# Patient Record
Sex: MALE | Race: WHITE | NOT HISPANIC OR LATINO | Employment: OTHER | URBAN - METROPOLITAN AREA
[De-identification: names, ages, dates, MRNs, and addresses within clinical notes are randomized per-mention and may not be internally consistent; named-entity substitution may affect disease eponyms.]

---

## 2021-04-08 DIAGNOSIS — Z23 ENCOUNTER FOR IMMUNIZATION: ICD-10-CM

## 2021-07-30 ENCOUNTER — HOSPITAL ENCOUNTER (OUTPATIENT)
Dept: CT IMAGING | Facility: HOSPITAL | Age: 71
Discharge: HOME | End: 2021-07-30

## 2021-07-30 ENCOUNTER — TELEPHONE (OUTPATIENT)
Dept: CT IMAGING | Facility: HOSPITAL | Age: 71
End: 2021-07-30

## 2021-07-30 DIAGNOSIS — R63.4 ABNORMAL WEIGHT LOSS: ICD-10-CM

## 2021-07-30 DIAGNOSIS — R06.00 DYSPNEA ON EXERTION: ICD-10-CM

## 2021-07-30 NOTE — TELEPHONE ENCOUNTER
On call physician called about ct script   [phone]  Script does not specify with or without iv and or oral contrast   TW

## 2021-07-31 ENCOUNTER — APPOINTMENT (EMERGENCY)
Dept: RADIOLOGY | Facility: HOSPITAL | Age: 71
DRG: 064 | End: 2021-07-31
Payer: MEDICARE

## 2021-07-31 ENCOUNTER — HOSPITAL ENCOUNTER (INPATIENT)
Facility: HOSPITAL | Age: 71
LOS: 8 days | DRG: 064 | End: 2021-08-08
Attending: EMERGENCY MEDICINE | Admitting: GENERAL PRACTICE
Payer: MEDICARE

## 2021-07-31 ENCOUNTER — APPOINTMENT (INPATIENT)
Dept: NON INVASIVE DIAGNOSTICS | Facility: HOSPITAL | Age: 71
DRG: 064 | End: 2021-07-31
Payer: MEDICARE

## 2021-07-31 ENCOUNTER — APPOINTMENT (INPATIENT)
Dept: RADIOLOGY | Facility: HOSPITAL | Age: 71
DRG: 064 | End: 2021-07-31
Payer: MEDICARE

## 2021-07-31 DIAGNOSIS — I63.9 CEREBROVASCULAR ACCIDENT (CVA), UNSPECIFIED MECHANISM (HCC): ICD-10-CM

## 2021-07-31 DIAGNOSIS — I82.4Z3 ACUTE DEEP VEIN THROMBOSIS (DVT) OF DISTAL VEIN OF BOTH LOWER EXTREMITIES (HCC): ICD-10-CM

## 2021-07-31 DIAGNOSIS — J39.2 NASOPHARYNGEAL MASS: ICD-10-CM

## 2021-07-31 DIAGNOSIS — R53.1 WEAKNESS DUE TO ACUTE STROKE (HCC): ICD-10-CM

## 2021-07-31 DIAGNOSIS — R91.8 LUNG MASS: ICD-10-CM

## 2021-07-31 DIAGNOSIS — R47.1 DYSARTHRIA: ICD-10-CM

## 2021-07-31 DIAGNOSIS — R77.8 ELEVATED TROPONIN: ICD-10-CM

## 2021-07-31 DIAGNOSIS — I63.9 ACUTE CVA (CEREBROVASCULAR ACCIDENT) (HCC): ICD-10-CM

## 2021-07-31 DIAGNOSIS — R29.810 FACIAL DROOP: ICD-10-CM

## 2021-07-31 DIAGNOSIS — I63.9 STROKE (HCC): Primary | ICD-10-CM

## 2021-07-31 DIAGNOSIS — I63.9 WEAKNESS DUE TO ACUTE STROKE (HCC): ICD-10-CM

## 2021-07-31 DIAGNOSIS — J96.01 ACUTE RESPIRATORY FAILURE WITH HYPOXIA (HCC): ICD-10-CM

## 2021-07-31 DIAGNOSIS — R91.8 MASS OF UPPER LOBE OF RIGHT LUNG: ICD-10-CM

## 2021-07-31 PROBLEM — R13.10 DYSPHAGIA: Status: ACTIVE | Noted: 2021-07-31

## 2021-07-31 PROBLEM — R79.89 ELEVATED TROPONIN: Status: ACTIVE | Noted: 2021-07-31

## 2021-07-31 LAB
ALBUMIN SERPL BCP-MCNC: 2.4 G/DL (ref 3.5–5)
ALP SERPL-CCNC: 56 U/L (ref 46–116)
ALT SERPL W P-5'-P-CCNC: 45 U/L (ref 12–78)
ANION GAP SERPL CALCULATED.3IONS-SCNC: 7 MMOL/L (ref 4–13)
APTT PPP: 29 SECONDS (ref 23–37)
AST SERPL W P-5'-P-CCNC: 34 U/L (ref 5–45)
BILIRUB DIRECT SERPL-MCNC: 0.12 MG/DL (ref 0–0.2)
BILIRUB SERPL-MCNC: 0.33 MG/DL (ref 0.2–1)
BUN SERPL-MCNC: 19 MG/DL (ref 5–25)
CALCIUM SERPL-MCNC: 9.2 MG/DL (ref 8.3–10.1)
CHLORIDE SERPL-SCNC: 106 MMOL/L (ref 100–108)
CO2 SERPL-SCNC: 25 MMOL/L (ref 21–32)
CREAT SERPL-MCNC: 1.05 MG/DL (ref 0.6–1.3)
ERYTHROCYTE [DISTWIDTH] IN BLOOD BY AUTOMATED COUNT: 15.7 % (ref 11.6–15.1)
GFR SERPL CREATININE-BSD FRML MDRD: 72 ML/MIN/1.73SQ M
GLUCOSE SERPL-MCNC: 100 MG/DL (ref 65–140)
GLUCOSE SERPL-MCNC: 102 MG/DL (ref 65–140)
HCT VFR BLD AUTO: 33.9 % (ref 36.5–49.3)
HGB BLD-MCNC: 10.3 G/DL (ref 12–17)
INR PPP: 1.28 (ref 0.84–1.19)
MCH RBC QN AUTO: 25.4 PG (ref 26.8–34.3)
MCHC RBC AUTO-ENTMCNC: 30.4 G/DL (ref 31.4–37.4)
MCV RBC AUTO: 84 FL (ref 82–98)
PLATELET # BLD AUTO: 245 THOUSANDS/UL (ref 149–390)
PMV BLD AUTO: 9.9 FL (ref 8.9–12.7)
POTASSIUM SERPL-SCNC: 4.2 MMOL/L (ref 3.5–5.3)
PROT SERPL-MCNC: 6.8 G/DL (ref 6.4–8.2)
PROTHROMBIN TIME: 16 SECONDS (ref 11.6–14.5)
RBC # BLD AUTO: 4.05 MILLION/UL (ref 3.88–5.62)
SARS-COV-2 RNA RESP QL NAA+PROBE: NEGATIVE
SODIUM SERPL-SCNC: 138 MMOL/L (ref 136–145)
TROPONIN I SERPL-MCNC: 6.9 NG/ML
TROPONIN I SERPL-MCNC: 7.54 NG/ML
WBC # BLD AUTO: 9.18 THOUSAND/UL (ref 4.31–10.16)

## 2021-07-31 PROCEDURE — 80076 HEPATIC FUNCTION PANEL: CPT | Performed by: GENERAL PRACTICE

## 2021-07-31 PROCEDURE — U0003 INFECTIOUS AGENT DETECTION BY NUCLEIC ACID (DNA OR RNA); SEVERE ACUTE RESPIRATORY SYNDROME CORONAVIRUS 2 (SARS-COV-2) (CORONAVIRUS DISEASE [COVID-19]), AMPLIFIED PROBE TECHNIQUE, MAKING USE OF HIGH THROUGHPUT TECHNOLOGIES AS DESCRIBED BY CMS-2020-01-R: HCPCS | Performed by: EMERGENCY MEDICINE

## 2021-07-31 PROCEDURE — U0005 INFEC AGEN DETEC AMPLI PROBE: HCPCS | Performed by: EMERGENCY MEDICINE

## 2021-07-31 PROCEDURE — 84484 ASSAY OF TROPONIN QUANT: CPT | Performed by: GENERAL PRACTICE

## 2021-07-31 PROCEDURE — 93970 EXTREMITY STUDY: CPT

## 2021-07-31 PROCEDURE — 36415 COLL VENOUS BLD VENIPUNCTURE: CPT | Performed by: EMERGENCY MEDICINE

## 2021-07-31 PROCEDURE — G1004 CDSM NDSC: HCPCS

## 2021-07-31 PROCEDURE — 93970 EXTREMITY STUDY: CPT | Performed by: SURGERY

## 2021-07-31 PROCEDURE — 1123F ACP DISCUSS/DSCN MKR DOCD: CPT | Performed by: PSYCHIATRY & NEUROLOGY

## 2021-07-31 PROCEDURE — 85610 PROTHROMBIN TIME: CPT | Performed by: EMERGENCY MEDICINE

## 2021-07-31 PROCEDURE — 99285 EMERGENCY DEPT VISIT HI MDM: CPT

## 2021-07-31 PROCEDURE — 82948 REAGENT STRIP/BLOOD GLUCOSE: CPT

## 2021-07-31 PROCEDURE — 99223 1ST HOSP IP/OBS HIGH 75: CPT | Performed by: GENERAL PRACTICE

## 2021-07-31 PROCEDURE — 84484 ASSAY OF TROPONIN QUANT: CPT | Performed by: EMERGENCY MEDICINE

## 2021-07-31 PROCEDURE — 71250 CT THORAX DX C-: CPT

## 2021-07-31 PROCEDURE — 99285 EMERGENCY DEPT VISIT HI MDM: CPT | Performed by: PSYCHIATRY & NEUROLOGY

## 2021-07-31 PROCEDURE — 74176 CT ABD & PELVIS W/O CONTRAST: CPT

## 2021-07-31 PROCEDURE — 80048 BASIC METABOLIC PNL TOTAL CA: CPT | Performed by: EMERGENCY MEDICINE

## 2021-07-31 PROCEDURE — 85027 COMPLETE CBC AUTOMATED: CPT | Performed by: EMERGENCY MEDICINE

## 2021-07-31 PROCEDURE — 70498 CT ANGIOGRAPHY NECK: CPT

## 2021-07-31 PROCEDURE — 93005 ELECTROCARDIOGRAM TRACING: CPT

## 2021-07-31 PROCEDURE — 70496 CT ANGIOGRAPHY HEAD: CPT

## 2021-07-31 PROCEDURE — 99291 CRITICAL CARE FIRST HOUR: CPT | Performed by: EMERGENCY MEDICINE

## 2021-07-31 PROCEDURE — 85730 THROMBOPLASTIN TIME PARTIAL: CPT | Performed by: EMERGENCY MEDICINE

## 2021-07-31 RX ORDER — ASPIRIN 325 MG
325 TABLET ORAL ONCE
Status: DISCONTINUED | OUTPATIENT
Start: 2021-07-31 | End: 2021-07-31

## 2021-07-31 RX ORDER — DIPHENOXYLATE HYDROCHLORIDE AND ATROPINE SULFATE 2.5; .025 MG/1; MG/1
1 TABLET ORAL DAILY
COMMUNITY
End: 2021-08-17 | Stop reason: HOSPADM

## 2021-07-31 RX ORDER — ASPIRIN 300 MG/1
300 SUPPOSITORY RECTAL ONCE
Status: COMPLETED | OUTPATIENT
Start: 2021-07-31 | End: 2021-07-31

## 2021-07-31 RX ORDER — ASPIRIN 300 MG/1
300 SUPPOSITORY RECTAL DAILY
Status: DISCONTINUED | OUTPATIENT
Start: 2021-08-01 | End: 2021-08-01

## 2021-07-31 RX ORDER — HEPARIN SODIUM 10000 [USP'U]/100ML
3-20 INJECTION, SOLUTION INTRAVENOUS
Status: DISCONTINUED | OUTPATIENT
Start: 2021-07-31 | End: 2021-08-02

## 2021-07-31 RX ORDER — ACETAMINOPHEN 650 MG/1
650 SUPPOSITORY RECTAL EVERY 6 HOURS PRN
Status: DISCONTINUED | OUTPATIENT
Start: 2021-07-31 | End: 2021-08-06

## 2021-07-31 RX ADMIN — ASPIRIN 300 MG: 300 SUPPOSITORY RECTAL at 15:17

## 2021-07-31 RX ADMIN — HEPARIN SODIUM 11.8 UNITS/KG/HR: 10000 INJECTION, SOLUTION INTRAVENOUS at 17:28

## 2021-07-31 NOTE — ASSESSMENT & PLAN NOTE
· Neuro appreciated  · Failed swallow eval so NPO  · Rectal ASA  · CVA pathway  · MRI-B  · Echo  · Defer statin as NPO

## 2021-07-31 NOTE — ASSESSMENT & PLAN NOTE
Bernard Boyd is a 72 y o  male with recent abnormal weight loss without documented medical comorbidities who presented to [place] on 07/31/2021 as a stroke alert with right hemiparesis and dysarthria  · NIHSS of 9, /87 on arrival  CT head revealed recent acute to subacute infarct located in the left caudate head, anterior lentiform nucleus, anterior limb of the left internal capsule  CTA head and neck unremarkable for large vessel occlusion or critical stenosis, however did reveal a right upper lobe lung mass and mediastinal adenopathy along with nasopharyngeal mass  Patient was not an IV tPA candidate due to infarct appearing acute to subacute, high bleed risk  · MRI brain w/wo contrast revealed multifocal diffusion abnormalities in several separate vascular territories, largest in the left MCA territory indicative of multifocal acute/recent infarctions most likely from a central embolic source  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  Both benign and malignant etiologies are in the differential diagnosis  No abnormal intraparenchymal enhancement    Strong suspicion for underlying malignancy  Acute infarct likely secondary to hypercoagulable state  Will continue on stroke pathway    Plan:  - ENT and pulmonology are planning on biopsy of lung mass and nasopharyngeal mass  - S/p rectal  mg x 1 on admission   - Heparin gtt d/c'd  - Started Lovenox on 8/3/2021; continue indefinitely  - No need for ASA 81 mg daily from a neurological perspective while pt is on Lovenox  - Continue atorvastatin 40 mg daily  - Goal normotension, euglycemia, normothermia  - Continue telemetry  - Frequent neuro checks  - Stroke education  - PT/OT/speech  - Medical management and supportive care per primary team, notify with changes  - No further inpatient neuro recommendations  Please call with questions      Imaging/Labs:  - CT head 07/31/2021:  · Recent infarct located in the left caudate head, anterior lentiform nucleus, anterior limb of the left internal capsule without evidence of hemorrhage  - CTA head and neck 07/31/2021:  · No evidence of large vessel occlusion  · Atherosclerotic calcification of the carotid bifurcations bilaterally without significant stenosis  · No focal intracranial stenosis or aneurysm noted  · Mass right nasopharynx incompletely evaluated secondary to arterial bolus times, mass measuring 2 7 x 2 0 cm  · Right upper lobe lung mass and mediastinal adenopathy noted small right pleural effusion  - CT CAP:  · There is moderate emphysema  · There is a 6 4 x 3 6 x 3 4 cm solid, irregular contoured right upper lobe pulmonary nodule, likely primary lung cancer (series 2 image 17 )  · There is extensive right hilar and mediastinal adenopathy  · Small right pleural effusion  · There are also numerous too small to characterize hepatic hypodense lesions  Consider abdomen MR with and without IV contrast to characterize   - MRI brain:  · Multifocal diffusion abnormalities in several separate vascular territories, largest in the left MCA territory indicative of multifocal acute/recent infarctions most likely from a central embolic source  Further clinical assessment advised  · 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

## 2021-07-31 NOTE — ASSESSMENT & PLAN NOTE
· Requiring 2L to keep O2 above 89  · Would like to do CTA chest but pt just got contrast load    Plus in setting of CVA poor candidate for lysis   · LEVDs confirm b/l DVTs so will assume pt has PE  · Check CT CAP w/o

## 2021-07-31 NOTE — Clinical Note
Case was discussed with Dr Parag Mejia and the patient's admission status was agreed to be Admission Status: inpatient status to the service of Dr Berkowitz Fruits

## 2021-07-31 NOTE — ASSESSMENT & PLAN NOTE
· Heparin gtt  · In setting of hypoxia will presume pt has PE  · F/u echo to check for R heart strain

## 2021-07-31 NOTE — CONSULTS
Consultation - Stroke   Aliyah Fried 72 y o  male MRN: [id number]  Unit/Bed#: CRB Encounter: [id number]      Assessment/Plan   * Acute CVA (cerebrovascular accident) [facility])  Assessment & Plan  Aliyah Fried is a 72 y o  male with recent abnormal weight loss without documented past medical history who presents to [place] [facility] on 07/31/2021 as a stroke alert with right hemiparesis and dysarthria  NIHSS of 9  CT head revealed recent acute to subacute infarct located in the left caudate head, anterior lentiform nucleus, anterior limb of the left internal capsule  CTA head and neck unremarkable for large vessel occlusion or critical stenosis, however did reveal a right upper lobe lung mass and mediastinal adenopathy  Patient was not an IV tPA candidate due to infarct appearing acute to subacute, high bleed risk  Strong suspicion for underlying malignancy  Will admit on stroke pathway      Plan:  - Admitted on stroke pathway  - Recommend MRI brain  - Echo pending  - VAS lower limb venous duplex study pending  - Recommend:  Fasting lipid panel, hemoglobin A1c, TSH  - S/p rectal  mg x 1  - Started on low-dose heparin given concern for MI vs PE  - Recommend atorvastatin 40 mg daily  - Telemetry  - Frequent neuro checks  - Stroke education  - PT/OT/speech  - Medical management and supportive care per primary team, notify with changes    Imaging/Labs:  - CT head 07/31/2021:  · Recent infarct located in the left caudate head, anterior lentiform nucleus, anterior limb of the left internal capsule without evidence of hemorrhage  - CTA head and neck 07/31/2021:  · No evidence of large vessel occlusion  · Atherosclerotic calcification of the carotid bifurcations bilaterally without significant stenosis  · No focal intracranial stenosis or aneurysm noted  · Mass right nasopharynx incompletely evaluated secondary to arterial bolus times, mass measuring 2 7 x 2 0 cm  · Right upper lobe lung mass and mediastinal adenopathy noted small right pleural effusion    - Troponin elevated on presentation 6 90    Recommendations for outpatient neurological follow up have yet to be determined  History of Present Illness     Reason for Consult / Principal Problem: Right hemiparesis   Hx and PE limited by: N/A  Patient last known well: 1315 on 07/31/2021  Stroke alert called: 1423 on 07/31/2021  Neurology time of arrival: 1423  HPI: Valarie Chapman is a 72 y o   male with recent abnormal weight loss without documented past medical history who presents to [facility] on 07/31/2021 as a stroke alert with right hemiparesis and dysarthria  Per discussed with the staff, approximately 1 hour prior to arrival patient acutely developed right-sided weakness and slurred speech  Patient reports that he developed the symptoms suddenly early in the afternoon  Patient states that he has never had similar symptoms in the past   He admits to smoking in the past, reportedly 60+ years ago  Patient states that has a lazy eye at baseline  Of note patient has been experiencing abnormal weight  This was in the process of being worked up with a scheduled CT CAP for 08/01  Patient presented to [facility] on 07/31/2021 as a stroke alert with right hemiparesis and dysarthria  NIHSS of 9 for right upper lower extremity weakness and ataxia, right facial weakness, dysarthria, and word-finding difficulties  CT head revealed evidence of a recent infarct in the left caudate head, anterior lentiform nucleus, anterior limb of the left internal capsule  Patient was not an IV tPA candidate due to the chronicity of the infarct on CT head, appearing acute to subacute per neurologist Gonzalo Ludwig  CTA unremarkable for large vessel occlusion, however did reveal a right upper lobe lung mass and mediastinal adenopathy  Troponin elevated on presentation 6 90  With concern for MI vs PE patient was started on low-dose heparin    Patient was then admitted on stroke pathway  Inpatient consult to Neurology  Consult performed by: Sonu Hernandez PA-C  Consult ordered by: Curry Rogers MD        Review of Systems  12 point ROS limited to acuity of condition  Historical Information   History reviewed  No pertinent past medical history  History reviewed  No pertinent surgical history  Social History   Social History     Substance and Sexual Activity   Alcohol Use Yes     Social History     Substance and Sexual Activity   Drug Use Never     E-Cigarette/Vaping    E-Cigarette Use Never User      E-Cigarette/Vaping Substances     Social History     Tobacco Use   Smoking Status Former Smoker   Smokeless Tobacco Never Used     Family History: History reviewed  No pertinent family history  Review of previous medical records was completed  Meds/Allergies   all current active meds have been reviewed, current meds:   Current Facility-Administered Medications   Medication Dose Route Frequency    heparin (porcine) 25,000 units in 0 45% NaCl 250 mL infusion (premix)  3-20 Units/kg/hr (Order-Specific) Intravenous Titrated    iohexol (OMNIPAQUE) 350 MG/ML injection (SINGLE-DOSE) 85 mL  85 mL Intravenous Once in imaging   , PTA meds:   None    and     No Known Allergies    Objective   Vitals:Blood pressure 150/82, pulse 76, temperature 98 1 °F (36 7 °C), temperature source Oral, resp  rate 20, weight 86 6 kg (190 lb 14 7 oz), SpO2 92 %  ,There is no height or weight on file to calculate BMI  No intake or output data in the 24 hours ending 07/31/21 1618    Invasive Devices: Invasive Devices     Peripheral Intravenous Line            Peripheral IV 07/31/21 Right Antecubital <1 day              Physical Exam  Vitals and nursing note reviewed  Constitutional:       General: He is not in acute distress  Appearance: He is normal weight  He is not ill-appearing, toxic-appearing or diaphoretic  HENT:      Head: Normocephalic and atraumatic     Eyes:      General: No scleral icterus  Right eye: No discharge  Left eye: No discharge  Extraocular Movements: Extraocular movements intact and EOM normal       Conjunctiva/sclera: Conjunctivae normal    Pulmonary:      Comments: Positive cough on exam  Musculoskeletal:      Cervical back: Normal range of motion and neck supple  Skin:     General: Skin is warm and dry  Coloration: Skin is not jaundiced or pale  Findings: No bruising, erythema, lesion or rash  Neurological:      Mental Status: He is alert  Psychiatric:         Mood and Affect: Mood normal          Behavior: Behavior normal          Thought Content: Thought content normal          Judgment: Judgment normal        Neurologic Exam     Mental Status   Patient is alert, lying in bed  Oriented to person and place  Dysarthric speech noted  Patient has difficulty naming some objects on the NIHSS booklet  Able to follow central and appendicular commands and answers all questions appropriately  Cranial Nerves     CN II   Visual fields full to confrontation       CN III, IV, VI   Extraocular motions are normal    Nystagmus: none   Upgaze: normal  Downgaze: normal  Conjugate gaze: present    CN VIII   Hearing: intact  Dysconjugate gaze at baseline, right eye appears to be slightly deviated laterally  Right lower facial weakness noted     Motor Exam   Muscle bulk: normal  Overall muscle tone: normal  Drift noted in right upper and lower extremity, both upper and lower extremity on the right to not hit the bed  No drift noted in left upper and lower extremity     Sensory Exam   Sensation to light touch intact in bilateral upper lower extremities  No evidence of extinction or neglect on exam     Gait, Coordination, and Reflexes     Tremor   Resting tremor: absent  Ataxia noted in right upper extremity finger-to-nose testing, left WNL  Ataxia noted in right lower extremity heel-to-shin testing, left WNL  No involuntary movements noted throughout exam NIHSS:  1a Level of Consciousness: 0 = Alert   1b  LOC Questions: 0 = Answers both correctly   1c  LOC Commands: 0 = Obeys both correctly   2  Best Gaze: 0 = Normal   3  Visual: 0 = No visual field loss   4  Facial Palsy: 2=Partial paralysis (total or near total paralysis of the lower face)   5a  Motor Right Arm: 1=Drift, limb holds 90 (or 45) degrees but drifts down before full 10 seconds: does not hit bed   5b  Motor Left Arm: 0=No drift, limb holds 90 (or 45) degrees for full 10 seconds   6a  Motor Right Le=Drift, limb holds 90 (or 45) degrees but drifts down before full 10 seconds: does not hit bed   6b  Motor Left Le=No drift, limb holds 90 (or 45) degrees for full 10 seconds   7  Limb Ataxia:  2=Present in two limbs   8  Sensory: 0=Normal; no sensory loss   9  Best Language:  1=Mild to moderate aphasia; some obvious loss of fluency or facility of comprehension without significant limitation on ideas expressed or form of expression  10  Dysarthria: 2=Severe; patient speech is so slurred as to be unintelligible in the absence of or our of proportion to any dysphagia, or is mute/anarthric   11  Extinction and Inattention (formerly Neglect): 0=No abnormality   Total Score: 9     Time NIHSS was completed: 1445    Modified [place] Score:  Unable to determine currently, will gather additional data    Lab Results: I have personally reviewed pertinent reports    Recent Results (from the past 24 hour(s))   Fingerstick Glucose (POCT)    Collection Time: 21  2:30 PM   Result Value Ref Range    POC Glucose 100 65 - 140 mg/dl   Basic metabolic panel    Collection Time: 21  2:48 PM   Result Value Ref Range    Sodium 138 136 - 145 mmol/L    Potassium 4 2 3 5 - 5 3 mmol/L    Chloride 106 100 - 108 mmol/L    CO2 25 21 - 32 mmol/L    ANION GAP 7 4 - 13 mmol/L    BUN 19 5 - 25 mg/dL    Creatinine 1 05 0 60 - 1 30 mg/dL    Glucose 102 65 - 140 mg/dL    Calcium 9 2 8 3 - 10 1 mg/dL    eGFR 72 ml/min/1 73sq m CBC and Platelet    Collection Time: 07/31/21  2:48 PM   Result Value Ref Range    WBC 9 18 4 31 - 10 16 Thousand/uL    RBC 4 05 3 88 - 5 62 Million/uL    Hemoglobin 10 3 (L) 12 0 - 17 0 g/dL    Hematocrit 33 9 (L) 36 5 - 49 3 %    MCV 84 82 - 98 fL    MCH 25 4 (L) 26 8 - 34 3 pg    MCHC 30 4 (L) 31 4 - 37 4 g/dL    RDW 15 7 (H) 11 6 - 15 1 %    Platelets 493 699 - 382 Thousands/uL    MPV 9 9 8 9 - 12 7 fL   Protime-INR    Collection Time: 07/31/21  2:48 PM   Result Value Ref Range    Protime 16 0 (H) 11 6 - 14 5 seconds    INR 1 28 (H) 0 84 - 1 19   APTT    Collection Time: 07/31/21  2:48 PM   Result Value Ref Range    PTT 29 23 - 37 seconds   Troponin I    Collection Time: 07/31/21  2:48 PM   Result Value Ref Range    Troponin I 6 90 (H) <=0 04 ng/mL   Hepatic function panel    Collection Time: 07/31/21  2:48 PM   Result Value Ref Range    Total Bilirubin 0 33 0 20 - 1 00 mg/dL    Bilirubin, Direct 0 12 0 00 - 0 20 mg/dL    Alkaline Phosphatase 56 46 - 116 U/L    AST 34 5 - 45 U/L    ALT 45 12 - 78 U/L    Total Protein 6 8 6 4 - 8 2 g/dL    Albumin 2 4 (L) 3 5 - 5 0 g/dL   ]  Imaging Studies: I have personally reviewed pertinent reports and I have personally reviewed pertinent films in PACS  EKG, Pathology, and Other Studies: I have personally reviewed pertinent reports  VTE Prophylaxis: Heparin     Counseling / Coordination of Care  Total time spent today 46 minutes critical care time  Greater than 50% of total time was spent with the patient and/or family counseling and/or coordination of care  A description of the counseling/coordination of care:  Patient was seen and evaluated  Discussed with attending  Chart reviewed thoroughly including laboratory and imaging studies    Plan of care discussed with patient and primary team   Discussed plan to admit on the stroke pathway with patient

## 2021-07-31 NOTE — ASSESSMENT & PLAN NOTE
· Trend trops  · Echo  · Rectal ASA  · D/w neuro - ok for heparin gtt w/ no boluses or reboluses  · Cardio consult  · F/u FLP and A1C

## 2021-07-31 NOTE — ED PROVIDER NOTES
History  Chief Complaint   Patient presents with   [address]     Pt started with R sided weakness around 1300     Pt is a 71 y/o M unknown PMH presenting as a pre-hospital stroke alert  LKN was approx 1PM  Pt presented with dysarthria, R facial droop, R UE and R LE weakness  Pt is able to speak although speech is slurred  States otherwise feeling well, does not recall feeling unwell until symptoms started around 1PM  Per chart review, patient has CT chest/abdomen/pelvis scheduled for tomorrow for dyspnea on exertion and weight loss  None       History reviewed  No pertinent past medical history  History reviewed  No pertinent surgical history  History reviewed  No pertinent family history  I have reviewed and agree with the history as documented  E-Cigarette/Vaping    E-Cigarette Use Never User      E-Cigarette/Vaping Substances     Social History     Tobacco Use    Smoking status: Former Smoker    Smokeless tobacco: Never Used   Vaping Use    Vaping Use: Never used   Substance Use Topics    Alcohol use: Yes    Drug use: Never        Review of Systems   Unable to perform ROS: Acuity of condition       Physical Exam  ED Triage Vitals [07/31/21 1430]   Temperature Pulse Respirations Blood Pressure SpO2   98 1 °F (36 7 °C) 79 18 152/87 92 %      Temp Source Heart Rate Source Patient Position - Orthostatic VS BP Location FiO2 (%)   Oral Monitor Lying -- --      Pain Score       --             Orthostatic Vital Signs  Vitals:    07/31/21 1500 07/31/21 1515 07/31/21 1530 07/31/21 1600   BP: 143/94 138/93 150/82 149/82   Pulse: 80 80 76 74   Patient Position - Orthostatic VS: Lying Lying Lying Lying       Physical Exam  Vitals and nursing note reviewed  Constitutional:       Appearance: He is not toxic-appearing  HENT:      Head: Normocephalic and atraumatic  Right Ear: External ear normal       Left Ear: External ear normal    Eyes:      General: No visual field deficit  Extraocular Movements: Extraocular movements intact  Pupils: Pupils are equal, round, and reactive to light  Cardiovascular:      Rate and Rhythm: Normal rate and regular rhythm  Pulses: Normal pulses  Heart sounds: Normal heart sounds  Pulmonary:      Effort: Pulmonary effort is normal    Abdominal:      General: Abdomen is flat  Palpations: Abdomen is soft  Musculoskeletal:      Cervical back: Normal range of motion  Skin:     General: Skin is warm and dry  Neurological:      Mental Status: He is alert and oriented to person, place, and time  Cranial Nerves: Dysarthria and facial asymmetry present  Sensory: Sensation is intact  Motor: Weakness present  Coordination: Finger-Nose-Finger Test abnormal and Heel to Revonda Lauth Test abnormal       Comments: R sided deficits: facial droop, RUE weakness 3/5, RLE weakness 4/5, dysarthria   NIHSS = 9   Psychiatric:         Mood and Affect: Mood normal          ED Medications  Medications   iohexol (OMNIPAQUE) 350 MG/ML injection (SINGLE-DOSE) 85 mL (has no administration in time range)   heparin (porcine) 25,000 units in 0 45% NaCl 250 mL infusion (premix) (has no administration in time range)   aspirin rectal suppository 300 mg (300 mg Rectal Given 7/31/21 1517)       Diagnostic Studies  Results Reviewed     Procedure Component Value Units Date/Time    Novel Coronavirus (Covid-19),PCR [facility] - 2 hour stat [[id number]]  (Normal) Collected: 07/31/21 1450    Lab Status: Final result Specimen: Nares from Nose Updated: 07/31/21 1621     SARS-CoV-2 Negative    Narrative:           APTT six (6) hours after Heparin bolus/drip initiation or dosing change [[id number]]     Lab Status: No result Specimen: Blood     Hepatic function panel [[id number]]  (Abnormal) Collected: 07/31/21 1448    Lab Status: Final result Specimen: Blood from Arm, Right Updated: 07/31/21 1531     Total Bilirubin 0 33 mg/dL      Bilirubin, Direct 0 12 mg/dL      Alkaline Phosphatase 56 U/L      AST 34 U/L      ALT 45 U/L      Total Protein 6 8 g/dL      Albumin 2 4 g/dL     Troponin I [[id number]]  (Abnormal) Collected: 07/31/21 1448    Lab Status: Final result Specimen: Blood from Arm, Right Updated: 07/31/21 1526     Troponin I 6 90 ng/mL     Basic metabolic panel [[id number]] Collected: 07/31/21 1448    Lab Status: Final result Specimen: Blood from Arm, Right Updated: 07/31/21 1521     Sodium 138 mmol/L      Potassium 4 2 mmol/L      Chloride 106 mmol/L      CO2 25 mmol/L      ANION GAP 7 mmol/L      BUN 19 mg/dL      Creatinine 1 05 mg/dL      Glucose 102 mg/dL      Calcium 9 2 mg/dL      eGFR 72 ml/min/1 73sq m     Narrative:      [place] guidelines for Chronic Kidney Disease (CKD):     Stage 1 with normal or high GFR (GFR > 90 mL/min/1 73 square meters)    Stage 2 Mild CKD (GFR = 60-89 mL/min/1 73 square meters)    Stage 3A Moderate CKD (GFR = 45-59 mL/min/1 73 square meters)    Stage 3B Moderate CKD (GFR = 30-44 mL/min/1 73 square meters)    Stage 4 Severe CKD (GFR = 15-29 mL/min/1 73 square meters)    Stage 5 End Stage CKD (GFR <15 mL/min/1 73 square meters)  Note: GFR calculation is accurate only with a steady state creatinine    Protime-INR [[id number]]  (Abnormal) Collected: 07/31/21 1448    Lab Status: Final result Specimen: Blood from Arm, Right Updated: 07/31/21 1513     Protime 16 0 seconds      INR 1 28    APTT [[id number]]  (Normal) Collected: 07/31/21 1448    Lab Status: Final result Specimen: Blood from Arm, Right Updated: 07/31/21 1513     PTT 29 seconds     CBC and Platelet [[id number]]  (Abnormal) Collected: 07/31/21 1448    Lab Status: Final result Specimen: Blood from Arm, Right Updated: 07/31/21 1459     WBC 9 18 Thousand/uL      RBC 4 05 Million/uL      Hemoglobin 10 3 g/dL      Hematocrit 33 9 %      MCV 84 fL      MCH 25 4 pg      MCHC 30 4 g/dL      RDW 15 7 %      Platelets 586 Thousands/uL      MPV 9 9 fL     Fingerstick Glucose (POCT) [[id number]]  (Normal) Collected: 07/31/21 1430    Lab Status: Final result Updated: 07/31/21 1431     POC Glucose 100 mg/dl                  CTA stroke alert (head/neck)   Final Result by Aicha Lee DO (07/31 1455)      No large vessel occlusion  Atherosclerotic calcification of the carotid bifurcation bilaterally with no significant stenosis  Bilateral vertebral arteries widely patent  No focal intracranial stenosis or aneurysm  Mass right nasopharynx incompletely evaluated secondary to arterial bolus timing  The mass measures approximately 2 7 x 2 0 cm and direct visualization by ENT recommended  Right upper lobe lung mass and mediastinal adenopathy noted small right pleural effusion               Findings were directly discussed with Yovany Fair on 7/31/2021 2:51 PM                      Workstation performed: [id number]         CT stroke alert brain   Final Result by Aicha Lee DO (07/31 1455)      Recent infarct left caudate head, anterior lentiform nucleus, and anterior limb of left internal capsule without evidence of hemorrhage  Findings were directly discussed with Yovany Fair on 7/31/2021 2:51 PM       Workstation performed: [id number]         CT chest abdomen pelvis wo contrast    (Results Pending)   VAS lower limb venous duplex study, complete bilateral    (Results Pending)         Procedures  Procedures      ED Course  ED Course as of Jul 31 1635   Sat Jul 31, 2021   1501 CBC and Platelet(!)   6394 CT stroke alert brain   1501 CTA stroke alert (head/neck)   1501 Blood Pressure: 149/88   1501 SpO2: 93 %   1516 Protime-INR(!)   1516 APTT   1631 Hepatic function panel(!)   1631 Troponin I(!)               Identification of Seniors at Risk      Most Recent Value   (ISAR) Identification of Seniors at Risk   Before the illness or injury that brought you to the Emergency, did you need someone to help you on a regular basis?   0 Filed at: 07/31/2021 1453   In the last 24 hours, have you needed more help than usual?  0 Filed at: 07/31/2021 1453   Have you been hospitalized for one or more nights during the past 6 months? 0 Filed at: 07/31/2021 1453   In general, do you see well?  0 Filed at: 07/31/2021 1453   In general, do you have serious problems with your memory? 0 Filed at: 07/31/2021 1453   Do you take more than three different medications every day?  0 Filed at: 07/31/2021 1453   ISAR Score  0 Filed at: 07/31/2021 1453                              MDM  Number of Diagnoses or Management Options  Cerebrovascular accident (CVA), unspecified mechanism ([facility] )  Dysarthria  Facial droop  Weakness due to acute stroke [facility])  Diagnosis management comments: Pre-hospital stroke alert, pt stable on arrival, sent straight to CT  CT non con evidence of stroke in L basal ganglia, likely >6hrs old/outside tPA window, CTA unrevealing  Neurology present for recs and evaluation  Discussed case with SLIM, agreed to admit to floor on tele          Amount and/or Complexity of Data Reviewed  Clinical lab tests: ordered  Tests in the radiology section of CPT®: ordered  Tests in the medicine section of CPT®: ordered  Discussion of test results with the performing providers: yes  Decide to obtain previous medical records or to obtain history from someone other than the patient: yes  Obtain history from someone other than the patient: yes  Review and summarize past medical records: yes  Discuss the patient with other providers: yes  Independent visualization of images, tracings, or specimens: yes    Risk of Complications, Morbidity, and/or Mortality  Presenting problems: high  Diagnostic procedures: low  Management options: minimal    Patient Progress  Patient progress: stable      Disposition  Final diagnoses:   Cerebrovascular accident (CVA), unspecified mechanism ([facility] )   Facial droop   Dysarthria   Weakness due to acute stroke [facility])     Time reflects when diagnosis was documented in both MDM as applicable and the Disposition within this note     Time User Action Codes Description Comment    7/31/2021  2:26 PM Wynelle Sida Add [I63 9] Stroke [facility])     7/31/2021  3:54 PM Berneda Nailer Add [J39 2] Nasopharyngeal mass     7/31/2021  3:55 PM Berneda Nailer Add [R77 8] Elevated troponin     7/31/2021  4:27 PM Cornelious Crate Add [I63 9] Cerebrovascular accident (CVA), unspecified mechanism ([address] )     7/31/2021  4:27 PM Cornelious Crate Add [R29 810] Facial droop     7/31/2021  4:27 PM Cornelious Crate Add [R47 1] Dysarthria     7/31/2021  4:27 PM Cornelious Crate Add [I63 9,  R53 1] Weakness due to acute stroke [facility])       ED Disposition     ED Disposition Condition Date/Time Comment    Admit Stable Sat Jul 31, 2021  3:38 PM Case was discussed with Dr Woo Harman and the patient's admission status was agreed to be Admission Status: inpatient status to the service of Dr Woo Harman   Follow-up Information    None         Patient's Medications    No medications on file     No discharge procedures on file  PDMP Review     None           ED Provider  Attending physically available and evaluated Barrett Stearns I managed the patient along with the ED Attending      Electronically Signed by         Betty Aguilar MD  07/31/21 7385

## 2021-07-31 NOTE — H&P
[address] [facility]  H&P- Antonella Hernandez 1950, 72 y o  male MRN: [id number]  Unit/Bed#: CRB Encounter: [id number]  Primary Care Provider: Tariq Moy MD   Date and time admitted to hospital: 7/31/2021  2:26 PM    * Acute CVA (cerebrovascular accident) [facility])  Assessment & Plan  · Neuro appreciated  · Failed swallow eval so NPO  · Rectal ASA  · CVA pathway  · MRI-B  · Echo  · Defer statin as NPO    Elevated troponin  Assessment & Plan  · Trend trops  · Echo  · Rectal ASA  · D/w neuro - ok for heparin gtt w/ no boluses or reboluses  · Cardio consult  · F/u FLP and A1C    Acute respiratory failure with hypoxia ([address] )  Assessment & Plan  · Requiring 2L to keep O2 above 89  · Would like to do CTA chest but pt just got contrast load  Plus in setting of CVA poor candidate for lysis   · LEVDs confirm b/l DVTs so will assume pt has PE  · Check CT CAP w/o    Dysphagia  Assessment & Plan  · Failed RN swallow eval  · NPO until speech eval  · Avoid IVF until echo and CT resulted    Nasopharyngeal mass  Assessment & Plan  · ENT consult  · CT CAP to check for other masses    Acute deep vein thrombosis (DVT) of distal vein of both lower extremities (HCC)  Assessment & Plan  · Heparin gtt  · In setting of hypoxia will presume pt has PE  · F/u echo to check for R heart strain      VTE Pharmacologic Prophylaxis: VTE Score: 7 High Risk (Score >/= 5) - Pharmacological DVT Prophylaxis Ordered: heparin drip  Sequential Compression Devices Ordered  Code Status: Full  Discussion with family: Updated  (wife and daughter) at bedside  Anticipated Length of Stay: Patient will be admitted on an inpatient basis with an anticipated length of stay of greater than 2 midnights secondary to CVA and hypoxia and elevated troponin      Total Time for Visit, including Counseling / Coordination of Care: 60 minutes Greater than 50% of this total time spent on direct patient counseling and coordination of care  Chief Complaint: SOB    History of Present Illness:  French Stephen is a 72 y o  male with no PMH who presents with several months of CALDERON  Can barely walk to restroom  Also noted weight loss  Had b/l CP a few days ago  Today woke up and could not move right side of body  No n/v/d  Fam hx CAD and cancer  Pt denies pain at this time and no SOB at rest     Review of Systems:  Review of Systems   Constitutional: Positive for activity change and unexpected weight change  HENT: Negative  Eyes: Negative  Respiratory: Positive for shortness of breath  Cardiovascular: Negative  Gastrointestinal: Negative  Endocrine: Negative  Genitourinary: Negative  Musculoskeletal: Negative  Skin: Negative  Allergic/Immunologic: Negative  Neurological: Positive for weakness  Hematological: Negative  Psychiatric/Behavioral: Negative  Past Medical and Surgical History:   History reviewed  No pertinent past medical history  History reviewed  No pertinent surgical history  Meds/Allergies:  Prior to Admission medications    Not on File     Pt not on any meds at home  Allergies: No Known Allergies    Social History:  Marital Status: Unknown     Patient Pre-hospital Living Situation: Home  Patient Pre-hospital Level of Mobility: walks  Patient Pre-hospital Diet Restrictions: none  Substance Use History:   Social History     Substance and Sexual Activity   Alcohol Use Yes     Social History     Tobacco Use   Smoking Status Former Smoker   Smokeless Tobacco Never Used     Social History     Substance and Sexual Activity   Drug Use Never       Family History:  History reviewed  No pertinent family history      Physical Exam:     Vitals:   Blood Pressure: 149/82 (07/31/21 1600)  Pulse: 74 (07/31/21 1600)  Temperature: 98 1 °F (36 7 °C) (07/31/21 1430)  Temp Source: Oral (07/31/21 1430)  Respirations: 20 (07/31/21 1600)  Weight - Scale: 86 6 kg (190 lb 14 7 oz) (07/31/21 1430)  SpO2: 93 % (07/31/21 1600)    Physical Exam  HENT:      Head: Normocephalic and atraumatic  Nose: Nose normal       Mouth/Throat:      Mouth: Mucous membranes are moist    Eyes:      Extraocular Movements: Extraocular movements intact  Conjunctiva/sclera: Conjunctivae normal    Cardiovascular:      Rate and Rhythm: Normal rate and regular rhythm  Pulmonary:      Effort: Pulmonary effort is normal       Breath sounds: Normal breath sounds  No wheezing or rales  Abdominal:      General: Bowel sounds are normal  There is no distension  Palpations: Abdomen is soft  Tenderness: There is no abdominal tenderness  Musculoskeletal:      Cervical back: Normal range of motion and neck supple  Right lower leg: No edema  Left lower leg: No edema  Comments: RUE and RLE weakness   Neurological:      Mental Status: He is alert and oriented to person, place, and time  Cranial Nerves: Cranial nerve deficit (facial droop) present  Motor: Weakness (RUE and RLE) present  Additional Data:     Lab Results:  Results from last 7 days   Lab Units 07/31/21  1448   WBC Thousand/uL 9 18   HEMOGLOBIN g/dL 10 3*   HEMATOCRIT % 33 9*   PLATELETS Thousands/uL 245     Results from last 7 days   Lab Units 07/31/21  1448   SODIUM mmol/L 138   POTASSIUM mmol/L 4 2   CHLORIDE mmol/L 106   CO2 mmol/L 25   BUN mg/dL 19   CREATININE mg/dL 1 05   ANION GAP mmol/L 7   CALCIUM mg/dL 9 2   ALBUMIN g/dL 2 4*   TOTAL BILIRUBIN mg/dL 0 33   ALK PHOS U/L 56   ALT U/L 45   AST U/L 34   GLUCOSE RANDOM mg/dL 102     Results from last 7 days   Lab Units 07/31/21  1448   INR  1 28*     Results from last 7 days   Lab Units 07/31/21  1430   POC GLUCOSE mg/dl 100               Imaging: Reviewed radiology reports from this admission including: CT head  CTA stroke alert (head/neck)   Final Result by Aicha 6, DO (07/31 1455)      No large vessel occlusion    Atherosclerotic calcification of the carotid bifurcation bilaterally with no significant stenosis  Bilateral vertebral arteries widely patent  No focal intracranial stenosis or aneurysm  Mass right nasopharynx incompletely evaluated secondary to arterial bolus timing  The mass measures approximately 2 7 x 2 0 cm and direct visualization by ENT recommended  Right upper lobe lung mass and mediastinal adenopathy noted small right pleural effusion               Findings were directly discussed with Marcelo Lozada on 7/31/2021 2:51 PM                      Workstation performed: [id number]         CT stroke alert brain   Final Result by Aicha 6, DO (07/31 1455)      Recent infarct left caudate head, anterior lentiform nucleus, and anterior limb of left internal capsule without evidence of hemorrhage  Findings were directly discussed with Marcelo Lozada on 7/31/2021 2:51 PM       Workstation performed: [id number]         CT chest abdomen pelvis wo contrast    (Results Pending)   VAS lower limb venous duplex study, complete bilateral    (Results Pending)       EKG and Other Studies Reviewed on Admission:   · EKG: NSR  HR 80     ** Please Note: This note has been constructed using a voice recognition system   **

## 2021-08-01 ENCOUNTER — APPOINTMENT (INPATIENT)
Dept: NON INVASIVE DIAGNOSTICS | Facility: HOSPITAL | Age: 71
DRG: 064 | End: 2021-08-01
Payer: MEDICARE

## 2021-08-01 ENCOUNTER — APPOINTMENT (INPATIENT)
Dept: RADIOLOGY | Facility: HOSPITAL | Age: 71
DRG: 064 | End: 2021-08-01
Payer: MEDICARE

## 2021-08-01 ENCOUNTER — HOSPITAL ENCOUNTER (OUTPATIENT)
Dept: CT IMAGING | Facility: HOSPITAL | Age: 71
Discharge: HOME | End: 2021-08-01

## 2021-08-01 DIAGNOSIS — R63.4 ABNORMAL WEIGHT LOSS: ICD-10-CM

## 2021-08-01 DIAGNOSIS — R06.00 DYSPNEA ON EXERTION: ICD-10-CM

## 2021-08-01 LAB
ANION GAP SERPL CALCULATED.3IONS-SCNC: 9 MMOL/L (ref 4–13)
APTT PPP: 30 SECONDS (ref 23–37)
APTT PPP: 47 SECONDS (ref 23–37)
APTT PPP: 58 SECONDS (ref 23–37)
ATRIAL RATE: 80 BPM
BASE EX.OXY STD BLDV CALC-SCNC: 83.9 % (ref 60–80)
BASE EXCESS BLDV CALC-SCNC: -3.1 MMOL/L
BUN SERPL-MCNC: 17 MG/DL (ref 5–25)
CALCIUM SERPL-MCNC: 9.8 MG/DL (ref 8.3–10.1)
CHLORIDE SERPL-SCNC: 108 MMOL/L (ref 100–108)
CHOLEST SERPL-MCNC: 125 MG/DL (ref 50–200)
CO2 SERPL-SCNC: 22 MMOL/L (ref 21–32)
CREAT SERPL-MCNC: 0.89 MG/DL (ref 0.6–1.3)
ERYTHROCYTE [DISTWIDTH] IN BLOOD BY AUTOMATED COUNT: 15.6 % (ref 11.6–15.1)
EST. AVERAGE GLUCOSE BLD GHB EST-MCNC: 117 MG/DL
GFR SERPL CREATININE-BSD FRML MDRD: 87 ML/MIN/1.73SQ M
GLUCOSE SERPL-MCNC: 93 MG/DL (ref 65–140)
HBA1C MFR BLD: 5.7 %
HCO3 BLDV-SCNC: 21.6 MMOL/L (ref 24–30)
HCT VFR BLD AUTO: 36 % (ref 36.5–49.3)
HDLC SERPL-MCNC: 30 MG/DL
HGB BLD-MCNC: 11.1 G/DL (ref 12–17)
LDLC SERPL CALC-MCNC: 76 MG/DL (ref 0–100)
MAGNESIUM SERPL-MCNC: 2.6 MG/DL (ref 1.6–2.6)
MCH RBC QN AUTO: 25.6 PG (ref 26.8–34.3)
MCHC RBC AUTO-ENTMCNC: 30.8 G/DL (ref 31.4–37.4)
MCV RBC AUTO: 83 FL (ref 82–98)
NT-PROBNP SERPL-MCNC: 4647 PG/ML
O2 CT BLDV-SCNC: 15 ML/DL
P AXIS: 71 DEGREES
PCO2 BLDV: 37.5 MM HG (ref 42–50)
PH BLDV: 7.38 [PH] (ref 7.3–7.4)
PHOSPHATE SERPL-MCNC: 3 MG/DL (ref 2.3–4.1)
PLATELET # BLD AUTO: 314 THOUSANDS/UL (ref 149–390)
PMV BLD AUTO: 10.7 FL (ref 8.9–12.7)
PO2 BLDV: 54.5 MM HG (ref 35–45)
POTASSIUM SERPL-SCNC: 4.1 MMOL/L (ref 3.5–5.3)
PR INTERVAL: 146 MS
PROCALCITONIN SERPL-MCNC: <0.05 NG/ML
QRS AXIS: 210 DEGREES
QRSD INTERVAL: 80 MS
QT INTERVAL: 360 MS
QTC INTERVAL: 415 MS
RBC # BLD AUTO: 4.33 MILLION/UL (ref 3.88–5.62)
SODIUM SERPL-SCNC: 139 MMOL/L (ref 136–145)
T WAVE AXIS: -45 DEGREES
TRIGL SERPL-MCNC: 95 MG/DL
TROPONIN I SERPL-MCNC: 10.3 NG/ML
TROPONIN I SERPL-MCNC: 10.8 NG/ML
TROPONIN I SERPL-MCNC: 8.23 NG/ML
VENTRICULAR RATE: 80 BPM
WBC # BLD AUTO: 11.12 THOUSAND/UL (ref 4.31–10.16)

## 2021-08-01 PROCEDURE — 92610 EVALUATE SWALLOWING FUNCTION: CPT

## 2021-08-01 PROCEDURE — 84100 ASSAY OF PHOSPHORUS: CPT | Performed by: GENERAL PRACTICE

## 2021-08-01 PROCEDURE — 80048 BASIC METABOLIC PNL TOTAL CA: CPT | Performed by: GENERAL PRACTICE

## 2021-08-01 PROCEDURE — 83036 HEMOGLOBIN GLYCOSYLATED A1C: CPT | Performed by: GENERAL PRACTICE

## 2021-08-01 PROCEDURE — 31575 DIAGNOSTIC LARYNGOSCOPY: CPT | Performed by: OTOLARYNGOLOGY

## 2021-08-01 PROCEDURE — 99222 1ST HOSP IP/OBS MODERATE 55: CPT | Performed by: INTERNAL MEDICINE

## 2021-08-01 PROCEDURE — 83880 ASSAY OF NATRIURETIC PEPTIDE: CPT | Performed by: PHYSICIAN ASSISTANT

## 2021-08-01 PROCEDURE — 85730 THROMBOPLASTIN TIME PARTIAL: CPT | Performed by: GENERAL PRACTICE

## 2021-08-01 PROCEDURE — 71045 X-RAY EXAM CHEST 1 VIEW: CPT

## 2021-08-01 PROCEDURE — 94762 N-INVAS EAR/PLS OXIMTRY CONT: CPT

## 2021-08-01 PROCEDURE — 85027 COMPLETE CBC AUTOMATED: CPT | Performed by: GENERAL PRACTICE

## 2021-08-01 PROCEDURE — 84145 PROCALCITONIN (PCT): CPT | Performed by: PHYSICIAN ASSISTANT

## 2021-08-01 PROCEDURE — 99232 SBSQ HOSP IP/OBS MODERATE 35: CPT | Performed by: PHYSICIAN ASSISTANT

## 2021-08-01 PROCEDURE — 99214 OFFICE O/P EST MOD 30 MIN: CPT | Performed by: PSYCHIATRY & NEUROLOGY

## 2021-08-01 PROCEDURE — 84484 ASSAY OF TROPONIN QUANT: CPT | Performed by: PHYSICIAN ASSISTANT

## 2021-08-01 PROCEDURE — 84484 ASSAY OF TROPONIN QUANT: CPT | Performed by: INTERNAL MEDICINE

## 2021-08-01 PROCEDURE — 94760 N-INVAS EAR/PLS OXIMETRY 1: CPT

## 2021-08-01 PROCEDURE — 80061 LIPID PANEL: CPT | Performed by: GENERAL PRACTICE

## 2021-08-01 PROCEDURE — 99221 1ST HOSP IP/OBS SF/LOW 40: CPT | Performed by: OTOLARYNGOLOGY

## 2021-08-01 PROCEDURE — 82805 BLOOD GASES W/O2 SATURATION: CPT | Performed by: PHYSICIAN ASSISTANT

## 2021-08-01 PROCEDURE — 93306 TTE W/DOPPLER COMPLETE: CPT

## 2021-08-01 PROCEDURE — 0CJS8ZZ INSPECTION OF LARYNX, VIA NATURAL OR ARTIFICIAL OPENING ENDOSCOPIC: ICD-10-PCS | Performed by: OTOLARYNGOLOGY

## 2021-08-01 PROCEDURE — 83735 ASSAY OF MAGNESIUM: CPT | Performed by: GENERAL PRACTICE

## 2021-08-01 PROCEDURE — 85730 THROMBOPLASTIN TIME PARTIAL: CPT | Performed by: INTERNAL MEDICINE

## 2021-08-01 PROCEDURE — 93010 ELECTROCARDIOGRAM REPORT: CPT | Performed by: INTERNAL MEDICINE

## 2021-08-01 PROCEDURE — 93306 TTE W/DOPPLER COMPLETE: CPT | Performed by: INTERNAL MEDICINE

## 2021-08-01 RX ORDER — ASPIRIN 81 MG/1
81 TABLET, CHEWABLE ORAL DAILY
Status: DISCONTINUED | OUTPATIENT
Start: 2021-08-01 | End: 2021-08-01

## 2021-08-01 RX ORDER — OXYMETAZOLINE HYDROCHLORIDE 0.05 G/100ML
2 SPRAY NASAL ONCE
Status: DISPENSED | OUTPATIENT
Start: 2021-08-01 | End: 2021-08-04

## 2021-08-01 RX ORDER — ATORVASTATIN CALCIUM 40 MG/1
40 TABLET, FILM COATED ORAL
Status: DISCONTINUED | OUTPATIENT
Start: 2021-08-01 | End: 2021-08-08 | Stop reason: HOSPADM

## 2021-08-01 RX ORDER — LIDOCAINE HYDROCHLORIDE 40 MG/ML
5 SOLUTION TOPICAL ONCE
Status: DISCONTINUED | OUTPATIENT
Start: 2021-08-01 | End: 2021-08-08 | Stop reason: HOSPADM

## 2021-08-01 RX ADMIN — ASPIRIN 300 MG: 300 SUPPOSITORY RECTAL at 10:40

## 2021-08-01 RX ADMIN — HEPARIN SODIUM 17.8 UNITS/KG/HR: 10000 INJECTION, SOLUTION INTRAVENOUS at 16:00

## 2021-08-01 RX ADMIN — ATORVASTATIN CALCIUM 40 MG: 40 TABLET, FILM COATED ORAL at 16:00

## 2021-08-01 NOTE — QUICK NOTE
Pt slowing increasing O2 requirement  90s on 5-6L NC  Requiring 12L now downtrending to 8L  Mild increase wob though pt continue to rip off O2 and significant snoring while falling asleep  Repeat CXR  PCT,VBG and repeat troponin this am  Mitt sapplied, monitor with Mitts to assess if O2 improves or he continues to need midflow  Neuro exam unchanged  Orientation questions appear to  Wax and wane between oriented x 1-3

## 2021-08-01 NOTE — SPEECH THERAPY NOTE
Speech-Language Pathology Bedside Swallow Evaluation      Patient Name: French Stephen    LQEOA'F Date: 8/1/2021     Problem List  Principal Problem:    Acute CVA (cerebrovascular accident) [facility])  Active Problems:    Elevated troponin    Acute respiratory failure with hypoxia ([facility] [address] )    Acute deep vein thrombosis (DVT) of distal vein of both lower extremities ([facility] [address] )    Nasopharyngeal mass    Dysphagia      Past Medical History  History reviewed  No pertinent past medical history  Past Surgical History  History reviewed  No pertinent surgical history  Summary   Pt presented with s/s suggestive of minimal oral and suspected minimal pharyngeal dysphagia  Symptoms or concerns included min prolonged mastication and transfer time with regular solids, with cough on 1 of 4 sips thin liquids  Patient presents with right side facial droop and weakness and is at risk of pocketing  However, patient is able to use lingual sweep to clear  Patient presents with dysarthria and would benefit from formal speech/language evaluation when able  Risk/s for Aspiration: low     Recommended Diet: soft/level 3 diet and thin liquids   Recommended Form of Meds: whole with liquid   Aspiration precautions and swallowing strategies: upright posture and small bites/sips  Other Recommendations: Continue frequent oral care    Current Medical Status  French Stephen is a 72 y o  male with no PMH who presents with several months of CALDERON  Can barely walk to restroom  Also noted weight loss  Had b/l CP a few days ago  Today woke up and could not move right side of body  No n/v/d  Fam hx CAD and cancer    Pt denies pain at this time and no SOB at rest     Neurology: 7/31/2021  Neurology time of arrival: 1423  HPI: French Stephen is a 72 y o   male with recent abnormal weight loss without documented past medical history who presents to [facility] ED on 07/31/2021 as a stroke alert with right hemiparesis and dysarthria    Per discussed with the staff, approximately 1 hour prior to arrival patient acutely developed right-sided weakness and slurred speech  Patient reports that he developed the symptoms suddenly early in the afternoon  Patient states that he has never had similar symptoms in the past   He admits to smoking in the past, reportedly 60+ years ago  Patient states that has a lazy eye at baseline  Of note patient has been experiencing abnormal weight  This was in the process of being worked up with a scheduled CT CAP for 08/01     Patient presented to [place] ED on 07/31/2021 as a stroke alert with right hemiparesis and dysarthria  NIHSS of 9 for right upper lower extremity weakness and ataxia, right facial weakness, dysarthria, and word-finding difficulties  CT head revealed evidence of a recent infarct in the left caudate head, anterior lentiform nucleus, anterior limb of the left internal capsule  Patient was not an IV tPA candidate due to the chronicity of the infarct on CT head, appearing acute to subacute per neurologist Gonzalo Ludwig  CTA unremarkable for large vessel occlusion, however did reveal a right upper lobe lung mass and mediastinal adenopathy  Troponin elevated on presentation 6 90  With concern for MI vs PE patient was started on low-dose heparin  Patient was then admitted on stroke pathway        Current Precautions:  Fall  Aspiration    Allergies:  No known food allergies  Past medical history:  Please see H&P for details    Special Studies:  CT chest 7/31/2021:   There is moderate emphysema      There is a 6 4 x 3 6 x 3 4 cm solid, irregular contoured right upper lobe pulmonary nodule, likely primary lung cancer (series 2 image 17 )   There is extensive right hilar and mediastinal adenopathy    Small right pleural effusion    There are also numerous too small to characterize hepatic hypodense lesions    Consider abdomen MR with and without IV contrast to characterize      Social/Education/Vocational Hx:  Pt lives with family    Swallow Information   Current Risks for Dysphagia & Aspiration: CVA and dysarthria  Current Symptoms/Concerns: coughing with po  Current Diet: NPO   Baseline Diet: regular diet and thin liquids    Baseline Assessment   Behavior/Cognition: alert  Speech/Language Status: able to participate in basic conversation and able to follow commands  Patient Positioning: upright in bed  Pain Status/Interventions/Response to Interventions: No report of or nonverbal indications of pain  Swallow Mechanism Exam  Facial: right facial droop  Labial: decreased ROM right side  Lingual: WFL  Velum: symmetrical  Mandible: decreased ROM right side  Dentition: adequate  Vocal quality:clear/adequate   Volitional Cough: strong/productive   Respiratory Status: on 8L midflow O2      Consistencies Assessed and Performance   Consistencies Administered: thin liquids, nectar thick, honey thick, puree, soft solids and hard solids  Materials administered included applesauce, lilian cracker and cookie with honey thick, nectar thick and thin liquids     Oral Stage: minimal  Mastication was prolonged, but efficient with solids  Bolus formation was adequate, with min prolonged transfer time  No significant oral residue noted  No overt s/s reduced oral control  Pharyngeal Stage: minimal  Swallow Mechanics:  Swallowing initiation appeared prompt  Laryngeal rise was palpated and judged to be within functional limits   Cough on 1 of 4 sips thin liquids observed     Esophageal Concerns: none reported    Summary and Recommendations (see above)    Results Reviewed with: patient, RN, PA and family     Treatment Recommended: dysphagia therapy and speech/language eval      Frequency of treatment: 2-3x week, as able     Patient Stated Goal: none     Dysphagia LTG  -Patient will demonstrate safe and effective oral intake (without overt s/s significant oral/pharyngeal dysphagia including s/s penetration or aspiration) for the highest appropriate diet level       Short Term Goals:  -Pt will tolerate Dysphagia 3/advanced (dental soft) diet and thin liquid with no significant s/s oral or pharyngeal dysphagia across 1-3 diagnostic session/s     -Patient will tolerate trials of upgraded food and/or liquid texture with no significant s/s of oral or pharyngeal dysphagia including aspiration across 1-3 diagnostic sessions     Speech Therapy Prognosis   Prognosis: good    Prognosis Considerations: age

## 2021-08-01 NOTE — ASSESSMENT & PLAN NOTE
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

## 2021-08-01 NOTE — ASSESSMENT & PLAN NOTE
- VAS lower limb venous duplex study 7/31/2021:  · Right lower limb:   · Acute deep vein thrombosis noted in posterior tibial and peroneal veins   · Left lower limb:  · Acute deep vein thrombosis noted in the peroneal veins   · Acute superficial thrombophlebitis noted in the great saphenous vein from the mid thigh to the mid calf  - Heparin drip d/c'd, Lovenox initiated

## 2021-08-01 NOTE — ASSESSMENT & PLAN NOTE
· Troponin elevation likely type 2 secondary to pulmonary embolism and hypoxia  · 6 90 > 7 54 > 10 80  · Will trend to peak then dc  · Echo- official read pending  · Received rectal aspirin  · D/w neuro - ok for heparin gtt w/ no boluses or reboluses  · Cardio consult- input appreciated  ·  Consider CT angiogram once patient more clinically stable  · Continue aspirin, heparin, treating underlying cause  · FLP- pending  · HbA1c: 5 6

## 2021-08-01 NOTE — PLAN OF CARE
Problem: PAIN - ADULT  Goal: Verbalizes/displays adequate comfort level or baseline comfort level  Description: Interventions:  - Encourage patient to monitor pain and request assistance  - Assess pain using appropriate pain scale  - Administer analgesics based on type and severity of pain and evaluate response  - Implement non-pharmacological measures as appropriate and evaluate response  - Consider cultural and social influences on pain and pain management  - Notify physician/advanced practitioner if interventions unsuccessful or patient reports new pain  Outcome: Progressing     Problem: INFECTION - ADULT  Goal: Absence or prevention of progression during hospitalization  Description: INTERVENTIONS:  - Assess and monitor for signs and symptoms of infection  - Monitor lab/diagnostic results  - Monitor all insertion sites, i e  indwelling lines, tubes, and drains  - Monitor endotracheal if appropriate and nasal secretions for changes in amount and color  - [place] appropriate cooling/warming therapies per order  - Administer medications as ordered  - Instruct and encourage patient and family to use good hand hygiene technique  - Identify and instruct in appropriate isolation precautions for identified infection/condition  Outcome: Progressing  Goal: Absence of fever/infection during neutropenic period  Description: INTERVENTIONS:  - Monitor WBC    Outcome: Progressing     Problem: SAFETY ADULT  Goal: Patient will remain free of falls  Description: INTERVENTIONS:  - Educate patient/family on patient safety including physical limitations  - Instruct patient to call for assistance with activity   - Consult OT/PT to assist with strengthening/mobility   - Keep Call bell within reach  - Keep bed low and locked with side rails adjusted as appropriate  - Keep care items and personal belongings within reach  - Initiate and maintain comfort rounds  - Make Fall Risk Sign visible to staff  - Apply yellow socks and bracelet for high fall risk patients  - Consider moving patient to room near nurses station  Outcome: Progressing  Goal: Maintain or return to baseline ADL function  Description: INTERVENTIONS:  -  Assess patient's ability to carry out ADLs; assess patient's baseline for ADL function and identify physical deficits which impact ability to perform ADLs (bathing, care of mouth/teeth, toileting, grooming, dressing, etc )  - Assess/evaluate cause of self-care deficits   - Assess range of motion  - Assess patient's mobility; develop plan if impaired  - Assess patient's need for assistive devices and provide as appropriate  - Encourage maximum independence but intervene and supervise when necessary  - Involve family in performance of ADLs  - Assess for home care needs following discharge   - Consider OT consult to assist with ADL evaluation and planning for discharge  - Provide patient education as appropriate  Outcome: Progressing  Goal: Maintains/Returns to pre admission functional level  Description: INTERVENTIONS:  - Perform BMAT or MOVE assessment daily    - Set and communicate daily mobility goal to care team and patient/family/caregiver     - Collaborate with rehabilitation services on mobility goals if consulted  - Perform Range of Motion   Problem: SAFETY ADULT  Goal: Patient will remain free of falls  Description: INTERVENTIONS:  - Educate patient/family on patient safety including physical limitations  - Instruct patient to call for assistance with activity   - Consult OT/PT to assist with strengthening/mobility   - Keep Call bell within reach  - Keep bed low and locked with side rails adjusted as appropriate  - Keep care items and personal belongings within reach  - Initiate and maintain comfort rounds  - Make Fall Risk Sign visible to staff   - Apply yellow socks and bracelet for high fall risk patients  - Consider moving patient to room near nurses station  Outcome: Progressing  Goal: Maintain or return to baseline ADL function  Description: INTERVENTIONS:  -  Assess patient's ability to carry out ADLs; assess patient's baseline for ADL function and identify physical deficits which impact ability to perform ADLs (bathing, care of mouth/teeth, toileting, grooming, dressing, etc )  - Assess/evaluate cause of self-care deficits   - Assess range of motion  - Assess patient's mobility; develop plan if impaired  - Assess patient's need for assistive devices and provide as appropriate  - Encourage maximum independence but intervene and supervise when necessary  - Involve family in performance of ADLs  - Assess for home care needs following discharge   - Consider OT consult to assist with ADL evaluation and planning for discharge  - Provide patient education as appropriate  Outcome: Progressing  Goal: Maintains/Returns to pre admission functional level  Description: INTERVENTIONS:  - Perform BMAT or MOVE assessment daily    - Set and communicate daily mobility goal to care team and patient/family/caregiver  - Collaborate with rehabilitation services on mobility goals if consulted  - Perform Range of Motion 2 times a day  - Reposition patient every 2 hours    - Dangle patient 3 times a day  Problem: SAFETY ADULT  Goal: Patient will remain free of falls  Description: INTERVENTIONS:  - Educate patient/family on patient safety including physical limitations  - Instruct patient to call for assistance with activity   - Consult OT/PT to assist with strengthening/mobility   - Keep Call bell within reach  - Keep bed low and locked with side rails adjusted as appropriate  - Keep care items and personal belongings within reach  - Initiate and maintain comfort rounds  - Make Fall Risk Sign visible to staff  - Apply yellow socks and bracelet for high fall risk patients  - Consider moving patient to room near nurses station  Outcome: Progressing  Goal: Maintain or return to baseline ADL function  Description: INTERVENTIONS:  -  Assess patient's ability to carry out ADLs; assess patient's baseline for ADL function and identify physical deficits which impact ability to perform ADLs (bathing, care of mouth/teeth, toileting, grooming, dressing, etc )  - Assess/evaluate cause of self-care deficits   - Assess range of motion  - Assess patient's mobility; develop plan if impaired  - Assess patient's need for assistive devices and provide as appropriate  - Encourage maximum independence but intervene and supervise when necessary  - Involve family in performance of ADLs  - Assess for home care needs following discharge   - Consider OT consult to assist with ADL evaluation and planning for discharge  - Provide patient education as appropriate  Outcome: Progressing  Goal: Maintains/Returns to pre admission functional level  Description: INTERVENTIONS:  - Perform BMAT or MOVE assessment daily    - Set and communicate daily mobility goal to care team and patient/family/caregiver  - Collaborate with rehabilitation services on mobility goals if consulted    Problem: DISCHARGE PLANNING  Goal: Discharge to home or other facility with appropriate resources  Description: INTERVENTIONS:  - Identify barriers to discharge w/patient and caregiver  - Arrange for needed discharge resources and transportation as appropriate  - Identify discharge learning needs (meds, wound care, etc )  - Arrange for interpretive services to assist at discharge as needed  - Refer to Case Management Department for coordinating discharge planning if the patient needs post-hospital services based on physician/advanced practitioner order or complex needs related to functional status, cognitive ability, or social support system  Outcome: Progressing     Problem: Nutrition/Hydration-ADULT  Goal: Nutrient/Hydration intake appropriate for improving, restoring or maintaining nutritional needs  Description: Monitor and assess patient's nutrition/hydration status for malnutrition   Collaborate with interdisciplinary team and initiate plan and interventions as ordered  Monitor patient's weight and dietary intake as ordered or per policy  Utilize nutrition screening tool and intervene as necessary  Determine patient's food preferences and provide high-protein, high-caloric foods as appropriate       INTERVENTIONS:  - Monitor oral intake, urinary output, labs, and treatment plans  - Assess nutrition and hydration status and recommend course of action  - Evaluate amount of meals eaten  - Assist patient with eating if necessary   - Allow adequate time for meals  - Recommend/ encourage appropriate diets, oral nutritional supplements, and vitamin/mineral supplements  - Order, calculate, and assess calorie counts as needed  - Recommend, monitor, and adjust tube feedings and TPN/PPN based on assessed needs  - Assess need for intravenous fluids  - Provide specific nutrition/hydration education as appropriate  - Include patient/family/caregiver in decisions related to nutrition  Outcome: Progressing     Problem: MOBILITY - ADULT  Goal: Maintain or return to baseline ADL function  Description: INTERVENTIONS:  -  Assess patient's ability to carry out ADLs; assess patient's baseline for ADL function and identify physical deficits which impact ability to perform ADLs (bathing, care of mouth/teeth, toileting, grooming, dressing, etc )  - Assess/evaluate cause of self-care deficits   - Assess range of motion  - Assess patient's mobility; develop plan if impaired  - Assess patient's need for assistive devices and provide as appropriate  - Encourage maximum independence but intervene and supervise when necessary  - Involve family in performance of ADLs  - Assess for home care needs following discharge   - Consider OT consult to assist with ADL evaluation and planning for discharge  - Provide patient education as appropriate  Outcome: Progressing  Goal: Maintains/Returns to pre admission functional level  Description: INTERVENTIONS:  - Perform BMAT or MOVE assessment daily    - Set and communicate daily mobility goal to care team and patient/family/caregiver  - Collaborate with rehabilitation services on mobility goals if consulted  - Out of bed for toileting  - Record patient progress and toleration of activity level   Outcome: Progressing   - Out of bed for toileting  - Record patient progress and toleration of activity level   Outcome: Progressing     - Out of bed for toileting  - Record patient progress and toleration of activity level   Outcome: Progressing   3 times a day  - Reposition patient every 2 hours    - Dangle patient 3 times a day  - Stand patient 2 times a day  - Ambulate patient 3 times a day  - Out of bed to chair 3 times a day   - Out of bed for meals 2 times a day  - Out of bed for toileting  - Record patient progress and toleration of activity level   Outcome: Progressing

## 2021-08-01 NOTE — PROGRESS NOTES
Progress Note - Neurology   Noel Prado 72 y o  male MRN: [id number]  Unit/Bed#: [facility] 725-01 Encounter: [id number]      Assessment/Plan   * Acute CVA (cerebrovascular accident) [facility])  Assessment & Plan  Noel Prado is a 72 y o  male with recent abnormal weight loss without documented past medical history who presents to [place] ED on 07/31/2021 as a stroke alert with right hemiparesis and dysarthria  NIHSS of 9  CT head revealed recent acute to subacute infarct located in the left caudate head, anterior lentiform nucleus, anterior limb of the left internal capsule  CTA head and neck unremarkable for large vessel occlusion or critical stenosis, however did reveal a right upper lobe lung mass and mediastinal adenopathy  Patient was not an IV tPA candidate due to infarct appearing acute to subacute, high bleed risk  Strong suspicion for underlying malignancy  Acute infarct likely secondary to hypercoagulable state  Will continue on stroke pathway      Plan:  - MRI brain pending   - Echo official read pending  - S/p rectal  mg x 1 on admission   - Continues on heparin gtt  - Appears to be on a ASA 81 mg daily, no indication to continue aspirin at this time from a neurology standpoint as patient is on heparin drip, would recommend discontinuation if there is no other indication  - Recommend atorvastatin 40 mg daily when able  - Goal normotension, euglycemia, normothermia  - Telemetry  - Frequent neuro checks  - Stroke education  - PT/OT/speech  - Medical management and supportive care per primary team, notify with changes    Imaging/Labs:  - CT head 07/31/2021:  · Recent infarct located in the left caudate head, anterior lentiform nucleus, anterior limb of the left internal capsule without evidence of hemorrhage  - CTA head and neck 07/31/2021:  · No evidence of large vessel occlusion  · Atherosclerotic calcification of the carotid bifurcations bilaterally without significant stenosis  · No focal intracranial stenosis or aneurysm noted  · Mass right nasopharynx incompletely evaluated secondary to arterial bolus times, mass measuring 2 7 x 2 0 cm  · Right upper lobe lung mass and mediastinal adenopathy noted small right pleural effusion  - VAS lower limb venous duplex study:  · Right lower limb:   · Acute deep vein thrombosis noted in posterior tibial and peroneal veins   · Left lower limb:  · Acute deep vein thrombosis noted in the peroneal veins   · Acute superficial thrombophlebitis noted in the great saphenous vein from the mid thigh to the mid calf  - Troponin elevated on presentation 6 90  - Lipid panel: Total cholesterol 125, triglycerides 95, HDL 30, LDL 76  - Hemoglobin A1c:  5 7    Nasopharyngeal mass  Assessment & Plan  - 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  - ENT following, recommending biopsy; patient cleared from a Neurology standpoint to proceed with ENT workup inpatient  - CT CAP pending to assess for other masses    Acute deep vein thrombosis (DVT) of distal vein of both lower extremities (HCC)  Assessment & Plan  - VAS lower limb venous duplex study:  · Right lower limb:   · Acute deep vein thrombosis noted in posterior tibial and peroneal veins   · Left lower limb:  · Acute deep vein thrombosis noted in the peroneal veins   · Acute superficial thrombophlebitis noted in the great saphenous vein from the mid thigh to the mid calf  - Currently on heparin drip    Elevated troponin  Assessment & Plan  - Troponin elevated on presentation 6 90  - Echo pending  - Currently on ASA 81 mg daily, no need to be on ASA from a neurology standpoint  - Appreciate cardiology input in regards to continuing ASA    Recommendations for outpatient neurological follow up have yet to be determined  Subjective: Today on exam patient now exhibits neglect on the right with flaccid right hemiparesis      ROS:  12 point ROS limited to encephalopathy    Vitals: Blood pressure 135/84, pulse 69, temperature 98 7 °F (37 1 °C), temperature source Oral, resp  rate 21, weight 86 6 kg (190 lb 14 7 oz), SpO2 90 %  ,There is no height or weight on file to calculate BMI  Physical Exam  Vitals and nursing note reviewed  Constitutional:       General: He is not in acute distress  Appearance: He is normal weight  He is ill-appearing and diaphoretic  He is not toxic-appearing  HENT:      Head: Normocephalic and atraumatic  Eyes:      General: No scleral icterus  Right eye: No discharge  Left eye: No discharge  Conjunctiva/sclera: Conjunctivae normal    Musculoskeletal:      Cervical back: Normal range of motion and neck supple  Skin:     General: Skin is warm  Coloration: Skin is not jaundiced or pale  Findings: No bruising, erythema, lesion or rash  Psychiatric:         Mood and Affect: Mood normal          Behavior: Behavior normal        Neurologic Exam     Mental Status   Patient is alert, sitting up in bed, accompanied by wife  Oriented to person  Incorrectly states he is in [place]  Incorrectly states the month is October and the year is 65  Able to name some simple objects, however has difficulty with other simple and complex objects  Patient is able to follow central and appendicular commands    Difficulty with complex commands     Cranial Nerves     Left gaze preference, able to cross midline to the right  Right facial weakness noted, appears to have facial puffing with breathing     Motor Exam   Muscle bulk: normal  Left upper extremity strength testing 5/5 throughout  Left lower extremity hip flexion at least 4/5 with give-way weakness    Right upper lower extremities flaccid, fall to the bed  No true withdrawal to noxious stimuli in right upper and lower extremity     Sensory Exam     Grimaces to noxious stimuli in right upper and lower extremity  Right-sided neglect noted, does not recognize right upper extremity with held in front of phase Gait, Coordination, and Reflexes     Tremor   Resting tremor: absent  No involuntary movements noted throughout exam     Lab Results: I have personally reviewed pertinent reports    Recent Results (from the past 24 hour(s))   Basic metabolic panel    Collection Time: 07/31/21  2:48 PM   Result Value Ref Range    Sodium 138 136 - 145 mmol/L    Potassium 4 2 3 5 - 5 3 mmol/L    Chloride 106 100 - 108 mmol/L    CO2 25 21 - 32 mmol/L    ANION GAP 7 4 - 13 mmol/L    BUN 19 5 - 25 mg/dL    Creatinine 1 05 0 60 - 1 30 mg/dL    Glucose 102 65 - 140 mg/dL    Calcium 9 2 8 3 - 10 1 mg/dL    eGFR 72 ml/min/1 73sq m   CBC and Platelet    Collection Time: 07/31/21  2:48 PM   Result Value Ref Range    WBC 9 18 4 31 - 10 16 Thousand/uL    RBC 4 05 3 88 - 5 62 Million/uL    Hemoglobin 10 3 (L) 12 0 - 17 0 g/dL    Hematocrit 33 9 (L) 36 5 - 49 3 %    MCV 84 82 - 98 fL    MCH 25 4 (L) 26 8 - 34 3 pg    MCHC 30 4 (L) 31 4 - 37 4 g/dL    RDW 15 7 (H) 11 6 - 15 1 %    Platelets 903 249 - 002 Thousands/uL    MPV 9 9 8 9 - 12 7 fL   Protime-INR    Collection Time: 07/31/21  2:48 PM   Result Value Ref Range    Protime 16 0 (H) 11 6 - 14 5 seconds    INR 1 28 (H) 0 84 - 1 19   APTT    Collection Time: 07/31/21  2:48 PM   Result Value Ref Range    PTT 29 23 - 37 seconds   Troponin I    Collection Time: 07/31/21  2:48 PM   Result Value Ref Range    Troponin I 6 90 (H) <=0 04 ng/mL   Hepatic function panel    Collection Time: 07/31/21  2:48 PM   Result Value Ref Range    Total Bilirubin 0 33 0 20 - 1 00 mg/dL    Bilirubin, Direct 0 12 0 00 - 0 20 mg/dL    Alkaline Phosphatase 56 46 - 116 U/L    AST 34 5 - 45 U/L    ALT 45 12 - 78 U/L    Total Protein 6 8 6 4 - 8 2 g/dL    Albumin 2 4 (L) 3 5 - 5 0 g/dL   Novel Coronavirus (Covid-19),PCR [facility] - 2 hour stat    Collection Time: 07/31/21  2:50 PM    Specimen: Nose; Nares   Result Value Ref Range    SARS-CoV-2 Negative Negative   Troponin I    Collection Time: 07/31/21  5:59 PM   Result Value Ref Range    Troponin I 7 54 (H) <=0 04 ng/mL   Troponin I    Collection Time: 07/31/21 11:38 PM   Result Value Ref Range    Troponin I 8 23 (H) <=0 04 ng/mL   APTT    Collection Time: 08/01/21 12:25 AM   Result Value Ref Range    PTT 58 (H) 23 - 37 seconds   Lipid Panel with Direct LDL reflex    Collection Time: 08/01/21  5:07 AM   Result Value Ref Range    Cholesterol 125 50 - 200 mg/dL    Triglycerides 95 <=150 mg/dL    HDL, Direct 30 (L) >=40 mg/dL    LDL Calculated 76 0 - 100 mg/dL   Hemoglobin A1c w/EAG Estimation    Collection Time: 08/01/21  5:07 AM   Result Value Ref Range    Hemoglobin A1C 5 7 (H) Normal 3 8-5 6%; PreDiabetic 5 7-6 4%;  Diabetic >=6 5%; Glycemic control for adults with diabetes <7 0% %     mg/dl   Magnesium    Collection Time: 08/01/21  5:07 AM   Result Value Ref Range    Magnesium 2 6 1 6 - 2 6 mg/dL   Phosphorus    Collection Time: 08/01/21  5:07 AM   Result Value Ref Range    Phosphorus 3 0 2 3 - 4 1 mg/dL   Basic metabolic panel    Collection Time: 08/01/21  5:07 AM   Result Value Ref Range    Sodium 139 136 - 145 mmol/L    Potassium 4 1 3 5 - 5 3 mmol/L    Chloride 108 100 - 108 mmol/L    CO2 22 21 - 32 mmol/L    ANION GAP 9 4 - 13 mmol/L    BUN 17 5 - 25 mg/dL    Creatinine 0 89 0 60 - 1 30 mg/dL    Glucose 93 65 - 140 mg/dL    Calcium 9 8 8 3 - 10 1 mg/dL    eGFR 87 ml/min/1 73sq m   CBC (With Platelets)    Collection Time: 08/01/21  5:07 AM   Result Value Ref Range    WBC 11 12 (H) 4 31 - 10 16 Thousand/uL    RBC 4 33 3 88 - 5 62 Million/uL    Hemoglobin 11 1 (L) 12 0 - 17 0 g/dL    Hematocrit 36 0 (L) 36 5 - 49 3 %    MCV 83 82 - 98 fL    MCH 25 6 (L) 26 8 - 34 3 pg    MCHC 30 8 (L) 31 4 - 37 4 g/dL    RDW 15 6 (H) 11 6 - 15 1 %    Platelets 719 823 - 110 Thousands/uL    MPV 10 7 8 9 - 12 7 fL   NT-BNP PRO    Collection Time: 08/01/21  7:58 AM   Result Value Ref Range    NT-proBNP 4,647 (H) <125 pg/mL   APTT    Collection Time: 08/01/21  7:59 AM   Result Value Ref Range PTT 30 23 - 37 seconds   Blood gas, venous    Collection Time: 08/01/21  7:59 AM   Result Value Ref Range    pH, Cipriano 7 378 7 300 - 7 400    pCO2, Cipriano 37 5 (L) 42 0 - 50 0 mm Hg    pO2, Cipriano 54 5 (H) 35 0 - 45 0 mm Hg    HCO3, Cipriano 21 6 (L) 24 - 30 mmol/L    Base Excess, Cipriano -3 1 mmol/L    O2 Content, Cipriano 15 0 ml/dL    O2 HGB, VENOUS 83 9 (H) 60 0 - 80 0 %   Procalcitonin with AM Reflex    Collection Time: 08/01/21  7:59 AM   Result Value Ref Range    Procalcitonin <0 05 <=0 25 ng/ml   Troponin I    Collection Time: 08/01/21  8:02 AM   Result Value Ref Range    Troponin I 10 80 (H) <=0 04 ng/mL   Troponin I    Collection Time: 08/01/21 11:54 AM   Result Value Ref Range    Troponin I 10 30 (H) <=0 04 ng/mL   ]  Imaging Studies: I have personally reviewed pertinent reports and I have personally reviewed pertinent films in PACS  EKG, Pathology, and Other Studies: I have personally reviewed pertinent reports  VTE Prophylaxis: Heparin    Counseling / Coordination of Care  Total time spent today 28 minutes  Greater than 50% of total time was spent with the patient and/or family counseling and/or coordination of care  A description of the counseling/coordination of care:  Patient was seen and evaluated  Discussed with attending  Chart reviewed thoroughly including laboratory and imaging studies  Plan of care discussed with patient and primary team   Discussed plan to obtain MRI brain and continue on the heparin drip with patient's wife

## 2021-08-01 NOTE — ASSESSMENT & PLAN NOTE
· ENT consult- directly visualized- will need biopsy  ·  trying to coordinate with Pulmonary Medicine to perform at the same time    Patient will need bronch with ebus and TBNA  · CT CAP to check for other masses: numerous too small to characterize hepatic hypodense lesions  ·  Consider MRI abdomen with contrast to further characterize

## 2021-08-01 NOTE — PROCEDURES
OTOLARYNGOLOGY PROCEDURE NOTE    PRE-OP DIAGNOSIS: The primary encounter diagnosis was Stroke [facility])  Diagnoses of Nasopharyngeal mass, Elevated troponin, Cerebrovascular accident (CVA), unspecified mechanism ([address] ), Facial droop, Dysarthria, Weakness due to acute stroke ([address] ), Acute deep vein thrombosis (DVT) of distal vein of both lower extremities ([address] ), Acute respiratory failure with hypoxia ([address] ), and Lung mass were also pertinent to this visit  POST-OP DIAGNOSIS: Same    PROCEDURE: Flexible Laryngoscopy    DESCRIPTION OF PROCEDURE:  After adequate anesthesia decongestion with a mixture of Afrin and lidocaine, scope was passed into the right nasal cavity along the nasal floor and into the nasopharynx  The patient did have smooth soft tissue mass in the right nasopharynx that appeared to be occluding the right eustachian tube  Left eustachian tube without any own abnormality  Nasopharynx is clear  The patient is able to move both vocal cords bilaterally  I did not see any other abnormalities in the hypopharynx or larynx  The patient tolerated the procedure well without complication        Jayy Ruby MD

## 2021-08-01 NOTE — PHYSICAL THERAPY NOTE
Physical Therapy Cancellation Note    Patient's Name: French Stephen    Orders received, chart reviewed  Pt admit with Acute CVA, Acute PE, B/L DVT, increasing O2 requirements this AM   Pt asleep upon PT arrival, met with family in hallway who request allowing pt time to rest   Made aware PT will follow-up next date and educated on PT evaluation/goals of mobility  Will follow for PT evaluation as medically appropriate  Thank you       Lieutenant Victor, PT, DPT

## 2021-08-01 NOTE — ASSESSMENT & PLAN NOTE
· Requiring 2L to keep O2 above 89  · CTA chest deferred given recent contrast load and patient being poor  Candidate for lysis  · LEVDs confirm b/l DVTs so will assume pt has PE

## 2021-08-01 NOTE — CONSULTS
Consultation - Pulmonary Medicine   Sonny Galarza 72 y o  male MRN: [id number]  Unit/Bed#: [facility] 725-01 Encounter: [id number]      Physician Requesting Consult: Dr Resendez  Reason for Consult: hypoxia    Assessment/Plan:    1  Acute hypoxic respiratory failure - due to PE and RUL lung mass  2  Acute pulmonary emboli unknown location without cor pulmonale  3  Acute left-sided CVA in the lentiform nucleus and anterior limb of her left internal capsule - not candidate for TPA since findings were present on CT on arrival  4  RUL lung mass with mediastinal and hilar lymphadenopathy - likely malignancy  5  Acute RLE DVT in the posterior tibial and peroneal veins  6  Acute LLE DVT in the peroneal veins  7  Acute LLE superficial thrombophlebitis in the great saphenous vein  8  Abnormal troponin  9  Moderate centrilobular emphysema  10  Small right sided pleural effusion - water density, likely transudative  11  Dysphagia  12  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  13  Nicotine dependence in sustained remission-smoked 2+ packs per day times 40+ years, quit at age 48    - continue oxygen titrate as able  Hypoxia secondary to his pulmonary emboli with possible right heart strain   -await echo   -continue heparin drip with plan to transition to LMWH vs oral anticoagulation when okay with Neurology and when no further procedures needed  Will need LMWH indefinitely at this point    - Right upper lobe mass with mediastinal hilar lymphadenopathy is malignant until proven otherwise  Consider small cell given smoking history  The liver lesions are likely too small for biopsy    - Patient would benefit from EBUS TBNA of mediastinal and hilar lymph nodes, which would help diagnose and stage him  Heparin will need to be held for the procedure  Will figure out timing for the procedure, which can be done this admission but likely should be done as outpatient, pending his clinical course   Also will need input from ENT regarding nasopharyngeal mass - are these related or two separate suspected primaries? - Await MRI brain   - Right-sided effusion is small and water density  May be secondary to pulmonary emboli  Too small for thoracentesis at this time  - Swallowing evaluation for dysphagia  - Etiology of CVA is likely due to hypercoaguable state from malignancy  PFO? Monitor neuro status closely while on heparin  At risk for hemorrhagic conversion  If he has hemorrhagic conversion and heparin is stopped, will need IVC filter  Oneta Pain on CT - needs o/p f/u for PFTs  Complicated case - multiple issues  Discussed need to hold heparin for biopsy with risk of CVA while its on hold with patient, daughter, and wife  Risk of hemorrhagic conversion on heparin  Risk of worsened clot since he is not able to receive heparin boluses/reboluses, etc  Overall prognosis guarded given likely cancer with PEs, DVTs, and new CVA  ______________________________________________________________________    Chief complaint:  I feel okay    HPI:    Antonella Hernandez is a 72 y o  male who presented as a stroke alert  History is per patient's wife and daughter who were sitting at the bedside  Patient prefers the provide history  Patient was sitting at the table eating lunch 20 only slumped to the right side  His wife was able to hold him and call 911  The advised lying him on the floor but he was to further to moves she held up until EMS arrived  In the emergency room, his CT head showed recent infarct in left caudate head, anterior lentiform nucleus and anterior limb of the internal capsule  Because he was found to evidence of stroke on CT scan, he was determined to be outside the window for tPA  He was noted to have an oxygen requirement, which is new for him  There was concern for PE  Because he had a CTA head/neck, he did not have a CTA chest but was empirically started on heparin    Admitted to the floors we are consulted to help with pulmonary management  Found lying in bed  Awake but somewhat withdrawn  Responds to questions  Has pursed lip breathing, which family reports is chronic for him and daughter reports is improved since yesterday  Has had shortness of breath for over a year  Patient has not noticed it but his wife has  About 6 weeks ago, he lost 10-15 lb dramatically  This was unintentional   He also had a decreased appetite  Patient went to primary care doctor and has been undergoing workup for unintentional weight loss  He had labs or unrevealing and was scheduled for a CT two days ago but did not make it due to confusion about location  No swelling or pain in bilateral lower extremities  Heavy smoker in past   Quit 15 years ago  Review of Systems:  Positive as mentioned above and negative otherwise    Historical Information   PMH  Nicotine dependence in remission  Sebaceous cysts behind right ear    PSH  Cataract extraction    Social History   Social History     Substance and Sexual Activity   Alcohol Use Yes     Social History     Tobacco Use   Smoking Status Former Smoker   Smokeless Tobacco Never Used       Smoked 2+ ppd x 40+ years, quit at age 48    Occupational history:  Works as a  - no occupational exposures to lung per family    Family History:   No family history of lung disease    Medications: The patient's active and prehospital medications were reviewed    Current Facility-Administered Medications   Medication Dose Route Frequency Provider Last Rate    acetaminophen  650 mg Rectal Q6H PRN Allison Knuckles, DO      aspirin  300 mg Rectal Daily Allison Knuckles, DO      heparin (porcine)  3-20 Units/kg/hr (Order-Specific) Intravenous Titrated Allison Knuckles, DO 17 8 Units/kg/hr (08/01/21 0905)    lidocaine  5 mL Topical Once Denver Pimenta, MD      oxymetazoline  2 spray Each Nare Once Denver Pimenta, MD           PhysicalExamination:  Vitals:   Vitals:    08/01/21 0345 08/01/21 8332 08/01/21 0645 08/01/21 0805   BP: 136/87 135/85 129/83 131/82   Pulse: 73 73 68 72   Resp:       Temp:       TempSrc:       SpO2: 90% (!) 89% 97% 94%   Weight:         There is no height or weight on file to calculate BMI  Temp  Min: 98 1 °F (36 7 °C)  Max: 98 7 °F (37 1 °C)       SpO2: 94 %,   SpO2 Activity: At Rest,   O2 Device: Mid flow nasal cannula    GEN: appears unwell/sick; no distress; pursed lip breathing  HEENT: NCAT, EOMI, mild right eyelid ptosis  CVS: regular  LUNGS: clear b/l bs  ABD: soft, nd, nt  EXT: No c/c/e  NEURO: + left sided weakness  MS: Moving all extremities  SKIN: warm, dry  PSYCH: calm, cooperative      Diagnostic Data:  CBC:   Results from last 7 days   Lab Units 08/01/21  0507 07/31/21  1448   WBC Thousand/uL 11 12* 9 18   HEMOGLOBIN g/dL 11 1* 10 3*   HEMATOCRIT % 36 0* 33 9*   PLATELETS Thousands/uL 314 245       CMP:   Results from last 7 days   Lab Units 08/01/21  0507 07/31/21  1448   SODIUM mmol/L 139 138   POTASSIUM mmol/L 4 1 4 2   CHLORIDE mmol/L 108 106   CO2 mmol/L 22 25   BUN mg/dL 17 19   CREATININE mg/dL 0 89 1 05   CALCIUM mg/dL 9 8 9 2   ALK PHOS U/L  --  56   ALT U/L  --  45   AST U/L  --  34       Microbiology: COVID negative        Imaging:  B/L L/E U/S: acute DVT RLE posterior tibial and peroneal veins; acute DVT LLE peroneal veins and acute superficial thrombophlebitis LLE great saphenous vein from mid thigh to calf  CT chest per my review shows 6 4-3 6-3 4 cm large RUL lung mass with diffuse right sided hilar and mediastinal lymphadenopathy; multiple small liver lesions, small right sided pleural effusion      Cardiac lab/EKG/telemetry/ECHO:   Results from last 7 days   Lab Units 08/01/21  0802 08/01/21  0758 07/31/21  2338 07/31/21  1759   TROPONIN I ng/mL 10 80*  --  8 23* 7 54*   NT-PRO BNP pg/mL  --  0,796*  --   --      ECHO pending      Susana Cortes, DO

## 2021-08-01 NOTE — ED ATTENDING ATTESTATION
7/31/2021  IRishi MD, saw and evaluated the patient  I have discussed the patient with the resident/non-physician practitioner and agree with the resident's/non-physician practitioner's findings, Plan of Care, and MDM as documented in the resident's/non-physician practitioner's note, except where noted  All available labs and Radiology studies were reviewed  I was present for key portions of any procedure(s) performed by the resident/non-physician practitioner and I was immediately available to provide assistance  At this point I agree with the current assessment done in the Emergency Department  I have conducted an independent evaluation of this patient a history and physical is as follows:    ED Course     Patient is a 71-year-old male brought in his stroke alert  Per EMS, last known well 1 hour prior to arrival   Symptoms include right-sided weakness right facial droop dysarthria  Patient noted to be hypoxic require 2 liters O2 to keep pulse ox above 90 percent  Patient taken directly to CT scanner from EMS as per stroke protocol  Neurology saw patient examined patient at bedside  Patient with NIH stroke scale of 9  Review of neuroimaging with Radiology Neurology shows a left stroke on left caudate and internal capsula at places patient's timing of stroke symptoms beyond the 3 hour surekha/subacute stroke  No LVO  Patient deemed not a tPA candidate will give rectal aspirin secondary to failed dysphagia screen admit patient to medicine for stroke pathway  Patient noted to have incidental lung mass as well as parapharyngeal mass on CT imaging of head neck  Labs reviewed patient has elevated troponin      Patient admitted to [address] [place] [address] Time  Performed by: Rishi Raygoza MD  Authorized by: Rishi Raygoza MD     Critical care provider statement:     Critical care time (minutes):  32    Critical care time was exclusive of: Separately billable procedures and treating other patients and teaching time    Critical care was necessary to treat or prevent imminent or life-threatening deterioration of the following conditions:  CNS failure or compromise    Critical care was time spent personally by me on the following activities:  Obtaining history from patient or surrogate, development of treatment plan with patient or surrogate, discussions with consultants, evaluation of patient's response to treatment, ordering and review of laboratory studies, ordering and review of radiographic studies, re-evaluation of patient's condition and examination of patient    I assumed direction of critical care for this patient from another provider in my specialty: no

## 2021-08-01 NOTE — ASSESSMENT & PLAN NOTE
· Heparin gtt  · In setting of hypoxia will presume pt has PE  · Echocardiogram does reveal hypokinesis of right ventricle, await official read

## 2021-08-01 NOTE — PROGRESS NOTES
[facility]  Progress Note Jack Exon 1950, 72 y o  male MRN: [id number]  Unit/Bed#: [facility] 725-01 Encounter: [id number]  Primary Care Provider: Mihai Blanchard MD   Date and time admitted to hospital: 7/31/2021  2:26 PM    * Acute CVA (cerebrovascular accident) [facility])  Assessment & Plan  · Neuro appreciated  · Source suspected to be   Hypercoagulable state due to likely underlying malignancy visualized in lung and nasopharyngeal mass  · Initially failed swallow evaluation but now able to tolerate level 3 with thin liquids per speech  · Rectal ASA  · CVA pathway  · MRI brain pending  · Echo- read pending does appear to have hypokinesis of right ventricle  · Initiate aspirin and statin as patient is now able to tolerate diet    Acute deep vein thrombosis (DVT) of distal vein of both lower extremities (HCC)  Assessment & Plan  · Heparin gtt  · In setting of hypoxia will presume pt has PE  · Echocardiogram does reveal hypokinesis of right ventricle, await official read    Elevated troponin  Assessment & Plan  · Troponin elevation likely type 2 secondary to pulmonary embolism and hypoxia  · 6 90 > 7 54 > 10 80  · Will trend to peak then dc  · Echo- official read pending  · Received rectal aspirin  · D/w neuro - ok for heparin gtt w/ no boluses or reboluses  · Cardio consult- input appreciated  ·  Consider CT angiogram once patient more clinically stable  · Continue aspirin, heparin, treating underlying cause  · FLP- pending  · HbA1c: 5 6    Dysphagia  Assessment & Plan  · Failed RN swallow eval  · Speech has cleared patient for dysphagia level 3 and thin liquid    Nasopharyngeal mass  Assessment & Plan  · ENT consult- directly visualized- will need biopsy  ·  trying to coordinate with Pulmonary Medicine to perform at the same time    Patient will need bronch with ebus and TBNA  · CT CAP to check for other masses: numerous too small to characterize hepatic hypodense lesions  ·  Consider MRI abdomen with contrast to further characterize    Acute respiratory failure with hypoxia ([address] )  Assessment & Plan  · Requiring 2L to keep O2 above 89  · CTA chest deferred given recent contrast load and patient being poor  Candidate for lysis  · LEVDs confirm b/l DVTs so will assume pt has PE        VTE Pharmacologic Prophylaxis: VTE Score: 7 High Risk (Score >/= 5) - Pharmacological DVT Prophylaxis Ordered: heparin drip  Sequential Compression Devices Ordered  Patient Centered Rounds: I performed bedside rounds with nursing staff today  Discussions with Specialists or Other Care Team Provider:  Discussed with ENT, pulmonology  Will anticipate further biopsies  Do agrees that this is likely type 2 troponin elevation in the setting pulmonary embolism  Education and Discussions with Family / Patient: Patient declined call to   Time Spent for Care: 60 minutes  More than 50% of total time spent on counseling and coordination of care as described above  Current Length of Stay: 1 day(s)  Current Patient Status: Inpatient   Certification Statement: The patient will continue to require additional inpatient hospital stay due to Further evaluation of possible malignancy, recent stroke, heparin drip  Discharge Plan: Anticipate discharge in >72 hrs to rehab facility  Code Status: Level 1 - Full Code    Subjective:   Patient appears comfortable, does have right-sided weakness and slurred speech  Baseline right-sided blindness    Objective:     Vitals:   Temp (24hrs), Av 4 °F (36 9 °C), Min:98 1 °F (36 7 °C), Max:98 7 °F (37 1 °C)    Temp:  [98 1 °F (36 7 °C)-98 7 °F (37 1 °C)] 98 7 °F (37 1 °C)  HR:  [68-82] 69  Resp:  [18-21] 21  BP: (129-162)/(79-95) 135/84  SpO2:  [87 %-97 %] 90 %  There is no height or weight on file to calculate BMI  Input and Output Summary (last 24 hours):      Intake/Output Summary (Last 24 hours) at 2021 1218  Last data filed at 8/1/2021 0905  Gross per 24 hour   Intake --   Output 200 ml   Net -200 ml       Physical Exam:   Physical Exam  Vitals and nursing note reviewed  Constitutional:       General: He is not in acute distress  Appearance: Normal appearance  He is well-developed  HENT:      Head: Normocephalic and atraumatic  Eyes:      General: No scleral icterus  Extraocular Movements:      Right eye: Abnormal extraocular motion present  Conjunctiva/sclera: Conjunctivae normal       Comments: R sided blindness baseline, gaze not aligned   Cardiovascular:      Rate and Rhythm: Normal rate and regular rhythm  Heart sounds: No murmur heard  Pulmonary:      Effort: Pulmonary effort is normal       Breath sounds: No wheezing, rhonchi or rales  Abdominal:      General: There is no distension  Palpations: Abdomen is soft  Skin:     General: Skin is warm and dry  Neurological:      General: No focal deficit present  Mental Status: He is alert  Cranial Nerves: Dysarthria present  Motor: Weakness (R sided) present     Psychiatric:         Mood and Affect: Mood normal           Additional Data:     Labs:  Results from last 7 days   Lab Units 08/01/21  0507   WBC Thousand/uL 11 12*   HEMOGLOBIN g/dL 11 1*   HEMATOCRIT % 36 0*   PLATELETS Thousands/uL 314     Results from last 7 days   Lab Units 08/01/21  0507 07/31/21  1448   SODIUM mmol/L 139 138   POTASSIUM mmol/L 4 1 4 2   CHLORIDE mmol/L 108 106   CO2 mmol/L 22 25   BUN mg/dL 17 19   CREATININE mg/dL 0 89 1 05   ANION GAP mmol/L 9 7   CALCIUM mg/dL 9 8 9 2   ALBUMIN g/dL  --  2 4*   TOTAL BILIRUBIN mg/dL  --  0 33   ALK PHOS U/L  --  56   ALT U/L  --  45   AST U/L  --  34   GLUCOSE RANDOM mg/dL 93 102     Results from last 7 days   Lab Units 07/31/21  1448   INR  1 28*     Results from last 7 days   Lab Units 07/31/21  1430   POC GLUCOSE mg/dl 100     Results from last 7 days   Lab Units 08/01/21  0507   HEMOGLOBIN A1C % 5 7*     Results from last 7 days   Lab Units 08/01/21  0759   PROCALCITONIN ng/ml <0 05       Lines/Drains:  Invasive Devices     Peripheral Intravenous Line            Peripheral IV 08/01/21 Dorsal (posterior); Right Hand <1 day    Peripheral IV 08/01/21 Right Antecubital <1 day                  Telemetry:  Telemetry Orders (From admission, onward)             48 Hour Telemetry Monitoring  Continuous x 48 hours     Question:  Reason for 48 Hour Telemetry  Answer:  Acute CVA (<24 hrs old, hemispheric strokes, selected brainstem strokes, cardiac arrhythmias)                 Telemetry Reviewed: Normal Sinus Rhythm  Indication for Continued Telemetry Use: PE           Imaging: Reviewed radiology reports from this admission including: abdominal/pelvic CT and CT head    Recent Cultures (last 7 days):         Last 24 Hours Medication List:   Current Facility-Administered Medications   Medication Dose Route Frequency Provider Last Rate    acetaminophen  650 mg Rectal Q6H PRN Freada Simper, DO      aspirin  81 mg Oral Daily [employer] YONATAN PERRIN      atorvastatin  40 mg Oral Daily With Chief Trunk V, PA-C      heparin (porcine)  3-20 Units/kg/hr (Order-Specific) Intravenous Titrated Freada Simper, DO 17 8 Units/kg/hr (08/01/21 0905)    lidocaine  5 mL Topical Once Ranjit Freeman MD      oxymetazoline  2 spray Each Nare Once Ranjit Freeman MD          Today, Patient Was Seen By: Bob Dye PA-C    **Please Note: This note may have been constructed using a voice recognition system  **

## 2021-08-01 NOTE — CONSULTS
Reason for Consult / Principal Problem:elevated troponin  Physician Requesting Consult:  Jagruti Boswell MD    Cardiologist: none  Assessment and Plan      Current Problem List   Principal Problem:    Acute CVA (cerebrovascular accident) ([address] )  Active Problems:    Elevated troponin    Acute respiratory failure with hypoxia ([facility])    Acute deep vein thrombosis (DVT) of distal vein of both lower extremities (HCC)    Nasopharyngeal mass    Dysphagia    Assessment/Plan:    1  Elevated troponin - in the setting of likely pulmonary embolism - wet read of patient's echocardiogram reveals significantly dilated right ventricle however technically difficult study, pending official read  Patient denies any chest pain to me  · Patient at this time likely has elevated troponin in the setting of likely submassive pulmonary embolism given findings on echocardiogram   · Other differentials do include acute coronary syndrome although feel this is less likely inpatient would not be able to undergo a cardiac catheterization regardless given his respiratory status as well as need for dual antiplatelet therapy  · Would continue medically treating with aspirin daily and heparin  · Await official read on echocardiogram   · Ultimately likely will need CT angiogram for further evaluation potential pulmonary embolism - but will leave up to primary team   · Continue aspirin and heparin drip as long as neurology clears  2   Elevated BNP  ·  Patient clinically does not appear to be in heart failure, this is likely elevated in the setting of likely pulmonary embolism    3  Acute respiratory failure  ·  Per primary, likely multifactorial   ·       Subjective     CC:  Elevated troponin    HPI: This is a 43-year-old male past medical history significant for tobacco abuse and no other known past medical history as records are not available although patient denies who presented to [facility] secondary to stroke alert  Patient reportedly had right-sided weakness around 1:00 p m  Yesterday  On presentation to the emergency department a stroke alert was called  Patient did not receive IV tPA secondary to infarct appearing acute to subacute with high bleed risk  Patient ultimately had a CT chest abdomen pelvis which revealed metastatic cancer likely secondary to primary lung cancer  As well as mass in the right nasopharynx  Patient was found to have bilateral DVTs suspect to have hypercoagulability of malignancy, subsequently troponin was obtained which trended upwards from 6-10  A BNP was elevated 4600  On questioning this morning patient to me denies any chest pain, he states that he is also not short of breath however patient is on mid flow nasal cannula at 8 liters/minute  He otherwise denies any previous cardiac history  EKG was reviewed which was notable for sinus rhythm, normal axis, T-wave inversions in V3 and V4 no ST segment elevation, T-wave inversions also noted in the inferior leads  He otherwise denies any complaints to me  Telemetry:  No events  Net fluid balance:      Weight: 190 lbs  EKG: see above    ECHO: Pending    Stress test: none in system  Cardiac Catheterization: none - patient denies  Device Interrogation: none    CHEST X-RAY: pending repeat  CT scan: see above    Labs: see above  Family History: History reviewed  No pertinent family history  Historical Information   History reviewed  No pertinent past medical history  History reviewed  No pertinent surgical history  Social History   Social History     Substance and Sexual Activity   Alcohol Use Yes     Social History     Substance and Sexual Activity   Drug Use Never     Social History     Tobacco Use   Smoking Status Former Smoker   Smokeless Tobacco Never Used     Family History: History reviewed  No pertinent family history      Review of Systems:  Review of Systems   Constitutional: Negative for activity change and fever  Eyes: Negative for discharge  Respiratory: Negative for chest tightness and shortness of breath  Cardiovascular: Negative for chest pain  Gastrointestinal: Negative for abdominal distention  Psychiatric/Behavioral: Negative for agitation  Scheduled Meds:  Current Facility-Administered Medications   Medication Dose Route Frequency Provider Last Rate    acetaminophen  650 mg Rectal Q6H PRN Fermin Twin Brooks, DO      aspirin  300 mg Rectal Daily Fermin Twin Brooks, DO      heparin (porcine)  3-20 Units/kg/hr (Order-Specific) Intravenous Titrated Fermin Twin Brooks, DO 17 8 Units/kg/hr (21)    lidocaine  5 mL Topical Once Eldon Cm MD      oxymetazoline  2 spray Each Nare Once Eldon Cm MD       Continuous Infusions:heparin (porcine), 3-20 Units/kg/hr (Order-Specific), Last Rate: 17 8 Units/kg/hr (21)      PRN Meds:   acetaminophen  current meds:   Current Facility-Administered Medications   Medication Dose Route Frequency    acetaminophen (TYLENOL) rectal suppository 650 mg  650 mg Rectal Q6H PRN    aspirin rectal suppository 300 mg  300 mg Rectal Daily    heparin (porcine) 25,000 units in 0 45% NaCl 250 mL infusion (premix)  3-20 Units/kg/hr (Order-Specific) Intravenous Titrated    lidocaine (XYLOCAINE) 4 % topical solution 5 mL  5 mL Topical Once    oxymetazoline (AFRIN) 0 05 % nasal spray 2 spray  2 spray Each Nare Once    and PTA meds:   Prior to Admission Medications   Prescriptions Last Dose Informant Patient Reported?  Taking?   multivitamin (THERAGRAN) TABS  Spouse/Significant Other Yes Yes   Sig: Take 1 tablet by mouth daily      Facility-Administered Medications: None       No Known Allergies    Objective   Vitals: Temp (24hrs), Av 4 °F (36 9 °C), Min:98 1 °F (36 7 °C), Max:98 7 °F (37 1 °C)  Current: Temperature: 98 7 °F (37 1 °C)  Patient Vitals for the past 24 hrs:   BP Temp Temp src Pulse Resp SpO2 Weight   21 0805 131/82 -- -- 72 -- 94 % --   08/01/21 0645 129/83 -- -- 68 -- 97 % --   08/01/21 0445 135/85 -- -- 73 -- (!) 89 % --   08/01/21 0345 136/87 -- -- 73 -- 90 % --   08/01/21 0245 139/94 -- -- 74 -- 90 % --   08/01/21 0230 141/93 -- -- 72 -- 91 % --   08/01/21 0226 141/93 -- -- 75 -- (!) 89 % --   08/01/21 0145 140/95 -- -- 74 -- (!) 87 % --   07/31/21 2345 142/91 -- -- 78 -- 91 % --   07/31/21 2151 -- 98 7 °F (37 1 °C) Oral 76 21 91 % --   07/31/21 2000 139/84 -- -- 74 18 94 % --   07/31/21 1900 148/84 -- -- 74 18 92 % --   07/31/21 1830 142/87 -- -- 72 18 92 % --   07/31/21 1800 143/85 -- -- 74 18 93 % --   07/31/21 1730 143/91 -- -- 74 20 92 % --   07/31/21 1700 162/90 -- -- 74 18 93 % --   07/31/21 1600 149/82 -- -- 74 20 93 % --   07/31/21 1530 150/82 -- -- 76 20 92 % --   07/31/21 1515 138/93 -- -- 80 20 92 % --   07/31/21 1500 143/94 -- -- 80 18 92 % --   07/31/21 1454 149/88 -- -- 82 18 93 % --   07/31/21 1449 148/80 -- -- 82 -- 94 % --   07/31/21 1436 130/79 -- -- 77 18 94 % --   07/31/21 1430 152/87 98 1 °F (36 7 °C) Oral 79 18 92 % 86 6 kg (190 lb 14 7 oz)    There is no height or weight on file to calculate BMI  Orthostatic Blood Pressures      Most Recent Value   Blood Pressure  131/82 filed at 08/01/2021 0805   Patient Position - Orthostatic VS  Lying filed at 07/31/2021 2000              Invasive Devices     Peripheral Intravenous Line            Peripheral IV 08/01/21 Dorsal (posterior); Right Hand <1 day    Peripheral IV 08/01/21 Right Antecubital <1 day                Physical Exam:    General:  AO x3 - in acute distress  Cardiac:  S1-S2 normal  No murmurs, rubs or gallops, JVP:not elevated  Lungs:  No adventitious lung sounds noted  Abdomen:  Soft nontender nondistended, positive bowel sounds  Extremities:  Warm  Neuro: noted to have facial droop  Able to speak with slurring of words, but able to discuss his medical conditions     Psych:  Normal affect      Lab Results:   Results from last 7 days   Lab Units 08/01/21  0507 07/31/21  1448   WBC Thousand/uL 11 12* 9 18   HEMOGLOBIN g/dL 11 1* 10 3*   HEMATOCRIT % 36 0* 33 9*   PLATELETS Thousands/uL 314 245      Results from last 7 days   Lab Units 08/01/21  0802 08/01/21  0759 08/01/21  0507 08/01/21 0025 07/31/21 2338 07/31/21 1759 07/31/21  1448   SODIUM mmol/L  --   --  139  --   --   --  138   POTASSIUM mmol/L  --   --  4 1  --   --   --  4 2   CHLORIDE mmol/L  --   --  108  --   --   --  106   CO2 mmol/L  --   --  22  --   --   --  25   BUN mg/dL  --   --  17  --   --   --  19   CREATININE mg/dL  --   --  0 89  --   --   --  1 05   CALCIUM mg/dL  --   --  9 8  --   --   --  9 2   ALK PHOS U/L  --   --   --   --   --   --  56   ALT U/L  --   --   --   --   --   --  45   AST U/L  --   --   --   --   --   --  34   TROPONIN I ng/mL 10 80*  --   --   --  8 23* 7 54* 6 90*   MAGNESIUM mg/dL  --   --  2 6  --   --   --   --    PHOSPHORUS mg/dL  --   --  3 0  --   --   --   --    INR   --   --   --   --   --   --  1 28*   PTT seconds  --  30  --  58*  --   --  29   EGFR ml/min/1 73sq m  --   --  87  --   --   --  72     Results from last 7 days   Lab Units 08/01/21  0759 08/01/21 0025 07/31/21  1448   INR   --   --  1 28*   PTT seconds 30 58* 29         Results from last 7 days   Lab Units 08/01/21  0802 07/31/21 2338 07/31/21 1759 07/31/21  1448   TROPONIN I ng/mL 10 80* 8 23* 7 54* 6 90*     No results found for: PHART, WLC6URY, PO2ART, EIL8ULE, E9IHXVGV, BEART, SOURCE  No components found for: HIV1X2  No results found for: HAV, HEPAIGM, HEPBIGM, HEPBCAB, HBEAG, HEPCAB  No results found for: SPEP, UPEP   Lab Results   Component Value Date    HGBA1C 5 7 (H) 08/01/2021     No results found for: CHOL   Lab Results   Component Value Date    HDL 30 (L) 08/01/2021      Lab Results   Component Value Date    LDLCALC 76 08/01/2021      Lab Results   Component Value Date    TRIG 95 08/01/2021     No components found for: PROCAL          Imaging: I have personally reviewed pertinent reports

## 2021-08-01 NOTE — CONSULTS
Otolaryngology (ENT) Consultation Note     Doc Tabitha  [id number]  1950       Chief Complaint: Nasopharyngeal Mass    History of Present Illness:  61-year-old man who presented to [facility] for acute stroke  CT scan showed acute left sided stroke  As part of the stroke workup, CTA head and neck was performed  This did show a right-sided nasopharyngeal mass for which ENT is consulted  In addition, he was also found to have a right upper lobe pulmonary nodule as well as extensive right hilar and mediastinal adenopathy  He is currently on a heparin drip  In addition to stroke, he was found to have multiple DVTs and some evidence of PE as well  He does have a history of smoking 2 packs per day plus but quit about 15 years ago  For the past 10 or so years, he has been working for a [employer] business  No Known Allergies    History reviewed  No pertinent past medical history  History reviewed  No pertinent surgical history  Social History     Socioeconomic History    Marital status: /Civil Union     Spouse name: Not on file    Number of children: Not on file    Years of education: Not on file    Highest education level: Not on file   Occupational History    Not on file   Tobacco Use    Smoking status: Former Smoker    Smokeless tobacco: Never Used   Vaping Use    Vaping Use: Never used   Substance and Sexual Activity    Alcohol use: Yes    Drug use: Never    Sexual activity: Not on file   Other Topics Concern    Not on file   Social History Narrative    Not on file     Social Determinants of Health     Financial Resource Strain:     Difficulty of Paying Living Expenses:    Food Insecurity:     Worried About Running Out of Food in the Last Year:     [address] in the Last Year:    Transportation Needs:     Lack of Transportation (Medical):      Lack of Transportation (Non-Medical):    Physical Activity:     Days of Exercise per Week:     Minutes of Exercise per Session:    Stress:     Feeling of Stress :    Social Connections:     Frequency of Communication with Friends and Family:     Frequency of Social Gatherings with Friends and Family:     Attends [employer] Services:     Active Member of Clubs or Organizations:     Attends Club or Organization Meetings:     Marital Status:    Intimate Partner Violence:     Fear of Current or Ex-Partner:     Emotionally Abused:     Physically Abused:     Sexually Abused:        History reviewed  No pertinent family history  No current facility-administered medications on file prior to encounter  Current Outpatient Medications on File Prior to Encounter   Medication Sig Dispense Refill    multivitamin (THERAGRAN) TABS Take 1 tablet by mouth daily         Review of Systems:  10-point ROS performed  Patient denies fevers or chills  All other systems reviewed and found to be negative unless otherwise noted in the HPI  Vitals:    08/01/21 0805   BP: 131/82   Pulse: 72   Resp:    Temp:    SpO2: 94%       General Appearance: No apparent distress    Head: Normocephalic, atraumatic  Face:  Some right-sided facial weakness noted house Brackmann 2 out of 6  Eyes: Conjunctiva clear, extraocular movements are intact, but on straight gaze, right eye does seem to drift laterally  He denies double vision  Ears: Pinna normal shape and position  Canals are clear  TMs intact with no middle ear effusion  Nose: External pyramid midline  Oral cavity/Oropharynx: No mucosal lesions, masses, or pharyngeal asymmetry  Neck: No cervical lymphadenopathy or masses appreciated    Skin: Skin warm and dry  Respiratory: No stridor or labored breathing  Cardiovascular: Good perfusion of the upper extremities  No cyanosis of the fingers or hands  Psychiatric: Alert  Data Reviewed:  CT scans were reviewed as above per the HPI      Assessment:  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  Patient also found to have DVTs, PE, and right-sided lung mass  Plan: This is a complicated cases the patient is just recovering from an acute stroke and also has DVTs and PEs  He is currently on heparin drip  I did visualize the nasopharyngeal mass via scope exam today  Etiology is unknown at this time, but would likely benefit from biopsy  Of course, the timing has to be considered and risks and benefits weighed given the fact that he is on blood thinners for stroke, DVT, and PE  Per pulmonology note, they are considering biopsy during this admission  Could consider performing endoscopic nasopharyngeal biopsy at the same time if schedule allows  Will follow  Melody Almazan MD  Otolaryngology - Head & Neck Surgery  [facility]    ADDENDUM:  After speaking with pulmonology, we will work with both of our schedulers to find time to perform biopsies in one OR trip  This is likely to happen as an outpatient    Will request medical clearance for the procedure from the team

## 2021-08-01 NOTE — PLAN OF CARE
Summary   Pt presented with s/s suggestive of minimal oral and suspected minimal pharyngeal dysphagia  Symptoms or concerns included min prolonged mastication and transfer time with regular solids, with cough on 1 of 4 sips thin liquids  Patient presents with right side facial droop and weakness and is at risk of pocketing  However, patient is able to use lingual sweep to clear  Patient presents with dysarthria and would benefit from formal speech/language evaluation when able        Risk/s for Aspiration: low     Recommended Diet: soft/level 3 diet and thin liquids   Recommended Form of Meds: whole with liquid   Aspiration precautions and swallowing strategies: upright posture and small bites/sips  Other Recommendations: Continue frequent oral care

## 2021-08-01 NOTE — ASSESSMENT & PLAN NOTE
- Troponin elevated on presentation 6 90, evaluated by Cardiology, likely due to PE   - Echo completed, EF 55%, right atrium normal sized

## 2021-08-01 NOTE — ASSESSMENT & PLAN NOTE
· Neuro appreciated  · Source suspected to be   Hypercoagulable state due to likely underlying malignancy visualized in lung and nasopharyngeal mass  · Initially failed swallow evaluation but now able to tolerate level 3 with thin liquids per speech  · Rectal ASA  · CVA pathway  · MRI brain pending  · Echo- read pending does appear to have hypokinesis of right ventricle  · Initiate aspirin and statin as patient is now able to tolerate diet

## 2021-08-02 LAB
ANION GAP SERPL CALCULATED.3IONS-SCNC: 4 MMOL/L (ref 4–13)
APTT PPP: 33 SECONDS (ref 23–37)
APTT PPP: 50 SECONDS (ref 23–37)
ATRIAL RATE: 77 BPM
BASOPHILS # BLD AUTO: 0.08 THOUSANDS/ΜL (ref 0–0.1)
BASOPHILS NFR BLD AUTO: 1 % (ref 0–1)
BUN SERPL-MCNC: 20 MG/DL (ref 5–25)
CALCIUM SERPL-MCNC: 9.8 MG/DL (ref 8.3–10.1)
CHLORIDE SERPL-SCNC: 110 MMOL/L (ref 100–108)
CO2 SERPL-SCNC: 24 MMOL/L (ref 21–32)
CREAT SERPL-MCNC: 0.99 MG/DL (ref 0.6–1.3)
EOSINOPHIL # BLD AUTO: 0.16 THOUSAND/ΜL (ref 0–0.61)
EOSINOPHIL NFR BLD AUTO: 2 % (ref 0–6)
ERYTHROCYTE [DISTWIDTH] IN BLOOD BY AUTOMATED COUNT: 15.6 % (ref 11.6–15.1)
GFR SERPL CREATININE-BSD FRML MDRD: 77 ML/MIN/1.73SQ M
GLUCOSE SERPL-MCNC: 96 MG/DL (ref 65–140)
HCT VFR BLD AUTO: 34.2 % (ref 36.5–49.3)
HGB BLD-MCNC: 10.4 G/DL (ref 12–17)
IMM GRANULOCYTES # BLD AUTO: 0.04 THOUSAND/UL (ref 0–0.2)
IMM GRANULOCYTES NFR BLD AUTO: 0 % (ref 0–2)
LYMPHOCYTES # BLD AUTO: 1.82 THOUSANDS/ΜL (ref 0.6–4.47)
LYMPHOCYTES NFR BLD AUTO: 19 % (ref 14–44)
MCH RBC QN AUTO: 25.6 PG (ref 26.8–34.3)
MCHC RBC AUTO-ENTMCNC: 30.4 G/DL (ref 31.4–37.4)
MCV RBC AUTO: 84 FL (ref 82–98)
MONOCYTES # BLD AUTO: 0.81 THOUSAND/ΜL (ref 0.17–1.22)
MONOCYTES NFR BLD AUTO: 9 % (ref 4–12)
NEUTROPHILS # BLD AUTO: 6.6 THOUSANDS/ΜL (ref 1.85–7.62)
NEUTS SEG NFR BLD AUTO: 69 % (ref 43–75)
NRBC BLD AUTO-RTO: 0 /100 WBCS
P AXIS: 42 DEGREES
PLATELET # BLD AUTO: 331 THOUSANDS/UL (ref 149–390)
PMV BLD AUTO: 10.2 FL (ref 8.9–12.7)
POTASSIUM SERPL-SCNC: 4 MMOL/L (ref 3.5–5.3)
PR INTERVAL: 148 MS
PROCALCITONIN SERPL-MCNC: <0.05 NG/ML
QRS AXIS: -43 DEGREES
QRSD INTERVAL: 82 MS
QT INTERVAL: 400 MS
QTC INTERVAL: 452 MS
RBC # BLD AUTO: 4.06 MILLION/UL (ref 3.88–5.62)
SODIUM SERPL-SCNC: 138 MMOL/L (ref 136–145)
T WAVE AXIS: -51 DEGREES
VENTRICULAR RATE: 77 BPM
WBC # BLD AUTO: 9.51 THOUSAND/UL (ref 4.31–10.16)

## 2021-08-02 PROCEDURE — 94760 N-INVAS EAR/PLS OXIMETRY 1: CPT

## 2021-08-02 PROCEDURE — 85730 THROMBOPLASTIN TIME PARTIAL: CPT | Performed by: INTERNAL MEDICINE

## 2021-08-02 PROCEDURE — 94762 N-INVAS EAR/PLS OXIMTRY CONT: CPT

## 2021-08-02 PROCEDURE — 97163 PT EVAL HIGH COMPLEX 45 MIN: CPT

## 2021-08-02 PROCEDURE — 85025 COMPLETE CBC W/AUTO DIFF WBC: CPT | Performed by: PHYSICIAN ASSISTANT

## 2021-08-02 PROCEDURE — 99232 SBSQ HOSP IP/OBS MODERATE 35: CPT | Performed by: INTERNAL MEDICINE

## 2021-08-02 PROCEDURE — 97167 OT EVAL HIGH COMPLEX 60 MIN: CPT

## 2021-08-02 PROCEDURE — 93010 ELECTROCARDIOGRAM REPORT: CPT | Performed by: INTERNAL MEDICINE

## 2021-08-02 PROCEDURE — 80048 BASIC METABOLIC PNL TOTAL CA: CPT | Performed by: PHYSICIAN ASSISTANT

## 2021-08-02 PROCEDURE — 84145 PROCALCITONIN (PCT): CPT | Performed by: PHYSICIAN ASSISTANT

## 2021-08-02 PROCEDURE — 99233 SBSQ HOSP IP/OBS HIGH 50: CPT | Performed by: PSYCHIATRY & NEUROLOGY

## 2021-08-02 RX ADMIN — ATORVASTATIN CALCIUM 40 MG: 40 TABLET, FILM COATED ORAL at 17:32

## 2021-08-02 RX ADMIN — HEPARIN SODIUM 20 UNITS/KG/HR: 10000 INJECTION, SOLUTION INTRAVENOUS at 08:31

## 2021-08-02 NOTE — PROGRESS NOTES
Attempted to follow up visit patient  Patient was not available for visit at this time  Will plan next visit if able       08/02/21 1100   Clinical Encounter Type   Visited With Patient not available   Referral From

## 2021-08-02 NOTE — ASSESSMENT & PLAN NOTE
· Most likely due to acute PE an RV dysfunction  · Patient denies any chest pain  · Cardiology following

## 2021-08-02 NOTE — PROGRESS NOTES
[address] [facility]  Progress Note Lisa Bradford 1950, 72 y o  male MRN: [id number]  Unit/Bed#: [facility] 725-01 Encounter: [id number]  Primary Care Provider: Nikia Donahue MD   Date and time admitted to hospital: 7/31/2021  2:26 PM    * Acute CVA (cerebrovascular accident) [facility])  Assessment & Plan  · Neuro appreciated  · Source suspected to be   Hypercoagulable state due to likely underlying malignancy visualized in lung and nasopharyngeal mass  · MRI brain pending  · Echo- shows right ventricular dysfunction  · Since patient on anticoagulation  No aspirin from Neurology standpoint  · Continue statin  · This morning patient reports that his right-sided weakness is improving  Nasopharyngeal mass  Assessment & Plan  · ENT consult- directly visualized- will need biopsy   ·  trying to coordinate with Pulmonary Medicine to perform at the same time  Patient will need bronch with ebus and TBNA  · CT CAP to check for other masses: numerous too small to characterize hepatic hypodense lesions  ·  Consider MRI abdomen with contrast to further characterize    Acute deep vein thrombosis (DVT) of distal vein of both lower extremities (HCC)  Assessment & Plan  · In setting of hypoxia will presume pt has PE  · Echocardiogram does reveal hypokinesis of right ventricle  · Patient currently on heparin drip  Cleared for Lovenox from Neurology standpoint  Will switched to Lovenox  Acute respiratory failure with hypoxia ([facility])  Assessment & Plan  · Currently on mid flow nasal cannula  Wean down as tolerated  · Suspected most likely due to pulmonary embolism  · CTA chest deferred given recent contrast load and patient being poor  Candidate for lysis  · LEVDs confirm b/l DVTs so will assume pt has PE    Elevated troponin  Assessment & Plan  · Most likely due to acute PE an RV dysfunction  · Patient denies any chest pain  · Cardiology following          VTE Pharmacologic Prophylaxis: Pharmacologic: Enoxaparin (Lovenox)  Mechanical VTE Prophylaxis in Place: Yes    Patient Centered Rounds: I have performed bedside rounds with nursing staff today  Discussions with Specialists or Other Care Team Provider: neurology    Education and Discussions with Family / Patient: pt and wife    Time Spent for Care: 20 minutes  More than 50% of total time spent on counseling and coordination of care as described above  Current Length of Stay: 2 day(s)    Current Patient Status: Inpatient   Certification Statement: The patient will continue to require additional inpatient hospital stay due to above    Discharge Plan: possibly in next 1-2 days     Code Status: Level 1 - Full Code      Subjective:   Pt seen and examined by me this morning  Pt denied any specific complaints  Said overall feeling better compared to yesterday  But wife was present at bedside  Feels that his strength on the right side is improving  Objective:     Vitals:   Temp (24hrs), Av 4 °F (36 3 °C), Min:97 3 °F (36 3 °C), Max:97 4 °F (36 3 °C)    Temp:  [97 3 °F (36 3 °C)-97 4 °F (36 3 °C)] 97 4 °F (36 3 °C)  HR:  [71-73] 73  Resp:  [18] 18  BP: (138-149)/(82-87) 138/82  SpO2:  [89 %-95 %] 89 %  There is no height or weight on file to calculate BMI  Input and Output Summary (last 24 hours): Intake/Output Summary (Last 24 hours) at 2021 1339  Last data filed at 2021 8868  Gross per 24 hour   Intake 683 73 ml   Output 200 ml   Net 483 73 ml       Physical Exam:     Physical Exam    Constitutional: Pt appears comfortable  Not in any acute distress  Cardiovascular: Normal rate, regular rhythm, normal heart sounds  No murmur heard  Pulmonary/Chest: Effort normal, air entry b/l equal  No respiratory distress  Pt has no wheezes or crackles  Abdominal: Soft  Non-distended, Non-tender  Bowel sounds are normal    Musculoskeletal: Normal range of motion  Neurological: awake, alert   Moving all extremities spontaneously  Right-sided strength 4/5  Psychiatric: normal mood and affect  Additional Data:     Labs:    Results from last 7 days   Lab Units 08/02/21  0619   WBC Thousand/uL 9 51   HEMOGLOBIN g/dL 10 4*   HEMATOCRIT % 34 2*   PLATELETS Thousands/uL 331   NEUTROS PCT % 69   LYMPHS PCT % 19   MONOS PCT % 9   EOS PCT % 2     Results from last 7 days   Lab Units 08/02/21  0619 07/31/21  1448   SODIUM mmol/L 138 138   POTASSIUM mmol/L 4 0 4 2   CHLORIDE mmol/L 110* 106   CO2 mmol/L 24 25   BUN mg/dL 20 19   CREATININE mg/dL 0 99 1 05   ANION GAP mmol/L 4 7   CALCIUM mg/dL 9 8 9 2   ALBUMIN g/dL  --  2 4*   TOTAL BILIRUBIN mg/dL  --  0 33   ALK PHOS U/L  --  56   ALT U/L  --  45   AST U/L  --  34   GLUCOSE RANDOM mg/dL 96 102     Results from last 7 days   Lab Units 07/31/21  1448   INR  1 28*     Results from last 7 days   Lab Units 07/31/21  1430   POC GLUCOSE mg/dl 100     Results from last 7 days   Lab Units 08/01/21  0507   HEMOGLOBIN A1C % 5 7*     Results from last 7 days   Lab Units 08/02/21  0619 08/01/21  0759   PROCALCITONIN ng/ml <0 05 <0 05           * I Have Reviewed All Lab Data Listed Above  * Additional Pertinent Lab Tests Reviewed:  Leslie 67 Admission Reviewed    Imaging:    Imaging Reports Reviewed Today Include:   Imaging Personally Reviewed by Myself Includes:      Recent Cultures (last 7 days):           Last 24 Hours Medication List:   Current Facility-Administered Medications   Medication Dose Route Frequency Provider Last Rate    acetaminophen  650 mg Rectal Q6H PRN Kye Reason, DO      atorvastatin  40 mg Oral Daily With [employer] V, PA-C      enoxaparin  1 mg/kg Subcutaneous Q12H [address] Toshia Zaman MD      lidocaine  5 mL Topical Once Evelyn Street MD      oxymetazoline  2 spray Each Nare Once Evelyn Street MD          Today, Patient Was Seen By: Toshia Zaman MD    ** Please Note: Dictation voice to text software may have been used in the creation of this document   **

## 2021-08-02 NOTE — PROGRESS NOTES
PULMONOLOGY PROGRESS NOTE     Name: Sonny Galarza   Age & Sex: 72 y o  male   MRN: [id number]  Unit/Bed#: [facility] 725-01   Encounter: [id number]    PATIENT INFORMATION     Name: Sonny Galarza   Age & Sex: 72 y o  male   MRN: [id number]  Hospital Stay Days: 2    ASSESSMENT/PLAN     Principal Problem:    Acute CVA (cerebrovascular accident) [facility])  Active Problems:    Elevated troponin    Acute respiratory failure with hypoxia ([address] )    Acute deep vein thrombosis (DVT) of distal vein of both lower extremities ([facility] [address] )    Nasopharyngeal mass    Dysphagia    Patient presents for acute evaluation of stroke-like symptoms and is found to have lef caudate, anterior lentiform nucleus and anterior limb of internal capsule stroke  He was outside of the window for tPA  As he was noted to be significantly hypoxic, he was started on empiric heparin for suspected PE (could not perform CTA after contrast for CT head/neck)  He was found to have DVTs in both lower extremities  Additionally, he was found to have a 6 4x3  6x3 4cm solid RUL pulmonary nodule suspicious for primary lung cancer  ENT is also following for 2 7x2 0cm right nasopharyngeal mass which may also be a primary malignancy  - Continue anticoagulation for suspected PE and DVTs, may benefit from visualizing PE with CT angio when appropriate  - Monitor neurological status closely while on anticoagulation given recent CVA  - Coodinating pulmonary and nasopharyngeal biopsy timing with ENT to avoid multiple doses of anesthesia     SUBJECTIVE     Patient seen and examined  No acute events overnight  He continues to experience generalized weakness and anxiety over his multiple medical conditions  His wife and extended family were at bedside and all questions were answered in full   Patient's ROS otherwise normal      OBJECTIVE     Vitals:    08/01/21 2317 08/02/21 0239 08/02/21 0903 08/02/21 1503   BP:  144/83 138/82 139/81   BP Location:       Pulse:  73  68   Resp:       Temp: Osvaldo Lace ) 97 4 °F (36 3 °C) 97 7 °F (36 5 °C)   TempSrc:       SpO2: 93% (!) 89%  93%   Weight:       Height:    5' 8" (1 727 m)      Temperature:   Temp (24hrs), Av 6 °F (36 4 °C), Min:97 4 °F (36 3 °C), Max:97 7 °F (36 5 °C)    Temperature: 97 7 °F (36 5 °C)  Intake & Output:  I/O       701 -  0700  07 -  0700    P  O  240 240    I V  (mL/kg) 323 7 (3 7)     Total Intake(mL/kg) 563 7 (6 5) 240 (2 8)    Urine (mL/kg/hr) 450 (0 2)     Total Output 450     Net +113 7 +240              Weights:   IBW (Ideal Body Weight): 68 4 kg    Body mass index is 29 03 kg/m²  Weight (last 2 days)     Date/Time   Weight    21 14:30:40   86 6 (190 92)            Physical Exam  Vitals and nursing note reviewed  Constitutional:       General: He is not in acute distress  Appearance: He is well-developed  HENT:      Head: Normocephalic and atraumatic  Mouth/Throat:      Pharynx: No oropharyngeal exudate or posterior oropharyngeal erythema  Eyes:      Conjunctiva/sclera: Conjunctivae normal    Cardiovascular:      Rate and Rhythm: Normal rate and regular rhythm  Heart sounds: No murmur heard  Pulmonary:      Effort: Pulmonary effort is normal  No respiratory distress  Breath sounds: Normal breath sounds  No wheezing, rhonchi or rales  Abdominal:      Palpations: Abdomen is soft  Tenderness: There is no abdominal tenderness  Musculoskeletal:      Cervical back: Neck supple  Skin:     General: Skin is warm and dry  Neurological:      Mental Status: He is alert and oriented to person, place, and time  Motor: Weakness: RUE weakness with 3-4/5 strength in the RUE  Otherwise strength grossly in tact  LABORATORY DATA     Labs: I have personally reviewed pertinent reports    Results from last 7 days   Lab Units 21  0619 21  0507 21  1448   WBC Thousand/uL 9 51 11 12* 9 18   HEMOGLOBIN g/dL 10 4* 11 1* 10 3*   HEMATOCRIT % 34 2* 36 0* 33 9* PLATELETS Thousands/uL 331 314 245   NEUTROS PCT % 69  --   --    MONOS PCT % 9  --   --       Results from last 7 days   Lab Units 08/02/21  0619 08/01/21  0507 07/31/21  1448   POTASSIUM mmol/L 4 0 4 1 4 2   CHLORIDE mmol/L 110* 108 106   CO2 mmol/L 24 22 25   BUN mg/dL 20 17 19   CREATININE mg/dL 0 99 0 89 1 05   CALCIUM mg/dL 9 8 9 8 9 2   ALK PHOS U/L  --   --  56   ALT U/L  --   --  45   AST U/L  --   --  34     Results from last 7 days   Lab Units 08/01/21  0507   MAGNESIUM mg/dL 2 6     Results from last 7 days   Lab Units 08/01/21  0507   PHOSPHORUS mg/dL 3 0      Results from last 7 days   Lab Units 08/02/21  0913 08/02/21  0102 08/01/21  1547 07/31/21  1448   INR   --   --   --  1 28*   PTT seconds 33 50* 47* 29         Results from last 7 days   Lab Units 08/01/21  1154 08/01/21  0802 07/31/21  2338   TROPONIN I ng/mL 10 30* 10 80* 8 23*           IMAGING & DIAGNOSTIC TESTING     Radiology Results: I have personally reviewed pertinent reports  CT chest abdomen pelvis wo contrast    Result Date: 7/31/2021  Impression: There is moderate emphysema  There is a 6 4 x 3 6 x 3 4 cm solid, irregular contoured right upper lobe pulmonary nodule, likely primary lung cancer (series 2 image 17 ) There is extensive right hilar and mediastinal adenopathy  Small right pleural effusion  There are also numerous too small to characterize hepatic hypodense lesions  Consider abdomen MR with and without IV contrast to characterize  Workstation performed: [id number]     XR chest portable    Result Date: 8/2/2021  Impression: Right upper lobe mass with suggestion of mediastinal and hilar lymphadenopathy  At the time of this dictation, CT of the chest had been performed which confirms findings which are suspicious for malignancy   Workstation performed: [id number]     CT stroke alert brain    Result Date: 7/31/2021  Impression: Recent infarct left caudate head, anterior lentiform nucleus, and anterior limb of left internal capsule without evidence of hemorrhage  Findings were directly discussed with Lazaro Mejia on 7/31/2021 2:51 PM  Workstation performed: [id number]     CTA stroke alert (head/neck)    Result Date: 7/31/2021  Impression: No large vessel occlusion  Atherosclerotic calcification of the carotid bifurcation bilaterally with no significant stenosis  Bilateral vertebral arteries widely patent  No focal intracranial stenosis or aneurysm  Mass right nasopharynx incompletely evaluated secondary to arterial bolus timing  The mass measures approximately 2 7 x 2 0 cm and direct visualization by ENT recommended  Right upper lobe lung mass and mediastinal adenopathy noted small right pleural effusion    Findings were directly discussed with Lazaro Mejia on 7/31/2021 2:51 PM  Workstation performed: [id number]     Other Diagnostic Testing: I have personally reviewed pertinent reports  ACTIVE MEDICATIONS     Current Facility-Administered Medications   Medication Dose Route Frequency    acetaminophen (TYLENOL) rectal suppository 650 mg  650 mg Rectal Q6H PRN    atorvastatin (LIPITOR) tablet 40 mg  40 mg Oral Daily With Dinner    enoxaparin (LOVENOX) subcutaneous injection 90 mg  1 mg/kg Subcutaneous Q12H OPAL    lidocaine (XYLOCAINE) 4 % topical solution 5 mL  5 mL Topical Once    oxymetazoline (AFRIN) 0 05 % nasal spray 2 spray  2 spray Each Nare Once       VTE Pharmacologic Prophylaxis: Enoxaparin (Lovenox)  - systemic anticoagulation      Disclaimer: Portions of the record may have been created with voice recognition software  Occasional wrong word or "sound a like" substitutions may have occurred due to the inherent limitations of voice recognition software  Careful consideration should be taken to recognize, using context, where substitutions have occurred      Galina Coleman DO  Pulmonary/Critical Care Fellowship PGY-IV  [facility]

## 2021-08-02 NOTE — RESTORATIVE TECHNICIAN NOTE
Restorative Technician Note      Patient Name: Lisa De La Paz     Note Type: Mobility  Patient Position Upon Consult: Bedside chair  Activity Performed: Ambulated;Dangled;Stood  Assistive Device: Other (Comment) (HHA x2 back to bed)  Education Provided: Yes (Educated/encouraged pt to ambulate with assistance 3-4 x's/day )  Patient Position at End of Consult: Supine; All needs within reach;Bed/Chair alarm activated      Birdie BLUNT, Restorative Technician, [employer]

## 2021-08-02 NOTE — PROGRESS NOTES
Cardiology Progress Note - Racheal Friend 72 y o  male MRN: [id number]    Unit/Bed#: [facility] 725-01 Encounter: [id number]      Assessment:  Principal Problem:    Acute CVA (cerebrovascular accident) [facility])  Active Problems:    Elevated troponin    Acute respiratory failure with hypoxia ([address] )    Acute deep vein thrombosis (DVT) of distal vein of both lower extremities ([address] )    Nasopharyngeal mass    Dysphagia      Plan:  Patient with no chest pain or significant dyspnea  He is on supplemental oxygen  Echocardiogram yesterday demonstrated preserved LV systolic function with mild concentric left ventricular hypertrophy  Dilatation of the right ventricle with reduced RV systolic function is noted  A small hemodynamically on port in pericardial effusion is noted  Patient on intravenous heparin  Will eventually need oral anticoagulant when appropriate in terms of possible need for further workup and in reference to neurologic situation  Troponin elevation likely related to pulmonary embolism with RV dysfunction which also may in part be related to COPD given patient's tobacco use in visualization of emphysema on the chest CT  Agree with current management  BMP today with potassium of 4 0 and creatinine of 0 99  Hemoglobin 10 4  Subjective:   Patient seen and examined  No significant events overnight   negative  Objective:     Vitals: Blood pressure 144/83, pulse 73, temperature (!) 97 3 °F (36 3 °C), resp  rate 18, weight 86 6 kg (190 lb 14 7 oz), SpO2 (!) 89 %  , There is no height or weight on file to calculate BMI ,   Orthostatic Blood Pressures      Most Recent Value   Blood Pressure  144/83 filed at 08/02/2021 0239   Patient Position - Orthostatic VS  Lying filed at 08/01/2021 1935      ,      Intake/Output Summary (Last 24 hours) at 8/2/2021 0853  Last data filed at 8/1/2021 1901  Gross per 24 hour   Intake 563 73 ml   Output 450 ml   Net 113 73 ml       No significant arrhythmias seen on telemetry review  Physical Exam:    GEN: Radha Hines   NECK: supple, no carotid bruits, no JVD or HJR  HEART: normal rate, regular rhythm, normal S1 and S2, no murmurs, clicks, gallops or rubs   LUNGS:  Reduced breath sounds   ABDOMEN: normal bowel sounds, soft, no tenderness, no distention  EXTREMITIES: peripheral pulses normal; no clubbing, cyanosis, or edema  SKIN: warm and well perfused, no suspicious lesions on exposed skin    Labs & Results:    Admission on 07/31/2021   Component Date Value    Sodium 07/31/2021 138     Potassium 07/31/2021 4 2     Chloride 07/31/2021 106     CO2 07/31/2021 25     ANION GAP 07/31/2021 7     BUN 07/31/2021 19     Creatinine 07/31/2021 1 05     Glucose 07/31/2021 102     Calcium 07/31/2021 9 2     eGFR 07/31/2021 72     WBC 07/31/2021 9 18     RBC 07/31/2021 4 05     Hemoglobin 07/31/2021 10 3*    Hematocrit 07/31/2021 33 9*    MCV 07/31/2021 84     MCH 07/31/2021 25 4*    MCHC 07/31/2021 30 4*    RDW 07/31/2021 15 7*    Platelets 64/16/7862 245     MPV 07/31/2021 9 9     Protime 07/31/2021 16 0*    INR 07/31/2021 1 28*    PTT 07/31/2021 29     Troponin I 07/31/2021 6 90*    SARS-CoV-2 07/31/2021 Negative     POC Glucose 07/31/2021 100     Total Bilirubin 07/31/2021 0 33     Bilirubin, Direct 07/31/2021 0 12     Alkaline Phosphatase 07/31/2021 56     AST 07/31/2021 34     ALT 07/31/2021 45     Total Protein 07/31/2021 6 8     Albumin 07/31/2021 2 4*    Troponin I 07/31/2021 7 54*    Troponin I 07/31/2021 8 23*    PTT 08/01/2021 58*    Cholesterol 08/01/2021 125     Triglycerides 08/01/2021 95     HDL, Direct 08/01/2021 30*    LDL Calculated 08/01/2021 76     Hemoglobin A1C 08/01/2021 5 7*    EAG 08/01/2021 117     Magnesium 08/01/2021 2 6     Phosphorus 08/01/2021 3 0     Sodium 08/01/2021 139     Potassium 08/01/2021 4 1     Chloride 08/01/2021 108     CO2 08/01/2021 22     ANION GAP 08/01/2021 9     BUN 08/01/2021 17     Creatinine 08/01/2021 0 89     Glucose 08/01/2021 93     Calcium 08/01/2021 9 8     eGFR 08/01/2021 87     WBC 08/01/2021 11 12*    RBC 08/01/2021 4 33     Hemoglobin 08/01/2021 11 1*    Hematocrit 08/01/2021 36 0*    MCV 08/01/2021 83     MCH 08/01/2021 25 6*    MCHC 08/01/2021 30 8*    RDW 08/01/2021 15 6*    Platelets 55/71/0887 314     MPV 08/01/2021 10 7     PTT 08/01/2021 30     Ventricular Rate 07/31/2021 80     Atrial Rate 07/31/2021 80     AZ Interval 07/31/2021 146     QRSD Interval 07/31/2021 80     QT Interval 07/31/2021 360     QTC Interval 07/31/2021 415     P Axis 07/31/2021 71     QRS Axis 07/31/2021 210     T Wave Axis 07/31/2021 -45     pH, Cipriano 08/01/2021 7 378     pCO2, Cipriano 08/01/2021 37 5*    pO2, Cipriano 08/01/2021 54 5*    HCO3, Cipriano 08/01/2021 21 6*    Base Excess, Cipriano 08/01/2021 -3 1     O2 Content, Cipriano 08/01/2021 15 0     O2 HGB, VENOUS 08/01/2021 83 9*    Procalcitonin 08/01/2021 <0 05     Troponin I 08/01/2021 10 80*    NT-proBNP 08/01/2021 4,647*    Procalcitonin 08/02/2021 <0 05     Troponin I 08/01/2021 10 30*    PTT 08/01/2021 47*    PTT 08/02/2021 50*    WBC 08/02/2021 9 51     RBC 08/02/2021 4 06     Hemoglobin 08/02/2021 10 4*    Hematocrit 08/02/2021 34 2*    MCV 08/02/2021 84     MCH 08/02/2021 25 6*    MCHC 08/02/2021 30 4*    RDW 08/02/2021 15 6*    MPV 08/02/2021 10 2     Platelets 16/14/3329 331     nRBC 08/02/2021 0     Neutrophils Relative 08/02/2021 69     Immat GRANS % 08/02/2021 0     Lymphocytes Relative 08/02/2021 19     Monocytes Relative 08/02/2021 9     Eosinophils Relative 08/02/2021 2     Basophils Relative 08/02/2021 1     Neutrophils Absolute 08/02/2021 6 60     Immature Grans Absolute 08/02/2021 0 04     Lymphocytes Absolute 08/02/2021 1 82     Monocytes Absolute 08/02/2021 0 81     Eosinophils Absolute 08/02/2021 0 16     Basophils Absolute 08/02/2021 0 08     Sodium 08/02/2021 138     Potassium 08/02/2021 4 0     Chloride 2021 110*    CO2 2021 24     ANION GAP 2021 4     BUN 2021 20     Creatinine 2021 0 99     Glucose 2021 96     Calcium 2021 9 8     eGFR 2021 77        Echo complete with contrast if indicated    Result Date: 2021  Narrative: [facility] [address] [facility] [place] [address] [facility] [phone] Transthoracic Echocardiogram 2D, M-mode, Doppler, and Color Doppler Study date:  01-Aug-2021 Patient: Yoan Contreras MR number: [id number] Account number: [de-identified] : 1950 Age: 72 years Gender: Male Status: Inpatient Location: Bedside Height: 68 in Weight: 189 6 lb BP: 129/ 83 mmHg Indications: CVA  Diagnoses: I67 9 - Cerebrovascular disease, unspecified Sonographer:  Becki Caraballo RDCS Referring Physician:  Lindsey Uriostegui DO Group:  [facility] Interpreting Physician:  Reji Foley DO SUMMARY LEFT VENTRICLE: Systolic function was normal  Ejection fraction was estimated to be 55 %  Although no diagnostic regional wall motion abnormality was identified, this possibility cannot be completely excluded on the basis of this study  Wall thickness was mildly increased  Doppler parameters were consistent with abnormal left ventricular relaxation (grade 1 diastolic dysfunction)  RIGHT VENTRICLE: The ventricle was mildly to moderately dilated  Systolic function was mildly reduced  There is hypokinesis of the mid free wall  RIGHT ATRIUM: The atrium was mildly dilated  TRICUSPID VALVE: There was mild regurgitation  Estimated peak PA pressure was 44 mmHg  PERICARDIUM: A small pericardial effusion was identified  There was no evidence of hemodynamic compromise  HISTORY: PRIOR HISTORY: CVA  Elevated troponin  Acute respiratory failure  DVT  Former smoker  PROCEDURE: The procedure was performed at the bedside  This was a routine study  The transthoracic approach was used   The study included complete 2D imaging, M-mode, complete spectral Doppler, and color Doppler  The heart rate was 70 bpm, at the start of the study  Images were obtained from the parasternal, apical, subcostal, and suprasternal notch acoustic windows  Echocardiographic views were limited due to restricted patient mobility and lung interference  This was a technically difficult study  LEFT VENTRICLE: Size was normal  Systolic function was normal  Ejection fraction was estimated to be 55 %  Although no diagnostic regional wall motion abnormality was identified, this possibility cannot be completely excluded on the basis of this study  Wall thickness was mildly increased  DOPPLER: Doppler parameters were consistent with abnormal left ventricular relaxation (grade 1 diastolic dysfunction)  RIGHT VENTRICLE: The ventricle was mildly to moderately dilated  Systolic function was mildly reduced  There is hypokinesis of the mid free wall  LEFT ATRIUM: Size was normal  RIGHT ATRIUM: The atrium was mildly dilated  MITRAL VALVE: Valve structure was normal  There was normal leaflet separation  DOPPLER: The transmitral velocity was within the normal range  There was no evidence for stenosis  There was no regurgitation  AORTIC VALVE: The valve was trileaflet  Leaflets exhibited normal thickness and normal cuspal separation  DOPPLER: Transaortic velocity was within the normal range  There was no evidence for stenosis  There was no regurgitation  TRICUSPID VALVE: The valve structure was normal  There was normal leaflet separation  DOPPLER: The transtricuspid velocity was within the normal range  There was no evidence for stenosis  There was mild regurgitation  Estimated peak PA pressure was 44 mmHg  PULMONIC VALVE: Leaflets exhibited normal thickness, no calcification, and normal cuspal separation  DOPPLER: The transpulmonic velocity was within the normal range  There was no regurgitation  PERICARDIUM: A small pericardial effusion was identified   There was no evidence of hemodynamic compromise  The pericardium was normal in appearance  AORTA: The root exhibited normal size  SYSTEMIC VEINS: IVC: The inferior vena cava was not well visualized  SYSTEM MEASUREMENT TABLES 2D %FS: 24 % Ao Diam: 3 63 cm EDV(Teich): 92 69 ml EF(Teich): 47 96 % ESV(Teich): 48 24 ml IVSd: 1 08 cm LA Area: 10 98 cm2 LA Diam: 3 49 cm LVEDV MOD A4C: 91 9 ml LVEF MOD A4C: 58 38 % LVESV MOD A4C: 38 25 ml LVIDd: 4 51 cm LVIDs: 3 42 cm LVLd A4C: 8 32 cm LVLs A4C: 7 04 cm LVPWd: 1 03 cm RA Area: 19 63 cm2 RVIDd: 4 75 cm SV MOD A4C: 53 66 ml SV(Teich): 44 45 ml CW TR Vmax: 3 04 m/s TR maxP 91 mmHg MM TAPSE: 1 3 cm PW E' Sept: 0 08 m/s E/E' Sept: 7 34 MV A Tristan: 0 75 m/s MV Dec Owen: 1 98 m/s2 MV DecT: 280 99 ms MV E Tristan: 0 56 m/s MV E/A Ratio: 0 74 MV PHT: 81 49 ms MVA By PHT: 2 7 cm2 [address] Accredited Echocardiography Laboratory Prepared and electronically signed by Jojo Newman DO Signed 01-Aug-2021 14:29:36     CT chest abdomen pelvis wo contrast    Result Date: 2021  Narrative: CT CHEST, ABDOMEN AND PELVIS WITHOUT IV CONTRAST INDICATION:   Metastases suspected, unknown primary lung mass and SOB  COMPARISON:  No prior chest imaging  Comparison is made with the relevant images of the head and neck CTA from earlier the same day  TECHNIQUE: CT examination of the chest, abdomen and pelvis was performed without intravenous contrast   Axial, sagittal, and coronal 2D reformatted images were created from the source data and submitted for interpretation  Radiation dose length product (DLP) for this visit:  848  76 mGy-cm   This examination, like all CT scans performed in the [facility], was performed utilizing techniques to minimize radiation dose exposure, including the use of iterative  reconstruction and automated exposure control  Enteric contrast was not administered  FINDINGS: CHEST LUNGS:  There is moderate emphysema   There is a 6 4 x 3 6 x 3 4 cm solid, irregular contoured right upper lobe pulmonary nodule (series 2 image 17 )  There is no tracheal or endobronchial lesion  PLEURA:  Small water density right-sided pleural effusion  HEART/GREAT VESSELS:  Trace pericardial effusion  Mild cardiomegaly  MEDIASTINUM AND JONH:  There is diffuse right hilar and mediastinal adenopathy  Largest is a right paratracheal node measuring 2 cm in short axis (series 2 image 21 ) CHEST WALL AND LOWER NECK:   Unremarkable  ABDOMEN LIVER/BILIARY TREE:  Numerous too small to characterize hypodensities scattered throughout the liver  GALLBLADDER:  No calcified gallstones  No pericholecystic inflammatory change  SPLEEN:  Unremarkable  PANCREAS:  Unremarkable  ADRENAL GLANDS:  Unremarkable  KIDNEYS/URETERS:  There is a 5 6 x 5 6 cm simple cyst in the left kidney lower pole  No hydronephrosis  There is contrast throughout the renal collecting systems, due to the prior IV contrast injection for head and neck CTA  STOMACH AND BOWEL:  Unremarkable  APPENDIX:  No findings to suggest appendicitis  ABDOMINOPELVIC CAVITY:  No ascites  No pneumoperitoneum  No lymphadenopathy  VESSELS:  Unremarkable for patient's age  PELVIS REPRODUCTIVE ORGANS:  Unremarkable for patient's age  URINARY BLADDER:  Bladder is also filled with previous injected iodinated IV contrast  ABDOMINAL WALL/INGUINAL REGIONS:  Unremarkable  OSSEOUS STRUCTURES:  No acute fracture or destructive osseous lesion  Impression: There is moderate emphysema  There is a 6 4 x 3 6 x 3 4 cm solid, irregular contoured right upper lobe pulmonary nodule, likely primary lung cancer (series 2 image 17 ) There is extensive right hilar and mediastinal adenopathy  Small right pleural effusion  There are also numerous too small to characterize hepatic hypodense lesions  Consider abdomen MR with and without IV contrast to characterize   Workstation performed: [id number]     XR chest portable    Result Date: 8/2/2021  Narrative: CHEST INDICATION: increasing oxygen requirements  COMPARISON:  None EXAM PERFORMED/VIEWS:  XR CHEST PORTABLE FINDINGS: Cardiomediastinal silhouette appears unremarkable  4 7 x 3 6 cm right upper lobe mass is present  There is suggestion of mediastinal and hilar lymphadenopathy  No pleural effusions or pneumothorax  Osseous structures appear within normal limits for patient age  Impression: Right upper lobe mass with suggestion of mediastinal and hilar lymphadenopathy  At the time of this dictation, CT of the chest had been performed which confirms findings which are suspicious for malignancy  Workstation performed: [id number]     CT stroke alert brain    Result Date: 7/31/2021  Narrative: CT BRAIN - STROKE ALERT PROTOCOL INDICATION:   Right-sided weakness  Dysarthria  COMPARISON:  None  TECHNIQUE:  CT examination of the brain was performed  In addition to axial images, coronal reformatted images were created and submitted for interpretation  Radiation dose length product (DLP) for this visit:  868 03 mGy-cm   This examination, like all CT scans performed in the [facility], was performed utilizing techniques to minimize radiation dose exposure, including the use of iterative  reconstruction and automated exposure control  IMAGE QUALITY:  Diagnostic  FINDINGS:  PARENCHYMA:  Loss of gray-white differentiation identified in the left caudate head involving the anterior limb of left internal capsule and anterior portion of the lentiform nucleus representing recent infarct measuring approximately 2 4 x 1 8 cm  No hemorrhage identified  Areas of low-density in periventricular subcortical regions compatible with mild chronic microangiopathy  Normal intracranial vasculature  VENTRICLES AND EXTRA-AXIAL SPACES:  Normal for patient's age  VISUALIZED ORBITS AND PARANASAL SINUSES:  Unremarkable   CALVARIUM AND EXTRACRANIAL SOFT TISSUES:   Normal      Impression: Recent infarct left caudate head, anterior lentiform nucleus, and anterior limb of left internal capsule without evidence of hemorrhage  Findings were directly discussed with Devang Mcgrath on 7/31/2021 2:51 PM  Workstation performed: [id number]     VAS lower limb venous duplex study, complete bilateral    Result Date: 8/1/2021  Narrative:  [facility] REPORT CLINICAL: Indications: Patient presents with bilateral lower calf pain and swelling  FINDINGS:  Right           Impression              Peroneal        Occlusive Subsegmental  PostTibial      Occlusive Subsegmental   Left            Impression              DVT                GSV Mid Thigh                           Acute - Occlusive  GSV Dist Thigh                          Acute - Occlusive  GSV Knee                                Acute - Occlusive  GSV Mid Calf                            Acute - Occlusive  Peroneal        Occlusive Subsegmental                        CONCLUSION:  Impression: RIGHT LOWER LIMB: Acute deep vein thrombosis is noted in the posterior tibial and peroneal veins  No evidence of superficial thrombophlebitis noted  Doppler evaluation shows a normal response to augmentation maneuvers  Popliteal, posterior tibial and anterior tibial arterial Doppler waveforms are triphasic  LEFT LOWER LIMB: Acute deep vein thrombosis is noted in the peroneal veins  Acute superficial thrombophlebitis is noted in the great saphenous vein from the mid thigh to the mid calf  Doppler evaluation shows a normal response to augmentation maneuvers  Popliteal, posterior tibial and anterior tibial arterial Doppler waveforms are triphasic  Technical findings were given to Dr Meg Sterling  SIGNATURE: Electronically Signed by: Lou Gordon on 2021-08-01 11:43:04 AM    CTA stroke alert (head/neck)    Result Date: 7/31/2021  Narrative: CTA NECK AND BRAIN WITH CONTRAST INDICATION: Stroke symptoms with right upper and lower extremity ataxia  Dysarthria  Right facial droop  COMPARISON:   None   TECHNIQUE:   Post contrast imaging was performed after administration of iodinated contrast through the neck and brain  Post contrast axial 0 625 mm images timed to opacify the arterial system  3D rendering was performed on an independent workstation  MIP reconstructions performed  Coronal reconstructions were performed of the noncontrast portion of the brain  Radiation dose length product (DLP) for this visit:  609 42 mGy-cm   This examination, like all CT scans performed in the [facility], was performed utilizing techniques to minimize radiation dose exposure, including the use of iterative  reconstruction and automated exposure control  IV Contrast:  85 mL Omnipaque 350 contrast   IMAGE QUALITY:   Diagnostic FINDINGS: CERVICAL VASCULATURE AORTIC ARCH AND GREAT VESSELS:  Normal aortic arch and great vessel origins  Normal visualized subclavian vessels  RIGHT VERTEBRAL ARTERY CERVICAL SEGMENT:  Normal origin  The vessel is normal in caliber throughout the neck  LEFT VERTEBRAL ARTERY CERVICAL SEGMENT:  Normal origin  The vessel is normal in caliber throughout the neck  RIGHT EXTRACRANIAL CAROTID SEGMENT:  Normal caliber common carotid artery  Normal bifurcation and cervical internal carotid artery  No stenosis or dissection  LEFT EXTRACRANIAL CAROTID SEGMENT:  Normal caliber common carotid artery  Normal bifurcation and cervical internal carotid artery  No stenosis or dissection  NASCET criteria was used to determine the degree of internal carotid artery diameter stenosis  INTRACRANIAL VASCULATURE INTERNAL CAROTID ARTERIES:  Normal enhancement of the intracranial portions of the internal carotid arteries  Normal ophthalmic artery origins  Normal ICA terminus  ANTERIOR CIRCULATION:  Symmetric A1 segments and anterior cerebral arteries with normal enhancement  Normal anterior communicating artery   MIDDLE CEREBRAL ARTERY CIRCULATION:  M1 segment and middle cerebral artery branches demonstrate normal enhancement bilaterally  DISTAL VERTEBRAL ARTERIES:  Normal distal vertebral arteries  Posterior inferior cerebellar artery origins are normal  Normal vertebral basilar junction  BASILAR ARTERY:  Basilar artery is normal in caliber  Normal superior cerebellar arteries  POSTERIOR CEREBRAL ARTERIES: Both posterior cerebral arteries arises from the basilar tip  Both arteries demonstrate normal enhancement  DURAL VENOUS SINUSES:  Normal  NON VASCULAR ANATOMY BONY STRUCTURES:  Reversal the cervical lordosis apex C4-5  SOFT TISSUES OF THE NECK:  Mass identified right nasopharynx for which direct visualization by ENT is recommended  This is incompletely evaluated on this study secondary arterial contrast bolus timing  THORACIC INLET:  Right upper lobe lung mass, small right pleural effusion, or mediastinal adenopathy noted  Impression: No large vessel occlusion  Atherosclerotic calcification of the carotid bifurcation bilaterally with no significant stenosis  Bilateral vertebral arteries widely patent  No focal intracranial stenosis or aneurysm  Mass right nasopharynx incompletely evaluated secondary to arterial bolus timing  The mass measures approximately 2 7 x 2 0 cm and direct visualization by ENT recommended  Right upper lobe lung mass and mediastinal adenopathy noted small right pleural effusion    Findings were directly discussed with Ammy Hirsch on 7/31/2021 2:51 PM  Workstation performed: [id number]       EKG personally reviewed by Inna Brian MD      Counseling / Coordination of Care  Total floor / unit time spent today 30 minutes  Greater than 50% of total time was spent with the patient and / or family counseling and / or coordination of care

## 2021-08-02 NOTE — ASSESSMENT & PLAN NOTE
· In setting of hypoxia will presume pt has PE  · Echocardiogram does reveal hypokinesis of right ventricle  · Patient currently on heparin drip  Cleared for Lovenox from Neurology standpoint  Will switched to Lovenox

## 2021-08-02 NOTE — PHYSICAL THERAPY NOTE
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]  Dysarthria [R47 1]  Acute respiratory failure with hypoxia ([address] ) [J96 01]  Acute deep vein thrombosis (DVT) of distal vein of both lower extremities (HCC) [I82 4Z3]  Cerebrovascular accident (CVA), unspecified mechanism ([address] ) [I63 9]  Weakness due to acute stroke ([facility] ) [I63 9, R53 1]    Problem List  Patient Active Problem List   Diagnosis    Acute CVA (cerebrovascular accident) ([address] )    Elevated troponin    Acute respiratory failure with hypoxia ([address] )    Acute deep vein thrombosis (DVT) of distal vein of both lower extremities ([facility] [address] [facility] )    Nasopharyngeal mass    Dysphagia       Past Medical History  History reviewed  No pertinent past medical history  Past Surgical History  History reviewed  No pertinent surgical history  08/02/21 0859   PT Last Visit   PT Visit Date 08/02/21   Note Type   Note type Evaluation   Pain Assessment   Pain Assessment Tool Pain Assessment not indicated - pt denies pain   Pain Score No Pain   Home Living   Type of [address] [place] [address] One level;Performs ADLs on one level; Able to live on main level with bedroom/bathroom  (1 NALLELY)   Bathroom Shower/Tub Tub/shower unit   Bathroom Toilet Standard   Bathroom Equipment   (denies )   Home Equipment   (denies )   Prior Function   Level of [place] Independent with ADLs and functional mobility   Lives With [employer] Help From Family   ADL Assistance Independent   IADLs Independent   Falls in the last 6 months 0   Vocational Retired   Restrictions/Precautions   Well[employer] Son[employer] Bearing Precautions Per Order No   Other Precautions Chair Alarm; Bed Alarm;Multiple lines;O2;Fall Risk   General   Family/Caregiver Present Yes   Cognition   Overall Cognitive Status Impaired   Attention Attends with cues to redirect   Orientation Level Oriented to person;Oriented to place  (knew year, reports it is "winter")   Following Commands Follows one step commands with increased time or repetition   Comments Pt pleasant and cooperative; motivated to get OOB  RLE Assessment   RLE Assessment X   Strength RLE   R Hip Flexion 2+/5   R Knee Flexion 3-/5   R Knee Extension 3-/5   R Ankle Dorsiflexion 2+/5   R Ankle Plantar Flexion 2+/5   LLE Assessment   LLE Assessment   (grossly 4-/5 )   Coordination   Movements are Fluid and Coordinated 0   Light Touch   RLE Light Touch Impaired   LLE Light Touch Impaired   Bed Mobility   Supine to Sit 3  Moderate assistance   Additional items Assist x 2; Increased time required;Verbal cues;LE management   Sit to Supine Unable to assess   Additional Comments Pt supine in bed upon arrival  Pt sitting EOB fluctuating between min- max Ax1 to correct R lateral and posterior lean  Pt sitting upright in bedside chair with chair alarm on with all needs within reach  Transfers   Sit to Stand 3  Moderate assistance   Additional items Assist x 2; Increased time required;Verbal cues  (R knee block )   Stand to Sit 3  Moderate assistance   Additional items Assist x 2; Increased time required;Verbal cues   Sit pivot 3  Moderate assistance   Additional items Assist x 2; Increased time required;Verbal cues   Additional Comments Transfers with b/l HHA + R knee block  Able to take 1 step however R knee buckled- performed sit pivot transfer with mod Ax2  Ambulation/Elevation   Gait pattern Decreased foot clearance;Decreased R stance; Step to;Excessively slow;R Knee Namita   Gait Assistance 3  Moderate assist   Additional items Assist x 2; Tactile cues; Verbal cues  (+ R knee block )   Assistive Device   (b/l HHA )   Distance 1 lateral side step    Balance   Static Sitting Poor   Dynamic Sitting Poor   Static Standing Poor -   Dynamic Standing Poor -   Ambulatory Poor -   Activity Tolerance   Activity Tolerance Patient limited by fatigue   Medical Staff Made Aware OT, OTS; Co-eval performed this date 2* increased medical complexity and multiple co-morbidities    Nurse Made Aware RN cleared pt to participate in therapy session    Assessment   Prognosis Good   Problem List Decreased strength;Decreased range of motion;Decreased endurance; Impaired balance;Decreased mobility; Impaired judgement;Decreased safety awareness;Decreased coordination;Decreased cognition   Assessment Pt seen for high complexity PT evaluation  Pt with active PT eval/treat and up and OOB as tolerated orders  Pt is a 72 y o  male who was [facility] to [facility] on 7/31/2021 with acute CVA  Pt's active problems include: nasopharyngeal mass, acute DVT of both LEs, acute respiratory failure with hypoxia, elevated troponin  Pt resides with wife in 1  with 1 NALLELY and was independent prior to hospital admission  Pt has limitations in strength, balance, endurance, and overall functional mobility  Pt requires assist of 2 for all mobility performed this date  Pt performed bed mobility tasks with mod Ax2  Pt sat EOB fluctuating between min- max Ax1 to correct R lateral and posterior lean  Pt performed STS from EOB using b/l HHA and mod Ax2 + R knee block  Pt able to take lateral side step toward [facility] with R knee block; R knee buckled  Pt performed sit pivot transfer from EOB to drop arm bedside chair with mod Ax2  Pt was left sitting upright in bedside chair with chair alarm on at the end of PT session with all needs in reach  Pt would benefit from continued PT services while in hospital to address remaining limitations  PT to continue to follow pt and recommends post-acute rehab services after d/c  The patient's AM-PAC Basic Mobility Inpatient Short Form Low Function Raw Score 14 , Standardized Score is 22 01  A standardized score less 42 9 suggests the patient may benefit from discharge to post-acute rehab services   Please also refer to the recommendation of the Physical Therapist for safe discharge planning  Barriers to Discharge Inaccessible home environment   Goals   Patient Goals to get OOB    STG Expiration Date 08/16/21   Short Term Goal #1 STG 1  Pt will be able to perform bed mobility with min A in order to improve overall functional mobility and assist in safe d/c  STG 2  Pt will be able to perform functional transfer with min A in order to improve overall functional mobility and assist in safe d/c  STG 3  Pt will be able to ambulate at least 150 feet with least restrictive device with min A in order to improve overall functional mobility and assist in safe d/c  STG 4  Pt will improve sitting/standing static/dynamic balance 1 grade in order to improve functional mobility and assist in safe d/c  STG 5  Pt will improve LE strength by one grade in order to improve functional mobility and assist in safe d/c     PT Treatment Day 0   Plan   Treatment/Interventions Functional transfer training;LE strengthening/ROM; Endurance training;Cognitive reorientation;Patient/family training;Bed mobility;Gait training;Spoke to nursing;Spoke to case management;OT   PT Frequency Other (Comment)  (3-5x/wk )   Recommendation   PT Discharge Recommendation Post acute rehabilitation services   PT - OK to Discharge Yes  (once medically cleared )   AM-PAC Basic Mobility Inpatient   Turning in Bed Without Bedrails 1   Lying on Back to Sitting on Edge of Flat Bed 1   Moving Bed to Chair 1   Standing Up From Chair 1   Walk in Room 1   Climb 3-5 Stairs 1   Basic Mobility Inpatient Raw Score 6   Turning Head Towards Sound 4   Follow Simple Instructions 4   Low Function Basic Mobility Raw Score 14   Low Function Basic Mobility Standardized Score 22 01   Modified [place] Scale   Modified [place] Scale 4         Blake Meléndez, PT, DPT

## 2021-08-02 NOTE — PROGRESS NOTES
Progress Note - Neurology   Gretel Stevens 72 y o  male [id number]  Unit/Bed#: [facility] 725/[facility] 725-01    Assessment/Plan:    * Acute CVA (cerebrovascular accident) [facility])  Assessment & Plan  Gretel Stevens is a 72 y o  male with recent abnormal weight loss without documented medical comorbidities who presented to [place] ED on 07/31/2021 as a stroke alert with right hemiparesis and dysarthria  NIHSS of 9, /87 on arrival  CT head revealed recent acute to subacute infarct located in the left caudate head, anterior lentiform nucleus, anterior limb of the left internal capsule  CTA head and neck unremarkable for large vessel occlusion or critical stenosis, however did reveal a right upper lobe lung mass and mediastinal adenopathy  Patient was not an IV tPA candidate due to infarct appearing acute to subacute, high bleed risk  Strong suspicion for underlying malignancy  Acute infarct likely secondary to hypercoagulable state  Will continue on stroke pathway   MRI brain pending     Plan:  - MRI brain w wo contrast pending   - Echo completed, EF 55%, normal sized R atrium   - S/p rectal  mg x 1 on admission   - Heparin gtt d/c'd  - Start Lovenox, discussed with primary team   - No need for ASA 81 mg QD from a neurological perspective while pt is on Lovenox   - Continue atorvastatin   - Goal normotension, euglycemia, normothermia  - Continue telemetry  - Frequent neuro checks  - Stroke education  - PT/OT/speech  - Medical management and supportive care per primary team, notify with changes    Imaging/Labs:  - CT head 07/31/2021:  · Recent infarct located in the left caudate head, anterior lentiform nucleus, anterior limb of the left internal capsule without evidence of hemorrhage  - CTA head and neck 07/31/2021:  · No evidence of large vessel occlusion  · Atherosclerotic calcification of the carotid bifurcations bilaterally without significant stenosis  · No focal intracranial stenosis or aneurysm noted  · Mass right nasopharynx incompletely evaluated secondary to arterial bolus times, mass measuring 2 7 x 2 0 cm  · Right upper lobe lung mass and mediastinal adenopathy noted small right pleural effusion  - VAS lower limb venous duplex study:  · Right lower limb:   · Acute deep vein thrombosis noted in posterior tibial and peroneal veins   · Left lower limb:  · Acute deep vein thrombosis noted in the peroneal veins   · Acute superficial thrombophlebitis noted in the great saphenous vein from the mid thigh to the mid calf  - Troponin elevated on presentation 6 90  - Lipid panel: Total cholesterol 125, triglycerides 95, HDL 30, LDL 76  - Hemoglobin A1c:  5 7    Elevated troponin  Assessment & Plan  - Troponin elevated on presentation 6 90, evaluated by Cardiology, likely due to PE   - Echo completed, EF 55%, right atrium normal sized       Nasopharyngeal mass  Assessment & Plan  - 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  - ENT following, recommending biopsy; patient cleared from a Neurology standpoint to proceed with ENT workup inpatient  - CT CAP pending to assess for other masses    Acute deep vein thrombosis (DVT) of distal vein of both lower extremities (HCC)  Assessment & Plan  - VAS lower limb venous duplex study 7/31/2021:  · Right lower limb:   · Acute deep vein thrombosis noted in posterior tibial and peroneal veins   · Left lower limb:  · Acute deep vein thrombosis noted in the peroneal veins   · Acute superficial thrombophlebitis noted in the great saphenous vein from the mid thigh to the mid calf  - Heparin drip d/c'd, Lovenox initiated       Recommendations for outpatient neurological follow up have yet to be determined  Subjective:   Pt is a 71 yo RHD male  Pt's family notes great improvement with movement compared to yesterday  Pt is now able to lift RUE and RLE against gravity, which pt's daughter states he was unable to do yesterday  Pt notes feeling "okay" overall  Pt's wife notes pt continues with some R facial droop and some dysarthria, which she notes is also improving  Pt notes that he felt better sitting up in chair with therapy today  History reviewed  No pertinent past medical history  History reviewed  No pertinent surgical history  History reviewed  No pertinent family history  Social History     Socioeconomic History    Marital status: /Civil Union     Spouse name: None    Number of children: None    Years of education: None    Highest education level: None   Occupational History    None   Tobacco Use    Smoking status: Former Smoker    Smokeless tobacco: Never Used   Vaping Use    Vaping Use: Never used   Substance and Sexual Activity    Alcohol use: Yes    Drug use: Never    Sexual activity: None   Other Topics Concern    None   Social History Narrative    None     Social Determinants of Health     Financial Resource Strain:     Difficulty of Paying Living Expenses:    Food Insecurity:     Worried About Running Out of Food in the Last Year:     [address] in the Last Year:    Transportation Needs:     Lack of Transportation (Medical):  Lack of Transportation (Non-Medical):    Physical Activity:     Days of Exercise per Week:     Minutes of Exercise per Session:    Stress:     Feeling of Stress :    Social Connections:     Frequency of Communication with Friends and Family:     Frequency of Social Gatherings with Friends and Family:     Attends [employer] Services:     Active Member of Clubs or Organizations:     Attends Club or Organization Meetings:     Marital Status:    Intimate Partner Violence:     Fear of Current or Ex-Partner:     Emotionally Abused:     Physically Abused:     Sexually Abused:      E-Cigarette/Vaping    E-Cigarette Use Never User      E-Cigarette/Vaping Substances         Medications:   All current active meds have been reviewed and current meds:  Scheduled Meds:  Current Facility-Administered Medications   Medication Dose Route Frequency Provider Last Rate    acetaminophen  650 mg Rectal Q6H PRN Fermin Lou DO      atorvastatin  40 mg Oral Daily With [employer] YONATAN PERRIN      enoxaparin  1 mg/kg Subcutaneous Q12H [address] Lavell Salgado MD      lidocaine  5 mL Topical Once Eldon Cm MD      oxymetazoline  2 spray Each Nare Once Eldon Cm MD       Continuous Infusions:   PRN Meds:   acetaminophen       ROS:   Review of Systems   Constitutional: Positive for unexpected weight change  Negative for appetite change and fever  HENT: Negative for congestion and sore throat  Eyes: Negative for pain, redness, itching and visual disturbance  Respiratory: Positive for shortness of breath  Negative for cough  Cardiovascular: Positive for leg swelling  Negative for chest pain and palpitations  Gastrointestinal: Negative for nausea and vomiting  Genitourinary: Negative for difficulty urinating and dysuria  Musculoskeletal: Positive for gait problem  Negative for arthralgias  Neurological: Positive for facial asymmetry (R sided reported by pt's wife ), speech difficulty (dysarthria reported by wife ) and weakness  Negative for dizziness, tremors, light-headedness, numbness and headaches  Psychiatric/Behavioral: Negative for behavioral problems and dysphoric mood  12 point review of systems performed and negative except as indicated above  Vitals:   /81   Pulse 68   Temp 97 7 °F (36 5 °C)   Resp 18   Ht 5' 8" (1 727 m)   Wt 86 6 kg (190 lb 14 7 oz)   SpO2 93%   BMI 29 03 kg/m²     Physical Exam:   Physical Exam  Vitals and nursing note reviewed  Constitutional:       General: He is not in acute distress  Appearance: He is not diaphoretic  HENT:      Head: Normocephalic and atraumatic  Eyes:      General: No scleral icterus  Right eye: No discharge  Left eye: No discharge        Pupils: Pupils are equal, round, and reactive to light  Comments: 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   Cardiovascular:      Rate and Rhythm: Normal rate  Pulmonary:      Effort: Pulmonary effort is normal    Musculoskeletal:      Cervical back: Normal range of motion  Skin:     General: Skin is warm and dry  Neurological:      Mental Status: He is alert  Coordination: Finger-Nose-Finger Test (with weakness in URE testing but ataxia not out of proportion to weakness) normal       Deep Tendon Reflexes:      Reflex Scores:       Bicep reflexes are 2+ on the right side and 2+ on the left side  Brachioradialis reflexes are 2+ on the right side and 2+ on the left side  Patellar reflexes are 2+ on the right side and 2+ on the left side  Psychiatric:      Comments: Pleasant and cooperative during exam        Neurologic Exam     Mental Status   Follows 2 step (touch your Left finger to your nose ) commands  Attention: normal  Concentration: normal    Speech: (Slight dysarthria noted)  Level of consciousness: alert  Able to perform simple calculations (6 quarters in $1 50, on 2nd attempt )  Unable to name object: glove, button, pen    - able to state name, that he is in a hospital, and the year 2021  Pt is unable to provide the hospital name nor the month  - unable to name current president but was able to name president prior "Trump"   - pt able to name association between apple/orange "they are both round"        Cranial Nerves     CN II   Visual acuity: (blind in R eye at baseline )  Right visual field deficit: blind in R eye at baseline   Left visual field deficit: none     CN III, IV, VI   Pupils are equal, round, and reactive to light  Right pupil: Size: 2 mm  Shape: regular  Reactivity: brisk  Consensual response: intact  Accommodation: intact  Left pupil: Size: 2 mm  Shape: regular  Reactivity: brisk  Consensual response: intact  Accommodation: intact     Nystagmus: none     CN V   Facial sensation intact  CN VII   Right facial weakness: central    CN VIII   CN VIII normal      CN IX, X   CN IX normal    CN X normal    Palate: symmetric    CN XI   CN XI normal    Right sternocleidomastoid strength: normal  Left sternocleidomastoid strength: normal  Right trapezius strength: normal  Left trapezius strength: normal    CN XII   CN XII normal    Tongue: not atrophic  Fasciculations: absent  - R gaze preference noted but is able to cross midline b/l   - gaze not aligned      Motor Exam   Muscle bulk: normal  Right arm pronator drift: present  Left arm pronator drift: absent    Strength   Strength 5/5 except as noted  - R shoulder abduction/adduction 3/5   - R  strength 4/5   - R elbow flexion/extenstion 4/5   - R hip flexion 3/5   - R knee flexion/extension 4/5   - + drift RLE  - able to hold BLE against gravity (without hitting bed) x 5 seconds   - able to hold BUE against gravity (without hitting bed) x 10 seconds      Sensory Exam   Light touch normal    Vibration normal    - temperature intact x 4 extremities   - extinction intact      Gait, Coordination, and Reflexes     Gait  Gait: (deferred for pt safety )    Coordination   Finger to nose coordination: normal (with weakness in URE testing but ataxia not out of proportion to weakness)    Tremor   Resting tremor: absent  Intention tremor: absent  Action tremor: absent    Reflexes   Right brachioradialis: 2+  Left brachioradialis: 2+  Right biceps: 2+  Left biceps: 2+  Right patellar: 2+  Left patellar: 2+  Right plantar: equivocal  Left plantar: equivocal  Right ankle clonus: absent  Left pendular knee jerk: absent          Labs: I have personally reviewed pertinent reports     Recent Results (from the past 24 hour(s))   APTT    Collection Time: 08/02/21  1:02 AM   Result Value Ref Range    PTT 50 (H) 23 - 37 seconds   Procalcitonin Reflex    Collection Time: 08/02/21  6:19 AM   Result Value Ref Range    Procalcitonin <0 05 <=0 25 ng/ml   CBC and differential    Collection Time: 08/02/21  6:19 AM   Result Value Ref Range    WBC 9 51 4 31 - 10 16 Thousand/uL    RBC 4 06 3 88 - 5 62 Million/uL    Hemoglobin 10 4 (L) 12 0 - 17 0 g/dL    Hematocrit 34 2 (L) 36 5 - 49 3 %    MCV 84 82 - 98 fL    MCH 25 6 (L) 26 8 - 34 3 pg    MCHC 30 4 (L) 31 4 - 37 4 g/dL    RDW 15 6 (H) 11 6 - 15 1 %    MPV 10 2 8 9 - 12 7 fL    Platelets 682 906 - 685 Thousands/uL    nRBC 0 /100 WBCs    Neutrophils Relative 69 43 - 75 %    Immat GRANS % 0 0 - 2 %    Lymphocytes Relative 19 14 - 44 %    Monocytes Relative 9 4 - 12 %    Eosinophils Relative 2 0 - 6 %    Basophils Relative 1 0 - 1 %    Neutrophils Absolute 6 60 1 85 - 7 62 Thousands/µL    Immature Grans Absolute 0 04 0 00 - 0 20 Thousand/uL    Lymphocytes Absolute 1 82 0 60 - 4 47 Thousands/µL    Monocytes Absolute 0 81 0 17 - 1 22 Thousand/µL    Eosinophils Absolute 0 16 0 00 - 0 61 Thousand/µL    Basophils Absolute 0 08 0 00 - 0 10 Thousands/µL   Basic metabolic panel    Collection Time: 08/02/21  6:19 AM   Result Value Ref Range    Sodium 138 136 - 145 mmol/L    Potassium 4 0 3 5 - 5 3 mmol/L    Chloride 110 (H) 100 - 108 mmol/L    CO2 24 21 - 32 mmol/L    ANION GAP 4 4 - 13 mmol/L    BUN 20 5 - 25 mg/dL    Creatinine 0 99 0 60 - 1 30 mg/dL    Glucose 96 65 - 140 mg/dL    Calcium 9 8 8 3 - 10 1 mg/dL    eGFR 77 ml/min/1 73sq m   APTT    Collection Time: 08/02/21  9:13 AM   Result Value Ref Range    PTT 33 23 - 37 seconds       Imaging: I have personally reviewed pertinent imaging in PACS, including CTH/CTA H/N stroke alert 7/321/21, CT chest/abdomen/pelvis without contrast 7/31/21  and I have personally reviewed PACS reports  EKG, Pathology, and Other Studies: I have personally reviewed pertinent reports  ECHO 8/1/21  EKG 8/2/21      VTE Prophylaxis: Heparin      Counseling / Coordination of Care  Total time spent today 30 minutes    Greater than 50% of total time was spent with the patient and/or family counseling and/or coordination of care  A description of the counseling/coordination of care:  Patient was seen and evaluated  Discussed with attending  Chart reviewed thoroughly including laboratory and imaging studies    Plan of care discussed with patient and primary team

## 2021-08-02 NOTE — PLAN OF CARE
Problem: OCCUPATIONAL THERAPY ADULT  Goal: Performs self-care activities at highest level of function for planned discharge setting  See evaluation for individualized goals  Description: Treatment Interventions: ADL retraining, Functional transfer training, UE strengthening/ROM, Endurance training, Cognitive reorientation, Patient/family training, Neuromuscular reeducation, Fine motor coordination activities, Compensatory technique education, Energy conservation, Activityengagement          See flowsheet documentation for full assessment, interventions and recommendations  Outcome: Progressing  Note: Limitation: Decreased UE ROM, Decreased UE strength, Decreased ADL status, Decreased cognition, Decreased endurance, Decreased fine motor control, Decreased high-level ADLs, Decreased self-care trans  Prognosis: Fair, Good  Assessment: Pt is a 72 y o  male admitted to [facility] on 7/[facility]/2021 with Acute CVA (cerebrovascular accident) ([facility] [address] )  Pt  has no past medical history on file    PTA, Pt's baseline was Independent in ADLs, IADLs, and functional mobility  Pt lives at home with his wife in a 1 story house with 1  NALLELY without railings  Pt is currently Mod assist x2 with ADLs, sit to stand, and sit pivot transfers from bed to chair  Pt unable to complete stand pivot transfers at this time 2* significant posterior lateral lean, decreased balance, deficits in coordination, and inability to bear weight through RLE due to significant knee buckling  Pt was able to attend to verbal cues and can follow simple one step commands with door closed to limit distraction  Pt is demonstrating deficits in decreased ADL status, decreased UE ROM, decreased UE strength, decreased cognition, decreased endurance, decreased fine motor control, decreased safety awareness during ADL, decreased self-care trans and decreased high-level ADLs   Pt has supportive wife who will be home 24/7 to assist prn, however, Pt is not functioning close to baseline levels   Pt would benefit from skilled OT services during hospital stay and post acute rehab upon d/c      OT Discharge Recommendation: Post acute rehabilitation services  OT - OK to Discharge: Yes (when medically cleared)

## 2021-08-02 NOTE — ASSESSMENT & PLAN NOTE
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

## 2021-08-02 NOTE — PLAN OF CARE
Problem: PHYSICAL THERAPY ADULT  Goal: Performs mobility at highest level of function for planned discharge setting  See evaluation for individualized goals  Description: Treatment/Interventions: Functional transfer training, LE strengthening/ROM, Endurance training, Cognitive reorientation, Patient/family training, Bed mobility, Gait training, Spoke to nursing, Spoke to case management, OT          See flowsheet documentation for full assessment, interventions and recommendations  Note: Prognosis: Good  Problem List: Decreased strength, Decreased range of motion, Decreased endurance, Impaired balance, Decreased mobility, Impaired judgement, Decreased safety awareness, Decreased coordination, Decreased cognition  Assessment: Pt seen for high complexity PT evaluation  Pt with active PT eval/treat and up and OOB as tolerated orders  Pt is a 72 y o  male who was admitted to [facility] on 7/31/2021 with acute CVA  Pt's active problems include: nasopharyngeal mass, acute DVT of both LEs, acute respiratory failure with hypoxia, elevated troponin  Pt resides with wife in 1  with 1 NALLELY and was independent prior to hospital admission  Pt has limitations in strength, balance, endurance, and overall functional mobility  Pt requires assist of 2 for all mobility performed this date  Pt performed bed mobility tasks with mod Ax2  Pt sat EOB fluctuating between min- max Ax1 to correct R lateral and posterior lean  Pt performed STS from EOB using b/l HHA and mod Ax2 + R knee block  Pt able to take lateral side step toward [facility] with R knee block; R knee buckled  Pt performed sit pivot transfer from EOB to drop arm bedside chair with mod Ax2  Pt was left sitting upright in bedside chair with chair alarm on at the end of PT session with all needs in reach  Pt would benefit from continued PT services while in hospital to address remaining limitations   PT to continue to follow pt and recommends post-acute rehab services after d/c  The patient's AM-PAC Basic Mobility Inpatient Short Form Low Function Raw Score 14 , Standardized Score is 22 01  A standardized score less 42 9 suggests the patient may benefit from discharge to post-acute rehab services  Please also refer to the recommendation of the Physical Therapist for safe discharge planning  Barriers to Discharge: Inaccessible home environment        PT Discharge Recommendation: Post acute rehabilitation services     PT - OK to Discharge: Yes (once medically cleared )    See flowsheet documentation for full assessment

## 2021-08-02 NOTE — OCCUPATIONAL THERAPY NOTE
Occupational Therapy Evaluation     Patient Name: [place]  Today's Date: 8/2/2021  Problem List  Principal Problem:    Acute CVA (cerebrovascular accident) [facility])  Active Problems:    Elevated troponin    Acute respiratory failure with hypoxia ([facility] [address] )    Acute deep vein thrombosis (DVT) of distal vein of both lower extremities ([facility] [address] )    Nasopharyngeal mass    Dysphagia    Past Medical History  History reviewed  No pertinent past medical history  Past Surgical History  History reviewed  No pertinent surgical history  08/02/21 0900   OT Last Visit   OT Visit Date 08/02/21   Note Type   Note type Evaluation   Restrictions/Precautions   Weight Bearing Precautions Per Order No   Other Precautions Chair Alarm; Bed Alarm;Multiple lines; Fall Risk   Pain Assessment   Pain Assessment Tool 0-10   Pain Score No Pain   Home Living   Type of [address] One level;Performs ADLs on one level; Able to live on main level with bedroom/bathroom;Stairs to enter without rails  (1 NALLELY)   Bathroom Shower/Tub Tub/shower unit   Bathroom Toilet Standard   Bathroom Equipment Other (Comment)  (Pt denies)   [address] Other (Comment)  (Pt denies- did not use AD/DME PTA)   Additional Comments Pt reports living in a [facility] with 1STE without railings   Prior Function   Level of [place] Independent with ADLs and functional mobility   Lives With Spouse   Receives Help From Family   ADL Assistance Independent   IADLs Independent   Falls in the last 6 months 0   Vocational Retired   Comments Pt reports living at home with his wife who is home 24/7   Lifestyle   Autonomy independent in ADLs, IADLs, and functional mobility PTA   Reciprocal Relationships supportive wife who will be home 24/7 to assist   Service to Others retired   Intrinsic Gratification active PTA   Psychosocial   Psychosocial (WDL) WDL   Subjective   Subjective "I am not in any pain"   ADL   Where Assessed Edge of bed Eating Assistance 5  Supervision/Setup   Grooming Assistance 5  Supervision/Setup   UB Bathing Assistance 4  Minimal Assistance   LB Bathing Assistance 3  Moderate Assistance   [address] 3  Moderate Assistance   LB Dressing Deficit Supervision/safety; Increased time to complete;Verbal cueing; Thread RLE into pants   Toileting Assistance  3  Moderate Assistance   Bed Mobility   Supine to Sit 3  Moderate assistance   Additional items Assist x 2; Increased time required;Verbal cues   Transfers   Sit to Stand 3  Moderate assistance   Additional items Assist x 2; Increased time required;Verbal cues   Stand to Sit 3  Moderate assistance   Additional items Assist x 2; Increased time required;Verbal cues   Sit pivot 3  Moderate assistance   Additional items Assist x 2; Increased time required;Verbal cues   Additional Comments Pt did not utilize AD/DME for functional transfers   Functional Mobility   Functional Mobility 3  Moderate assistance   Additional Comments Pt did not utilize AD/DME- Pt unable to take steps due to lack of coordination, unable to bear weight without knee buckling on RLE   Balance   Static Sitting Poor   Dynamic Sitting Poor   Static Standing Poor -   Dynamic Standing Poor -   Ambulatory Poor -   Activity Tolerance   Activity Tolerance Patient limited by fatigue;Patient tolerated treatment well   Medical Staff Made Aware PT Fortino Angelucci- saorj due to medical complexity  OT focused on self-care tasks, safety during functional transfers, and limited activity tolerance  Nurse Made Aware nurse cleared session   RUE Assessment   RUE Assessment X  (3-/5)   LUE Assessment   LUE Assessment WNL  (4-/5)   Hand Function   Gross Motor Coordination Impaired  (RUE)   Fine Motor Coordination Impaired  (RUE impaired grasp noted while grabbing pants)   Cognition   Overall Cognitive Status Impaired   Arousal/Participation Responsive; Cooperative;Arousable   Attention Attends with cues to redirect   Orientation Level Oriented to person;Oriented to place  (Pt reported correct year - incorrect season)   Memory Unable to assess   Following Commands Follows one step commands with increased time or repetition   Comments Pt was able to follow simple one step commands    Assessment   Limitation Decreased UE ROM; Decreased UE strength;Decreased ADL status; Decreased cognition;Decreased endurance;Decreased fine motor control;Decreased high-level ADLs; Decreased self-care trans   Prognosis Fair;Good   Assessment Pt is a 72 y o  male admitted to [facility] on 7/31/2021 with Acute CVA (cerebrovascular accident) ([facility] [address] )  Pt  has no past medical history on file    PTA, Pt's baseline was Independent in ADLs, IADLs, and functional mobility  Pt lives at home with his wife in a 1 story house with 1  NALLELY without railings  Pt is currently Mod assist x2 with ADLs, sit to stand, and sit pivot transfers from bed to chair  Pt unable to complete stand pivot transfers at this time 2* significant posterior lateral lean, decreased balance, deficits in coordination, and inability to bear weight through RLE due to significant knee buckling  Pt was able to attend to verbal cues and can follow simple one step commands with door closed to limit distraction  Pt is demonstrating deficits in decreased ADL status, decreased UE ROM, decreased UE strength, decreased cognition, decreased endurance, decreased fine motor control, decreased safety awareness during ADL, decreased self-care trans and decreased high-level ADLs  Pt has supportive wife who will be home 24/7 to assist prn, however, Pt is not functioning close to baseline levels  Pt would benefit from skilled OT services during hospital stay and post acute rehab upon d/c     Goals   Patient Goals to get better   LTG Time Frame 10-14   Long Term Goal #1 please see below   Plan   Treatment Interventions ADL retraining;Functional transfer training;UE strengthening/ROM; Endurance training;Cognitive reorientation;Patient/family training;Neuromuscular reeducation; Fine motor coordination activities; Compensatory technique education; Energy conservation; Activityengagement   Goal Expiration Date 08/16/21   OT Frequency 3-5x/wk   Recommendation   OT Discharge Recommendation Post acute rehabilitation services   OT - OK to Discharge Yes  (when medically cleared)   AM-[facility] Daily Activity Inpatient   Lower Body Dressing 2   Bathing 2   Toileting 2   Upper Body Dressing 3   Grooming 3   Eating 3   Daily Activity Raw Score 15   Daily Activity Standardized Score (Calc for Raw Score >=11) 34 69   AM-[facility] Applied Cognition Inpatient   Following a Speech/Presentation 1   Understanding Ordinary Conversation 3   Taking Medications 1   Remembering Where Things Are Placed or Put Away 3   Remembering List of 4-5 Errands 2   Taking Care of Complicated Tasks 1   Applied Cognition Raw Score 11   Applied Cognition Standardized Score 27 03       The patient's raw score on the AM-PAC Daily Activity inpatient short form is 15, standardized score is 34 69, less than 39 4  Patients at this level are likely to benefit from discharge to post-acute rehabilitation services  Please refer to the recommendation of the Occupational Therapist for safe discharge planning  Goals to be met within 10-14 days:    1  Pt will be Independent in UB bathing with use of AD/DME prn     2  Pt will be Independent in LB bathing with use of AD/DME prn     3  Pt will be Independent in UB dressing tasks with use of AD/DME prn     4  Pt will be Independent in LB dressing tasks with use of AD/DME prn     7    Pt will be Independent with toileting with use of AD/DME prn     8  Pt will demonstrate good carryover of body mechanics and energy conservation techniques during ADL/IADL tasks  9  Pt will be Independent in functional transfers to and from all surfaces with good safety awareness and good balance      10  Pt will be Independent with functional mobility with use of AD/DME prn for ADL/IADL tasks  11  Pt will be A & O x 4 for all tx sessions with environmental/verbal cues prn  12    Pt will demonstrate good problem solving and sequencing skills during ADL/IADL tasks with environmental/verbal cues prn  15  Pt will engage in ongoing cognitive assessment w/ good participation to assist with safe d/c planning recommendations  14  Pt will demonstrate good activity tolerance during OT session  15  Pt will increase balance to good during ADL/IADL activities        Venkat Magana

## 2021-08-02 NOTE — ASSESSMENT & PLAN NOTE
· Currently on mid flow nasal cannula  Wean down as tolerated  · Suspected most likely due to pulmonary embolism    · CTA chest deferred given recent contrast load and patient being poor  Candidate for lysis  · LEVDs confirm b/l DVTs so will assume pt has PE

## 2021-08-02 NOTE — RESTORATIVE TECHNICIAN NOTE
Restorative Technician Note      Patient Name: Racheal Friend     Note Type: Mobility  Patient Position Upon Consult: Bedside chair  Activity Performed: Dangled;Stood  Assistive Device: Other (Comment) (Assist x2 to stand pt to assist nursing with putting pt on/off the bedpan )  Education Provided: Yes  Patient Position at End of Consult: Bedside chair; All needs within reach;Bed/Chair alarm activated      Octavia BLUNT, Restorative Technician, [employer]

## 2021-08-03 ENCOUNTER — APPOINTMENT (INPATIENT)
Dept: RADIOLOGY | Facility: HOSPITAL | Age: 71
DRG: 064 | End: 2021-08-03
Payer: MEDICARE

## 2021-08-03 ENCOUNTER — TELEPHONE (OUTPATIENT)
Dept: NEUROLOGY | Facility: CLINIC | Age: 71
End: 2021-08-03

## 2021-08-03 PROBLEM — R91.8 MASS OF UPPER LOBE OF RIGHT LUNG: Status: ACTIVE | Noted: 2021-08-03

## 2021-08-03 LAB
ANION GAP SERPL CALCULATED.3IONS-SCNC: 7 MMOL/L (ref 4–13)
BASOPHILS # BLD AUTO: 0.08 THOUSANDS/ΜL (ref 0–0.1)
BASOPHILS NFR BLD AUTO: 1 % (ref 0–1)
BUN SERPL-MCNC: 20 MG/DL (ref 5–25)
CALCIUM SERPL-MCNC: 9.9 MG/DL (ref 8.3–10.1)
CHLORIDE SERPL-SCNC: 108 MMOL/L (ref 100–108)
CO2 SERPL-SCNC: 24 MMOL/L (ref 21–32)
CREAT SERPL-MCNC: 0.92 MG/DL (ref 0.6–1.3)
EOSINOPHIL # BLD AUTO: 0.2 THOUSAND/ΜL (ref 0–0.61)
EOSINOPHIL NFR BLD AUTO: 2 % (ref 0–6)
ERYTHROCYTE [DISTWIDTH] IN BLOOD BY AUTOMATED COUNT: 15.6 % (ref 11.6–15.1)
GFR SERPL CREATININE-BSD FRML MDRD: 85 ML/MIN/1.73SQ M
GLUCOSE SERPL-MCNC: 90 MG/DL (ref 65–140)
HCT VFR BLD AUTO: 34.1 % (ref 36.5–49.3)
HGB BLD-MCNC: 10.4 G/DL (ref 12–17)
IMM GRANULOCYTES # BLD AUTO: 0.04 THOUSAND/UL (ref 0–0.2)
IMM GRANULOCYTES NFR BLD AUTO: 1 % (ref 0–2)
LYMPHOCYTES # BLD AUTO: 1.49 THOUSANDS/ΜL (ref 0.6–4.47)
LYMPHOCYTES NFR BLD AUTO: 17 % (ref 14–44)
MCH RBC QN AUTO: 25.6 PG (ref 26.8–34.3)
MCHC RBC AUTO-ENTMCNC: 30.5 G/DL (ref 31.4–37.4)
MCV RBC AUTO: 84 FL (ref 82–98)
MONOCYTES # BLD AUTO: 0.73 THOUSAND/ΜL (ref 0.17–1.22)
MONOCYTES NFR BLD AUTO: 9 % (ref 4–12)
NEUTROPHILS # BLD AUTO: 6.09 THOUSANDS/ΜL (ref 1.85–7.62)
NEUTS SEG NFR BLD AUTO: 70 % (ref 43–75)
NRBC BLD AUTO-RTO: 0 /100 WBCS
PLATELET # BLD AUTO: 370 THOUSANDS/UL (ref 149–390)
PMV BLD AUTO: 9.9 FL (ref 8.9–12.7)
POTASSIUM SERPL-SCNC: 4 MMOL/L (ref 3.5–5.3)
RBC # BLD AUTO: 4.06 MILLION/UL (ref 3.88–5.62)
SODIUM SERPL-SCNC: 139 MMOL/L (ref 136–145)
WBC # BLD AUTO: 8.63 THOUSAND/UL (ref 4.31–10.16)

## 2021-08-03 PROCEDURE — 99233 SBSQ HOSP IP/OBS HIGH 50: CPT | Performed by: PHYSICIAN ASSISTANT

## 2021-08-03 PROCEDURE — 99232 SBSQ HOSP IP/OBS MODERATE 35: CPT | Performed by: INTERNAL MEDICINE

## 2021-08-03 PROCEDURE — G1004 CDSM NDSC: HCPCS

## 2021-08-03 PROCEDURE — A9585 GADOBUTROL INJECTION: HCPCS | Performed by: GENERAL PRACTICE

## 2021-08-03 PROCEDURE — 80048 BASIC METABOLIC PNL TOTAL CA: CPT | Performed by: PHYSICIAN ASSISTANT

## 2021-08-03 PROCEDURE — 70553 MRI BRAIN STEM W/O & W/DYE: CPT

## 2021-08-03 PROCEDURE — 85025 COMPLETE CBC W/AUTO DIFF WBC: CPT | Performed by: PHYSICIAN ASSISTANT

## 2021-08-03 RX ADMIN — GADOBUTROL 8 ML: 604.72 INJECTION INTRAVENOUS at 04:30

## 2021-08-03 RX ADMIN — ENOXAPARIN SODIUM 90 MG: 100 INJECTION SUBCUTANEOUS at 08:44

## 2021-08-03 RX ADMIN — ENOXAPARIN SODIUM 90 MG: 100 INJECTION SUBCUTANEOUS at 21:47

## 2021-08-03 RX ADMIN — ATORVASTATIN CALCIUM 40 MG: 40 TABLET, FILM COATED ORAL at 16:16

## 2021-08-03 NOTE — PROGRESS NOTES
Cardiology Progress Note - Radha Hines 72 y o  male MRN: [id number]    Unit/Bed#: [facility] 725-01 Encounter: [id number]      Assessment:  Principal Problem:    Acute CVA (cerebrovascular accident) [facility])  Active Problems:    Elevated troponin    Acute respiratory failure with hypoxia ([address] )    Acute deep vein thrombosis (DVT) of distal vein of both lower extremities (HCC)    Nasopharyngeal mass    Dysphagia      Plan:  No issues overnight  Patient has no chest pain or significant dyspnea  He is on supplemental oxygen  Consultant notes reviewed  Patient likely to have diagnostic Pulmonary and ENT biopsies  Patient on Lovenox  Pulmonary note appreciated  Neurology note appreciated in reference to CVA  Patient cleared from cardiac perspective for diagnostic testing  Can check follow-up echocardiogram in 4-6 weeks to assess status of right ventricle again  RV function should recover with treatment of thromboembolic condition  Troponin elevation and EKG change on arrival likely related to presumed pulmonary embolism  BMP is 4 0 with a creatinine of 0 92  Hemoglobin 10 4  Cardiology can see as an outpatient if this would be helpful  Please call if I can be of further assistance  Subjective:   Patient seen and examined  No significant events overnight   negative  Objective:     Vitals: Blood pressure 135/80, pulse 65, temperature 97 7 °F (36 5 °C), resp  rate 18, height 5' 8" (1 727 m), weight 86 6 kg (190 lb 14 7 oz), SpO2 94 %  , Body mass index is 29 03 kg/m² ,   Orthostatic Blood Pressures      Most Recent Value   Blood Pressure  135/80 filed at 08/03/2021 0757   Patient Position - Orthostatic VS  Lying filed at 08/01/2021 1935      ,    No intake or output data in the 24 hours ending 08/03/21 3152          Physical Exam:    GEN: Radha Hines   NECK: supple, no carotid bruits, no JVD or HJR  HEART: normal rate, regular rhythm, normal S1 and S2, no murmurs, clicks, gallops or rubs   LUNGS: clear to auscultation bilaterally; no wheezes, rales, or rhonchi   ABDOMEN: normal bowel sounds, soft, no tenderness, no distention  EXTREMITIES: peripheral pulses normal; no clubbing, cyanosis, or edema  SKIN: warm and well perfused, no suspicious lesions on exposed skin    Labs & Results:    Admission on 07/31/2021   Component Date Value    Sodium 07/31/2021 138     Potassium 07/31/2021 4 2     Chloride 07/31/2021 106     CO2 07/31/2021 25     ANION GAP 07/31/2021 7     BUN 07/31/2021 19     Creatinine 07/31/2021 1 05     Glucose 07/31/2021 102     Calcium 07/31/2021 9 2     eGFR 07/31/2021 72     WBC 07/31/2021 9 18     RBC 07/31/2021 4 05     Hemoglobin 07/31/2021 10 3*    Hematocrit 07/31/2021 33 9*    MCV 07/31/2021 84     MCH 07/31/2021 25 4*    MCHC 07/31/2021 30 4*    RDW 07/31/2021 15 7*    Platelets 92/14/3646 245     MPV 07/31/2021 9 9     Protime 07/31/2021 16 0*    INR 07/31/2021 1 28*    PTT 07/31/2021 29     Troponin I 07/31/2021 6 90*    SARS-CoV-2 07/31/2021 Negative     POC Glucose 07/31/2021 100     Total Bilirubin 07/31/2021 0 33     Bilirubin, Direct 07/31/2021 0 12     Alkaline Phosphatase 07/31/2021 56     AST 07/31/2021 34     ALT 07/31/2021 45     Total Protein 07/31/2021 6 8     Albumin 07/31/2021 2 4*    Troponin I 07/31/2021 7 54*    Troponin I 07/31/2021 8 23*    PTT 08/01/2021 58*    Cholesterol 08/01/2021 125     Triglycerides 08/01/2021 95     HDL, Direct 08/01/2021 30*    LDL Calculated 08/01/2021 76     Hemoglobin A1C 08/01/2021 5 7*    EAG 08/01/2021 117     Magnesium 08/01/2021 2 6     Phosphorus 08/01/2021 3 0     Sodium 08/01/2021 139     Potassium 08/01/2021 4 1     Chloride 08/01/2021 108     CO2 08/01/2021 22     ANION GAP 08/01/2021 9     BUN 08/01/2021 17     Creatinine 08/01/2021 0 89     Glucose 08/01/2021 93     Calcium 08/01/2021 9 8     eGFR 08/01/2021 87     WBC 08/01/2021 11 12*    RBC 08/01/2021 4 33     Hemoglobin 08/01/2021 11 1*    Hematocrit 08/01/2021 36 0*    MCV 08/01/2021 83     MCH 08/01/2021 25 6*    MCHC 08/01/2021 30 8*    RDW 08/01/2021 15 6*    Platelets 78/52/4393 314     MPV 08/01/2021 10 7     PTT 08/01/2021 30     Ventricular Rate 07/31/2021 80     Atrial Rate 07/31/2021 80     ND Interval 07/31/2021 146     QRSD Interval 07/31/2021 80     QT Interval 07/31/2021 360     QTC Interval 07/31/2021 415     P Axis 07/31/2021 71     QRS Axis 07/31/2021 210     T Wave Axis 07/31/2021 -45     pH, Cipriano 08/01/2021 7 378     pCO2, Cipriano 08/01/2021 37 5*    pO2, Cipriano 08/01/2021 54 5*    HCO3, Cipriano 08/01/2021 21 6*    Base Excess, Cipriano 08/01/2021 -3 1     O2 Content, Cipriano 08/01/2021 15 0     O2 HGB, VENOUS 08/01/2021 83 9*    Procalcitonin 08/01/2021 <0 05     Troponin I 08/01/2021 10 80*    NT-proBNP 08/01/2021 4,647*    Procalcitonin 08/02/2021 <0 05     Troponin I 08/01/2021 10 30*    PTT 08/01/2021 47*    PTT 08/02/2021 50*    WBC 08/02/2021 9 51     RBC 08/02/2021 4 06     Hemoglobin 08/02/2021 10 4*    Hematocrit 08/02/2021 34 2*    MCV 08/02/2021 84     MCH 08/02/2021 25 6*    MCHC 08/02/2021 30 4*    RDW 08/02/2021 15 6*    MPV 08/02/2021 10 2     Platelets 70/68/5735 331     nRBC 08/02/2021 0     Neutrophils Relative 08/02/2021 69     Immat GRANS % 08/02/2021 0     Lymphocytes Relative 08/02/2021 19     Monocytes Relative 08/02/2021 9     Eosinophils Relative 08/02/2021 2     Basophils Relative 08/02/2021 1     Neutrophils Absolute 08/02/2021 6 60     Immature Grans Absolute 08/02/2021 0 04     Lymphocytes Absolute 08/02/2021 1 82     Monocytes Absolute 08/02/2021 0 81     Eosinophils Absolute 08/02/2021 0 16     Basophils Absolute 08/02/2021 0 08     Sodium 08/02/2021 138     Potassium 08/02/2021 4 0     Chloride 08/02/2021 110*    CO2 08/02/2021 24     ANION GAP 08/02/2021 4     BUN 08/02/2021 20     Creatinine 08/02/2021 0 99     Glucose 08/02/2021 96     Calcium 2021 9 8     eGFR 2021 77     PTT 2021 33     Ventricular Rate 2021 77     Atrial Rate 2021 77     MI Interval 2021 148     QRSD Interval 2021 82     QT Interval 2021 400     QTC Interval 2021 452     P Axis 2021 42     QRS Axis 2021 -43     T Wave Axis 2021 -51     WBC 2021 8 63     RBC 2021 4 06     Hemoglobin 2021 10 4*    Hematocrit 2021 34 1*    MCV 2021 84     MCH 2021 25 6*    MCHC 2021 30 5*    RDW 2021 15 6*    MPV 2021 9 9     Platelets  370     nRBC 2021 0     Neutrophils Relative 2021 70     Immat GRANS % 2021 1     Lymphocytes Relative 2021 17     Monocytes Relative 2021 9     Eosinophils Relative 2021 2     Basophils Relative 2021 1     Neutrophils Absolute 2021 6 09     Immature Grans Absolute 2021 0 04     Lymphocytes Absolute 2021 1 49     Monocytes Absolute 2021 0 73     Eosinophils Absolute 2021 0 20     Basophils Absolute 2021 0 08     Sodium 2021 139     Potassium 2021 4 0     Chloride 2021 108     CO2 2021 24     ANION GAP 2021 7     BUN 2021 20     Creatinine 2021 0 92     Glucose 2021 90     Calcium 2021 9 9     eGFR 2021 85        Echo complete with contrast if indicated    Result Date: 2021  Narrative: [address] [phone] Transthoracic Echocardiogram 2D, M-mode, Doppler, and Color Doppler Study date:  01-Aug-2021 Patient: Topher Diallo MR number: [id number] Account number: [de-identified] : 1950 Age: 72 years Gender: Male Status: Inpatient Location: Bedside Height: 68 in Weight: 189 6 lb BP: 129/ 83 mmHg Indications: CVA   Diagnoses: I67 9 - Cerebrovascular disease, unspecified Sonographer: Madeleine Freeman RDCS Referring Physician:  Adeline Russell DO Group:  [facility] Interpreting Physician:  Delaney Cody DO SUMMARY LEFT VENTRICLE: Systolic function was normal  Ejection fraction was estimated to be 55 %  Although no diagnostic regional wall motion abnormality was identified, this possibility cannot be completely excluded on the basis of this study  Wall thickness was mildly increased  Doppler parameters were consistent with abnormal left ventricular relaxation (grade 1 diastolic dysfunction)  RIGHT VENTRICLE: The ventricle was mildly to moderately dilated  Systolic function was mildly reduced  There is hypokinesis of the mid free wall  RIGHT ATRIUM: The atrium was mildly dilated  TRICUSPID VALVE: There was mild regurgitation  Estimated peak PA pressure was 44 mmHg  PERICARDIUM: A small pericardial effusion was identified  There was no evidence of hemodynamic compromise  HISTORY: PRIOR HISTORY: CVA  Elevated troponin  Acute respiratory failure  DVT  Former smoker  PROCEDURE: The procedure was performed at the bedside  This was a routine study  The transthoracic approach was used  The study included complete 2D imaging, M-mode, complete spectral Doppler, and color Doppler  The heart rate was 70 bpm, at the start of the study  Images were obtained from the parasternal, apical, subcostal, and suprasternal notch acoustic windows  Echocardiographic views were limited due to restricted patient mobility and lung interference  This was a technically difficult study  LEFT VENTRICLE: Size was normal  Systolic function was normal  Ejection fraction was estimated to be 55 %  Although no diagnostic regional wall motion abnormality was identified, this possibility cannot be completely excluded on the basis of this study  Wall thickness was mildly increased  DOPPLER: Doppler parameters were consistent with abnormal left ventricular relaxation (grade 1 diastolic dysfunction)   RIGHT VENTRICLE: The ventricle was mildly to moderately dilated  Systolic function was mildly reduced  There is hypokinesis of the mid free wall  LEFT ATRIUM: Size was normal  RIGHT ATRIUM: The atrium was mildly dilated  MITRAL VALVE: Valve structure was normal  There was normal leaflet separation  DOPPLER: The transmitral velocity was within the normal range  There was no evidence for stenosis  There was no regurgitation  AORTIC VALVE: The valve was trileaflet  Leaflets exhibited normal thickness and normal cuspal separation  DOPPLER: Transaortic velocity was within the normal range  There was no evidence for stenosis  There was no regurgitation  TRICUSPID VALVE: The valve structure was normal  There was normal leaflet separation  DOPPLER: The transtricuspid velocity was within the normal range  There was no evidence for stenosis  There was mild regurgitation  Estimated peak PA pressure was 44 mmHg  PULMONIC VALVE: Leaflets exhibited normal thickness, no calcification, and normal cuspal separation  DOPPLER: The transpulmonic velocity was within the normal range  There was no regurgitation  PERICARDIUM: A small pericardial effusion was identified  There was no evidence of hemodynamic compromise  The pericardium was normal in appearance  AORTA: The root exhibited normal size  SYSTEMIC VEINS: IVC: The inferior vena cava was not well visualized   SYSTEM MEASUREMENT TABLES 2D %FS: 24 % Ao Diam: 3 63 cm EDV(Teich): 92 69 ml EF(Teich): 47 96 % ESV(Teich): 48 24 ml IVSd: 1 08 cm LA Area: 10 98 cm2 LA Diam: 3 49 cm LVEDV MOD A4C: 91 9 ml LVEF MOD A4C: 58 38 % LVESV MOD A4C: 38 25 ml LVIDd: 4 51 cm LVIDs: 3 42 cm LVLd A4C: 8 32 cm LVLs A4C: 7 04 cm LVPWd: 1 03 cm RA Area: 19 63 cm2 RVIDd: 4 75 cm SV MOD A4C: 53 66 ml SV(Teich): 44 45 ml CW TR Vmax: 3 04 m/s TR maxP 91 mmHg MM TAPSE: 1 3 cm PW E' Sept: 0 08 m/s E/E' Sept: 7 34 MV A Tristan: 0 75 m/s MV Dec [place]: 1 98 m/s2 MV DecT: 280 99 ms MV E Tristan: 0 56 m/s MV E/A Ratio: 0 74 MV PHT: 81 49 ms MVA By PHT: 2 7 cm2 [address] Accredited Echocardiography Laboratory Prepared and electronically signed by Nancy Rivera DO Signed 01-Aug-2021 14:29:36     CT chest abdomen pelvis wo contrast    Result Date: 7/31/2021  Narrative: CT CHEST, ABDOMEN AND PELVIS WITHOUT IV CONTRAST INDICATION:   Metastases suspected, unknown primary lung mass and SOB  COMPARISON:  No prior chest imaging  Comparison is made with the relevant images of the head and neck CTA from earlier the same day  TECHNIQUE: CT examination of the chest, abdomen and pelvis was performed without intravenous contrast   Axial, sagittal, and coronal 2D reformatted images were created from the source data and submitted for interpretation  Radiation dose length product (DLP) for this visit:  848  76 mGy-cm   This examination, like all CT scans performed in the [facility], was performed utilizing techniques to minimize radiation dose exposure, including the use of iterative  reconstruction and automated exposure control  Enteric contrast was not administered  FINDINGS: CHEST LUNGS:  There is moderate emphysema  There is a 6 4 x 3 6 x 3 4 cm solid, irregular contoured right upper lobe pulmonary nodule (series 2 image 17 )  There is no tracheal or endobronchial lesion  PLEURA:  Small water density right-sided pleural effusion  HEART/GREAT VESSELS:  Trace pericardial effusion  Mild cardiomegaly  MEDIASTINUM AND JONH:  There is diffuse right hilar and mediastinal adenopathy  Largest is a right paratracheal node measuring 2 cm in short axis (series 2 image 21 ) CHEST WALL AND LOWER NECK:   Unremarkable  ABDOMEN LIVER/BILIARY TREE:  Numerous too small to characterize hypodensities scattered throughout the liver  GALLBLADDER:  No calcified gallstones  No pericholecystic inflammatory change  SPLEEN:  Unremarkable  PANCREAS:  Unremarkable  ADRENAL GLANDS:  Unremarkable   KIDNEYS/URETERS:  There is a 5 6 x 5 6 cm simple cyst in the left kidney lower pole  No hydronephrosis  There is contrast throughout the renal collecting systems, due to the prior IV contrast injection for head and neck CTA  STOMACH AND BOWEL:  Unremarkable  APPENDIX:  No findings to suggest appendicitis  ABDOMINOPELVIC CAVITY:  No ascites  No pneumoperitoneum  No lymphadenopathy  VESSELS:  Unremarkable for patient's age  PELVIS REPRODUCTIVE ORGANS:  Unremarkable for patient's age  URINARY BLADDER:  Bladder is also filled with previous injected iodinated IV contrast  ABDOMINAL WALL/INGUINAL REGIONS:  Unremarkable  OSSEOUS STRUCTURES:  No acute fracture or destructive osseous lesion  Impression: There is moderate emphysema  There is a 6 4 x 3 6 x 3 4 cm solid, irregular contoured right upper lobe pulmonary nodule, likely primary lung cancer (series 2 image 17 ) There is extensive right hilar and mediastinal adenopathy  Small right pleural effusion  There are also numerous too small to characterize hepatic hypodense lesions  Consider abdomen MR with and without IV contrast to characterize  Workstation performed: [id number]     XR chest portable    Result Date: 8/2/2021  Narrative: CHEST INDICATION:   increasing oxygen requirements  COMPARISON:  None EXAM PERFORMED/VIEWS:  XR CHEST PORTABLE FINDINGS: Cardiomediastinal silhouette appears unremarkable  4 7 x 3 6 cm right upper lobe mass is present  There is suggestion of mediastinal and hilar lymphadenopathy  No pleural effusions or pneumothorax  Osseous structures appear within normal limits for patient age  Impression: Right upper lobe mass with suggestion of mediastinal and hilar lymphadenopathy  At the time of this dictation, CT of the chest had been performed which confirms findings which are suspicious for malignancy  Workstation performed: [id number]     MRI brain w wo contrast    Result Date: 8/3/2021  Narrative: MRI BRAIN WITH AND WITHOUT CONTRAST INDICATION: stroke     Status post stroke alert  Right-sided weakness  COMPARISON:  None  TECHNIQUE: Sagittal T1, axial T2, axial FLAIR, axial T1, axial Jasper, axial diffusion  Sagittal, axial T1 postcontrast   Axial bravo postcontrast with coronal reconstructions  IV Contrast:  8 mL of Gadobutrol injection (SINGLE-DOSE)  IMAGE QUALITY:   Diagnostic  FINDINGS: BRAIN PARENCHYMA:  Multifocal diffusion restriction in at least 3 discrete vascular territories most notably in the left MCA involving the left basal ganglia and portions of the left internal capsule on image 19, series 3 correlating to the hypodensity on the prior stroke alert head CT from a few days days earlier  Additionally there are few subtle cortical foci of diffusion restriction more superiorly in the left frontal lobe on image 25 and 26 also in the left MCA territory  A faint, subtle focus of diffusion restriction superiorly in the right frontal lobe on image 29, series 3 is noted  This is in the right MCA territory  In the periventricular white matter adjacent to the occipital horn of the left lateral ventricle in the parietal occipital junction on image 20, series 3 there is a faint focus of diffusion restriction  In the left occipital lobe on image 15, series 3 is a left posterior cerebral artery territory there are 2 foci of diffusion restriction  A faint focus of diffusion restriction superiorly in the right cerebellum image 11, series 3 is noted indicative of posterior circulation infarction likely in the superior cerebellar artery territory on the right  Another more subtle faint focus of diffusion restriction more anteriorly inferiorly on image 10, series 3 may be in the anterior-inferior cerebellar artery territory  There is questionable subtle faint  patchy enhancement in the left basal ganglia infarction, suggesting a subacute component  No large intracranial hemorrhage  No extra-axial fluid collection  Cerebellar tonsils are normally positioned   Small scattered hyperintensities on T2/FLAIR imaging are noted in the periventricular and subcortical white matter demonstrating an appearance that is statistically most likely to represent mild microangiopathic change  VENTRICLES:  Slight effacement of the frontal horn left lateral ventricle  SELLA AND PITUITARY GLAND:  Normal  ORBITS:  Right lens implant noted  PARANASAL SINUSES:  Normal  VASCULATURE:  Evaluation of the major intracranial vasculature demonstrates appropriate flow voids  CALVARIUM AND SKULL BASE:  Normal  EXTRACRANIAL SOFT TISSUES:  Bilobed mass in the right nasopharynx laterally in the region of the fossa of Rosenmuller has 2 components with a more medial intrinsically T1 hyperintense elements and more medial hypointense/cystic element  In aggregate the  lesion measures approximately 2 5 cm transverse dimension on image 5, series 11  Impression: 1  Multifocal diffusion abnormalities in several separate vascular territories, largest in the left MCA territory indicative of multifocal acute/recent infarctions most likely from a central embolic source  Further clinical assessment advised  2   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  Both benign and malignant etiologies are in the differential diagnosis  ENT assessment recommended  Workstation performed: [id number]     CT stroke alert brain    Result Date: 7/31/2021  Narrative: CT BRAIN - STROKE ALERT PROTOCOL INDICATION:   Right-sided weakness  Dysarthria  COMPARISON:  None  TECHNIQUE:  CT examination of the brain was performed  In addition to axial images, coronal reformatted images were created and submitted for interpretation  Radiation dose length product (DLP) for this visit:  868 03 mGy-cm     This examination, like all CT scans performed in the [facility], was performed utilizing techniques to minimize radiation dose exposure, including the use of iterative  reconstruction and automated exposure control  IMAGE QUALITY:  Diagnostic  FINDINGS:  PARENCHYMA:  Loss of gray-white differentiation identified in the left caudate head involving the anterior limb of left internal capsule and anterior portion of the lentiform nucleus representing recent infarct measuring approximately 2 4 x 1 8 cm  No hemorrhage identified  Areas of low-density in periventricular subcortical regions compatible with mild chronic microangiopathy  Normal intracranial vasculature  VENTRICLES AND EXTRA-AXIAL SPACES:  Normal for patient's age  VISUALIZED ORBITS AND PARANASAL SINUSES:  Unremarkable  CALVARIUM AND EXTRACRANIAL SOFT TISSUES:   Normal      Impression: Recent infarct left caudate head, anterior lentiform nucleus, and anterior limb of left internal capsule without evidence of hemorrhage  Findings were directly discussed with Terence Patiño on 7/31/2021 2:51 PM  Workstation performed: [id number]     VAS lower limb venous duplex study, complete bilateral    Result Date: 8/1/2021  Narrative:  [facility] REPORT CLINICAL: Indications: Patient presents with bilateral lower calf pain and swelling  FINDINGS:  Right           Impression              Peroneal        Occlusive Subsegmental  PostTibial      Occlusive Subsegmental   Left            Impression              DVT                GSV Mid Thigh                           Acute - Occlusive  GSV Dist Thigh                          Acute - Occlusive  GSV Knee                                Acute - Occlusive  GSV Mid Calf                            Acute - Occlusive  Peroneal        Occlusive Subsegmental                        CONCLUSION:  Impression: RIGHT LOWER LIMB: Acute deep vein thrombosis is noted in the posterior tibial and peroneal veins  No evidence of superficial thrombophlebitis noted  Doppler evaluation shows a normal response to augmentation maneuvers  Popliteal, posterior tibial and anterior tibial arterial Doppler waveforms are triphasic    LEFT LOWER LIMB: Acute deep vein thrombosis is noted in the peroneal veins  Acute superficial thrombophlebitis is noted in the great saphenous vein from the mid thigh to the mid calf  Doppler evaluation shows a normal response to augmentation maneuvers  Popliteal, posterior tibial and anterior tibial arterial Doppler waveforms are triphasic  Technical findings were given to Dr Yaya Baker  SIGNATURE: Electronically Signed by: Jules Joseph on 2021-08-01 11:43:04 AM    CTA stroke alert (head/neck)    Result Date: 7/31/2021  Narrative: CTA NECK AND BRAIN WITH CONTRAST INDICATION: Stroke symptoms with right upper and lower extremity ataxia  Dysarthria  Right facial droop  COMPARISON:   None  TECHNIQUE:   Post contrast imaging was performed after administration of iodinated contrast through the neck and brain  Post contrast axial 0 625 mm images timed to opacify the arterial system  3D rendering was performed on an independent workstation  MIP reconstructions performed  Coronal reconstructions were performed of the noncontrast portion of the brain  Radiation dose length product (DLP) for this visit:  609 42 mGy-cm   This examination, like all CT scans performed in the [facility], was performed utilizing techniques to minimize radiation dose exposure, including the use of iterative  reconstruction and automated exposure control  IV Contrast:  85 mL Omnipaque 350 contrast   IMAGE QUALITY:   Diagnostic FINDINGS: CERVICAL VASCULATURE AORTIC ARCH AND GREAT VESSELS:  Normal aortic arch and great vessel origins  Normal visualized subclavian vessels  RIGHT VERTEBRAL ARTERY CERVICAL SEGMENT:  Normal origin  The vessel is normal in caliber throughout the neck  LEFT VERTEBRAL ARTERY CERVICAL SEGMENT:  Normal origin  The vessel is normal in caliber throughout the neck  RIGHT EXTRACRANIAL CAROTID SEGMENT:  Normal caliber common carotid artery  Normal bifurcation and cervical internal carotid artery    No stenosis or dissection  LEFT EXTRACRANIAL CAROTID SEGMENT:  Normal caliber common carotid artery  Normal bifurcation and cervical internal carotid artery  No stenosis or dissection  NASCET criteria was used to determine the degree of internal carotid artery diameter stenosis  INTRACRANIAL VASCULATURE INTERNAL CAROTID ARTERIES:  Normal enhancement of the intracranial portions of the internal carotid arteries  Normal ophthalmic artery origins  Normal ICA terminus  ANTERIOR CIRCULATION:  Symmetric A1 segments and anterior cerebral arteries with normal enhancement  Normal anterior communicating artery  MIDDLE CEREBRAL ARTERY CIRCULATION:  M1 segment and middle cerebral artery branches demonstrate normal enhancement bilaterally  DISTAL VERTEBRAL ARTERIES:  Normal distal vertebral arteries  Posterior inferior cerebellar artery origins are normal  Normal vertebral basilar junction  BASILAR ARTERY:  Basilar artery is normal in caliber  Normal superior cerebellar arteries  POSTERIOR CEREBRAL ARTERIES: Both posterior cerebral arteries arises from the basilar tip  Both arteries demonstrate normal enhancement  DURAL VENOUS SINUSES:  Normal  NON VASCULAR ANATOMY BONY STRUCTURES:  Reversal the cervical lordosis apex C4-5  SOFT TISSUES OF THE NECK:  Mass identified right nasopharynx for which direct visualization by ENT is recommended  This is incompletely evaluated on this study secondary arterial contrast bolus timing  THORACIC INLET:  Right upper lobe lung mass, small right pleural effusion, or mediastinal adenopathy noted  Impression: No large vessel occlusion  Atherosclerotic calcification of the carotid bifurcation bilaterally with no significant stenosis  Bilateral vertebral arteries widely patent  No focal intracranial stenosis or aneurysm  Mass right nasopharynx incompletely evaluated secondary to arterial bolus timing    The mass measures approximately 2 7 x 2 0 cm and direct visualization by ENT recommended  Right upper lobe lung mass and mediastinal adenopathy noted small right pleural effusion    Findings were directly discussed with Linda Ruiz on 7/31/2021 2:51 PM  Workstation performed: [id number]       EKG personally reviewed by Nadeem Douglas MD      Counseling / Coordination of Care  Total floor / unit time spent today 30 minutes  Greater than 50% of total time was spent with the patient and / or family counseling and / or coordination of care

## 2021-08-03 NOTE — PROGRESS NOTES
INTERNAL MEDICINE RESIDENCY PROGRESS NOTE     Name: Anita Chin   Age & Sex: 72 y o  male   MRN: [id number]  Unit/Bed#: [facility] 725-01   Encounter: [id number]  Team: SLIM    PATIENT INFORMATION     Name: Anita Chin   Age & Sex: 72 y o  male   MRN: [id number]  Hospital Stay Days: 3    ASSESSMENT/PLAN     Principal Problem:    Acute CVA (cerebrovascular accident) [facility])  Active Problems:    Acute respiratory failure with hypoxia ([address] )    Acute deep vein thrombosis (DVT) of distal vein of both lower extremities ([facility] [address] )    Mass of upper lobe of right lung    Nasopharyngeal mass    Elevated troponin    Dysphagia      * Acute CVA (cerebrovascular accident) [facility])  Assessment & Plan  Presented as stroke alert  CT showed recent infarct in the left caudate head, anterior lentiform nucleus and anterior limb of the internal capsule  Was not a tPA candidate secondary to in fact appearing acute to subacute  MRI brain showing multifocal diffusion abnormalities in several separate vascular territories, largest in the left MCA territory indicative of multifocal acute/recent infarctions most likely from a central embolic source  Also imaging found to have nasopharyngeal mass as well as right upper lobe lung lesion  Echo EF 55%, RV dysfunction    Etiology of stroke likely secondary to hypercoagulable state from possible underlying malignancy  Plan:  · Neurology following, appreciated  · No need for aspirin while patient is on Lovenox  · Continue atorvastatin 40 mg daily  · Goal normotension  · Frequent neuro checks  · PT/OT and PM&R  Acute respiratory failure with hypoxia [facility])  Assessment & Plan  Due to presumed PE and right upper lobe lung mass  Plan:  Continue to titrate oxygen as able for goal SpO2 greater than 90%        Acute deep vein thrombosis (DVT) of distal vein of both lower extremities (HCC)  Assessment & Plan  Right lower extremity DVT in the posterior tibial and peroneal veins, acute lower extremity DVT in the peroneal veins and acute lower extremity superficial thrombophlebitis in the greater saphenous vein  In the setting of acute hypoxic respiratory failure, PEs presumed  Plan:  · Continue therapeutic Lovenox  Will likely need AC indefinitely  Mass of upper lobe of right lung  Assessment & Plan  6 4 x 3 6 x 3 4 cm solid, irregular contoured right upper lobe pulmonary nodule, likely primary lung cancer with extensive hilar and mediastinal lymphadenopathy  Plan:  · Pulmonology following, appreciated  · Would benefit from EBUS TBNA of mediastinal and hilar lymph nodes diagnose and stage  Timing to be decided, can likely be performed as outpatient  Nasopharyngeal mass  Assessment & Plan   2 7 x 2 0 cm mass noted on CT and directly visualized by ENT  Plan:   · ENT following  · Plans to consider endoscopic nasopharyngeal biopsy at the same time with EBUS TBNA if possible  Elevated troponin  Assessment & Plan  Most likely due to acute PE an RV dysfunction  Low concern for ACS  Plan:  · Echocardiogram in 4-6 weeks to assess RV function  Disposition:  Continue current level of care, PM&R evaluation for acute rehab need    SUBJECTIVE     Patient seen and examined  No acute events overnight  Patient's wife and son at bedside  No acute complaints this morning    OBJECTIVE     Vitals:    21 1503 21 2216 21 0757 21 1530   BP: 139/81 136/80 135/80 136/80   Pulse: 68 70 65 64   Resp:   18 20   Temp: 97 7 °F (36 5 °C)  97 7 °F (36 5 °C) 97 7 °F (36 5 °C)   TempSrc:       SpO2: 93% 91% 94% 96%   Weight:       Height: 5' 8" (1 727 m)         Temperature:   Temp (24hrs), Av 7 °F (36 5 °C), Min:97 7 °F (36 5 °C), Max:97 7 °F (36 5 °C)    Temperature: 97 7 °F (36 5 °C)  Intake & Output:  I/O       701 -  07 -  07 -  07    P  O  240 240     I V  (mL/kg) 323 7 (3 7)      Total Intake(mL/kg) 563 7 (6 5) 240 (2 8) Urine (mL/kg/hr) 450 (0 2)      Total Output 450      Net +113 7 +240                Weights:   IBW (Ideal Body Weight): 68 4 kg    Body mass index is 29 03 kg/m²  Weight (last 2 days)     None        Physical Exam  Vitals and nursing note reviewed  Constitutional:       Appearance: He is well-developed  HENT:      Head: Normocephalic and atraumatic  Eyes:      Conjunctiva/sclera: Conjunctivae normal    Cardiovascular:      Rate and Rhythm: Normal rate and regular rhythm  Heart sounds: No murmur heard  Pulmonary:      Effort: Pulmonary effort is normal  No respiratory distress  Breath sounds: Normal breath sounds  Abdominal:      Palpations: Abdomen is soft  Tenderness: There is no abdominal tenderness  Musculoskeletal:      Cervical back: Neck supple  Skin:     General: Skin is warm and dry  Neurological:      Mental Status: He is alert and oriented to person, place, and time  Comments: RUE 4/5 otherwise intact strength       LABORATORY DATA     Labs: I have personally reviewed pertinent reports    Results from last 7 days   Lab Units 08/03/21  0550 08/02/21  0619 08/01/21  0507   WBC Thousand/uL 8 63 9 51 11 12*   HEMOGLOBIN g/dL 10 4* 10 4* 11 1*   HEMATOCRIT % 34 1* 34 2* 36 0*   PLATELETS Thousands/uL 370 331 314   NEUTROS PCT % 70 69  --    MONOS PCT % 9 9  --       Results from last 7 days   Lab Units 08/03/21  0550 08/02/21  0619 08/01/21  0507 07/31/21  1448   POTASSIUM mmol/L 4 0 4 0 4 1 4 2   CHLORIDE mmol/L 108 110* 108 106   CO2 mmol/L 24 24 22 25   BUN mg/dL 20 20 17 19   CREATININE mg/dL 0 92 0 99 0 89 1 05   CALCIUM mg/dL 9 9 9 8 9 8 9 2   ALK PHOS U/L  --   --   --  56   ALT U/L  --   --   --  45   AST U/L  --   --   --  34     Results from last 7 days   Lab Units 08/01/21  0507   MAGNESIUM mg/dL 2 6     Results from last 7 days   Lab Units 08/01/21  0507   PHOSPHORUS mg/dL 3 0      Results from last 7 days   Lab Units 08/02/21  0913 08/02/21  0102 08/01/21  1547 07/31/21  1448   INR   --   --   --  1 28*   PTT seconds 33 50* 47* 29         Results from last 7 days   Lab Units 08/01/21  1154 08/01/21  0802 07/31/21  2338   TROPONIN I ng/mL 10 30* 10 80* 8 23*       IMAGING & DIAGNOSTIC TESTING     Radiology Results: I have personally reviewed pertinent reports  CT chest abdomen pelvis wo contrast    Result Date: 7/31/2021  Impression: There is moderate emphysema  There is a 6 4 x 3 6 x 3 4 cm solid, irregular contoured right upper lobe pulmonary nodule, likely primary lung cancer (series 2 image 17 ) There is extensive right hilar and mediastinal adenopathy  Small right pleural effusion  There are also numerous too small to characterize hepatic hypodense lesions  Consider abdomen MR with and without IV contrast to characterize  Workstation performed: [id number]     XR chest portable    Result Date: 8/2/2021  Impression: Right upper lobe mass with suggestion of mediastinal and hilar lymphadenopathy  At the time of this dictation, CT of the chest had been performed which confirms findings which are suspicious for malignancy  Workstation performed: [id number]     MRI brain w wo contrast    Result Date: 8/3/2021  Impression: 1  Multifocal diffusion abnormalities in several separate vascular territories, largest in the left MCA territory indicative of multifocal acute/recent infarctions most likely from a central embolic source  Further clinical assessment advised  2   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  Both benign and malignant etiologies are in the differential diagnosis  ENT assessment recommended  Workstation performed: [id number]     CT stroke alert brain    Result Date: 7/31/2021  Impression: Recent infarct left caudate head, anterior lentiform nucleus, and anterior limb of left internal capsule without evidence of hemorrhage   Findings were directly discussed with Pedro Thurman on 7/31/2021 2:51 PM  Workstation performed: [id number]     CTA stroke alert (head/neck)    Result Date: 7/31/2021  Impression: No large vessel occlusion  Atherosclerotic calcification of the carotid bifurcation bilaterally with no significant stenosis  Bilateral vertebral arteries widely patent  No focal intracranial stenosis or aneurysm  Mass right nasopharynx incompletely evaluated secondary to arterial bolus timing  The mass measures approximately 2 7 x 2 0 cm and direct visualization by ENT recommended  Right upper lobe lung mass and mediastinal adenopathy noted small right pleural effusion    Findings were directly discussed with Ira Jensen on 7/31/2021 2:51 PM  Workstation performed: [id number]     Other Diagnostic Testing: I have personally reviewed pertinent reports  ACTIVE MEDICATIONS     Current Facility-Administered Medications   Medication Dose Route Frequency    acetaminophen (TYLENOL) rectal suppository 650 mg  650 mg Rectal Q6H PRN    atorvastatin (LIPITOR) tablet 40 mg  40 mg Oral Daily With Dinner    enoxaparin (LOVENOX) subcutaneous injection 90 mg  1 mg/kg Subcutaneous Q12H OPAL    lidocaine (XYLOCAINE) 4 % topical solution 5 mL  5 mL Topical Once    oxymetazoline (AFRIN) 0 05 % nasal spray 2 spray  2 spray Each Nare Once       VTE Pharmacologic Prophylaxis: Enoxaparin (Lovenox)  VTE Mechanical Prophylaxis: sequential compression device    Portions of the record may have been created with voice recognition software  Occasional wrong word or "sound a like" substitutions may have occurred due to the inherent limitations of voice recognition software    Read the chart carefully and recognize, using context, where substitutions have occurred   ==  Aly St MD  [address]  Internal Medicine Residency PGY-3

## 2021-08-03 NOTE — ASSESSMENT & PLAN NOTE
2 7 x 2 0 cm mass noted on CT and directly visualized via flexible laryngoscope by ENT  Plan:   · ENT recommended biopsy as outpatient

## 2021-08-03 NOTE — ASSESSMENT & PLAN NOTE
- CT CAP:  · There is a 6 4 x 3 6 x 3 4 cm solid, irregular contoured right upper lobe pulmonary nodule, likely primary lung cancer (series 2 image 17 )  · There is extensive right hilar and mediastinal adenopathy  · Small right pleural effusion  · There are also numerous too small to characterize hepatic hypodense lesions    Consider abdomen MR with and without IV contrast to characterize   - Biopsy pending

## 2021-08-03 NOTE — ASSESSMENT & PLAN NOTE
Due to presumed PE and right upper lobe lung mass  Plan:  · Continue to titrate oxygen as able for goal SpO2 greater than 88%  · Will likely need home O2 on discharge  Will need repeat O2 eval prior to discharge from [facility]

## 2021-08-03 NOTE — CASE MANAGEMENT
LOS-2d  Bundle-Yes  Readmission score-12, green  Pt resides [place] [address] [address] [facility] [place] with his spouse Renetta Luna (  Pt resides in a ranch style home and has access to all his needs  Prior to admission pt was independent and able to meet all his ADL's  Pt has never used/needed any DME or has been in any SNF/TUC  Pt has no POA/LW, spouse requested information  CM to provide  Pt has reported no D&A or MH history  Pt and family are open to VNA, and SNF if recommended  Pt reported being seen by PT yesterday   Family to transport at d/c   CM to follow

## 2021-08-03 NOTE — TELEPHONE ENCOUNTER
Reviewed patient chart, inpatient at [facility] at this time  Admitted under stroke pathway  Plan per inpatient therapy recommendations is for patient to be discharged to post-acute rehab when medically cleared  Per inpatient team documentation, patient continues to be confused at this time - spouse Brandy Hobson is managing care coordination  Caleb Ramirez, I introduced myself and explained neurovascular nurse navigator role  We discussed follow up care, stroke education, and discharge planning  She declined full stroke education review at this time  Offered to send stroke education binder in the mail, she is agreeable to this  Mailed the information as requested to the home address on file  Informed spouse I will continue to follow up during hospitalization with inpatient neurology recommendations as well as after hospital discharge to assist  She verbalizes understanding and is agreeable to plan  I addressed all her questions  At the conclusion of the conversation, wife denies having any further questions or concerns

## 2021-08-03 NOTE — PLAN OF CARE
Problem: PAIN - ADULT  Goal: Verbalizes/displays adequate comfort level or baseline comfort level  Description: Interventions:  - Encourage patient to monitor pain and request assistance  - Assess pain using appropriate pain scale  - Administer analgesics based on type and severity of pain and evaluate response  - Implement non-pharmacological measures as appropriate and evaluate response  - Consider cultural and social influences on pain and pain management  - Notify physician/advanced practitioner if interventions unsuccessful or patient reports new pain  8/3/2021 0858 by Carline Franklin RN  Outcome: Progressing  8/3/2021 0858 by Carline Franklin RN  Outcome: Progressing     Problem: INFECTION - ADULT  Goal: Absence or prevention of progression during hospitalization  Description: INTERVENTIONS:  - Assess and monitor for signs and symptoms of infection  - Monitor lab/diagnostic results  - Monitor all insertion sites, i e  indwelling lines, tubes, and drains  - Winston appropriate cooling/warming therapies per order  - Administer medications as ordered  - Instruct and encourage patient and family to use good hand hygiene technique  - Identify and instruct in appropriate isolation precautions for identified infection/condition  8/3/2021 0858 by Carline Franklin RN  Outcome: Progressing  8/3/2021 0858 by Carline Franklin RN  Outcome: Progressing  Goal: Absence of fever/infection during neutropenic period  Description: INTERVENTIONS:  - Monitor WBC    8/3/2021 0858 by Carline Franklin RN  Outcome: Progressing  8/3/2021 0858 by Carline Franklin RN  Outcome: Progressing     Problem: SAFETY ADULT  Goal: Patient will remain free of falls  Description: INTERVENTIONS:  - Educate patient/family on patient safety including physical limitations  - Instruct patient to call for assistance with activity   - Consult OT/PT to assist with strengthening/mobility   - Keep Call bell within reach  - Keep bed low and locked with side rails adjusted as appropriate  - Keep care items and personal belongings within reach  - Initiate and maintain comfort rounds  - Make Fall Risk Sign visible to staff  - Offer Toileting every 2 Hours, in advance of need  - Initiate/Maintain bed alarm  - Apply yellow socks and bracelet for high fall risk patients  - Consider moving patient to room near nurses station  8/3/2021 0858 by Polina Davey RN  Outcome: Progressing  8/3/2021 0858 by Polina Davey RN  Outcome: Progressing  Goal: Maintain or return to baseline ADL function  Description: INTERVENTIONS:  -  Assess patient's ability to carry out ADLs; assess patient's baseline for ADL function and identify physical deficits which impact ability to perform ADLs (bathing, care of mouth/teeth, toileting, grooming, dressing, etc )  - Assess/evaluate cause of self-care deficits   - Assess range of motion  - Assess patient's mobility; develop plan if impaired  - Assess patient's need for assistive devices and provide as appropriate  - Encourage maximum independence but intervene and supervise when necessary  - Involve family in performance of ADLs  - Assess for home care needs following discharge   - Consider OT consult to assist with ADL evaluation and planning for discharge  - Provide patient education as appropriate  8/3/2021 0858 by Polina Davey RN  Outcome: Progressing  8/3/2021 0858 by Polina Davey RN  Outcome: Progressing  Goal: Maintains/Returns to pre admission functional level  Description: INTERVENTIONS:  - Perform BMAT or MOVE assessment daily    - Set and communicate daily mobility goal to care team and patient/family/caregiver  - Collaborate with rehabilitation services on mobility goals if consulted  - Perform Range of Motion 3 times a day  - Reposition patient every 2 hours    - Dangle patient 3 times a day  - Stand patient 3 times a day  - Ambulate patient 3 times a day  - Out of bed to chair 3 times a day - Out of bed for meals 3 times a day  - Out of bed for toileting  - Record patient progress and toleration of activity level   8/3/2021 0858 by Zac Benitez RN  Outcome: Connie Benton  8/3/2021 0858 by Zac Benitez RN  Outcome: Progressing     Problem: DISCHARGE PLANNING  Goal: Discharge to home or other facility with appropriate resources  Description: INTERVENTIONS:  - Identify barriers to discharge w/patient and caregiver  - Arrange for needed discharge resources and transportation as appropriate  - Identify discharge learning needs (meds, wound care, etc )  - Arrange for interpretive services to assist at discharge as needed  - Refer to Case Management Department for coordinating discharge planning if the patient needs post-hospital services based on physician/advanced practitioner order or complex needs related to functional status, cognitive ability, or social support system  8/3/2021 0858 by Zac Benitez RN  Outcome: Connie Benton  8/3/2021 0858 by Zac Benitez RN  Outcome: Progressing     Problem: Knowledge Deficit  Goal: Patient/family/caregiver demonstrates understanding of disease process, treatment plan, medications, and discharge instructions  Description: Complete learning assessment and assess knowledge base  Interventions:  - Provide teaching at level of understanding  - Provide teaching via preferred learning methods  8/3/2021 0858 by Zac Benitez RN  Outcome: Connie Benton  8/3/2021 0858 by Zac Benitez RN  Outcome: Progressing     Problem: Nutrition/Hydration-ADULT  Goal: Nutrient/Hydration intake appropriate for improving, restoring or maintaining nutritional needs  Description: Monitor and assess patient's nutrition/hydration status for malnutrition  Collaborate with interdisciplinary team and initiate plan and interventions as ordered  Monitor patient's weight and dietary intake as ordered or per policy   Utilize nutrition screening tool and intervene as necessary  Determine patient's food preferences and provide high-protein, high-caloric foods as appropriate       INTERVENTIONS:  - Monitor oral intake, urinary output, labs, and treatment plans  - Assess nutrition and hydration status and recommend course of action  - Evaluate amount of meals eaten  - Assist patient with eating if necessary   - Allow adequate time for meals  - Recommend/ encourage appropriate diets, oral nutritional supplements, and vitamin/mineral supplements  - Order, calculate, and assess calorie counts as needed  - Assess need for intravenous fluids  - Provide specific nutrition/hydration education as appropriate  - Include patient/family/caregiver in decisions related to nutrition  8/3/2021 0858 by Carlos Manuel Thomas RN  Outcome: Progressing  8/3/2021 0858 by Carlos Manuel Thomas RN  Outcome: Progressing     Problem: MOBILITY - ADULT  Goal: Maintain or return to baseline ADL function  Description: INTERVENTIONS:  -  Assess patient's ability to carry out ADLs; assess patient's baseline for ADL function and identify physical deficits which impact ability to perform ADLs (bathing, care of mouth/teeth, toileting, grooming, dressing, etc )  - Assess/evaluate cause of self-care deficits   - Assess range of motion  - Assess patient's mobility; develop plan if impaired  - Assess patient's need for assistive devices and provide as appropriate  - Encourage maximum independence but intervene and supervise when necessary  - Involve family in performance of ADLs  - Assess for home care needs following discharge   - Consider OT consult to assist with ADL evaluation and planning for discharge  - Provide patient education as appropriate  8/3/2021 0858 by Carlos Manuel Thomas RN  Outcome: Progressing  8/3/2021 0858 by Carlos Manuel Thomas RN  Outcome: Progressing  Goal: Maintains/Returns to pre admission functional level  Description: INTERVENTIONS:  - Perform BMAT or MOVE assessment daily    - Set and communicate daily mobility goal to care team and patient/family/caregiver  - Collaborate with rehabilitation services on mobility goals if consulted  - Perform Range of Motion 3 times a day  - Reposition patient every 2 hours    - Dangle patient 3 times a day  - Stand patient 3 times a day  - Ambulate patient 3 times a day  - Out of bed to chair 3 times a day   - Out of bed for meals 3 times a day  - Out of bed for toileting  - Record patient progress and toleration of activity level   8/3/2021 0858 by Ortega Whiteside RN  Outcome: Progressing  8/3/2021 0858 by Ortega Whiteside RN  Outcome: Progressing     Problem: Prexisting or High Potential for Compromised Skin Integrity  Goal: Skin integrity is maintained or improved  Description: INTERVENTIONS:  - Identify patients at risk for skin breakdown  - Assess and monitor skin integrity  - Assess and monitor nutrition and hydration status  - Monitor labs   - Assess for incontinence   - Turn and reposition patient  - Assist with mobility/ambulation  - Relieve pressure over bony prominences  - Avoid friction and shearing  - Provide appropriate hygiene as needed including keeping skin clean and dry  - Evaluate need for skin moisturizer/barrier cream  - Collaborate with interdisciplinary team   - Patient/family teaching  - Consider wound care consult   8/3/2021 0858 by Ortega Whiteside RN  Outcome: Progressing  8/3/2021 0858 by Ortega Whiteside RN  Outcome: Progressing

## 2021-08-03 NOTE — ASSESSMENT & PLAN NOTE
6 4 x 3 6 x 3 4 cm solid, irregular contoured right upper lobe pulmonary nodule, likely primary lung cancer with extensive hilar and mediastinal lymphadenopathy  IR guided percutaneous biopsy of lung mass performed, preliminary result consistent with adenocarcinoma  Plan:  · Pulmonology following, appreciated  · Medical oncology on board, outpatient follow-up with them regarding plan for chemotherapy

## 2021-08-03 NOTE — PLAN OF CARE
Problem: PAIN - ADULT  Goal: Verbalizes/displays adequate comfort level or baseline comfort level  Description: Interventions:  - Encourage patient to monitor pain and request assistance  - Assess pain using appropriate pain scale  - Administer analgesics based on type and severity of pain and evaluate response  - Implement non-pharmacological measures as appropriate and evaluate response  - Consider cultural and social influences on pain and pain management  - Notify physician/advanced practitioner if interventions unsuccessful or patient reports new pain  Outcome: Progressing     Problem: INFECTION - ADULT  Goal: Absence or prevention of progression during hospitalization  Description: INTERVENTIONS:  - Assess and monitor for signs and symptoms of infection  - Monitor lab/diagnostic results  - Monitor all insertion sites, i e  indwelling lines, tubes, and drains  - Conde appropriate cooling/warming therapies per order  - Administer medications as ordered  - Instruct and encourage patient and family to use good hand hygiene technique  - Identify and instruct in appropriate isolation precautions for identified infection/condition  Outcome: Progressing  Goal: Absence of fever/infection during neutropenic period  Description: INTERVENTIONS:  - Monitor WBC    Outcome: Progressing     Problem: SAFETY ADULT  Goal: Patient will remain free of falls  Description: INTERVENTIONS:  - Educate patient/family on patient safety including physical limitations  - Instruct patient to call for assistance with activity   - Consult OT/PT to assist with strengthening/mobility   - Keep Call bell within reach  - Keep bed low and locked with side rails adjusted as appropriate  - Keep care items and personal belongings within reach  - Initiate and maintain comfort rounds  - Make Fall Risk Sign visible to staff  - Offer Toileting every 2 Hours, in advance of need  - Initiate/Maintain bed alarm  - Apply yellow socks and bracelet for high fall risk patients  - Consider moving patient to room near nurses station  Outcome: Progressing  Goal: Maintain or return to baseline ADL function  Description: INTERVENTIONS:  -  Assess patient's ability to carry out ADLs; assess patient's baseline for ADL function and identify physical deficits which impact ability to perform ADLs (bathing, care of mouth/teeth, toileting, grooming, dressing, etc )  - Assess/evaluate cause of self-care deficits   - Assess range of motion  - Assess patient's mobility; develop plan if impaired  - Assess patient's need for assistive devices and provide as appropriate  - Encourage maximum independence but intervene and supervise when necessary  - Involve family in performance of ADLs  - Assess for home care needs following discharge   - Consider OT consult to assist with ADL evaluation and planning for discharge  - Provide patient education as appropriate  Outcome: Progressing  Goal: Maintains/Returns to pre admission functional level  Description: INTERVENTIONS:  - Perform BMAT or MOVE assessment daily    - Set and communicate daily mobility goal to care team and patient/family/caregiver  - Collaborate with rehabilitation services on mobility goals if consulted  - Perform Range of Motion 3 times a day  - Reposition patient every 2 hours    - Dangle patient 3 times a day  - Stand patient 3 times a day  - Ambulate patient 3 times a day  - Out of bed to chair 3 times a day   - Out of bed for meals 3 times a day  - Out of bed for toileting  - Record patient progress and toleration of activity level   Outcome: Progressing     Problem: DISCHARGE PLANNING  Goal: Discharge to home or other facility with appropriate resources  Description: INTERVENTIONS:  - Identify barriers to discharge w/patient and caregiver  - Arrange for needed discharge resources and transportation as appropriate  - Identify discharge learning needs (meds, wound care, etc )  - Arrange for interpretive services to assist at discharge as needed  - Refer to Case Management Department for coordinating discharge planning if the patient needs post-hospital services based on physician/advanced practitioner order or complex needs related to functional status, cognitive ability, or social support system  Outcome: Progressing     Problem: Knowledge Deficit  Goal: Patient/family/caregiver demonstrates understanding of disease process, treatment plan, medications, and discharge instructions  Description: Complete learning assessment and assess knowledge base  Interventions:  - Provide teaching at level of understanding  - Provide teaching via preferred learning methods  Outcome: Progressing     Problem: Nutrition/Hydration-ADULT  Goal: Nutrient/Hydration intake appropriate for improving, restoring or maintaining nutritional needs  Description: Monitor and assess patient's nutrition/hydration status for malnutrition  Collaborate with interdisciplinary team and initiate plan and interventions as ordered  Monitor patient's weight and dietary intake as ordered or per policy  Utilize nutrition screening tool and intervene as necessary  Determine patient's food preferences and provide high-protein, high-caloric foods as appropriate       INTERVENTIONS:  - Monitor oral intake, urinary output, labs, and treatment plans  - Assess nutrition and hydration status and recommend course of action  - Evaluate amount of meals eaten  - Assist patient with eating if necessary   - Allow adequate time for meals  - Recommend/ encourage appropriate diets, oral nutritional supplements, and vitamin/mineral supplements  - Order, calculate, and assess calorie counts as needed  - Assess need for intravenous fluids  - Provide specific nutrition/hydration education as appropriate  - Include patient/family/caregiver in decisions related to nutrition  Outcome: Progressing     Problem: MOBILITY - ADULT  Goal: Maintain or return to baseline ADL function  Description: INTERVENTIONS:  -  Assess patient's ability to carry out ADLs; assess patient's baseline for ADL function and identify physical deficits which impact ability to perform ADLs (bathing, care of mouth/teeth, toileting, grooming, dressing, etc )  - Assess/evaluate cause of self-care deficits   - Assess range of motion  - Assess patient's mobility; develop plan if impaired  - Assess patient's need for assistive devices and provide as appropriate  - Encourage maximum independence but intervene and supervise when necessary  - Involve family in performance of ADLs  - Assess for home care needs following discharge   - Consider OT consult to assist with ADL evaluation and planning for discharge  - Provide patient education as appropriate  Outcome: Progressing  Goal: Maintains/Returns to pre admission functional level  Description: INTERVENTIONS:  - Perform BMAT or MOVE assessment daily    - Set and communicate daily mobility goal to care team and patient/family/caregiver  - Collaborate with rehabilitation services on mobility goals if consulted  - Perform Range of Motion 3 times a day  - Reposition patient every 2 hours    - Dangle patient 3 times a day  - Stand patient 3 times a day  - Ambulate patient 3 times a day  - Out of bed to chair 3 times a day   - Out of bed for meals 3 times a day  - Out of bed for toileting  - Record patient progress and toleration of activity level   Outcome: Progressing     Problem: Prexisting or High Potential for Compromised Skin Integrity  Goal: Skin integrity is maintained or improved  Description: INTERVENTIONS:  - Identify patients at risk for skin breakdown  - Assess and monitor skin integrity  - Assess and monitor nutrition and hydration status  - Monitor labs   - Assess for incontinence   - Turn and reposition patient  - Assist with mobility/ambulation  - Relieve pressure over bony prominences  - Avoid friction and shearing  - Provide appropriate hygiene as needed including keeping skin clean and dry  - Evaluate need for skin moisturizer/barrier cream  - Collaborate with interdisciplinary team   - Patient/family teaching  - Consider wound care consult   Outcome: Progressing

## 2021-08-03 NOTE — PROGRESS NOTES
PULMONOLOGY PROGRESS NOTE     Name: Brittany Langford   Age & Sex: 72 y o  male   MRN: [id number]  Unit/Bed#: [facility] 725-01   Encounter: [id number]    PATIENT INFORMATION     Name: Brittany Langford   Age & Sex: 72 y o  male   MRN: [id number]  Hospital Stay Days: 3    ASSESSMENT/PLAN     Principal Problem:    Acute CVA (cerebrovascular accident) [facility])  Active Problems:    Elevated troponin    Acute respiratory failure with hypoxia ([address] )    Acute deep vein thrombosis (DVT) of distal vein of both lower extremities ([facility] [address] )    Nasopharyngeal mass    Dysphagia    Mass of upper lobe of right lung    Patient presents for acute evaluation of stroke-like symptoms and is found to have CVA  He was outside of the window for tPA  As he was noted to be significantly hypoxic, he was started on empiric heparin for suspected PE (could not perform CTA after contrast for CT head/neck)  He was found to have DVTs in both lower extremities  Additionally, he was found to have a 6 4x3  6x3 4cm solid RUL pulmonary nodule suspicious for primary lung cancer  ENT is also following for 2 7x2 0cm right nasopharyngeal mass which may also be a primary malignancy      - Continue anticoagulation with lovenox for suspected PE and DVTs, may consider visualizing PE with CT angio at some point  - Monitor neurological status closely while on anticoagulation given recent CVA  - Nasopharyngeal biopsy may not be able to be timed with pulmonary nodule biopsy  - IR consulted for potential percutaneous pulmonary biopsy    SUBJECTIVE     Patient seen and examined  No acute events overnight  Patient continues to improve symptomatically from a pulmonary standpoint  Denies difficulty breathing, productive cough or hemoptysis   ROS otherwise normal     OBJECTIVE     Vitals:    08/02/21 1503 08/02/21 2216 08/03/21 0757 08/03/21 1530   BP: 139/81 136/80 135/80 136/80   Pulse: 68 70 65 64   Resp:   18 20   Temp: 97 7 °F (36 5 °C)  97 7 °F (36 5 °C) 97 7 °F (36 5 °C)   TempSrc: SpO2: 93% 91% 94% 96%   Weight:       Height: 5' 8" (1 727 m)         Temperature:   Temp (24hrs), Av 7 °F (36 5 °C), Min:97 7 °F (36 5 °C), Max:97 7 °F (36 5 °C)    Temperature: 97 7 °F (36 5 °C)  Intake & Output:  I/O        07 -  0700  07 -  0700    P  O  240 480    I V  (mL/kg)      Total Intake(mL/kg) 240 (2 8) 480 (5 5)    Urine (mL/kg/hr)      Total Output      Net +240 +480              Weights:   IBW (Ideal Body Weight): 68 4 kg    Body mass index is 29 03 kg/m²  Weight (last 2 days)     None        Physical Exam  Constitutional:       General: He is not in acute distress  Appearance: Normal appearance  HENT:      Head: Normocephalic and atraumatic  Mouth/Throat:      Mouth: Mucous membranes are moist       Pharynx: No oropharyngeal exudate or posterior oropharyngeal erythema  Eyes:      General: No scleral icterus  Conjunctiva/sclera: Conjunctivae normal    Cardiovascular:      Rate and Rhythm: Normal rate and regular rhythm  Heart sounds: No murmur heard  No friction rub  No gallop  Pulmonary:      Effort: Pulmonary effort is normal  No respiratory distress  Breath sounds: Normal breath sounds  No wheezing, rhonchi or rales  Abdominal:      Palpations: Abdomen is soft  Tenderness: There is no abdominal tenderness  There is no guarding  Musculoskeletal:      Cervical back: No tenderness  Right lower leg: No edema  Left lower leg: No edema  Lymphadenopathy:      Cervical: No cervical adenopathy  Skin:     General: Skin is warm and dry  Neurological:      Mental Status: He is alert and oriented to person, place, and time  Motor: No weakness (4/5 strength in the RUE  Otherwise generally weak, but strength is intact to confrontation)  LABORATORY DATA     Labs: I have personally reviewed pertinent reports    Results from last 7 days   Lab Units 21  0550 21  0619 21  0507   WBC Thousand/uL 8 63 9 51 11 12*   HEMOGLOBIN g/dL 10 4* 10 4* 11 1*   HEMATOCRIT % 34 1* 34 2* 36 0*   PLATELETS Thousands/uL 370 331 314   NEUTROS PCT % 70 69  --    MONOS PCT % 9 9  --       Results from last 7 days   Lab Units 08/03/21  0550 08/02/21  0619 08/01/21  0507 07/31/21  1448   POTASSIUM mmol/L 4 0 4 0 4 1 4 2   CHLORIDE mmol/L 108 110* 108 106   CO2 mmol/L 24 24 22 25   BUN mg/dL 20 20 17 19   CREATININE mg/dL 0 92 0 99 0 89 1 05   CALCIUM mg/dL 9 9 9 8 9 8 9 2   ALK PHOS U/L  --   --   --  56   ALT U/L  --   --   --  45   AST U/L  --   --   --  34     Results from last 7 days   Lab Units 08/01/21  0507   MAGNESIUM mg/dL 2 6     Results from last 7 days   Lab Units 08/01/21  0507   PHOSPHORUS mg/dL 3 0      Results from last 7 days   Lab Units 08/02/21  0913 08/02/21  0102 08/01/21  1547 07/31/21  1448   INR   --   --   --  1 28*   PTT seconds 33 50* 47* 29         Results from last 7 days   Lab Units 08/01/21  1154 08/01/21  0802 07/31/21  2338   TROPONIN I ng/mL 10 30* 10 80* 8 23*         ABG:       IMAGING & DIAGNOSTIC TESTING     Radiology Results: I have personally reviewed pertinent reports  CT chest abdomen pelvis wo contrast    Result Date: 7/31/2021  Impression: There is moderate emphysema  There is a 6 4 x 3 6 x 3 4 cm solid, irregular contoured right upper lobe pulmonary nodule, likely primary lung cancer (series 2 image 17 ) There is extensive right hilar and mediastinal adenopathy  Small right pleural effusion  There are also numerous too small to characterize hepatic hypodense lesions  Consider abdomen MR with and without IV contrast to characterize  Workstation performed: [id number]     XR chest portable    Result Date: 8/2/2021  Impression: Right upper lobe mass with suggestion of mediastinal and hilar lymphadenopathy  At the time of this dictation, CT of the chest had been performed which confirms findings which are suspicious for malignancy   Workstation performed: [id number]     MRI brain w wo contrast    Result Date: 8/3/2021  Impression: 1  Multifocal diffusion abnormalities in several separate vascular territories, largest in the left MCA territory indicative of multifocal acute/recent infarctions most likely from a central embolic source  Further clinical assessment advised  2   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  Both benign and malignant etiologies are in the differential diagnosis  ENT assessment recommended  Workstation performed: [id number]     CT stroke alert brain    Result Date: 7/31/2021  Impression: Recent infarct left caudate head, anterior lentiform nucleus, and anterior limb of left internal capsule without evidence of hemorrhage  Findings were directly discussed with Devang Mcgrath on 7/31/2021 2:51 PM  Workstation performed: [id number]     CTA stroke alert (head/neck)    Result Date: 7/31/2021  Impression: No large vessel occlusion  Atherosclerotic calcification of the carotid bifurcation bilaterally with no significant stenosis  Bilateral vertebral arteries widely patent  No focal intracranial stenosis or aneurysm  Mass right nasopharynx incompletely evaluated secondary to arterial bolus timing  The mass measures approximately 2 7 x 2 0 cm and direct visualization by ENT recommended  Right upper lobe lung mass and mediastinal adenopathy noted small right pleural effusion    Findings were directly discussed with Devang Mcgrath on 7/31/2021 2:51 PM  Workstation performed: [id number]     Other Diagnostic Testing: I have personally reviewed pertinent reports      ACTIVE MEDICATIONS     Current Facility-Administered Medications   Medication Dose Route Frequency    acetaminophen (TYLENOL) rectal suppository 650 mg  650 mg Rectal Q6H PRN    atorvastatin (LIPITOR) tablet 40 mg  40 mg Oral Daily With Dinner    enoxaparin (LOVENOX) subcutaneous injection 90 mg  1 mg/kg Subcutaneous Q12H [facility]    lidocaine (XYLOCAINE) 4 % topical solution 5 mL  5 mL Topical Once    oxymetazoline (AFRIN) 0 05 % nasal spray 2 spray  2 spray Each Nare Once       Disclaimer: Portions of the record may have been created with voice recognition software  Occasional wrong word or "sound a like" substitutions may have occurred due to the inherent limitations of voice recognition software  Careful consideration should be taken to recognize, using context, where substitutions have occurred      Aki Norton DO  Pulmonary/Critical Care Fellowship PGY-IV  [facility] Pulmonary & Critical Care [facility]

## 2021-08-03 NOTE — ASSESSMENT & PLAN NOTE
Most likely due to acute PE and RV dysfunction  Low concern for ACS  No chest pain  Plan:  · Echocardiogram in 4-6 weeks to assess RV function

## 2021-08-03 NOTE — ASSESSMENT & PLAN NOTE
Presented as stroke alert  CT showed recent infarct in the left caudate head, anterior lentiform nucleus and anterior limb of the internal capsule  Was not a tPA candidate secondary to in fact appearing acute to subacute  MRI brain showing multifocal diffusion abnormalities in several separate vascular territories, largest in the left MCA territory indicative of multifocal acute/recent infarctions most likely from a central embolic source  Also on imaging found to have nasopharyngeal mass as well as right upper lobe lung lesion  Echo EF 55%, RV dysfunction     Etiology of stroke likely secondary to hypercoagulable state from possible underlying malignancy  Neurology signed off  PMR cleared for ARC, but pt family prefers home discharge  Plan:  · No need for aspirin while patient is on anticoagulation with Eliquis  · Continue atorvastatin 40 mg daily    · Goal normotension  · Frequent neuro checks  · PT/OT

## 2021-08-03 NOTE — ASSESSMENT & PLAN NOTE
Right lower extremity DVT in the posterior tibial and peroneal veins, acute lower extremity DVT in the peroneal veins and acute lower extremity superficial thrombophlebitis in the greater saphenous vein  In the setting of acute hypoxic respiratory failure, PEs presumed  Plan:  Switched to Eliquis on 08/08  Discussed with Medical Oncology and okay to do Eliquis instead of Lovenox for acute DVT in setting of malignancy  Will be on 10 mg b i d  Eliquis for 7 days starting 8 8 and switch to 5 mg b i d  On 08/14

## 2021-08-03 NOTE — PROGRESS NOTES
Progress Note - Neurology   Johnathan Mattson 72 y o  male [id number]  Unit/Bed#: [facility] 725/[facility] 725-01    Assessment/Plan:    * Acute CVA (cerebrovascular accident) [facility])  Assessment & Plan  Johnathan Mattson is a 72 y o  male with recent abnormal weight loss without documented medical comorbidities who presented to [place] ED on 07/31/2021 as a stroke alert with right hemiparesis and dysarthria  · NIHSS of 9, /87 on arrival  CT head revealed recent acute to subacute infarct located in the left caudate head, anterior lentiform nucleus, anterior limb of the left internal capsule  CTA head and neck unremarkable for large vessel occlusion or critical stenosis, however did reveal a right upper lobe lung mass and mediastinal adenopathy along with nasopharyngeal mass  Patient was not an IV tPA candidate due to infarct appearing acute to subacute, high bleed risk  · MRI brain w/wo contrast revealed multifocal diffusion abnormalities in several separate vascular territories, largest in the left MCA territory indicative of multifocal acute/recent infarctions most likely from a central embolic source  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  Both benign and malignant etiologies are in the differential diagnosis  No abnormal intraparenchymal enhancement    Strong suspicion for underlying malignancy  Acute infarct likely secondary to hypercoagulable state    Will continue on stroke pathway    Plan:  - ENT and pulmonology are planning on biopsy of lung mass and nasopharyngeal mass  - S/p rectal  mg x 1 on admission   - Heparin gtt d/c'd  - Started Lovenox on 8/3/2021; continue indefinitely  - No need for ASA 81 mg daily from a neurological perspective while pt is on Lovenox  - Continue atorvastatin 40 mg daily  - Goal normotension, euglycemia, normothermia  - Continue telemetry  - Frequent neuro checks  - Stroke education  - PT/OT/speech  - Medical management and supportive care per primary team, notify with changes  - No further inpatient neuro recommendations  Please call with questions  Imaging/Labs:  - CT head 07/31/2021:  · Recent infarct located in the left caudate head, anterior lentiform nucleus, anterior limb of the left internal capsule without evidence of hemorrhage  - CTA head and neck 07/31/2021:  · No evidence of large vessel occlusion  · Atherosclerotic calcification of the carotid bifurcations bilaterally without significant stenosis  · No focal intracranial stenosis or aneurysm noted  · Mass right nasopharynx incompletely evaluated secondary to arterial bolus times, mass measuring 2 7 x 2 0 cm  · Right upper lobe lung mass and mediastinal adenopathy noted small right pleural effusion  - CT CAP:  · There is moderate emphysema  · There is a 6 4 x 3 6 x 3 4 cm solid, irregular contoured right upper lobe pulmonary nodule, likely primary lung cancer (series 2 image 17 )  · There is extensive right hilar and mediastinal adenopathy  · Small right pleural effusion  · There are also numerous too small to characterize hepatic hypodense lesions  Consider abdomen MR with and without IV contrast to characterize   - MRI brain:  · Multifocal diffusion abnormalities in several separate vascular territories, largest in the left MCA territory indicative of multifocal acute/recent infarctions most likely from a central embolic source  Further clinical assessment advised  · 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  Both benign and malignant etiologies are in the differential diagnosis    - VAS lower limb venous duplex study:  · Right lower limb:   · Acute deep vein thrombosis noted in posterior tibial and peroneal veins   · Left lower limb:  · Acute deep vein thrombosis noted in the peroneal veins   · Acute superficial thrombophlebitis noted in the great saphenous vein from the mid thigh to the mid calf  - Troponin elevated on presentation 6 90  - Lipid panel: Total cholesterol 125, triglycerides 95, HDL 30, LDL 76  - Hemoglobin A1c:  5 7    Acute deep vein thrombosis (DVT) of distal vein of both lower extremities (HCC)  Assessment & Plan  - VAS lower limb venous duplex study 7/31/2021:  · Right lower limb:   · Acute deep vein thrombosis noted in posterior tibial and peroneal veins   · Left lower limb:  · Acute deep vein thrombosis noted in the peroneal veins   · Acute superficial thrombophlebitis noted in the great saphenous vein from the mid thigh to the mid calf  - Heparin drip d/c'd, Lovenox initiated     Mass of upper lobe of right lung  Assessment & Plan  - CT CAP:  · There is a 6 4 x 3 6 x 3 4 cm solid, irregular contoured right upper lobe pulmonary nodule, likely primary lung cancer (series 2 image 17 )  · There is extensive right hilar and mediastinal adenopathy  · Small right pleural effusion  · There are also numerous too small to characterize hepatic hypodense lesions  Consider abdomen MR with and without IV contrast to characterize   - Biopsy pending    Nasopharyngeal mass  Assessment & Plan  - 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  - ENT following, recommending biopsy; patient cleared from a Neurology standpoint to proceed with ENT workup inpatient    Elevated troponin  Assessment & Plan  - Troponin elevated on presentation 6 90, evaluated by Cardiology, likely due to PE   - Echo completed, EF 55%, right atrium normal sized         Hermina Dudley will need follow up in in 4 weeks with neurovascular attending  He will not require outpatient neurological testing  Subjective:   Patient seen and examined with daughter at bedside  Patient initially sleeping and quite lethargic upon arousal, but became more awake throughout the exam   Continues to have dysarthria, aphasia, and right sided weakness  Per daughter, the strength on the right side continues to improve    She also states that the patient has not been sleeping well while in the hospital and gets more confused at night  Patient had difficulty answering ROS questions given lethargy  History reviewed  No pertinent past medical history  History reviewed  No pertinent surgical history  History reviewed  No pertinent family history  Social History     Socioeconomic History    Marital status: /Civil Union     Spouse name: None    Number of children: None    Years of education: None    Highest education level: None   Occupational History    None   Tobacco Use    Smoking status: Former Smoker    Smokeless tobacco: Never Used   Vaping Use    Vaping Use: Never used   Substance and Sexual Activity    Alcohol use: Yes    Drug use: Never    Sexual activity: None   Other Topics Concern    None   Social History Narrative    None     Social Determinants of Health     Financial Resource Strain:     Difficulty of Paying Living Expenses:    Food Insecurity:     Worried About Running Out of Food in the Last Year:     [address] in the Last Year:    Transportation Needs:     Lack of Transportation (Medical):  Lack of Transportation (Non-Medical):    Physical Activity:     Days of Exercise per Week:     Minutes of Exercise per Session:    Stress:     Feeling of Stress :    Social Connections:     Frequency of Communication with Friends and Family:     Frequency of Social Gatherings with Friends and Family:     Attends [employer] Services:     Active Member of Clubs or Organizations:     Attends Club or Organization Meetings:     Marital Status:    Intimate Partner Violence:     Fear of Current or Ex-Partner:     Emotionally Abused:     Physically Abused:     Sexually Abused:      E-Cigarette/Vaping    E-Cigarette Use Never User      E-Cigarette/Vaping Substances         Medications:   All current active meds have been reviewed and current meds:  Scheduled Meds:  Current Facility-Administered Medications Medication Dose Route Frequency Provider Last Rate    acetaminophen  650 mg Rectal Q6H PRN Adeline Russell DO      atorvastatin  40 mg Oral Daily With [employer] YONATAN PERRIN      enoxaparin  1 mg/kg Subcutaneous Q12H [address] Shauna Wong MD      lidocaine  5 mL Topical Once Marlea Self, MD      oxymetazoline  2 spray Each Nare Once Marlea Self, MD       Continuous Infusions:   PRN Meds:   acetaminophen       ROS:   Review of Systems   Reason unable to perform ROS: lethargy  Vitals:   /80   Pulse 65   Temp 97 7 °F (36 5 °C)   Resp 18   Ht 5' 8" (1 727 m)   Wt 86 6 kg (190 lb 14 7 oz)   SpO2 94%   BMI 29 03 kg/m²     Physical Exam:   Physical Exam  Constitutional:       Comments: Patient initially sleeping and remained lethargic throughout the exam    HENT:      Head: Normocephalic and atraumatic  Mouth/Throat:      Mouth: Mucous membranes are moist       Pharynx: Oropharynx is clear  Eyes:      Extraocular Movements: Extraocular movements intact  Conjunctiva/sclera: Conjunctivae normal       Comments: Blind in the right eye   Pulmonary:      Effort: Pulmonary effort is normal    Musculoskeletal:      Comments: Continues to have weakness in the right UE/LE, but is able to lift both limbs antigravity   Skin:     General: Skin is warm and dry  Neurological:      Comments: See full neuro exam below       Neurologic Exam     Mental Status   Neuro exam limited due to lethargy  Able to state his name and location  Had difficulty naming month and year  Speech was slightly dysarthric and hypophonic  Able to name "Pen" but could not name "phone" or "badge "  Able to follow most simple commands       Cranial Nerves   Blind in the right eye  Left pupil round and reactive to light  Slight dysconjugate gaze  Right central facial weakness  Difficulty assessing facial sensation due to lethargy  Tongue midline     Motor Exam   Pronation, but no drift of right UE  Able to lift right UE antigravity  Approximatley 4+/5 right biceps/triceps  4-/5 right  strength  Able to lift right LE antigravity and wiggle toes bilaterally  5/5 strength in left UE/LE     Sensory Exam   Difficulty assessing sensation due to lethargy     Gait, Coordination, and Reflexes     Gait  Gait: (deferred for patient's safety)    No resting or action tremor  No ankle clonus bilaterally  Ataxia is not out of proportion to weakness with right finger to nose testing  No ataxia with left finger to nose           Labs: I have personally reviewed pertinent reports     Recent Results (from the past 24 hour(s))   CBC and differential    Collection Time: 08/03/21  5:50 AM   Result Value Ref Range    WBC 8 63 4 31 - 10 16 Thousand/uL    RBC 4 06 3 88 - 5 62 Million/uL    Hemoglobin 10 4 (L) 12 0 - 17 0 g/dL    Hematocrit 34 1 (L) 36 5 - 49 3 %    MCV 84 82 - 98 fL    MCH 25 6 (L) 26 8 - 34 3 pg    MCHC 30 5 (L) 31 4 - 37 4 g/dL    RDW 15 6 (H) 11 6 - 15 1 %    MPV 9 9 8 9 - 12 7 fL    Platelets 950 452 - 306 Thousands/uL    nRBC 0 /100 WBCs    Neutrophils Relative 70 43 - 75 %    Immat GRANS % 1 0 - 2 %    Lymphocytes Relative 17 14 - 44 %    Monocytes Relative 9 4 - 12 %    Eosinophils Relative 2 0 - 6 %    Basophils Relative 1 0 - 1 %    Neutrophils Absolute 6 09 1 85 - 7 62 Thousands/µL    Immature Grans Absolute 0 04 0 00 - 0 20 Thousand/uL    Lymphocytes Absolute 1 49 0 60 - 4 47 Thousands/µL    Monocytes Absolute 0 73 0 17 - 1 22 Thousand/µL    Eosinophils Absolute 0 20 0 00 - 0 61 Thousand/µL    Basophils Absolute 0 08 0 00 - 0 10 Thousands/µL   Basic metabolic panel    Collection Time: 08/03/21  5:50 AM   Result Value Ref Range    Sodium 139 136 - 145 mmol/L    Potassium 4 0 3 5 - 5 3 mmol/L    Chloride 108 100 - 108 mmol/L    CO2 24 21 - 32 mmol/L    ANION GAP 7 4 - 13 mmol/L    BUN 20 5 - 25 mg/dL    Creatinine 0 92 0 60 - 1 30 mg/dL    Glucose 90 65 - 140 mg/dL    Calcium 9 9 8 3 - 10 1 mg/dL eGFR 85 ml/min/1 73sq m       Imaging: I have personally reviewed pertinent imaging in PACS, including CT head, CTA head/neck, MRI brain w/wo contrast, Echo,  and I have personally reviewed PACS reports  EKG, Pathology, and Other Studies: I have personally reviewed pertinent reports  VTE Prophylaxis: Sequential compression device (Venodyne)  and Enoxaparin (Lovenox)      Counseling / Coordination of Care  Total time spent today 28 minutes  Greater than 50% of total time was spent with the patient and/or family counseling and/or coordination of care  A description of the counseling/coordination of care:  Patient was seen and evaluated  Discussed with attending  Chart reviewed thoroughly including laboratory and imaging studies    Plan of care discussed with patient and primary team

## 2021-08-04 LAB
ANION GAP SERPL CALCULATED.3IONS-SCNC: 7 MMOL/L (ref 4–13)
APTT PPP: 33 SECONDS (ref 23–37)
BASOPHILS # BLD AUTO: 0.07 THOUSANDS/ΜL (ref 0–0.1)
BASOPHILS NFR BLD AUTO: 1 % (ref 0–1)
BUN SERPL-MCNC: 21 MG/DL (ref 5–25)
CALCIUM SERPL-MCNC: 9.8 MG/DL (ref 8.3–10.1)
CHLORIDE SERPL-SCNC: 107 MMOL/L (ref 100–108)
CO2 SERPL-SCNC: 24 MMOL/L (ref 21–32)
CREAT SERPL-MCNC: 0.96 MG/DL (ref 0.6–1.3)
EOSINOPHIL # BLD AUTO: 0.18 THOUSAND/ΜL (ref 0–0.61)
EOSINOPHIL NFR BLD AUTO: 2 % (ref 0–6)
ERYTHROCYTE [DISTWIDTH] IN BLOOD BY AUTOMATED COUNT: 15.2 % (ref 11.6–15.1)
GFR SERPL CREATININE-BSD FRML MDRD: 80 ML/MIN/1.73SQ M
GLUCOSE SERPL-MCNC: 85 MG/DL (ref 65–140)
HCT VFR BLD AUTO: 34.6 % (ref 36.5–49.3)
HGB BLD-MCNC: 10.4 G/DL (ref 12–17)
IMM GRANULOCYTES # BLD AUTO: 0.04 THOUSAND/UL (ref 0–0.2)
IMM GRANULOCYTES NFR BLD AUTO: 1 % (ref 0–2)
INR PPP: 1.07 (ref 0.84–1.19)
LYMPHOCYTES # BLD AUTO: 1.61 THOUSANDS/ΜL (ref 0.6–4.47)
LYMPHOCYTES NFR BLD AUTO: 19 % (ref 14–44)
MCH RBC QN AUTO: 25.3 PG (ref 26.8–34.3)
MCHC RBC AUTO-ENTMCNC: 30.1 G/DL (ref 31.4–37.4)
MCV RBC AUTO: 84 FL (ref 82–98)
MONOCYTES # BLD AUTO: 0.71 THOUSAND/ΜL (ref 0.17–1.22)
MONOCYTES NFR BLD AUTO: 9 % (ref 4–12)
NEUTROPHILS # BLD AUTO: 5.67 THOUSANDS/ΜL (ref 1.85–7.62)
NEUTS SEG NFR BLD AUTO: 68 % (ref 43–75)
NRBC BLD AUTO-RTO: 0 /100 WBCS
PLATELET # BLD AUTO: 378 THOUSANDS/UL (ref 149–390)
PMV BLD AUTO: 10.2 FL (ref 8.9–12.7)
POTASSIUM SERPL-SCNC: 3.8 MMOL/L (ref 3.5–5.3)
PROTHROMBIN TIME: 14 SECONDS (ref 11.6–14.5)
RBC # BLD AUTO: 4.11 MILLION/UL (ref 3.88–5.62)
SODIUM SERPL-SCNC: 138 MMOL/L (ref 136–145)
WBC # BLD AUTO: 8.28 THOUSAND/UL (ref 4.31–10.16)

## 2021-08-04 PROCEDURE — 97535 SELF CARE MNGMENT TRAINING: CPT

## 2021-08-04 PROCEDURE — 97530 THERAPEUTIC ACTIVITIES: CPT

## 2021-08-04 PROCEDURE — 85730 THROMBOPLASTIN TIME PARTIAL: CPT | Performed by: STUDENT IN AN ORGANIZED HEALTH CARE EDUCATION/TRAINING PROGRAM

## 2021-08-04 PROCEDURE — 97110 THERAPEUTIC EXERCISES: CPT

## 2021-08-04 PROCEDURE — NC001 PR NO CHARGE: Performed by: PHYSICIAN ASSISTANT

## 2021-08-04 PROCEDURE — 85025 COMPLETE CBC W/AUTO DIFF WBC: CPT | Performed by: PHYSICIAN ASSISTANT

## 2021-08-04 PROCEDURE — 99232 SBSQ HOSP IP/OBS MODERATE 35: CPT | Performed by: INTERNAL MEDICINE

## 2021-08-04 PROCEDURE — 80048 BASIC METABOLIC PNL TOTAL CA: CPT | Performed by: PHYSICIAN ASSISTANT

## 2021-08-04 PROCEDURE — 85610 PROTHROMBIN TIME: CPT | Performed by: STUDENT IN AN ORGANIZED HEALTH CARE EDUCATION/TRAINING PROGRAM

## 2021-08-04 PROCEDURE — 99223 1ST HOSP IP/OBS HIGH 75: CPT | Performed by: PHYSICAL MEDICINE & REHABILITATION

## 2021-08-04 PROCEDURE — 97116 GAIT TRAINING THERAPY: CPT

## 2021-08-04 RX ORDER — POLYETHYLENE GLYCOL 3350 17 G/17G
17 POWDER, FOR SOLUTION ORAL DAILY PRN
Status: DISCONTINUED | OUTPATIENT
Start: 2021-08-04 | End: 2021-08-05

## 2021-08-04 RX ORDER — POLYETHYLENE GLYCOL 3350 17 G/17G
17 POWDER, FOR SOLUTION ORAL DAILY
Status: DISCONTINUED | OUTPATIENT
Start: 2021-08-05 | End: 2021-08-08 | Stop reason: HOSPADM

## 2021-08-04 RX ORDER — AMOXICILLIN 250 MG
1 CAPSULE ORAL 2 TIMES DAILY
Status: DISCONTINUED | OUTPATIENT
Start: 2021-08-04 | End: 2021-08-05

## 2021-08-04 RX ORDER — HEPARIN SODIUM 10000 [USP'U]/100ML
3-30 INJECTION, SOLUTION INTRAVENOUS
Status: DISCONTINUED | OUTPATIENT
Start: 2021-08-04 | End: 2021-08-06

## 2021-08-04 RX ADMIN — HEPARIN SODIUM 18 UNITS/KG/HR: 10000 INJECTION, SOLUTION INTRAVENOUS at 18:09

## 2021-08-04 RX ADMIN — ENOXAPARIN SODIUM 90 MG: 100 INJECTION SUBCUTANEOUS at 08:14

## 2021-08-04 RX ADMIN — ATORVASTATIN CALCIUM 40 MG: 40 TABLET, FILM COATED ORAL at 18:05

## 2021-08-04 RX ADMIN — DOCUSATE SODIUM AND SENNOSIDES 1 TABLET: 8.6; 5 TABLET ORAL at 18:08

## 2021-08-04 NOTE — PROGRESS NOTES
INTERNAL MEDICINE RESIDENCY PROGRESS NOTE     Name: Valarie Chapman   Age & Sex: 72 y o  male   MRN: [id number]  Unit/Bed#: [facility] 725-01   Encounter: [id number]  Team: SLIM    PATIENT INFORMATION     Name: Valarie Chapman   Age & Sex: 72 y o  male   MRN: [id number]  Hospital Stay Days: 4    ASSESSMENT/PLAN     Principal Problem:    Acute CVA (cerebrovascular accident) [facility])  Active Problems:    Acute respiratory failure with hypoxia ([address] )    Acute deep vein thrombosis (DVT) of distal vein of both lower extremities ([facility] [address] )    Mass of upper lobe of right lung    Nasopharyngeal mass    Elevated troponin    Dysphagia      * Acute CVA (cerebrovascular accident) [facility])  Assessment & Plan  Presented as stroke alert  CT showed recent infarct in the left caudate head, anterior lentiform nucleus and anterior limb of the internal capsule  Was not a tPA candidate secondary to in fact appearing acute to subacute  MRI brain showing multifocal diffusion abnormalities in several separate vascular territories, largest in the left MCA territory indicative of multifocal acute/recent infarctions most likely from a central embolic source  Also on imaging found to have nasopharyngeal mass as well as right upper lobe lung lesion  Echo EF 55%, RV dysfunction     Etiology of stroke likely secondary to hypercoagulable state from possible underlying malignancy  Neurology signed off  Plan:  · No need for aspirin while patient is on Lovenox  · Continue atorvastatin 40 mg daily  · Goal normotension  · Frequent neuro checks  · PT/OT  · PM&R consulted    Acute respiratory failure with hypoxia [facility])  Assessment & Plan  Due to presumed PE and right upper lobe lung mass  Plan:  Continue to titrate oxygen as able for goal SpO2 greater than 90%        Acute deep vein thrombosis (DVT) of distal vein of both lower extremities (HCC)  Assessment & Plan  Right lower extremity DVT in the posterior tibial and peroneal veins, acute lower extremity DVT in the peroneal veins and acute lower extremity superficial thrombophlebitis in the greater saphenous vein  In the setting of acute hypoxic respiratory failure, PEs presumed  Plan:  · Continue therapeutic Lovenox  Will likely need AC indefinitely  Mass of upper lobe of right lung  Assessment & Plan  6 4 x 3 6 x 3 4 cm solid, irregular contoured right upper lobe pulmonary nodule, likely primary lung cancer with extensive hilar and mediastinal lymphadenopathy  Plan:  · Pulmonology following, appreciated  · IR consulted, plan for IR guided percutaneous biopsy of lung mass with associated thoracentesis for diagnosing and staging  Nasopharyngeal mass  Assessment & Plan   2 7 x 2 0 cm mass noted on CT and directly visualized via flexible laryngoscope by ENT  Plan:   · ENT following  · Biopsy as outpatient  Elevated troponin  Assessment & Plan  Most likely due to acute PE and RV dysfunction  Low concern for ACS  Plan:  · Echocardiogram in 4-6 weeks to assess RV function  Disposition:  Downgrade to [facility] surg  Awaiting IR biopsy of lung mass and PM&R evaluation for rehabilitation needs    SUBJECTIVE     Patient seen and examined  No acute events overnight  Patient doing well, family present at bedside  No acute complaints  OBJECTIVE     Vitals:    21 0757 21 1530 21 2202 21 0723   BP: 135/80 136/80 131/78 130/77   BP Location:   Left arm    Pulse: 65 64 65    Resp: 18 20 18    Temp: 97 7 °F (36 5 °C) 97 7 °F (36 5 °C) 98 1 °F (36 7 °C)    TempSrc:   Oral    SpO2: 94% 96% 93%    Weight:       Height:          Temperature:   Temp (24hrs), Av 9 °F (36 6 °C), Min:97 7 °F (36 5 °C), Max:98 1 °F (36 7 °C)    Temperature: 98 1 °F (36 7 °C)  Intake & Output:  I/O       701 -  07 -  07 -  07    P  O  240 480     I V  (mL/kg)       Total Intake(mL/kg) 240 (2 8) 480 (5 5)     Urine (mL/kg/hr)       Total Output Net +240 +480                Weights:   IBW (Ideal Body Weight): 68 4 kg    Body mass index is 29 03 kg/m²  Weight (last 2 days)     None        Physical Exam  Vitals and nursing note reviewed  Constitutional:       Appearance: He is well-developed  HENT:      Head: Normocephalic and atraumatic  Eyes:      Conjunctiva/sclera: Conjunctivae normal    Cardiovascular:      Rate and Rhythm: Normal rate and regular rhythm  Heart sounds: No murmur heard  Pulmonary:      Effort: Pulmonary effort is normal  No respiratory distress  Breath sounds: Normal breath sounds  Comments: On 5 L NC  Abdominal:      Palpations: Abdomen is soft  Tenderness: There is no abdominal tenderness  Musculoskeletal:      Cervical back: Neck supple  Skin:     General: Skin is warm and dry  Neurological:      Mental Status: He is alert  Motor: Weakness (RUE 4/5) present  LABORATORY DATA     Labs: I have personally reviewed pertinent reports    Results from last 7 days   Lab Units 08/04/21  0453 08/03/21  0550 08/02/21  0619   WBC Thousand/uL 8 28 8 63 9 51   HEMOGLOBIN g/dL 10 4* 10 4* 10 4*   HEMATOCRIT % 34 6* 34 1* 34 2*   PLATELETS Thousands/uL 378 370 331   NEUTROS PCT % 68 70 69   MONOS PCT % 9 9 9      Results from last 7 days   Lab Units 08/04/21  0453 08/03/21  0550 08/02/21  0619 07/31/21  1448   POTASSIUM mmol/L 3 8 4 0 4 0 4 2   CHLORIDE mmol/L 107 108 110* 106   CO2 mmol/L 24 24 24 25   BUN mg/dL 21 20 20 19   CREATININE mg/dL 0 96 0 92 0 99 1 05   CALCIUM mg/dL 9 8 9 9 9 8 9 2   ALK PHOS U/L  --   --   --  56   ALT U/L  --   --   --  45   AST U/L  --   --   --  34     Results from last 7 days   Lab Units 08/01/21  0507   MAGNESIUM mg/dL 2 6     Results from last 7 days   Lab Units 08/01/21  0507   PHOSPHORUS mg/dL 3 0      Results from last 7 days   Lab Units 08/02/21  0913 08/02/21  0102 08/01/21  1547 07/31/21  1448   INR   --   --   --  1 28*   PTT seconds 33 50* 47* 29         Results from last 7 days   Lab Units 08/01/21  1154 08/01/21  0802 07/31/21  2338   TROPONIN I ng/mL 10 30* 10 80* 8 23*       IMAGING & DIAGNOSTIC TESTING     Radiology Results: I have personally reviewed pertinent reports  XR chest portable    Result Date: 8/2/2021  Impression: Right upper lobe mass with suggestion of mediastinal and hilar lymphadenopathy  At the time of this dictation, CT of the chest had been performed which confirms findings which are suspicious for malignancy  Workstation performed: [id number]     MRI brain w wo contrast    Result Date: 8/3/2021  Impression: 1  Multifocal diffusion abnormalities in several separate vascular territories, largest in the left MCA territory indicative of multifocal acute/recent infarctions most likely from a central embolic source  Further clinical assessment advised  2   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  Both benign and malignant etiologies are in the differential diagnosis  ENT assessment recommended  Workstation performed: [id number]     Other Diagnostic Testing: I have personally reviewed pertinent reports  ACTIVE MEDICATIONS     Current Facility-Administered Medications   Medication Dose Route Frequency    acetaminophen (TYLENOL) rectal suppository 650 mg  650 mg Rectal Q6H PRN    atorvastatin (LIPITOR) tablet 40 mg  40 mg Oral Daily With Dinner    enoxaparin (LOVENOX) subcutaneous injection 90 mg  1 mg/kg Subcutaneous Q12H OPAL    lidocaine (XYLOCAINE) 4 % topical solution 5 mL  5 mL Topical Once    polyethylene glycol (MIRALAX) packet 17 g  17 g Oral Daily PRN    senna-docusate sodium (SENOKOT S) 8 6-50 mg per tablet 1 tablet  1 tablet Oral BID       VTE Pharmacologic Prophylaxis: Enoxaparin (Lovenox)  VTE Mechanical Prophylaxis: sequential compression device    Portions of the record may have been created with voice recognition software    Occasional wrong word or "sound a like" substitutions may have occurred due to the inherent limitations of voice recognition software    Read the chart carefully and recognize, using context, where substitutions have occurred   ==  Gladys Bangura MD  [address]  Internal Medicine Residency PGY-3

## 2021-08-04 NOTE — PROGRESS NOTES
PULMONOLOGY PROGRESS NOTE     Name: Rohith Buckner   Age & Sex: 72 y o  male   MRN: [id number]  Unit/Bed#: [facility] 725-01   Encounter: [id number]    PATIENT INFORMATION     Name: Rohith Buckner   Age & Sex: 72 y o  male   MRN: [id number]  Hospital Stay Days: 4    ASSESSMENT/PLAN     Principal Problem:    Acute CVA (cerebrovascular accident) [facility])  Active Problems:    Elevated troponin    Acute respiratory failure with hypoxia ([address] )    Acute deep vein thrombosis (DVT) of distal vein of both lower extremities ([facility] [address] )    Nasopharyngeal mass    Dysphagia    Mass of upper lobe of right lung    Patient presents for acute evaluation of stroke-like symptoms and is found to have CVA  He was outside of the window for tPA  As he was noted to be significantly hypoxic, he was started on empiric heparin for suspected PE (could not perform CTA after contrast for CT head/neck)  He was found to have DVTs in both lower extremities  Additionally, he was found to have a 6 4x3  6x3 4cm solid RUL pulmonary nodule suspicious for primary lung cancer  ENT is also following for 2 7x2 0cm right nasopharyngeal mass which may also be a primary malignancy  He has been on lovenox for his suspected PE  This was discussed with interventional radiology and he will be transitioned to heparin infusion as he will be undergoing percutaneous biopsy of his pulmonary lesion      - Continue anticoagulation with heparin and discontinue approximately 6 hours before his procedure tomorrow with IR  - Monitor neurological status closely while on anticoagulation given recent CVA  - Nasopharyngeal biopsy per ENT   - Supplemental O2 to maintain saturations >88%  Titrate down as tolerated      SUBJECTIVE     Patient seen and examined  No acute events overnight  No acute concerns this morning  States that he is feeling well and has a good appetite  No respiratory concerns   ROS otherwise normal      OBJECTIVE     Vitals:    08/03/21 1530 08/03/21 2202 08/04/21 0723 08/04/21 1533   BP: 136/80 131/78 130/77 124/71   BP Location:  Left arm     Pulse: 64 65  67   Resp: 20 18     Temp: 97 7 °F (36 5 °C) 98 1 °F (36 7 °C)  98 6 °F (37 °C)   TempSrc:  Oral     SpO2: 96% 93%  90%   Weight:       Height:          Temperature:   Temp (24hrs), Av 4 °F (36 9 °C), Min:98 1 °F (36 7 °C), Max:98 6 °F (37 °C)    Temperature: 98 6 °F (37 °C)  Intake & Output:  I/O        07 - / 0700 / 07 -  0700 / 07 -  0700    P  O  240 480 180    I V  (mL/kg)       Total Intake(mL/kg) 240 (2 8) 480 (5 5) 180 (2 1)    Urine (mL/kg/hr)       Total Output       Net +240 +480 +180               Weights:   IBW (Ideal Body Weight): 68 4 kg    Body mass index is 29 03 kg/m²  Weight (last 2 days)     None        Physical Exam  Constitutional:       General: He is not in acute distress  Appearance: Normal appearance  HENT:      Head: Normocephalic and atraumatic  Nose: No congestion or rhinorrhea  Mouth/Throat:      Mouth: Mucous membranes are moist       Pharynx: No oropharyngeal exudate or posterior oropharyngeal erythema  Eyes:      General: No scleral icterus  Conjunctiva/sclera: Conjunctivae normal    Cardiovascular:      Rate and Rhythm: Normal rate and regular rhythm  Heart sounds: No murmur heard  No friction rub  No gallop  Pulmonary:      Effort: Pulmonary effort is normal  No respiratory distress  Breath sounds: Normal breath sounds  No wheezing, rhonchi or rales  Abdominal:      Palpations: Abdomen is soft  Tenderness: There is no abdominal tenderness  There is no guarding  Musculoskeletal:      Cervical back: No tenderness  Right lower leg: No edema  Left lower leg: No edema  Lymphadenopathy:      Cervical: No cervical adenopathy  Skin:     General: Skin is warm and dry  Neurological:      Mental Status: He is alert and oriented to person, place, and time  LABORATORY DATA     Labs:  I have personally reviewed pertinent reports  Results from last 7 days   Lab Units 08/04/21  0453 08/03/21  0550 08/02/21  0619   WBC Thousand/uL 8 28 8 63 9 51   HEMOGLOBIN g/dL 10 4* 10 4* 10 4*   HEMATOCRIT % 34 6* 34 1* 34 2*   PLATELETS Thousands/uL 378 370 331   NEUTROS PCT % 68 70 69   MONOS PCT % 9 9 9      Results from last 7 days   Lab Units 08/04/21  0453 08/03/21  0550 08/02/21  0619 07/31/21  1448   POTASSIUM mmol/L 3 8 4 0 4 0 4 2   CHLORIDE mmol/L 107 108 110* 106   CO2 mmol/L 24 24 24 25   BUN mg/dL 21 20 20 19   CREATININE mg/dL 0 96 0 92 0 99 1 05   CALCIUM mg/dL 9 8 9 9 9 8 9 2   ALK PHOS U/L  --   --   --  56   ALT U/L  --   --   --  45   AST U/L  --   --   --  34     Results from last 7 days   Lab Units 08/01/21  0507   MAGNESIUM mg/dL 2 6     Results from last 7 days   Lab Units 08/01/21  0507   PHOSPHORUS mg/dL 3 0      Results from last 7 days   Lab Units 08/02/21  0913 08/02/21  0102 08/01/21  1547 07/31/21  1448   INR   --   --   --  1 28*   PTT seconds 33 50* 47* 29         Results from last 7 days   Lab Units 08/01/21  1154 08/01/21  0802 07/31/21  2338   TROPONIN I ng/mL 10 30* 10 80* 8 23*       IMAGING & DIAGNOSTIC TESTING     Radiology Results: I have personally reviewed pertinent reports  XR chest portable    Result Date: 8/2/2021  Impression: Right upper lobe mass with suggestion of mediastinal and hilar lymphadenopathy  At the time of this dictation, CT of the chest had been performed which confirms findings which are suspicious for malignancy  Workstation performed: [id number]     MRI brain w wo contrast    Result Date: 8/3/2021  Impression: 1  Multifocal diffusion abnormalities in several separate vascular territories, largest in the left MCA territory indicative of multifocal acute/recent infarctions most likely from a central embolic source  Further clinical assessment advised  2   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    Both benign and malignant etiologies are in the differential diagnosis  ENT assessment recommended  Workstation performed: [id number]     Other Diagnostic Testing: I have personally reviewed pertinent reports  ACTIVE MEDICATIONS     Current Facility-Administered Medications   Medication Dose Route Frequency    acetaminophen (TYLENOL) rectal suppository 650 mg  650 mg Rectal Q6H PRN    atorvastatin (LIPITOR) tablet 40 mg  40 mg Oral Daily With Dinner    heparin (porcine) 25,000 units in 0 45% NaCl 250 mL infusion (premix)  3-30 Units/kg/hr (Order-Specific) Intravenous Titrated    lidocaine (XYLOCAINE) 4 % topical solution 5 mL  5 mL Topical Once    polyethylene glycol (MIRALAX) packet 17 g  17 g Oral Daily PRN    senna-docusate sodium (SENOKOT S) 8 6-50 mg per tablet 1 tablet  1 tablet Oral BID       Disclaimer: Portions of the record may have been created with voice recognition software  Occasional wrong word or "sound a like" substitutions may have occurred due to the inherent limitations of voice recognition software  Careful consideration should be taken to recognize, using context, where substitutions have occurred      Pasquale Barksdale DO  Pulmonary/Critical Care Fellowship PGY-IV  [facility]

## 2021-08-04 NOTE — H&P (VIEW-ONLY)
PHYSICAL MEDICINE AND REHABILITATION CONSULT NOTE  Epifania Aschoff 72 y o  male MRN: [id number]  [facility] Encounter: [id number]    Requested by (Physician/Service): Gio Tidwell MD  Reason for Consultation:  Assessment of rehabilitation needs    Assessment:  Rehabilitation Diagnosis:    Bilateral CVA   RUL lobe lung mass   Impaired mobility and self care   Impaired cognition     Recommendations:  Rehabilitation Plan:   Continue PT/OT/SLP while on acute care   The patient will likely be a candidate for acute inpatient rehabilitation however spouse prefers to take patient home with in home therapy  Would recommend eventual transition to outpt neuro rehabilitation   Covid-19 Testing: [facility] rehabilitation units require testing within 48 hours of potential admission for those UNVACCINATED  *Re-testing is NOT required for patients recovering from COVID-19 infection if isolation has been discontinued per CDC criteria  Medical Co-morbidities Plan:  · Bilateral CVA  · RUL lung mass  · Bilateral DVTs  · Presumed PE  · Acute respiratory failure with hypoxia   · Nasopharyngeal mass  · Elevated troponin  · DVT ppx: lovenox and SCD    Thank you for this consultation  Do not hesitate to contact service with further questions  CRISTOFER Aponte  PM&R    History of Present Illness:  Epifania Aschoff is a 72 y o  male with PMH of tobacco abuse and recent weight loss who presented to the [address] on 7/31/21 with right hemiparesis and dysarthria  CT of the head revealed recent infarct in the left caudate head, anterior lentiform nucleus, anterior limb of the left internal capsule  MRI showed multifocal diffusion abnormalities in several separate vascular territories, largest in the left MCA territory indicative of multifocal acute/recent infarctions most likely from a central embolic source    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 Aarti  Both benign and malignant etiologies are in the differential diagnosis  ENT assessment recommended  He was found to have RUL pulmonary nodule suspicious for primary lung cancer as well as DVTs in the bilateral lower extremities  He was started on lovenox  He will need potential percutaneous pulmonary biopsy  PM&R are consulted for rehabilitation recommendations  The patient was seen in his room with his spouse at bedside  Per spouse she has noted that he is off his baseline cognitively  He denies any pain, numbness or tingling  Inpatient rehabilitation was discussed however spouse would prefer to take him home with in home therapy  Review of Systems: 10 point ROS negative except for what is noted in HPI    Function:  Prior level of function and living situation:  The patient lives in a one level home with 1 [address]  He lives with his spouse who is home 24/7 and was independent for ADLs  Current level of function:  Physical Therapy: Moderate assist for bed mobility, transfers and ambulation   Occupational Therapy:  Supervision for eating, minimal assist for grooming, moderate assist for UB bathing/dressing, LB dressing and toileting maximal assist for LB bathing     Physical Exam:  /77   Pulse 65   Temp 98 1 °F (36 7 °C) (Oral)   Resp 18   Ht 5' 8" (1 727 m)   Wt 86 6 kg (190 lb 14 7 oz)   SpO2 93%   BMI 29 03 kg/m²        Intake/Output Summary (Last 24 hours) at 8/4/2021 1007  Last data filed at 8/3/2021 1800  Gross per 24 hour   Intake 480 ml   Output --   Net 480 ml       Body mass index is 29 03 kg/m²  Physical Exam  HENT:      Head: Atraumatic  Eyes:      Comments: Right visual field cut which is chronic    Cardiovascular:      Rate and Rhythm: Regular rhythm  Pulmonary:      Effort: Pulmonary effort is normal       Breath sounds: Normal breath sounds  Musculoskeletal:      Comments: RUE/RLE 4/5 throughout   Skin:     Coloration: Skin is pale     Neurological: Mental Status: He is alert  Comments: Right UE/LE ataxia    Psychiatric:         Cognition and Memory: Cognition is impaired  Social History:    Social History     Socioeconomic History    Marital status: /Civil Union     Spouse name: None    Number of children: None    Years of education: None    Highest education level: None   Occupational History    None   Tobacco Use    Smoking status: Former Smoker    Smokeless tobacco: Never Used   Vaping Use    Vaping Use: Never used   Substance and Sexual Activity    Alcohol use: Yes    Drug use: Never    Sexual activity: None   Other Topics Concern    None   Social History Narrative    None     Social Determinants of Health     Financial Resource Strain:     Difficulty of Paying Living Expenses:    Food Insecurity:     Worried About Running Out of Food in the Last Year:     [address] in the Last Year:    Transportation Needs:     Lack of Transportation (Medical):  Lack of Transportation (Non-Medical):    Physical Activity:     Days of Exercise per Week:     Minutes of Exercise per Session:    Stress:     Feeling of Stress :    Social Connections:     Frequency of Communication with Friends and Family:     Frequency of Social Gatherings with Friends and Family:     Attends Muslim Services:     Active Member of Clubs or Organizations:     Attends Club or Organization Meetings:     Marital Status:    Intimate Partner Violence:     Fear of Current or Ex-Partner:     Emotionally Abused:     Physically Abused:     Sexually Abused:         Family History:    History reviewed  No pertinent family history        Medications:     Current Facility-Administered Medications:     acetaminophen (TYLENOL) rectal suppository 650 mg, 650 mg, Rectal, Q6H PRN, Justin Allison DO    atorvastatin (LIPITOR) tablet 40 mg, 40 mg, Oral, Daily With Dinner, Reyna PERRIN PA-C, 40 mg at 08/03/21 1616    enoxaparin (LOVENOX) subcutaneous injection 90 mg, 1 mg/kg, Subcutaneous, Q12H [address], [place]ena Ruby MD, 90 mg at 08/04/21 0814    lidocaine (XYLOCAINE) 4 % topical solution 5 mL, 5 mL, Topical, Once, Scottie Huynh MD    oxymetazoline (AFRIN) 0 05 % nasal spray 2 spray, 2 spray, Each Nare, Once, Scottie Huynh MD    Past Medical History:     History reviewed  No pertinent past medical history  Past Surgical History:     History reviewed  No pertinent surgical history  Allergies:     No Known Allergies        LABORATORY RESULTS:      Lab Results   Component Value Date    HGB 10 4 (L) 08/04/2021    HCT 34 6 (L) 08/04/2021    WBC 8 28 08/04/2021     Lab Results   Component Value Date    BUN 21 08/04/2021    K 3 8 08/04/2021     08/04/2021    CREATININE 0 96 08/04/2021     Lab Results   Component Value Date    PROTIME 16 0 (H) 07/31/2021    INR 1 28 (H) 07/31/2021        DIAGNOSTIC STUDIES: Reviewed  XR chest portable    Result Date: 8/2/2021  Impression: Right upper lobe mass with suggestion of mediastinal and hilar lymphadenopathy  At the time of this dictation, CT of the chest had been performed which confirms findings which are suspicious for malignancy  Workstation performed: [id number]     MRI brain w wo contrast    Result Date: 8/3/2021  Impression: 1  Multifocal diffusion abnormalities in several separate vascular territories, largest in the left MCA territory indicative of multifocal acute/recent infarctions most likely from a central embolic source  Further clinical assessment advised  2   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

## 2021-08-04 NOTE — TELEMEDICINE
e-Consult (IPC)  - Interventional Radiology  Eren Carreon 72 y o  male MRN: [id number]  [facility] Encounter: [id number]    IR has been consulted to evaluate the patient, determine the appropriate procedure, and whether or not a procedure can and should be performed regarding the care of Eren Carreon  We were consulted by pulmonology concerning large right pulmonary mass, and to possibly perform a biopsy +/- right thoracentesis if medically appropriate for the patient  IP Consult to IR  Consult performed by: Giancarlo Recinos PA-C  Consult ordered by: Nicholette Runner, DO        08/04/21      Assessment/Recommendation:     72year old male presenting with PE on therapeutic lovenox, CVA, with large right pulmonary mass, also with right nasopharyngeal mass    - patient currently on therapeutic lovenox  Would request lovenox be held tonight and patient placed on a heparin gtt  - Tentatively scheduled for lung biopsy tomorrow in IR at 1430  Please hold heparin gtt 6 hours prior to procedure (0830)  Heparin gtt may be resumed in 6-8 hours after biopsy   - please keep npo after midnight      Total time spent in review of data, discussion with requesting provider and rendering advice was 25 minutes       Patient or appropriate family member was verbally informed by pulmonology of this consultative service on their behalf to provide more timely access to specialty care in lieu of an in person consultation  Verbal consent was obtained  Thank you for allowing Interventional Radiology to participate in the care of Eren Carreon  Please don't hesitate to call or TigerText us with any questions       Giancarlo Recinos PA-C

## 2021-08-04 NOTE — PHYSICAL THERAPY NOTE
PHYSICAL THERAPY NOTE  Patient Name: Stefanie Rincon  EVRPW'T Date: 8/4/2021 08/04/21 1041   PT Last Visit   PT Visit Date 08/04/21   Note Type   Note Type Treatment   Pain Assessment   Pain Assessment Tool 0-10   Pain Score No Pain   Restrictions/Precautions   Weight Bearing Precautions Per Order No   Other Precautions Chair Alarm;Telemetry; Fall Risk;O2  (4L O2)   General   Chart Reviewed Yes   Response to Previous Treatment Patient with no complaints from previous session  Family/Caregiver Present Yes   Cognition   Arousal/Participation Alert; Responsive; Cooperative   Attention Within functional limits   Orientation Level Oriented to person;Disoriented to place;Oriented to time;Disoriented to situation   Following Commands Follows one step commands with increased time or repetition   Comments Pt very pleasant and cooperative to work w/ therapy   Subjective   Subjective "I'm feeling better today"   Bed Mobility   Supine to Sit Unable to assess   Sit to Supine Unable to assess   Additional Comments Pt seated OOB upon PT arrival  Pt returned seated OOB at end of session w/ all needs within reach and chair alarm activated   Transfers   Sit to Stand 3  Moderate assistance   Additional items Assist x 1; Increased time required;Verbal cues   Stand to Sit 3  Moderate assistance   Additional items Assist x 1; Increased time required;Verbal cues   Additional Comments Transfers w/ RW  Pt initially requiring Mod Ax2 for STS and progressed to Mod Ax1  During mobility, pt required Mod Ax1 w/ RW + 2nd person to provide R knee block with hand throughout mobility  During 2nd mobility trial, pt required very minimal R knee block  Continue to assess DME needs hemiwalker/QC vs RW   Ambulation/Elevation   Gait pattern Excessively slow; Short stride; Inconsistent shala;Decreased foot clearance;Shuffling;R Knee Namita   Gait Assistance 3  Moderate assist Additional items Assist x 1;Verbal cues  (+ 2nd for R knee block (Min))   Assistive Device Rolling walker   Distance 10ft + 10ft   Balance   Static Sitting Fair -   Dynamic Sitting Poor +   Static Standing Poor   Dynamic Standing Poor   Ambulatory Poor   Endurance Deficit   Endurance Deficit Yes   Endurance Deficit Description weakness, fatigue   Activity Tolerance   Activity Tolerance Patient limited by fatigue   Nurse Made Aware yes   Exercises   Hip Abduction Sitting;Bilateral;10 reps  (x2)   Knee AROM Long Arc Quad Sitting;Bilateral;10 reps  (x2)   Marching Sitting;Bilateral;10 reps  (x2)   Assessment   Prognosis Good   Problem List Decreased strength;Decreased endurance; Impaired balance;Decreased mobility   Assessment Pt presents with decreased mobility, strength, balance, and activity tolerance  Pt demonstrated ability to perform STS functional transfers at [address] [place] [address] Road (progressed from Mod Ax2 to Mod Ax1)  Pt ambulated 10ft + 10ft with RW at Mod Ax1 + 2nd to provide R knee block with hand  During 2nd mobility trial, pt required very minimal R knee block  Continue to assess DME needs hemiwalker/QC vs RW  Pt performed seated LE therex program  Pt demonstrates improvement in mobility this session being able to progress to ambulation trials and requiring less assistance for transfers  Pt continues to benefit from skilled therapy to maximize functional independence  Recommendation at this time is rehab  Pt would benefit from continued ambulation, functional transfers, strength, balance, and activity tolerance   Barriers to Discharge Inaccessible home environment   Goals   Patient Goals to walk more   STG Expiration Date 08/16/21   PT Treatment Day 1   Plan   Treatment/Interventions Functional transfer training;LE strengthening/ROM; Therapeutic exercise; Endurance training;Patient/family training;Equipment eval/education; Bed mobility;Gait training;Spoke to nursing   Progress Progressing toward goals   PT Frequency (3-5x/week)   Recommendation   PT Discharge Recommendation Post acute rehabilitation services   Equipment Recommended   (RW pending)   PT - OK to Discharge Yes   [place] Vanegas   Turning in Bed Without Bedrails 2   Lying on Back to Sitting on Edge of Flat Bed 2   Moving Bed to Chair 2   Standing Up From Chair 2   Walk in Room 2   Climb 3-5 Stairs 1   Basic Mobility Inpatient Raw Score 11   Basic Mobility Standardized Score 30 25     The patient's AM-PAC Basic Mobility Inpatient Short Form Raw Score is 11, Standardized Score is 30 25  A standardized score less than 42 9 suggests the patient may benefit from discharge to post-acute rehabilitation services  Please also refer to the recommendation of the Physical Therapist for safe discharge planning      Bessy Macario, PT, DPT

## 2021-08-04 NOTE — RESTORATIVE TECHNICIAN NOTE
Restorative Technician Note      Patient Name: Dannial Fleischer     Note Type: Mobility  Patient Position Upon Consult: Bedside chair  Activity Performed: Ambulated;Dangled;Stood  Assistive Device: Roller walker; Other (Comment) (Assist x2 with chair follow  Assisted PT Madie Sauer )  Education Provided: Yes (Educated/encouraged pt to ambulate with assistance 3-4 x's/day )  Patient Position at End of Consult: Bedside chair; All needs within reach;Bed/Chair alarm activated    Elodia BLUNT, Restorative Technician, [employer]

## 2021-08-04 NOTE — CONSULTS
PHYSICAL MEDICINE AND REHABILITATION CONSULT NOTE  Yi Headley 72 y o  male MRN: [id number]  [facility] Encounter: [id number]    Requested by (Physician/Service): Alesia Shreman MD  Reason for Consultation:  Assessment of rehabilitation needs    Assessment:  Rehabilitation Diagnosis:    Bilateral CVA   RUL lobe lung mass   Impaired mobility and self care   Impaired cognition     Recommendations:  Rehabilitation Plan:   Continue PT/OT/SLP while on acute care   The patient will likely be a candidate for acute inpatient rehabilitation however spouse prefers to take patient home with in home therapy  Would recommend eventual transition to outpt neuro rehabilitation   Covid-19 Testing: [facility] rehabilitation units require testing within 48 hours of potential admission for those UNVACCINATED  *Re-testing is NOT required for patients recovering from COVID-19 infection if isolation has been discontinued per CDC criteria  Medical Co-morbidities Plan:  · Bilateral CVA  · RUL lung mass  · Bilateral DVTs  · Presumed PE  · Acute respiratory failure with hypoxia   · Nasopharyngeal mass  · Elevated troponin  · DVT ppx: lovenox and SCD    Thank you for this consultation  Do not hesitate to contact service with further questions  CRISTOFER Davidson  PM&R    History of Present Illness:  Yi Headley is a 72 y o  male with PMH of tobacco abuse and recent weight loss who presented to the [address] on 7/31/21 with right hemiparesis and dysarthria  CT of the head revealed recent infarct in the left caudate head, anterior lentiform nucleus, anterior limb of the left internal capsule  MRI showed multifocal diffusion abnormalities in several separate vascular territories, largest in the left MCA territory indicative of multifocal acute/recent infarctions most likely from a central embolic source    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 [place]  Both benign and malignant etiologies are in the differential diagnosis  ENT assessment recommended  He was found to have RUL pulmonary nodule suspicious for primary lung cancer as well as DVTs in the bilateral lower extremities  He was started on lovenox  He will need potential percutaneous pulmonary biopsy  PM&R are consulted for rehabilitation recommendations  The patient was seen in his room with his spouse at bedside  Per spouse she has noted that he is off his baseline cognitively  He denies any pain, numbness or tingling  Inpatient rehabilitation was discussed however spouse would prefer to take him home with in home therapy  Review of Systems: 10 point ROS negative except for what is noted in HPI    Function:  Prior level of function and living situation:  The patient lives in a one level home with 1 NALLELY  He lives with his spouse who is home 24/7 and was independent for ADLs  Current level of function:  Physical Therapy: Moderate assist for bed mobility, transfers and ambulation   Occupational Therapy:  Supervision for eating, minimal assist for grooming, moderate assist for UB bathing/dressing, LB dressing and toileting maximal assist for LB bathing     Physical Exam:  /77   Pulse 65   Temp 98 1 °F (36 7 °C) (Oral)   Resp 18   Ht 5' 8" (1 727 m)   Wt 86 6 kg (190 lb 14 7 oz)   SpO2 93%   BMI 29 03 kg/m²        Intake/Output Summary (Last 24 hours) at 8/4/2021 1007  Last data filed at 8/3/2021 1800  Gross per 24 hour   Intake 480 ml   Output --   Net 480 ml       Body mass index is 29 03 kg/m²  Physical Exam  HENT:      Head: Atraumatic  Eyes:      Comments: Right visual field cut which is chronic    Cardiovascular:      Rate and Rhythm: Regular rhythm  Pulmonary:      Effort: Pulmonary effort is normal       Breath sounds: Normal breath sounds  Musculoskeletal:      Comments: RUE/RLE 4/5 throughout   Skin:     Coloration: Skin is pale     Neurological: Mental Status: He is alert  Comments: Right UE/LE ataxia    Psychiatric:         Cognition and Memory: Cognition is impaired  Social History:    Social History     Socioeconomic History    Marital status: /Civil Union     Spouse name: None    Number of children: None    Years of education: None    Highest education level: None   Occupational History    None   Tobacco Use    Smoking status: Former Smoker    Smokeless tobacco: Never Used   Vaping Use    Vaping Use: Never used   Substance and Sexual Activity    Alcohol use: Yes    Drug use: Never    Sexual activity: None   Other Topics Concern    None   Social History Narrative    None     Social Determinants of Health     Financial Resource Strain:     Difficulty of Paying Living Expenses:    Food Insecurity:     Worried About Running Out of Food in the Last Year:     [address] in the Last Year:    Transportation Needs:     Lack of Transportation (Medical):  Lack of Transportation (Non-Medical):    Physical Activity:     Days of Exercise per Week:     Minutes of Exercise per Session:    Stress:     Feeling of Stress :    Social Connections:     Frequency of Communication with Friends and Family:     Frequency of Social Gatherings with Friends and Family:     Attends Judaism Services:     Active Member of Clubs or Organizations:     Attends Club or Organization Meetings:     Marital Status:    Intimate Partner Violence:     Fear of Current or Ex-Partner:     Emotionally Abused:     Physically Abused:     Sexually Abused:         Family History:    History reviewed  No pertinent family history        Medications:     Current Facility-Administered Medications:     acetaminophen (TYLENOL) rectal suppository 650 mg, 650 mg, Rectal, Q6H PRN, DO James    atorvastatin (LIPITOR) tablet 40 mg, 40 mg, Oral, Daily With Dinner, Reyna PERRIN PA-C, 40 mg at 08/03/21 1616    enoxaparin (LOVENOX) subcutaneous injection 90 mg, 1 mg/kg, Subcutaneous, Q12H [address], Peewee FriMD ej, 90 mg at 08/04/21 0814    lidocaine (XYLOCAINE) 4 % topical solution 5 mL, 5 mL, Topical, Once, Lucas Sicard, MD    oxymetazoline (AFRIN) 0 05 % nasal spray 2 spray, 2 spray, Each Nare, Once, Lucas Sicard, MD    Past Medical History:     History reviewed  No pertinent past medical history  Past Surgical History:     History reviewed  No pertinent surgical history  Allergies:     No Known Allergies        LABORATORY RESULTS:      Lab Results   Component Value Date    HGB 10 4 (L) 08/04/2021    HCT 34 6 (L) 08/04/2021    WBC 8 28 08/04/2021     Lab Results   Component Value Date    BUN 21 08/04/2021    K 3 8 08/04/2021     08/04/2021    CREATININE 0 96 08/04/2021     Lab Results   Component Value Date    PROTIME 16 0 (H) 07/31/2021    INR 1 28 (H) 07/31/2021        DIAGNOSTIC STUDIES: Reviewed  XR chest portable    Result Date: 8/2/2021  Impression: Right upper lobe mass with suggestion of mediastinal and hilar lymphadenopathy  At the time of this dictation, CT of the chest had been performed which confirms findings which are suspicious for malignancy  Workstation performed: [id number]     MRI brain w wo contrast    Result Date: 8/3/2021  Impression: 1  Multifocal diffusion abnormalities in several separate vascular territories, largest in the left MCA territory indicative of multifocal acute/recent infarctions most likely from a central embolic source  Further clinical assessment advised  2   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  Both benign and malignant etiologies are in the differential diagnosis  ENT assessment recommended   Workstation performed: [id number]

## 2021-08-04 NOTE — OCCUPATIONAL THERAPY NOTE
Occupational Therapy Treatment Note:       08/04/21 1050   OT Last Visit   OT Visit Date 08/04/21   Note Type   Note Type Treatment   Restrictions/Precautions   Weight Bearing Precautions Per Order No   Other Precautions Chair Alarm;O2;Fall Risk   Pain Assessment   Pain Score No Pain   ADL   Where Assessed Chair   Grooming Assistance 4  Minimal Assistance   UB Bathing Assistance 3  Moderate Assistance   LB Bathing Assistance 2  Maximal Assistance   UB Dressing Assistance 4  Minimal Assistance   LB Dressing Assistance 3  Moderate Assistance   Toileting Assistance  3  Moderate Assistance   Bed Mobility   Additional Comments OOB upon presentaion and at end of session   Transfers   Sit to Stand 3  Moderate assistance  (initially mod x 2 for STS pregressed to mod x1)   Additional items Assist x 1;Assist x 2   Stand to Sit 3  Moderate assistance   Additional items Assist x 1   Functional Mobility   Functional Mobility 3  Moderate assistance   Additional Comments ax2 for knee block    Additional items Rolling walker   Cognition   Overall Cognitive Status Impaired   Arousal/Participation Alert; Cooperative   Attention Attends with cues to redirect   Orientation Level Oriented to person;Oriented to time;Disoriented to place; Disoriented to situation   Memory Decreased recall of recent events   Following Commands Follows one step commands with increased time or repetition   Comments Overall plesant and cooperative, very slow to process at times, inreased time require to follow directions    Vision   Vision Comments Able to read, small print low contrast closk on upper right wall, no reports of double or blurry vision, is able to track objects    Activity Tolerance   Activity Tolerance Patient limited by fatigue   Medical Staff Made Aware NSG aware   Assessment   Assessment Pt was seen this date for OT tx session focusing on self care tasks, sit to stand progressions, standing tolerance, tranfers, functional mobility, compensatory techniques, and overall activity tolerance  Pt presents seated OOB in chair , completes previously mentioned tasks at documented assist levels please see above in flow sheet  Pt with notable progress this date, continues to have some inattention to R side however educated on importance of forced use of RUE as ROM and  strength are in tact  Pt was able to compelte trafner this date noted R knee buckle requires assist to block for same  Given built up handle for self - feeding and encouraged continual use of RUE for funcitonal tasks  Family educated on same as well  Pt resting OOB in chair at end of session with all needs in reach and alarm on Would benefit from continued OT tx to improve overall functional abilities   Continue to follow with current POC   Plan   Treatment Interventions ADL retraining   Goal Expiration Date 08/16/21   OT Treatment Day 1   OT Frequency 3-5x/wk   Recommendation   OT Discharge Recommendation Post acute rehabilitation services   AM-PAC Daily Activity Inpatient   Lower Body Dressing 2   Bathing 2   Toileting 2   Upper Body Dressing 3   Grooming 3   Eating 3   Daily Activity Raw Score 15   Daily Activity Standardized Score (Calc for Raw Score >=11) 34 69   AM-PAC Applied Cognition Inpatient   Following a Speech/Presentation 2   Understanding Ordinary Conversation 3   Taking Medications 1   Remembering Where Things Are Placed or Put Away 2   Remembering List of 4-5 Errands 2   Taking Care of Complicated Tasks 2   Applied Cognition Raw Score 12   Applied Cognition Standardized Score 28 82     [facility]ON [facility]su

## 2021-08-04 NOTE — PLAN OF CARE
Problem: PHYSICAL THERAPY ADULT  Goal: Performs mobility at highest level of function for planned discharge setting  See evaluation for individualized goals  Description: Treatment/Interventions: Functional transfer training, LE strengthening/ROM, Endurance training, Cognitive reorientation, Patient/family training, Bed mobility, Gait training, Spoke to nursing, Spoke to case management, OT          See flowsheet documentation for full assessment, interventions and recommendations  Outcome: Progressing  Note: Prognosis: Good  Problem List: Decreased strength, Decreased endurance, Impaired balance, Decreased mobility  Assessment: Pt presents with decreased mobility, strength, balance, and activity tolerance  Pt demonstrated ability to perform STS functional transfers at [address] (progressed from Mod Ax2 to Mod Ax1)  Pt ambulated 10ft + 10ft with RW at Mod Ax1 + 2nd to provide R knee block with hand  During 2nd mobility trial, pt required very minimal R knee block  Continue to assess DME needs hemiwalker/QC vs RW  Pt performed seated LE therex program  Pt demonstrates improvement in mobility this session being able to progress to ambulation trials and requiring less assistance for transfers  Pt continues to benefit from skilled therapy to maximize functional independence  Recommendation at this time is rehab  Pt would benefit from continued ambulation, functional transfers, strength, balance, and activity tolerance  Barriers to Discharge: Inaccessible home environment        PT Discharge Recommendation: Post acute rehabilitation services     PT - OK to Discharge: Yes    See flowsheet documentation for full assessment

## 2021-08-05 ENCOUNTER — APPOINTMENT (INPATIENT)
Dept: RADIOLOGY | Facility: HOSPITAL | Age: 71
DRG: 064 | End: 2021-08-05
Payer: MEDICARE

## 2021-08-05 LAB — APTT PPP: 40 SECONDS (ref 23–37)

## 2021-08-05 PROCEDURE — 0T9B70Z DRAINAGE OF BLADDER WITH DRAINAGE DEVICE, VIA NATURAL OR ARTIFICIAL OPENING: ICD-10-PCS | Performed by: INTERNAL MEDICINE

## 2021-08-05 PROCEDURE — 0BBC3ZX EXCISION OF RIGHT UPPER LUNG LOBE, PERCUTANEOUS APPROACH, DIAGNOSTIC: ICD-10-PCS | Performed by: RADIOLOGY

## 2021-08-05 PROCEDURE — 97530 THERAPEUTIC ACTIVITIES: CPT

## 2021-08-05 PROCEDURE — 99232 SBSQ HOSP IP/OBS MODERATE 35: CPT | Performed by: INTERNAL MEDICINE

## 2021-08-05 PROCEDURE — 88342 IMHCHEM/IMCYTCHM 1ST ANTB: CPT | Performed by: PATHOLOGY

## 2021-08-05 PROCEDURE — 99152 MOD SED SAME PHYS/QHP 5/>YRS: CPT

## 2021-08-05 PROCEDURE — 88305 TISSUE EXAM BY PATHOLOGIST: CPT | Performed by: PATHOLOGY

## 2021-08-05 PROCEDURE — 32408 CORE NDL BX LNG/MED PERQ: CPT | Performed by: RADIOLOGY

## 2021-08-05 PROCEDURE — 88334 PATH CONSLTJ SURG CYTO XM EA: CPT | Performed by: PATHOLOGY

## 2021-08-05 PROCEDURE — 88333 PATH CONSLTJ SURG CYTO XM 1: CPT | Performed by: PATHOLOGY

## 2021-08-05 PROCEDURE — 32408 CORE NDL BX LNG/MED PERQ: CPT

## 2021-08-05 PROCEDURE — 97116 GAIT TRAINING THERAPY: CPT

## 2021-08-05 PROCEDURE — 99153 MOD SED SAME PHYS/QHP EA: CPT

## 2021-08-05 PROCEDURE — 99152 MOD SED SAME PHYS/QHP 5/>YRS: CPT | Performed by: RADIOLOGY

## 2021-08-05 PROCEDURE — 88341 IMHCHEM/IMCYTCHM EA ADD ANTB: CPT | Performed by: PATHOLOGY

## 2021-08-05 PROCEDURE — 85730 THROMBOPLASTIN TIME PARTIAL: CPT | Performed by: INTERNAL MEDICINE

## 2021-08-05 RX ORDER — FENTANYL CITRATE 50 UG/ML
INJECTION, SOLUTION INTRAMUSCULAR; INTRAVENOUS CODE/TRAUMA/SEDATION MEDICATION
Status: COMPLETED | OUTPATIENT
Start: 2021-08-05 | End: 2021-08-05

## 2021-08-05 RX ORDER — LANOLIN ALCOHOL/MO/W.PET/CERES
3 CREAM (GRAM) TOPICAL
Status: DISCONTINUED | OUTPATIENT
Start: 2021-08-05 | End: 2021-08-08 | Stop reason: HOSPADM

## 2021-08-05 RX ORDER — MIDAZOLAM HYDROCHLORIDE 2 MG/2ML
INJECTION, SOLUTION INTRAMUSCULAR; INTRAVENOUS CODE/TRAUMA/SEDATION MEDICATION
Status: COMPLETED | OUTPATIENT
Start: 2021-08-05 | End: 2021-08-05

## 2021-08-05 RX ORDER — AMOXICILLIN 250 MG
2 CAPSULE ORAL 2 TIMES DAILY
Status: DISCONTINUED | OUTPATIENT
Start: 2021-08-05 | End: 2021-08-08 | Stop reason: HOSPADM

## 2021-08-05 RX ADMIN — FENTANYL CITRATE 50 MCG: 50 INJECTION INTRAMUSCULAR; INTRAVENOUS at 16:26

## 2021-08-05 RX ADMIN — FENTANYL CITRATE 50 MCG: 50 INJECTION INTRAMUSCULAR; INTRAVENOUS at 16:55

## 2021-08-05 RX ADMIN — MIDAZOLAM 1 MG: 1 INJECTION INTRAMUSCULAR; INTRAVENOUS at 16:26

## 2021-08-05 RX ADMIN — MIDAZOLAM 1 MG: 1 INJECTION INTRAMUSCULAR; INTRAVENOUS at 16:55

## 2021-08-05 NOTE — DISCHARGE INSTRUCTIONS
Needle Biopsy of the Lung    WHAT YOU NEED TO KNOW:  A needle biopsy of the lung is a procedure to remove cells or tissue from your lung  You may have a fine needle aspiration biopsy (FNAB), or a core needle biopsy (CNB)  A FNAB is used to remove cells through a thin needle  CNB uses a thicker needle to remove lung tissue  The samples are collected and tested for inflammation, infection, or cancer  DISCHARGE INSTRUCTIONS:   Resume your normal diet  Small sips of flat soda will help with nausea  Limit your activity for 24 hours  Wound Care:      - Remove band aid in 24 hours      Contact Interventional Radiology at [phone] Jalen PATIENTS: Contact Interventional Radiology at [phone]) Carmelo Morelos PATIENTS: Contact Interventional Radiology at [phone]) if any of the following occur:    - You have a fever greater than 101*    - You cough up large amounts of bright red blood     - You have chest pain with breathing    - You have shortness of breath    -You have persistent nausea and vomiting    - You have pus, redness or swelling around your biopsy site    - You have questions or concerns about your condition or care

## 2021-08-05 NOTE — PHYSICAL THERAPY NOTE
PHYSICAL THERAPY NOTE    Patient Name: Messi Dave  EKFFL'O Date: 8/5/2021 08/05/21 1118   PT Last Visit   PT Visit Date 08/05/21   Note Type   Note Type Treatment   Pain Assessment   Pain Assessment Tool 0-10   Pain Score No Pain   Restrictions/Precautions   Weight Bearing Precautions Per Order No   Other Precautions Chair Alarm; Bed Alarm;O2;Fall Risk  (5L O2)   General   Chart Reviewed Yes   Response to Previous Treatment Patient with no complaints from previous session  Family/Caregiver Present Yes   Cognition   Overall Cognitive Status Impaired   Arousal/Participation Alert; Responsive; Cooperative   Attention Attends with cues to redirect   Orientation Level Oriented to person;Oriented to place   Memory Decreased recall of precautions   Following Commands Follows one step commands with increased time or repetition   Comments Pt overall pleasant and cooperative to work w/ therapy   Subjective   Subjective "I need to use the bathroom"   Bed Mobility   Supine to Sit 3  Moderate assistance   Additional items Assist x 1;HOB elevated; Increased time required;Verbal cues;LE management   Sit to Supine Unable to assess   Additional Comments Pt lying supine upon PT arrival  Pt returned seated OOB at end of session w/ all needs within reach and chair alarm activated   Transfers   Sit to Stand 3  Moderate assistance   Additional items Assist x 1; Increased time required;Verbal cues   Stand to Sit 3  Moderate assistance   Additional items Assist x 1; Increased time required;Verbal cues   Toilet transfer 3  Moderate assistance   Additional items Assist x 1;Standard toilet   Additional Comments Transfers w/ RW  Pt presents w/ R lateral lean in standing and during mobility  Pt requires VCs to lock R knee into extension throughout mobility  Ambulation/Elevation   Gait pattern Excessively slow; Short stride; Inconsistent shala;R Foot drag;R Knee Namita   Gait Assistance 3  Moderate assist   Additional items Assist x 1;Verbal cues  (+ SBA of 2nd for safety + 3rd for chair and lines)   Assistive Device Rolling walker   Distance 40ft + 40ft   Stair Management Assistance Not tested   Balance   Static Sitting Fair -   Dynamic Sitting Poor +   Static Standing Poor   Dynamic Standing Poor   Ambulatory Poor   Endurance Deficit   Endurance Deficit Yes   Endurance Deficit Description weakness, fatigue   Activity Tolerance   Activity Tolerance Patient limited by fatigue   Nurse Made Aware yes   Assessment   Prognosis Good   Problem List Decreased strength;Decreased endurance; Impaired balance;Decreased mobility; Decreased cognition   Assessment Pt presents with decreased mobility, strength, balance, and activity tolerance  Pt demonstrated ability to perform bed mobility at Mod Ax1 and STS functional transfers at [facility] [address]  Pt ambulated 40ft x2 with RW at Mod Ax1 + SBA of 2nd for safety + 3rd for lines and chair follow  Pt presents w/ R lateral lean in standing and during mobility  Pt requires VCs to lock R knee into extension throughout mobility  Pt continues to benefit from skilled therapy to maximize functional independence  Recommendation at this time is rehab  Pt would benefit from continued ambulation, functional transfers, strength, balance, and activity tolerance   Barriers to Discharge Inaccessible home environment   Goals   Patient Goals to use the bathroom   STG Expiration Date 08/16/21   PT Treatment Day 2   Plan   Treatment/Interventions Functional transfer training;LE strengthening/ROM; Therapeutic exercise; Endurance training;Patient/family training;Equipment eval/education; Bed mobility;Gait training;Spoke to nursing   Progress Progressing toward goals   PT Frequency   (3-5x/week)   Recommendation   PT Discharge Recommendation Post acute rehabilitation services   Equipment Recommended Vibha Batista   PT - OK to Discharge Yes   [place] Vanegas Turning in Bed Without Bedrails 2   Lying on Back to Sitting on Edge of Flat Bed 2   Moving Bed to Chair 2   Standing Up From Chair 2   Walk in Room 2   Climb 3-5 Stairs 2   Basic Mobility Inpatient Raw Score 12   Basic Mobility Standardized Score 32 23     The patient's AM-PAC Basic Mobility Inpatient Short Form Raw Score is 12, Standardized Score is 32 23  A standardized score less than 42 9 suggests the patient may benefit from discharge to post-acute rehabilitation services  Please also refer to the recommendation of the Physical Therapist for safe discharge planning      Frandy Fabian, PT, DPT

## 2021-08-05 NOTE — PLAN OF CARE
Problem: PHYSICAL THERAPY ADULT  Goal: Performs mobility at highest level of function for planned discharge setting  See evaluation for individualized goals  Description: Treatment/Interventions: Functional transfer training, LE strengthening/ROM, Endurance training, Cognitive reorientation, Patient/family training, Bed mobility, Gait training, Spoke to nursing, Spoke to case management, OT          See flowsheet documentation for full assessment, interventions and recommendations  Outcome: Progressing  Note: Prognosis: Good  Problem List: Decreased strength, Decreased endurance, Impaired balance, Decreased mobility, Decreased cognition  Assessment: Pt presents with decreased mobility, strength, balance, and activity tolerance  Pt demonstrated ability to perform bed mobility at Mod Ax1 and STS functional transfers at [address]  Pt ambulated 40ft x2 with RW at Mod Ax1 + SBA of 2nd for safety + 3rd for lines and chair follow  Pt presents w/ R lateral lean in standing and during mobility  Pt requires VCs to lock R knee into extension throughout mobility  Pt continues to benefit from skilled therapy to maximize functional independence  Recommendation at this time is rehab  Pt would benefit from continued ambulation, functional transfers, strength, balance, and activity tolerance  Barriers to Discharge: Inaccessible home environment        PT Discharge Recommendation: Post acute rehabilitation services     PT - OK to Discharge: Yes    See flowsheet documentation for full assessment

## 2021-08-05 NOTE — PLAN OF CARE
Problem: PAIN - ADULT  Goal: Verbalizes/displays adequate comfort level or baseline comfort level  Description: Interventions:  - Encourage patient to monitor pain and request assistance  - Assess pain using appropriate pain scale  - Administer analgesics based on type and severity of pain and evaluate response  - Implement non-pharmacological measures as appropriate and evaluate response  - Consider cultural and social influences on pain and pain management  - Notify physician/advanced practitioner if interventions unsuccessful or patient reports new pain  Outcome: Progressing     Problem: INFECTION - ADULT  Goal: Absence or prevention of progression during hospitalization  Description: INTERVENTIONS:  - Assess and monitor for signs and symptoms of infection  - Monitor lab/diagnostic results  - Monitor all insertion sites, i e  indwelling lines, tubes, and drains  - [place] appropriate cooling/warming therapies per order  - Administer medications as ordered  - Instruct and encourage patient and family to use good hand hygiene technique  - Identify and instruct in appropriate isolation precautions for identified infection/condition  Outcome: Progressing  Goal: Absence of fever/infection during neutropenic period  Description: INTERVENTIONS:  - Monitor WBC    Outcome: Progressing     Problem: SAFETY ADULT  Goal: Patient will remain free of falls  Description: INTERVENTIONS:  - Educate patient/family on patient safety including physical limitations  - Instruct patient to call for assistance with activity   - Consult OT/PT to assist with strengthening/mobility   - Keep Call bell within reach  - Keep bed low and locked with side rails adjusted as appropriate  - Keep care items and personal belongings within reach  - Initiate and maintain comfort rounds  - Make Fall Risk Sign visible to staff  - Offer Toileting every 2 Hours, in advance of need  - Initiate/Maintain bed alarm  - Apply yellow socks and bracelet for high fall risk patients  - Consider moving patient to room near nurses station  Outcome: Progressing  Goal: Maintain or return to baseline ADL function  Description: INTERVENTIONS:  -  Assess patient's ability to carry out ADLs; assess patient's baseline for ADL function and identify physical deficits which impact ability to perform ADLs (bathing, care of mouth/teeth, toileting, grooming, dressing, etc )  - Assess/evaluate cause of self-care deficits   - Assess range of motion  - Assess patient's mobility; develop plan if impaired  - Assess patient's need for assistive devices and provide as appropriate  - Encourage maximum independence but intervene and supervise when necessary  - Involve family in performance of ADLs  - Assess for home care needs following discharge   - Consider OT consult to assist with ADL evaluation and planning for discharge  - Provide patient education as appropriate  Outcome: Progressing  Goal: Maintains/Returns to pre admission functional level  Description: INTERVENTIONS:  - Perform BMAT or MOVE assessment daily    - Set and communicate daily mobility goal to care team and patient/family/caregiver  - Collaborate with rehabilitation services on mobility goals if consulted  - Perform Range of Motion 3 times a day  - Reposition patient every 2 hours    - Dangle patient 3 times a day  - Stand patient 3 times a day  - Ambulate patient 3 times a day  - Out of bed to chair 3 times a day   - Out of bed for meals 3 times a day  - Out of bed for toileting  - Record patient progress and toleration of activity level   Outcome: Progressing     Problem: DISCHARGE PLANNING  Goal: Discharge to home or other facility with appropriate resources  Description: INTERVENTIONS:  - Identify barriers to discharge w/patient and caregiver  - Arrange for needed discharge resources and transportation as appropriate  - Identify discharge learning needs (meds, wound care, etc )  - Arrange for interpretive services to assist at discharge as needed  - Refer to Case Management Department for coordinating discharge planning if the patient needs post-hospital services based on physician/advanced practitioner order or complex needs related to functional status, cognitive ability, or social support system  Outcome: Progressing     Problem: Knowledge Deficit  Goal: Patient/family/caregiver demonstrates understanding of disease process, treatment plan, medications, and discharge instructions  Description: Complete learning assessment and assess knowledge base  Interventions:  - Provide teaching at level of understanding  - Provide teaching via preferred learning methods  Outcome: Progressing     Problem: Nutrition/Hydration-ADULT  Goal: Nutrient/Hydration intake appropriate for improving, restoring or maintaining nutritional needs  Description: Monitor and assess patient's nutrition/hydration status for malnutrition  Collaborate with interdisciplinary team and initiate plan and interventions as ordered  Monitor patient's weight and dietary intake as ordered or per policy  Utilize nutrition screening tool and intervene as necessary  Determine patient's food preferences and provide high-protein, high-caloric foods as appropriate       INTERVENTIONS:  - Monitor oral intake, urinary output, labs, and treatment plans  - Assess nutrition and hydration status and recommend course of action  - Evaluate amount of meals eaten  - Assist patient with eating if necessary   - Allow adequate time for meals  - Recommend/ encourage appropriate diets, oral nutritional supplements, and vitamin/mineral supplements  - Order, calculate, and assess calorie counts as needed  - Assess need for intravenous fluids  - Provide specific nutrition/hydration education as appropriate  - Include patient/family/caregiver in decisions related to nutrition  Outcome: Progressing     Problem: MOBILITY - ADULT  Goal: Maintain or return to baseline ADL function  Description: INTERVENTIONS:  -  Assess patient's ability to carry out ADLs; assess patient's baseline for ADL function and identify physical deficits which impact ability to perform ADLs (bathing, care of mouth/teeth, toileting, grooming, dressing, etc )  - Assess/evaluate cause of self-care deficits   - Assess range of motion  - Assess patient's mobility; develop plan if impaired  - Assess patient's need for assistive devices and provide as appropriate  - Encourage maximum independence but intervene and supervise when necessary  - Involve family in performance of ADLs  - Assess for home care needs following discharge   - Consider OT consult to assist with ADL evaluation and planning for discharge  - Provide patient education as appropriate  Outcome: Progressing  Goal: Maintains/Returns to pre admission functional level  Description: INTERVENTIONS:  - Perform BMAT or MOVE assessment daily    - Set and communicate daily mobility goal to care team and patient/family/caregiver  - Collaborate with rehabilitation services on mobility goals if consulted  - Perform Range of Motion 3 times a day  - Reposition patient every 2 hours    - Dangle patient 3 times a day  - Stand patient 3 times a day  - Ambulate patient 3 times a day  - Out of bed to chair 3 times a day   - Out of bed for meals 3 times a day  - Out of bed for toileting  - Record patient progress and toleration of activity level   Outcome: Progressing     Problem: Prexisting or High Potential for Compromised Skin Integrity  Goal: Skin integrity is maintained or improved  Description: INTERVENTIONS:  - Identify patients at risk for skin breakdown  - Assess and monitor skin integrity  - Assess and monitor nutrition and hydration status  - Monitor labs   - Assess for incontinence   - Turn and reposition patient  - Assist with mobility/ambulation  - Relieve pressure over bony prominences  - Avoid friction and shearing  - Provide appropriate hygiene as needed including keeping skin clean and dry  - Evaluate need for skin moisturizer/barrier cream  - Collaborate with interdisciplinary team   - Patient/family teaching  - Consider wound care consult   Outcome: Progressing     Problem: Potential for Falls  Goal: Patient will remain free of falls  Description: INTERVENTIONS:  - Educate patient/family on patient safety including physical limitations  - Instruct patient to call for assistance with activity   - Consult OT/PT to assist with strengthening/mobility   - Keep Call bell within reach  - Keep bed low and locked with side rails adjusted as appropriate  - Keep care items and personal belongings within reach  - Initiate and maintain comfort rounds  - Make Fall Risk Sign visible to staff  - Offer Toileting every 2 Hours, in advance of need  - Initiate/Maintain bed alarm  - Apply yellow socks and bracelet for high fall risk patients  - Consider moving patient to room near nurses station  Outcome: Progressing

## 2021-08-05 NOTE — INTERVAL H&P NOTE
Update: (This section must be completed if the H&P was completed greater than 24 hrs to procedure or admission)    H&P reviewed  After examining the patient, I find no changed to the H&P since it had been written  70M w/ recent strok, found to have lung mass - Lung biopsy indicated for diagnosis    Ct reviewed    Risks benefit alternatives discussed w/ daughter via phone  Risks of bleeding, elevated on anticoagulation (held per protocol)  Risk of pneumothorax, chest tube    Will proceed  Mp2 ASA3    Patient re-evaluated   Accept as history and physical     Gregg Bahena MD/August 5, 2021/4:07 PM

## 2021-08-05 NOTE — RESTORATIVE TECHNICIAN NOTE
Restorative Technician Note      Patient Name: Epifania Aschoff     Note Type: Mobility  Patient Position Upon Consult: Supine  Activity Performed: Ambulated;Dangled;Stood  Assistive Device: Roller walker; Other (Comment) (Assist x1-2 with chair follow and NC O2 on 5L in room at rest and 6L for ambulation  Assisted PT Conception Radar )  Education Provided: Yes (Educated/encouraged pt to ambulate with assistance 3-4 x's/day )  Patient Position at End of Consult: Bedside chair; All needs within reach;Bed/Chair alarm activated  Radhika BLUNT, Restorative Technician, [employer]

## 2021-08-05 NOTE — ARC ADMISSION
Referral received for consideration of patient for inpatient acute rehab  Per PM&R consult, patient is likely ARC candidate pending medical stability  Noted that patient is for IR lung biopsy today, 8/5  Will continue to follow and update as able

## 2021-08-05 NOTE — PROGRESS NOTES
INTERNAL MEDICINE RESIDENCY PROGRESS NOTE     Name: Jack Rosas   Age & Sex: 72 y o  male   MRN: [id number]  Unit/Bed#: [facility] 725-01   Encounter: [id number]  Team: SLIM    PATIENT INFORMATION     Name: Jack Rosas   Age & Sex: 72 y o  male   MRN: [id number]  Hospital Stay Days: 5    ASSESSMENT/PLAN     Principal Problem:    Acute CVA (cerebrovascular accident) [facility])  Active Problems:    Acute respiratory failure with hypoxia ([address] )    Acute deep vein thrombosis (DVT) of distal vein of both lower extremities ([facility] [address] )    Mass of upper lobe of right lung    Nasopharyngeal mass    Elevated troponin    Dysphagia      * Acute CVA (cerebrovascular accident) [facility])  Assessment & Plan  Presented as stroke alert  CT showed recent infarct in the left caudate head, anterior lentiform nucleus and anterior limb of the internal capsule  Was not a tPA candidate secondary to in fact appearing acute to subacute  MRI brain showing multifocal diffusion abnormalities in several separate vascular territories, largest in the left MCA territory indicative of multifocal acute/recent infarctions most likely from a central embolic source  Also on imaging found to have nasopharyngeal mass as well as right upper lobe lung lesion  Echo EF 55%, RV dysfunction     Etiology of stroke likely secondary to hypercoagulable state from possible underlying malignancy  Neurology signed off  PMR cleared for ARC, but pt wife prefers home discharge  Plan:  · No need for aspirin while patient is on Lovenox  · Continue atorvastatin 40 mg daily  · Goal normotension  · Frequent neuro checks  · PT/OT      Acute respiratory failure with hypoxia [facility])  Assessment & Plan  Due to presumed PE and right upper lobe lung mass  Plan:  Continue to titrate oxygen as able for goal SpO2 greater than 90%        Acute deep vein thrombosis (DVT) of distal vein of both lower extremities ([facility])  Assessment & Plan  Right lower extremity DVT in the posterior tibial and peroneal veins, acute lower extremity DVT in the peroneal veins and acute lower extremity superficial thrombophlebitis in the greater saphenous vein  In the setting of acute hypoxic respiratory failure, PEs presumed  Plan:  · Continue therapeutic Lovenox  Will likely need AC indefinitely  Mass of upper lobe of right lung  Assessment & Plan  6 4 x 3 6 x 3 4 cm solid, irregular contoured right upper lobe pulmonary nodule, likely primary lung cancer with extensive hilar and mediastinal lymphadenopathy  Plan:  · Pulmonology following, appreciated  · IR guided percutaneous biopsy of lung mass with associated thoracentesis for diagnosing and staging scheduled  at 1430  Nasopharyngeal mass  Assessment & Plan   2 7 x 2 0 cm mass noted on CT and directly visualized via flexible laryngoscope by ENT  Plan:   · ENT following  · Biopsy as outpatient  Elevated troponin  Assessment & Plan  Most likely due to acute PE and RV dysfunction  Low concern for ACS  No chest pain  Plan:  · Echocardiogram in 4-6 weeks to assess RV function  Disposition: continue current level of care  SUBJECTIVE     Patient seen and examined  No acute events overnight  Patient unable to void and required straight cath  Denies dysuria  Also complains of constipation  Feels his strength has improved  OBJECTIVE     Vitals:    21 0723 21 1533 21 2209 21 0837   BP: 130/77 124/71 125/76 126/76   BP Location:  Left arm Left arm    Pulse:  67 66 64   Resp:  22 18 18   Temp:  98 6 °F (37 °C) 98 4 °F (36 9 °C) 97 6 °F (36 4 °C)   TempSrc:  Oral Oral    SpO2:  90% 93% 93%   Weight:       Height:          Temperature:   Temp (24hrs), Av 2 °F (36 8 °C), Min:97 6 °F (36 4 °C), Max:98 6 °F (37 °C)    Temperature: 97 6 °F (36 4 °C)  Intake & Output:  I/O       701 -  07 -  07 -  0700    P  O  480 180     Total Intake(mL/kg) 480 (5 5) 180 (2 1) Urine (mL/kg/hr)  600 (0 3) 100 (0 9)    Total Output  600 100    Net +480 -420 -100           Unmeasured Urine Occurrence  1 x         Weights:   IBW (Ideal Body Weight): 68 4 kg    Body mass index is 29 03 kg/m²  Weight (last 2 days)     None        Physical Exam  Vitals and nursing note reviewed  Constitutional:       Appearance: He is well-developed  HENT:      Head: Normocephalic and atraumatic  Eyes:      Conjunctiva/sclera: Conjunctivae normal    Cardiovascular:      Rate and Rhythm: Normal rate and regular rhythm  Heart sounds: No murmur heard  Pulmonary:      Effort: Pulmonary effort is normal  No respiratory distress  Breath sounds: Normal breath sounds  Comments: 5 L NC   Abdominal:      Palpations: Abdomen is soft  Tenderness: There is no abdominal tenderness  Musculoskeletal:      Cervical back: Neck supple  Skin:     General: Skin is warm and dry  Neurological:      Mental Status: He is alert  Motor: Weakness present  Comments: Right sided weakness        LABORATORY DATA     Labs: I have personally reviewed pertinent reports    Results from last 7 days   Lab Units 08/04/21 0453 08/03/21  0550 08/02/21  0619   WBC Thousand/uL 8 28 8 63 9 51   HEMOGLOBIN g/dL 10 4* 10 4* 10 4*   HEMATOCRIT % 34 6* 34 1* 34 2*   PLATELETS Thousands/uL 378 370 331   NEUTROS PCT % 68 70 69   MONOS PCT % 9 9 9      Results from last 7 days   Lab Units 08/04/21  0453 08/03/21  0550 08/02/21  0619 07/31/21  1448   POTASSIUM mmol/L 3 8 4 0 4 0 4 2   CHLORIDE mmol/L 107 108 110* 106   CO2 mmol/L 24 24 24 25   BUN mg/dL 21 20 20 19   CREATININE mg/dL 0 96 0 92 0 99 1 05   CALCIUM mg/dL 9 8 9 9 9 8 9 2   ALK PHOS U/L  --   --   --  56   ALT U/L  --   --   --  45   AST U/L  --   --   --  34     Results from last 7 days   Lab Units 08/01/21  0507   MAGNESIUM mg/dL 2 6     Results from last 7 days   Lab Units 08/01/21  0507   PHOSPHORUS mg/dL 3 0      Results from last 7 days   Lab Units 08/05/21  0037 08/04/21  1647 08/02/21  0913 07/31/21  1448   INR   --  1 07  --  1 28*   PTT seconds 40* 33 33 29         Results from last 7 days   Lab Units 08/01/21  1154 08/01/21  0802 07/31/21  2338   TROPONIN I ng/mL 10 30* 10 80* 8 23*       IMAGING & DIAGNOSTIC TESTING     Radiology Results: I have personally reviewed pertinent reports  XR chest portable    Result Date: 8/2/2021  Impression: Right upper lobe mass with suggestion of mediastinal and hilar lymphadenopathy  At the time of this dictation, CT of the chest had been performed which confirms findings which are suspicious for malignancy  Workstation performed: [id number]     MRI brain w wo contrast    Result Date: 8/3/2021  Impression: 1  Multifocal diffusion abnormalities in several separate vascular territories, largest in the left MCA territory indicative of multifocal acute/recent infarctions most likely from a central embolic source  Further clinical assessment advised  2   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  Both benign and malignant etiologies are in the differential diagnosis  ENT assessment recommended  Workstation performed: [id number]     Other Diagnostic Testing: I have personally reviewed pertinent reports      ACTIVE MEDICATIONS     Current Facility-Administered Medications   Medication Dose Route Frequency    acetaminophen (TYLENOL) rectal suppository 650 mg  650 mg Rectal Q6H PRN    atorvastatin (LIPITOR) tablet 40 mg  40 mg Oral Daily With Dinner    heparin (porcine) 25,000 units in 0 45% NaCl 250 mL infusion (premix)  3-30 Units/kg/hr (Order-Specific) Intravenous Titrated    lidocaine (XYLOCAINE) 4 % topical solution 5 mL  5 mL Topical Once    polyethylene glycol (MIRALAX) packet 17 g  17 g Oral Daily    senna-docusate sodium (SENOKOT S) 8 6-50 mg per tablet 2 tablet  2 tablet Oral BID       VTE Pharmacologic Prophylaxis: Reason for no pharmacologic prophylaxis Held prior to biopsy, will resume afterwards   VTE Mechanical Prophylaxis: sequential compression device    Portions of the record may have been created with voice recognition software  Occasional wrong word or "sound a like" substitutions may have occurred due to the inherent limitations of voice recognition software    Read the chart carefully and recognize, using context, where substitutions have occurred   ==  Robley Heimlich, MD  [address]  Internal Medicine Residency PGY-3

## 2021-08-05 NOTE — PROGRESS NOTES
PULMONOLOGY PROGRESS NOTE     Name: French Stephen   Age & Sex: 72 y o  male   MRN: [id number]  Unit/Bed#: [facility] 725-01   Encounter: [id number]    PATIENT INFORMATION     Name: French Stephen   Age & Sex: 72 y o  male   MRN: [id number]  Hospital Stay Days: 5    ASSESSMENT/PLAN     Principal Problem:    Acute CVA (cerebrovascular accident) [facility])  Active Problems:    Elevated troponin    Acute respiratory failure with hypoxia ([address] )    Acute deep vein thrombosis (DVT) of distal vein of both lower extremities ([address] )    Nasopharyngeal mass    Dysphagia    Mass of upper lobe of right lung    Patient presents for acute evaluation of stroke-like symptoms and is found to have CVA  He was outside of the window for tPA  As he was noted to be significantly hypoxic, he was started on empiric heparin for suspected PE (could not perform CTA after contrast for CT head/neck)  He was found to have DVTs in both lower extremities  Additionally, he was found to have a 6 4x3  6x3 4cm solid RUL pulmonary nodule suspicious for primary lung cancer  ENT is also following for 2 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 0cm right nasopharyngeal mass which may also be a primary malignancy  He has been on lovenox for his suspected PE  This was discussed with interventional radiology and he will be transitioned to heparin infusion as he will be undergoing percutaneous biopsy of his pulmonary lesion  Some degree of emphysema noted on his CT, not symptomatic from a potential COPD standpoint      - Heparin held this morning in anticipation of IR biopsy  To be resumed when cleared from IR perspective after procedure  - Monitor neurological status closely while on anticoagulation given recent CVA  - Nasopharyngeal biopsy per ENT   - Supplemental O2 to maintain saturations >88%  Titrate down as tolerated         SUBJECTIVE     Patient seen and examined  No acute events overnight  Experienced urinary retention overnight and has anxiety due to this   ROS otherwise normal      OBJECTIVE Vitals:    21 1656 21 1700 21 1701 21 1743   BP: 154/82  141/81 139/77   Pulse: 67  66 69   Resp: 20 20 20 20   Temp:       TempSrc:       SpO2: 99%  99% (!) 89%   Weight:       Height:          Temperature:   Temp (24hrs), Av °F (36 7 °C), Min:97 6 °F (36 4 °C), Max:98 4 °F (36 9 °C)    Temperature: 97 6 °F (36 4 °C)  Intake & Output:  I/O        07 -  0700  07 -  0700    P  O  180     Total Intake(mL/kg) 180 (2 1)     Urine (mL/kg/hr) 600 (0 3) 200 (0 2)    Stool  0    Total Output 600 200    Net -420 -200          Unmeasured Urine Occurrence 1 x     Unmeasured Stool Occurrence  1 x        Weights:   IBW (Ideal Body Weight): 68 4 kg    Body mass index is 29 03 kg/m²  Weight (last 2 days)     None        Physical Exam  Vitals and nursing note reviewed  Constitutional:       General: He is not in acute distress  Appearance: Normal appearance  HENT:      Head: Normocephalic and atraumatic  Mouth/Throat:      Mouth: Mucous membranes are moist       Pharynx: No oropharyngeal exudate or posterior oropharyngeal erythema  Eyes:      General: No scleral icterus  Conjunctiva/sclera: Conjunctivae normal    Cardiovascular:      Rate and Rhythm: Normal rate and regular rhythm  Heart sounds: No murmur heard  No friction rub  No gallop  Pulmonary:      Effort: Pulmonary effort is normal  No respiratory distress  Breath sounds: Normal breath sounds  No wheezing, rhonchi or rales  Abdominal:      Palpations: Abdomen is soft  Tenderness: There is no abdominal tenderness  There is no guarding  Musculoskeletal:      Cervical back: No tenderness  Right lower leg: No edema  Left lower leg: No edema  Lymphadenopathy:      Cervical: No cervical adenopathy  Skin:     General: Skin is warm and dry  Neurological:      Mental Status: He is alert and oriented to person, place, and time        Motor: Weakness (Neurological deficits unchanged) present  LABORATORY DATA     Labs: I have personally reviewed pertinent reports  Results from last 7 days   Lab Units 08/04/21  0453 08/03/21  0550 08/02/21  0619   WBC Thousand/uL 8 28 8 63 9 51   HEMOGLOBIN g/dL 10 4* 10 4* 10 4*   HEMATOCRIT % 34 6* 34 1* 34 2*   PLATELETS Thousands/uL 378 370 331   NEUTROS PCT % 68 70 69   MONOS PCT % 9 9 9      Results from last 7 days   Lab Units 08/04/21  0453 08/03/21  0550 08/02/21  0619 07/31/21  1448   POTASSIUM mmol/L 3 8 4 0 4 0 4 2   CHLORIDE mmol/L 107 108 110* 106   CO2 mmol/L 24 24 24 25   BUN mg/dL 21 20 20 19   CREATININE mg/dL 0 96 0 92 0 99 1 05   CALCIUM mg/dL 9 8 9 9 9 8 9 2   ALK PHOS U/L  --   --   --  56   ALT U/L  --   --   --  45   AST U/L  --   --   --  34     Results from last 7 days   Lab Units 08/01/21  0507   MAGNESIUM mg/dL 2 6     Results from last 7 days   Lab Units 08/01/21  0507   PHOSPHORUS mg/dL 3 0      Results from last 7 days   Lab Units 08/05/21  0037 08/04/21  1647 08/02/21  0913 07/31/21  1448   INR   --  1 07  --  1 28*   PTT seconds 40* 33 33 29         Results from last 7 days   Lab Units 08/01/21  1154 08/01/21  0802 07/31/21  2338   TROPONIN I ng/mL 10 30* 10 80* 8 23*         IMAGING & DIAGNOSTIC TESTING     Radiology Results: I have personally reviewed pertinent reports  XR chest portable    Result Date: 8/2/2021  Impression: Right upper lobe mass with suggestion of mediastinal and hilar lymphadenopathy  At the time of this dictation, CT of the chest had been performed which confirms findings which are suspicious for malignancy  Workstation performed: [id number]     MRI brain w wo contrast    Result Date: 8/3/2021  Impression: 1  Multifocal diffusion abnormalities in several separate vascular territories, largest in the left MCA territory indicative of multifocal acute/recent infarctions most likely from a central embolic source  Further clinical assessment advised   2   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  Both benign and malignant etiologies are in the differential diagnosis  ENT assessment recommended  Workstation performed: [id number]     IR biopsy lung    Result Date: 8/5/2021  Impression: Impression: CT-guided biopsy of a right lung mass Small right pleural effusion  Given the small size of the effusion and risk for pneumothorax after needle angulation required for the biopsy we deferred a thoracentesis at this time  This can be considered in the future if clinically appropriate Workstation performed: [id number]     Other Diagnostic Testing: I have personally reviewed pertinent reports  ACTIVE MEDICATIONS     Current Facility-Administered Medications   Medication Dose Route Frequency    acetaminophen (TYLENOL) rectal suppository 650 mg  650 mg Rectal Q6H PRN    atorvastatin (LIPITOR) tablet 40 mg  40 mg Oral Daily With Dinner    heparin (porcine) 25,000 units in 0 45% NaCl 250 mL infusion (premix)  3-30 Units/kg/hr (Order-Specific) Intravenous Titrated    lidocaine (XYLOCAINE) 4 % topical solution 5 mL  5 mL Topical Once    polyethylene glycol (MIRALAX) packet 17 g  17 g Oral Daily    senna-docusate sodium (SENOKOT S) 8 6-50 mg per tablet 2 tablet  2 tablet Oral BID       Disclaimer: Portions of the record may have been created with voice recognition software  Occasional wrong word or "sound a like" substitutions may have occurred due to the inherent limitations of voice recognition software  Careful consideration should be taken to recognize, using context, where substitutions have occurred      Robert Acevedor, DO  Pulmonary/Critical Care Fellowship PGY-IV  [facility]

## 2021-08-06 PROBLEM — R33.9 URINARY RETENTION: Status: ACTIVE | Noted: 2021-08-06

## 2021-08-06 LAB
ANION GAP SERPL CALCULATED.3IONS-SCNC: 1 MMOL/L (ref 4–13)
APTT PPP: 35 SECONDS (ref 23–37)
APTT PPP: 51 SECONDS (ref 23–37)
BASOPHILS # BLD AUTO: 0.04 THOUSANDS/ΜL (ref 0–0.1)
BASOPHILS # BLD AUTO: 0.04 THOUSANDS/ΜL (ref 0–0.1)
BASOPHILS NFR BLD AUTO: 0 % (ref 0–1)
BASOPHILS NFR BLD AUTO: 0 % (ref 0–1)
BUN SERPL-MCNC: 20 MG/DL (ref 5–25)
CALCIUM SERPL-MCNC: 10.7 MG/DL (ref 8.3–10.1)
CHLORIDE SERPL-SCNC: 108 MMOL/L (ref 100–108)
CO2 SERPL-SCNC: 28 MMOL/L (ref 21–32)
CREAT SERPL-MCNC: 0.89 MG/DL (ref 0.6–1.3)
EOSINOPHIL # BLD AUTO: 0.05 THOUSAND/ΜL (ref 0–0.61)
EOSINOPHIL # BLD AUTO: 0.06 THOUSAND/ΜL (ref 0–0.61)
EOSINOPHIL NFR BLD AUTO: 1 % (ref 0–6)
EOSINOPHIL NFR BLD AUTO: 1 % (ref 0–6)
ERYTHROCYTE [DISTWIDTH] IN BLOOD BY AUTOMATED COUNT: 15.3 % (ref 11.6–15.1)
ERYTHROCYTE [DISTWIDTH] IN BLOOD BY AUTOMATED COUNT: 15.3 % (ref 11.6–15.1)
GFR SERPL CREATININE-BSD FRML MDRD: 87 ML/MIN/1.73SQ M
GLUCOSE SERPL-MCNC: 91 MG/DL (ref 65–140)
HCT VFR BLD AUTO: 35.6 % (ref 36.5–49.3)
HCT VFR BLD AUTO: 36 % (ref 36.5–49.3)
HGB BLD-MCNC: 10.8 G/DL (ref 12–17)
HGB BLD-MCNC: 10.9 G/DL (ref 12–17)
IMM GRANULOCYTES # BLD AUTO: 0.05 THOUSAND/UL (ref 0–0.2)
IMM GRANULOCYTES # BLD AUTO: 0.06 THOUSAND/UL (ref 0–0.2)
IMM GRANULOCYTES NFR BLD AUTO: 1 % (ref 0–2)
IMM GRANULOCYTES NFR BLD AUTO: 1 % (ref 0–2)
LYMPHOCYTES # BLD AUTO: 1.18 THOUSANDS/ΜL (ref 0.6–4.47)
LYMPHOCYTES # BLD AUTO: 1.35 THOUSANDS/ΜL (ref 0.6–4.47)
LYMPHOCYTES NFR BLD AUTO: 11 % (ref 14–44)
LYMPHOCYTES NFR BLD AUTO: 13 % (ref 14–44)
MCH RBC QN AUTO: 25.2 PG (ref 26.8–34.3)
MCH RBC QN AUTO: 25.5 PG (ref 26.8–34.3)
MCHC RBC AUTO-ENTMCNC: 30.3 G/DL (ref 31.4–37.4)
MCHC RBC AUTO-ENTMCNC: 30.3 G/DL (ref 31.4–37.4)
MCV RBC AUTO: 83 FL (ref 82–98)
MCV RBC AUTO: 84 FL (ref 82–98)
MONOCYTES # BLD AUTO: 0.68 THOUSAND/ΜL (ref 0.17–1.22)
MONOCYTES # BLD AUTO: 0.77 THOUSAND/ΜL (ref 0.17–1.22)
MONOCYTES NFR BLD AUTO: 7 % (ref 4–12)
MONOCYTES NFR BLD AUTO: 7 % (ref 4–12)
NEUTROPHILS # BLD AUTO: 8.34 THOUSANDS/ΜL (ref 1.85–7.62)
NEUTROPHILS # BLD AUTO: 8.4 THOUSANDS/ΜL (ref 1.85–7.62)
NEUTS SEG NFR BLD AUTO: 78 % (ref 43–75)
NEUTS SEG NFR BLD AUTO: 80 % (ref 43–75)
NRBC BLD AUTO-RTO: 0 /100 WBCS
NRBC BLD AUTO-RTO: 0 /100 WBCS
PLATELET # BLD AUTO: 407 THOUSANDS/UL (ref 149–390)
PLATELET # BLD AUTO: 412 THOUSANDS/UL (ref 149–390)
PMV BLD AUTO: 9.3 FL (ref 8.9–12.7)
PMV BLD AUTO: 9.5 FL (ref 8.9–12.7)
POTASSIUM SERPL-SCNC: 4.2 MMOL/L (ref 3.5–5.3)
RBC # BLD AUTO: 4.27 MILLION/UL (ref 3.88–5.62)
RBC # BLD AUTO: 4.28 MILLION/UL (ref 3.88–5.62)
SODIUM SERPL-SCNC: 137 MMOL/L (ref 136–145)
WBC # BLD AUTO: 10.34 THOUSAND/UL (ref 4.31–10.16)
WBC # BLD AUTO: 10.68 THOUSAND/UL (ref 4.31–10.16)

## 2021-08-06 PROCEDURE — 94760 N-INVAS EAR/PLS OXIMETRY 1: CPT

## 2021-08-06 PROCEDURE — 85730 THROMBOPLASTIN TIME PARTIAL: CPT | Performed by: INTERNAL MEDICINE

## 2021-08-06 PROCEDURE — 85025 COMPLETE CBC W/AUTO DIFF WBC: CPT | Performed by: PHYSICIAN ASSISTANT

## 2021-08-06 PROCEDURE — 99232 SBSQ HOSP IP/OBS MODERATE 35: CPT | Performed by: INTERNAL MEDICINE

## 2021-08-06 PROCEDURE — 94761 N-INVAS EAR/PLS OXIMETRY MLT: CPT

## 2021-08-06 PROCEDURE — 80048 BASIC METABOLIC PNL TOTAL CA: CPT | Performed by: PHYSICIAN ASSISTANT

## 2021-08-06 PROCEDURE — 99233 SBSQ HOSP IP/OBS HIGH 50: CPT | Performed by: INTERNAL MEDICINE

## 2021-08-06 RX ORDER — ACETAMINOPHEN 325 MG/1
650 TABLET ORAL EVERY 6 HOURS SCHEDULED
Status: DISCONTINUED | OUTPATIENT
Start: 2021-08-06 | End: 2021-08-08 | Stop reason: HOSPADM

## 2021-08-06 RX ADMIN — ACETAMINOPHEN 650 MG: 325 TABLET, FILM COATED ORAL at 17:24

## 2021-08-06 RX ADMIN — HEPARIN SODIUM 22 UNITS/KG/HR: 10000 INJECTION, SOLUTION INTRAVENOUS at 04:04

## 2021-08-06 RX ADMIN — ATORVASTATIN CALCIUM 40 MG: 40 TABLET, FILM COATED ORAL at 17:24

## 2021-08-06 RX ADMIN — ENOXAPARIN SODIUM 90 MG: 100 INJECTION SUBCUTANEOUS at 12:52

## 2021-08-06 RX ADMIN — MELATONIN 3 MG: at 22:04

## 2021-08-06 RX ADMIN — POLYETHYLENE GLYCOL 3350 17 G: 17 POWDER, FOR SOLUTION ORAL at 08:59

## 2021-08-06 RX ADMIN — DOCUSATE SODIUM AND SENNOSIDES 2 TABLET: 8.6; 5 TABLET ORAL at 08:59

## 2021-08-06 RX ADMIN — DOCUSATE SODIUM AND SENNOSIDES 2 TABLET: 8.6; 5 TABLET ORAL at 17:25

## 2021-08-06 RX ADMIN — ENOXAPARIN SODIUM 90 MG: 100 INJECTION SUBCUTANEOUS at 22:04

## 2021-08-06 NOTE — PROGRESS NOTES
PULMONOLOGY PROGRESS NOTE     Name: Anita Chin   Age & Sex: 72 y o  male   MRN: [id number]  Unit/Bed#: [facility] 725-01   Encounter: [id number]    PATIENT INFORMATION     Name: Anita Chin   Age & Sex: 72 y o  male   MRN: [id number]  Hospital Stay Days: 6    ASSESSMENT/PLAN     Principal Problem:    Acute CVA (cerebrovascular accident) [facility])  Active Problems:    Elevated troponin    Acute respiratory failure with hypoxia ([facility] [address] )    Acute deep vein thrombosis (DVT) of distal vein of both lower extremities ([facility] [address] )    Nasopharyngeal mass    Dysphagia    Mass of upper lobe of right lung    Urinary retention    Patient initially presented for acute evaluation of stroke-like symptoms and is found to have CVA  He was outside of the window for tPA  As he was noted to be significantly hypoxic, he was started on empiric heparin for suspected PE (could not perform CTA after contrast for CT head/neck)  He was found to have DVTs in both lower extremities  Additionally, he was found to have a 6 4x3  6x3 4cm solid RUL pulmonary nodule suspicious for primary lung cancer  ENT is also following for 2 7x2 0cm right nasopharyngeal mass which may also be a primary malignancy  IR performed percutaneous biopsy  He has been on anticoagulation for his suspected PE  Some degree of emphysema noted on his CT, not symptomatic from a potential COPD standpoint      - Awaiting biopsy results  - Monitor neurological status closely while on anticoagulation given recent CVA  - Nasopharyngeal biopsy per ENT   - Supplemental O2 to maintain saturations >88%  Titrate down as tolerated  - Can follow up with pulmonary in approximately 2 weeks  - Will sign off at this point  Please feel free to reach out with any further questions or concerns        SUBJECTIVE     Patient seen and examined  No acute events overnight  Patient states that he feels well this morning  Continues to improve daily and looks forward to going home   ROS otherwise normal      OBJECTIVE Vitals:    21 1743 21 2006 21 0047 21 1530   BP: 139/77 155/82 119/56 (!) 139/115   Pulse: 69 72 67 73   Resp: 20   18   Temp:    97 8 °F (36 6 °C)   TempSrc:       SpO2: (!) 89% 90% 92% 93%   Weight:       Height:          Temperature:   Temp (24hrs), Av 8 °F (36 6 °C), Min:97 8 °F (36 6 °C), Max:97 8 °F (36 6 °C)    Temperature: 97 8 °F (36 6 °C)  Intake & Output:  I/O       701 -  07 -  07 -  0700    P  O  180      Total Intake(mL/kg) 180 (2 1)      Urine (mL/kg/hr) 600 (0 3) 1334 (0 6)     Stool  0     Total Output 600 1334     Net -420 -1334            Unmeasured Urine Occurrence 1 x      Unmeasured Stool Occurrence  2 x         Weights:   IBW (Ideal Body Weight): 68 4 kg    Body mass index is 29 03 kg/m²  Weight (last 2 days)     None        Physical Exam  Vitals and nursing note reviewed  Constitutional:       Appearance: He is well-developed  HENT:      Head: Normocephalic and atraumatic  Eyes:      Conjunctiva/sclera: Conjunctivae normal    Cardiovascular:      Rate and Rhythm: Normal rate and regular rhythm  Heart sounds: No murmur heard  Pulmonary:      Effort: Pulmonary effort is normal  No respiratory distress  Breath sounds: Normal breath sounds  Abdominal:      Palpations: Abdomen is soft  Tenderness: There is no abdominal tenderness  Musculoskeletal:      Cervical back: Neck supple  Skin:     General: Skin is warm and dry  Neurological:      Mental Status: He is alert and oriented to person, place, and time  Mental status is at baseline  LABORATORY DATA     Labs: I have personally reviewed pertinent reports    Results from last 7 days   Lab Units 21  1004 21  0735 21  0453   WBC Thousand/uL 10 34* 10 68* 8 28   HEMOGLOBIN g/dL 10 8* 10 9* 10 4*   HEMATOCRIT % 35 6* 36 0* 34 6*   PLATELETS Thousands/uL 412* 407* 378   NEUTROS PCT % 80* 78* 68   MONOS PCT % 7 7 9 Results from last 7 days   Lab Units 08/06/21  0735 08/04/21  0453 08/03/21  0550 07/31/21  1448   POTASSIUM mmol/L 4 2 3 8 4 0 4 2   CHLORIDE mmol/L 108 107 108 106   CO2 mmol/L 28 24 24 25   BUN mg/dL 20 21 20 19   CREATININE mg/dL 0 89 0 96 0 92 1 05   CALCIUM mg/dL 10 7* 9 8 9 9 9 2   ALK PHOS U/L  --   --   --  56   ALT U/L  --   --   --  45   AST U/L  --   --   --  34     Results from last 7 days   Lab Units 08/01/21  0507   MAGNESIUM mg/dL 2 6     Results from last 7 days   Lab Units 08/01/21  0507   PHOSPHORUS mg/dL 3 0      Results from last 7 days   Lab Units 08/06/21  1004 08/05/21  0037 08/04/21  1647 07/31/21  1448   INR   --   --  1 07 1 28*   PTT seconds 51* 40* 33 29         Results from last 7 days   Lab Units 08/01/21  1154 08/01/21  0802 07/31/21  2338   TROPONIN I ng/mL 10 30* 10 80* 8 23*           IMAGING & DIAGNOSTIC TESTING     Radiology Results: I have personally reviewed pertinent reports  XR chest portable    Result Date: 8/2/2021  Impression: Right upper lobe mass with suggestion of mediastinal and hilar lymphadenopathy  At the time of this dictation, CT of the chest had been performed which confirms findings which are suspicious for malignancy  Workstation performed: [id number]     MRI brain w wo contrast    Result Date: 8/3/2021  Impression: 1  Multifocal diffusion abnormalities in several separate vascular territories, largest in the left MCA territory indicative of multifocal acute/recent infarctions most likely from a central embolic source  Further clinical assessment advised  2   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  Both benign and malignant etiologies are in the differential diagnosis  ENT assessment recommended  Workstation performed: [id number]     IR biopsy lung    Result Date: 8/5/2021  Impression: Impression: CT-guided biopsy of a right lung mass Small right pleural effusion   Given the small size of the effusion and risk for pneumothorax after needle angulation required for the biopsy we deferred a thoracentesis at this time  This can be considered in the future if clinically appropriate Workstation performed: [id number]     Other Diagnostic Testing: I have personally reviewed pertinent reports  ACTIVE MEDICATIONS     Current Facility-Administered Medications   Medication Dose Route Frequency    acetaminophen (TYLENOL) tablet 650 mg  650 mg Oral Q6H [facility]    atorvastatin (LIPITOR) tablet 40 mg  40 mg Oral Daily With Dinner    enoxaparin (LOVENOX) subcutaneous injection 90 mg  1 mg/kg Subcutaneous Q12H [facility]    lidocaine (XYLOCAINE) 4 % topical solution 5 mL  5 mL Topical Once    melatonin tablet 3 mg  3 mg Oral HS    polyethylene glycol (MIRALAX) packet 17 g  17 g Oral Daily    senna-docusate sodium (SENOKOT S) 8 6-50 mg per tablet 2 tablet  2 tablet Oral BID     Disclaimer: Portions of the record may have been created with voice recognition software  Occasional wrong word or "sound a like" substitutions may have occurred due to the inherent limitations of voice recognition software  Careful consideration should be taken to recognize, using context, where substitutions have occurred      Leila Klein DO  Pulmonary/Critical Care Fellowship PGY-IV  [facility]

## 2021-08-06 NOTE — CASE MANAGEMENT
Pt family refusing out of home placement and would like to take him home  Family in agreement with [facility] and will provide choices  As of this time [facility] is not accepting due to a high census  Pt to be d/c home wit his family with [facility], DME has been ordered-walker, hospital bed, and commode  Pt also to be discharged with o2 (already ordered) and Lovenox  Unable to get a price from [facility] for the Lovenox

## 2021-08-06 NOTE — RESPIRATORY THERAPY NOTE
resp          Home Oxygen Qualifying Test       Patient name: Johnathan Mattson        : 1950   Date of Test:  2021  Diagnosis:      Home Oxygen Test:    **Medicare Guidelines require item(s) 1-5 on all ambulatory patients or 1 and 2 on non-ambulatory patients  1   Baseline SPO2 on Room Air at rest 87-90 %  2   SPO2 during exercise on Room Air 85 %  During exercise monitor SpO2  If SPO2 increases >=89% with ambulation do not add supplemental             oxygen  If <= 88% on room air add O2 via NC and titrate patient  Patient must be ambulated with O2 and titrated to > 88% with exertion  3   SPO2 on Oxygen at rest 94 % 3 lpm     4   SPO2 during exercise on Oxygen  87% a liter flow of 6 lpm     5   Exercise performed:          walking, duration 5 (min), distance 30 (feet)          [x]  Supplemental Home Oxygen is indicated  []  Client does not qualify for home oxygen  Respiratory Additional Notes- Pt unstable on feet requiring an assist of three  Unable to maintain SpO2 above 88% on 6L  Pt will most likely need a pendant cannula for home use and an oxygen concentrator that achieves at least 10L       Darryn Yeung RT

## 2021-08-06 NOTE — PROGRESS NOTES
INTERNAL MEDICINE RESIDENCY PROGRESS NOTE     Name: Gretel Stevens   Age & Sex: 72 y o  male   MRN: [id number]  Unit/Bed#: [facility] 725-01   Encounter: [id number]  Team: SLIM    PATIENT INFORMATION     Name: Gretel Stevens   Age & Sex: 72 y o  male   MRN: [id number]  Hospital Stay Days: 6    ASSESSMENT/PLAN     Principal Problem:    Acute CVA (cerebrovascular accident) [facility])  Active Problems:    Acute respiratory failure with hypoxia ([facility] [address] )    Acute deep vein thrombosis (DVT) of distal vein of both lower extremities ([facility] [address] )    Mass of upper lobe of right lung    Nasopharyngeal mass    Elevated troponin    Dysphagia    Urinary retention      * Acute CVA (cerebrovascular accident) [facility])  [address] [place] [address]  Presented as stroke alert  CT showed recent infarct in the left caudate head, anterior lentiform nucleus and anterior limb of the internal capsule  Was not a tPA candidate secondary to in fact appearing acute to subacute  MRI brain showing multifocal diffusion abnormalities in several separate vascular territories, largest in the left MCA territory indicative of multifocal acute/recent infarctions most likely from a central embolic source  Also on imaging found to have nasopharyngeal mass as well as right upper lobe lung lesion  Echo EF 55%, RV dysfunction     Etiology of stroke likely secondary to hypercoagulable state from possible underlying malignancy  Neurology signed off  PMR cleared for ARC, but pt wife prefers home discharge  Plan:  · No need for aspirin while patient is on Lovenox  · Continue atorvastatin 40 mg daily  · Goal normotension  · Frequent neuro checks  · PT/OT      Acute respiratory failure with hypoxia [facility])  Assessment & Plan  Due to presumed PE and right upper lobe lung mass  Plan:  · Continue to titrate oxygen as able for goal SpO2 greater than 90%    · Will need home O2 on discharge    Acute deep vein thrombosis (DVT) of distal vein of both lower extremities ([facility])  Assessment & Plan  Right lower extremity DVT in the posterior tibial and peroneal veins, acute lower extremity DVT in the peroneal veins and acute lower extremity superficial thrombophlebitis in the greater saphenous vein  In the setting of acute hypoxic respiratory failure, PEs presumed  Plan:  · Continue therapeutic Lovenox  Will likely need AC indefinitely  Mass of upper lobe of right lung  Assessment & Plan  6 4 x 3 6 x 3 4 cm solid, irregular contoured right upper lobe pulmonary nodule, likely primary lung cancer with extensive hilar and mediastinal lymphadenopathy  Plan:  · Pulmonology following, appreciated  · IR guided percutaneous biopsy of lung mass performed and awaiting results  · Thoracentesis deferred  Nasopharyngeal mass  Assessment & Plan   2 7 x 2 0 cm mass noted on CT and directly visualized via flexible laryngoscope by ENT  Plan:   · ENT following  · Biopsy as outpatient  Elevated troponin  Assessment & Plan  Most likely due to acute PE and RV dysfunction  Low concern for ACS  No chest pain  Plan:  · Echocardiogram in 4-6 weeks to assess RV function  Disposition:  Continue current level of care  Anticipate discharge home with home O2 and DME the next 24-48 hours per patient's family request     SUBJECTIVE     Patient seen and examined  No critical events overnight  Patient's wife present at bedside  Patient reported doing well and had no acute complaints  He was unable to void and again required straight catheterization  Did have a bowel movement yesterday  OBJECTIVE     Vitals:    08/05/21 1701 08/05/21 1743 08/05/21 2006 08/06/21 0047   BP: 141/81 139/77 155/82 119/56   Pulse: 66 69 72 67   Resp: 20 20     Temp:       TempSrc:       SpO2: 99% (!) 89% 90% 92%   Weight:       Height:          Temperature:   No data recorded      Temperature: 97 6 °F (36 4 °C)  Intake & Output:  I/O       08/04 0701 - 08/05 0700 08/05 0701 - 08/06 0700 08/06 0701 - 08/07 0700 P O  180      Total Intake(mL/kg) 180 (2 1)      Urine (mL/kg/hr) 600 (0 3) 1334 (0 6)     Stool  0     Total Output 600 1334     Net -420 -1334            Unmeasured Urine Occurrence 1 x      Unmeasured Stool Occurrence  2 x         Weights:   IBW (Ideal Body Weight): 68 4 kg    Body mass index is 29 03 kg/m²  Weight (last 2 days)     None        Physical Exam  Vitals and nursing note reviewed  Constitutional:       Appearance: He is well-developed  HENT:      Head: Normocephalic and atraumatic  Eyes:      Conjunctiva/sclera: Conjunctivae normal    Cardiovascular:      Rate and Rhythm: Normal rate and regular rhythm  Heart sounds: No murmur heard  Pulmonary:      Effort: Pulmonary effort is normal  No respiratory distress  Breath sounds: Normal breath sounds  Comments: On 5 L nasal cannula  Abdominal:      Palpations: Abdomen is soft  Tenderness: There is no abdominal tenderness  Musculoskeletal:      Cervical back: Neck supple  Skin:     General: Skin is warm and dry  Neurological:      Mental Status: He is alert  Comments: Alert and awake  Follows commands and answers appropriately  Right-sided weakness noted  LABORATORY DATA     Labs: I have personally reviewed pertinent reports    Results from last 7 days   Lab Units 08/06/21  1004 08/06/21  0735 08/04/21  0453   WBC Thousand/uL 10 34* 10 68* 8 28   HEMOGLOBIN g/dL 10 8* 10 9* 10 4*   HEMATOCRIT % 35 6* 36 0* 34 6*   PLATELETS Thousands/uL 412* 407* 378   NEUTROS PCT % 80* 78* 68   MONOS PCT % 7 7 9      Results from last 7 days   Lab Units 08/06/21  0735 08/04/21  0453 08/03/21  0550 07/31/21  1448   POTASSIUM mmol/L 4 2 3 8 4 0 4 2   CHLORIDE mmol/L 108 107 108 106   CO2 mmol/L 28 24 24 25   BUN mg/dL 20 21 20 19   CREATININE mg/dL 0 89 0 96 0 92 1 05   CALCIUM mg/dL 10 7* 9 8 9 9 9 2   ALK PHOS U/L  --   --   --  56   ALT U/L  --   --   --  45   AST U/L  --   --   --  34     Results from last 7 days   Lab Units 08/01/21  0507   MAGNESIUM mg/dL 2 6     Results from last 7 days   Lab Units 08/01/21  0507   PHOSPHORUS mg/dL 3 0      Results from last 7 days   Lab Units 08/06/21  1004 08/05/21  0037 08/04/21  1647 07/31/21  1448   INR   --   --  1 07 1 28*   PTT seconds 51* 40* 33 29         Results from last 7 days   Lab Units 08/01/21  1154 08/01/21  0802 07/31/21  2338   TROPONIN I ng/mL 10 30* 10 80* 8 23*       IMAGING & DIAGNOSTIC TESTING     Radiology Results: I have personally reviewed pertinent reports  XR chest portable    Result Date: 8/2/2021  Impression: Right upper lobe mass with suggestion of mediastinal and hilar lymphadenopathy  At the time of this dictation, CT of the chest had been performed which confirms findings which are suspicious for malignancy  Workstation performed: [id number]     MRI brain w wo contrast    Result Date: 8/3/2021  Impression: 1  Multifocal diffusion abnormalities in several separate vascular territories, largest in the left MCA territory indicative of multifocal acute/recent infarctions most likely from a central embolic source  Further clinical assessment advised  2   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  Both benign and malignant etiologies are in the differential diagnosis  ENT assessment recommended  Workstation performed: [id number]     IR biopsy lung    Result Date: 8/5/2021  Impression: Impression: CT-guided biopsy of a right lung mass Small right pleural effusion  Given the small size of the effusion and risk for pneumothorax after needle angulation required for the biopsy we deferred a thoracentesis at this time  This can be considered in the future if clinically appropriate Workstation performed: [id number]     Other Diagnostic Testing: I have personally reviewed pertinent reports      ACTIVE MEDICATIONS     Current Facility-Administered Medications   Medication Dose Route Frequency    acetaminophen (TYLENOL) rectal suppository 650 mg  650 mg Rectal Q6H PRN    atorvastatin (LIPITOR) tablet 40 mg  40 mg Oral Daily With Dinner    enoxaparin (LOVENOX) subcutaneous injection 90 mg  1 mg/kg Subcutaneous Q12H OPAL    lidocaine (XYLOCAINE) 4 % topical solution 5 mL  5 mL Topical Once    melatonin tablet 3 mg  3 mg Oral HS    polyethylene glycol (MIRALAX) packet 17 g  17 g Oral Daily    senna-docusate sodium (SENOKOT S) 8 6-50 mg per tablet 2 tablet  2 tablet Oral BID       VTE Pharmacologic Prophylaxis: Enoxaparin (Lovenox)  VTE Mechanical Prophylaxis: sequential compression device    Portions of the record may have been created with voice recognition software  Occasional wrong word or "sound a like" substitutions may have occurred due to the inherent limitations of voice recognition software    Read the chart carefully and recognize, using context, where substitutions have occurred   ==  Pepe Guillen MD  [facility]  Internal Medicine Residency PGY-3

## 2021-08-06 NOTE — RESTORATIVE TECHNICIAN NOTE
Restorative Technician Note      Patient Name: Dannial Fleischer     Note Type: Mobility  Patient Position Upon Consult: Supine  Activity Performed: Ambulated;Dangled;Stood  Assistive Device: Roller walker; Other (Comment) (Assist x2 with chair follow  NC O2 on 3L)  Education Provided: Yes (Educated/encouraged pt to ambulate with assistance )  Patient Position at End of Consult: Bedside chair; All needs within reach;Bed/Chair alarm activated      Elodia BLUNT, Restorative Technician, [employer]

## 2021-08-06 NOTE — ARC ADMISSION
Per [facility] physician and PM&R consult, patient is acute rehab appropriate pending medical stability  CM has been updated  Will continue to follow at this time

## 2021-08-06 NOTE — RESPIRATORY THERAPY NOTE
resp       08/06/21 1843   Respiratory Assessment   Resp Comments Attempted home O2 evaluation at this time  Pt not stable on feet requiring an assist of three for ambulation  Pt only able to walk 30feet due to SpO2 unable to be maintain at 6L SpO2 87% and pt became to unsteady on feet and needed to sit  Pt may be a candidate for a pendant cannula  Prior to study pt SpO2 at rest 87-90% on RA  Pt currently up in chair on 3L NC, SpO2 WNL      Oxygen Therapy/Pulse Ox   O2 Device Nasal cannula   Nasal Cannula O2 Flow Rate (L/min) 3 L/min   Calculated FIO2 (%) - Nasal Cannula 32   SpO2 94 %   SpO2 Activity At Rest   $ Pulse Oximetry Spot Check Charge Completed   $ Home O2 Eval Completed

## 2021-08-06 NOTE — PLAN OF CARE
Problem: PAIN - ADULT  Goal: Verbalizes/displays adequate comfort level or baseline comfort level  Description: Interventions:  - Encourage patient to monitor pain and request assistance  - Assess pain using appropriate pain scale  - Administer analgesics based on type and severity of pain and evaluate response  - Implement non-pharmacological measures as appropriate and evaluate response  - Consider cultural and social influences on pain and pain management  - Notify physician/advanced practitioner if interventions unsuccessful or patient reports new pain  Outcome: Progressing     Problem: INFECTION - ADULT  Goal: Absence or prevention of progression during hospitalization  Description: INTERVENTIONS:  - Assess and monitor for signs and symptoms of infection  - Monitor lab/diagnostic results  - Monitor all insertion sites, i e  indwelling lines, tubes, and drains  - [place] appropriate cooling/warming therapies per order  - Administer medications as ordered  - Instruct and encourage patient and family to use good hand hygiene technique  - Identify and instruct in appropriate isolation precautions for identified infection/condition  Outcome: Progressing  Goal: Absence of fever/infection during neutropenic period  Description: INTERVENTIONS:  - Monitor WBC    Outcome: Progressing     Problem: SAFETY ADULT  Goal: Patient will remain free of falls  Description: INTERVENTIONS:  - Educate patient/family on patient safety including physical limitations  - Instruct patient to call for assistance with activity   - Consult OT/PT to assist with strengthening/mobility   - Keep Call bell within reach  - Keep bed low and locked with side rails adjusted as appropriate  - Keep care items and personal belongings within reach  - Initiate and maintain comfort rounds  - Make Fall Risk Sign visible to staff  - Offer Toileting every 2 Hours, in advance of need  - Initiate/Maintain bed alarm  - Apply yellow socks and bracelet for high fall risk patients  - Consider moving patient to room near nurses station  Outcome: Progressing  Goal: Maintain or return to baseline ADL function  Description: INTERVENTIONS:  -  Assess patient's ability to carry out ADLs; assess patient's baseline for ADL function and identify physical deficits which impact ability to perform ADLs (bathing, care of mouth/teeth, toileting, grooming, dressing, etc )  - Assess/evaluate cause of self-care deficits   - Assess range of motion  - Assess patient's mobility; develop plan if impaired  - Assess patient's need for assistive devices and provide as appropriate  - Encourage maximum independence but intervene and supervise when necessary  - Involve family in performance of ADLs  - Assess for home care needs following discharge   - Consider OT consult to assist with ADL evaluation and planning for discharge  - Provide patient education as appropriate  Outcome: Progressing  Goal: Maintains/Returns to pre admission functional level  Description: INTERVENTIONS:  - Perform BMAT or MOVE assessment daily    - Set and communicate daily mobility goal to care team and patient/family/caregiver  - Collaborate with rehabilitation services on mobility goals if consulted  - Perform Range of Motion 3 times a day  - Reposition patient every 2 hours    - Dangle patient 3 times a day  - Stand patient 3 times a day  - Ambulate patient 3 times a day  - Out of bed to chair 3 times a day   - Out of bed for meals 3 times a day  - Out of bed for toileting  - Record patient progress and toleration of activity level   Outcome: Progressing     Problem: DISCHARGE PLANNING  Goal: Discharge to home or other facility with appropriate resources  Description: INTERVENTIONS:  - Identify barriers to discharge w/patient and caregiver  - Arrange for needed discharge resources and transportation as appropriate  - Identify discharge learning needs (meds, wound care, etc )  - Arrange for interpretive services to assist at discharge as needed  - Refer to Case Management Department for coordinating discharge planning if the patient needs post-hospital services based on physician/advanced practitioner order or complex needs related to functional status, cognitive ability, or social support system  Outcome: Progressing     Problem: Knowledge Deficit  Goal: Patient/family/caregiver demonstrates understanding of disease process, treatment plan, medications, and discharge instructions  Description: Complete learning assessment and assess knowledge base  Interventions:  - Provide teaching at level of understanding  - Provide teaching via preferred learning methods  Outcome: Progressing     Problem: Nutrition/Hydration-ADULT  Goal: Nutrient/Hydration intake appropriate for improving, restoring or maintaining nutritional needs  Description: Monitor and assess patient's nutrition/hydration status for malnutrition  Collaborate with interdisciplinary team and initiate plan and interventions as ordered  Monitor patient's weight and dietary intake as ordered or per policy  Utilize nutrition screening tool and intervene as necessary  Determine patient's food preferences and provide high-protein, high-caloric foods as appropriate       INTERVENTIONS:  - Monitor oral intake, urinary output, labs, and treatment plans  - Assess nutrition and hydration status and recommend course of action  - Evaluate amount of meals eaten  - Assist patient with eating if necessary   - Allow adequate time for meals  - Recommend/ encourage appropriate diets, oral nutritional supplements, and vitamin/mineral supplements  - Order, calculate, and assess calorie counts as needed  - Assess need for intravenous fluids  - Provide specific nutrition/hydration education as appropriate  - Include patient/family/caregiver in decisions related to nutrition  Outcome: Progressing     Problem: MOBILITY - ADULT  Goal: Maintain or return to baseline ADL function  Description: INTERVENTIONS:  -  Assess patient's ability to carry out ADLs; assess patient's baseline for ADL function and identify physical deficits which impact ability to perform ADLs (bathing, care of mouth/teeth, toileting, grooming, dressing, etc )  - Assess/evaluate cause of self-care deficits   - Assess range of motion  - Assess patient's mobility; develop plan if impaired  - Assess patient's need for assistive devices and provide as appropriate  - Encourage maximum independence but intervene and supervise when necessary  - Involve family in performance of ADLs  - Assess for home care needs following discharge   - Consider OT consult to assist with ADL evaluation and planning for discharge  - Provide patient education as appropriate  Outcome: Progressing  Goal: Maintains/Returns to pre admission functional level  Description: INTERVENTIONS:  - Perform BMAT or MOVE assessment daily    - Set and communicate daily mobility goal to care team and patient/family/caregiver  - Collaborate with rehabilitation services on mobility goals if consulted  - Perform Range of Motion 3 times a day  - Reposition patient every 2 hours    - Dangle patient 3 times a day  - Stand patient 3 times a day  - Ambulate patient 3 times a day  - Out of bed to chair 3 times a day   - Out of bed for meals 3 times a day  - Out of bed for toileting  - Record patient progress and toleration of activity level   Outcome: Progressing     Problem: Prexisting or High Potential for Compromised Skin Integrity  Goal: Skin integrity is maintained or improved  Description: INTERVENTIONS:  - Identify patients at risk for skin breakdown  - Assess and monitor skin integrity  - Assess and monitor nutrition and hydration status  - Monitor labs   - Assess for incontinence   - Turn and reposition patient  - Assist with mobility/ambulation  - Relieve pressure over bony prominences  - Avoid friction and shearing  - Provide appropriate hygiene as needed including keeping skin clean and dry  - Evaluate need for skin moisturizer/barrier cream  - Collaborate with interdisciplinary team   - Patient/family teaching  - Consider wound care consult   Outcome: Progressing     Problem: Potential for Falls  Goal: Patient will remain free of falls  Description: INTERVENTIONS:  - Educate patient/family on patient safety including physical limitations  - Instruct patient to call for assistance with activity   - Consult OT/PT to assist with strengthening/mobility   - Keep Call bell within reach  - Keep bed low and locked with side rails adjusted as appropriate  - Keep care items and personal belongings within reach  - Initiate and maintain comfort rounds  - Make Fall Risk Sign visible to staff  - Offer Toileting every 2 Hours, in advance of need  - Initiate/Maintain bed alarm  - Apply yellow socks and bracelet for high fall risk patients  - Consider moving patient to room near nurses station  Outcome: Progressing

## 2021-08-07 LAB
ANION GAP SERPL CALCULATED.3IONS-SCNC: 5 MMOL/L (ref 4–13)
BASOPHILS # BLD AUTO: 0.06 THOUSANDS/ΜL (ref 0–0.1)
BASOPHILS NFR BLD AUTO: 1 % (ref 0–1)
BUN SERPL-MCNC: 22 MG/DL (ref 5–25)
CALCIUM SERPL-MCNC: 10.2 MG/DL (ref 8.3–10.1)
CHLORIDE SERPL-SCNC: 106 MMOL/L (ref 100–108)
CO2 SERPL-SCNC: 28 MMOL/L (ref 21–32)
CREAT SERPL-MCNC: 0.96 MG/DL (ref 0.6–1.3)
EOSINOPHIL # BLD AUTO: 0.11 THOUSAND/ΜL (ref 0–0.61)
EOSINOPHIL NFR BLD AUTO: 1 % (ref 0–6)
ERYTHROCYTE [DISTWIDTH] IN BLOOD BY AUTOMATED COUNT: 15.4 % (ref 11.6–15.1)
GFR SERPL CREATININE-BSD FRML MDRD: 80 ML/MIN/1.73SQ M
GLUCOSE SERPL-MCNC: 118 MG/DL (ref 65–140)
GLUCOSE SERPL-MCNC: 132 MG/DL (ref 65–140)
HCT VFR BLD AUTO: 34.8 % (ref 36.5–49.3)
HGB BLD-MCNC: 10.4 G/DL (ref 12–17)
IMM GRANULOCYTES # BLD AUTO: 0.07 THOUSAND/UL (ref 0–0.2)
IMM GRANULOCYTES NFR BLD AUTO: 1 % (ref 0–2)
LYMPHOCYTES # BLD AUTO: 1.28 THOUSANDS/ΜL (ref 0.6–4.47)
LYMPHOCYTES NFR BLD AUTO: 11 % (ref 14–44)
MCH RBC QN AUTO: 24.9 PG (ref 26.8–34.3)
MCHC RBC AUTO-ENTMCNC: 29.9 G/DL (ref 31.4–37.4)
MCV RBC AUTO: 84 FL (ref 82–98)
MONOCYTES # BLD AUTO: 0.74 THOUSAND/ΜL (ref 0.17–1.22)
MONOCYTES NFR BLD AUTO: 7 % (ref 4–12)
NEUTROPHILS # BLD AUTO: 8.93 THOUSANDS/ΜL (ref 1.85–7.62)
NEUTS SEG NFR BLD AUTO: 79 % (ref 43–75)
NRBC BLD AUTO-RTO: 0 /100 WBCS
PLATELET # BLD AUTO: 399 THOUSANDS/UL (ref 149–390)
PMV BLD AUTO: 9.3 FL (ref 8.9–12.7)
POTASSIUM SERPL-SCNC: 4 MMOL/L (ref 3.5–5.3)
RBC # BLD AUTO: 4.17 MILLION/UL (ref 3.88–5.62)
SARS-COV-2 RNA RESP QL NAA+PROBE: NEGATIVE
SODIUM SERPL-SCNC: 139 MMOL/L (ref 136–145)
WBC # BLD AUTO: 11.19 THOUSAND/UL (ref 4.31–10.16)

## 2021-08-07 PROCEDURE — 99239 HOSP IP/OBS DSCHRG MGMT >30: CPT | Performed by: INTERNAL MEDICINE

## 2021-08-07 PROCEDURE — U0005 INFEC AGEN DETEC AMPLI PROBE: HCPCS | Performed by: INTERNAL MEDICINE

## 2021-08-07 PROCEDURE — 82948 REAGENT STRIP/BLOOD GLUCOSE: CPT

## 2021-08-07 PROCEDURE — 80048 BASIC METABOLIC PNL TOTAL CA: CPT | Performed by: PHYSICIAN ASSISTANT

## 2021-08-07 PROCEDURE — U0003 INFECTIOUS AGENT DETECTION BY NUCLEIC ACID (DNA OR RNA); SEVERE ACUTE RESPIRATORY SYNDROME CORONAVIRUS 2 (SARS-COV-2) (CORONAVIRUS DISEASE [COVID-19]), AMPLIFIED PROBE TECHNIQUE, MAKING USE OF HIGH THROUGHPUT TECHNOLOGIES AS DESCRIBED BY CMS-2020-01-R: HCPCS | Performed by: INTERNAL MEDICINE

## 2021-08-07 PROCEDURE — 85025 COMPLETE CBC W/AUTO DIFF WBC: CPT | Performed by: PHYSICIAN ASSISTANT

## 2021-08-07 PROCEDURE — 99231 SBSQ HOSP IP/OBS SF/LOW 25: CPT | Performed by: INTERNAL MEDICINE

## 2021-08-07 RX ORDER — BISACODYL 10 MG
10 SUPPOSITORY, RECTAL RECTAL DAILY PRN
Status: DISCONTINUED | OUTPATIENT
Start: 2021-08-07 | End: 2021-08-08 | Stop reason: HOSPADM

## 2021-08-07 RX ADMIN — ATORVASTATIN CALCIUM 40 MG: 40 TABLET, FILM COATED ORAL at 17:18

## 2021-08-07 RX ADMIN — ACETAMINOPHEN 650 MG: 325 TABLET, FILM COATED ORAL at 17:18

## 2021-08-07 RX ADMIN — ENOXAPARIN SODIUM 90 MG: 100 INJECTION SUBCUTANEOUS at 21:30

## 2021-08-07 RX ADMIN — MELATONIN 3 MG: at 21:30

## 2021-08-07 RX ADMIN — ACETAMINOPHEN 650 MG: 325 TABLET, FILM COATED ORAL at 00:58

## 2021-08-07 RX ADMIN — ENOXAPARIN SODIUM 90 MG: 100 INJECTION SUBCUTANEOUS at 08:14

## 2021-08-07 RX ADMIN — DOCUSATE SODIUM AND SENNOSIDES 2 TABLET: 8.6; 5 TABLET ORAL at 17:18

## 2021-08-07 RX ADMIN — ACETAMINOPHEN 650 MG: 325 TABLET, FILM COATED ORAL at 12:20

## 2021-08-07 RX ADMIN — DOCUSATE SODIUM AND SENNOSIDES 2 TABLET: 8.6; 5 TABLET ORAL at 08:13

## 2021-08-07 RX ADMIN — POLYETHYLENE GLYCOL 3350 17 G: 17 POWDER, FOR SOLUTION ORAL at 08:13

## 2021-08-07 NOTE — PROGRESS NOTES
INTERNAL MEDICINE RESIDENCY PROGRESS NOTE     Name: Antonella Hernandez   Age & Sex: 72 y o  male   MRN: [id number]  Unit/Bed#: [facility] 725-01   Encounter: [id number]  Team: [id number]    PATIENT INFORMATION     Name: Antonella Hernandez   Age & Sex: 72 y o  male   MRN: [id number]  Hospital Stay Days: 7    ASSESSMENT/PLAN     Principal Problem:    Acute CVA (cerebrovascular accident) [facility])  Active Problems:    Acute respiratory failure with hypoxia ([address] )    Acute deep vein thrombosis (DVT) of distal vein of both lower extremities ([facility] [address] )    Mass of upper lobe of right lung    Nasopharyngeal mass    Elevated troponin    Dysphagia    Urinary retention      * Acute CVA (cerebrovascular accident) [facility])  [address] [place] [address]  Presented as stroke alert  CT showed recent infarct in the left caudate head, anterior lentiform nucleus and anterior limb of the internal capsule  Was not a tPA candidate secondary to in fact appearing acute to subacute  MRI brain showing multifocal diffusion abnormalities in several separate vascular territories, largest in the left MCA territory indicative of multifocal acute/recent infarctions most likely from a central embolic source  Also on imaging found to have nasopharyngeal mass as well as right upper lobe lung lesion  Echo EF 55%, RV dysfunction     Etiology of stroke likely secondary to hypercoagulable state from possible underlying malignancy  Neurology signed off  PMR cleared for AR[facility], but pt family prefers home discharge  Plan:  · No need for aspirin while patient is on Lovenox  · Continue atorvastatin 40 mg daily  · Goal normotension  · Frequent neuro checks  · PT/OT      Acute respiratory failure with hypoxia [facility])  Assessment & Plan  Due to presumed PE and right upper lobe lung mass  Plan:  · Continue to titrate oxygen as able for goal SpO2 greater than 88%    · Will need home O2 on discharge    Acute deep vein thrombosis (DVT) of distal vein of both lower extremities (HCC)  Assessment & Plan  Right lower extremity DVT in the posterior tibial and peroneal veins, acute lower extremity DVT in the peroneal veins and acute lower extremity superficial thrombophlebitis in the greater saphenous vein  In the setting of acute hypoxic respiratory failure, PEs presumed  Plan:  · Continue therapeutic Lovenox  Will likely need AC indefinitely  Mass of upper lobe of right lung  Assessment & Plan  6 4 x 3 6 x 3 4 cm solid, irregular contoured right upper lobe pulmonary nodule, likely primary lung cancer with extensive hilar and mediastinal lymphadenopathy  IR guided percutaneous biopsy of lung mass performed, preliminary result consistent with adenocarcinoma  Plan:  · Pulmonology following, appreciated  · Will consult oncology  Nasopharyngeal mass  Assessment & Plan   2 7 x 2 0 cm mass noted on CT and directly visualized via flexible laryngoscope by ENT  Plan:   · ENT recommended biopsy as outpatient  Elevated troponin  Assessment & Plan  Most likely due to acute PE and RV dysfunction  Low concern for ACS  No chest pain  Plan:  · Echocardiogram in 4-6 weeks to assess RV function  Disposition: anticipate discharge home on oxygen, DMEs and home health per family request     SUBJECTIVE     Patient seen and examined  No acute events overnight  Wife and son at bedside  Was unable to void, now cadet in place  Had a BM yesterday  No acute complains  OBJECTIVE     Vitals:    21 1530 21 1843 21 2052 21 0829   BP: (!) 139/115  116/54 130/76   Pulse: 73  68 68   Resp: 18  18 18   Temp: 97 8 °F (36 6 °C)  97 7 °F (36 5 °C) (!) 97 3 °F (36 3 °C)   TempSrc:       SpO2: 93% 94% 94% 90%   Weight:       Height:          Temperature:   Temp (24hrs), Av 6 °F (36 4 °C), Min:97 3 °F (36 3 °C), Max:97 8 °F (36 6 °C)    Temperature: (!) 97 3 °F (36 3 °C)  Intake & Output:  I/O       701 -  07 -  -  07    P  O  Total Intake(mL/kg)       Urine (mL/kg/hr) 1334 (0 6) 550 (0 3)     Stool 0      Total Output 1334 550     Net -1334 -550            Unmeasured Stool Occurrence 2 x          Weights:   IBW (Ideal Body Weight): 68 4 kg    Body mass index is 29 03 kg/m²  Weight (last 2 days)     None        Physical Exam  Vitals and nursing note reviewed  Constitutional:       Appearance: He is well-developed  HENT:      Head: Normocephalic and atraumatic  Eyes:      Conjunctiva/sclera: Conjunctivae normal    Cardiovascular:      Rate and Rhythm: Normal rate and regular rhythm  Heart sounds: No murmur heard  Pulmonary:      Effort: Pulmonary effort is normal  No respiratory distress  Breath sounds: Normal breath sounds  Comments: 3 L NC   Abdominal:      Palpations: Abdomen is soft  Tenderness: There is no abdominal tenderness  Musculoskeletal:      Cervical back: Neck supple  Right lower leg: No edema  Left lower leg: No edema  Skin:     General: Skin is warm and dry  Neurological:      Mental Status: He is alert  Motor: Weakness present  LABORATORY DATA     Labs: I have personally reviewed pertinent reports    Results from last 7 days   Lab Units 08/06/21  1004 08/06/21  0735 08/04/21  0453   WBC Thousand/uL 10 34* 10 68* 8 28   HEMOGLOBIN g/dL 10 8* 10 9* 10 4*   HEMATOCRIT % 35 6* 36 0* 34 6*   PLATELETS Thousands/uL 412* 407* 378   NEUTROS PCT % 80* 78* 68   MONOS PCT % 7 7 9      Results from last 7 days   Lab Units 08/06/21  0735 08/04/21  0453 08/03/21  0550 07/31/21  1448   POTASSIUM mmol/L 4 2 3 8 4 0 4 2   CHLORIDE mmol/L 108 107 108 106   CO2 mmol/L 28 24 24 25   BUN mg/dL 20 21 20 19   CREATININE mg/dL 0 89 0 96 0 92 1 05   CALCIUM mg/dL 10 7* 9 8 9 9 9 2   ALK PHOS U/L  --   --   --  56   ALT U/L  --   --   --  45   AST U/L  --   --   --  34     Results from last 7 days   Lab Units 08/01/21  0507   MAGNESIUM mg/dL 2 6     Results from last 7 days   Lab Units 08/01/21  0507   PHOSPHORUS mg/dL 3 0      Results from last 7 days   Lab Units 08/06/21  1737 08/06/21  1004 08/05/21  0037 08/04/21  1647 07/31/21  1448   INR   --   --   --  1 07 1 28*   PTT seconds 35 51* 40* 33 29         Results from last 7 days   Lab Units 08/01/21  1154 08/01/21  0802 07/31/21  2338   TROPONIN I ng/mL 10 30* 10 80* 8 23*       IMAGING & DIAGNOSTIC TESTING     Radiology Results: I have personally reviewed pertinent reports  XR chest portable    Result Date: 8/2/2021  Impression: Right upper lobe mass with suggestion of mediastinal and hilar lymphadenopathy  At the time of this dictation, CT of the chest had been performed which confirms findings which are suspicious for malignancy  Workstation performed: [id number]     MRI brain w wo contrast    Result Date: 8/3/2021  Impression: 1  Multifocal diffusion abnormalities in several separate vascular territories, largest in the left MCA territory indicative of multifocal acute/recent infarctions most likely from a central embolic source  Further clinical assessment advised  2   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  Both benign and malignant etiologies are in the differential diagnosis  ENT assessment recommended  Workstation performed: [id number]     IR biopsy lung    Result Date: 8/5/2021  Impression: Impression: CT-guided biopsy of a right lung mass Small right pleural effusion  Given the small size of the effusion and risk for pneumothorax after needle angulation required for the biopsy we deferred a thoracentesis at this time  This can be considered in the future if clinically appropriate Workstation performed: [id number]     Other Diagnostic Testing: I have personally reviewed pertinent reports      ACTIVE MEDICATIONS     Current Facility-Administered Medications   Medication Dose Route Frequency    acetaminophen (TYLENOL) tablet 650 mg  650 mg Oral Q6H [address]    atorvastatin (LIPITOR) tablet 40 mg  40 mg Oral Daily With Dinner    enoxaparin (LOVENOX) subcutaneous injection 90 mg  1 mg/kg Subcutaneous Q12H [address]    lidocaine (XYLOCAINE) 4 % topical solution 5 mL  5 mL Topical Once    melatonin tablet 3 mg  3 mg Oral HS    polyethylene glycol (MIRALAX) packet 17 g  17 g Oral Daily    senna-docusate sodium (SENOKOT S) 8 6-50 mg per tablet 2 tablet  2 tablet Oral BID       VTE Pharmacologic Prophylaxis: Enoxaparin (Lovenox)  VTE Mechanical Prophylaxis: sequential compression device    Portions of the record may have been created with voice recognition software  Occasional wrong word or "sound a like" substitutions may have occurred due to the inherent limitations of voice recognition software    Read the chart carefully and recognize, using context, where substitutions have occurred   ==  Riccardo Gomez MD  [address] [facility]  Internal Medicine Residency PGY-3

## 2021-08-07 NOTE — CONSULTS
Medical Oncology/Hematology Consult Note  Camila Grant, male, 72 y o , 1950,  [facility], [id number]     Assessment and Plan  1  Adenocarcinoma of the lung:  I had a lengthy discussion with the patient and his family regarding the new diagnosis of adenocarcinoma of the lung  The initial clinical staging seems to be compatible with at least stage III A  However, the CT scan was done without IV contrast   The patient was told that a PET-CT scan as an outpatient may give us a better idea about the exact staging  If he continues to have stage IIIA then concurrent chemoradiation followed by maintenance immunotherapy should be entertained if his performance status allows  The patient lives close to [facility] where he can be treated in the Oncology Clinic/infusion center  2  Hypercoagulable state:  He seems to have a DVT of the lower extremities and recent stroke  The patient should be continued on anticoagulation unless there is absolute contraindication  Direct oral anticoagulants would be appropriate if low-molecular rate heparin is not feasible  3  Right Nasopharyngeal mass: The exact etiology is not entirely clear  He will need to follow-up with the ENT team as an outpatient for further evaluation and possible biopsy to rule out any hint of a 2nd malignancy versus benign etiology  Reason for consultation:  Adenocarcinoma of the lung  History of present illness: This is a 42-year-old gentleman with the history of tobacco abuse, the patient was admitted to the hospital as a stroke alert  During the hospital stay he had multiple imaging studies including CTA of the brain which showed mass in the right nasopharyngeal region measuring 2 7 cm in greatest dimension  He also had a Doppler ultrasound of the lower extremities which showed acute deep vein thrombosis in both lower extremities    CT scan of the chest abdomen pelvis without contrast on 07/31 showed 6 4 cm solid irregular right upper lobe pulmonary mass with extensive right hilar and mediastinal adenopathy  He also seems to have small right pleural effusion and moderate emphysema  MRI of the brain on 08/03/2021 showed multifocal diffusion abnormalities in several separate vascular territories  No metastatic disease to the brain was mentioned  He does seem to have 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  CT-guided biopsy of the right lung mass was done on 08/05  The pathology showed adenocarcinoma of lung primary  The patient is currently on Lovenox injection for anticoagulation  His most recent blood work from yesterday showed hemoglobin of 10 8 with a platelet count of 701 and white cell count of 10 3  Creatinine 0 8 with calcium of 10 7  He seems to have weakness of the right upper lower extremities which is getting slightly better since the stroke  Review of Systems:   Review of Systems   Constitutional: Positive for activity change, appetite change and fatigue  Negative for chills and fever  HENT: Negative for ear pain and sore throat  Eyes: Negative for pain and visual disturbance  Respiratory: Positive for cough  Negative for shortness of breath  Cardiovascular: Negative for chest pain and palpitations  Gastrointestinal: Negative for abdominal pain and vomiting  Genitourinary: Negative for dysuria and hematuria  Musculoskeletal: Negative for arthralgias and back pain  Skin: Negative for color change and rash  Neurological: Positive for weakness (Right upper and lower extremities)  Negative for seizures and syncope  All other systems reviewed and are negative  History reviewed  No pertinent past medical history  Past Surgical History:   Procedure Laterality Date    IR BIOPSY LUNG  8/5/2021       History reviewed  No pertinent family history      Social History     Socioeconomic History    Marital status: /Civil Union     Spouse name: None    Number of children: None    Years of education: None    Highest education level: None   Occupational History    None   Tobacco Use    Smoking status: Former Smoker    Smokeless tobacco: Never Used   Vaping Use    Vaping Use: Never used   Substance and Sexual Activity    Alcohol use: Yes    Drug use: Never    Sexual activity: None   Other Topics Concern    None   Social History Narrative    None     Social Determinants of Health     Financial Resource Strain:     Difficulty of Paying Living Expenses:    Food Insecurity:     Worried About Running Out of Food in the Last Year:     [address] in the Last Year:    Transportation Needs:     Lack of Transportation (Medical):      Lack of Transportation (Non-Medical):    Physical Activity:     Days of Exercise per Week:     Minutes of Exercise per Session:    Stress:     Feeling of Stress :    Social Connections:     Frequency of Communication with Friends and Family:     Frequency of Social Gatherings with Friends and Family:     Attends [employer] Services:     Active Member of Clubs or Organizations:     Attends Club or Organization Meetings:     Marital Status:    Intimate Partner Violence:     Fear of Current or Ex-Partner:     Emotionally Abused:     Physically Abused:     Sexually Abused:          Current Facility-Administered Medications:     acetaminophen (TYLENOL) tablet 650 mg, 650 mg, Oral, Q6H [facility], Danica Rey MD, 650 mg at 08/07/21 0058    atorvastatin (LIPITOR) tablet 40 mg, 40 mg, Oral, Daily With Dinner, Reyna PERRIN PA-C, 40 mg at 08/06/21 1724    enoxaparin (LOVENOX) subcutaneous injection 90 mg, 1 mg/kg, Subcutaneous, Q12H [facility], Meme Brunson MD, 90 mg at 08/07/21 0814    lidocaine (XYLOCAINE) 4 % topical solution 5 mL, 5 mL, Topical, Once, Wanda Martell MD    melatonin tablet 3 mg, 3 mg, Oral, HS, Michi Alexis PA-C, 3 mg at 08/06/21 2204    polyethylene glycol (MIRALAX) packet 17 g, 17 g, Oral, Daily, Princess Rosa MD, 17 g at 08/07/21 0813    senna-docusate sodium (SENOKOT S) 8 6-50 mg per tablet 2 tablet, 2 tablet, Oral, BID, Joaquín Gunter MD, 2 tablet at 08/07/21 0813    Medications Prior to Admission   Medication    multivitamin (THERAGRAN) TABS       No Known Allergies      Physical Exam:    /76   Pulse 68   Temp (!) 97 3 °F (36 3 °C)   Resp 18   Ht 5' 8" (1 727 m)   Wt 86 6 kg (190 lb 14 7 oz)   SpO2 90%   BMI 29 03 kg/m²     Physical Exam    Recent Results (from the past 48 hour(s))   Tissue Exam    Collection Time: 08/05/21  4:35 PM   Result Value Ref Range    Case Report       Surgical Pathology Report                         Case: [id number]                                   Authorizing Provider:  Wilber Wilburn MD       Collected:           08/05/2021 1635              Ordering Location:     [address] [place] [address]      Received:            08/05/2021 [address] [place] [address]                                                              Pathologist:           Matt Alvarez MD                                                         Intraop:               Matt Alvarez MD                                                         Specimen:    Lung, apex                                                                                 Preliminary Diagnosis       A  Right lung apex, image-guided core needle biopsy:  - Adenocarcinoma - see Note  Note       Note to preliminary report:  routine H&E sections demonstrate malignant glandular structures infiltrating a desmoplastic stroma, indicating the presence of adenocarcinoma  Intradepartmental consultation concurs with the diagnosis of adenocarcinoma  Immunohistochemical stains are undertaken in an attempt to determine tumor histotype and will be described in the final report  Clinical Data:  - CT chest, abd pelvis (7/31/2021): CT CHEST, ABDOMEN AND PELVIS WITHOUT IV CONTRAST  INDICATION:   Metastases suspected, unknown primary lung mass and SOB  COMPARISON:  No prior chest imaging  Comparison is made with the relevant images of the head and neck CTA from earlier the same day  TECHNIQUE: CT examination of the chest, abdomen and pelvis was performed without intravenous contrast   Axial, sagittal, and coronal 2D reformatted images were created from the source data and submitted for interpretation  Radiation dose length product (DLP) for this visit:  848  76 mGy-cm   This examination, like all CT scans performed in the [facility], was performed utilizing techniques to minimize radiation dose exposure, including the use of iterative  reconstruction and automated exposure control  Enteric contrast was not administered  FINDINGS:   CHEST   LUNGS:  There is moderate emphysema  There is a 6 4 x 3 6 x 3 4 cm solid, irregular contoured right upper lobe pulmonary nodule (series 2 image 17 )  There is no tracheal or endobronchial lesion  PLEURA:  Small water density right-sided pleural effusion  HEART/GREAT VESSELS:  Trace pericardial effusion  Mild cardiomegaly  MEDIASTINUM AND JONH:  There is diffuse right hilar and mediastinal adenopathy  Largest is a right paratracheal node measuring 2 cm in short axis (series 2 image 21 )   CHEST WALL AND LOWER NECK:   Unremarkable  ABDOMEN   LIVER/BILIARY TREE:  Numerous too small to characterize hypodensities scattered throughout the liver  GALLBLADDER:  No calcified gallstones  No pericholecystic inflammatory change  SPLEEN:  Unremarkable  PANCREAS:  Unremarkable  ADRENAL GLANDS:  Unremarkable  KIDNEYS/URETERS:  There is a 5 6 x 5 6 cm simple cyst in the left kidney lower pole  No hydronephrosis  There is contrast throughout the renal collecting systems, due to the prior IV contrast injection for head and neck CTA  STOMACH AND BOWEL:  Unremarkable  APPENDIX:  No findings to suggest appendicitis  ABDOMINOPELVIC CAVITY:  No ascites  No pneumoperitoneum  No lymphadenopathy  VESSELS:  Unremarkable for patient's age  PELVIS   REPRODUCTIVE ORGANS:  Unremarkable for patient's age  URINARY BLADDER:  Bladder is also filled with previous injected iodinated IV contrast    ABDOMINAL WALL/INGUINAL REGIONS:  Unremarkable  OSSEOUS STRUCTURES:  No acute fracture or destructive osseous lesion  IMPRESSION:  There is moderate emphysema  There is a 6 4 x 3 6 x 3 4 cm solid, irregular contoured right upper lobe pulmonary nodule, likely primary lung cancer (series 2 image 17 )   There is extensive right hilar and mediastinal adenopathy  Small right pleural effusion  There are also numerous too small to characterize hepatic hypodense lesions  Consider abdomen MR with and without IV contrast to characterize      Additional Information       All reported additional testing was performed with appropriately reactive controls  These tests were developed and their performance characteristics determined by [facility] or appropriate performing facility, though some tests may be performed on tissues which have not been validated for performance characteristics (such as staining performed on alcohol exposed cell blocks and decalcified tissues)  Results should be interpreted with caution and in the context of the patients clinical condition  These tests may not be cleared or approved by the [place]  Food and Drug Administration, though the FDA has determined that such clearance or approval is not necessary  These tests are used for clinical purposes and they should not be regarded as investigational or for research  This laboratory has been approved by CLIA 88, designated as a high-complexity laboratory and is qualified to perform these tests         Intraoperative Consultation          A  TPDx A: Atypical epithelioid cells present, request additional cores into formalin    TPDx A given by Dr Rody Paulino to Dr Stef Prather @ 4:54 PM, 8/5/2021  Charli Paredes  [facility], MD *electronic signature*  Interpretation performed at [facility], [address] [facility]        Gross Description          A  The specimen is received in formalin, labeled with the patient's name and hospital number, and is designated " Lung apex"  The specimen consists of 5 tan soft tissue cores measuring less than 0 1 cm in diameter and ranging from 0 5- 0 8 cm in length  The specimen is entirely submitted between sponges, 5 cassettes  Note: The estimated total formalin fixation time based upon information provided by the submitting clinician and the standard processing schedule is 10 hours      [facility]    CBC and differential    Collection Time: 08/06/21  7:35 AM   Result Value Ref Range    WBC 10 68 (H) 4 31 - 10 16 Thousand/uL    RBC 4 27 3 88 - 5 62 Million/uL    Hemoglobin 10 9 (L) 12 0 - 17 0 g/dL    Hematocrit 36 0 (L) 36 5 - 49 3 %    MCV 84 82 - 98 fL    MCH 25 5 (L) 26 8 - 34 3 pg    MCHC 30 3 (L) 31 4 - 37 4 g/dL    RDW 15 3 (H) 11 6 - 15 1 %    MPV 9 3 8 9 - 12 7 fL    Platelets 696 (H) 455 - 390 Thousands/uL    nRBC 0 /100 WBCs    Neutrophils Relative 78 (H) 43 - 75 %    Immat GRANS % 1 0 - 2 %    Lymphocytes Relative 13 (L) 14 - 44 %    Monocytes Relative 7 4 - 12 %    Eosinophils Relative 1 0 - 6 %    Basophils Relative 0 0 - 1 %    Neutrophils Absolute 8 40 (H) 1 85 - 7 62 Thousands/µL    Immature Grans Absolute 0 06 0 00 - 0 20 Thousand/uL    Lymphocytes Absolute 1 35 0 60 - 4 47 Thousands/µL    Monocytes Absolute 0 77 0 17 - 1 22 Thousand/µL    Eosinophils Absolute 0 06 0 00 - 0 61 Thousand/µL    Basophils Absolute 0 04 0 00 - 0 10 Thousands/µL   Basic metabolic panel    Collection Time: 08/06/21  7:35 AM   Result Value Ref Range    Sodium 137 136 - 145 mmol/L    Potassium 4 2 3 5 - 5 3 mmol/L    Chloride 108 100 - 108 mmol/L    CO2 28 21 - 32 mmol/L    ANION GAP 1 (L) 4 - 13 mmol/L BUN 20 5 - 25 mg/dL    Creatinine 0 89 0 60 - 1 30 mg/dL    Glucose 91 65 - 140 mg/dL    Calcium 10 7 (H) 8 3 - 10 1 mg/dL    eGFR 87 ml/min/1 73sq m   CBC and differential    Collection Time: 08/06/21 10:04 AM   Result Value Ref Range    WBC 10 34 (H) 4 31 - 10 16 Thousand/uL    RBC 4 28 3 88 - 5 62 Million/uL    Hemoglobin 10 8 (L) 12 0 - 17 0 g/dL    Hematocrit 35 6 (L) 36 5 - 49 3 %    MCV 83 82 - 98 fL    MCH 25 2 (L) 26 8 - 34 3 pg    MCHC 30 3 (L) 31 4 - 37 4 g/dL    RDW 15 3 (H) 11 6 - 15 1 %    MPV 9 5 8 9 - 12 7 fL    Platelets 594 (H) 432 - 390 Thousands/uL    nRBC 0 /100 WBCs    Neutrophils Relative 80 (H) 43 - 75 %    Immat GRANS % 1 0 - 2 %    Lymphocytes Relative 11 (L) 14 - 44 %    Monocytes Relative 7 4 - 12 %    Eosinophils Relative 1 0 - 6 %    Basophils Relative 0 0 - 1 %    Neutrophils Absolute 8 34 (H) 1 85 - 7 62 Thousands/µL    Immature Grans Absolute 0 05 0 00 - 0 20 Thousand/uL    Lymphocytes Absolute 1 18 0 60 - 4 47 Thousands/µL    Monocytes Absolute 0 68 0 17 - 1 22 Thousand/µL    Eosinophils Absolute 0 05 0 00 - 0 61 Thousand/µL    Basophils Absolute 0 04 0 00 - 0 10 Thousands/µL   APTT    Collection Time: 08/06/21 10:04 AM   Result Value Ref Range    PTT 51 (H) 23 - 37 seconds   Wheeled Walker    Collection Time: 08/06/21  3:09 PM   Result Value Ref Range    Supplier Name [address]     Supplier Phone Number [phone]     Order Status Pending Delivery Ticket     Delivery Note      Delivery Request Date 08/06/2021     Item Description Mervat Duarte, Adult    Semi Electric Bed Package    Collection Time: 08/06/21  4:07 PM   Result Value Ref Range    Supplier Name [address]     Supplier Phone Number [phone]     Order Status Pending Patient Contact     Delivery Note      Delivery Request Date 08/06/2021     Item Description Semi-Electric Hospital Bed, Gel Overlay     Item Description Standard Mattress     Item Description Gel Overlay     Item Description Half Bed Rails    APTT    Collection Time: 21  5:37 PM   Result Value Ref Range    PTT 35 23 - 37 seconds   Fingerstick Glucose (POCT)    Collection Time: 21 10:57 AM   Result Value Ref Range    POC Glucose 118 65 - 140 mg/dl       Echo complete with contrast if indicated    Result Date: 2021  Narrative: [place] [address] [facility], [address] [facility] [phone] Transthoracic Echocardiogram 2D, M-mode, Doppler, and Color Doppler Study date:  01-Aug-2021 Patient: Chad De La Paz MR number: [id number] Account number: [de-identified] : 1950 Age: 72 years Gender: Male Status: Inpatient Location: Bedside Height: 68 in Weight: 189 6 lb BP: 129/ 83 mmHg Indications: CVA  Diagnoses: I67 9 - Cerebrovascular disease, unspecified Sonographer:  Bishop Connie RDCS Referring Physician:  Ishan Yanez DO Group:  [facility] Interpreting Physician:  Dahiana Bashir DO SUMMARY LEFT VENTRICLE: Systolic function was normal  Ejection fraction was estimated to be 55 %  Although no diagnostic regional wall motion abnormality was identified, this possibility cannot be completely excluded on the basis of this study  Wall thickness was mildly increased  Doppler parameters were consistent with abnormal left ventricular relaxation (grade 1 diastolic dysfunction)  RIGHT VENTRICLE: The ventricle was mildly to moderately dilated  Systolic function was mildly reduced  There is hypokinesis of the mid free wall  RIGHT ATRIUM: The atrium was mildly dilated  TRICUSPID VALVE: There was mild regurgitation  Estimated peak PA pressure was 44 mmHg  PERICARDIUM: A small pericardial effusion was identified  There was no evidence of hemodynamic compromise  HISTORY: PRIOR HISTORY: CVA  Elevated troponin  Acute respiratory failure  DVT  Former smoker  PROCEDURE: The procedure was performed at the bedside  This was a routine study   The transthoracic approach was used  The study included complete 2D imaging, M-mode, complete spectral Doppler, and color Doppler  The heart rate was 70 bpm, at the start of the study  Images were obtained from the parasternal, apical, subcostal, and suprasternal notch acoustic windows  Echocardiographic views were limited due to restricted patient mobility and lung interference  This was a technically difficult study  LEFT VENTRICLE: Size was normal  Systolic function was normal  Ejection fraction was estimated to be 55 %  Although no diagnostic regional wall motion abnormality was identified, this possibility cannot be completely excluded on the basis of this study  Wall thickness was mildly increased  DOPPLER: Doppler parameters were consistent with abnormal left ventricular relaxation (grade 1 diastolic dysfunction)  RIGHT VENTRICLE: The ventricle was mildly to moderately dilated  Systolic function was mildly reduced  There is hypokinesis of the mid free wall  LEFT ATRIUM: Size was normal  RIGHT ATRIUM: The atrium was mildly dilated  MITRAL VALVE: Valve structure was normal  There was normal leaflet separation  DOPPLER: The transmitral velocity was within the normal range  There was no evidence for stenosis  There was no regurgitation  AORTIC VALVE: The valve was trileaflet  Leaflets exhibited normal thickness and normal cuspal separation  DOPPLER: Transaortic velocity was within the normal range  There was no evidence for stenosis  There was no regurgitation  TRICUSPID VALVE: The valve structure was normal  There was normal leaflet separation  DOPPLER: The transtricuspid velocity was within the normal range  There was no evidence for stenosis  There was mild regurgitation  Estimated peak PA pressure was 44 mmHg  PULMONIC VALVE: Leaflets exhibited normal thickness, no calcification, and normal cuspal separation  DOPPLER: The transpulmonic velocity was within the normal range  There was no regurgitation   PERICARDIUM: A small pericardial effusion was identified  There was no evidence of hemodynamic compromise  The pericardium was normal in appearance  AORTA: The root exhibited normal size  SYSTEMIC VEINS: IVC: The inferior vena cava was not well visualized  SYSTEM MEASUREMENT TABLES 2D %FS: 24 % Ao Diam: 3 63 cm EDV(Teich): 92 69 ml EF(Teich): 47 96 % ESV(Teich): 48 24 ml IVSd: 1 08 cm LA Area: 10 98 cm2 LA Diam: 3 49 cm LVEDV MOD A4C: 91 9 ml LVEF MOD A4C: 58 38 % LVESV MOD A4C: 38 25 ml LVIDd: 4 51 cm LVIDs: 3 42 cm LVLd A4C: 8 32 cm LVLs A4C: 7 04 cm LVPWd: 1 03 cm RA Area: 19 63 cm2 RVIDd: 4 75 cm SV MOD A4C: 53 66 ml SV(Teich): 44 45 ml CW TR Vmax: 3 04 m/s TR maxP 91 mmHg MM TAPSE: 1 3 cm PW E' Sept: 0 08 m/s E/E' Sept: 7 34 MV A Tristan: 0 75 m/s MV Dec Owsley: 1 98 m/s2 MV DecT: 280 99 ms MV E Tristan: 0 56 m/s MV E/A Ratio: 0 74 MV PHT: 81 49 ms MVA By PHT: 2 7 cm2 [address] Accredited Echocardiography Laboratory Prepared and electronically signed by Melvi Gilbert DO Signed 01-Aug-2021 14:29:36     CT chest abdomen pelvis wo contrast    Result Date: 2021  Narrative: CT CHEST, ABDOMEN AND PELVIS WITHOUT IV CONTRAST INDICATION:   Metastases suspected, unknown primary lung mass and SOB  COMPARISON:  No prior chest imaging  Comparison is made with the relevant images of the head and neck CTA from earlier the same day  TECHNIQUE: CT examination of the chest, abdomen and pelvis was performed without intravenous contrast   Axial, sagittal, and coronal 2D reformatted images were created from the source data and submitted for interpretation  Radiation dose length product (DLP) for this visit:  848  76 mGy-cm   This examination, like all CT scans performed in the [place], was performed utilizing techniques to minimize radiation dose exposure, including the use of iterative  reconstruction and automated exposure control  Enteric contrast was not administered   FINDINGS: CHEST LUNGS:  There is moderate emphysema  There is a 6 4 x 3 6 x 3 4 cm solid, irregular contoured right upper lobe pulmonary nodule (series 2 image 17 )  There is no tracheal or endobronchial lesion  PLEURA:  Small water density right-sided pleural effusion  HEART/GREAT VESSELS:  Trace pericardial effusion  Mild cardiomegaly  MEDIASTINUM AND JONH:  There is diffuse right hilar and mediastinal adenopathy  Largest is a right paratracheal node measuring 2 cm in short axis (series 2 image 21 ) CHEST WALL AND LOWER NECK:   Unremarkable  ABDOMEN LIVER/BILIARY TREE:  Numerous too small to characterize hypodensities scattered throughout the liver  GALLBLADDER:  No calcified gallstones  No pericholecystic inflammatory change  SPLEEN:  Unremarkable  PANCREAS:  Unremarkable  ADRENAL GLANDS:  Unremarkable  KIDNEYS/URETERS:  There is a 5 6 x 5 6 cm simple cyst in the left kidney lower pole  No hydronephrosis  There is contrast throughout the renal collecting systems, due to the prior IV contrast injection for head and neck CTA  STOMACH AND BOWEL:  Unremarkable  APPENDIX:  No findings to suggest appendicitis  ABDOMINOPELVIC CAVITY:  No ascites  No pneumoperitoneum  No lymphadenopathy  VESSELS:  Unremarkable for patient's age  PELVIS REPRODUCTIVE ORGANS:  Unremarkable for patient's age  URINARY BLADDER:  Bladder is also filled with previous injected iodinated IV contrast  ABDOMINAL WALL/INGUINAL REGIONS:  Unremarkable  OSSEOUS STRUCTURES:  No acute fracture or destructive osseous lesion  Impression: There is moderate emphysema  There is a 6 4 x 3 6 x 3 4 cm solid, irregular contoured right upper lobe pulmonary nodule, likely primary lung cancer (series 2 image 17 ) There is extensive right hilar and mediastinal adenopathy  Small right pleural effusion  There are also numerous too small to characterize hepatic hypodense lesions  Consider abdomen MR with and without IV contrast to characterize   Workstation performed: [id number]     XR chest portable    Result Date: 8/2/2021  Narrative: CHEST INDICATION:   increasing oxygen requirements  COMPARISON:  None EXAM PERFORMED/VIEWS:  XR CHEST PORTABLE FINDINGS: Cardiomediastinal silhouette appears unremarkable  4 7 x 3 6 cm right upper lobe mass is present  There is suggestion of mediastinal and hilar lymphadenopathy  No pleural effusions or pneumothorax  Osseous structures appear within normal limits for patient age  Impression: Right upper lobe mass with suggestion of mediastinal and hilar lymphadenopathy  At the time of this dictation, CT of the chest had been performed which confirms findings which are suspicious for malignancy  Workstation performed: [id number]     MRI brain w wo contrast    Result Date: 8/3/2021  Narrative: MRI BRAIN WITH AND WITHOUT CONTRAST INDICATION: stroke  Status post stroke alert  Right-sided weakness  COMPARISON:  None  TECHNIQUE: Sagittal T1, axial T2, axial FLAIR, axial T1, axial Wiggins, axial diffusion  Sagittal, axial T1 postcontrast   Axial bravo postcontrast with coronal reconstructions  IV Contrast:  8 mL of Gadobutrol injection (SINGLE-DOSE)  IMAGE QUALITY:   Diagnostic  FINDINGS: BRAIN PARENCHYMA:  Multifocal diffusion restriction in at least 3 discrete vascular territories most notably in the left MCA involving the left basal ganglia and portions of the left internal capsule on image 19, series 3 correlating to the hypodensity on the prior stroke alert head CT from a few days days earlier  Additionally there are few subtle cortical foci of diffusion restriction more superiorly in the left frontal lobe on image 25 and 26 also in the left MCA territory  A faint, subtle focus of diffusion restriction superiorly in the right frontal lobe on image 29, series 3 is noted  This is in the right MCA territory   In the periventricular white matter adjacent to the occipital horn of the left lateral ventricle in the parietal occipital junction on image 20, series 3 there is a faint focus of diffusion restriction  In the left occipital lobe on image 15, series 3 is a left posterior cerebral artery territory there are 2 foci of diffusion restriction  A faint focus of diffusion restriction superiorly in the right cerebellum image 11, series 3 is noted indicative of posterior circulation infarction likely in the superior cerebellar artery territory on the right  Another more subtle faint focus of diffusion restriction more anteriorly inferiorly on image 10, series 3 may be in the anterior-inferior cerebellar artery territory  There is questionable subtle faint  patchy enhancement in the left basal ganglia infarction, suggesting a subacute component  No large intracranial hemorrhage  No extra-axial fluid collection  Cerebellar tonsils are normally positioned  Small scattered hyperintensities on T2/FLAIR imaging are noted in the periventricular and subcortical white matter demonstrating an appearance that is statistically most likely to represent mild microangiopathic change  VENTRICLES:  Slight effacement of the frontal horn left lateral ventricle  SELLA AND PITUITARY GLAND:  Normal  ORBITS:  Right lens implant noted  PARANASAL SINUSES:  Normal  VASCULATURE:  Evaluation of the major intracranial vasculature demonstrates appropriate flow voids  CALVARIUM AND SKULL BASE:  Normal  EXTRACRANIAL SOFT TISSUES:  Bilobed mass in the right nasopharynx laterally in the region of the fossa of Rosenmuller has 2 components with a more medial intrinsically T1 hyperintense elements and more medial hypointense/cystic element  In aggregate the  lesion measures approximately 2 5 cm transverse dimension on image 5, series 11  Impression: 1  Multifocal diffusion abnormalities in several separate vascular territories, largest in the left MCA territory indicative of multifocal acute/recent infarctions most likely from a central embolic source  Further clinical assessment advised   2   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  Both benign and malignant etiologies are in the differential diagnosis  ENT assessment recommended  Workstation performed: [id number]     CT stroke alert brain    Result Date: 7/31/2021  Narrative: CT BRAIN - STROKE ALERT PROTOCOL INDICATION:   Right-sided weakness  Dysarthria  COMPARISON:  None  TECHNIQUE:  CT examination of the brain was performed  In addition to axial images, coronal reformatted images were created and submitted for interpretation  Radiation dose length product (DLP) for this visit:  868 03 mGy-cm   This examination, like all CT scans performed in the [facility], was performed utilizing techniques to minimize radiation dose exposure, including the use of iterative  reconstruction and automated exposure control  IMAGE QUALITY:  Diagnostic  FINDINGS:  PARENCHYMA:  Loss of gray-white differentiation identified in the left caudate head involving the anterior limb of left internal capsule and anterior portion of the lentiform nucleus representing recent infarct measuring approximately 2 4 x 1 8 cm  No hemorrhage identified  Areas of low-density in periventricular subcortical regions compatible with mild chronic microangiopathy  Normal intracranial vasculature  VENTRICLES AND EXTRA-AXIAL SPACES:  Normal for patient's age  VISUALIZED ORBITS AND PARANASAL SINUSES:  Unremarkable  CALVARIUM AND EXTRACRANIAL SOFT TISSUES:   Normal      Impression: Recent infarct left caudate head, anterior lentiform nucleus, and anterior limb of left internal capsule without evidence of hemorrhage  Findings were directly discussed with Neena Arauz on 7/31/2021 2:51 PM  Workstation performed: [id number]     IR biopsy lung    Result Date: 8/5/2021  Narrative: CT-guided lung biopsy Clinical History: Lung mass  Stroke  Biopsy for diagnosis  Small right effusion  Nasopharyngeal mass   Moderate sedation time: 45 minutes Procedure: After explaining the risks and benefits of the procedure to the patient's daughter, informed consent was obtained  CT was used to localize the right apical mass   Radiation dose length product (DLP) for this visit:  2294 mGy   This examination, like all CT scans performed in the [facility], was performed utilizing techniques to minimize radiation dose exposure, including the use of iterative reconstruction and automated exposure control  The overlying skin was prepped and draped in the usual sterile fashion  Local anesthesia was obtained with a 1% lidocaine solution  Using CT guidance, a 17-gauge coaxial needle was advanced  2 passes with an 18g gauge core biopsy needle were performed  The tissue was given to pathology  Additional passes were obtained  The needle was removed and final imaging performed  Findings: Large right apical lung mass measuring up to 7 5 cm  Internal calcifications  Small right pleural effusion  Needle within the lesion  Postprocedural changes without pneumothorax  Specimens: Touch prep, 18-gauge core x5   The patient tolerated the procedure well without immediate complication     Impression: Impression: CT-guided biopsy of a right lung mass Small right pleural effusion  Given the small size of the effusion and risk for pneumothorax after needle angulation required for the biopsy we deferred a thoracentesis at this time  This can be considered in the future if clinically appropriate Workstation performed: [id number]     VAS lower limb venous duplex study, complete bilateral    Result Date: 8/1/2021  Narrative:  [facility] REPORT CLINICAL: Indications: Patient presents with bilateral lower calf pain and swelling    FINDINGS:  Right           Impression              Peroneal        Occlusive Subsegmental  PostTibial      Occlusive Subsegmental   Left            Impression              DVT                GSV Mid Thigh                           Acute - Occlusive  GSV Dist Thigh                          Acute - Occlusive  GSV Knee                                Acute - Occlusive  GSV Mid Calf                            Acute - Occlusive  Peroneal        Occlusive Subsegmental                        CONCLUSION:  Impression: RIGHT LOWER LIMB: Acute deep vein thrombosis is noted in the posterior tibial and peroneal veins  No evidence of superficial thrombophlebitis noted  Doppler evaluation shows a normal response to augmentation maneuvers  Popliteal, posterior tibial and anterior tibial arterial Doppler waveforms are triphasic  LEFT LOWER LIMB: Acute deep vein thrombosis is noted in the peroneal veins  Acute superficial thrombophlebitis is noted in the great saphenous vein from the mid thigh to the mid calf  Doppler evaluation shows a normal response to augmentation maneuvers  Popliteal, posterior tibial and anterior tibial arterial Doppler waveforms are triphasic  Technical findings were given to Dr Kelly Fields  SIGNATURE: Electronically Signed by: Leila Flores on 2021-08-01 11:43:04 AM    CTA stroke alert (head/neck)    Result Date: 7/31/2021  Narrative: CTA NECK AND BRAIN WITH CONTRAST INDICATION: Stroke symptoms with right upper and lower extremity ataxia  Dysarthria  Right facial droop  COMPARISON:   None  TECHNIQUE:   Post contrast imaging was performed after administration of iodinated contrast through the neck and brain  Post contrast axial 0 625 mm images timed to opacify the arterial system  3D rendering was performed on an independent workstation  MIP reconstructions performed  Coronal reconstructions were performed of the noncontrast portion of the brain  Radiation dose length product (DLP) for this visit:  609 42 mGy-cm     This examination, like all CT scans performed in the [place], was performed utilizing techniques to minimize radiation dose exposure, including the use of iterative  reconstruction and automated exposure control  IV Contrast:  85 mL Omnipaque 350 contrast   IMAGE QUALITY:   Diagnostic FINDINGS: CERVICAL VASCULATURE AORTIC ARCH AND GREAT VESSELS:  Normal aortic arch and great vessel origins  Normal visualized subclavian vessels  RIGHT VERTEBRAL ARTERY CERVICAL SEGMENT:  Normal origin  The vessel is normal in caliber throughout the neck  LEFT VERTEBRAL ARTERY CERVICAL SEGMENT:  Normal origin  The vessel is normal in caliber throughout the neck  RIGHT EXTRACRANIAL CAROTID SEGMENT:  Normal caliber common carotid artery  Normal bifurcation and cervical internal carotid artery  No stenosis or dissection  LEFT EXTRACRANIAL CAROTID SEGMENT:  Normal caliber common carotid artery  Normal bifurcation and cervical internal carotid artery  No stenosis or dissection  NASCET criteria was used to determine the degree of internal carotid artery diameter stenosis  INTRACRANIAL VASCULATURE INTERNAL CAROTID ARTERIES:  Normal enhancement of the intracranial portions of the internal carotid arteries  Normal ophthalmic artery origins  Normal ICA terminus  ANTERIOR CIRCULATION:  Symmetric A1 segments and anterior cerebral arteries with normal enhancement  Normal anterior communicating artery  MIDDLE CEREBRAL ARTERY CIRCULATION:  M1 segment and middle cerebral artery branches demonstrate normal enhancement bilaterally  DISTAL VERTEBRAL ARTERIES:  Normal distal vertebral arteries  Posterior inferior cerebellar artery origins are normal  Normal vertebral basilar junction  BASILAR ARTERY:  Basilar artery is normal in caliber  Normal superior cerebellar arteries  POSTERIOR CEREBRAL ARTERIES: Both posterior cerebral arteries arises from the basilar tip  Both arteries demonstrate normal enhancement  DURAL VENOUS SINUSES:  Normal  NON VASCULAR ANATOMY BONY STRUCTURES:  Reversal the cervical lordosis apex C4-5  SOFT TISSUES OF THE NECK:  Mass identified right nasopharynx for which direct visualization by ENT is recommended    This is incompletely evaluated on this study secondary arterial contrast bolus timing  THORACIC INLET:  Right upper lobe lung mass, small right pleural effusion, or mediastinal adenopathy noted  Impression: No large vessel occlusion  Atherosclerotic calcification of the carotid bifurcation bilaterally with no significant stenosis  Bilateral vertebral arteries widely patent  No focal intracranial stenosis or aneurysm  Mass right nasopharynx incompletely evaluated secondary to arterial bolus timing  The mass measures approximately 2 7 x 2 0 cm and direct visualization by ENT recommended  Right upper lobe lung mass and mediastinal adenopathy noted small right pleural effusion    Findings were directly discussed with Cory Yu on 7/31/2021 2:51 PM  Workstation performed: [id number]       Labs and pertinent reports reviewed  This note has been generated by voice recognition software system  Therefore, there may be spelling, grammar, and or syntax errors  Please contact if questions arise

## 2021-08-07 NOTE — PROGRESS NOTES
PHYSICAL MEDICINE AND REHABILITATION   PREADMISSION ASSESSMENT     Projected [facility] Diagnoses:  Impairment of mobility, safety, Activities of Daily Living (ADLs), and cognitive/communication skills due to Stroke:  01 2  Right Body Involvement (Left Brain)  Etiologic Dx: Bilateral Multifocal Infarcts  Date of Onset: 7/31/2021   Date of surgery: 8/1/2021 Flexible Laryngoscopy; 8/5/2021 IR Lung Biopsy    PATIENT INFORMATION  Name: Dustin Hernandez Phone #: [phone] (home)   Address: [address] [place] [address]  YOB: 1950 Age: 72 y o  SS#   Marital Status: /Civil Union  Ethnicity:   Employment Status: retired  Extended Emergency Contact Information  Primary Emergency Contact: [address] [place] [address] Phone: [phone]  Relation: Daughter  Advance Directive: Level 1 Full Code - unknown advanced directive    INSURANCE/COVERAGE:     Primary Payor: MEDICARE / Plan: MEDICARE A AND B / Product Type: Medicare A & B Fee for Service /   Secondary Payer: Private Pay   Payer Contact:  Payer Contact:   Contact Phone:  Contact Phone:     Authorization #:   Coverage Dates:  LCD:   MEDICARE #: [id number]  Medicare Days: 60/30/60  Medical Record #: [id number]    REFERRAL SOURCE:   Referring provider: Aj Odonnell MD  Referring facility: [address] [place] [address]  Room: [facility] 725/[facility]  PCP: Steve Hsieh MD PCP phone number: [phone]    MEDICAL INFORMATION  HPI: Patient is a 72year old male that presented to [place] [address] on 7/31/2021 as a pre-hospital stroke alert for complaints of dysarthria, right facial droop and right sided weakness  Patient with noted recent dyspnea on exertion and weight loss, with planned CT chest/abdomen/pelvis  CT of the brain showed recent infarct left caudate head, anterior lentiform nucleus and anterior limb of left internal capsule without evidence of hemorrhage   CTA of the head/neck showed no occlusion or stenosis, however did reveal mass right nasopharynx and right upper lobe lung mass  Neurology was consulted  Patient was with an NIHSS of 9, and was not a tPA candidate given infarct appearing acute to subacute and high bleed risk  There was a strong suspicion for underlying malignancy with hypercoagulable state, and patient was admitted to stroke pathway  Patient was initiated on Heparin gtt with statin after ASA load, given concern for MI vs PE, in setting of elevated troponin levels (6 90) and dyspnea  Exam revealed B/L LE swelling and calf pain, with B/L LE venous duplex study showing RLE DVT in posterior tibial and peroneal veins, LLE DVT in peroneal veins  CT of the chest/abdomen/pelvis showed moderate emphysema, 6 4x3  6x3 4cm solid, irregular contoured RUL pulmonary nodule, likely primary lung cancer  Pulmonary was consulted due to increased O2 needs/hypoxia, likely caused by PE with possible right heart strain  ECHO showed an EF of 64%, grade 1 diastolic dysfunction, RV mildly to moderately dilated with hypokinesis, RA mildly dilated, mild TVR and small pericardial effusion  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 and underwent flexible laryngoscopy on 8/1  Mass was visualized, however etiology remains unknown - would benefit from biopsy with timing TBD, likely as an outpatient  Cardiology was consulted regarding elevated troponin levels, likely multifactorial and type 2 elevation given profound hypoxia and likely PE  Recommended for medical treatment with ASA and Heparin  Patient is to undergo repeat ECHO in 4-6 weeks to assess RV  On 8/2, Heparin gtt was discontinued and patient was initiated on Lovenox SQ per Neurology recommendations  Patient is to continue indefinitely   MRI of the brain showed multifocal diffusion abnormalities in several separate vascular territories, largest in left MCA territory indicative of multifocal acute/recent infarctions most likely from central embolic source  IR was consulted regarding right lung biopsy, and on 8/5, underwent IR right lung biopsy  Patient was with noted urinary retention during hospital course, and required cadet catheter insertion on 8/6  Oncology was consulted on 8/7, with initial clinical staging appearing to be compatible with at least stage IIIA  Patient is recommended for an outpatient PET-CT scan to give better idea about exact staging  If patient has stage IIIA, then recommendation would be for concurrent chemoradiation followed by immunotherapy pending functional status  Patient is overall hemodynamically stable and medically cleared for discharge to [facility]  PT/OT/ST therapies and PM&R were consulted, and they are recommending patient for inpatient Acute Rehab  He has demonstrated that he can tolerate and participate in 3 hours of therapy per day  COVID test resulted negative on 7/31/2021 and 8/7/2021  Past Medical History:   Past Surgical History: Allergies:     History reviewed  No pertinent past medical history   Past Surgical History:   Procedure Laterality Date    IR BIOPSY LUNG  8/5/2021     No Known Allergies      Comorbidities/Surgeries in the last 100 days: dysarthria, right facial droop, mild right hemiparesis, expressive aphasia, dysphagia, right lung cancer s/p IR biopsy on 8/5, ARF with hypoxia, right nasopharyngeal mass, B/L LE DVT, type 2 MI, suspected PE, urinary retention    CURRENT VITAL SIGNS:   Temp:  [97 7 °F (36 5 °C)-98 2 °F (36 8 °C)] 97 7 °F (36 5 °C)  HR:  [66-69] 69  Resp:  [16-18] 18  BP: (144-146)/(79-82) 144/79   Intake/Output Summary (Last 24 hours) at 8/8/2021 1105  Last data filed at 8/8/2021 0407  Gross per 24 hour   Intake 240 ml   Output 1225 ml   Net -985 ml        LABORATORY RESULTS:      Lab Results   Component Value Date    HGB 9 9 (L) 08/08/2021    HCT 33 3 (L) 08/08/2021    WBC 10 28 (H) 08/08/2021     Lab Results   Component Value Date    BUN 19 08/08/2021    K 4 0 08/08/2021    CL 106 08/08/2021    CREATININE 0 79 08/08/2021     Lab Results   Component Value Date    PROTIME 14 0 08/04/2021    INR 1 07 08/04/2021        DIAGNOSTIC STUDIES:  XR chest portable    Result Date: 8/2/2021  Impression: Right upper lobe mass with suggestion of mediastinal and hilar lymphadenopathy  At the time of this dictation, CT of the chest had been performed which confirms findings which are suspicious for malignancy  Workstation performed: [id number]     MRI brain w wo contrast    Result Date: 8/3/2021  Impression: 1  Multifocal diffusion abnormalities in several separate vascular territories, largest in the left MCA territory indicative of multifocal acute/recent infarctions most likely from a central embolic source  Further clinical assessment advised  2   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  Both benign and malignant etiologies are in the differential diagnosis  ENT assessment recommended  Workstation performed: [id number]     IR biopsy lung    Result Date: 8/5/2021  Impression: Impression: CT-guided biopsy of a right lung mass Small right pleural effusion  Given the small size of the effusion and risk for pneumothorax after needle angulation required for the biopsy we deferred a thoracentesis at this time   This can be considered in the future if clinically appropriate Workstation performed: [id number]       PRECAUTIONS/SPECIAL NEEDS:  Tobacco:   Social History     Tobacco Use   Smoking Status Former Smoker   Smokeless Tobacco Never Used   , Alcohol:    Social History     Substance and Sexual Activity   Alcohol Use Yes   , Anticoagulation:  Eliquis 10mg PO BID and Lovenox 90mg SQ Q12H, Edema Management, Safety Concerns, Pain Management, Requires O2: 3 L/min, IV: Type: Peripheral Location: Right Antecubital Reason: Medications/IVF, Aspiration Risk/Precautions, Dietary Restrictions: Dysphagia 3 diet with Thin liquids, Language Preference: English and Fall Precautions  MEDICATIONS:     Current Facility-Administered Medications:     acetaminophen (TYLENOL) tablet 650 mg, 650 mg, Oral, Q6H [address], Magaly Schwartz MD, 650 mg at 08/08/21 0028    apixaban (ELIQUIS) tablet 10 mg, 10 mg, Oral, BID, Yolie Jung MD, 10 mg at 08/08/21 0938    [START ON 8/14/2021] apixaban (ELIQUIS) tablet 5 mg, 5 mg, Oral, BID, Yolie Jung MD    atorvastatin (LIPITOR) tablet 40 mg, 40 mg, Oral, Daily With Dinner, Reyna PERRIN PA-C, 40 mg at 08/07/21 1718    bisacodyl (DULCOLAX) rectal suppository 10 mg, 10 mg, Rectal, Daily PRN, Yolie Jung MD    lidocaine (XYLOCAINE) 4 % topical solution 5 mL, 5 mL, Topical, Once, Jennifer Martinez MD    melatonin tablet 3 mg, 3 mg, Oral, HS, Michi Alexis PA-C, 3 mg at 08/07/21 2130    polyethylene glycol (MIRALAX) packet 17 g, 17 g, Oral, Daily, Yolie Jung MD, 17 g at 08/08/21 8934    senna-docusate sodium (SENOKOT S) 8 6-50 mg per tablet 2 tablet, 2 tablet, Oral, BID, Magaly Schwartz MD, 2 tablet at 08/08/21 8935    SKIN INTEGRITY:   no rashes, no erythema, right chest catheter site with bandaid    PRIOR LEVEL OF FUNCTION:  He lives in a(n) single family home  Ulysses Ronna is  and lives with their spouse  Self Care: Independent, Indoor Mobility: Independent, Stairs (in/outdoor): Independent and Cognition: Independent  Prior to patient's admission, patient was fully Independent with ADLs and IADLs  Patient was Independent without use of assistive device for mobility  FALLS IN THE LAST 6 MONTHS: none    HOME ENVIRONMENT:  The living area: can live on one level  There is 1 step to enter the home  The patient will have 24 hour supervision/physical assistance available upon discharge  Patient's spouse is supportive and able to assist as needed      PREVIOUS DME:  Equipment in home (previous DME): None    FUNCTIONAL STATUS:  Physical Therapy Occupational Therapy Speech Therapy   8/8/2021, per PTA    Subjective Subjective Pt encountered supine in bed, pleasant and agreeable to treatment  Reports no new complaints  Follows commands with occastional repetition, though does demonstrate limited carryover between trials  Reports no changes in status with activity  Bed Mobility   Supine to Sit 4  Minimal assistance   Additional items Assist x 1;HOB elevated; Increased time required;Verbal cues   Transfers   Sit to Stand 3  Moderate assistance   Additional items Assist x 1; Armrests; Increased time required;Verbal cues   Stand to Sit 3  Moderate assistance   Additional items Assist x 1; Armrests; Increased time required;Verbal cues   Ambulation/Elevation   Gait pattern Excessively slow; Short stride; Inconsistent shala; Shuffling;Decreased foot clearance;Narrow ALEX; Improper Weight shift; Poor UE support   Gait Assistance 3  Moderate assist   Additional items Assist x 1   Assistive Device Rolling walker   Distance 30' x 2   Balance   Static Sitting Fair   Dynamic Sitting Poor +   Static Standing Poor   Ambulatory Poor   Endurance Deficit   Endurance Deficit Yes   Endurance Deficit Description fatigue, dyspnea with exertion on 3L O2 nc with SpO2 92% at rest, dropping to 87% s/p ambulation, returning to baseline with short rest    Activity Tolerance   Activity Tolerance Patient tolerated treatment well;Patient limited by fatigue   Nurse Made Aware yes   Assessment   Prognosis Good   Problem List Decreased strength;Decreased endurance; Impaired balance;Decreased mobility; Decreased cognition   Assessment Pt demonstrted improved bed mobiilty this session, performing transfer with reduced assist & demonstrating no LOB once sitting unsupported EOB  Does require increased assist when performing dynamic UE & LE movments to dress with LONGORIA, which results in retropulsion  Min-mod A required to prevent overt posterior LOB    Pt able to perform repeated sit to stand transfers from bed & chair without LOB, but does require increased assist to initiate from bed surface  LEs appeared unsteady, but no knee buckling noted in standing or while ambulatory  Pt maintained static standing for extended periods of time for pericare & donning pants EOB prior to ambulation  During ambulation, pt demosntrated inconsistant step length & foot clearance as well as limited  strength & poor hand position of RUE on RW  Requried assist for RW management as a result  Pt also demosntrated R lateral lean as he fatigued during each trial, which he was able to correct slightly during 2nd trial with instructions  Discussed POC, goals of care & role of PT at this time, as  well as plan to d/c to rehab when medically cleared  Pt & wife in agreement at this time  Will continue to follow and progress mobility, addressing functional deficits & reducing burden of care with these tasks  8/8/2021, per LONGORIA    ADL   Where Assessed Edge of bed   Grooming Assistance 4  Minimal Assistance   UB Bathing Assistance 4  Minimal Assistance   LB Bathing Assistance 3  Moderate Assistance   [address] 2  Maximal Assistance  (max asst to [place] an around the back jacket/robe  )   UB Dressing Comments poor awareness of sleeve height after donning r sleeve  pt needed asst to bring jacket around his back and to place stronger l ue  LB Dressing Assistance 2  Maximal Assistance   LB Dressing Comments pt was able to thread pants onto r le with mod asst and requried max for socks and for clothing management  Toileting Assistance  1  Total Assistance   Toileting Comments incont of bowel smear, pt aware and voiced need to go  pt with indwelling cadet catheter at this time  tmax asst for clothing management and ta for anal hygiene   Functional Standing Tolerance   Time f- balance during clothing management and transfers   Bed Mobility   Supine to Sit 4  Minimal assistance  (hob elevated slightly  Simultaneous filing   User may not have seen previous data )   Additional items    (much incrased time allowed 2* to trunk coord / moderate vc's  Simultaneous filing  User may not have seen previous data )   Transfers   Sit to Stand 3  Moderate assistance  (Simultaneous filing  User may not have seen previous data )   Additional items    (mod vc's for hand placement, mod asst for standing  Simultaneous filing  User may not have seen previous data )   Stand to Sit 4  Minimal assistance  (Simultaneous filing  User may not have seen previous data )   Additional items Assist x 1; Armrests; Increased time required;Verbal cues   Stand pivot 3  Moderate assistance   Additional items    (asst to balance / manage rw at times  pt slow and ataxic)   Functional Mobility   Functional Mobility 3  Moderate assistance  (o2, cadet and chair follow)   Additional Comments    (ax1)   Additional items Rolling walker  (pt reports moderate fatigued post adl and mobility)   Cognition   Overall Cognitive Status Impaired   Arousal/Participation Alert   Attention Within functional limits   Orientation Level    (grossly oriented to month states "8" )   Memory Decreased short term memory;Decreased recall of recent events;Decreased recall of precautions   Following Commands Follows one step commands with increased time or repetition   Vision   Vision Comments recommend continued assessment   Activity Tolerance   Activity Tolerance Patient tolerated treatment well   Assessment   Assessment pt participated in am ot session and was seen focusing on adl tasks seated eob, dynamic sitting and standing balance, activity tolerence and ongoing cognitive and v/p0 assessment during adl tasks  pt with slight weakness r hemibody but appears more limited by ataxic trunk / body during mobility tasks  pt was able to close small snap using b hands with increased time  pt required heavy balance asst when attempting to use 1 ue to maange pants overh ips   pt with fair + [id number] balance dynamic and f- standing c re static   pt follows commands with increased time  pt is slow to verbally answer questions and is often incorrect  pt also is a know joker per pts wife  pt was on 3 l o2 throughout session and pulse ranged from 95 to 85%  pt reports moderate fatigue post session  pt is motivated and very cooperative  pts wife was present for posrtions of session      8/1/2021, per SLP    Summary   Pt presented with s/s suggestive of minimal oral and suspected minimal pharyngeal dysphagia  Symptoms or concerns included min prolonged mastication and transfer time with regular solids, with cough on 1 of 4 sips thin liquids  Patient presents with right side facial droop and weakness and is at risk of pocketing  However, patient is able to use lingual sweep to clear       Patient presents with dysarthria and would benefit from formal speech/language evaluation when able       Risk/s for Aspiration: low     Recommended Diet: soft/level 3 diet and thin liquids   Recommended Form of Meds: whole with liquid   Aspiration precautions and swallowing strategies: upright posture and small bites/sips  Other Recommendations: Continue frequent oral care     Current Medical Status  Elifalivia Alexey a 72 y  o  male with no PMH who presents with several months of CALDERON   Can barely walk to restroom  [facility] noted weight loss   Had b/l CP a few days ago  Bertha Ruiz woke up and could not move right side of body   No n/v/d   Fam hx CAD and cancer   Pt denies pain at this time and no SOB at rest      Neurology: 7/31/2021  Neurology time of arrival: 1423  HPI: Corin Rubio is U 89 y o   male with recent abnormal weight loss without documented past medical history who presents to [place] [facility] on 07/31/2021 as a stroke alert with right hemiparesis and dysarthria    Per discussed with the staff, approximately 1 hour prior to arrival patient acutely developed right-sided weakness and slurred speech   Patient reports that he developed the symptoms suddenly early in the afternoon   Patient states that he has never had similar symptoms in the past  [facility] admits to smoking in the past, reportedly 60+ years ago  Jaswinder Acosta states that has a lazy eye at baseline  Of note patient has been experiencing abnormal weight   This was in the process of being worked up with a scheduled CT CAP for 08/01     Patient presented to [place] [facility] on 07/31/2021 as a stroke alert with right hemiparesis and dysarthria   NIHSS of 9 for right upper lower extremity weakness and ataxia, right facial weakness, dysarthria, and word-finding difficulties   CT head revealed evidence of a recent infarct in the left caudate head, anterior lentiform nucleus, anterior limb of the left internal capsule   Patient was not an IV tPA candidate due to the chronicity of the infarct on CT head, appearing acute to subacute per neurologist Dana Espino unremarkable for large vessel occlusion, however did reveal a right upper lobe lung mass and mediastinal adenopathy   Troponin elevated on presentation 6 90   With concern for MI vs PE patient was started on low-dose heparin   Patient was then admitted on stroke pathway         Current Precautions:  Fall  Aspiration    Allergies:  No known food allergies  Past medical history:  Please see H&P for details     Special Studies:  CT chest 7/31/2021:   There is moderate emphysema      There is a 6 4 x 3 6 x 3 4 cm solid, irregular contoured right upper lobe pulmonary nodule, likely primary lung cancer (series 2 image 17 )   There is extensive right hilar and mediastinal adenopathy    Small right pleural effusion    There are also numerous too small to characterize hepatic hypodense lesions   Consider abdomen MR with and without IV contrast to characterize      Social/Education/Vocational Hx:  Pt lives with family     Swallow Information   Current Risks for Dysphagia & Aspiration: CVA and dysarthria  Current Symptoms/Concerns: coughing with po  Current Diet: NPO   Baseline Diet: regular diet and thin liquids    Baseline Assessment   Behavior/Cognition: alert  Speech/Language Status: able to participate in basic conversation and able to follow commands  Patient Positioning: upright in bed  Pain Status/Interventions/Response to Interventions: No report of or nonverbal indications of pain  Swallow Mechanism Exam  Facial: right facial droop  Labial: decreased ROM right side  Lingual: WFL  Velum: symmetrical  Mandible: decreased ROM right side  Dentition: adequate  Vocal quality:clear/adequate   Volitional Cough: strong/productive   Respiratory Status: on 8L midflow O2       Consistencies Assessed and Performance   Consistencies Administered: thin liquids, nectar thick, honey thick, puree, soft solids and hard solids  Materials administered included applesauce, lilian cracker and cookie with honey thick, nectar thick and thin liquids     Oral Stage: minimal  Mastication was prolonged, but efficient with solids  Bolus formation was adequate, with min prolonged transfer time  No significant oral residue noted  No overt s/s reduced oral control  Pharyngeal Stage: minimal  Swallow Mechanics:  Swallowing initiation appeared prompt  Laryngeal rise was palpated and judged to be within functional limits   Cough on 1 of 4 sips thin liquids observed      Esophageal Concerns: none reported    Summary and Recommendations (see above)     Results Reviewed with: patient, RN, PA and family      Treatment Recommended: dysphagia therapy and speech/language eval       Frequency of treatment: 2-3x week, as able      Patient Stated Goal: none      Dysphagia LTG  -Patient will demonstrate safe and effective oral intake (without overt s/s significant oral/pharyngeal dysphagia including s/s penetration or aspiration) for the highest appropriate diet level       Short Term Goals:  -Pt will tolerate Dysphagia 3/advanced (dental soft) diet and thin liquid with no significant s/s oral or pharyngeal dysphagia across 1-3 diagnostic session/s      -Patient will tolerate trials of upgraded food and/or liquid texture with no significant s/s of oral or pharyngeal dysphagia including aspiration across 1-3 diagnostic sessions      Speech Therapy Prognosis   Prognosis: good     Prognosis Considerations: age     CURRENT GAP IN FUNCTION  Prior to Admission: Functional Status: Patient was independent with mobility/ambulation, transfers, ADL's, IADL's  Estimated length of stay: 10 to 14 days    Anticipated Post-Discharge Disposition/Treatment  Disposition: Return to previous home/apartment  Outpatient Services: Physical Therapy (PT), Occupational Therapy (OT) and Speech Therapy    BARRIERS TO DISCHARGE  Lovenox, Weakness, Pain, Diminished cognition/Mentation change, Balance Difficulty, Fatigue, Home Accessibility, Caregiver Accessibility, Financial Resources, Equipment Needs and Resource Availability    INTERVENTIONS FOR DISCHARGE  Adaptive equipment, Patient/Family/Caregiver Education, [employer], Support Group, Financial Assistance, Arrange DME needs, Medication Changes as per MD recommendations, Therapy exercises, Center of balance support  and Energy conservation education     REQUIRED THERAPY:  Patient will require PT, OT and ST 60 minutes each per day, five days per week to achieve rehab goals  REQUIRED FUNCTIONAL AND MEDICAL MANAGEMENT FOR INPATIENT REHABILITATION:  Skin:  There are no pressure sores currently, right chest catheter site with bandaid, Pain Management: Overall pain is well controlled, Deep Vein Thrombosis (DVT) Prophylaxis:  Eliquis 10mg PO BID and Lovenox 90mg SQ Q12H, further IM management of additional medical conditions while on the ARC, he needs PT/OT/ST intervention, patient/family education and training, possible Neuropsych and Neurology consults with any other needed consults prn, nursing medication review and management of bowel/bladder function   Patient/family can participate in unit based stroke education  RECOMMENDED LEVEL OF CARE:  Patient presented to [place] [address] on 7/31/2021 as a pre-hospital stroke alert for complaints of dysarthria, right facial droop and right sided weakness  Patient with noted recent dyspnea on exertion and weight loss, with planned CT chest/abdomen/pelvis  CT of the brain showed recent infarct left caudate head, anterior lentiform nucleus and anterior limb of left internal capsule without evidence of hemorrhage  CTA of the head/neck showed no occlusion or stenosis, however did reveal mass right nasopharynx and right upper lobe lung mass  Neurology was consulted  Patient was with an NIHSS of 9, and was not a tPA candidate given infarct appearing acute to subacute and high bleed risk  There was a strong suspicion for underlying malignancy with hypercoagulable state, and patient was admitted to stroke pathway  Patient was initiated on Heparin gtt with statin after ASA load, given concern for MI vs PE, in setting of elevated troponin levels (6 90) and dyspnea  Exam revealed B/L LE swelling and calf pain, with B/L LE venous duplex study showing RLE DVT in posterior tibial and peroneal veins, LLE DVT in peroneal veins  CT of the chest/abdomen/pelvis showed moderate emphysema, 6 4x3  6x3 4cm solid, irregular contoured RUL pulmonary nodule, likely primary lung cancer  Pulmonary was consulted due to increased O2 needs/hypoxia, likely caused by PE with possible right heart strain  ECHO showed an EF of 72%, grade 1 diastolic dysfunction, RV mildly to moderately dilated with hypokinesis, RA mildly dilated, mild TVR and small pericardial effusion  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 and underwent flexible laryngoscopy on 8/1  Mass was visualized, however etiology remains unknown - would benefit from biopsy with timing TBD, likely as an outpatient   Cardiology was consulted regarding elevated troponin levels, likely multifactorial and type 2 elevation given profound hypoxia and likely PE  Recommended for medical treatment with ASA and Heparin  Patient is to undergo repeat ECHO in 4-6 weeks to assess RV  On 8/2, Heparin gtt was discontinued and patient was initiated on Lovenox SQ per Neurology recommendations  Patient is to continue indefinitely  MRI of the brain showed multifocal diffusion abnormalities in several separate vascular territories, largest in left MCA territory indicative of multifocal acute/recent infarctions most likely from central embolic source  IR was consulted regarding right lung biopsy, and on 8/5, underwent IR right lung biopsy  Patient was with noted urinary retention during hospital course, and required cadet catheter insertion on 8/6  Oncology was consulted on 8/7, with initial clinical staging appearing to be compatible with at least stage IIIA  Patient is recommended for an outpatient PET-CT scan to give better idea about exact staging  If patient has stage IIIA, then recommendation would be for concurrent chemoradiation followed by immunotherapy pending functional status  Prior to patient's admission, patient was fully Independent with ADLs and IADLs  Patient was Independent without use of assistive device for mobility  Currently, patient is Mod assist with use of RW for gait and transfers, and Min/Max assist with UB ADLs, Max assist with LB ADLs  Close medical management and PM&R management is recommended at this time while patient is on the [facility]  Inpatient acute rehab is recommended for patient to maximize overall strength and mobility upon discharge to home with support of family

## 2021-08-07 NOTE — CASE MANAGEMENT
CM met with pt's daughter Mima Taveras at bedside as requested  Mima Taveras informed upon discussion with the medical team, pt and family have decided to move forward with admission to [facility]  CM informed [facility] admission's liaison Vijaya Leavitt of same requesting re-evaluation  Upon review, CM informed pt has been accepted to [facility] and can be admitted tomorrow pending new therapy notes for continued PT/OT need  CM informed PT/OT of same via TT  Pt placed on Sunday PT/OT schedule  CM informed Dr Choudhury of same who informed pt is medically stable for d/c tomorrow to [facility]  COVID test requested Per [facility] request      Original goal for pt to return home with [facility] services  DME was ordered and has already been processed for delivery  Co-payment also paid per daughter  DME includes: hospital bed, BSC, and walker  Pt also recommended home 02 upon d/c  CM informed [facility] of same for re-evaluation of home 02 need upon d/c from [facility]  CM informed pt and pts daughter, who are agreeable to same  CM delivered walker to pt at bedside  Delivery ticket signed  Copy placed in [employer] mailbox  CM will deliver [facility] upon [facility]  CM will remain available for additional d/c needs or concerns

## 2021-08-07 NOTE — [facility] ADMISSION
Notified by CM that patient/family are requesting re-eval for [facility]  Will need updated PT/OT notes, and will review updates with [facility]  Will continue to follow and update as able

## 2021-08-07 NOTE — DISCHARGE SUMMARY
Discharge Summary - [facility] Internal Medicine    Patient Information: Gini Álvarez 72 y o  male MRN: [id number]  Unit/Bed#: [facility] 725-01 Encounter: [id number]    Discharging Physician / Practitioner: Eren Sheets MD  PCP: Camila Mcgrath MD  Admission Date: 7/31/2021  Discharge Date: 08/08/21    Reason for Admission:  Stroke alert    Discharge Diagnoses:     Principal Problem:    Acute CVA (cerebrovascular accident) [facility])  Active Problems:    Elevated troponin    Acute respiratory failure with hypoxia ([address] )    Acute deep vein thrombosis (DVT) of distal vein of both lower extremities ([facility] [address] )    Nasopharyngeal mass    Dysphagia    Mass of upper lobe of right lung    Urinary retention    Constipation  Resolved Problems:    * No resolved hospital problems  *      Consultations During Hospital Stay:  · Neurology  · Pulmonology  · Cardiology  · ENT  · Oncology    Procedures Performed:     · Flexible laryngoscopy 8/1  · IR lung biopsy 8/5    Significant Findings / Test Results:     · [facility] 7/31:  Recent infarct in the left caudate head, anterior lentiform nucleus and anterior limb of left internal capsule without evidence of hemorrhage  · CTA head and neck 7/31:  No large vessel occlusion, atherosclerotic calcification of the carotids, no focal intracranial stenosis or aneurysms  Right nasopharynx mass measuring 2 7 x 2 0 centimetre, right upper lobe lung mass and mediastinal adenopathy  · VAS lower limb duplex 7/31:  Acute DVT in the left posterior tibial and peroneal veins, acute DVT the right peroneal veins and superficial thrombophlebitis in the right great saphenous vein  · CT C/A/P 7/31:  Moderate emphysema, right upper lobe pulmonary nodule likely primary lung cancer, right hilar and mediastinal adenopathy, numerous too small to characterize hepatic dense lesions    · MRI brain 8/3:  Multifocal diffusion abnormalities in several separate vascular territories largest in the left MCA indicative of multifocal acute/recent infarctions most likely from central embolic source, complex bilobed right nasopharyngeal mass  Incidental Findings:   CT chest abdomen pelvis: There are also numerous too small to characterize hepatic hypodense lesions  Consider abdomen MR with and without IV contrast to characterize  Test Results Pending at Discharge (will require follow up): · Final biopsy result of lung mass     tpatient Tests Requested:    Complications:  None     Hospital Course:     Emma Mcdonald is a 72 y o  male patient who originally presented to the hospital on 7/31/2021 as a stroke alert  Patient has significant tobacco abuse history but no other known past medical history  He was sitting at the table eating lunch and slumped to the right side  Upon arrival he received CT head which showed recent infarct in the left caudate head, anterior lentiform nucleus and anterior limb of the internal capsule  His CTA showed no large vessel occlusion, focal internal stenosis or aneurysms but did show nasopharyngeal mass and right upper lobe lung mass  Patient did not receive tPA secondary to infarct appearing acute to subacute with high bleed risk  He received CT chest abdomen pelvis which demonstrated irregular right upper lobe pulmonary nodule likely primary lung cancer with extensive right hilar and mediastinal adenopathy as well as numerous small to characterize hepatic hypodense lesions  He was noted to have hypoxia with increasing oxygen requirement which is new for him and there was concern for PE  He received lower extremity Dopplers which did show bilateral DVTs  He was empirically started on heparin for presumed PEs  He was seen by Neurology, had MRI which showed multiple diffusion abnormalities in several vascular territories largest in the left MCA indicative of multifocal acute/recent infarctions most likely from central embolic source    Etiology of stroke felt to be likely secondary to hypercoagulable state from underlying malignancy  He was started on atorvastatin, no need for aspirin as patient is receiving full-dose anticoagulation  Discussed with medical oncology on 08/07  Okay to switch from Lovenox to Eliquis for anticoagulation  Now on Eliquis  10 mg b i d  For 7 days followed by 5 mg b i d   Patient also noted to have elevated troponin, seen by Cardiology felt likely secondary to PE and RV dysfunction  He will need repeat echocardiogram in 4-6 weeks to assess RV function  He was also seen in consultation by ENT and received direct laryngoscopy to visualize nasopharyngeal mass  ENT recommended outpatient biopsy  Pulmonology was consulted, recommended IR biopsy of lung mass which took place on 8/5  Preliminary results consistent with adenocarcinoma  Oncology was consulted and they will follow outpatient for further management including chemotherapy       During hospitalization, patient was complaining of urine retention  He was placed on urine retention protocol and failed spontaneous voiding trials  He required placement of [place] catheter  Patient was evaluated by PT/OT and PM&R who recommended acute rehab  Patient's family was not agreeable and elected to take patient with home health despite our recommendations for rehab  To maximize discharge his of tolerating chemotherapy, patient and family ventrally agreed for acute rehab at [facility]  Will likely need repeat home oxygen eval prior to discharge from rehab  Urology can be consulted at rehab for Hampton removal       Condition at Discharge: stable     Discharge Day Visit / Exam:     Subjective:  No overnight events  Still with intermittent constipation  No nausea, vomiting, abdominal discomfort, shortness of breath  Agreeable with discharge plan today      Vitals: Blood Pressure: 144/79 (08/08/21 0757)  Pulse: 69 (08/08/21 0757)  Temperature: 97 7 °F (36 5 °C) (08/08/21 0757)  Temp Source: Oral (08/04/21 2209)  Respirations: 18 (08/08/21 0757)  Height: 5' 8" (172 7 cm) (08/02/21 1503)  Weight - Scale: 86 6 kg (190 lb 14 7 oz) (07/31/21 1430)  SpO2: (!) 89 % (08/08/21 0757)  Exam:   Physical Exam  Constitutional: Pt appears comfortable  Not in any acute distress  Cardiovascular: Normal rate, regular rhythm, normal heart sounds   No murmur heard  Pulmonary/Chest: Effort normal, air entry b/l equal  No respiratory distress  Pt has no wheezes or crackles  Abdominal: Soft  Minimally ddistended, Non-tender  Bowel sounds are normal    Neurological:  Awake and alert   Communicative   Right-sided weakness noted  Skin:  Warm    Discussion with Family:  Discussed with wife at bedside    Discharge instructions/Information to patient and family:   See after visit summary for information provided to patient and family  Provisions for Follow-Up Care:  See after visit summary for information related to follow-up care and any pertinent home health orders  Discharge Statement:  I spent 40 minutes discharging the patient  This time was spent on the day of discharge  I had direct contact with the patient on the day of discharge  Greater than 50% of the total time was spent examining patient, answering all patient questions, arranging and discussing plan of care with patient as well as directly providing post-discharge instructions  Additional time then spent on discharge activities  Discharge Medications:  See after visit summary for reconciled discharge medications provided to patient and family        ** Please Note: This note has been constructed using a voice recognition system **

## 2021-08-07 NOTE — PLAN OF CARE
Problem: PAIN - ADULT  Goal: Verbalizes/displays adequate comfort level or baseline comfort level  Description: Interventions:  - Encourage patient to monitor pain and request assistance  - Assess pain using appropriate pain scale  - Administer analgesics based on type and severity of pain and evaluate response  - Implement non-pharmacological measures as appropriate and evaluate response  - Consider cultural and social influences on pain and pain management  - Notify physician/advanced practitioner if interventions unsuccessful or patient reports new pain  Outcome: Progressing     Problem: INFECTION - ADULT  Goal: Absence or prevention of progression during hospitalization  Description: INTERVENTIONS:  - Assess and monitor for signs and symptoms of infection  - Monitor lab/diagnostic results  - Monitor all insertion sites, i e  indwelling lines, tubes, and drains  - [place] appropriate cooling/warming therapies per order  - Administer medications as ordered  - Instruct and encourage patient and family to use good hand hygiene technique  - Identify and instruct in appropriate isolation precautions for identified infection/condition  Outcome: Progressing  Goal: Absence of fever/infection during neutropenic period  Description: INTERVENTIONS:  - Monitor WBC    Outcome: Progressing     Problem: SAFETY ADULT  Goal: Patient will remain free of falls  Description: INTERVENTIONS:  - Educate patient/family on patient safety including physical limitations  - Instruct patient to call for assistance with activity   - Consult OT/PT to assist with strengthening/mobility   - Keep Call bell within reach  - Keep bed low and locked with side rails adjusted as appropriate  - Keep care items and personal belongings within reach  - Initiate and maintain comfort rounds  - Make Fall Risk Sign visible to staff  - Offer Toileting every 2 Hours, in advance of need  - Initiate/Maintain bed alarm  - Apply yellow socks and bracelet for high fall risk patients  - Consider moving patient to room near nurses station  Outcome: Progressing  Goal: Maintain or return to baseline ADL function  Description: INTERVENTIONS:  -  Assess patient's ability to carry out ADLs; assess patient's baseline for ADL function and identify physical deficits which impact ability to perform ADLs (bathing, care of mouth/teeth, toileting, grooming, dressing, etc )  - Assess/evaluate cause of self-care deficits   - Assess range of motion  - Assess patient's mobility; develop plan if impaired  - Assess patient's need for assistive devices and provide as appropriate  - Encourage maximum independence but intervene and supervise when necessary  - Involve family in performance of ADLs  - Assess for home care needs following discharge   - Consider OT consult to assist with ADL evaluation and planning for discharge  - Provide patient education as appropriate  Outcome: Progressing  Goal: Maintains/Returns to pre admission functional level  Description: INTERVENTIONS:  - Perform BMAT or MOVE assessment daily    - Set and communicate daily mobility goal to care team and patient/family/caregiver  - Collaborate with rehabilitation services on mobility goals if consulted  - Perform Range of Motion 3 times a day  - Reposition patient every 2 hours    - Dangle patient 3 times a day  - Stand patient 3 times a day  - Ambulate patient 3 times a day  - Out of bed to chair 3 times a day   - Out of bed for meals 3 times a day  - Out of bed for toileting  - Record patient progress and toleration of activity level   Outcome: Progressing     Problem: DISCHARGE PLANNING  Goal: Discharge to home or other facility with appropriate resources  Description: INTERVENTIONS:  - Identify barriers to discharge w/patient and caregiver  - Arrange for needed discharge resources and transportation as appropriate  - Identify discharge learning needs (meds, wound care, etc )  - Arrange for interpretive services to assist at discharge as needed  - Refer to Case Management Department for coordinating discharge planning if the patient needs post-hospital services based on physician/advanced practitioner order or complex needs related to functional status, cognitive ability, or social support system  Outcome: Progressing     Problem: Knowledge Deficit  Goal: Patient/family/caregiver demonstrates understanding of disease process, treatment plan, medications, and discharge instructions  Description: Complete learning assessment and assess knowledge base  Interventions:  - Provide teaching at level of understanding  - Provide teaching via preferred learning methods  Outcome: Progressing     Problem: Nutrition/Hydration-ADULT  Goal: Nutrient/Hydration intake appropriate for improving, restoring or maintaining nutritional needs  Description: Monitor and assess patient's nutrition/hydration status for malnutrition  Collaborate with interdisciplinary team and initiate plan and interventions as ordered  Monitor patient's weight and dietary intake as ordered or per policy  Utilize nutrition screening tool and intervene as necessary  Determine patient's food preferences and provide high-protein, high-caloric foods as appropriate       INTERVENTIONS:  - Monitor oral intake, urinary output, labs, and treatment plans  - Assess nutrition and hydration status and recommend course of action  - Evaluate amount of meals eaten  - Assist patient with eating if necessary   - Allow adequate time for meals  - Recommend/ encourage appropriate diets, oral nutritional supplements, and vitamin/mineral supplements  - Order, calculate, and assess calorie counts as needed  - Assess need for intravenous fluids  - Provide specific nutrition/hydration education as appropriate  - Include patient/family/caregiver in decisions related to nutrition  Outcome: Progressing     Problem: MOBILITY - ADULT  Goal: Maintain or return to baseline ADL function  Description: INTERVENTIONS:  -  Assess patient's ability to carry out ADLs; assess patient's baseline for ADL function and identify physical deficits which impact ability to perform ADLs (bathing, care of mouth/teeth, toileting, grooming, dressing, etc )  - Assess/evaluate cause of self-care deficits   - Assess range of motion  - Assess patient's mobility; develop plan if impaired  - Assess patient's need for assistive devices and provide as appropriate  - Encourage maximum independence but intervene and supervise when necessary  - Involve family in performance of ADLs  - Assess for home care needs following discharge   - Consider OT consult to assist with ADL evaluation and planning for discharge  - Provide patient education as appropriate  Outcome: Progressing  Goal: Maintains/Returns to pre admission functional level  Description: INTERVENTIONS:  - Perform BMAT or MOVE assessment daily    - Set and communicate daily mobility goal to care team and patient/family/caregiver  - Collaborate with rehabilitation services on mobility goals if consulted  - Perform Range of Motion 3 times a day  - Reposition patient every 2 hours    - Dangle patient 3 times a day  - Stand patient 3 times a day  - Ambulate patient 3 times a day  - Out of bed to chair 3 times a day   - Out of bed for meals 3 times a day  - Out of bed for toileting  - Record patient progress and toleration of activity level   Outcome: Progressing     Problem: Prexisting or High Potential for Compromised Skin Integrity  Goal: Skin integrity is maintained or improved  Description: INTERVENTIONS:  - Identify patients at risk for skin breakdown  - Assess and monitor skin integrity  - Assess and monitor nutrition and hydration status  - Monitor labs   - Assess for incontinence   - Turn and reposition patient  - Assist with mobility/ambulation  - Relieve pressure over bony prominences  - Avoid friction and shearing  - Provide appropriate hygiene as needed including keeping skin clean and dry  - Evaluate need for skin moisturizer/barrier cream  - Collaborate with interdisciplinary team   - Patient/family teaching  - Consider wound care consult   Outcome: Progressing     Problem: Potential for Falls  Goal: Patient will remain free of falls  Description: INTERVENTIONS:  - Educate patient/family on patient safety including physical limitations  - Instruct patient to call for assistance with activity   - Consult OT/PT to assist with strengthening/mobility   - Keep Call bell within reach  - Keep bed low and locked with side rails adjusted as appropriate  - Keep care items and personal belongings within reach  - Initiate and maintain comfort rounds  - Make Fall Risk Sign visible to staff  - Offer Toileting every 2 Hours, in advance of need  - Initiate/Maintain bed alarm  - Apply yellow socks and bracelet for high fall risk patients  - Consider moving patient to room near nurses station  Outcome: Progressing

## 2021-08-08 ENCOUNTER — HOSPITAL ENCOUNTER (INPATIENT)
Facility: HOSPITAL | Age: 71
LOS: 9 days | Discharge: HOME HEALTH | DRG: 056 | End: 2021-08-17
Attending: STUDENT IN AN ORGANIZED HEALTH CARE EDUCATION/TRAINING PROGRAM | Admitting: STUDENT IN AN ORGANIZED HEALTH CARE EDUCATION/TRAINING PROGRAM
Payer: MEDICARE

## 2021-08-08 VITALS
DIASTOLIC BLOOD PRESSURE: 79 MMHG | HEIGHT: 68 IN | HEART RATE: 69 BPM | RESPIRATION RATE: 18 BRPM | OXYGEN SATURATION: 89 % | TEMPERATURE: 97.7 F | WEIGHT: 190.92 LBS | SYSTOLIC BLOOD PRESSURE: 144 MMHG | BODY MASS INDEX: 28.94 KG/M2

## 2021-08-08 DIAGNOSIS — I63.9 ACUTE CVA (CEREBROVASCULAR ACCIDENT) (HCC): ICD-10-CM

## 2021-08-08 DIAGNOSIS — Z91.89 AT RISK FOR PAIN: ICD-10-CM

## 2021-08-08 DIAGNOSIS — Z97.8 FOLEY CATHETER IN PLACE: ICD-10-CM

## 2021-08-08 DIAGNOSIS — R45.1 RESTLESSNESS AND AGITATION: ICD-10-CM

## 2021-08-08 DIAGNOSIS — K59.00 CONSTIPATION: ICD-10-CM

## 2021-08-08 DIAGNOSIS — R31.9 HEMATURIA: ICD-10-CM

## 2021-08-08 DIAGNOSIS — R77.8 ELEVATED TROPONIN: ICD-10-CM

## 2021-08-08 DIAGNOSIS — G47.00 INSOMNIA: ICD-10-CM

## 2021-08-08 DIAGNOSIS — R33.9 URINARY RETENTION: ICD-10-CM

## 2021-08-08 DIAGNOSIS — R91.8 MASS OF UPPER LOBE OF RIGHT LUNG: Primary | ICD-10-CM

## 2021-08-08 DIAGNOSIS — I82.4Z3 ACUTE DEEP VEIN THROMBOSIS (DVT) OF DISTAL VEIN OF BOTH LOWER EXTREMITIES (HCC): ICD-10-CM

## 2021-08-08 DIAGNOSIS — I51.89 RIGHT VENTRICULAR DIASTOLIC DYSFUNCTION: ICD-10-CM

## 2021-08-08 DIAGNOSIS — R09.89 SUSPECTED PULMONARY EMBOLISM: ICD-10-CM

## 2021-08-08 DIAGNOSIS — S06.9X9A: ICD-10-CM

## 2021-08-08 DIAGNOSIS — J96.01 ACUTE RESPIRATORY FAILURE WITH HYPOXIA (HCC): ICD-10-CM

## 2021-08-08 PROBLEM — I50.42 CHRONIC COMBINED SYSTOLIC AND DIASTOLIC CHF (CONGESTIVE HEART FAILURE) (HCC): Status: ACTIVE | Noted: 2021-08-08

## 2021-08-08 PROBLEM — D72.829 LEUKOCYTOSIS: Status: ACTIVE | Noted: 2021-08-08

## 2021-08-08 PROBLEM — D64.9 ANEMIA: Status: ACTIVE | Noted: 2021-08-08

## 2021-08-08 LAB
ANION GAP SERPL CALCULATED.3IONS-SCNC: 2 MMOL/L (ref 4–13)
BASOPHILS # BLD AUTO: 0.09 THOUSANDS/ΜL (ref 0–0.1)
BASOPHILS NFR BLD AUTO: 1 % (ref 0–1)
BUN SERPL-MCNC: 19 MG/DL (ref 5–25)
CALCIUM SERPL-MCNC: 10.3 MG/DL (ref 8.3–10.1)
CHLORIDE SERPL-SCNC: 106 MMOL/L (ref 100–108)
CO2 SERPL-SCNC: 31 MMOL/L (ref 21–32)
CREAT SERPL-MCNC: 0.79 MG/DL (ref 0.6–1.3)
EOSINOPHIL # BLD AUTO: 0.23 THOUSAND/ΜL (ref 0–0.61)
EOSINOPHIL NFR BLD AUTO: 2 % (ref 0–6)
ERYTHROCYTE [DISTWIDTH] IN BLOOD BY AUTOMATED COUNT: 15.6 % (ref 11.6–15.1)
GFR SERPL CREATININE-BSD FRML MDRD: 92 ML/MIN/1.73SQ M
GLUCOSE SERPL-MCNC: 93 MG/DL (ref 65–140)
HCT VFR BLD AUTO: 33.3 % (ref 36.5–49.3)
HGB BLD-MCNC: 9.9 G/DL (ref 12–17)
IMM GRANULOCYTES # BLD AUTO: 0.05 THOUSAND/UL (ref 0–0.2)
IMM GRANULOCYTES NFR BLD AUTO: 1 % (ref 0–2)
LYMPHOCYTES # BLD AUTO: 1.78 THOUSANDS/ΜL (ref 0.6–4.47)
LYMPHOCYTES NFR BLD AUTO: 17 % (ref 14–44)
MCH RBC QN AUTO: 25.4 PG (ref 26.8–34.3)
MCHC RBC AUTO-ENTMCNC: 29.7 G/DL (ref 31.4–37.4)
MCV RBC AUTO: 86 FL (ref 82–98)
MONOCYTES # BLD AUTO: 0.82 THOUSAND/ΜL (ref 0.17–1.22)
MONOCYTES NFR BLD AUTO: 8 % (ref 4–12)
NEUTROPHILS # BLD AUTO: 7.31 THOUSANDS/ΜL (ref 1.85–7.62)
NEUTS SEG NFR BLD AUTO: 71 % (ref 43–75)
NRBC BLD AUTO-RTO: 0 /100 WBCS
PLATELET # BLD AUTO: 427 THOUSANDS/UL (ref 149–390)
PMV BLD AUTO: 9.8 FL (ref 8.9–12.7)
POTASSIUM SERPL-SCNC: 4 MMOL/L (ref 3.5–5.3)
RBC # BLD AUTO: 3.89 MILLION/UL (ref 3.88–5.62)
SODIUM SERPL-SCNC: 139 MMOL/L (ref 136–145)
WBC # BLD AUTO: 10.28 THOUSAND/UL (ref 4.31–10.16)

## 2021-08-08 PROCEDURE — 97112 NEUROMUSCULAR REEDUCATION: CPT

## 2021-08-08 PROCEDURE — 97535 SELF CARE MNGMENT TRAINING: CPT

## 2021-08-08 PROCEDURE — 85025 COMPLETE CBC W/AUTO DIFF WBC: CPT | Performed by: PHYSICIAN ASSISTANT

## 2021-08-08 PROCEDURE — NC001 PR NO CHARGE

## 2021-08-08 PROCEDURE — 80048 BASIC METABOLIC PNL TOTAL CA: CPT | Performed by: PHYSICIAN ASSISTANT

## 2021-08-08 PROCEDURE — 97116 GAIT TRAINING THERAPY: CPT

## 2021-08-08 PROCEDURE — 97530 THERAPEUTIC ACTIVITIES: CPT

## 2021-08-08 PROCEDURE — 99223 1ST HOSP IP/OBS HIGH 75: CPT | Performed by: NURSE PRACTITIONER

## 2021-08-08 PROCEDURE — 99223 1ST HOSP IP/OBS HIGH 75: CPT | Performed by: STUDENT IN AN ORGANIZED HEALTH CARE EDUCATION/TRAINING PROGRAM

## 2021-08-08 RX ORDER — AMOXICILLIN 250 MG
2 CAPSULE ORAL 2 TIMES DAILY
Status: DISCONTINUED | OUTPATIENT
Start: 2021-08-08 | End: 2021-08-17 | Stop reason: HOSPADM

## 2021-08-08 RX ORDER — BISACODYL 10 MG
10 SUPPOSITORY, RECTAL RECTAL DAILY PRN
Status: CANCELLED | OUTPATIENT
Start: 2021-08-08

## 2021-08-08 RX ORDER — LANOLIN ALCOHOL/MO/W.PET/CERES
3 CREAM (GRAM) TOPICAL
Status: DISCONTINUED | OUTPATIENT
Start: 2021-08-08 | End: 2021-08-12

## 2021-08-08 RX ORDER — ATORVASTATIN CALCIUM 40 MG/1
40 TABLET, FILM COATED ORAL
Status: DISCONTINUED | OUTPATIENT
Start: 2021-08-08 | End: 2021-08-17 | Stop reason: HOSPADM

## 2021-08-08 RX ORDER — LANOLIN ALCOHOL/MO/W.PET/CERES
3 CREAM (GRAM) TOPICAL
Status: CANCELLED | OUTPATIENT
Start: 2021-08-08

## 2021-08-08 RX ORDER — POLYETHYLENE GLYCOL 3350 17 G/17G
17 POWDER, FOR SOLUTION ORAL DAILY
Status: CANCELLED | OUTPATIENT
Start: 2021-08-09

## 2021-08-08 RX ORDER — POLYETHYLENE GLYCOL 3350 17 G/17G
17 POWDER, FOR SOLUTION ORAL DAILY
Status: DISCONTINUED | OUTPATIENT
Start: 2021-08-09 | End: 2021-08-09

## 2021-08-08 RX ORDER — BISACODYL 10 MG
10 SUPPOSITORY, RECTAL RECTAL DAILY PRN
Status: DISCONTINUED | OUTPATIENT
Start: 2021-08-08 | End: 2021-08-09

## 2021-08-08 RX ORDER — ATORVASTATIN CALCIUM 40 MG/1
40 TABLET, FILM COATED ORAL
Status: CANCELLED | OUTPATIENT
Start: 2021-08-08

## 2021-08-08 RX ORDER — AMOXICILLIN 250 MG
2 CAPSULE ORAL 2 TIMES DAILY
Status: CANCELLED | OUTPATIENT
Start: 2021-08-08

## 2021-08-08 RX ADMIN — DOCUSATE SODIUM AND SENNOSIDES 2 TABLET: 8.6; 5 TABLET ORAL at 17:16

## 2021-08-08 RX ADMIN — APIXABAN 10 MG: 5 TABLET, FILM COATED ORAL at 17:16

## 2021-08-08 RX ADMIN — MELATONIN 3 MG: at 21:18

## 2021-08-08 RX ADMIN — APIXABAN 10 MG: 5 TABLET, FILM COATED ORAL at 09:38

## 2021-08-08 RX ADMIN — ATORVASTATIN CALCIUM 40 MG: 40 TABLET, FILM COATED ORAL at 17:16

## 2021-08-08 RX ADMIN — DOCUSATE SODIUM AND SENNOSIDES 2 TABLET: 8.6; 5 TABLET ORAL at 09:38

## 2021-08-08 RX ADMIN — POLYETHYLENE GLYCOL 3350 17 G: 17 POWDER, FOR SOLUTION ORAL at 09:38

## 2021-08-08 RX ADMIN — ACETAMINOPHEN 650 MG: 325 TABLET, FILM COATED ORAL at 00:28

## 2021-08-08 NOTE — OCCUPATIONAL THERAPY NOTE
Occupational Therapy Treatment Note:       08/08/21 0948   OT Last Visit   OT Visit Date 08/08/21   Note Type   Note Type Treatment  (Simultaneous filing  User may not have seen previous data )   Restrictions/Precautions   Other Precautions Cognitive; Chair Alarm; Bed Alarm;O2;Fall Risk  (Simultaneous filing  User may not have seen previous data )   Lifestyle   Reciprocal Relationships per pts wife, pt likes to joke as noted during todays session   Pain Assessment   Pain Assessment Tool Pain Assessment not indicated - pt denies pain  (Simultaneous filing  User may not have seen previous data )   ADL   Where Assessed Edge of bed   Grooming Assistance 4  Minimal Assistance   UB Bathing Assistance 4  Minimal Assistance   LB Bathing Assistance 3  Moderate Assistance   [facility] 2  Maximal Assistance  (max asst to [place] an around the back jacket/robe  )   UB Dressing Comments poor awareness of sleeve height after donning r sleeve  pt needed asst to bring jacket around his back and to place stronger l ue  LB Dressing Assistance 2  Maximal Assistance   LB Dressing Comments pt was able to thread pants onto r le with mod asst and requried max for socks and for clothing management  Toileting Assistance  1  Total Assistance   Toileting Comments incont of bowel smear, pt aware and voiced need to go  pt with indwelling cadet catheter at this time  tmax asst for clothing management and ta for anal hygiene   Functional Standing Tolerance   Time f- balance during clothing management and transfers   Bed Mobility   Supine to Sit 4  Minimal assistance  (hob elevated slightly  Simultaneous filing  User may not have seen previous data )   Additional items   (much incrased time allowed 2* to trunk coord / moderate vc's  Simultaneous filing  User may not have seen previous data )   Transfers   Sit to Stand 3  Moderate assistance  (Simultaneous filing   User may not have seen previous data )   Additional items   (mod vc's for hand placement, mod asst for standing  Simultaneous filing  User may not have seen previous data )   Stand to Sit 4  Minimal assistance  (Simultaneous filing  User may not have seen previous data )   Additional items Assist x 1; Armrests; Increased time required;Verbal cues   Stand pivot 3  Moderate assistance   Additional items   (asst to balance / manage rw at times  pt slow and ataxic)   Functional Mobility   Functional Mobility 3  Moderate assistance  (o2, cadet and chair follow)   Additional Comments   (ax1)   Additional items Rolling walker  (pt reports moderate fatigued post adl and mobility)   Cognition   Overall Cognitive Status Impaired   Arousal/Participation Alert   Attention Within functional limits   Orientation Level   (grossly oriented to month states "8" )   Memory Decreased short term memory;Decreased recall of recent events;Decreased recall of precautions   Following Commands Follows one step commands with increased time or repetition   Vision   Vision Comments recommend continued assessment   Activity Tolerance   Activity Tolerance Patient tolerated treatment well   Assessment   Assessment pt participated in am ot session and was seen focusing on adl tasks seated eob, dynamic sitting and standing balance, activity tolerence and ongoing cognitive and v/p0 assessment during adl tasks  pt with slight weakness r hemibody but appears more limited by ataxic trunk / body during mobility tasks  pt was able to close small snap using b hands with increased time  pt required heavy balance asst when attempting to use 1 ue to maange pants overh ips  pt with fair + [id number] balance dynamic and f- standing c re static   pt follows commands with increased time  pt is slow to verbally answer questions and is often incorrect  pt also is a know joker per pts wife  pt was on 3 l o2 throughout session and pulse ranged from 95 to 85%  pt reports moderate fatigue post session   pt is motivated and very cooperative  pts wife was present for posrtions of session   Plan   Treatment Interventions ADL retraining;Visual perceptual retraining;Functional transfer training;UE strengthening/ROM; Endurance training;Cognitive reorientation;Patient/family training;Equipment evaluation/education; Neuromuscular reeducation; Fine motor coordination activities; Compensatory technique education; Energy conservation; Activityengagement   Goal Expiration Date 08/16/21   OT Treatment Day 2   OT Frequency 3-5x/wk   Recommendation   OT Discharge Recommendation Post acute rehabilitation services   OT - OK to Discharge Yes   AM-PAC Daily Activity Inpatient   Lower Body Dressing 2   Bathing 2   Toileting 2   Upper Body Dressing 2   Grooming 3   Eating 3   Daily Activity Raw Score 14   Daily Activity Standardized Score (Calc for Raw Score >=11) 33 39   AM-PAC Applied Cognition Inpatient   Following a Speech/Presentation 2   Understanding Ordinary Conversation 3   Taking Medications 1   Remembering Where Things Are Placed or Put Away 2   Remembering List of 4-5 Errands 1   Taking Care of Complicated Tasks 1   Applied Cognition Raw Score 10   Applied Cognition Standardized Score 24 98   April A Earle White

## 2021-08-08 NOTE — ASSESSMENT & PLAN NOTE
Thought to be related to hypercoagulability secondary to malignancy  -Concern for PE as well   Transitioned from Eliquis 10mg BID to 5mg BID on 8/15    RIGHT LOWER LIMB:  Acute deep vein thrombosis is noted in the posterior tibial and peroneal veins  LEFT LOWER LIMB:  Acute deep vein thrombosis is noted in the peroneal veins  Acute superficial thrombophlebitis is noted in the great saphenous vein from  the mid thigh to the mid calf      OP oncology/PCP follow-up

## 2021-08-08 NOTE — ASSESSMENT & PLAN NOTE
· Stable 8/15, remains afebrile  · Low grade leukocytosis likely reactive from DVTs and suspected Pulmonary embolism  · Monitor for urinary or pulmonary infections clinically

## 2021-08-08 NOTE — CONSULTS
Internal Medicine Consultation Note    Patient: Angie Cevallos  Age/sex: 72 y o  male  Medical Record #: [id number]      ASSESSMENT PLAN    CVA   Multiembolic due to hypercoagulable state from cancer   Cont eliquis/atorvastatin   Rehab per PMR    B/L DVT/suspected PE   Continue eliquis    Adenocarcinoma of RUL   Seen by oncology   Needs outpatient PET scan for staging    Nasopharyngeal mass   Needs biopsy as outpatient by ENT    Elevated troponin   Needs cardiology f/u as outpatient for repeat echo   Ef 55% +RV hypokinesis        Subjective/ HPI: Patient seen and examined  Pt is a 70 y/o male who developed acute onset right facial droop, right hemiparesis and dysarthria on 7/31  He presented to the ER and was found to have multi embolic CVA, CTA however did not show any acute occlusions  He was not a candidate for tPA due to increased bleeding risk  His has a recent h/o weight loss as well as CALDERON and was scheduled to have outpatient CT scan  He underwent scan here and was found to have a nasopharyngeal mass as well as a RUL mass  He also had b/l LE edema and CALDERON, dopplers were positive for DVT in both legs and it was assumed he also had PE and was started on therapeutic lovenox  He also underwent biopsy of RUL mass on 8/5 which came back positive for adenocarcinoma and is likely the primary site  He will need to rehab and then have outpatient PET scan for further staging and to develop a plan a/t oncology  He was seen by ENT for the mass in his nasopharyngeal area and will need biopsy as outpatient  He was seen by cardiology for elevated troponin and abnormal echocardiogram   They felt that the troponin was secondary to the suspected PE  He will need repeat echo to evaluate his RV function as it was significantly decreased  He also had some acute urinary retention and required cadet catheter insertion  HE is now coming to acute rehab and we are asked to follow along for medical management      ROS:   A 10 point ROS was performed; negative except as noted above       Social History:    Substance Use History:   Social History     Substance and Sexual Activity   Alcohol Use Yes     Social History     Tobacco Use   Smoking Status Former Smoker   Smokeless Tobacco Never Used     Social History     Substance and Sexual Activity   Drug Use Never       Family History:    non-contributory      Review of Scheduled Meds:  Current Facility-Administered Medications   Medication Dose Route Frequency Provider Last Rate    apixaban  10 mg Oral BID Vanetta Christiank, DO      [START ON 8/14/2021] apixaban  5 mg Oral BID Vandamaris Gonzalesk, DO      atorvastatin  40 mg Oral Daily With [employer] a[employer] Mas DO      bisacodyl  10 mg Rectal Daily PRN Vanetta Stank, DO      melatonin  3 mg Oral HS Vanetta Christiank, DO      [START ON 8/9/2021] polyethylene glycol  17 g Oral Daily Vandamaris Gonzalesk, DO      senna-docusate sodium  2 tablet Oral BID Vandamaris Gonzalesk, DO         Labs:     Results from last 7 days   Lab Units 08/08/21  0457 08/07/21  1431   WBC Thousand/uL 10 28* 11 19*   HEMOGLOBIN g/dL 9 9* 10 4*   HEMATOCRIT % 33 3* 34 8*   PLATELETS Thousands/uL 427* 399*     Results from last 7 days   Lab Units 08/08/21  0457 08/07/21  1431   SODIUM mmol/L 139 139   POTASSIUM mmol/L 4 0 4 0   CHLORIDE mmol/L 106 106   CO2 mmol/L 31 28   BUN mg/dL 19 22   CREATININE mg/dL 0 79 0 96   CALCIUM mg/dL 10 3* 10 2*         Results from last 7 days   Lab Units 08/04/21  1647   INR  1 07        Results from last 7 days   Lab Units 08/07/21  1057   POC GLUCOSE mg/dl 118       No results found for: Paulina Azevedo, [facility] , [employer]UR    Input and Output Summary (last 24 hours):     No intake or output data in the 24 hours ending 08/08/21 1654    Imaging:     No orders to display       *Labs /Radiology studiesLabs reviewed  *Medications reviewed and reconciled as needed  *Please refer to order section for additional ordered labs studies  *Case discussed with primary attending during morning huddle case rounds    Vitals:   Temp (24hrs), Av °F (36 7 °C), Min:97 7 °F (36 5 °C), Max:98 3 °F (36 8 °C)    Temp:  [97 7 °F (36 5 °C)-98 3 °F (36 8 °C)] 97 9 °F (36 6 °C)  HR:  [65-69] 65  Resp:  [18-24] 20  BP: (131-144)/(75-79) 141/76  SpO2:  [89 %-96 %] 96 %  There is no height or weight on file to calculate BMI  Physical Exam:   GEN: No apparent distress, interactive  NEURO: Alert and oriented x3  HEENT: Pupils are equal and reactive, EOMI, mucous membranes are moist, face asymmetrical  CV: S1 S2 regular, no MRG, no peripheral edema noted  RESP: Lungs are clear bilaterally, no wheezes, rales or rhonchi noted, on room air, respirations easy and non labored  GI: Flat, soft non tender, non distended; +BS x4  : Voiding without difficulty  MUSC: Moves all extremities; mild right hemiparesis  SKIN: pink, warm and dry, normal turgor, no rashes, lesions          Invasive Devices     Peripheral Intravenous Line            Peripheral IV 21 Right Antecubital 3 days          Drain            Urethral Catheter 16 Fr  2 days                 VTE Pharmacologic Prophylaxis: Sequential compression device (Venodyne)   Code Status: Level 1 - Full Code  Current Length of Stay: 0 day(s)    Total floor / unit time spent today 1 hour with more than 50% spent counseling/coordinating care  Counseling includes discussion with patient re: progress  and discussion with patient of his/her current medical state/information  Coordination of patient's care was performed in conjunction with primary service  Time invested included review of patient's labs, vitals, and management of their comorbidities with continued monitoring  In addition, this patient was discussed with medical team including physician and advanced extenders  The care of the patient was extensively discussed and appropriate treatment plan was formulated unique for this patient       ** Please Note: Fluency Direct voice to text software may have been used in the creation of this document   Audio transcription errors may occur**

## 2021-08-08 NOTE — TREATMENT PLAN
Individualized Plan of [address] [facility] [place] 72 y o  male MRN: [id number]  Unit/Bed#: -01 Encounter: [id number]     PATIENT INFORMATION  ADMISSION DATE: 8/8/2021  2:18 PM TERESA CATEGORY:Stroke:  01 2  Right Body Involvement (Left Brain)   ADMISSION DIAGNOSIS: CVA (cerebral vascular accident) ([facility] [address] ) [I63 9]  EXPECTED LOS: 2 - 3 weeks     MEDICAL/FUNCTIONAL PROGNOSIS  Based on my assessment of the patient's medical conditions and current functional status, the prognosis for attaining medical and functional goals or the IRF stay is:  Good    Medical Goals: Patient will be medically stable for discharge to [facility] upon completion of rehab program and Patient will be able to manage medical conditions and comorbid conditions with medications and follow up upon completion of rehab program    Cardiopulmonary function in setting of diastolic and systolic dysfunction/Anemia: Ensure cardiopulmonary stability and optimize cardiopulmonary function not only at rest but with activity as patient's activity level significantly increases in acute rehab compared with prior to transfer in preparation for safe discharge from [facility]  Must closely and frequently monitor blood pressure, HR, anemia to ensure adequate cardiac output during ADLs and ambulation as patient is at increased risk for orthostatic hypotension/syncope and potential injury if not monitored for and managed adequately  Hypoxia prevention: Ensure appropriate level of oxygenation at rest and with activity to avoid symptomatic hypoxia, maximize functional performance, and decrease risk of atelectasis/pneumonia through close and frequent monitoring, providing appropriate respiratory treatments (such as incentive spirometry), and when necessary provide/adjust respiratory medications      Stroke with hemiparesis:  Intensive skilled therapies with physical therapy and occupational therapy with close oversight and management by rehab specialized physician in acute rehabilitation setting to most expeditiously and effectively improve functional mobility, transfers, upper and lower body strengthening, conditioning, balance, and gait training with appropriate assistive device  Patient will have optimal blood pressure management and blood sugar control  Prevent/decrease risk of VTE, atelectasis, pneumonia, skin ulceration, fall, other injury (and when applicable shoulder subluxation, chronic shoulder pain, plantarflexion contracture)  Cognitive deficits following cerebrovascular disease: intensive skilled therapies including speech language pathology to evaluate and treat deficits in cognition  Close oversight and management by rehab specialized physician of cognitive deficits and potential confounding factors (prevention of, monitoring for, and if found treatment of possible complications such as infections, as well as optimally managing sleep, mood, and meications which can negatively impact cognition and functional recovery)  Dysphagia: improve swallowing through SLP evaluation with intensive treatments, optimal nutrition, and fluid intake  Adjust diet and swallowing precautions as indicated to decrease risk of aspiration pneumonia and respiratory distress  Close oversight and management by rehab specialized physician  Inpatient rehabilitation education/teaching: To be provided to patient and typically family/caregiver (if able to be identified) by all skilled therapists, rehab nursing, case management, and rehab specialized physician to ensure optimal recovery and decrease risks of complications in both acute rehabilitation setting as well as after discharge  Electrolyte abnormality: hypercalcemia:  placing patient at risk for additional complications which may impact medical stability and functional recovery    Frequent measurement and monitoring of labs by with appropriate adjustments in medication and/or fluid management by rehabilitation physician and internal medicine consultant  Acute indwelling cadet management: Appropriate urinary retention management and voiding protocols which include close monitoring and evaluation of bladder scans/post-void residuals once cadet removed, toileting program, and monitor voided urinary output with goal of expeditious but safe and appropriate removal of indwelling cadet catheter, improved urinary retention and risks associated with retention some of which include infection, bladder stretch injury, and kidney injury  Adjustments in medications are common and will be provided if indicated as well  Bowel dysfunction: Appropriate bowel management with appropriate toileting program from rehab nursing and staff with oversight management by rehabilitation physicians which include adjustments in medications to optimize appropriate bowel function and prevent/decrease risk of constipation and bowel obstruction  Protein-calorie malnutrition with failure to thrive-unintentional weight loss:  Close monitoring of nutrition status, caloric intake, nutrition specialist with adjustments in diet, supplements, and at times medications to optimize nutritional status for both short-term and long-term functional recovery and prevention of complications associated with malnutrition  Skin: Appropriate skin checks for wound/skin evaluation  Ensure frequent appropriate turning, positioning in bed, in chair, when mobilizing, and when appropriate with use of appropriate devices to optimize healing and decrease risk of worsening or new skin breakdown          ANTICIPATED DISCHARGE DISPOSITION AND SERVICES  COMMUNITY SETTING: Home - independent/modified independent    ANTICIPATED FOLLOW-UP SERVICE:   Outpatient Therapy Services: PT, OT and SLP     DISCIPLINE SPECIFIC PLANS:  Required Disciplines & Services: Rehabillitation Nursing, Case Management, Repiratory Therapy, Dietay/Nutrition and Psychology      REQUIRED THERAPY:  Therapy Hours per Day Days per Week Total Days   Physical Therapy 1 5 21   Occupational Therapy 1 5 21   Speech/Language Therapy 1 5 21   NOTE: Additional therapy time(s) or changes to allocation of therapies as appropriate to meet patient needs and to achieve functional goals  Patient will either participate in above therapy regimen or participate in 900 minutes of therapy within 7 day week consisting of PT, OT and SLP due to the following medical procedure/condition:Stroke:  01 2  Right Body Involvement (Left Brain)    ANTICIPATED FUNCTIONAL OUTCOMES:  ADL:  Modified independetn for basic ADLs, supervision to Min A for iADLs   Bladder/Bowel:   Independent-supervision   Transfers:  Modified independent   Locomotion:   Modified independent   Cognitive:  Supervision-independent     DISCHARGE PLANNING NEEDS  Equipment needs: Discharge needs to be reviewed with team      REHAB ANTICIPATED PARTICIPATION RESTRICTIONS:  Amubulate Short Distances Only, Assist with Bathing in Tub, Assist with Mobility, Assist with Tub Shower Transfer, Decreased Insight to Deficits, Decreaed Safety Awareness, Rquires Assist with ADLS, Requires Assit with Homemaking, Requires Assit with Steps and Requires modified diet    Aubrey Hairston,   Physical Medicine and [place]

## 2021-08-08 NOTE — ASSESSMENT & PLAN NOTE
· Troponin elevation likely type 2 secondary to pulmonary embolism and hypoxia  ? 6 90 > 7 54 > 10 80 > Trended by SLIM to Peak at 10 8, next Tropnin was 10 3 and discontinued  · Cardio consulted  ? Recommend OP TTE  to recheck right ventricle function in 4-6 weeks from prior  ? Discussed with medicine CTA chest consideration and will hold for now as not likely to acutely change mgmt patient can follow-up with pulmonary and cards as OP to reconsider  · On Eliquis  · OP f/u cards and pulm     ECHO:  · Left ventricular EF was estimated to be 55 %  Wall thickness was mildly increased  Grade 1 diastolic dysfunction  · The right ventricle was mildly to moderately dilated  Systolic function was mildly reduced  There is hypokinesis of the mid free wall  · The right atrium was mildly dilated  · A small pericardial effusion was identified

## 2021-08-08 NOTE — ASSESSMENT & PLAN NOTE
· Hampton placed on 8/6 for urinary retention > continue for now per urology f/u 8/16  · Improving hematuria   · Hampton can remain in place for up to 1 month  · (Proscar was never started by urology)   · Eliquis dose decreased  · OP urology follow-up

## 2021-08-08 NOTE — PHYSICAL THERAPY NOTE
Physical Therapy Progress Note     08/08/21 0948   PT Last Visit   PT Visit Date 08/08/21   Note Type   Note Type Treatment for insurance authorization   Pain Assessment   Pain Assessment Tool Pain Assessment not indicated - pt denies pain   Restrictions/Precautions   Other Precautions Cognitive; Chair Alarm; Fall Risk;Multiple lines;O2  ([place] catheter)   Subjective   Subjective Pt encountered supine in bed, pleasant and agreeable to treatment  Reports no new complaints  Follows commands with occastional repetition, though does demonstrate limited carryover between trials  Reports no changes in status with activity  Bed Mobility   Supine to Sit 4  Minimal assistance   Additional items Assist x 1;HOB elevated; Increased time required;Verbal cues   Transfers   Sit to Stand 3  Moderate assistance   Additional items Assist x 1; Armrests; Increased time required;Verbal cues   Stand to Sit 3  Moderate assistance   Additional items Assist x 1; Armrests; Increased time required;Verbal cues   Ambulation/Elevation   Gait pattern Excessively slow; Short stride; Inconsistent shala; Shuffling;Decreased foot clearance;Narrow ALEX; Improper Weight shift; Poor UE support   Gait Assistance 3  Moderate assist   Additional items Assist x 1   Assistive Device Rolling walker   Distance 30' x 2   Balance   Static Sitting Fair   Dynamic Sitting Poor +   Static Standing Poor   Ambulatory Poor   Endurance Deficit   Endurance Deficit Yes   Endurance Deficit Description fatigue, dyspnea with exertion on 3L O2 nc with SpO2 92% at rest, dropping to 87% s/p ambulation, returning to baseline with short rest    Activity Tolerance   Activity Tolerance Patient tolerated treatment well;Patient limited by fatigue   Nurse Made Aware yes   Assessment   Prognosis Good   Problem List Decreased strength;Decreased endurance; Impaired balance;Decreased mobility; Decreased cognition   Assessment Pt demonstrted improved bed mobiilty this session, performing transfer with reduced assist & demonstrating no LOB once sitting unsupported EOB  Does require increased assist when performing dynamic UE & LE movments to dress with LONGORIA, which results in retropulsion  Min-mod A required to prevent overt posterior LOB  Pt able to perform repeated sit to stand transfers from bed & chair without LOB, but does require increased assist to initiate from bed surface  LEs appeared unsteady, but no knee buckling noted in standing or while ambulatory  Pt maintained static standing for extended periods of time for pericare & donning pants EOB prior to ambulation  During ambulation, pt demosntrated inconsistant step length & foot clearance as well as limited  strength & poor hand position of RUE on RW  Requried assist for RW management as a result  Pt also demosntrated R lateral lean as he fatigued during each trial, which he was able to correct slightly during 2nd trial with instructions  Discussed POC, goals of care & role of PT at this time, as  well as plan to d/c to rehab when medically cleared  Pt & wife in agreement at this time  Will continue to follow and progress mobility, addressing functional deficits & reducing burden of care with these tasks  Goals   Patient Goals to get stronger & go home   STG Expiration Date 08/16/21   PT Treatment Day 3   Plan   Treatment/Interventions Functional transfer training;LE strengthening/ROM; Therapeutic exercise; Endurance training;Patient/family training;Equipment eval/education; Bed mobility;Gait training   Progress Progressing toward goals   PT Frequency Other (Comment)  (3-5x/week)   Recommendation   PT Discharge Recommendation Post acute rehabilitation services   Equipment Recommended [address] Mobility Inpatient   Turning in Bed Without Bedrails 3   Lying on Back to Sitting on Edge of Flat Bed 3   Moving Bed to Chair 2   Standing Up From Chair 2   Walk in Room 2   Climb 3-5 Stairs 2   Basic Mobility Inpatient Raw Score 14 Basic Mobility Standardized Score 35 55   The patient's AM-PAC Basic Mobility Inpatient Short Form Raw Score is 14, Standardized Score is 35 55  A standardized score greater than 42 9 suggests the patient may benefit from discharge to home  Please also refer to the recommendation of the Physical Therapist for safe discharge planning            Adolph Fabian PTA

## 2021-08-08 NOTE — CASE MANAGEMENT
Per chart review this AM PT/OT visit completed  CM informed [facility] admission's Deven Chavarria of same via TT at 10:30am      10:49AM:  Pt accepted for admission to [facility] to room 970 at 2pm report to be called to   CM informed Pts, pt's spouse, bedside RN, and Dr Lisy Roberts of same  CM placed TC to [place] [employer] in regards to DME: [facility] order for pt, as daughter informed she has already paid co-payment cost for order  CM spoke with customer service who informed cancel of order was in error and advised to deliver [facility] via in stock consignment  BSC delivered at bedside and delivery ticket signed  Copy of delivery ticket placed in [facility] mailbox  O2 order canceled as pt will be re-evaluated for DME needs prior to d/c from [facility]  CM to continue to remain available for d/c needs and or concerns as needed

## 2021-08-08 NOTE — PLAN OF CARE
Problem: PAIN - ADULT  Goal: Verbalizes/displays adequate comfort level or baseline comfort level  Description: Interventions:  - Encourage patient to monitor pain and request assistance  - Assess pain using appropriate pain scale  - Administer analgesics based on type and severity of pain and evaluate response  - Implement non-pharmacological measures as appropriate and evaluate response  - Consider cultural and social influences on pain and pain management  - Notify physician/advanced practitioner if interventions unsuccessful or patient reports new pain  Outcome: Progressing     Problem: INFECTION - ADULT  Goal: Absence or prevention of progression during hospitalization  Description: INTERVENTIONS:  - Assess and monitor for signs and symptoms of infection  - Monitor lab/diagnostic results  - Monitor all insertion sites, i e  indwelling lines, tubes, and drains  - Monitor endotracheal if appropriate and nasal secretions for changes in amount and color  - Thompson appropriate cooling/warming therapies per order  - Administer medications as ordered  - Instruct and encourage patient and family to use good hand hygiene technique  - Identify and instruct in appropriate isolation precautions for identified infection/condition  Outcome: Progressing     Problem: SAFETY ADULT  Goal: Patient will remain free of falls  Description: INTERVENTIONS:  - Educate patient/family on patient safety including physical limitations  - Instruct patient to call for assistance with activity   - Consult OT/PT to assist with strengthening/mobility   - Keep Call bell within reach  - Keep bed low and locked with side rails adjusted as appropriate  - Keep care items and personal belongings within reach  - Initiate and maintain comfort rounds  - Make Fall Risk Sign visible to staff  - Offer Toileting every Hours, in advance of need  - Initiate/Maintain alarm  - Obtain necessary fall risk management equipment:   - Apply yellow socks and bracelet for high fall risk patients  - Consider moving patient to room near nurses station  Outcome: Progressing  Goal: Maintain or return to baseline ADL function  Description: INTERVENTIONS:  -  Assess patient's ability to carry out ADLs; assess patient's baseline for ADL function and identify physical deficits which impact ability to perform ADLs (bathing, care of mouth/teeth, toileting, grooming, dressing, etc )  - Assess/evaluate cause of self-care deficits   - Assess range of motion  - Assess patient's mobility; develop plan if impaired  - Assess patient's need for assistive devices and provide as appropriate  - Encourage maximum independence but intervene and supervise when necessary  - Involve family in performance of ADLs  - Assess for home care needs following discharge   - Consider OT consult to assist with ADL evaluation and planning for discharge  - Provide patient education as appropriate  Outcome: Progressing  Goal: Maintains/Returns to pre admission functional level  Description: INTERVENTIONS:  - Perform BMAT or MOVE assessment daily    - Set and communicate daily mobility goal to care team and patient/family/caregiver  - Collaborate with rehabilitation services on mobility goals if consulted  - Perform Range of Motion  times a day  - Reposition patient every  hours    - Dangle patient  times a day  - Stand patient  times a day  - Ambulate patient  times a day  - Out of bed to chair times a day   - Out of bed for meals times a day  - Out of bed for toileting  - Record patient progress and toleration of activity level   Outcome: Progressing     Problem: DISCHARGE PLANNING  Goal: Discharge to home or other facility with appropriate resources  Description: INTERVENTIONS:  - Identify barriers to discharge w/patient and caregiver  - Arrange for needed discharge resources and transportation as appropriate  - Identify discharge learning needs (meds, wound care, etc )  - Arrange for interpretive services to assist at discharge as needed  - Refer to Case Management Department for coordinating discharge planning if the patient needs post-hospital services based on physician/advanced practitioner order or complex needs related to functional status, cognitive ability, or social support system  Outcome: Progressing     Problem: NEUROSENSORY - ADULT  Goal: Achieves stable or improved neurological status  Description: INTERVENTIONS  - Monitor and report changes in neurological status  - Monitor vital signs such as temperature, blood pressure, glucose, and any other labs ordered   - Initiate measures to prevent increased intracranial pressure  - Monitor for seizure activity and implement precautions if appropriate      Outcome: Progressing  Goal: Achieves maximal functionality and self care  Description: INTERVENTIONS  - Monitor swallowing and airway patency with patient fatigue and changes in neurological status  - Encourage and assist patient to increase activity and self care     - Encourage visually impaired, hearing impaired and aphasic patients to use assistive/communication devices  Outcome: Progressing     Problem: RESPIRATORY - ADULT  Goal: Achieves optimal ventilation and oxygenation  Description: INTERVENTIONS:  - Assess for changes in respiratory status  - Assess for changes in mentation and behavior  - Position to facilitate oxygenation and minimize respiratory effort  - Oxygen administered by appropriate delivery if ordered  - Initiate smoking cessation education as indicated  - Encourage broncho-pulmonary hygiene including cough, deep breathe, Incentive Spirometry  - Assess the need for suctioning and aspirate as needed  - Assess and instruct to report SOB or any respiratory difficulty  - Respiratory Therapy support as indicated  Outcome: Progressing     Problem: GENITOURINARY - ADULT  Goal: Maintains or returns to baseline urinary function  Description: INTERVENTIONS:  - Assess urinary function  - Encourage oral fluids to ensure adequate hydration if ordered  - Administer IV fluids as ordered to ensure adequate hydration  - Administer ordered medications as needed  - Offer frequent toileting  - Follow urinary retention protocol if ordered  Outcome: Progressing  Goal: Absence of urinary retention  Description: INTERVENTIONS:  - Assess patients ability to void and empty bladder  - Monitor I/O  - Bladder scan as needed  - Discuss with physician/AP medications to alleviate retention as needed  - Discuss catheterization for long term situations as appropriate  Outcome: Progressing  Goal: Urinary catheter remains patent  Description: INTERVENTIONS:  - Assess patency of urinary catheter  - If patient has a chronic cadet, consider changing catheter if non-functioning  - Follow guidelines for intermittent irrigation of non-functioning urinary catheter  Outcome: Progressing     Problem: SKIN/TISSUE INTEGRITY - ADULT  Goal: Skin Integrity remains intact(Skin Breakdown Prevention)  Description: Assess:  -Perform [place] assessment every   -Clean and moisturize skin every   -Inspect skin when repositioning, toileting, and assisting with ADLS  -Assess under medical devices such as every   -Assess extremities for adequate circulation and sensation     Bed Management:  -Have minimal linens on bed & keep smooth, unwrinkled  -Change linens as needed when moist or perspiring  -Avoid sitting or lying in one position for more than  hours while in bed  -Keep HOB at degrees     Toileting:  -Offer bedside commode  -Assess for incontinence every   -Use incontinent care products after each incontinent episode such as     Activity:  -Mobilize patient times a day  -Encourage activity and walks on unit  -Encourage or provide ROM exercises   -Turn and reposition patient every Hours  -Use appropriate equipment to lift or move patient in bed  -Instruct/ Assist with weight shifting every when out of bed in chair  -Consider limitation of chair time hour intervals    Skin Care:  -Avoid use of baby powder, tape, friction and shearing, hot water or constrictive clothing  -Relieve pressure over bony prominences using   -Do not massage red bony areas    Next Steps:  -Teach patient strategies to minimize risks such as   -Consider consults to  interdisciplinary teams such as   Outcome: Progressing     Problem: MUSCULOSKELETAL - ADULT  Goal: Maintain or return mobility to safest level of function  Description: INTERVENTIONS:  - Assess patient's ability to carry out ADLs; assess patient's baseline for ADL function and identify physical deficits which impact ability to perform ADLs (bathing, care of mouth/teeth, toileting, grooming, dressing, etc )  - Assess/evaluate cause of self-care deficits   - Assess range of motion  - Assess patient's mobility  - Assess patient's need for assistive devices and provide as appropriate  - Encourage maximum independence but intervene and supervise when necessary  - Involve family in performance of ADLs  - Assess for home care needs following discharge   - Consider OT consult to assist with ADL evaluation and planning for discharge  - Provide patient education as appropriate  Outcome: Progressing  Goal: Maintain proper alignment of affected body part  Description: INTERVENTIONS:  - Support, maintain and protect limb and body alignment  - Provide patient/ family with appropriate education  Outcome: Progressing

## 2021-08-08 NOTE — PLAN OF CARE
Problem: Neurological Deficit  Goal: Neurological status is stable or improving  Description: Interventions:  - Monitor and assess patient's level of consciousness, motor function, sensory function, and level of assistance needed for ADLs  - Monitor and report changes from baseline  Collaborate with interdisciplinary team to initiate plan and implement interventions as ordered  - Provide and maintain a safe environment  - Consider seizure precautions  - Consider fall precautions  - Consider aspiration precautions  - Consider bleeding precautions  Outcome: Completed     Problem: Activity Intolerance/Impaired Mobility  Goal: Mobility/activity is maintained at optimum level for patient  Description: Interventions:  - Assess and monitor patient  barriers to mobility and need for assistive/adaptive devices  - Assess patient's emotional response to limitations  - Collaborate with interdisciplinary team and initiate plans and interventions as ordered  - Encourage independent activity per ability   - Maintain proper body alignment  - Perform active/passive rom as tolerated/ordered  - Plan activities to conserve energy   - Turn patient as appropriate  Outcome: Completed     Problem: Communication Impairment  Goal: Ability to express needs and understand communication  Description: Assess patient's communication skills and ability to understand information  Patient will demonstrate use of effective communication techniques, alternative methods of communication and understanding even if not able to speak  - Encourage communication and provide alternate methods of communication as needed  - Collaborate with case management/ for discharge needs  - Include patient/family/caregiver in decisions related to communication    Outcome: Completed     Problem: Potential for Aspiration  Goal: Non-ventilated patient's risk of aspiration is minimized  Description: Assess and monitor vital signs, respiratory status, and labs (WBC)  Monitor for signs of aspiration (tachypnea, cough, rales, wheezing, cyanosis, fever)  - Assess and monitor patient's ability to swallow  - Place patient up in chair to eat if possible  - HOB up at 90 degrees to eat if unable to get patient up into chair   - Supervise patient during oral intake  - Instruct patient/ family to take small bites  - Instruct patient/ family to take small single sips when taking liquids  - Follow patient-specific strategies generated by speech pathologist   Outcome: Completed  Goal: Ventilated patient's risk of aspiration is minimized  Description: Assess and monitor vital signs, respiratory status, airway cuff pressure, and labs (WBC)  Monitor for signs of aspiration (tachypnea, cough, rales, wheezing, cyanosis, fever)  - Elevate head of bed 30 degrees if patient has tube feeding   - Monitor tube feeding  Outcome: Completed     Problem: Nutrition  Goal: Nutrition/Hydration status is improving  Description: Monitor and assess patient's nutrition/hydration status for malnutrition (ex- brittle hair, bruises, dry skin, pale skin and conjunctiva, muscle wasting, smooth red tongue, and disorientation)  Collaborate with interdisciplinary team and initiate plan and interventions as ordered  Monitor patient's weight and dietary intake as ordered or per policy  Utilize nutrition screening tool and intervene per policy  Determine patient's food preferences and provide high-protein, high-caloric foods as appropriate  - Assist patient with eating   - Allow adequate time for meals   - Encourage patient to take dietary supplement as ordered  - Collaborate with clinical nutritionist   - Include patient/family/caregiver in decisions related to nutrition    Outcome: Completed     Problem: Prexisting or High Potential for Compromised Skin Integrity  Goal: Skin integrity is maintained or improved  Description: INTERVENTIONS:  - Identify patients at risk for skin breakdown  - Assess and monitor skin integrity  - Assess and monitor nutrition and hydration status  - Monitor labs   - Assess for incontinence   - Turn and reposition patient  - Assist with mobility/ambulation  - Relieve pressure over bony prominences  - Avoid friction and shearing  - Provide appropriate hygiene as needed including keeping skin clean and dry  - Evaluate need for skin moisturizer/barrier cream  - Collaborate with interdisciplinary team   - Patient/family teaching  - Consider wound care consult   Outcome: Completed     Problem: Potential for Falls  Goal: Patient will remain free of falls  Description: INTERVENTIONS:  - Educate patient/family on patient safety including physical limitations  - Instruct patient to call for assistance with activity   - Consult OT/PT to assist with strengthening/mobility   - Keep Call bell within reach  - Keep bed low and locked with side rails adjusted as appropriate  - Keep care items and personal belongings within reach  - Initiate and maintain comfort rounds  - Make Fall Risk Sign visible to staff  - Offer Toileting every Hours, in advance of need  - Initiate/Maintain alarm  - Obtain necessary fall risk management equipment:   - Apply yellow socks and bracelet for high fall risk patients  - Consider moving patient to room near nurses station  Outcome: Completed

## 2021-08-08 NOTE — ASSESSMENT & PLAN NOTE
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

## 2021-08-08 NOTE — PLAN OF CARE
Problem: PAIN - ADULT  Goal: Verbalizes/displays adequate comfort level or baseline comfort level  Description: Interventions:  - Encourage patient to monitor pain and request assistance  - Assess pain using appropriate pain scale  - Administer analgesics based on type and severity of pain and evaluate response  - Implement non-pharmacological measures as appropriate and evaluate response  - Consider cultural and social influences on pain and pain management  - Notify physician/advanced practitioner if interventions unsuccessful or patient reports new pain  Outcome: Progressing     Problem: INFECTION - ADULT  Goal: Absence or prevention of progression during hospitalization  Description: INTERVENTIONS:  - Assess and monitor for signs and symptoms of infection  - Monitor lab/diagnostic results  - Monitor all insertion sites, i e  indwelling lines, tubes, and drains  - Hall appropriate cooling/warming therapies per order  - Administer medications as ordered  - Instruct and encourage patient and family to use good hand hygiene technique  - Identify and instruct in appropriate isolation precautions for identified infection/condition  Outcome: Progressing  Goal: Absence of fever/infection during neutropenic period  Description: INTERVENTIONS:  - Monitor WBC    Outcome: Progressing     Problem: SAFETY ADULT  Goal: Patient will remain free of falls  Description: INTERVENTIONS:  - Educate patient/family on patient safety including physical limitations  - Instruct patient to call for assistance with activity   - Consult OT/PT to assist with strengthening/mobility   - Keep Call bell within reach  - Keep bed low and locked with side rails adjusted as appropriate  - Keep care items and personal belongings within reach  - Initiate and maintain comfort rounds  - Make Fall Risk Sign visible to staff  - Offer Toileting every 2 Hours, in advance of need  - Initiate/Maintain bed alarm  - Apply yellow socks and bracelet for high fall risk patients  - Consider moving patient to room near nurses station  Outcome: Progressing  Goal: Maintain or return to baseline ADL function  Description: INTERVENTIONS:  -  Assess patient's ability to carry out ADLs; assess patient's baseline for ADL function and identify physical deficits which impact ability to perform ADLs (bathing, care of mouth/teeth, toileting, grooming, dressing, etc )  - Assess/evaluate cause of self-care deficits   - Assess range of motion  - Assess patient's mobility; develop plan if impaired  - Assess patient's need for assistive devices and provide as appropriate  - Encourage maximum independence but intervene and supervise when necessary  - Involve family in performance of ADLs  - Assess for home care needs following discharge   - Consider OT consult to assist with ADL evaluation and planning for discharge  - Provide patient education as appropriate  Outcome: Progressing  Goal: Maintains/Returns to pre admission functional level  Description: INTERVENTIONS:  - Perform BMAT or MOVE assessment daily    - Set and communicate daily mobility goal to care team and patient/family/caregiver  - Collaborate with rehabilitation services on mobility goals if consulted  - Perform Range of Motion 3 times a day  - Reposition patient every 2 hours    - Dangle patient 3 times a day  - Stand patient 3 times a day  - Ambulate patient 3 times a day  - Out of bed to chair 3 times a day   - Out of bed for meals 3 times a day  - Out of bed for toileting  - Record patient progress and toleration of activity level   Outcome: Progressing     Problem: DISCHARGE PLANNING  Goal: Discharge to home or other facility with appropriate resources  Description: INTERVENTIONS:  - Identify barriers to discharge w/patient and caregiver  - Arrange for needed discharge resources and transportation as appropriate  - Identify discharge learning needs (meds, wound care, etc )  - Arrange for interpretive services to assist at discharge as needed  - Refer to Case Management Department for coordinating discharge planning if the patient needs post-hospital services based on physician/advanced practitioner order or complex needs related to functional status, cognitive ability, or social support system  Outcome: Progressing     Problem: Knowledge Deficit  Goal: Patient/family/caregiver demonstrates understanding of disease process, treatment plan, medications, and discharge instructions  Description: Complete learning assessment and assess knowledge base  Interventions:  - Provide teaching at level of understanding  - Provide teaching via preferred learning methods  Outcome: Progressing     Problem: Nutrition/Hydration-ADULT  Goal: Nutrient/Hydration intake appropriate for improving, restoring or maintaining nutritional needs  Description: Monitor and assess patient's nutrition/hydration status for malnutrition  Collaborate with interdisciplinary team and initiate plan and interventions as ordered  Monitor patient's weight and dietary intake as ordered or per policy  Utilize nutrition screening tool and intervene as necessary  Determine patient's food preferences and provide high-protein, high-caloric foods as appropriate       INTERVENTIONS:  - Monitor oral intake, urinary output, labs, and treatment plans  - Assess nutrition and hydration status and recommend course of action  - Evaluate amount of meals eaten  - Assist patient with eating if necessary   - Allow adequate time for meals  - Recommend/ encourage appropriate diets, oral nutritional supplements, and vitamin/mineral supplements  - Order, calculate, and assess calorie counts as needed  - Assess need for intravenous fluids  - Provide specific nutrition/hydration education as appropriate  - Include patient/family/caregiver in decisions related to nutrition  Outcome: Progressing     Problem: MOBILITY - ADULT  Goal: Maintain or return to baseline ADL function  Description: INTERVENTIONS:  -  Assess patient's ability to carry out ADLs; assess patient's baseline for ADL function and identify physical deficits which impact ability to perform ADLs (bathing, care of mouth/teeth, toileting, grooming, dressing, etc )  - Assess/evaluate cause of self-care deficits   - Assess range of motion  - Assess patient's mobility; develop plan if impaired  - Assess patient's need for assistive devices and provide as appropriate  - Encourage maximum independence but intervene and supervise when necessary  - Involve family in performance of ADLs  - Assess for home care needs following discharge   - Consider OT consult to assist with ADL evaluation and planning for discharge  - Provide patient education as appropriate  Outcome: Progressing  Goal: Maintains/Returns to pre admission functional level  Description: INTERVENTIONS:  - Perform BMAT or MOVE assessment daily    - Set and communicate daily mobility goal to care team and patient/family/caregiver  - Collaborate with rehabilitation services on mobility goals if consulted  - Perform Range of Motion 3 times a day  - Reposition patient every 2 hours    - Dangle patient 3 times a day  - Stand patient 3 times a day  - Ambulate patient 3 times a day  - Out of bed to chair 3 times a day   - Out of bed for meals 3 times a day  - Out of bed for toileting  - Record patient progress and toleration of activity level   Outcome: Progressing     Problem: Prexisting or High Potential for Compromised Skin Integrity  Goal: Skin integrity is maintained or improved  Description: INTERVENTIONS:  - Identify patients at risk for skin breakdown  - Assess and monitor skin integrity  - Assess and monitor nutrition and hydration status  - Monitor labs   - Assess for incontinence   - Turn and reposition patient  - Assist with mobility/ambulation  - Relieve pressure over bony prominences  - Avoid friction and shearing  - Provide appropriate hygiene as needed including keeping skin clean and dry  - Evaluate need for skin moisturizer/barrier cream  - Collaborate with interdisciplinary team   - Patient/family teaching  - Consider wound care consult   Outcome: Progressing     Problem: Potential for Falls  Goal: Patient will remain free of falls  Description: INTERVENTIONS:  - Educate patient/family on patient safety including physical limitations  - Instruct patient to call for assistance with activity   - Consult OT/PT to assist with strengthening/mobility   - Keep Call bell within reach  - Keep bed low and locked with side rails adjusted as appropriate  - Keep care items and personal belongings within reach  - Initiate and maintain comfort rounds  - Make Fall Risk Sign visible to staff  - Offer Toileting every 2 Hours, in advance of need  - Initiate/Maintain bed alarm  - Apply yellow socks and bracelet for high fall risk patients  - Consider moving patient to room near nurses station  Outcome: Progressing

## 2021-08-08 NOTE — PLAN OF CARE
Problem: PHYSICAL THERAPY ADULT  Goal: Performs mobility at highest level of function for planned discharge setting  See evaluation for individualized goals  Description: Treatment/Interventions: Functional transfer training, LE strengthening/ROM, Endurance training, Cognitive reorientation, Patient/family training, Bed mobility, Gait training, Spoke to nursing, Spoke to case management, OT          See flowsheet documentation for full assessment, interventions and recommendations  Outcome: Progressing  Note: Prognosis: Good  Problem List: Decreased strength, Decreased endurance, Impaired balance, Decreased mobility, Decreased cognition  Assessment: Pt demonstrted improved bed mobiilty this session, performing transfer with reduced assist & demonstrating no LOB once sitting unsupported EOB  Does require increased assist when performing dynamic UE & LE movments to dress with LONGORIA, which results in retropulsion  Min-mod A required to prevent overt posterior LOB  Pt able to perform repeated sit to stand transfers from bed & chair without LOB, but does require increased assist to initiate from bed surface  LEs appeared unsteady, but no knee buckling noted in standing or while ambulatory  Pt maintained static standing for extended periods of time for pericare & donning pants EOB prior to ambulation  During ambulation, pt demosntrated inconsistant step length & foot clearance as well as limited  strength & poor hand position of RUE on RW  Requried assist for RW management as a result  Pt also demosntrated R lateral lean as he fatigued during each trial, which he was able to correct slightly during 2nd trial with instructions  Discussed POC, goals of care & role of PT at this time, as  well as plan to d/c to rehab when medically cleared  Pt & wife in agreement at this time  Will continue to follow and progress mobility, addressing functional deficits & reducing burden of care with these tasks    Barriers to Discharge: Inaccessible home environment        PT Discharge Recommendation: Post acute rehabilitation services     PT - OK to Discharge: Yes    See flowsheet documentation for full assessment

## 2021-08-08 NOTE — ARC ADMISSION
Patient will admit to [address] [place] [address] room 970 with pickup at 2pm  Report can be called to   Noted that COVID test resulted negative on 8/7/2021  CM has been updated

## 2021-08-08 NOTE — ASSESSMENT & PLAN NOTE
Lung Adenocarcinoma   - Found to have a 6 4x3  6x3 4cm solid RUL pulmonary nodule IR performed percutaneous biopsy   - Seen by oncology on acute, Dr Goodman Record  "clinical staging seems to be compatible with at least stage III A  However, the CT scan was done without IV contrast   The patient was told that a PET-CT scan as an outpatient may give us a better idea about the exact staging  If he continues to have stage IIIA then concurrent chemoradiation followed by maintenance immunotherapy should be entertained if his performance status allows  The patient lives close to [facility] where he can be treated in the Oncology Clinic/infusion center  "  - Follow-up with OP oncology   - Follow up with [facility] pulmonary

## 2021-08-08 NOTE — H&P
PHYSICAL MEDICINE AND REHABILITATION H&P/ADMISSION NOTE  West Mensah 72 y o  male MRN: [id number]  Unit/Bed#: -01 Encounter: [id number]     Rehab Diagnosis: Impairment of mobility, safety, Activities of Daily Living (ADLs), and cognitive/communication skills due to Stroke:  01 2  Right Body Involvement (Left Brain)    History of Present Illness:   West Mensah is a 72year old male that presented to [place] [address] on 7/31/2021 as a pre-hospital stroke alert for complaints of dysarthria, right facial droop and right sided weakness  Patient with noted recent dyspnea on exertion and weight loss, with planned CT chest/abdomen/pelvis  CT of the brain showed recent infarct left caudate head, anterior lentiform nucleus and anterior limb of left internal capsule without evidence of hemorrhage  CTA of the head/neck showed no occlusion or stenosis, however did reveal mass right nasopharynx and right upper lobe lung mass  Neurology was consulted  Patient was with an NIHSS of 9, and was not a tPA candidate given infarct appearing acute to subacute and high bleed risk  There was a strong suspicion for underlying malignancy with hypercoagulable state, and patient was admitted to stroke pathway  Patient was initiated on Heparin gtt with statin after ASA load, given concern for MI vs PE, in setting of elevated troponin levels (6 90) and dyspnea  Exam revealed B/L LE swelling and calf pain, with B/L LE venous duplex study showing RLE DVT in posterior tibial and peroneal veins, LLE DVT in peroneal veins  CT of the chest/abdomen/pelvis showed moderate emphysema, 6 4x3  6x3 4cm solid, irregular contoured RUL pulmonary nodule, likely primary lung cancer  Pulmonary was consulted due to increased O2 needs/hypoxia, likely caused by PE with possible right heart strain   ECHO showed an EF of 63%, grade 1 diastolic dysfunction, RV mildly to moderately dilated with hypokinesis, RA mildly dilated, mild TVR and small pericardial effusion  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 and underwent flexible laryngoscopy on 8/1  Mass was visualized, however etiology remains unknown - would benefit from biopsy with timing TBD, likely as an outpatient  Cardiology was consulted regarding elevated troponin levels, likely multifactorial and type 2 elevation given profound hypoxia and likely PE  Recommended for medical treatment with ASA and Heparin  Patient is to undergo repeat ECHO in 4-6 weeks to assess RV  On 8/2, Heparin gtt was discontinued and patient was initiated on Lovenox SQ per Neurology recommendations  Patient is to continue indefinitely  MRI of the brain showed multifocal diffusion abnormalities in several separate vascular territories, largest in left MCA territory indicative of multifocal acute/recent infarctions most likely from central embolic source  IR was consulted regarding right lung biopsy, and on 8/5, underwent IR right lung biopsy  Patient was with noted urinary retention during hospital course, and required cadet catheter insertion on 8/6  Oncology was consulted on 8/7, with initial clinical staging appearing to be compatible with at least stage IIIA  Patient is recommended for an outpatient PET-CT scan to give better idea about exact staging  If patient has stage IIIA, then recommendation would be for concurrent chemoradiation followed by immunotherapy pending functional status  Patient is overall hemodynamically stable and medically cleared for discharge to [facility]  PT/OT/ST therapies and PM&R were consulted, and patient was deemed appropriate for acute inpatient rehabilitation and admitted to the [facility] on 8/8/21  Plan:     Rehabilitation   Functional deficits: impaired mobility, self care   Begin PT/OT/SLP  Rehabilitation goals are to achieve a modified independent level with mobility and self care  Prognosis is good-fair  ELOS is 2-3 weeks  Estimated discharge is home       DVT prophylaxis   Eliquis 10mg BID loading through 8/14 and decreased to 5mg BID henceforth    Pain   None at this time    Bladder plan   Indwelling catheter placed 8/6    Bowel plan   Continent   Last BM 8/8    Code Status   Level 1: Full Code    Incidental Findings   Lung Mass found on Chest CT during workup for pulmonary embolism   Right nasopharynx mass   Numerous hypodense hepatic lesions    * Acute CVA (cerebrovascular accident) ([address] )  Assessment & Plan  · CT head revealed recent acute to subacute infarct located in the left caudate head, anterior lentiform nucleus, anterior limb of the left internal capsule  · MRI brain w/wo contrast revealed multifocal diffusion abnormalities in several separate vascular territories, largest in the left MCA territory indicative of multifocal acute/recent infarctions most likely from a central embolic source  · Neurology followed in acute care and will need follow up as outpatient  · Welsh to be embolic and started on Eliquis 10mg BID through 8/14 then 5mg BID  · Per Neuro, continue statin, no need for aspirin based on etiology  · Normotension goals    Chronic combined systolic and diastolic CHF (congestive heart failure) (HCC)  Assessment & Plan  Wt Readings from Last 3 Encounters:   07/31/21 86 6 kg (190 lb 14 7 oz)     · Left ventricular EF was estimated to be 55 %  Wall thickness was mildly increased  Grade 1 diastolic dysfunction  · The right ventricle was mildly to moderately dilated  Systolic function was mildly reduced  There is hypokinesis of the mid free wall      Monitor weights, appears euvolemic      Suspected pulmonary embolism  Assessment & Plan  · In setting of hypoxemic respiratory failure, newly found malignancy, hypercoabuable state and confirmed bilateral DVTs, likely pulmonary embolism  · Could not be confirmed due to contrast that had previously been used earlier that day for the CT head and neck  · Continue Eliquis  · Oxygen 3L via NC, wean as able to maintain 88%+    Leukocytosis  Assessment & Plan  · Low grade leukocytosis likely reactive from DVTs and suspected Pulmonary embolism  · Monitor for urinary or pulmonary infections clinically  · Labs for the AM    Anemia  Assessment & Plan  · Hgb 9 9 down from 10 9 a few days ago  · Seen by heme/Onc for Malignancy work up and plans  · Monitor for further drop as patient is on Eliquis 10mg BID at this    Urinary retention  Assessment & Plan  · [place] placed on 8/6 for urinary retention  · Urinary retention protocol    Mass of upper lobe of right lung  Assessment & Plan  Patient presented with hypoxia  Started on empiric heparin for suspected PE (could not perform CTA after contrast for CT head/neck)  Found to have a 6 4x3  6x3 4cm solid RUL pulmonary nodule suspicious for primary lung cancer  IR performed percutaneous biopsy   - Awaiting biopsy results  - Supplemental O2 to maintain saturations >88%  Titrate down as tolerated  - Can follow up with pulmonary in approximately 2 weeks  Dysphagia  Assessment & Plan  · Dysphagia 3 with thins, had failed several bedside swallow evaluations prior  · SLP to follow on ARC    Nasopharyngeal mass  Assessment & Plan  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  Both benign and malignant etiologies are in the differential diagnosis  ENT saw patient and visualized mass flexible laryngoscopy  - Smooth soft tissue mass in the right nasopharynx that appeared to be occluding the right eustachian tube    Acute deep vein thrombosis (DVT) of distal vein of both lower extremities (HCC)  Assessment & Plan  Thought to be related to hypercoagulability secondary to malignancy  Continue Eliquis 10mg BID through 8/14 and then 5mg BID    RIGHT LOWER LIMB:  Acute deep vein thrombosis is noted in the posterior tibial and peroneal veins  LEFT LOWER LIMB:  Acute deep vein thrombosis is noted in the peroneal veins    Acute superficial thrombophlebitis is noted in the great saphenous vein from  the mid thigh to the mid calf  Acute respiratory failure with hypoxia ([facility])  Assessment & Plan  On 3L via NC  Goal per Pulm is to maintain 88%+ O2 sat, wean as able to    Elevated troponin  Assessment & Plan  · Troponin elevation likely type 2 secondary to pulmonary embolism and hypoxia  ? 6 90 > 7 54 > 10 80  ? Trended by SLIM to Peak at 10 8, next Tropnin was 10 3 and discontinued  · Cardio consult- input appreciated  ? Consider CT angiogram once patient more clinically stable  ? Continue aspirin, heparin, treating underlying cause    ECHO:  · Left ventricular EF was estimated to be 55 %  Wall thickness was mildly increased  Grade 1 diastolic dysfunction  · The right ventricle was mildly to moderately dilated  Systolic function was mildly reduced  There is hypokinesis of the mid free wall  · The right atrium was mildly dilated  · A small pericardial effusion was identified  Subjective:     Review of Systems   Constitutional: Positive for unexpected weight change  Negative for chills and fever  HENT: Positive for trouble swallowing  Negative for hearing loss  Eyes: Positive for visual disturbance  Negative for photophobia  Respiratory: Negative for cough and shortness of breath  Cardiovascular: Negative for chest pain and leg swelling  Gastrointestinal: Positive for constipation  Negative for diarrhea, nausea and vomiting  Endocrine: Negative for cold intolerance and heat intolerance  Genitourinary: Positive for dysuria  Negative for hematuria  Musculoskeletal: Negative for back pain and neck pain  Skin: Negative for rash and wound  Allergic/Immunologic: Negative for environmental allergies and food allergies  Neurological: Positive for weakness  Negative for dizziness, light-headedness and headaches  Hematological: Negative for adenopathy  Does not bruise/bleed easily     Psychiatric/Behavioral: Negative for dysphoric mood and sleep disturbance  Function:    PRIOR LEVEL OF FUNCTION:  He lives in a(n) single family home  Anita Chin is  and lives with their spouse  Self Care: Independent, Indoor Mobility: Independent, Stairs (in/outdoor): Independent and Cognition: Independent  Prior to patient's admission, patient was fully Independent with ADLs and IADLs  Patient was Independent without use of assistive device for mobility      HOME ENVIRONMENT:  The living area: can live on one level  There is 1 step to enter the home  The patient will have 24 hour supervision/physical assistance available upon discharge  Patient's spouse is supportive and able to assist as needed  Current level of function:  Physical Therapy: bed mobility Min A, Transfers Mod A, Ambulation Mod A with RW for 30' x2  Occupational Therapy: Mod-Max Assist for UB and LB ADLs, Total assist for toileting  Speech Therapy: Dysarthria, modifed dysphagia 3 diet with thins    Physical Exam:  /75 (BP Location: Left arm)   Pulse 69   Temp 98 3 °F (36 8 °C) (Oral)   Resp (!) 24   SpO2 96%      No intake or output data in the 24 hours ending 08/08/21 1537    There is no height or weight on file to calculate BMI  Physical Exam  Constitutional:       General: He is not in acute distress  HENT:      Head:      Comments: Mild facial droop     Right Ear: External ear normal       Left Ear: External ear normal       Nose: No rhinorrhea  Comments: Nasal cannula     Mouth/Throat:      Mouth: Mucous membranes are moist    Eyes:      Comments: Right eye blind, intact left sided peripheral vision, chronic right field cut     Cardiovascular:      Rate and Rhythm: Normal rate  Pulses: Normal pulses  Pulmonary:      Effort: Pulmonary effort is normal  No respiratory distress  Breath sounds: No wheezing or rales  Abdominal:      General: Bowel sounds are normal  There is no distension  Palpations: Abdomen is soft  Musculoskeletal:      Right lower leg: No edema  Left lower leg: No edema  Skin:     General: Skin is warm  Neurological:      Mental Status: He is alert and oriented to person, place, and time  Comments: RUE/RLE 4/5 with right pronator drift and mild dysmetria/ataxia  Tremoring on MMT on right leg  Dysarthric  Mild cognitive impairments   Psychiatric:         Mood and Affect: Mood normal          Behavior: Behavior normal             Labs, medications, and imaging personally reviewed      Laboratory:    Lab Results   Component Value Date    SODIUM 139 08/08/2021    K 4 0 08/08/2021     08/08/2021    CO2 31 08/08/2021    BUN 19 08/08/2021    CREATININE 0 79 08/08/2021    GLUC 93 08/08/2021    CALCIUM 10 3 (H) 08/08/2021     Lab Results   Component Value Date    WBC 10 28 (H) 08/08/2021    HGB 9 9 (L) 08/08/2021    HCT 33 3 (L) 08/08/2021    MCV 86 08/08/2021     (H) 08/08/2021     Lab Results   Component Value Date    INR 1 07 08/04/2021    INR 1 28 (H) 07/31/2021    PROTIME 14 0 08/04/2021    PROTIME 16 0 (H) 07/31/2021         Current Facility-Administered Medications:     apixaban (ELIQUIS) tablet 10 mg, 10 mg, Oral, BID, Doneta New York, DO    [START ON 8/14/2021] apixaban (ELIQUIS) tablet 5 mg, 5 mg, Oral, BID, Doneta New York, DO    atorvastatin (LIPITOR) tablet 40 mg, 40 mg, Oral, Daily With Dinner, Doneta [place], DO    bisacodyl (DULCOLAX) rectal suppository 10 mg, 10 mg, Rectal, Daily PRN, Doneta [place], DO    melatonin tablet 3 mg, 3 mg, Oral, HS, Doneta [place], DO    [START ON 8/9/2021] polyethylene glycol (MIRALAX) packet 17 g, 17 g, Oral, Daily, Doneta New York, DO    senna-docusate sodium (SENOKOT S) 8 6-50 mg per tablet 2 tablet, 2 tablet, Oral, BID, Doneta [place], DO  No Known Allergies   Patient Active Problem List    Diagnosis Date Noted    Acute CVA (cerebrovascular [facility]) [address] ) 07/31/2021    Constipation 08/08/2021    Anemia 08/08/2021    Leukocytosis 08/08/2021    Suspected pulmonary embolism 08/08/2021    Chronic combined systolic and diastolic CHF (congestive heart failure) ([facility] [address] ) 08/08/2021    Urinary retention 08/06/2021    Mass of upper lobe of right lung 08/03/2021    Elevated troponin 07/31/2021    Acute respiratory failure with hypoxia ([facility]) 07/31/2021    Acute deep vein thrombosis (DVT) of distal vein of both lower [facility] 07/31/2021    Nasopharyngeal mass 07/31/2021    Dysphagia 07/31/2021     No past medical history on file  Past Surgical History:   Procedure Laterality Date    IR BIOPSY LUNG  8/5/2021     Social History     Socioeconomic History    Marital status: /Civil Union     Spouse name: Not on file    Number of children: Not on file    Years of education: Not on file    Highest education level: Not on file   Occupational History    Not on file   Tobacco Use    Smoking status: Former Smoker    Smokeless tobacco: Never Used   Vaping Use    Vaping Use: Never used   Substance and Sexual Activity    Alcohol use: Yes    Drug use: Never    Sexual activity: Not on file   Other Topics Concern    Not on file   Social History Narrative    Not on file     Social Determinants of Health     Financial Resource Strain:     Difficulty of Paying Living Expenses:    Food Insecurity:     Worried About Running Out of Food in the Last Year:     [address] in the Last Year:    Transportation Needs:     Lack of Transportation (Medical):      Lack of Transportation (Non-Medical):    Physical Activity:     Days of Exercise per Week:     Minutes of Exercise per Session:    Stress:     Feeling of Stress :    Social Connections:     Frequency of Communication with Friends and Family:     Frequency of Social Gatherings with Friends and Family:     Attends Zoroastrianism Services:     Active Member of Clubs or Organizations:     Attends Club or Organization Meetings:     Marital Status:    Intimate Partner Violence:     Fear of Current or Ex-Partner:     Emotionally Abused:     Physically Abused:     Sexually Abused:      Social History     Tobacco Use   Smoking Status Former Smoker   Smokeless Tobacco Never Used     Social History     Substance and Sexual Activity   Alcohol Use Yes     No family history on file  Medical Necessity Criteria for ARC Admission: Electrolyte imbalance:  hypercalcemia, Anemia, with the following plan: monitor H/H, Bowel/Bladder Management, Leukocystosis, Urinary retention, Pulmonary emboli / Deep vein thrombosis (PE/DVT), STEMI/NSTEMI and Oxygen needs of 3L via NC, Newly diagnosed nasopharyngeal and right Lung Mass  In addition, the preadmission screen, post-admission physical evaluation, overall plan of care and admissions order demonstrate a reasonable expectation that the following criteria were met at the time of admission to the [facility]  1  The patient requires active and ongoing therapeutic intervention of multiple therapy disciplines (physical therapy, occupational therapy, speech-language pathology, or prosthetics/orthotics), one of which is physical or occupational therapy  2  Patient requires an intensive rehabilitation therapy program, as defined in Chapter 1, section 110 2 2 of the CMS Medicare Policy Manual  This intensive rehabilitation therapy program will consist of at least 3 hours of therapy per day at least 5 days per week or at least 15 hours of intensive rehabilitation therapy within a 7 consecutive day period, beginning with the date of admission to the [facility]  3  The patient is reasonably expected to actively participate in, and benefit significantly from, the intensive rehabilitation therapy program as defined in Chapter 1, section 110 2 2 of the CMS Medicare Policy Manual at this time of admission to the [facility]   He can reasonably be expected to make measurable improvement (that will be of practical value to improve the patients functional capacity or adaptation to impairments) as a result of the rehabilitation treatment, as defined in section 110 3, and such improvement can be expected to be made within the prescribed period of time  As noted in the CMS Medicare Policy Manual, the patient need not be expected to achieve complete independence in the domain of self-care nor be expected to return to his or her prior level of functioning in order to meet this standard  4  The patient must require physician supervision by a rehabilitation physician  As such, a rehabilitation physician will conduct face-to-face visits with the patient at least 3 days per week throughout the patients stay in the [facility] to assess the patient both medically and functionally, as well as to modify the course of treatment as needed to maximize the patients capacity to benefit from the rehabilitation process  5  The patient requires an intensive and coordinated interdisciplinary approach to providing rehabilitation, as defined in Chapter 1, section 110 2 5 of the CMS Medicare Policy Manual  This will be achieved through periodic team conferences, conducted at least once in a 7-day period, and comprising of an interdisciplinary team of medical professionals consisting of: a rehabilitation physician, registered nurse,  and/or , and a licensed/certified therapist from each therapy discipline involved in treating the patient  Changes Since Pre-admission Assessment: None -This patient's participation in rehab continues to be reasonable, necessary and appropriate  CMS Required Post-Admission Physician Evaluation Elements  History and Physical, including medical history, functional history and active comorbidities as in above text  Post-Admission Physician Evaluation:  The patient has the potential to make improvement and is in need of physical, occupational, and/or therapy services  The patient may also need nutritional services   Given the patient's complex medical condition and risk of further medical complications, rehabilitative services cannot be safely provided at a lower level of care, such as a skilled nursing facility  I have reviewed the patient's functional and medical status at the time of the preadmission screening and they are the same as on the day of this admission  I acknowledge that I have personally performed a full physical examination on this patient within 24 hours of admission  The patient and/or family demonstrated understanding the rehabilitation program and the discharge process after we discussed them  Agree in entirety: yes  Minor adaptions: none    Major changes: none    Tye Montelongo, DO  Physical Medicine and [address]    ** Please Note: Fluency Direct voice to text software may have been used in the creation of this document   **

## 2021-08-08 NOTE — PLAN OF CARE
Problem: OCCUPATIONAL THERAPY ADULT  Goal: Performs self-care activities at highest level of function for planned discharge setting  See evaluation for individualized goals  Description: Treatment Interventions: ADL retraining, Visual perceptual retraining, Functional transfer training, UE strengthening/ROM, Endurance training, Cognitive reorientation, Patient/family training, Equipment evaluation/education, Neuromuscular reeducation, Fine motor coordination activities, Compensatory technique education, Energy conservation, Activityengagement          See flowsheet documentation for full assessment, interventions and recommendations  8/8/2021 1020 by VON Jovel  Note: Limitation: Decreased UE ROM, Decreased UE strength, Decreased ADL status, Decreased cognition, Decreased endurance, Decreased fine motor control, Decreased high-level ADLs, Decreased self-care trans  Prognosis: Fair, Good  Assessment: pt participated in am ot session and was seen focusing on adl tasks seated eob, dynamic sitting and standing balance, activity tolerence and ongoing cognitive and v/p0 assessment during adl tasks  pt with slight weakness r hemibody but appears more limited by ataxic trunk / body during mobility tasks  pt was able to close small snap using b hands with increased time  pt required heavy balance asst when attempting to use 1 ue to maange pants overh ips  pt with fair + [id number] balance dynamic and f- standing c re static   pt follows commands with increased time  pt is slow to verbally answer questions and is often incorrect  pt also is a know joker per pts wife  pt was on 3 l o2 throughout session and pulse ranged from 95 to 85%  pt reports moderate fatigue post session  pt is motivated and very cooperative   pts wife was present for posrtions of session     OT Discharge Recommendation: Post acute rehabilitation services  OT - OK to Discharge: Yes    8/8/2021 1019 by VON Jovel  Outcome: Progressing  Note: Limitation: Decreased UE ROM, Decreased UE strength, Decreased ADL status, Decreased cognition, Decreased endurance, Decreased fine motor control, Decreased high-level ADLs, Decreased self-care trans  Prognosis: Fair, Good  Assessment: pt participated in am ot session and was seen focusing on adl tasks seated eob, dynamic sitting and standing balance, activity tolerence and ongoing cognitive and v/p0 assessment during adl tasks  pt with slight weakness r hemibody but appears more limited by ataxic trunk / body during mobility tasks  pt was able to close small snap using b hands with increased time  pt required heavy balance asst when attempting to use 1 ue to maange pants overh ips  pt with fair + [id number] balance dynamic and f- standing c re static   pt follows commands with increased time  pt is slow to verbally answer questions and is often incorrect  pt also is a know joker per pts wife  pt was on 3 l o2 throughout session and pulse ranged from 95 to 85%  pt reports moderate fatigue post session  pt is motivated and very cooperative  pts wife was present for posrtions of session     OT Discharge Recommendation: Post acute rehabilitation services  OT - OK to Discharge:  Yes

## 2021-08-08 NOTE — ASSESSMENT & PLAN NOTE
Wt Readings from Last 3 Encounters:   07/31/21 86 6 kg (190 lb 14 7 oz)     · Left ventricular EF was estimated to be 55 %  Wall thickness was mildly increased  Grade 1 diastolic dysfunction  · The right ventricle was mildly to moderately dilated  Systolic function was mildly reduced  There is hypokinesis of the mid free wall    · Appears euvolemic  · OP Cards follow-up

## 2021-08-08 NOTE — ASSESSMENT & PLAN NOTE
· In setting of hypoxemic respiratory failure, newly found malignancy, hypercoabuable state and confirmed bilateral DVTs, likely pulmonary embolism  · Could not be confirmed due to contrast that had previously been used earlier that day for the CT head and neck  · Continue Eliquis  · Supplemental O2, wean as able to maintain 88%+  · OP pulm/hem-onc follow-up

## 2021-08-08 NOTE — ASSESSMENT & PLAN NOTE
· Stable exam during ARC course with improvements in function; completed CG training   · CT head revealed recent acute to subacute infarct located in the left caudate head, anterior lentiform nucleus, anterior limb of the left internal capsule     · MRI brain w/wo contrast revealed multifocal diffusion abnormalities in several separate vascular territories, largest in the left MCA territory indicative of multifocal acute/recent infarctions most likely from a central embolic source  · Neurology followed in acute care and will need follow up as outpatient  · Eliquis 5mg BID (transitioned from 10mg BID as previously instructed on 8/15)  · Per Neuro, continue statin, no need for aspirin based on etiology  · OP neuro follow-up

## 2021-08-08 NOTE — ASSESSMENT & PLAN NOTE
Hampton placed and as per urinary retention protocol, Hampton placed on 08/06  Outpatient Urology follow-up

## 2021-08-09 ENCOUNTER — PATIENT OUTREACH (OUTPATIENT)
Dept: CASE MANAGEMENT | Facility: OTHER | Age: 71
End: 2021-08-09

## 2021-08-09 PROBLEM — R41.89 IMPAIRED COGNITION: Status: ACTIVE | Noted: 2021-08-09

## 2021-08-09 PROBLEM — J96.91 RESPIRATORY FAILURE WITH HYPOXIA (HCC): Status: ACTIVE | Noted: 2021-08-09

## 2021-08-09 LAB
ANION GAP SERPL CALCULATED.3IONS-SCNC: 5 MMOL/L (ref 4–13)
BUN SERPL-MCNC: 18 MG/DL (ref 5–25)
CALCIUM SERPL-MCNC: 10.3 MG/DL (ref 8.3–10.1)
CHLORIDE SERPL-SCNC: 107 MMOL/L (ref 100–108)
CO2 SERPL-SCNC: 29 MMOL/L (ref 21–32)
CREAT SERPL-MCNC: 0.79 MG/DL (ref 0.6–1.3)
ERYTHROCYTE [DISTWIDTH] IN BLOOD BY AUTOMATED COUNT: 15.6 % (ref 11.6–15.1)
GFR SERPL CREATININE-BSD FRML MDRD: 92 ML/MIN/1.73SQ M
GLUCOSE P FAST SERPL-MCNC: 88 MG/DL (ref 65–99)
GLUCOSE SERPL-MCNC: 88 MG/DL (ref 65–140)
HCT VFR BLD AUTO: 34.4 % (ref 36.5–49.3)
HGB BLD-MCNC: 10.4 G/DL (ref 12–17)
MCH RBC QN AUTO: 25.6 PG (ref 26.8–34.3)
MCHC RBC AUTO-ENTMCNC: 30.2 G/DL (ref 31.4–37.4)
MCV RBC AUTO: 85 FL (ref 82–98)
PLATELET # BLD AUTO: 427 THOUSANDS/UL (ref 149–390)
PMV BLD AUTO: 10 FL (ref 8.9–12.7)
POTASSIUM SERPL-SCNC: 3.8 MMOL/L (ref 3.5–5.3)
RBC # BLD AUTO: 4.07 MILLION/UL (ref 3.88–5.62)
SODIUM SERPL-SCNC: 141 MMOL/L (ref 136–145)
WBC # BLD AUTO: 10.83 THOUSAND/UL (ref 4.31–10.16)

## 2021-08-09 PROCEDURE — 85027 COMPLETE CBC AUTOMATED: CPT | Performed by: STUDENT IN AN ORGANIZED HEALTH CARE EDUCATION/TRAINING PROGRAM

## 2021-08-09 PROCEDURE — 97535 SELF CARE MNGMENT TRAINING: CPT

## 2021-08-09 PROCEDURE — 97167 OT EVAL HIGH COMPLEX 60 MIN: CPT

## 2021-08-09 PROCEDURE — 92610 EVALUATE SWALLOWING FUNCTION: CPT

## 2021-08-09 PROCEDURE — 99233 SBSQ HOSP IP/OBS HIGH 50: CPT

## 2021-08-09 PROCEDURE — 99232 SBSQ HOSP IP/OBS MODERATE 35: CPT | Performed by: NURSE PRACTITIONER

## 2021-08-09 PROCEDURE — 97129 THER IVNTJ 1ST 15 MIN: CPT

## 2021-08-09 PROCEDURE — 97163 PT EVAL HIGH COMPLEX 45 MIN: CPT

## 2021-08-09 PROCEDURE — 97530 THERAPEUTIC ACTIVITIES: CPT

## 2021-08-09 PROCEDURE — 92523 SPEECH SOUND LANG COMPREHEN: CPT

## 2021-08-09 PROCEDURE — 80048 BASIC METABOLIC PNL TOTAL CA: CPT | Performed by: STUDENT IN AN ORGANIZED HEALTH CARE EDUCATION/TRAINING PROGRAM

## 2021-08-09 RX ORDER — ONDANSETRON 4 MG/1
4 TABLET, ORALLY DISINTEGRATING ORAL EVERY 8 HOURS PRN
Status: DISCONTINUED | OUTPATIENT
Start: 2021-08-09 | End: 2021-08-17 | Stop reason: HOSPADM

## 2021-08-09 RX ORDER — BISACODYL 10 MG
10 SUPPOSITORY, RECTAL RECTAL DAILY PRN
Status: DISCONTINUED | OUTPATIENT
Start: 2021-08-09 | End: 2021-08-17 | Stop reason: HOSPADM

## 2021-08-09 RX ORDER — POLYETHYLENE GLYCOL 3350 17 G/17G
17 POWDER, FOR SOLUTION ORAL DAILY PRN
Status: DISCONTINUED | OUTPATIENT
Start: 2021-08-09 | End: 2021-08-17 | Stop reason: HOSPADM

## 2021-08-09 RX ORDER — ACETAMINOPHEN 325 MG/1
650 TABLET ORAL EVERY 6 HOURS PRN
Status: DISCONTINUED | OUTPATIENT
Start: 2021-08-09 | End: 2021-08-17 | Stop reason: HOSPADM

## 2021-08-09 RX ADMIN — DOCUSATE SODIUM AND SENNOSIDES 2 TABLET: 8.6; 5 TABLET ORAL at 17:44

## 2021-08-09 RX ADMIN — MELATONIN 3 MG: at 21:20

## 2021-08-09 RX ADMIN — POLYETHYLENE GLYCOL 3350 17 G: 17 POWDER, FOR SOLUTION ORAL at 09:53

## 2021-08-09 RX ADMIN — ATORVASTATIN CALCIUM 40 MG: 40 TABLET, FILM COATED ORAL at 17:52

## 2021-08-09 RX ADMIN — APIXABAN 10 MG: 5 TABLET, FILM COATED ORAL at 17:52

## 2021-08-09 RX ADMIN — APIXABAN 10 MG: 5 TABLET, FILM COATED ORAL at 09:55

## 2021-08-09 NOTE — PROGRESS NOTES
Physical Medicine and Rehabilitation Progress Note  Gini Álvarez 72 y o  male MRN: [id number]  Unit/Bed#: -01 Encounter: [id number]      Chief Complaints:  Wants to know what is going on with his car     Interval Events/Subjective:     Patient perseverating on what is going on with his car  He was only partially redirectable  On eval, patient somewhat tired but arousable able to follow most simple commands but does have some delay in processing speed and response time  He is oriented to self, month, year, and partially to situation  Patient denies pain, visual problems, focal weakness, lightheadedness, SOB, CP, fever, chills, or other new complaints  ROS: A 10 point ROS was performed; negative except as noted above  Assessment & Plan: Mass of upper lobe of right lung  Assessment & Plan  Lung Adenocarcinoma   - Found to have a 6 4x3  6x3 4cm solid RUL pulmonary nodule IR performed percutaneous biopsy   - Seen by oncology on acute, Dr Sara Maher  "clinical staging seems to be compatible with at least stage III A  However, the CT scan was done without IV contrast   The patient was told that a PET-CT scan as an outpatient may give us a better idea about the exact staging  If he continues to have stage IIIA then concurrent chemoradiation followed by maintenance immunotherapy should be entertained if his performance status allows  The patient lives close to [address] [place] [address] where he can be treated in the Oncology Clinic/infusion center  "  - Follow-up with OP oncology   - Follow up with OP pulmonary (approximately 2 weeks if d/c)    Impaired cognition  Assessment & Plan  -Acute comprehensive interdisciplinary inpatient rehabilitation to include intensive skilled therapies (PT, OT, ST) as outlined with oversight and management by rehabilitation physician  Continue inpatient rehab level nursing, case management and weekly interdisciplinary team meetings   Provide family teaching regarding brain injury  -Monitor neuro-exam, wakefulness, mood, cognition, insight into deficits and safety awareness  -Patient/family/caregiver education and training   -Overstimulation precautions, frequent re-orientation, re-direction, re-assurance  -Sleep log and agitation monitoring  -Optimize sleep-wake cycle  -Limit sedating medications when possible  -Ensure optimal management electrolytes, nutrition, and hydration  -Monitor for signs or symptoms of infection, medication intolerances, other systemic etiologies  -Provide 1:1 > pt trying to get out of bed repeatedly and is high fall risk  -Fall precautions with frequent rounding; proactive toileting program, patient should not be unattended in bathroom     -Current psychotropics and potentially sedating medications: Melatonin      Nasopharyngeal mass  Assessment & Plan  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  Both benign and malignant etiologies are in the differential diagnosis  ENT saw patient and visualized mass flexible laryngoscopy  - Smooth soft tissue mass in the right nasopharynx that appeared to be occluding the right eustachian tube  - OP ENT follow-up unless condition warrants sooner     * Acute CVA (cerebrovascular accident) [facility])  Assessment & Plan  · CT head revealed recent acute to subacute infarct located in the left caudate head, anterior lentiform nucleus, anterior limb of the left internal capsule     · MRI brain w/wo contrast revealed multifocal diffusion abnormalities in several separate vascular territories, largest in the left MCA territory indicative of multifocal acute/recent infarctions most likely from a central embolic source  · Neurology followed in acute care and will need follow up as outpatient  · Tebbetts to be embolic and started on Eliquis 10mg BID through 8/14 then 5mg BID  · Per Neuro, continue statin, no need for aspirin based on etiology  · Normotension goals    Acute respiratory failure with hypoxia ([address] )  Assessment & Plan  On 3L via NC  Goal per Pulm is to maintain 88%+ O2 sat, wean as able to  Likely multifactorial but could be large part related to PE, also has moderate emphysema/COPD, and lung mass  - Supplemental O2  - Antithrombotics  - Mgmt of COPD per IM  - OP follow-up with pulm/oncology     Suspected pulmonary embolism  Assessment & Plan  · In setting of hypoxemic respiratory failure, newly found malignancy, hypercoabuable state and confirmed bilateral DVTs, likely pulmonary embolism  · Could not be confirmed due to contrast that had previously been used earlier that day for the CT head and neck  · Continue Eliquis  · Supplemental O2, wean as able to maintain 88%+  · OP pulm follow-up     Acute deep vein thrombosis (DVT) of distal vein of both lower extremities (HCC)  Assessment & Plan  Thought to be related to hypercoagulability secondary to malignancy  Continue Eliquis 10mg BID through 8/14 and then 5mg BID    RIGHT LOWER LIMB:  Acute deep vein thrombosis is noted in the posterior tibial and peroneal veins  LEFT LOWER LIMB:  Acute deep vein thrombosis is noted in the peroneal veins  Acute superficial thrombophlebitis is noted in the great saphenous vein from  the mid thigh to the mid calf  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 6 90 > 7 54 > 10 80  ?  Trended by SLIM to Peak at 10 8, next Tropnin was 10 3 and discontinued  · Cardio consulted  ? Consider CT angiogram once patient more clinically stable  · On Eliquis    ECHO:  · Left ventricular EF was estimated to be 55 %  Wall thickness was mildly increased  Grade 1 diastolic dysfunction  · The right ventricle was mildly to moderately dilated  Systolic function was mildly reduced  There is hypokinesis of the mid free wall  · The right atrium was mildly dilated  · A small pericardial effusion was identified  · OP Cards Follow-up     Urinary retention  Assessment & Plan  · Hampton placed on 8/6 for urinary retention  · Continue for now as it was recently placed for failed voiding trial  · Consider removal late this week   · Gross hematuria in bag > comgmt with medicine service - they recommend waiting on urology eval for now  · Monitor for s/s of infection     Leukocytosis  Assessment & Plan  · Stable  · Low grade leukocytosis likely reactive from DVTs and suspected Pulmonary embolism  · Monitor for urinary or pulmonary infections clinically      Anemia  Assessment & Plan  · Hb stable   · Seen by heme/Onc for Malignancy work up and plans  · Monitor for further drop as patient is on Eliquis 10mg BID at this    Dysphagia  Assessment & Plan  · Dysphagia 3 with thins, had failed several bedside swallow evaluations prior  · SLP to follow on ARC  · Supervision or hold oral intake if too lethargic  · Aspiration precautions         Other Medical Issues:       Disposition   Home v other with [facility] 2-3 weeks from admission     Follow-up providers and other issues to be followed up after discharge   PCP   Oncology   ENT   Pulmonary   Cardiology    CODE: Level 1: Full Code    Restrictions include:   Fall precautions    Objective:    Functional Update:  UB dressing mod assist  Sit to stand transfers mod assist    Allergies per EMR    Physical Exam:  Temp:  [98 4 °F (36 9 °C)-98 7 °F (37 1 °C)] 98 7 °F (37 1 °C)  HR:  [65-80] 70  Resp:  [16-18] 16  BP: (126-150)/(68-77) 134/68  SpO2:  [93 %-94 %] 94 %    GEN:  Lying in bed in NAD   HEENT/NECK: Normocephalic, atraumatic, moist mucous membranes   CARDIAC: Regular rate rhythm, no murmers, no rubs, no gallops  LUNGS:  clear to auscultation, no wheezes, rales, or rhonchi  ABDOMEN: Soft, nontender, slightly hypoactive, nondistended  EXTREMITIES/SKIN:  no calf edema, no calf tenderness to palpation  NEURO:   MENTAL STATUS: patient somewhat tired but arousable able to follow most simple commands but does have some delay in processing speed and response time  He is oriented to self, month, year, and partially to situation   , CN II-XII: Intact and Strength/MMT:  RUE 4/5, RLE 5-/5, L sided 5/5  PSYCH:  Affect:  Flattened    Diagnostic Studies: Reviewed, no new imaging  No orders to display       Laboratory: Labs reviewed  Results from last 7 days   Lab Units 08/09/21  0548 08/08/21  0457 08/07/21  1431   HEMOGLOBIN g/dL 10 4* 9 9* 10 4*   HEMATOCRIT % 34 4* 33 3* 34 8*   WBC Thousand/uL 10 83* 10 28* 11 19*     Results from last 7 days   Lab Units 08/09/21  0548 08/08/21  0457 08/07/21  1431   BUN mg/dL 18 19 22   SODIUM mmol/L 141 139 139   POTASSIUM mmol/L 3 8 4 0 4 0   CHLORIDE mmol/L 107 106 106   CREATININE mg/dL 0 79 0 79 0 96     Results from last 7 days   Lab Units 08/04/21  1647   PROTIME seconds 14 0   INR  1 07        Drug regimen reviewed, all potential adverse effects identified and addressed:    Scheduled Meds:  Current Facility-Administered Medications   Medication Dose Route Frequency Provider Last Rate    acetaminophen  650 mg Oral Q6H PRN Dinesh MD Slava      apixaban  10 mg Oral BID [facility], DO      [START ON 8/14/2021] apixaban  5 mg Oral BID [facility], DO      atorvastatin  40 mg Oral Daily With [employer] [facility] [place], DO      bisacodyl  10 mg Rectal Daily PRN Dinesh Pedersen MD      melatonin  3 mg Oral HS [facility], DO      ondansetron  4 mg Oral Q8H PRN Dinesh MD Slava      polyethylene glycol 17 g Oral Daily PRN MD Mynor Sanchez-docusate sodium  2 tablet Oral BID Drema Krabbe, DO         ** Please Note: Fluency Direct voice to text software may have been used in the creation of this document  **    Total time spent:  35 minutes, with more than 50% spent counseling/coordinating care  Counseling includes discussion with patient and wife re: progress in therapies, functional issues observed by therapy staff, and discussion with patient his/her current medical state/wellbeing  Coordination of patient's care was performed in conjunction with Internal Medicine service to monitor patient's labs, vitals, and management of their comorbidities

## 2021-08-09 NOTE — PROGRESS NOTES
Internal Medicine Progress Note  Patient: Jack Rosas  Age/sex: 72 y o  male  Medical Record #: [id number]      ASSESSMENT/PLAN: (Interval History)  Jack Rosas is seen and examined and management for following issues:    CVA  · Multiembolic due to hypercoagulable state from cancer  · Cont eliquis/atorvastatin     B/L DVT/suspected PE  · Continue eliquis     Adenocarcinoma of RUL  · Seen by oncology  · Needs outpatient PET scan for staging     Acute resp failure/emphysema  · Presumed from PE, possibly lung mass  · Smoked 2+ppd x 40 yrs and quit   · Improving  · Still requires 3L NC  · Will wean O2 as able but at home, he could barely walk to the bathroom 2/2 CALDERON    Nasopharyngeal mass  · Needs biopsy as outpatient by ENT     Elevated troponin/depressed RVEF  · Needs cardiology f/u as outpatient for repeat echo  · Ef 55% +RV hypokinesis    Urine retention  · Has cadet  · Had some punch colored urine today that has subsided = if reoccurs may need to be seen by Uro  · Dr Nellie Sood aware      Discharge date:  Team      The above assessment and plan was reviewed and updated as determined by my evaluation of the patient on 8/9/2021      Labs:   Results from last 7 days   Lab Units 08/09/21  0548 08/08/21  0457   WBC Thousand/uL 10 83* 10 28*   HEMOGLOBIN g/dL 10 4* 9 9*   HEMATOCRIT % 34 4* 33 3*   PLATELETS Thousands/uL 427* 427*     Results from last 7 days   Lab Units 08/09/21  0548 08/08/21  0457   SODIUM mmol/L 141 139   POTASSIUM mmol/L 3 8 4 0   CHLORIDE mmol/L 107 106   CO2 mmol/L 29 31   BUN mg/dL 18 19   CREATININE mg/dL 0 79 0 79   CALCIUM mg/dL 10 3* 10 3*         Results from last 7 days   Lab Units 08/04/21  1647   INR  1 07     Results from last 7 days   Lab Units 08/07/21  1057   POC GLUCOSE mg/dl 118       Review of Scheduled Meds:  Current Facility-Administered Medications   Medication Dose Route Frequency Provider Last Rate    apixaban  10 mg Oral BID Rebecca Wilson DO      [START ON 8/14/2021] apixaban 5 mg Oral BID Fariba Rist, DO      atorvastatin  40 mg Oral Daily With [employer] Alexis Harding, DO      bisacodyl  10 mg Rectal Daily PRN Fariba Rist, DO      melatonin  3 mg Oral HS Fariba Rist, DO      polyethylene glycol  17 g Oral Daily Fariba Rist, DO      senna-docusate sodium  2 tablet Oral BID Fariba Rist, DO         Subjective/ HPI: Patient seen and examined  Patients overnight issues or events were reviewed with nursing or staff during rounds or morning huddle session  New or overnight issues include the following:     None     ROS:   Negative 12 point ROS other than denoted above in HPI or assessment/plan  Imaging:     No orders to display       *Labs /Radiology studies reviewed  *Medications reviewed and reconciled as needed  *Please refer to order section for additional ordered labs studies  *Case discussed with primary attending during morning huddle case rounds      Physical Examination:  Vitals:   Vitals:    08/08/21 1530 08/08/21 2100 08/09/21 0107 08/09/21 0744   BP: 141/76  126/72 137/69   BP Location: Left arm  Left arm Left arm   Pulse: 65  65 71   Resp: 20  18 18   Temp: 97 9 °F (36 6 °C)  98 7 °F (37 1 °C) 98 4 °F (36 9 °C)   TempSrc: Oral  Oral Oral   SpO2: 96%  93% 93%   Weight:  87 2 kg (192 lb 3 9 oz)     Height:  5' 8" (1 727 m)         General Appearance: no distress, conversive  HEENT: PERRLA, conjuctiva normal; oropharynx clear; mucous membranes moist   Neck:  Supple, normal ROM, no JVD  Lungs: CTA, normal respiratory effort, no retractions, expiratory effort normal  CV: regular rate and rhythm; no rubs/murmurs/gallops, PMI normal   ABD: soft; ND/NT; +BS  EXT: no edema  Skin: normal turgor, normal texture, no rashes  Psych: affect normal, mood normal  Neuro: AA; sleepy but follows commands  +right sided weakness      The above physical exam was reviewed and updated as determined by my evaluation of the patient on 8/9/2021      Invasive Devices     Peripheral Intravenous Line            Peripheral IV 08/05/21 Right Antecubital 4 days          Drain            Urethral Catheter 16 Fr  2 days                   VTE Pharmacologic Prophylaxis: Eliquis  Code Status: Level 1 - Full Code  Current Length of Stay: 1 day(s)      Total time spent:  30 minutes with more than 50% spent counseling/coordinating care  Counseling includes discussion with patient re: progress  and discussion with patient of his/her current medical state/information  Coordination of patient's care was performed in conjunction with primary service  Time invested included review of patient's labs, vitals, and management of their comorbidities with continued monitoring  In addition, this patient was discussed with medical team including physician and advanced extenders  The care of the patient was extensively discussed and appropriate treatment plan was formulated unique for this patient  ** Please Note:  voice to text software may have been used in the creation of this document   Although proof errors in transcription or interpretation are a potential of such software**

## 2021-08-09 NOTE — PROGRESS NOTES
PT Initial Evaluation   08/09/21 1300   Patient Data   Rehab Impairment Decline in Funcional mobility, ADLS and Safety due to Stroke   Etiologic Diagnosis Multifocal Infarcts   Date of Onset 07/31/21   Support System   Name David Maher   Relationship Spouse   Able to provide 24 hour supervision Yes  (will need to confirm)   Able to provide physical help? Yes   Multiple Support Systems Yes   Support System 2   Name 2 Radha Rob   Relationship 2 Daughter   Home Setup   Type of Home Single Level  (per chart review and pt, will need to confirm w/ family)   Method of Entry Stairs   Number of Stairs   (will need to confirm home setup/entry with family)   Available Equipment   (per chart and pt no DME at home)   Prior Level of Function   Self-Care 3  Independent - Patient completed the activities by him/herself, with or without an assistive device, with no assistance from a helper  Indoor-Mobility (Ambulation) 3  Independent - Patient completed the activities by him/herself, with or without an assistive device, with no assistance from a helper  Stairs 3  Independent - Patient completed the activities by him/herself, with or without an assistive device, with no assistance from a helper  Functional Cognition 3  Independent - Patient completed the activities by him/herself, with or without an assistive device, with no assistance from a helper  Prior Device Used Z  None of the above   Falls in the Last Year   Number of falls in the past 12 months 0   Psychosocial   Ability to Express Feelings Needs assistance   Ability to Express Needs Needs assistance   Ability to Express Thoughts Needs assistance   Ability to Understand Others Usually understands   Restrictions/Precautions   Precautions Aspiration;Bed/chair alarms;Cognitive; Fall Risk;Supervision on toilet/commode;O2;[place]   Pain Assessment   Pain Assessment Tool Pain Assessment not indicated - pt denies pain   Pain Score No Pain   Transfer Bed/Chair/Wheelchair   Positioning Concerns Cognition   Limitations Noted In Problem Solving; Endurance; Coordination;Balance; Sequencing;LE Strength;UE Strength;Vision   Adaptive Equipment None   Type of Assistance Needed Physical assistance;Verbal cues   Physical Assistance Level 51%-75%   Comment Stand pivot w/o device, leans to right, diff moving feet with pivot, requires A for management of O2 and [place]   Chair/Bed-to-Chair Transfer CARE Score 2   Roll Left and Right   Type of Assistance Needed Incidental touching   Physical Assistance Level No physical assistance   Roll Left and Right CARE Score 4   Sit to Lying   Type of Assistance Needed Physical assistance   Physical Assistance Level 51%-75%   Comment poor ability to coordinate and motor plan transfer   Sit to Lying CARE Score 2   Lying to Sitting on Side of Bed   Type of Assistance Needed Physical assistance;Verbal cues   Physical Assistance Level 26%-50%   Comment cueing to complete pushing up from side and to scoot to EOB, right /post lean noted   Lying to Sitting on Side of Bed CARE Score 3   Sit to Stand   Type of Assistance Needed Physical assistance   Physical Assistance Level 26%-50%   Comment Right lateral/post deviation   Sit to Stand CARE Score 3   Picking Up Object   Comment will attempt when safe   Reason if not Attempted Safety concerns   Picking Up Object CARE Score 88   Car Transfer   Type of Assistance Needed Physical assistance;Verbal cues   Physical Assistance Level 51%-75%   Comment MiN A to perform SPT from chair and with Sit<> stand from seat, cueing for sequencing   Car Transfer CARE Score 2   Ambulation   Primary Mode of Locomotion Prior to Admission Walk   Distance Walked (feet) 75 ft   Assist Device Hand Hold  (left)   Gait Pattern Ataxic; Slow Ama;R foot drag;Lateral deviation; Improper weight shift; Step to; Step through; Decreased foot clearance   Limitations Noted In Balance; Coordination; Heel Strike;Midline Orientation;Posture; Safety;Strength; Endurance   Provided Assistance with: Weight Shift;Trunk Support;Balance   Walk Assist Level Moderate Assist;Chair Follow   Walk 10 Feet   Type of Assistance Needed Physical assistance   Physical Assistance Level Total assistance   Comment Mod A with Chair follow and A for lines   Walk 10 Feet CARE Score 1   Walk 50 Feet with Two Turns   Type of Assistance Needed Physical assistance   Physical Assistance Level Total assistance   Comment Mod A with Chair follow and A for lines   Walk 50 Feet with Two Turns CARE Score 1   Walk 150 Feet   Comment fatigued at 75ft   Reason if not Attempted Safety concerns   Walk 150 Feet CARE Score 88   Walking 10 Feet on Uneven Surfaces   Reason if not Attempted Safety concerns   Walking 10 Feet on Uneven Surfaces CARE Score 88   Wheel 50 Feet with Two Turns   Reason if not Attempted Activity not applicable   Wheel 50 Feet with Two Turns CARE Score 9   Wheel 150 Feet   Reason if not Attempted Activity not applicable   Wheel 936 Feet CARE Score 9   Curb or Single Stair   Style negotiated Single stair   Type of Assistance Needed Physical assistance   Physical Assistance Level Total assistance   Comment Mod A , step by step cueing, BU E support on HR and 2 nd person for stand by  Completd 2 6"  steps   1 Step (Curb) CARE Score 1   4 Steps   Comment LE weakness, retropulsive, stopped after 2 steps   Reason if not Attempted Safety concerns   4 Steps CARE Score 88   12 Steps   Reason if not Attempted Safety concerns   12 Steps CARE Score 88   Comprehension   QI: Comprehension 2  Sometimes Understands: Understands only basic conversations or simple, direct phrases  Frequently requires cues to understand   Expression   QI: Expression 3   Exhibits some difficulty with expressing needs and ideas (e g , some words or finishing thoughts) or speech is not clear   RLE Assessment   RLE Assessment X   Strength RLE   R Hip Flexion 3/5   R Knee Flexion 3-/5   R Knee Extension 3+/5   R Ankle Dorsiflexion 3/5   R Ankle Plantar Flexion 3/5   LLE Assessment   LLE Assessment WFL  (grossly 4-/5)   Coordination   Movements are Fluid and Coordinated 0   Coordination and Movement Description Dysmetria noted   Sensation   Propioception Partial deficits in the RUE;Partial deficits in the RLE   Cognition   Overall Cognitive Status Impaired   Arousal/Participation Alert; Cooperative   Attention Difficulty dividing attention   Orientation Level Oriented to person;Oriented to time   Memory Decreased short term memory;Decreased recall of recent events;Decreased recall of precautions;Decreased recall of biographical information   Following Commands Follows one step commands with increased time or repetition   Vision   Vision Comments Recommend formal screen by OTR   Therapeutic Exercise   Therapeutic Exercise/Activity Practiced safe transfers  Extra time required for all mobility with frequent rest breaks  Vitals checked for Orthostatic BP, see flowsheet stable with all activity  I   Discharge Information   Patient's Discharge Plan to return home with family   Patient's Rehab Expectations unale to state   Barriers to Discharge Home Decreased Endurance; Safety Considerations;Decreased Strength;Decreased Cognitive Function  (will need to confirm family Assistance and home setup)   Impressions Pt is a 71 y/o male admitted to the [facility] after suffering CVA with muiltifocal infarcts  Pt with newly found Lung nodule, hepatic lesions and nasalpharangyl mass  Pt on 3L o2 with O2 sats 95%, 89% on RA  Presents with balance deficits, right sided weakness with noted right lean, ataxia and dysmetria  Poor activity tolerance with fatigue  Decreased ability to ambulate with coordination deficits, decreased safey awareness and delayed righting reactions  Fair to Good Rehab potential to return home with family, however will need to confirm setup, assistance and supervision available due to patient's cognitive deficits     PT Therapy Minutes   PT Time In 0900   PT Time Out 1000   PT Total Time (minutes) 60   PT Mode of treatment - Individual (minutes) 60   PT Mode of treatment - Concurrent (minutes) 0   PT Mode of treatment - Group (minutes) 0   PT Mode of treatment - Co-treat (minutes) 0   PT Mode of Treatment - Total time(minutes) 60 minutes   PT Cumulative Minutes 60   Cumulative Minutes   Cumulative therapy minutes 105                    08/09/21 1300   Patient Data   Rehab Impairment Decline in Funcional mobility, ADLS and Safety due to Stroke   Etiologic Diagnosis Multifocal Infarcts   Date of Onset 07/31/21   Support System   Name Carlos Enrique Elders   Relationship Spouse   Able to provide 24 hour supervision Yes  (will need to confirm)   Able to provide physical help? Yes   Multiple Support Systems Yes   Support System 2   Name 2 Tavon Villagomez   Relationship 2 Daughter   Home Setup   Type of Home Single Level  (per chart review and pt, will need to confirm w/ family)   Method of Entry Stairs   Number of Stairs   (will need to confirm home setup/entry with family)   Available Equipment   (per chart and pt no DME at home)   Prior Level of Function   Self-Care 3  Independent - Patient completed the activities by him/herself, with or without an assistive device, with no assistance from a helper  Indoor-Mobility (Ambulation) 3  Independent - Patient completed the activities by him/herself, with or without an assistive device, with no assistance from a helper  Stairs 3  Independent - Patient completed the activities by him/herself, with or without an assistive device, with no assistance from a helper  Functional Cognition 3  Independent - Patient completed the activities by him/herself, with or without an assistive device, with no assistance from a helper  Prior Device Used Z   None of the above   Falls in the Last Year   Number of falls in the past 12 months 0   Psychosocial   Ability to Express Feelings Needs assistance   Ability to Express Needs Needs assistance   Ability to Express Thoughts Needs assistance   Ability to Understand Others Usually understands   Restrictions/Precautions   Precautions Aspiration;Bed/chair alarms;Cognitive; Fall Risk;Supervision on toilet/commode;O2;[place]   Pain Assessment   Pain Assessment Tool Pain Assessment not indicated - pt denies pain   Pain Score No Pain   Transfer Bed/Chair/Wheelchair   Positioning Concerns Cognition   Limitations Noted In Problem Solving; Endurance; Coordination;Balance; Sequencing;LE Strength;UE Strength;Vision   Adaptive Equipment None   Type of Assistance Needed Physical assistance;Verbal cues   Physical Assistance Level 51%-75%   Comment Stand pivot w/o device, leans to right, diff moving feet with pivot, requires A for management of O2 and [place]   Chair/Bed-to-Chair Transfer CARE Score 2   Roll Left and Right   Type of Assistance Needed Incidental touching   Physical Assistance Level No physical assistance   Roll Left and Right CARE Score 4   Sit to Lying   Type of Assistance Needed Physical assistance   Physical Assistance Level 51%-75%   Comment poor ability to coordinate and motor plan transfer   Sit to Lying CARE Score 2   Lying to Sitting on Side of Bed   Type of Assistance Needed Physical assistance;Verbal cues   Physical Assistance Level 26%-50%   Comment cueing to complete pushing up from side and to scoot to EOB, right /post lean noted   Lying to Sitting on Side of Bed CARE Score 3   Sit to Stand   Type of Assistance Needed Physical assistance   Physical Assistance Level 26%-50%   Comment Right lateral/post deviation   Sit to Stand CARE Score 3   Picking Up Object   Comment will attempt when safe   Reason if not Attempted Safety concerns   Picking Up Object CARE Score 88   Car Transfer   Type of Assistance Needed Physical assistance;Verbal cues   Physical Assistance Level 51%-75%   Comment MiN A to perform SPT from chair and with Sit<> stand from seat, cueing for sequencing   Car Transfer CARE Score 2   Ambulation   Primary Mode of Locomotion Prior to Admission Walk   Distance Walked (feet) 75 ft   Assist Device Hand Hold  (left)   Gait Pattern Ataxic; Slow Ama;R foot drag;Lateral deviation; Improper weight shift; Step to; Step through; Decreased foot clearance   Limitations Noted In Balance; Coordination; Heel Strike;Midline Orientation;Posture; Safety;Strength; Endurance   Provided Assistance with: Weight Shift;Trunk Support;Balance   Walk Assist Level Moderate Assist;Chair Follow   Walk 10 Feet   Type of Assistance Needed Physical assistance   Physical Assistance Level Total assistance   Comment Mod A with Chair follow and A for lines   Walk 10 Feet CARE Score 1   Walk 50 Feet with Two Turns   Type of Assistance Needed Physical assistance   Physical Assistance Level Total assistance   Comment Mod A with Chair follow and A for lines   Walk 50 Feet with Two Turns CARE Score 1   Walk 150 Feet   Comment fatigued at 75ft   Reason if not Attempted Safety concerns   Walk 150 Feet CARE Score 88   Walking 10 Feet on Uneven Surfaces   Reason if not Attempted Safety concerns   Walking 10 Feet on Uneven Surfaces CARE Score 88   Wheel 50 Feet with Two Turns   Reason if not Attempted Activity not applicable   Wheel 50 Feet with Two Turns CARE Score 9   Wheel 150 Feet   Reason if not Attempted Activity not applicable   Wheel 687 Feet CARE Score 9   Curb or Single Stair   Style negotiated Single stair   Type of Assistance Needed Physical assistance   Physical Assistance Level Total assistance   Comment Mod A , step by step cueing, BU E support on HR and 2 nd person for stand by  Completd 2 6"  steps   1 Step (Curb) CARE Score 1   4 Steps   Comment LE weakness, retropulsive, stopped after 2 steps   Reason if not Attempted Safety concerns   4 Steps CARE Score 88   12 Steps   Reason if not Attempted Safety concerns   12 Steps CARE Score 88   Comprehension   QI: Comprehension 2   Sometimes Understands: Understands only basic conversations or simple, direct phrases  Frequently requires cues to understand   Expression   QI: Expression 3  Exhibits some difficulty with expressing needs and ideas (e g , some words or finishing thoughts) or speech is not clear   RLE Assessment   RLE Assessment X   Strength RLE   R Hip Flexion 3/5   R Knee Flexion 3-/5   R Knee Extension 3+/5   R Ankle Dorsiflexion 3/5   R Ankle Plantar Flexion 3/5   LLE Assessment   LLE Assessment WFL  (grossly 4-/5)   Coordination   Movements are Fluid and Coordinated 0   Coordination and Movement Description Dysmetria noted   Sensation   Propioception Partial deficits in the RUE;Partial deficits in the RLE   Cognition   Overall Cognitive Status Impaired   Arousal/Participation Alert; Cooperative   Attention Difficulty dividing attention   Orientation Level Oriented to person;Oriented to time   Memory Decreased short term memory;Decreased recall of recent events;Decreased recall of precautions;Decreased recall of biographical information   Following Commands Follows one step commands with increased time or repetition   Vision   Vision Comments Recommend formal screen by OTR   Therapeutic Exercise   Therapeutic Exercise/Activity Practiced safe transfers  Extra time required for all mobility with frequent rest breaks  Vitals checked for Orthostatic BP, see flowsheet stable with all activity  I   Discharge Information   Patient's Discharge Plan to return home with family   Patient's Rehab Expectations unale to state   Barriers to Discharge Home Decreased Endurance; Safety Considerations;Decreased Strength;Decreased Cognitive Function  (will need to confirm family Assistance and home setup)   Impressions Pt is a 73 y/o male admitted to the [facility] after suffering CVA with muiltifocal infarcts  Pt with newly found Lung nodule, hepatic lesions and nasalpharangyl mass  Pt on 3L o2 with O2 sats 95%, 89% on RA  Presents with balance deficits, right sided weakness with noted right lean, ataxia and dysmetria   Poor activity tolerance with fatigue  Decreased ability to ambulate with coordination deficits, decreased safey awareness and delayed righting reactions  Fair to Good Rehab potential to return home with family, however will need to confirm setup, assistance and supervision available due to patient's cognitive deficits     PT Therapy Minutes   PT Time In 0900   PT Time Out 1000   PT Total Time (minutes) 60   PT Mode of treatment - Individual (minutes) 60   PT Mode of treatment - Concurrent (minutes) 0   PT Mode of treatment - Group (minutes) 0   PT Mode of treatment - Co-treat (minutes) 0   PT Mode of Treatment - Total time(minutes) 60 minutes   PT Cumulative Minutes 60   Cumulative Minutes   Cumulative therapy minutes 105

## 2021-08-09 NOTE — PLAN OF CARE
Problem: PAIN - ADULT  Goal: Verbalizes/displays adequate comfort level or baseline comfort level  Description: Interventions:  - Encourage patient to monitor pain and request assistance  - Assess pain using appropriate pain scale  - Administer analgesics based on type and severity of pain and evaluate response  - Implement non-pharmacological measures as appropriate and evaluate response  - Consider cultural and social influences on pain and pain management  - Notify physician/advanced practitioner if interventions unsuccessful or patient reports new pain  Outcome: Progressing     Problem: INFECTION - ADULT  Goal: Absence or prevention of progression during hospitalization  Description: INTERVENTIONS:  - Assess and monitor for signs and symptoms of infection  - Monitor lab/diagnostic results  - Monitor all insertion sites, i e  indwelling lines, tubes, and drains  - Monitor endotracheal if appropriate and nasal secretions for changes in amount and color  - [place] appropriate cooling/warming therapies per order  - Administer medications as ordered  - Instruct and encourage patient and family to use good hand hygiene technique  - Identify and instruct in appropriate isolation precautions for identified infection/condition  Outcome: Progressing     Problem: SAFETY ADULT  Goal: Patient will remain free of falls  Description: INTERVENTIONS:  - Educate patient/family on patient safety including physical limitations  - Instruct patient to call for assistance with activity   - Consult OT/PT to assist with strengthening/mobility   - Keep Call bell within reach  - Keep bed low and locked with side rails adjusted as appropriate  - Keep care items and personal belongings within reach  - Initiate and maintain comfort rounds  - Make Fall Risk Sign visible to staff  - Offer Toileting every  Hours, in advance of need  - Initiate/Maintain alarm  - Obtain necessary fall risk management equipment:   - Apply yellow socks and bracelet for high fall risk patients  - Consider moving patient to room near nurses station  Outcome: Progressing  Goal: Maintain or return to baseline ADL function  Description: INTERVENTIONS:  -  Assess patient's ability to carry out ADLs; assess patient's baseline for ADL function and identify physical deficits which impact ability to perform ADLs (bathing, care of mouth/teeth, toileting, grooming, dressing, etc )  - Assess/evaluate cause of self-care deficits   - Assess range of motion  - Assess patient's mobility; develop plan if impaired  - Assess patient's need for assistive devices and provide as appropriate  - Encourage maximum independence but intervene and supervise when necessary  - Involve family in performance of ADLs  - Assess for home care needs following discharge   - Consider OT consult to assist with ADL evaluation and planning for discharge  - Provide patient education as appropriate  Outcome: Progressing  Goal: Maintains/Returns to pre admission functional level  Description: INTERVENTIONS:  - Perform BMAT or MOVE assessment daily    - Set and communicate daily mobility goal to care team and patient/family/caregiver  - Collaborate with rehabilitation services on mobility goals if consulted  - Perform Range of Motion  times a day  - Reposition patient every  hours    - Dangle patient times a day  - Stand patient  times a day  - Ambulate patient  times a day  - Out of bed to chair times a day   - Out of bed for m times a day  - Out of bed for toileting  - Record patient progress and toleration of activity level   Outcome: Progressing     Problem: DISCHARGE PLANNING  Goal: Discharge to home or other facility with appropriate resources  Description: INTERVENTIONS:  - Identify barriers to discharge w/patient and caregiver  - Arrange for needed discharge resources and transportation as appropriate  - Identify discharge learning needs (meds, wound care, etc )  - Arrange for interpretive services to assist at discharge as needed  - Refer to Case Management Department for coordinating discharge planning if the patient needs post-hospital services based on physician/advanced practitioner order or complex needs related to functional status, cognitive ability, or social support system  Outcome: Progressing     Problem: NEUROSENSORY - ADULT  Goal: Achieves stable or improved neurological status  Description: INTERVENTIONS  - Monitor and report changes in neurological status  - Monitor vital signs such as temperature, blood pressure, glucose, and any other labs ordered   - Initiate measures to prevent increased intracranial pressure  - Monitor for seizure activity and implement precautions if appropriate      Outcome: Progressing  Goal: Achieves maximal functionality and self care  Description: INTERVENTIONS  - Monitor swallowing and airway patency with patient fatigue and changes in neurological status  - Encourage and assist patient to increase activity and self care     - Encourage visually impaired, hearing impaired and aphasic patients to use assistive/communication devices  Outcome: Progressing     Problem: RESPIRATORY - ADULT  Goal: Achieves optimal ventilation and oxygenation  Description: INTERVENTIONS:  - Assess for changes in respiratory status  - Assess for changes in mentation and behavior  - Position to facilitate oxygenation and minimize respiratory effort  - Oxygen administered by appropriate delivery if ordered  - Initiate smoking cessation education as indicated  - Encourage broncho-pulmonary hygiene including cough, deep breathe, Incentive Spirometry  - Assess the need for suctioning and aspirate as needed  - Assess and instruct to report SOB or any respiratory difficulty  - Respiratory Therapy support as indicated  Outcome: Progressing     Problem: GENITOURINARY - ADULT  Goal: Maintains or returns to baseline urinary function  Description: INTERVENTIONS:  - Assess urinary function  - Encourage oral fluids to ensure adequate hydration if ordered  - Administer IV fluids as ordered to ensure adequate hydration  - Administer ordered medications as needed  - Offer frequent toileting  - Follow urinary retention protocol if ordered  Outcome: Progressing  Goal: Absence of urinary retention  Description: INTERVENTIONS:  - Assess patients ability to void and empty bladder  - Monitor I/O  - Bladder scan as needed  - Discuss with physician/AP medications to alleviate retention as needed  - Discuss catheterization for long term situations as appropriate  Outcome: Progressing  Goal: Urinary catheter remains patent  Description: INTERVENTIONS:  - Assess patency of urinary catheter  - If patient has a chronic cadet, consider changing catheter if non-functioning  - Follow guidelines for intermittent irrigation of non-functioning urinary catheter  Outcome: Progressing     Problem: SKIN/TISSUE INTEGRITY - ADULT  Goal: Skin Integrity remains intact(Skin Breakdown Prevention)  Description: Assess:  -Perform [place] assessment every   -Clean and moisturize skin every   -Inspect skin when repositioning, toileting, and assisting with ADLS  -Assess under medical devices such as  every   -Assess extremities for adequate circulation and sensation     Bed Management:  -Have minimal linens on bed & keep smooth, unwrinkled  -Change linens as needed when moist or perspiring  -Avoid sitting or lying in one position for more than  hours while in bed  -Keep HOB at degrees     Toileting:  -Offer bedside commode  -Assess for incontinence every   -Use incontinent care products after each incontinent episode such as     Activity:  -Mobilize patient  times a day  -Encourage activity and walks on unit  -Encourage or provide ROM exercises   -Turn and reposition patient every Hours  -Use appropriate equipment to lift or move patient in bed  -Instruct/ Assist with weight shifting every when out of bed in chair  -Consider limitation of chair time  hour intervals    Skin Care:  -Avoid use of baby powder, tape, friction and shearing, hot water or constrictive clothing  -Relieve pressure over bony prominences using   -Do not massage red bony areas    Next Steps:  -Teach patient strategies to minimize risks such as    -Consider consults to  interdisciplinary teams such as  Outcome: Progressing     Problem: MUSCULOSKELETAL - ADULT  Goal: Maintain or return mobility to safest level of function  Description: INTERVENTIONS:  - Assess patient's ability to carry out ADLs; assess patient's baseline for ADL function and identify physical deficits which impact ability to perform ADLs (bathing, care of mouth/teeth, toileting, grooming, dressing, etc )  - Assess/evaluate cause of self-care deficits   - Assess range of motion  - Assess patient's mobility  - Assess patient's need for assistive devices and provide as appropriate  - Encourage maximum independence but intervene and supervise when necessary  - Involve family in performance of ADLs  - Assess for home care needs following discharge   - Consider OT consult to assist with ADL evaluation and planning for discharge  - Provide patient education as appropriate  Outcome: Progressing  Goal: Maintain proper alignment of affected body part  Description: INTERVENTIONS:  - Support, maintain and protect limb and body alignment  - Provide patient/ family with appropriate education  Outcome: Progressing     Problem: INFECTION - ADULT  Goal: Absence or prevention of progression during hospitalization  Description: INTERVENTIONS:  - Assess and monitor for signs and symptoms of infection  - Monitor lab/diagnostic results  - Monitor all insertion sites, i e  indwelling lines, tubes, and drains  - Monitor endotracheal if appropriate and nasal secretions for changes in amount and color  - [place] appropriate cooling/warming therapies per order  - Administer medications as ordered  - Instruct and encourage patient and family to use good hand hygiene technique  - Identify and instruct in appropriate isolation precautions for identified infection/condition  Outcome: Progressing     Problem: SAFETY ADULT  Goal: Patient will remain free of falls  Description: INTERVENTIONS:  - Educate patient/family on patient safety including physical limitations  - Instruct patient to call for assistance with activity   - Consult OT/PT to assist with strengthening/mobility   - Keep Call bell within reach  - Keep bed low and locked with side rails adjusted as appropriate  - Keep care items and personal belongings within reach  - Initiate and maintain comfort rounds  - Make Fall Risk Sign visible to staff  - Offer Toileting every Hours, in advance of need  - Initiate/Maintain alarm  - Obtain necessary fall risk management equipment:   - Apply yellow socks and bracelet for high fall risk patients  - Consider moving patient to room near nurses station  Outcome: Progressing     Problem: SAFETY ADULT  Goal: Maintains/Returns to pre admission functional level  Description: INTERVENTIONS:  - Perform BMAT or MOVE assessment daily    - Set and communicate daily mobility goal to care team and patient/family/caregiver  - Collaborate with rehabilitation services on mobility goals if consulted  - Perform Range of Motion times a day  - Reposition patient every  hours    - Dangle patient  times a day  - Stand patient times a day  - Ambulate patient  times a day  - Out of bed to chair  times a day   - Out of bed for meals times a day  - Out of bed for toileting  - Record patient progress and toleration of activity level   Outcome: Progressing     Problem: DISCHARGE PLANNING  Goal: Discharge to home or other facility with appropriate resources  Description: INTERVENTIONS:  - Identify barriers to discharge w/patient and caregiver  - Arrange for needed discharge resources and transportation as appropriate  - Identify discharge learning needs (meds, wound care, etc )  - Arrange for interpretive services to assist at discharge as needed  - Refer to Case Management Department for coordinating discharge planning if the patient needs post-hospital services based on physician/advanced practitioner order or complex needs related to functional status, cognitive ability, or social support system  Outcome: Progressing     Problem: NEUROSENSORY - ADULT  Goal: Achieves stable or improved neurological status  Description: INTERVENTIONS  - Monitor and report changes in neurological status  - Monitor vital signs such as temperature, blood pressure, glucose, and any other labs ordered   - Initiate measures to prevent increased intracranial pressure  - Monitor for seizure activity and implement precautions if appropriate      Outcome: Progressing     Problem: NEUROSENSORY - ADULT  Goal: Achieves stable or improved neurological status  Description: INTERVENTIONS  - Monitor and report changes in neurological status  - Monitor vital signs such as temperature, blood pressure, glucose, and any other labs ordered   - Initiate measures to prevent increased intracranial pressure  - Monitor for seizure activity and implement precautions if appropriate      Outcome: Progressing     Problem: NEUROSENSORY - ADULT  Goal: Achieves maximal functionality and self care  Description: INTERVENTIONS  - Monitor swallowing and airway patency with patient fatigue and changes in neurological status  - Encourage and assist patient to increase activity and self care     - Encourage visually impaired, hearing impaired and aphasic patients to use assistive/communication devices  Outcome: Progressing     Problem: RESPIRATORY - ADULT  Goal: Achieves optimal ventilation and oxygenation  Description: INTERVENTIONS:  - Assess for changes in respiratory status  - Assess for changes in mentation and behavior  - Position to facilitate oxygenation and minimize respiratory effort  - Oxygen administered by appropriate delivery if ordered  - Initiate smoking cessation education as indicated  - Encourage broncho-pulmonary hygiene including cough, deep breathe, Incentive Spirometry  - Assess the need for suctioning and aspirate as needed  - Assess and instruct to report SOB or any respiratory difficulty  - Respiratory Therapy support as indicated  Outcome: Progressing     Problem: GENITOURINARY - ADULT  Goal: Maintains or returns to baseline urinary function  Description: INTERVENTIONS:  - Assess urinary function  - Encourage oral fluids to ensure adequate hydration if ordered  - Administer IV fluids as ordered to ensure adequate hydration  - Administer ordered medications as needed  - Offer frequent toileting  - Follow urinary retention protocol if ordered  Outcome: Progressing     Problem: GENITOURINARY - ADULT  Goal: Absence of urinary retention  Description: INTERVENTIONS:  - Assess patients ability to void and empty bladder  - Monitor I/O  - Bladder scan as needed  - Discuss with physician/AP medications to alleviate retention as needed  - Discuss catheterization for long term situations as appropriate  Outcome: Progressing     Problem: GENITOURINARY - ADULT  Goal: Urinary catheter remains patent  Description: INTERVENTIONS:  - Assess patency of urinary catheter  - If patient has a chronic cadet, consider changing catheter if non-functioning  - Follow guidelines for intermittent irrigation of non-functioning urinary catheter  Outcome: Progressing     Problem: SKIN/TISSUE INTEGRITY - ADULT  Goal: Skin Integrity remains intact(Skin Breakdown Prevention)  Description: Assess:  -Perform [place] assessment every   -Clean and moisturize skin every   -Inspect skin when repositioning, toileting, and assisting with ADLS  -Assess under medical devices such as every   -Assess extremities for adequate circulation and sensation     Bed Management:  -Have minimal linens on bed & keep smooth, unwrinkled  -Change linens as needed when moist or perspiring  -Avoid sitting or lying in one position for more than hours while in bed  -Keep HOB atdegrees     Toileting:  -Offer bedside commode  -Assess for incontinence every   -Use incontinent care products after each incontinent episode such as    Activity:  -Mobilize patient  times a day  -Encourage activity and walks on unit  -Encourage or provide ROM exercises   -Turn and reposition patient every Hours  -Use appropriate equipment to lift or move patient in bed  -Instruct/ Assist with weight shifting every  when out of bed in chair  -Consider limitation of chair time hour intervals    Skin Care:  -Avoid use of baby powder, tape, friction and shearing, hot water or constrictive clothing  -Relieve pressure over bony prominences using   -Do not massage red bony areas    Next Steps:  -Teach patient strategies to minimize risks such as   -Consider consults to  interdisciplinary teams such  Outcome: Progressing     Problem: MUSCULOSKELETAL - ADULT  Goal: Maintain or return mobility to safest level of function  Description: INTERVENTIONS:  - Assess patient's ability to carry out ADLs; assess patient's baseline for ADL function and identify physical deficits which impact ability to perform ADLs (bathing, care of mouth/teeth, toileting, grooming, dressing, etc )  - Assess/evaluate cause of self-care deficits   - Assess range of motion  - Assess patient's mobility  - Assess patient's need for assistive devices and provide as appropriate  - Encourage maximum independence but intervene and supervise when necessary  - Involve family in performance of ADLs  - Assess for home care needs following discharge   - Consider OT consult to assist with ADL evaluation and planning for discharge  - Provide patient education as appropriate  Outcome: Progressing     Problem: MUSCULOSKELETAL - ADULT  Goal: Maintain proper alignment of affected body part  Description: INTERVENTIONS:  - Support, maintain and protect limb and body alignment  - Provide patient/ family with appropriate education  Outcome: Progressing

## 2021-08-09 NOTE — PROGRESS NOTES
SLP Cognitive/Language and Dysphagia Assessments       21 1100   Pain Assessment   Pain Assessment Tool Pain Assessment not indicated - pt denies pain   Restrictions/Precautions   Precautions Aspiration;Bed/chair alarms;Cognitive; Fall Risk;Impulsive; [place]; Supervision on toilet/commode   Cognitive Linguisitic Assessments   Cognitive Linguistic Quick Test (CLQT) Refer to assessment section for details  Comprehension   QI: Comprehension 2  Sometimes Understands: Understands only basic conversations or simple, direct phrases  Frequently requires cues to understand   Comprehension (FIM) 3 - Understands basic info/conversation 50-74% of time   Expression   QI: Expression 3  Exhibits some difficulty with expressing needs and ideas (e g , some words or finishing thoughts) or speech is not clear   Expression (FIM) 4 - Expresses basic info/needs 75-90% of time   Social Interaction   Social Interaction (FIM) 3 - Interacts 50-74% of time   Problem Solving   Problem solving (FIM) 1 - Needs direction nearly all the time   Memory   Memory (FIM) 1 - Recalls only 1 step   Language Assessments   Informal Speech Language Assessment Refer to assessment below   Speech/Swallow Mechanism Exam   Labial Symmetry Abnormal symmetry right   Labial Strength WFL   Labial ROM WFL   Facial Symmetry Right droop   Facial Strength WFL   Facial ROM WFL   Lingual Symmetry WFL   Lingual Strength Mild reduced   Lingual ROM Mild reduced   Dentition Dentures top;Dentures bottom   Respratory Status Nasal Cannula  (3L)   Speech/Language/Cognition Assessmetn   Treatment Assessment Pt completing the Informal Language Assessment   Pt was able to answer the following orientation information: Name: +; Date: -month/+year; Place: -  Needing moderate amount of probing questions to elicit being in the hospital; Situation:-   Ability to answer LTM Biographical Information is as follows: : +; Age: - ; Marital Status/Name of Significant Other; +; Children/Names; +, but difficulty in recalling grandchildren's name; Address: increased time noted to recall street address, but able to state city w/o cues; Prior IADL's completed: Pt reporting that wife completes all I ADL's prior to admission; will need to confirm w/ wife  Auditory Comprehension Tasks: Simple Yes/No Questions: 8/10 accurate; Moderate Yes/No Questions: 7/10 accurate; Auditory Commands: 1-step: 5/5 accurate; 2-step: 10/10 accurate; Verbal Expression Tasks: Refused to answer any questions pertaining to expressive language skills  Did STM recall given 4 words to recall immediately where pt was 4/4 accurate, but when attempting to recall in 5-10 second delay 1/4 accurate  It was noted throughout questions that pt remained fairly confused as to current location, perseverating on 'getting the car fixed and then I leave' needing moderate redirection for location/situation  Increased need for reorienting to situation primarily needed once pt began to demonstrate refusal to answer questions  As for speech intelligibility, pt's voicing is noticed to be lower volume, but pt does report this is baseline, but will need to confirm w/ wife  Otherwise, speech was fairly intelligible as session progressed, but could see as pt would fatigue, increased difficulty in clarity of speech  At this time, pt will continue to benefit from further completion of speech, language and cognitive skills to determine plan of care while in the acute rehab center  Pt would benefit from ongoing SLP services at this time to maximize overall functioning of mentioned deficits to decreased burden of care for family at time of discharge  Swallow Information   Current Risks for Dysphagia & Aspiration Respiratory compromise;Rapid respiratory rate;Weak cough; Dysarthria;General debilitation;New Neuro event;Brain injury;Cognitive deficit; Mental status change;Reduced alertnes; Positionong Issues   Current Symptoms/Concerns Cough; With liquids Current Diet Dysphagia advance; Thin liquid   Baseline Diet Regular; Thin liquids   Consistencies Assessed and Performance   Materials Admnistered Puree/Level 1;Soft/Level 3; Thin liquid   Oral Stage Mild impaired   Phargngeal Stage Mild impaired;Aspiration risk   Swallow Mechanics Mild delayed;Swallow initation;Good Larygneal rise;Aspiration risk   Esophageal Concerns No s/s reported   Recommendations   Risk for Aspiration Present   Recommendations Dysphagia treatment   Diet Solid Recommendation Level 3 Dysphagia/ advanced/ soft to chew   Diet Liquid Recommendation Thin liquid   Recommended Form of Meds As tolerated   General Precautions Aspiration precautions; Feed only when alert;Minimize distractions;Upright as possible for all oral intake;Remain upright for 45 mins after meals; Other (Comment)  (Distant supervision, FULLY upright for ALL meals)   Compensatory Swallowing Strategies Alternate solids and liquids; External pacing;Effortful swallow;Cue for lingual sweep;Voluntary throat clear/cough to clear penetration   Results Reviewed with RN;MD;PT/Family/Caregiver   Eating   Type of Assistance Needed Set-up / clean-up;Supervision;Verbal cues   Physical Assistance Level No physical assistance   Eating CARE Score 4   Swallow Assessment   Swallow Treatment Assessment Pt was observed w/ lunchtime meal for dysphagia tx session, to which current diet is dysphagia level 3 w/ thin liquids  Pt was seated fully upright w/ bed in chair position for meal, to which SLP setup tray but pt was able to feed self accordingly  Consumed 50% of meal and 120cc of thin liquids by cup  Lip seal around tsp, fork and cup was noted to be functional as well as bolus retrieval of consistencies observed during meal  No anterior loss was noted today  Mastication given softer solids (chopped chicken) was noted to be mildly prolonged but was effective in clearing oral cavity prior to next bites   Bolus control/transfer given puree (mashed potatoes, chocolate mousse) was noted to be mildly labored but did not exhibit residual/pocketing  Bolus control/transfer of thin liquids by cup was noted be more controlled/timely w/ small single sips vs taking larger sips  Suspect decreased control of larger sips of thin liquids  Swallow initiation was mildly delayed across consistencies, but hyolarygneal excursion was fairly functional upon palpation  Weak cough x1 elicited after larger sip of thin liquid but no further or increased aspiration sxs noted w/ single sips nor given food items throughout meal  At this time, will recommend to continue dysphagia level 3 diet w/ thin liquids  Aspiration Precautions, Distant supervision for now--->setup w/ tray  FULLY upright w/ bed in chair position or OOB w/ meals, small bites/sips, alternate solids/liquids, encourage PO intake  If changes in cognitive skills noted, would recommend FULL supervision for meals  Results d/w nsg and MD, Dr Deborah Sullivan  Pt will continue to benefit from SLP services to maximize overall safety in swallow function as well as establish safest least restrictive diet w/o increased oropharygneal or aspiration sxs  Swallow Assessment Prognosis   Prognosis Fair   Prognosis Considerations Co-morbidities; Medical diagnosis; Medical prognosis; New learning ability; Severity of impairments;Previous level of function;Ability to carry over; Cooperation   SLP Therapy Minutes   SLP Time In 1100   SLP Time Out 6338   SLP Total Time (minutes) 45   SLP Mode of treatment - Individual (minutes) 45   SLP Mode of treatment - Concurrent (minutes) 0   SLP Mode of treatment - Group (minutes) 0   SLP Mode of treatment - Co-treat (minutes) 0   SLP Mode of Treatment - Total time(minutes) 45 minutes   SLP Cumulative Minutes 45   Therapy Time missed   Time missed?  No

## 2021-08-09 NOTE — PROGRESS NOTES
OT LTGs     08/09/21 1300   Rehab Team Goals   ADL Team Goal Patient will require supervision with ADLs with least restrictive device upon completion of rehab program   Rehab Team Interventions   OT Interventions Self Care;Home Management; Therapeutic Exercise;Cognitive Reintegration;Cognitive Retraining;Energy Conservation;Patient/Family Education   Eating Goal   Eating Goal 05  Setup or clean-up assistance - Edon SETS UP or CLEANS UP, patient completes activity  Edon assists only prior to or following the activity  Status Ongoing; Target goal - three weeks   Interventions Optimal Position; Neuromuscular Education   Grooming Goal   Oral Hygiene Goal 05  Setup or clean-up assistance - Edon SETS UP or CLEANS UP, patient completes activity  Edon assists only prior to or following the activity  Task Brush Teeth;Wash/Dry Hands; Wash/Dry Face;Comb Hair   Environment Seated at [employer]; Target goal - three weeks   Intervention Assistive Device;Balance Work;Neuromuscular Education; Therapeutic Exercise; Tolerance Work   Tub/Shower Transfer Goal   Method Shower Stall  (SUP using LRD)   Status Ongoing; Target goal - three weeks   Interventions ADL Training;Assistive Device; Neuromuscular Education   Bathing Goal   Shower/bathe self Goal 04  Supervision or touching assistance- Edon provides VERBAL CUES or supervision throughout activity  Environment Seated;Standing   Status Ongoing; Target goal - three weeks   Intervention ADL Training;Assistive Device; Neuromuscular Education; Therapeutic Exercise   Upper Body Dressing Goal   Upper body dressing Goal 05  Setup or clean-up assistance - Edon SETS UP or CLEANS UP, patient completes activity  Edon assists only prior to or following the activity  Task Upper Body;Arms in/out; Over Head   Environment Seated   Status Ongoing; Target goal - three weeks   Intervention Balance Work;Neuromuscular Education; Therapeutic Exercise; Tolerance Work   Lower Body Dressing Goal   Lower body dressing Goal 04  Supervision or touching assistance- [place] provides VERBAL CUES or supervision throughout activity  Putting on/taking off footwear Goal 04  Supervision or touching assistance- [place] provides VERBAL CUES or supervision throughout activity  Environment Seated;Standing   Status Ongoing; Target goal - three weeks   Intervention Assistive Device;Balance Work;Neuromuscular Education; Therapeutic Exercise; Tolerance Work   Toileting Transfer Goal   Toilet transfer Goal 04  Supervision or touching assistance- [place] provides VERBAL CUES or supervision throughout activity  Status Ongoing; Target goal - three weeks   Toileting Goal   Toileting hygiene Goal 04  Supervision or touching assistance- [place] provides VERBAL CUES or supervision throughout activity  Task Pants Up;Pants Down;Hygiene   Status Ongoing; Target goal - three weeks   Intervention ADL Training;Balance Work;Assistive Device

## 2021-08-09 NOTE — ASSESSMENT & PLAN NOTE
Impaired cognition but stable during ARC course  - Had occasional increased confusion brief agitation in evenings in ARC but remains at risk for future confusion/agitation   - Completed CG training  - Optimal sleep/wake - melatonin low dose at night   - Limit sedating meds as much as possible  - Will Rx very limited Rx of Seroquel 12 5mg qday PRN Disp #5 for uncontrolled agitation/restlessness not adequately controlled by non-pharm means     -MOCA not completed during ARC course > OP neuro follow-up     I notified patient/family that due to cog impairments and patient's residual functional impairments could significantly impact driving  I notifiied patient/family that I would notify [employer] that it could impact driving and it would be at their discretion how to proceed  In interim, I recommend no driving until cleared by outpatient physician and after cleared by formal driving testing  Initial Reporting Form [id number] filled out and faxed to [facility]

## 2021-08-09 NOTE — ASSESSMENT & PLAN NOTE
Improving prior to d/c but still needing O2 with activity; occasionally at rest/sleep   - Desat on RA with activity but was maintaining saturation at rest most recently   - Desat from 91% to 84% with activity on 8/16 that went up to 95% with 2L O2  - CM setting up home O2 - DME form filled out   Goal per Pulm is to maintain 88%+ O2 sat, wean as able to  - Family counseled to check and provide or increase O2 supplementation if SpO2<89%   Likely multifactorial but could be large part related to PE, also has moderate emphysema/COPD, and lung mass  - Supplemental O2  - Antithrombotics  - OP follow-up with pulm/oncology

## 2021-08-09 NOTE — PROGRESS NOTES
Intelligibility Phrase   Findings Pt initiating portions of langauge and cognitive assessments  Refer to SLP Rehab note for details  QI: Expression 3  Exhibits some difficulty with expressing needs and ideas (e g , some words or finishing thoughts) or speech is not clear   Expression (FIM) 4 - Expresses basic info/needs 75-90% of time   Social Interaction   Cooperation with staff   Participation Individual   Behaviors observed Appropriate   Findings Pt initiating portions of langauge and cognitive assessments  Refer to SLP Rehab note for details  Social Interaction (FIM) 3 - Interacts 50-74% of time   Problem Solving   Routine Manages call bell   Findings Pt initiating portions of langauge and cognitive assessments  Refer to SLP Rehab note for details  Problem solving (FIM) 1 - Needs direction nearly all the time   Memory   Recognize People No   Remember Routine No   Initiates Tasks Yes   Short-Term Impaired   Long Term Impaired   Recalls Precaution No   Findings Pt initiating portions of langauge and cognitive assessments  Refer to SLP Rehab note for details  Memory (FIM) 1 - Recalls only 1 step   Discharge Information   Patient's Discharge Plan home w/ family support/supervision   Patient's Rehab Expectations unable to verbalize at this time   Barriers to Discharge Home Decreased Cognitive Function;Decreased Strength;Decreased Endurance; Safety Considerations   Impressions Pt is a fair to good rehab candidate to achieve min A level cognitive, language and speech skills as well as establishing safest least restrictive diet w/o increased aspiration sxs  Current deficits include decreased orientation, decreased LT/ST memory, decreased working memory, decreased speech clarity, decreased executive function skills (problem solving, reasoning, sequencing, organization), decreased attention and decreased insight to overall deficits which impacts safety awareness and functional mobility   ELOS 2-3 weeks, given family training w/ family for ensure safe discharge home w/ supervision/support  At this time, pt will continue to benefit from SLP services to maximize overall safety in swallow function as well as establish safest least restrictive diet w/o increased oropharygneal or aspiration sxs as well as to maximize overall cognitive linguistic functioning of mentioned deficits to decreased burden of care for family at time of discharge      SLP Therapy Minutes   SLP Time In 1100   SLP Time Out 9374   SLP Total Time (minutes) 45   SLP Mode of treatment - Individual (minutes) 45   SLP Mode of treatment - Concurrent (minutes) 0   SLP Mode of treatment - Group (minutes) 0   SLP Mode of treatment - Co-treat (minutes) 0   SLP Mode of Treatment - Total time(minutes) 45 minutes   SLP Cumulative Minutes 45   Cumulative Minutes   Cumulative therapy minutes 45

## 2021-08-09 NOTE — CASE MANAGEMENT
Phone call placed to pts dtr Ghada Duran, [address], reviewed rehab routine and cm role  Pt and spouse, lulu live in a ranch home and lulu is independent and drives  Pt and wife were/are completely healthy that they dont even take any prescribed medications  Pt does not have an rx plan and Ghada Duran is looking into plans that she can enroll in now until medicare open enrollment  Ghada Duran is aware his current medications are going to be costly  Cm provided number for [employer] at [facility] for her to gain information about medicare programs  Pt has a walker and commode that were ordered during hospital stay and per raya the bed was being delivered today  Ghada Duran is aware of [facility] services if recommended  Reviewed team mtg process and potential los  Ghada Duran would like her mother and brother available on wedensday following team to collectively receive info  3pm time arranged  Pt wife Mallissa Goodpasture is to be first contact and if not available/does not answer the phone Ghada Duran can be called  Lulu's number is [phone]

## 2021-08-10 LAB
DME PARACHUTE DELIVERY DATE ACTUAL: NORMAL
DME PARACHUTE DELIVERY DATE EXPECTED: NORMAL
DME PARACHUTE DELIVERY DATE REQUESTED: NORMAL
DME PARACHUTE ITEM DESCRIPTION: NORMAL
DME PARACHUTE ORDER STATUS: NORMAL
DME PARACHUTE SUPPLIER NAME: NORMAL
DME PARACHUTE SUPPLIER PHONE: NORMAL

## 2021-08-10 PROCEDURE — 97112 NEUROMUSCULAR REEDUCATION: CPT

## 2021-08-10 PROCEDURE — 92507 TX SP LANG VOICE COMM INDIV: CPT

## 2021-08-10 PROCEDURE — 97535 SELF CARE MNGMENT TRAINING: CPT

## 2021-08-10 PROCEDURE — 97129 THER IVNTJ 1ST 15 MIN: CPT

## 2021-08-10 PROCEDURE — 92526 ORAL FUNCTION THERAPY: CPT

## 2021-08-10 PROCEDURE — 97116 GAIT TRAINING THERAPY: CPT

## 2021-08-10 PROCEDURE — 99232 SBSQ HOSP IP/OBS MODERATE 35: CPT | Performed by: INTERNAL MEDICINE

## 2021-08-10 PROCEDURE — 97530 THERAPEUTIC ACTIVITIES: CPT

## 2021-08-10 PROCEDURE — 99232 SBSQ HOSP IP/OBS MODERATE 35: CPT

## 2021-08-10 RX ADMIN — MELATONIN 3 MG: at 21:27

## 2021-08-10 RX ADMIN — APIXABAN 10 MG: 5 TABLET, FILM COATED ORAL at 10:10

## 2021-08-10 RX ADMIN — APIXABAN 10 MG: 5 TABLET, FILM COATED ORAL at 17:57

## 2021-08-10 RX ADMIN — ATORVASTATIN CALCIUM 40 MG: 40 TABLET, FILM COATED ORAL at 17:57

## 2021-08-10 NOTE — PCC SPEECH THERAPY
Pt currently being followed for dysphagia, speech/language/cognitive tx sessions  Currently for pt's swallow function, pt is demonstrating mild oropharyngeal sxs due to fatigue and fluctuating KALLI which increased mastication time is noted along w/ decreased bolus control/transfer of thin liquids  Swallow initiation is mildly delayed across consistencies to where pt does exhibit weak cough given thin liquids  However, it is noted that in f/u session where pt's KALLI is maximal, pt's swallow function is improved w/o exhibiting increased oropharyngeal or aspiration sxs  Due to pt's fluctuations given KALLI, will continue to recommend level 3 diet w/ thin liquids but will continue to benefit from SLP services to maximize swallow function and establish safest least restrictive diet w/o increased aspiration sxs  As for pt's speech/language/cognitive skills, it heavily depends on pt's overall KALLI and fatigue levels  Pt was noted to demonstrate mild dysarthria as well as some difficulty in word finding and pt's orientation waxes  Again, when KALLI improved, orientation is still off but some improvement in expression/comprehension skills and basic cognitive skills  Other barriers besides pt's fatigue include decreased comprehension, decreased word finding, decreased ST/working memory, decreased problem solving, decreased sequencing, decreased organization of thoughts decreased insight to deficits which impacts current safety and functional mobility  Will continue to benefit from SLP services to maximize overall functional independence of speech/language/cognitive skills to decreased burden of care for family at discharge

## 2021-08-10 NOTE — PROGRESS NOTES
08/10/21 1230   Pain Assessment   Pain Assessment Tool Pain Assessment not indicated - pt denies pain   Pain Score No Pain   Restrictions/Precautions   Precautions Bed/chair alarms;1:1;Aspiration;Cognitive; Fall Risk; [place]et; Impulsive;Supervision on toilet/commode   Weight Bearing Restrictions No   ROM Restrictions No   Lifestyle   Autonomy "I think yesterday I was OK"   Oral Hygiene   Type of Assistance Needed Supervision   Physical Assistance Level No physical assistance   Comment seated in w/c at sink; max VCs for sequencing/initiation   Oral Hygiene CARE Score 4   Shower/Bathe Self   Type of Assistance Needed Physical assistance   Physical Assistance Level 51%-75%   Comment Seated at sink to complete sponge bath, pt able to bath UB with set up/supervision with max VCs for sequencing/initiation  Pt able to bathe upper/lower legs and feet seated in w/c using cross leg tech; mod assist in stance for balance while bathing buttocks/sheron area      Shower/Bathe Self CARE Score 2   Upper Body Dressing   Type of Assistance Needed Physical assistance   Physical Assistance Level 25% or less   Comment min assist to don due to decreased orientation of shirt, decreased sequencing and problem solving   Upper Body Dressing CARE Score 3   Lower Body Dressing   Type of Assistance Needed Physical assistance   Physical Assistance Level 76% or more   Comment pt requires assist to manage cadet through pants, assist to thread R LE, able to thread L LE, assist to pull up over hips   Lower Body Dressing CARE Score 2   Putting On/Taking Off Footwear   Type of Assistance Needed Supervision   Physical Assistance Level No physical assistance   Comment seated to don/doff socks   Putting On/Taking Off Footwear CARE Score 4   Sit to Stand   Type of Assistance Needed Physical assistance   Physical Assistance Level 26%-50%   Comment from w/c to sink and to manage cadet   Sit to Stand CARE Score 3   Bed-Chair Transfer   Type of Assistance Needed Physical assistance   Physical Assistance Level 26%-50%   Comment sit/stand pivot from recliner to w/c and w/c to recliner transferring to L side; assist to manage O2 tubing and cadet   Chair/Bed-to-Chair Transfer CARE Score 3   Toileting Hygiene   Type of Assistance Needed Physical assistance   Physical Assistance Level Total assistance   Comment due to bowel incontinence in brief    Toileting Hygiene CARE Score 1   Functional Standing Tolerance   Time 5 min at sink during self care tasks   Comments up to mod assist for balance in stance at sink during LE self care   Cognition   Overall Cognitive Status Impaired   Arousal/Participation Alert; Cooperative   Attention Attends with cues to redirect   Orientation Level Disoriented to place; Disoriented to time;Disoriented to situation   Memory Decreased short term memory;Decreased recall of precautions;Decreased recall of recent events   Following Commands Follows one step commands inconsistently   Activity Tolerance   Activity Tolerance Patient tolerated treatment well   Medical Staff Made Aware RN made aware of pt complaint with discomfort from brief  RN in agreement to only have pt wear brief when in therapies/OOB, otherwise when in bed, pt to have brief off  Assessment   Treatment Assessment Pt engages in 60 minute skilled OT Session focusing on ADL at sink, sit/stand pivots to L side  See above for full functional details  Pt demo's inc ability to safely engage and perform OT tasks, appears more alert and awake this date  Pt able to perform sit/stand pivots with min assist and max VCs for safe hand placement  Min assist with max VCs for donning over head shirt as pt noted with decreased problem solving, sequencing and initiation  Grooming tasks completed seated at sink due to decreased standing kristen/bal as well as func cog requiring max VCs for initiating and sequencing brushing teeth and combing hair   Coordination and visual deficits noted during basic self care tasks  When asked pt to cover L eye, pt reports he sees "nothing" in his R eye  When asked about this, 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  Will need to confirm with spouse  Recommend continued skilled care in order to inc independence with self care, functional transfers, standing kristen/bal, func cog in order to decrease burden of care at d/c  Prognosis Good   Problem List Decreased strength;Decreased range of motion;Decreased endurance; Impaired balance;Decreased mobility; Decreased coordination;Decreased cognition; Impaired judgement;Decreased safety awareness; Impaired vision   Barriers to Discharge Inaccessible home environment;Decreased caregiver support   Plan   Treatment/Interventions ADL retraining;Functional transfer training; Therapeutic exercise; Endurance training;Cognitive reorientation;Patient/family training;Equipment eval/education; Compensatory technique education   OT Therapy Minutes   OT Time In 1230   OT Time Out 1330   OT Total Time (minutes) 60   OT Mode of treatment - Individual (minutes) 60   OT Mode of treatment - Concurrent (minutes) 0   OT Mode of treatment - Group (minutes) 0   OT Mode of treatment - Co-treat (minutes) 0   OT Mode of Treatment - Total time(minutes) 60 minutes   OT Cumulative Minutes 90   Therapy Time missed   Time missed?  No

## 2021-08-10 NOTE — PROGRESS NOTES
08/10/21 0800   Pain Assessment   Pain Assessment Tool Pain Assessment not indicated - pt denies pain   Restrictions/Precautions   Precautions 1:1;Aspiration;Bed/chair alarms;Cognitive; Fall Risk;Impulsive; [place]; Supervision on toilet/commode   Comprehension   Comprehension (FIM) 4 - Understands basic info/conversation 75-90% of time   Expression   Expression (FIM) 4 - Expresses basic info/needs 75-90% of time   Social Interaction   Social Interaction (FIM) 5 - Interacts appropriately with others 90% of time   Problem Solving   Problem solving (FIM) 4 - Solves basic problems 75-89% of time   Memory   Memory (FIM) 4 - Recalls 2 of 3 steps   Speech/Language/Cognition Assessmetn   Treatment Assessment Pt awake, alert and was noted to be more oriented today  Due to improved KALLI and participation, SLP reviewed information which was covered yesterday in regards to LTM biographical information, orientation, etc  Today, pt was able to verbalize correct place, year  Pt continues to need re-orientation to month and situation  As for recall of LTM biographical information, pt was noted to have difficulty initially recalling son and dtr's names, but when SLP provided initial letter cue, able to recall their names accordingly  Of note, pt was able to recall all 4 grandchildren's names w/o cues today  Pt remained consistent in recall of  and wife's name  However, improvement noted in ability to spontaneously elicit age, stree address as well as confirming that wife does complete I ADL's at home   Pt's ability to engage in conversational speech was noted to be more fluent in regards to home life, hobbies, etc  However, when asking pt about medical information, pt was noted to demonstrate difficulty in regards to needing O2 currently, as well as deficits given sustaining stroke, etc  SLP did engage pt in confrontation naming due to what he did verbalize as difficulty in coming up w/ words, to which pt was able to name all items which SLP had pt name given 8/8 accuracy  SLP providing pt w/ education in regards to yesterday's session and how fatigue will highly impact overall expressive language skills, speech clarity as well as cognitive linguistic skills which more support will be provided by staff  Pt verbalizing understanding of information at this time and will continue to benefit from SLP services to maximize overall functional independence of speech/language/cognitive skills to decreased burden of care for family at discharge  Swallow Information   Current Risks for Dysphagia & Aspiration Respiratory compromise;Rapid respiratory rate;Weak cough; Dysarthria;General debilitation;New Neuro event;Brain injury;Cognitive deficit; Mental status change;Reduced alertnes; Positionong Issues   Current Symptoms/Concerns Clear throat;Cough; With liquids; Difficulty chewing;Decreased oral intake   Current Diet Dysphagia advance; Thin liquid   Baseline Diet Regular; Thin liquids   Consistencies Assessed and Performance   Materials Admnistered Regular/Solid; Thin liquid   Oral Stage Mild impaired   Phargngeal Stage Mild impaired;Aspiration risk   Swallow Mechanics Mild delayed;Swallow initation;Good Larygneal rise;Aspiration risk   Esophageal Concerns No s/s reported   Recommendations   Risk for Aspiration Present   Recommendations Dysphagia treatment   Diet Solid Recommendation Level 3 Dysphagia/ advanced/ soft to chew   Diet Liquid Recommendation Thin liquid   Recommended Form of Meds As tolerated   General Precautions Aspiration precautions; Feed only when alert;Minimize distractions;Upright as possible for all oral intake;Remain upright for 45 mins after meals; Other (Comment)  (FULLY upright/OOB for ALL meals, Distant supervision)   Compensatory Swallowing Strategies Alternate solids and liquids; External pacing;Effortful swallow;Cue for lingual sweep; Check for pocketing of food on the right;Voluntary throat clear/cough to clear penetration   Results Reviewed with RN;PT/Family/Caregiver   Eating   Type of Assistance Needed Set-up / clean-up; Verbal cues   Eating CARE Score 4   Swallow Assessment   Swallow Treatment Assessment Observed pt w/ breakfast meal where SLP did order pt a trial tray of regular diet w/ thin liquids  Bed was placed in chair position for meal which was the seated fully upright for meal  Continue to setup tray for pt but pt is able to feed self w/o assistance  Today, pt consumed 75% of meal and 360cc of thin liquids by cup/straw  Due to overall improvement in KALLI, pt's functional stages of swallow were also noted to be improved  SLP did take O2 today as pt does remain on 3L via NC  Prior to initiating meal, pt's O2 sats ranged from 94-95% and HR was 68  When consuming meal, pt's O2 ranged from 91-93% on 3L via NC and HR was 72 during meal, which remained consistent throughout meal  Lip seal around fork, cup and straw was noted to be [place] along w/ functional bolus retrieval given consistencies during meal  Mastication given solids today  (ham, cheese, onion and pepper omelet and sliced peaches) was noted to be mildly prolonged but effective and functional w/o exhibiting oral residual/pocketing  Of note, rate of intake was increased near end of meal to which SLP needed to provide pt w/ verbal cues to "chew and swallow before taking next bites " Pt was able to follow through w/ this strategy as meal progressed  Bolus control/transfer given thin liquids was prompter today  Swallow initiation remains mildly delayed given solids, but prompter given thin liquids  Hyolaryngeal excursion upon palpation was noted to be functional across textures during meal today  No overt or increased aspiration or oropharyngeal sxs were noted during meal today but suspect 2* improved KALLI  SLP providing education to pt in regards to how improved KALLI does impact overall functioning of oropharyngeal stages which does decrease risk of aspiration   SLP also stating to pt about being observed at different meals to monitor KALLI and ability to safely advance diet at this time, which pt agreeable given education at this time  Currently will continue to recommend level 3 diet w/ thin liquids, continuing Aspiration Precautions, Distant supervision for now--->setup w/ tray  FULLY upright w/ bed in chair position or OOB w/ meals, small bites/sips, alternate solids/liquids, encourage PO intake  If changes in cognitive skills noted, would recommend FULL supervision for meals  Pt will continue to benefit from SLP services to maximize overall safety in swallow function as well as establish safest least restrictive diet w/o increased oropharygneal or aspiration sxs  Swallow Assessment Prognosis   Prognosis Good   Prognosis Considerations Co-morbidities; Medical diagnosis; Medical prognosis; Severity of impairments;Previous level of function;New learning ability;Ability to carry over   SLP Therapy Minutes   SLP Time In 0800   SLP Time Out 0900   SLP Total Time (minutes) 60   SLP Mode of treatment - Individual (minutes) 60   SLP Mode of treatment - Concurrent (minutes) 0   SLP Mode of treatment - Group (minutes) 0   SLP Mode of treatment - Co-treat (minutes) 0   SLP Mode of Treatment - Total time(minutes) 60 minutes   SLP Cumulative Minutes 105   Therapy Time missed   Time missed?  No

## 2021-08-10 NOTE — PLAN OF CARE
Problem: PAIN - ADULT  Goal: Verbalizes/displays adequate comfort level or baseline comfort level  Description: Interventions:  - Encourage patient to monitor pain and request assistance  - Assess pain using appropriate pain scale  - Administer analgesics based on type and severity of pain and evaluate response  - Implement non-pharmacological measures as appropriate and evaluate response  - Consider cultural and social influences on pain and pain management  - Notify physician/advanced practitioner if interventions unsuccessful or patient reports new pain  Outcome: Progressing     Problem: INFECTION - ADULT  Goal: Absence or prevention of progression during hospitalization  Description: INTERVENTIONS:  - Assess and monitor for signs and symptoms of infection  - Monitor lab/diagnostic results  - Monitor all insertion sites, i e  indwelling lines, tubes, and drains  - Monitor endotracheal if appropriate and nasal secretions for changes in amount and color  - [place] appropriate cooling/warming therapies per order  - Administer medications as ordered  - Instruct and encourage patient and family to use good hand hygiene technique  - Identify and instruct in appropriate isolation precautions for identified infection/condition  Outcome: Progressing     Problem: SAFETY ADULT  Goal: Patient will remain free of falls  Description: INTERVENTIONS:  - Educate patient/family on patient safety including physical limitations  - Instruct patient to call for assistance with activity   - Consult OT/PT to assist with strengthening/mobility   - Keep Call bell within reach  - Keep bed low and locked with side rails adjusted as appropriate  - Keep care items and personal belongings within reach  - Initiate and maintain comfort rounds  - Make Fall Risk Sign visible to staff  - Offer Toileting every  Hours, in advance of need  - Initiate/Maintain alarm  - Obtain necessary fall risk management equipment:   - Apply yellow socks and bracelet for high fall risk patients  - Consider moving patient to room near nurses station  Outcome: Progressing  Goal: Maintain or return to baseline ADL function  Description: INTERVENTIONS:  -  Assess patient's ability to carry out ADLs; assess patient's baseline for ADL function and identify physical deficits which impact ability to perform ADLs (bathing, care of mouth/teeth, toileting, grooming, dressing, etc )  - Assess/evaluate cause of self-care deficits   - Assess range of motion  - Assess patient's mobility; develop plan if impaired  - Assess patient's need for assistive devices and provide as appropriate  - Encourage maximum independence but intervene and supervise when necessary  - Involve family in performance of ADLs  - Assess for home care needs following discharge   - Consider OT consult to assist with ADL evaluation and planning for discharge  - Provide patient education as appropriate  Outcome: Progressing  Goal: Maintains/Returns to pre admission functional level  Description: INTERVENTIONS:  - Perform BMAT or MOVE assessment daily    - Set and communicate daily mobility goal to care team and patient/family/caregiver  - Collaborate with rehabilitation services on mobility goals if consulted  - Perform Range of Motion  times a day  - Reposition patient every  hours    - Dangle patient  times a day  - Stand patient  times a day  - Ambulate patient  times a day  - Out of bed to chair  times a day   - Out of bed for meals times a day  - Out of bed for toileting  - Record patient progress and toleration of activity level   Outcome: Progressing     Problem: DISCHARGE PLANNING  Goal: Discharge to home or other facility with appropriate resources  Description: INTERVENTIONS:  - Identify barriers to discharge w/patient and caregiver  - Arrange for needed discharge resources and transportation as appropriate  - Identify discharge learning needs (meds, wound care, etc )  - Arrange for interpretive services to assist at discharge as needed  - Refer to Case Management Department for coordinating discharge planning if the patient needs post-hospital services based on physician/advanced practitioner order or complex needs related to functional status, cognitive ability, or social support system  Outcome: Progressing     Problem: NEUROSENSORY - ADULT  Goal: Achieves stable or improved neurological status  Description: INTERVENTIONS  - Monitor and report changes in neurological status  - Monitor vital signs such as temperature, blood pressure, glucose, and any other labs ordered   - Initiate measures to prevent increased intracranial pressure  - Monitor for seizure activity and implement precautions if appropriate      Outcome: Progressing  Goal: Achieves maximal functionality and self care  Description: INTERVENTIONS  - Monitor swallowing and airway patency with patient fatigue and changes in neurological status  - Encourage and assist patient to increase activity and self care     - Encourage visually impaired, hearing impaired and aphasic patients to use assistive/communication devices  Outcome: Progressing     Problem: RESPIRATORY - ADULT  Goal: Achieves optimal ventilation and oxygenation  Description: INTERVENTIONS:  - Assess for changes in respiratory status  - Assess for changes in mentation and behavior  - Position to facilitate oxygenation and minimize respiratory effort  - Oxygen administered by appropriate delivery if ordered  - Initiate smoking cessation education as indicated  - Encourage broncho-pulmonary hygiene including cough, deep breathe, Incentive Spirometry  - Assess the need for suctioning and aspirate as needed  - Assess and instruct to report SOB or any respiratory difficulty  - Respiratory Therapy support as indicated  Outcome: Progressing     Problem: GENITOURINARY - ADULT  Goal: Maintains or returns to baseline urinary function  Description: INTERVENTIONS:  - Assess urinary function  - Encourage oral fluids to ensure adequate hydration if ordered  - Administer IV fluids as ordered to ensure adequate hydration  - Administer ordered medications as needed  - Offer frequent toileting  - Follow urinary retention protocol if ordered  Outcome: Progressing  Goal: Absence of urinary retention  Description: INTERVENTIONS:  - Assess patients ability to void and empty bladder  - Monitor I/O  - Bladder scan as needed  - Discuss with physician/AP medications to alleviate retention as needed  - Discuss catheterization for long term situations as appropriate  Outcome: Progressing  Goal: Urinary catheter remains patent  Description: INTERVENTIONS:  - Assess patency of urinary catheter  - If patient has a chronic cadet, consider changing catheter if non-functioning  - Follow guidelines for intermittent irrigation of non-functioning urinary catheter  Outcome: Progressing     Problem: SKIN/TISSUE INTEGRITY - ADULT  Goal: Skin Integrity remains intact(Skin Breakdown Prevention)  Description: Assess:  -Perform [place] assessment every   -Clean and moisturize skin every   -Inspect skin when repositioning, toileting, and assisting with ADLS  -Assess under medical devices such as  every   -Assess extremities for adequate circulation and sensation     Bed Management:  -Have minimal linens on bed & keep smooth, unwrinkled  -Change linens as needed when moist or perspiring  -Avoid sitting or lying in one position for more than  hours while in bed  -Keep HOB at degrees     Toileting:  -Offer bedside commode  -Assess for incontinence every   -Use incontinent care products after each incontinent episode such as     Activity:  -Mobilize patient  times a day  -Encourage activity and walks on unit  -Encourage or provide ROM exercises   -Turn and reposition patient every  Hours  -Use appropriate equipment to lift or move patient in bed  -Instruct/ Assist with weight shifting every  when out of bed in chair  -Consider limitation of chair time  hour intervals    Skin Care:  -Avoid use of baby powder, tape, friction and shearing, hot water or constrictive clothing  -Relieve pressure over bony prominences using   -Do not massage red bony areas    Next Steps:  -Teach patient strategies to minimize risks such as    -Consider consults to  interdisciplinary teams such   Outcome: Progressing     Problem: MUSCULOSKELETAL - ADULT  Goal: Maintain or return mobility to safest level of function  Description: INTERVENTIONS:  - Assess patient's ability to carry out ADLs; assess patient's baseline for ADL function and identify physical deficits which impact ability to perform ADLs (bathing, care of mouth/teeth, toileting, grooming, dressing, etc )  - Assess/evaluate cause of self-care deficits   - Assess range of motion  - Assess patient's mobility  - Assess patient's need for assistive devices and provide as appropriate  - Encourage maximum independence but intervene and supervise when necessary  - Involve family in performance of ADLs  - Assess for home care needs following discharge   - Consider OT consult to assist with ADL evaluation and planning for discharge  - Provide patient education as appropriate  Outcome: Progressing  Goal: Maintain proper alignment of affected body part  Description: INTERVENTIONS:  - Support, maintain and protect limb and body alignment  - Provide patient/ family with appropriate education  Outcome: Progressing

## 2021-08-10 NOTE — PCC PHYSICAL THERAPY
Pt requires overall min-mod A for functional mobility including transfers, standing static balance/dynamic balance, gait with HHA and elevations with HRs  Second person utilized for w/c follow and assist with mgmt of O2 line and cadet line during amb  Observed ~50% accuracy with one-step commands throughout PT session  Pt is cooperative, motivated and easily redirected to tasks at hand through demo and repeated cues  Deficits include dec coordination, delayed motor planning/processing, dec balance in sitting and standing, dec midline awareness/attn with slight post/lat lean especially when engaged in dynamic tasks including SLS tasks, dec endurance and activity tolerance and dec overall mobility  Amb up to 90 ft with HHA on L with dec step lengths, shuffle, slight fwd flex, inconsistent step length and gait speed  Assist for weight shift, support in SLS, cues for inc step length bilat and midline awareness/upright posture  Pt will benefit from ongoing PT to further improve NMR, strength, balance, endurance and indep and safety with all functional mobility  Barriers: Unclear home setup at this time according to PT eval  Will need to confirm available support and setup

## 2021-08-10 NOTE — PROGRESS NOTES
08/10/21 0900   Pain Assessment   Pain Assessment Tool 0-10   Pain Score No Pain   Restrictions/Precautions   Precautions Aphasia;Bed/chair alarms;Cognitive; Fall Risk; Cadet; Impulsive;O2;Supervision on toilet/commode;1:1   Weight Bearing Restrictions No   ROM Restrictions No   Cognition   Overall Cognitive Status Impaired   Arousal/Participation Alert; Cooperative   Attention Attends with cues to redirect   Orientation Level Oriented to person   Memory Decreased short term memory;Decreased recall of precautions;Decreased recall of recent events   Following Commands Follows one step commands inconsistently   Comments 50% accurate with one-step commands, occ inc cues, prompting  Delayed motor planning/processing time  Pleasant  Subjective   Subjective Agreable to PT  PT arrived with pt in bed, with gown only  1:1 aide present  Roll Left and Right   Type of Assistance Needed Incidental touching;Verbal cues   Roll Left and Right CARE Score 4   Lying to Sitting on Side of Bed   Type of Assistance Needed Physical assistance;Verbal cues   Physical Assistance Level 25% or less   Comment Cues for hand placement and attn to O2 line/cadet line  Lying to Sitting on Side of Bed CARE Score 3   Sit to Stand   Type of Assistance Needed Physical assistance;Verbal cues   Physical Assistance Level 26%-50%   Comment At times CG/Min A however occ mod A as pt fatigue   Sit to Stand CARE Score 3   Bed-Chair Transfer   Type of Assistance Needed Physical assistance;Verbal cues   Physical Assistance Level 51%-75%   Comment Fluctuating between min-mod A depending on pt level of fatigue  Requires inc time and cueing for sequencing  Chair/Bed-to-Chair Transfer CARE Score 2   Transfer Bed/Chair/Wheelchair   Limitations Noted In Balance;Confidence; Endurance;Problem Solving; Sequencing;Vision   Adaptive Equipment None   Findings Inc time and cues to complete for proper safety, sequencing and attn      Walk 10 Feet   Type of Assistance Needed Physical assistance;Verbal cues   Physical Assistance Level Total assistance   Comment Min-mod A HHA on L for balance, stability and to encourage speed and reciprocal gait  Use of second person for wc follow and assist with O2 tank  Walk 10 Feet CARE Score 1   Walk 50 Feet with Two Turns   Type of Assistance Needed Physical assistance;Verbal cues   Physical Assistance Level Total assistance   Comment As above, min-mod A for balance with HHA on L and assist of second person for wc follow and O2 tank mgmt  Walk 50 Feet with Two Turns CARE Score 1   Walk 150 Feet   Reason if not Attempted Safety concerns   Walk 150 Feet CARE Score 88   Walking 10 Feet on Uneven Surfaces   Reason if not Attempted Safety concerns   Walking 10 Feet on Uneven Surfaces CARE Score 88   Ambulation   Does the patient walk? 2  Yes   Primary Mode of Locomotion Prior to Admission Walk   Distance Walked (feet) 90 ft   Assist Device Hand Hold   Gait Pattern Ataxic; Inconsistant Ama; Slow Ama;Decreased foot clearance; Forward Flexion;Narrow ALEX;Shuffle;Step to; Step through; Decreased R stance;Decreased L stance; Improper weight shift   Limitations Noted In Balance; Coordination; Endurance; Heel Strike;Midline Orientation;Posture; Safety; Sensation; Sequencing;Speed;Strength;Swing   Provided Assistance with: Balance;Direction;Weight Shift;Trunk Support   Walk Assist Level Minimum Assist;Moderate Assist;Chair Follow   Findings Occasional instability at bilat ankles and knees upon initial stand, improved as walk progressed however pt with short step length bilat, dec heel strike  Was able to amb 80' x2   Wheel 50 Feet with Two Turns   Reason if not Attempted Activity not applicable   Wheel 50 Feet with Two Turns CARE Score 9   Wheel 150 Feet   Reason if not Attempted Activity not applicable   Wheel 663 Feet CARE Score 9   Wheelchair mobility   Does the patient use a wheelchair? 0   No   Curb or Single Stair   Style negotiated Single stair Type of Assistance Needed Physical assistance;Verbal cues; Adaptive equipment   Physical Assistance Level 26%-50%   Comment Trainer steps   1 Step (Curb) CARE Score 3   4 Steps   Type of Assistance Needed Physical assistance;Verbal cues; Adaptive equipment   Physical Assistance Level 26%-50%   Comment 4 steps on  steps, up fwd/down bkwd with bilat HRs   4 Steps CARE Score 3   12 Steps   Reason if not Attempted Safety concerns   12 Steps CARE Score 88   Stairs   Type Stairs   # of Steps 4   Weight Bearing Precautions Fall Risk   Assist Devices Bilateral Rail   Findings Up fwd/down bkwd   Picking Up Object   Reason if not Attempted Safety concerns   Picking Up Object CARE Score 88   Therapeutic Interventions   Neuromuscular Re-Education Pre-gait activities including lateral weight shift with uni UE support, fwd step taps to target with focus on weight shift and "hold"   Assessment   Treatment Assessment Pt participated in 60 min PT session with focus on NMR, pregait and gait activities  Pt demo improved dynamic balance in seated and standing positions  Dec latera/post lean noted throughout session including with standing and gait activities  He conts to require inc time for completing all tasks, activities  Inc motor planning/processing time  Conts with coordination deficits and ?vision deficits  Ongoing rec for HHA at this time (vs RW/AD)  He conts to be motivated and cooperative and will cont to benefit from PT to further improve strength, NMR, balance, endurance/activity tolerance, coordination, motor planning/processing and overall level of functional mobility, indep and safety prior to d/c as well as dec caregiver burden  Family/Caregiver Present No   Problem List Decreased strength;Decreased range of motion;Decreased endurance; Impaired balance;Decreased mobility; Decreased coordination;Decreased cognition; Impaired judgement;Decreased safety awareness; Impaired vision   Barriers to Discharge Inaccessible home environment;Decreased caregiver support   PT Barriers   Physical Impairment Decreased strength;Decreased range of motion;Decreased endurance; Impaired balance;Decreased mobility; Decreased coordination;Decreased cognition; Impaired judgement;Decreased safety awareness; Impaired vision   Functional Limitation Car transfers;Stair negotiation;Ramp negotiation;Standing;Transfers; Walking   Plan   Treatment/Interventions Functional transfer training;LE strengthening/ROM; Elevations; Therapeutic exercise; Endurance training;Cognitive reorientation;Patient/family training;Equipment eval/education; Bed mobility;Gait training; Compensatory technique education   Progress Progressing toward goals   Recommendation   PT Discharge Recommendation Home with home health rehabilitation   PT Therapy Minutes   PT Time In 0900   PT Time Out 1000   PT Total Time (minutes) 60   PT Mode of treatment - Individual (minutes) 60   PT Mode of treatment - Concurrent (minutes) 0   PT Mode of treatment - Group (minutes) 0   PT Mode of treatment - Co-treat (minutes) 0   PT Mode of Treatment - Total time(minutes) 60 minutes   PT Cumulative Minutes 120   Therapy Time missed   Time missed?  No

## 2021-08-10 NOTE — PCC OCCUPATIONAL THERAPY
72year [facility] as a pre-hospital stroke alert for complaints of dysarthria, right facial droop and right sided weakness  CT of the brain showed recent infarct left caudate head, anterior lentiform nucleus and anterior limb of left internal capsule without evidence of hemorrhage  CTA of the head/neck showed no occlusion or stenosis, however did reveal mass right nasopharynx and right upper lobe lung mass  MRI of the brain showed multifocal diffusion abnormalities in several separate vascular territories, largest in left MCA territory indicative of multifocal acute/recent infarctions most likely from central embolic source  B/L LE venous duplex study showing RLE DVT in posterior tibial and peroneal veins, LLE DVT in peroneal veins  Pulmonary was consulted due to increased O2 needs/hypoxia, likely caused by PE with possible right heart strain  ECHO showed an EF of 82%, grade 1 diastolic dysfunction, RV mildly to moderately dilated with hypokinesis, RA mildly dilated, mild TVR and small pericardial effusion  Pt is to undergo repeat ECHO in 4-6 weeks to assess RV  Pt was with noted urinary retention during hospital course, and required cadet catheter insertion on 8/6  Pt underwent lung biopsy 8/5  Oncology was consulted on 8/7, with initial clinical staging appearing to be compatible with at least stage IIIA lung cancer  Pt is recommended for an outpatient PET-CT scan to give better idea about exact staging  Pt oriented to person  Due to impaired cognition, recommending therapy team clarify home setup and SUP/assistance available upon d/c with spouse  Pt presenting with lethargy, impaired sitting balance, impaired activity tolerance, impaired functional strength and impaired cognition that are impacting his ability to complete functional transfers/mobility and ADLs independently   Pt with significant decreased problem solving, sequencing and initiation especially during self care tasks requiring max VCs for accurate completion  Pt would benefit from 2-3week LOS to achieve SUP functional level upon d/c   Recommend re-team

## 2021-08-10 NOTE — PROGRESS NOTES
Internal Medicine Progress Note  Patient: Aliyah Fried  Age/sex: 72 y o  male  Medical Record #: [id number]      ASSESSMENT/PLAN: (Interval History)  Aliyah Fried is seen and examined and management for following issues:    CVA  · Multiembolic due to hypercoagulable state from cancer  · Cont eliquis/atorvastatin     B/L DVT w/suspected PE  · Continue eliquis     Adenocarcinoma of RUL  · Seen by oncology  · Needs outpatient PET scan for staging     Acute resp failure/emphysema  · Presumed from PE, possibly lung mass  · Smoked 2+ppd x 40 yrs and quit   · Improving  · Still requires 3L NC  · Will wean O2 as able  At home, he could barely walk to the bathroom 2/2 CALDERON     Nasopharyngeal mass  · Needs biopsy as outpatient by ENT     Elevated troponin/depressed RVEF  · Needs cardiology f/u as outpatient for repeat ECHO  · Ef 55% +RV hypokinesis     Urine retention  · Has cadet  · Had some punch colored urine on 8/9/21 that subsided = if reoccurs may need to be seen by [facility]  · Dr Severino Come aware        Discharge date:  Team    The above assessment and plan was reviewed and updated as determined by my evaluation of the patient on 8/10/2021      Labs:   Results from last 7 days   Lab Units 08/09/21  0548 08/08/21  0457   WBC Thousand/uL 10 83* 10 28*   HEMOGLOBIN g/dL 10 4* 9 9*   HEMATOCRIT % 34 4* 33 3*   PLATELETS Thousands/uL 427* 427*     Results from last 7 days   Lab Units 08/09/21  0548 08/08/21  0457   SODIUM mmol/L 141 139   POTASSIUM mmol/L 3 8 4 0   CHLORIDE mmol/L 107 106   CO2 mmol/L 29 31   BUN mg/dL 18 19   CREATININE mg/dL 0 79 0 79   CALCIUM mg/dL 10 3* 10 3*         Results from last 7 days   Lab Units 08/04/21  1647   INR  1 07     Results from last 7 days   Lab Units 08/07/21  1057   POC GLUCOSE mg/dl 118       Review of Scheduled Meds:  Current Facility-Administered Medications   Medication Dose Route Frequency Provider Last Rate    acetaminophen  650 mg Oral Q6H PRN Aashish Linda MD      apixaban  10 mg Oral BID David Miller DO      [START ON 8/14/2021] apixaban  5 mg Oral BID David iMller,       atorvastatin  40 mg Oral Daily With [employer] Cori Joseph,       bisacodyl  10 mg Rectal Daily PRN Doug Matthews MD      melatonin  3 mg Oral HS David Miller, DO      ondansetron  4 mg Oral Q8H PRN Doug Matthews MD      polyethylene glycol  17 g Oral Daily PRN Doug Matthews MD      senna-docusate sodium  2 tablet Oral BID David Miller DO         Subjective/ HPI: Patient seen and examined  Patients overnight issues or events were reviewed with nursing or staff during rounds or morning huddle session  New or overnight issues include the following:     None     ROS:   Negative 12 point ROS other than denoted above in HPI or assessment/plan        Imaging:     No orders to display       *Labs /Radiology studies reviewed  *Medications reviewed and reconciled as needed  *Please refer to order section for additional ordered labs studies  *Case discussed with primary attending during morning huddle case rounds      Physical Examination:  Vitals:   Vitals:    08/09/21 0905 08/09/21 1543 08/10/21 0051 08/10/21 0743   BP: 150/77 134/68 121/72 128/76   BP Location: Left arm Left arm Left arm Left arm   Pulse: 80 70 61 66   Resp:  16 20 16   Temp:  98 7 °F (37 1 °C) 98 6 °F (37 °C) 98 4 °F (36 9 °C)   TempSrc:  Oral Oral Oral   SpO2:  94% 93% 95%   Weight:       Height:           General Appearance: no distress, conversive  HEENT: PERRLA, conjuctiva normal; oropharynx clear; mucous membranes moist   Neck:  Supple, normal ROM, no JVD  Lungs: CTA, normal respiratory effort, no retractions, expiratory effort normal  CV: regular rate and rhythm; no rubs/murmurs/gallops, PMI normal   ABD: soft; ND/NT; +BS  EXT: no edema  Skin: normal turgor, normal texture, no rashes  Psych: affect normal, mood normal  Neuro: AA      The above physical exam was reviewed and updated as determined by my evaluation of the patient on 8/10/2021  Invasive Devices     Peripheral Intravenous Line            Peripheral IV 08/05/21 Right Antecubital 5 days          Drain            Urethral Catheter 16 Fr  3 days                   VTE Pharmacologic Prophylaxis: Eliquis  Code Status: Level 1 - Full Code  Current Length of Stay: 2 day(s)      Total time spent:  30 minutes with more than 50% spent counseling/coordinating care  Counseling includes discussion with patient re: progress  and discussion with patient of his/her current medical state/information  Coordination of patient's care was performed in conjunction with primary service  Time invested included review of patient's labs, vitals, and management of their comorbidities with continued monitoring  In addition, this patient was discussed with medical team including physician and advanced extenders  The care of the patient was extensively discussed and appropriate treatment plan was formulated unique for this patient  ** Please Note:  voice to text software may have been used in the creation of this document   Although proof errors in transcription or interpretation are a potential of such software**

## 2021-08-10 NOTE — PROGRESS NOTES
08/10/21 1500   Pain Assessment   Pain Assessment Tool Pain Assessment not indicated - pt denies pain   Pain Score No Pain   Restrictions/Precautions   Precautions Bed/chair alarms;1:1;Cognitive; Fall Risk;Impulsive;Supervision on toilet/commode   Weight Bearing Restrictions No   ROM Restrictions No   Cognition   Overall Cognitive Status Impaired   Arousal/Participation Alert; Cooperative   Attention Attends with cues to redirect   Orientation Level Unable to assess   Memory Decreased recall of recent events;Decreased recall of precautions;Decreased short term memory   Following Commands Follows one step commands with increased time or repetition   Sit to Stand   Type of Assistance Needed Physical assistance;Verbal cues   Physical Assistance Level 26%-50%   Comment verbal cues for safety  Sit to Stand CARE Score 3   Bed-Chair Transfer   Type of Assistance Needed Physical assistance;Verbal cues   Physical Assistance Level 26%-50%   Comment no AD   Chair/Bed-to-Chair Transfer CARE Score 3   Transfer Bed/Chair/Wheelchair   Adaptive Equipment None   Walk 10 Feet   Type of Assistance Needed Physical assistance;Verbal cues   Physical Assistance Level Total assistance   Comment MIN/MODA HHA on L side, A of second person for safety and 02 tank management   Walk 10 Feet CARE Score 1   Walk 150 Feet   Reason if not Attempted Safety concerns   Walk 150 Feet CARE Score 88   Walking 10 Feet on Uneven Surfaces   Reason if not Attempted Safety concerns   Walking 10 Feet on Uneven Surfaces CARE Score 88   Ambulation   Does the patient walk? 2  Yes   Primary Mode of Locomotion Prior to Admission Walk   Distance Walked (feet) 15 ft  (x2)   Assist Device Hand Hold   Gait Pattern Ataxic; Inconsistant Ama; Slow Ama;Decreased foot clearance;R foot drag; Forward Flexion;Narrow ALEX;Lateral deviation; Shuffle;Improper weight shift   Limitations Noted In Balance; Coordination;Posture; Safety;Speed;Strength   Provided Assistance with: Balance   Walk Assist Level Minimum Assist   Findings initially shuffled gait but able to correct with VC's   Wheel 50 Feet with Two Turns   Reason if not Attempted Activity not applicable   Wheel 50 Feet with Two Turns CARE Score 9   Wheel 150 Feet   Reason if not Attempted Activity not applicable   Wheel 069 Feet CARE Score 9   Toilet Transfer   Type of Assistance Needed Physical assistance;Verbal cues; Adaptive equipment   Physical Assistance Level 26%-50%   Comment BRITTNEY with use of grab bars   Toilet Transfer CARE Score 3   Toilet Transfer   Findings pt incontinent of BM requiring MAXA to perform hygiene  Therapeutic Interventions   Neuromuscular Re-Education fwd/bwds along rail x4 reps, side stepping along rail x4 reps with HHA on R   Assessment   Treatment Assessment Pt performed 30 min skilled PT session with increased focus on NPP and functional transfers  Pt was found to be incontinent of BM upon entering room and was able to amb to toilet with HHA on L  Pt had no knowledge of BM in brief when questioned  Pt requires VC's to increase WHT shift to L with gait and increase step length for longer stride  Pt will cont to benefit from increased functional transfers, increased balance/coordination, increased gait and increased safety awareness for decreased burden of care at discharge  Cont POC as tolerated  Problem List Decreased strength;Decreased endurance; Impaired balance;Decreased coordination;Decreased mobility; Decreased cognition; Impaired judgement;Decreased safety awareness; Impaired tone   Barriers to Discharge Inaccessible home environment;Decreased caregiver support   PT Barriers   Functional Limitation Car transfers;Stair negotiation;Standing;Transfers; Walking   Plan   Treatment/Interventions Functional transfer training;LE strengthening/ROM; Therapeutic exercise; Endurance training;Cognitive reorientation; Bed mobility;Gait training   Progress Progressing toward goals   Recommendation   PT Discharge Recommendation   (TBD)   Equipment Recommended   (TBD)   PT Therapy Minutes   PT Time In 1500   PT Time Out 1530   PT Total Time (minutes) 30   PT Mode of treatment - Individual (minutes) 30   PT Mode of treatment - Concurrent (minutes) 0   PT Mode of treatment - Group (minutes) 0   PT Mode of treatment - Co-treat (minutes) 0   PT Mode of Treatment - Total time(minutes) 30 minutes   PT Cumulative Minutes 150   Therapy Time missed   Time missed?  No

## 2021-08-10 NOTE — PROGRESS NOTES
Physical Medicine and Rehabilitation Progress Note  Jack Rosas 72 y o  male MRN: [id number]  Unit/Bed#: -01 Encounter: [id number]      Chief Complaints:  Rehab follow-up      Interval Events/Subjective:     Patient reports feeling ok overall and is without complaints  He denies significant pain, SOB, nausea, visual changes, changes in strength or other new complaints  ROS: A 10 point ROS was performed; negative except as noted above  Assessment & Plan: Mass of upper lobe of right lung  Assessment & Plan  Lung Adenocarcinoma   - Found to have a 6 4x3  6x3 4cm solid RUL pulmonary nodule IR performed percutaneous biopsy   - Seen by oncology on acute, Dr Yoselin Ceja  "clinical staging seems to be compatible with at least stage III A  However, the CT scan was done without IV contrast   The patient was told that a PET-CT scan as an outpatient may give us a better idea about the exact staging  If he continues to have stage IIIA then concurrent chemoradiation followed by maintenance immunotherapy should be entertained if his performance status allows  The patient lives close to [facility] where he can be treated in the Oncology Clinic/infusion center  "  - Follow-up with OP oncology   - Follow up with OP pulmonary (approximately 2 weeks if d/c)    Impaired cognition  Assessment & Plan  -8/10 Some improved wakefulness - neuro exam stable otherwise  -Acute comprehensive interdisciplinary inpatient rehabilitation to include intensive skilled therapies (PT, OT, ST) as outlined with oversight and management by rehabilitation physician  Continue inpatient rehab level nursing, case management and weekly interdisciplinary team meetings   Provide family teaching regarding brain injury  -Monitor neuro-exam, wakefulness, mood, cognition, insight into deficits and safety awareness  -Patient/family/caregiver education and training   -Overstimulation precautions, frequent re-orientation, re-direction, re-assurance  -Sleep log and agitation monitoring  -Optimize sleep-wake cycle  -Limit sedating medications when possible  -Ensure optimal management electrolytes, nutrition, and hydration  -Monitor for signs or symptoms of infection, medication intolerances, other systemic etiologies  -Provide 1:1 > pt trying to get out of bed repeatedly and is high fall risk  -Fall precautions with frequent rounding; proactive toileting program, patient should not be unattended in bathroom     -Current psychotropics and potentially sedating medications: Melatonin      Nasopharyngeal mass  Assessment & Plan  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  Both benign and malignant etiologies are in the differential diagnosis  ENT saw patient and visualized mass flexible laryngoscopy  - Smooth soft tissue mass in the right nasopharynx that appeared to be occluding the right eustachian tube  - OP ENT follow-up unless condition warrants sooner     * Acute CVA (cerebrovascular accident) ([facility] [address] )  Assessment & Plan  · -8/10 Some improved wakefulness - neuro exam stable otherwise  · CT head revealed recent acute to subacute infarct located in the left caudate head, anterior lentiform nucleus, anterior limb of the left internal capsule     · MRI brain w/wo contrast revealed multifocal diffusion abnormalities in several separate vascular territories, largest in the left MCA territory indicative of multifocal acute/recent infarctions most likely from a central embolic source  · Neurology followed in acute care and will need follow up as outpatient  · Spickard to be embolic and started on Eliquis 10mg BID through 8/14 then 5mg BID > does not have insurance > CM to assist  · Per Neuro, continue statin, no need for aspirin based on etiology  · Normotension goals    Acute respiratory failure with hypoxia ([address] )  Assessment & Plan  On 3L via NC  Goal per Pulm is to maintain 88%+ O2 sat, wean as able to  Likely multifactorial but could be large part related to PE, also has moderate emphysema/COPD, and lung mass  - Supplemental O2  - Antithrombotics  - Mgmt of COPD per IM  - OP follow-up with pulm/oncology     Suspected pulmonary embolism  Assessment & Plan  · In setting of hypoxemic respiratory failure, newly found malignancy, hypercoabuable state and confirmed bilateral DVTs, likely pulmonary embolism  · Could not be confirmed due to contrast that had previously been used earlier that day for the CT head and neck  · Continue Eliquis  · Supplemental O2, wean as able to maintain 88%+  · OP pulm follow-up     Acute deep vein thrombosis (DVT) of distal vein of both lower extremities (HCC)  Assessment & Plan  Thought to be related to hypercoagulability secondary to malignancy  Continue Eliquis 10mg BID through 8/14 and then 5mg BID    RIGHT LOWER LIMB:  Acute deep vein thrombosis is noted in the posterior tibial and peroneal veins  LEFT LOWER LIMB:  Acute deep vein thrombosis is noted in the peroneal veins  Acute superficial thrombophlebitis is noted in the great saphenous vein from  the mid thigh to the mid calf  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 6 90 > 7 54 > 10 80  ? Trended by SLIM to Peak at 10 8, next Tropnin was 10 3 and discontinued  · Cardio consulted  ?  Consider CT angiogram once patient more clinically stable  · On Eliquis    ECHO:  · Left ventricular EF was estimated to be 55 %  Wall thickness was mildly increased  Grade 1 diastolic dysfunction  · The right ventricle was mildly to moderately dilated  Systolic function was mildly reduced  There is hypokinesis of the mid free wall  · The right atrium was mildly dilated  · A small pericardial effusion was identified  · OP Cards Follow-up     Urinary retention  Assessment & Plan  · [place] placed on 8/6 for urinary retention  · Continue for now as it was recently placed for failed voiding trial  · Consider removal late this week   · Gross hematuria in bag but appears better 8/10 > comgmt with medicine service - they recommend waiting on urology eval for now  · Monitor for s/s of infection     Leukocytosis  Assessment & Plan  · Stable  · Low grade leukocytosis likely reactive from DVTs and suspected Pulmonary embolism  · Monitor for urinary or pulmonary infections clinically      Anemia  Assessment & Plan  · Hb stable   · Seen by heme/Onc for Malignancy work up and plans  · Monitor for further drop as patient is on Eliquis 10mg BID at this    Dysphagia  Assessment & Plan  · Dysphagia 3 with thins, had failed several bedside swallow evaluations prior  · SLP to follow on ARC  · Supervision or hold oral intake if too lethargic  · Aspiration precautions       Other Medical Issues:       Disposition   Home v other with ELOS 2-3 weeks from admission     Follow-up providers and other issues to be followed up after discharge   PCP   Oncology   ENT   Pulmonary   Cardiology    CODE: Level 1: Full Code    Restrictions include:   Fall precautions    Objective:    Functional Update:  UB dressing mod assist  Sit to stand transfers mod assist    Allergies per EMR    Physical Exam:  Temp:  [98 4 °F (36 9 °C)-98 7 °F (37 1 °C)] 98 4 °F (36 9 °C)  HR:  [61-70] 66  Resp:  [16-20] 16  BP: (121-134)/(68-76) 128/76  SpO2:  [93 %-95 %] 95 %    GEN:  Sitting in NAD   HEENT/NECK: MMM, NC in place  CARDIAC: Regular rate rhythm, no murmers, no rubs, no gallops  LUNGS:  clear to auscultation, no wheezes, rales, or rhonchi  ABDOMEN: Soft, nontender, normoactive, nondistended  EXTREMITIES/SKIN:  no calf edema, no calf tenderness to palpation  NEURO:   MENTAL STATUS: Some improved wakefulness and interaction but still blunted; able to follow simple commands; oriented to self, place, year with small prompt, CN II-XII: Intact and Strength/MMT:  RUE 4/5, RLE 5-/5, L sided 5/5  PSYCH:  Affect:  Flat    Diagnostic Studies: Reviewed, no new imaging  No orders to display       Laboratory: Labs reviewed  Results from last 7 days   Lab Units 08/09/21  0548 08/08/21  0457 08/07/21  1431   HEMOGLOBIN g/dL 10 4* 9 9* 10 4*   HEMATOCRIT % 34 4* 33 3* 34 8*   WBC Thousand/uL 10 83* 10 28* 11 19*     Results from last 7 days   Lab Units 08/09/21  0548 08/08/21  0457 08/07/21  1431   BUN mg/dL 18 19 22   SODIUM mmol/L 141 139 139   POTASSIUM mmol/L 3 8 4 0 4 0   CHLORIDE mmol/L 107 106 106   CREATININE mg/dL 0 79 0 79 0 96     Results from last 7 days   Lab Units 08/04/21  1647   PROTIME seconds 14 0   INR  1 07        Drug regimen reviewed, all potential adverse effects identified and addressed:    Scheduled Meds:  Current Facility-Administered Medications   Medication Dose Route Frequency Provider Last Rate    acetaminophen  650 mg Oral Q6H PRN Yandel Oropeza MD      apixaban  10 mg Oral BID Rob Ng DO      [START ON 8/14/2021] apixaban  5 mg Oral BID Rob Ng DO      atorvastatin  40 mg Oral Daily With [employer] Bryant Fillers, DO      bisacodyl  10 mg Rectal Daily PRN Yandel Oropeza MD      melatonin  3 mg Oral HS Rob Ng DO      ondansetron  4 mg Oral Q8H PRN Yandel Oropeza MD      polyethylene glycol  17 g Oral Daily PRN Yandel Oropeza MD      senna-docusate sodium  2 tablet Oral BID Rob Ng DO         ** Please Note: Fluency Direct voice to text software may have been used in the creation of this document   **

## 2021-08-11 PROCEDURE — 97110 THERAPEUTIC EXERCISES: CPT

## 2021-08-11 PROCEDURE — 99232 SBSQ HOSP IP/OBS MODERATE 35: CPT | Performed by: INTERNAL MEDICINE

## 2021-08-11 PROCEDURE — 97530 THERAPEUTIC ACTIVITIES: CPT

## 2021-08-11 PROCEDURE — 92526 ORAL FUNCTION THERAPY: CPT

## 2021-08-11 PROCEDURE — 97129 THER IVNTJ 1ST 15 MIN: CPT

## 2021-08-11 PROCEDURE — 97535 SELF CARE MNGMENT TRAINING: CPT

## 2021-08-11 PROCEDURE — 97130 THER IVNTJ EA ADDL 15 MIN: CPT

## 2021-08-11 PROCEDURE — 92507 TX SP LANG VOICE COMM INDIV: CPT

## 2021-08-11 PROCEDURE — 99233 SBSQ HOSP IP/OBS HIGH 50: CPT

## 2021-08-11 PROCEDURE — 97112 NEUROMUSCULAR REEDUCATION: CPT

## 2021-08-11 RX ADMIN — ATORVASTATIN CALCIUM 40 MG: 40 TABLET, FILM COATED ORAL at 17:37

## 2021-08-11 RX ADMIN — DOCUSATE SODIUM AND SENNOSIDES 2 TABLET: 8.6; 5 TABLET ORAL at 17:37

## 2021-08-11 RX ADMIN — APIXABAN 10 MG: 5 TABLET, FILM COATED ORAL at 17:37

## 2021-08-11 RX ADMIN — APIXABAN 10 MG: 5 TABLET, FILM COATED ORAL at 08:05

## 2021-08-11 RX ADMIN — MELATONIN 3 MG: at 21:42

## 2021-08-11 NOTE — TEAM CONFERENCE
Acute RehabilitationTeam Conference Note  Date: 8/11/2021   Time: 11:30 AM       Patient Name:  tJ Valentine       Medical Record Number: [id number]   YOB: 1950  Sex:  Male          Room/Bed:  [facility] 970/[facility] 970-01  Payor Info:  Payor: MEDICARE / Plan: MEDICARE A AND B / Product Type: Medicare A & B Fee for Service /      Admitting Diagnosis: CVA (cerebral vascular accident) ([facility] ) [I63 9]   Admit Date/Time:  8/8/2021  2:18 PM  Admission Comments: No comment available     Primary Diagnosis:  Acute CVA (cerebrovascular accident) ([facility] )  Principal Problem: Acute CVA (cerebrovascular accident) [facility])    Patient Active Problem List    Diagnosis Date Noted    Respiratory failure with hypoxia ([facility] ) 08/09/2021    Impaired cognition 08/09/2021    Constipation 08/08/2021    Anemia 08/08/2021    Leukocytosis 08/08/2021    Suspected pulmonary embolism 08/08/2021    Chronic combined systolic and diastolic CHF (congestive heart failure) ([facility] ) 08/08/2021    Urinary retention 08/06/2021    Mass of upper lobe of right lung 08/03/2021    Acute CVA (cerebrovascular accident) ([place] [address] ) 07/31/2021    Elevated troponin 07/31/2021    Acute respiratory failure with hypoxia ([facility] ) 07/31/2021    Acute deep vein thrombosis (DVT) of distal vein of both lower extremities ([place] [address] ) 07/31/2021    Nasopharyngeal mass 07/31/2021    Dysphagia 07/31/2021       Physical Therapy:    Weight Bearing Status: Full Weight Bearing  Transfers: Minimal Assistance, Moderate Assistance  Bed Mobility: Minimal Assistance  Amulation Distance (ft): 90 feet  Ambulation: Minimal Assistance, Moderate Assistance, Assist of 2 (Assist of second person for w/c follow and O2/cadet line mgmt)  Assistive Device for Ambulation: Other (HHA on L)  Number of Stairs: 2  Assistive Device for Stairs: Bilateral [employer]  Stair Assistance: Minimal Assistance (Up fwd/down bkwd)  Discharge Recommendations: Home with:  [address] with[de-identified] 24 Hour Supervision, 24 Hour Assisteance, Family Support, Outpatient Physical Therapy, Home Physical Therapy    Pt requires overall min-mod A for functional mobility including transfers, standing static balance/dynamic balance, gait with HHA and elevations with HRs  Second person utilized for w/c follow and assist with mgmt of O2 line and cadet line during amb  Observed ~50% accuracy with one-step commands throughout PT session  Pt is cooperative, motivated and easily redirected to tasks at hand through demo and repeated cues  Deficits include dec coordination, delayed motor planning/processing, dec balance in sitting and standing, dec midline awareness/attn with slight post/lat lean especially when engaged in dynamic tasks including SLS tasks, dec endurance and activity tolerance and dec overall mobility  Amb up to 90 ft with HHA on L with dec step lengths, shuffle, slight fwd flex, inconsistent step length and gait speed  Assist for weight shift, support in SLS, cues for inc step length bilat and midline awareness/upright posture  Pt will benefit from ongoing PT to further improve NMR, strength, balance, endurance and indep and safety with all functional mobility  Barriers: Unclear home setup at this time according to PT eval  Will need to confirm available support and setup  Occupational Therapy:  Eating: Supervision  Grooming: Supervision  Bathing: Moderate Assistance  Bathing: Moderate Assistance  Upper Body Dressing: Minimal Assistance  Lower Body Dressing: Maximum Assistance  Toileting: Total Assistance  Cognition: Exceptions to WNL  Cognition: Decreased Memory, Decreased Executive Functions, Decreased Attention, Decreased Comprehension, Decreased Safety, Impulsive  Orientation: Person  Discharge Recommendations:  (pending pt progress)       72year old male that presented [facility] as a pre-hospital stroke alert for complaints of dysarthria, right facial droop and right sided weakness  CT of the brain showed recent infarct left caudate head, anterior lentiform nucleus and anterior limb of left internal capsule without evidence of hemorrhage  CTA of the head/neck showed no occlusion or stenosis, however did reveal mass right nasopharynx and right upper lobe lung mass  MRI of the brain showed multifocal diffusion abnormalities in several separate vascular territories, largest in left MCA territory indicative of multifocal acute/recent infarctions most likely from central embolic source  B/L LE venous duplex study showing RLE DVT in posterior tibial and peroneal veins, LLE DVT in peroneal veins  Pulmonary was consulted due to increased O2 needs/hypoxia, likely caused by PE with possible right heart strain  ECHO showed an EF of 31%, grade 1 diastolic dysfunction, RV mildly to moderately dilated with hypokinesis, RA mildly dilated, mild TVR and small pericardial effusion  Pt is to undergo repeat ECHO in 4-6 weeks to assess RV  Pt was with noted urinary retention during hospital course, and required cadet catheter insertion on 8/6  Pt underwent lung biopsy 8/5  Oncology was consulted on 8/7, with initial clinical staging appearing to be compatible with at least stage IIIA lung cancer  Pt is recommended for an outpatient PET-CT scan to give better idea about exact staging  Pt oriented to person  Due to impaired cognition, recommending therapy team clarify home setup and SUP/assistance available upon d/c with spouse  Pt presenting with lethargy, impaired sitting balance, impaired activity tolerance, impaired functional strength and impaired cognition that are impacting his ability to complete functional transfers/mobility and ADLs independently  Pt with significant decreased problem solving, sequencing and initiation especially during self care tasks requiring max VCs for accurate completion  Pt would benefit from 2-3week LOS to achieve SUP functional level upon d/c   Recommend re-team        Speech Therapy:  Mode of Communication: Verbal  Speech/Language: Dysarthia (suspect some degree of receptive and expressive language deficits)  Cognition: Exceptions to WNL  Cognition: Decreased Memory, Decreased Executive Functions, Decreased Attention, Decreased Comprehension, Decreased Safety, Impulsive  Orientation: Person, Place  Swallowing: Exceptions to WNL  Swallowing: Oral Dysphagia, Pharyngeal Dysphagia, Aspiration Risk  Diet Recommendations: Level 3/Denture Soft, Thin  Discharge Recommendations: Home with:  [address] with[de-identified] 24 Hour Supervision, 24 Hour Assisteance, Family Support, Home Speech Therapy, Outpatient Speech Therapy (pending pt progress)  Pt currently being followed for dysphagia, speech/language/cognitive tx sessions  Currently for pt's swallow function, pt is demonstrating mild oropharyngeal sxs due to fatigue and fluctuating KALLI which increased mastication time is noted along w/ decreased bolus control/transfer of thin liquids  Swallow initiation is mildly delayed across consistencies to where pt does exhibit weak cough given thin liquids  However, it is noted that in f/u session where pt's KALLI is maximal, pt's swallow function is improved w/o exhibiting increased oropharyngeal or aspiration sxs  Due to pt's fluctuations given KALLI, will continue to recommend level 3 diet w/ thin liquids but will continue to benefit from SLP services to maximize swallow function and establish safest least restrictive diet w/o increased aspiration sxs  As for pt's speech/language/cognitive skills, it heavily depends on pt's overall KALLI and fatigue levels  Pt was noted to demonstrate mild dysarthria as well as some difficulty in word finding and pt's orientation waxes  Again, when KALLI improved, orientation is still off but some improvement in expression/comprehension skills and basic cognitive skills   Other barriers besides pt's fatigue include decreased comprehension, decreased word finding, decreased ST/working memory, decreased problem solving, decreased sequencing, decreased organization of thoughts decreased insight to deficits which impacts current safety and functional mobility  Will continue to benefit from SLP services to maximize overall functional independence of speech/language/cognitive skills to decreased burden of care for family at discharge  Nursing Notes:     Diet Type: Dysphagia III, Thin Liquids                                                                        Pain Score: 0                                  Patient is a 72year old male that presented to [place] [id number] on 7/31/2021 as a pre-hospital stroke alert for complaints of dysarthria, right facial droop and right sided weakness  Patient with noted recent dyspnea on exertion and weight loss, with planned CT chest/abdomen/pelvis  CT of the brain showed recent infarct left caudate head, anterior lentiform nucleus and anterior limb of left internal capsule without evidence of hemorrhage  CTA of the head/neck showed no occlusion or stenosis, however did reveal mass right nasopharynx and right upper lobe lung mass  HX: B/L LE DVTs, CHF, anemia, impaired cognition    Pain managed with PRN Tylenol  Anticoagulation and stroke maintenance managed with eliquis and lipitor  Alarms and continuous observation required for safety  Skin is intact  Incontinent of bowel, indwelling catheter patent  Dysphagia 3 diet with thin liquids ordered  Patient is currently on 3L O2 NC  This week we will encourage independence with ADLs while monitoring labs and vitals and maintaining skin integrity  We will keep the patient free from falls through patient education with safe transferring and maintaining safety precautions         Case Management:     Discharge Planning  Living Arrangements: Lives w/ Spouse/significant other  Support Systems: Spouse/significant other, Children  Assistance Needed: no  Type of Current Residence: Private residence  Current Home Care Services: No  Pt admitted s/p stroke and is experiencing cognitive deficits  Pt has supportive spouse and children and are prepared to take pt home on dc  Education provided on continued hhc and outpt therapy services  Pt has dme in the home already  Following to assist w/dc planning needs  Is the patient actively participating in therapies? yes  List any modifications to the treatment plan:     Barriers Interventions   Cognition effecting carryover with new learning and safety Safety education techinques   Coordination, delayed processing Therapy exercises, cueing for safety   Poor initiation to task Cueing to attend to task   Right side inattention Cueing to attend to the right         Is the patient making expected progress toward goals? yes  List any update or changes to goals:     Medical Goals: Patient will be medically stable for discharge to [facility] upon completion of rehab program and Patient will be able to manage medical conditions and comorbid conditions with medications and follow up upon completion of rehab program    Weekly Team Goals:   Rehab Team Goals  ADL Team Goal: Patient will require supervision with ADLs with least restrictive device upon completion of rehab program  Bowel/Bladder Team Goal: Patient will return to premorbid level for bladder/bowel management upon completion of rehab program  Transfer Team Goal: Patient will require supervision with transfers with least restrictive device upon completion of rehab program  Locomotion Team Goal: Patient will require supervision with locomotion with least restrictive device upon completion of rehab program    Discussion: pt presents with the above barriers and is mod a with ambulation with a walker  Pt has a cadet but discussion is for potential removal on Friday for voiding trial  adls are mod to max a with adls   pts biggest barrier is cognition which is effecting task initiation, carryover and safety  Pt is mod to max a with cognition  Estimated los two weeks  Anticipated Discharge Date:  reteam [facility] Team Members Present: The following team members are supervising care for this patient and were present during this Weekly Team Conference      Physician: Dr Yariel Alcocer MD  : SELWYN Kaur  Registered Nurse: Shaggy Scales RN  Physical Therapist: Melissa Gordillo DPT  Occupational Therapist: Yessenia Otero MS, OTR/L, CBIS  Speech Therapist: Colton Lopez MA, CCC-SLP  Other: Mirella Magallanes RN  [address] [place] [address] and Hospice

## 2021-08-11 NOTE — PROGRESS NOTES
Occupational Therapy Treatment Note         08/11/21 5888   Pain Assessment   Pain Assessment Tool Pain Assessment not indicated - pt denies pain   Restrictions/Precautions   Precautions Bed/chair alarms;Cognitive;1:1;Fall Risk;Supervision on toilet/commemily; [place]  (2 L 02)   Sit to Stand   Type of Assistance Needed Physical assistance; Adaptive equipment   Physical Assistance Level 26%-50%   Comment RW   Sit to Stand CARE Score 3   Bed-Chair Transfer   Type of Assistance Needed Physical assistance; Adaptive equipment   Physical Assistance Level 26%-50%   Comment to/from bathroom with RW    Chair/Bed-to-Chair Transfer CARE Score 3   Toileting Hygiene   Type of Assistance Needed Physical assistance; Adaptive equipment   Physical Assistance Level 51%-75%   Comment assist for clothing management in stance and rear hygiene, stands with RW    Toileting Hygiene CARE Score 2   Toilet Transfer   Type of Assistance Needed Physical assistance; Adaptive equipment   Physical Assistance Level 26%-50%   Comment using standard toilet, RW and R grab bar    Toilet Transfer CARE Score 3   Cognition   Overall Cognitive Status Impaired   Arousal/Participation Alert   Attention Attends with cues to redirect   Orientation Level Oriented to person   Memory Decreased short term memory;Decreased recall of recent events;Decreased recall of precautions   Following Commands Follows one step commands with increased time or repetition   Comments Engaged in repetitive cognitive retraining focused on increasing pt's orientation to location, situation, and deficits  Provided pt with orientation sheet in room  Initially, pt oriented to having something happen with his "brain", he was oriented to month being August, but not is birthday   After repetitive cognitive retraining focused on him reading worksheet, repeating, and asking questions x15 minutes, pt was able to recall (without visual aide of worksheet) [facility], and that he had a problem with his brain  He continued to require cues for deficits in memory and walking, and that he needs therapy to get better  Assessment   Treatment Assessment Pt participated in skilled OT tx session focused on toileting, functional mobility within room/bathroom using RW, cognitive retraining  See above for further details on functional performance  Pt is an inconsistent historian regarding his vision, 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 from [facility] 1 and denies any new vision deficits, states he retired from being a  in 7132 2* blindness, then he reports he retired in 2018  Will need to follow up with family on pt's baseline vision deficits  Pt will continue to benefit from skilled OT intervention to address functional mobility/transfers using RW, standing balance/tolerance, cognitive retraining focused on pt's orientation to current situation in order to maximize functional independence in ADLS, functional mobility/transfers, while decreasing burden of care  pts 02 92% on room air during session, HR 70s  Prognosis Fair   Problem List Decreased strength;Decreased endurance; Impaired balance;Decreased mobility; Decreased safety awareness; Impaired judgement;Decreased cognition; Impaired vision   Plan   Treatment/Interventions Functional transfer training;ADL retraining; Therapeutic exercise; Endurance training;Cognitive reorientation;Patient/family training;Equipment eval/education; Bed mobility; Compensatory technique education   Progress Progressing toward goals   Recommendation   OT Discharge Recommendation   (pending progress )   OT Therapy Minutes   OT Time In 1330   OT Time Out 1430   OT Total Time (minutes) 60   OT Mode of treatment - Individual (minutes) 60   OT Mode of treatment - Concurrent (minutes) 0   OT Mode of treatment - Group (minutes) 0   OT Mode of treatment - Co-treat (minutes) 0   OT Mode of Treatment - Total time(minutes) 60 minutes   OT Cumulative Minutes 150   Therapy Time missed   Time missed?  No

## 2021-08-11 NOTE — NURSING NOTE
Patient becoming restless requesting to speak with daughter  Call placed to daughter, Lex Serrano for patient and he started speaking to her stating that staff was tying him up and that we were trying to kill him  Patient reassured and redirected that staff is here to help him  This nurse then spoke with the patient's daughter to give her updates on the patient's status

## 2021-08-11 NOTE — PCC NURSING
Patient is a 72year old [facility] as a pre-hospital stroke alert for complaints of dysarthria, right facial droop and right sided weakness  Patient with noted recent dyspnea on exertion and weight loss, with planned CT chest/abdomen/pelvis  CT of the brain showed recent infarct left caudate head, anterior lentiform nucleus and anterior limb of left internal capsule without evidence of hemorrhage  CTA of the head/neck showed no occlusion or stenosis, however did reveal mass right nasopharynx and right upper lobe lung mass  HX: B/L LE DVTs, CHF, anemia, impaired cognition    Pain managed with PRN Tylenol  Anticoagulation and stroke maintenance managed with eliquis and lipitor  Alarms and continuous observation required for safety  Skin is intact  Incontinent of bowel, indwelling catheter patent  Dysphagia 3 diet with thin liquids ordered  Patient is currently on 3L O2 NC  This week we will encourage independence with ADLs while monitoring labs and vitals and maintaining skin integrity  We will keep the patient free from falls through patient education with safe transferring and maintaining safety precautions

## 2021-08-11 NOTE — PROGRESS NOTES
08/11/21 1200   Pain Assessment   Pain Assessment Tool Pain Assessment not indicated - pt denies pain   Pain Score No Pain   Restrictions/Precautions   Precautions Bed/chair alarms;Cognitive;1:1;Fall Risk; [place]; Visual deficit;Supervision on toilet/commode  (2L 02)   Weight Bearing Restrictions No   ROM Restrictions No   Comprehension   Comprehension (FIM) 4 - Understands basic info/conversation 75-90% of time   Expression   Expression (FIM) 4 - Expresses basic info/needs 75-90% of time   Social Interaction   Social Interaction (FIM) 5 - Interacts appropriately with others 90% of time   Problem Solving   Problem solving (FIM) 4 - Solves basic problems 75-89% of time   Memory   Memory (FIM) 4 - Recognizes/recalls/performs 75-89%   Speech/Language/Cognition Assessmetn   Treatment Assessment   Pt seen for skilled speech therapy session following lunch  Pt alert and interactive seen OOB upright in chair in room  Pt engaging and completing portions of the Informal Language Assessment and the Informal Cognitive Assessment  Scores as follows: Auditory Comprehension Tasks: Auditory Commands: 1-step: (5/5); 2-step (5/5); 3-step (3/5); Receptive Picture/Object Identification: (5/5); Verbal Expression Tasks: Responsive Naming: (10/10); Phrase Completion: Opposites: (9/10); Simple Phrases: (9/10); Confrontation Naming: (10/10); Written Expression: Name/Address (partial); Reading Comprehension: 1, 2, 3 step commands:(+)  Short Term Memory Recall: Working Memory: (6/8); Immediate Recall Digits Forward: (4/5); Immediate Recall Digits Backwards: (1/3); Auditory Recall of Short Paragraph (0/4); Executive Function Tasks: Convergent Categories: (1/3); Divergent Categories (2/3); Compare/Contrast: (4/4);  Overall pt is demonstrating mild language deficits and moderate cognitive linguistic communication deficits in thought organization, STM and working memory, reasoning/problem solving, insight to deficits, attention, safety awareness and overall executive functioning skills  Pt will benefit from SLP services at this time to maximize overall language skills  At end of session, pt removing O2 and asking to go look in the closet and drawers  Pt redirected to put on O2 and agreed to allow pt to walk with assistance over to those items in the room  Pt removing clothes from drawers however able to be redirected to hand items to this SLP and agreed to simple place items in bed  Pt ambulated back to chair and was content to sit down for a while and wait for his next therapy session  Pt needs multiple repetitions for recall of rehab stay and to participate in therapy in order to get better and stronger before going home  Pt conts with 1:1 for this attention and to reduce falls as pt is impulsive and does not recall his precautions  Swallow Information   Current Risks for Dysphagia & Aspiration Respiratory compromise;Rapid respiratory rate;Weak cough; Dysarthria;General debilitation;New Neuro event;Brain injury;Cognitive deficit; Mental status change;Reduced alertnes; Positionong Issues   Current Symptoms/Concerns Difficulty chewing;Holding food in mouth;Decreased oral intake   Current Diet Dysphagia advance; Thin liquid   Baseline Diet Regular; Thin liquids   Consistencies Assessed and Performance   Materials Admnistered Regular/Solid; Thin liquid   Oral Stage Mild impaired   Phargngeal Stage Mild impaired   Swallow Mechanics Mild delayed;Swallow initation;Good Larygneal rise;Aspiration risk   Esophageal Concerns No s/s reported   Recommendations   Risk for Aspiration Present   Recommendations Dysphagia treatment   Diet Solid Recommendation Level 3 Dysphagia/ advanced/ soft to chew   Diet Liquid Recommendation Thin liquid   Recommended Form of Meds Whole; With thin liquid   General Precautions Aspiration precautions; Feed only when alert;Minimize distractions;Upright as possible for all oral intake;Remain upright for 45 mins after meals  (Distant supervision, FULLY upright for ALL meals)   Compensatory Swallowing Strategies Alternate solids and liquids; External pacing;Effortful swallow;Cue for lingual sweep;Voluntary throat clear/cough to clear penetration   Results Reviewed with PT/Family/Caregiver;RN   Eating   Type of Assistance Needed Supervision;Set-up / clean-up   Physical Assistance Level No physical assistance   Eating CARE Score 4   Swallow Assessment   Swallow Treatment Assessment Pt seen for skilled dysphagia therapy session  Pt seated OOB upright in chair in room, 2L NC present  Pt agreeable to meal- items assessed included regular diet trial items- cheeseburger, chips, ice cream and thin liquids by cup  Pt required some set up assistance with tray but no physical assistance self feeding  Meal completion 75% and 180cc liquids  Pt ate food items c good bolus retrieval by bite, noted larger bites and taking 2 at a time  Oral processing slow and mildly prolonged with mildly delayed transfers and swallows as well  Pt drank thin liquids by cup c good bolus retrieval, oral control, functional transfers and swallows  No s/s aspiration noted  Noted pt with some retention/pocketing in R side during meal which cleared throughout  Pt benefitted from cues to slow rate and take one bite at a time while clearing food between bites  Pt benefitted also from extended time during meal to complete  Currently will continue to recommend level 3 diet w/ thin liquids, continuing Aspiration Precautions, Distant supervision for now--->setup w/ tray  FULLY upright w/ bed in chair position or OOB w/ meals, small bites/sips, alternate solids/liquids, encourage PO intake  Pt noted with improvement in alertness and cognition and anticipate upgrade soon pending cont'ing upward trend   Pt is recommended for continued skilled ST services for dysphagia therapy to maximize swallow function, to increase independent use of safe swallow strategies and to safely support PO intake while determining safest/least restrictive diet at this time  Swallow Assessment Prognosis   Prognosis Good   Prognosis Considerations Age; Co-morbidities; Medical diagnosis; Medical prognosis;Previous level of function;Severity of impairments;Ability to carry over;New learning ability; Cooperation   SLP Therapy Minutes   SLP Time In 1200   SLP Time Out 1847   SLP Total Time (minutes) 75   SLP Mode of treatment - Individual (minutes) 75   SLP Mode of treatment - Concurrent (minutes) 0   SLP Mode of treatment - Group (minutes) 0   SLP Mode of treatment - Co-treat (minutes) 0   SLP Mode of Treatment - Total time(minutes) 75 minutes   SLP Cumulative Minutes 180

## 2021-08-11 NOTE — PROGRESS NOTES
Physical Medicine and Rehabilitation Progress Note  Minna Nielsen 72 y o  male MRN: [id number]  Unit/Bed#: -01 Encounter: [id number]      Chief Complaints:  Rehab follow-up      Interval Events/Subjective:     Patient reports no current complaints  He had transient paranoia last evening but then slept fairly well  Patient denies pain, SOB, anxiety, depression, or other new complaints  ROS: A 10 point ROS was performed; negative except as noted above  Assessment & Plan: Mass of upper lobe of right lung  Assessment & Plan  Lung Adenocarcinoma   - Found to have a 6 4x3  6x3 4cm solid RUL pulmonary nodule IR performed percutaneous biopsy   - Seen by oncology on acute, Dr Jonh Gale  "clinical staging seems to be compatible with at least stage III A  However, the CT scan was done without IV contrast   The patient was told that a PET-CT scan as an outpatient may give us a better idea about the exact staging  If he continues to have stage IIIA then concurrent chemoradiation followed by maintenance immunotherapy should be entertained if his performance status allows  The patient lives close to [address] [place] [address] where he can be treated in the Oncology Clinic/infusion center  "  - Follow-up with OP oncology   - Follow up with OP pulmonary (approximately 2 weeks if d/c)    Impaired cognition  Assessment & Plan  -8/10-11 Improved wakefulness/interaction   -Recommend MOCA likely early next week  -Acute comprehensive interdisciplinary inpatient rehabilitation to include intensive skilled therapies (PT, OT, ST) as outlined with oversight and management by rehabilitation physician  Continue inpatient rehab level nursing, case management and weekly interdisciplinary team meetings   Provide family teaching regarding brain injury  -Monitor neuro-exam, wakefulness, mood, cognition, insight into deficits and safety awareness  -Patient/family/caregiver education and training   -Overstimulation precautions, frequent re-orientation, re-direction, re-assurance  -Sleep log and agitation monitoring  -Optimize sleep-wake cycle > slept well overnight   -Limit sedating medications when possible  -Ensure optimal management electrolytes, nutrition, and hydration  -Monitor for signs or symptoms of infection, medication intolerances, other systemic etiologies  -Provide 1:1 > pt trying to get out of bed repeatedly and is high fall risk  - Transient paranoid ideations 8/10 pm > wife reports some escalation even at home that started a few weeks prior > not currently > may be sundowing phenomenon   -Fall precautions with frequent rounding; proactive toileting program, patient should not be unattended in bathroom     -Current psychotropics and potentially sedating medications: Melatonin      Nasopharyngeal mass  Assessment & Plan  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  Both benign and malignant etiologies are in the differential diagnosis  ENT saw patient and visualized mass flexible laryngoscopy  - Smooth soft tissue mass in the right nasopharynx that appeared to be occluding the right eustachian tube  - OP ENT follow-up unless condition warrants sooner     * Acute CVA (cerebrovascular accident) [facility])  Assessment & Plan  -8/10-11 Improved wakefulness/interaction   · CT head revealed recent acute to subacute infarct located in the left caudate head, anterior lentiform nucleus, anterior limb of the left internal capsule     · MRI brain w/wo contrast revealed multifocal diffusion abnormalities in several separate vascular territories, largest in the left MCA territory indicative of multifocal acute/recent infarctions most likely from a central embolic source  · Neurology followed in acute care and will need follow up as outpatient  · [place] to be embolic and started on Eliquis 10mg BID through 8/14 then 5mg BID > does not have insurance > CM to assist  · Per Neuro, continue statin, no need for aspirin based on etiology  · Normotension goals    Acute respiratory failure with hypoxia ([facility])  Assessment & Plan  Remains largely On 3 to 3 5L via NC  Goal per Pulm is to maintain 88%+ O2 sat, wean as able to  Likely multifactorial but could be large part related to PE, also has moderate emphysema/COPD, and lung mass  - Supplemental O2  - Antithrombotics  - Mgmt of COPD per IM  - OP follow-up with pulm/oncology     Suspected pulmonary embolism  Assessment & Plan  · In setting of hypoxemic respiratory failure, newly found malignancy, hypercoabuable state and confirmed bilateral DVTs, likely pulmonary embolism  · Could not be confirmed due to contrast that had previously been used earlier that day for the CT head and neck  · Continue Eliquis  · Supplemental O2, wean as able to maintain 88%+  · OP pulm follow-up     Acute deep vein thrombosis (DVT) of distal vein of both lower extremities (HCC)  Assessment & Plan  Thought to be related to hypercoagulability secondary to malignancy  Continue Eliquis 10mg BID through 8/14 and then 5mg BID    RIGHT LOWER LIMB:  Acute deep vein thrombosis is noted in the posterior tibial and peroneal veins  LEFT LOWER LIMB:  Acute deep vein thrombosis is noted in the peroneal veins  Acute superficial thrombophlebitis is noted in the great saphenous vein from  the mid thigh to the mid calf  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  6 90 > 7 54 > 10 80  ? Trended by SLIM to [place] at 10 8, next Tropnin was 10 3 and discontinued  · Cardio consulted  ? Consider CT angiogram once patient more clinically stable  · On Eliquis    ECHO:  · Left ventricular EF was estimated to be 55 %  Wall thickness was mildly increased  Grade 1 diastolic dysfunction  · The right ventricle was mildly to moderately dilated  Systolic function was mildly reduced  There is hypokinesis of the mid free wall  · The right atrium was mildly dilated  · A small pericardial effusion was identified  · OP Cards Follow-up     Urinary retention  Assessment & Plan  · Hampton placed on 8/6 for urinary retention  · Continue for now as it was recently placed for failed voiding trial  · Consider removal late this week  · Gross hematuria in bag but appears better 8/10 > comgmt with medicine service - they recommend waiting on urology eval for now  · Monitor for s/s of infection     Leukocytosis  Assessment & Plan  · Stable  · Low grade leukocytosis likely reactive from DVTs and suspected Pulmonary embolism  · Monitor for urinary or pulmonary infections clinically      Anemia  Assessment & Plan  · Hb stable   · Seen by heme/Onc for Malignancy work up and plans  · Monitor for further drop as patient is on Eliquis 10mg BID at this    Dysphagia  Assessment & Plan  · Dysphagia 3 with thins, had failed several bedside swallow evaluations prior  · SLP to follow on ARC  · Supervision or hold oral intake if too lethargic  · Aspiration precautions       Other Medical Issues:       Disposition   Home v other with [facility] 2-3 weeks from admission     Follow-up providers and other issues to be followed up after discharge   PCP   Oncology   ENT   Pulmonary   Cardiology    CODE: Level 1: Full Code    Restrictions include: Fall precautions    Objective:    Functional Update:   Total assist - to hygiene  Sig assist - bathing, LB dressing   UB dressing min assist  Transfers mod assist     Allergies per EMR    Physical Exam:  Temp:  [98 3 °F (36 8 °C)-98 9 °F (37 2 °C)] 98 3 °F (36 8 °C)  HR:  [64-74] 72  Resp:  [16-18] 18  BP: (117-143)/(63-74) 117/63  SpO2:  [92 %-95 %] 94 %    GEN:  Lying in bed in NAD   HEENT/NECK: MMM, NC in place  CARDIAC: Regular rate rhythm, no murmers, no rubs, no gallops  LUNGS:  clear to auscultation, no wheezes, rales, or rhonchi  ABDOMEN: Soft, nontender, normoactive, nondistended  EXTREMITIES/SKIN:  no calf edema, no calf tenderness to palpation  NEURO:   MENTAL STATUS: Improving wakefulness; able to follow simple commands, CN II-XII: Intact and Strength/MMT:  RUE 4+/5, RLE 5-/5, L sided 5/5  PSYCH:  Affect:  Flat    Diagnostic Studies: Reviewed, no new imaging  No orders to display       Laboratory: Labs reviewed  Results from last 7 days   Lab Units 08/09/21  0548 08/08/21  0457 08/07/21  1431   HEMOGLOBIN g/dL 10 4* 9 9* 10 4*   HEMATOCRIT % 34 4* 33 3* 34 8*   WBC Thousand/uL 10 83* 10 28* 11 19*     Results from last 7 days   Lab Units 08/09/21  0548 08/08/21  0457 08/07/21  1431   BUN mg/dL 18 19 22   SODIUM mmol/L 141 139 139   POTASSIUM mmol/L 3 8 4 0 4 0   CHLORIDE mmol/L 107 106 106   CREATININE mg/dL 0 79 0 79 0 96     Results from last 7 days   Lab Units 08/04/21  1647   PROTIME seconds 14 0   INR  1 07        Drug regimen reviewed, all potential adverse effects identified and addressed:    Scheduled Meds:  Current Facility-Administered Medications   Medication Dose Route Frequency Provider Last Rate    acetaminophen  650 mg Oral Q6H PRN Adam Smith MD      apixaban  10 mg Oral BID Rebecca Wilson DO      [START ON 8/14/2021] apixaban  5 mg Oral BID Rebecca Wilson DO      atorvastatin  40 mg Oral Daily With [employer] Mellisa Mini, DO      bisacodyl  10 mg Rectal Daily PRN Adam Smith MD      melatonin  3 mg Oral HS Rebecca Wilson DO      ondansetron  4 mg Oral Q8H PRN Adam Smith MD      polyethylene glycol  17 g Oral Daily PRN Adam Smith MD     [place] senna-docusate sodium  2 tablet Oral BID Kristen Brooke,          ** Please Note: Fluency Direct voice to text software may have been used in the creation of this document  **    Total time spent:  35 minutes, with more than 50% spent counseling/coordinating care  Counseling includes discussion with patient re: progress in therapies, functional issues observed by therapy staff, and discussion with patient his/her current medical state/wellbeing  Coordination of patient's care was performed in conjunction with Internal Medicine service to monitor patient's labs, vitals, and management of their comorbidities  In addition, this patient was discussed by the interdisciplinary team in weekly case conference today  The care of the patient was extensively discussed with all care providers and an appropriate rehabilitation plan was formulated unique for this patient  Barriers were identified preventing progression of therapy and appropriate interventions were discussed with each discipline  Please see the team note for input from all disciplines regarding barriers, intervention, and discharge planning

## 2021-08-11 NOTE — PROGRESS NOTES
Internal Medicine Progress Note  Patient: Valarie Chapman  Age/sex: 72 y o  male  Medical Record #: [id number]      ASSESSMENT/PLAN: (Interval History)  Valarie Chapman is seen and examined and management for following issues:    CVA  · Multiembolic due to hypercoagulable state from cancer  · Cont eliquis/atorvastatin     B/L DVT w/suspected PE  · Continue eliquis     Adenocarcinoma of RUL  · Seen by oncology  · Needs outpatient PET scan for staging     Acute resp failure/emphysema  · Presumed from PE, possibly lung mass  · Smoked 2+ppd x 40 yrs and quit   · Improving  · Still requires 2L NC  · Will wean O2 as able  At home, he could barely walk to the bathroom 2/2 CALDERON but has improved     Nasopharyngeal mass  · Needs biopsy as outpatient by ENT     Elevated troponin/depressed RVEF  · Needs cardiology f/u as outpatient for repeat ECHO  · Ef 55% +RV hypokinesis     Urine retention  · Has cadet  · Had some punch colored urine on 8/9/21 that subsided = if reoccurs may need to be seen by Uro  · Dr Michael Uribe aware        Discharge date:  Team    The above assessment and plan was reviewed and updated as determined by my evaluation of the patient on 8/11/2021      Labs:   Results from last 7 days   Lab Units 08/09/21  0548 08/08/21  0457   WBC Thousand/uL 10 83* 10 28*   HEMOGLOBIN g/dL 10 4* 9 9*   HEMATOCRIT % 34 4* 33 3*   PLATELETS Thousands/uL 427* 427*     Results from last 7 days   Lab Units 08/09/21  0548 08/08/21  0457   SODIUM mmol/L 141 139   POTASSIUM mmol/L 3 8 4 0   CHLORIDE mmol/L 107 106   CO2 mmol/L 29 31   BUN mg/dL 18 19   CREATININE mg/dL 0 79 0 79   CALCIUM mg/dL 10 3* 10 3*         Results from last 7 days   Lab Units 08/04/21  1647   INR  1 07     Results from last 7 days   Lab Units 08/07/21  1057   POC GLUCOSE mg/dl 118       Review of Scheduled Meds:  Current Facility-Administered Medications   Medication Dose Route Frequency Provider Last Rate    acetaminophen  650 mg Oral Q6H PRN MD Yary Irene apixaban  10 mg Oral BID Piedad Huerta DO      [START ON 8/14/2021] apixaban  5 mg Oral BID Piedad Huerta DO      atorvastatin  40 mg Oral Daily With [employer] Katt Estrada DO      bisacodyl  10 mg Rectal Daily PRN Ailyn Polanco MD      melatonin  3 mg Oral HS Piedad Huerta DO      ondansetron  4 mg Oral Q8H PRN Ailyn Polanco MD      polyethylene glycol  17 g Oral Daily PRN Ailyn Polanco MD      senna-docusate sodium  2 tablet Oral BID Piedad Huerta DO         Subjective/ HPI: Patient seen and examined  Patients overnight issues or events were reviewed with nursing or staff during rounds or morning huddle session  New or overnight issues include the following:     Confused, restless last night and told daughter on the phone that the staff was tying him up and trying to kill him    ROS:   Negative 12 point ROS other than denoted above in HPI or assessment/plan        Imaging:     No orders to display       *Labs /Radiology studies reviewed  *Medications reviewed and reconciled as needed  *Please refer to order section for additional ordered labs studies  *Case discussed with primary attending during morning huddle case rounds      Physical Examination:  Vitals:   Vitals:    08/10/21 1500 08/10/21 2221 08/11/21 0535 08/11/21 0801   BP: 126/74 143/64  117/63   BP Location: Left arm Left arm  Right arm   Pulse: 64 74  72   Resp: 18 16  18   Temp: 98 9 °F (37 2 °C) 98 9 °F (37 2 °C)  98 3 °F (36 8 °C)   TempSrc: Oral Oral  Oral   SpO2: 95% 92%  94%   Weight:   83 5 kg (184 lb 1 4 oz)    Height:           General Appearance: no distress, conversive  HEENT: PERRLA, conjuctiva normal; oropharynx clear; mucous membranes moist   Neck:  Supple, normal ROM, no JVD  Lungs: CTA, normal respiratory effort, no retractions, expiratory effort normal  CV: regular rate and rhythm; no rubs/murmurs/gallops, PMI normal   ABD: soft; ND/NT; +BS  EXT: no edema  Skin: normal turgor, normal texture, no rashes  Psych: affect normal, mood normal  Neuro: AA; some mild confusion but cooperative      The above physical exam was reviewed and updated as determined by my evaluation of the patient on 8/11/2021  Invasive Devices     Drain            Urethral Catheter 16 Fr  4 days                   VTE Pharmacologic Prophylaxis: Eliquis  Code Status: Level 1 - Full Code  Current Length of Stay: 3 day(s)      Total time spent:  30 minutes with more than 50% spent counseling/coordinating care  Counseling includes discussion with patient re: progress  and discussion with patient of his/her current medical state/information  Coordination of patient's care was performed in conjunction with primary service  Time invested included review of patient's labs, vitals, and management of their comorbidities with continued monitoring  In addition, this patient was discussed with medical team including physician and advanced extenders  The care of the patient was extensively discussed and appropriate treatment plan was formulated unique for this patient  ** Please Note:  voice to text software may have been used in the creation of this document   Although proof errors in transcription or interpretation are a potential of such software**

## 2021-08-11 NOTE — PCC CARE MANAGEMENT
Pt admitted s/p stroke and is experiencing cognitive deficits  Pt has supportive spouse and children and are prepared to take pt home on dc  Education provided on continued hhc and outpt therapy services  Pt has dme in the home already  Following to assist w/dc planning needs

## 2021-08-11 NOTE — PROGRESS NOTES
08/11/21 1000   Pain Assessment   Pain Assessment Tool Pain Assessment not indicated - pt denies pain   Pain Score No Pain   Restrictions/Precautions   Precautions Bed/chair alarms;Cognitive;1:1;Fall Risk;Supervision on toilet/commode;O2  (2-3L O2)   Weight Bearing Restrictions No   ROM Restrictions No   Cognition   Overall Cognitive Status Impaired   Comments Patient oriented to person, place, month, and year  He was also able to answer correctly to reason for admission as being a stroke  Subjective   Subjective Patient agreeable to participate in PT session  No new complaints  1:1 at bedside at the start and end of session    Roll Left and Right   Comment OOB in chair at the start and end of session    Sit to [address] in chair at the start and end of session    Lying to Sitting on Side of Bed   Comment OOB in chair at the start and end of session    Sit to Stand   Type of Assistance Needed Physical assistance   Physical Assistance Level 26%-50%   Comment modAx1; vebral cues for proper hand placement   Sit to Stand CARE Score 3   Bed-Chair Transfer   Type of Assistance Needed Physical assistance   Physical Assistance Level 26%-50%   Comment min/mod Ax1 SPT with RW   Chair/Bed-to-Chair Transfer CARE Score 3   Transfer Bed/Chair/Wheelchair   Limitations Noted In Balance; Endurance; Sequencing;Vision   Adaptive Equipment Roller Walker   Walk 10 Feet   Type of Assistance Needed Physical assistance   Physical Assistance Level 26%-50%   Comment mod Ax1 with RW   Walk 10 Feet CARE Score 3   Walk 50 Feet with Two Turns   Type of Assistance Needed Physical assistance   Physical Assistance Level 26%-50%   Comment mod Ax1 with RW   Walk 50 Feet with Two Turns CARE Score 3   Walk 150 Feet   Type of Assistance Needed Physical assistance   Physical Assistance Level 26%-50%   Comment mod Ax1 with RW   Walk 150 Feet CARE Score 3   Ambulation   Does the patient walk? 2   Yes   Findings Initiated ambulation with BARB SHELBY and need for assist of w/c and line management  Patient able to complete 150' with B HHA without rest  He demonstrated mild CALDERON on 2-3L and remained at 95-97% SpO2 with all activity  Progressed patient to 150'x2 ambulation with RW at [facility] and no chair follow to allow for increased (I) and decreased caregiver burden   Wheel 50 Feet with Two Turns   Reason if not Attempted Activity not applicable   Wheel 50 Feet with Two Turns CARE Score 9   Wheel 150 Feet   Reason if not Attempted Activity not applicable   Wheel 661 Feet CARE Score 9   Wheelchair mobility   Does the patient use a wheelchair? 0  No   Curb or Single Stair   Style negotiated Single stair   Type of Assistance Needed Physical assistance   Physical Assistance Level 26%-50%   Comment Isela on  steps with B HR   1 Step (Curb) CARE Score 3   4 Steps   Type of Assistance Needed Physical assistance   Physical Assistance Level 26%-50%   Comment Isela on  steps with B HR   4 Steps CARE Score 3   12 Steps   Type of Assistance Needed Physical assistance   Physical Assistance Level 26%-50%   12 Steps CARE Score 3   Stairs   Type Stairs   # of Steps 12   Weight Bearing Precautions Fall Risk   Assist Devices Bilateral Rail  (nonreciprocal pattern; cues for RUE advancement)   Findings Wife confirmed patient as 1 NALLELY from either entryway without TINOCO  She confirms this can be done with a RW like a curb step  Recommend performing next session    Therapeutic Interventions   Strengthening 10x STS with no UE support    Flexibility BLE heel cord and HS gentle stretch TERT 3 minutes    Balance 30'x2 forwards + 30'x2 backwards ambulation with R rail assist; 150' B HHA ambulation; 150' ambulation with RW   Other /82 at the start of session; SpO2 95-97% on 2-3 L at rest and with activity; HR ranged in the 70s  Other Comments   Comments Spoke with wife during session  She confirms 1 NALLELY home  She currently has a hospital bed, RW and BSC   She states they are waiting on a w/c but did not state who has ordered this equipment  She is turning the living room into an area where [place] can stay  She is there at all times and can provide needed assistance  She reports he was relatively sedentary PTA, taking care of his outdoor cats and watching TV  Assessment   Treatment Assessment Patient engaged in PT treatment session with focus on functional mobility  Patient seemingly more alert and appropriate this session in comparison to previous sessions and overnight  He was able to identify accurate orientation to person, place, month/year and situation  He also demonstrated improved breathing and O2 saturations with numbers reading >95% on 2L  His quality of mobility also improved with dec ataxia and right lateral leaned noted  He was able to icn distances to 150' with B HHA and again 2x using a RW  Will likely incorporate use of RW to inc his (I) and reduce his overall burden of care  Wife present at the end of session and pleased with his current status  She states thi PM at 15:00 meeting with CM to review recommendations from [address]  Despite progress made, patient will need to demonstrate consistency and safety with mobility as well as family training in order to safely d/c home with 24 hour S/A from wife  Problem List Decreased strength; Impaired balance;Decreased endurance;Decreased mobility; Decreased cognition;Decreased safety awareness   Barriers to Discharge Inaccessible home environment;Decreased caregiver support   PT Barriers   Functional Limitation Stair negotiation; Walking   Plan   Treatment/Interventions LE strengthening/ROM; Functional transfer training;Elevations; Therapeutic exercise; Endurance training;Equipment eval/education;Patient/family training;Bed mobility;Gait training   Progress Progressing toward goals   PT Therapy Minutes   PT Time In 1000   PT Time Out 1100   PT Total Time (minutes) 60   PT Mode of treatment - Individual (minutes) 60   PT Mode of treatment - Concurrent (minutes) 0   PT Mode of treatment - Group (minutes) 0   PT Mode of treatment - Co-treat (minutes) 0   PT Mode of Treatment - Total time(minutes) 60 minutes   PT Cumulative Minutes 210

## 2021-08-11 NOTE — PROGRESS NOTES
Urine this am cloudy yellow this afternoon has hematuria no clots punch colored is clearing up back to yellow Dr Severino Come aware It was reported to me that pt pulled at cadet last night

## 2021-08-11 NOTE — SPEECH THERAPY NOTE
Family meeting was held w/ pt's wife, dtr and son today in regards to pt current functioning level  , Coco Negron, reviewing update w/ pt's family in regards to team meeting  Current SLP offering increased education in regards to pt's current language, cognitive and dysphagia functioning  Provided family guidance and education in regards to decreased appetite given current diet BUT the hope is to advance diet relatively soon to regular diet  SLP providing education in regards to pt's fatigue and changes in cognition throughout the day to which SLP team is able to assess pt at all 3 different meals, which is the plan for pt to hopefully exhibit consistency in swallow functioning to safely advance diet w/o increasing any risk of aspiration  Family accepting of this information at this time w/o further questions  SLP also providing update from past sessions in regards to speech/langauge skills, to which language does appear to be functional not when demonstrating confusion or fatigue  SLP asked family for feedback in regards to current speech clarity, which the did state his speech remains impaired but not too far off from his normal  What family did express most concern was in regards to pt's overall cognition as pt currently waxing in regards to overall clarity  SLP providing family w/ basic education in regards to pt's stroke and the cognitive effects which occur in this acute phase  However, SLP unable to speak further into the overt changes which family had currently noticed today  MD, Dr Thad Trevino was able to join family meeting via phone who was able to provide family w/ further insight/education in regards to his delirium which appears to be occurring late afternoons into early evening  By the end of the meeting, family w/o further questions and thankful for information provided       Start of meetin  End of meetin

## 2021-08-11 NOTE — CASE MANAGEMENT
Family meeting held with pts wife Olvin Ceron and son Kecia Roa  Speech therapist Darling Bourne also attended to provide comprehensive overview of pts cog and speech/language deficits  Reviewed potential los, potential recommendations for contd outpt therapy vs [facility] services, how they're arranged and how often they occur  Educated on potential need for family training prior to dc  Dr Amy Freitas phoned in and answered medical questions from family related to confusion, cadet and plan  Family aware of another team mtg next week with a potential second family meeting to review dc plan and recommendations

## 2021-08-12 LAB
25(OH)D3 SERPL-MCNC: 47.8 NG/ML (ref 30–100)
ALBUMIN SERPL BCP-MCNC: 2.5 G/DL (ref 3.5–5)
ALP SERPL-CCNC: 61 U/L (ref 46–116)
ALT SERPL W P-5'-P-CCNC: 34 U/L (ref 12–78)
AMMONIA PLAS-SCNC: 34 UMOL/L (ref 11–35)
ANION GAP SERPL CALCULATED.3IONS-SCNC: 4 MMOL/L (ref 4–13)
AST SERPL W P-5'-P-CCNC: 24 U/L (ref 5–45)
BASOPHILS # BLD AUTO: 0.11 THOUSANDS/ΜL (ref 0–0.1)
BASOPHILS NFR BLD AUTO: 1 % (ref 0–1)
BILIRUB DIRECT SERPL-MCNC: 0.15 MG/DL (ref 0–0.2)
BILIRUB SERPL-MCNC: 0.52 MG/DL (ref 0.2–1)
BUN SERPL-MCNC: 20 MG/DL (ref 5–25)
CALCIUM SERPL-MCNC: 10.5 MG/DL (ref 8.3–10.1)
CHLORIDE SERPL-SCNC: 103 MMOL/L (ref 100–108)
CO2 SERPL-SCNC: 31 MMOL/L (ref 21–32)
CREAT SERPL-MCNC: 0.92 MG/DL (ref 0.6–1.3)
EOSINOPHIL # BLD AUTO: 0.24 THOUSAND/ΜL (ref 0–0.61)
EOSINOPHIL NFR BLD AUTO: 2 % (ref 0–6)
ERYTHROCYTE [DISTWIDTH] IN BLOOD BY AUTOMATED COUNT: 16 % (ref 11.6–15.1)
FOLATE SERPL-MCNC: 16 NG/ML (ref 3.1–17.5)
GFR SERPL CREATININE-BSD FRML MDRD: 85 ML/MIN/1.73SQ M
GLUCOSE P FAST SERPL-MCNC: 92 MG/DL (ref 65–99)
GLUCOSE SERPL-MCNC: 92 MG/DL (ref 65–140)
HCT VFR BLD AUTO: 34.2 % (ref 36.5–49.3)
HGB BLD-MCNC: 10.2 G/DL (ref 12–17)
IMM GRANULOCYTES # BLD AUTO: 0.07 THOUSAND/UL (ref 0–0.2)
IMM GRANULOCYTES NFR BLD AUTO: 1 % (ref 0–2)
LYMPHOCYTES # BLD AUTO: 2.01 THOUSANDS/ΜL (ref 0.6–4.47)
LYMPHOCYTES NFR BLD AUTO: 19 % (ref 14–44)
MCH RBC QN AUTO: 25 PG (ref 26.8–34.3)
MCHC RBC AUTO-ENTMCNC: 29.8 G/DL (ref 31.4–37.4)
MCV RBC AUTO: 84 FL (ref 82–98)
MONOCYTES # BLD AUTO: 0.92 THOUSAND/ΜL (ref 0.17–1.22)
MONOCYTES NFR BLD AUTO: 9 % (ref 4–12)
NEUTROPHILS # BLD AUTO: 7.09 THOUSANDS/ΜL (ref 1.85–7.62)
NEUTS SEG NFR BLD AUTO: 68 % (ref 43–75)
NRBC BLD AUTO-RTO: 0 /100 WBCS
PLATELET # BLD AUTO: 337 THOUSANDS/UL (ref 149–390)
PMV BLD AUTO: 9.8 FL (ref 8.9–12.7)
POTASSIUM SERPL-SCNC: 4.1 MMOL/L (ref 3.5–5.3)
PROT SERPL-MCNC: 7 G/DL (ref 6.4–8.2)
RBC # BLD AUTO: 4.08 MILLION/UL (ref 3.88–5.62)
SODIUM SERPL-SCNC: 138 MMOL/L (ref 136–145)
VIT B12 SERPL-MCNC: 1514 PG/ML (ref 100–900)
WBC # BLD AUTO: 10.44 THOUSAND/UL (ref 4.31–10.16)

## 2021-08-12 PROCEDURE — 80048 BASIC METABOLIC PNL TOTAL CA: CPT | Performed by: NURSE PRACTITIONER

## 2021-08-12 PROCEDURE — 82746 ASSAY OF FOLIC ACID SERUM: CPT

## 2021-08-12 PROCEDURE — 97130 THER IVNTJ EA ADDL 15 MIN: CPT

## 2021-08-12 PROCEDURE — 85025 COMPLETE CBC W/AUTO DIFF WBC: CPT | Performed by: NURSE PRACTITIONER

## 2021-08-12 PROCEDURE — 99232 SBSQ HOSP IP/OBS MODERATE 35: CPT

## 2021-08-12 PROCEDURE — 99232 SBSQ HOSP IP/OBS MODERATE 35: CPT | Performed by: NURSE PRACTITIONER

## 2021-08-12 PROCEDURE — 97530 THERAPEUTIC ACTIVITIES: CPT

## 2021-08-12 PROCEDURE — 82306 VITAMIN D 25 HYDROXY: CPT

## 2021-08-12 PROCEDURE — 97110 THERAPEUTIC EXERCISES: CPT

## 2021-08-12 PROCEDURE — 80076 HEPATIC FUNCTION PANEL: CPT

## 2021-08-12 PROCEDURE — 99222 1ST HOSP IP/OBS MODERATE 55: CPT | Performed by: PHYSICIAN ASSISTANT

## 2021-08-12 PROCEDURE — 92526 ORAL FUNCTION THERAPY: CPT

## 2021-08-12 PROCEDURE — 82140 ASSAY OF AMMONIA: CPT

## 2021-08-12 PROCEDURE — 97129 THER IVNTJ 1ST 15 MIN: CPT

## 2021-08-12 PROCEDURE — 97112 NEUROMUSCULAR REEDUCATION: CPT

## 2021-08-12 PROCEDURE — 82607 VITAMIN B-12: CPT

## 2021-08-12 RX ORDER — LANOLIN ALCOHOL/MO/W.PET/CERES
3 CREAM (GRAM) TOPICAL
Status: DISCONTINUED | OUTPATIENT
Start: 2021-08-12 | End: 2021-08-17 | Stop reason: HOSPADM

## 2021-08-12 RX ADMIN — MELATONIN TAB 3 MG 3 MG: 3 TAB at 20:23

## 2021-08-12 RX ADMIN — APIXABAN 10 MG: 5 TABLET, FILM COATED ORAL at 18:19

## 2021-08-12 RX ADMIN — DOCUSATE SODIUM AND SENNOSIDES 2 TABLET: 8.6; 5 TABLET ORAL at 08:40

## 2021-08-12 RX ADMIN — ATORVASTATIN CALCIUM 40 MG: 40 TABLET, FILM COATED ORAL at 18:19

## 2021-08-12 RX ADMIN — APIXABAN 10 MG: 5 TABLET, FILM COATED ORAL at 08:40

## 2021-08-12 RX ADMIN — DOCUSATE SODIUM AND SENNOSIDES 2 TABLET: 8.6; 5 TABLET ORAL at 18:19

## 2021-08-12 NOTE — CONSULTS
Consults: UROLOGY  Angie Cevallos 72 y o  male [id number]   Unit/Bed #: -01  Encounter: [id number]        Assessment  & Plan  :  Gross hematuria;  -spontaneously began on 08/09 and resolved  Began again on 0 8/11   -continues to be present  Urine currently light punch in color  -CT scan did not reveal any abnormalities  However exam was limited due to contrast within urinary bladder from prior examination  No abnormalities on kidneys except for benign renal cyst   -hemoglobin currently stable at 10 4, serial H&H, transfuse as needed per primary team   -manual irrigation Q 4 per nursing staff  Discussed with nursing team today   -patient currently anticoagulated on Eliquis  Unable to discontinue this medication at this time as patient had recent bilateral DVTs with suspected PE and CVA  He is at high risk an in hypercoagulable state from cancer   -would maintain urethral Hampton catheter at this time  Given continued gross hematuria  Hampton catheter can remain in place for up to a month  -will add Proscar for possible prostatic bleeding in the setting of anticoagulation   -urology to determine 1 urethral Hampton catheter will be removed  Urology will continue to follow  Subjective :    Angie Cevallos  is a 72 y o  male who was admitted for CVA (cerebral vascular accident) ([facility] [address] ) [I63 [id number]]  Urology consulted due intermittent gross hematuria  Patient had a urethral Hampton catheter inserted for urinary retention on 08/06/2021  Patient failed recent voiding trial   And had reinsertion  Patient developed a spontaneous gross hematuria on 08/09 which resolved  He then had a recurrence on 8/11  Patient had a CT scan on 07/31/2021 bladder is filled with previously injected IV contrast, unable to be visualized  Kidneys revealed a 5 x 5 cm simple cyst on the left kidney  No hydronephrosis  There is contrast in the renal collecting systems due to the prior IV contrast injection for head and neck CT    Patient denies a history of radiation  He denies a history of enlarged prostate  He reports he does have a smoking history approximately 15 years ago and was a heavy smoker  Patient is not the most reliable historian  His family members are present and able to provide historical information  No Known Allergies   Current Outpatient Medications   Medication Instructions    enoxaparin (LOVENOX) 1 mg/kg, Subcutaneous, Every 12 hours scheduled    multivitamin (THERAGRAN) TABS 1 tablet, Oral, Daily      Past Medical History:   Diagnosis Date    Lung cancer ([facility] [address] )     Stroke [facility])      Past Surgical History:   Procedure Laterality Date    IR BIOPSY LUNG  8/5/2021     History reviewed  No pertinent family history  Social History     Socioeconomic History    Marital status: /Civil Union     Spouse name: None    Number of children: None    Years of education: None    Highest education level: None   Occupational History    None   Tobacco Use    Smoking status: Former Smoker    Smokeless tobacco: Never Used   Vaping Use    Vaping Use: Never used   Substance and Sexual Activity    Alcohol use: Yes    Drug use: Never    Sexual activity: None   Other Topics Concern    None   Social History Narrative    None     Social Determinants of Health     Financial Resource Strain:     Difficulty of Paying Living Expenses:    Food Insecurity:     Worried About Running Out of Food in the Last Year:     [address] in the Last Year:    Transportation Needs:     Lack of Transportation (Medical):      Lack of Transportation (Non-Medical):    Physical Activity:     Days of Exercise per Week:     Minutes of Exercise per Session:    Stress:     Feeling of Stress :    Social Connections:     Frequency of Communication with Friends and Family:     Frequency of Social Gatherings with Friends and Family:     Attends Rastafarian Services:     Active Member of Clubs or Organizations:     Attends Club or Organization Meetings:     Marital Status:    Intimate Partner Violence:     Fear of Current or Ex-Partner:     Emotionally Abused:     Physically Abused:     Sexually Abused:         Review of Systems   Constitutional: Negative  Negative for chills and fever  HENT: Negative  Eyes: Negative  Respiratory: Negative  Cardiovascular: Negative  Gastrointestinal: Negative  Negative for abdominal pain, diarrhea, nausea and vomiting  Endocrine: Negative  Genitourinary: Positive for hematuria  Negative for difficulty urinating, dysuria, flank pain and urgency  Musculoskeletal: Negative  Skin: Negative  Allergic/Immunologic: Negative  Neurological: Negative  Hematological: Negative  Psychiatric/Behavioral: Negative  Objective     Physical Exam  Vitals reviewed  Constitutional:       General: He is not in acute distress  Appearance: He is normal weight  He is not ill-appearing, toxic-appearing or diaphoretic  HENT:      Head: Normocephalic and atraumatic  Right Ear: External ear normal       Left Ear: External ear normal       Nose: Nose normal       Mouth/Throat:      Pharynx: Oropharynx is clear  Eyes:      General: No scleral icterus  Conjunctiva/sclera: Conjunctivae normal    Cardiovascular:      Rate and Rhythm: Normal rate and regular rhythm  Pulses: Normal pulses  Heart sounds: No murmur heard  No friction rub  No gallop  Pulmonary:      Effort: Pulmonary effort is normal  No respiratory distress  Breath sounds: Normal breath sounds  No stridor  No wheezing, rhonchi or rales  Abdominal:      General: Bowel sounds are normal  There is no distension  Tenderness: There is no abdominal tenderness  There is no right CVA tenderness or left CVA tenderness  Genitourinary:     Comments: Urethral Hampton catheter in place draining light punch colored urine  Musculoskeletal:      Cervical back: Normal range of motion     Neurological: General: No focal deficit present  Mental Status: He is alert and oriented to person, place, and time  Psychiatric:         Mood and Affect: Mood normal          Behavior: Behavior normal          Thought Content: Thought content normal          Judgment: Judgment normal                 Imaging:  CT CHEST, ABDOMEN AND PELVIS WITHOUT IV CONTRAST     INDICATION:   Metastases suspected, unknown primary  lung mass and SOB      COMPARISON:  No prior chest imaging  Comparison is made with the relevant images of the head and neck CTA from earlier the same day      TECHNIQUE: CT examination of the chest, abdomen and pelvis was performed without intravenous contrast   Axial, sagittal, and coronal 2D reformatted images were created from the source data and submitted for interpretation      Radiation dose length product (DLP) for this visit:  848  76 mGy-cm   This examination, like all CT scans performed in the [facility], was performed utilizing techniques to minimize radiation dose exposure, including the use of iterative   reconstruction and automated exposure control       Enteric contrast was not administered       FINDINGS:     CHEST     LUNGS:  There is moderate emphysema      There is a 6 4 x 3 6 x 3 4 cm solid, irregular contoured right upper lobe pulmonary nodule (series 2 image 17 )  There is no tracheal or endobronchial lesion      PLEURA:  Small water density right-sided pleural effusion      HEART/GREAT VESSELS:  Trace pericardial effusion  Mild cardiomegaly      MEDIASTINUM AND JONH:  There is diffuse right hilar and mediastinal adenopathy  Largest is a right paratracheal node measuring 2 cm in short axis (series 2 image 21 )     CHEST WALL AND LOWER NECK:   Unremarkable      ABDOMEN     LIVER/BILIARY TREE:  Numerous too small to characterize hypodensities scattered throughout the liver      GALLBLADDER:  No calcified gallstones   No pericholecystic inflammatory change      SPLEEN: Unremarkable      PANCREAS:  Unremarkable      ADRENAL GLANDS:  Unremarkable      KIDNEYS/URETERS:  There is a 5 6 x 5 6 cm simple cyst in the left kidney lower pole  No hydronephrosis  There is contrast throughout the renal collecting systems, due to the prior IV contrast injection for head and neck CTA      STOMACH AND BOWEL:  Unremarkable      APPENDIX:  No findings to suggest appendicitis      ABDOMINOPELVIC CAVITY:  No ascites  No pneumoperitoneum  No lymphadenopathy      VESSELS:  Unremarkable for patient's age      PELVIS     REPRODUCTIVE ORGANS:  Unremarkable for patient's age      URINARY BLADDER:  Bladder is also filled with previous injected iodinated IV contrast      ABDOMINAL WALL/INGUINAL REGIONS:  Unremarkable      OSSEOUS STRUCTURES:  No acute fracture or destructive osseous lesion      IMPRESSION:     There is moderate emphysema      There is a 6 4 x 3 6 x 3 4 cm solid, irregular contoured right upper lobe pulmonary nodule, likely primary lung cancer (series 2 image 17 )     There is extensive right hilar and mediastinal adenopathy      Small right pleural effusion      There are also numerous too small to characterize hepatic hypodense lesions  Consider abdomen MR with and without IV contrast to characterize      Labs:  Lab Results   Component Value Date    SODIUM 138 08/12/2021    K 4 1 08/12/2021     08/12/2021    CO2 31 08/12/2021    BUN 20 08/12/2021    CREATININE 0 92 08/12/2021    GLUC 92 08/12/2021    CALCIUM 10 5 (H) 08/12/2021         Lab Results   Component Value Date    WBC 10 44 (H) 08/12/2021    HGB 10 2 (L) 08/12/2021    HCT 34 2 (L) 08/12/2021    MCV 84 08/12/2021     08/12/2021         VTE Pharmacologic Prophylaxis:  Eliquis  VTE Mechanical Prophylaxis: sequential compression device     Cecy Maria PA-C

## 2021-08-12 NOTE — CASE MANAGEMENT
Received call from pts dtr [facility] stating family had a discussion last evening and they feel pt needs to be dc'd Sunday or Monday to aid in better emotional recovery  They feel pt is getting depressed and it would be better for him to be at home  Dr Rishi Walker made aware and is asking that family training be initiated tomorrow and through the weekend with team follow up on Monday and dc Tuesday  [place] is still planning on being discontinued tomorrow and family is in agreement pt needs a couple of days after for medical observation  Therapy made aware and they have contacted [facility] and scheduled family training  Following to final recommendations for dc planning

## 2021-08-12 NOTE — PROGRESS NOTES
08/12/21 0900   Pain Assessment   Pain Assessment Tool Pain Assessment not indicated - pt denies pain   Pain Score No Pain   Restrictions/Precautions   Precautions 1:1;Bed/chair alarms;Cognitive; Fall Risk; [place]; Supervision on toilet/commode;O2  (1-2 L O2 during session )   Weight Bearing Restrictions No   ROM Restrictions No   Cognition   Overall Cognitive Status Impaired   Attention Attends with cues to redirect   Memory Decreased short term memory;Decreased recall of recent events;Decreased recall of precautions   Following Commands Follows one step commands inconsistently   Subjective   Subjective Patient ready to participate  No new complaints    Sit to Stand   Type of Assistance Needed Physical assistance   Physical Assistance Level 25% or less   Comment improvement noted from this AM; able to carry over proper hand placement appx 50% of the time    Sit to Stand CARE Score 3   Bed-Chair Transfer   Type of Assistance Needed Physical assistance   Physical Assistance Level 25% or less   Chair/Bed-to-Chair Transfer CARE Score 3   Transfer Bed/Chair/Wheelchair   Limitations Noted In Balance; Endurance;Confidence;Problem Solving;UE Strength;Vision   Adaptive Equipment Roller Walker   Walk 10 Feet   Type of Assistance Needed Physical assistance   Physical Assistance Level 26%-50%   Walk 10 Feet CARE Score 3   Walk 50 Feet with Two Turns   Type of Assistance Needed Physical assistance   Physical Assistance Level 26%-50%   Walk 50 Feet with Two Turns CARE Score 3   Walk 150 Feet   Type of Assistance Needed Physical assistance   Physical Assistance Level 26%-50%   Walk 150 Feet CARE Score 3   Ambulation   Does the patient walk? 2  Yes   Primary Mode of Locomotion Prior to Admission Walk   Distance Walked (feet) 150 ft   Assist Device Walker   Gait Pattern Ataxic; Inconsistant Ama;Decreased foot clearance;R foot drag;L foot drag;Shuffle;Improper weight shift; Lateral deviation  (R lateral lean )   Limitations Noted In Balance; Endurance; Heel Strike; Sequencing;Speed;Strength;Swing   Provided Assistance with: Balance;Weight Shift   Therapeutic Interventions   Balance 30'x2 forward + 30'x2 backwards ambulation using right railing; 150'x2 L HHA ambulation with 2nd person managing O2    Other 1x 60' ambulation with focus on O2 line management- anticipate wife will need to be trained on management    Equipment Use   NuStep L1x8 minutes BUE/LE   Assessment   Treatment Assessment Patient engaged in PT treatment session with focus on assessing and improving overall functional mobility  Patient with improvement noted in alertness, balance, and right sided attention in comparison to earlier this AM  He was able to increase ambulation trials and distance  Focus for d/c will however be placed on short distances with RW  RW will be helpful in energy conservation, management of lines, and decrease caregiver burden on patient's spouse  Trialed patient managing O2 line however he needed 100% cues and intervention for safety  Patient's spouse will need to be trained on this in order to dec patient's risk for falls  Spoke with ANA Calderon who was able to schedule FT with patient's wife 8/13/21  Plan for some observation and then hands on for STS, SPT with RW and short distances with RW  Patient will need 24 hour care and will likely be set up initially with HHPT services  PT Barriers   Physical Impairment Decreased strength;Decreased range of motion;Decreased endurance; Impaired balance;Decreased mobility; Decreased coordination;Decreased cognition   Functional Limitation Car transfers; Ramp negotiation;Stair negotiation;Standing;Transfers; Walking   Plan   Treatment/Interventions Functional transfer training;LE strengthening/ROM; Therapeutic exercise; Endurance training;Cognitive reorientation;Equipment eval/education; Bed mobility;Gait training;Patient/family training   Recommendation   PT Discharge Recommendation   (TBD pending progress )   PT Therapy Minutes   PT Time In 0930   PT Time Out 1030   PT Total Time (minutes) 60   PT Mode of treatment - Individual (minutes) 60   PT Mode of treatment - Concurrent (minutes) 0   PT Mode of treatment - Group (minutes) 0   PT Mode of treatment - Co-treat (minutes) 0   PT Mode of Treatment - Total time(minutes) 60 minutes   PT Cumulative Minutes 300

## 2021-08-12 NOTE — OCCUPATIONAL THERAPY NOTE
OT NOTE      OT notified via tiger text from CM that pt's children would like to pursue d/c home possibly next Tuesday  OT called pt's daughter Henrik Shaikh, informed her of pt's high caregiver burden at this time, including providing him max cues during ADL routine as well as managing his 02 and cadet lines during mobility (plan for void trial but will need training on cadet if void trial is not successful)  Henrik Shaikh and her brother will be assisting as they are able to, but it will be pt's wife who is with pt all day  OT recommending pt's wife be present all day beginning tomorrow and continuing over the weekend, for training and to see how pt is throughout the day  Family training scheduled with pt's wife and Henrik Shaikh Lenin  for full ADL and then at 12:30 for PT family training which pt's son will also be at  Pt's wife will be in tomorrow for family training all day in therapy beginning at 9:30  OT informed her on home health aides, to decrease wife's caregiver burden at home, she reports she will be looking into this  Plan for OT family training - educate on pt's cognitive deficits, functional mobility to/from bathroom using RW, toileting  Then plan for full ADL (sponge bathing level) on Sunday  Please give wife handouts to recommend: bed/chair alarm, video baby monitor, gait belt  Discuss plan for sponge bathing currently  Submitted BSC order today to , recommend this be used at night       Will be recommending continued home therapy services initially upon d/c

## 2021-08-12 NOTE — PROGRESS NOTES
Internal Medicine Progress Note  Patient: Jack Rosas  Age/sex: 72 y o  male  Medical Record #: [id number]      ASSESSMENT/PLAN: (Interval History)  Jack Rosas is seen and examined and management for following issues:    CVA  · Multiembolic due to hypercoagulable state from cancer  · Cont eliquis/atorvastatin     B/L DVT w/suspected PE  · Continue eliquis     Adenocarcinoma of RUL  · Seen by oncology  · Needs outpatient PET scan for staging     Acute resp failure/emphysema  · Presumed from PE, possibly lung mass  · Smoked 2+ppd x 40 yrs and quit   · Still requires 2L NC  · Will wean O2 as able for sat 92% or greater  · At home, he could barely walk to the bathroom 2/2 CALDERON but has improved here     Nasopharyngeal mass  · Needs biopsy as outpatient by ENT     Elevated troponin/depressed RVEF  · Needs cardiology f/u as outpatient for repeat ECHO  · Ef 55% +RV hypokinesis     Urine retention/hematuria  · Has cadet  · Had some punch colored urine on 8/9/21 and 8/11 that resolved and reoccurred today after being OOB with therapy  · Uro to see        Discharge date:  [facility]       The above assessment and plan was reviewed and updated as determined by my evaluation of the patient on 8/12/2021      Labs:   Results from last 7 days   Lab Units 08/12/21  0539 08/09/21  0548   WBC Thousand/uL 10 44* 10 83*   HEMOGLOBIN g/dL 10 2* 10 4*   HEMATOCRIT % 34 2* 34 4*   PLATELETS Thousands/uL 337 427*     Results from last 7 days   Lab Units 08/12/21  0539 08/09/21  0548   SODIUM mmol/L 138 141   POTASSIUM mmol/L 4 1 3 8   CHLORIDE mmol/L 103 107   CO2 mmol/L 31 29   BUN mg/dL 20 18   CREATININE mg/dL 0 92 0 79   CALCIUM mg/dL 10 5* 10 3*             Results from last 7 days   Lab Units 08/07/21  1057   POC GLUCOSE mg/dl 118       Review of Scheduled Meds:  Current Facility-Administered Medications   Medication Dose Route Frequency Provider Last Rate    acetaminophen  650 mg Oral Q6H PRN Adam Smith MD      apixaban  10 mg Oral BID Simrantova Ramirez, DO      [START ON 8/14/2021] apixaban  5 mg Oral BID Simran Ashley,       atorvastatin  40 mg Oral Daily With [employer] Emani Mobley DO      bisacodyl  10 mg Rectal Daily PRN Dinesh Pill, MD      melatonin  3 mg Oral HS Dinesh Pill, MD      ondansetron  4 mg Oral Q8H PRN Dinesh Pill, MD      polyethylene glycol  17 g Oral Daily PRN Dinesh Pill, MD      senna-docusate sodium  2 tablet Oral BID Simran Ashley, DO         Subjective/ HPI: Patient seen and examined  Patients overnight issues or events were reviewed with nursing or staff during rounds or morning huddle session  New or overnight issues include the following:     No new or overnight issues  Offers no complaints  Reoccurrence of hematuria    ROS:   A 10 point ROS was performed; negative except as noted above         Imaging:     No orders to display       *Labs /Radiology studies reviewed  *Medications reviewed and reconciled as needed  *Please refer to order section for additional ordered labs studies  *Case discussed with primary attending during morning huddle case rounds      Physical Examination:  Vitals:   Vitals:    08/11/21 0801 08/11/21 1500 08/11/21 2321 08/12/21 0542   BP: 117/63 147/85 125/77 139/74   BP Location: Right arm Left arm Left arm Left arm   Pulse: 72 82 67 66   Resp: 18 19 20 18   Temp: 98 3 °F (36 8 °C) 97 8 °F (36 6 °C) 98 °F (36 7 °C) 98 7 °F (37 1 °C)   TempSrc: Oral Oral Axillary Oral   SpO2: 94% 93% 97% 92%   Weight:       Height:           General Appearance: no distress, conversive  HEENT: PERRLA, conjuctiva normal; oropharynx clear; mucous membranes moist   Neck:  Supple, normal ROM, no JVD  Lungs: CTA, normal respiratory effort, no retractions, expiratory effort normal  CV: regular rate and rhythm; no rubs/murmurs/gallops, PMI normal   ABD: soft; ND/NT; +BS  EXT: no edema  Skin: normal turgor, normal texture, no rashes  Psych: affect normal, mood normal  Neuro: AA      The above physical exam was reviewed and updated as determined by my evaluation of the patient on 8/12/2021  Invasive Devices     Drain            Urethral Catheter 16 Fr  5 days                   VTE Pharmacologic Prophylaxis: Eliquis  Code Status: Level 1 - Full Code  Current Length of Stay: 4 day(s)      Total time spent:  30 minutes with more than 50% spent counseling/coordinating care  Counseling includes discussion with patient re: progress  and discussion with patient of his/her current medical state/information  Coordination of patient's care was performed in conjunction with primary service  Time invested included review of patient's labs, vitals, and management of their comorbidities with continued monitoring  In addition, this patient was discussed with medical team including physician and advanced extenders  The care of the patient was extensively discussed and appropriate treatment plan was formulated unique for this patient  ** Please Note:  voice to text software may have been used in the creation of this document   Although proof errors in transcription or interpretation are a potential of such software**

## 2021-08-12 NOTE — PROGRESS NOTES
08/12/21 0830   Pain Assessment   Pain Assessment Tool Pain Assessment not indicated - pt denies pain   Pain Score No Pain   Restrictions/Precautions   Precautions Bed/chair alarms;1:1;Cognitive; Fall Risk; [place]; Supervision on toilet/commode;O2   Weight Bearing Restrictions No   ROM Restrictions No   Cognition   Overall Cognitive Status Impaired   Attention Attends with cues to redirect   Memory Decreased short term memory;Decreased recall of recent events;Decreased recall of precautions   Following Commands Follows one step commands inconsistently   Subjective   Subjective Patient OOB in chair, agreeable to particiapte in PT session  Sit to Stand   Type of Assistance Needed Physical assistance   Physical Assistance Level 26%-50%   Sit to Stand CARE Score 3   Bed-Chair Transfer   Type of Assistance Needed Physical assistance   Physical Assistance Level 26%-50%   Comment with RW; cues for proper hand placement    Chair/Bed-to-Chair Transfer CARE Score 3   Transfer Bed/Chair/Wheelchair   Limitations Noted In Balance;Confidence; Endurance;Problem Solving; Sequencing;Vision;UE Strength   Adaptive Equipment Roller Walker   Walk 10 Feet   Type of Assistance Needed Physical assistance   Physical Assistance Level 26%-50%   Walk 10 Feet CARE Score 3   Walk 50 Feet with Two Turns   Type of Assistance Needed Physical assistance   Physical Assistance Level 26%-50%   Walk 50 Feet with Two Turns CARE Score 3   Ambulation   Does the patient walk? 2  Yes   Primary Mode of Locomotion Prior to Admission Walk   Distance Walked (feet) 120 ft   Assist Device Walker   Gait Pattern Ataxic; Inconsistant Ama;Decreased foot clearance;R foot drag;L foot drag;Shuffle;Improper weight shift; Lateral deviation  (R lateral lean )   Limitations Noted In Balance; Endurance; Heel Strike; Sequencing;Speed;Swing;Strength   Provided Assistance with: Balance;Weight Shift   Findings Patient needing inc cueing for R hand placement 2* dec attention and coordination  Cues needed also to inc step length however unsuccessful  Inc right sway this session compared to 8/11   Wheel 50 Feet with Two Turns   Reason if not Attempted Activity not applicable   Wheel 50 Feet with Two Turns CARE Score 9   Wheel 150 Feet   Reason if not Attempted Activity not applicable   Wheel 938 Feet CARE Score 9   Wheelchair mobility   Does the patient use a wheelchair? 0  No   Therapeutic Interventions   Strengthening 5x STS with focus on proper hand placement using RW   Other Upon arrival, PCA present stating Dr Nellie Sood just took off O2 and patient reading 95% at rest on RA  Following transport to PT gym, patient reading 85% on RA and was unable to keep >90% consistently with verbal cues  Kept patient on 1-2L where he was able to keep WNL  /78 at the start of session with no complaints or issues   Assessment   Treatment Assessment Patient engaged in brief 30 minute PT treatment session where he presented with dec attention to right side, specifically when using the RW, and increased right lateral sway  He is slow to process and needs cues to initiate and engage in task  Later PT session to focus on strengthening, balance and more gait training through neuro re-ed  PT Barriers   Physical Impairment Decreased strength;Decreased range of motion;Decreased endurance; Impaired balance;Decreased mobility; Decreased coordination;Decreased cognition   Functional Limitation Car transfers; Ramp negotiation;Stair negotiation;Standing;Transfers; Walking   Recommendation   PT Discharge Recommendation   (TBD pending progress )   PT Therapy Minutes   PT Time In 0830   PT Time Out 0900   PT Total Time (minutes) 30   PT Mode of treatment - Individual (minutes) 30   PT Mode of treatment - Concurrent (minutes) 0   PT Mode of treatment - Group (minutes) 0   PT Mode of treatment - Co-treat (minutes) 0   PT Mode of Treatment - Total time(minutes) 30 minutes   PT Cumulative Minutes 240   Therapy Time missed Time missed?  No

## 2021-08-12 NOTE — PLAN OF CARE
Problem: PAIN - ADULT  Goal: Verbalizes/displays adequate comfort level or baseline comfort level  Description: Interventions:  - Encourage patient to monitor pain and request assistance  - Assess pain using appropriate pain scale  - Administer analgesics based on type and severity of pain and evaluate response  - Implement non-pharmacological measures as appropriate and evaluate response  - Consider cultural and social influences on pain and pain management  - Notify physician/advanced practitioner if interventions unsuccessful or patient reports new pain  Outcome: Progressing     Problem: INFECTION - ADULT  Goal: Absence or prevention of progression during hospitalization  Description: INTERVENTIONS:  - Assess and monitor for signs and symptoms of infection  - Monitor lab/diagnostic results  - Monitor all insertion sites, i e  indwelling lines, tubes, and drains  - Monitor endotracheal if appropriate and nasal secretions for changes in amount and color  - [place] appropriate cooling/warming therapies per order  - Administer medications as ordered  - Instruct and encourage patient and family to use good hand hygiene technique  - Identify and instruct in appropriate isolation precautions for identified infection/condition  Outcome: Progressing     Problem: SAFETY ADULT  Goal: Patient will remain free of falls  Description: INTERVENTIONS:  - Educate patient/family on patient safety including physical limitations  - Instruct patient to call for assistance with activity   - Consult OT/PT to assist with strengthening/mobility   - Keep Call bell within reach  - Keep bed low and locked with side rails adjusted as appropriate  - Keep care items and personal belongings within reach  - Initiate and maintain comfort rounds  - Make Fall Risk Sign visible to staff  - Offer Toileting every 2Hours, in advance of need  - Maintain alarm  - Apply yellow socks and bracelet for high fall risk patients  - Consider moving patient to room near nurses station  Outcome: Progressing  Goal: Maintain or return to baseline ADL function  Description: INTERVENTIONS:  -  Assess patient's ability to carry out ADLs; assess patient's baseline for ADL function and identify physical deficits which impact ability to perform ADLs (bathing, care of mouth/teeth, toileting, grooming, dressing, etc )  - Assess/evaluate cause of self-care deficits   - Assess range of motion  - Assess patient's mobility; develop plan if impaired  - Assess patient's need for assistive devices and provide as appropriate  - Encourage maximum independence but intervene and supervise when necessary  - Involve family in performance of ADLs  - Assess for home care needs following discharge   - Consider OT consult to assist with ADL evaluation and planning for discharge  - Provide patient education as appropriate  Outcome: Progressing  Goal: Maintains/Returns to pre admission functional level  Description: INTERVENTIONS:  - Perform BMAT or MOVE assessment daily    - Set and communicate daily mobility goal to care team and patient/family/caregiver     - Collaborate with rehabilitation services on mobility goals if consulted  - Perform Range of Motion   - Reposition patient   - Stand patient   - Ambulate patient   - Out of bed to chair    - Out of bed for meals   - Out of bed for toileting  - Record patient progress and toleration of activity level   Outcome: Progressing     Problem: DISCHARGE PLANNING  Goal: Discharge to home or other facility with appropriate resources  Description: INTERVENTIONS:  - Identify barriers to discharge w/patient and caregiver  - Arrange for needed discharge resources and transportation as appropriate  - Identify discharge learning needs (meds, wound care, etc )  - Arrange for interpretive services to assist at discharge as needed  - Refer to Case Management Department for coordinating discharge planning if the patient needs post-hospital services based on physician/advanced practitioner order or complex needs related to functional status, cognitive ability, or social support system  Outcome: Progressing     Problem: NEUROSENSORY - ADULT  Goal: Achieves stable or improved neurological status  Description: INTERVENTIONS  - Monitor and report changes in neurological status  - Monitor vital signs such as temperature, blood pressure, glucose, and any other labs ordered   - Initiate measures to prevent increased intracranial pressure  - Monitor for seizure activity and implement precautions if appropriate      Outcome: Progressing  Goal: Achieves maximal functionality and self care  Description: INTERVENTIONS  - Monitor swallowing and airway patency with patient fatigue and changes in neurological status  - Encourage and assist patient to increase activity and self care     - Encourage visually impaired, hearing impaired and aphasic patients to use assistive/communication devices  Outcome: Progressing     Problem: RESPIRATORY - ADULT  Goal: Achieves optimal ventilation and oxygenation  Description: INTERVENTIONS:  - Assess for changes in respiratory status  - Assess for changes in mentation and behavior  - Position to facilitate oxygenation and minimize respiratory effort  - Oxygen administered by appropriate delivery if ordered  - Initiate smoking cessation education as indicated  - Encourage broncho-pulmonary hygiene including cough, deep breathe, Incentive Spirometry  - Assess the need for suctioning and aspirate as needed  - Assess and instruct to report SOB or any respiratory difficulty  - Respiratory Therapy support as indicated  Outcome: Progressing     Problem: GENITOURINARY - ADULT  Goal: Maintains or returns to baseline urinary function  Description: INTERVENTIONS:  - Assess urinary function  - Encourage oral fluids to ensure adequate hydration if ordered  - Administer IV fluids as ordered to ensure adequate hydration  - Administer ordered medications as needed  - Offer frequent toileting  - Follow urinary retention protocol if ordered  Outcome: Progressing  Goal: Absence of urinary retention  Description: INTERVENTIONS:  - Assess patients ability to void and empty bladder  - Monitor I/O  - Bladder scan as needed  - Discuss with physician/AP medications to alleviate retention as needed  - Discuss catheterization for long term situations as appropriate  Outcome: Progressing  Goal: Urinary catheter remains patent  Description: INTERVENTIONS:  - Assess patency of urinary catheter  - If patient has a chronic cadet, consider changing catheter if non-functioning  - Follow guidelines for intermittent irrigation of non-functioning urinary catheter  Outcome: Progressing     Problem: SKIN/TISSUE INTEGRITY - ADULT  Goal: Skin Integrity remains intact(Skin Breakdown Prevention)  Description: Assess:  -Perform [place] assessment  -Clean and moisturize skin  -Inspect skin when repositioning, toileting, and assisting with ADLS  -Assess extremities for adequate circulation and sensation     Bed Management:  -Have minimal linens on bed & keep smooth, unwrinkled  -Change linens as needed when moist or perspiring  -Avoid sitting or lying in one position for more than 2 hours while in bed    Toileting:  -Offer bedside commode  -Assess for incontinence  -Use incontinent care products after each incontinent episode    Activity:  -Mobilize patient   -Encourage activity and walks on unit  -Encourage or provide ROM exercises   -Turn and reposition patient every 2 Hours  -Use appropriate equipment to lift or move patient in bed  -Instruct/ Assist with weight shifting when out of bed in chair  -Consider limitation of chair time    Skin Care:  -Avoid use of baby powder, tape, friction and shearing, hot water or constrictive clothing  -Relieve pressure over bony prominences   -Do not massage red bony areas    Next Steps:  -Teach patient strategies to minimize risks    -Consider consults to interdisciplinary teams   Outcome: Progressing     Problem: MUSCULOSKELETAL - ADULT  Goal: Maintain or return mobility to safest level of function  Description: INTERVENTIONS:  - Assess patient's ability to carry out ADLs; assess patient's baseline for ADL function and identify physical deficits which impact ability to perform ADLs (bathing, care of mouth/teeth, toileting, grooming, dressing, etc )  - Assess/evaluate cause of self-care deficits   - Assess range of motion  - Assess patient's mobility  - Assess patient's need for assistive devices and provide as appropriate  - Encourage maximum independence but intervene and supervise when necessary  - Involve family in performance of ADLs  - Assess for home care needs following discharge   - Consider OT consult to assist with ADL evaluation and planning for discharge  - Provide patient education as appropriate  Outcome: Progressing  Goal: Maintain proper alignment of affected body part  Description: INTERVENTIONS:  - Support, maintain and protect limb and body alignment  - Provide patient/ family with appropriate education  Outcome: Progressing     Problem: Nutrition/Hydration-ADULT  Goal: Nutrient/Hydration intake appropriate for improving, restoring or maintaining nutritional needs  Description: Monitor and assess patient's nutrition/hydration status for malnutrition  Collaborate with interdisciplinary team and initiate plan and interventions as ordered  Monitor patient's weight and dietary intake as ordered or per policy  Utilize nutrition screening tool and intervene as necessary  Determine patient's food preferences and provide high-protein, high-caloric foods as appropriate       INTERVENTIONS:  - Monitor oral intake, urinary output, labs, and treatment plans  - Assess nutrition and hydration status and recommend course of action  - Evaluate amount of meals eaten  - Assist patient with eating if necessary   - Allow adequate time for meals  - Recommend/ encourage appropriate diets, oral nutritional supplements, and vitamin/mineral supplements  - Order, calculate, and assess calorie counts as needed  - Recommend, monitor, and adjust tube feedings and TPN/PPN based on assessed needs  - Assess need for intravenous fluids  - Provide specific nutrition/hydration education as appropriate  - Include patient/family/caregiver in decisions related to nutrition  Outcome: Progressing

## 2021-08-12 NOTE — PROGRESS NOTES
21 0700   Pain Assessment   Pain Assessment Tool Pain Assessment not indicated - pt denies pain   Pain Score No Pain   Restrictions/Precautions   Precautions 1:1;Bed/chair alarms;Cognitive; Fall Risk; [place]; Impulsive;Multiple lines;O2;Supervision on toilet/commode;Visual deficit   Comprehension   Comprehension (FIM) 4 - Understands basic info/conversation 75-90% of time   Expression   Expression (FIM) 4 - Expresses basic info/needs 75-90% of time   Social Interaction   Social Interaction (FIM) 5 - Interacts appropriately with others 90% of time   Problem Solving   Problem solving (FIM) 3 - Solves basic problmes 50-74% of time   Memory   Memory (FIM) 4 - Recognizes/recalls/performs 75-89%   Speech/Language/Cognition Assessmetn   Treatment Assessment Pt seen for skilled speech therapy session following breakfast meal  Pt completing the last portions of Informal Cognitive Assessment  Executive Function Tasks:  Organization: (1/3); Auditory Sequencing: (1/3); Auditory Problem Solving: (); Basic Math: (); Written Expression: Name and Address (improvement from yesterday with full address completed this time); Clock Drawing: (completed with all numbers and appropriate hands however length of hands the same but each hand pointing to appropriate target)  Pt completed orientation as well- able to state "hospitial" in regards to location but unable to recall [facility]  For situation, pt needed cues to recall as well  For date, pt stated appropriate month and year but need cues for number/[place]  Pt adequately recalled his  but for age first stated "22" but when provided a quizzical look, corrected to "80" to which pt needed cues that he is currently 72 and will be turning 71 soon  Pt able to recite full and complete address  Pt able to state his wife's name and his two children but increased difficulty with naming all grandchildren getting 2/4 correct   Pt provided again repetition as to purpose of therapy due to current situation  Pt provided direct examples as to his current deficits and safety needs  Reiterated reason for rehab and his goals to return home with family support but at his best level of function in order to decrease burden on family  Pt agrees to all this but will need repetition and redirection for increased comprehension/recall  Pt will benefit from SLP services at this time to maximize overall cognitive linguistic skills  Swallow Information   Current Risks for Dysphagia & Aspiration Respiratory compromise;Rapid respiratory rate;Weak cough; Dysarthria;General debilitation;New Neuro event;Brain injury;Cognitive deficit; Mental status change;Reduced alertnes; Positionong Issues   Current Symptoms/Concerns Cough; With liquids   Current Diet Dysphagia advance; Thin liquid   Baseline Diet Regular; Thin liquids   Consistencies Assessed and Performance   Materials Admnistered Regular/Solid; Thin liquid   Oral Stage Mild impaired   Phargngeal Stage Mild impaired   Swallow Mechanics Mild delayed;Swallow initation;Good Larygneal rise;Aspiration risk   Esophageal Concerns No s/s reported   Recommendations   Risk for Aspiration Present   Recommendations Dysphagia treatment   Diet Solid Recommendation Level 3 Dysphagia/ advanced/ soft to chew   Diet Liquid Recommendation Thin liquid   Recommended Form of Meds As desired; As tolerated   General Precautions Aspiration precautions; Feed only when alert;Upright as possible for all oral intake;Minimize distractions; Remain upright for 45 mins after meals  (Distant supervision, FULLY upright for ALL meals)   Compensatory Swallowing Strategies Alternate solids and liquids; External pacing;Effortful swallow;Cue for lingual sweep;Voluntary throat clear/cough to clear penetration   Results Reviewed with PT/Family/Caregiver   Eating   Type of Assistance Needed Set-up / clean-up;Supervision;Verbal cues   Physical Assistance Level No physical assistance   Eating CARE Score 4   Swallow Assessment   Swallow Treatment Assessment Pt seen for skilled dysphagia therapy session early this AM with breakfast  Pt seated OOB upright in chair in room, 2L NC present  Pt agreeable to meal- items assessed included regular diet trial items- egg omelet with mushrooms and cheese with home fries and francis and thin liquids by cup  Pt required some set up assistance with tray but no physical assistance self feeding  Meal completion 75% and 120cc liquids  Pt ate food items c good bolus retrieval by bite, noted larger bites and taking 2 at a time  Oral processing slow and mildly prolonged with mildly delayed transfers and swallows  Pt provided verbal cues for alternation of food and liquids- drank thin liquids by cup c good bolus retrieval, oral control, functional transfers and swallows  However noted one immediate dry cough post successive sips of thin- able to cough and clear with then no further s/s aspiration noted  Noted pt with some retention/pocketing in R side during meal which cleared throughout  Pt benefitted from cues to slow rate and take one bite at a time while clearing food between bites  Pt benefitted also from extended time during meal to complete  Pt improving with tolerance of regular diet textures however cont to assess at different times of day to assess for fluctuating alertness and attention  Currently will continue to recommend level 3 diet w/ thin liquids, continuing Aspiration Precautions, Distant supervision for now--->setup w/ tray  FULLY upright w/ bed in chair position or OOB w/ meals, small bites/sips, alternate solids/liquids, encourage PO intake  Pt is recommended for continued skilled ST services for dysphagia therapy to maximize swallow function, to increase independent use of safe swallow strategies and to safely support PO intake while determining safest/least restrictive diet at this time     Swallow Assessment Prognosis   Prognosis Good   Prognosis Considerations Age;Co-morbidities; Medical diagnosis; Medical prognosis;Previous level of function;Severity of impairments;New learning ability;Ability to carry over; Cooperation   SLP Therapy Minutes   SLP Time In 0700   SLP Time Out 0800   SLP Total Time (minutes) 60   SLP Mode of treatment - Individual (minutes) 60   SLP Mode of treatment - Concurrent (minutes) 0   SLP Mode of treatment - Group (minutes) 0   SLP Mode of treatment - Co-treat (minutes) 0   SLP Mode of Treatment - Total time(minutes) 60 minutes   SLP Cumulative Minutes 240   Therapy Time missed   Time missed?  No

## 2021-08-12 NOTE — PROGRESS NOTES
08/12/21 1230   Pain Assessment   Pain Assessment Tool Pain Assessment not indicated - pt denies pain   Pain Score No Pain   Restrictions/Precautions   Precautions 1:1;Bed/chair alarms;Cognitive; Fall Risk; [place]; Impulsive;Multiple lines;O2;Supervision on toilet/commode   Weight Bearing Restrictions No   ROM Restrictions No   Lifestyle   Autonomy Pt agreeable to OT session   Sit to Stand   Type of Assistance Needed Physical assistance   Physical Assistance Level 25% or less   Comment with RW; max VCs for safe hand placement   Sit to Stand CARE Score 3   Bed-Chair Transfer   Type of Assistance Needed Physical assistance   Physical Assistance Level 25% or less   Comment with RW; max VCs for safe hand placement   Chair/Bed-to-Chair Transfer CARE Score 3   Toileting Hygiene   Type of Assistance Needed Physical assistance   Physical Assistance Level 76% or more   Comment Pt requires assist for thoroughness post BM incontinence in brief  Requires assist for balance and ultimately assist to fully pull up brief and pants  Toileting Hygiene CARE Score 2   Toilet Transfer   Type of Assistance Needed Physical assistance   Physical Assistance Level 26%-50%   Comment with RW; max VCs for safe hand placement on standard toilet with GB   Toilet Transfer CARE Score 3   Functional Standing Tolerance   Time 2 min x2    Activity balloon pass in stance   Comments Pt engages in 2 rounds of balloon pass in stance focusing on unsupported standing in order to inc/challenge balance dynamically  Pt requires up to mod/max assist for balance when fatigued as pt starts to R laterally lean as well as stands with flexed knees requiring constant VCs for upright posture when fatigued  Coordination   Gross Motor Pt engages in seated UE coordination task again using balloon while holding 2 5# dowel and hitting balloon using dowel bar for overall coordination, direction following and strength/endurance      Additional Activities   Additional Activities Comments pt's SpO2 remains above 95% on 1 5L O2 throughout entire session   Activity Tolerance   Activity Tolerance Patient tolerated treatment well   Assessment   Treatment Assessment Pt engages in 90 minute skilled OT Session focusing on repetitive STS transfers with RW and short distance in room functional mobility with RW, dynamic standing kristen/bal, activity tolerance, repetitive cognitive orientation, and light UE strengthening/coordination  See above for full functional details  Pt completes mass repetition of STS transfers and short in room functional transfers with RW and overall CG/min assist with max VCs for safe hand placement  Pt requires assist to safely manage O2 tubing and cadet bag  Pt completes unsupported standing task in order to challenge balance and activity tolerance  Pt initially does well requiring only CG, however with fatigue requires up to max assist for R lateral lean as well as "sinking knees" requiring VCs for upright posture  Repetitive cognitive orientation completed with significant difficulty with basic orientation questions  Pt accurately states day of week, year, name, birthday, but unable to accurately state month, date, age  Pt able to state that Christmas is in December but unable to match any other holidays to the correct months  Pt's spouse present at end of session  Updated spouse on activities completed in today's session as well as therapist that will be completing family training with spouse tomorrow  Continue with OT POC to focus on ADL retraining, mass repetition functional transfers, cognitive orientation, activity tolerance and strength/endurance/coordination in order to decrease burden of care at d/c  Prognosis Fair   Problem List Decreased strength;Decreased endurance; Impaired balance;Decreased mobility; Decreased safety awareness; Impaired judgement;Decreased cognition; Impaired vision   Barriers to Discharge Inaccessible home environment;Decreased caregiver support   Plan   Treatment/Interventions ADL retraining;Functional transfer training; Therapeutic exercise; Endurance training;Cognitive reorientation;Patient/family training;Equipment eval/education; Compensatory technique education   OT Therapy Minutes   OT Time In 1230   OT Time Out 1400   OT Total Time (minutes) 90   OT Mode of treatment - Individual (minutes) 90   OT Mode of treatment - Concurrent (minutes) 0   OT Mode of treatment - Group (minutes) 0   OT Mode of treatment - Co-treat (minutes) 0   OT Mode of Treatment - Total time(minutes) 90 minutes   OT Cumulative Minutes 240   Therapy Time missed   Time missed?  No

## 2021-08-13 PROBLEM — Z78.9 IMPAIRED MOBILITY AND ADLS: Status: ACTIVE | Noted: 2021-08-13

## 2021-08-13 PROBLEM — Z74.09 IMPAIRED MOBILITY AND ADLS: Status: ACTIVE | Noted: 2021-08-13

## 2021-08-13 LAB
DME PARACHUTE DELIVERY DATE ACTUAL: NORMAL
DME PARACHUTE DELIVERY DATE ACTUAL: NORMAL
DME PARACHUTE DELIVERY DATE REQUESTED: NORMAL
DME PARACHUTE DELIVERY DATE REQUESTED: NORMAL
DME PARACHUTE ITEM DESCRIPTION: NORMAL
DME PARACHUTE ITEM DESCRIPTION: NORMAL
DME PARACHUTE ORDER STATUS: NORMAL
DME PARACHUTE ORDER STATUS: NORMAL
DME PARACHUTE SUPPLIER NAME: NORMAL
DME PARACHUTE SUPPLIER NAME: NORMAL
DME PARACHUTE SUPPLIER PHONE: NORMAL
DME PARACHUTE SUPPLIER PHONE: NORMAL

## 2021-08-13 PROCEDURE — 92526 ORAL FUNCTION THERAPY: CPT

## 2021-08-13 PROCEDURE — 97530 THERAPEUTIC ACTIVITIES: CPT

## 2021-08-13 PROCEDURE — 99232 SBSQ HOSP IP/OBS MODERATE 35: CPT

## 2021-08-13 PROCEDURE — 97535 SELF CARE MNGMENT TRAINING: CPT

## 2021-08-13 PROCEDURE — 97110 THERAPEUTIC EXERCISES: CPT

## 2021-08-13 PROCEDURE — 97130 THER IVNTJ EA ADDL 15 MIN: CPT

## 2021-08-13 PROCEDURE — 97129 THER IVNTJ 1ST 15 MIN: CPT

## 2021-08-13 PROCEDURE — 99232 SBSQ HOSP IP/OBS MODERATE 35: CPT | Performed by: NURSE PRACTITIONER

## 2021-08-13 RX ADMIN — ATORVASTATIN CALCIUM 40 MG: 40 TABLET, FILM COATED ORAL at 17:08

## 2021-08-13 RX ADMIN — DOCUSATE SODIUM AND SENNOSIDES 2 TABLET: 8.6; 5 TABLET ORAL at 07:44

## 2021-08-13 RX ADMIN — APIXABAN 10 MG: 5 TABLET, FILM COATED ORAL at 17:08

## 2021-08-13 RX ADMIN — APIXABAN 10 MG: 5 TABLET, FILM COATED ORAL at 07:44

## 2021-08-13 RX ADMIN — MELATONIN TAB 3 MG 3 MG: 3 TAB at 21:43

## 2021-08-13 NOTE — PROGRESS NOTES
Internal Medicine Progress Note  Patient: Noel Prado  Age/sex: 72 y o  male  Medical Record #: [id number]      ASSESSMENT/PLAN: (Interval History)  Noel Prado is seen and examined and management for following issues:    CVA  · Multiembolic due to hypercoagulable state from cancer  · Cont eliquis/atorvastatin     B/L DVT w/suspected PE  · Continue eliquis     Adenocarcinoma of RUL  · Seen by oncology  · Needs outpatient PET scan for staging     Acute resp failure/emphysema  · Presumed from PE, possibly lung mass  · Smoked 2+ppd and quit 15 yrs ago  · Still requires 2L NC  · Will wean O2 as able for sat 92% or greater  · At home, he could barely walk to the bathroom 2/2 CALDERON but has improved here possibly since he is wearing O2     Nasopharyngeal mass  · Needs biopsy as outpatient by ENT     Elevated troponin/depressed RVEF  · Needs cardiology f/u as outpatient for repeat ECHO  · Ef 55% +RV hypokinesis     Urine retention/hematuria  · Has cadet  · Had some punch colored urine on 8/9/21, 8/11 that resolved; reoccurred 8/12/21 after being OOB with therapy  · Will check CBC in AM  · Uro saw 8/12:   Added Proscar for possible prostatic bleeding in setting of [facility]   Keep cadet in for now and they will decide when to remove        Discharge date:  [facility]eam    The above assessment and plan was reviewed and updated as determined by my evaluation of the patient on 8/13/2021      Labs:   Results from last 7 days   Lab Units 08/12/21  0539 08/09/21  0548   WBC Thousand/uL 10 44* 10 83*   HEMOGLOBIN g/dL 10 2* 10 4*   HEMATOCRIT % 34 2* 34 4*   PLATELETS Thousands/uL 337 427*     Results from last 7 days   Lab Units 08/12/21  0539 08/09/21  0548   SODIUM mmol/L 138 141   POTASSIUM mmol/L 4 1 3 8   CHLORIDE mmol/L 103 107   CO2 mmol/L 31 29   BUN mg/dL 20 18   CREATININE mg/dL 0 92 0 79   CALCIUM mg/dL 10 5* 10 3*             Results from last 7 days   Lab Units 08/07/21  1057   POC GLUCOSE mg/dl 118       Review of Scheduled Meds:  Current Facility-Administered Medications   Medication Dose Route Frequency Provider Last Rate    acetaminophen  650 mg Oral Q6H PRN Aashish Linda MD      apixaban  10 mg Oral BID Darryn Preston DO      [START ON 8/14/2021] apixaban  5 mg Oral BID Darryn Preston DO      atorvastatin  40 mg Oral Daily With [employer] Tanna De La Fuente DO      bisacodyl  10 mg Rectal Daily PRN Aashish Linda MD      melatonin  3 mg Oral HS Aashish Linda MD      ondansetron  4 mg Oral Q8H PRN Aashish Linda MD      polyethylene glycol  17 g Oral Daily PRN Aashish Linda MD      senna-docusate sodium  2 tablet Oral BID Darryn Preston DO         Subjective/ HPI: Patient seen and examined  Patients overnight issues or events were reviewed with nursing or staff during rounds or morning huddle session  New or overnight issues include the following:     No new or overnight issues  Offers no complaints    ROS:   A 10 point ROS was performed; negative except as noted above         Imaging:     No orders to display       *Labs /Radiology studies reviewed  *Medications reviewed and reconciled as needed  *Please refer to order section for additional ordered labs studies  *Case discussed with primary attending during morning huddle case rounds      Physical Examination:  Vitals:   Vitals:    08/12/21 0542 08/12/21 1523 08/13/21 0025 08/13/21 0555   BP: 139/74 138/72 129/66 134/76   BP Location: Left arm Left arm Left arm Left arm   Pulse: 66 70 74 68   Resp: 18 18 18 18   Temp: 98 7 °F (37 1 °C) 98 8 °F (37 1 °C) 98 6 °F (37 °C) 98 8 °F (37 1 °C)   TempSrc: Oral Oral Oral Axillary   SpO2: 92% 93% 91% 94%   Weight:       Height:           General Appearance: no distress, conversive  HEENT: PERRLA, conjuctiva normal; oropharynx clear; mucous membranes moist   Neck:  Supple, normal ROM, no JVD  Lungs: CTA, normal respiratory effort, no retractions, expiratory effort normal  CV: regular rate and rhythm; no rubs/murmurs/gallops, PMI normal ABD: soft; ND/NT; +BS  : cadet with some brown sediment but no bright red blood today  EXT: no edema  Skin: normal turgor, normal texture, no rashes  Psych: affect normal, mood normal  Neuro: AA      The above physical exam was reviewed and updated as determined by my evaluation of the patient on 8/13/2021  Invasive Devices     Drain            Urethral Catheter 16 Fr  6 days                   VTE Pharmacologic Prophylaxis: Eliquis  Code Status: Level 1 - Full Code  Current Length of Stay: 5 day(s)      Total time spent:  30 minutes with more than 50% spent counseling/coordinating care  Counseling includes discussion with patient re: progress  and discussion with patient of his/her current medical state/information  Coordination of patient's care was performed in conjunction with primary service  Time invested included review of patient's labs, vitals, and management of their comorbidities with continued monitoring  In addition, this patient was discussed with medical team including physician and advanced extenders  The care of the patient was extensively discussed and appropriate treatment plan was formulated unique for this patient  ** Please Note:  voice to text software may have been used in the creation of this document   Although proof errors in transcription or interpretation are a potential of such software**

## 2021-08-13 NOTE — PROGRESS NOTES
08/13/21 1230   Pain Assessment   Pain Assessment Tool Pain Assessment not indicated - pt denies pain   Pain Score No Pain   Restrictions/Precautions   Precautions 1:1;Bed/chair alarms;Cognitive; Fall Risk; [place]; Impulsive;Multiple lines;O2;Supervision on toilet/commode   Weight Bearing Restrictions No   ROM Restrictions No   Exercise Tools   Exercise Tools Yes   Other Exercise Tool 1 Seated w/Sup, [id number] all planes alternating UE TherEx using 2# free weight, for BUE strengthening and endurance work, for increased participation in fxl transfers/STS  Pt tolerated well, with repeated verbal directions and frequent vc's for technique and positioning  Rest breaks required between sets to manage fatigue  Neuromuscular Education   Weight Bearing Technique Yes   RUE Weight Bearing Extended arm seated   Response to Weight Bearing Technique Seated with Sup, [id number] each fwd flexion and clockwise circles while WBing through B/L hands (L over R to stabilize R) into large red physioball on floor ahead  Focus was on increasing proprioceptive input via [address] through RUE for neuro re-ed  Pt tolerated with min vc's for technique and occasional rest breaks to stop and reposition hands, as R hand tended to slide with circular movements  Cognition   Overall Cognitive Status Impaired   Arousal/Participation Alert; Cooperative   Attention Attends with cues to redirect   Orientation Level Oriented to person;Oriented to place   Memory Decreased short term memory;Decreased recall of precautions;Decreased recall of recent events   Following Commands Follows one step commands inconsistently   Activity Tolerance   Activity Tolerance Patient tolerated treatment well   Assessment   Treatment Assessment Pt seen for 60min skilled OT session focused on family training with pt's wife, UE TherEx, and RUE neuro re-ed, for increased independence w/ADLs/IADLs and decreased caregiver burden  See detailed descriptions of fxl performance below   Pt REFILLED   PER  PROTOCOL tolerated session well, family training to continue t/o weekend, including full ADL (sponge bath) Sunday with family  Pt continues to demonstrate difficulty accurately answering orientation questions, and continues to require 1:1 2* impulsivity and decreased safety awareness  Pt would benefit from continued skilled OT focused on functional mobility/transfers using RW, standing balance/tolerance, cognitive retraining focused on pt's orientation to current situation  OT Family training done with: pt's wife   Assessment of family training Pt and pt's wife present for family training, including the following  Cadet training: therapist demonstrated how to empty cadet bag with pt's wife repeating steps back to therapist and would benefit from completing this herself in future session  Cognition: educated pt's wife on pt's cognitive deficits, including difficulty orienting self, with therapist asking pt orientation questions, pt responded that month was February, year 2020, day Friday (correct)  Educated wife on pt demonstrating increased disorientation at overnight hours  Provided wife with handouts recommending bed/chair alarm, video baby monitor, for increased safety especially overnight  Wife agreeable  Self care: pt and wife declined to complete toileting/toilet transfer during OT training session as pt's wife states pt just completed this in PT family training just prior  Pt's wife reported she assisted pt hands-on with toilet transfer and hygiene/CM and reports was able to manage O2 and Cadet lines well  Pt's wife able to describe toileting transfer/process step by step verbally correctly  Practice physically in future OT family training session  Discussed plan for sponge bathing currently, and that sponge bath will occur as part of FT on Sunday (full ADL)  Pt's wife reported they already have [facility] at home, so [facility] followed up with KATY Thayer who cancelled [facility] order   Wife and pt agreeable to using BSC at night for increased safety w/toileting, and decreased fall risk  Recommended continued home therapy services initially upon d/c  Prognosis Fair   Problem List Decreased strength;Decreased endurance; Impaired balance;Decreased mobility; Decreased coordination;Decreased cognition; Impaired judgement;Decreased safety awareness; Impaired tone   Barriers to Discharge Inaccessible home environment   Plan   Treatment/Interventions ADL retraining;Functional transfer training; Therapeutic exercise; Endurance training;Cognitive reorientation;Patient/family training;Equipment eval/education; Bed mobility; Compensatory technique education   Progress Progressing toward goals   Recommendation   OT Discharge Recommendation   (pending)   OT Therapy Minutes   OT Time In 1230   OT Time Out 1330   OT Total Time (minutes) 60   OT Mode of treatment - Individual (minutes) 60   OT Mode of treatment - Concurrent (minutes) 0   OT Mode of treatment - Group (minutes) 0   OT Mode of treatment - Co-treat (minutes) 0   OT Mode of Treatment - Total time(minutes) 60 minutes   OT Cumulative Minutes 300   Therapy Time missed   Time missed?  No

## 2021-08-13 NOTE — PROGRESS NOTES
08/13/21 1130   Pain Assessment   Pain Assessment Tool Pain Assessment not indicated - pt denies pain   Pain Score No Pain   Restrictions/Precautions   Precautions 1:1;Bed/chair alarms;Aspiration;Cognitive; Fall Risk; [place]; Impulsive;O2;Multiple lines;Visual deficit;Supervision on toilet/commode   Weight Bearing Restrictions No   ROM Restrictions No   Comprehension   Comprehension (FIM) 4 - Understands basic info/conversation 75-90% of time   Expression   Expression (FIM) 4 - Expresses self with assist from staff to occlude trach   Social Interaction   Social Interaction (FIM) 5 - Interacts appropriately with others 90% of time   Problem Solving   Problem solving (FIM) 3 - Solves basic problmes 50-74% of time   Memory   Memory (FIM) 3 - Recognizes, recalls/performs 50-74%   Speech/Language/Cognition Assessmetn   Treatment Assessment Pt seen for skilled speech therapy session targeting cognitive linguistic communication skills  Pt alert and interactive- completed skilled tasks following lunch meal  Wife present for session as well to observe how he does  Pt completed the following- problem solving picture cards- pt was able to ID the problem in the picture with 6/8acc and then describe how he would fix the problem with 5/8acc  Pt needed verbal cues and further context to situation- suspect visual deficit too as well as he was having difficulty identifying some simple items in the picture (microwave, fork)  Pt next completed simple 4 step sequencing task- provided with 4 written steps, pt was instructed to read through and put them in the correct order  Pt needed direct assistance with task as he would perseverate on the first statement and needed assistance to read through ALL of the statements before putting in order  In attempted to help with processing and give context, asked pt to try to name the task being completed (e g , gardening, driving a car, doing laundry) to allow him better processing of task   However this was too difficult as well and pt needed mod to max cues to ID  Pt was able to successfully sequence tasks with 12/24acc  Pt also noted to have increased difficulty with chronological time and ID which holidays occurred in which months  At end of task, engaged wife in discussion regarding pt's progress as well as current cognitive deficits  Expressed pt conts with MODERATE cognitive deficits with simple tasks and decreased memory, problem solving, orientation, recall, sequencing, and planning as well as decreased insight to overall deficits  Pt and wife also would benefit from further stroke education  Discussed how at this time, it is recommended that pt have 24hr FULL supervision at home for his safety  Pt with need assistance with completion of medication management skills as well  Wife appeared in agreement, all questions answered as able  Encouraged wife to cont to observe sessions this weekend prior to possible discharge home early next week  Pt overall is improving with skilled SLP services and will cont to benefit to maximize overall cognitive linguistic communication abilities at this time  Swallow Information   Current Risks for Dysphagia & Aspiration Respiratory compromise;Rapid respiratory rate;Weak cough; Dysarthria;General debilitation;New Neuro event;Brain injury;Cognitive deficit; Mental status change;Reduced alertnes; Positionong Issues   Current Diet Dysphagia advance; Thin liquid   Baseline Diet Regular; Thin liquids   Consistencies Assessed and Performance   Materials Admnistered Regular/Solid; Thin liquid   Oral Stage Mild impaired   Phargngeal Stage Mild impaired;Aspiration risk   Swallow Mechanics Mild delayed;Swallow initation;Good Larygneal rise;Aspiration risk   Esophageal Concerns No s/s reported   Recommendations   Risk for Aspiration Present   Recommendations Dysphagia treatment   Diet Solid Recommendation Regular consistency   Diet Liquid Recommendation Thin liquid   Recommended Form of Meds As desired; As tolerated   General Precautions Aspiration precautions; Feed only when alert;Minimize distractions;Upright as possible for all oral intake;Remain upright for 45 mins after meals  (Distant supervision, FULLY upright for ALL meals)   Compensatory Swallowing Strategies Alternate solids and liquids; External pacing;Effortful swallow;Cue for lingual sweep;Voluntary throat clear/cough to clear penetration   Results Reviewed with PT/Family/Caregiver;RN;MD   Eating   Type of Assistance Needed Set-up / clean-up;Supervision;Verbal cues   Physical Assistance Level No physical assistance   Eating CARE Score 4   Swallow Assessment   Swallow Treatment Assessment Pt seen for skilled dysphagia therapy session with lunch  Pt seated OOB upright in chair in room, 2L NC present  Pt agreeable to meal- items assessed included regular diet trial items- figueroa pasta, mixed cooked veggies and pudding with thin liquids by cup  Pt required some set up assistance with tray but no physical assistance self feeding  Meal completion 75% and 300cc liquids  Wife present for portion of meal before leaving to get herself lunch and returning for portion of cognitive session to discuss pt progress  Discussed plan for upgraded post this session- reference pink sheet for swallow precautions/strategies  Pt ate food items c good bolus retrieval from utensil  Oral processing slow and grossly functional with mildly delayed transfers and swallows  Pt provided verbal cues for alternation of food and liquids- drank thin liquids by cup c good bolus retrieval, oral control, functional transfers and swallows  Oral clearance appropriate throughout meal- trace retention between swallow which cleared by end of meal  Pt benefitted from cues to slow rate and take one bite at a time while clearing food between bites  Pt also benefitted also from extended time during meal to complete   Pt appears to be tolerating regular diet textures and thin liquids therefore recommend UPGRADE to REGULAR diet with cont'd thin liquids, continuing Aspiration Precautions, set up with tray and distant supervision, FULLY upright w/ bed in chair position or OOB w/ meals, small bites/sips, alternate solids/liquids, encourage PO intake  Pt is recommended for continued skilled ST services for dysphagia therapy to follow up post upgrade x1 to maximize swallow function, to increase independent use of safe swallow strategies and to safely support PO intake while determining safest/least restrictive diet at this time  Swallow Assessment Prognosis   Prognosis Good   Prognosis Considerations Age; Family/community support; Co-morbidities; Potential;Previous level of function;New learning ability;Ability to carry over; Cooperation   SLP Therapy Minutes   SLP Time In 1130   SLP Time Out 1230   SLP Total Time (minutes) 60   SLP Mode of treatment - Individual (minutes) 60   SLP Mode of treatment - Concurrent (minutes) 0   SLP Mode of treatment - Group (minutes) 0   SLP Mode of treatment - Co-treat (minutes) 0   SLP Mode of Treatment - Total time(minutes) 60 minutes   SLP Cumulative Minutes 300   Therapy Time missed   Time missed?  No

## 2021-08-13 NOTE — PROGRESS NOTES
08/13/21 0930   Pain Assessment   Pain Assessment Tool Pain Assessment not indicated - pt denies pain   Restrictions/Precautions   Precautions 1:1;Bed/chair alarms; Fall Risk; [place]; Impulsive;O2;Multiple lines;Supervision on toilet/commode   Weight Bearing Restrictions No   ROM Restrictions No   Cognition   Overall Cognitive Status Impaired   Arousal/Participation Alert; Cooperative   Attention Attends with cues to redirect   Orientation Level Oriented to person   Memory Decreased short term memory;Decreased recall of recent events;Decreased recall of precautions   Following Commands Follows one step commands inconsistently   Sit to Lying   Type of Assistance Needed Verbal cues; Adaptive equipment;Supervision   Physical Assistance Level No physical assistance   Comment CS with use of grab bar, Pt's wife providing S  Sit to Lying CARE Score 4   Lying to Sitting on Side of Bed   Type of Assistance Needed Verbal cues; Adaptive equipment;Supervision   Physical Assistance Level No physical assistance   Comment CS with use of grab bar, Pt's wife providing S    Lying to Sitting on Side of Bed CARE Score 4   Sit to Stand   Type of Assistance Needed Physical assistance;Verbal cues; Adaptive equipment   Physical Assistance Level 25% or less   Comment VC's for hand placement and safety provided by pt's wife appropriately  Sit to Stand CARE Score 3   Bed-Chair Transfer   Type of Assistance Needed Physical assistance;Verbal cues; Adaptive equipment   Physical Assistance Level 25% or less   Comment A provided by wife with use of RW, VC's for hand placment   Chair/Bed-to-Chair Transfer CARE Score 3   Transfer Bed/Chair/Wheelchair   Adaptive Equipment Roller Walker   Car Transfer   Type of Assistance Needed Physical assistance;Verbal cues; Adaptive equipment   Physical Assistance Level 26%-50%   Comment BRITTNEY with RW and VC's provided by wife   Car Transfer CARE Score 3   Walk 10 Feet   Type of Assistance Needed Physical assistance;Verbal cues; Adaptive equipment   Physical Assistance Level 26%-50%   Comment MIN/MODA with use of RW provided by wife, PTA assisted with o2 tank when needed  Walk 10 Feet CARE Score 3   Ambulation   Does the patient walk? 2  Yes   Primary Mode of Locomotion Prior to Admission Walk   Distance Walked (feet) 15 ft  (x4)   Assist Device Roller Walker   Gait Pattern Ataxic; Inconsistant Ama; Slow Ama;Decreased foot clearance;R foot drag;Lateral deviation;Narrow ALEX; Improper weight shift   Limitations Noted In Balance;Device Management; Heel Strike;Midline Orientation; Safety;Speed;Strength   Provided Assistance with: Balance;Weight Shift   Walk Assist Level Minimum Assist   Findings Vc's for hand placement and R attention  Pt;s wife providing A with o2 line and RW management when required  Wheel 50 Feet with Two Turns   Reason if not Attempted Activity not applicable   Wheel 50 Feet with Two Turns CARE Score 9   Wheel 150 Feet   Reason if not Attempted Activity not applicable   Wheel 895 Feet CARE Score 9   Wheelchair mobility   Does the patient use a wheelchair? 0  No   Curb or Single Stair   Style negotiated Curb   Type of Assistance Needed Physical assistance;Verbal cues; Adaptive equipment   Physical Assistance Level 26%-50%   Comment MIN/MODA on 6inch  step x2 reps with MOD VC's  Pt's wife providing A and VC's when needed, SBA by PTA   1 Step (Curb) CARE Score 3   Stairs   Type Curb   # of Steps 2   Weight Bearing Precautions Fall Risk   Assist Devices Roller Walker   Findings curb step to enter home with MIN/MODA and MOD VC's required  Pt's wife feels she could benefit from increased training with step  Picking Up Object   Reason if not Attempted Safety concerns   Picking Up Object CARE Score 88   Toilet Transfer   Type of Assistance Needed Physical assistance; Adaptive equipment;Verbal cues   Physical Assistance Level 26%-50%   Comment with RW and grab abrs, pt will have BSC      Toilet Transfer CARE Score 3   Assessment   Treatment Assessment Pt's wife present for hands on training with increased focus on HH dist amb with RW, functional transfers and bed mobility  Pt's wife was able to give appropriate VC's and A to pt with all transfers and gait with visual and verbal cues given first  Both pt and wife educated on safety precautions with o2 tubing and cadet bag with transfers and gait  Wife Pranay Pugh stated she would benefit from increased curb step management and cont management of 02 line with functional transfers and gait  Pt's wife will be the primary care giver at discharge and is anticipating discharge Tuesday 08/17/21  Pt will cont to benefit from increased functional transfers, increased gait, increased safety awareness and increased family training with pt's wife to decrease burden of care at home  Cont POC as tolerated  Family/Caregiver Present yes   PT Family training done with: wife, Pranay Pugh   Problem List Decreased strength;Decreased endurance; Impaired balance;Decreased mobility; Decreased coordination;Decreased cognition; Impaired judgement;Decreased safety awareness; Impaired tone   Barriers to Discharge Inaccessible home environment   PT Barriers   Functional Limitation Car transfers;Stair negotiation;Standing;Transfers; Walking   Plan   Treatment/Interventions Functional transfer training;LE strengthening/ROM; Therapeutic exercise; Endurance training;Patient/family training;Cognitive reorientation;Gait training   Progress Progressing toward goals   Recommendation   PT Discharge Recommendation Home with home health rehabilitation   Equipment Recommended Walker   PT Therapy Minutes   PT Time In 0930   PT Time Out 1100   PT Total Time (minutes) 90   PT Mode of treatment - Individual (minutes) 90   PT Mode of treatment - Concurrent (minutes) 0   PT Mode of treatment - Group (minutes) 0   PT Mode of treatment - Co-treat (minutes) 0   PT Mode of Treatment - Total time(minutes) 90 minutes   PT Cumulative Minutes 390   Therapy Time missed   Time missed?  No

## 2021-08-13 NOTE — PLAN OF CARE
Problem: PAIN - ADULT  Goal: Verbalizes/displays adequate comfort level or baseline comfort level  Description: Interventions:  - Encourage patient to monitor pain and request assistance  - Assess pain using appropriate pain scale  - Administer analgesics based on type and severity of pain and evaluate response  - Implement non-pharmacological measures as appropriate and evaluate response  - Consider cultural and social influences on pain and pain management  - Notify physician/advanced practitioner if interventions unsuccessful or patient reports new pain  Outcome: Progressing     Problem: INFECTION - ADULT  Goal: Absence or prevention of progression during hospitalization  Description: INTERVENTIONS:  - Assess and monitor for signs and symptoms of infection  - Monitor lab/diagnostic results  - Monitor all insertion sites, i e  indwelling lines, tubes, and drains  - Monitor endotracheal if appropriate and nasal secretions for changes in amount and color  - [place] appropriate cooling/warming therapies per order  - Administer medications as ordered  - Instruct and encourage patient and family to use good hand hygiene technique  - Identify and instruct in appropriate isolation precautions for identified infection/condition  Outcome: Progressing     Problem: SAFETY ADULT  Goal: Patient will remain free of falls  Description: INTERVENTIONS:  - Educate patient/family on patient safety including physical limitations  - Instruct patient to call for assistance with activity   - Consult OT/PT to assist with strengthening/mobility   - Keep Call bell within reach  - Keep bed low and locked with side rails adjusted as appropriate  - Keep care items and personal belongings within reach  - Initiate and maintain comfort rounds  - Make Fall Risk Sign visible to staff  - Offer Toileting every 2 Hours, in advance of need  - Maintain alarm  - Apply yellow socks and bracelet for high fall risk patients  - Consider moving patient to room near nurses station  Outcome: Progressing  Goal: Maintain or return to baseline ADL function  Description: INTERVENTIONS:  -  Assess patient's ability to carry out ADLs; assess patient's baseline for ADL function and identify physical deficits which impact ability to perform ADLs (bathing, care of mouth/teeth, toileting, grooming, dressing, etc )  - Assess/evaluate cause of self-care deficits   - Assess range of motion  - Assess patient's mobility; develop plan if impaired  - Assess patient's need for assistive devices and provide as appropriate  - Encourage maximum independence but intervene and supervise when necessary  - Involve family in performance of ADLs  - Assess for home care needs following discharge   - Consider OT consult to assist with ADL evaluation and planning for discharge  - Provide patient education as appropriate  Outcome: Progressing  Goal: Maintains/Returns to pre admission functional level  Description: INTERVENTIONS:  - Perform BMAT or MOVE assessment daily    - Set and communicate daily mobility goal to care team and patient/family/caregiver     - Collaborate with rehabilitation services on mobility goals if consulted  - Perform Range of Motion   - Reposition patient   - Dangle patient   - Stand patient  - Ambulate patient  - Out of bed to chair   - Out of bed for meals  - Out of bed for toileting  - Record patient progress and toleration of activity level   Outcome: Progressing     Problem: DISCHARGE PLANNING  Goal: Discharge to home or other facility with appropriate resources  Description: INTERVENTIONS:  - Identify barriers to discharge w/patient and caregiver  - Arrange for needed discharge resources and transportation as appropriate  - Identify discharge learning needs (meds, wound care, etc )  - Arrange for interpretive services to assist at discharge as needed  - Refer to Case Management Department for coordinating discharge planning if the patient needs post-hospital services based on physician/advanced practitioner order or complex needs related to functional status, cognitive ability, or social support system  Outcome: Progressing     Problem: NEUROSENSORY - ADULT  Goal: Achieves stable or improved neurological status  Description: INTERVENTIONS  - Monitor and report changes in neurological status  - Monitor vital signs such as temperature, blood pressure, glucose, and any other labs ordered   - Initiate measures to prevent increased intracranial pressure  - Monitor for seizure activity and implement precautions if appropriate      Outcome: Progressing  Goal: Achieves maximal functionality and self care  Description: INTERVENTIONS  - Monitor swallowing and airway patency with patient fatigue and changes in neurological status  - Encourage and assist patient to increase activity and self care     - Encourage visually impaired, hearing impaired and aphasic patients to use assistive/communication devices  Outcome: Progressing     Problem: RESPIRATORY - ADULT  Goal: Achieves optimal ventilation and oxygenation  Description: INTERVENTIONS:  - Assess for changes in respiratory status  - Assess for changes in mentation and behavior  - Position to facilitate oxygenation and minimize respiratory effort  - Oxygen administered by appropriate delivery if ordered  - Initiate smoking cessation education as indicated  - Encourage broncho-pulmonary hygiene including cough, deep breathe, Incentive Spirometry  - Assess the need for suctioning and aspirate as needed  - Assess and instruct to report SOB or any respiratory difficulty  - Respiratory Therapy support as indicated  Outcome: Progressing     Problem: GENITOURINARY - ADULT  Goal: Maintains or returns to baseline urinary function  Description: INTERVENTIONS:  - Assess urinary function  - Encourage oral fluids to ensure adequate hydration if ordered  - Administer IV fluids as ordered to ensure adequate hydration  - Administer ordered medications as needed  - Offer frequent toileting  - Follow urinary retention protocol if ordered  Outcome: Progressing  Goal: Absence of urinary retention  Description: INTERVENTIONS:  - Assess patients ability to void and empty bladder  - Monitor I/O  - Bladder scan as needed  - Discuss with physician/AP medications to alleviate retention as needed  - Discuss catheterization for long term situations as appropriate  Outcome: Progressing  Goal: Urinary catheter remains patent  Description: INTERVENTIONS:  - Assess patency of urinary catheter  - If patient has a chronic cadet, consider changing catheter if non-functioning  - Follow guidelines for intermittent irrigation of non-functioning urinary catheter  Outcome: Progressing     Problem: SKIN/TISSUE INTEGRITY - ADULT  Goal: Skin Integrity remains intact(Skin Breakdown Prevention)  Description: Assess:  -Perform [place] assessment  -Clean and moisturize skin   -Inspect skin when repositioning, toileting, and assisting with ADLS  -Assess under medical devices   -Assess extremities for adequate circulation and sensation     Bed Management:  -Have minimal linens on bed & keep smooth, unwrinkled  -Change linens as needed when moist or perspiring  -Avoid sitting or lying in one position     Toileting:  -Offer bedside commode  -Assess for incontinence   -Use incontinent care products after each incontinent episode    Activity:  -Mobilize patient   -Encourage activity and walks on unit  -Encourage or provide ROM exercises   -Turn and reposition patient   -Use appropriate equipment to lift or move patient in bed  -Instruct/ Assist with weight shifting when out of bed in chair  -Consider limitation of chair time     Skin Care:  -Avoid use of baby powder, tape, friction and shearing, hot water or constrictive clothing  -Relieve pressure over bony prominences  -Do not massage red bony areas    Next Steps:  -Teach patient strategies to minimize risks   -Consider consults to  interdisciplinary teams   Outcome: Progressing     Problem: MUSCULOSKELETAL - ADULT  Goal: Maintain or return mobility to safest level of function  Description: INTERVENTIONS:  - Assess patient's ability to carry out ADLs; assess patient's baseline for ADL function and identify physical deficits which impact ability to perform ADLs (bathing, care of mouth/teeth, toileting, grooming, dressing, etc )  - Assess/evaluate cause of self-care deficits   - Assess range of motion  - Assess patient's mobility  - Assess patient's need for assistive devices and provide as appropriate  - Encourage maximum independence but intervene and supervise when necessary  - Involve family in performance of ADLs  - Assess for home care needs following discharge   - Consider OT consult to assist with ADL evaluation and planning for discharge  - Provide patient education as appropriate  Outcome: Progressing  Goal: Maintain proper alignment of affected body part  Description: INTERVENTIONS:  - Support, maintain and protect limb and body alignment  - Provide patient/ family with appropriate education  Outcome: Progressing     Problem: Nutrition/Hydration-ADULT  Goal: Nutrient/Hydration intake appropriate for improving, restoring or maintaining nutritional needs  Description: Monitor and assess patient's nutrition/hydration status for malnutrition  Collaborate with interdisciplinary team and initiate plan and interventions as ordered  Monitor patient's weight and dietary intake as ordered or per policy  Utilize nutrition screening tool and intervene as necessary  Determine patient's food preferences and provide high-protein, high-caloric foods as appropriate       INTERVENTIONS:  - Monitor oral intake, urinary output, labs, and treatment plans  - Assess nutrition and hydration status and recommend course of action  - Evaluate amount of meals eaten  - Assist patient with eating if necessary   - Allow adequate time for meals  - Recommend/ encourage appropriate diets, oral nutritional supplements, and vitamin/mineral supplements  - Order, calculate, and assess calorie counts as needed  - Recommend, monitor, and adjust tube feedings and TPN/PPN based on assessed needs  - Assess need for intravenous fluids  - Provide specific nutrition/hydration education as appropriate  - Include patient/family/caregiver in decisions related to nutrition  Outcome: Progressing

## 2021-08-14 LAB
BASOPHILS # BLD AUTO: 0.09 THOUSANDS/ΜL (ref 0–0.1)
BASOPHILS NFR BLD AUTO: 1 % (ref 0–1)
EOSINOPHIL # BLD AUTO: 0.19 THOUSAND/ΜL (ref 0–0.61)
EOSINOPHIL NFR BLD AUTO: 2 % (ref 0–6)
ERYTHROCYTE [DISTWIDTH] IN BLOOD BY AUTOMATED COUNT: 16 % (ref 11.6–15.1)
HCT VFR BLD AUTO: 34.5 % (ref 36.5–49.3)
HGB BLD-MCNC: 10.3 G/DL (ref 12–17)
IMM GRANULOCYTES # BLD AUTO: 0.06 THOUSAND/UL (ref 0–0.2)
IMM GRANULOCYTES NFR BLD AUTO: 1 % (ref 0–2)
LYMPHOCYTES # BLD AUTO: 1.8 THOUSANDS/ΜL (ref 0.6–4.47)
LYMPHOCYTES NFR BLD AUTO: 17 % (ref 14–44)
MCH RBC QN AUTO: 25 PG (ref 26.8–34.3)
MCHC RBC AUTO-ENTMCNC: 29.9 G/DL (ref 31.4–37.4)
MCV RBC AUTO: 84 FL (ref 82–98)
MONOCYTES # BLD AUTO: 0.92 THOUSAND/ΜL (ref 0.17–1.22)
MONOCYTES NFR BLD AUTO: 9 % (ref 4–12)
NEUTROPHILS # BLD AUTO: 7.33 THOUSANDS/ΜL (ref 1.85–7.62)
NEUTS SEG NFR BLD AUTO: 70 % (ref 43–75)
NRBC BLD AUTO-RTO: 0 /100 WBCS
PLATELET # BLD AUTO: 319 THOUSANDS/UL (ref 149–390)
PMV BLD AUTO: 9.8 FL (ref 8.9–12.7)
RBC # BLD AUTO: 4.12 MILLION/UL (ref 3.88–5.62)
WBC # BLD AUTO: 10.39 THOUSAND/UL (ref 4.31–10.16)

## 2021-08-14 PROCEDURE — 99232 SBSQ HOSP IP/OBS MODERATE 35: CPT

## 2021-08-14 PROCEDURE — 85025 COMPLETE CBC W/AUTO DIFF WBC: CPT | Performed by: NURSE PRACTITIONER

## 2021-08-14 PROCEDURE — 92526 ORAL FUNCTION THERAPY: CPT

## 2021-08-14 PROCEDURE — 97129 THER IVNTJ 1ST 15 MIN: CPT

## 2021-08-14 PROCEDURE — 99232 SBSQ HOSP IP/OBS MODERATE 35: CPT | Performed by: NURSE PRACTITIONER

## 2021-08-14 PROCEDURE — 97535 SELF CARE MNGMENT TRAINING: CPT

## 2021-08-14 PROCEDURE — 97530 THERAPEUTIC ACTIVITIES: CPT

## 2021-08-14 PROCEDURE — 97110 THERAPEUTIC EXERCISES: CPT

## 2021-08-14 RX ADMIN — ATORVASTATIN CALCIUM 40 MG: 40 TABLET, FILM COATED ORAL at 17:27

## 2021-08-14 RX ADMIN — APIXABAN 10 MG: 5 TABLET, FILM COATED ORAL at 17:27

## 2021-08-14 RX ADMIN — MELATONIN TAB 3 MG 3 MG: 3 TAB at 21:12

## 2021-08-14 RX ADMIN — DOCUSATE SODIUM AND SENNOSIDES 2 TABLET: 8.6; 5 TABLET ORAL at 09:14

## 2021-08-14 RX ADMIN — APIXABAN 10 MG: 5 TABLET, FILM COATED ORAL at 09:14

## 2021-08-14 RX ADMIN — DOCUSATE SODIUM AND SENNOSIDES 2 TABLET: 8.6; 5 TABLET ORAL at 17:27

## 2021-08-14 NOTE — PROGRESS NOTES
Internal Medicine Progress Note  Patient: French Stephen  Age/sex: 72 y o  male  Medical Record #: [id number]      ASSESSMENT/PLAN: (Interval History)  French Stephen is seen and examined and management for following issues:    CVA  · Multiembolic due to hypercoagulable state from cancer  · Cont eliquis/atorvastatin     B/L DVT w/suspected PE  · Continue eliquis     Adenocarcinoma of RUL  · Seen by oncology  · Needs outpatient PET scan for staging     Acute resp failure/emphysema  · Presumed from PE, possibly lung mass  · Smoked 2+ppd and quit 15 yrs ago  · Still requires 2L NC  · Will wean O2 as able for sat 92% or greater  · At home, he could barely walk to the bathroom 2/2 CALDERON but has improved here possibly since he is wearing O2     Nasopharyngeal mass  · Needs biopsy as outpatient by ENT     Elevated troponin/depressed RVEF  · Needs cardiology f/u as outpatient for repeat ECHO  · Ef 55% +RV hypokinesis     Urine retention/hematuria  · Has cadet  · Had some punch colored urine on 8/9/21, 8/11 that resolved; reoccurred 8/12/21 after being OOB with therapy  · Hemoglobin stable today 8/14  · Uro saw 8/12:             Added Proscar for possible prostatic bleeding in setting of [facility]             Keep cadet in for now and they will decide when to remove        Discharge date:  Reteam    The above assessment and plan was reviewed and updated as determined by my evaluation of the patient on 8/14/2021      Labs:   Results from last 7 days   Lab Units 08/14/21  0605 08/12/21  0539   WBC Thousand/uL 10 39* 10 44*   HEMOGLOBIN g/dL 10 3* 10 2*   HEMATOCRIT % 34 5* 34 2*   PLATELETS Thousands/uL 319 337     Results from last 7 days   Lab Units 08/12/21  0539 08/09/21  0548   SODIUM mmol/L 138 141   POTASSIUM mmol/L 4 1 3 8   CHLORIDE mmol/L 103 107   CO2 mmol/L 31 29   BUN mg/dL 20 18   CREATININE mg/dL 0 92 0 79   CALCIUM mg/dL 10 5* 10 3*                   Review of Scheduled Meds:  Current Facility-Administered Medications Medication Dose Route Frequency Provider Last Rate    acetaminophen  650 mg Oral Q6H PRN Ailyn Polanco MD      apixaban  10 mg Oral BID Piedad Huerta DO      [START ON 8/15/2021] apixaban  5 mg Oral BID Ailyn Polanco MD      atorvastatin  40 mg Oral Daily With Troy Ortega DO      bisacodyl  10 mg Rectal Daily PRN Ailyn Polanco MD      melatonin  3 mg Oral HS Ailyn Polanco MD      ondansetron  4 mg Oral Q8H PRTOMÁS Polanco MD      polyethylene glycol  17 g Oral Daily PRTOMÁS Polanco MD      senna-docusate sodium  2 tablet Oral BID Piedad Huetra DO         Subjective/ HPI: Patient seen and examined  Patients overnight issues or events were reviewed with nursing or staff during rounds or morning huddle session  New or overnight issues include the following:     Agitated last night and kept trying to take off O2 at which point he would drop his O2 sat into 80's  Offers no complaints    ROS:   A 10 point ROS was performed; negative except as noted above         Imaging:     No orders to display       *Labs /Radiology studies reviewed  *Medications reviewed and reconciled as needed  *Please refer to order section for additional ordered labs studies  *Case discussed with primary attending during morning huddle case rounds      Physical Examination:  Vitals:   Vitals:    08/13/21 1443 08/14/21 0020 08/14/21 0555 08/14/21 1410   BP: 127/81 116/58 133/79 137/78   BP Location: Left arm Left arm Right arm    Pulse: 76 70 63 73   Resp: 18 18 18 18   Temp: 98 °F (36 7 °C) 98 3 °F (36 8 °C) 98 1 °F (36 7 °C) 98 5 °F (36 9 °C)   TempSrc: Oral Oral Oral Oral   SpO2: 94% 92% 91% 94%   Weight:       Height:           General Appearance: no distress, conversive  HEENT: PERRLA, conjuctiva normal; oropharynx clear; mucous membranes moist   Neck:  Supple, normal ROM, no JVD  Lungs: CTA, normal respiratory effort, no retractions, expiratory effort normal  CV: regular rate and rhythm; no rubs/murmurs/gallops, PMI normal   ABD: soft; ND/NT; +BS  : cadet with some old bloody sediment that is clearing  EXT: no edema  Skin: normal turgor, normal texture, no rashes  Psych: affect normal, mood normal  Neuro: AA; mildly confused but cooperative    The above physical exam was reviewed and updated as determined by my evaluation of the patient on 8/14/2021  Invasive Devices     Drain            Urethral Catheter 16 Fr  7 days                   VTE Pharmacologic Prophylaxis: Eliquis  Code Status: Level 1 - Full Code  Current Length of Stay: 6 day(s)      Total time spent:  30 minutes with more than 50% spent counseling/coordinating care  Counseling includes discussion with patient re: progress  and discussion with patient of his/her current medical state/information  Coordination of patient's care was performed in conjunction with primary service  Time invested included review of patient's labs, vitals, and management of their comorbidities with continued monitoring  In addition, this patient was discussed with medical team including physician and advanced extenders  The care of the patient was extensively discussed and appropriate treatment plan was formulated unique for this patient  ** Please Note:  voice to text software may have been used in the creation of this document   Although proof errors in transcription or interpretation are a potential of such software**

## 2021-08-14 NOTE — PROGRESS NOTES
08/14/21 0955   Pain Assessment   Pain Assessment Tool Pain Assessment not indicated - pt denies pain   Pain Score No Pain   Restrictions/Precautions   Precautions 1:1;Aspiration;Bed/chair alarms;Cognitive; Fall Risk;Impulsive; Cadet;O2;Supervision on toilet/commode   Weight Bearing Restrictions No   ROM Restrictions No   Sit to Lying   Type of Assistance Needed Supervision; Adaptive equipment;Verbal cues   Physical Assistance Level No physical assistance   Comment CS using grab bars, pt's wife providing Sup  Therapist provided vc's for technique  Pt's wife reports he will have hospital bed at home, they are currently ordering bed rails  Sit to Lying CARE Score 4   Lying to Sitting on Side of Bed   Type of Assistance Needed Supervision; Adaptive equipment;Verbal cues   Physical Assistance Level No physical assistance   Comment CS using grab bars, pt's wife and daughter providing Sup  Therapist provided vc's for hand placement on bed rail and provided pt's family with education regarding body mechanics for transitioning to seated EOB (push up through elbow, swing legs over EOB first, etc)   Lying to Sitting on Side of Bed CARE Score 4   Sit to Stand   Type of Assistance Needed Physical assistance;Verbal cues; Adaptive equipment   Physical Assistance Level 25% or less   Comment at RW, with A and vc's provided by wife and daughter, mainly for hand placement (pt demo P carryover) and O2/cadet line mgmt   Sit to Stand CARE Score 3   Bed-Chair Transfer   Type of Assistance Needed Physical assistance;Verbal cues; Adaptive equipment   Physical Assistance Level 26%-50%   Comment SPT and short distance fxl mob w/RW, Phi required  Assist and vc's provided by pt's wife and daughter, who managed O2/cadet lines well, and gave G vc's for hand positioning and body mechanics   Chair/Bed-to-Chair Transfer CARE Score 3   Toileting Hygiene   Type of Assistance Needed Physical assistance;Verbal cues; Adaptive equipment   Physical Assistance Level 51%-75%   Comment A for CM up/down over hips in stance with pt BUE support on RW and grab bar, pt unable to have BM but has demonstrated need for A with rear hygiene in recent sessions   Kuldip Conwaydex 83 Score 2   Toilet Transfer   Type of Assistance Needed Physical assistance;Verbal cues; Adaptive equipment   Physical Assistance Level 26%-50%   Comment Phi w/RW for fxl mob recliner<>toilet, A provided by daughter and wife who also managed O2 and cadet lines well  Vc's provided for hand placement, with pt using both RW and grab bar   Toilet Transfer CARE Score 3   Toilet Transfer   Surface Assessed Standard Toilet   Transfer Technique Standard   Limitations Noted In Balance; Endurance;Problem Solving;ROM;Safety; Sequencing;UE Strength;LE Strength   Adaptive Equipment Grab Bar;Walker   Positioning Concerns Grab Bars;Cognition; Safety   Exercise Tools   Exercise Tools Yes   Theraband Initiated UE strengthening HEP using yellow tband and providing pt and pt's wife/daughter with handouts showing visuals of each exercise so they can assist pt with direction following and technique at home 2* cog impairments  Recommended [id number] each UE for each exercise, initially 1x per day, grading up to 2-3 x per day as appropriate  Therapist educated/demonstrated elbow flexion and extension exercises, with pt demo F carryover of technique and requiriing frequent tactile cues/ [place] assist for technique  Finish educating pt on remainder of HEP packet in future OT session  Also (at pt's daughter's request) provided handout on putty exercises HEP (and yellow putty) for RUE FMC/FMS  If able in future sessions, educate pt and family on these exercises more before D/C  Cognition   Overall Cognitive Status Impaired   Arousal/Participation Alert; Cooperative   Attention Attends with cues to redirect   Orientation Level Oriented to person   Memory Decreased short term memory;Decreased recall of precautions   Following Commands Follows one step commands with increased time or repetition   Activity Tolerance   Activity Tolerance Patient tolerated treatment well   Medical Staff Made Aware Notified nsg RN Na Norton that 250cc urine was emptied from pt's cadet bag during OT session, Na Norton stated she would enter into pt's I/O record in epic   Assessment   Treatment Assessment Pt seen for 90min skilled OT session focused on family training with pt's wife and daughter, initiating BUE TherEx HEP, toileting, bed mobility, and toilet transfers, for increased independence w/ADLs/IADLs and decreased caregiver burden  See detailed descriptions of fxl performance above/below  Pt tolerated session well, family training to continue t/o weekend, including full ADL (sponge bath) tomorrow (Sunday) with family  Pt continues to demonstrate difficulty accurately answering orientation questions, and continues to require 1:1 2* impulsivity and decreased safety awareness  Pt would benefit from continued skilled OT focused on functional mobility/transfers using RW, standing balance/tolerance, cognitive retraining focused on pt's orientation to current situation  Vitals monitored during session, SPO2 99% on 4LO2 via NC, 68 bpm HR, 120/77 BP seated  OT Family training done with: pt's wife Rocio Chan and daughter, Nayan Root of family training OT family training continued today, with pt's wife and daughter present t/o session  Pt's wife hands-on practiced managing w/c brakes and leg rests (donning/doffing several times) with vc's provided for technique and increased time required to manage, with pt's wife demonstrating G carryover of education and improving with practice  Provided pt's wife and daughter education on Cadet bag mgmt including emptying cadet bag, as pt expected to D/C home with cadet bag  LONGORIA first provided demonstration, then pt's wife emptied cadet bag into measuring cup, and successfully manipulating closures   Continue to provide family opportunities for emptying cadet before D/C for continued practice/carryover  Pt's wife and daughter also hands-on practiced bed mobility, toileting (CM), and fxl mobility w/RW to/from toilet, w/pt, see above for details  Prognosis Fair   Problem List Decreased strength;Decreased endurance;Decreased mobility; Impaired balance;Decreased coordination;Decreased cognition; Impaired judgement;Decreased safety awareness; Impaired tone   Barriers to Discharge Inaccessible home environment   Plan   Treatment/Interventions ADL retraining;Functional transfer training; Therapeutic exercise; Endurance training;Cognitive reorientation;Patient/family training;Equipment eval/education; Bed mobility; Compensatory technique education;Spoke to nursing   Progress Progressing toward goals   Recommendation   OT Discharge Recommendation   (pending)   OT Therapy Minutes   OT Time In 0930   OT Time Out 1100   OT Total Time (minutes) 90   OT Mode of treatment - Individual (minutes) 90   OT Mode of treatment - Concurrent (minutes) 0   OT Mode of treatment - Group (minutes) 0   OT Mode of treatment - Co-treat (minutes) 0   OT Mode of Treatment - Total time(minutes) 90 minutes   OT Cumulative Minutes 390   Therapy Time missed   Time missed?  No

## 2021-08-14 NOTE — PROGRESS NOTES
08/14/21 1630   Pain Assessment   Pain Assessment Tool Pain Assessment not indicated - pt denies pain   Restrictions/Precautions   Precautions 1:1;Aspiration;Bed/chair alarms;Cognitive; Fall Risk;Impulsive;O2;Supervision on toilet/commode; [place]   Swallow Information   Current Risks for Dysphagia & Aspiration Respiratory compromise;Rapid respiratory rate;Weak cough; Dysarthria;General debilitation;New Neuro event;Brain injury;Cognitive deficit; Mental status change;Behavioral issues; Positionong Issues   Current Symptoms/Concerns Clear throat;During meals; Difficulty chewing;Decreased oral intake   Current Diet Regular; Thin liquid   Baseline Diet Regular; Thin liquids   Consistencies Assessed and Performance   Materials Admnistered Regular/Solid; Thin liquid   Oral Stage WFL   Phargngeal Stage WFL; Aspiration risk   Swallow Mechanics WFL;Swallow initation; Appears prompt;Good Larygneal rise;Aspiration risk   Esophageal Concerns No s/s reported   Recommendations   Diet Solid Recommendation Regular consistency   Diet Liquid Recommendation Thin liquid   Recommended Form of Meds As tolerated   General Precautions Aspiration precautions; Feed only when alert;Minimize distractions;Upright as possible for all oral intake;Remain upright for 45 mins after meals; Other (Comment)  (OOB for ALL meals, Distant supervision)   Compensatory Swallowing Strategies Alternate solids and liquids; External pacing;Effortful swallow;Cue for lingual sweep;Voluntary throat clear/cough to clear penetration   Results Reviewed with RN;PT/Family/Caregiver   Eating   Type of Assistance Needed Set-up / clean-up   Physical Assistance Level No physical assistance   Eating CARE Score 5   Swallow Assessment   Swallow Treatment Assessment Pt observed w/ dinner time meal for f/u dysphagia tx session  Pt had been recently advanced to regular w/ thin liquids   Wife initially present for meal, but went to get coffee once pt began meal  Pt able to setup tray by self and feed self, but consumed ~25% of meal tonight, but consumed 240cc of Ensure and 60cc of water  Pt was OOB in recliner for meal  When SLP providing encouragement to increase intake, pt politely declining stating that the rest was for [place]ough (pt's wife)  SLP did state to pt that likely she would encourage pt to consume more of his meal as well when she returned  Which she did and attempted to cue to increase intake but continued to decline  Pt's brother, Barrett Antunez, came in during meal to which he also brought pt food from home, which pt continued to decline him as well as his encouragement to consumed food items  However, of what pt did eat, lip seal around tsp, fork, cup and straw was noted to be [facility] along w/ functional bolus retrieval given consistencies  Mastication given solids (meatloaf, parmesan stuffed tomato) was noted to be full/functional w/o exhibiting oral residual/pocketing  Bolus manipulation given ice cream was prompt  Bolus control/transfer of thin liquids by cup/straw remains to be prompt across sips, both single sips as well as larger sips  Swallow initiation is noted to be fairly prompt across consistencies and hyolaryngeal excursion upon palpation across all items consumed was noted to be functional  No overt signs/sxs of aspiration noted throughout meal today  SLP providing f/u education to wife in regards to swallow strategies once home, being OOB and fully upright for meals, slower rate of intake, along w/ small/bites and sips, alternating solids/liquids, as well as providing encouragement for more intake, but to not push allowing rest breaks throughout meal  At this time, will continue to recommend regular diet w/ thin liquids  Continue continuing Aspiration Precautions, set up with tray and distant supervision, FULLY upright w/ bed in chair position or OOB w/ meals, small bites/sips, alternate solids/liquids, encourage PO intake   Currently pt is demonstrating tolerance of safest least restrictive diet w/o increase oropharyngeal or aspiration sxs  No further services warranted for dysphagia at this time, but reconsult if issues arise during meals  Swallow Assessment Prognosis   Prognosis Good   Prognosis Considerations Co-morbidities; Medical diagnosis; Medical prognosis; Potential;Previous level of function;Severity of impairments;New learning ability;Ability to carry over   SLP Therapy Minutes   SLP Time In 1630   SLP Time Out 1700   SLP Total Time (minutes) 30   SLP Mode of treatment - Individual (minutes) 30   SLP Mode of treatment - Concurrent (minutes) 0   SLP Mode of treatment - Group (minutes) 0   SLP Mode of treatment - Co-treat (minutes) 0   SLP Mode of Treatment - Total time(minutes) 30 minutes   SLP Cumulative Minutes 350   Therapy Time missed   Time missed?  No

## 2021-08-14 NOTE — ASSESSMENT & PLAN NOTE
- Function improving  - CG training completed   - Also c/b cardiopulmonary limitations, supplemental O2, cadet cath  -  PT, OT, ST, RN

## 2021-08-14 NOTE — PROGRESS NOTES
08/14/21 1430   Pain Assessment   Pain Assessment Tool Pain Assessment not indicated - pt denies pain   Restrictions/Precautions   Precautions 1:1;Aspiration;Bed/chair alarms;Cognitive; Fall Risk;Impulsive; [place];O2;Supervision on toilet/commode   Comprehension   Comprehension (FIM) 4 - Understands basic info/conversation 75-90% of time   Expression   Expression (FIM) 5 - Needs help/cues only RARELY (< 10% of the time)   Social Interaction   Social Interaction (FIM) 3 - Interacts 50-74% of time   Problem Solving   Problem solving (FIM) 3 - Solves basic problmes 50-74% of time   Memory   Memory (FIM) 2 - Recalls 1 of 2 steps   Speech/Language/Cognition Assessmetn   Treatment Assessment Pt OOB in recliner upon completing session  P's wife David Maher was present for session today, providing education in regards to session as it progressed today  SLP engaging pt in answering orientation questions to which pt was accurate in stating "[place] [id number]" for place, but needing binary choices for city  Remains not oriented to month, needing maximal cues to determine "August" but remains consistent is stating year as "2021 " SLP asking pt about prior therapies today, in which pt verbalized initially that "I didn't do any " However wife and SLP cueing pt about relocation to therapy gyms for prior OT/PT sessions, which pt did verbalize occurring but unable to provide any further insight to activities completed  Pt was agreeable to engage in verbal problem solving task, to which SLP provided pt w/ a problem to a situation and given solution to the problem  Pt was to identify whether the solution was good vs bad and provide support as to why  Pt's ability to identify whether the solution to the problem was good vs bad, where pt was 5/6 accurate, but 4/6 correct in verbalizing a solution to a bad situation, needing moderate probing questions to elicit appropriate solutions   During this task (as SLP had 4 more questions to go), pt noted to become more "figety" in looking for papers  Pt asking wife "where the papers are for the [place]?" SLP asking wife if he has been focusing on the car again (as this was noted on initial assessment w/ current SLP)  Wife did verbalize he does reference this quite often  She also reported that this is the kind of behaviors which occur when current SLP and wife had discussed in family meeting  Wife did state that she is noticing how when he does fatigue, his mentation does decrease  SLP attempting to re-orient pt, as he was insistent that he was "at home" but again, unable to redirect despite cues to look around room, etc  SLP providing pt w/ directive cues to place, time and when asking situation, pt stated "I don't want to have to go through this again " When SLP offered binary choices, pt was accurate in eliciting stroke  Despite attempts to have pt complete structured task pt continued to decline  SLP providing education to wife in regards if this occurs at home, allow optimal rest breaks to allow decompression and decrease possible agitation  Will f/u later today during dinner time meal to f/u and monitor pt's swallow function at dinner time meal  At this time, pt would continue to benefit from SLP services to maximize overall functional cognitive linguistic skills to decrease burden of care for family at time of discharge  SLP Therapy Minutes   SLP Time In 1430   SLP Time Out 1450   SLP Total Time (minutes) 20   SLP Mode of treatment - Individual (minutes) 20   SLP Mode of treatment - Concurrent (minutes) 0   SLP Mode of treatment - Group (minutes) 0   SLP Mode of treatment - Co-treat (minutes) 0   SLP Mode of Treatment - Total time(minutes) 20 minutes   SLP Cumulative Minutes 320   Therapy Time missed   Time missed?  No

## 2021-08-14 NOTE — PROGRESS NOTES
Called into room by PCA  Patient agitated and attempting to pull at cadet catheter  Patient states wants to go home and sitting at edge of bed  Patient noted to have oxygen off and o2 sat 87%, however, patient refusing to wear o2  With verbal redirection, patient agreeable to be repositioned and lay in bed again, does not want to sit in chair at this time  However, he continued to refuse oxygen  Two security personnel assisted nursing staff with patient and were able to  re-apply patient's oxygen  Within few minutes, patient no longer agitated, became calm  Patient o2 sat 91%  Patient now cooperative and calm  Patient now falling back to sleep  Will continue to monitor patient

## 2021-08-14 NOTE — PROGRESS NOTES
08/14/21 1300   Pain Assessment   Pain Assessment Tool Pain Assessment not indicated - pt denies pain   Restrictions/Precautions   Precautions 1:1;Aspiration;Bed/chair alarms;Cognitive; Fall Risk; [place]; Impulsive;O2;Multiple lines;Supervision on toilet/commode   Weight Bearing Restrictions No   ROM Restrictions No   Cognition   Overall Cognitive Status Impaired   Arousal/Participation Alert; Cooperative   Attention Attends with cues to redirect   Orientation Level Oriented to person   Memory Decreased short term memory;Decreased recall of precautions   Following Commands Follows one step commands with increased time or repetition   Sit to Stand   Type of Assistance Needed Physical assistance;Verbal cues; Adaptive equipment   Physical Assistance Level 25% or less   Comment A and VC's provided by pt's wife and daughter   Sit to Stand CARE Score 3   Bed-Chair Transfer   Type of Assistance Needed Physical assistance;Verbal cues; Adaptive equipment   Physical Assistance Level 26%-50%   Comment A and VC's provided by pt's wife and daughter   Chair/Bed-to-Chair Transfer CARE Score 3   Transfer Bed/Chair/Wheelchair   Adaptive Equipment Roller Walker   Car Transfer   Type of Assistance Needed Physical assistance;Verbal cues; Adaptive equipment   Physical Assistance Level 26%-50%   Comment BRITTNEY with RW and VC's provided by wife/daughter   Car Transfer CARE Score 3   Walk 10 Feet   Type of Assistance Needed Physical assistance;Verbal cues; Adaptive equipment   Physical Assistance Level 26%-50%   Comment MIN/MODA with use of RW provided by wife and daughter, PTA assisted with o2 tank when needed  Walk 10 Feet CARE Score 3   Walking 10 Feet on Uneven Surfaces   Reason if not Attempted Safety concerns   Walking 10 Feet on Uneven Surfaces CARE Score 88   Ambulation   Does the patient walk? 2   Yes   Primary Mode of Locomotion Prior to Admission Walk   Distance Walked (feet) 25 ft  (15 x3)   Assist Device Roller Walker   Gait Pattern Ataxic; Inconsistant Ama; Slow Ama;Decreased foot clearance;Narrow ALEX;Shuffle;Improper weight shift   Limitations Noted In Balance;Device Management; Safety;Speed;Strength;Swing   Provided Assistance with: Balance;Weight Shift   Walk Assist Level Minimum Assist   Findings Vc's for hand placement and R attention  Pt's wife and daughter providing A with o2 line and RW management when required  Wheel 50 Feet with Two Turns   Reason if not Attempted Activity not applicable   Wheel 50 Feet with Two Turns CARE Score 9   Wheel 150 Feet   Reason if not Attempted Activity not applicable   Wheel 719 Feet CARE Score 9   Wheelchair mobility   Does the patient use a wheelchair? 0  No   Curb or Single Stair   Style negotiated Curb   Type of Assistance Needed Physical assistance;Verbal cues; Adaptive equipment   Physical Assistance Level Total assistance   Comment MIN/MODA on 6 inch  step x2 reps with MOD VC's  Pt's wife in front helping with RW management and daughter behind pt to A with balance as doorway at home would be too small to stand next to pt     1 Step (Curb) CARE Score 1   Stairs   Type Curb   # of Steps 2   Weight Bearing Precautions Fall Risk   Assist Devices Roller Walker   Picking Up Object   Reason if not Attempted Safety concerns   Picking Up Object CARE Score 88   Toilet Transfer   Type of Assistance Needed Physical assistance;Verbal cues; Adaptive equipment   Physical Assistance Level 26%-50%   Comment daughter and wife present A with pt and providing VC's for sequencing  Toilet Transfer CARE Score 3   Other Comments   Comments Pt remained on 3L during session via NC with 02 saturation WNL      Assessment   Treatment Assessment Pt wife, Cynthia Castro and daughter, Vinh Sarmiento present for family training that consisted of hands on with functional transfers from [facility], recliner and toilet, short dist amb with use of RW, car transfers and step negotiation with use of RW  Pt's family was able to provide A and VC's appropriately throughout session with x1 VC's to maintain nest to pt with all transfers to allow for hands on at all times  Family educated when in tight areas to remain in front of pt with hands on gait belt to A with RW when needed  If pt is having a better day he can manage RW and they can just worry about maintaining hands on for balance  They both would benefit from cont practise with o2 line management and stair management to increase safety awareness prior to discharge  Cont POC as tolerated  Family/Caregiver Present yes   PT Family training done with: Wife, Abbey Bueno and Daughter, Clive Gutierrez   Problem List Decreased strength;Decreased endurance; Impaired balance;Decreased mobility; Decreased coordination;Decreased cognition; Impaired judgement;Decreased safety awareness; Impaired tone   Barriers to Discharge Inaccessible home environment   PT Barriers   Functional Limitation Car transfers;Transfers;Stair negotiation; Walking   Plan   Treatment/Interventions Functional transfer training;LE strengthening/ROM; Therapeutic exercise; Endurance training;Cognitive reorientation;Patient/family training;Gait training   Progress Progressing toward goals   Recommendation   PT Discharge Recommendation Home with home health rehabilitation   Equipment Recommended Walker   PT Therapy Minutes   PT Time In 1300   PT Time Out 1400   PT Total Time (minutes) 60   PT Mode of treatment - Individual (minutes) 60   PT Mode of treatment - Concurrent (minutes) 0   PT Mode of treatment - Group (minutes) 0   PT Mode of treatment - Co-treat (minutes) 0   PT Mode of Treatment - Total time(minutes) 60 minutes   PT Cumulative Minutes 450   Therapy Time missed   Time missed?  No

## 2021-08-14 NOTE — PROGRESS NOTES
Physical Medicine and Rehabilitation Progress Note  Cely Chew 72 y o  male MRN: [id number]  Unit/Bed#: -01 Encounter: [id number]      Chief Complaints:  Brain injury rehab follow-up      Interval Events/Subjective:     Urology eval yesterday and rec manual irrigation and proscar while maintaining cadet  Patient denies new complaints including pain, worsening strength, lightheadedness, etc     CG training initiated  ROS: A 10 point ROS was performed; negative except as noted above  Functional Update:  Sig assist - bathing, LB dressing, To hygien  UB dressing min assist  Transfers min-mod assist   Ambulation 150 ft mod assist  Problem solving, memory mod assist  Comprehension, expression supervision    Assessment & Plan:     Impaired mobility and ADLs  Assessment & Plan  Family would like to bring patient home as soon as possible although he continues to have significant functional deficits and is 2 person assist in several domains and continues with significant brain injury  - Also c/b cardiopulmonary limitations, supplemental O2, cadet cath  - We will work to expedite family training  - Begin caregiver training and continue pt/family education on BI and overall functional limitations  - Continue PT, OT, ST    Mass of upper lobe of right lung  Assessment & Plan  Lung Adenocarcinoma   - Found to have a 6 4x3  6x3 4cm solid RUL pulmonary nodule IR performed percutaneous biopsy   - Seen by oncology on acute, Dr Tr Chester  "clinical staging seems to be compatible with at least stage III A  However, the CT scan was done without IV contrast   The patient was told that a PET-CT scan as an outpatient may give us a better idea about the exact staging  If he continues to have stage IIIA then concurrent chemoradiation followed by maintenance immunotherapy should be entertained if his performance status allows    The patient lives close to [facility] where he can be treated in the Oncology Clinic/infusion center  "  - Follow-up with OP oncology   - Follow up with OP pulmonary (approximately 2 weeks if d/c)    Impaired cognition  Assessment & Plan  -8/13 stable wakefulness and neuro exam; not quite as oriented but overall stable cognition    -8/12 stable wakefulness and interaction   -8/10-11 Improved wakefulness/interaction   -Recommend MOCA Monday   -Acute comprehensive interdisciplinary inpatient rehabilitation to include intensive skilled therapies (PT, OT, ST) as outlined with oversight and management by rehabilitation physician  Continue inpatient rehab level nursing, case management and weekly interdisciplinary team meetings  Provide family teaching regarding brain injury  -Monitor neuro-exam, wakefulness, mood, cognition, insight into deficits and safety awareness  -Patient/family/caregiver education and training   -Overstimulation precautions, frequent re-orientation, re-direction, re-assurance  -Sleep log and agitation monitoring  -Optimize sleep-wake cycle > slept well overnight   -Limit sedating medications when possible  -Ensure optimal management electrolytes, nutrition, and hydration  -Monitor for signs or symptoms of infection, medication intolerances, other systemic etiologies  -Provide 1:1 > pt trying to get out of bed repeatedly and is high fall risk  - Transient paranoid ideations 8/10 pm > wife reports some escalation even at home that started a few weeks prior > not currently > may be sundowing phenomenon   -Fall precautions with frequent rounding; proactive toileting program, patient should not be unattended in bathroom     -Current psychotropics and potentially sedating medications: Melatonin      Nasopharyngeal mass  Assessment & Plan  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  Both benign and malignant etiologies are in the differential diagnosis    ENT saw patient and visualized mass flexible laryngoscopy  - Smooth soft tissue mass in the right nasopharynx that appeared to be occluding the right eustachian tube  - OP ENT follow-up unless condition warrants sooner     * Acute CVA (cerebrovascular accident) [facility])  Assessment & Plan  -8/13 stable wakefulness and neuro exam; not quite as oriented but overall stable cognition    -8/12-stable wakefulness/interaction and neuro exam  -8/10-11 Improved wakefulness/interaction; stable neuro exam   · CT head revealed recent acute to subacute infarct located in the left caudate head, anterior lentiform nucleus, anterior limb of the left internal capsule     · MRI brain w/wo contrast revealed multifocal diffusion abnormalities in several separate vascular territories, largest in the left MCA territory indicative of multifocal acute/recent infarctions most likely from a central embolic source  · Neurology followed in acute care and will need follow up as outpatient  · [place] to be embolic and started on Eliquis 10mg BID through 8/14 then 5mg BID > does not have insurance > CM to assist  · Per Neuro, continue statin, no need for aspirin based on etiology  · Normotension goals    Acute respiratory failure with hypoxia ([address] )  Assessment & Plan  Remains largely On 2-3 via NC  Goal per Pulm is to maintain 88%+ O2 sat, wean as able to  Likely multifactorial but could be large part related to PE, also has moderate emphysema/COPD, and lung mass  - Supplemental O2  - Antithrombotics  - Mgmt of COPD per IM  - OP follow-up with pulm/oncology     Suspected pulmonary embolism  Assessment & Plan  · In setting of hypoxemic respiratory failure, newly found malignancy, hypercoabuable state and confirmed bilateral DVTs, likely pulmonary embolism  · Could not be confirmed due to contrast that had previously been used earlier that day for the CT head and neck  · Continue Eliquis  · Supplemental O2, wean as able to maintain 88%+  · OP pulm follow-up     Acute deep vein thrombosis (DVT) of distal vein of both lower extremities ([address] )  Assessment & Plan  Thought to be related to hypercoagulability secondary to malignancy  Continue Eliquis 10mg BID through 8/14 and then 5mg BID    RIGHT LOWER LIMB:  Acute deep vein thrombosis is noted in the posterior tibial and peroneal veins  LEFT LOWER LIMB:  Acute deep vein thrombosis is noted in the peroneal veins  Acute superficial thrombophlebitis is noted in the great saphenous vein from  the mid thigh to the mid calf  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 6 90 > 7 54 > 10 80  ? Trended by SLIM to [place] at 10 8, next Tropnin was 10 3 and discontinued  · Cardio consulted  ? Consider CT angiogram once patient more clinically stable  · On Eliquis    ECHO:  · Left ventricular EF was estimated to be 55 %  Wall thickness was mildly increased  Grade 1 diastolic dysfunction  · The right ventricle was mildly to moderately dilated  Systolic function was mildly reduced  There is hypokinesis of the mid free wall  · The right atrium was mildly dilated  · A small pericardial effusion was identified    · OP Cards Follow-up     Urinary retention  Assessment & Plan  · [place] placed on 8/6 for urinary retention > continue for now  · Consult urology 8/12 - gross hematuria   · Now with ongoing fairly significant intermittent gross hematuria > clearing up with manual irrigation and start of proscar for possible prostatic bleeding  · [place] can remain in place for up to 1 month  · Eliquis dose decreasing soon which may help as well  · Continue for now as it was recently placed for failed voiding trial  · Monitor for s/s of infection     Leukocytosis  Assessment & Plan  · Stable 8/12, remains afebril  · Low grade leukocytosis likely reactive from DVTs and suspected Pulmonary embolism  · Monitor for urinary or pulmonary infections clinically      Anemia  Assessment & Plan  · Hb stable 8/12  · Seen by heme/Onc for Malignancy work up and plans  · Monitor for further drop as patient is on Eliquis 10mg BID at this    Dysphagia  Assessment & Plan  · Improving   · Upgrade to regular with thins  · SLP   · Supervision or hold oral intake if too lethargic  · Aspiration precautions       Other Medical Issues:       Disposition   Home with goal Tuesday at family request pending medical stability and completion of CG training    Follow-up providers and other issues to be followed up after discharge   PCP   Oncology   ENT   Pulmonary   Cardiology    CODE: Level 1: Full Code    Restrictions include: Fall precautions    Objective:       Allergies per EMR    Physical Exam:  Vitals:    08/13/21 1443   BP: 127/81   Pulse: 76   Resp: 18   Temp: 98 °F (36 7 °C)   SpO2: 94%     GEN:  Sitting in NAD   HEENT/NECK: MMM, NC in place  CARDIAC: Reg rate  LUNGS:  Nonlabored breathing  ABDOMEN: Nondistended  EXTREMITIES/SKIN:  no calf edema, no calf tenderness to palpation and : Catheter in place with slight pinkish urine  NEURO:   MENTAL STATUS: Stable wakefulness and interaction; oriented to self, year with prompting, not [place] initially; able to follow simple commands; Strength; RUE seems slightly stronger today and  PSYCH:  Affect:  Flat    Diagnostic Studies: Reviewed, no new imaging  No orders to display       Laboratory: Labs reviewed  Results from last 7 days   Lab Units 08/12/21  0539 08/09/21  0548 08/08/21  0457   HEMOGLOBIN g/dL 10 2* 10 4* 9 9*   HEMATOCRIT % 34 2* 34 4* 33 3*   WBC Thousand/uL 10 44* 10 83* 10 28*     Results from last 7 days   Lab Units 08/12/21  0539 08/09/21  0548 08/08/21  0457   BUN mg/dL 20 18 19   SODIUM mmol/L 138 141 139   POTASSIUM mmol/L 4 1 3 8 4 0   CHLORIDE mmol/L 103 107 106   CREATININE mg/dL 0 92 0 79 0 79   AST U/L 24  --   --    ALT U/L 34  --   --             Drug regimen reviewed, all potential adverse effects identified and addressed:    Scheduled Meds:  Current Facility-Administered Medications   Medication Dose Route Frequency Provider Last Rate    acetaminophen  650 mg Oral Q6H PRN Mariela Tejeda MD      apixaban  10 mg Oral BID Erasto Yusuf DO      [START ON 8/15/2021] apixaban  5 mg Oral BID Mariela Tejeda MD      atorvastatin  40 mg Oral Daily With 1 hospitalsDO      bisacodyl  10 mg Rectal Daily PRN Mariela Tejeda MD      melatonin  3 mg Oral HS Mariela Tejeda MD      ondansetron  4 mg Oral Q8H PRN Mariela Tejeda MD      polyethylene glycol  17 g Oral Daily PRTOMÁS Tejeda MD      senna-docusate sodium  2 tablet Oral BID Erasto Yusuf DO         ** Please Note: Fluency Direct voice to text software may have been used in the creation of this document   **

## 2021-08-14 NOTE — PLAN OF CARE
Reviewed    Problem: PAIN - ADULT  Goal: Verbalizes/displays adequate comfort level or baseline comfort level  Description: Interventions:  - Encourage patient to monitor pain and request assistance  - Assess pain using appropriate pain scale  - Administer analgesics based on type and severity of pain and evaluate response  - Implement non-pharmacological measures as appropriate and evaluate response  - Consider cultural and social influences on pain and pain management  - Notify physician/advanced practitioner if interventions unsuccessful or patient reports new pain  Outcome: Progressing     Problem: INFECTION - ADULT  Goal: Absence or prevention of progression during hospitalization  Description: INTERVENTIONS:  - Assess and monitor for signs and symptoms of infection  - Monitor lab/diagnostic results  - Monitor all insertion sites, i e  indwelling lines, tubes, and drains  - Monitor endotracheal if appropriate and nasal secretions for changes in amount and color  - [place] appropriate cooling/warming therapies per order  - Administer medications as ordered  - Instruct and encourage patient and family to use good hand hygiene technique  - Identify and instruct in appropriate isolation precautions for identified infection/condition  Outcome: Progressing     Problem: SAFETY ADULT  Goal: Patient will remain free of falls  Description: INTERVENTIONS:  - Educate patient/family on patient safety including physical limitations  - Instruct patient to call for assistance with activity   - Consult OT/PT to assist with strengthening/mobility   - Keep Call bell within reach  - Keep bed low and locked with side rails adjusted as appropriate  - Keep care items and personal belongings within reach  - Initiate and maintain comfort rounds  - Make Fall Risk Sign visible to staff  - Offer Toileting every 4 Hours, in advance of need  - Initiate/Maintain bed and chair alarm  - Apply yellow socks and bracelet for high fall risk patients  - Consider moving patient to room near nurses station  Outcome: Progressing  Goal: Maintain or return to baseline ADL function  Description: INTERVENTIONS:  -  Assess patient's ability to carry out ADLs; assess patient's baseline for ADL function and identify physical deficits which impact ability to perform ADLs (bathing, care of mouth/teeth, toileting, grooming, dressing, etc )  - Assess/evaluate cause of self-care deficits   - Assess range of motion  - Assess patient's mobility; develop plan if impaired  - Assess patient's need for assistive devices and provide as appropriate  - Encourage maximum independence but intervene and supervise when necessary  - Involve family in performance of ADLs  - Assess for home care needs following discharge   - Consider OT consult to assist with ADL evaluation and planning for discharge  - Provide patient education as appropriate  Outcome: Progressing     Problem: DISCHARGE PLANNING  Goal: Discharge to home or other facility with appropriate resources  Description: INTERVENTIONS:  - Identify barriers to discharge w/patient and caregiver  - Arrange for needed discharge resources and transportation as appropriate  - Identify discharge learning needs (meds, wound care, etc )  - Arrange for interpretive services to assist at discharge as needed  - Refer to Case Management Department for coordinating discharge planning if the patient needs post-hospital services based on physician/advanced practitioner order or complex needs related to functional status, cognitive ability, or social support system  Outcome: Progressing     Problem: NEUROSENSORY - ADULT  Goal: Achieves stable or improved neurological status  Description: INTERVENTIONS  - Monitor and report changes in neurological status  - Monitor vital signs such as temperature, blood pressure, glucose, and any other labs ordered   - Initiate measures to prevent increased intracranial pressure  - Monitor for seizure activity and implement precautions if appropriate      Outcome: Progressing  Goal: Achieves maximal functionality and self care  Description: INTERVENTIONS  - Monitor swallowing and airway patency with patient fatigue and changes in neurological status  - Encourage and assist patient to increase activity and self care     - Encourage visually impaired, hearing impaired and aphasic patients to use assistive/communication devices  Outcome: Progressing     Problem: RESPIRATORY - ADULT  Goal: Achieves optimal ventilation and oxygenation  Description: INTERVENTIONS:  - Assess for changes in respiratory status  - Assess for changes in mentation and behavior  - Position to facilitate oxygenation and minimize respiratory effort  - Oxygen administered by appropriate delivery if ordered  - Initiate smoking cessation education as indicated  - Encourage broncho-pulmonary hygiene including cough, deep breathe, Incentive Spirometry  - Assess the need for suctioning and aspirate as needed  - Assess and instruct to report SOB or any respiratory difficulty  - Respiratory Therapy support as indicated  Outcome: Progressing     Problem: GENITOURINARY - ADULT  Goal: Maintains or returns to baseline urinary function  Description: INTERVENTIONS:  - Assess urinary function  - Encourage oral fluids to ensure adequate hydration if ordered  - Administer IV fluids as ordered to ensure adequate hydration  - Administer ordered medications as needed  - Offer frequent toileting  - Follow urinary retention protocol if ordered  Outcome: Progressing  Goal: Absence of urinary retention  Description: INTERVENTIONS:  - Assess patients ability to void and empty bladder  - Monitor I/O  - Bladder scan as needed  - Discuss with physician/AP medications to alleviate retention as needed  - Discuss catheterization for long term situations as appropriate  Outcome: Progressing  Goal: Urinary catheter remains patent  Description: INTERVENTIONS:  - Assess patency of urinary catheter  - If patient has a chronic cadet, consider changing catheter if non-functioning  - Follow guidelines for intermittent irrigation of non-functioning urinary catheter  Outcome: Progressing     Problem: SKIN/TISSUE INTEGRITY - ADULT  Goal: Skin Integrity remains intact(Skin Breakdown Prevention)  Description: Assess:  -Perform Chester assessment every 12  -Clean and moisturize skin every shift  -Inspect skin when repositioning, toileting, and assisting with ADLS  -Assess extremities for adequate circulation and sensation     Bed Management:  -Have minimal linens on bed & keep smooth, unwrinkled  -Change linens as needed when moist or perspiring  -Avoid sitting or lying in one position for more than 2 hours while in bed  -Keep HOB at 30 degrees     Toileting:  -Offer bedside commode  -Assess for incontinence every 4 hours      Skin Care:  -Avoid use of baby powder, tape, friction and shearing, hot water or constrictive clothing  -Relieve pressure over bony prominences using Allevyn foam  -Do not massage red bony areas    Outcome: Progressing     Problem: MUSCULOSKELETAL - ADULT  Goal: Maintain or return mobility to safest level of function  Description: INTERVENTIONS:  - Assess patient's ability to carry out ADLs; assess patient's baseline for ADL function and identify physical deficits which impact ability to perform ADLs (bathing, care of mouth/teeth, toileting, grooming, dressing, etc )  - Assess/evaluate cause of self-care deficits   - Assess range of motion  - Assess patient's mobility  - Assess patient's need for assistive devices and provide as appropriate  - Encourage maximum independence but intervene and supervise when necessary  - Involve family in performance of ADLs  - Assess for home care needs following discharge   - Consider OT consult to assist with ADL evaluation and planning for discharge  - Provide patient education as appropriate  Outcome: Progressing  Goal: Maintain proper alignment of affected body part  Description: INTERVENTIONS:  - Support, maintain and protect limb and body alignment  - Provide patient/ family with appropriate education  Outcome: Progressing     Problem: Nutrition/Hydration-ADULT  Goal: Nutrient/Hydration intake appropriate for improving, restoring or maintaining nutritional needs  Description: Monitor and assess patient's nutrition/hydration status for malnutrition  Collaborate with interdisciplinary team and initiate plan and interventions as ordered  Monitor patient's weight and dietary intake as ordered or per policy  Utilize nutrition screening tool and intervene as necessary  Determine patient's food preferences and provide high-protein, high-caloric foods as appropriate       INTERVENTIONS:  - Monitor oral intake, urinary output, labs, and treatment plans  - Assess nutrition and hydration status and recommend course of action  - Evaluate amount of meals eaten  - Assist patient with eating if necessary   - Allow adequate time for meals  - Recommend/ encourage appropriate diets, oral nutritional supplements, and vitamin/mineral supplements  - Order, calculate, and assess calorie counts as needed  - Recommend, monitor, and adjust tube feedings and TPN/PPN based on assessed needs  - Assess need for intravenous fluids  - Provide specific nutrition/hydration education as appropriate  - Include patient/family/caregiver in decisions related to nutrition  Outcome: Progressing

## 2021-08-15 PROCEDURE — 97530 THERAPEUTIC ACTIVITIES: CPT

## 2021-08-15 PROCEDURE — 99232 SBSQ HOSP IP/OBS MODERATE 35: CPT | Performed by: NURSE PRACTITIONER

## 2021-08-15 PROCEDURE — 97535 SELF CARE MNGMENT TRAINING: CPT

## 2021-08-15 RX ORDER — QUETIAPINE FUMARATE 25 MG/1
12.5 TABLET, FILM COATED ORAL DAILY PRN
Status: DISCONTINUED | OUTPATIENT
Start: 2021-08-15 | End: 2021-08-17 | Stop reason: HOSPADM

## 2021-08-15 RX ADMIN — MELATONIN TAB 3 MG 3 MG: 3 TAB at 20:00

## 2021-08-15 RX ADMIN — APIXABAN 5 MG: 5 TABLET, FILM COATED ORAL at 08:47

## 2021-08-15 RX ADMIN — DOCUSATE SODIUM AND SENNOSIDES 2 TABLET: 8.6; 5 TABLET ORAL at 16:40

## 2021-08-15 RX ADMIN — ATORVASTATIN CALCIUM 40 MG: 40 TABLET, FILM COATED ORAL at 16:40

## 2021-08-15 RX ADMIN — APIXABAN 5 MG: 5 TABLET, FILM COATED ORAL at 16:41

## 2021-08-15 RX ADMIN — DOCUSATE SODIUM AND SENNOSIDES 2 TABLET: 8.6; 5 TABLET ORAL at 08:47

## 2021-08-15 NOTE — PROGRESS NOTES
08/15/21 1000   Pain Assessment   Pain Assessment Tool Pain Assessment not indicated - pt denies pain   Pain Score No Pain   Restrictions/Precautions   Precautions 1:1;Aspiration;Bed/chair alarms;Cognitive; Fall Risk; [place]; Impulsive;O2;Supervision on toilet/commode   Weight Bearing Restrictions No   ROM Restrictions No   Eating   Type of Assistance Needed Set-up / clean-up   Physical Assistance Level No physical assistance   Comment seated in recliner at end of OT session for lunch meal with pt's wife, daughter, and 1:1 present   Eating CARE Score 5   Oral Hygiene   Reason if not Attempted Refused to perform   Oral Hygiene CARE Score 7   Grooming   Able To Wash/Dry Hands; Wash/Dry Face;Comb/Brush Hair   Limitation Noted In Coordination;Problem Solving; Safety; Sequencing;Strength;Timeliness   Findings Sup while seated in w/c at sink to brush hair and wash face, vc's required from pt's wife for initiation and sequencing   Shower/Bathe Self   Type of Assistance Needed Physical assistance;Verbal cues   Physical Assistance Level 26%-50%   Comment Pt engaged in sponge bath seated in w/c at sink with pt's wife and daughter present as part of ongoing family training  LONGORIA provided vc's for sequencing for pt and wife, to sequence bathing from head downward, and pt's wife then physically assisted pt with bath and pt's daughter observed  Pt continues to require max vc's for sequencing and one-step verbal directions, and A to safely manage O2 line and [place] bag/line  Pt able to wash UB and B/L upper legs while seated with cues/Sup  In stance with Isela to steady, B/L support on sink ledge, and vc's for hand placement and posture  In stance, pt's wife washed pt's sheron/rear areas  Explained to pts wife that pt has been able to cross legs to reach feet, but pt's wife wanted to complete B/L lower leg/feet bathing and stated she would be doing this at home     Shower/Bathe Self CARE Score 3   Bathing   Assessed Bath Style Sponge Bath Anticipated D/C Bath Style Sponge Bath   Able to Gather/Transport No   Able to [employer] Temperature No   Able to Wash/Rinse/Dry (body part) Left Arm;Right Arm;L Upper Leg;R Upper Leg;Chest;Abdomen   Limitations Noted in Balance; Coordination; Endurance;Problem Solving; Safety; Sequencing;Strength;Timeliness   Positioning Seated;Standing   Tub/Shower Transfer   Reason Not Assessed Sponge Bath;Safety   Upper Body Dressing   Type of Assistance Needed Physical assistance;Verbal cues   Physical Assistance Level 25% or less   Comment pt's wife provided Phi to don [place] shirt 2* decreased orientation of shirt, problem solving, sequencing  LONGORIA provided vc's to pt's wife to assist with threading UEs first, then donning [place]   Upper Body Dressing CARE Score 3   Lower Body Dressing   Type of Assistance Needed Physical assistance;Verbal cues   Physical Assistance Level 76% or more   Comment LONGORIA educated pt's wife/daughter on how to thread [place] bag/line through L pants leg, with pt's wife then practicing this   A to thread RLE and complete CM up over hips while pt in stance with BUE support on sink ledge, with vc's for posture and hand placement   Lower Body Dressing CARE Score 2   Putting On/Taking Off Footwear   Type of Assistance Needed Supervision;Verbal cues   Physical Assistance Level No physical assistance   Comment pt able to complete seated in w/c at Sup level using cross leg technique with max cues for sequencing and initiation, although pt's wife wished to doff/don pt's socks today to practice assisting, as pt's wife states she will likely assist pt with footwear at home   Putting [address] Score 4   Picking Up Object   Reason if not Attempted Safety concerns   Picking Up Object CARE Score 88   Dressing/Undressing Clothing   Remove UB Clothes Pullover [address] Clothes Pants;Socks   Don LB Clothes Pants;Socks   Limitations Noted In Balance; Coordination; Endurance;Problem Solving; Safety; Sequencing;Strength;Timeliness   Positioning Supported Sit;Standing   Sit to Stand   Type of Assistance Needed Physical assistance;Verbal cues; Adaptive equipment   Physical Assistance Level 25% or less   Comment A and vc's provided by pt's wife and daughter, cues for hand placement (pt demo P carryover from previous education) at [employer] to Stand CARE Score 3   Bed-Chair Transfer   Type of Assistance Needed Physical assistance;Verbal cues; Adaptive equipment   Physical Assistance Level 25% or less   Comment SPT w/RW, Phi and vc's provided by pt's wife and daughter, for hand placement and to manage O2/cadet lines   Chair/Bed-to-Chair Transfer CARE Score 3   Cognition   Overall Cognitive Status Impaired   Arousal/Participation Alert; Cooperative   Attention Attends with cues to redirect   Orientation Level Oriented to person   Memory Decreased short term memory;Decreased recall of precautions   Following Commands Follows one step commands with increased time or repetition   Activity Tolerance   Activity Tolerance Patient tolerated treatment well   Medical Staff Made Aware Notified nsg GILMAR Anderson that pt's wife emptied 300cc urine from pt's Cadet bag during OT family training   Assessment   Treatment Assessment Pt seen for 75min skilled OT session focused on continued family training with pt's wife and daughter  Main focus of today's FT was on completing full ADL routine (sponge bath and dressing at sink), repetitive practice of emptying pt's cadet bag, and O2/Cadet line mgmt during dressing/bathing and fxl mobility w/RW  See detailed descriptions of fxl performance above  Pt's wife was hands-on completing each step of bathing/dressing ADL with patient, with daughter observing in background, and LONGORIA providing wife step-by-step instruction on how best to assist pt through ADL   Pt's wife and daughter demonstrated G ability to manage O2 and Cadet lines today during transfers/RW mobility in-room, and demo ability to safely assist pt with ADL routine (sponge bath) upon D/C in home environment  Pt's wife may benefit from continued repetitive practice of threading [place] bag/line through pants leg when donning LB clothing  Pt tolerated session well, but continues to demonstrate difficulty accurately answering orientation questions, and continues to require 1:1 2* impulsivity and decreased safety awareness when family not present  Pt would benefit from continued skilled OT focused on D/C ADL, functional mobility/transfers using RW, standing balance/tolerance, and cognitive retraining focused on pt's orientation to current situation  Pt anticipated to D/C home on Tuesday 8/17 with family support  Prognosis Fair   Problem List Decreased strength;Decreased endurance; Impaired balance;Decreased mobility; Decreased coordination;Decreased cognition; Impaired judgement;Decreased safety awareness; Impaired tone   Barriers to Discharge Inaccessible home environment   Plan   Treatment/Interventions ADL retraining;Functional transfer training; Therapeutic exercise; Endurance training;Cognitive reorientation;Patient/family training;Equipment eval/education; Bed mobility; Compensatory technique education;Spoke to nursing   Progress Progressing toward goals   Recommendation   OT Discharge Recommendation   (home with family support)   OT Therapy Minutes   OT Time In 1000   OT Time Out 1115   OT Total Time (minutes) 75   OT Mode of treatment - Individual (minutes) 75   OT Mode of treatment - Concurrent (minutes) 0   OT Mode of treatment - Group (minutes) 0   OT Mode of treatment - Co-treat (minutes) 0   OT Mode of Treatment - Total time(minutes) 75 minutes   OT Cumulative Minutes 465   Therapy Time missed   Time missed?  No

## 2021-08-15 NOTE — PROGRESS NOTES
08/15/21 1300   Pain Assessment   Pain Assessment Tool Pain Assessment not indicated - pt denies pain   Pain Score No Pain   Restrictions/Precautions   Precautions Bed/chair alarms;Cognitive;1:1;Fall Risk; [place];O2;Supervision on toilet/commode   Cognition   Overall Cognitive Status Impaired   Comments improved carryover of instruction noted from previous sessions   Subjective   Subjective Patient and family with no new complaints, eager to particiapte in PT session    Sit to Lying   Comment patient requesting to stay OOB at the end of session, educated on change of positioning and importance of mobility in order to reduce risk for skin breakdown    Sit to Stand   Type of Assistance Needed Incidental touching   Physical Assistance Level No physical assistance   Comment CGA with vebral cues needed for prooper had placement    Sit to Stand CARE Score 4   Bed-Chair Transfer   Type of Assistance Needed Physical assistance   Physical Assistance Level 25% or less   Comment patient overall CGA however demonstrated 1x LOB needing slight correction to recover    Chair/Bed-to-Chair Transfer CARE Score 3   Transfer Bed/Chair/Wheelchair   Limitations Noted In Balance; Endurance;Problem Solving; Sequencing   Adaptive Equipment Roller Walker   Car Transfer   Type of Assistance Needed Physical assistance   Physical Assistance Level 25% or less   Comment overall CGA/Isela with RW    Car Transfer CARE Score 3   Walk 10 Feet   Type of Assistance Needed Physical assistance   Physical Assistance Level 25% or less   Comment CGA/Isela with RW and use of gait belt    Walk 10 Feet CARE Score 3   Walk 50 Feet with Two Turns   Type of Assistance Needed Physical assistance   Physical Assistance Level 25% or less   Comment CGA/Isela with RW and use of gait belt    Walk 50 Feet with Two Turns CARE Score 3   Ambulation   Does the patient walk? 2   Yes   Primary Mode of Locomotion Prior to Admission Walk   Distance Walked (feet) 50 ft  (x3)   Assist Device Roller Walker   Gait Pattern Inconsistant Ama;Decreased foot clearance   Limitations Noted In Balance; Endurance; Heel Strike;Speed;Strength;Swing   Findings repeated ambulation trials performed mostly with patient's son, Lala Alicia  He required PT intervention for line management and body placement at first  He was educated to consider assessing the environment and task prior to movement  Following repetition and education on proper techniques, Lala Alicia was able to demonstrate good understanding of guarding techniques, hand placement and line management  All prefer use of gait belt with mobility, and theirs should be delivered by Thursday  Wheelchair mobility   Does the patient use a wheelchair? 0  No   Findings Patient does not have wheelchair goals as he is unable to self propel due to deficits in cognition, carryover, UE coordination and activity tolerance  He would however beneift from w/c at time of discharge to increase safety and reduce falls risk  He requires Ax1 for all mobility and is only able to consistently ambulate household distances safely  During ambulation, caregiver is expected to manage RW at times for steering, O2 line management, catheter, and steady patient's occasional losses of balance  He presents with a CVA but also has a diagnosis of "adenocarcinoma, acute respiratory failure/emphysema, and nasopharyngeal mass" which can be progressive in nature and affect patient's overall ability to function, his overall activity tolerance/endurance, and in turn increase the caregiver's burden  He would benefit from a wheelchair at time of discharge in order to reduce risk for falls and injury, allow patient to access medical appointments necessary, and allow him to improve upon his QOL      Curb or Single Stair   Style negotiated Curb   Type of Assistance Needed Physical assistance   Physical Assistance Level 25% or less   Comment performed 4x with patient's family guarding which they were able to perform well with good carryover of previous session  Encouraged family to have patient step into walker prior to advancement off or on step for improved balance and safety    1 Step (Curb) CARE Score 3   Other Comments   Comments SpO2 93% or greater on 2L- family reports purchasing SpO2 monitor (I)    Assessment   Treatment Assessment Patient and family participated in PT treatment session focused on family training with daughter, son and wife  Son mostly hands on this session as daughter and wife previously in and felt comfortable with techniques reviewed  Following education and repetition, son able to verbalize and demonstrate understanding of recommendations and techniques for d/c  Family hopeful for Tuesday d/c, 8/17/21, with HHPT services  They are aware that w/c may be covered however would like a phone call 8/16 by therapy or CM after stand up to confirm d/c and  determine if item was covered  Will speak with MD  Otherwise, patient's family previously owning hospital bed, RW and BSC  Will additionally provide with HEP prior to discharge to continue to maximize strength and in turn function and mobility while reducing risk for falls and decreasing caregiver burden      Recommendation   PT Discharge Recommendation Home with home health rehabilitation   Equipment Recommended Wheelchair;Walker   PT Equipment ordered w/c    Date ordered 08/12/21   PT Therapy Minutes   PT Time In 1300   PT Time Out 1400   PT Total Time (minutes) 60   PT Mode of treatment - Individual (minutes) 60   PT Mode of treatment - Concurrent (minutes) 0   PT Mode of treatment - Group (minutes) 0   PT Mode of treatment - Co-treat (minutes) 0   PT Mode of Treatment - Total time(minutes) 60 minutes   PT Cumulative Minutes 510

## 2021-08-15 NOTE — PROGRESS NOTES
Internal Medicine Progress Note  Patient: Epifania Aschoff  Age/sex: 72 y o  male  Medical Record #: [id number]      ASSESSMENT/PLAN: (Interval History)  Epifania Aschoff is seen and examined and management for following issues:    CVA  · Multiembolic due to hypercoagulable state from cancer  · Cont eliquis/atorvastatin     B/L DVT w/suspected PE  · Continue eliquis     Adenocarcinoma of RUL  · Seen by oncology  · Needs outpatient PET scan for staging     Acute resp failure/emphysema  · Presumed from PE, possibly lung mass  · Smoked 2+ppd and quit 15 yrs ago  · Still requires 2L NC  · Will wean O2 as able for sat 92% or greater  · At home, he could barely walk to the bathroom 2/2 Lena Clancy has improved here possibly since he is wearing O2     Nasopharyngeal mass  · Needs biopsy as outpatient by ENT     Elevated troponin/depressed RVEF  · Needs cardiology f/u as outpatient for repeat ECHO  · Ef 55% +RV hypokinesis     Urine retention/hematuria  · Has cadet  · No hematuria today  · Hemoglobin stable 8/14  · Uro saw 8/12:             Added Proscar for possible prostatic bleeding in setting of AC             Keep cadet in for now and they will decide when to remove        Discharge date:  Reteam    The above assessment and plan was reviewed and updated as determined by my evaluation of the patient on 8/15/2021      Labs:   Results from last 7 days   Lab Units 08/14/21  0605 08/12/21  0539   WBC Thousand/uL 10 39* 10 44*   HEMOGLOBIN g/dL 10 3* 10 2*   HEMATOCRIT % 34 5* 34 2*   PLATELETS Thousands/uL 319 337     Results from last 7 days   Lab Units 08/12/21  0539 08/09/21  0548   SODIUM mmol/L 138 141   POTASSIUM mmol/L 4 1 3 8   CHLORIDE mmol/L 103 107   CO2 mmol/L 31 29   BUN mg/dL 20 18   CREATININE mg/dL 0 92 0 79   CALCIUM mg/dL 10 5* 10 3*                   Review of Scheduled Meds:  Current Facility-Administered Medications   Medication Dose Route Frequency Provider Last Rate    acetaminophen  650 mg Oral Q6H PRN Chestine Led Art Freed MD      apixaban  5 mg Oral BID Mariela Tejeda MD      atorvastatin  40 mg Oral Daily With Roosevelt Hammer DO      bisacodyl  10 mg Rectal Daily PRN Mariela Tejeda MD      melatonin  3 mg Oral HS Mariela Tejeda MD      ondansetron  4 mg Oral Q8H PRN Mariela Tejeda MD      polyethylene glycol  17 g Oral Daily PRN Mariela Tejeda MD      QUEtiapine  12 5 mg Oral Daily PRN Mariela Tejeda MD      senna-docusate sodium  2 tablet Oral BID Erasto Yusuf DO         Subjective/ HPI: Patient seen and examined  Patients overnight issues or events were reviewed with nursing or staff during rounds or morning huddle session  New or overnight issues include the following:     No new or overnight issues  Offers no complaints    ROS:   A 10 point ROS was performed; negative except as noted above         Imaging:     No orders to display       *Labs /Radiology studies reviewed  *Medications reviewed and reconciled as needed  *Please refer to order section for additional ordered labs studies  *Case discussed with primary attending during morning huddle case rounds      Physical Examination:  Vitals:   Vitals:    08/14/21 1410 08/14/21 2111 08/15/21 0600 08/15/21 1337   BP: 137/78 140/74 119/71 120/74   BP Location:   Right arm Right arm   Pulse: 73 76 60 67   Resp: 18 18 18 16   Temp: 98 5 °F (36 9 °C) 98 3 °F (36 8 °C) 98 °F (36 7 °C) 98 3 °F (36 8 °C)   TempSrc: Oral Oral Oral Oral   SpO2: 94% 97% 94% 94%   Weight:       Height:           General Appearance: no distress, conversive; nontoxic appearing  HEENT: PERRLA, conjuctiva normal; oropharynx clear; mucous membranes moist   Neck:  Supple, normal ROM, no JVD  Lungs: CTA, normal respiratory effort, no retractions, expiratory effort normal  CV: regular rate and rhythm; no rubs/murmurs/gallops, PMI normal   ABD: soft; ND/NT; +BS  EXT: no edema  Skin: normal turgor, normal texture, no rashes  Psych: affect normal, mood normal  Neuro: AA; confused but cooperative The above physical exam was reviewed and updated as determined by my evaluation of the patient on 8/15/2021  Invasive Devices     Drain            Urethral Catheter 16 Fr  8 days                   VTE Pharmacologic Prophylaxis: Eliquis  Code Status: Level 1 - Full Code  Current Length of Stay: 7 day(s)      Total time spent:  30 minutes with more than 50% spent counseling/coordinating care  Counseling includes discussion with patient re: progress  and discussion with patient of his/her current medical state/information  Coordination of patient's care was performed in conjunction with primary service  Time invested included review of patient's labs, vitals, and management of their comorbidities with continued monitoring  In addition, this patient was discussed with medical team including physician and advanced extenders  The care of the patient was extensively discussed and appropriate treatment plan was formulated unique for this patient  ** Please Note:  voice to text software may have been used in the creation of this document   Although proof errors in transcription or interpretation are a potential of such software**

## 2021-08-16 LAB
ANION GAP SERPL CALCULATED.3IONS-SCNC: 2 MMOL/L (ref 4–13)
BUN SERPL-MCNC: 19 MG/DL (ref 5–25)
CALCIUM SERPL-MCNC: 10.6 MG/DL (ref 8.3–10.1)
CHLORIDE SERPL-SCNC: 107 MMOL/L (ref 100–108)
CO2 SERPL-SCNC: 29 MMOL/L (ref 21–32)
CREAT SERPL-MCNC: 0.85 MG/DL (ref 0.6–1.3)
GFR SERPL CREATININE-BSD FRML MDRD: 89 ML/MIN/1.73SQ M
GLUCOSE P FAST SERPL-MCNC: 90 MG/DL (ref 65–99)
GLUCOSE SERPL-MCNC: 90 MG/DL (ref 65–140)
POTASSIUM SERPL-SCNC: 4.4 MMOL/L (ref 3.5–5.3)
SODIUM SERPL-SCNC: 138 MMOL/L (ref 136–145)

## 2021-08-16 PROCEDURE — 99232 SBSQ HOSP IP/OBS MODERATE 35: CPT | Performed by: NURSE PRACTITIONER

## 2021-08-16 PROCEDURE — 99232 SBSQ HOSP IP/OBS MODERATE 35: CPT | Performed by: PHYSICIAN ASSISTANT

## 2021-08-16 PROCEDURE — 97130 THER IVNTJ EA ADDL 15 MIN: CPT

## 2021-08-16 PROCEDURE — 97530 THERAPEUTIC ACTIVITIES: CPT

## 2021-08-16 PROCEDURE — 97129 THER IVNTJ 1ST 15 MIN: CPT

## 2021-08-16 PROCEDURE — 97110 THERAPEUTIC EXERCISES: CPT

## 2021-08-16 PROCEDURE — 99233 SBSQ HOSP IP/OBS HIGH 50: CPT

## 2021-08-16 PROCEDURE — 97116 GAIT TRAINING THERAPY: CPT

## 2021-08-16 PROCEDURE — 97535 SELF CARE MNGMENT TRAINING: CPT

## 2021-08-16 PROCEDURE — 80048 BASIC METABOLIC PNL TOTAL CA: CPT | Performed by: NURSE PRACTITIONER

## 2021-08-16 RX ORDER — QUETIAPINE FUMARATE 25 MG/1
12.5 TABLET, FILM COATED ORAL DAILY PRN
Qty: 5 TABLET | Refills: 0 | Status: SHIPPED | OUTPATIENT
Start: 2021-08-16 | End: 2021-08-26

## 2021-08-16 RX ORDER — ATORVASTATIN CALCIUM 40 MG/1
40 TABLET, FILM COATED ORAL
Qty: 30 TABLET | Refills: 2 | Status: SHIPPED | OUTPATIENT
Start: 2021-08-16 | End: 2021-11-08 | Stop reason: SDUPTHER

## 2021-08-16 RX ORDER — ACETAMINOPHEN 325 MG/1
650 TABLET ORAL 4 TIMES DAILY PRN
Refills: 0
Start: 2021-08-16

## 2021-08-16 RX ORDER — LANOLIN ALCOHOL/MO/W.PET/CERES
3 CREAM (GRAM) TOPICAL
Qty: 30 TABLET | Refills: 0 | Status: SHIPPED | OUTPATIENT
Start: 2021-08-16 | End: 2021-09-24 | Stop reason: ALTCHOICE

## 2021-08-16 RX ADMIN — MELATONIN TAB 3 MG 3 MG: 3 TAB at 21:40

## 2021-08-16 RX ADMIN — APIXABAN 5 MG: 5 TABLET, FILM COATED ORAL at 08:32

## 2021-08-16 RX ADMIN — APIXABAN 5 MG: 5 TABLET, FILM COATED ORAL at 18:09

## 2021-08-16 RX ADMIN — DOCUSATE SODIUM AND SENNOSIDES 2 TABLET: 8.6; 5 TABLET ORAL at 18:09

## 2021-08-16 RX ADMIN — DOCUSATE SODIUM AND SENNOSIDES 2 TABLET: 8.6; 5 TABLET ORAL at 08:32

## 2021-08-16 RX ADMIN — ATORVASTATIN CALCIUM 40 MG: 40 TABLET, FILM COATED ORAL at 16:14

## 2021-08-16 NOTE — CASE MANAGEMENT
Cm phoned dtr Micheal Quijano and confirmed dc for tomorrow  Cm asked for  between 12 and 1 to allow for wheelchair and oxygen to get delivered to pts room  Micheal Quijano would like [facility] for contd rn pt ot slp and hha services and is requesting one or any of the following  Nurses: Lan Bucio, adan berger, octavio camacho  Referral made via ecin, awaiting determination  Cm informed Micheal Quijano of the scripts being sent to [facility] in [place] and she confirmed she has already received the cards from [facility] for prescription coverage  Micheal Quijano stated she will call and or go to [facility] today and will phone cm if any issues arise  Dr Jaya Almonte made aware  Awaiting testing results for oxygen need and completion of script for the order to be placed  Completed script and documentation sent via ecin to [facility] for oxygen order

## 2021-08-16 NOTE — SPEECH THERAPY NOTE
Stroke Education Series    Pt participated in skilled Stroke Education Series in an individual setting to address the topic of Purpose of Rehab post stroke in both verbal and written formats  Education within this session included a review of the individual roles of the rehab team, functions of the acute rehab center, and neuro rehabilitation treatment strategies  The goal of this education session was to provide the patient with an understanding of the overall importance of the therapy process  This section reviews neuroplasticity principles as well that includes how patiens can incorporate specificity, intensity, repetition and salience into their home exercise program  This allows the patient to connect neuro rehabilitation treatment strategies to his or her individual therapy process  The patients are engaged in conversation to incorporate activities they like to do into therapy sessions  Following education, the patient's response to education is: verbalizes understanding more from wife vs pt at this time  Additional topics to individualize this section of stroke education include: adapting to stroke , cardiovascular components of rehab, involvement of therapy reducing risk of another stroke and how family can help in the rehab process   Start Time: 1100    End Time: 36    Stroke Education Series    Patient and caregiver (pt's wife) participated in skilled Stroke Education Series in an individualized setting to address the topic of Caregiver Education post stroke in both verbal and written formats  Education within this session included the roles of family/caregivers within the rehabilitation process and how they will be involved and instrumental in his/her return home  In addition, education on how "home life" may be affected and the impact of caring for loved ones post stroke was provided   The goal of this education session was to provide patients and caregivers with a greater understanding of their roles in the rehabilitation process and how instrumental that relationship could and will be at time of discharge  Education was provided to improve both patient and caregiver safety and self-efficacy, therefore improving overall quality of life individually, and as a unit  Following education, pt's response to education is: verbalizes understanding more from wife vs pt at this time      Start Time: 1100    End Time: 36    Stroke Education Series    Pt participated in skilled Stroke Education Series in an individualized setting to address the topic of Preparing To Go Home  This education was provided in both verbal and written formats  Education within this session focused on providing safety strategies including environmental and lifestyle changes that if made will support a safe discharge to his/her home setting  One goal of this session is to focus on ways to improve the patient's independence while maintaining safety and decreasing fall risk  Following education, pt's response to education is: verbalizes understanding more from wife vs pt at this time  To individualize this session, the following topics were reviewed: caregiver considerations, assistive devices, ADL recommendations, IADL recommendations , emergency preparedness, family training and healthcare literacy resources        Start Time: 1100    End Time: 1130

## 2021-08-16 NOTE — PROGRESS NOTES
Internal Medicine Progress Note  Patient: Barrett Stearns  Age/sex: 72 y o  male  Medical Record #: [id number]      ASSESSMENT/PLAN: (Interval History)  Barrett Stearns is seen and examined and management for following issues:    CVA  · Multiembolic due to hypercoagulable state from cancer  · Cont eliquis/atorvastatin     B/L DVT w/suspected PE  · Continue eliquis     Adenocarcinoma of RUL  · Seen by oncology  · Needs outpatient PET scan for staging     Acute resp failure/emphysema  · Presumed from PE, possibly lung mass  · Smoked 2+ppd and quit 15 yrs ago  · Still requires 2L NC  · Will wean O2 as able for sat 92% or greater  · At home, he could barely walk to the bathroom 2/2 Ron Castelan has improved here possibly since he is wearing O2     Nasopharyngeal mass  · Needs biopsy as outpatient by ENT     Elevated troponin/depressed RVEF  · Needs cardiology f/u as outpatient for repeat ECHO  · Ef 55% +RV hypokinesis     Urine retention/hematuria  · Has cadet  · No hematuria again today  · Hemoglobin stable 8/14  · Uro saw 8/12:             Added Proscar for possible prostatic bleeding in setting of AC             Keep cadet in for now and they will decide when to remove        Discharge date:  [facility]       The above assessment and plan was reviewed and updated as determined by my evaluation of the patient on 8/16/2021      Labs:   Results from last 7 days   Lab Units 08/14/21  0605 08/12/21  0539   WBC Thousand/uL 10 39* 10 44*   HEMOGLOBIN g/dL 10 3* 10 2*   HEMATOCRIT % 34 5* 34 2*   PLATELETS Thousands/uL 319 337     Results from last 7 days   Lab Units 08/16/21  0447 08/12/21  0539   SODIUM mmol/L 138 138   POTASSIUM mmol/L 4 4 4 1   CHLORIDE mmol/L 107 103   CO2 mmol/L 29 31   BUN mg/dL 19 20   CREATININE mg/dL 0 85 0 92   CALCIUM mg/dL 10 6* 10 5*                   Review of Scheduled Meds:  Current Facility-Administered Medications   Medication Dose Route Frequency Provider Last Rate    acetaminophen  650 mg Oral Q6H PRN Karoline Bro MD      apixaban  5 mg Oral BID Karoline Bro MD      atorvastatin  40 mg Oral Daily With Rober Church DO      bisacodyl  10 mg Rectal Daily PRN Karoline Bro MD      melatonin  3 mg Oral HS Karoline Bro MD      ondansetron  4 mg Oral Q8H PRN Karoline Bro MD      polyethylene glycol  17 g Oral Daily PRN Karoline Bro MD      QUEtiapine  12 5 mg Oral Daily PRN Karoline Bro MD      senna-docusate sodium  2 tablet Oral BID Vasile Knowles DO         Subjective/ HPI: Patient seen and examined  Patients overnight issues or events were reviewed with nursing or staff during rounds or morning huddle session  New or overnight issues include the following:     No new or overnight issues  Offers no complaints    ROS:   A 10 point ROS was performed; negative except as noted above         Imaging:     No orders to display       *Labs /Radiology studies reviewed  *Medications reviewed and reconciled as needed  *Please refer to order section for additional ordered labs studies  *Case discussed with primary attending during morning huddle case rounds      Physical Examination:  Vitals:   Vitals:    08/15/21 1337 08/15/21 2111 08/16/21 0633 08/16/21 0830   BP: 120/74 148/67 116/71 149/82   BP Location: Right arm Left arm Left arm Right arm   Pulse: 67 72 66    Resp: 16 18     Temp: 98 3 °F (36 8 °C) 98 9 °F (37 2 °C) 98 2 °F (36 8 °C)    TempSrc: Oral Oral     SpO2: 94% 94% 91%    Weight:       Height:           General Appearance: no distress, conversive  HEENT: PERRLA, conjuctiva normal; oropharynx clear; mucous membranes moist   Neck:  Supple, normal ROM, no JVD  Lungs: CTA, normal respiratory effort, no retractions, expiratory effort normal  CV: regular rate and rhythm; no rubs/murmurs/gallops, PMI normal   ABD: soft; ND/NT; +BS  EXT: no edema  Skin: normal turgor, normal texture, no rashes  Psych: affect normal, mood normal  Neuro: AA      The above physical exam was reviewed and updated as determined by my evaluation of the patient on 8/16/2021  Invasive Devices     Drain            Urethral Catheter 16 Fr  9 days                   VTE Pharmacologic Prophylaxis: Eliquis  Code Status: Level 1 - Full Code  Current Length of Stay: 8 day(s)      Total time spent:  30 minutes with more than 50% spent counseling/coordinating care  Counseling includes discussion with patient re: progress  and discussion with patient of his/her current medical state/information  Coordination of patient's care was performed in conjunction with primary service  Time invested included review of patient's labs, vitals, and management of their comorbidities with continued monitoring  In addition, this patient was discussed with medical team including physician and advanced extenders  The care of the patient was extensively discussed and appropriate treatment plan was formulated unique for this patient  ** Please Note:  voice to text software may have been used in the creation of this document   Although proof errors in transcription or interpretation are a potential of such software**

## 2021-08-16 NOTE — PROGRESS NOTES
08/16/21 0830   Pain Assessment   Pain Assessment Tool Pain Assessment not indicated - pt denies pain   Pain Score No Pain   Restrictions/Precautions   Precautions Bed/chair alarms;Cognitive; Fall Risk; [place]; Supervision on toilet/commode;O2   Weight Bearing Restrictions No   ROM Restrictions No   Cognition   Overall Cognitive Status Impaired   Arousal/Participation Alert; Cooperative   Attention Attends with cues to redirect   Orientation Level Oriented to person   Memory Decreased short term memory;Decreased recall of precautions   Following Commands Follows one step commands with increased time or repetition   Sit to Lying   Type of Assistance Needed Supervision;Verbal cues   Comment on mat table with increased time   Sit to Lying CARE Score 4   Lying to Sitting on Side of Bed   Type of Assistance Needed Supervision;Verbal cues   Comment on mat table with increased time   Lying to Sitting on Side of Bed CARE Score 4   Sit to Stand   Type of Assistance Needed Incidental touching   Physical Assistance Level No physical assistance   Comment CGA with VC's at times for hand placement   Sit to Stand CARE Score 4   Bed-Chair Transfer   Type of Assistance Needed Incidental touching; Adaptive equipment;Verbal cues   Physical Assistance Level No physical assistance   Comment CGA with RW  VC for hand placement at times  Chair/Bed-to-Chair Transfer CARE Score 4   Transfer Bed/Chair/Wheelchair   Adaptive Equipment Roller Walker   Car Transfer   Type of Assistance Needed Physical assistance;Verbal cues; Adaptive equipment   Physical Assistance Level 25% or less   Comment CGA/BRITTNEY with RW   Car Transfer CARE Score 3   Walk 10 Feet   Type of Assistance Needed Incidental touching; Adaptive equipment   Physical Assistance Level No physical assistance   Comment CGA with RW   Walk 10 Feet CARE Score 4   Walk 50 Feet with Two Turns   Type of Assistance Needed Physical assistance; Incidental touching; Adaptive equipment   Physical Assistance Level 25% or less   Comment CGA/BRITTNEY with RW   Walk 50 Feet with Two Turns CARE Score 3   Walk 150 Feet   Reason if not Attempted Safety concerns   Walk 150 Feet CARE Score 88   Walking 10 Feet on Uneven Surfaces   Reason if not Attempted Safety concerns   Walking 10 Feet on Uneven Surfaces CARE Score 88   Ambulation   Does the patient walk? 2  Yes   Primary Mode of Locomotion Prior to Admission Walk   Distance Walked (feet) 75 ft  (x2)   Assist Device Roller Walker   Gait Pattern Inconsistant Ama; Slow Ama;Decreased foot clearance;Shuffle;Improper weight shift;Narrow ALEX   Limitations Noted In Balance; Endurance;Midline Orientation;Speed;Strength   Provided Assistance with: Balance   Walk Assist Level Contact Guard;Minimum Assist   Findings up to 75 feet for increased endurance and strengthening only  Wheel 50 Feet with Two Turns   Reason if not Attempted Activity not applicable   Wheel 50 Feet with Two Turns CARE Score 9   Wheel 150 Feet   Reason if not Attempted Activity not applicable   Wheel 848 Feet CARE Score 9   Wheelchair mobility   Does the patient use a wheelchair? 0  No   Curb or Single Stair   Style negotiated Curb   Type of Assistance Needed Physical assistance;Verbal cues; Adaptive equipment   Physical Assistance Level 25% or less   Comment BRITTNEY with RW with MIN VC's for sequencing     1 Step (Curb) CARE Score 3   4 Steps   Reason if not Attempted Safety concerns   4 Steps CARE Score 88   12 Steps   Reason if not Attempted Activity not applicable   12 Steps CARE Score 9   Stairs   Type Curb   # of Steps 2   Weight Bearing Precautions Fall Risk   Assist Devices Roller Walker   Findings 6 inch curb step   Picking Up Object   Comment will have 24 hour S at home   Reason if not Attempted Safety concerns   Picking Up Object CARE Score 88   Therapeutic Interventions   Strengthening overviewed supine and seated HEP: seated LAQ, hip flex, ankle DF/PF, hip ADD/ABD, supine bridges, LAQ, glute sets and SAQ   Assessment   Treatment Assessment Pt participated in skilled PT session with increased focus on gait, functional transfers and increased LE strengthening with HEP overview  Pt anticipates discharge home tomorrow 8/17 with hour A and S at home provided by family  Pt's wife was given copy of HEP and copy was placed in pt's soft chart  Pt will cont to benefit from increased endurance, safe awareness and increased gait  Cont POC as tolerated  Problem List Decreased strength;Decreased range of motion;Decreased endurance; Impaired balance;Decreased mobility; Decreased coordination;Decreased cognition; Impaired judgement;Decreased safety awareness; Impaired tone   Barriers to Discharge None   PT Barriers   Functional Limitation Car transfers;Stair negotiation; Walking;Standing;Transfers   Plan   Treatment/Interventions Functional transfer training;LE strengthening/ROM; Endurance training; Therapeutic exercise;Gait training   Progress Progressing toward goals   Recommendation   PT Discharge Recommendation Home with home health rehabilitation   Equipment Recommended Walker; Wheelchair   PT Therapy Minutes   PT Time In 0830   PT Time Out 1000   PT Total Time (minutes) 90   PT Mode of treatment - Individual (minutes) 90   PT Mode of treatment - Concurrent (minutes) 0   PT Mode of treatment - Group (minutes) 0   PT Mode of treatment - Co-treat (minutes) 0   PT Mode of Treatment - Total time(minutes) 90 minutes   PT Cumulative Minutes 600   Therapy Time missed   Time missed?  No

## 2021-08-16 NOTE — PROGRESS NOTES
Physical Medicine and Rehabilitation Progress Note  Jack Rosas 72 y o  male MRN: [id number]  Unit/Bed#: -01 Encounter: [id number]      Chief Complaints:  BI rehab follow-up     Interval Events/Subjective: On eval, patient reports no new issues including pain or uncontrolled mood  He denies SOB, lightheadedness, changes in strength or other complaints  ROS: A 10 point ROS was performed; negative except as noted above  Extended time meeting with patient/family to discuss brain injury care, recent hospital management, function, rehabilitation, disposition, and to provide supportive counseling  Assessment & Plan:     Impaired mobility and ADLs  Assessment & Plan  - Function improving  - CG training completed   - Also c/b cardiopulmonary limitations, supplemental O2, cadet cath  -  PT, OT, ST, RN     Mass of upper lobe of right lung  Assessment & Plan  Lung Adenocarcinoma   - Found to have a 6 4x3  6x3 4cm solid RUL pulmonary nodule IR performed percutaneous biopsy   - Seen by oncology on acute, Dr Yoselin Ceja  "clinical staging seems to be compatible with at least stage III A  However, the CT scan was done without IV contrast   The patient was told that a PET-CT scan as an outpatient may give us a better idea about the exact staging  If he continues to have stage IIIA then concurrent chemoradiation followed by maintenance immunotherapy should be entertained if his performance status allows  The patient lives close to [facility] where he can be treated in the Oncology Clinic/infusion center  "  - Follow-up with OP oncology   - Follow up with OP pulmonary (approximately 2 weeks if d/c)    Impaired cognition  Assessment & Plan  -8/16 stable   -8/14 stable wakefulness and exam (Had some evening transient agitation and restlessness with transient increase confusion the night before)  -8/13 stable wakefulness and neuro exam; not quite as oriented but overall stable cognition   -8/12 stable wakefulness and interaction   -8/10-11 Improved wakefulness/interaction   -Recommend MOCA > prior to d/c would be ideal   -Patient/family/caregiver education and training   -Overstimulation precautions, frequent re-orientation, re-direction, re-assurance  -Sleep log and agitation monitoring  -Optimize sleep-wake cycle   -Limit sedating medications when possible  -Ensure optimal management electrolytes, nutrition, and hydration  -Monitor for signs or symptoms of infection, medication intolerances, other systemic etiologies  -Provide 1:1 > pt trying to get out of bed repeatedly and is high fall risk  -Fall precautions with frequent rounding; proactive toileting program, patient should not be unattended in bathroom     -Current psychotropics and potentially sedating medications:    Melatonin   Seroquel 12 5mg PRN for uncontrolled agitation/restlessness not redirectable by non-pharm means - discussed risks with pt/wife       Nasopharyngeal mass  Assessment & Plan  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  Both benign and malignant etiologies are in the differential diagnosis  ENT saw patient and visualized mass flexible laryngoscopy  - Smooth soft tissue mass in the right nasopharynx that appeared to be occluding the right eustachian tube  - OP ENT follow-up unless condition warrants sooner     * Acute CVA (cerebrovascular accident) [facility])  Assessment & Plan  -8/13-14, 16 stable wakefulness and neuro exam  -8/12-stable wakefulness/interaction and neuro exam  -8/10-11 Improved wakefulness/interaction; stable neuro exam   · CT head revealed recent acute to subacute infarct located in the left caudate head, anterior lentiform nucleus, anterior limb of the left internal capsule     · MRI brain w/wo contrast revealed multifocal diffusion abnormalities in several separate vascular territories, largest in the left MCA territory indicative of multifocal acute/recent infarctions most likely from a central embolic source  · Neurology followed in acute care and will need follow up as outpatient  · [place] to be embolic and started on Eliquis 10mg BID through 8/14 then 5mg BID > does not have insurance > CM to assist  · Per Neuro, continue statin, no need for aspirin based on etiology  · Normotension goals    Acute respiratory failure with hypoxia ([facility])  Assessment & Plan  On 2-3L at times  - Desat on RA with activity but was maintaining saturation at rest today  - Desat from 91% on 84% after activity that went up to 95% with 2L O2  - CM setting up home O2 - DME form filled out   Goal per Pulm is to maintain 88%+ O2 sat, wean as able to  Likely multifactorial but could be large part related to PE, also has moderate emphysema/COPD, and lung mass  - Supplemental O2  - Antithrombotics  - Mgmt of COPD per IM  - OP follow-up with pulm/oncology     Suspected pulmonary embolism  Assessment & Plan  · In setting of hypoxemic respiratory failure, newly found malignancy, hypercoabuable state and confirmed bilateral DVTs, likely pulmonary embolism  · Could not be confirmed due to contrast that had previously been used earlier that day for the CT head and neck  · Continue Eliquis  · Supplemental O2, wean as able to maintain 88%+  · OP pulm follow-up     Acute deep vein thrombosis (DVT) of distal vein of both lower extremities ([address] )  Assessment & Plan  Thought to be related to hypercoagulability secondary to malignancy  Transitioned from Eliquis 10mg BID to 5mg BID on 8/15    RIGHT LOWER LIMB:  Acute deep vein thrombosis is noted in the posterior tibial and peroneal veins  LEFT LOWER LIMB:  Acute deep vein thrombosis is noted in the peroneal veins  Acute superficial thrombophlebitis is noted in the great saphenous vein from  the mid thigh to the mid calf        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 6 90 > 7 54 > 10 80  ? Trended by SLIM to [place] at 10 8, next Tropnin was 10 3 and discontinued  · Cardio consulted  ? Discussed with medicine CTA and will hold for now as not likely to acutely change mgmt patient can follow-up with pulmonary and cards as OP to reconsider  · On Eliquis    ECHO:  · Left ventricular EF was estimated to be 55 %  Wall thickness was mildly increased  Grade 1 diastolic dysfunction  · The right ventricle was mildly to moderately dilated  Systolic function was mildly reduced  There is hypokinesis of the mid free wall  · The right atrium was mildly dilated  · A small pericardial effusion was identified    · Will need follow-up TTE to recheck right ventricle function in 4-6 weeks from prior  · OP Cards Follow-up     Urinary retention  Assessment & Plan  · Hampton placed on 8/6 for urinary retention > continue for now per urology f/u 8/16  · Improving hematuria   · Hampton can remain in place for up to 1 month  · (Proscar was never started by urology)   · Eliquis dose decreased  · OP urology follow-up   · Continue for now as it was recently placed for failed voiding trial  · Monitor for s/s of infection     Leukocytosis  Assessment & Plan  · Stable 8/15, remains afebrile  · Low grade leukocytosis likely reactive from DVTs and suspected Pulmonary embolism  · Monitor for urinary or pulmonary infections clinically      Anemia  Assessment & Plan  · Hb stable 8/12  · Seen by heme/Onc for Malignancy work up and plans  · Eliquis down to 5mg BID Dysphagia  Assessment & Plan  · Improving   · Upgrade to regular with thins  · SLP   · Supervision or hold oral intake if too lethargic  · Aspiration precautions       Other Medical Issues:       Disposition   Home with goal Tuesday at family request pending medical stability and completion of CG training    Follow-up providers and other issues to be followed up after discharge   PCP  [address] ENT   Pulmonary   Cardiology    CODE: Level 1: Full Code    Restrictions include: Fall precautions    Objective:       Allergies per EMR    Physical Exam:  08/16/21 0633  98 2 °F (36 8 °C)  66  --  116/71  --  91 %  --  --  Nasal cannula        Vitals reviewed  GEN:  Lying in gym in NAD  HEENT/NECK: MMM, NC in place  CARDIAC: Regular rate rhythm, no murmers, no rubs, no gallops  LUNGS:  clear to auscultation, no wheezes, rales, or rhonchi  ABDOMEN: Soft, non-tender, non-distended, normal active bowel sounds  EXTREMITIES/SKIN:  no calf edema, no calf tenderness to palpation and : [place] in place with largely slightly dark yellow urine  NEURO:   MENTAL STATUS: Stable wakefulness and interaction; able to follow simple commands; some impaired memory; Strength unchanged and Strength/MMT:  N/A  PSYCH:  Affect:  Flat      Diagnostic Studies: Reviewed, no new imaging  No orders to display       Laboratory: Labs reviewed  Results from last 7 days   Lab Units 08/14/21  0605 08/12/21  0539   HEMOGLOBIN g/dL 10 3* 10 2*   HEMATOCRIT % 34 5* 34 2*   WBC Thousand/uL 10 39* 10 44*     Results from last 7 days   Lab Units 08/16/21  0447 08/12/21  0539   BUN mg/dL 19 20   SODIUM mmol/L 138 138   POTASSIUM mmol/L 4 4 4 1   CHLORIDE mmol/L 107 103   CREATININE mg/dL 0 85 0 92   AST U/L  --  24   ALT U/L  --  34            Drug regimen reviewed, all potential adverse effects identified and addressed:    Scheduled Meds:  Current Facility-Administered Medications   Medication Dose Route Frequency Provider Last Rate    acetaminophen 650 mg Oral Q6H PRN Yung Carranza MD      apixaban  5 mg Oral BID Yung Carranza MD      atorvastatin  40 mg Oral Daily With Garrison Mathis DO      bisacodyl  10 mg Rectal Daily PRN Yung Carranza MD      melatonin  3 mg Oral HS Yung Carranza MD      ondansetron  4 mg Oral Q8H PRN Yung Carranza MD      polyethylene glycol  17 g Oral Daily PRN Yung Carranza MD      QUEtiapine  12 5 mg Oral Daily PRN Yung Carranza MD      senna-docusate sodium  2 tablet Oral BID Charbel Adrian DO         ** Please Note: Fluency Direct voice to text software may have been used in the creation of this document  **    Extended time meeting with patient/family to discuss brain injury care, recent hospital management, function, rehabilitation, disposition, and to provide supportive counseling  35 minutes or greater spent face-to-face with patient during this encounter and with coordination of care  Extended discussion today held with patient regarding current condition, medical history, mood, medical/rehabilitation management, and disposition

## 2021-08-16 NOTE — PROGRESS NOTES
08/16/21 1230   Pain Assessment   Pain Assessment Tool Pain Assessment not indicated - pt denies pain   Pain Score No Pain   Restrictions/Precautions   Precautions Bed/chair alarms;Cognitive; Fall Risk; Cadet; Supervision on toilet/commode;O2   Weight Bearing Restrictions No   ROM Restrictions No   Lifestyle   Autonomy Pt and spouse agreeable to shower with OT   Oral Hygiene   Reason if not Attempted Refused to perform   Oral Hygiene CARE Score 7   Shower/Bathe Self   Type of Assistance Needed Physical assistance   Physical Assistance Level 26%-50%   Comment Pt completes full shower seated and standing with continued recommendation of sponge bathing at time of d/c until assessed by home OT  Pt bathes UB with supervision and max VCs for sequencing steps and problem solving  Pt bathes LB bathing with min/mod assist for lower legs and feet as well as rear hygiene in stance with heavy reliance on GB for balance  Shower/Bathe Self CARE Score 3   Bathing   Assessed Bath Style Shower   Anticipated D/C Bath Style Sponge Bath   Able to Vee Markie No   Able to [employer] Temperature No   Able to Wash/Rinse/Dry (body part) Left Arm;Right Arm;L Upper Leg;R Upper Leg;Perineal Area; Chest;Abdomen   Limitations Noted in Balance; Coordination; Endurance;Problem Solving; Safety; Sequencing;Timeliness   Positioning Seated;Standing   Adaptive Equipment Shower Bars; Shower Seat;Hand [employer]   Type of Assistance Needed Supervision   Physical Assistance Level No physical assistance   Comment however level of assist varies due to decreased func cog   Upper Body Dressing CARE Score 4   Lower Body Dressing   Type of Assistance Needed Physical assistance   Physical Assistance Level 76% or more   Comment Pt's spouse mervin's great understanding and carryover of threading cadet bag through pants and then threading feet with no VCs or assist from this LONGORIA   Spouse then assists with donning tab brief as well as pulling up pants over hips while pt maintains balance with RW  Pt does attempt to assist, however ultimately requires assist fully  Lower Body Dressing CARE Score 2   Putting On/Taking Off Footwear   Type of Assistance Needed Physical assistance   Physical Assistance Level Total assistance   Comment pt's spouse assists with footwear   Putting On/Taking Off Footwear CARE Score 1   Sit to Stand   Type of Assistance Needed Incidental touching   Physical Assistance Level No physical assistance   Comment CG with RW; max VCs for hand placement and sequencing steps   Sit to Stand CARE Score 4   Bed-Chair Transfer   Type of Assistance Needed Incidental touching   Physical Assistance Level No physical assistance   Comment CG with RW; max VCs for sequencing, problem solving   Chair/Bed-to-Chair Transfer CARE Score 4   Toileting Hygiene   Type of Assistance Needed Physical assistance   Physical Assistance Level 51%-75%   Comment assist for safely managing pants up/down during toileting; unsuccessful with BM   Toileting Hygiene CARE Score 2   Toilet Transfer   Type of Assistance Needed Incidental touching   Physical Assistance Level No physical assistance   Comment CG with RW, GB   Toilet Transfer CARE Score 4   Cognition   Overall Cognitive Status Impaired   Arousal/Participation Alert; Cooperative   Attention Attends with cues to redirect   Orientation Level Oriented to person   Memory Decreased short term memory;Decreased recall of precautions   Following Commands Follows one step commands with increased time or repetition   Additional Activities   Additional Activities Comments SpO2 monitored on RA at rest: 91%; SpO2 on RA with exercise: 84%   Activity Tolerance   Activity Tolerance Patient tolerated treatment well   Assessment   Treatment Assessment Pt engages in 105 minute skilled OT Session focusing on full shower routine, functional transfers, and SpO2 monitoring during ADL routine   See above for full functional details  Pt SpO2 monitored on RA at rest: 91%; SpO2 on RA with exercise (walking down hallway): 84%, increasing to 92% post 2 min PLB  Pt placed back on 2L O2 at end of session/trial on RA with SpO2 returning to 95%  Dr Gagan Glasgow and Alesia Riley made aware  Pt's spouse mervin's excellent carryover of managing cadet bag through pants as well as assisting with CM over hips  She also demo's good safety awareness with O2 tubing management during functional transfers requiring no VCs from this LONGORIA  Despite showering this session in OT, continued recommendation of sponge bathing upon d/c home until assessed by home OT, spouse verbalizes understanding  Pt to d/c home tomorrow with 24/7 assist/supervision from spouse with intermittent assist from daughter Jack Larson  Family training completed with sposue and daughter with spouse completing hands on training  Recommend home OT  Recommend continued skilled care in order to inc independence with self care, functional transfers and cognitive re-orientation in order to decrease burden of care at d/c  Prognosis Fair   Problem List Decreased strength;Decreased range of motion;Decreased endurance; Impaired balance;Decreased mobility; Decreased coordination;Decreased cognition; Impaired judgement;Decreased safety awareness; Impaired tone   Barriers to Discharge None   Plan   Treatment/Interventions ADL retraining;Functional transfer training; Therapeutic exercise; Endurance training;Cognitive reorientation;Patient/family training;Equipment eval/education; Compensatory technique education   OT Therapy Minutes   OT Time In 1230   OT Time Out 1415   OT Total Time (minutes) 105   OT Mode of treatment - Individual (minutes) 105   OT Mode of treatment - Concurrent (minutes) 0   OT Mode of treatment - Group (minutes) 0   OT Mode of treatment - Co-treat (minutes) 0   OT Mode of Treatment - Total time(minutes) 105 minutes   OT Cumulative Minutes 570   Therapy Time missed   Time missed?  No

## 2021-08-16 NOTE — PROGRESS NOTES
08/16/21 1100   Pain Assessment   Pain Assessment Tool Pain Assessment not indicated - pt denies pain   Restrictions/Precautions   Precautions Aspiration;Bed/chair alarms;Cognitive; Fall Risk; [place]; Impulsive;O2;Supervision on toilet/commode   Comprehension   Comprehension (FIM) 4 - Understands basic info/conversation 75-90% of time   Expression   Expression (FIM) 4 - Expresses basic info/needs 75-90% of time   Social Interaction   Social Interaction (FIM) 5 - Interacts appropriately with others 90% of time   Problem Solving   Problem solving (FIM) 2 - Needs direction more than ½ time to initiate, plan or complete simple tasks   Memory   Memory (FIM) 2 - Recalls 1 of 2 steps   Speech/Language/Cognition Assessmetn   Treatment Assessment Session today focusing on an overview of Stroke Education for both pt and pt's wife  Upon arrival into room, SLP asking pt about tomorrow, which increased time needed for pt to recognize that he would be discharging tomorrow  When asking pt about recalling items consumed for breakfast or activities complete in prior PT session, pt needed mod-max verbal cues from wife to elicit information  SLP began reviewing basic stroke information in regards to the types of stroke sustained focusing on pt's infact type stroke, as well as reviewing pt's current risk factors for stroke, as well as engaging pt in importance of taking medications once home  Wife was able to verbalize insight to the current medications which pt is taking for stroke prevention (blood thinners, cholesterol medication) which SLP further providing education as to how these medications will decrease chances for stroke at this time  Other topics discussed were overall stroke recovery, which pt's wife asked about  SLP provided education in regards to most recovery in the first month since sustaining stroke, but w/ continued therapies and ongoing medical care recovery can take up to a year or greater   Stressed to both that there is no timeline in regards to recovery but what the pt puts in to help his recovery the better  SLP also providing wife w/ education in regards to decreasing caregiver burnout  SLP stressing to wife to use others as resources to help (their children, siblings, neighbors, friends, etc) which pt and pt's wife verbalizing understanding  Last topic which SLP covered was signs/sxs of stroke using the acronym "BE FAST " SLP discussing each letter at length w/ both pt and wife and how symptoms could be just one item or multiple  SLP even providing written handout to pt and wife at end of session for ease of reference  Wife did state having the patient booklet "What You Need to Know About Stroke: A Patient Resource" but was currently at home  SLP stating to wife to read the information as well as having both son/dtr read through the information to maximize awareness/knowledge  SLP providing pt's wife different activities and topics to engage pt's STM once home, which wife thankful for at this time  No further questions presented by pt or wife at this time  Will recommend continued SLP services at discharge w/ home care to continue to maximize pt's overall cognitive linguistic skills to decrease burden of care for family at discharge  SLP Therapy Minutes   SLP Time In 1100   SLP Time Out 1130   SLP Total Time (minutes) 30   SLP Mode of treatment - Individual (minutes) 30   SLP Mode of treatment - Concurrent (minutes) 0   SLP Mode of treatment - Group (minutes) 0   SLP Mode of treatment - Co-treat (minutes) 0   SLP Mode of Treatment - Total time(minutes) 30 minutes   SLP Cumulative Minutes 380   Therapy Time missed   Time missed?  No

## 2021-08-16 NOTE — PLAN OF CARE
Problem: PAIN - ADULT  Goal: Verbalizes/displays adequate comfort level or baseline comfort level  Description: Interventions:  - Encourage patient to monitor pain and request assistance  - Assess pain using appropriate pain scale  - Administer analgesics based on type and severity of pain and evaluate response  - Implement non-pharmacological measures as appropriate and evaluate response  - Consider cultural and social influences on pain and pain management  - Notify physician/advanced practitioner if interventions unsuccessful or patient reports new pain  Outcome: Progressing     Problem: INFECTION - ADULT  Goal: Absence or prevention of progression during hospitalization  Description: INTERVENTIONS:  - Assess and monitor for signs and symptoms of infection  - Monitor lab/diagnostic results  - Monitor all insertion sites, i e  indwelling lines, tubes, and drains  - Monitor endotracheal if appropriate and nasal secretions for changes in amount and color  - [place] appropriate cooling/warming therapies per order  - Administer medications as ordered  - Instruct and encourage patient and family to use good hand hygiene technique  - Identify and instruct in appropriate isolation precautions for identified infection/condition  Outcome: Progressing     Problem: SAFETY ADULT  Goal: Patient will remain free of falls  Description: INTERVENTIONS:  - Educate patient/family on patient safety including physical limitations  - Instruct patient to call for assistance with activity   - Consult OT/PT to assist with strengthening/mobility   - Keep Call bell within reach  - Keep bed low and locked with side rails adjusted as appropriate  - Keep care items and personal belongings within reach  - Initiate and maintain comfort rounds  - Make Fall Risk Sign visible to staff  - Offer Toileting every 2 Hours, in advance of need  - Initiate/Maintain bed/chair alarm  - Obtain necessary fall risk management equipment: 1:1  - Apply yellow socks and bracelet for high fall risk patients  - Consider moving patient to room near nurses station  Outcome: Progressing  Goal: Maintain or return to baseline ADL function  Description: INTERVENTIONS:  -  Assess patient's ability to carry out ADLs; assess patient's baseline for ADL function and identify physical deficits which impact ability to perform ADLs (bathing, care of mouth/teeth, toileting, grooming, dressing, etc )  - Assess/evaluate cause of self-care deficits   - Assess range of motion  - Assess patient's mobility; develop plan if impaired  - Assess patient's need for assistive devices and provide as appropriate  - Encourage maximum independence but intervene and supervise when necessary  - Involve family in performance of ADLs  - Assess for home care needs following discharge   - Consider OT consult to assist with ADL evaluation and planning for discharge  - Provide patient education as appropriate  Outcome: Progressing  Goal: Maintains/Returns to pre admission functional level  Description: INTERVENTIONS:  - Perform BMAT or MOVE assessment daily    - Set and communicate daily mobility goal to care team and patient/family/caregiver  - Collaborate with rehabilitation services on mobility goals if consulted  - Perform Range of Motion 3 times a day  - Reposition patient every 2 hours    - Dangle patient 3 times a day  - Stand patient 3 times a day  - Ambulate patient 3 times a day  - Out of bed to chair 3 times a day   - Out of bed for meals 3 times a day  - Out of bed for toileting  - Record patient progress and toleration of activity level   Outcome: Progressing     Problem: DISCHARGE PLANNING  Goal: Discharge to home or other facility with appropriate resources  Description: INTERVENTIONS:  - Identify barriers to discharge w/patient and caregiver  - Arrange for needed discharge resources and transportation as appropriate  - Identify discharge learning needs (meds, wound care, etc )  - Arrange for interpretive services to assist at discharge as needed  - Refer to Case Management Department for coordinating discharge planning if the patient needs post-hospital services based on physician/advanced practitioner order or complex needs related to functional status, cognitive ability, or social support system  Outcome: Progressing     Problem: NEUROSENSORY - ADULT  Goal: Achieves stable or improved neurological status  Description: INTERVENTIONS  - Monitor and report changes in neurological status  - Monitor vital signs such as temperature, blood pressure, glucose, and any other labs ordered   - Initiate measures to prevent increased intracranial pressure  - Monitor for seizure activity and implement precautions if appropriate      Outcome: Progressing  Goal: Achieves maximal functionality and self care  Description: INTERVENTIONS  - Monitor swallowing and airway patency with patient fatigue and changes in neurological status  - Encourage and assist patient to increase activity and self care     - Encourage visually impaired, hearing impaired and aphasic patients to use assistive/communication devices  Outcome: Progressing     Problem: RESPIRATORY - ADULT  Goal: Achieves optimal ventilation and oxygenation  Description: INTERVENTIONS:  - Assess for changes in respiratory status  - Assess for changes in mentation and behavior  - Position to facilitate oxygenation and minimize respiratory effort  - Oxygen administered by appropriate delivery if ordered  - Initiate smoking cessation education as indicated  - Encourage broncho-pulmonary hygiene including cough, deep breathe, Incentive Spirometry  - Assess the need for suctioning and aspirate as needed  - Assess and instruct to report SOB or any respiratory difficulty  - Respiratory Therapy support as indicated  Outcome: Progressing     Problem: GENITOURINARY - ADULT  Goal: Maintains or returns to baseline urinary function  Description: INTERVENTIONS:  - Assess urinary function  - Encourage oral fluids to ensure adequate hydration if ordered  - Administer IV fluids as ordered to ensure adequate hydration  - Administer ordered medications as needed  - Offer frequent toileting  - Follow urinary retention protocol if ordered  Outcome: Progressing  Goal: Absence of urinary retention  Description: INTERVENTIONS:  - Assess patients ability to void and empty bladder  - Monitor I/O  - Bladder scan as needed  - Discuss with physician/AP medications to alleviate retention as needed  - Discuss catheterization for long term situations as appropriate  Outcome: Progressing  Goal: Urinary catheter remains patent  Description: INTERVENTIONS:  - Assess patency of urinary catheter  - If patient has a chronic cadet, consider changing catheter if non-functioning  - Follow guidelines for intermittent irrigation of non-functioning urinary catheter  Outcome: Progressing     Problem: SKIN/TISSUE INTEGRITY - ADULT  Goal: Skin Integrity remains intact(Skin Breakdown Prevention)  Description: Assess:  -Perform [place] assessment every 3  -Clean and moisturize skin every 3  -Inspect skin when repositioning, toileting, and assisting with ADLS  -Assess under medical devices such as 3 every 3  -Assess extremities for adequate circulation and sensation     Bed Management:  -Have minimal linens on bed & keep smooth, unwrinkled  -Change linens as needed when moist or perspiring  -Avoid sitting or lying in one position for more than 3 hours while in bed  -Keep HOB at 3degrees     Toileting:  -Offer bedside commode  -Assess for incontinence every 3  -Use incontinent care products after each incontinent episode such as 3    Activity:  -Mobilize patient 3 times a day  -Encourage activity and walks on unit  -Encourage or provide ROM exercises   -Turn and reposition patient every 2 Hours  -Use appropriate equipment to lift or move patient in bed  -Instruct/ Assist with weight shifting every 3 when out of bed in chair  -Consider limitation of chair time 3 hour intervals    Skin Care:  -Avoid use of baby powder, tape, friction and shearing, hot water or constrictive clothing  -Relieve pressure over bony prominences using 3  -Do not massage red bony areas    Next Steps:  -Teach patient strategies to minimize risks such as 3   -Consider consults to  interdisciplinary teams such as 3  Outcome: Progressing

## 2021-08-16 NOTE — PROGRESS NOTES
provided introduction to role and support available from [facility] [employer], [facility]  Explored [employer] affiliation  Fanny Cousin does not identify with an organized [employer]   Gregor Morin will update [employer] info in Epic to reflect this      08/16/21 1200   Clinical Encounter Type   Visited With Patient and family together   Routine Visit Introduction

## 2021-08-16 NOTE — PROGRESS NOTES
Physical Medicine and Rehabilitation Progress Note  Gretel Stevens 72 y o  male MRN: [id number]  Unit/Bed#: -01 Encounter: [id number]      Chief Complaints:  Rehab follow-up     Interval Events/Subjective:     Patient with temporary increased confusion and refusing to wear O2 overnight along with some agitation which has since resolved  Patient denies increased SOB, CP, lightheadedness, weakness, calf pain, or other new complaints  ROS: A 10 point ROS was performed; negative except as noted above  Assessment & Plan:     Impaired mobility and ADLs  Assessment & Plan  Family would like to bring patient home as soon as possible although he continues to have significant functional deficits and is 2 person assist in several domains and continues with significant brain injury  - Also c/b cardiopulmonary limitations, supplemental O2, cadet cath  - Family training ongoing - goal still d/c Tuesday but if additional days needed that would be reasonable   - Continue PT, OT, ST    Mass of upper lobe of right lung  Assessment & Plan  Lung Adenocarcinoma   - Found to have a 6 4x3  6x3 4cm solid RUL pulmonary nodule IR performed percutaneous biopsy   - Seen by oncology on acute, Dr Esther Maldonado  "clinical staging seems to be compatible with at least stage III A  However, the CT scan was done without IV contrast   The patient was told that a PET-CT scan as an outpatient may give us a better idea about the exact staging  If he continues to have stage IIIA then concurrent chemoradiation followed by maintenance immunotherapy should be entertained if his performance status allows  The patient lives close to [place] where he can be treated in the Oncology Clinic/infusion center  "  - Follow-up with OP oncology   - Follow up with OP pulmonary (approximately 2 weeks if d/c)    Impaired cognition  Assessment & Plan  -8/14 stable wakefulness and exam (Had some evening transient agitation and restlessness with transient increase confusion the night before)  -8/13 stable wakefulness and neuro exam; not quite as oriented but overall stable cognition   -8/12 stable wakefulness and interaction   -8/10-11 Improved wakefulness/interaction   -Recommend MOCA Monday   -Acute comprehensive interdisciplinary inpatient rehabilitation to include intensive skilled therapies (PT, OT, ST) as outlined with oversight and management by rehabilitation physician  Continue inpatient rehab level nursing, case management and weekly interdisciplinary team meetings  Provide family teaching regarding brain injury  -Monitor neuro-exam, wakefulness, mood, cognition, insight into deficits and safety awareness  -Patient/family/caregiver education and training   -Overstimulation precautions, frequent re-orientation, re-direction, re-assurance  -Sleep log and agitation monitoring  -Optimize sleep-wake cycle > slept well overnight   -Limit sedating medications when possible  -Ensure optimal management electrolytes, nutrition, and hydration  -Monitor for signs or symptoms of infection, medication intolerances, other systemic etiologies  -Provide 1:1 > pt trying to get out of bed repeatedly and is high fall risk  - Transient paranoid ideations 8/10 pm > wife reports some escalation even at home that started a few weeks prior > not currently > may be sundowing phenomenon   -Fall precautions with frequent rounding; proactive toileting program, patient should not be unattended in bathroom     -Current psychotropics and potentially sedating medications:    Melatonin   Consider seroquel 12 5mg PRN for uncontrolled agitation/restlessness not redirectable by non-pharm means - discussed risks with pt/wife       Nasopharyngeal mass  Assessment & Plan  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  Both benign and malignant etiologies are in the differential diagnosis    ENT saw patient and visualized mass flexible laryngoscopy  - Smooth soft tissue mass in the right nasopharynx that appeared to be occluding the right eustachian tube  - OP ENT follow-up unless condition warrants sooner     * Acute CVA (cerebrovascular accident) [facility])  Assessment & Plan  -8/13-14 stable wakefulness and neuro exam  -8/12-stable wakefulness/interaction and neuro exam  -8/10-11 Improved wakefulness/interaction; stable neuro exam   · CT head revealed recent acute to subacute infarct located in the left caudate head, anterior lentiform nucleus, anterior limb of the left internal capsule  · MRI brain w/wo contrast revealed multifocal diffusion abnormalities in several separate vascular territories, largest in the left MCA territory indicative of multifocal acute/recent infarctions most likely from a central embolic source  · Neurology followed in acute care and will need follow up as outpatient  · [place] to be embolic and started on Eliquis 10mg BID through 8/14 then 5mg BID > does not have insurance > CM to assist  · Per Neuro, continue statin, no need for aspirin based on etiology  · Normotension goals    Acute respiratory failure with hypoxia ([facility] [address] )  Assessment & Plan  Slightly up to 4L at times - denies increased SOB/CP or other new complaints    Goal per Pulm is to maintain 88%+ O2 sat, wean as able to  Likely multifactorial but could be large part related to PE, also has moderate emphysema/COPD, and lung mass  - Supplemental O2  - Antithrombotics  - Mgmt of COPD per IM  - OP follow-up with pulm/oncology     Suspected pulmonary embolism  Assessment & Plan  · In setting of hypoxemic respiratory failure, newly found malignancy, hypercoabuable state and confirmed bilateral DVTs, likely pulmonary embolism  · Could not be confirmed due to contrast that had previously been used earlier that day for the CT head and neck  · Continue Eliquis  · Supplemental O2, wean as able to maintain 88%+  · OP pulm follow-up     Acute deep vein thrombosis (DVT) of distal vein of both lower extremities [facility])  Assessment & Plan  Thought to be related to hypercoagulability secondary to malignancy  Transitioned from Eliquis 10mg BID to 5mg BID on 8/15    RIGHT LOWER LIMB:  Acute deep vein thrombosis is noted in the posterior tibial and peroneal veins  LEFT LOWER LIMB:  Acute deep vein thrombosis is noted in the peroneal veins  Acute superficial thrombophlebitis is noted in the great saphenous vein from  the mid thigh to the mid calf  Chronic combined systolic and diastolic CHF (congestive heart failure) ([facility])  Assessment & Plan  Wt Readings from Last 3 Encounters:   07/31/21 86 6 kg (190 lb 14 7 oz)     · Left ventricular EF was estimated to be 55 %  Wall thickness was mildly increased  Grade 1 diastolic dysfunction  · The right ventricle was mildly to moderately dilated  Systolic function was mildly reduced  There is hypokinesis of the mid free wall  · Monitor weights, appears euvolemic  · OP Cards follow-up       Elevated troponin  Assessment & Plan  · Troponin elevation likely type 2 secondary to pulmonary embolism and hypoxia  ? 6 90 > 7 54 > 10 80  ? Trended by SLIM to Peak at 10 8, next Tropnin was 10 3 and discontinued  · Cardio consulted  ? Consider CT angiogram once patient more clinically stable  · On Eliquis    ECHO:  · Left ventricular EF was estimated to be 55 %  Wall thickness was mildly increased  Grade 1 diastolic dysfunction  · The right ventricle was mildly to moderately dilated  Systolic function was mildly reduced  There is hypokinesis of the mid free wall  · The right atrium was mildly dilated  · A small pericardial effusion was identified    · OP Cards Follow-up     Urinary retention  Assessment & Plan  · [place] placed on 8/6 for urinary retention > continue for now  · Decreased gross hematuria   · Consult urology 8/12 - gross hematuria   · Had airly significant intermittent gross hematuria > clearing up with manual irrigation and start of proscar for possible prostatic bleeding  · Hampton can remain in place for up to 1 month  · Eliquis dose decreased  · Continue for now as it was recently placed for failed voiding trial  · Monitor for s/s of infection     Leukocytosis  Assessment & Plan  · Stable 8/15, remains afebrile  · Low grade leukocytosis likely reactive from DVTs and suspected Pulmonary embolism  · Monitor for urinary or pulmonary infections clinically      Anemia  Assessment & Plan  · Hb stable 8/12  · Seen by heme/Onc for Malignancy work up and plans  · Monitor for further drop as patient is on Eliquis 10mg BID at this    Dysphagia  Assessment & Plan  · Improving   · Upgrade to regular with thins  · SLP   · Supervision or hold oral intake if too lethargic  · Aspiration precautions       Other Medical Issues:       Disposition   Home with goal Tuesday at family request pending medical stability and completion of CG training    Follow-up providers and other issues to be followed up after discharge   PCP   Oncology   ENT   Pulmonary   Cardiology    CODE: Level 1: Full Code    Restrictions include: Fall precautions    Objective:       Allergies per EMR    Physical Exam:  08/14/21 1410  98 5 °F (36 9 °C)  73  18  137/78  --  94 % on 4L   Vitals reviewed    GEN:  Sitting in NAD   HEENT/NECK: MMM, NC in place  CARDIAC: Regular rate rhythm, no murmers, no rubs, no gallops  LUNGS:  clear to auscultation, no wheezes, rales, or rhonchi  ABDOMEN: Soft, non-tender, non-distended, normal active bowel sounds  EXTREMITIES/SKIN:  no calf edema, no calf tenderness to palpation  NEURO:   MENTAL STATUS: Stable wakefulness and interaction; able to follow simple commands; some impaired memory; Strength unchanged  PSYCH:  Affect:  Flat      Diagnostic Studies: Reviewed, no new imaging  No orders to display       Laboratory: Labs reviewed  Results from last 7 days   Lab Units 08/14/21  0605 08/12/21  0539 08/09/21  0548   HEMOGLOBIN g/dL 10 3* 10 2* 10 4*   HEMATOCRIT % 34 5* 34 2* 34 4* WBC Thousand/uL 10 39* 10 44* 10 83*     Results from last 7 days   Lab Units 08/12/21  0539 08/09/21  0548   BUN mg/dL 20 18   SODIUM mmol/L 138 141   POTASSIUM mmol/L 4 1 3 8   CHLORIDE mmol/L 103 107   CREATININE mg/dL 0 92 0 79   AST U/L 24  --    ALT U/L 34  --             Drug regimen reviewed, all potential adverse effects identified and addressed:    Scheduled Meds:  Current Facility-Administered Medications   Medication Dose Route Frequency Provider Last Rate    acetaminophen  650 mg Oral Q6H PRN Cherylene Deans, MD      apixaban  5 mg Oral BID Cherylene Deans, MD      atorvastatin  40 mg Oral Daily With Ana Kelley DO      bisacodyl  10 mg Rectal Daily PRN Cherylene Deans, MD      melatonin  3 mg Oral HS Cherylene Deans, MD      ondansetron  4 mg Oral Q8H PRN Cherylene Deans, MD      polyethylene glycol  17 g Oral Daily PRN Cherylene Deans, MD      QUEtiapine  12 5 mg Oral Daily PRN Cherylene Deans, MD      senna-docusate sodium  2 tablet Oral BID Ephriam Bellashanti,          ** Please Note: Fluency Direct voice to text software may have been used in the creation of this document   **

## 2021-08-16 NOTE — PROGRESS NOTES
UROLOGY PROGRESS NOTE   Patient Identifiers: Emma Mcdonald (MRN [id number])  Date of Service: 8/16/2021    Subjective:    Sitting out of bed in a chair  Creatinine 0 85  Patient has  no complaints        Objective:     VITALS:    Vitals:    08/16/21 0633   BP: 116/71   Pulse: 66   Resp:    Temp: 98 2 °F (36 8 °C)   SpO2: 91%           LABS:  Lab Results   Component Value Date    HGB 10 3 (L) 08/14/2021    HCT 34 5 (L) 08/14/2021    WBC 10 39 (H) 08/14/2021     08/14/2021   ]    Lab Results   Component Value Date    K 4 4 08/16/2021     08/16/2021    CO2 29 08/16/2021    BUN 19 08/16/2021    CREATININE 0 85 08/16/2021    CALCIUM 10 6 (H) 08/16/2021   ]        INPATIENT MEDS:    Current Facility-Administered Medications:     acetaminophen (TYLENOL) tablet 650 mg, 650 mg, Oral, Q6H PRN, Liz Yousif MD    apixaban Salazar Cart) tablet 5 mg, 5 mg, Oral, BID, Liz Yousif MD, 5 mg at 08/16/21 5984    atorvastatin (LIPITOR) tablet 40 mg, 40 mg, Oral, Daily With Dinner, Israel Buckley DO, 40 mg at 08/15/21 1640    bisacodyl (DULCOLAX) rectal suppository 10 mg, 10 mg, Rectal, Daily PRN, Liz Yousif MD    melatonin tablet 3 mg, 3 mg, Oral, HS, Liz Yousif MD, 3 mg at 08/15/21 2000    ondansetron (ZOFRAN-ODT) dispersible tablet 4 mg, 4 mg, Oral, Q8H PRN, Liz Yousif MD    polyethylene glycol [facility]) packet 17 g, 17 g, Oral, Daily PRN, Liz Yousif MD    QUEtiapine (SEROquel) tablet 12 5 mg, 12 5 mg, Oral, Daily PRN, Liz Yousif MD    senna-docusate sodium (SENOKOT S) 8 6-50 mg per tablet 2 tablet, 2 tablet, Oral, BID, Israel Buckley DO, 2 tablet at 08/16/21 4113      Physical Exam:   /71 (BP Location: Left arm)   Pulse 66   Temp 98 2 °F (36 8 °C)   Resp 18   Ht 5' 8" (1 727 m)   Wt 83 5 kg (184 lb 1 4 oz)   SpO2 91%   BMI 27 99 kg/m²   GEN: no acute distress    RESP: breathing comfortably with no accessory muscle use    ABD: soft, non-tender, non-distended   INCISION: EXT: no significant peripheral edema   VARGAS: in place draining clear yellow urine  heavy clots and       RADIOLOGY:   None     Assessment:   1  Urinary retention  2  Gross hematuria-resolved  3   History of CVA    Plan:   -maintain Vargas and observe for improvement  -outpatient trial of void  -urine is clear with no further hematuria will sign off and see p r n   -please call with any further questions or concerns

## 2021-08-17 ENCOUNTER — PATIENT OUTREACH (OUTPATIENT)
Dept: CASE MANAGEMENT | Facility: OTHER | Age: 71
End: 2021-08-17

## 2021-08-17 VITALS
BODY MASS INDEX: 27.9 KG/M2 | DIASTOLIC BLOOD PRESSURE: 75 MMHG | SYSTOLIC BLOOD PRESSURE: 131 MMHG | TEMPERATURE: 97.8 F | HEIGHT: 68 IN | WEIGHT: 184.08 LBS | HEART RATE: 65 BPM | RESPIRATION RATE: 20 BRPM | OXYGEN SATURATION: 92 %

## 2021-08-17 PROCEDURE — 99232 SBSQ HOSP IP/OBS MODERATE 35: CPT | Performed by: INTERNAL MEDICINE

## 2021-08-17 PROCEDURE — 99239 HOSP IP/OBS DSCHRG MGMT >30: CPT

## 2021-08-17 RX ORDER — POLYETHYLENE GLYCOL 3350 17 G/17G
17 POWDER, FOR SOLUTION ORAL DAILY PRN
Qty: 255 G | Refills: 0 | Status: SHIPPED | OUTPATIENT
Start: 2021-08-17 | End: 2021-08-24 | Stop reason: HOSPADM

## 2021-08-17 RX ORDER — POLYETHYLENE GLYCOL 3350 17 G/17G
17 POWDER, FOR SOLUTION ORAL ONCE
Status: COMPLETED | OUTPATIENT
Start: 2021-08-17 | End: 2021-08-17

## 2021-08-17 RX ORDER — AMOXICILLIN 250 MG
2 CAPSULE ORAL 2 TIMES DAILY
Qty: 60 TABLET | Refills: 0 | Status: SHIPPED | OUTPATIENT
Start: 2021-08-17 | End: 2021-09-24 | Stop reason: ALTCHOICE

## 2021-08-17 RX ORDER — BISACODYL 10 MG
10 SUPPOSITORY, RECTAL RECTAL DAILY PRN
Qty: 3 SUPPOSITORY | Refills: 0 | Status: SHIPPED | OUTPATIENT
Start: 2021-08-17 | End: 2021-08-24 | Stop reason: HOSPADM

## 2021-08-17 RX ADMIN — DOCUSATE SODIUM AND SENNOSIDES 2 TABLET: 8.6; 5 TABLET ORAL at 08:22

## 2021-08-17 RX ADMIN — POLYETHYLENE GLYCOL 3350 17 G: 17 POWDER, FOR SOLUTION ORAL at 08:21

## 2021-08-17 RX ADMIN — APIXABAN 5 MG: 5 TABLET, FILM COATED ORAL at 08:22

## 2021-08-17 NOTE — DISCHARGE SUMMARY
Discharge Summary - PMR   Dustin Hernandez 72 y o  male MRN: [id number]  Unit/Bed#: -01 Encounter: [id number]    Admission Date: 8/8/2021     Discharge Date: 8/17/2021    Rehabilitation/Etiologic Diagnosis:   Stroke:  01 2  Right Body Involvement (Left Brain)    Discharge Diagnoses:      Patient Active Problem List   Diagnosis    Acute CVA (cerebrovascular accident) ([address] )    Elevated troponin    Acute respiratory failure with hypoxia ([address] )    Acute deep vein thrombosis (DVT) of distal vein of both lower extremities ([address] )    Nasopharyngeal mass    Dysphagia    Mass of upper lobe of right lung    Urinary retention    Constipation    Anemia    Leukocytosis    Suspected pulmonary embolism    Chronic combined systolic and diastolic CHF (congestive heart failure) (HCC)    Respiratory failure with hypoxia ([facility])    Impaired cognition    Impaired mobility and ADLs       Acute Rehabilitation Center Course:   (with significant findings, care, complications, treatment, and services provided):       Patient participated in a comprehensive interdisciplinary inpatient rehabilitation program which included involvment of MD, therapies (PT, OT, and SLP), RN, CM/SW, dietary  He made some functional gains as outlined below  He has had some improvements in cognition but still continues with significant impairments as outlined below  He was offered additional time in rehab but he and his family elected to d/c home at this time  He continues to require assistance for most ADLs and mobility  Patient remains some fall/injury risk however this risk has decreased since admission to [facility]  Caregiver training completed and with adequate oversight patient is cleared for discharge home with family and assistance  Medical issues with comanagement from our internal medicine team     Please see below for patient's hospital course and day to day management of medical needs in problem list format        Functional status on admission to [facility]:  Total assist Ambulation   Significant assist to hygiene, bathing, LB dressing, transfers   Total assist problem solving, memory  Mod assist social interaction and comprehension  Expression min assist     Functional status on discharge from ARC:  Bathing mod assist   LB dressing significant assist and toileting hygiene  Transfers CGA  Ambulation CGA 10 ft min assist 50 ft, mod assist 150 ft   Significant assist memory and problem solving  Min assist comprehension and expression  Social interaction supervision      Impaired mobility and ADLs  Assessment & Plan  - Function improving  - CG training completed   - Also c/b cardiopulmonary limitations, supplemental O2, cadet cath  -  PT, OT, ST, RN     Mass of upper lobe of right lung  Assessment & Plan  Lung Adenocarcinoma   - Found to have a 6 4x3  6x3 4cm solid RUL pulmonary nodule IR performed percutaneous biopsy   - Seen by oncology on acute, Dr Goodman Record  "clinical staging seems to be compatible with at least stage III A  However, the CT scan was done without IV contrast   The patient was told that a PET-CT scan as an outpatient may give us a better idea about the exact staging  If he continues to have stage IIIA then concurrent chemoradiation followed by maintenance immunotherapy should be entertained if his performance status allows  The patient lives close to [facility] where he can be treated in the Oncology Clinic/infusion center  "  - Follow-up with OP oncology   - Follow up with OP pulmonary     Impaired cognition  Assessment & Plan  Impaired cognition but stable during ARC course  - Had occasional increased confusion brief agitation in evenings in ARC but remains at risk for future confusion/agitation   - Completed CG training  - Optimal sleep/wake - melatonin low dose at night   - Limit sedating meds as much as possible  - Will Rx very limited Rx of Seroquel 12 5mg qday PRN Disp #5 for uncontrolled agitation/restlessness not adequately controlled by non-pharm means     -MOCA not completed during ARC course > OP neuro follow-up     I notified patient/family that due to cog impairments and patient's residual functional impairments could significantly impact driving  I notifiied patient/family that I would notify [employer] that it could impact driving and it would be at their discretion how to proceed  In interim, I recommend no driving until cleared by outpatient physician and after cleared by formal driving testing  Initial Reporting Form [id number] filled out and faxed to [facility]  Nasopharyngeal mass  Assessment & Plan  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  Both benign and malignant etiologies are in the differential diagnosis  ENT saw patient and visualized mass flexible laryngoscopy  - Smooth soft tissue mass in the right nasopharynx that appeared to be occluding the right eustachian tube  - OP ENT follow-up     * Acute CVA (cerebrovascular accident) [facility])  Assessment & Plan  · Stable exam during ARC course with improvements in function; completed CG training   · CT head revealed recent acute to subacute infarct located in the left caudate head, anterior lentiform nucleus, anterior limb of the left internal capsule     · MRI brain w/wo contrast revealed multifocal diffusion abnormalities in several separate vascular territories, largest in the left MCA territory indicative of multifocal acute/recent infarctions most likely from a central embolic source  · Neurology followed in acute care and will need follow up as outpatient  · Eliquis 5mg BID (transitioned from 10mg BID as previously instructed on 8/15)  · Per Neuro, continue statin, no need for aspirin based on etiology  · OP neuro follow-up    Acute respiratory failure with hypoxia [facility])  Assessment & Plan  Improving prior to d/c but still needing O2 with activity; occasionally at rest/sleep   - Desat on RA with activity but was maintaining saturation at rest most recently   - Desat from 91% to 84% with activity on 8/16 that went up to 95% with 2L O2  - CM setting up home O2 - DME form filled out   Goal per Pulm is to maintain 88%+ O2 sat, wean as able to  - Family counseled to check and provide or increase O2 supplementation if SpO2<89%   Likely multifactorial but could be large part related to PE, also has moderate emphysema/COPD, and lung mass  - Supplemental O2  - Antithrombotics  - OP follow-up with pulm/oncology     Suspected pulmonary embolism  Assessment & Plan  · In setting of hypoxemic respiratory failure, newly found malignancy, hypercoabuable state and confirmed bilateral DVTs, likely pulmonary embolism  · Could not be confirmed due to contrast that had previously been used earlier that day for the CT head and neck  · Continue Eliquis  · Supplemental O2, wean as able to maintain 88%+  · OP pulm/hem-onc follow-up     Acute deep vein thrombosis (DVT) of distal vein of both lower extremities ([address] )  Assessment & Plan  Thought to be related to hypercoagulability secondary to malignancy  -Concern for PE as well   Transitioned from Eliquis 10mg BID to 5mg BID on 8/15    RIGHT LOWER LIMB:  Acute deep vein thrombosis is noted in the posterior tibial and peroneal veins  LEFT LOWER LIMB:  Acute deep vein thrombosis is noted in the peroneal veins  Acute superficial thrombophlebitis is noted in the great saphenous vein from  the mid thigh to the mid calf  OP oncology/PCP follow-up    Chronic combined systolic and diastolic CHF (congestive heart failure) (HCC)  Assessment & Plan  Wt Readings from Last 3 Encounters:   07/31/21 86 6 kg (190 lb 14 7 oz)     · Left ventricular EF was estimated to be 55 %  Wall thickness was mildly increased  Grade 1 diastolic dysfunction  · The right ventricle was mildly to moderately dilated  Systolic function was mildly reduced  There is hypokinesis of the mid free wall    · Appears euvolemic  · OP Cards follow-up       Elevated troponin  Assessment & Plan  · Troponin elevation likely type 2 secondary to pulmonary embolism and hypoxia  ? 6 90 > 7 54 > 10 80 > Trended by SLIM to Peak at 10 8, next Tropnin was 10 3 and discontinued  · Cardio consulted  ? Recommend OP TTE  to recheck right ventricle function in 4-6 weeks from prior  ? Discussed with medicine CTA chest consideration and will hold for now as not likely to acutely change mgmt patient can follow-up with pulmonary and cards as OP to reconsider  · On Eliquis  · OP f/u cards and pulm     ECHO:  · Left ventricular EF was estimated to be 55 %  Wall thickness was mildly increased  Grade 1 diastolic dysfunction  · The right ventricle was mildly to moderately dilated  Systolic function was mildly reduced  There is hypokinesis of the mid free wall  · The right atrium was mildly dilated  · A small pericardial effusion was identified        Urinary retention  Assessment & Plan  · [place] placed on 8/6 for urinary retention > continue for now per urology f/u 8/16  · Improving hematuria   · Hampton can remain in place for up to 1 month  · (Proscar was never started by urology)   · Eliquis dose decreased  · OP urology follow-up       Leukocytosis  Assessment & Plan  · Stable 8/15, remains afebrile  · Low grade leukocytosis likely reactive from DVTs and suspected Pulmonary embolism  · Monitor for urinary or pulmonary infections clinically      Anemia  Assessment & Plan  · Hb stable 8/12  · Seen by heme/Onc for Malignancy work up and plans  · Eliquis down to 5mg BID     Constipation  Assessment & Plan  Colace-senna  PRN miralax/dulcolax     Dysphagia  Assessment & Plan  · Improved  · Upgraded to regular with thins  · SLP   · Aspiration precautions       Disposition  · Home with family and HH PT, OT, ST, RN    Follow-up providers and other issues to be followed up after discharge  · PCP  · Oncology  · ENT  · Pulmonary  · Cardiology  · Urology      - See AVS (After visit summary) with discharge instructions, discharge medications, and other details  Imaging (See above as well):  No results found  Pertinent Recent Labs (See Course above, EPIC EMR, and if needed request additional records):  Results for Chitra Scales (MRN [id number]) as of 8/17/2021 14:21   Ref   Range 8/14/2021 06:05 8/16/2021 04:47   Sodium Latest Ref Range: 136 - 145 mmol/L  138   Potassium Latest Ref Range: 3 5 - 5 3 mmol/L  4 4   Chloride Latest Ref Range: 100 - 108 mmol/L  107   CO2 Latest Ref Range: 21 - 32 mmol/L  29   Anion Gap Latest Ref Range: 4 - 13 mmol/L  2 (L)   BUN Latest Ref Range: 5 - 25 mg/dL  19   Creatinine Latest Ref Range: 0 60 - 1 30 mg/dL  0 85   Glucose, Random Latest Ref Range: 65 - 140 mg/dL  90   GLUCOSE FASTING Latest Ref Range: 65 - 99 mg/dL  90   Calcium Latest Ref Range: 8 3 - 10 1 mg/dL  10 6 (H)   eGFR Latest Units: ml/min/1 73sq m  89   WBC Latest Ref Range: 4 31 - 10 16 Thousand/uL 10 39 (H)    Red Blood Cell Count Latest Ref Range: 3 88 - 5 62 Million/uL 4 12    Hemoglobin Latest Ref Range: 12 0 - 17 0 g/dL 10 3 (L)    HCT Latest Ref Range: 36 5 - 49 3 % 34 5 (L)    MCV Latest Ref Range: 82 - 98 fL 84    MCH Latest Ref Range: 26 8 - 34 3 pg 25 0 (L)    MCHC Latest Ref Range: 31 4 - 37 4 g/dL 29 9 (L)    RDW Latest Ref Range: 11 6 - 15 1 % 16 0 (H)    Platelet Count Latest Ref Range: 149 - 390 Thousands/uL 319        Procedures Performed During ARC Admission: None    Discharge Physical Examination:  Temp:  [97 8 °F (36 6 °C)-98 8 °F (37 1 °C)] 97 8 °F (36 6 °C)  HR:  [65-67] 65  Resp:  [20] 20  BP: (131-136)/(74-75) 131/75  SpO2:  [88 %-95 %] 92 %  GEN:  Sitting in NAD   HEENT/NECK: Normocephalic, atraumatic, moist mucous membranes   NC in place  CARDIAC: Regular rate rhythm, no murmers, no rubs, no gallops  LUNGS:  clear to auscultation, no wheezes, rales, or rhonchi  ABDOMEN: Soft, non-tender, non-distended, normal active bowel sounds  EXTREMITIES/SKIN:  no calf edema, no calf tenderness to palpation  NEURO:   MENTAL STATUS: Oriented to self, 8th month, [place]; able to follow simple commands; stable sluggish processing time, CN II-XII: R eye exotropia otherwise intact and Strength/MMT:  RUE stable mild weakness; RLE near 5/5, L side 5/5 FTN intact b/l  PSYCH:  Affect:  Flat    HPI:   Per Dr Arabella Holly  "Antonella Hernandez is a 72year old male that presented to [place] [id number] on 7/31/2021 as a pre-hospital stroke alert for complaints of dysarthria, right facial droop and right sided weakness  Patient with noted recent dyspnea on exertion and weight loss, with planned CT chest/abdomen/pelvis  CT of the brain showed recent infarct left caudate head, anterior lentiform nucleus and anterior limb of left internal capsule without evidence of hemorrhage  CTA of the head/neck showed no occlusion or stenosis, however did reveal mass right nasopharynx and right upper lobe lung mass  Neurology was consulted  Patient was with an NIHSS of 9, and was not a tPA candidate given infarct appearing acute to subacute and high bleed risk  There was a strong suspicion for underlying malignancy with hypercoagulable state, and patient was admitted to stroke pathway  Patient was initiated on Heparin gtt with statin after ASA load, given concern for MI vs PE, in setting of elevated troponin levels (6 90) and dyspnea  Exam revealed B/L LE swelling and calf pain, with B/L LE venous duplex study showing RLE DVT in posterior tibial and peroneal veins, LLE DVT in peroneal veins  CT of the chest/abdomen/pelvis showed moderate emphysema, 6 4x3  6x3 4cm solid, irregular contoured RUL pulmonary nodule, likely primary lung cancer  Pulmonary was consulted due to increased O2 needs/hypoxia, likely caused by PE with possible right heart strain  ECHO showed an EF of 24%, grade 1 diastolic dysfunction, RV mildly to moderately dilated with hypokinesis, RA mildly dilated, mild TVR and small pericardial effusion   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 and underwent flexible laryngoscopy on 8/1  Mass was visualized, however etiology remains unknown - would benefit from biopsy with timing TBD, likely as an outpatient  Cardiology was consulted regarding elevated troponin levels, likely multifactorial and type 2 elevation given profound hypoxia and likely PE  Recommended for medical treatment with ASA and Heparin  Patient is to undergo repeat ECHO in 4-6 weeks to assess RV  On 8/2, Heparin gtt was discontinued and patient was initiated on Lovenox SQ per Neurology recommendations  Patient is to continue indefinitely  MRI of the brain showed multifocal diffusion abnormalities in several separate vascular territories, largest in left MCA territory indicative of multifocal acute/recent infarctions most likely from central embolic source  IR was consulted regarding right lung biopsy, and on 8/5, underwent IR right lung biopsy  Patient was with noted urinary retention during hospital course, and required cadet catheter insertion on 8/6  Oncology was consulted on 8/7, with initial clinical staging appearing to be compatible with at least stage IIIA  Patient is recommended for an outpatient PET-CT scan to give better idea about exact staging  If patient has stage IIIA, then recommendation would be for concurrent chemoradiation followed by immunotherapy pending functional status  Patient is overall hemodynamically stable and medically cleared for discharge to [facility]  PT/OT/ST therapies and PM&R were consulted, and patient was deemed appropriate for acute inpatient rehabilitation and admitted to the [facility] on 8/8/21"    Condition at Discharge: stable     Discharge instructions/Information to patient and family:   See after visit summary for information provided to patient and family  Provisions for Follow-Up Care:  See after visit summary for information related to follow-up care and any pertinent home health orders        Disposition: Home with family and 24-7 supervision    Planned Readmission:  No    Discharge Statement   Total time spent examining patient, counseling patient (or patient/family) on condition, medication, rehabilitation/medical plan, and coordinating care on day of discharge: at least 45 minutes  Greater than 50% of the total time was spent examining patient, answering all questions, directly discussing plan of care and post-discharge instructions with patient (or patient and family)  Additional time spent on coordinating care and other discharge activities  Discharge Medications:  See after visit summary for reconciled discharge medications provided to patient and family

## 2021-08-17 NOTE — PLAN OF CARE
Problem: PAIN - ADULT  Goal: Verbalizes/displays adequate comfort level or baseline comfort level  Description: Interventions:  - Encourage patient to monitor pain and request assistance  - Assess pain using appropriate pain scale  - Administer analgesics based on type and severity of pain and evaluate response  - Implement non-pharmacological measures as appropriate and evaluate response  - Consider cultural and social influences on pain and pain management  - Notify physician/advanced practitioner if interventions unsuccessful or patient reports new pain  Outcome: Adequate for Discharge     Problem: INFECTION - ADULT  Goal: Absence or prevention of progression during hospitalization  Description: INTERVENTIONS:  - Assess and monitor for signs and symptoms of infection  - Monitor lab/diagnostic results  - Monitor all insertion sites, i e  indwelling lines, tubes, and drains  - Monitor endotracheal if appropriate and nasal secretions for changes in amount and color  - [place] appropriate cooling/warming therapies per order  - Administer medications as ordered  - Instruct and encourage patient and family to use good hand hygiene technique  - Identify and instruct in appropriate isolation precautions for identified infection/condition  Outcome: Adequate for Discharge     Problem: SAFETY ADULT  Goal: Patient will remain free of falls  Description: INTERVENTIONS:  - Educate patient/family on patient safety including physical limitations  - Instruct patient to call for assistance with activity   - Consult OT/PT to assist with strengthening/mobility   - Keep Call bell within reach  - Keep bed low and locked with side rails adjusted as appropriate  - Keep care items and personal belongings within reach  - Initiate and maintain comfort rounds  - Make Fall Risk Sign visible to staff  - Offer Toileting every 2 Hours, in advance of need  - Maintain alarm  - Apply yellow socks and bracelet for high fall risk patients  - Consider moving patient to room near nurses station  Outcome: Adequate for Discharge  Goal: Maintain or return to baseline ADL function  Description: INTERVENTIONS:  -  Assess patient's ability to carry out ADLs; assess patient's baseline for ADL function and identify physical deficits which impact ability to perform ADLs (bathing, care of mouth/teeth, toileting, grooming, dressing, etc )  - Assess/evaluate cause of self-care deficits   - Assess range of motion  - Assess patient's mobility; develop plan if impaired  - Assess patient's need for assistive devices and provide as appropriate  - Encourage maximum independence but intervene and supervise when necessary  - Involve family in performance of ADLs  - Assess for home care needs following discharge   - Consider OT consult to assist with ADL evaluation and planning for discharge  - Provide patient education as appropriate  Outcome: Adequate for Discharge  Goal: Maintains/Returns to pre admission functional level  Description: INTERVENTIONS:  - Perform BMAT or MOVE assessment daily    - Set and communicate daily mobility goal to care team and patient/family/caregiver     - Collaborate with rehabilitation services on mobility goals if consulted  - Perform Range of Motion   - Reposition patient   - Stand patient   - Ambulate patient  - Out of bed to chair  - Out of bed for meals   - Out of bed for toileting  - Record patient progress and toleration of activity level   Outcome: Adequate for Discharge     Problem: DISCHARGE PLANNING  Goal: Discharge to home or other facility with appropriate resources  Description: INTERVENTIONS:  - Identify barriers to discharge w/patient and caregiver  - Arrange for needed discharge resources and transportation as appropriate  - Identify discharge learning needs (meds, wound care, etc )  - Arrange for interpretive services to assist at discharge as needed  - Refer to Case Management Department for coordinating discharge planning if the patient needs post-hospital services based on physician/advanced practitioner order or complex needs related to functional status, cognitive ability, or social support system  Outcome: Adequate for Discharge     Problem: NEUROSENSORY - ADULT  Goal: Achieves stable or improved neurological status  Description: INTERVENTIONS  - Monitor and report changes in neurological status  - Monitor vital signs such as temperature, blood pressure, glucose, and any other labs ordered   - Initiate measures to prevent increased intracranial pressure  - Monitor for seizure activity and implement precautions if appropriate      Outcome: Adequate for Discharge  Goal: Achieves maximal functionality and self care  Description: INTERVENTIONS  - Monitor swallowing and airway patency with patient fatigue and changes in neurological status  - Encourage and assist patient to increase activity and self care     - Encourage visually impaired, hearing impaired and aphasic patients to use assistive/communication devices  Outcome: Adequate for Discharge     Problem: RESPIRATORY - ADULT  Goal: Achieves optimal ventilation and oxygenation  Description: INTERVENTIONS:  - Assess for changes in respiratory status  - Assess for changes in mentation and behavior  - Position to facilitate oxygenation and minimize respiratory effort  - Oxygen administered by appropriate delivery if ordered  - Initiate smoking cessation education as indicated  - Encourage broncho-pulmonary hygiene including cough, deep breathe, Incentive Spirometry  - Assess the need for suctioning and aspirate as needed  - Assess and instruct to report SOB or any respiratory difficulty  - Respiratory Therapy support as indicated  Outcome: Adequate for Discharge     Problem: GENITOURINARY - ADULT  Goal: Maintains or returns to baseline urinary function  Description: INTERVENTIONS:  - Assess urinary function  - Encourage oral fluids to ensure adequate hydration if ordered  - Administer IV fluids as ordered to ensure adequate hydration  - Administer ordered medications as needed  - Offer frequent toileting  - Follow urinary retention protocol if ordered  Outcome: Adequate for Discharge  Goal: Absence of urinary retention  Description: INTERVENTIONS:  - Assess patients ability to void and empty bladder  - Monitor I/O  - Bladder scan as needed  - Discuss with physician/AP medications to alleviate retention as needed  - Discuss catheterization for long term situations as appropriate  Outcome: Adequate for Discharge  Goal: Urinary catheter remains patent  Description: INTERVENTIONS:  - Assess patency of urinary catheter  - If patient has a chronic cadet, consider changing catheter if non-functioning  - Follow guidelines for intermittent irrigation of non-functioning urinary catheter  Outcome: Adequate for Discharge     Problem: SKIN/TISSUE INTEGRITY - ADULT  Goal: Skin Integrity remains intact(Skin Breakdown Prevention)  Description: Assess:  -Perform [place] assessment   -Clean and moisturize skin   -Inspect skin when repositioning, toileting, and assisting with ADLS  -Assess under medical devices   -Assess extremities for adequate circulation and sensation     Bed Management:  -Have minimal linens on bed & keep smooth, unwrinkled  -Change linens as needed when moist or perspiring  -Avoid sitting or lying in one position   -Keep HOB     Toileting:  -Offer bedside commode  -Assess for incontinence  -Use incontinent care products after each incontinent episode     Activity:  -Mobilize patient   -Encourage activity and walks on unit  -Encourage or provide ROM exercises   -Turn and reposition patient   -Use appropriate equipment to lift or move patient in bed  -Instruct/ Assist with weight shifting   -Consider limitation of chair time     Skin Care:  -Avoid use of baby powder, tape, friction and shearing, hot water or constrictive clothing  -Relieve pressure over bony prominences   -Do not massage red bony areas    Next Steps:  -Teach patient strategies to minimize risks   -Consider consults to  interdisciplinary teams   Outcome: Adequate for Discharge     Problem: MUSCULOSKELETAL - ADULT  Goal: Maintain or return mobility to safest level of function  Description: INTERVENTIONS:  - Assess patient's ability to carry out ADLs; assess patient's baseline for ADL function and identify physical deficits which impact ability to perform ADLs (bathing, care of mouth/teeth, toileting, grooming, dressing, etc )  - Assess/evaluate cause of self-care deficits   - Assess range of motion  - Assess patient's mobility  - Assess patient's need for assistive devices and provide as appropriate  - Encourage maximum independence but intervene and supervise when necessary  - Involve family in performance of ADLs  - Assess for home care needs following discharge   - Consider OT consult to assist with ADL evaluation and planning for discharge  - Provide patient education as appropriate  Outcome: Adequate for Discharge  Goal: Maintain proper alignment of affected body part  Description: INTERVENTIONS:  - Support, maintain and protect limb and body alignment  - Provide patient/ family with appropriate education  Outcome: Adequate for Discharge     Problem: Nutrition/Hydration-ADULT  Goal: Nutrient/Hydration intake appropriate for improving, restoring or maintaining nutritional needs  Description: Monitor and assess patient's nutrition/hydration status for malnutrition  Collaborate with interdisciplinary team and initiate plan and interventions as ordered  Monitor patient's weight and dietary intake as ordered or per policy  Utilize nutrition screening tool and intervene as necessary  Determine patient's food preferences and provide high-protein, high-caloric foods as appropriate       INTERVENTIONS:  - Monitor oral intake, urinary output, labs, and treatment plans  - Assess nutrition and hydration status and recommend course of action  - Evaluate amount of meals eaten  - Assist patient with eating if necessary   - Allow adequate time for meals  - Recommend/ encourage appropriate diets, oral nutritional supplements, and vitamin/mineral supplements  - Order, calculate, and assess calorie counts as needed  - Recommend, monitor, and adjust tube feedings and TPN/PPN based on assessed needs  - Assess need for intravenous fluids  - Provide specific nutrition/hydration education as appropriate  - Include patient/family/caregiver in decisions related to nutrition  Outcome: Adequate for Discharge

## 2021-08-17 NOTE — PROGRESS NOTES
Internal Medicine Progress Note  Patient: Jack Rosas  Age/sex: 72 y o  male  Medical Record #: [id number]      ASSESSMENT/PLAN: (Interval History)  Jack Rosas is seen and examined and management for following issues:    CVA  · Multiembolic due to hypercoagulable state from cancer  · Cont eliquis/atorvastatin     B/L DVT w/suspected PE  · Continue eliquis     Adenocarcinoma of RUL  · Seen by oncology  · Needs outpatient PET scan for staging     Acute resp failure/emphysema  · Presumed from PE, possibly lung mass  · Smoked 2+ppd and quit 15 yrs ago  · Still requires 2L NC  · At home, he could barely walk to the bathroom 2/2 CALDERON but has improved here possibly since he is wearing O2  · For home O2     Nasopharyngeal mass  · Needs biopsy as outpatient by ENT     Elevated troponin/depressed RVEF  · Needs cardiology f/u as outpatient for repeat ECHO  · Ef 55% +RV hypokinesis     Urine retention/hematuria  · Has cadet  · No hematuria again today  · Hemoglobin stable 8/14  · Uro saw 8/12:             Added Proscar for possible prostatic bleeding in setting of AC             Keep cadet in for now and they will decide when to remove        Discharge date:  [facility]       The above assessment and plan was reviewed and updated as determined by my evaluation of the patient on 8/17/2021      Labs:   Results from last 7 days   Lab Units 08/14/21  0605 08/12/21  0539   WBC Thousand/uL 10 39* 10 44*   HEMOGLOBIN g/dL 10 3* 10 2*   HEMATOCRIT % 34 5* 34 2*   PLATELETS Thousands/uL 319 337     Results from last 7 days   Lab Units 08/16/21  0447 08/12/21  0539   SODIUM mmol/L 138 138   POTASSIUM mmol/L 4 4 4 1   CHLORIDE mmol/L 107 103   CO2 mmol/L 29 31   BUN mg/dL 19 20   CREATININE mg/dL 0 85 0 92   CALCIUM mg/dL 10 6* 10 5*                   Review of Scheduled Meds:  Current Facility-Administered Medications   Medication Dose Route Frequency Provider Last Rate    acetaminophen  650 mg Oral Q6H PRN Adam Smith MD      apixaban 5 mg Oral BID Valentine Recio MD      atorvastatin  40 mg Oral Daily With [employer] Cynthia Goldberg DO      bisacodyl  10 mg Rectal Daily PRN Valentine Recio MD      melatonin  3 mg Oral HS Valentine Recio MD      ondansetron  4 mg Oral Q8H PRN Valentine Recio MD      polyethylene glycol  17 g Oral Daily PRN Valentine Recio MD      QUEtiapine  12 5 mg Oral Daily PRN Valentine Recio MD      senna-docusate sodium  2 tablet Oral BID Aliyah Kebede DO         Subjective/ HPI: Patient seen and examined  Patients overnight issues or events were reviewed with nursing or staff during rounds or morning huddle session  New or overnight issues include the following:     No new or overnight issues  Offers no complaints    ROS:   A 10 point ROS was performed; negative except as noted above         Imaging:     No orders to display       *Labs /Radiology studies reviewed  *Medications reviewed and reconciled as needed  *Please refer to order section for additional ordered labs studies  *Case discussed with primary attending during morning huddle case rounds      Physical Examination:  Vitals:   Vitals:    08/16/21 1415 08/16/21 1636 08/16/21 2139 08/17/21 0606   BP:  135/75 136/74 131/75   BP Location:  Right arm Left arm Left arm   Pulse:  67 66 65   Resp:  20 20 20   Temp:  97 9 °F (36 6 °C) 98 8 °F (37 1 °C) 97 8 °F (36 6 °C)   TempSrc:  Oral Oral Oral   SpO2: (!) 84% 95% 93% 93%   Weight:       Height:           General Appearance: no distress, conversive  HEENT: PERRLA, conjuctiva normal; oropharynx clear; mucous membranes moist   Neck:  Supple, normal ROM, no JVD  Lungs: CTA, normal respiratory effort, no retractions, expiratory effort normal  CV: regular rate and rhythm; no rubs/murmurs/gallops, PMI normal   ABD: soft; ND/NT; +BS  EXT: no edema  Skin: normal turgor, normal texture, no rashes  Psych: affect normal, mood normal  Neuro: AA      The above physical exam was reviewed and updated as determined by my evaluation of the patient on 8/17/2021  Invasive Devices     Drain            Urethral Catheter 16 Fr  10 days                   VTE Pharmacologic Prophylaxis: Eliquis  Code Status: Level 1 - Full Code  Current Length of Stay: 9 day(s)      Total time spent:  30 minutes with more than 50% spent counseling/coordinating care  Counseling includes discussion with patient re: progress  and discussion with patient of his/her current medical state/information  Coordination of patient's care was performed in conjunction with primary service  Time invested included review of patient's labs, vitals, and management of their comorbidities with continued monitoring  In addition, this patient was discussed with medical team including physician and advanced extenders  The care of the patient was extensively discussed and appropriate treatment plan was formulated unique for this patient  ** Please Note:  voice to text software may have been used in the creation of this document   Although proof errors in transcription or interpretation are a potential of such software**

## 2021-08-17 NOTE — CASE MANAGEMENT
Team dc summary - pt made gains but family wished to take him home prior to the maximizing of his los  Family education completed and pt would be continuing with [facility] services for rn pt ot and speech through [facility] [address]  Pt received a wheelchair through [employer] as well as oxygen for home use with activity or when pt requires  Family present for dc instructions

## 2021-08-17 NOTE — PROGRESS NOTES
Chart review complete the patient  8/17/21 to Home with [facility]  I have removed myself off of the care team, added the CM to the care team who will follow the patient through the bundle episode, sent the care manager a maday notifying them of the bundle episode, updated the BPCI form, and updated the care coordination note  Per team conference meeting on 8/11/21 the patient is functioning at the current levels:    Physical Therapy:     Weight Bearing Status: Full Weight Bearing  Transfers: Minimal Assistance, Moderate Assistance  Bed Mobility: Minimal Assistance  Amulation Distance (ft): 90 feet  Ambulation: Minimal Assistance, Moderate Assistance, Assist of 2 (Assist of second person for w/c follow and O2/cadet line mgmt)  Assistive Device for Ambulation: Other (HHA on L)  Number of Stairs: 2  Assistive Device for Stairs: Bilateral Hand Rails  Stair Assistance: Minimal Assistance (Up fwd/down bkwd)  Discharge Recommendations: Home with:  [address] with[de-identified] 24 Hour Supervision, 24 Hour Assisteance, Family Support, Outpatient Physical Therapy, Home Physical Therapy     Occupational Therapy:  Eating: Supervision  Grooming: Supervision  Bathing: Moderate Assistance  Bathing: Moderate Assistance  Upper Body Dressing: Minimal Assistance  Lower Body Dressing: Maximum Assistance  Toileting:  Total Assistance  Cognition: Exceptions to WNL  Cognition: Decreased Memory, Decreased Executive Functions, Decreased Attention, Decreased Comprehension, Decreased Safety, Impulsive  Orientation: Person  Discharge Recommendations:  (pending pt progress)    Speech Therapy:  Mode of Communication: Verbal  Speech/Language: Dysarthia (suspect some degree of receptive and expressive language deficits)  Cognition: Exceptions to WNL  Cognition: Decreased Memory, Decreased Executive Functions, Decreased Attention, Decreased Comprehension, Decreased Safety, Impulsive  Orientation: Person, Place  Swallowing: Exceptions to WNL  Swallowing: Oral Dysphagia, Pharyngeal Dysphagia, Aspiration Risk  Diet Recommendations: Level 3/Denture Soft, Thin  Discharge Recommendations: Home with:  [address] with[de-identified] 24 Hour Supervision, 24 Hour Assisteance, Family Support, Home Speech     This care manager assistant will continue to monitor via chart review throughout bundle episode

## 2021-08-17 NOTE — DISCHARGE INSTRUCTIONS
DISCHARGE INSTRUCTIONS: Edward Lynne [age]    Bring these instructions with you to your Outpatient Physician appointments so they can order and follow-up any additional lab work or imaging recommended at time of discharge  It  you or your caregivers responsibility to obtain follow-up MEDICATION REFILLS  As indicated through your Primary Care Physician (PCP) and other outpatient specialty provider(s) after discharge  Please follow-up with your PCP as soon as possible after discharge to set-up follow-up management and when appropriate refills  You have been determined to have some cognitive impairments  - It is YOUR CAREGIVER'S RESPONSIBILITY to ensure appropriate follow-up which includes:  - APPOINTMENTS are scheduled and safe transportation is arranged  - LABS and IMAGING are completed  - MEDICATION MANAGEMENT at home is carried out appropriately     You remain a fall and injury risk which could be severe  - Your risk of fall has decreased however since admission to acute rehab  Caregiver training has been completed with our staff  - Appropriate supervision +/- assistance as instructed during your rehab course is recommended to decrease risk of fall and injury  If you (or your health care proxy) have any questions or concerns regarding your acute rehabilitation stay including issues with medications, rehabilitation, and follow-up plan, please call:           [address] [place] [address] at [phone] or [phone]  Should you develop fevers, chills, new weakness, changes in sensation, difficulty speaking, facial weakness, confusion, shortness of breath, chest pain, or other concerning symptoms please call 911 and/or obtain transportation to nearest ER immediately  Should you develop feelings of significant hopelessness, severe depression, severe anxiety, or suicide you should call 911 or obtain transportation to nearest ER  National Suicide Prevention Lifeline is 2-270.923.9001 and is available 24 hrs/day  PHYSICIANS to see:  Please see your doctors listed in the follow up providers section of your discharge paperwork, and take the discharge paperwork with you to your appointments  Home health has been ordered for you: Your home health agency should reach out to you or your family soon to arrange follow-up  (If [facility] is your provider their phone number is [phone])    If you are unable to get in touch with home health you may contact your  at [phone]  [place]      LAB WORK to recommended after discharge: Follow-up lab work at discretion of your outpatient physicians to be determined at time of your future appointments  Obtain the following lab orders and follow-up management through PCP and outpatient specialists  CBC and BMP to monitor hemoglobin, white blood cell, electrolytes and kidney function at next visit within about 1 week     Excessive delay in labs and appropriate follow-up could potentially increase your risk of complications which could be severe and even life-threatening  It is you or your caregiver's responsibility to ensure these tests are ordered by your outpatient providers and followed up with accordingly  IMAGING to follow-up:  Follow-up imaging as discussed with your recent physicians or at discretion of your outpatient physicians to be determined at time of your appointments  Excessive delay in imaging and appropriate follow-up could potentially increase your risk of complications which could be severe and even life-threatening  It is you or your caregiver's responsibility to ensure these tests are ordered by your outpatient providers and followed up with accordingly  ADDITIONAL FINDINGS and ISSUES to follow-up:    Check under the "DISCHARGE PROVIDER" section of these DISCHARGE INSTRUCTIONS for provider specific issues as well        Right upper lobe lung mass consistent with lung cancer  - Follow-up with oncology who will likely also refer you to radiation oncology as well as pulmonary specialists    Nasopharyngeal mass  - Follow-up with ENT specialist to obtain biopsy and follow-up management  Also follow-up with oncology  Respiratory failure requiring oxygen and likely lung clot  - Follow-up with pulmonary     Stroke history  - Follow-up with neurology    Urinary retention and blood in urine  - Follow-up with urology    Elevated heart enzymes and abnormal right ventricle function   - Follow-up with cardiology - will need follow-up echocardiogram as well     Leg clots  - Follow-up with hematology/oncology    Excessive delay or lack of appropriate follow-up could potentially increase your risk of complications which could be severe and even life-threatening  It is you or your caregiver's responsibility to ensure these tests are ordered by your outpatient providers and followed up with accordingly  SKIN CARE INSTRUCTIONS to follow:    Monitor skin for increased redness or breadown and promptly notify your physician should these develop    Be sure you stand and walk some every 1-2 hours during day (with supervision/assist)  While seated or lying in bed shift positions and from side to side often  Can use barrier type cream such as Hydragaurd 1-2 times per day  Turn patient (yourself) every 2 hours  Due to the following you are at increased risk of skin breakdown:  - Impaired mobility  - Medical co-morbidities    WEIGHTBEARING/ACTIVITY PRECAUTIONS to follow:  Weightbearing as tolerated  Driving restrictions: You are recommended against driving until cleared by an outpatient physician and cleared through a formal driving evaluation  Driving at current time can increase your risk of injury and can increase risk of injury of others        **As outlined in [address], healthcare personnel are required to report every person over 13years of age diagnosed as having a condition that could impair their ability to drive, with the exception of medical condition expected to last less than 90 days (most commonly orthopedic fractures and post orthopedic surgery conditions without other co-morbidities)  Your medical condition hopefully will have adequately improved by that time but at this time it is not certain  Work restrictions: You should NOT return to work (if working) until cleared by an outpatient physician  You should not operate heavy machinery (if applicable) until cleared by an outpatient physician  Alcohol restrictions: You are recommended to not drink alcohol at this time unless cleared by an outpatient physician  Drinking alcohol in your current functional condition can increase your risk of injury which could be severe  Drinking alcohol given your current health problems can lead to increased medical complications which could be severe  Combining alcohol with your current medications can increase your risk of injury which could be severe  Smoking restrictions: You are recommended to not smoke nicotine  Smoking increases your risk of heart attack, stroke, emphysema/COPD, and lung cancer  Cannabis restrictions: You are recommended to not smoke/ingest/consume/use cannabis/marijuana at this time unless cleared by an outpatient physician  Cannabis use/intoxication in your current functional condition can increase your risk of injury which could be severe  MEDICATIONS:  Please see a full list of your medications outlined in the After Visit Summary that is attached to these Discharge Instructions  Please note changes may have been made to your medications please refer to your discharge paperwork for your current medications and take this list with you to all your doctors appointments for your doctors to review    Please do not resume a home medication unless the medication reconciliation sheet indicates to do so, please do not assume that a medication that you were given a prescription for is the same as a medication you have at home based on both medications having the same name as dosages and frequency may have changed  Unless specifically noted in your medication list provided to you in your discharge paper work do not resume prior vitamins, minerals, or supplements you may have been taking prior to your hospitalization unless instructed by an outpatient physician in the future  Blood thinners prescribed to optimize long and short term health and decrease risk of complications but with risks related to bleeding and other complications  Eliquis (Apixiban) instructions: You have been prescribed apixiban(Eliquis)  This medication is used to prevent clots which can cause severe disability and even death  This medication will need to be managed by your outpatient physician(s) after discharge  Follow-up with the appropriate provider as soon as possible to ensure appropriate use  This is a strong anticoagulant (blood thinner)  It is being recommended for use by you (or your family) to decrease the risk of clotting which can be severely disabling and even life-threatening  Even when provided as recommended it can cause severe disabling and even life-threatening bleeding  Too much or too little of this blood thinner further increases your risk of this medication causing serious and even life-threatening complications such as severe bleeding, clots, strokes, and death  With that said, at this time based on available evidence and consensus agreement, your physicians recommend you take Eliquis (Mitzi Graven) medication based on your overall risks and benefits in your specific medical situation        If you (or your family/caregiver) notice black stools, bloody stools, vomit blood, develop new weakness, slurred speech, confusion or have any other concerning symptoms call 911 or obtain transportation to nearest emergency room immediately  Sedating Medications with increased risk of complications:    Melatonin has been used to help you sleep  You tolerated this medication adequately during your recent hospital stay  Nevertheless, the medication can increase your risk of confusion and falls (injury) which we have discussed with your prior to discharge  If you developing any concerning symptoms stop medication and notify your physician  Psychotropic Medications (Medications intended to improve mental health and safety)  You were evaluated recently and found to have signs and symptoms of labile (unstable) mood insomnia that can negatively impact your function, sleep, safety, and quality of life  You were managed by both medications and non-pharmacologic (non-medication) methods to try to improve mood sleep    Non-pharmacologic methods included supportive counseling and discussion of deep breathing/relaxation/behavioral management techniques, and skilled functional therapies  For uncontrolled agitation, restlessness you will be prescribed limited prescription of quetiapine (Seroquel)  This medication can slightly increase risk of mortality (death) but prolonged confusion and agitation can also lead to significant side effects and at this time you have been recommended this medication only if absolutely needed  You and your family were discussed risks  MEDICAL MANAGEMENT AT HOME specific to you:    Low oxygen: You were co-managed by internal medicine and recommended to follow-up with PULMONARY, PCP, AND CARDIOLOGY after discharge  You are recommended to use approximately 2 L of home oxygen with activity (and if SpO2 <89%) with goal Oxygen Saturation >89%  Obtain pulse oximeter to check and adjust oxygen accordingly    Obtain transport to ER or call 911 if oxygen levels drop below 89% and you need more than 4 L of oxygen or you have increased shortness or breath, chest pain, or confusion  Case management has set-up home health vendor to arrange home oxygen  Direct calls to vendor as related to issues obtaining oxygen or with the equipment itself  - If unable to obtain adequate assistance or are unable to reach the vendor contact   - [place]  at [phone]    - [place]  at [phone]  - If you are still unable to obtain adequate assistance you may call the rehab unit below until your are established back with your outpatient providers  - [address] [place] [address] in [place] at [phone] or [phone]  - If you have increased shortness of breath, chest pain, increased confusion, or unable to keep oxygen adequately elevated call 911 or obtain immediate transport to ED  [place] management  - Continue as taught during [facility] course; follow-up with home health nursing and outpatient urology     NSAID Warning:  Please avoid NSAID (including but not limited to advil, aleve, motrin, naproxen, ibuprofen, mobic, meloxicam, diclofenac etc) medications as NSAID medications may increase your risk of bleeding (which can be life-threatening)  Please note a summary of your hospital stay with relevant information for your doctors will try to be sent to them  Please confirm with your doctors at your follow up visits that they have received this summary and have them contact [address] [place] [address] if they have not received them along with any other medical records they may require  [place] Phone Number:  [phone]    Non-traumatic brain injury    WHAT YOU NEED TO KNOW:   Brain injury/damage  It developed because of injury to your brain related to stroke  Symptoms may be mild or severe, and may be short-term or long-term  DISCHARGE INSTRUCTIONS:   Call 911 or have someone else call for any of the following or obtain transportation to nearest emergency room right away:   · You cannot be woken     · You had a seizure  · You develop increased difficulty speaking, weakness, sensation changes, facial drooping  · Symptoms are worsening   Contact your healthcare provider if:   · You have a fever  · You are sleeping more than usual  · You dizzy or lightheaded  (If worsen or due not improve in short period see physician right away)  · You have questions or concerns about your condition or care  Medicines:    · Take your medicine as directed  Contact your healthcare provider if you think your medicine is not helping or if you have side effects  Carry your medicine list with you in case of an emergency  Brain injury management plan:     · Follow-up routinely with Primary Care Physician  · Follow-up with Neurology   · Follow-up with Hematology   · If needed follow-up with Psychiatry/Psychology (Behavioral health specialists)   · If needed follow-up with Physical Medicine & Rehabilitation    · If you need assistance prior to your outpatient follow-up appointments call:  · Akua Boucher in [facility] at [phone] or [phone]   · [facility] at [facility] at [phone]  · Follow-up with the following therapies after discharge:    · Physical therapy Occupation therapy Speech therapy     · The brain like other parts of the body takes time to heal   You need both therapy/activity and appropriate rest to heal optimally  · Staying active, sleeping well, eating well, staying appropriately hydrated, and optimal stress management are keys to an optimal recovery  · Be patient and use relaxation and deep breathing technique to minimize stress  · Eat a well-balanced diet and stay hydrated with appropriate amount of fluids    · Consider Mediterranean-style meals - healthy diet including olive oil, fruits, vegetables, nuts, beans, and whole grains has been proven to improve thinking, memory, and brain health  · Limit saturated fats, high calorie diets, and excessive carbs   · Try to get a full nights rest with goal of sleep at least 8 hours  · Limit overstimulation (loud music, overstimulating movie, prolonged computer or tv time) prior to bed   · Avoid alcohol as this can have a toxic effect on the brain   · Avoid illicit drugs   · Avoid cannabis unless cleared by another licensed healthcare provider as an outpatient in the future     · If you (or your caregiver notices that you) develop vomiting, worsening headache, visual changes, room spinning, difficulty speaking, facial weakness/drooping, weakness of arms or legs, confusion, difficulty waking up; you (or your family) should call 911 or you should obtain safe transportation to nearest emergency room immediately  · If you (or your caregiver notices that you) develop uncontrolled emotions such as uncontrolled anger, severe depression and/or severe anxiety and you want to harm yourself or others you (or your caregiver) should call 911 and/or an emergency mental health provider right away   · It is not uncommon to have some anxiety and depression during the recovery process but there is plenty of hope, treatment, and resources available  · Do not hesitate to reach out to friends, family, physician, mental health providers, therapists, and other community resources  · If you develop headaches, restlessness, frustration, or tiredness you should stop activity and rest minimum 1 hour  Follow up with your healthcare provider as directed:  Write down your questions so you remember to ask them during your visits  Additional resources for you and your family:  [facility] - [address] 9-151-57-PVTZZ ([phone])  [employer] [place] - [employer]urate [employer] com  Brain Injury Association [facility] - Accurate Group [facility]  BIAPA - Brain Injury Resource Line 5-694.714.9464   Staffed by volunteers who are able to provide information about resources that may be of help to people with brain injury or their family  Brain Injury Support Group Listings can be found at - https://biapa org/programs/support-group-listing/  [facility])  Name: Napoleon Velez Brain Injury Support Group  Location: [facility] [address] [place] [address], 2nd Floor Conference Room  Address: [address] [place] [address], [facility], [facility] [address]   Contacts: Erven Severs: [phone]  Website: www [employer] org/biplv  Meeting Day: 3rd Thursday of each month  Meeting Time: 6:30 PM  Format: Group discussion, guest speakers, social and emotional  support, social events, agenda determined by the participants

## 2021-08-17 NOTE — PROGRESS NOTES
In-basket notification that patient was D/C'd from [facility] on 8/17/21  Patient received a W/C and also oxygen Patient has received PACE  Patient was in [facility] 8/8/21-8/17/21 s/p CVA  This RN care manager will outreach patient next week

## 2021-08-17 NOTE — NURSING NOTE
Reviewed discharge instructions with the patients spouse, daughter, son and patient  All questions answered  Patient discharged with cadet in place with instructions to ensure to follow up with urology as well  Education provided to family in regards to properly provide cadet care  In addition patient was given education regarding oxygen  Script provided to the family for evaluation of home conserving device which was then faxed to [place]  Patient did drop down to 88% on room air at rest, however did increase to 92% on 2L of oxygen  Patient min assist x 1 with the use of a RW  Continent of bowel  Family stated that they understood all d/c instructions and would be following up with scheduling follow up appointments once they got home

## 2021-08-18 ENCOUNTER — TELEPHONE (OUTPATIENT)
Dept: OTHER | Facility: OTHER | Age: 71
End: 2021-08-18

## 2021-08-18 ENCOUNTER — TELEPHONE (OUTPATIENT)
Dept: NEUROLOGY | Facility: CLINIC | Age: 71
End: 2021-08-18

## 2021-08-18 ENCOUNTER — TELEPHONE (OUTPATIENT)
Dept: SURGICAL ONCOLOGY | Facility: CLINIC | Age: 71
End: 2021-08-18

## 2021-08-18 NOTE — TELEPHONE ENCOUNTER
Called Latisha nurse back and explained she would have to have PCP write orders to flush as we had not seen him yet   Will find appointment and call Corin's family

## 2021-08-18 NOTE — TELEPHONE ENCOUNTER
Sched HFU appt 10/22/2021 with Dr Blair Martinez in [place]  Pt kindly declined PMR hfu appt  Mailed NP paperwork to pt's home  SLB/Rt Hemiparesis/Medicare    PMR/SLB/Medicare    NOTE FROM CHART:  Reason for Consult / Principal Problem: Right hemiparesis   [place] will need follow up in in 4 weeks with neurovascular attending  He will not require outpatient neurological testing

## 2021-08-18 NOTE — SPEECH THERAPY NOTE
SLP Discharge Summary      Pt was discharged home w/ family support/supervision on 8/17/2021  At time of discharge, pt was able to achieve 4/6 LTG's as established on initial assessment  Pt did make slow gains while on the acute rehab but primary barriers which continued to present were decreased ST/working memory as well as decreased executive function skills, to which increased family education/training had occurred due to pt's need for 24/7 supervision/assistance at time of discharge  Pt was followed for dysphagia, speech/language/cognitive tx sessions  In regards to pt's swallow function, pt initially demonstrated mild oropharyngeal sxs due to fatigue and fluctuating KALLI which increased mastication time is noted along w/ decreased bolus control/transfer of thin liquids  Swallow initiation was mildly delayed across consistencies to where pt does exhibit weak cough given thin liquids  However, it is noted that in f/u session where pt is awake/alert, pt's swallow function is improved w/o exhibiting increased oropharyngeal or aspiration sxs  Due to pt's initial fluctuations given KALLI, continued to recommend level 3 diet w/ thin liquids but will continue to benefit from SLP services to maximize swallow function and establish safest least restrictive diet w/o increased aspiration sxs  Pt was able to be observed across various meals throughout the day (breakfast, lunch and dinner) to which overall wakefulness had improved to which pt's swallow function was noted to be fairly functional given both oral and pharyngeal stages of swallow  Diet was able to be advanced accordingly to regular diet w/ thin liquids but continued Aspiration Precautions during meals, being OOB for ALL meals, slow rate of intake, small bites/sips  Family education provided to wife/dtr in regards to swallow strategies/function as well  As for pt's speech/language/cognitive skills, it heavily depends on pt's overall KALLI and fatigue levels  Pt was noted to demonstrate mild dysarthria as well as some difficulty in word finding and pt's orientation waxes  Again, when KALLI improved, orientation is still off but some improvement in expression/comprehension skills and basic cognitive skills  Other barriers besides pt's fatigue include decreased comprehension, decreased word finding, decreased ST/working memory, decreased problem solving, decreased sequencing, decreased organization of thoughts decreased insight to deficits which impacts current safety and functional mobility  As pt's overall wakefulness improved, so did pt's overall language skills, to which it was more so noted that pt's cognitive deficits were the most forefront barrier, which included decreased attention, ST/workign memory and executive function skills  Additionally pt exhibiting confusion throughout the day  Family education again provided through multiple sessions, in which pt's wife/dtr asking questions as needed and highly appropriate  SLP also providing update from past sessions in regards to speech/langauge skills, to which language does appear to be functional not when demonstrating confusion or fatigue  SLP asked family for feedback in regards to current speech clarity, which the did state his speech remains impaired but not too far off from his normal  What family did express most concern was in regards to pt's overall cognition as pt currently waxing in regards to overall clarity  SLP providing family w/ basic education in regards to pt's stroke and the cognitive effects which occur in this acute phase  [place] education provided to wife/pt on last day to increase awareness of risk factors as well as increasing knowledge given signs/sxs of stroke  At time of discharge, pt will continue to benefit from home care SLP services to maximize overall functional independence of speech/language/cognitive skills to decreased burden of care for family at discharge

## 2021-08-18 NOTE — PROGRESS NOTES
08/18/21 1308   Hello, [Guardians Name / Andrey Paulino, this is [Caller Chun Nearing from [facility], and our clinical care team wanted to check on you / your child after your recent visit to the hospital  It will only take 3-5 minutes  Is this a good time? Discharge Call Type/ Specific Diagnosis: General Call   General Discharge Phone Call   Were your/your child's discharge instructions clear and understandable? Please tell me in your own words how to care for yourself/your child now that you're home Yes;Patient understood instructions   Have you filled your/your child's new prescriptions yet? Yes   What questions do you have about those medications? No questions   Are you/your child having any unusual symptoms or problems? (Specific to problem- i e , dressing, pain, bruising or swelling, procedure, etc ) No reported symptoms/problems   Do you have follow up appointment with your/your child's physician? Yes   Is there anything preventing you from keeping that appointment? No;Patient able to keep appointment   Are there any physicians, nurses, or hospital staff you would like us to recognize for doing a very good job? Nurse;PCA/Tech;PT/OT/RT/SLP  (everyone did a great job)   Thank you for taking the time to share with me about your care and recovery  Do you have any suggestions for us? No   This call resulted in: No interventions needed   Call Complete   Attempted Number of Calls 1   Discharge phone call complete?  Complete

## 2021-08-18 NOTE — TELEPHONE ENCOUNTER
Pt has been scheduled with Dr Deep Parker in [facility], per pt's request, on 9/3/21

## 2021-08-18 NOTE — TELEPHONE ENCOUNTER
Intake received  Insurance reviewed  Pt outreach to follow    Called pt to see if there is a supplemental plan & spoke to his wife danny she sd no other ins  Told her after the appt I will revw the plan & get back to her   She thanked me for the bonnie

## 2021-08-18 NOTE — TELEPHONE ENCOUNTER
Okay to provide flush orders to [facility]    Patient can be scheduled for combined nurse/provider visit in the next 1-2 weeks to establish care and perform void trial

## 2021-08-18 NOTE — TELEPHONE ENCOUNTER
Latisha from [facility] admitted patient to their service and is calling for orders and to schedule a new patient visit for follow up within a week at the [place] office  Patient will be on their service for a couple of weeks  Patient was discharged from rehab on 8/8/21 with a 12 F cadet catheter for urinary retention post stroke  Ian Boucher would like orders to flush and irrigate catheter if needed  She is also requesting orders to change catheter and how often if needed  Patient's catheter is draining darker urine  Denies any additional symptoms or irritation  I attempted to schedule a new patient visit with a physician in the [place] and [place] offices and could not find an appointment within a week  Please review and call Ian Boucher or patient's wife to schedule

## 2021-08-18 NOTE — TELEPHONE ENCOUNTER
New Patient Encounter    New Patient Intake Form   Patient Details:  Barrett Stearns  1950  [id number]    Background Information:  [address] starts by opening a telephone encounter and gathering the following information   Who is calling to schedule? If not self, relationship to patient? Spouse   Referring Provider Hospital f/u   What is the diagnosis? R91 8 (ICD-10-CM) - Mass of upper lobe of right lung   Is this Cancer or Non-Cancer? Cancer   Is this diagnosis confirmed? Yes   When was the diagnosis? 8/2021   Is there a confirmed diagnosis from a biopsy/tissue reviewed by pathology? Yes   Were outside slides requested? No   Is patient aware of diagnosis? Yes   Is there a personal history and what kind? No   Is there a family history and what kind? Yes  Brother prostate and brother lung   Reason for visit? Hospital Follow Up   Have you had any imaging or labs done? If so: when, where? yes  [facility]   Are records in [facility]? yes   Are records needed form an outside facility? No   If yes, name, city, state where facility is located  If patient has a prior history of cancer were old records obtained? NA   Was the patient told to bring a disk? No   Does the patient smoke or Vape? no   If yes, how many packs or cartridges per day? Scheduling Information:   Preferred [facility]:  [facility]     Are there any dates/time the patient cannot be seen? Miscellaneous: Sending to Nurse Navigator for scheduling instructions   After completing the above information, please route to Financial Counselor and the appropriate Nurse Navigator for review

## 2021-08-18 NOTE — TELEPHONE ENCOUNTER
Spoke with Roxie Abreu his wife and scheduled him with Dr Aubree Vidal on Friday @ 1;00, She is aware of location

## 2021-08-20 ENCOUNTER — OFFICE VISIT (OUTPATIENT)
Dept: UROLOGY | Facility: HOSPITAL | Age: 71
End: 2021-08-20
Payer: MEDICARE

## 2021-08-20 VITALS
SYSTOLIC BLOOD PRESSURE: 130 MMHG | HEART RATE: 67 BPM | HEIGHT: 68 IN | WEIGHT: 184 LBS | BODY MASS INDEX: 27.89 KG/M2 | DIASTOLIC BLOOD PRESSURE: 60 MMHG

## 2021-08-20 DIAGNOSIS — Z97.8 FOLEY CATHETER IN PLACE: ICD-10-CM

## 2021-08-20 DIAGNOSIS — N40.1 BPH WITH URINARY OBSTRUCTION: Primary | ICD-10-CM

## 2021-08-20 DIAGNOSIS — N13.8 BPH WITH URINARY OBSTRUCTION: Primary | ICD-10-CM

## 2021-08-20 DIAGNOSIS — R33.9 URINARY RETENTION: ICD-10-CM

## 2021-08-20 PROCEDURE — 99214 OFFICE O/P EST MOD 30 MIN: CPT | Performed by: UROLOGY

## 2021-08-20 RX ORDER — TAMSULOSIN HYDROCHLORIDE 0.4 MG/1
CAPSULE ORAL
Qty: 30 CAPSULE | Refills: 3 | Status: SHIPPED | OUTPATIENT
Start: 2021-08-20 | End: 2021-12-06

## 2021-08-20 NOTE — PROGRESS NOTES
HISTORY:    Follow-up for hospital care, urinary retention over the past month or so after a stroke  Patient denies any pre-existing symptoms, but wife said he did have slow flow, nocturia times 2-4  Since the episode retention, has failed one voiding trial, urine clear through Hampton         ASSESSMENT / PLAN:    I have given written instructions to home care nurses to remove the Hampton next week  I asked him to be available to replace the Hampton if not voiding well within 24 hours    If Hampton does have to be replaced, our next step will be cystoscopy to further evaluate    Will start tamsulosin, warned about side effects of dizziness, lightheaded, low blood pressure  Patient does not have hypertension does not take antihypertensives at this point  Wife understands this potential side effects and will monitor things carefully    The following portions of the patient's history were reviewed and updated as appropriate: allergies, current medications, past family history, past medical history, past social history, past surgical history and problem list     Review of Systems   All other systems reviewed and are negative  Objective:     Physical Exam  Constitutional:       General: He is not in acute distress  Appearance: He is well-developed  He is not diaphoretic  HENT:      Head: Normocephalic and atraumatic  Eyes:      General: No scleral icterus  Pulmonary:      Effort: Pulmonary effort is normal    Genitourinary:     Comments: Penis testes some atrophy    Prostate moderately enlarged no nodules  Skin:     Coloration: Skin is not pale  Neurological:      Mental Status: He is alert and oriented to person, place, and time  Psychiatric:         Behavior: Behavior normal          Thought Content:  Thought content normal          Judgment: Judgment normal            No results found for: PSA]  BUN   Date Value Ref Range Status   08/16/2021 19 5 - 25 mg/dL Final     Creatinine   Date Value Ref Range Status   08/16/2021 0 85 0 60 - 1 30 mg/dL Final     Comment:     Standardized to IDMS reference method     No components found for: CBC      Patient Active Problem List   Diagnosis    Acute CVA [facility]cerebrovascular accident) ([facility] [address] )    Elevated troponin    Acute respiratory failure with hypoxia ([facility])    Acute deep vein thrombosis (DVT) of distal vein of both lower extremities (HCC)    Nasopharyngeal mass    Dysphagia    Mass of upper lobe of right lung    Urinary retention    Constipation    Anemia    Leukocytosis    Suspected pulmonary embolism    Chronic combined systolic and diastolic CHF (congestive heart failure) ([facility] [address] )    Respiratory failure with hypoxia ([facility])    Impaired cognition    Impaired mobility and ADLs        Diagnoses and all orders for this visit:    BPH with urinary obstruction  -     tamsulosin (FLOMAX) 0 4 mg; Once a day right after supper    Urinary retention  -     Ambulatory referral to Urology    Hampton catheter in place  -     Ambulatory referral to Urology           Patient ID: Anita Chin is a 72 y o  male        Current Outpatient Medications:     acetaminophen (TYLENOL) 325 mg tablet, Take 2 tablets (650 mg total) by mouth 4 (four) times a day as needed for mild pain, headaches or fever, Disp: , Rfl: 0    apixaban (ELIQUIS) 5 mg, Take 1 tablet (5 mg total) by mouth 2 (two) times a day, Disp: 60 tablet, Rfl: 2    atorvastatin (LIPITOR) 40 mg tablet, Take 1 tablet (40 mg total) by mouth daily with dinner, Disp: 30 tablet, Rfl: 2    bisacodyl (DULCOLAX) 10 mg suppository, Insert 1 suppository (10 mg total) into the rectum daily as needed (Constipation - 3rd line), Disp: 3 suppository, Rfl: 0    melatonin 3 mg, Take 1 tablet (3 mg total) by mouth daily at bedtime as needed (Sleep), Disp: 30 tablet, Rfl: 0    polyethylene glycol (GLYCOLAX) 17 GM/SCOOP powder, Take 17 g by mouth daily as needed (Constipation), Disp: 255 g, Rfl: 0    QUEtiapine (SEROquel) 25 mg tablet, Take 0 5 tablets (12 5 mg total) by mouth daily as needed (Significant/uncontrolled agitation or restlessness not controlled by non-pharmacologic means), Disp: 5 tablet, Rfl: 0    senna-docusate sodium (SENOKOT S) 8 6-50 mg per tablet, Take 2 tablets by mouth 2 (two) times a day, Disp: 60 tablet, Rfl: 0    tamsulosin (FLOMAX) 0 4 mg, Once a day right after supper, Disp: 30 capsule, Rfl: 3    Past Medical History:   Diagnosis Date    Lung cancer ([facility] [address] )     Stroke [facility])        Past Surgical History:   Procedure Laterality Date    IR BIOPSY LUNG  8/5/2021       Social History

## 2021-08-20 NOTE — PATIENT INSTRUCTIONS
To visiting nurses:  Please remove Hampton next week, in the morning  Be available within the next 24 hours to replace catheter if not voiding well

## 2021-08-21 ENCOUNTER — HOSPITAL ENCOUNTER (INPATIENT)
Facility: HOSPITAL | Age: 71
LOS: 3 days | Discharge: HOME HEALTH | DRG: 299 | End: 2021-08-24
Attending: EMERGENCY MEDICINE | Admitting: HOSPITALIST
Payer: MEDICARE

## 2021-08-21 ENCOUNTER — APPOINTMENT (EMERGENCY)
Dept: NON INVASIVE DIAGNOSTICS | Facility: HOSPITAL | Age: 71
DRG: 299 | End: 2021-08-21
Payer: MEDICARE

## 2021-08-21 DIAGNOSIS — I82.403 ACUTE DEEP VEIN THROMBOSIS (DVT) OF BOTH LOWER EXTREMITIES, UNSPECIFIED VEIN (HCC): Primary | ICD-10-CM

## 2021-08-21 DIAGNOSIS — S06.9X9A: ICD-10-CM

## 2021-08-21 DIAGNOSIS — R45.1 RESTLESSNESS AND AGITATION: ICD-10-CM

## 2021-08-21 PROBLEM — D63.8 ANEMIA OF CHRONIC DISEASE: Status: ACTIVE | Noted: 2021-08-08

## 2021-08-21 PROBLEM — C34.90 ADENOCARCINOMA, LUNG (HCC): Status: ACTIVE | Noted: 2021-08-21

## 2021-08-21 LAB
ANION GAP SERPL CALCULATED.3IONS-SCNC: 2 MMOL/L (ref 4–13)
APTT PPP: 104 SECONDS (ref 23–37)
APTT PPP: 30 SECONDS (ref 23–37)
ATRIAL RATE: 65 BPM
BASOPHILS # BLD AUTO: 0.09 THOUSANDS/ΜL (ref 0–0.1)
BASOPHILS NFR BLD AUTO: 1 % (ref 0–1)
BUN SERPL-MCNC: 12 MG/DL (ref 5–25)
CALCIUM SERPL-MCNC: 10 MG/DL (ref 8.3–10.1)
CHLORIDE SERPL-SCNC: 106 MMOL/L (ref 100–108)
CO2 SERPL-SCNC: 28 MMOL/L (ref 21–32)
CREAT SERPL-MCNC: 0.8 MG/DL (ref 0.6–1.3)
EOSINOPHIL # BLD AUTO: 0.13 THOUSAND/ΜL (ref 0–0.61)
EOSINOPHIL NFR BLD AUTO: 1 % (ref 0–6)
ERYTHROCYTE [DISTWIDTH] IN BLOOD BY AUTOMATED COUNT: 16.1 % (ref 11.6–15.1)
GFR SERPL CREATININE-BSD FRML MDRD: 91 ML/MIN/1.73SQ M
GLUCOSE SERPL-MCNC: 83 MG/DL (ref 65–140)
HCT VFR BLD AUTO: 34.6 % (ref 36.5–49.3)
HGB BLD-MCNC: 10.4 G/DL (ref 12–17)
IMM GRANULOCYTES # BLD AUTO: 0.05 THOUSAND/UL (ref 0–0.2)
IMM GRANULOCYTES NFR BLD AUTO: 1 % (ref 0–2)
INR PPP: 1.16 (ref 0.84–1.19)
LYMPHOCYTES # BLD AUTO: 2.03 THOUSANDS/ΜL (ref 0.6–4.47)
LYMPHOCYTES NFR BLD AUTO: 18 % (ref 14–44)
MCH RBC QN AUTO: 25.1 PG (ref 26.8–34.3)
MCHC RBC AUTO-ENTMCNC: 30.1 G/DL (ref 31.4–37.4)
MCV RBC AUTO: 83 FL (ref 82–98)
MONOCYTES # BLD AUTO: 0.82 THOUSAND/ΜL (ref 0.17–1.22)
MONOCYTES NFR BLD AUTO: 7 % (ref 4–12)
NEUTROPHILS # BLD AUTO: 7.93 THOUSANDS/ΜL (ref 1.85–7.62)
NEUTS SEG NFR BLD AUTO: 72 % (ref 43–75)
NRBC BLD AUTO-RTO: 0 /100 WBCS
P AXIS: 44 DEGREES
PLATELET # BLD AUTO: 256 THOUSANDS/UL (ref 149–390)
PMV BLD AUTO: 9.9 FL (ref 8.9–12.7)
POTASSIUM SERPL-SCNC: 4.2 MMOL/L (ref 3.5–5.3)
PR INTERVAL: 144 MS
PROTHROMBIN TIME: 14.8 SECONDS (ref 11.6–14.5)
QRS AXIS: 129 DEGREES
QRSD INTERVAL: 84 MS
QT INTERVAL: 396 MS
QTC INTERVAL: 411 MS
RBC # BLD AUTO: 4.15 MILLION/UL (ref 3.88–5.62)
SODIUM SERPL-SCNC: 136 MMOL/L (ref 136–145)
T WAVE AXIS: 33 DEGREES
TROPONIN I SERPL-MCNC: 0.38 NG/ML
VENTRICULAR RATE: 65 BPM
WBC # BLD AUTO: 11.05 THOUSAND/UL (ref 4.31–10.16)

## 2021-08-21 PROCEDURE — 85610 PROTHROMBIN TIME: CPT | Performed by: EMERGENCY MEDICINE

## 2021-08-21 PROCEDURE — 36415 COLL VENOUS BLD VENIPUNCTURE: CPT | Performed by: EMERGENCY MEDICINE

## 2021-08-21 PROCEDURE — 99285 EMERGENCY DEPT VISIT HI MDM: CPT

## 2021-08-21 PROCEDURE — 85025 COMPLETE CBC W/AUTO DIFF WBC: CPT | Performed by: EMERGENCY MEDICINE

## 2021-08-21 PROCEDURE — 80048 BASIC METABOLIC PNL TOTAL CA: CPT | Performed by: EMERGENCY MEDICINE

## 2021-08-21 PROCEDURE — 99285 EMERGENCY DEPT VISIT HI MDM: CPT | Performed by: EMERGENCY MEDICINE

## 2021-08-21 PROCEDURE — 85730 THROMBOPLASTIN TIME PARTIAL: CPT | Performed by: EMERGENCY MEDICINE

## 2021-08-21 PROCEDURE — 84484 ASSAY OF TROPONIN QUANT: CPT | Performed by: EMERGENCY MEDICINE

## 2021-08-21 PROCEDURE — 93970 EXTREMITY STUDY: CPT | Performed by: SURGERY

## 2021-08-21 PROCEDURE — 99223 1ST HOSP IP/OBS HIGH 75: CPT | Performed by: INTERNAL MEDICINE

## 2021-08-21 PROCEDURE — 93005 ELECTROCARDIOGRAM TRACING: CPT

## 2021-08-21 PROCEDURE — 85730 THROMBOPLASTIN TIME PARTIAL: CPT | Performed by: INTERNAL MEDICINE

## 2021-08-21 PROCEDURE — 93010 ELECTROCARDIOGRAM REPORT: CPT | Performed by: INTERNAL MEDICINE

## 2021-08-21 PROCEDURE — 93970 EXTREMITY STUDY: CPT

## 2021-08-21 PROCEDURE — 87186 SC STD MICRODIL/AGAR DIL: CPT | Performed by: EMERGENCY MEDICINE

## 2021-08-21 PROCEDURE — 87086 URINE CULTURE/COLONY COUNT: CPT | Performed by: EMERGENCY MEDICINE

## 2021-08-21 RX ORDER — OXYCODONE HYDROCHLORIDE 5 MG/1
5 TABLET ORAL EVERY 4 HOURS PRN
Status: DISCONTINUED | OUTPATIENT
Start: 2021-08-21 | End: 2021-08-24 | Stop reason: HOSPADM

## 2021-08-21 RX ORDER — ONDANSETRON 2 MG/ML
4 INJECTION INTRAMUSCULAR; INTRAVENOUS EVERY 4 HOURS PRN
Status: DISCONTINUED | OUTPATIENT
Start: 2021-08-21 | End: 2021-08-24 | Stop reason: HOSPADM

## 2021-08-21 RX ORDER — TAMSULOSIN HYDROCHLORIDE 0.4 MG/1
0.4 CAPSULE ORAL
Status: DISCONTINUED | OUTPATIENT
Start: 2021-08-21 | End: 2021-08-24 | Stop reason: HOSPADM

## 2021-08-21 RX ORDER — HEPARIN SODIUM 1000 [USP'U]/ML
3200 INJECTION, SOLUTION INTRAVENOUS; SUBCUTANEOUS
Status: ACTIVE | OUTPATIENT
Start: 2021-08-21 | End: 2021-08-23

## 2021-08-21 RX ORDER — LANOLIN ALCOHOL/MO/W.PET/CERES
3 CREAM (GRAM) TOPICAL
Status: DISCONTINUED | OUTPATIENT
Start: 2021-08-21 | End: 2021-08-22

## 2021-08-21 RX ORDER — HEPARIN SODIUM 1000 [USP'U]/ML
6400 INJECTION, SOLUTION INTRAVENOUS; SUBCUTANEOUS
Status: DISPENSED | OUTPATIENT
Start: 2021-08-21 | End: 2021-08-23

## 2021-08-21 RX ORDER — ACETAMINOPHEN 325 MG/1
650 TABLET ORAL EVERY 6 HOURS PRN
Status: DISCONTINUED | OUTPATIENT
Start: 2021-08-21 | End: 2021-08-24 | Stop reason: HOSPADM

## 2021-08-21 RX ORDER — ATORVASTATIN CALCIUM 40 MG/1
40 TABLET, FILM COATED ORAL
Status: DISCONTINUED | OUTPATIENT
Start: 2021-08-21 | End: 2021-08-24 | Stop reason: HOSPADM

## 2021-08-21 RX ORDER — OXYCODONE HYDROCHLORIDE 5 MG/1
2.5 TABLET ORAL EVERY 4 HOURS PRN
Status: DISCONTINUED | OUTPATIENT
Start: 2021-08-21 | End: 2021-08-24 | Stop reason: HOSPADM

## 2021-08-21 RX ORDER — OLANZAPINE 10 MG/1
2.5 INJECTION, POWDER, LYOPHILIZED, FOR SOLUTION INTRAMUSCULAR EVERY 8 HOURS PRN
Status: DISCONTINUED | OUTPATIENT
Start: 2021-08-21 | End: 2021-08-21

## 2021-08-21 RX ORDER — HEPARIN SODIUM 1000 [USP'U]/ML
6400 INJECTION, SOLUTION INTRAVENOUS; SUBCUTANEOUS ONCE
Status: COMPLETED | OUTPATIENT
Start: 2021-08-21 | End: 2021-08-21

## 2021-08-21 RX ORDER — OLANZAPINE 10 MG/1
5 INJECTION, POWDER, LYOPHILIZED, FOR SOLUTION INTRAMUSCULAR EVERY 8 HOURS PRN
Status: DISCONTINUED | OUTPATIENT
Start: 2021-08-21 | End: 2021-08-24 | Stop reason: HOSPADM

## 2021-08-21 RX ORDER — AMOXICILLIN 250 MG
2 CAPSULE ORAL 2 TIMES DAILY
Status: DISCONTINUED | OUTPATIENT
Start: 2021-08-21 | End: 2021-08-24 | Stop reason: HOSPADM

## 2021-08-21 RX ORDER — HEPARIN SODIUM 10000 [USP'U]/100ML
3-30 INJECTION, SOLUTION INTRAVENOUS
Status: DISPENSED | OUTPATIENT
Start: 2021-08-21 | End: 2021-08-23

## 2021-08-21 RX ADMIN — HEPARIN SODIUM 18 UNITS/KG/HR: 10000 INJECTION, SOLUTION INTRAVENOUS at 12:46

## 2021-08-21 RX ADMIN — HEPARIN SODIUM 6400 UNITS: 1000 INJECTION INTRAVENOUS; SUBCUTANEOUS at 12:44

## 2021-08-21 RX ADMIN — ATORVASTATIN CALCIUM 40 MG: 40 TABLET, FILM COATED ORAL at 17:24

## 2021-08-21 RX ADMIN — TAMSULOSIN HYDROCHLORIDE 0.4 MG: 0.4 CAPSULE ORAL at 17:24

## 2021-08-21 NOTE — ASSESSMENT & PLAN NOTE
A sequela of recent hospitalization for stroke  Urinary retention protocol - follows outpatient urology  Per urology recs plan was a voiding trial with [facility] this week    Family requests & reasonable to perform void trial here  Orders placed - if fails , reinsert cadet  C/w Flomax

## 2021-08-21 NOTE — ASSESSMENT & PLAN NOTE
Presents w/ worsening pain/swelling -> recent acute DVTs diagnosed earlier this month and discharged at the time on Eliquis  Due to worsening symptomatology, may consider Eliquis failure? ? -> initiated on a heparin drip in the ED  Lower extremity venous doppler study today when compared to previous on 7/31/21, now revealing new right gastrocnemius vein and left posterior tibial vein DVTs  Consult placed for hematology/oncology to determine 1  If eliquis failure 2   Alternate options  Family is agreeable with lovenox if that's recommended  PRN pain control - PT/OT as tolerated - avoid SCDs

## 2021-08-21 NOTE — H&P
[facility]  H&P- Shimon Stewart 1950, 72 y o  male MRN: [id number]  Unit/Bed#: ED 13 Encounter: [id number]  Primary Care Provider: Cherelle Nguyen MD   Date and time admitted to hospital: 8/21/2021  7:14 AM      * Bilateral lower extremity DVTs  Assessment & Plan  Presents w/ worsening pain/swelling -> recent acute DVTs diagnosed earlier this month and discharged at the time on Eliquis  Due to worsening symptomatology, may consider Eliquis failure? ? -> initiated on a heparin drip in the ED - once further stable, recommend transition to an alternate NOAC vs Coumadin or may consider therapeutic Lovenox in the setting of underlying malignancies (see below)  Lower extremity venous doppler study today when compared to previous on 7/31/21, now revealing new right gastrocnemius vein and left posterior tibial vein DVTs  Consider hematology/oncology evaluation if necessary  PRN pain control - PT/OT as tolerated - avoid SCDs    Recent cerebrovascular accident  Assessment & Plan  MRI earlier this month noting multifocal infarctions for presumed central/embolic source  Continue statin - continue anticoagulation  PT/OT as tolerated    Suspected pulmonary embolism  Assessment & Plan  Initially suspected on recent hospitalization earlier in the month -> maintained on Eliquis for anticoagulation  Echocardiogram three weeks ago noting mild-moderate RV dilatation w/ mildly diminished function and mid free wall hypokinesis  Anticoagulation transition to a heparin drip due to presumed Eliquis failure for DVTs  Maintain oxygenation as necessary  Monitor vitals/hemodynamics    Leukocytosis  Assessment & Plan  Likely reactive due to acute medical issue(s)  Monitor WBC count    Elevated troponin  Assessment & Plan  Currently @ 0 38 (improved from 10 3 earlier this month)  Likely a residual sequela of suspected pulmonary embolism from earlier in the month    Anemia of chronic disease  Assessment & Plan  Monitor H/H    Adenocarcinoma of lung  Assessment & Plan  Confirmed on lung biopsy on 8/5/21  Outpatient follow-up with oncology    Nasopharyngeal mass  Assessment & Plan  Outpatient follow-up recommended per ENT    Urinary retention  Assessment & Plan  A sequela of recent hospitalization for stroke  Urinary retention protocol - follows outpatient neurology  Hampton catheter as necessary  C/w Flomax      DVT Prophylaxis:  Heparin drip    Code Status: Full code    Discussion with:  Patient at bedside    Anticipated Length of Stay:  Patient will be admitted on an Inpatient basis with an anticipated length of stay of greater than 2 midnights  Justification for Hospital Stay:  Recurrent DVTs requiring modification of anticoagulation and pain control  Total Time for Visit, including Counseling / Coordination of Care: 72 minutes  Greater than 50% of this total time spent on direct patient counseling and coordination of care  Chief Complaint:  Lower leg swelling/pain      History of Present Illness:    Johnathan Mattson is a 72 y o  male who presents with complaints of right lower leg swelling/pain since yesterday evening which worsened overnight radiating from the ankle region to the calf progressively  His past medical history is notable for a recent hospital admission for an acute stroke with discovery of bilateral lower extremity DVTs at the time (three weeks ago) and was discharged on and Eliquis regimen  Patient states he has been compliant to his anticoagulation regimen  Of note, during the prior hospital course, he underwent CT imaging of his head and neck during the stroke alert at the time, which incidentally discovered a nasopharyngeal mass, for which ENT recommended outpatient follow-up, along with a lung mass which was biopsied on 8/5, confirming pathologic diagnosis of adenocarcinoma    He was seen by a home health nurse yesterday, who out of concern, recommended presentation to the ED for further evaluation  At the time of my encounter, he is resting bed fairly comfortably  Denies chest pain or shortness of breath/dyspnea at this time  States that his lower extremity pain/discomfort has currently improved/resolved  No other acute complaints at this time other than weakness/fatigue  Review of Systems:    Review of Systems - A thorough 12 point review systems was conducted  Pertinent positives and negatives are mentioned in the history of present illness  Past Medical and Surgical History:     Past Medical History:   Diagnosis Date    Lung cancer ([facility] [address] )     Stroke [facility])        Past Surgical History:   Procedure Laterality Date    IR BIOPSY LUNG  8/5/2021         Medications & Allergies:    Prior to Admission medications    Medication Sig Start Date End Date Taking?  Authorizing Provider   acetaminophen (TYLENOL) 325 mg tablet Take 2 tablets (650 mg total) by mouth 4 (four) times a day as needed for mild pain, headaches or fever 8/16/21  Yes Theresa Pickering MD   apixaban (ELIQUIS) 5 mg Take 1 tablet (5 mg total) by mouth 2 (two) times a day 8/16/21  Yes Theresa Pickering MD   atorvastatin (LIPITOR) 40 mg tablet Take 1 tablet (40 mg total) by mouth daily with dinner 8/16/21  Yes Theresa Pickering MD   melatonin 3 mg Take 1 tablet (3 mg total) by mouth daily at bedtime as needed (Sleep) 8/16/21  Yes Theresa Pickering MD   senna-docusate sodium (SENOKOT S) 8 6-50 mg per tablet Take 2 tablets by mouth 2 (two) times a day 8/17/21  Yes Theresa Pickering MD   tamsulosin [facility]) 0 4 mg Once a day right after supper 8/20/21  Yes Wendy Lackey MD   bisacodyl (DULCOLAX) 10 mg suppository Insert 1 suppository (10 mg total) into the rectum daily as needed (Constipation - 3rd line)  Patient not taking: Reported on 8/21/2021 8/17/21   Theresa Pickering MD   polyethylene glycol [facility]) 17 GM/SCOOP powder Take 17 g by mouth daily as needed (Constipation)  Patient not taking: Reported on 8/21/2021 8/17/21   Sebastián Marrero MD   QUEtiapine (SEROquel) 25 mg tablet Take 0 5 tablets (12 5 mg total) by mouth daily as needed (Significant/uncontrolled agitation or restlessness not controlled by non-pharmacologic means)  Patient not taking: Reported on 8/21/2021 8/16/21   Sebastián Marrero MD         Allergies: No Known Allergies      Social History:    Substance Use History:   Social History     Substance and Sexual Activity   Alcohol Use Yes     Social History     Tobacco Use   Smoking Status Former Smoker   Smokeless Tobacco Never Used     Social History     Substance and Sexual Activity   Drug Use Never         Family History:    Significant for breast cancer in mother  Denies family history of blood clots to his knowledge        Physical Exam:     Vitals:   Blood Pressure: 142/61 (08/21/21 1215)  Pulse: 62 (08/21/21 1215)  Temperature: 98 4 °F (36 9 °C) (08/21/21 0723)  Temp Source: Tympanic (08/21/21 0723)  Respirations: 16 (08/21/21 1215)  Weight - Scale: 80 3 kg (177 lb) (08/21/21 0723)  SpO2: 95 % (08/21/21 1215)      GENERAL:  Weak/fatigued  HEAD:  Normocephalic - atraumatic  EYES: PERRL - EOMI   MOUTH:  Mucosa moist  NECK:  Supple - full range of motion  CARDIAC:  Rate controlled - S1/S2 positive  PULMONARY:  Clear breath sounds bilaterally - nonlabored respirations  ABDOMEN:  Soft - nontender/nondistended - active bowel sounds  MUSCULOSKELETAL:  Motor strength/range of motion deconditioned - no current calf tenderness  NEUROLOGIC:  Alert/oriented at baseline  SKIN:  Chronic wrinkles/blemishes   PSYCHIATRIC:  Mood/affect stable      Additional Data:     Labs & Recent Cultures:    Results from last 7 days   Lab Units 08/21/21  1107   WBC Thousand/uL 11 05*   HEMOGLOBIN g/dL 10 4*   HEMATOCRIT % 34 6*   PLATELETS Thousands/uL 256   NEUTROS PCT % 72   LYMPHS PCT % 18   MONOS PCT % 7   EOS PCT % 1     Results from last 7 days   Lab Units 08/21/21  1107   SODIUM mmol/L 136   POTASSIUM mmol/L 4 2   CHLORIDE mmol/L 106   CO2 mmol/L 28   BUN mg/dL 12   CREATININE mg/dL 0 80   ANION GAP mmol/L 2*   CALCIUM mg/dL 10 0   GLUCOSE RANDOM mg/dL 83     Results from last 7 days   Lab Units 08/21/21  1107   INR  1 16               Imaging:     VAS lower limb venous duplex study, complete bilateral    Result Date: 8/21/2021  Narrative:  [facility] REPORT CLINICAL: Indications: Patient complains of a new onset of right calf pain  Patient presents to determine propagation vs resolution of previously noted DVT in the bilateral legs discovered on 07/31/21  FINDINGS:  Segment         Right              Left                            DVT                DVT                Gastrocnemius   Acute - Occlusive                     GSV Mid Thigh                      Acute - Occlusive  GSV Dist Thigh                     Acute - Occlusive  GSV Knee                           Acute - Occlusive  GSV Prox Calf                      Acute - Occlusive  GSV Mid Calf                       Acute - Occlusive  Peroneal        Acute - Occlusive  Acute - Occlusive  PostTibial      Acute - Occlusive  Acute - Occlusive     CONCLUSION: RIGHT LOWER LIMB: Acute deep vein thrombosis is noted in the posterior tibial, peroneal veins and one of the gastrocnemius veins  No evidence of superficial thrombophlebitis noted  Doppler evaluation shows a normal response to augmentation maneuvers  Popliteal, posterior tibial and anterior tibial arterial Doppler waveforms are triphasic  LEFT LOWER LIMB: Acute deep vein thrombosis is noted in the peroneal veins and one of the posterior tibial veins  Acute superficial thrombophlebitis is noted in the great saphenous vein from the mid thigh to the mid calf  Doppler evaluation shows a normal response to augmentation maneuvers  Popliteal, posterior tibial and anterior tibial arterial Doppler waveforms are triphasic  Technical findings were given to Dr Mono Bryant    In comparison to the study of 07/31/21, there is a new finding of occurred  **

## 2021-08-21 NOTE — ED ATTENDING ATTESTATION
8/21/2021  I saw and evaluated the patient  I have discussed the patient with the resident physician and agree with the resident's findings, assessment and plan as documented in the resident physician's note, unless otherwise documented below  All available laboratory and imaging studies were reviewed by myself  I was present for key portions of any procedure(s) performed by the resident and I was immediately available to provide assistance  I agree with the current assessment done in the Emergency Department  I have conducted an independent evaluation of this patient  Chief Complaint   Patient presents with    Leg Swelling     Right LE swelling started last  Pt is s/p stroke 7/31  Was advised to come to ED by Visiting nurse to rule out clots       Cely Chew is a 72 y o  male CVA, prior DVTs on Eliquis, presenting with right lower extremity swelling and pain  Patient recently had a stroke on 07/31, predominantly in left MCA territory with concern for embolic source  He is currently convalescing at home  Per wife, he still has some confusion after the hospitalization  Yesterday, patient had some swelling in his right ankle and this morning, he complained of right ankle pain  Visiting nurse noticed that the right leg was swollen and recommended evaluation  At present, right ankle pain is completely resolved  Right leg continues to be more swollen compared to left  Patient denies any chest pain  He denies shortness of breath  He has not had any fevers  Per patient and wife, his right upper and lower extremity weakness - sequelae of his stroke - are continuing to resolve  HPI obtained from patient and wife      Review of Systems info provided by patient and wife  Constitutional: denies fevers, chills  Visual/Eyes: no changes in vision  HENT: no rhinorrhea, no sore throat  Cardiac: no chest pain  Respiratory: no shortness of breath, no cough  GI: no abdominal pain, nausea, vomiting, or diarrhea  : currently has urinary retention and has a cadet catheter in place  There is sediment in the urine  Heme/Onc: History of DVT, on Eliquis  MSK: as above  Neuro: recent stroke, residual R upper and lower extremity paresis, no headache  Psy: intermittent confusion    Ten systems reviewed and negative unless otherwise noted in HPI and above    Physical Exam  Vitals:    08/21/21 0723 08/21/21 0900 08/21/21 1000 08/21/21 1215   BP: 115/64 129/70 151/67 142/61   TempSrc: Tympanic      Pulse: 64 62 66 62   Resp: 16 16 16 16   Patient Position - Orthostatic VS: Lying   Lying   Temp: 98 4 °F (36 9 °C)        SPO2 RA Rest      ED from 8/21/2021 in [facility] Emergency Department   SpO2  95 %   SpO2 Activity  At Rest   O2 Device  None (Room air)   O2 Flow Rate  --          Constitutional:  Awake, alert, oriented  No acute distress  HEENT:  Normocephalic, atraumatic  Sclera anicteric, conjunctiva not injected  Moist oral mucosa  Cardiac:  Regular rate and rhythm, no murmurs, rubs, or gallops  2+ radial pulses  Respiratory:  Lungs are clear to auscultation bilaterally, no wheezes or rales  Abdomen:  Nondistended  Bowel sounds present  No tenderness to palpation  No rebound or guarding  Cadet catheter is in place with yellow urine with sediment  Extremities:  No deformities, right thigh and calf are edematous compared to left  No tenderness, no warmth  Integument:  No rashes over exposed areas, cap refill less than 2 seconds  Neurologic:  Awake, alert, and oriented x3  Face is symmetric  RUE 4+/5, LUE 5/5, RLE 4/5, LLE 5/5    Psychiatric:  Somewhat confused, very pleasant        Diagnostic Studies  Results Reviewed     Procedure Component Value Units Date/Time    Troponin I [[id number]]  (Abnormal) Collected: 08/21/21 1107    Lab Status: Final result Specimen: Blood from Arm, Right Updated: 08/21/21 1141     Troponin I 0 38 ng/mL     Basic metabolic panel [[id number]]  (Abnormal) Collected: 08/21/21 1107    Lab Status: Final result Specimen: Blood from Arm, Right Updated: 08/21/21 1138     Sodium 136 mmol/L      Potassium 4 2 mmol/L      Chloride 106 mmol/L      CO2 28 mmol/L      ANION GAP 2 mmol/L      BUN 12 mg/dL      Creatinine 0 80 mg/dL      Glucose 83 mg/dL      Calcium 10 0 mg/dL      eGFR 91 ml/min/1 73sq m     Narrative:      [place] guidelines for Chronic Kidney Disease (CKD):     Stage 1 with normal or high GFR (GFR > 90 mL/min/1 73 square meters)    Stage 2 Mild CKD (GFR = 60-89 mL/min/1 73 square meters)    Stage 3A Moderate CKD (GFR = 45-59 mL/min/1 73 square meters)    Stage 3B Moderate CKD (GFR = 30-44 mL/min/1 73 square meters)    Stage 4 Severe CKD (GFR = 15-29 mL/min/1 73 square meters)    Stage 5 End Stage CKD (GFR <15 mL/min/1 73 square meters)  Note: GFR calculation is accurate only with a steady state creatinine    Protime-INR [[id number]]  (Abnormal) Collected: 08/21/21 1107    Lab Status: Final result Specimen: Blood from Arm, Right Updated: 08/21/21 1138     Protime 14 8 seconds      INR 1 16    APTT [[id number]]  (Normal) Collected: 08/21/21 1107    Lab Status: Final result Specimen: Blood from Arm, Right Updated: 08/21/21 1138     PTT 30 seconds     CBC and differential [[id number]]  (Abnormal) Collected: 08/21/21 1107    Lab Status: Final result Specimen: Blood from Arm, Right Updated: 08/21/21 1119     WBC 11 05 Thousand/uL      RBC 4 15 Million/uL      Hemoglobin 10 4 g/dL      Hematocrit 34 6 %      MCV 83 fL      MCH 25 1 pg      MCHC 30 1 g/dL      RDW 16 1 %      MPV 9 9 fL      Platelets 476 Thousands/uL      nRBC 0 /100 WBCs      Neutrophils Relative 72 %      Immat GRANS % 1 %      Lymphocytes Relative 18 %      Monocytes Relative 7 %      Eosinophils Relative 1 %      Basophils Relative 1 %      Neutrophils Absolute 7 93 Thousands/µL      Immature Grans Absolute 0 05 Thousand/uL      Lymphocytes Absolute 2 03 Thousands/µL Monocytes Absolute 0 82 Thousand/µL      Eosinophils Absolute 0 13 Thousand/µL      Basophils Absolute 0 09 Thousands/µL     Urine culture [[id number]] Collected: 08/21/21 0834    Lab Status: In process Specimen: Urine, Indwelling Hampton Catheter Updated: 08/21/21 0838          VAS lower limb venous duplex study, complete bilateral   Final Result   [facility] REPORT  CLINICAL:  Indications: Patient complains of a new onset of right calf pain  Patient presents to determine propagation vs resolution of previously noted DVT  in the bilateral legs discovered on 07/31/21  FINDINGS:     Segment         Right              Left                              DVT                DVT                  Gastrocnemius   Acute - Occlusive                       GSV Mid Thigh                      Acute - Occlusive    GSV Dist Thigh                     Acute - Occlusive    GSV Knee                           Acute - Occlusive    GSV Prox Calf                      Acute - Occlusive    GSV Mid Calf                       Acute - Occlusive    Peroneal        Acute - Occlusive  Acute - Occlusive    PostTibial      Acute - Occlusive  Acute - Occlusive             CONCLUSION:     Impression:  RIGHT LOWER LIMB:  Acute deep vein thrombosis is noted in the posterior tibial, peroneal veins and  one of the gastrocnemius veins  No evidence of superficial thrombophlebitis noted  Doppler evaluation shows a normal response to augmentation maneuvers  Popliteal, posterior tibial and anterior tibial arterial Doppler waveforms are  triphasic  LEFT LOWER LIMB:  Acute deep vein thrombosis is noted in the peroneal veins and one of the  posterior tibial veins  Acute superficial thrombophlebitis is noted in the great saphenous vein from  the mid thigh to the mid calf  Doppler evaluation shows a normal response to augmentation maneuvers  Popliteal, posterior tibial and anterior tibial arterial Doppler waveforms are  triphasic  Technical findings were given to Dr Jv Collins  In comparison to the study of 07/31/21, there is a new finding of of a right  gastrocnemius vein DVT and left posterior tibial vein DVT  SIGNATURE:  Electronically Signed by: Aldo Beckford MD on 2021-08-21 11:23:16 PM       Procedures  ECG 12 Lead Documentation Only    Date/Time: 8/21/2021 11:05 AM  Performed by: Nisa Winkler MD  Authorized by: Nisa Winkler MD     Comments:      Normal sinus rhythm, VR 65, , QRS 84, QTc 411, right axis deviation, RSR' of incomplete RBBB, TWIs in anterior and anterolateral precordial leads, no STEMI  Right axis deviation is now present, TWIs in inferior leads are no longer present compared to prior EKG dated 7/31/2021  ED Course  Medications   heparin (porcine) injection 6,400 Units (has no administration in time range)   heparin (porcine) 25,000 units in 0 45% NaCl 250 mL infusion (premix) (has no administration in time range)   heparin (porcine) injection 6,400 Units (has no administration in time range)   heparin (porcine) injection 3,200 Units (has no administration in time range)     72year old male presenting with new onset RLE swelling (still present) and pain (now resolved) in setting of recent bilateral DVT, currently on Eliquis  VS reviewed, WNL  DDx includes acute DVT, volume overload, lymphedema, versus another pathology  Labs reveal unremarkable BMP, CBC with WBC 11 05, Hgb 10 4 and baseline, and normal platelet count  Coags are c/w patient being on Eliquis  Troponin is elevated at 0 38, however, it is improving from 10 30 on 8/1/2021  Patient denies chest pain  EKG obtained, as reviewed by me as above  Venous duplex reveals new DVTs as above  Patient admitted to [facility] for breakthrough DVT despite Eliquis  Patient is not tachycardic or hypoxic and he denies chest pain and shortness of breath  Will hold off on CT angiography at present      Clinical Impression  Final diagnoses:   Acute deep vein thrombosis (DVT) of both lower extremities, unspecified vein ([facility] )

## 2021-08-21 NOTE — ASSESSMENT & PLAN NOTE
Currently @ 0 38 (improved from 10 3 earlier this month)  Likely a residual sequela of suspected pulmonary embolism from earlier in the month

## 2021-08-21 NOTE — ASSESSMENT & PLAN NOTE
Initially suspected on recent hospitalization earlier in the month -> maintained on Eliquis for anticoagulation  Echocardiogram three weeks ago noting mild-moderate RV dilatation w/ mildly diminished function and mid free wall hypokinesis  Anticoagulation transition to a heparin drip due to presumed Eliquis failure for DVTs  Maintain oxygenation as necessary  Monitor vitals/hemodynamics

## 2021-08-21 NOTE — Clinical Note
Case was discussed with ANGEL and the patient's admission status was agreed to be Admission Status: inpatient status to the service of Dr Shannen Sewell

## 2021-08-21 NOTE — ED PROVIDER NOTES
History  Chief Complaint   Patient presents with    Leg Swelling     Right LE swelling started last  Pt is s/p stroke 7/31  Was advised to come to ED by Visiting nurse to rule out clots      Patient is a 43-year-old male with a past medical history lung cancer, previous stroke, and previous DVT on Eliquis presents for evaluation of right lower extremity swelling  According to patient, noticed some mild swelling in the right foot yesterday, swelling got worse overnight  Patient also experienced some mild pain in the right calf overnight  Patient currently denies any pain in the calf currently  Patient states that he was just recently in the hospital for the past 2 weeks after a stroke, patient just at home a few days ago  Patient states that he was anticoagulated during the hospital stay  Patient currently denies any other symptoms including fevers, chills, chest pain, shortness of breath, abdominal pain, nausea vomiting  History provided by:  Patient   used: No    Leg Pain  Location:  Leg  Leg location:  R lower leg  Chronicity:  New  Associated symptoms: swelling    Associated symptoms: no fever, no numbness and no tingling    Swelling:     Location: RLE  Chronicity:  New      Prior to Admission Medications   Prescriptions Last Dose Informant Patient Reported? Taking?    QUEtiapine (SEROquel) 25 mg tablet Not Taking at Unknown time Self No No   Sig: Take 0 5 tablets (12 5 mg total) by mouth daily as needed (Significant/uncontrolled agitation or restlessness not controlled by non-pharmacologic means)   Patient not taking: Reported on 8/21/2021   acetaminophen (TYLENOL) 325 mg tablet Past Month at Unknown time Self No Yes   Sig: Take 2 tablets (650 mg total) by mouth 4 (four) times a day as needed for mild pain, headaches or fever   apixaban (ELIQUIS) 5 mg 8/20/2021 Self No Yes   Sig: Take 1 tablet (5 mg total) by mouth 2 (two) times a day   atorvastatin (LIPITOR) 40 mg tablet 8/20/2021 Self No Yes   Sig: Take 1 tablet (40 mg total) by mouth daily with dinner   bisacodyl (DULCOLAX) 10 mg suppository Not Taking at Unknown time Self No No   Sig: Insert 1 suppository (10 mg total) into the rectum daily as needed (Constipation - 3rd line)   Patient not taking: Reported on 8/21/2021   melatonin 3 mg 8/20/2021 Self No Yes   Sig: Take 1 tablet (3 mg total) by mouth daily at bedtime as needed (Sleep)   polyethylene glycol (GLYCOLAX) 17 GM/SCOOP powder Not Taking at Unknown time Self No No   Sig: Take 17 g by mouth daily as needed (Constipation)   Patient not taking: Reported on 8/21/2021   senna-docusate sodium (SENOKOT S) 8 6-50 mg per tablet 8/20/2021 Self No Yes   Sig: Take 2 tablets by mouth 2 (two) times a day   tamsulosin (FLOMAX) 0 4 mg 8/20/2021 at Unknown time  No Yes   Sig: Once a day right after supper      Facility-Administered Medications: None       Past Medical History:   Diagnosis Date    Lung cancer ([facility] )     Stroke [facility])        Past Surgical History:   Procedure Laterality Date    IR BIOPSY LUNG  8/5/2021       History reviewed  No pertinent family history  I have reviewed and agree with the history as documented  E-Cigarette/Vaping    E-Cigarette Use Never User      E-Cigarette/Vaping Substances     Social History     Tobacco Use    Smoking status: Former Smoker    Smokeless tobacco: Never Used   Vaping Use    Vaping Use: Never used   Substance Use Topics    Alcohol use: Yes    Drug use: Never        Review of Systems   Constitutional: Negative for chills and fever  HENT: Negative  Respiratory: Negative for shortness of breath  Cardiovascular: Positive for leg swelling  Negative for chest pain  Gastrointestinal: Negative for abdominal pain, nausea and vomiting  Genitourinary: Negative for dysuria  Urine discoloration   Musculoskeletal: Positive for myalgias  Skin: Negative  Neurological: Negative      All other systems reviewed and are negative  Physical Exam  ED Triage Vitals [21 0723]   Temperature Pulse Respirations Blood Pressure SpO2   98 4 °F (36 9 °C) 64 16 115/64 96 %      Temp Source Heart Rate Source Patient Position - Orthostatic VS BP Location FiO2 (%)   Tympanic Monitor Lying Right arm --      Pain Score       --             Orthostatic Vital Signs  Vitals:    21 0723 21 0900 21 1000   BP: 115/64 129/70 151/67   Pulse: 64 62 66   Patient Position - Orthostatic VS: Lying         Physical Exam  Vitals and nursing note reviewed  Constitutional:       General: He is awake  He is not in acute distress  Appearance: He is not toxic-appearing  HENT:      Head: Normocephalic and atraumatic  Eyes:      General: Vision grossly intact  Gaze aligned appropriately  Cardiovascular:      Rate and Rhythm: Normal rate and regular rhythm  Pulses:           Dorsalis pedis pulses are 2+ on the right side  Heart sounds: Normal heart sounds  Pulmonary:      Effort: Pulmonary effort is normal  No respiratory distress  Breath sounds: Normal breath sounds  Abdominal:      Palpations: Abdomen is soft  Tenderness: There is no abdominal tenderness  Genitourinary:     Comments: Patient has an indwelling Hampton catheter in place  Hampton bag containing pink/red tinted urine with some sediment particles  Musculoskeletal:      Right lower le+ Edema present  Comments: No RLE tenderness   Skin:     General: Skin is warm and dry  Neurological:      Mental Status: He is alert and oriented to person, place, and time           ED Medications  Medications   heparin (VTE/PE) high (has no administration in time range)       Diagnostic Studies  Results Reviewed     Procedure Component Value Units Date/Time    Troponin I [[id number]]  (Abnormal) Collected: 21 1107    Lab Status: Final result Specimen: Blood from Arm, Right Updated: 21 1141     Troponin I 0 38 ng/mL     Basic metabolic panel [[id number]]  (Abnormal) Collected: 08/21/21 1107    Lab Status: Final result Specimen: Blood from Arm, Right Updated: 08/21/21 1138     Sodium 136 mmol/L      Potassium 4 2 mmol/L      Chloride 106 mmol/L      CO2 28 mmol/L      ANION GAP 2 mmol/L      BUN 12 mg/dL      Creatinine 0 80 mg/dL      Glucose 83 mg/dL      Calcium 10 0 mg/dL      eGFR 91 ml/min/1 73sq m     Narrative:      [place] guidelines for Chronic Kidney Disease (CKD):     Stage 1 with normal or high GFR (GFR > 90 mL/min/1 73 square meters)    Stage 2 Mild CKD (GFR = 60-89 mL/min/1 73 square meters)    Stage 3A Moderate CKD (GFR = 45-59 mL/min/1 73 square meters)    Stage 3B Moderate CKD (GFR = 30-44 mL/min/1 73 square meters)    Stage 4 Severe CKD (GFR = 15-29 mL/min/1 73 square meters)    Stage 5 End Stage CKD (GFR <15 mL/min/1 73 square meters)  Note: GFR calculation is accurate only with a steady state creatinine    Protime-INR [[id number]]  (Abnormal) Collected: 08/21/21 1107    Lab Status: Final result Specimen: Blood from Arm, Right Updated: 08/21/21 1138     Protime 14 8 seconds      INR 1 16    APTT [[id number]]  (Normal) Collected: 08/21/21 1107    Lab Status: Final result Specimen: Blood from Arm, Right Updated: 08/21/21 1138     PTT 30 seconds     CBC and differential [[id number]]  (Abnormal) Collected: 08/21/21 1107    Lab Status: Final result Specimen: Blood from Arm, Right Updated: 08/21/21 1119     WBC 11 05 Thousand/uL      RBC 4 15 Million/uL      Hemoglobin 10 4 g/dL      Hematocrit 34 6 %      MCV 83 fL      MCH 25 1 pg      MCHC 30 1 g/dL      RDW 16 1 %      MPV 9 9 fL      Platelets 717 Thousands/uL      nRBC 0 /100 WBCs      Neutrophils Relative 72 %      Immat GRANS % 1 %      Lymphocytes Relative 18 %      Monocytes Relative 7 %      Eosinophils Relative 1 %      Basophils Relative 1 %      Neutrophils Absolute 7 93 Thousands/µL      Immature Grans Absolute 0 05 Thousand/uL      Lymphocytes Absolute 2 03 Thousands/µL      Monocytes Absolute 0 82 Thousand/µL      Eosinophils Absolute 0 13 Thousand/µL      Basophils Absolute 0 09 Thousands/µL     Urine culture [[id number]] Collected: 08/21/21 0834    Lab Status: In process Specimen: Urine, Indwelling [place] Catheter Updated: 08/21/21 0838                 VAS lower limb venous duplex study, complete bilateral    (Results Pending)         Procedures  Procedures      ED Course  ED Course as of Aug 21 1210   Sat Aug 21, 2021   1203 Previously 10 3   Troponin I(!): 0 38               Identification of Seniors at Risk      Most Recent Value   (ISAR) Identification of Seniors at Risk   Before the illness or injury that brought you to the Emergency, did you need someone to help you on a regular basis? 0 Filed at: 08/21/2021 6811   In the last 24 hours, have you needed more help than usual?  0 Filed at: 08/21/2021 0102   Have you been hospitalized for one or more nights during the past 6 months? 1 Filed at: 08/21/2021 0492   In general, do you see well?  0 Filed at: 08/21/2021 8544   In general, do you have serious problems with your memory? 0 Filed at: 08/21/2021 7539   Do you take more than three different medications every day? 1 Filed at: 08/21/2021 6997   ISAR Score  2 Filed at: 08/21/2021 3318                    SBIRT 22yo+      Most Recent Value   SBIRT (23 yo +)   In order to provide better care to our patients, we are screening all of our patients for alcohol and drug use  Would it be okay to ask you these screening questions? No Filed at: 08/21/2021 0737                MDM  Number of Diagnoses or Management Options  Acute deep vein thrombosis (DVT) of both lower extremities, unspecified vein ([facility] [address] ): new and requires workup  Diagnosis management comments: 72-year-old presents for evaluation of right lower extremity pain swelling after recent inpatient stay after a stroke    Patient states that he noticed the swelling cramping his right lower extremity yesterday  Patient is currently on Eliquis for previous DVT  Patient any other symptoms at this time  On exam, patient mild swelling of right lower extremity  No calf or popliteal tenderness  Will obtain duplex of RLE to rule out acute DVT  Patient's s/o also mentions that patient's urine appears to be discolored  Patient has indwelling cadet catheter  Denies any fevers or abdominal pain at this time  Urine is pink-tinged and sediment noted in tubing  Will send urine culture  Patient found to have acute DVT's in bilateral lower extremities  Given the fact that he is already anticoagulated, decision made to admit patient for further workup and treatment  Spoke with medicine attending who requested CBC, BMP, PT, PTT, trop, and EKG for complete evaluation  Will start patient on VTE heparin drip once labs drawn  No concern at this time for acute PE as patient is on his home oxygen and denying any chest pain or dyspnea, and VSS at this time  Discussed risks and benefits of heparin drip with patient at this time          Amount and/or Complexity of Data Reviewed  Clinical lab tests: ordered and reviewed  Tests in the radiology section of CPT®: ordered and reviewed  Discuss the patient with other providers: yes    Patient Progress  Patient progress: stable      Disposition  Final diagnoses:   Acute deep vein thrombosis (DVT) of both lower extremities, unspecified vein ([facility] [address] )     Time reflects when diagnosis was documented in both MDM as applicable and the Disposition within this note     Time User Action Codes Description Comment    8/21/2021 10:49 AM Galina Duff Add [I82 403] Acute deep vein thrombosis (DVT) of both lower extremities, unspecified vein [facility])       ED Disposition     ED Disposition Condition Date/Time Comment    Admit Stable Sat Aug 21, 2021 12:08 PM Case was discussed with ANGEL and the patient's admission status was agreed to be Admission Status: inpatient status to the service of Dr Charles Fracno          Follow-up Information    None         Patient's Medications   Discharge Prescriptions    No medications on file     No discharge procedures on file  PDMP Review     None           ED Provider  Attending physically available and evaluated Epifania Aschoff I managed the patient along with the ED Attending      Electronically Signed by         Steve Anderson DO  08/21/21 8380

## 2021-08-21 NOTE — ASSESSMENT & PLAN NOTE
MRI earlier this month noting multifocal infarctions for presumed central/embolic source  Continue statin - continue anticoagulation  PT/OT as tolerated

## 2021-08-22 PROBLEM — R41.0 DELIRIUM: Status: ACTIVE | Noted: 2021-08-22

## 2021-08-22 LAB
ANION GAP SERPL CALCULATED.3IONS-SCNC: 3 MMOL/L (ref 4–13)
APTT PPP: 128 SECONDS (ref 23–37)
APTT PPP: 33 SECONDS (ref 23–37)
APTT PPP: 72 SECONDS (ref 23–37)
BASOPHILS # BLD AUTO: 0.1 THOUSANDS/ΜL (ref 0–0.1)
BASOPHILS NFR BLD AUTO: 1 % (ref 0–1)
BUN SERPL-MCNC: 15 MG/DL (ref 5–25)
CALCIUM SERPL-MCNC: 10.1 MG/DL (ref 8.3–10.1)
CHLORIDE SERPL-SCNC: 105 MMOL/L (ref 100–108)
CO2 SERPL-SCNC: 29 MMOL/L (ref 21–32)
CREAT SERPL-MCNC: 0.82 MG/DL (ref 0.6–1.3)
EOSINOPHIL # BLD AUTO: 0.15 THOUSAND/ΜL (ref 0–0.61)
EOSINOPHIL NFR BLD AUTO: 1 % (ref 0–6)
ERYTHROCYTE [DISTWIDTH] IN BLOOD BY AUTOMATED COUNT: 16.1 % (ref 11.6–15.1)
GFR SERPL CREATININE-BSD FRML MDRD: 90 ML/MIN/1.73SQ M
GLUCOSE SERPL-MCNC: 93 MG/DL (ref 65–140)
HCT VFR BLD AUTO: 32.9 % (ref 36.5–49.3)
HCT VFR BLD AUTO: 37.1 % (ref 36.5–49.3)
HGB BLD-MCNC: 11.1 G/DL (ref 12–17)
HGB BLD-MCNC: 9.9 G/DL (ref 12–17)
IMM GRANULOCYTES # BLD AUTO: 0.07 THOUSAND/UL (ref 0–0.2)
IMM GRANULOCYTES NFR BLD AUTO: 1 % (ref 0–2)
INR PPP: 1.22 (ref 0.84–1.19)
LYMPHOCYTES # BLD AUTO: 2.06 THOUSANDS/ΜL (ref 0.6–4.47)
LYMPHOCYTES NFR BLD AUTO: 20 % (ref 14–44)
MCH RBC QN AUTO: 24.9 PG (ref 26.8–34.3)
MCHC RBC AUTO-ENTMCNC: 29.9 G/DL (ref 31.4–37.4)
MCV RBC AUTO: 83 FL (ref 82–98)
MONOCYTES # BLD AUTO: 0.79 THOUSAND/ΜL (ref 0.17–1.22)
MONOCYTES NFR BLD AUTO: 8 % (ref 4–12)
NEUTROPHILS # BLD AUTO: 7.42 THOUSANDS/ΜL (ref 1.85–7.62)
NEUTS SEG NFR BLD AUTO: 69 % (ref 43–75)
NRBC BLD AUTO-RTO: 0 /100 WBCS
PLATELET # BLD AUTO: 290 THOUSANDS/UL (ref 149–390)
PMV BLD AUTO: 10.3 FL (ref 8.9–12.7)
POTASSIUM SERPL-SCNC: 3.7 MMOL/L (ref 3.5–5.3)
PROTHROMBIN TIME: 15.4 SECONDS (ref 11.6–14.5)
RBC # BLD AUTO: 4.46 MILLION/UL (ref 3.88–5.62)
SODIUM SERPL-SCNC: 137 MMOL/L (ref 136–145)
WBC # BLD AUTO: 10.59 THOUSAND/UL (ref 4.31–10.16)

## 2021-08-22 PROCEDURE — 99232 SBSQ HOSP IP/OBS MODERATE 35: CPT | Performed by: HOSPITALIST

## 2021-08-22 PROCEDURE — 85018 HEMOGLOBIN: CPT | Performed by: STUDENT IN AN ORGANIZED HEALTH CARE EDUCATION/TRAINING PROGRAM

## 2021-08-22 PROCEDURE — 85730 THROMBOPLASTIN TIME PARTIAL: CPT | Performed by: INTERNAL MEDICINE

## 2021-08-22 PROCEDURE — 85610 PROTHROMBIN TIME: CPT | Performed by: INTERNAL MEDICINE

## 2021-08-22 PROCEDURE — 85014 HEMATOCRIT: CPT | Performed by: STUDENT IN AN ORGANIZED HEALTH CARE EDUCATION/TRAINING PROGRAM

## 2021-08-22 PROCEDURE — 85730 THROMBOPLASTIN TIME PARTIAL: CPT | Performed by: HOSPITALIST

## 2021-08-22 PROCEDURE — 80048 BASIC METABOLIC PNL TOTAL CA: CPT | Performed by: INTERNAL MEDICINE

## 2021-08-22 PROCEDURE — 85025 COMPLETE CBC W/AUTO DIFF WBC: CPT | Performed by: INTERNAL MEDICINE

## 2021-08-22 RX ORDER — QUETIAPINE FUMARATE 25 MG/1
12.5 TABLET, FILM COATED ORAL
Status: DISCONTINUED | OUTPATIENT
Start: 2021-08-22 | End: 2021-08-24 | Stop reason: HOSPADM

## 2021-08-22 RX ORDER — LANOLIN ALCOHOL/MO/W.PET/CERES
3 CREAM (GRAM) TOPICAL
Status: DISCONTINUED | OUTPATIENT
Start: 2021-08-22 | End: 2021-08-24 | Stop reason: HOSPADM

## 2021-08-22 RX ADMIN — TAMSULOSIN HYDROCHLORIDE 0.4 MG: 0.4 CAPSULE ORAL at 17:39

## 2021-08-22 RX ADMIN — MELATONIN TAB 3 MG 3 MG: 3 TAB at 22:10

## 2021-08-22 RX ADMIN — ATORVASTATIN CALCIUM 40 MG: 40 TABLET, FILM COATED ORAL at 17:39

## 2021-08-22 RX ADMIN — DOCUSATE SODIUM AND SENNOSIDES 2 TABLET: 8.6; 5 TABLET ORAL at 17:39

## 2021-08-22 RX ADMIN — HEPARIN SODIUM 6400 UNITS: 1000 INJECTION INTRAVENOUS; SUBCUTANEOUS at 10:21

## 2021-08-22 RX ADMIN — DOCUSATE SODIUM AND SENNOSIDES 2 TABLET: 8.6; 5 TABLET ORAL at 08:58

## 2021-08-22 RX ADMIN — QUETIAPINE FUMARATE 12.5 MG: 25 TABLET ORAL at 22:10

## 2021-08-22 RX ADMIN — HEPARIN SODIUM 17 UNITS/KG/HR: 10000 INJECTION, SOLUTION INTRAVENOUS at 22:10

## 2021-08-22 NOTE — PLAN OF CARE
Problem: Prexisting or High Potential for Compromised Skin Integrity  Goal: Skin integrity is maintained or improved  Description: INTERVENTIONS:  - Identify patients at risk for skin breakdown  - Assess and monitor skin integrity  - Assess and monitor nutrition and hydration status  - Monitor labs   - Assess for incontinence   - Turn and reposition patient  - Assist with mobility/ambulation  - Relieve pressure over bony prominences  - Avoid friction and shearing  - Provide appropriate hygiene as needed including keeping skin clean and dry  - Evaluate need for skin moisturizer/barrier cream  - Collaborate with interdisciplinary team   - Patient/family teaching  - Consider wound care consult   Outcome: Progressing     Problem: MOBILITY - ADULT  Goal: Maintain or return to baseline ADL function  Description: INTERVENTIONS:  -  Assess patient's ability to carry out ADLs; assess patient's baseline for ADL function and identify physical deficits which impact ability to perform ADLs (bathing, care of mouth/teeth, toileting, grooming, dressing, etc )  - Assess/evaluate cause of self-care deficits   - Assess range of motion  - Assess patient's mobility; develop plan if impaired  - Assess patient's need for assistive devices and provide as appropriate  - Encourage maximum independence but intervene and supervise when necessary  - Involve family in performance of ADLs  - Assess for home care needs following discharge   - Consider OT consult to assist with ADL evaluation and planning for discharge  - Provide patient education as appropriate  Outcome: Progressing     Problem: PAIN - ADULT  Goal: Verbalizes/displays adequate comfort level or baseline comfort level  Description: Interventions:  - Encourage patient to monitor pain and request assistance  - Assess pain using appropriate pain scale  - Administer analgesics based on type and severity of pain and evaluate response  - Implement non-pharmacological measures as appropriate and evaluate response  - Consider cultural and social influences on pain and pain management  - Notify physician/advanced practitioner if interventions unsuccessful or patient reports new pain  Outcome: Progressing     Problem: INFECTION - ADULT  Goal: Absence or prevention of progression during hospitalization  Description: INTERVENTIONS:  - Assess and monitor for signs and symptoms of infection  - Monitor lab/diagnostic results  - Monitor all insertion sites, i e  indwelling lines, tubes, and drains  - Monitor endotracheal if appropriate and nasal secretions for changes in amount and color  - [place] appropriate cooling/warming therapies per order  - Administer medications as ordered  - Instruct and encourage patient and family to use good hand hygiene technique  - Identify and instruct in appropriate isolation precautions for identified infection/condition  Outcome: Progressing     Problem: INFECTION - ADULT  Goal: Absence of fever/infection during neutropenic period  Description: INTERVENTIONS:  - Monitor WBC    Outcome: Progressing     Problem: Knowledge Deficit  Goal: Patient/family/caregiver demonstrates understanding of disease process, treatment plan, medications, and discharge instructions  Description: Complete learning assessment and assess knowledge base    Interventions:  - Provide teaching at level of understanding  - Provide teaching via preferred learning methods  Outcome: Progressing

## 2021-08-22 NOTE — ASSESSMENT & PLAN NOTE
· Hospital delirium  · Better at home  · Family wants to take him home asap  · Cont posey, ordered prn seroquel HS prn, melatonin HS

## 2021-08-22 NOTE — PROGRESS NOTES
[facility]  Progress Note Maira Alvares 1950, 72 y o  male MRN: [id number]  Unit/Bed#: CW2 215-01 Encounter: [id number]  Primary Care Provider: Rossi Mims MD   Date and time admitted to hospital: 8/21/2021  7:14 AM    Delirium  Assessment & Plan  · Hospital delirium  · Better at home  · Family wants to take him home asap  · Cont posey, ordered prn seroquel HS prn, melatonin HS    Adenocarcinoma of lung  Assessment & Plan  Confirmed on lung biopsy on 8/5/21  Outpatient follow-up with oncology & pulmonary arranged    Suspected pulmonary embolism  Assessment & Plan  Initially suspected on recent hospitalization earlier in the month -> maintained on Eliquis for anticoagulation  Echocardiogram three weeks ago noting mild-moderate RV dilatation w/ mildly diminished function and mid free wall hypokinesis  Anticoagulation transition to a heparin drip due to presumed Eliquis failure for DVTs  Maintain oxygenation as necessary  Monitor vitals/hemodynamics    Leukocytosis  Assessment & Plan  Likely reactive due to acute medical issue(s)  Monitor WBC count    Anemia of chronic disease  Assessment & Plan  Monitor H/H    Urinary retention  Assessment & Plan  A sequela of recent hospitalization for stroke  Urinary retention protocol - follows outpatient urology  Per urology recs plan was a voiding trial with VAN this week    Family requests & reasonable to perform void trial here  Orders placed - if fails , reinsert cadet  C/w Flomax    Nasopharyngeal mass  Assessment & Plan  Outpatient follow-up recommended per ENT    Elevated troponin  Assessment & Plan  Currently @ 0 38 (improved from 10 3 earlier this month)  Likely a residual sequela of suspected pulmonary embolism from earlier in the month    Recent cerebrovascular accident  Assessment & Plan  MRI earlier this month noting multifocal infarctions for presumed central/embolic source  Continue statin - continue anticoagulation  PT/OT as tolerated    * Bilateral lower extremity DVTs  Assessment & Plan  Presents w/ worsening pain/swelling -> recent acute DVTs diagnosed earlier this month and discharged at the time on Eliquis  Due to worsening symptomatology, may consider Eliquis failure? ? -> initiated on a heparin drip in the ED  Lower extremity venous doppler study today when compared to previous on 7/31/21, now revealing new right gastrocnemius vein and left posterior tibial vein DVTs  Consult placed for hematology/oncology to determine 1  If eliquis failure 2  Alternate options  Family is agreeable with lovenox if that's recommended  PRN pain control - PT/OT as tolerated - avoid SCDs      [facility] Internal Medicine Progress Note  Patient: Messi Dave 72 y o  male   MRN: [id number]  PCP: Pavan Monroe MD  Unit/Bed#: CW2 215-01 Encounter: [id number]  Date Of Visit: 08/22/21    Assessment:    Principal Problem:    Bilateral lower extremity DVTs  Active Problems:    Recent cerebrovascular accident    Elevated troponin    Nasopharyngeal mass    Urinary retention    Anemia of chronic disease    Leukocytosis    Suspected pulmonary embolism    Adenocarcinoma of lung    Delirium      Plan:           VTE Pharmacologic Prophylaxis:     Moderate Risk (Score 3-4) - Pharmacological DVT Prophylaxis Ordered: Heparin Drip  Mechanical VTE Prophylaxis in Place: Yes    Patient Centered Rounds: I have performed bedside rounds with nursing staff today  Discussions with Specialists or Other Care Team Provider:     Education and Discussions with Family / Patient: Updated  (wife, son and daughter) at bedside  Time Spent for Care: 30 minutes  More than 50% of total time spent on counseling and coordination of care as described above      Current Length of Stay: 1 day(s)  Current Patient Status: Inpatient     Certification Statement: The patient will continue to require additional inpatient hospital stay due to decide on home [facility] type    Discharge Plan / Estimated Discharge Date: 24-48 hrs, family looking to take him home    Code Status: Prior      Subjective:   Pt is not fully oriented  Denies any complains or pain    Objective:     Vitals:   Temp (24hrs), Av 5 °F (36 9 °C), Min:98 3 °F (36 8 °C), Max:98 7 °F (37 1 °C)    Temp:  [98 3 °F (36 8 °C)-98 7 °F (37 1 °C)] 98 3 °F (36 8 °C)  HR:  [68-74] 68  Resp:  [18-20] 20  BP: (138-155)/(65-79) 141/73  SpO2:  [92 %-96 %] 96 %  Body mass index is 26 91 kg/m²  Input and Output Summary (last 24 hours): Intake/Output Summary (Last 24 hours) at 2021 1635  Last data filed at 2021 1300  Gross per 24 hour   Intake 970 ml   Output 1350 ml   Net -380 ml       Physical Exam:   Physical Exam  Vitals reviewed  HENT:      Head: Normocephalic and atraumatic  Mouth/Throat:      Mouth: Mucous membranes are moist    Eyes:      Conjunctiva/sclera: Conjunctivae normal    Cardiovascular:      Rate and Rhythm: Normal rate and regular rhythm  Pulmonary:      Effort: Pulmonary effort is normal  No respiratory distress  Breath sounds: Normal breath sounds  No wheezing  Abdominal:      General: There is no distension  Palpations: Abdomen is soft  Tenderness: There is no abdominal tenderness  Musculoskeletal:      Right lower leg: No edema  Left lower leg: No edema  Neurological:      Mental Status: He is alert and oriented to person, place, and time            Additional Data:     Labs:  Results from last 7 days   Lab Units 21  0557   WBC Thousand/uL 10 59*   HEMOGLOBIN g/dL 11 1*   HEMATOCRIT % 37 1   PLATELETS Thousands/uL 290   NEUTROS PCT % 69   LYMPHS PCT % 20   MONOS PCT % 8   EOS PCT % 1     Results from last 7 days   Lab Units 21  0557   SODIUM mmol/L 137   POTASSIUM mmol/L 3 7   CHLORIDE mmol/L 105   CO2 mmol/L 29   BUN mg/dL 15   CREATININE mg/dL 0 82   ANION GAP mmol/L 3*   CALCIUM mg/dL 10 1   GLUCOSE RANDOM mg/dL 93 Results from last 7 days   Lab Units 08/22/21  0557   INR  1 22*                   Imaging: No pertinent imaging reviewed  Recent Cultures (last 7 days):     Results from last 7 days   Lab Units 08/21/21  0834   URINE CULTURE  40,000-49,000 cfu/ml Staphylococcus coagulase negative*       Lines/Drains:  Invasive Devices     Peripheral Intravenous Line            Peripheral IV 08/22/21 Left;Ventral (anterior) Forearm <1 day          Drain            Urethral Catheter 16 Fr  16 days                Telemetry:        Last 24 Hours Medication List:   Current Facility-Administered Medications   Medication Dose Route Frequency Provider Last Rate    acetaminophen  650 mg Oral Q6H PRN Liliana Monreal MD      atorvastatin  40 mg Oral Daily With Aurea Carpenter MD      heparin (porcine)  3-30 Units/kg/hr (Order-Specific) Intravenous Titrated Liliana Monreal MD 20 Units/kg/hr (08/22/21 1015)    heparin (porcine)  3,200 Units Intravenous Q1H PRN Liliana Monreal MD      heparin (porcine)  6,400 Units Intravenous Q1H PRN Liliana Monreal MD      melatonin  3 mg Oral HS Shannen Lynch MD      morphine injection  2 mg Intravenous Q3H PRN Liliana Monreal MD      OLANZapine  5 mg Intramuscular Q8H PRN Liliana Monreal MD      ondansetron  4 mg Intravenous Q4H PRN MD Venkata Suh oxyCODONE  2 5 mg Oral Q4H PRN MD Venkata Suh oxyCODONE  5 mg Oral Q4H PRN Liliana Monreal MD      QUEtiapine  12 5 mg Oral HS PRN Shawnee Schmitt MD      senna-docusate sodium  2 tablet Oral BID Liliana Monreal MD      tamsulosin  0 4 mg Oral Daily With Aurea Carpenter MD          Today, Patient Was Seen By: Shawnee Schmitt MD    ** Please Note: This note has been constructed using a voice recognition system   ** Patient

## 2021-08-23 ENCOUNTER — PATIENT OUTREACH (OUTPATIENT)
Dept: CASE MANAGEMENT | Facility: OTHER | Age: 71
End: 2021-08-23

## 2021-08-23 ENCOUNTER — APPOINTMENT (INPATIENT)
Dept: RADIOLOGY | Facility: HOSPITAL | Age: 71
DRG: 299 | End: 2021-08-23
Payer: MEDICARE

## 2021-08-23 ENCOUNTER — TELEPHONE (OUTPATIENT)
Dept: RADIOLOGY | Facility: HOSPITAL | Age: 71
End: 2021-08-23

## 2021-08-23 LAB
ANION GAP SERPL CALCULATED.3IONS-SCNC: 1 MMOL/L (ref 4–13)
APTT PPP: 54 SECONDS (ref 23–37)
APTT PPP: 61 SECONDS (ref 23–37)
APTT PPP: 63 SECONDS (ref 23–37)
BACTERIA UR CULT: ABNORMAL
BACTERIA UR CULT: ABNORMAL
BASOPHILS # BLD AUTO: 0.08 THOUSANDS/ΜL (ref 0–0.1)
BASOPHILS NFR BLD AUTO: 1 % (ref 0–1)
BUN SERPL-MCNC: 15 MG/DL (ref 5–25)
CALCIUM SERPL-MCNC: 9.9 MG/DL (ref 8.3–10.1)
CHLORIDE SERPL-SCNC: 105 MMOL/L (ref 100–108)
CO2 SERPL-SCNC: 29 MMOL/L (ref 21–32)
CREAT SERPL-MCNC: 0.8 MG/DL (ref 0.6–1.3)
EOSINOPHIL # BLD AUTO: 0.23 THOUSAND/ΜL (ref 0–0.61)
EOSINOPHIL NFR BLD AUTO: 3 % (ref 0–6)
ERYTHROCYTE [DISTWIDTH] IN BLOOD BY AUTOMATED COUNT: 16.1 % (ref 11.6–15.1)
GFR SERPL CREATININE-BSD FRML MDRD: 91 ML/MIN/1.73SQ M
GLUCOSE SERPL-MCNC: 87 MG/DL (ref 65–140)
HCT VFR BLD AUTO: 32.6 % (ref 36.5–49.3)
HGB BLD-MCNC: 9.8 G/DL (ref 12–17)
IMM GRANULOCYTES # BLD AUTO: 0.04 THOUSAND/UL (ref 0–0.2)
IMM GRANULOCYTES NFR BLD AUTO: 1 % (ref 0–2)
INR PPP: 1.13 (ref 0.84–1.19)
LYMPHOCYTES # BLD AUTO: 1.63 THOUSANDS/ΜL (ref 0.6–4.47)
LYMPHOCYTES NFR BLD AUTO: 19 % (ref 14–44)
MCH RBC QN AUTO: 24.7 PG (ref 26.8–34.3)
MCHC RBC AUTO-ENTMCNC: 30.1 G/DL (ref 31.4–37.4)
MCV RBC AUTO: 82 FL (ref 82–98)
MONOCYTES # BLD AUTO: 0.77 THOUSAND/ΜL (ref 0.17–1.22)
MONOCYTES NFR BLD AUTO: 9 % (ref 4–12)
NEUTROPHILS # BLD AUTO: 5.89 THOUSANDS/ΜL (ref 1.85–7.62)
NEUTS SEG NFR BLD AUTO: 67 % (ref 43–75)
NRBC BLD AUTO-RTO: 0 /100 WBCS
PLATELET # BLD AUTO: 271 THOUSANDS/UL (ref 149–390)
PMV BLD AUTO: 10.3 FL (ref 8.9–12.7)
POTASSIUM SERPL-SCNC: 3.8 MMOL/L (ref 3.5–5.3)
PROTHROMBIN TIME: 14.5 SECONDS (ref 11.6–14.5)
RBC # BLD AUTO: 3.96 MILLION/UL (ref 3.88–5.62)
SODIUM SERPL-SCNC: 135 MMOL/L (ref 136–145)
WBC # BLD AUTO: 8.64 THOUSAND/UL (ref 4.31–10.16)

## 2021-08-23 PROCEDURE — 97163 PT EVAL HIGH COMPLEX 45 MIN: CPT

## 2021-08-23 PROCEDURE — 85025 COMPLETE CBC W/AUTO DIFF WBC: CPT | Performed by: INTERNAL MEDICINE

## 2021-08-23 PROCEDURE — 85730 THROMBOPLASTIN TIME PARTIAL: CPT | Performed by: HOSPITALIST

## 2021-08-23 PROCEDURE — A9585 GADOBUTROL INJECTION: HCPCS | Performed by: INTERNAL MEDICINE

## 2021-08-23 PROCEDURE — G1004 CDSM NDSC: HCPCS

## 2021-08-23 PROCEDURE — 74183 MRI ABD W/O CNTR FLWD CNTR: CPT

## 2021-08-23 PROCEDURE — 99223 1ST HOSP IP/OBS HIGH 75: CPT | Performed by: INTERNAL MEDICINE

## 2021-08-23 PROCEDURE — 85610 PROTHROMBIN TIME: CPT | Performed by: HOSPITALIST

## 2021-08-23 PROCEDURE — 99232 SBSQ HOSP IP/OBS MODERATE 35: CPT | Performed by: HOSPITALIST

## 2021-08-23 PROCEDURE — 80048 BASIC METABOLIC PNL TOTAL CA: CPT | Performed by: INTERNAL MEDICINE

## 2021-08-23 RX ADMIN — GADOBUTROL 8 ML: 604.72 INJECTION INTRAVENOUS at 15:17

## 2021-08-23 RX ADMIN — TAMSULOSIN HYDROCHLORIDE 0.4 MG: 0.4 CAPSULE ORAL at 17:39

## 2021-08-23 RX ADMIN — DOCUSATE SODIUM AND SENNOSIDES 2 TABLET: 8.6; 5 TABLET ORAL at 17:38

## 2021-08-23 RX ADMIN — HEPARIN SODIUM 19 UNITS/KG/HR: 10000 INJECTION, SOLUTION INTRAVENOUS at 14:46

## 2021-08-23 RX ADMIN — HEPARIN SODIUM 3200 UNITS: 1000 INJECTION INTRAVENOUS; SUBCUTANEOUS at 08:15

## 2021-08-23 RX ADMIN — QUETIAPINE FUMARATE 12.5 MG: 25 TABLET ORAL at 17:39

## 2021-08-23 RX ADMIN — ATORVASTATIN CALCIUM 40 MG: 40 TABLET, FILM COATED ORAL at 17:39

## 2021-08-23 RX ADMIN — DOCUSATE SODIUM AND SENNOSIDES 2 TABLET: 8.6; 5 TABLET ORAL at 09:00

## 2021-08-23 RX ADMIN — ENOXAPARIN SODIUM 80 MG: 80 INJECTION SUBCUTANEOUS at 22:22

## 2021-08-23 NOTE — ASSESSMENT & PLAN NOTE
A sequela of recent hospitalization for stroke  Urinary retention protocol - follows outpatient urology  Per urology recs plan was a voiding trial with VNA this week    Family requests & reasonable to perform void trial here  Hampton removed 8/22 - good UO so far  C/w Flomax

## 2021-08-23 NOTE — PLAN OF CARE
Problem: PHYSICAL THERAPY ADULT  Goal: Performs mobility at highest level of function for planned discharge setting  See evaluation for individualized goals  Description: Treatment/Interventions: Functional transfer training, LE strengthening/ROM, Elevations, Therapeutic exercise, Endurance training, Patient/family training, Equipment eval/education, Bed mobility, Gait training          See flowsheet documentation for full assessment, interventions and recommendations  8/23/2021 1533 by Ethan Hernandez, PT  Note: Prognosis: Good  Problem List: Decreased strength, Decreased endurance, Impaired balance, Decreased mobility, Decreased coordination, Decreased cognition, Impaired judgement, Decreased safety awareness  Assessment: Pt seen for high complexity physical therapy evaluation  Pt is a 71 y/o male w/ history/comorbidities of recent CVA w/ R residual weakness, DVT, lung CA who is now admitted R LE edema, pain  Dx include acute B LE DVTs, presumed PE, as well as delerium  Due to acute medical issues, need for hospital re-admit post recent d/c, need for restraints, pain, fall risk, note unstable clinical picture  PT consulted to assess mobility, d/c needs  Pt presents w/ decreased functional mob, standing balance, endurance, B LE strength R > L and B LE coordination, barriers at home  Pt will benefit from skilled PT to correct for the above problems  Anticipate d/c home w/ family when stable w/ continued home therapy  PT Discharge Recommendation: Home with home health rehabilitation     PT - OK to Discharge: (S) Yes (when stable to home w/ home PT)    See flowsheet documentation for full assessment

## 2021-08-23 NOTE — PROGRESS NOTES
[facility]  Progress Note Petty Benito 1950, 72 y o  male MRN: [id number]  Unit/Bed#: CW2 215-01 Encounter: [id number]  Primary Care Provider: Cherelle Nguyen MD   Date and time admitted to hospital: 8/21/2021  7:14 AM    * Bilateral lower extremity DVTs  Assessment & Plan  Presents w/ worsening pain/swelling -> recent acute DVTs diagnosed earlier this month and discharged at the time on Eliquis  Due to worsening symptomatology, may consider Eliquis failure? ? -> initiated on a heparin drip in the ED  Lower extremity venous doppler study today when compared to previous on 7/31/21, now revealing new right gastrocnemius vein and left posterior tibial vein DVTs  Consult placed for hematology/oncology - If eliquis failure, recommend Lovenox 1 mg /kg BID  Family is agreeable with lovenox, working on costing, needs insurance auth - called & got it override for 1 time    PRN pain control - PT/OT as tolerated - avoid SCDs    Delirium  Assessment & Plan  · Hospital delirium  · Better at home  · Family wants to take him home asap  · Off restraints, ordered prn seroquel HS prn, melatonin HS    Adenocarcinoma of lung  Assessment & Plan  Confirmed on lung biopsy on 8/5/21  Outpatient follow-up with oncology & pulmonary arranged  Hem Onc ordered MRI abdomen to evaluate the liver lesions    Suspected pulmonary embolism  Assessment & Plan  Initially suspected on recent hospitalization earlier in the month -> maintained on Eliquis for anticoagulation  Echocardiogram three weeks ago noting mild-moderate RV dilatation w/ mildly diminished function and mid free wall hypokinesis  Anticoagulation transition to a heparin drip due to presumed Eliquis failure for DVTs  Maintain oxygenation as necessary  Monitor vitals/hemodynamics    Leukocytosis  Assessment & Plan  Likely reactive due to acute medical issue(s)  Monitor WBC count    Anemia of chronic disease  Assessment & Plan  Monitor H/H    Urinary retention  Assessment & Plan  A sequela of recent hospitalization for stroke  Urinary retention protocol - follows outpatient urology  Per urology recs plan was a voiding trial with VNA this week  Family requests & reasonable to perform void trial here  Hampton removed 8/22 - good UO so far  C/w Flomax    Nasopharyngeal mass  Assessment & Plan  Outpatient follow-up recommended per ENT    Elevated troponin  Assessment & Plan  Currently @ 0 38 (improved from 10 3 earlier this month)  Likely a residual sequela of suspected pulmonary embolism from earlier in the month    Recent cerebrovascular accident  Assessment & Plan  MRI earlier this month noting multifocal infarctions for presumed central/embolic source  Continue statin - continue anticoagulation  PT/OT as tolerated      [facility] Internal Medicine Progress Note  Patient: Jt Section 72 y o  male   MRN: [id number]  PCP: Michaela Bearden MD  Unit/Bed#: CW2 215-01 Encounter: [id number]  Date Of Visit: 08/23/21    Assessment:    Principal Problem:    Bilateral lower extremity DVTs  Active Problems:    Recent cerebrovascular accident    Elevated troponin    Nasopharyngeal mass    Urinary retention    Anemia of chronic disease    Leukocytosis    Suspected pulmonary embolism    Adenocarcinoma of lung    Delirium      Plan:    ·        VTE Pharmacologic Prophylaxis:     Moderate Risk (Score 3-4) - Pharmacological DVT Prophylaxis Ordered: Heparin Drip  Mechanical VTE Prophylaxis in Place: Yes    Patient Centered Rounds: I have performed bedside rounds with nursing staff today  Discussions with Specialists or Other Care Team Provider:     Education and Discussions with Family / Patient: Updated  (wife) at bedside  Time Spent for Care: 30 minutes  More than 50% of total time spent on counseling and coordination of care as described above      Current Length of Stay: 2 day(s)  Current Patient Status: Inpatient     Certification Statement: The patient will continue to require additional inpatient hospital stay due to work on home [facility], MRI abdomen    Discharge Plan / Estimated Discharge Date: Anticipate discharge tomorrow to home with home services  family want to take him home    Code Status: Prior      Subjective:   Denies any complains, got seroquel last night    Objective:     Vitals:   Temp (24hrs), Av °F (36 7 °C), Min:97 5 °F (36 4 °C), Max:98 3 °F (36 8 °C)    Temp:  [97 5 °F (36 4 °C)-98 3 °F (36 8 °C)] 97 5 °F (36 4 °C)  HR:  [66-73] 73  Resp:  [17-21] 17  BP: (141-146)/(73-78) 146/78  SpO2:  [91 %-92 %] 92 %  Body mass index is 26 91 kg/m²  Input and Output Summary (last 24 hours): Intake/Output Summary (Last 24 hours) at 2021 1513  Last data filed at 2021 1200  Gross per 24 hour   Intake 962 65 ml   Output 1225 ml   Net -262 35 ml       Physical Exam:   Physical Exam  Vitals reviewed  HENT:      Head: Normocephalic and atraumatic  Mouth/Throat:      Mouth: Mucous membranes are moist    Cardiovascular:      Rate and Rhythm: Normal rate and regular rhythm  Heart sounds: Normal heart sounds  Pulmonary:      Effort: Pulmonary effort is normal  No respiratory distress  Breath sounds: Normal breath sounds  No wheezing  Abdominal:      General: There is no distension  Palpations: Abdomen is soft  Tenderness: There is no abdominal tenderness  Musculoskeletal:      Right lower leg: No edema  Left lower leg: No edema  Neurological:      Mental Status: He is alert            Additional Data:     Labs:  Results from last 7 days   Lab Units 21  0636   WBC Thousand/uL 8 64   HEMOGLOBIN g/dL 9 8*   HEMATOCRIT % 32 6*   PLATELETS Thousands/uL 271   NEUTROS PCT % 67   LYMPHS PCT % 19   MONOS PCT % 9   EOS PCT % 3     Results from last 7 days   Lab Units 21  0636   SODIUM mmol/L 135*   POTASSIUM mmol/L 3 8   CHLORIDE mmol/L 105   CO2 mmol/L 29   BUN mg/dL 15   CREATININE mg/dL 0 80   ANION GAP mmol/L 1*   CALCIUM mg/dL 9 9   GLUCOSE RANDOM mg/dL 87     Results from last 7 days   Lab Units 08/23/21  0636   INR  1 13                   Imaging: No pertinent imaging reviewed  Recent Cultures (last 7 days):     Results from last 7 days   Lab Units 08/21/21  0834   URINE CULTURE  40,000-49,000 cfu/ml Staphylococcus coagulase negative*  <10,000 cfu/ml Non lactose fermenting gram negative jamie*       Lines/Drains:  Invasive Devices     Peripheral Intravenous Line            Peripheral IV 08/22/21 Left;Ventral (anterior) Forearm 1 day                Telemetry:        Last 24 Hours Medication List:   Current Facility-Administered Medications   Medication Dose Route Frequency Provider Last Rate    acetaminophen  650 mg Oral Q6H PRN Stephon Aguilera MD      atorvastatin  40 mg Oral Daily With Ceasar Peralta MD      heparin (porcine)  3-30 Units/kg/hr (Order-Specific) Intravenous Titrated Stephon Aguilera MD 19 Units/kg/hr (08/23/21 1446)    heparin (porcine)  3,200 Units Intravenous Q1H PRN Stephon Aguilera MD      heparin (porcine)  6,400 Units Intravenous Q1H PRN Stephon Aguilera MD      melatonin  3 mg Oral HS Earle Galarza MD      morphine injection  2 mg Intravenous Q3H PRN Stephon Aguilera MD      OLANZapine  5 mg Intramuscular Q8H PRN Stephon Aguilera MD      ondansetron  4 mg Intravenous Q4H PRN MD Moon Mendez oxyCODONE  2 5 mg Oral Q4H PRN MD Moon Mendez oxyCODONE  5 mg Oral Q4H PRN Stephon Aguilera MD      QUEtiapine  12 5 mg Oral HS PRN Earle Galarza MD      senna-docusate sodium  2 tablet Oral BID Stephon Aguilera MD      tamsulosin  0 4 mg Oral Daily With Ceasar Peralta MD          Today, Patient Was Seen By: Earle Galarza MD    ** Please Note: This note has been constructed using a voice recognition system   **

## 2021-08-23 NOTE — CONSULTS
Medical Oncology/Hematology Consult Note  Messi Dave, male, 72 y o , 1950,  CW2 215/CW2 215-01, [id number]       Reason for consultation: Recommendations of anticoagulation in the setting of worsening DVT despite Eliquis (transitioned to 5mg, BID on 08/15/2021)  Assessment and Plan:  1  Worsening DVT in setting of therapeutic Eliquis  · Suspected Eliquis failure  · Patient previously seen for stroke 07/31/2021 ; was found to have DVT   · previously transitioned to Eliquis 5mg, BID on 08/15/2021  · Suspected multifactorial in setting of previous stroke (07/31/2021), adenocarcinoma of the lung (confirmed via biopsy on 08/05/2021)  · VAS Lower Limb Duplex (08/21/2021): compared to previous on 7/31/21, now revealing new right gastrocnemius vein and left posterior tibial vein DVTs    Plan:   Suspected Eliquis failure   Would recommend starting patient on lifelong Lovenox (1mg/kg, BID) for future coagulation  o Family is in agreement with Lovenox      2  Adenocarcinoma of the lung with potential hepatic metastasis  · In setting of extensive smoking history: (3ppd for 30+ years, quit 17 years ago)  · Biopsy confirmed diagnosis (08/05/2021): Adenocarcinoma, mucin-producing, of uncertain origin  · Positive: CK7(diffusely strong cytoplasmic), CK20(weak cytoplasmic membrane in 20% subset)  Negative: TTF-1, Napsin A, p40, CDX2, Gata3, SATB2, PAX8, estrogen receptor protein  · Previous Imaging:  · CT C/A/P (07/31/2021): There is a 6 4 x 3 6 x 3 4 cm solid, irregular contoured right upper lobe pulmonary nodule, likely primary lung cancer (series 2 image 17 ) There is extensive right hilar and mediastinal adenopathy  There are also numerous too small to characterize hepatic hypodense lesions  · MRI Brain w and wo contrast (08/03/2021): (1)   Multifocal diffusion abnormalities in several separate vascular territories, largest in the left MCA territory indicative of multifocal acute/recent infarctions most likely from a central embolic source  (2)  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  · ENT saw patient and visualized mass flexible laryngoscopy ; Smooth soft tissue mass in the right nasopharynx that appeared to be occluding the right eustachian tube  OP ENT follow-up   · Patient was recommended to follow up with Dr Gonzalo Carrillo outpatient to establish care and get scheduled for a PET-CT scan ; Patient has an appointment on 09/03/2021  Plan:  · Would recommend ordering MRI of the liver while the patient is inpatient as this could replace the need for subsequent PET-CT can outpatient  · Would recommend patient follow up with Dr Gonzalo Carrillo outpatient to discuss MRI findings and subsequent treatment options      Oncologic History:  · CT C/A/P (07/31/2021): There is a 6 4 x 3 6 x 3 4 cm solid, irregular contoured right upper lobe pulmonary nodule, likely primary lung cancer (series 2 image 17 ) There is extensive right hilar and mediastinal adenopathy  There are also numerous too small to characterize hepatic hypodense lesions  · CXR (08/01/2021): Right upper lobe mass with suggestion of mediastinal and hilar lymphadenopathy  At the time of this dictation, CT of the chest had been performed which confirms findings which are suspicious for malignancy  · MRI Brain w and wo contrast (08/03/2021): (1)  Multifocal diffusion abnormalities in several separate vascular territories, largest in the left MCA territory indicative of multifocal acute/recent infarctions most likely from a central embolic source  (2)  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  · ENT saw patient and visualized mass flexible laryngoscopy ; Smooth soft tissue mass in the right nasopharynx that appeared to be occluding the right eustachian tube  OP ENT follow-up   · IR Biopsy Lung (08/05/2021): Small right pleural effusion   Given the small size of the effusion and risk for pneumothorax after needle angulation required for the biopsy we deferred a thoracentesis at this time  This can be considered in the future if clinically appropriate  · Tissue Exam (08/05/2021): Adenocarcinoma, mucin-producing, of uncertain origin  · Positive: CK7(diffusely strong cytoplasmic), CK20(weak cytoplasmic membrane in 20% subset)  Negative: TTF-1, Napsin A, p40, CDX2, Gata3, SATB2, PAX8, estrogen receptor protein  · Per Discharge Summary (08/08/2021): "clinical staging seems to be compatible with at least stage III A  However, the CT scan was done without IV contrast  The patient was told that a PET-CT scan as an outpatient may give us a better idea about the exact staging  If he continues to have stage IIIA then concurrent chemoradiation followed by maintenance immunotherapy should be entertained if his performance status allows  Patient to follow up with OP oncology and pulmonology"        History of present illness:  Mr Joan Otero is a 72year old  male with past medical history of newly diagnosed adenocarcinoma of the lung (08/2021) with potential liver mets, nasopharyngeal mass, recent stroke (07/31/2021), and recent DVT (diagnosed 08/2021 ; transitioned to Eliquis 5mg, BID on 08/15/2021)  Patient presented to [facility] on 08/21/2021 for evaluation of one day onset, progressive right lower extremity swelling with associated mild pain of the right calf  Notably, patient states he was in the hospital for the past 2 weeks following a stroke (07/31/2021) and was discharged 08/17/2021 on therapeutic Eliquis  While in the ED, patient was found to have worsening acute DVT's of the bilateral lower extremities, despite being on therapeutic Eliquis  Patient was started on a Heparin drip and admitted for further evaluation and management  Patient states he was a former smoker of 3 ppd for 30+ years  He quit 17 years ago  Interval History:  Patient has no general complaints at today's visit   Patient states he was anticoagulated during the hospital course and was sent home on Eliquis (transitioned to 5mg, BID on 08/15/2021)  Patient states he has been compliant with the treatment as prescribed  This is confirmed by his wife and son who are present at the bedside  Patient notes he has lost approximately 30lbs unintentionally over the last year  Following his stroke, he now ambulates indepedently with a FWW  His wife notes that he has no residual focal neurological deficits, however, she states his memory has not recovered  Patient denies lightheadedness, dizziness, new headaches, sensation changes, focal weakness, gait/balance abnormalities, chest pain, dyspnea, abdominal pain, constipation, diarrhea, dysuria, urinary urgency, and increased urinary frequency  Patient admits to shortness of breath but states no worse then his baseline  Patient states he had his indwelling catheter removed yesterday and now has a condom cath in place  Patient notes he has an appointment with Dr Shahid Batista to discuss treatment options of his lung cancer on 09/03/2021  Review of Systems:   Review of Systems   Constitutional: Positive for activity change and unexpected weight change (30 lbs unintentional weight loss in last year)  Negative for appetite change, chills, fatigue and fever  HENT: Negative for congestion, sinus pressure, sinus pain, sore throat and trouble swallowing  Eyes: Negative for photophobia and visual disturbance  Respiratory: Positive for shortness of breath (at baseline)  Negative for chest tightness  Cardiovascular: Positive for leg swelling (right worse than left)  Negative for chest pain and palpitations  Gastrointestinal: Negative for abdominal pain, blood in stool, constipation, diarrhea, nausea and vomiting  Genitourinary: Negative for dysuria, frequency, hematuria and urgency  Musculoskeletal: Negative for arthralgias and back pain  Skin: Negative for color change and rash  Neurological: Negative for dizziness, weakness, light-headedness, numbness and headaches  Hematological: Negative for adenopathy  Does not bruise/bleed easily  Psychiatric/Behavioral: Negative for confusion and sleep disturbance  The patient is not nervous/anxious  Memory problems  Past Medical History:   Diagnosis Date    Lung cancer ([address] )     Stroke [facility])        Past Surgical History:   Procedure Laterality Date    IR BIOPSY LUNG  8/5/2021       History reviewed  No pertinent family history  Social History     Socioeconomic History    Marital status: /Civil Union     Spouse name: None    Number of children: None    Years of education: None    Highest education level: None   Occupational History    None   Tobacco Use    Smoking status: Former Smoker    Smokeless tobacco: Never Used   Vaping Use    Vaping Use: Never used   Substance and Sexual Activity    Alcohol use: Yes    Drug use: Never    Sexual activity: None   Other Topics Concern    None   Social History Narrative    None     Social Determinants of Health     Financial Resource Strain:     Difficulty of Paying Living Expenses:    Food Insecurity:     Worried About Running Out of Food in the Last Year:     [address] in the Last Year:    Transportation Needs:     Lack of Transportation (Medical):      Lack of Transportation (Non-Medical):    Physical Activity:     Days of Exercise per Week:     Minutes of Exercise per Session:    Stress:     Feeling of Stress :    Social Connections:     Frequency of Communication with Friends and Family:     Frequency of Social Gatherings with Friends and Family:     Attends [employer] Services:     Active Member of Clubs or Organizations:     Attends Club or Organization Meetings:     Marital Status:    Intimate Partner Violence:     Fear of Current or Ex-Partner:     Emotionally Abused:     Physically Abused:     Sexually Abused:          Current Facility-Administered Medications:     acetaminophen (TYLENOL) tablet 650 mg, 650 mg, Oral, Q6H PRN, Nicky Lundberg MD    atorvastatin (LIPITOR) tablet 40 mg, 40 mg, Oral, Daily With Dominick Block MD, 40 mg at 08/22/21 1739    heparin (porcine) 25,000 units in 0 45% NaCl 250 mL infusion (premix), 3-30 Units/kg/hr (Order-Specific), Intravenous, Titrated, Nicky Lundberg MD, Last Rate: 15 2 mL/hr at 08/23/21 0811, 19 Units/kg/hr at 08/23/21 0811    heparin (porcine) injection 3,200 Units, 3,200 Units, Intravenous, Q1H PRN, Nicky Lundberg MD, 3,200 Units at 08/23/21 0815    heparin (porcine) injection 6,400 Units, 6,400 Units, Intravenous, Q1H PRN, Nicky Lundberg MD, 6,400 Units at 08/22/21 1021    melatonin tablet 3 mg, 3 mg, Oral, HS, Shannenbarney Lynch MD, 3 mg at 08/22/21 2210    morphine injection 2 mg, 2 mg, Intravenous, Q3H PRN, Nicky Lundberg MD    OLANZapine (ZyPREXA) IM injection 5 mg, 5 mg, Intramuscular, Q8H PRN, Nicky Lundberg MD    ondansetron (ZOFRAN) injection 4 mg, 4 mg, Intravenous, Q4H PRN, Nicky Lundberg MD    oxyCODONE (ROXICODONE) IR tablet 2 5 mg, 2 5 mg, Oral, Q4H PRN, Nicky Lundberg MD    oxyCODONE (ROXICODONE) IR tablet 5 mg, 5 mg, Oral, Q4H PRN, Nicky Lundberg MD    QUEtiapine (SEROquel) tablet 12 5 mg, 12 5 mg, Oral, HS PRN, Lg Gross MD, 12 5 mg at 08/22/21 2210    senna-docusate sodium (SENOKOT S) 8 6-50 mg per tablet 2 tablet, 2 tablet, Oral, BID, Nicky Lundberg MD, 2 tablet at 08/22/21 1739    tamsulosin (FLOMAX) capsule 0 4 mg, 0 4 mg, Oral, Daily With Dominick Block MD, 0 4 mg at 08/22/21 4654    Medications Prior to Admission   Medication    acetaminophen (TYLENOL) 325 mg tablet    apixaban (ELIQUIS) 5 mg    atorvastatin (LIPITOR) 40 mg tablet    melatonin 3 mg    senna-docusate sodium (SENOKOT S) 8 6-50 mg per tablet    tamsulosin (FLOMAX) 0 4 mg    bisacodyl (DULCOLAX) 10 mg suppository    polyethylene glycol (GLYCOLAX) 17 GM/SCOOP powder    QUEtiapine (SEROquel) 25 mg tablet       No Known Allergies      Physical Exam:    /78 (BP Location: Right arm)   Pulse 66   Temp 97 5 °F (36 4 °C) (Oral)   Resp 17   Wt 80 3 kg (177 lb)   SpO2 91%   BMI 26 91 kg/m²     Physical Exam  Constitutional:       General: He is not in acute distress  Appearance: Normal appearance  He is not ill-appearing  HENT:      Head: Normocephalic and atraumatic  Nose: Nose normal       Mouth/Throat:      Mouth: Mucous membranes are moist       Pharynx: Oropharynx is clear  Eyes:      Extraocular Movements: Extraocular movements intact  Conjunctiva/sclera: Conjunctivae normal       Pupils: Pupils are equal, round, and reactive to light  Cardiovascular:      Rate and Rhythm: Normal rate and regular rhythm  Pulses: Normal pulses  Heart sounds: Normal heart sounds  No murmur heard  No gallop  Pulmonary:      Effort: Pulmonary effort is normal       Breath sounds: Normal breath sounds  No wheezing, rhonchi or rales  Abdominal:      General: Bowel sounds are normal  There is no distension  Palpations: Abdomen is soft  Tenderness: There is no abdominal tenderness  There is no guarding  Musculoskeletal:         General: Swelling (trace edema to the bilateral lower extremities ; right worse than left) present  No tenderness (to palpation of bilateral posterior calf)  Normal range of motion  Cervical back: Normal range of motion  Lymphadenopathy:      Comments: No palpable submental, submandibular, pre/posterior auricular, occipital, cervical, supraclavicular, or axillary lymphadenopathy  Skin:     General: Skin is warm and dry  Neurological:      General: No focal deficit present  Mental Status: He is alert  Mental status is at baseline  Psychiatric:         Mood and Affect: Mood normal          Behavior: Behavior normal          Thought Content:  Thought content normal          Judgment: Judgment normal  Recent Results (from the past 48 hour(s))   Urine culture    Collection Time: 08/21/21  8:34 AM    Specimen: Urine, Indwelling [place] Catheter   Result Value Ref Range    Urine Culture (A)      40,000-49,000 cfu/ml Staphylococcus coagulase negative   ECG 12 lead    Collection Time: 08/21/21 11:02 AM   Result Value Ref Range    Ventricular Rate 65 BPM    Atrial Rate 65 BPM    MI Interval 144 ms    QRSD Interval 84 ms    QT Interval 396 ms    QTC Interval 411 ms    P [place] 44 degrees    QRS Axis 129 degrees    T Wave Axis 33 degrees   CBC and differential    Collection Time: 08/21/21 11:07 AM   Result Value Ref Range    WBC 11 05 (H) 4 31 - 10 16 Thousand/uL    RBC 4 15 3 88 - 5 62 Million/uL    Hemoglobin 10 4 (L) 12 0 - 17 0 g/dL    Hematocrit 34 6 (L) 36 5 - 49 3 %    MCV 83 82 - 98 fL    MCH 25 1 (L) 26 8 - 34 3 pg    MCHC 30 1 (L) 31 4 - 37 4 g/dL    RDW 16 1 (H) 11 6 - 15 1 %    MPV 9 9 8 9 - 12 7 fL    Platelets 648 466 - 194 Thousands/uL    nRBC 0 /100 WBCs    Neutrophils Relative 72 43 - 75 %    Immat GRANS % 1 0 - 2 %    Lymphocytes Relative 18 14 - 44 %    Monocytes Relative 7 4 - 12 %    Eosinophils Relative 1 0 - 6 %    Basophils Relative 1 0 - 1 %    Neutrophils Absolute 7 93 (H) 1 85 - 7 62 Thousands/µL    Immature Grans Absolute 0 05 0 00 - 0 20 Thousand/uL    Lymphocytes Absolute 2 03 0 60 - 4 47 Thousands/µL    Monocytes Absolute 0 82 0 17 - 1 22 Thousand/µL    Eosinophils Absolute 0 13 0 00 - 0 61 Thousand/µL    Basophils Absolute 0 09 0 00 - 0 10 Thousands/µL   Basic metabolic panel    Collection Time: 08/21/21 11:07 AM   Result Value Ref Range    Sodium 136 136 - 145 mmol/L    Potassium 4 2 3 5 - 5 3 mmol/L    Chloride 106 100 - 108 mmol/L    CO2 28 21 - 32 mmol/L    ANION GAP 2 (L) 4 - 13 mmol/L    BUN 12 5 - 25 mg/dL    Creatinine 0 80 0 60 - 1 30 mg/dL    Glucose 83 65 - 140 mg/dL    Calcium 10 0 8 3 - 10 1 mg/dL    eGFR 91 ml/min/1 73sq m   Troponin I    Collection Time: 08/21/21 11:07 AM   Result Value Ref Range    Troponin I 0 38 (H) <=0 04 ng/mL   Protime-INR    Collection Time: 08/21/21 11:07 AM   Result Value Ref Range    Protime 14 8 (H) 11 6 - 14 5 seconds    INR 1 16 0 84 - 1 19   APTT    Collection Time: 08/21/21 11:07 AM   Result Value Ref Range    PTT 30 23 - 37 seconds   APTT    Collection Time: 08/21/21  6:11 PM   Result Value Ref Range     (H) 23 - 37 seconds   APTT    Collection Time: 08/22/21  1:03 AM   Result Value Ref Range    PTT 72 (H) 23 - 37 seconds   Basic metabolic panel    Collection Time: 08/22/21  5:57 AM   Result Value Ref Range    Sodium 137 136 - 145 mmol/L    Potassium 3 7 3 5 - 5 3 mmol/L    Chloride 105 100 - 108 mmol/L    CO2 29 21 - 32 mmol/L    ANION GAP 3 (L) 4 - 13 mmol/L    BUN 15 5 - 25 mg/dL    Creatinine 0 82 0 60 - 1 30 mg/dL    Glucose 93 65 - 140 mg/dL    Calcium 10 1 8 3 - 10 1 mg/dL    eGFR 90 ml/min/1 73sq m   CBC and differential    Collection Time: 08/22/21  5:57 AM   Result Value Ref Range    WBC 10 59 (H) 4 31 - 10 16 Thousand/uL    RBC 4 46 3 88 - 5 62 Million/uL    Hemoglobin 11 1 (L) 12 0 - 17 0 g/dL    Hematocrit 37 1 36 5 - 49 3 %    MCV 83 82 - 98 fL    MCH 24 9 (L) 26 8 - 34 3 pg    MCHC 29 9 (L) 31 4 - 37 4 g/dL    RDW 16 1 (H) 11 6 - 15 1 %    MPV 10 3 8 9 - 12 7 fL    Platelets 491 833 - 873 Thousands/uL    nRBC 0 /100 WBCs    Neutrophils Relative 69 43 - 75 %    Immat GRANS % 1 0 - 2 %    Lymphocytes Relative 20 14 - 44 %    Monocytes Relative 8 4 - 12 %    Eosinophils Relative 1 0 - 6 %    Basophils Relative 1 0 - 1 %    Neutrophils Absolute 7 42 1 85 - 7 62 Thousands/µL    Immature Grans Absolute 0 07 0 00 - 0 20 Thousand/uL    Lymphocytes Absolute 2 06 0 60 - 4 47 Thousands/µL    Monocytes Absolute 0 79 0 17 - 1 22 Thousand/µL    Eosinophils Absolute 0 15 0 00 - 0 61 Thousand/µL    Basophils Absolute 0 10 0 00 - 0 10 Thousands/µL   Protime-INR    Collection Time: 08/22/21  5:57 AM   Result Value Ref Range    Protime 15 4 (H) 11 6 - 14 5 seconds    INR 1 22 (H) 0 84 - 1 19   APTT    Collection Time: 08/22/21  9:27 AM   Result Value Ref Range    PTT 33 23 - 37 seconds   APTT    Collection Time: 08/22/21  5:12 PM   Result Value Ref Range     (HH) 23 - 37 seconds   Hemoglobin and hematocrit, blood    Collection Time: 08/22/21 10:15 PM   Result Value Ref Range    Hemoglobin 9 9 (L) 12 0 - 17 0 g/dL    Hematocrit 32 9 (L) 36 5 - 49 3 %   APTT    Collection Time: 08/23/21  1:17 AM   Result Value Ref Range    PTT 63 (H) 23 - 37 seconds   Basic metabolic panel    Collection Time: 08/23/21  6:36 AM   Result Value Ref Range    Sodium 135 (L) 136 - 145 mmol/L    Potassium 3 8 3 5 - 5 3 mmol/L    Chloride 105 100 - 108 mmol/L    CO2 29 21 - 32 mmol/L    ANION GAP 1 (L) 4 - 13 mmol/L    BUN 15 5 - 25 mg/dL    Creatinine 0 80 0 60 - 1 30 mg/dL    Glucose 87 65 - 140 mg/dL    Calcium 9 9 8 3 - 10 1 mg/dL    eGFR 91 ml/min/1 73sq m   CBC and differential    Collection Time: 08/23/21  6:36 AM   Result Value Ref Range    WBC 8 64 4 31 - 10 16 Thousand/uL    RBC 3 96 3 88 - 5 62 Million/uL    Hemoglobin 9 8 (L) 12 0 - 17 0 g/dL    Hematocrit 32 6 (L) 36 5 - 49 3 %    MCV 82 82 - 98 fL    MCH 24 7 (L) 26 8 - 34 3 pg    MCHC 30 1 (L) 31 4 - 37 4 g/dL    RDW 16 1 (H) 11 6 - 15 1 %    MPV 10 3 8 9 - 12 7 fL    Platelets 486 500 - 733 Thousands/uL    nRBC 0 /100 WBCs    Neutrophils Relative 67 43 - 75 %    Immat GRANS % 1 0 - 2 %    Lymphocytes Relative 19 14 - 44 %    Monocytes Relative 9 4 - 12 %    Eosinophils Relative 3 0 - 6 %    Basophils Relative 1 0 - 1 %    Neutrophils Absolute 5 89 1 85 - 7 62 Thousands/µL    Immature Grans Absolute 0 04 0 00 - 0 20 Thousand/uL    Lymphocytes Absolute 1 63 0 60 - 4 47 Thousands/µL    Monocytes Absolute 0 77 0 17 - 1 22 Thousand/µL    Eosinophils Absolute 0 23 0 00 - 0 61 Thousand/µL    Basophils Absolute 0 08 0 00 - 0 10 Thousands/µL   APTT    Collection Time: 08/23/21  6:36 AM   Result Value Ref Range    PTT 54 (H) 23 - 37 seconds   Protime-INR    Collection Time: 21  6:36 AM   Result Value Ref Range    Protime 14 5 11 6 - 14 5 seconds    INR 1 13 0 84 - 1 19       Echo complete with contrast if indicated    Result Date: 2021  Narrative: [address], [address] [facility] [phone] Transthoracic Echocardiogram 2D, M-mode, Doppler, and Color Doppler Study date:  01-Aug-2021 Patient: Ambreen Choe MR number: [id number] Account number: [de-identified] : 1950 Age: 72 years Gender: Male Status: Inpatient Location: Bedside Height: 68 in Weight: 189 6 lb BP: 129/ 83 mmHg Indications: CVA  Diagnoses: I67 9 - Cerebrovascular disease, unspecified Sonographer:  Carlos Jane RDCS Referring Physician:  Kathy Salazar DO Group:  [facility] Interpreting Physician:  Vanita Davis DO SUMMARY LEFT VENTRICLE: Systolic function was normal  Ejection fraction was estimated to be 55 %  Although no diagnostic regional wall motion abnormality was identified, this possibility cannot be completely excluded on the basis of this study  Wall thickness was mildly increased  Doppler parameters were consistent with abnormal left ventricular relaxation (grade 1 diastolic dysfunction)  RIGHT VENTRICLE: The ventricle was mildly to moderately dilated  Systolic function was mildly reduced  There is hypokinesis of the mid free wall  RIGHT ATRIUM: The atrium was mildly dilated  TRICUSPID VALVE: There was mild regurgitation  Estimated peak PA pressure was 44 mmHg  PERICARDIUM: A small pericardial effusion was identified  There was no evidence of hemodynamic compromise  HISTORY: PRIOR HISTORY: CVA  Elevated troponin  Acute respiratory failure  DVT  Former smoker  PROCEDURE: The procedure was performed at the bedside  This was a routine study  The transthoracic approach was used   The study included complete 2D imaging, M-mode, complete spectral Doppler, and color Doppler  The heart rate was 70 bpm, at the start of the study  Images were obtained from the parasternal, apical, subcostal, and suprasternal notch acoustic windows  Echocardiographic views were limited due to restricted patient mobility and lung interference  This was a technically difficult study  LEFT VENTRICLE: Size was normal  Systolic function was normal  Ejection fraction was estimated to be 55 %  Although no diagnostic regional wall motion abnormality was identified, this possibility cannot be completely excluded on the basis of this study  Wall thickness was mildly increased  DOPPLER: Doppler parameters were consistent with abnormal left ventricular relaxation (grade 1 diastolic dysfunction)  RIGHT VENTRICLE: The ventricle was mildly to moderately dilated  Systolic function was mildly reduced  There is hypokinesis of the mid free wall  LEFT ATRIUM: Size was normal  RIGHT ATRIUM: The atrium was mildly dilated  MITRAL VALVE: Valve structure was normal  There was normal leaflet separation  DOPPLER: The transmitral velocity was within the normal range  There was no evidence for stenosis  There was no regurgitation  AORTIC VALVE: The valve was trileaflet  Leaflets exhibited normal thickness and normal cuspal separation  DOPPLER: Transaortic velocity was within the normal range  There was no evidence for stenosis  There was no regurgitation  TRICUSPID VALVE: The valve structure was normal  There was normal leaflet separation  DOPPLER: The transtricuspid velocity was within the normal range  There was no evidence for stenosis  There was mild regurgitation  Estimated peak PA pressure was 44 mmHg  PULMONIC VALVE: Leaflets exhibited normal thickness, no calcification, and normal cuspal separation  DOPPLER: The transpulmonic velocity was within the normal range  There was no regurgitation  PERICARDIUM: A small pericardial effusion was identified  There was no evidence of hemodynamic compromise   The pericardium was normal in appearance  AORTA: The root exhibited normal size  SYSTEMIC VEINS: IVC: The inferior vena cava was not well visualized  SYSTEM MEASUREMENT TABLES 2D %FS: 24 % Ao Diam: 3 63 cm EDV(Teich): 92 69 ml EF(Teich): 47 96 % ESV(Teich): 48 24 ml IVSd: 1 08 cm LA Area: 10 98 cm2 LA Diam: 3 49 cm LVEDV MOD A4C: 91 9 ml LVEF MOD A4C: 58 38 % LVESV MOD A4C: 38 25 ml LVIDd: 4 51 cm LVIDs: 3 42 cm LVLd A4C: 8 32 cm LVLs A4C: 7 04 cm LVPWd: 1 03 cm RA Area: 19 63 cm2 RVIDd: 4 75 cm SV MOD A4C: 53 66 ml SV(Teich): 44 45 ml CW TR Vmax: 3 04 m/s TR maxP 91 mmHg MM TAPSE: 1 3 cm PW E' Sept: 0 08 m/s E/E' Sept: 7 34 MV A Tristan: 0 75 m/s MV Dec [place]: 1 98 m/s2 MV DecT: 280 99 ms MV E Tristan: 0 56 m/s MV E/A Ratio: 0 74 MV PHT: 81 49 ms MVA By PHT: 2 7 cm2 [address] [facility] Prepared and electronically signed by Lynda Sesay DO Signed 01-Aug-2021 14:29:36     CT chest abdomen pelvis wo contrast    Result Date: 2021  Narrative: CT CHEST, ABDOMEN AND PELVIS WITHOUT IV CONTRAST INDICATION:   Metastases suspected, unknown primary lung mass and SOB  COMPARISON:  No prior chest imaging  Comparison is made with the relevant images of the head and neck CTA from earlier the same day  TECHNIQUE: CT examination of the chest, abdomen and pelvis was performed without intravenous contrast   Axial, sagittal, and coronal 2D reformatted images were created from the source data and submitted for interpretation  Radiation dose length product (DLP) for this visit:  848  76 mGy-cm   This examination, like all CT scans performed in the [facility], was performed utilizing techniques to minimize radiation dose exposure, including the use of iterative  reconstruction and automated exposure control  Enteric contrast was not administered  FINDINGS: CHEST LUNGS:  There is moderate emphysema   There is a 6 4 x 3 6 x 3 4 cm solid, irregular contoured right upper lobe pulmonary nodule (series 2 image 17 )  There is no tracheal or endobronchial lesion  PLEURA:  Small water density right-sided pleural effusion  HEART/GREAT VESSELS:  Trace pericardial effusion  Mild cardiomegaly  MEDIASTINUM AND JONH:  There is diffuse right hilar and mediastinal adenopathy  Largest is a right paratracheal node measuring 2 cm in short axis (series 2 image 21 ) CHEST WALL AND LOWER NECK:   Unremarkable  ABDOMEN LIVER/BILIARY TREE:  Numerous too small to characterize hypodensities scattered throughout the liver  GALLBLADDER:  No calcified gallstones  No pericholecystic inflammatory change  SPLEEN:  Unremarkable  PANCREAS:  Unremarkable  ADRENAL GLANDS:  Unremarkable  KIDNEYS/URETERS:  There is a 5 6 x 5 6 cm simple cyst in the left kidney lower pole  No hydronephrosis  There is contrast throughout the renal collecting systems, due to the prior IV contrast injection for head and neck CTA  STOMACH AND BOWEL:  Unremarkable  APPENDIX:  No findings to suggest appendicitis  ABDOMINOPELVIC CAVITY:  No ascites  No pneumoperitoneum  No lymphadenopathy  VESSELS:  Unremarkable for patient's age  PELVIS REPRODUCTIVE ORGANS:  Unremarkable for patient's age  URINARY BLADDER:  Bladder is also filled with previous injected iodinated IV contrast  ABDOMINAL WALL/INGUINAL REGIONS:  Unremarkable  OSSEOUS STRUCTURES:  No acute fracture or destructive osseous lesion  Impression: There is moderate emphysema  There is a 6 4 x 3 6 x 3 4 cm solid, irregular contoured right upper lobe pulmonary nodule, likely primary lung cancer (series 2 image 17 ) There is extensive right hilar and mediastinal adenopathy  Small right pleural effusion  There are also numerous too small to characterize hepatic hypodense lesions  Consider abdomen MR with and without IV contrast to characterize  Workstation performed: [id number]     XR chest portable    Result Date: 8/2/2021  Narrative: CHEST INDICATION:   increasing oxygen requirements   COMPARISON: None EXAM PERFORMED/VIEWS:  XR CHEST PORTABLE FINDINGS: Cardiomediastinal silhouette appears unremarkable  4 7 x 3 6 cm right upper lobe mass is present  There is suggestion of mediastinal and hilar lymphadenopathy  No pleural effusions or pneumothorax  Osseous structures appear within normal limits for patient age  Impression: Right upper lobe mass with suggestion of mediastinal and hilar lymphadenopathy  At the time of this dictation, CT of the chest had been performed which confirms findings which are suspicious for malignancy  Workstation performed: [id number]     MRI brain w wo contrast    Result Date: 8/3/2021  Narrative: MRI BRAIN WITH AND WITHOUT CONTRAST INDICATION: stroke  Status post stroke alert  Right-sided weakness  COMPARISON:  None  TECHNIQUE: Sagittal T1, axial T2, axial FLAIR, axial T1, axial [place], axial diffusion  Sagittal, axial T1 postcontrast   Axial bravo postcontrast with coronal reconstructions  IV Contrast:  8 mL of Gadobutrol injection (SINGLE-DOSE)  IMAGE QUALITY:   Diagnostic  FINDINGS: BRAIN PARENCHYMA:  Multifocal diffusion restriction in at least 3 discrete vascular territories most notably in the left MCA involving the left basal ganglia and portions of the left internal capsule on image 19, series 3 correlating to the hypodensity on the prior stroke alert head CT from a few days days earlier  Additionally there are few subtle cortical foci of diffusion restriction more superiorly in the left frontal lobe on image 25 and 26 also in the left MCA territory  A faint, subtle focus of diffusion restriction superiorly in the right frontal lobe on image 29, series 3 is noted  This is in the right MCA territory  In the periventricular white matter adjacent to the occipital horn of the left lateral ventricle in the parietal occipital junction on image 20, series 3 there is a faint focus of diffusion restriction    In the left occipital lobe on image 15, series 3 is a left posterior cerebral artery territory there are 2 foci of diffusion restriction  A faint focus of diffusion restriction superiorly in the right cerebellum image 11, series 3 is noted indicative of posterior circulation infarction likely in the superior cerebellar artery territory on the right  Another more subtle faint focus of diffusion restriction more anteriorly inferiorly on image 10, series 3 may be in the anterior-inferior cerebellar artery territory  There is questionable subtle faint  patchy enhancement in the left basal ganglia infarction, suggesting a subacute component  No large intracranial hemorrhage  No extra-axial fluid collection  Cerebellar tonsils are normally positioned  Small scattered hyperintensities on T2/FLAIR imaging are noted in the periventricular and subcortical white matter demonstrating an appearance that is statistically most likely to represent mild microangiopathic change  VENTRICLES:  Slight effacement of the frontal horn left lateral ventricle  SELLA AND PITUITARY GLAND:  Normal  ORBITS:  Right lens implant noted  PARANASAL SINUSES:  Normal  VASCULATURE:  Evaluation of the major intracranial vasculature demonstrates appropriate flow voids  CALVARIUM AND SKULL BASE:  Normal  EXTRACRANIAL SOFT TISSUES:  Bilobed mass in the right nasopharynx laterally in the region of the fossa of Rosenmuller has 2 components with a more medial intrinsically T1 hyperintense elements and more medial hypointense/cystic element  In aggregate the  lesion measures approximately 2 5 cm transverse dimension on image 5, series 11  Impression: 1  Multifocal diffusion abnormalities in several separate vascular territories, largest in the left MCA territory indicative of multifocal acute/recent infarctions most likely from a central embolic source  Further clinical assessment advised  2   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    Both benign and malignant etiologies are in the differential diagnosis  ENT assessment recommended  Workstation performed: [id number]     CT stroke alert brain    Result Date: 7/31/2021  Narrative: CT BRAIN - STROKE ALERT PROTOCOL INDICATION:   Right-sided weakness  Dysarthria  COMPARISON:  None  TECHNIQUE:  CT examination of the brain was performed  In addition to axial images, coronal reformatted images were created and submitted for interpretation  Radiation dose length product (DLP) for this visit:  868 03 mGy-cm   This examination, like all CT scans performed in the [facility], was performed utilizing techniques to minimize radiation dose exposure, including the use of iterative  reconstruction and automated exposure control  IMAGE QUALITY:  Diagnostic  FINDINGS:  PARENCHYMA:  Loss of gray-white differentiation identified in the left caudate head involving the anterior limb of left internal capsule and anterior portion of the lentiform nucleus representing recent infarct measuring approximately 2 4 x 1 8 cm  No hemorrhage identified  Areas of low-density in periventricular subcortical regions compatible with mild chronic microangiopathy  Normal intracranial vasculature  VENTRICLES AND EXTRA-AXIAL SPACES:  Normal for patient's age  VISUALIZED ORBITS AND PARANASAL SINUSES:  Unremarkable  CALVARIUM AND EXTRACRANIAL SOFT TISSUES:   Normal      Impression: Recent infarct left caudate head, anterior lentiform nucleus, and anterior limb of left internal capsule without evidence of hemorrhage  Findings were directly discussed with Pedro Thurman on 7/31/2021 2:51 PM  Workstation performed: [id number]     IR biopsy lung    Result Date: 8/5/2021  Narrative: CT-guided lung biopsy Clinical History: Lung mass  Stroke  Biopsy for diagnosis  Small right effusion  Nasopharyngeal mass   Moderate sedation time: 45 minutes Procedure: After explaining the risks and benefits of the procedure to the patient's daughter, informed consent was obtained  CT was used to localize the right apical mass   Radiation dose length product (DLP) for this visit:  2294 mGy   This examination, like all CT scans performed in the [facility], was performed utilizing techniques to minimize radiation dose exposure, including the use of iterative reconstruction and automated exposure control  The overlying skin was prepped and draped in the usual sterile fashion  Local anesthesia was obtained with a 1% lidocaine solution  Using CT guidance, a 17-gauge coaxial needle was advanced  2 passes with an 18g gauge core biopsy needle were performed  The tissue was given to pathology  Additional passes were obtained  The needle was removed and final imaging performed  Findings: Large right apical lung mass measuring up to 7 5 cm  Internal calcifications  Small right pleural effusion  Needle within the lesion  Postprocedural changes without pneumothorax  Specimens: Touch prep, 18-gauge core x5   The patient tolerated the procedure well without immediate complication     Impression: Impression: CT-guided biopsy of a right lung mass Small right pleural effusion  Given the small size of the effusion and risk for pneumothorax after needle angulation required for the biopsy we deferred a thoracentesis at this time  This can be considered in the future if clinically appropriate Workstation performed: [id number]     VAS lower limb venous duplex study, complete bilateral    Result Date: 8/21/2021  Narrative:  [facility] REPORT CLINICAL: Indications: Patient complains of a new onset of right calf pain  Patient presents to determine propagation vs resolution of previously noted DVT in the bilateral legs discovered on 07/31/21    FINDINGS:  Segment         Right              Left                            DVT                DVT                Gastrocnemius   Acute - Occlusive                     GSV Mid Thigh                      Acute - Occlusive  GSV Dist Thigh                     Acute - Occlusive  GSV Knee                           Acute - Occlusive  GSV Prox Calf                      Acute - Occlusive  GSV Mid Calf                       Acute - Occlusive  Peroneal        Acute - Occlusive  Acute - Occlusive  PostTibial      Acute - Occlusive  Acute - Occlusive     CONCLUSION:  Impression: RIGHT LOWER LIMB: Acute deep vein thrombosis is noted in the posterior tibial, peroneal veins and one of the gastrocnemius veins  No evidence of superficial thrombophlebitis noted  Doppler evaluation shows a normal response to augmentation maneuvers  Popliteal, posterior tibial and anterior tibial arterial Doppler waveforms are triphasic  LEFT LOWER LIMB: Acute deep vein thrombosis is noted in the peroneal veins and one of the posterior tibial veins  Acute superficial thrombophlebitis is noted in the great saphenous vein from the mid thigh to the mid calf  Doppler evaluation shows a normal response to augmentation maneuvers  Popliteal, posterior tibial and anterior tibial arterial Doppler waveforms are triphasic  Technical findings were given to Dr Thanh Molina  In comparison to the study of 07/31/21, there is a new finding of of a right gastrocnemius vein DVT and left posterior tibial vein DVT  SIGNATURE: Electronically Signed by: Magaly Gallego MD on 2021-08-21 11:23:16 PM    VAS lower limb venous duplex study, complete bilateral    Result Date: 8/1/2021  Narrative:  [facility] REPORT CLINICAL: Indications: Patient presents with bilateral lower calf pain and swelling    FINDINGS:  Right           Impression              Peroneal        Occlusive Subsegmental  PostTibial      Occlusive Subsegmental   Left            Impression              DVT                GSV Mid Thigh                           Acute - Occlusive  GSV Dist Thigh                          Acute - Occlusive  GSV Knee                                Acute - Occlusive  GSV Mid Calf Acute - Occlusive  Peroneal        Occlusive Subsegmental                        CONCLUSION:  Impression: RIGHT LOWER LIMB: Acute deep vein thrombosis is noted in the posterior tibial and peroneal veins  No evidence of superficial thrombophlebitis noted  Doppler evaluation shows a normal response to augmentation maneuvers  Popliteal, posterior tibial and anterior tibial arterial Doppler waveforms are triphasic  LEFT LOWER LIMB: Acute deep vein thrombosis is noted in the peroneal veins  Acute superficial thrombophlebitis is noted in the great saphenous vein from the mid thigh to the mid calf  Doppler evaluation shows a normal response to augmentation maneuvers  Popliteal, posterior tibial and anterior tibial arterial Doppler waveforms are triphasic  Technical findings were given to Dr Hallie Barakat  SIGNATURE: Electronically Signed by: Josh Arevalo on 2021-08-01 11:43:04 AM    CTA stroke alert (head/neck)    Result Date: 7/31/2021  Narrative: CTA NECK AND BRAIN WITH CONTRAST INDICATION: Stroke symptoms with right upper and lower extremity ataxia  Dysarthria  Right facial droop  COMPARISON:   None  TECHNIQUE:   Post contrast imaging was performed after administration of iodinated contrast through the neck and brain  Post contrast axial 0 625 mm images timed to opacify the arterial system  3D rendering was performed on an independent workstation  MIP reconstructions performed  Coronal reconstructions were performed of the noncontrast portion of the brain  Radiation dose length product (DLP) for this visit:  609 42 mGy-cm   This examination, like all CT scans performed in the [facility], was performed utilizing techniques to minimize radiation dose exposure, including the use of iterative  reconstruction and automated exposure control     IV Contrast:  85 mL Omnipaque 350 contrast   IMAGE QUALITY:   Diagnostic FINDINGS: CERVICAL VASCULATURE AORTIC ARCH AND GREAT VESSELS:  Normal aortic arch and great vessel origins  Normal visualized subclavian vessels  RIGHT VERTEBRAL ARTERY CERVICAL SEGMENT:  Normal origin  The vessel is normal in caliber throughout the neck  LEFT VERTEBRAL ARTERY CERVICAL SEGMENT:  Normal origin  The vessel is normal in caliber throughout the neck  RIGHT EXTRACRANIAL CAROTID SEGMENT:  Normal caliber common carotid artery  Normal bifurcation and cervical internal carotid artery  No stenosis or dissection  LEFT EXTRACRANIAL CAROTID SEGMENT:  Normal caliber common carotid artery  Normal bifurcation and cervical internal carotid artery  No stenosis or dissection  NASCET criteria was used to determine the degree of internal carotid artery diameter stenosis  INTRACRANIAL VASCULATURE INTERNAL CAROTID ARTERIES:  Normal enhancement of the intracranial portions of the internal carotid arteries  Normal ophthalmic artery origins  Normal ICA terminus  ANTERIOR CIRCULATION:  Symmetric A1 segments and anterior cerebral arteries with normal enhancement  Normal anterior communicating artery  MIDDLE CEREBRAL ARTERY CIRCULATION:  M1 segment and middle cerebral artery branches demonstrate normal enhancement bilaterally  DISTAL VERTEBRAL ARTERIES:  Normal distal vertebral arteries  Posterior inferior cerebellar artery origins are normal  Normal vertebral basilar junction  BASILAR ARTERY:  Basilar artery is normal in caliber  Normal superior cerebellar arteries  POSTERIOR CEREBRAL ARTERIES: Both posterior cerebral arteries arises from the basilar tip  Both arteries demonstrate normal enhancement  DURAL VENOUS SINUSES:  Normal  NON VASCULAR ANATOMY BONY STRUCTURES:  Reversal the cervical lordosis apex C4-5  SOFT TISSUES OF THE NECK:  Mass identified right nasopharynx for which direct visualization by ENT is recommended  This is incompletely evaluated on this study secondary arterial contrast bolus timing   THORACIC INLET:  Right upper lobe lung mass, small right pleural effusion, or mediastinal adenopathy noted  Impression: No large vessel occlusion  Atherosclerotic calcification of the carotid bifurcation bilaterally with no significant stenosis  Bilateral vertebral arteries widely patent  No focal intracranial stenosis or aneurysm  Mass right nasopharynx incompletely evaluated secondary to arterial bolus timing  The mass measures approximately 2 7 x 2 0 cm and direct visualization by ENT recommended  Right upper lobe lung mass and mediastinal adenopathy noted small right pleural effusion    Findings were directly discussed with Pedro Thurman on 7/31/2021 2:51 PM  Workstation performed: [id number]         Labs and pertinent reports reviewed  Disclaimer: This document was prepared using [employer] technology  If a word or phrase is confusing, or does not make sense, this is likely due to recognition error which was not discovered during the providers review  If you believe an error has occurred, please contact me for charmaine? cation

## 2021-08-23 NOTE — PLAN OF CARE
Problem: Prexisting or High Potential for Compromised Skin Integrity  Goal: Skin integrity is maintained or improved  Description: INTERVENTIONS:  - Identify patients at risk for skin breakdown  - Assess and monitor skin integrity  - Assess and monitor nutrition and hydration status  - Monitor labs   - Assess for incontinence   - Turn and reposition patient  - Assist with mobility/ambulation  - Relieve pressure over bony prominences  - Avoid friction and shearing  - Provide appropriate hygiene as needed including keeping skin clean and dry  - Evaluate need for skin moisturizer/barrier cream  - Collaborate with interdisciplinary team   - Patient/family teaching  - Consider wound care consult   Outcome: Progressing     Problem: MOBILITY - ADULT  Goal: Maintain or return to baseline ADL function  Description: INTERVENTIONS:  -  Assess patient's ability to carry out ADLs; assess patient's baseline for ADL function and identify physical deficits which impact ability to perform ADLs (bathing, care of mouth/teeth, toileting, grooming, dressing, etc )  - Assess/evaluate cause of self-care deficits   - Assess range of motion  - Assess patient's mobility; develop plan if impaired  - Assess patient's need for assistive devices and provide as appropriate  - Encourage maximum independence but intervene and supervise when necessary  - Involve family in performance of ADLs  - Assess for home care needs following discharge   - Consider OT consult to assist with ADL evaluation and planning for discharge  - Provide patient education as appropriate  Outcome: Progressing  Goal: Maintains/Returns to pre admission functional level  Description: INTERVENTIONS:  - Perform BMAT or MOVE assessment daily    - Set and communicate daily mobility goal to care team and patient/family/caregiver     - Collaborate with rehabilitation services on mobility goals if consulted  - Out of bed for toileting  - Record patient progress and toleration of activity level   Outcome: Progressing     Problem: PAIN - ADULT  Goal: Verbalizes/displays adequate comfort level or baseline comfort level  Description: Interventions:  - Encourage patient to monitor pain and request assistance  - Assess pain using appropriate pain scale  - Administer analgesics based on type and severity of pain and evaluate response  - Implement non-pharmacological measures as appropriate and evaluate response  - Consider cultural and social influences on pain and pain management  - Notify physician/advanced practitioner if interventions unsuccessful or patient reports new pain  Outcome: Progressing     Problem: INFECTION - ADULT  Goal: Absence or prevention of progression during hospitalization  Description: INTERVENTIONS:  - Assess and monitor for signs and symptoms of infection  - Monitor lab/diagnostic results  - Monitor all insertion sites, i e  indwelling lines, tubes, and drains  - Monitor endotracheal if appropriate and nasal secretions for changes in amount and color  - [place] appropriate cooling/warming therapies per order  - Administer medications as ordered  - Instruct and encourage patient and family to use good hand hygiene technique  - Identify and instruct in appropriate isolation precautions for identified infection/condition  Outcome: Progressing  Goal: Absence of fever/infection during neutropenic period  Description: INTERVENTIONS:  - Monitor WBC    Outcome: Progressing     Problem: SAFETY ADULT  Goal: Maintain or return to baseline ADL function  Description: INTERVENTIONS:  -  Assess patient's ability to carry out ADLs; assess patient's baseline for ADL function and identify physical deficits which impact ability to perform ADLs (bathing, care of mouth/teeth, toileting, grooming, dressing, etc )  - Assess/evaluate cause of self-care deficits   - Assess range of motion  - Assess patient's mobility; develop plan if impaired  - Assess patient's need for assistive devices and provide as appropriate  - Encourage maximum independence but intervene and supervise when necessary  - Involve family in performance of ADLs  - Assess for home care needs following discharge   - Consider OT consult to assist with ADL evaluation and planning for discharge  - Provide patient education as appropriate  Outcome: Progressing  Goal: Maintains/Returns to pre admission functional level  Description: INTERVENTIONS:  - Perform BMAT or MOVE assessment daily    - Set and communicate daily mobility goal to care team and patient/family/caregiver     - Collaborate with rehabilitation services on mobility goals if consulted  - Out of bed for toileting  - Record patient progress and toleration of activity level   Outcome: Progressing  Goal: Patient will remain free of falls  Description: INTERVENTIONS:  - Educate patient/family on patient safety including physical limitations  - Instruct patient to call for assistance with activity   - Consult OT/PT to assist with strengthening/mobility   - Keep Call bell within reach  - Keep bed low and locked with side rails adjusted as appropriate  - Keep care items and personal belongings within reach  - Initiate and maintain comfort rounds  - Apply yellow socks and bracelet for high fall risk patients  - Consider moving patient to room near nurses station  Outcome: Progressing     Problem: DISCHARGE PLANNING  Goal: Discharge to home or other facility with appropriate resources  Description: INTERVENTIONS:  - Identify barriers to discharge w/patient and caregiver  - Arrange for needed discharge resources and transportation as appropriate  - Identify discharge learning needs (meds, wound care, etc )  - Arrange for interpretive services to assist at discharge as needed  - Refer to Case Management Department for coordinating discharge planning if the patient needs post-hospital services based on physician/advanced practitioner order or complex needs related to functional status, cognitive ability, or social support system  Outcome: Progressing     Problem: Knowledge Deficit  Goal: Patient/family/caregiver demonstrates understanding of disease process, treatment plan, medications, and discharge instructions  Description: Complete learning assessment and assess knowledge base  Interventions:  - Provide teaching at level of understanding  - Provide teaching via preferred learning methods  Outcome: Progressing     Problem: Potential for Falls  Goal: Patient will remain free of falls  Description: INTERVENTIONS:  - Educate patient/family on patient safety including physical limitations  - Instruct patient to call for assistance with activity   - Consult OT/PT to assist with strengthening/mobility   - Keep Call bell within reach  - Keep bed low and locked with side rails adjusted as appropriate  - Keep care items and personal belongings within reach  - Initiate and maintain comfort rounds  - Apply yellow socks and bracelet for high fall risk patients  - Consider moving patient to room near nurses station  Outcome: Progressing     Problem: Nutrition/Hydration-ADULT  Goal: Nutrient/Hydration intake appropriate for improving, restoring or maintaining nutritional needs  Description: Monitor and assess patient's nutrition/hydration status for malnutrition  Collaborate with interdisciplinary team and initiate plan and interventions as ordered  Monitor patient's weight and dietary intake as ordered or per policy  Utilize nutrition screening tool and intervene as necessary  Determine patient's food preferences and provide high-protein, high-caloric foods as appropriate       INTERVENTIONS:  - Monitor oral intake, urinary output, labs, and treatment plans  - Assess nutrition and hydration status and recommend course of action  - Evaluate amount of meals eaten  - Assist patient with eating if necessary   - Allow adequate time for meals  - Recommend/ encourage appropriate diets, oral nutritional supplements, and vitamin/mineral supplements  - Order, calculate, and assess calorie counts as needed  - Recommend, monitor, and adjust tube feedings and TPN/PPN based on assessed needs  - Assess need for intravenous fluids  - Provide specific nutrition/hydration education as appropriate  - Include patient/family/caregiver in decisions related to nutrition  Outcome: Progressing     Problem: COPING  Goal: Pt/Family able to verbalize concerns and demonstrate effective coping strategies  Description: INTERVENTIONS:  - Assist patient/family to identify coping skills, available support systems and cultural and spiritual values  - Provide emotional support, including active listening and acknowledgement of concerns of patient and caregivers  - Reduce environmental stimuli, as able  - Provide patient education  - Assess for spiritual pain/suffering and initiate spiritual care, including notification of Pastoral Care or keshav based community as needed  - Assess effectiveness of coping strategies  Outcome: Progressing  Goal: Will report anxiety at manageable levels  Description: INTERVENTIONS:  - Administer medication as ordered  - Teach and encourage coping skills  - Provide emotional support  - Assess patient/family for anxiety and ability to cope  Outcome: Progressing     Problem: DEATH & DYING  Goal: Pt/Family communicate acceptance of impending death and expresses psychological comfort and peace  Description: INTERVENTIONS:  - Assess patient/family anxiety and grief process related to end of life issues  - Provide emotional, spiritual and psychosocial support  - Provide information about the patients health status with consideration of family and cultural values  - Communicate willingness to discuss death and facilitate grief process  with patient/family as appropriate  - Emphasize sustaining relationships within family system and community, or keshav/spiritual traditions  - Initiate Spiritual Care, Pastoral care or other ancillary consults as needed  - Refer to community support groups as appropriate  Outcome: Progressing     Problem: CHANGE IN BODY IMAGE  Goal: Pt/Family communicate acceptance of loss or change in body image and expresses psychological comfort and peace  Description: INTERVENTIONS:  - Assess patient/family anxiety and grief process related to change in body image, loss of functional status and loss of sense of self  - Assess patient/family's coping skills and provide emotional, spiritual and psychosocial support  - Provide information about the patient's health status with consideration of family and cultural values  - Communicate willingness to discuss loss and facilitate grief process with patient/family as appropriate  - Emphasize sustaining relationships within family system and community, or keshav/spiritual traditions  - Refer to community support groups as appropriate  - Initiate Spiritual Care, Pastoral care or other ancillary consults as needed  Outcome: Progressing     Problem: DECISION MAKING  Goal: Pt/Family able to effectively weigh alternatives and participate in decision making related to treatment and care  Description: INTERVENTIONS:  - Identify decision maker  - Determine when there are differences among patient's view, family's view, and healthcare provider's view of patient condition and care goals  - Facilitate patient/family articulation of goals for care  - Help patient/family identify pros/cons of alternative solutions  - Provide information as requested by patient/family  - Respect patient/family rights related to privacy and sharing information   - Serve as a liaison between patient, family and health care team  - Initiate consults as appropriate (Ethics Team, Palliative Care, Family Care Conference, etc )  Outcome: Progressing     Problem: CONFUSION/THOUGHT DISTURBANCE  Goal: Thought disturbances (confusion, delirium, depression, dementia or psychosis) are managed to maintain or return to baseline mental status and functional level  Description: INTERVENTIONS:  - Assess for possible contributors to  thought disturbance, including but not limited to medications, infection, impaired vision or hearing, underlying metabolic abnormalities, dehydration, respiratory compromise,  psychiatric diagnoses and notify attending PHYSICAN/AP  - Monitor and intervene to maintain adequate nutrition, hydration, elimination, sleep and activity  - Decrease environmental stimuli, including noise as appropriate  - Provide frequent contacts to provide refocusing, direction and reassurance as needed  Approach patient calmly with eye contact and at their level  - [place] high risk fall precautions, aspiration precautions and other safety measures, as indicated  - If delirium suspected, notify physician/AP of change in condition and request immediate in-person evaluation  - Pursue consults as appropriate including Geriatric (campus dependent), OT for cognitive evaluation/activity planning, psychiatric, pastoral care, etc   Outcome: Progressing     Problem: BEHAVIOR  Goal: Pt/Family maintain appropriate behavior and adhere to behavioral management agreement, if implemented  Description: INTERVENTIONS:  - Assess the family dynamic   - Encourage verbalization of thoughts and concerns in a socially appropriate manner  - Assess patient/family's coping skills and non-compliant behavior (including use of illegal substances)  - Utilize positive, consistent limit setting strategies supporting safety of patient, staff and others  - Initiate consult with Case Management, Spiritual Care or other ancillary services as appropriate  - If a patient's/visitor's behavior jeopardizes the safety of the patient, staff, or others, refer to organization procedure     - Notify Security of behavior or suspected illegal substances which indicate the need for search of the patient and/or belongings  - Encourage participation in the decision making process about a behavioral management agreement; implement if patient meets criteria  Outcome: Progressing     Problem: Depression - IP adult  Goal: Effects of depression will be minimized  Description: INTERVENTIONS:  - Assess impact of patient's symptoms on level of functioning, self-care needs and offer support as indicated  - Assess patient/family knowledge of depression, impact on illness and need for teaching  - Provide emotional support, presence and reassurance  - Assess for possible suicidal thoughts, ideation or self-harm  If patient expresses suicidal thoughts or statements do not leave alone, notify physician/AP immediately, initiate Suicide Precautions, and determine need for continual observation   - Initiate consults and referrals as appropriate (a mental health professional, Spiritual Care)  - Administer medication as ordered  Outcome: Progressing     Problem: SELF HARM  Goal: Effect of psychiatric condition will be minimized and patient will be protected from self harm  Description: INTERVENTIONS:  - Assess impact of patient's symptoms on level of functioning, self-care needs and offer support as indicated  - Assess patient/family knowledge of depression, impact on illness and need for teaching  - Provide emotional support, presence and reassurance  - Assess for possible suicidal thoughts, ideation or self-harm  If patient expresses suicidal thoughts or statements do not leave alone, notify physician/AP immediately, initiate suicide precautions, and determine need for continual observation    - initiate consults and referrals as appropriate (a mental health professional, Spiritual Care  Outcome: Progressing     Problem: ABUSE/NEGLECT  Goal: Pt/Caregiver/Family aware of resources to assist with issues of abuse and neglect  Description: INTERVENTIONS:  - If child abuse and/or neglect is suspected, notify [facility] directly  - Assess for level of risk and safety  - Initiate referral to Case Management  - Notify PHYSICIAN/AP and Nursing Supervisor  - Provide appropriate education and resources to patient and/or family  - Initiate referral to age appropriate protective services, i e  [facility] or Child Protective Services  - Offer patient/caregiver the option to [facility] of patient [employer]  - Provide emotional support, including active listening and acknowledgment of concerns  - Provide the patient with information about supportive services i e  shelters, community resources for domestic violence  Outcome: Progressing     Problem: SUBSTANCE USE/ABUSE  Goal: Will have no detox symptoms and will verbalize plan for changing substance-related behavior  Description: INTERVENTIONS:  - Monitor physical status and assess for symptoms of withdrawal  - Administer medication as ordered  - Provide emotional support with 1 on 1 interaction with staff  - Encourage recovery focused program/ addiction education  - Assess for verbalization of changing behaviors related to substance abuse  - Initiate consults and referrals as appropriate (Case Management, Spiritual Care, etc )  Outcome: Progressing  Goal: By discharge, will develop insight into their chemical dependency and sustain motivation to continue in recovery  Description: INTERVENTIONS:  - Attends all daily group sessions and scheduled AA groups  - Actively practices coping skills through participation in the therapeutic community and adherence to program rules  - Reviews and completes assignments from individual treatment plan  - Assist patient development of understanding of their personal cycle of addiction and relapse triggers  Outcome: Progressing  Goal: By discharge, patient will have ongoing treatment plan addressing chemical dependency  Description: INTERVENTIONS:  - Assist patient with resources and/or appointments for ongoing recovery based living  Outcome: Progressing     Problem: SPIRITUAL CARE  Goal: Pt/Family able to move forward in process of forgiving self, others and/or higher power  Description: INTERVENTIONS:  - Assist patient with any spiritual needs/requests such as communion, confession, anointing, etc  - Explore guilt and help patient/family identify possible spiritual/cultural beliefs and values  - Explore possibilities of making amends & reconciliation with self, others, and/or a greater power  - Guide patient/family in identifying painful feelings  - Help patient explore and identify spiritual beliefs, cultural understandings or values that may help or hinder letting go of issue  - Help patient explore feelings of anger, bitterness, resentment, anxiety   Help patient/family identify and examine the situation in which these feelings are experienced  - Help patient/family identify destructive displacement of feelings onto other individuals  - Refer patient to formal counseling and/or to keshav community for further support as needed or per request  Outcome: Progressing  Goal: Patient feels balance and connection with others and/or higher power that empowers the self during times of loss, guilt and fear  Description: INTERVENTIONS:  - Create safety for patient through empathic presence and non-judgmental listening  - Encourage patient to explore his/her values, beliefs and/or spiritual images and practices  - Encourage use of breath work, imagery, meditation, relaxation, reiki to ease distress and provide healing  - Encourage use of cultural and spiritual celebrations and rituals  - Facilitate discussion that helps patient sort out spiritual concerns  - Help patient identify where meaning/hope/comfort & strength are in his/her life  - Refer patient to keshav community for assistance, as appropriate  - Respond to patient/family need for prayer/ritual/sacrament/ceremony  Outcome: Progressing

## 2021-08-23 NOTE — ASSESSMENT & PLAN NOTE
· Hospital delirium  · Better at home  · Family wants to take him home asap  · Off restraints, ordered prn seroquel HS prn, melatonin HS

## 2021-08-23 NOTE — PLAN OF CARE
Problem: Prexisting or High Potential for Compromised Skin Integrity  Goal: Skin integrity is maintained or improved  Description: INTERVENTIONS:  - Identify patients at risk for skin breakdown  - Assess and monitor skin integrity  - Assess and monitor nutrition and hydration status  - Monitor labs   - Assess for incontinence   - Turn and reposition patient  - Assist with mobility/ambulation  - Relieve pressure over bony prominences  - Avoid friction and shearing  - Provide appropriate hygiene as needed including keeping skin clean and dry  - Evaluate need for skin moisturizer/barrier cream  - Collaborate with interdisciplinary team   - Patient/family teaching  - Consider wound care consult   Outcome: Progressing     Problem: MOBILITY - ADULT  Goal: Maintain or return to baseline ADL function  Description: INTERVENTIONS:  -  Assess patient's ability to carry out ADLs; assess patient's baseline for ADL function and identify physical deficits which impact ability to perform ADLs (bathing, care of mouth/teeth, toileting, grooming, dressing, etc )  - Assess/evaluate cause of self-care deficits   - Assess range of motion  - Assess patient's mobility; develop plan if impaired  - Assess patient's need for assistive devices and provide as appropriate  - Encourage maximum independence but intervene and supervise when necessary  - Involve family in performance of ADLs  - Assess for home care needs following discharge   - Consider OT consult to assist with ADL evaluation and planning for discharge  - Provide patient education as appropriate  Outcome: Progressing  Goal: Maintains/Returns to pre admission functional level  Description: INTERVENTIONS:  - Perform BMAT or MOVE assessment daily    - Set and communicate daily mobility goal to care team and patient/family/caregiver     - Collaborate with rehabilitation services on mobility goals if consulted  - Out of bed for toileting  - Record patient progress and toleration of activity level   Outcome: Progressing     Problem: PAIN - ADULT  Goal: Verbalizes/displays adequate comfort level or baseline comfort level  Description: Interventions:  - Encourage patient to monitor pain and request assistance  - Assess pain using appropriate pain scale  - Administer analgesics based on type and severity of pain and evaluate response  - Implement non-pharmacological measures as appropriate and evaluate response  - Consider cultural and social influences on pain and pain management  - Notify physician/advanced practitioner if interventions unsuccessful or patient reports new pain  Outcome: Progressing     Problem: INFECTION - ADULT  Goal: Absence or prevention of progression during hospitalization  Description: INTERVENTIONS:  - Assess and monitor for signs and symptoms of infection  - Monitor lab/diagnostic results  - Monitor all insertion sites, i e  indwelling lines, tubes, and drains  - Monitor endotracheal if appropriate and nasal secretions for changes in amount and color  - [place] appropriate cooling/warming therapies per order  - Administer medications as ordered  - Instruct and encourage patient and family to use good hand hygiene technique  - Identify and instruct in appropriate isolation precautions for identified infection/condition  Outcome: Progressing  Goal: Absence of fever/infection during neutropenic period  Description: INTERVENTIONS:  - Monitor WBC    Outcome: Progressing     Problem: SAFETY ADULT  Goal: Maintain or return to baseline ADL function  Description: INTERVENTIONS:  -  Assess patient's ability to carry out ADLs; assess patient's baseline for ADL function and identify physical deficits which impact ability to perform ADLs (bathing, care of mouth/teeth, toileting, grooming, dressing, etc )  - Assess/evaluate cause of self-care deficits   - Assess range of motion  - Assess patient's mobility; develop plan if impaired  - Assess patient's need for assistive devices and provide as appropriate  - Encourage maximum independence but intervene and supervise when necessary  - Involve family in performance of ADLs  - Assess for home care needs following discharge   - Consider OT consult to assist with ADL evaluation and planning for discharge  - Provide patient education as appropriate  Outcome: Progressing  Goal: Maintains/Returns to pre admission functional level  Description: INTERVENTIONS:  - Perform BMAT or MOVE assessment daily    - Set and communicate daily mobility goal to care team and patient/family/caregiver     - Collaborate with rehabilitation services on mobility goals if consulted  - Record patient progress and toleration of activity level   Outcome: Progressing  Goal: Patient will remain free of falls  Description: INTERVENTIONS:  - Educate patient/family on patient safety including physical limitations  - Instruct patient to call for assistance with activity   - Consult OT/PT to assist with strengthening/mobility   - Keep Call bell within reach  - Keep bed low and locked with side rails adjusted as appropriate  - Keep care items and personal belongings within reach  - Initiate and maintain comfort rounds  - Make Fall Risk Sign visible to staff  - Apply yellow socks and bracelet for high fall risk patients  - Consider moving patient to room near nurses station  Outcome: Progressing     Problem: DISCHARGE PLANNING  Goal: Discharge to home or other facility with appropriate resources  Description: INTERVENTIONS:  - Identify barriers to discharge w/patient and caregiver  - Arrange for needed discharge resources and transportation as appropriate  - Identify discharge learning needs (meds, wound care, etc )  - Arrange for interpretive services to assist at discharge as needed  - Refer to Case Management Department for coordinating discharge planning if the patient needs post-hospital services based on physician/advanced practitioner order or complex needs related to functional status, cognitive ability, or social support system  Outcome: Progressing     Problem: Knowledge Deficit  Goal: Patient/family/caregiver demonstrates understanding of disease process, treatment plan, medications, and discharge instructions  Description: Complete learning assessment and assess knowledge base  Interventions:  - Provide teaching at level of understanding  - Provide teaching via preferred learning methods  Outcome: Progressing     Problem: Knowledge Deficit  Goal: Patient/family/caregiver demonstrates understanding of disease process, treatment plan, medications, and discharge instructions  Description: Complete learning assessment and assess knowledge base  Interventions:  - Provide teaching at level of understanding  - Provide teaching via preferred learning methods  Outcome: Progressing     Problem: Potential for Falls  Goal: Patient will remain free of falls  Description: INTERVENTIONS:  - Educate patient/family on patient safety including physical limitations  - Instruct patient to call for assistance with activity   - Consult OT/PT to assist with strengthening/mobility   - Keep Call bell within reach  - Keep bed low and locked with side rails adjusted as appropriate  - Keep care items and personal belongings within reach  - Initiate and maintain comfort rounds  - Make Fall Risk Sign visible to staff  - Apply yellow socks and bracelet for high fall risk patients  - Consider moving patient to room near nurses station  Outcome: Progressing     Problem: Nutrition/Hydration-ADULT  Goal: Nutrient/Hydration intake appropriate for improving, restoring or maintaining nutritional needs  Description: Monitor and assess patient's nutrition/hydration status for malnutrition  Collaborate with interdisciplinary team and initiate plan and interventions as ordered  Monitor patient's weight and dietary intake as ordered or per policy  Utilize nutrition screening tool and intervene as necessary   Determine patient's food preferences and provide high-protein, high-caloric foods as appropriate       INTERVENTIONS:  - Monitor oral intake, urinary output, labs, and treatment plans  - Assess nutrition and hydration status and recommend course of action  - Evaluate amount of meals eaten  - Assist patient with eating if necessary   - Allow adequate time for meals  - Recommend/ encourage appropriate diets, oral nutritional supplements, and vitamin/mineral supplements  - Order, calculate, and assess calorie counts as needed  - Recommend, monitor, and adjust tube feedings and TPN/PPN based on assessed needs  - Assess need for intravenous fluids  - Provide specific nutrition/hydration education as appropriate  - Include patient/family/caregiver in decisions related to nutrition  Outcome: Progressing     Problem: COPING  Goal: Pt/Family able to verbalize concerns and demonstrate effective coping strategies  Description: INTERVENTIONS:  - Assist patient/family to identify coping skills, available support systems and cultural and spiritual values  - Provide emotional support, including active listening and acknowledgement of concerns of patient and caregivers  - Reduce environmental stimuli, as able  - Provide patient education  - Assess for spiritual pain/suffering and initiate spiritual care, including notification of Pastoral Care or keshav based community as needed  - Assess effectiveness of coping strategies  Outcome: Progressing  Goal: Will report anxiety at manageable levels  Description: INTERVENTIONS:  - Administer medication as ordered  - Teach and encourage coping skills  - Provide emotional support  - Assess patient/family for anxiety and ability to cope  Outcome: Progressing     Problem: DEATH & DYING  Goal: Pt/Family communicate acceptance of impending death and expresses psychological comfort and peace  Description: INTERVENTIONS:  - Assess patient/family anxiety and grief process related to end of life issues  - Provide emotional, spiritual and psychosocial support  - Provide information about the patients health status with consideration of family and cultural values  - Communicate willingness to discuss death and facilitate grief process  with patient/family as appropriate  - Emphasize sustaining relationships within family system and community, or keshav/spiritual traditions  - Initiate Spiritual Care, Pastoral care or other ancillary consults as needed  - Refer to community support groups as appropriate  Outcome: Progressing     Problem: CHANGE IN BODY IMAGE  Goal: Pt/Family communicate acceptance of loss or change in body image and expresses psychological comfort and peace  Description: INTERVENTIONS:  - Assess patient/family anxiety and grief process related to change in body image, loss of functional status and loss of sense of self  - Assess patient/family's coping skills and provide emotional, spiritual and psychosocial support  - Provide information about the patient's health status with consideration of family and cultural values  - Communicate willingness to discuss loss and facilitate grief process with patient/family as appropriate  - Emphasize sustaining relationships within family system and community, or keshav/spiritual traditions  - Refer to community support groups as appropriate  - Initiate Spiritual Care, Pastoral care or other ancillary consults as needed  Outcome: Progressing     Problem: DECISION MAKING  Goal: Pt/Family able to effectively weigh alternatives and participate in decision making related to treatment and care  Description: INTERVENTIONS:  - Identify decision maker  - Determine when there are differences among patient's view, family's view, and healthcare provider's view of patient condition and care goals  - Facilitate patient/family articulation of goals for care  - Help patient/family identify pros/cons of alternative solutions  - Provide information as requested by patient/family  - Respect patient/family rights related to privacy and sharing information   - Serve as a liaison between patient, family and health care team  - Initiate consults as appropriate (Ethics Team, Palliative Care, Family Care Conference, etc )  Outcome: Progressing     Problem: CONFUSION/THOUGHT DISTURBANCE  Goal: Thought disturbances (confusion, delirium, depression, dementia or psychosis) are managed to maintain or return to baseline mental status and functional level  Description: INTERVENTIONS:  - Assess for possible contributors to  thought disturbance, including but not limited to medications, infection, impaired vision or hearing, underlying metabolic abnormalities, dehydration, respiratory compromise,  psychiatric diagnoses and notify attending PHYSICAN/AP  - Monitor and intervene to maintain adequate nutrition, hydration, elimination, sleep and activity  - Decrease environmental stimuli, including noise as appropriate  - Provide frequent contacts to provide refocusing, direction and reassurance as needed  Approach patient calmly with eye contact and at their level  - [place] high risk fall precautions, aspiration precautions and other safety measures, as indicated  - If delirium suspected, notify physician/AP of change in condition and request immediate in-person evaluation  - Pursue consults as appropriate including Geriatric (campus dependent), OT for cognitive evaluation/activity planning, psychiatric, pastoral care, etc   Outcome: Progressing     Problem: BEHAVIOR  Goal: Pt/Family maintain appropriate behavior and adhere to behavioral management agreement, if implemented  Description: INTERVENTIONS:  - Assess the family dynamic   - Encourage verbalization of thoughts and concerns in a socially appropriate manner  - Assess patient/family's coping skills and non-compliant behavior (including use of illegal substances)    - Utilize positive, consistent limit setting strategies supporting safety of patient, staff and others  - Initiate consult with Case Management, Spiritual Care or other ancillary services as appropriate  - If a patient's/visitor's behavior jeopardizes the safety of the patient, staff, or others, refer to organization procedure  - Notify Security of behavior or suspected illegal substances which indicate the need for search of the patient and/or belongings  - Encourage participation in the decision making process about a behavioral management agreement; implement if patient meets criteria  Outcome: Progressing     Problem: Depression - IP adult  Goal: Effects of depression will be minimized  Description: INTERVENTIONS:  - Assess impact of patient's symptoms on level of functioning, self-care needs and offer support as indicated  - Assess patient/family knowledge of depression, impact on illness and need for teaching  - Provide emotional support, presence and reassurance  - Assess for possible suicidal thoughts, ideation or self-harm  If patient expresses suicidal thoughts or statements do not leave alone, notify physician/AP immediately, initiate Suicide Precautions, and determine need for continual observation   - Initiate consults and referrals as appropriate (a mental health professional, Spiritual Care)  - Administer medication as ordered  Outcome: Progressing     Problem: SELF HARM  Goal: Effect of psychiatric condition will be minimized and patient will be protected from self harm  Description: INTERVENTIONS:  - Assess impact of patient's symptoms on level of functioning, self-care needs and offer support as indicated  - Assess patient/family knowledge of depression, impact on illness and need for teaching  - Provide emotional support, presence and reassurance  - Assess for possible suicidal thoughts, ideation or self-harm   If patient expresses suicidal thoughts or statements do not leave alone, notify physician/AP immediately, initiate suicide precautions, and determine need for continual observation    - initiate consults and referrals as appropriate (a mental health professional, Spiritual Care  Outcome: Progressing     Problem: ABUSE/NEGLECT  Goal: Pt/Caregiver/Family aware of resources to assist with issues of abuse and neglect  Description: INTERVENTIONS:  - If child abuse and/or neglect is suspected, notify Childline directly  - Assess for level of risk and safety  - Initiate referral to Case Management  - Notify PHYSICIAN/AP and Nursing Supervisor  - Provide appropriate education and resources to patient and/or family  - Initiate referral to age appropriate protective services, i e  [facility] or Child Protective Services  - Offer patient/caregiver the option to [place] of patient information directory  - Provide emotional support, including active listening and acknowledgment of concerns  - Provide the patient with information about supportive services i e  shelters, community resources for domestic violence  Outcome: Progressing     Problem: SUBSTANCE USE/ABUSE  Goal: Will have no detox symptoms and will verbalize plan for changing substance-related behavior  Description: INTERVENTIONS:  - Monitor physical status and assess for symptoms of withdrawal  - Administer medication as ordered  - Provide emotional support with 1 on 1 interaction with staff  - Encourage recovery focused program/ addiction education  - Assess for verbalization of changing behaviors related to substance abuse  - Initiate consults and referrals as appropriate (Case Management, Spiritual Care, etc )  Outcome: Progressing  Goal: By discharge, will develop insight into their chemical dependency and sustain motivation to continue in recovery  Description: INTERVENTIONS:  - Attends all daily group sessions and scheduled AA groups  - Actively practices coping skills through participation in the therapeutic community and adherence to program rules  - Reviews and completes assignments from individual treatment plan  - Assist patient development of understanding of their personal cycle of addiction and relapse triggers  Outcome: Progressing  Goal: By discharge, patient will have ongoing treatment plan addressing chemical dependency  Description: INTERVENTIONS:  - Assist patient with resources and/or appointments for ongoing recovery based living  Outcome: Progressing     Problem: SPIRITUAL CARE  Goal: Pt/Family able to move forward in process of forgiving self, others and/or higher power  Description: INTERVENTIONS:  - Assist patient with any spiritual needs/requests such as communion, confession, anointing, etc  - Explore guilt and help patient/family identify possible spiritual/cultural beliefs and values  - Explore possibilities of making amends & reconciliation with self, others, and/or a greater power  - Guide patient/family in identifying painful feelings  - Help patient explore and identify spiritual beliefs, cultural understandings or values that may help or hinder letting go of issue  - Help patient explore feelings of anger, bitterness, resentment, anxiety   Help patient/family identify and examine the situation in which these feelings are experienced  - Help patient/family identify destructive displacement of feelings onto other individuals  - Refer patient to formal counseling and/or to keshav community for further support as needed or per request  Outcome: Progressing  Goal: Patient feels balance and connection with others and/or higher power that empowers the self during times of loss, guilt and fear  Description: INTERVENTIONS:  - Create safety for patient through empathic presence and non-judgmental listening  - Encourage patient to explore his/her values, beliefs and/or spiritual images and practices  - Encourage use of breath work, imagery, meditation, relaxation, reiki to ease distress and provide healing  - Encourage use of cultural and spiritual celebrations and rituals  - Facilitate discussion that helps patient sort out spiritual concerns  - Help patient identify where meaning/hope/comfort & strength are in his/her life  - Refer patient to keshav community for assistance, as appropriate  - Respond to patient/family need for prayer/ritual/sacrament/ceremony  Outcome: Progressing

## 2021-08-23 NOTE — PHYSICAL THERAPY NOTE
Physical Therapy Evaluation    Patient's Name: Brittany Langford    Admitting Diagnosis  Leg pain [M79 606]  Acute deep vein thrombosis (DVT) of both lower extremities, unspecified vein ([facility] [address] ) [I82 403]    Problem List  Patient Active Problem List   Diagnosis    Recent cerebrovascular accident    Elevated troponin    Acute respiratory failure with hypoxia ([facility])    Bilateral lower extremity DVTs    Nasopharyngeal mass    Dysphagia    Mass of upper lobe of right lung    Urinary retention    Constipation    Anemia of chronic disease    Leukocytosis    Suspected pulmonary embolism    Chronic combined systolic and diastolic CHF (congestive heart failure) (HCC)    Respiratory failure with hypoxia ([facility])    Impaired cognition    Impaired mobility and ADLs    Adenocarcinoma of lung    Delirium       Past Medical History  Past Medical History:   Diagnosis Date    Lung cancer ([facility] [address] )     Stroke [facility])        Past Surgical History  Past Surgical History:   Procedure Laterality Date    IR BIOPSY LUNG  8/5/2021 08/23/21 1020   PT Last Visit   PT Visit Date 08/23/21   Note Type   Note type Evaluation   Pain Assessment   Pain Assessment Tool 0-10   Pain Score 3   Pain Location/Orientation Location: Generalized; Location: Back   Patient's Stated Pain Goal No pain   Hospital Pain Intervention(s) Ambulation/increased activity   Home Living   Type of Home House   Additional Comments Resides w/ spouse, children in 1 story home, 1 [address]ALLE[address]  recently d/c home from the [place] [address] w/ , was getting home therapies, and has needed A w/ ADLs since recent d/c    Prior Function   Level of [place] Needs assistance with ADLs and functional mobility   Falls in the last 6 months 1 to 4   Restrictions/Precautions   Weight Bearing Precautions Per Order No   Other Precautions Multiple lines; Fall Risk;Restraints;Cognitive; Chair Alarm; Bed Alarm   General   Family/Caregiver Present No   Cognition   Overall Cognitive Status Impaired   Arousal/Participation Arousable   Orientation Level Oriented to person   Memory Unable to assess   Following Commands Follows one step commands inconsistently   Comments slow to respond, confused, but does follow most simple commands if given time   RLE Assessment   RLE Assessment   (strength grossly 3 to 3+/5)   LLE Assessment   LLE Assessment   (strength grossly 4-/5)   Coordination   Movements are Fluid and Coordinated 0   Coordination and Movement Description B LE ataxia   Bed Mobility   Supine to Sit 4  Minimal assistance   Additional items Assist x 1   Additional Comments sat EOB x 5 min, some posterior and lateral LOB, maintains w/ CGA/min A   Transfers   Sit to Stand 4  Minimal assistance   Additional items Assist x 1   Stand to Sit 4  Minimal assistance   Ambulation/Elevation   Gait pattern   (slow, ataxia, forward flexion, lateral LOB)   Gait Assistance 4  Minimal assist   Additional items Assist x 1   Assistive Device Rolling walker   Distance 5'x1 from bed to bedside chair   Balance   Static Sitting Fair -   Dynamic Sitting Poor +   Static Standing Poor +   Dynamic Standing Poor +   Ambulatory Poor +   Endurance Deficit   Endurance Deficit Yes   Endurance Deficit Description fatigue, weakness, cog   Activity Tolerance   Activity Tolerance Patient limited by fatigue;Treatment limited secondary to medical complications (Comment)   Nurse Made Aware yes   Assessment   Prognosis Good   Problem List Decreased strength;Decreased endurance; Impaired balance;Decreased mobility; Decreased coordination;Decreased cognition; Impaired judgement;Decreased safety awareness   Assessment Pt seen for high complexity physical therapy evaluation  Pt is a 73 y/o male w/ history/comorbidities of recent CVA w/ R residual weakness, DVT, lung CA who is now admitted R LE edema, pain  Dx include acute B LE DVTs, presumed PE, as well as delerium    Due to acute medical issues, need for hospital re-admit post recent d/c, need for restraints, pain, fall risk, note unstable clinical picture  PT consulted to assess mobility, d/c needs  Pt presents w/ decreased functional mob, standing balance, endurance, B LE strength R > L and B LE coordination, barriers at home  Pt will benefit from skilled PT to correct for the above problems  Anticipate d/c home w/ family when stable w/ continued home therapy  Goals   Patient Goals none stated due to MS   STG Expiration Date 09/06/21   Short Term Goal #1 1-2 wks: bed mob and transfers w/ S, standing balance to good w/ device, ambulate 100-150 ft w/ RW and S, increase B LE strength by 1/2 -1 grade, ambulate 1 stair w/ S   PT Treatment Day 0   Plan   Treatment/Interventions Functional transfer training;LE strengthening/ROM; Elevations; Therapeutic exercise; Endurance training;Patient/family training;Equipment eval/education; Bed mobility;Gait training   PT Frequency Other (Comment)  (3-5x/wk)   Recommendation   PT Discharge Recommendation Home with home health rehabilitation   PT - OK to Discharge Yes  (when stable to home w/ home PT)   Morris 8 in Bed Without Bedrails 3   Lying on Back to Sitting on Edge of Flat Bed 3   Moving Bed to Chair 3   Standing Up From Chair 3   Walk in Room 3   Climb 3-5 Stairs 2   Basic Mobility Inpatient Raw Score 17   Basic Mobility Standardized Score 39 67           Mary Pinedo PT, DPT, CSRS

## 2021-08-23 NOTE — PROGRESS NOTES
Chart review reveals that patient was noted to have RLE swelling on 8/20/21 and home care SN advised patient to be seen in the ED to rule out DVT  Patient did present to the ED on 8/21/21 when his RLE swelling increased overnight  Venous doppler revealed bilateral DVT in BLE  Patient was admitted as he was already anti-coagulated  Plan is for voiding trial inpatient, heparin for DVT's, continue tamsulosin  This RN care manager will continue chart review to assess patient's progress toward D/C and MARY

## 2021-08-23 NOTE — OCCUPATIONAL THERAPY NOTE
Occupational Therapy         Patient Name: Stefanie Rincon  BDXCX'R Date: 8/23/2021 08/23/21 1500   OT Last Visit   OT Visit Date 08/23/21   Note Type   Note type Evaluation   Cancel Reasons Patient off floor/test                 OT orders received - chart reviewed- attempted to see pt for OT eval however pt off floor at this time - will re-attempt at later time    Jerry Johnson, OT

## 2021-08-23 NOTE — ASSESSMENT & PLAN NOTE
[address] [facility]   Patient Discharge Instructions    Jeanna Parks / [id number] : 1938    Admitted 2019 Discharged: 2019     IF YOU HAVE ANY PROBLEMS ONCE YOU ARE AT HOME CALL THE FOLLOWING NUMBERS:   Main office number: [phone]    Take Home Medications     · It is important that you take the medication exactly as they are prescribed. · Keep your medication in the bottles provided by the pharmacist and keep a list of the medication names, dosages, and times to be taken in your wallet. · Do not take other medications without consulting your doctor. What to do at [address] your prehospital diet. If you have excessive nausea or vomitting call your doctor's office     Home Physical Therapy is arranged. Use rolling walker when walking. Patients who have had a joint replacement should not drive until you are seen for your follow up appointment by Dr. Luis Enrique Root. When to Call    - Call if you have a temperature greater then 101  - Unable to keep food down  - Loose control of your bladder or bowel function  - Are unable to bear any weight   - Need a pain medication refill       DISCHARGE SUMMARY from Nurse    The following personal items collected during your admission are returned to you:   Dental Appliance: Dental Appliances: None  Vision: Visual Aid: Glasses  Hearing Aid:    Jewelry: Jewelry: None  Clothing: Clothing: Shirt, Pants  Other Valuables: Other Valuables: None  Valuables sent to safe:      PATIENT INSTRUCTIONS:    After general anesthesia or intravenous sedation, for 24 hours or while taking prescription Narcotics:  · Limit your activities  · Do not drive and operate hazardous machinery  · Do not make important personal or business decisions  · Do  not drink alcoholic beverages  · If you have not urinated within 8 hours after discharge, please contact your surgeon on call.     Report the following to your surgeon:  · Excessive pain, swelling, redness or Currently @ 0 38 (improved from 10 3 earlier this month)  Likely a residual sequela of suspected pulmonary embolism from earlier in the month odor of or around the surgical area  · Temperature over 101  · Nausea and vomiting lasting longer than 4 hours or if unable to take medications  · Any signs of decreased circulation or nerve impairment to extremity: change in color, persistent  numbness, tingling, coldness or increase pain  · Any questions, call office @ [phone]      Keep scheduled follow up appointment. If need to change, call office @ [phone]. *  Please give a list of your current medications to your Primary Care Provider. *  Please update this list whenever your medications are discontinued, doses are      changed, or new medications (including over-the-counter products) are added. *  Please carry medication information at all times in case of emergency situations. Patient Education        Total Knee Replacement: What to Expect at Home  Your Recovery    When you leave the hospital, you should be able to move around with a walker or crutches. But you will need someone to help you at home for the next few weeks or until you have more energy and can move around better. If you need more extensive rehab, you may go to a specialized rehab center for more treatment. You will go home with a bandage and stitches, staples, tissue glue, or tape strips. Change the bandage as your doctor tells you to. If you have stitches or staples, your doctor will remove them 10 to 21 days after your surgery. Glue or tape strips will fall off on their own over time. You may still have some mild pain, and the area may be swollen for 3 to 6 months after surgery. Your knee will continue to improve for 6 to 12 months. You will probably use a walker for 1 to 3 weeks and then use crutches. When you are ready, you can use a cane. You will probably be able to walk on your own in 4 to 8 weeks. You will need to do months of physical rehabilitation (rehab) after a knee replacement. Rehab will help you strengthen the muscles of the knee and help you regain movement. After you recover, your artificial knee will allow you to do normal daily activities with less pain or no pain at all. You may be able to hike, dance, ride a bike, and play golf. Talk to your doctor about whether you can do more strenuous activities. Always tell your caregivers that you have an artificial knee. How long it will take to walk on your own, return to normal activities, and go back to work depends on your health and how well your rehabilitation (rehab) program goes. The better you do with your rehab exercises, the quicker you will get your strength and movement back. This care sheet gives you a general idea about how long it will take for you to recover. But each person recovers at a different pace. Follow the steps below to get better as quickly as possible. How can you care for yourself at home? Activity    · Rest when you feel tired. You may take a nap, but do not stay in bed all day. When you sit, use a chair with arms. You can use the arms to help you stand up.     · Work with your physical therapist to find the best way to exercise. What you can do as your knee heals will depend on whether your new knee is cemented or uncemented. You may not be able to do certain things for a while if your new knee is uncemented.     · After your knee has healed enough, you can do more strenuous activities with caution. ? You can golf, but use a golf cart, and do not wear shoes with spikes. ? You can bike on a flat road or on a stationary bike. Avoid biking up hills. ? Your doctor may suggest that you stay away from activities that put stress on your knee. These include tennis or badminton, squash or racquetball, contact sports like football, jumping (such as in basketball), jogging, or running. ? Avoid activities where you might fall. These include horseback riding, skiing, and mountain biking.     · Do not sit for more than 1 hour at a time. Get up and walk around for a while before you sit again.  If you must sit for a long time, prop up your leg with a chair or footstool. This will help you avoid swelling.     · Ask your doctor when you can drive again. It may take up to 8 weeks after knee replacement surgery before it is safe for you to drive.     · When you get into a car, sit on the edge of the seat. Then pull in your legs, and turn to face the front.     · You should be able to do many everyday activities 3 to 6 weeks after your surgery. You will probably need to take 4 to 16 weeks off from work. When you can go back to work depends on the type of work you do and how you feel.     · Ask your doctor when it is okay for you to have sex.     · Do not lift anything heavier than 10 pounds and do not lift weights for 12 weeks. Diet    · By the time you leave the hospital, you should be eating your normal diet. If your stomach is upset, try bland, low-fat foods like plain rice, broiled chicken, toast, and yogurt. Your doctor may suggest that you take iron and vitamin supplements.     · Drink plenty of fluids (unless your doctor tells you not to).   · Eat healthy foods, and watch your portion sizes. Try to stay at your ideal weight. Too much weight puts more stress on your new knee.     · You may notice that your bowel movements are not regular right after your surgery. This is common. Try to avoid constipation and straining with bowel movements. You may want to take a fiber supplement every day. If you have not had a bowel movement after a couple of days, ask your doctor about taking a mild laxative. Medicines    · Your doctor will tell you if and when you can restart your medicines. He or she will also give you instructions about taking any new medicines.     · If you take blood thinners, such as warfarin (Coumadin), clopidogrel (Plavix), or aspirin, be sure to talk to your doctor. He or she will tell you if and when to start taking those medicines again.  Make sure that you understand exactly what your doctor wants you to do.     · Your doctor may give you a blood-thinning medicine to prevent blood clots. If you take a blood thinner, be sure you get instructions about how to take your medicine safely. Blood thinners can cause serious bleeding problems. This medicine could be in pill form or as a shot (injection). If a shot is necessary, your doctor will tell you how to do this.     · Be safe with medicines. Take pain medicines exactly as directed. ? If the doctor gave you a prescription medicine for pain, take it as prescribed. ? If you are not taking a prescription pain medicine, ask your doctor if you can take an over-the-counter medicine. ? Plan to take your pain medicine 30 minutes before exercises. It is easier to prevent pain before it starts than to stop it once it has started.     · If you think your pain medicine is making you sick to your stomach:  ? Take your medicine after meals (unless your doctor has told you not to). ? Ask your doctor for a different pain medicine.     · If your doctor prescribed antibiotics, take them as directed. Do not stop taking them just because you feel better. You need to take the full course of antibiotics. Incision care    · If your doctor told you how to care for your cut (incision), follow your doctor's instructions. You will have a dressing over the cut. A dressing helps the incision heal and protects it. Your doctor will tell you how to take care of this.     · If you did not get instructions, follow this general advice:  ? If you have strips of tape on the cut the doctor made, leave the tape on for a week or until it falls off.  ? If you have stitches or staples, your doctor will tell you when to come back to have them removed. ? If you have skin adhesive on the cut, leave it on until it falls off. Skin adhesive is also called glue or liquid stitches. ? Change the bandage every day. ? Wash the area daily with warm water, and pat it dry.  Don't use hydrogen peroxide or alcohol. They can slow healing. ? You may cover the area with a gauze bandage if it oozes fluid or rubs against clothing. ? You may shower 24 to 48 hours after surgery. Pat the incision dry. Don't swim or take a bath for the first 2 weeks, or until your doctor tells you it is okay. Exercise    · Your rehab program will give you a number of exercises to do to help you get back your knee's range of motion and strength. Always do them as your therapist tells you. Ice and elevation    · For pain and swelling, put ice or a cold pack on the area for 10 to 20 minutes at a time. Put a thin cloth between the ice and your skin. Other instructions    · Continue to wear your support stockings as your doctor says. These help to prevent blood clots. The length of time that you will have to wear them depends on your activity level and the amount of swelling.     · You have metal pieces in your knee. These may set off some airport metal detectors. Carry a medical alert card that says you have an artificial joint, just in case. Follow-up care is a key part of your treatment and safety. Be sure to make and go to all appointments, and call your doctor if you are having problems. It's also a good idea to know your test results and keep a list of the medicines you take. When should you call for help? Call 911 anytime you think you may need emergency care. For example, call if:    · You passed out (lost consciousness).     · You have severe trouble breathing.     · You have sudden chest pain and shortness of breath, or you cough up blood.    Call your doctor now or seek immediate medical care if:    · You have signs of infection, such as:  ? Increased pain, swelling, warmth, or redness. ? Red streaks leading from the incision. ? Pus draining from the incision. ? A fever.     · You have signs of a blood clot, such as:  ? Pain in your calf, back of the knee, thigh, or groin. ? Redness and swelling in your leg or groin.   · Your incision comes open and begins to bleed, or the bleeding increases.     · You have pain that does not get better after you take pain medicine.    Watch closely for changes in your health, and be sure to contact your doctor if:    · You do not have a bowel movement after taking a laxative. Where can you learn more? Go to http://aminah-tonie.info/. Enter E216 in the search box to learn more about \"Total Knee Replacement: What to Expect at Home. \"  Current as of: June 26, 2019  Content Version: 12.2  © 2965-8507 [employer]. Care instructions adapted under license by [employer] (which disclaims liability or warranty for this information). If you have questions about a medical condition or this instruction, always ask your healthcare professional. [address] any warranty or liability for your use of this information. These are general instructions for a healthy lifestyle:    No smoking/ No tobacco products/ Avoid exposure to second hand smoke    Surgeon General's Warning:  Quitting smoking now greatly reduces serious risk to your health. Obesity, smoking, and sedentary lifestyle greatly increases your risk for illness    A healthy diet, regular physical exercise & weight monitoring are important for maintaining a healthy lifestyle    You may be retaining fluid if you have a history of heart failure or if you experience any of the following symptoms:  Weight gain of 3 pounds or more overnight or 5 pounds in a week, increased swelling in our hands or feet or shortness of breath while lying flat in bed. Please call your doctor as soon as you notice any of these symptoms; do not wait until your next office visit.     Recognize signs and symptoms of STROKE:    F-face looks uneven    A-arms unable to move or move even    S-speech slurred or non-existent    T-time-call 911 as soon as signs and symptoms begin-DO NOT go       Back to bed or wait to see if you get better-TIME IS BRAIN. The discharge information has been reviewed with the patient. The patient verbalized understanding. Information obtained by :  I understand that if any problems occur once I am at home I am to contact my physician. I understand and acknowledge receipt of the instructions indicated above.                                                                                                                                            Physician's or R.N.'s Signature                                                                  Date/Time                                                                                                                                              Patient or Representative Signature                                                          Date/Time

## 2021-08-23 NOTE — CASE MANAGEMENT
Pt's prescription Lovenox has been pre-authorized by [employer] company ANDREW  CHANELLE Cherry, [phone])  Prescription is ready for p/u at pt's preferred [facility] and is $8 in total -- CM s/w daughter Melanie Quiles via phone to inform her of same  Rosalva agreeable  CM requested by Melanie Quiles to resume services with [facility] for at-home PT/OT/VNA services as he is current with them following his d/c from the [facility] last week  Referral sent via Ecin  Awaiting PT/OT notes from [facility] therapy team  CM will continue to follow

## 2021-08-23 NOTE — ASSESSMENT & PLAN NOTE
Confirmed on lung biopsy on 8/5/21  Outpatient follow-up with oncology & pulmonary arranged  Hem Onc ordered MRI abdomen to evaluate the liver lesions

## 2021-08-23 NOTE — ASSESSMENT & PLAN NOTE
Presents w/ worsening pain/swelling -> recent acute DVTs diagnosed earlier this month and discharged at the time on Eliquis  Due to worsening symptomatology, may consider Eliquis failure? ? -> initiated on a heparin drip in the ED  Lower extremity venous doppler study today when compared to previous on 7/31/21, now revealing new right gastrocnemius vein and left posterior tibial vein DVTs  Consult placed for hematology/oncology - If eliquis failure, recommend Lovenox 1 mg /kg BID  Family is agreeable with lovenox, working on costing, needs insurance auth - called & got it override for 1 time    PRN pain control - PT/OT as tolerated - avoid SCDs

## 2021-08-23 NOTE — CASE MANAGEMENT
LOS 2  Pt is a bundle -- Stroke SNF LOS 15-19 (BCPI left at bedside)  Pt is a readmission  Unplanned readmission risk score: 22    CM m/w pt at spouse at bedside to introduce role of CM and discuss pt's needs upon discharge  As pt is a readmission, CM reviewed previous CM note and confirmed same at bedside  Pt resides with spouse in a 1L home where he was previously IPTA, no DME utilized prior to initial admission  Pt has hospital bed, BSC, and walker at home from previous hospital stay  Pt has hx of rehab at [facility], however spouse and pt are currently requesting at-home PT/OT if recommended by therapy team due to preferred home environment  Pt is not working at this time  Spouse to provide transportation to and from medical appointments  PCP is Santiago Arango MD  Preferred [facility] is [facility] does not have a POA -- Instructions on establishing same provided during previous hospital stay  Spouse is alternate healthcare decisionmaker (Jacob Valentino,  [phone]), while daughter Britta Lomeli ([phone]) assists with the financial piece of medication management  CM will await therapy recommendations and plan for referrals accordingly  CM reviewed d/c planning process including the following: identifying help at home, patient preference for d/c planning needs, Discharge [facility] Meds to Bed program, availability of treatment team to discuss questions or concerns patient and/or family may have regarding understanding medications and recognizing signs and symptoms once discharged  CM also encouraged patient to follow up with all recommended appointments after discharge  Patient advised of importance for patient and family to participate in managing patients medical well being

## 2021-08-24 VITALS
DIASTOLIC BLOOD PRESSURE: 81 MMHG | RESPIRATION RATE: 18 BRPM | WEIGHT: 176.37 LBS | HEART RATE: 74 BPM | HEIGHT: 68 IN | TEMPERATURE: 97.7 F | BODY MASS INDEX: 26.73 KG/M2 | OXYGEN SATURATION: 97 % | SYSTOLIC BLOOD PRESSURE: 134 MMHG

## 2021-08-24 PROBLEM — D72.829 LEUKOCYTOSIS: Status: RESOLVED | Noted: 2021-08-08 | Resolved: 2021-08-24

## 2021-08-24 LAB
ANION GAP SERPL CALCULATED.3IONS-SCNC: 2 MMOL/L (ref 4–13)
BASOPHILS # BLD AUTO: 0.07 THOUSANDS/ΜL (ref 0–0.1)
BASOPHILS NFR BLD AUTO: 1 % (ref 0–1)
BUN SERPL-MCNC: 14 MG/DL (ref 5–25)
CALCIUM SERPL-MCNC: 10.1 MG/DL (ref 8.3–10.1)
CHLORIDE SERPL-SCNC: 105 MMOL/L (ref 100–108)
CO2 SERPL-SCNC: 30 MMOL/L (ref 21–32)
CREAT SERPL-MCNC: 0.8 MG/DL (ref 0.6–1.3)
EOSINOPHIL # BLD AUTO: 0.3 THOUSAND/ΜL (ref 0–0.61)
EOSINOPHIL NFR BLD AUTO: 4 % (ref 0–6)
ERYTHROCYTE [DISTWIDTH] IN BLOOD BY AUTOMATED COUNT: 16.2 % (ref 11.6–15.1)
GFR SERPL CREATININE-BSD FRML MDRD: 91 ML/MIN/1.73SQ M
GLUCOSE SERPL-MCNC: 89 MG/DL (ref 65–140)
HCT VFR BLD AUTO: 34.1 % (ref 36.5–49.3)
HGB BLD-MCNC: 10.2 G/DL (ref 12–17)
IMM GRANULOCYTES # BLD AUTO: 0.03 THOUSAND/UL (ref 0–0.2)
IMM GRANULOCYTES NFR BLD AUTO: 0 % (ref 0–2)
LYMPHOCYTES # BLD AUTO: 1.79 THOUSANDS/ΜL (ref 0.6–4.47)
LYMPHOCYTES NFR BLD AUTO: 22 % (ref 14–44)
MCH RBC QN AUTO: 24.9 PG (ref 26.8–34.3)
MCHC RBC AUTO-ENTMCNC: 29.9 G/DL (ref 31.4–37.4)
MCV RBC AUTO: 83 FL (ref 82–98)
MONOCYTES # BLD AUTO: 0.77 THOUSAND/ΜL (ref 0.17–1.22)
MONOCYTES NFR BLD AUTO: 9 % (ref 4–12)
NEUTROPHILS # BLD AUTO: 5.27 THOUSANDS/ΜL (ref 1.85–7.62)
NEUTS SEG NFR BLD AUTO: 64 % (ref 43–75)
NRBC BLD AUTO-RTO: 0 /100 WBCS
PLATELET # BLD AUTO: 283 THOUSANDS/UL (ref 149–390)
PMV BLD AUTO: 10.5 FL (ref 8.9–12.7)
POTASSIUM SERPL-SCNC: 3.8 MMOL/L (ref 3.5–5.3)
RBC # BLD AUTO: 4.09 MILLION/UL (ref 3.88–5.62)
SODIUM SERPL-SCNC: 137 MMOL/L (ref 136–145)
WBC # BLD AUTO: 8.23 THOUSAND/UL (ref 4.31–10.16)

## 2021-08-24 PROCEDURE — 85025 COMPLETE CBC W/AUTO DIFF WBC: CPT | Performed by: INTERNAL MEDICINE

## 2021-08-24 PROCEDURE — 99239 HOSP IP/OBS DSCHRG MGMT >30: CPT | Performed by: FAMILY MEDICINE

## 2021-08-24 PROCEDURE — 80048 BASIC METABOLIC PNL TOTAL CA: CPT | Performed by: INTERNAL MEDICINE

## 2021-08-24 RX ADMIN — ENOXAPARIN SODIUM 80 MG: 80 INJECTION SUBCUTANEOUS at 10:12

## 2021-08-24 RX ADMIN — DOCUSATE SODIUM AND SENNOSIDES 2 TABLET: 8.6; 5 TABLET ORAL at 10:12

## 2021-08-24 NOTE — CASE MANAGEMENT
CM received response from [address] via [address] -- They are able to accept for at-home PT/OT/VNA  CM informed them of tentative plan for d/c today  CM will continue to follow

## 2021-08-24 NOTE — INCIDENTAL FINDINGS
The following findings require follow up:  Radiographic finding   Finding: pancreatic neck cyst   Follow up required:  Repeat mri   Follow up should be done within 2 years  Please notify the following clinician to assist with the follow up:   Dr Eden Aquino

## 2021-08-24 NOTE — QUICK NOTE
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

## 2021-08-25 ENCOUNTER — TELEPHONE (OUTPATIENT)
Dept: FAMILY MEDICINE CLINIC | Facility: HOSPITAL | Age: 71
End: 2021-08-25

## 2021-08-25 NOTE — ASSESSMENT & PLAN NOTE
Presents w/ worsening pain/swelling -> recent acute DVTs diagnosed earlier this month and discharged at the time on Eliquis  Due to worsening symptomatology, may consider Eliquis failure? ? -> initiated on a heparin drip in the ED  Lower extremity venous doppler study today when compared to previous on 7/31/21, now revealing new right gastrocnemius vein and left posterior tibial vein DVTs  Consult placed for hematology/oncology - If eliquis failure, recommend Lovenox 1 mg /kg BID  Family is agreeable with lovenox, working on costing, needs insurance auth - called & got it override for 1 time    For discharge home today

## 2021-08-25 NOTE — ASSESSMENT & PLAN NOTE
Confirmed on lung biopsy on 8/5/21  Outpatient follow-up with oncology & pulmonary arranged  Hem Onc ordered MRI abdomen to evaluate the liver lesions-no suspicious lesion for metastasis    Pancreatic head cyst noted which can be followed as an outpatient with repeat MRI   Patient follows up with Dr Shahid Batista

## 2021-08-25 NOTE — DISCHARGE SUMMARY
[address] [facility]  Discharge- Stefanie Rincon 1950, 72 y o  male MRN: [id number]  Unit/Bed#: [facility] 215-01 Encounter: [id number]  Primary Care Provider: Efraín Conte MD   Date and time admitted to hospital: 8/21/2021  7:14 AM    * Bilateral lower extremity DVTs  Assessment & Plan  Presents w/ worsening pain/swelling -> recent acute DVTs diagnosed earlier this month and discharged at the time on Eliquis  Due to worsening symptomatology, may consider Eliquis failure? ? -> initiated on a heparin drip in the ED  Lower extremity venous doppler study today when compared to previous on 7/31/21, now revealing new right gastrocnemius vein and left posterior tibial vein DVTs  Consult placed for hematology/oncology - If eliquis failure, recommend Lovenox 1 mg /kg BID  Family is agreeable with lovenox, working on costing, needs insurance auth - called & got it override for 1 time  For discharge home today    Delirium  Assessment & Plan  · Hospital delirium  · Better at home    Adenocarcinoma of lung  Assessment & Plan  Confirmed on lung biopsy on 8/5/21  Outpatient follow-up with oncology & pulmonary arranged  Hem Onc ordered MRI abdomen to evaluate the liver lesions-no suspicious lesion for metastasis    Pancreatic head cyst noted which can be followed as an outpatient with repeat MRI   Patient follows up with Dr Breanna Garcia    Suspected pulmonary embolism  Assessment & Plan  Initially suspected on recent hospitalization earlier in the month -> maintained on Eliquis for anticoagulation  Echocardiogram three weeks ago noting mild-moderate RV dilatation w/ mildly diminished function and mid free wall hypokinesis  Anticoagulation transition to a heparin drip due to presumed Eliquis failure for DVTs  Patient will be discharged home with Lovenox since it is considered Eliquis failure    Anemia of chronic disease  Assessment & Plan  Monitor H/H    Urinary retention  Assessment & Plan  A sequela of recent hospitalization for stroke  Urinary retention protocol - follows outpatient urology  Per urology recs plan was a voiding trial with VNA this week  Family requests & reasonable to perform void trial here  Hampton removed 8/22 - good UO so far  C/w Flomax    Nasopharyngeal mass  Assessment & Plan  Outpatient follow-up recommended per ENT    Elevated troponin  Assessment & Plan  Currently @ 0 38 (improved from 10 3 earlier this month)  Likely a residual sequela of suspected pulmonary embolism from earlier in the month    Recent cerebrovascular accident  Assessment & Plan  MRI earlier this month noting multifocal infarctions for presumed central/embolic source  Continue statin - continue anticoagulation  PT/OT as tolerated        Discharging Physician / Practitioner: Kashmir Ellington MD  PCP: Jarett Alvarez MD  Admission Date:   Admission Orders (From admission, onward)     Ordered        08/21/21 1210  INPATIENT ADMISSION  Once                   Discharge Date: 08/24/21    Medical Problems     Resolved Problems  Date Reviewed: 8/24/2021        Resolved    Leukocytosis 8/24/2021     Resolved by  Kashmir Ellington MD                Consultations During Hospital Stay:  · Oncology    Procedures Performed:   ·     Significant Findings / Test Results:   · MRI abdomen-multiple simple hepatic cyst   No suspicious hepatic lesions  A 5 mm pancreatic neck simple cyst with connection to the main duct and without suspicious features likely side-branch IPMN  Follow-up MRI with and without contrast in 2 years can be considered if clinically relevant    Stable small right pleural effusion partially visualized right hilar lymphadenopathy better evaluated on prior CT  · Lower extremity venous Doppler-in new right gastrocnemius vein DVT and left posterior tibial vein DVT    Incidental Findings:   · Pancreatic neck cyst    Test Results Pending at Discharge (will require follow up):   ·      Outpatient Tests Requested:  ·     Complications:   none    Reason for Admission:     Hospital Course:     Messi Dave is a 72 y o  male patient who originally presented to the hospital on 8/21/2021 due to bilateral leg swelling and pain  Patient was recently in the hospital for acute CVA and at that time patient was diagnosed with bilateral lower extremity DVT and was discharged on Eliquis home  Patient has been compliant with anticoagulation and noted to have worsening of pain and swelling so he was evaluated in the emergency room  Patient found to have a nasopharyngeal mass for which he needs to see ENT as an outpatient during the last admission and he also noted to have lung mass which was biopsied confirming the pathology could diagnosis of adenocarcinoma  Patient has a scheduled appointment with Hematology next week  Patient was admitted to the hospital it appears that the patient has new DVT and was evaluated by Hematology-Oncology  Considered as failure of Eliquis and patient was initially on heparin drip which was transition to Lovenox  Patient will be discharged home on Lovenox with outpatient follow-up  Patient also had urinary retention patient successfully did the voiding trial   Patient had an MRI done which showed a simple hepatics cyst and a simple cyst in the pancreatic neck  Patient remained hemodynamically stable and afebrile for details refer to the chart        Please see above list of diagnoses and related plan for additional information  Condition at Discharge: good     Discharge Day Visit / Exam:     Subjective:  Patient seen and examined, patient and family in the room eager to go home  Discussed with the patient about the MRI report and patient has a scheduled appointment with Dr Kamilah Givens next week    Informed the patient's family that there is no concerning lesions on the MRI  Vitals: Blood Pressure: 134/81 (08/24/21 0742)  Pulse: 74 (08/24/21 0742)  Temperature: 97 7 °F (36 5 °C) (08/24/21 5668)  Temp Source: Oral (08/24/21 0742)  Respirations: 18 (08/24/21 0742)  Height: 5' 8" (172 7 cm) (08/23/21 2356)  Weight - Scale: 80 kg (176 lb 5 9 oz) (08/23/21 2356)  SpO2: 97 % (08/24/21 0742)  Exam:   Physical Exam  Constitutional:       General: He is not in acute distress  HENT:      Head: Normocephalic and atraumatic  Nose: Nose normal       Mouth/Throat:      Mouth: Mucous membranes are moist    Eyes:      General: No scleral icterus  Cardiovascular:      Rate and Rhythm: Normal rate and regular rhythm  Pulses: Normal pulses  Heart sounds: Normal heart sounds  Pulmonary:      Effort: Pulmonary effort is normal    Abdominal:      General: Abdomen is flat  There is no distension  Musculoskeletal:         General: Normal range of motion  Cervical back: Normal range of motion and neck supple  Skin:     General: Skin is warm and dry  Neurological:      General: No focal deficit present  Mental Status: He is alert  Discussion with Family:  Family updated in the room    Discharge instructions/Information to patient and family:   See after visit summary for information provided to patient and family  Provisions for Follow-Up Care:  See after visit summary for information related to follow-up care and any pertinent home health orders  Disposition:     Home with VNA Services (Reminder: Complete face to face encounter)      Planned Readmission:  none     Discharge Statement:  I spent  45  minutes discharging the patient  This time was spent on the day of discharge  I had direct contact with the patient on the day of discharge  Greater than 50% of the total time was spent examining patient, answering all patient questions, arranging and discussing plan of care with patient as well as directly providing post-discharge instructions  Additional time then spent on discharge activities      Discharge Medications:  See after visit summary for reconciled discharge medications provided to patient and family        ** Please Note: This note has been constructed using a voice recognition system **

## 2021-08-25 NOTE — TELEPHONE ENCOUNTER
Pt is a new patient - establishing care with you tomorrow  Has been in and out of the hospital (see records in chart)  His BP has been consistently getting higher since August 18th  Was running systolics in the 414-123F, but today reports it is 166/90  HR has been in the 70s with a reading of 74 today  VNA just wanted to give us a heads up to address tomorrow since he is not on any BP meds

## 2021-08-25 NOTE — ASSESSMENT & PLAN NOTE
Initially suspected on recent hospitalization earlier in the month -> maintained on Eliquis for anticoagulation  Echocardiogram three weeks ago noting mild-moderate RV dilatation w/ mildly diminished function and mid free wall hypokinesis  Anticoagulation transition to a heparin drip due to presumed Eliquis failure for DVTs  Patient will be discharged home with Lovenox since it is considered Eliquis failure

## 2021-08-26 ENCOUNTER — PATIENT OUTREACH (OUTPATIENT)
Dept: CASE MANAGEMENT | Facility: OTHER | Age: 71
End: 2021-08-26

## 2021-08-26 ENCOUNTER — OFFICE VISIT (OUTPATIENT)
Dept: FAMILY MEDICINE CLINIC | Facility: HOSPITAL | Age: 71
End: 2021-08-26
Payer: MEDICARE

## 2021-08-26 ENCOUNTER — TELEPHONE (OUTPATIENT)
Dept: UROLOGY | Facility: AMBULATORY SURGERY CENTER | Age: 71
End: 2021-08-26

## 2021-08-26 ENCOUNTER — TELEPHONE (OUTPATIENT)
Dept: NEUROLOGY | Facility: CLINIC | Age: 71
End: 2021-08-26

## 2021-08-26 VITALS
TEMPERATURE: 96.8 F | SYSTOLIC BLOOD PRESSURE: 128 MMHG | HEART RATE: 66 BPM | BODY MASS INDEX: 26.83 KG/M2 | HEIGHT: 68 IN | WEIGHT: 177 LBS | DIASTOLIC BLOOD PRESSURE: 82 MMHG

## 2021-08-26 DIAGNOSIS — J96.91 RESPIRATORY FAILURE WITH HYPOXIA, UNSPECIFIED CHRONICITY (HCC): ICD-10-CM

## 2021-08-26 DIAGNOSIS — J39.2 NASOPHARYNGEAL MASS: ICD-10-CM

## 2021-08-26 DIAGNOSIS — R41.0 DELIRIUM: ICD-10-CM

## 2021-08-26 DIAGNOSIS — R91.8 MASS OF UPPER LOBE OF RIGHT LUNG: ICD-10-CM

## 2021-08-26 DIAGNOSIS — Z78.9 IMPAIRED MOBILITY AND ADLS: ICD-10-CM

## 2021-08-26 DIAGNOSIS — D63.8 ANEMIA OF CHRONIC DISEASE: ICD-10-CM

## 2021-08-26 DIAGNOSIS — I63.9 ACUTE CVA (CEREBROVASCULAR ACCIDENT) (HCC): ICD-10-CM

## 2021-08-26 DIAGNOSIS — J96.01 ACUTE RESPIRATORY FAILURE WITH HYPOXIA (HCC): ICD-10-CM

## 2021-08-26 DIAGNOSIS — I82.4Z3 ACUTE DEEP VEIN THROMBOSIS (DVT) OF DISTAL VEIN OF BOTH LOWER EXTREMITIES (HCC): ICD-10-CM

## 2021-08-26 DIAGNOSIS — I50.42 CHRONIC COMBINED SYSTOLIC AND DIASTOLIC CHF (CONGESTIVE HEART FAILURE) (HCC): ICD-10-CM

## 2021-08-26 DIAGNOSIS — C34.90 ADENOCARCINOMA OF LUNG, UNSPECIFIED LATERALITY (HCC): Primary | ICD-10-CM

## 2021-08-26 DIAGNOSIS — R33.9 URINARY RETENTION: ICD-10-CM

## 2021-08-26 DIAGNOSIS — Z74.09 IMPAIRED MOBILITY AND ADLS: ICD-10-CM

## 2021-08-26 PROCEDURE — 99205 OFFICE O/P NEW HI 60 MIN: CPT | Performed by: FAMILY MEDICINE

## 2021-08-26 NOTE — PROGRESS NOTES
Outreach TC to patient for initial care management assessment  Spoke with CG, spouse, Anabel Smith  CG reports that patient is feeling well  CG reports that patient has no new  s/sx of CVA including unilateral weakness, new speech/language difficulties, increased confusion, headaches or sudden changes to vision  CG reports that patient needs assistance with ADL's and is dependent on spouse for IADLs  Patient resides with his spouse  who assists as needed  Patient did complete his MARY appointment this AM and  made all f/u appointments  Patient has a cardiology appointment on 9/14/21, oncology appointment 9/3/21, pulmonary -9/24/21, urology-9/28/21 and neurology appointment on 10/22/21  CG declined a review of medications  Reviewed future appointments, s/sx to report and how/when to report symptoms to provider vs  911  CG verbalizes understanding      CG educated on role of CM, contact information for CM and BPCI program CG declines additional calls  Of note, patient had difficulty voiding yesterday and [facility] was called to patient's home to catheterize patient for 1200 ml of urine  Hampton catheter was left in place  Chart review will continue until end of episode

## 2021-08-26 NOTE — PROGRESS NOTES
Assessment/Plan:      Problem List Items Addressed This Visit        Respiratory    Acute respiratory failure with hypoxia ([facility])    Adenocarcinoma of lung - Primary    Respiratory failure with hypoxia ([facility])       Cardiovascular and Mediastinum    Bilateral lower extremity DVTs    Chronic combined systolic and diastolic CHF (congestive heart failure) ([facility] [address] )    Recent cerebrovascular accident       Nervous and Auditory    Delirium       Genitourinary    Urinary retention       Other    Anemia of chronic disease    Impaired mobility and ADLs    Mass of upper lobe of right lung    Nasopharyngeal mass    Relevant Orders    Ambulatory Referral to Otolaryngology           Plan/Discussion:    Patient is establishing care today  1  Lung cancer, adenocarcinoma  Will be following up with Oncology  Current plan is to undergo chemoradiation  2  Bilateral lower extremity DVT  Secondary to underlying carcinoma  Currently on Lovenox  Previously on Eliquis an assumed failure  With bilateral lower extremity edema advised leg elevation, salt restriction, compression stockings  3  Recent cerebrovascular accident  Again probably embolic with underlying cancer  Continue with Lovenox  Continue follow-up with Neurology  4  Delirium  Mild delirium currently not needing Seroquel  Continue to monitor  Due to multiple underlying illness and hospitalization  MRI not showing any evidence of metastasis  5  Urinary retention  Recent failure of voiding trial   Continue indwelling Hampton catheter at this time  Continue care through VNA  Continue coordination with Urology  Has follow-up appointment with Urology  6  Hyperlipidemia  Continue with atorvastatin  7  Nasopharyngeal mass  No tissue diagnosis  Not clear if malignant or benign  Will need follow-up with ENT  Referral placed  8  Congestive heart failure    Currently he is euvolemic except for the lower extremity edema which I do think is more due to the bilateral DVTs rather than fluid overload  He will follow-up with Cardiology  Continue on the statin  Reviewed latest echo  With hypokinesis of the right ventricle  9  End of life counseling  At this point in time he is a full code   Discussed that other services such as palliative Care Medicine on hospice services(hospice not needed at this time) are available     total time spent face to face, review of chart, review of imaging, review of labs, review of consultations, review of hospital discharge is greater than 60 minutes  Subjective:   Chief Complaint   Patient presents with    [facility]    DVT        Patient ID: Dustin Hernandez is a 72 y o  male  Patient seen   To [facility]  Patient was recently in the hospital   Patient with known lung cancer  Following up with Oncology  Is known as a dental carcinoma  Ongoing planus the undergo chemo radiation  He has a follow-up with Oncology soon  Patient had new onset CVA  A suspected of a pulmonary embolism  Had bilateral DVTs  At the time he was on Eliquis  Deemed Eliquis to be a failure  As he has underlying lung cancer it is appropriate for him to be on Lovenox which she is able to get  He has a short supply  But working with the pharmacy for the ongoing prescription  Congestive heart failure  No shortness of breath no chest pain  Has follow-up with Cardiology  Does have some leg edema though  Delirium  Has been off and on  Significant improvement since hospitalization  Has not needed Seroquel  Urinary retention  Currently with the indwelling Hampton catheter  Voiding trial done in the hospital but a few days later had urinary retention and VNA replaced the indwelling catheter  Has follow-up with Urology  Hospital records reviewed  Reviewed labs  Reviewed imaging  Reviewed consultations            The following portions of the patient's history were reviewed and updated as appropriate: allergies, current medications, past family history, past medical history, past social history, past surgical history and problem list     Review of Systems   Constitutional: Negative  Negative for activity change, appetite change, chills and diaphoresis  HENT: Negative for congestion and dental problem  Respiratory: Negative  Negative for apnea, chest tightness, shortness of breath and wheezing  Cardiovascular: Negative  Negative for chest pain, palpitations and leg swelling  Gastrointestinal: Negative  Negative for abdominal distention, abdominal pain, constipation, diarrhea and nausea  Genitourinary: Negative  Negative for difficulty urinating, discharge, dysuria, frequency, penile pain and penile swelling  Psychiatric/Behavioral: Positive for confusion  Objective:  Vitals:    08/26/21 0934   BP: 128/82   Pulse: 66   Temp: (!) 96 8 °F (36 °C)   Weight: 80 3 kg (177 lb)   Height: 5' 8" (1 727 m)     BP Readings from Last 6 Encounters:   08/26/21 128/82   08/24/21 134/81   08/20/21 130/60   08/17/21 131/75   08/08/21 144/79      Wt Readings from Last 6 Encounters:   08/26/21 80 3 kg (177 lb)   08/23/21 80 kg (176 lb 5 9 oz)   08/23/21 80 kg (176 lb 5 9 oz)   08/20/21 83 5 kg (184 lb)   08/11/21 83 5 kg (184 lb 1 4 oz)   07/31/21 86 6 kg (190 lb 14 7 oz)             Physical Exam  Vitals and nursing note reviewed  Constitutional:       General: He is not in acute distress  Appearance: Normal appearance  He is well-developed  He is ill-appearing  HENT:      Head: Normocephalic and atraumatic  Right Ear: External ear normal       Left Ear: External ear normal       Nose: Nose normal  No congestion or rhinorrhea  Mouth/Throat:      Mouth: Mucous membranes are moist       Pharynx: No oropharyngeal exudate or posterior oropharyngeal erythema  Eyes:      Extraocular Movements: Extraocular movements intact        Conjunctiva/sclera: Conjunctivae normal  Pupils: Pupils are equal, round, and reactive to light  Cardiovascular:      Rate and Rhythm: Normal rate and regular rhythm  Heart sounds: Normal heart sounds  No murmur heard  No friction rub  No gallop  Pulmonary:      Effort: Pulmonary effort is normal  No respiratory distress  Breath sounds: Normal breath sounds  No wheezing or rales  Chest:      Chest wall: No tenderness  Abdominal:      General: Bowel sounds are normal  There is no distension  Palpations: Abdomen is soft  There is no mass  Tenderness: There is no abdominal tenderness  There is no guarding or rebound  Musculoskeletal:         General: Normal range of motion  Cervical back: Normal range of motion and neck supple  Skin:     General: Skin is warm  Capillary Refill: Capillary refill takes less than 2 seconds  Neurological:      Mental Status: He is alert and oriented to person, place, and time     Psychiatric:         Mood and Affect: Mood normal          Behavior: Behavior normal

## 2021-08-26 NOTE — TELEPHONE ENCOUNTER
Patient managed by Dr Carmen Miller [facility]) Carrillo Fall from [facility] called in to inform he got a call last evening stating patient was not voiding and he catheterized him got 1200ml and he still has the cath and wanted to know the next plan of care   Carrillo Fall can be reached at [phone]

## 2021-08-26 NOTE — TELEPHONE ENCOUNTER
Post CVA Discharge Follow Up    Hospitalization: 7/31/2021 - 8/8/2021  Rehabilitation: 8/8/2021 - 8/17/2021  The purpose of this phone call is to assess patient's general wellbeing or for any assistance needed with follow-up care  Called, reached wife Wily Murdock (she is indeed on communication consent form), I introduced myself and explained neurovascular nurse navigator role  Since discharge, she denies patient experiencing any new or worsening stroke-like symptoms  States that his clarity is improving, states that he continues to be confused at times  She notes improvement since returning home  Ambulation / ADLs:  he is ambulating with wife's assistance due to the fact that he has supplemental oxygen and an indwelling cadet catheter  She is also assisting with his ADLs, medications, appointments, and affairs  Appointments / Medication Review:  Reviewed appointments - Patient scheduled with PCP today, hem/onc 9/3, cardiology 9/14, pulm 9/24, urology 9/28, and neuro 10/22  Reports that patient, was indeed evaluated by in-home visiting nurses  I reviewed medications with her  Reports having no difficulties obtaining medications  Reports he is taking all as prescribed with no missed doses, medication side effects, or signs of bleeding  Risk Factors / Education:  Wife reports she understands stroke type, symptoms, personal risk factors and management, medications, and resources  Urine retention  BP is monitored at home by in home staff  States that his BP was slightly elevated due to urinary retention, but is now stable  I addressed all her questions  At the conclusion of the conversation, wife denies having any further questions or concerns

## 2021-08-26 NOTE — TELEPHONE ENCOUNTER
Called Netta Amezquita back and explained patient is already scheduled for a Cysto to she about retention    It was stated hi Dr Litzy Gray note if he didn't pass void trial   He will also notify patients wife of plan

## 2021-08-30 ENCOUNTER — TELEPHONE (OUTPATIENT)
Dept: FAMILY MEDICINE CLINIC | Facility: HOSPITAL | Age: 71
End: 2021-08-30

## 2021-08-30 DIAGNOSIS — R45.1 RESTLESSNESS AND AGITATION: ICD-10-CM

## 2021-08-30 DIAGNOSIS — S06.9X9A: ICD-10-CM

## 2021-08-30 RX ORDER — QUETIAPINE FUMARATE 25 MG/1
12.5 TABLET, FILM COATED ORAL
Qty: 15 TABLET | Refills: 0 | Status: SHIPPED | OUTPATIENT
Start: 2021-08-30 | End: 2021-09-22

## 2021-08-30 NOTE — TELEPHONE ENCOUNTER
Wife called  Had discussed a nightly medication at recent appt with Dr Virginia Murcia  Wife would like to proceed with that  Nighttime delirium is getting worse  He does do well on Seroquel, asking if it's ok for him to have that nightly? If it is ok, a script for that is ok with her  If not something similar please that he can be given on a nightly basis  PCB and let her know what will be sent to pharmacy

## 2021-08-31 NOTE — PHYSICAL THERAPY NOTE
PT DISCHARGE SUMMARY    Patient made fair progress during his stay at the acute rehab center where he presented with a Decline in Funcional mobility, ADLS and Safety due to Stroke  On eval, he presented with balance deficits, right sided weakness with noted right lean, ataxia and dysmetria, poor activity tolerance with fatigue, decreased ability to ambulate with coordination deficits, decreased safey awareness and delayed righting reactions  He responded well to therapeutic activity and exercise, neuro re-ed, and transfer, gait and stair training  He was able to achieve CGA/Isela level using RW; family present on multiple occasions for family training  Family aware of the following recommendations: hands on A for all mobility using gait belt (information provided from [employer] during stay) with 24 hour S also recommended  Mobility to be performed with RW with a transport chair (ordered during stay) recommended 2* deficits in cognition, carryover, UE coordination and activity tolerance  HEP provided prior to discharge with recommendation to also follow up with HHPT services in order to maximize function and reduce caregiver burden      Ruy Morales, PT

## 2021-09-02 ENCOUNTER — TELEPHONE (OUTPATIENT)
Dept: FAMILY MEDICINE CLINIC | Facility: HOSPITAL | Age: 71
End: 2021-09-02

## 2021-09-02 NOTE — TELEPHONE ENCOUNTER
Home care nurse called to get a verbal order to a urine culture on patient  Patient has a catheter and the urine is cloudy and has a smell

## 2021-09-03 ENCOUNTER — OFFICE VISIT (OUTPATIENT)
Dept: HEMATOLOGY ONCOLOGY | Facility: HOSPITAL | Age: 71
End: 2021-09-03
Payer: MEDICARE

## 2021-09-03 ENCOUNTER — TELEPHONE (OUTPATIENT)
Dept: FAMILY MEDICINE CLINIC | Facility: HOSPITAL | Age: 71
End: 2021-09-03

## 2021-09-03 VITALS
WEIGHT: 179 LBS | BODY MASS INDEX: 27.13 KG/M2 | DIASTOLIC BLOOD PRESSURE: 72 MMHG | HEIGHT: 68 IN | SYSTOLIC BLOOD PRESSURE: 112 MMHG | OXYGEN SATURATION: 97 % | TEMPERATURE: 97.6 F | RESPIRATION RATE: 16 BRPM | HEART RATE: 78 BPM

## 2021-09-03 DIAGNOSIS — C34.11 MALIGNANT NEOPLASM OF UPPER LOBE OF RIGHT LUNG (HCC): Primary | ICD-10-CM

## 2021-09-03 DIAGNOSIS — J39.2 NASOPHARYNGEAL MASS: ICD-10-CM

## 2021-09-03 DIAGNOSIS — C34.90 ADENOCARCINOMA OF LUNG, UNSPECIFIED LATERALITY (HCC): ICD-10-CM

## 2021-09-03 DIAGNOSIS — R91.8 MASS OF UPPER LOBE OF RIGHT LUNG: ICD-10-CM

## 2021-09-03 PROCEDURE — 99214 OFFICE O/P EST MOD 30 MIN: CPT | Performed by: INTERNAL MEDICINE

## 2021-09-03 NOTE — TELEPHONE ENCOUNTER
Tejas rubin occ therapist [facility] home care calling stating she is going to resume occupational therapy for him for twice a week for 3 weeks   Pt is still very confused and tired    If the dr does not want him to have it you can reach [facility] at [phone]

## 2021-09-03 NOTE — PROGRESS NOTES
Hematology/Oncology Outpatient Follow- up Note  Minna Nielsen 72 y o  male MRN: @ Encounter: [id number]        Date:  9/3/2021    Presenting Complaint/Diagnosis :  Adenocarcinoma of the lung  HPI:     per the note from Dr Jonh Gale     This is a 81-year-old gentleman with the history of tobacco abuse, the patient was admitted to the hospital as a stroke alert  During the hospital stay he had multiple imaging studies including CTA of the brain which showed mass in the right nasopharyngeal region measuring 2 7 cm in greatest dimension  He also had a Doppler ultrasound of the lower extremities which showed acute deep vein thrombosis in both lower extremities  CT scan of the chest abdomen pelvis without contrast on 07/31 showed 6 4 cm solid irregular right upper lobe pulmonary mass with extensive right hilar and mediastinal adenopathy  He also seems to have small right pleural effusion and moderate emphysema  MRI of the brain on 08/03/2021 showed multifocal diffusion abnormalities in several separate vascular territories  No metastatic disease to the brain was mentioned  He does seem to have 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  CT-guided biopsy of the right lung mass was done on 08/05  The pathology showed adenocarcinoma of lung primary  The patient is currently on Lovenox injection for anticoagulation  Current Hematologic/ Oncologic Treatment:     the patient is on Lovenox for his  Thrombosis as he had this while on Eliquis so failed Eliquis  Interval History:      The patient returns for follow-up visit  He has a known right upper lobe adenocarcinoma with mediastinal adenopathy in addition to a nasopharyngeal mass which has not been worked up or biopsied yet  He was seen in the hospital by 1 of my partners and based on his mediastinal adenopathy it was thought he should get a PET-CT scan and will be a candidate for concurrent chemoradiation    The patient has not been set up for the PET-CT scan or concurrent chemoradiation yet  I will set both of these up  The patient also needs to see ear nose and throat surgery to have the nasopharyngeal mass evaluated and I will set this up also  At this point I agree with initial plan for concurrent chemoradiation with carboplatin and Taxol and based on the fact that this is stage III disease I would favor immunotherapy for a year after that  Of course this is based on just the lung findings  The nasopharyngeal mass still needs to undergo workup  If that is malignancy he will need radiation to that also possibly with concurrent chemotherapy and if he is put on immunotherapy for his lung cancer this should help improve his survival from the nasopharyngeal mass if it is malignancy also  As far symptoms today are concerned he has a  Urinary catheter in place after his stroke which is being trialed to be removed by his urologist   Is on Lovenox which he injects at home and his wife tells me is prescribed through his primary care physician  Denies any nausea or vomiting  He had right-sided weakness which has improved  Walks with a walker  The rest of his 14 point review of systems today was negative  Test Results:    Imaging: MRI abdomen w wo contrast    Result Date: 8/24/2021  Narrative: MRI - ABDOMEN - WITH AND WITHOUT CONTRAST INDICATION:  History of lung cancer  Hypodense liver lesion seen on prior CT  COMPARISON: CT chest/abdomen/pelvis 7/31/2021   TECHNIQUE:  The following pulse sequences were obtained prior to and following the administration of intravenous contrast:     Coronal and axial T2 with TE of 90 and 180 respectively, axial T2 with fat saturation, axial FIESTA fat-sat, axial T1-weighted in-and-out-of phase, axial DWI/ADC, precontrast axial T1 with fat saturation, post-contrast dynamic axial T1 with fat saturation at 20, 70, and 180 seconds, coronal T1  with fat saturation and 7 minute delayed axial T1 with fat saturation  IV Contrast:  8 mL of Gadobutrol injection (SINGLE-DOSE) FINDINGS: LOWER CHEST:   Stable small right pleural effusion  Partially visualized right hilar lymphadenopathy, better evaluated on prior CT  LIVER: Normal in size and configuration  Scattered simple cysts throughout the liver  No suspicious mass  The hepatic veins and portal veins are patent  BILE DUCTS: No intrahepatic or extrahepatic bile duct dilation  GALLBLADDER:  Normal  PANCREAS: There is a 5 mm pancreatic neck simple cyst with connection to the main pancreatic duct, likely side branch IPMNs  No solid or enhancing components    No main pancreatic ductal dilation  ADRENAL GLANDS:  Normal  SPLEEN:  Normal  KIDNEYS/PROXIMAL URETERS: No hydroureteronephrosis  No suspicious renal mass  Left lower pole simple cyst  BOWEL:   No dilated loops of bowel  PERITONEUM/RETROPERITONEUM: No ascites  LYMPH NODES: No abdominal lymphadenopathy  VASCULAR STRUCTURES:  No aneurysm  Atherosclerotic vascular calcifications in the aorta  ABDOMINAL WALL:  Unremarkable  OSSEOUS STRUCTURES:  No suspicious osseous lesion  Few scattered vertebral body T2 hyperintense lesions, likely hemangiomas  Impression: 1  Multiple hepatic simple cysts  No suspicious hepatic lesions  2  A 5 mm pancreatic neck simple cyst with connection to the main duct and without suspicious features, likely side branch IPMN  Follow-up MRI with and without contrast in 2 years can be considered if clinically relevant  3   Stable small right pleural effusion  Partially visualized right hilar lymphadenopathy, better evaluated on prior CT  The study was marked in EPIC for significant notification  Workstation performed: [id number]     IR biopsy lung    Result Date: 8/5/2021  Narrative: CT-guided lung biopsy Clinical History: Lung mass  Stroke  Biopsy for diagnosis  Small right effusion  Nasopharyngeal mass   Moderate sedation time: 45 minutes Procedure: After explaining the risks and benefits of the procedure to the patient's daughter, informed consent was obtained  CT was used to localize the right apical mass   Radiation dose length product (DLP) for this visit:  2294 mGy   This examination, like all CT scans performed in the [facility], was performed utilizing techniques to minimize radiation dose exposure, including the use of iterative reconstruction and automated exposure control  The overlying skin was prepped and draped in the usual sterile fashion  Local anesthesia was obtained with a 1% lidocaine solution  Using CT guidance, a 17-gauge coaxial needle was advanced  2 passes with an 18g gauge core biopsy needle were performed  The tissue was given to pathology  Additional passes were obtained  The needle was removed and final imaging performed  Findings: Large right apical lung mass measuring up to 7 5 cm  Internal calcifications  Small right pleural effusion  Needle within the lesion  Postprocedural changes without pneumothorax  Specimens: Touch prep, 18-gauge core x5   The patient tolerated the procedure well without immediate complication     Impression: Impression: CT-guided biopsy of a right lung mass Small right pleural effusion  Given the small size of the effusion and risk for pneumothorax after needle angulation required for the biopsy we deferred a thoracentesis at this time  This can be considered in the future if clinically appropriate Workstation performed: [id number]     VAS lower limb venous duplex study, complete bilateral    Result Date: 8/21/2021  Narrative:  [facility] REPORT CLINICAL: Indications: Patient complains of a new onset of right calf pain  Patient presents to determine propagation vs resolution of previously noted DVT in the bilateral legs discovered on 07/31/21    FINDINGS:  Segment         Right              Left                            DVT                DVT                Gastrocnemius   Acute - Occlusive GSV Mid Thigh                      Acute - Occlusive  GSV Dist Thigh                     Acute - Occlusive  GSV Knee                           Acute - Occlusive  GSV Prox Calf                      Acute - Occlusive  GSV Mid Calf                       Acute - Occlusive  Peroneal        Acute - Occlusive  Acute - Occlusive  PostTibial      Acute - Occlusive  Acute - Occlusive     CONCLUSION:  Impression: RIGHT LOWER LIMB: Acute deep vein thrombosis is noted in the posterior tibial, peroneal veins and one of the gastrocnemius veins  No evidence of superficial thrombophlebitis noted  Doppler evaluation shows a normal response to augmentation maneuvers  Popliteal, posterior tibial and anterior tibial arterial Doppler waveforms are triphasic  LEFT LOWER LIMB: Acute deep vein thrombosis is noted in the peroneal veins and one of the posterior tibial veins  Acute superficial thrombophlebitis is noted in the great saphenous vein from the mid thigh to the mid calf  Doppler evaluation shows a normal response to augmentation maneuvers  Popliteal, posterior tibial and anterior tibial arterial Doppler waveforms are triphasic  Technical findings were given to Dr Jacquie Bond  In comparison to the study of 07/31/21, there is a new finding of of a right gastrocnemius vein DVT and left posterior tibial vein DVT    SIGNATURE: Electronically Signed by: Dain Lara MD on 2021-08-21 11:23:16 PM      Labs:   Lab Results   Component Value Date    WBC 8 23 08/24/2021    HGB 10 2 (L) 08/24/2021    HCT 34 1 (L) 08/24/2021    MCV 83 08/24/2021     08/24/2021     Lab Results   Component Value Date    K 3 8 08/24/2021     08/24/2021    CO2 30 08/24/2021    BUN 14 08/24/2021    CREATININE 0 80 08/24/2021    GLUF 90 08/16/2021    CALCIUM 10 1 08/24/2021    AST 24 08/12/2021    ALT 34 08/12/2021    ALKPHOS 61 08/12/2021    EGFR 91 08/24/2021       Lab Results   Component Value Date    NQGCNQDP03 1,514 (H) 08/12/2021 ROS: As stated in the history of present illness otherwise his 14 point review of systems today was negative  Active Problems:   Patient Active Problem List   Diagnosis    Recent cerebrovascular accident    Elevated troponin    Acute respiratory failure with hypoxia ([facility])    Bilateral lower extremity DVTs    Nasopharyngeal mass    Dysphagia    Mass of upper lobe of right lung    Urinary retention    Constipation    Anemia of chronic disease    Suspected pulmonary embolism    Chronic combined systolic and diastolic CHF (congestive heart failure) (HCC)    Respiratory failure with hypoxia ([facility])    Impaired cognition    Impaired mobility and ADLs    Adenocarcinoma of lung    Delirium       Past Medical History:   Past Medical History:   Diagnosis Date    Lung cancer ([facility] [address] )     Stroke [facility])        Surgical History:   Past Surgical History:   Procedure Laterality Date    IR BIOPSY LUNG  8/5/2021       Family History:  No family history on file  Cancer-related family history is not on file  Social History:   Social History     Socioeconomic History    Marital status: /Civil Union     Spouse name: Not on file    Number of children: Not on file    Years of education: Not on file    Highest education level: Not on file   Occupational History    Not on file   Tobacco Use    Smoking status: Former Smoker    Smokeless tobacco: Never Used   Vaping Use    Vaping Use: Never used   Substance and Sexual Activity    Alcohol use: Yes    Drug use: Never    Sexual activity: Not on file   Other Topics Concern    Not on file   Social History Narrative    Not on file     Social Determinants of Health     Financial Resource Strain:     Difficulty of Paying Living Expenses:    Food Insecurity:     Worried About Running Out of Food in the Last Year:     [address] in the Last Year:    Transportation Needs:     Lack of Transportation (Medical):      Lack of Transportation (Non-Medical):    Physical Activity:     Days of Exercise per Week:     Minutes of Exercise per Session:    Stress:     Feeling of Stress :    Social Connections:     Frequency of Communication with Friends and Family:     Frequency of Social Gatherings with Friends and Family:     Attends Faith Services:     Active Member of Clubs or Organizations:     Attends Club or Organization Meetings:     Marital Status:    Intimate Partner Violence:     Fear of Current or Ex-Partner:     Emotionally Abused:     Physically Abused:     Sexually Abused:        Current Medications:   Current Outpatient Medications   Medication Sig Dispense Refill    acetaminophen (TYLENOL) 325 mg tablet Take 2 tablets (650 mg total) by mouth 4 (four) times a day as needed for mild pain, headaches or fever  0    atorvastatin (LIPITOR) 40 mg tablet Take 1 tablet (40 mg total) by mouth daily with dinner 30 tablet 2    enoxaparin (LOVENOX) 80 mg/0 8 mL Inject 0 8 mL (80 mg total) under the skin every 12 (twelve) hours 48 mL 0    melatonin 3 mg Take 1 tablet (3 mg total) by mouth daily at bedtime as needed (Sleep) 30 tablet 0    QUEtiapine (SEROquel) 25 mg tablet Take 0 5 tablets (12 5 mg total) by mouth daily at bedtime 15 tablet 0    senna-docusate sodium (SENOKOT S) 8 6-50 mg per tablet Take 2 tablets by mouth 2 (two) times a day 60 tablet 0    tamsulosin (FLOMAX) 0 4 mg Once a day right after supper 30 capsule 3     No current facility-administered medications for this visit  Allergies: No Known Allergies    Physical Exam:    Body surface area is 1 95 meters squared      Wt Readings from Last 3 Encounters:   09/03/21 81 2 kg (179 lb)   08/26/21 80 3 kg (177 lb)   08/23/21 80 kg (176 lb 5 9 oz)        Temp Readings from Last 3 Encounters:   09/03/21 97 6 °F (36 4 °C) (Temporal)   08/26/21 (!) 96 8 °F (36 °C)   08/24/21 97 7 °F (36 5 °C) (Oral)        BP Readings from Last 3 Encounters:   09/03/21 112/72   08/26/21 128/82   08/24/21 134/81         Pulse Readings from Last 3 Encounters:   09/03/21 78   08/26/21 66   08/24/21 74        Physical Exam     Constitutional   General appearance: No acute distress, well appearing and well nourished  Eyes   Conjunctiva and lids: No swelling, erythema or discharge  Pupils and irises: Equal, round and reactive to light  Ears, Nose, Mouth, and Throat   External inspection of ears and nose: Normal     Nasal mucosa, septum, and turbinates: Normal without edema or erythema  Oropharynx: Normal with no erythema, edema, exudate or lesions  Pulmonary   Respiratory effort: No increased work of breathing or signs of respiratory distress  Auscultation of lungs: Clear to auscultation  Cardiovascular   Palpation of heart: Normal PMI, no thrills  Auscultation of heart: Normal rate and rhythm, normal S1 and S2, without murmurs  Examination of extremities for edema and/or varicosities: Normal     Carotid pulses: Normal     Abdomen   Abdomen: Non-tender, no masses  Liver and spleen: No hepatomegaly or splenomegaly  Lymphatic   Palpation of lymph nodes in neck: No lymphadenopathy  Musculoskeletal   Gait and station:  Walks with a walker  Digits and nails: Normal without clubbing or cyanosis  Inspection/palpation of joints, bones, and muscles: Normal     Skin   Skin and subcutaneous tissue: Normal without rashes or lesions  Assessment / Plan:      The patient is a pleasant 78-year-old male who has had documented thrombosis on Eliquis so is currently on Lovenox with an active malignancy I e  lung cancer with 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  I advised him he needs to stay on indefinite anticoagulation for now with Lovenox since he failed Eliquis    He could consider Coumadin if his primary care physician wishes to change him from Lovenox but I would not go to novel oral anticoagulant at this point since he failed his previous 1 probably secondary to a hypercoagulable state from the malignancy  He will continue to get his prescriptions through his primary care physician for this  In terms of his lung cancer he needs concurrent chemoradiation as I do not think he is a candidate for surgery at this point  Chemotherapy would consist of carboplatin at an AUC of 2 with Taxol at 50 milligrams/meter subcutaneously concurrently with radiation to the lung and mediastinum  I went over the risks benefits and alternatives of this approach  I explained the possible side effects to include but not limited to nausea, vomiting, diarrhea, mouth sores, hair loss, fatigue, mucositis, neuropathy, low blood counts, risk of infection and even death  The patient signed a consent form  He will start this as soon as he is seen by radiation and they can make a radiation plan  Once he completes concurrent chemoradiation he will be put on immunotherapy for a year  This is based on his stage III adenocarcinoma of the lung  In terms of his nasopharyngeal mass I will order PET-CT scan to evaluate this and also stage his lung cancer  I will also set him up with our colleagues in  Ear nose and throat surgery to workup the nasopharyngeal mass  If this is malignancy radiation to this site could be considered   In conjunction with chemotherapy  I have ordered his PET-CT scan, a Radiation Oncology consult, a near nose and throat surgery consult and I have had him consent for his chemotherapy  I will see him back in 3 weeks hopefully after he is seen by all these services and has had a PET-CT scan to finalize the plan  I hope he will be able to start within the next few weeks  He will follow with urology for his catheter and hopeful removal   He will contact his primary care physician to continue to be on indefinite anticoagulation    He can stay on Lovenox as he states it does not bother him and the other option if he wishes to switch would be Coumadin with a goal INR of 2 5-3 5  Goals and Barriers:  Current Goal:  Prolong Survival from   Newly diagnosed lung cancer and 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

## 2021-09-04 ENCOUNTER — TELEPHONE (OUTPATIENT)
Dept: OTHER | Facility: OTHER | Age: 71
End: 2021-09-04

## 2021-09-04 NOTE — TELEPHONE ENCOUNTER
RN from [facility] home care calling to report an abnormal urine culture  Paged on call via TC

## 2021-09-07 ENCOUNTER — TELEPHONE (OUTPATIENT)
Dept: HEMATOLOGY ONCOLOGY | Facility: CLINIC | Age: 71
End: 2021-09-07

## 2021-09-07 ENCOUNTER — TELEPHONE (OUTPATIENT)
Dept: NEUROLOGY | Facility: CLINIC | Age: 71
End: 2021-09-07

## 2021-09-07 ENCOUNTER — TELEPHONE (OUTPATIENT)
Dept: OTHER | Facility: OTHER | Age: 71
End: 2021-09-07

## 2021-09-07 NOTE — TELEPHONE ENCOUNTER
Home care nurse called regarding patient of Dr Carmen Miller she wanted to see if his appointment can be moved up from the 28th due to he is starting chemo on the 20th and schedule will get crazy  Please give patient's nurse a call back

## 2021-09-07 NOTE — TELEPHONE ENCOUNTER
21-30 Day Post CVA Discharge Follow Up    Hospitalization: 8/21/2021 - 8/24/2021  The purpose of this phone call is to assess patient's general wellbeing or for any assistance needed with follow-up care  Called, reached wife Cynthia Castro, since discharge, she denies patient experiencing any new or worsening stroke-like symptoms  Claims his cognition continues to improve  States he is getting bestter with every day  Ambulation / ADLs:  he is ambulating independently with Lulu's assistance  She is also available to help with ADLs as needed  Cynthia Castro manages his medications, appointments, and affairs  Appointments / Medication Review:  Reviewed appointments - patient successfully followed up with PCP and hem/onc  Scheduled with urology 9/28 and neuro 10/22  Reports that patient, was indeed evaluated by in-home visiting nurses  They visit twice a week  I reviewed medications with her  He started Cipro for UTI  Reports having no difficulties obtaining medications  Reports he is taking all as prescribed with no missed doses, medication side effects, or signs of bleeding  Risk Factors / Education:  She verbalizes understanding of stroke type, symptoms, personal risk factors and management, medications, and resources  Patient verbalizes understanding  As for risk factors, BP is monitored at home  Reported average BP continues to be 130s  She reports following a low salt diet with less processed foods  I addressed all her questions  At the conclusion of the conversation, wife denies having any further questions or concerns

## 2021-09-07 NOTE — TELEPHONE ENCOUNTER
The nurse Koffi Lopez from [facility] called to ask if she needs  her labs done before her infusion, call back # [phone]

## 2021-09-07 NOTE — TELEPHONE ENCOUNTER
Called Corin back and explaine that Dr [facility]jo [facility] is only in [facility] on the 28th this month - Will look at [facility] office and he has and appointment at the end of October    They will keep scheduled appointment

## 2021-09-09 ENCOUNTER — PATIENT OUTREACH (OUTPATIENT)
Dept: CASE MANAGEMENT | Facility: OTHER | Age: 71
End: 2021-09-09

## 2021-09-09 NOTE — PROGRESS NOTES
Chart review reveals that patient was restarted on Seroquel 12 5mg po daily at [facility]  Patient was seen by oncology on 9/3/21 and was set up for concurrent chemo and radiation for his known lung CA  Patient is scheduled for PET sandoval on 9/21/21  Patient is scheduled for cardiology on 9/14/21 and for chemo on 9/20/21  Patient will be evaluated by radiation oncology on 9/23/21and will follow up with pulmonology on 9/21/21  Patient will not be seen by ENT until 10/14/21  Patient/CG have declined calls from this RN care manager so chart review will continue until end of episode

## 2021-09-11 PROBLEM — I50.42 CHRONIC COMBINED SYSTOLIC AND DIASTOLIC CHF (CONGESTIVE HEART FAILURE) (HCC): Status: RESOLVED | Noted: 2021-08-08 | Resolved: 2021-09-11

## 2021-09-11 NOTE — PROGRESS NOTES
Cardiology   Hospital Follow Up   Office Visit Note  Jt Valentine   80 y o    male   MRN: [id number]  [place] [id number] [facility] [address] [facility] [id number] [phone]    PCP: Meme Valentine MD  Cardiologist:  Will be Dr Che Bryan            Summary of recommendations  Heart healthy diet  Educational information provided  follow up echocardiogram re: RV dysfucntion  Follow up will be scheduled with Dr Che Bryan        Assessment/plan  Acute CVA, with right-sided hemiparesissuspected  Etiology to be hypercoagulability secondary to malignancy, adm 8/8-8/17/21   No need for aspirin, per Neurology  Lung CA  PET-CT pending  To begin chemotherapy next week  Nasopharnygeal mass- F/U ENT, pending  Bilateral lower extremity DVT - on Lovenox injections, indefinitely  Suspected PE   RV dysfunction  Hyperlipidemia, on atorvastatin 40 mg daily  8/1/21: LDL 76  BPH on tamsulosin  tobacco abuse  Quit 17 years ago  Cardiac testing   TTE 8/1/21 EF  55%  No RWMA  Grade 1 DD  RV mildly to moderately dilated  Systolic function was mildly reduced  There was hypokinesis of the mid free wall  There was mild right atrial enlargement, mild TR peak PA pressure 44 mm Hg            HPI  Jt Valentine is a 81 yo male with tobacco abuse; no previously known chronic medical problems  Adm 7/31-8/8/21  to B secondary to a stroke alert  He was unable to receive tPA due to the infarct appearing acute to subacute with high risk for for bleeding  His workup revealed a right upper lobe lung mass and a nasopharyngeal mass, with extensive hilar and mediastinal adenopathy, as well as numerous hepatic hypodense lesions  He was hypoxic with increasing oxygen requirement, and treated empirically for presumed PEs based on his history and discovery of bilateral acute lower extremity DVTs:     · Acute deep vein thrombosis is noted in the right posterior tibial and peroneal veins   · Acute deep vein thrombosis is noted in the left peroneal veins  · Acute superficial thrombophlebitis is noted in the great saphenous vein from the mid thigh to the mid calf  An MRI of the brain showed multifocal diffusion abnormalities in several separate vascular territories, largest in the left MCA territory indicative of multifocal acute/recent infarctions most likely from a central embolic source  He was also found have an elevated troponin, peaking at 10 followed by Cardiology, felt likely to be secondary to PE and RV dysfunction  His EKG showed sinus rhythm and nonspecific ST T-wave changes, most pronounced inferiorly An echo demonstrated preserved LV systolic function with mild concentric left ventricular hypertrophy  Dilatation of the right ventricle with reduced RV systolic function is noted  He denied chest pain  A repeat echocardiogram was recommended in 4-6 weeks to reassess RV function  BNP was 4600  chest CT showed a small right pleural effusion  He was started on atorvastatin, and Eliquis  10 mg b i d  per Neurology  ENT recommended an outpatient biopsy  Pulmonology recommended IR biopsy of lung mass which was performed August 5th  Preliminary results were consistent with an adenocarcinoma of the lung  Oncology was consulted, and recommended outpatient follow-up  He was discharged to acute rehab    Adm 8/8/-8/17/21  [facility]  He was offered additional time in rehab but he and his family elected to d/c home at this time  He continues to require assistance for most ADLs and mobility  Adm 8/21-8/24/21  CC:  bilateral lower extremity edema  Patient reported compliance with anticoagulation  Lower extremity venous Doppler-in new right gastrocnemius vein DVT and left posterior tibial vein DVT  He was followed by Hem onc  Considered a failure of Eliquis  Transitioned to Lovenox    9/3/21  OV hem onc  PET scan ordered/ chemo ordered--carboplatin and Taxol     needs nasopharyngeal bx      9/14/21   Hospital follow-up  He is accompanied by his wife  The patient has no complaints today  Specifically he denies chest pain shortness of breath lightheadedness or dizziness  He starts chemotherapy next week Monday  He has several appointments lined up  He is receiving Lovenox injections, by his wife  He has no history of atrial fibrillation  He denies palpitations  All EKGs in the hospital showed sinus rhythm  His rhythm is regular by exam today  Blood pressure is satisfactory  He is walking with a walker, doing well  He did have physical therapy in the home, his wife now helps him with the exercises  He has some residual dysarthria and right-sided weakness  He has some right lower extremity edema  Will order a follow-up echocardiogram          I have spent 25  minutes with Patient  today in which greater than 50% of this time was spent in counseling/coordination of care regarding Intructions for management, Patient and family education, Importance of tx compliance and Risk factor reductions  Assessment  Diagnoses and all orders for this visit:    Hospital discharge follow-up    Chronic right-sided heart failure ([facility] [address] )    Recent cerebrovascular accident    Elevated troponin    Bilateral lower extremity DVTs    Suspected pulmonary embolism    Adenocarcinoma of lung, unspecified laterality (HCC)    Malignant neoplasm of upper lobe of right lung ([facility] [address] )    Nasopharyngeal mass          Past Medical History:   Diagnosis Date    Lung cancer ([facility] [address] )     Stroke ([facility] [address] )        Review of Systems   Constitutional: Negative for chills  Cardiovascular: Negative for chest pain, claudication, cyanosis, dyspnea on exertion, irregular heartbeat, leg swelling, near-syncope, orthopnea, palpitations, paroxysmal nocturnal dyspnea and syncope  Respiratory: Negative for cough and shortness of breath  Gastrointestinal: Negative for heartburn and nausea     Neurological: Negative for dizziness, focal weakness, headaches, light-headedness and weakness  All other systems reviewed and are negative  No Known Allergies    Current Outpatient Medications:     acetaminophen (TYLENOL) 325 mg tablet, Take 2 tablets (650 mg total) by mouth 4 (four) times a day as needed for mild pain, headaches or fever, Disp: , Rfl: 0    atorvastatin (LIPITOR) 40 mg tablet, Take 1 tablet (40 mg total) by mouth daily with dinner, Disp: 30 tablet, Rfl: 2    ciprofloxacin (CIPRO) 500 mg tablet, Take 1 tablet (500 mg total) by mouth every 12 (twelve) hours for 7 days, Disp: 14 tablet, Rfl: 0    enoxaparin (LOVENOX) 80 mg/0 8 mL, Inject 0 8 mL (80 mg total) under the skin every 12 (twelve) hours, Disp: 48 mL, Rfl: 0    melatonin 3 mg, Take 1 tablet (3 mg total) by mouth daily at bedtime as needed (Sleep), Disp: 30 tablet, Rfl: 0    QUEtiapine (SEROquel) 25 mg tablet, Take 0 5 tablets (12 5 mg total) by mouth daily at bedtime, Disp: 15 tablet, Rfl: 0    senna-docusate sodium (SENOKOT S) 8 6-50 mg per tablet, Take 2 tablets by mouth 2 (two) times a day, Disp: 60 tablet, Rfl: 0    tamsulosin (FLOMAX) 0 4 mg, Once a day right after supper, Disp: 30 capsule, Rfl: 3        Social History     Socioeconomic History    Marital status: /Civil Union     Spouse name: Not on file    Number of children: Not on file    Years of education: Not on file    Highest education level: Not on file   Occupational History    Not on file   Tobacco Use    Smoking status: Former Smoker    Smokeless tobacco: Never Used   Vaping Use    Vaping Use: Never used   Substance and Sexual Activity    Alcohol use:  Yes    Drug use: Never    Sexual activity: Not on file   Other Topics Concern    Not on file   Social History Narrative    Not on file     Social Determinants of Health     Financial Resource Strain:     Difficulty of Paying Living Expenses:    Food Insecurity:     Worried About Running Out of Food in the Last Year:  Ran Out of Food in the Last Year:    Transportation Needs:     Lack of Transportation (Medical):  Lack of Transportation (Non-Medical):    Physical Activity:     Days of Exercise per Week:     Minutes of Exercise per Session:    Stress:     Feeling of Stress :    Social Connections:     Frequency of Communication with Friends and Family:     Frequency of Social Gatherings with Friends and Family:     Attends Cheondo[employer] Services:     Active Member of Clubs or Organizations:     Attends Club or Organization Meetings:     Marital Status:    Intimate Partner Violence:     Fear of Current or Ex-Partner:     Emotionally Abused:     Physically Abused:     Sexually Abused:        No family history on file  Physical Exam  Vitals and nursing note reviewed  Constitutional:       General: He is not in acute distress  HENT:      Head: Normocephalic and atraumatic  Eyes:      Conjunctiva/sclera: Conjunctivae normal    Cardiovascular:      Rate and Rhythm: Normal rate and regular rhythm  Pulses: Intact distal pulses  Heart sounds: Normal heart sounds  Pulmonary:      Effort: Pulmonary effort is normal       Breath sounds: Normal breath sounds  Abdominal:      General: Bowel sounds are normal       Palpations: Abdomen is soft  Musculoskeletal:         General: Normal range of motion  Cervical back: Normal range of motion and neck supple  Skin:     General: Skin is warm and dry  Neurological:      Mental Status: He is alert and oriented to person, place, and time  Vitals: There were no vitals taken for this visit     Wt Readings from Last 3 Encounters:   09/03/21 81 2 kg (179 lb)   08/26/21 80 3 kg (177 lb)   08/23/21 80 kg (176 lb 5 9 oz)         Labs & Results:  Lab Results   Component Value Date    WBC 8 23 08/24/2021    HGB 10 2 (L) 08/24/2021    HCT 34 1 (L) 08/24/2021    MCV 83 08/24/2021     08/24/2021     No results found for: BNP  No components found for: CHEM  Troponin I   Date Value Ref Range Status   08/21/2021 0 38 (H) <=0 04 ng/mL Final     Comment:     [employer] Chemistry analyzer 99% cutoff is > 0 04 ng/mL in network labs     o cTnI 99% cutoff is useful only when applied to patients in the clinical setting of myocardial ischemia   o cTnI 99% cutoff should be interpreted in the context of clinical history, ECG findings and possibly cardiac imaging to establish correct diagnosis  o cTnI 99% cutoff may be suggestive but clearly not indicative of a coronary event without the clinical setting of myocardial ischemia  Results indicate test should be repeated on new specimen collected within 4-6 hours of the original   08/01/2021 10 30 (H) <=0 04 ng/mL Final     Comment:     [employer] Chemistry analyzer 99% cutoff is > 0 04 ng/mL in network labs     o cTnI 99% cutoff is useful only when applied to patients in the clinical setting of myocardial ischemia   o cTnI 99% cutoff should be interpreted in the context of clinical history, ECG findings and possibly cardiac imaging to establish correct diagnosis  o cTnI 99% cutoff may be suggestive but clearly not indicative of a coronary event without the clinical setting of myocardial ischemia  08/01/2021 10 80 (H) <=0 04 ng/mL Final     Comment:     S[employer]ens Chemistry analyzer 99% cutoff is > 0 04 ng/mL in network labs     o cTnI 99% cutoff is useful only when applied to patients in the clinical setting of myocardial ischemia   o cTnI 99% cutoff should be interpreted in the context of clinical history, ECG findings and possibly cardiac imaging to establish correct diagnosis  o cTnI 99% cutoff may be suggestive but clearly not indicative of a coronary event without the clinical setting of myocardial ischemia         Results for orders placed during the hospital encounter of 07/31/21    Echo complete with contrast if indicated    Paddy France [address] [place] [id number]  [phone]    Transthoracic Echocardiogram  2D, M-mode, Doppler, and Color Doppler    Study date:  01-Aug-2021    Patient: Yissel Locke  MR number: [id number]  Account number: [de-identified]  : 1950  Age: 72 years  Gender: Male  Status: Inpatient  Location: Bedside  Height: 68 in  Weight: 189 6 lb  BP: 129/ 83 mmHg    Indications: CVA  Diagnoses: I67 9 - Cerebrovascular disease, unspecified    Sonographer:  Sofia Ryan RDCS  Referring Physician:  Amaris Mark DO  Group:  [facility]  Interpreting Physician:  Luis Alvarez DO    SUMMARY    LEFT VENTRICLE:  Systolic function was normal  Ejection fraction was estimated to be 55 %  Although no diagnostic regional wall motion abnormality was identified, this possibility cannot be completely excluded on the basis of this study  Wall thickness was mildly increased  Doppler parameters were consistent with abnormal left ventricular relaxation (grade 1 diastolic dysfunction)  RIGHT VENTRICLE:  The ventricle was mildly to moderately dilated  Systolic function was mildly reduced  There is hypokinesis of the mid free wall  RIGHT ATRIUM:  The atrium was mildly dilated  TRICUSPID VALVE:  There was mild regurgitation  Estimated peak PA pressure was 44 mmHg  PERICARDIUM:  A small pericardial effusion was identified  There was no evidence of hemodynamic compromise  HISTORY: PRIOR HISTORY: CVA  Elevated troponin  Acute respiratory failure  DVT  Former smoker  PROCEDURE: The procedure was performed at the bedside  This was a routine study  The transthoracic approach was used  The study included complete 2D imaging, M-mode, complete spectral Doppler, and color Doppler  The heart rate was 70 bpm,  at the start of the study  Images were obtained from the parasternal, apical, subcostal, and suprasternal notch acoustic windows   Echocardiographic views were limited due to restricted patient mobility and lung interference  This was a  technically difficult study  LEFT VENTRICLE: Size was normal  Systolic function was normal  Ejection fraction was estimated to be 55 %  Although no diagnostic regional wall motion abnormality was identified, this possibility cannot be completely excluded on the basis  of this study  Wall thickness was mildly increased  DOPPLER: Doppler parameters were consistent with abnormal left ventricular relaxation (grade 1 diastolic dysfunction)  RIGHT VENTRICLE: The ventricle was mildly to moderately dilated  Systolic function was mildly reduced  There is hypokinesis of the mid free wall  LEFT ATRIUM: Size was normal     RIGHT ATRIUM: The atrium was mildly dilated  MITRAL VALVE: Valve structure was normal  There was normal leaflet separation  DOPPLER: The transmitral velocity was within the normal range  There was no evidence for stenosis  There was no regurgitation  AORTIC VALVE: The valve was trileaflet  Leaflets exhibited normal thickness and normal cuspal separation  DOPPLER: Transaortic velocity was within the normal range  There was no evidence for stenosis  There was no regurgitation  TRICUSPID VALVE: The valve structure was normal  There was normal leaflet separation  DOPPLER: The transtricuspid velocity was within the normal range  There was no evidence for stenosis  There was mild regurgitation  Estimated peak PA  pressure was 44 mmHg  PULMONIC VALVE: Leaflets exhibited normal thickness, no calcification, and normal cuspal separation  DOPPLER: The transpulmonic velocity was within the normal range  There was no regurgitation  PERICARDIUM: A small pericardial effusion was identified  There was no evidence of hemodynamic compromise  The pericardium was normal in appearance  AORTA: The root exhibited normal size  SYSTEMIC VEINS: IVC: The inferior vena cava was not well visualized      SYSTEM MEASUREMENT TABLES    2D  %FS: 24 %  Ao Diam: 3 63 cm  EDV(Teich): 92 69 ml  EF(Teich): 47 96 %  ESV(Teich): 48 24 ml  IVSd: 1 08 cm  LA Area: 10 98 cm2  LA Diam: 3 49 cm  LVEDV MOD A4C: 91 9 ml  LVEF MOD A4C: 58 38 %  LVESV MOD A4C: 38 25 ml  LVIDd: 4 51 cm  LVIDs: 3 42 cm  LVLd A4C: 8 32 cm  LVLs A4C: 7 04 cm  LVPWd: 1 03 cm  RA Area: 19 63 cm2  RVIDd: 4 75 cm  SV MOD A4C: 53 66 ml  SV(Teich): 44 45 ml    CW  TR Vmax: 3 04 m/s  TR maxP 91 mmHg    MM  TAPSE: 1 3 cm    PW  E' Sept: 0 08 m/s  E/E' Sept: 7 34  MV A Tristan: 0 75 m/s  MV Dec Villalba: 1 98 m/s2  MV DecT: 280 99 ms  MV E Tristan: 0 56 m/s  MV E/A Ratio: 0 74  MV PHT: 81 49 ms  MVA By PHT: 2 7 cm2    [facility] Accredited Echocardiography [facility]    Prepared and electronically signed by    Willie Ellington DO  Signed 01-Aug-2021 14:29:36    No results found for this or any previous visit  This note was completed in part utilizing m-modal fluency direct voice recognition software  Grammatical errors, random word insertion, spelling mistakes, and incomplete sentences may be an occasional consequence of the system secondary to software limitations, ambient noise and hardware issues  At the time of dictation, efforts were made to edit, clarify and /or correct errors  Please read the chart carefully and recognize, using context, where substitutions have occurred    If you have any questions or concerns about the context, text or information contained within the body of this dictation, please contact myself, the provider, for further clarification

## 2021-09-14 ENCOUNTER — OFFICE VISIT (OUTPATIENT)
Dept: CARDIOLOGY CLINIC | Facility: CLINIC | Age: 71
End: 2021-09-14
Payer: MEDICARE

## 2021-09-14 VITALS
OXYGEN SATURATION: 97 % | HEART RATE: 80 BPM | HEIGHT: 68 IN | SYSTOLIC BLOOD PRESSURE: 126 MMHG | DIASTOLIC BLOOD PRESSURE: 66 MMHG | WEIGHT: 178.8 LBS | BODY MASS INDEX: 27.1 KG/M2

## 2021-09-14 DIAGNOSIS — I51.89 RIGHT VENTRICULAR DIASTOLIC DYSFUNCTION: ICD-10-CM

## 2021-09-14 DIAGNOSIS — I51.9 MILD PULMONIC REGURGITATION AND RV DYSFUNCTION BY PRIOR ECHOCARDIOGRAM: ICD-10-CM

## 2021-09-14 DIAGNOSIS — I50.812 CHRONIC RIGHT-SIDED HEART FAILURE (HCC): ICD-10-CM

## 2021-09-14 DIAGNOSIS — J39.2 NASOPHARYNGEAL MASS: ICD-10-CM

## 2021-09-14 DIAGNOSIS — R09.89 SUSPECTED PULMONARY EMBOLISM: ICD-10-CM

## 2021-09-14 DIAGNOSIS — C34.11 MALIGNANT NEOPLASM OF UPPER LOBE OF RIGHT LUNG (HCC): ICD-10-CM

## 2021-09-14 DIAGNOSIS — I63.9 ACUTE CVA (CEREBROVASCULAR ACCIDENT) (HCC): ICD-10-CM

## 2021-09-14 DIAGNOSIS — R77.8 ELEVATED TROPONIN: ICD-10-CM

## 2021-09-14 DIAGNOSIS — Z09 HOSPITAL DISCHARGE FOLLOW-UP: Primary | ICD-10-CM

## 2021-09-14 DIAGNOSIS — I37.1 MILD PULMONIC REGURGITATION AND RV DYSFUNCTION BY PRIOR ECHOCARDIOGRAM: ICD-10-CM

## 2021-09-14 DIAGNOSIS — C34.90 ADENOCARCINOMA OF LUNG, UNSPECIFIED LATERALITY (HCC): ICD-10-CM

## 2021-09-14 DIAGNOSIS — I82.4Z3 ACUTE DEEP VEIN THROMBOSIS (DVT) OF DISTAL VEIN OF BOTH LOWER EXTREMITIES (HCC): ICD-10-CM

## 2021-09-14 PROCEDURE — 99214 OFFICE O/P EST MOD 30 MIN: CPT | Performed by: NURSE PRACTITIONER

## 2021-09-14 NOTE — LETTER
September 14, 2021     Josafat Malik MD  [address]  [id number] [address]    Patient: Bernard Boyd   YOB: 1950   Date of Visit: 9/14/2021       Dear Dr Jose Maria Golden: Thank you for referring Bernard Boyd to me for evaluation  Below are my notes for this consultation  If you have questions, please do not hesitate to call me  I look forward to following your patient along with you  Sincerely,        CRISTOFER Mercer        CC: MD Yajaira Gentile, [facility]  9/14/2021  2:56 PM  Sign when [facility] Visit  Cardiology   Krunaljennifer Ho Follow Up   Office Visit Note  Bernard Boyd   80 y o    male   MRN: [id number]  [place] [id number] [address]  [phone]    PCP: Josafat Malik MD  Cardiologist:  Will be Dr Rossana Luna            Summary of recommendations  Heart healthy diet  Educational information provided  follow up echocardiogram re: RV dysfucntion  Follow up will be scheduled with Dr Rossana Luna        Assessment/plan  Acute CVA, with right-sided hemiparesissuspected  Etiology to be hypercoagulability secondary to malignancy, adm 8/8-8/17/21   No need for aspirin, per Neurology  Lung CA  PET-CT pending  To begin chemotherapy next week  Nasopharnygeal mass- F/U ENT, pending  Bilateral lower extremity DVT - on Lovenox injections, indefinitely  Suspected PE   RV dysfunction  Hyperlipidemia, on atorvastatin 40 mg daily  8/1/21: LDL 76  BPH on tamsulosin  tobacco abuse  Quit 17 years ago  Cardiac testing   TTE 8/1/21 EF  55%  No RWMA  Grade 1 DD  RV mildly to moderately dilated  Systolic function was mildly reduced  There was hypokinesis of the mid free wall  There was mild right atrial enlargement, mild TR peak PA pressure 44 mm Hg            HPI  Bernard Boyd is a 81 yo male with tobacco abuse; no previously known chronic medical problems       Adm 7/31-8/8/21  to [facility] secondary to a stroke alert  He was unable to receive tPA due to the infarct appearing acute to subacute with high risk for for bleeding  His workup revealed a right upper lobe lung mass and a nasopharyngeal mass, with extensive hilar and mediastinal adenopathy, as well as numerous hepatic hypodense lesions  He was hypoxic with increasing oxygen requirement, and treated empirically for presumed PEs based on his history and discovery of bilateral acute lower extremity DVTs:  · Acute deep vein thrombosis is noted in the right posterior tibial and peroneal veins  · Acute deep vein thrombosis is noted in the left peroneal veins  · Acute superficial thrombophlebitis is noted in the great saphenous vein from the mid thigh to the mid calf  An MRI of the brain showed multifocal diffusion abnormalities in several separate vascular territories, largest in the left MCA territory indicative of multifocal acute/recent infarctions most likely from a central embolic source  He was also found have an elevated troponin, peaking at 10 followed by Cardiology, felt likely to be secondary to PE and RV dysfunction  His EKG showed sinus rhythm and nonspecific ST T-wave changes, most pronounced inferiorly An echo demonstrated preserved LV systolic function with mild concentric left ventricular hypertrophy  Dilatation of the right ventricle with reduced RV systolic function is noted  He denied chest pain  A repeat echocardiogram was recommended in 4-6 weeks to reassess RV function  BNP was 4600  chest CT showed a small right pleural effusion  He was started on atorvastatin, and Eliquis  10 mg b i d  per Neurology  ENT recommended an outpatient biopsy  Pulmonology recommended IR biopsy of lung mass which was performed August 5th  Preliminary results were consistent with an adenocarcinoma of the lung  Oncology was consulted, and recommended outpatient follow-up  He was discharged to acute rehab    Adm 8/8/-8/17/21  [facility] He was offered additional time in rehab but he and his family elected to d/c home at this time  He continues to require assistance for most ADLs and mobility  Adm 8/21-8/24/21  CC:  bilateral lower extremity edema  Patient reported compliance with anticoagulation  Lower extremity venous Doppler-in new right gastrocnemius vein DVT and left posterior tibial vein DVT  He was followed by Hem onc  Considered a failure of Eliquis  Transitioned to Lovenox    9/3/21  OV hem onc  PET scan ordered/ chemo ordered--carboplatin and Taxol   needs nasopharyngeal bx      9/14/21   Hospital follow-up  He is accompanied by his wife  The patient has no complaints today  Specifically he denies chest pain shortness of breath lightheadedness or dizziness  He starts chemotherapy next week Monday  He has several appointments lined up  He is receiving Lovenox injections, by his wife  He has no history of atrial fibrillation  He denies palpitations  All EKGs in the hospital showed sinus rhythm  His rhythm is regular by exam today  Blood pressure is satisfactory  He is walking with a walker, doing well  He did have physical therapy in the home, his wife now helps him with the exercises  He has some residual dysarthria and right-sided weakness  He has some right lower extremity edema  Will order a follow-up echocardiogram          I have spent 25  minutes with Patient  today in which greater than 50% of this time was spent in counseling/coordination of care regarding Intructions for management, Patient and family education, Importance of tx compliance and Risk factor reductions    Assessment  Diagnoses and all orders for this visit:    Hospital discharge follow-up    Chronic right-sided heart failure [facility])    Recent cerebrovascular accident    Elevated troponin    Bilateral lower extremity DVTs    Suspected pulmonary embolism    Adenocarcinoma of lung, unspecified laterality (HCC)    Malignant neoplasm of upper lobe of right lung ([facility] [address] )    Nasopharyngeal mass          Past Medical History:   Diagnosis Date    [facility]     Stroke ([facility] )        Review of Systems   Constitutional: Negative for chills  Cardiovascular: Negative for chest pain, claudication, cyanosis, dyspnea on exertion, irregular heartbeat, leg swelling, near-syncope, orthopnea, palpitations, paroxysmal nocturnal dyspnea and syncope  Respiratory: Negative for cough and shortness of breath  Gastrointestinal: Negative for heartburn and nausea  Neurological: Negative for dizziness, focal weakness, headaches, light-headedness and weakness  All other systems reviewed and are negative  No Known Allergies        Current Outpatient Medications:     acetaminophen (TYLENOL) 325 mg tablet, Take 2 tablets (650 mg total) by mouth 4 (four) times a day as needed for mild pain, headaches or fever, Disp: , Rfl: 0    atorvastatin (LIPITOR) 40 mg tablet, Take 1 tablet (40 mg total) by mouth daily with dinner, Disp: 30 tablet, Rfl: 2    ciprofloxacin (CIPRO) 500 mg tablet, Take 1 tablet (500 mg total) by mouth every 12 (twelve) hours for 7 days, Disp: 14 tablet, Rfl: 0    enoxaparin (LOVENOX) 80 mg/0 8 mL, Inject 0 8 mL (80 mg total) under the skin every 12 (twelve) hours, Disp: 48 mL, Rfl: 0    melatonin 3 mg, Take 1 tablet (3 mg total) by mouth daily at bedtime as needed (Sleep), Disp: 30 tablet, Rfl: 0    QUEtiapine (SEROquel) 25 mg tablet, Take 0 5 tablets (12 5 mg total) by mouth daily at bedtime, Disp: 15 tablet, Rfl: 0    senna-docusate sodium (SENOKOT S) 8 6-50 mg per tablet, Take 2 tablets by mouth 2 (two) times a day, Disp: 60 tablet, Rfl: 0    tamsulosin (FLOMAX) 0 4 mg, Once a day right after supper, Disp: 30 capsule, Rfl: 3        Social History     Socioeconomic History    Marital status: /Civil Union     Spouse name: Not on file    Number of children: Not on file    Years of education: Not on file    Highest education level: Not on file   Occupational History    Not on file   Tobacco Use    Smoking status: Former Smoker    Smokeless tobacco: Never Used   Vaping Use    Vaping Use: Never used   Substance and Sexual Activity    Alcohol use: Yes    Drug use: Never    Sexual activity: Not on file   Other Topics Concern    Not on file   Social History Narrative    Not on file     Social Determinants of Health     Financial Resource Strain:     Difficulty of Paying Living Expenses:    Food Insecurity:     Worried About Running Out of Food in the Last Year:     [address] in the Last Year:    Transportation Needs:     Lack of Transportation (Medical):  Lack of Transportation (Non-Medical):    Physical Activity:     Days of Exercise per Week:     Minutes of Exercise per Session:    Stress:     Feeling of Stress :    Social Connections:     Frequency of Communication with Friends and Family:     Frequency of Social Gatherings with Friends and Family:     Attends [employer] Services:     Active Member of Clubs or Organizations:     Attends Club or Organization Meetings:     Marital Status:    Intimate Partner Violence:     Fear of Current or Ex-Partner:     Emotionally Abused:     Physically Abused:     Sexually Abused:        No family history on file  Physical Exam  Vitals and nursing note reviewed  Constitutional:       General: He is not in acute distress  HENT:      Head: Normocephalic and atraumatic  Eyes:      Conjunctiva/sclera: Conjunctivae normal    Cardiovascular:      Rate and Rhythm: Normal rate and regular rhythm  Pulses: Intact distal pulses  Heart sounds: Normal heart sounds  Pulmonary:      Effort: Pulmonary effort is normal       Breath sounds: Normal breath sounds  Abdominal:      General: Bowel sounds are normal       Palpations: Abdomen is soft  Musculoskeletal:         General: Normal range of motion        Cervical back: Normal range of motion and neck supple  Skin:     General: Skin is warm and dry  Neurological:      Mental Status: He is alert and oriented to person, place, and time  Vitals: There were no vitals taken for this visit  Wt Readings from Last 3 Encounters:   09/03/21 81 2 kg (179 lb)   08/26/21 80 3 kg (177 lb)   08/23/21 80 kg (176 lb 5 9 oz)         Labs & Results:  Lab Results   Component Value Date    WBC 8 23 08/24/2021    HGB 10 2 (L) 08/24/2021    HCT 34 1 (L) 08/24/2021    MCV 83 08/24/2021     08/24/2021     No results found for: BNP  No components found for: CHEM  Troponin I   Date Value Ref Range Status   08/21/2021 0 38 (H) <=0 04 ng/mL Final     Comment:     [employer] Chemistry analyzer 99% cutoff is > 0 04 ng/mL in network labs     o cTnI 99% cutoff is useful only when applied to patients in the clinical setting of myocardial ischemia   o cTnI 99% cutoff should be interpreted in the context of clinical history, ECG findings and possibly cardiac imaging to establish correct diagnosis  o cTnI 99% cutoff may be suggestive but clearly not indicative of a coronary event without the clinical setting of myocardial ischemia  Results indicate test should be repeated on new specimen collected within 4-6 hours of the original   08/01/2021 10 30 (H) <=0 04 ng/mL Final     Comment:     S[employer]ens Chemistry analyzer 99% cutoff is > 0 04 ng/mL in network labs     o cTnI 99% cutoff is useful only when applied to patients in the clinical setting of myocardial ischemia   o cTnI 99% cutoff should be interpreted in the context of clinical history, ECG findings and possibly cardiac imaging to establish correct diagnosis  o cTnI 99% cutoff may be suggestive but clearly not indicative of a coronary event without the clinical setting of myocardial ischemia       08/01/2021 10 80 (H) <=0 04 ng/mL Final     Comment:     Siemens Chemistry analyzer 99% cutoff is > 0 04 ng/mL in network labs     o cTnI 99% cutoff is useful only when applied to patients in the clinical setting of myocardial ischemia   o cTnI 99% cutoff should be interpreted in the context of clinical history, ECG findings and possibly cardiac imaging to establish correct diagnosis  o cTnI 99% cutoff may be suggestive but clearly not indicative of a coronary event without the clinical setting of myocardial ischemia  Results for orders placed during the hospital encounter of 21    Echo complete with contrast if indicated    Narrative  [address]  [address], [address] [facility]  [phone]    Transthoracic Echocardiogram  2D, M-mode, Doppler, and Color Doppler    Study date:  01-Aug-2021    Patient: Radha Ruff  MR number: [id number]  Account number: [de-identified]  : 1950  Age: 72 years  Gender: Male  Status: Inpatient  Location: Bedside  Height: 68 in  Weight: 189 6 lb  BP: 129/ 83 mmHg    Indications: CVA  Diagnoses: I67 9 - Cerebrovascular disease, unspecified    Sonographer:  Shyla Odonnell RDCS  Referring Physician:  Artemio Luna DO  Group:  [facility]  Interpreting Physician:  Alena Ross DO    SUMMARY    LEFT VENTRICLE:  Systolic function was normal  Ejection fraction was estimated to be 55 %  Although no diagnostic regional wall motion abnormality was identified, this possibility cannot be completely excluded on the basis of this study  Wall thickness was mildly increased  Doppler parameters were consistent with abnormal left ventricular relaxation (grade 1 diastolic dysfunction)  RIGHT VENTRICLE:  The ventricle was mildly to moderately dilated  Systolic function was mildly reduced  There is hypokinesis of the mid free wall  RIGHT ATRIUM:  The atrium was mildly dilated  TRICUSPID VALVE:  There was mild regurgitation  Estimated peak PA pressure was 44 mmHg  PERICARDIUM:  A small pericardial effusion was identified   There was no evidence of hemodynamic compromise  HISTORY: PRIOR HISTORY: CVA  Elevated troponin  Acute respiratory failure  DVT  Former smoker  PROCEDURE: The procedure was performed at the bedside  This was a routine study  The transthoracic approach was used  The study included complete 2D imaging, M-mode, complete spectral Doppler, and color Doppler  The heart rate was 70 bpm,  at the start of the study  Images were obtained from the parasternal, apical, subcostal, and suprasternal notch acoustic windows  Echocardiographic views were limited due to restricted patient mobility and lung interference  This was a  technically difficult study  LEFT VENTRICLE: Size was normal  Systolic function was normal  Ejection fraction was estimated to be 55 %  Although no diagnostic regional wall motion abnormality was identified, this possibility cannot be completely excluded on the basis  of this study  Wall thickness was mildly increased  DOPPLER: Doppler parameters were consistent with abnormal left ventricular relaxation (grade 1 diastolic dysfunction)  RIGHT VENTRICLE: The ventricle was mildly to moderately dilated  Systolic function was mildly reduced  There is hypokinesis of the mid free wall  LEFT ATRIUM: Size was normal     RIGHT ATRIUM: The atrium was mildly dilated  MITRAL VALVE: Valve structure was normal  There was normal leaflet separation  DOPPLER: The transmitral velocity was within the normal range  There was no evidence for stenosis  There was no regurgitation  AORTIC VALVE: The valve was trileaflet  Leaflets exhibited normal thickness and normal cuspal separation  DOPPLER: Transaortic velocity was within the normal range  There was no evidence for stenosis  There was no regurgitation  TRICUSPID VALVE: The valve structure was normal  There was normal leaflet separation  DOPPLER: The transtricuspid velocity was within the normal range  There was no evidence for stenosis  There was mild regurgitation   Estimated peak PA  pressure was 44 mmHg  PULMONIC VALVE: Leaflets exhibited normal thickness, no calcification, and normal cuspal separation  DOPPLER: The transpulmonic velocity was within the normal range  There was no regurgitation  PERICARDIUM: A small pericardial effusion was identified  There was no evidence of hemodynamic compromise  The pericardium was normal in appearance  AORTA: The root exhibited normal size  SYSTEMIC VEINS: IVC: The inferior vena cava was not well visualized  SYSTEM MEASUREMENT TABLES    2D  %FS: 24 %  Ao Diam: 3 63 cm  EDV(Teich): 92 69 ml  EF(Teich): 47 96 %  ESV(Teich): 48 24 ml  IVSd: 1 08 cm  LA Area: 10 98 cm2  LA Diam: 3 49 cm  LVEDV MOD A4C: 91 9 ml  LVEF MOD A4C: 58 38 %  LVESV MOD A4C: 38 25 ml  LVIDd: 4 51 cm  LVIDs: 3 42 cm  LVLd A4C: 8 32 cm  LVLs A4C: 7 04 cm  LVPWd: 1 03 cm  RA Area: 19 63 cm2  RVIDd: 4 75 cm  SV MOD A4C: 53 66 ml  SV(Teich): 44 45 ml    CW  TR Vmax: 3 04 m/s  TR maxP 91 mmHg    MM  TAPSE: 1 3 cm    PW  E' Sept: 0 08 m/s  E/E' Sept: 7 34  MV A Tristan: 0 75 m/s  MV Dec [place]: 1 98 m/s2  MV DecT: 280 99 ms  MV E Tristan: 0 56 m/s  MV E/A Ratio: 0 74  MV PHT: 81 49 ms  MVA By PHT: 2 7 cm2    [facility]    Prepared and electronically signed by    Wilfrid Farfan DO  Signed 01-Aug-2021 14:29:36    No results found for this or any previous visit  This note was completed in part utilizing [employer] direct voice recognition software  Grammatical errors, random word insertion, spelling mistakes, and incomplete sentences may be an occasional consequence of the system secondary to software limitations, ambient noise and hardware issues  At the time of dictation, efforts were made to edit, clarify and /or correct errors  Please read the chart carefully and recognize, using context, where substitutions have occurred    If you have any questions or concerns about the context, text or information contained within the body of this dictation, please contact myself, the provider, for further clarification

## 2021-09-20 ENCOUNTER — DOCUMENTATION (OUTPATIENT)
Dept: HEMATOLOGY ONCOLOGY | Facility: CLINIC | Age: 71
End: 2021-09-20

## 2021-09-20 ENCOUNTER — HOSPITAL ENCOUNTER (OUTPATIENT)
Dept: INFUSION CENTER | Facility: HOSPITAL | Age: 71
Discharge: HOME | End: 2021-09-20
Attending: INTERNAL MEDICINE

## 2021-09-20 ENCOUNTER — TELEPHONE (OUTPATIENT)
Dept: HEMATOLOGY ONCOLOGY | Facility: CLINIC | Age: 71
End: 2021-09-20

## 2021-09-20 VITALS
TEMPERATURE: 97.8 F | HEIGHT: 68 IN | WEIGHT: 176.8 LBS | OXYGEN SATURATION: 96 % | BODY MASS INDEX: 26.8 KG/M2 | HEART RATE: 79 BPM | SYSTOLIC BLOOD PRESSURE: 137 MMHG | DIASTOLIC BLOOD PRESSURE: 81 MMHG | RESPIRATION RATE: 18 BRPM

## 2021-09-20 DIAGNOSIS — R91.8 MASS OF UPPER LOBE OF RIGHT LUNG: ICD-10-CM

## 2021-09-20 DIAGNOSIS — I82.403 ACUTE DEEP VEIN THROMBOSIS (DVT) OF BOTH LOWER EXTREMITIES, UNSPECIFIED VEIN (HCC): ICD-10-CM

## 2021-09-20 DIAGNOSIS — C34.90 ADENOCARCINOMA OF LUNG, UNSPECIFIED LATERALITY (HCC): ICD-10-CM

## 2021-09-20 DIAGNOSIS — C34.11 MALIGNANT NEOPLASM OF UPPER LOBE OF RIGHT LUNG (HCC): ICD-10-CM

## 2021-09-20 NOTE — TELEPHONE ENCOUNTER
Patient's wife David Maher would like a call back to discuss chemo therapy, she would like to know if he was not suppose to be started until he started radiation first  Patient is also scheduled for NM PET CT will they be able to do that before he gets the biopsy  Please call back patient to discuss radiation and appts, best call back # [phone]

## 2021-09-20 NOTE — PROGRESS NOTES
Pt has a f/u w/med onc on 09/27/21 will revw  No funding at this time for the pts dx will continue to ck for funding

## 2021-09-20 NOTE — PROGRESS NOTES
Pt arrived amb for chemo  On O2 @ 2L via n/c  Instructions reviewed  Pt confirms understanding  Lab results obtained from [facility]  Notified office that orders needed to be signed now that labs were available  Checked pt's schedule, has XRT consult on 9/23  Doesn't see ENT until next month  Spoke with office,  Treatment cancelled for today and probably next week until there is a radiation therapy schedule to start simultaneously with chemo  Office will be in touch with pt and wife about new schedule  Pt has leg bag for urine, emptied for 100mls prior to discharge  Disch amb to home, steady, slow gait  Pt states he's ok without O2 for awhile

## 2021-09-21 ENCOUNTER — HOSPITAL ENCOUNTER (OUTPATIENT)
Dept: RADIOLOGY | Age: 71
Discharge: HOME | End: 2021-09-21
Payer: MEDICARE

## 2021-09-21 DIAGNOSIS — J39.2 NASOPHARYNGEAL MASS: ICD-10-CM

## 2021-09-21 DIAGNOSIS — C34.11 MALIGNANT NEOPLASM OF UPPER LOBE OF RIGHT LUNG (HCC): ICD-10-CM

## 2021-09-21 LAB — GLUCOSE SERPL-MCNC: 92 MG/DL (ref 65–140)

## 2021-09-21 PROCEDURE — 78815 PET IMAGE W/CT SKULL-THIGH: CPT

## 2021-09-21 PROCEDURE — A9552 F18 FDG: HCPCS

## 2021-09-21 PROCEDURE — 82948 REAGENT STRIP/BLOOD GLUCOSE: CPT

## 2021-09-21 PROCEDURE — G1004 CDSM NDSC: HCPCS

## 2021-09-22 DIAGNOSIS — R45.1 RESTLESSNESS AND AGITATION: ICD-10-CM

## 2021-09-22 DIAGNOSIS — S06.9X9A: ICD-10-CM

## 2021-09-22 RX ORDER — QUETIAPINE FUMARATE 25 MG/1
TABLET, FILM COATED ORAL
Qty: 15 TABLET | Refills: 0 | Status: SHIPPED | OUTPATIENT
Start: 2021-09-22 | End: 2021-09-24 | Stop reason: ALTCHOICE

## 2021-09-23 ENCOUNTER — PATIENT OUTREACH (OUTPATIENT)
Dept: HEMATOLOGY ONCOLOGY | Facility: CLINIC | Age: 71
End: 2021-09-23

## 2021-09-23 ENCOUNTER — RADIATION ONCOLOGY CONSULT (OUTPATIENT)
Dept: RADIATION ONCOLOGY | Facility: HOSPITAL | Age: 71
End: 2021-09-23
Attending: INTERNAL MEDICINE
Payer: MEDICARE

## 2021-09-23 ENCOUNTER — TELEPHONE (OUTPATIENT)
Dept: OTHER | Facility: OTHER | Age: 71
End: 2021-09-23

## 2021-09-23 VITALS
BODY MASS INDEX: 26.85 KG/M2 | RESPIRATION RATE: 18 BRPM | WEIGHT: 176.6 LBS | HEART RATE: 82 BPM | OXYGEN SATURATION: 95 % | TEMPERATURE: 97.6 F

## 2021-09-23 DIAGNOSIS — C34.90 ADENOCARCINOMA OF LUNG, UNSPECIFIED LATERALITY (HCC): Primary | ICD-10-CM

## 2021-09-23 DIAGNOSIS — C34.11 MALIGNANT NEOPLASM OF UPPER LOBE OF RIGHT LUNG (HCC): Primary | ICD-10-CM

## 2021-09-23 PROCEDURE — 99204 OFFICE O/P NEW MOD 45 MIN: CPT | Performed by: INTERNAL MEDICINE

## 2021-09-23 PROCEDURE — 77263 THER RADIOLOGY TX PLNG CPLX: CPT | Performed by: INTERNAL MEDICINE

## 2021-09-23 PROCEDURE — 99211 OFF/OP EST MAY X REQ PHY/QHP: CPT | Performed by: INTERNAL MEDICINE

## 2021-09-23 NOTE — LETTER
2021     [facility] [address]    Patient: Cash Rahman   YOB: 1950   Date of Visit: 2021       Dear Dr Ashanti Royal:    Thank you for referring Cash Rahman to me for evaluation  Below are my notes for this consultation  If you have questions, please do not hesitate to call me  I look forward to following your patient along with you  Sincerely,        Vic George MD        CC: No Recipients  Vic George MD  2021  6:59 PM  Sign when Signing Visit  Consultation - Radiation Oncology      UCS:[id number] : 1950  Encounter: [id number]  Patient Information: Cash Rahman Referring Provider: Bekah Mays MD    CHIEF COMPLAINT  Chief Complaint   Patient presents with    Consult     Radiation Oncology     Cancer Staging  Malignant neoplasm of upper lobe of right lung [facility])  Staging form: Lung, AJCC 8th Edition  - Clinical stage from 2021: Stage IIIC (cT3, cN3, cM0) - Signed by Vic George MD on 2021  Histopathologic type: Adenocarcinoma, NOS       History of Present Illness   Cash Rahman is a 80 y o  male who presents for radiation consult for adenocarcinoma of the right lung, referred by Dr Ashanti Royal to discuss concurrent chemoradiation       80year old male with history of tobacco use, recently hospitalized in 2021 as a stroke alert  - 21  He presented with right sided weakness and facial droop  Imaging showed a 6 4 cm right upper lobe lung mass with extensive right hilar and mediastinal adenopathy and nasopharyngeal mass  Biopsy of right lung apex revealed adenocarcinoma  Bilateral lower extremity DVT diagnosed, he was transitioned to Eliquis, then switched to lovenox injections   He was discharged to follow-up with Oncology outpatient       21 CTA stroke alert  No large vessel occlusion   Atherosclerotic calcification of the carotid bifurcation bilaterally with no significant stenosis   Bilateral vertebral arteries widely patent    No focal intracranial stenosis or aneurysm    Mass right nasopharynx incompletely evaluated secondary to arterial bolus timing   The mass measures approximately 2 7 x 2 0 cm and direct visualization by ENT recommended    Right upper lobe lung mass and mediastinal adenopathy noted small right pleural effusion      7/31/21 CT chest abdomen pelvis wo contrast  There is moderate emphysema    There is a 6 4 x 3 6 x 3 4 cm solid, irregular contoured right upper lobe pulmonary nodule, likely primary lung cancer (series 2 image 17 )   There is extensive right hilar and mediastinal adenopathy    Small right pleural effusion    There are also numerous too small to characterize hepatic hypodense lesions   Consider abdomen MR with and without IV contrast to characterize    8/1/21 Inpatient Flexible Laryngoscopy - Dr Gagan Bingham: Flexible Laryngoscopy (without biopsy)     DESCRIPTION OF PROCEDURE:  After adequate anesthesia decongestion with a mixture of Afrin and lidocaine, scope was passed into the right nasal cavity along the nasal floor and into the nasopharynx  The patient did have smooth soft tissue mass in the right nasopharynx that appeared to be occluding the right eustachian tube  Left eustachian tube without any own abnormality  Nasopharynx is clear  The patient is able to move both vocal cords bilaterally  I did not see any other abnormalities in the hypopharynx or larynx  The patient tolerated the procedure well without complication      4/2/03 MRI brain w and wo contrast  1   Multifocal diffusion abnormalities in several separate vascular territories, largest in the left MCA territory indicative of multifocal acute/recent infarctions most likely from a central embolic source   Further clinical assessment advised    2   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   Both benign and malignant etiologies are in the differential diagnosis   ENT assessment recommended      8/5/21 IR biopsy - right lung apex   Adenocarcinoma     8/23/21 MRI abdomen (evaluate hypodense liver lesion seen on CT)  1  Multiple hepatic simple cysts   No suspicious hepatic lesions    2  A 5 mm pancreatic neck simple cyst with connection to the main duct and without suspicious features, likely side branch IPMN   Follow-up MRI with and without contrast in 2 years can be considered if clinically relevant    3   Stable small right pleural effusion   Partially visualized right hilar lymphadenopathy, better evaluated on prior CT      9/3/21 Med Onc, Dr Eli Walker  Pt with new diagnosis of lung cancer and 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  Discussed concurrent chemoradiation for lung cancer with Carboplatin/Taxol for lung cancer  PET/CT ordered to evaluate nasopharyngeal mass and refer to ENT  If malignant, radiation could be considered in conjunction with chemotherapy       9/21/21 PET/CT  1  Enlarging right upper lobe mass in keeping with biopsy proven adenocarcinoma  2  Extensive adenopathy is identified, including the neck base bilaterally, thoracic inlets, mediastinum, right hilum  3  Small right effusion, and small pericardial effusion  4  No hypermetabolic metastases identified below the diaphragms   No evidence of skeletal metastasis  5  The right-sided nasopharyngeal mass demonstrates no abnormal glucose activity      9/28/21 ENT, Dr Jamison Ruelas  9/28/21 Urology Dr Dmitri Persaud  10/4/21 Infusion  10/11/21 Dr Eli Walker follow-up, Med Onc    Today, Mr Jazmine Fam reports that his breathing is stable on 2L O2  He has mild cough and CALDERON  He reports improving weakness in his right extremities s/p stroke  He presents with his wife and son Rocio Aggarwal  He lives near [facility], but prefers RT at [place]  He denies changes in his vision, hearing, or voice, nasal discharge, epistaxis, or dysphagia       Historical Information   Oncology History   Adenocarcinoma of lung   8/2021 Initial Diagnosis    Adenocarcinoma of lung     8/5/2021 Biopsy    Right lung apex, image-guided core needle biopsy:  - Adenocarcinoma      10/4/2021 -  Chemotherapy    CARBOplatin (PARAPLATIN) IVPB (GOG AUC DOSING),  (original dose ), Intravenous, Once, 0 of 7 cycles  Dose modification:   (Cycle 1)  PACLItaxel (TAXOL) chemo IVPB, 50 mg/m2, Intravenous, Once, 0 of 7 cycles     Malignant neoplasm of upper lobe of right lung ([facility] [address] )   9/3/2021 Initial Diagnosis    Malignant neoplasm of upper lobe of right lung ([facility] [address] )     9/23/2021 -  Cancer Staged    Staging form: Lung, AJCC 8th Edition  - Clinical stage from 9/23/2021: Stage IIIC (cT3, cN3, cM0) - Signed by Sunday White MD on 9/23/2021  Histopathologic type:  Adenocarcinoma, NOS       10/4/2021 -  Chemotherapy    CARBOplatin (PARAPLATIN) IVPB (GOG AUC DOSING),  (original dose ), Intravenous, Once, 0 of 7 cycles  Dose modification:   (Cycle 1)  PACLItaxel (TAXOL) chemo IVPB, 50 mg/m2, Intravenous, Once, 0 of 7 cycles       Past Medical History:   Diagnosis Date    Lung cancer ([facility] [address] )     Stroke [facility])      Past Surgical History:   Procedure Laterality Date    IR BIOPSY LUNG  8/5/2021     Family History   Problem Relation Age of Onset    Prostate cancer Brother     Lung cancer Brother      Social History   Social History     Substance and Sexual Activity   Alcohol Use Yes     Social History     Substance and Sexual Activity   Drug Use Never     Social History     Tobacco Use   Smoking Status Former Smoker    Packs/day: 2 00   Smokeless Tobacco Never Used   Tobacco Comment    quits 17 years ago (as of 2021)       Meds/Allergies     Current Outpatient Medications:     acetaminophen (TYLENOL) 325 mg tablet, Take 2 tablets (650 mg total) by mouth 4 (four) times a day as needed for mild pain, headaches or fever, Disp: , Rfl: 0    atorvastatin (LIPITOR) 40 mg tablet, Take 1 tablet (40 mg total) by mouth daily with dinner, Disp: 30 tablet, Rfl: 2    enoxaparin (LOVENOX) 80 mg/0 8 mL, Inject 0 8 mL (80 mg total) under the skin every 12 (twelve) hours, Disp: 48 mL, Rfl: 0    tamsulosin (FLOMAX) 0 4 mg, Once a day right after supper, Disp: 30 capsule, Rfl: 3    melatonin 3 mg, Take 1 tablet (3 mg total) by mouth daily at bedtime as needed (Sleep) (Patient not taking: Reported on 9/23/2021), Disp: 30 tablet, Rfl: 0    QUEtiapine (SEROquel) 25 mg tablet, TAKE HALF A TABLET BY MOUTH DAILY AT BEDTIME (Patient not taking: Reported on 9/23/2021), Disp: 15 tablet, Rfl: 0    senna-docusate sodium (SENOKOT S) 8 6-50 mg per tablet, Take 2 tablets by mouth 2 (two) times a day (Patient not taking: Reported on 9/23/2021), Disp: 60 tablet, Rfl: 0  No Known Allergies    Review of Systems  Review of Systems   Constitutional: Positive for unexpected weight change (10-15 lbs over the last couple months)  HENT: Negative  Eyes: Negative  Respiratory: Positive for cough (with occasional phlegm, clear) and shortness of breath  3L oxygen continuous   Cardiovascular: Negative  Gastrointestinal: Negative  Endocrine: Negative  Genitourinary: Hampton catheter in place s/p stroke, appt with urology tues 9/29 for voiding trial   Musculoskeletal: Positive for gait problem (ambulates with walker)  Skin: Negative  Allergic/Immunologic: Negative  Neurological: Positive for weakness (right sided s/p stroke)  Psychiatric/Behavioral: Negative  OBJECTIVE:   Pulse 82   Temp 97 6 °F (36 4 °C) (Temporal)   Resp 18   Wt 80 1 kg (176 lb 9 6 oz)   SpO2 95%   BMI 26 85 kg/m²   Pain Assessment:  0  Performance Status: ECOG/Zubrod/WHO: 1 - Symptomatic but completely ambulatory    Physical Exam  General Appearance:  Alert, cooperative, no distress, appears stated age  HEENT: normocephalic/atraumatic, neck supple  Cardiovascular:  Extremities warm and well perfused, no lower extremity edema  Lungs: On 2L NC  No breath sounds over the RUL, clear in remainder of lung fields   Respirations unlabored, no cyanosis, able to speak in complete sentences without dyspnea  Abdomen: Soft, non-distended  Extremities: No cyanosis or edema, no joint swelling  Skin: No rash or dermatitis  Neurologic: AAOx3, CNII-XII grossly intact, muscle strength full and equal bilaterally  RESULTS  Lab Results    Chemistry        Component Value Date/Time    K 3 8 08/24/2021 0509     08/24/2021 0509    CO2 30 08/24/2021 0509    BUN 14 08/24/2021 0509    CREATININE 0 80 08/24/2021 0509        Component Value Date/Time    CALCIUM 10 1 08/24/2021 0509    ALKPHOS 61 08/12/2021 0539    AST 24 08/12/2021 0539    ALT 34 08/12/2021 0539          Lab Results   Component Value Date    WBC 8 23 08/24/2021    HGB 10 2 (L) 08/24/2021    HCT 34 1 (L) 08/24/2021    MCV 83 08/24/2021     08/24/2021       Imaging Studies  NM PET CT skull base to mid thigh    Result Date: 9/21/2021  Narrative: PET/CT SCAN INDICATION:  C34 11: Malignant neoplasm of upper lobe, right bronchus or lung J39 2: Other diseases of pharynx   Newly diagnosed lung carcinoma  History of nasopharyngeal mass  Initial staging MODIFIER: PI COMPARISON: No prior PET scans  CT chest abdomen and pelvis 7/31/2021 CELL TYPE:  Mucinous producing adenocarcinoma status diagnosed via biopsy of right lung mass TECHNIQUE:   8 6 mCi F-18-FDG administered IV  Multiplanar attenuation corrected and non attenuation corrected PET images were acquired 60 minutes post injection  Contiguous, low dose, axial CT sections were obtained from the skull base through the femurs   Intravenous contrast material was not utilized  This examination, like all CT scans performed in the [facility], was performed utilizing techniques to minimize radiation dose exposure, including the use of iterative reconstruction and automated exposure control  Fasting serum glucose: 92 mg/dl FINDINGS: VISUALIZED BRAIN:   No acute abnormalities are seen   HEAD/NECK:   The nasopharyngeal mass (image 3/61) is unchanged in size since the previous CTA of the brain, and demonstrates no abnormal glucose activity  There is no superior cervical adenopathy however, there is bulky hypermetabolic adenopathy at the neck base bilaterally  For example, on the right, soft tissue mass on image 95 measures 3 2 x 2 8 cm, SUV max 8 9  CT images: No additional findings CHEST:   Bulky multifocal hypermetabolic adenopathy is identified  Adenopathy begins at the thoracic inlet, right worse than left, continuing through the anterior mediastinum, right hilum, subcarinal space, and inferiorly toward the posterior medial right hemithorax  Examples include the following: Right thoracic inlet darien mass measures 2 7 x 1 7 cm SUV max 8 4 image 104  Right paratracheal lymph node image 123 measures 3 4 x 2 7 cm SUV max 8 5, right hilar mass measures 5 4 x 3 6 cm SUV max 9 0, right subcarinal mass measured on image 145 measures 4 2 x 3 5 cm SUV max 8 6  There is an enlarging right apical mass, on prior CT measuring 6 4 x 3 6 x 3 4 cm now 7 5 x 5 2 cm, SUV max 18 3  CT images: There is a small right-sided pleural effusion and a small pericardial effusion  There is coronary artery calcification  ABDOMEN:   No FDG avid soft tissue lesions are seen  CT images: Left kidney lower pole cyst   No ascites  No adrenal lesions  PELVIS: No FDG avid soft tissue lesions are seen  CT images: A Hampton catheter is present  OSSEOUS STRUCTURES: No FDG avid lesions are seen  CT images: No significant findings  Impression: 1  Enlarging right upper lobe mass in keeping with biopsy proven adenocarcinoma 2  Extensive adenopathy is identified, including the neck base bilaterally, thoracic inlets, mediastinum, right hilum  3  Small right effusion, and small pericardial effusion 4  No hypermetabolic metastases identified below the diaphragms  No evidence of skeletal metastasis 5   The right-sided nasopharyngeal mass demonstrates no abnormal glucose activity  Workstation performed: [id number]     Pathology:  A  Right lung apex, image-guided core needle biopsy:  - Adenocarcinoma, mucin-producing, of uncertain origin - see Note  ASSESSMENT  1  Malignant neoplasm of upper lobe of right lung [facility])  Ambulatory referral to social work care management program       Cancer Staging  Malignant neoplasm of upper lobe of right lung [facility])  Staging form: Lung, AJCC 8th Edition  - Clinical stage from 9/23/2021: Stage IIIC (cT3, cN3, cM0) - Signed by Jerrell Robin MD on 9/23/2021  Histopathologic type: Adenocarcinoma, NOS    PLAN  Emma Mcdonald is a 80 y o  male with newly diagnosed [id number] (IIIC) NSCLC (adenocarcinoma) of the RUL, with PET/CT demonstrating 6 4cm RUL mass with extensive adenopathy at the neck base bilaterally, thoracic inlets, mediastinum, and right hilum  Incidentally, MRI imaging of the head for his CVA had revealed a mass in the right nasopharynx, which did not demonstrate uptake on the PET/CT  Given the suspicion for malignancy, he is scheduled for ENT evaluation on 9/28/21  He remains on anticoagulation  He is scheduled to start chemotherapy on 10/5/21 with Dr Rakesh Flores  I discussed that given the patient's stage III disease, multiple medical comorbidities, and likely poor candidacy for surgical resection, definitive concurrent chemoradiation therapy is indicated  The patient asked many appropriate questions regarding the targeting, toxicity, and benefit of radiotherapy  I recommend 6 weeks of external beam radiation therapy (60Gy in 30fx) in an effort to control this tumor  We discussed the potential acute and late side effects, which included, but are not limited to fatigue, radiation esophagitis, radiation pneumonitis, acute skin reaction, pulmonary fibrosis, increased risk of cardiac events and secondary malignancy  I explained that the disease may progress despite chemotherapy and radiation therapy       Regarding the nasopharyngeal mass, we discussed the importance of ENT evaluation and biopsy, and I have asked my office to move up the appointment to next available - 9/28/21, in order to rule out potentially synchronous malignancies  Should he have a nasopharynx malignancy as well, we would discuss treatment for this separately  He agreed to proceed with RT, and informed consent was signed  He will be scheduled for CT simulation with contrast at [facility], with treatments to be scheduled at [facility]  Thank you for the opportunity to participate in the care of this patient  Jeyson Leslie MD  Department of [address] [place] [address] 12    Orders Placed This Encounter   Procedures    Ambulatory referral to social work care management program      Portions of the record may have been created with voice recognition software  Occasional wrong word or "sound a like" substitutions may have occurred due to the inherent limitations of voice recognition software  Read the chart carefully and recognize, using context, where substitutions have occurred

## 2021-09-23 NOTE — PROGRESS NOTES
Dannial Fleischer 1950 is a 80 y o  male    Patient presents for radiation consult for adenocarcinoma of the right lung, referred by Dr Meaghan Moreno to discuss concurrent chemoradiation  80year old male with history of tobacco use, recently hospitalized in August 2021 as a stroke alert 8/8 - 8/17/21  He presented with right sided weakness and facial droop  Imaging showed a 6 4 cm right upper lobe lung mass with extensive right hilar and mediastinal adenopathy and nasopharyngeal mass  Biopsy of right lung apex revealed adenocarcinoma  Bilateral lower extremity DVT diagnosed, he was transitioned to Eliquis, then switched to lovenox injections  He was discharged to follow-up with Oncology outpatient  7/31/21 CTA stroke alert  No large vessel occlusion  Atherosclerotic calcification of the carotid bifurcation bilaterally with no significant stenosis  Bilateral vertebral arteries widely patent  No focal intracranial stenosis or aneurysm  Mass right nasopharynx incompletely evaluated secondary to arterial bolus timing  The mass measures approximately 2 7 x 2 0 cm and direct visualization by ENT recommended  Right upper lobe lung mass and mediastinal adenopathy noted small right pleural effusion      7/31/21 CT chest abdomen pelvis wo contrast  There is moderate emphysema  There is a 6 4 x 3 6 x 3 4 cm solid, irregular contoured right upper lobe pulmonary nodule, likely primary lung cancer (series 2 image 17 )   There is extensive right hilar and mediastinal adenopathy  Small right pleural effusion  There are also numerous too small to characterize hepatic hypodense lesions  Consider abdomen MR with and without IV contrast to characterize    8/3/21 MRI brain w and wo contrast  1  Multifocal diffusion abnormalities in several separate vascular territories, largest in the left MCA territory indicative of multifocal acute/recent infarctions most likely from a central embolic source    Further clinical assessment advised  2   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  Both benign and malignant etiologies are in the differential diagnosis  ENT assessment recommended  8/5/21 IR biopsy - right lung apex   Adenocarcinoma      8/23/21 MRI abdomen (evaluate hypodense liver lesion seen on CT)  1  Multiple hepatic simple cysts  No suspicious hepatic lesions  2  A 5 mm pancreatic neck simple cyst with connection to the main duct and without suspicious features, likely side branch IPMN  Follow-up MRI with and without contrast in 2 years can be considered if clinically relevant  3   Stable small right pleural effusion  Partially visualized right hilar lymphadenopathy, better evaluated on prior CT        9/3/21 Med Onc, Dr Donta Monte  Pt with new diagnosis of lung cancer and 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  Discussed concurrent chemoradiation for lung cancer with Carboplatin/Taxol for lung cancer  PET/CT ordered to evaluate nasopharyngeal mass and refer to ENT  If malignant, radiation could be considered in conjunction with chemotherapy  9/21/21 PET/CT  1  Enlarging right upper lobe mass in keeping with biopsy proven adenocarcinoma  2  Extensive adenopathy is identified, including the neck base bilaterally, thoracic inlets, mediastinum, right hilum  3  Small right effusion, and small pericardial effusion  4  No hypermetabolic metastases identified below the diaphragms  No evidence of skeletal metastasis  5  The right-sided nasopharyngeal mass demonstrates no abnormal glucose activity             10/4/21 Infusion [facility] Adkins  10/11/21 Dr Donta Monte follow-up, Med Onc  10/14/21 ENT, Dr Ponce [place]        Oncology History   Adenocarcinoma of lung   8/2021 Initial Diagnosis    Adenocarcinoma of lung     8/5/2021 Biopsy    Right lung apex, image-guided core needle biopsy:  - Adenocarcinoma      10/4/2021 -  Chemotherapy    CARBOplatin (PARAPLATIN) IVPB (GOG AUC DOSING),  (original dose ), Intravenous, Once, 0 of 7 cycles  Dose modification:   (Cycle 1)  PACLItaxel (TAXOL) chemo IVPB, 50 mg/m2, Intravenous, Once, 0 of 7 cycles     Malignant neoplasm of upper lobe of right lung (HCC)   9/3/2021 Initial Diagnosis    Malignant neoplasm of upper lobe of right lung ([facility] [address] )     10/4/2021 -  Chemotherapy    CARBOplatin (PARAPLATIN) IVPB (GOG AUC DOSING),  (original dose ), Intravenous, Once, 0 of 7 cycles  Dose modification:   (Cycle 1)  PACLItaxel (TAXOL) chemo IVPB, 50 mg/m2, Intravenous, Once, 0 of 7 cycles         Clinical Trial: no      Health Maintenance   Topic Date Due    Hepatitis C Screening  Never done    Medicare Annual Wellness Visit (AWV)  Never done    Pneumococcal Vaccine: 65+ Years (1 of 2 - PPSV23) Never done    COVID-19 Vaccine (1) Never done    BMI: Followup Plan  Never done    DTaP,Tdap,and Td Vaccines (1 - Tdap) Never done    Colorectal Cancer Screening  Never done    Fall Risk  Never done    Influenza Vaccine (1) 09/01/2021    Depression Screening  08/26/2022    BMI: Adult  09/20/2022    HIB Vaccine  Aged Out    Hepatitis B Vaccine  Aged Out    IPV Vaccine  Aged Out    Hepatitis A Vaccine  Aged Out    Meningococcal ACWY Vaccine  Aged Out    HPV Vaccine  Aged Out       Past Medical History:   Diagnosis Date    [facility]     Stroke [facility])        Past Surgical History:   Procedure Laterality Date    IR BIOPSY LUNG  8/5/2021       Family History   Problem Relation Age of Onset    Prostate cancer Brother     Lung cancer Brother        Social History     Tobacco Use    Smoking status: Former Smoker     Packs/day: 2 00    Smokeless tobacco: Never Used    Tobacco comment: quits 17 years ago (as of 2021)   Vaping Use    Vaping Use: Never used   Substance Use Topics    Alcohol use:  Yes    Drug use: Never          Current Outpatient Medications:     acetaminophen (TYLENOL) 325 mg tablet, Take 2 tablets (650 mg total) by mouth 4 (four) times a day as needed for mild pain, headaches or fever, Disp: , Rfl: 0    atorvastatin (LIPITOR) 40 mg tablet, Take 1 tablet (40 mg total) by mouth daily with dinner, Disp: 30 tablet, Rfl: 2    enoxaparin (LOVENOX) 80 mg/0 8 mL, Inject 0 8 mL (80 mg total) under the skin every 12 (twelve) hours, Disp: 48 mL, Rfl: 0    tamsulosin (FLOMAX) 0 4 mg, Once a day right after supper, Disp: 30 capsule, Rfl: 3    melatonin 3 mg, Take 1 tablet (3 mg total) by mouth daily at bedtime as needed (Sleep) (Patient not taking: Reported on 9/23/2021), Disp: 30 tablet, Rfl: 0    QUEtiapine (SEROquel) 25 mg tablet, TAKE HALF A TABLET BY MOUTH DAILY AT BEDTIME (Patient not taking: Reported on 9/23/2021), Disp: 15 tablet, Rfl: 0    senna-docusate sodium (SENOKOT S) 8 6-50 mg per tablet, Take 2 tablets by mouth 2 (two) times a day (Patient not taking: Reported on 9/23/2021), Disp: 60 tablet, Rfl: 0    No Known Allergies     Review of Systems:  Review of Systems   Constitutional: Positive for unexpected weight change (10-15 lbs over the last couple months)  HENT: Negative  Eyes: Negative  Respiratory: Positive for cough (with occasional phlegm, clear) and shortness of breath  3L oxygen continuous   Cardiovascular: Negative  Gastrointestinal: Negative  Endocrine: Negative  Genitourinary: [place] catheter in place s/p stroke, appt with urology tues 9/29 for voiding trial   Musculoskeletal: Positive for gait problem (ambulates with walker)  Skin: Negative  Allergic/Immunologic: Negative  Neurological: Positive for weakness (right sided s/p stroke)  Psychiatric/Behavioral: Negative  Vitals:    09/23/21 1239   Pulse: 82   Resp: 18   Temp: 97 6 °F (36 4 °C)   TempSrc: Temporal   SpO2: 95%   Weight: 80 1 kg (176 lb 9 6 oz)           Pain assessment: 0    PFT: none on chart    Imaging:No images are attached to the encounter       Teaching: NCI radiation packet, lung cancer    MST completed Implantable Devices (Port, pacemaker, pain stimulator): no    Hip Replacement: n/a

## 2021-09-23 NOTE — PROGRESS NOTES
Consultation - Radiation Oncology      KDV:[id number] : 1950  Encounter: [id number]  Patient Information: Rohith Buckner Referring Provider: Monserrat Harding MD    CHIEF COMPLAINT  Chief Complaint   Patient presents with    Consult     Radiation Oncology     Cancer Staging  Malignant neoplasm of upper lobe of right lung [facility])  Staging form: Lung, AJCC 8th Edition  - Clinical stage from 2021: Stage IIIC (cT3, cN3, cM0) - Signed by Mary Gonzalez MD on 2021  Histopathologic type: Adenocarcinoma, NOS       History of Present Illness   Rohith Buckner is a 80 y o  male who presents for radiation consult for adenocarcinoma of the right lung, referred by Dr Eli Walker to discuss concurrent chemoradiation       80year old male with history of tobacco use, recently hospitalized in 2021 as a stroke alert  - 21  He presented with right sided weakness and facial droop  Imaging showed a 6 4 cm right upper lobe lung mass with extensive right hilar and mediastinal adenopathy and nasopharyngeal mass  Biopsy of right lung apex revealed adenocarcinoma  Bilateral lower extremity DVT diagnosed, he was transitioned to Eliquis, then switched to lovenox injections   He was discharged to follow-up with Oncology outpatient       21 CTA stroke alert  No large vessel occlusion   Atherosclerotic calcification of the carotid bifurcation bilaterally with no significant stenosis   Bilateral vertebral arteries widely patent    No focal intracranial stenosis or aneurysm    Mass right nasopharynx incompletely evaluated secondary to arterial bolus timing   The mass measures approximately 2 7 x 2 0 cm and direct visualization by ENT recommended    Right upper lobe lung mass and mediastinal adenopathy noted small right pleural effusion      21 CT chest abdomen pelvis wo contrast  There is moderate emphysema    There is a 6 4 x 3 6 x 3 4 cm solid, irregular contoured right upper lobe pulmonary nodule, likely primary lung cancer (series 2 image 17 )   There is extensive right hilar and mediastinal adenopathy    Small right pleural effusion    There are also numerous too small to characterize hepatic hypodense lesions   Consider abdomen MR with and without IV contrast to characterize    8/1/21 Inpatient Flexible Laryngoscopy - Dr Abby Roth: Flexible Laryngoscopy (without biopsy)     DESCRIPTION OF PROCEDURE:  After adequate anesthesia decongestion with a mixture of Afrin and lidocaine, scope was passed into the right nasal cavity along the nasal floor and into the nasopharynx  The patient did have smooth soft tissue mass in the right nasopharynx that appeared to be occluding the right eustachian tube  Left eustachian tube without any own abnormality  Nasopharynx is clear  The patient is able to move both vocal cords bilaterally  I did not see any other abnormalities in the hypopharynx or larynx  The patient tolerated the procedure well without complication      4/8/99 MRI brain w and wo contrast  1   Multifocal diffusion abnormalities in several separate vascular territories, largest in the left MCA territory indicative of multifocal acute/recent infarctions most likely from a central embolic source   Further clinical assessment advised  2   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   Both benign and malignant etiologies are in the differential diagnosis   ENT assessment recommended      8/5/21 IR biopsy - right lung apex   Adenocarcinoma     8/23/21 MRI abdomen (evaluate hypodense liver lesion seen on CT)  1  Multiple hepatic simple cysts   No suspicious hepatic lesions    2  A 5 mm pancreatic neck simple cyst with connection to the main duct and without suspicious features, likely side branch IPMN    Follow-up MRI with and without contrast in 2 years can be considered if clinically relevant    3   Stable small right pleural effusion   Partially visualized right hilar lymphadenopathy, better evaluated on prior CT      9/3/21 Med Onc, Dr Shahid Batista  Pt with new diagnosis of lung cancer and 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  Discussed concurrent chemoradiation for lung cancer with Carboplatin/Taxol for lung cancer  PET/CT ordered to evaluate nasopharyngeal mass and refer to ENT  If malignant, radiation could be considered in conjunction with chemotherapy       9/21/21 PET/CT  1  Enlarging right upper lobe mass in keeping with biopsy proven adenocarcinoma  2  Extensive adenopathy is identified, including the neck base bilaterally, thoracic inlets, mediastinum, right hilum  3  Small right effusion, and small pericardial effusion  4  No hypermetabolic metastases identified below the diaphragms   No evidence of skeletal metastasis  5  The right-sided nasopharyngeal mass demonstrates no abnormal glucose activity      9/28/21 ENT, Dr Chandra Iqbal  9/28/21 Urology Dr Razia Richter  10/4/21 Infusion  10/11/21 Dr Shahid Batista follow-up, Med Onc    Today, Mr Zenobia Rodriguez reports that his breathing is stable on 2L O2  He has mild cough and CALDERON  He reports improving weakness in his right extremities s/p stroke  He presents with his wife and son [facility]  He lives near [place], but prefers RT at [place]  He denies changes in his vision, hearing, or voice, nasal discharge, epistaxis, or dysphagia       Historical Information   Oncology History   Adenocarcinoma of lung   8/2021 Initial Diagnosis    Adenocarcinoma of lung     8/5/2021 Biopsy    Right lung apex, image-guided core needle biopsy:  - Adenocarcinoma      10/4/2021 -  Chemotherapy    CARBOplatin (PARAPLATIN) IVPB (GOG AUC DOSING),  (original dose ), Intravenous, Once, 0 of 7 cycles  Dose modification:   (Cycle 1)  PACLItaxel (TAXOL) chemo IVPB, 50 mg/m2, Intravenous, Once, 0 of 7 cycles     Malignant neoplasm of upper lobe of right lung ([address] )   9/3/2021 Initial Diagnosis    Malignant neoplasm of upper lobe of right lung ([address] )     9/23/2021 -  Cancer Staged    Staging form: Lung, AJCC 8th Edition  - Clinical stage from 9/23/2021: Stage IIIC (cT3, cN3, cM0) - Signed by Kali Oneal MD on 9/23/2021  Histopathologic type:  Adenocarcinoma, NOS       10/4/2021 -  Chemotherapy    CARBOplatin (PARAPLATIN) IVPB (GOG AUC DOSING),  (original dose ), Intravenous, Once, 0 of 7 cycles  Dose modification:   (Cycle 1)  PACLItaxel (TAXOL) chemo IVPB, 50 mg/m2, Intravenous, Once, 0 of 7 cycles       Past Medical History:   Diagnosis Date    [facility] [address] )     Stroke [facility])      Past Surgical History:   Procedure Laterality Date    IR BIOPSY LUNG  8/5/2021     Family History   Problem Relation Age of Onset    Prostate cancer Brother     Lung cancer Brother      Social History   Social History     Substance and Sexual Activity   Alcohol Use Yes     Social History     Substance and Sexual Activity   Drug Use Never     Social History     Tobacco Use   Smoking Status Former Smoker    Packs/day: 2 00   Smokeless Tobacco Never Used   Tobacco Comment    quits 17 years ago (as of 2021)       Meds/Allergies     Current Outpatient Medications:     acetaminophen (TYLENOL) 325 mg tablet, Take 2 tablets (650 mg total) by mouth 4 (four) times a day as needed for mild pain, headaches or fever, Disp: , Rfl: 0    atorvastatin (LIPITOR) 40 mg tablet, Take 1 tablet (40 mg total) by mouth daily with dinner, Disp: 30 tablet, Rfl: 2    enoxaparin (LOVENOX) 80 mg/0 8 mL, Inject 0 8 mL (80 mg total) under the skin every 12 (twelve) hours, Disp: 48 mL, Rfl: 0    tamsulosin (FLOMAX) 0 4 mg, Once a day right after supper, Disp: 30 capsule, Rfl: 3    melatonin 3 mg, Take 1 tablet (3 mg total) by mouth daily at bedtime as needed (Sleep) (Patient not taking: Reported on 9/23/2021), Disp: 30 tablet, Rfl: 0    QUEtiapine (SEROquel) 25 mg tablet, TAKE HALF A TABLET BY MOUTH DAILY AT BEDTIME (Patient not taking: Reported on 9/23/2021), Disp: 15 tablet, Rfl: 0    senna-docusate sodium (SENOKOT S) 8 6-50 mg per tablet, Take 2 tablets by mouth 2 (two) times a day (Patient not taking: Reported on 9/23/2021), Disp: 60 tablet, Rfl: 0  No Known Allergies    Review of Systems  Review of Systems   Constitutional: Positive for unexpected weight change (10-15 lbs over the last couple months)  HENT: Negative  Eyes: Negative  Respiratory: Positive for cough (with occasional phlegm, clear) and shortness of breath  3L oxygen continuous   Cardiovascular: Negative  Gastrointestinal: Negative  Endocrine: Negative  Genitourinary: Hampton catheter in place s/p stroke, appt with urology tues 9/29 for voiding trial   Musculoskeletal: Positive for gait problem (ambulates with walker)  Skin: Negative  Allergic/Immunologic: Negative  Neurological: Positive for weakness (right sided s/p stroke)  Psychiatric/Behavioral: Negative  OBJECTIVE:   Pulse 82   Temp 97 6 °F (36 4 °C) (Temporal)   Resp 18   Wt 80 1 kg (176 lb 9 6 oz)   SpO2 95%   BMI 26 85 kg/m²   Pain Assessment:  0  Performance Status: ECOG/Zubrod/WHO: 1 - Symptomatic but completely ambulatory    Physical Exam  General Appearance:  Alert, cooperative, no distress, appears stated age  HEENT: normocephalic/atraumatic, neck supple  Cardiovascular:  Extremities warm and well perfused, no lower extremity edema  Lungs: On 2L NC  No breath sounds over the RUL, clear in remainder of lung fields  Respirations unlabored, no cyanosis, able to speak in complete sentences without dyspnea  Abdomen: Soft, non-distended  Extremities: No cyanosis or edema, no joint swelling  Skin: No rash or dermatitis  Neurologic: AAOx3, Lateral gaze deviation of right eye, mild right facial droop  Muscle strength decreased on right compared to left  Patient is righthanded, still able to sign his name but handwriting is impaired  Ambulates with walker  Cognition and speech intact      RESULTS  Lab Results    Chemistry        Component Value Date/Time    K 3 8 08/24/2021 0509     08/24/2021 0509    CO2 30 08/24/2021 0509    BUN 14 08/24/2021 0509    CREATININE 0 80 08/24/2021 0509        Component Value Date/Time    CALCIUM 10 1 08/24/2021 0509    ALKPHOS 61 08/12/2021 0539    AST 24 08/12/2021 0539    ALT 34 08/12/2021 0539          Lab Results   Component Value Date    WBC 8 23 08/24/2021    HGB 10 2 (L) 08/24/2021    HCT 34 1 (L) 08/24/2021    MCV 83 08/24/2021     08/24/2021       Imaging Studies  NM PET CT skull base to mid thigh    Result Date: 9/21/2021  Narrative: PET/CT SCAN INDICATION:  C34 11: Malignant neoplasm of upper lobe, right bronchus or lung J39 2: Other diseases of pharynx   Newly diagnosed lung carcinoma  History of nasopharyngeal mass  Initial staging MODIFIER: PI COMPARISON: No prior PET scans  CT chest abdomen and pelvis 7/31/2021 CELL TYPE:  Mucinous producing adenocarcinoma status diagnosed via biopsy of right lung mass TECHNIQUE:   8 6 mCi F-18-FDG administered IV  Multiplanar attenuation corrected and non attenuation corrected PET images were acquired 60 minutes post injection  Contiguous, low dose, axial CT sections were obtained from the skull base through the femurs   Intravenous contrast material was not utilized  This examination, like all CT scans performed in the [facility], was performed utilizing techniques to minimize radiation dose exposure, including the use of iterative reconstruction and automated exposure control  Fasting serum glucose: 92 mg/dl FINDINGS: VISUALIZED BRAIN:   No acute abnormalities are seen  HEAD/NECK:   The nasopharyngeal mass (image 3/61) is unchanged in size since the previous CTA of the brain, and demonstrates no abnormal glucose activity  There is no superior cervical adenopathy however, there is bulky hypermetabolic adenopathy at the neck base bilaterally  For example, on the right, soft tissue mass on image 95 measures 3 2 x 2 8 cm, SUV max 8 9  CT images:  No additional findings CHEST:   Bulky multifocal hypermetabolic adenopathy is identified  Adenopathy begins at the thoracic inlet, right worse than left, continuing through the anterior mediastinum, right hilum, subcarinal space, and inferiorly toward the posterior medial right hemithorax  Examples include the following: Right thoracic inlet darien mass measures 2 7 x 1 7 cm SUV max 8 4 image 104  Right paratracheal lymph node image 123 measures 3 4 x 2 7 cm SUV max 8 5, right hilar mass measures 5 4 x 3 6 cm SUV max 9 0, right subcarinal mass measured on image 145 measures 4 2 x 3 5 cm SUV max 8 6  There is an enlarging right apical mass, on prior CT measuring 6 4 x 3 6 x 3 4 cm now 7 5 x 5 2 cm, SUV max 18 3  CT images: There is a small right-sided pleural effusion and a small pericardial effusion  There is coronary artery calcification  ABDOMEN:   No FDG avid soft tissue lesions are seen  CT images: Left kidney lower pole cyst   No ascites  No adrenal lesions  PELVIS: No FDG avid soft tissue lesions are seen  CT images: A Hampton catheter is present  OSSEOUS STRUCTURES: No FDG avid lesions are seen  CT images: No significant findings  Impression: 1  Enlarging right upper lobe mass in keeping with biopsy proven adenocarcinoma 2  Extensive adenopathy is identified, including the neck base bilaterally, thoracic inlets, mediastinum, right hilum  3  Small right effusion, and small pericardial effusion 4  No hypermetabolic metastases identified below the diaphragms  No evidence of skeletal metastasis 5  The right-sided nasopharyngeal mass demonstrates no abnormal glucose activity  Workstation performed: [id number]     Pathology:  A  Right lung apex, image-guided core needle biopsy:  - Adenocarcinoma, mucin-producing, of uncertain origin - see Note  ASSESSMENT  1   Malignant neoplasm of upper lobe of right lung [facility])  Ambulatory referral to social work care management program       Cancer Staging  Malignant neoplasm of upper lobe of right lung [facility])  Staging form: Lung, AJCC 8th Edition  - Clinical stage from 9/23/2021: Stage IIIC (cT3, cN3, cM0) - Signed by Amy Tate MD on 9/23/2021  Histopathologic type: Adenocarcinoma, NOS    PLAN  Emmanuel King is a 80 y o  male with newly diagnosed [id number] (IIIC) NSCLC (adenocarcinoma) of the RUL, with PET/CT demonstrating 6 4cm RUL mass with extensive adenopathy at the neck base bilaterally, thoracic inlets, mediastinum, and right hilum  Incidentally, MRI imaging of the head for his CVA had revealed a mass in the right nasopharynx, which did not demonstrate uptake on the PET/CT  Given the suspicion for malignancy, he is scheduled for ENT evaluation on 9/28/21  He remains on anticoagulation  He is scheduled to start chemotherapy on 10/5/21 with Dr Raoul Mendieta  I discussed that given the patient's stage III disease, multiple medical comorbidities, and likely poor candidacy for surgical resection, definitive concurrent chemoradiation therapy is indicated  The patient asked many appropriate questions regarding the targeting, toxicity, and benefit of radiotherapy  I recommend 6 weeks of external beam radiation therapy (60Gy in 30fx) in an effort to control this tumor  We discussed the potential acute and late side effects, which included, but are not limited to fatigue, radiation esophagitis, radiation pneumonitis, acute skin reaction, pulmonary fibrosis, increased risk of cardiac events and secondary malignancy  I explained that the disease may progress despite chemotherapy and radiation therapy  Regarding the nasopharyngeal mass, we discussed the importance of ENT evaluation and biopsy, and I have asked my office to move up the appointment to next available - 9/28/21, in order to rule out potentially synchronous malignancies  Should he have a nasopharynx malignancy as well, we would discuss treatment for this separately      He agreed to proceed with RT, and informed consent was signed  He will be scheduled for CT simulation with contrast at [facility], with treatments to be scheduled at [facility]  Thank you for the opportunity to participate in the care of this patient  Trino Mujica MD  Department of [address] [place] [address] 12    Orders Placed This Encounter   Procedures    Ambulatory referral to social work care management program      Portions of the record may have been created with voice recognition software  Occasional wrong word or "sound a like" substitutions may have occurred due to the inherent limitations of voice recognition software  Read the chart carefully and recognize, using context, where substitutions have occurred

## 2021-09-23 NOTE — TELEPHONE ENCOUNTER
Spoke to Patient 's wife she is concerned that her  needs his cath removed  She is aware if 9/28 CYSTO is cancelled Dr Omega Nyhan is booking into Jan 22  She requested to speak to a Nurse about the cath and then they will decide about the CYSTO  Please call back

## 2021-09-24 ENCOUNTER — TELEPHONE (OUTPATIENT)
Dept: NUTRITION | Facility: CLINIC | Age: 71
End: 2021-09-24

## 2021-09-24 ENCOUNTER — OFFICE VISIT (OUTPATIENT)
Dept: PULMONOLOGY | Facility: CLINIC | Age: 71
End: 2021-09-24
Payer: MEDICARE

## 2021-09-24 VITALS
OXYGEN SATURATION: 100 % | HEIGHT: 69 IN | TEMPERATURE: 97.1 F | DIASTOLIC BLOOD PRESSURE: 78 MMHG | HEART RATE: 74 BPM | SYSTOLIC BLOOD PRESSURE: 118 MMHG | BODY MASS INDEX: 25.92 KG/M2 | WEIGHT: 175 LBS

## 2021-09-24 DIAGNOSIS — J44.9 COPD MIXED TYPE (HCC): ICD-10-CM

## 2021-09-24 DIAGNOSIS — I26.99 OTHER PULMONARY EMBOLISM WITHOUT ACUTE COR PULMONALE, UNSPECIFIED CHRONICITY (HCC): Primary | ICD-10-CM

## 2021-09-24 DIAGNOSIS — J96.11 CHRONIC RESPIRATORY FAILURE WITH HYPOXIA (HCC): ICD-10-CM

## 2021-09-24 DIAGNOSIS — C34.11 MALIGNANT NEOPLASM OF UPPER LOBE OF RIGHT LUNG (HCC): ICD-10-CM

## 2021-09-24 PROBLEM — R77.8 ELEVATED TROPONIN: Status: RESOLVED | Noted: 2021-07-31 | Resolved: 2021-09-24

## 2021-09-24 PROBLEM — R41.0 DELIRIUM: Status: RESOLVED | Noted: 2021-08-22 | Resolved: 2021-09-24

## 2021-09-24 PROBLEM — R79.89 ELEVATED TROPONIN: Status: RESOLVED | Noted: 2021-07-31 | Resolved: 2021-09-24

## 2021-09-24 PROCEDURE — 99214 OFFICE O/P EST MOD 30 MIN: CPT | Performed by: INTERNAL MEDICINE

## 2021-09-24 NOTE — ASSESSMENT & PLAN NOTE
· Severity unknown and not a candidate currently for pulmonary function testing   · Has hypoxemic respiratory failure   · Has not been on inhalers or bronchodilators but also does not feel the need for these at this time    · Will continue to evaluate symptoms

## 2021-09-24 NOTE — ASSESSMENT & PLAN NOTE
· Followed by Dr Maru Hope  · Attended to begin chemotherapy  · Has ENT evaluation for nasopharyngeal mass pending  · Suspect that he may have problems with his breathing related to chemotherapy and other agents    Will need to continue to monitor symptoms

## 2021-09-24 NOTE — TELEPHONE ENCOUNTER
Naty June to discuss his nutrition after receiving notification by Gris RN on 9/23/21 that pt is appropriate for oncology nutrition care (reason for referral: Malnutrition Screening Tool (MST) Triggers: scored a 3 indicating 14-23# (6 4-10 5 kg) recent wt loss and is eating poorly due to a decreased appetite  (Date of MST: 9/23/21))  Spoke with Corin's wife Juaquin Salgado, discussed oncology nutrition services available (options for in-person and phone consultation) and the benefits of meeting for a consultation  Juaquin Salgado wrote down RD contact info and states that she or Jose Raul Talbert will reach out with any nutrition questions/concerns

## 2021-09-24 NOTE — TELEPHONE ENCOUNTER
Called Maral Nava back as patient can't do 9/28 Cysto with DR Ninfa Samuel as he has and cancer treatment    Scheduled him for cadet exchange on 9/29 with nurse and Cysto in December

## 2021-09-24 NOTE — PROGRESS NOTES
Progress note - Pulmonary Medicine   Lisa De La Paz 80 y o  male MRN: [id number]       Impression & Plan:     COPD mixed type ([facility] [address] )  · Severity unknown and not a candidate currently for pulmonary function testing   · Has hypoxemic respiratory failure   · Has not been on inhalers or bronchodilators but also does not feel the need for these at this time  · Will continue to evaluate symptoms    Malignant neoplasm of upper lobe of right lung (HCC)  · Followed by Dr Tariq Golden  · Attended to begin chemotherapy  · Has ENT evaluation for nasopharyngeal mass pending  · Suspect that he may have problems with his breathing related to chemotherapy and other agents  Will need to continue to monitor symptoms    Chronic respiratory failure with hypoxia ([facility])  · Multifactorial related to COPD, and recent pulmonary embolism  · Currently with adequate room air saturations   · Should continue use of supplemental oxygen with exertion and for sleep   · This was reviewed with the patient and his wife today  · Will order portability evaluation to see if he can either have a conserving device for a portable oxygen concentrator    [facility] ([facility] )  · Patient had pulmonary embolism and DVT  · Failed Eliquis therapy  · Currently on injection Lovenox   · Will defer anticoagulation to Hematology/Oncology given cancer and other anticoagulant failure    Recommended follow-up in 3 months  Will see sooner if worsening symptoms  ______________________________________________________________________    HPI:    Lisa De La Paz presents accompanied by his wife today for follow-up of hospitalization with DVT and pulmonary embolism  His wife has provided much of the history today  He also had acute hypoxemic respiratory failure which was multifactorial   He has COPD and is a heavy smoker and has recently diagnosed with primary lung malignancy  He is undergoing further workup and is anticipated to initiate chemotherapy        His leg swelling has been improving  He failed Eliquis therapy  He is currently on injection Lovenox therapy  He reports no chest pain or cardiac complaints  He does not report wheezing, cough, or worsening shortness of breath  He does have shortness of breath with exertion and is using oxygen continually  He does take his oxygen off to go to the bathroom and to walk in the house  Proper oxygen use was explained to the patient and his wife today  He does not report appetite or weight changes  He does have gait instability but is undergoing physical therapy at home  He has no residual speech or swallowing impairment as a result of the stroke however    Current Medications:    Current Outpatient Medications:     acetaminophen (TYLENOL) 325 mg tablet, Take 2 tablets (650 mg total) by mouth 4 (four) times a day as needed for mild pain, headaches or fever, Disp: , Rfl: 0    atorvastatin (LIPITOR) 40 mg tablet, Take 1 tablet (40 mg total) by mouth daily with dinner, Disp: 30 tablet, Rfl: 2    enoxaparin (LOVENOX) 80 mg/0 8 mL, Inject 0 8 mL (80 mg total) under the skin every 12 (twelve) hours, Disp: 48 mL, Rfl: 0    tamsulosin (FLOMAX) 0 4 mg, Once a day right after supper, Disp: 30 capsule, Rfl: 3    Review of Systems:  Aside from what is mentioned in the HPI, the review of systems is otherwise negative    Past medical history, surgical history, and family history were reviewed and updated as appropriate    Social history updates:  Social History     Tobacco Use   Smoking Status Former Smoker    Packs/day: 2 50    Years: 30 00    Pack years: 75 00    Quit date:     Years since quittin 7   Smokeless Tobacco Never Used       PhysicalExamination:  Vitals:   /78 (BP Location: Left arm, Patient Position: Sitting)   Pulse 74   Temp (!) 97 1 °F (36 2 °C)   Ht 5' 9" (1 753 m)   Wt 79 4 kg (175 lb)   SpO2 100%   BMI 25 84 kg/m²   Gen: Comfortable on nasal cannula at 2 L  Non-labored  HEENT:  Conjugate gaze    No scleral icterus  Oropharynx exam deferred due to COVID-19 face mask  Neck: Trachea is midline  No JVD  No adenopathy  Chest:  Symmetric chest wall excursion  Breath sounds slightly distant but otherwise clear to auscultation  Cardiac:  Regular  no murmur  Abdomen:  Benign  Extremities:  Trace edema  Neuro:  He does have abnormality of gait requiring slow and almost festinating gait  Did bring a rolling walker with him for stability if needed    Diagnostic Data:  Labs: I personally reviewed the most recent laboratory data pertinent to today's visit    Lab Results   Component Value Date    WBC 8 23 08/24/2021    HGB 10 2 (L) 08/24/2021    HCT 34 1 (L) 08/24/2021    MCV 83 08/24/2021     08/24/2021     Lab Results   Component Value Date    SODIUM 137 08/24/2021    K 3 8 08/24/2021    CO2 30 08/24/2021     08/24/2021    BUN 14 08/24/2021    CREATININE 0 80 08/24/2021    CALCIUM 10 1 08/24/2021       PFT results:  He has not had formal pulmonary function testing    Imaging:  I personally reviewed the images on the [facility] system pertinent to today's visit  09/21/2021 PET scan was reviewed  Multiple areas of adenopathy in the neck and chest   Also concern for primary right apical mass  CT imaging shows at least moderate emphysema    Other studies:  Bilateral lower extremity vascular studies from August 21st show propagation of prior DVT    He was presumed to have pulmonary embolism on the basis other indirect evidence for embolism was pulmonary hypertension of 44 mm mercury on August echocardiogram    Dustin Glass MD

## 2021-09-24 NOTE — ASSESSMENT & PLAN NOTE
· Multifactorial related to COPD, and recent pulmonary embolism  · Currently with adequate room air saturations   · Should continue use of supplemental oxygen with exertion and for sleep   · This was reviewed with the patient and his wife today  · Will order portability evaluation to see if he can either have a conserving device for a portable oxygen concentrator

## 2021-09-24 NOTE — ASSESSMENT & PLAN NOTE
· Patient had pulmonary embolism and DVT  · Failed Eliquis therapy  · Currently on injection Lovenox   · Will defer anticoagulation to Hematology/Oncology given cancer and other anticoagulant failure

## 2021-09-27 ENCOUNTER — APPOINTMENT (OUTPATIENT)
Dept: RADIATION ONCOLOGY | Facility: CLINIC | Age: 71
End: 2021-09-27
Attending: RADIOLOGY
Payer: MEDICARE

## 2021-09-27 PROCEDURE — 77399 UNLISTED PX MED RADJ PHYSICS: CPT | Performed by: INTERNAL MEDICINE

## 2021-09-27 PROCEDURE — 77470 SPECIAL RADIATION TREATMENT: CPT | Performed by: INTERNAL MEDICINE

## 2021-09-27 PROCEDURE — 77334 RADIATION TREATMENT AID(S): CPT | Performed by: RADIOLOGY

## 2021-09-27 PROCEDURE — 77290 THER RAD SIMULAJ FIELD CPLX: CPT | Performed by: RADIOLOGY

## 2021-09-29 ENCOUNTER — TELEPHONE (OUTPATIENT)
Dept: OTHER | Facility: OTHER | Age: 71
End: 2021-09-29

## 2021-09-29 ENCOUNTER — PROCEDURE VISIT (OUTPATIENT)
Dept: UROLOGY | Facility: HOSPITAL | Age: 71
End: 2021-09-29
Payer: MEDICARE

## 2021-09-29 VITALS
DIASTOLIC BLOOD PRESSURE: 70 MMHG | BODY MASS INDEX: 26.67 KG/M2 | HEIGHT: 68 IN | SYSTOLIC BLOOD PRESSURE: 110 MMHG | HEART RATE: 76 BPM | WEIGHT: 176 LBS

## 2021-09-29 DIAGNOSIS — R33.9 URINARY RETENTION: Primary | ICD-10-CM

## 2021-09-29 PROCEDURE — 51702 INSERT TEMP BLADDER CATH: CPT

## 2021-09-29 NOTE — PROGRESS NOTES
9/29/2021  Jt Valentine is a 80 y o  male  [id number]    Diagnosis:  Chief Complaint     Urinary Retention          Patient presents for routine cadet exchange managed by Dr Miller Courser:  Patient will come back in a week for a void trial   He is also scheduled in December for Cystoscopy after his infusions are finished  Patient instructed to call with any questions or concerns in the meantime  Procedure:      Bladder catheterization    Date/Time: 9/29/2021 12:57 PM  Performed by: Horace Aggarwal RN  Authorized by: Darby Hardy MD     Pre-procedure details:     Procedure purpose:  Diagnostic  Procedure details:     Catheter insertion:  Indwelling    Approach: natural orifice      Catheter type:  Coude    Catheter size:  16 Fr    Number of attempts:  1    Successful placement: yes      Urine characteristics:  Clear  Post-procedure details:     Patient tolerance of procedure:   Tolerated well, no immediate complications    Attached to leg bag        Horace Aggarwal RN

## 2021-09-29 NOTE — TELEPHONE ENCOUNTER
Glen Sifuentes called back and she stated it was just leaking a little around penis - it was draining into the bag    Suggested added 5 ml of saline into balloon to see if that helped

## 2021-09-30 ENCOUNTER — HOSPITAL ENCOUNTER (OUTPATIENT)
Dept: NON INVASIVE DIAGNOSTICS | Age: 71
Discharge: HOME | End: 2021-09-30
Payer: MEDICARE

## 2021-09-30 ENCOUNTER — TELEPHONE (OUTPATIENT)
Dept: CARDIOLOGY CLINIC | Facility: CLINIC | Age: 71
End: 2021-09-30

## 2021-09-30 ENCOUNTER — PATIENT OUTREACH (OUTPATIENT)
Dept: CASE MANAGEMENT | Facility: OTHER | Age: 71
End: 2021-09-30

## 2021-09-30 ENCOUNTER — PATIENT OUTREACH (OUTPATIENT)
Dept: HEMATOLOGY ONCOLOGY | Facility: CLINIC | Age: 71
End: 2021-09-30

## 2021-09-30 DIAGNOSIS — R09.89 SUSPECTED PULMONARY EMBOLISM: ICD-10-CM

## 2021-09-30 DIAGNOSIS — I51.89 RIGHT VENTRICULAR DIASTOLIC DYSFUNCTION: ICD-10-CM

## 2021-09-30 PROCEDURE — 93306 TTE W/DOPPLER COMPLETE: CPT | Performed by: INTERNAL MEDICINE

## 2021-09-30 PROCEDURE — 93306 TTE W/DOPPLER COMPLETE: CPT

## 2021-09-30 NOTE — PROGRESS NOTES
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  Patient saw radiation oncology on 9/23/21  Plan is for 6 weeks of external beam radiation to R lung mass  Radiation oncology has not addressed the nasopharyngeal mass but did have a discussion with ENT and appointment was moved to 9/28/21  Mich Adjutant had an appointment with pulmonology on 9/24/21 with no changes to medication or plan of care  On 9/28/21 patient did see ENT and had laryngoscopy performed in the office which demonstrated a normal exam  ENT feels that the mass noted on imaging is benign  On 9/29/21 patient was seen in the urology office for a cadet catheter change  Patient will have a voiding trial next week  Patient is scheduled for ECHO on 9/30/21 and has his chemo scheduled for 10/4/21 as well as first radiation treatment  Patient has declined calls from this RN care manager so chart review will continue,  until end of episode

## 2021-09-30 NOTE — TELEPHONE ENCOUNTER
----- Message from [id number] sent at 9/30/2021  4:07 PM EDT -----  Please let him know his ultrasound showed normal pumping function  The aorta was mildly dilated, with mild dilatation of the ascending aorta, for which good blood pressure control is recommended    Recommend follow-up with Dr Mylene Hughes in a couple months

## 2021-10-01 ENCOUNTER — TELEPHONE (OUTPATIENT)
Dept: HEMATOLOGY ONCOLOGY | Facility: CLINIC | Age: 71
End: 2021-10-01

## 2021-10-01 ENCOUNTER — TELEPHONE (OUTPATIENT)
Dept: UROLOGY | Facility: MEDICAL CENTER | Age: 71
End: 2021-10-01

## 2021-10-01 ENCOUNTER — APPOINTMENT (OUTPATIENT)
Dept: LAB | Facility: CLINIC | Age: 71
End: 2021-10-01
Payer: MEDICARE

## 2021-10-01 DIAGNOSIS — C34.11 MALIGNANT NEOPLASM OF UPPER LOBE OF RIGHT LUNG (HCC): Primary | ICD-10-CM

## 2021-10-01 DIAGNOSIS — J39.2 NASOPHARYNGEAL MASS: ICD-10-CM

## 2021-10-01 LAB
ALBUMIN SERPL BCP-MCNC: 2.6 G/DL (ref 3.5–5)
ALP SERPL-CCNC: 71 U/L (ref 46–116)
ALT SERPL W P-5'-P-CCNC: 19 U/L (ref 12–78)
ANION GAP SERPL CALCULATED.3IONS-SCNC: 3 MMOL/L (ref 4–13)
AST SERPL W P-5'-P-CCNC: 13 U/L (ref 5–45)
BASOPHILS # BLD AUTO: 0.08 THOUSANDS/ΜL (ref 0–0.1)
BASOPHILS NFR BLD AUTO: 1 % (ref 0–1)
BILIRUB SERPL-MCNC: 0.25 MG/DL (ref 0.2–1)
BUN SERPL-MCNC: 13 MG/DL (ref 5–25)
CALCIUM ALBUM COR SERPL-MCNC: 11.7 MG/DL (ref 8.3–10.1)
CALCIUM SERPL-MCNC: 10.6 MG/DL (ref 8.3–10.1)
CHLORIDE SERPL-SCNC: 104 MMOL/L (ref 100–108)
CO2 SERPL-SCNC: 28 MMOL/L (ref 21–32)
CREAT SERPL-MCNC: 0.9 MG/DL (ref 0.6–1.3)
EOSINOPHIL # BLD AUTO: 0.11 THOUSAND/ΜL (ref 0–0.61)
EOSINOPHIL NFR BLD AUTO: 1 % (ref 0–6)
ERYTHROCYTE [DISTWIDTH] IN BLOOD BY AUTOMATED COUNT: 17.6 % (ref 11.6–15.1)
GFR SERPL CREATININE-BSD FRML MDRD: 86 ML/MIN/1.73SQ M
GLUCOSE SERPL-MCNC: 107 MG/DL (ref 65–140)
HCT VFR BLD AUTO: 34.5 % (ref 36.5–49.3)
HGB BLD-MCNC: 10.1 G/DL (ref 12–17)
IMM GRANULOCYTES # BLD AUTO: 0.04 THOUSAND/UL (ref 0–0.2)
IMM GRANULOCYTES NFR BLD AUTO: 0 % (ref 0–2)
LYMPHOCYTES # BLD AUTO: 2.01 THOUSANDS/ΜL (ref 0.6–4.47)
LYMPHOCYTES NFR BLD AUTO: 19 % (ref 14–44)
MCH RBC QN AUTO: 25.1 PG (ref 26.8–34.3)
MCHC RBC AUTO-ENTMCNC: 29.3 G/DL (ref 31.4–37.4)
MCV RBC AUTO: 86 FL (ref 82–98)
MONOCYTES # BLD AUTO: 0.93 THOUSAND/ΜL (ref 0.17–1.22)
MONOCYTES NFR BLD AUTO: 9 % (ref 4–12)
NEUTROPHILS # BLD AUTO: 7.48 THOUSANDS/ΜL (ref 1.85–7.62)
NEUTS SEG NFR BLD AUTO: 70 % (ref 43–75)
NRBC BLD AUTO-RTO: 0 /100 WBCS
PLATELET # BLD AUTO: 499 THOUSANDS/UL (ref 149–390)
PMV BLD AUTO: 10.9 FL (ref 8.9–12.7)
POTASSIUM SERPL-SCNC: 3.9 MMOL/L (ref 3.5–5.3)
PROT SERPL-MCNC: 7.9 G/DL (ref 6.4–8.2)
RBC # BLD AUTO: 4.02 MILLION/UL (ref 3.88–5.62)
SODIUM SERPL-SCNC: 135 MMOL/L (ref 136–145)
T3FREE SERPL-MCNC: 2.15 PG/ML (ref 2.3–4.2)
TSH SERPL DL<=0.05 MIU/L-ACNC: 2.59 UIU/ML (ref 0.36–3.74)
WBC # BLD AUTO: 10.65 THOUSAND/UL (ref 4.31–10.16)

## 2021-10-01 PROCEDURE — 80053 COMPREHEN METABOLIC PANEL: CPT

## 2021-10-01 PROCEDURE — 85025 COMPLETE CBC W/AUTO DIFF WBC: CPT

## 2021-10-01 PROCEDURE — 84481 FREE ASSAY (FT-3): CPT

## 2021-10-01 PROCEDURE — 36415 COLL VENOUS BLD VENIPUNCTURE: CPT

## 2021-10-01 PROCEDURE — 84443 ASSAY THYROID STIM HORMONE: CPT

## 2021-10-01 RX ORDER — PROCHLORPERAZINE MALEATE 10 MG
10 TABLET ORAL EVERY 6 HOURS PRN
Qty: 45 TABLET | Refills: 3 | Status: SHIPPED | OUTPATIENT
Start: 2021-10-01

## 2021-10-01 RX ORDER — DEXAMETHASONE 4 MG/1
4 TABLET ORAL 2 TIMES DAILY WITH MEALS
Qty: 16 TABLET | Refills: 0 | Status: SHIPPED | OUTPATIENT
Start: 2021-10-01 | End: 2021-11-23

## 2021-10-01 RX ORDER — FOLIC ACID 1 MG/1
1 TABLET ORAL DAILY
Qty: 30 TABLET | Refills: 3 | Status: SHIPPED | OUTPATIENT
Start: 2021-10-01 | End: 2021-12-20

## 2021-10-04 ENCOUNTER — HOSPITAL ENCOUNTER (OUTPATIENT)
Dept: INFUSION CENTER | Facility: HOSPITAL | Age: 71
End: 2021-10-04
Attending: INTERNAL MEDICINE

## 2021-10-04 RX ORDER — SODIUM CHLORIDE 9 MG/ML
20 INJECTION, SOLUTION INTRAVENOUS ONCE
Status: CANCELLED | OUTPATIENT
Start: 2021-10-06

## 2021-10-04 RX ORDER — CYANOCOBALAMIN 1000 UG/ML
1000 INJECTION INTRAMUSCULAR; SUBCUTANEOUS ONCE
Status: CANCELLED | OUTPATIENT
Start: 2021-10-06 | End: 2021-10-06

## 2021-10-05 ENCOUNTER — OFFICE VISIT (OUTPATIENT)
Dept: UROLOGY | Facility: HOSPITAL | Age: 71
End: 2021-10-05
Payer: MEDICARE

## 2021-10-05 VITALS
HEART RATE: 86 BPM | HEIGHT: 68 IN | BODY MASS INDEX: 26.83 KG/M2 | SYSTOLIC BLOOD PRESSURE: 128 MMHG | DIASTOLIC BLOOD PRESSURE: 70 MMHG | WEIGHT: 177 LBS

## 2021-10-05 DIAGNOSIS — R33.9 URINARY RETENTION: Primary | ICD-10-CM

## 2021-10-05 LAB
POST-VOID RESIDUAL VOLUME, ML POC: 402 ML
SL AMB  POCT GLUCOSE, UA: NORMAL
SL AMB LEUKOCYTE ESTERASE,UA: NORMAL
SL AMB POCT BILIRUBIN,UA: NORMAL
SL AMB POCT BLOOD,UA: NORMAL
SL AMB POCT CLARITY,UA: NORMAL
SL AMB POCT COLOR,UA: YELLOW
SL AMB POCT KETONES,UA: NORMAL
SL AMB POCT NITRITE,UA: NORMAL
SL AMB POCT PH,UA: 6
SL AMB POCT SPECIFIC GRAVITY,UA: 1.01
SL AMB POCT URINE PROTEIN: NORMAL
SL AMB POCT UROBILINOGEN: 0.2

## 2021-10-05 PROCEDURE — 51798 US URINE CAPACITY MEASURE: CPT | Performed by: NURSE PRACTITIONER

## 2021-10-05 PROCEDURE — 99213 OFFICE O/P EST LOW 20 MIN: CPT | Performed by: NURSE PRACTITIONER

## 2021-10-05 PROCEDURE — 81002 URINALYSIS NONAUTO W/O SCOPE: CPT | Performed by: NURSE PRACTITIONER

## 2021-10-05 RX ORDER — CIPROFLOXACIN 500 MG/1
500 TABLET, FILM COATED ORAL EVERY 12 HOURS SCHEDULED
Qty: 14 TABLET | Refills: 0 | Status: SHIPPED | OUTPATIENT
Start: 2021-10-05 | End: 2021-10-12

## 2021-10-06 ENCOUNTER — HOSPITAL ENCOUNTER (OUTPATIENT)
Dept: INFUSION CENTER | Facility: HOSPITAL | Age: 71
Discharge: HOME | End: 2021-10-06
Attending: INTERNAL MEDICINE
Payer: MEDICARE

## 2021-10-06 VITALS
TEMPERATURE: 97.6 F | DIASTOLIC BLOOD PRESSURE: 68 MMHG | OXYGEN SATURATION: 95 % | WEIGHT: 174.82 LBS | HEART RATE: 74 BPM | SYSTOLIC BLOOD PRESSURE: 116 MMHG | HEIGHT: 68 IN | BODY MASS INDEX: 26.5 KG/M2

## 2021-10-06 DIAGNOSIS — R91.8 MASS OF UPPER LOBE OF RIGHT LUNG: ICD-10-CM

## 2021-10-06 DIAGNOSIS — C34.11 MALIGNANT NEOPLASM OF UPPER LOBE OF RIGHT LUNG (HCC): Primary | ICD-10-CM

## 2021-10-06 DIAGNOSIS — C34.90 ADENOCARCINOMA OF LUNG, UNSPECIFIED LATERALITY (HCC): ICD-10-CM

## 2021-10-06 PROCEDURE — 96411 CHEMO IV PUSH ADDL DRUG: CPT

## 2021-10-06 PROCEDURE — 96377 APPLICATON ON-BODY INJECTOR: CPT

## 2021-10-06 PROCEDURE — 96367 TX/PROPH/DG ADDL SEQ IV INF: CPT

## 2021-10-06 PROCEDURE — 96372 THER/PROPH/DIAG INJ SC/IM: CPT

## 2021-10-06 PROCEDURE — 96413 CHEMO IV INFUSION 1 HR: CPT

## 2021-10-06 PROCEDURE — 96417 CHEMO IV INFUS EACH ADDL SEQ: CPT

## 2021-10-06 RX ORDER — SODIUM CHLORIDE 9 MG/ML
20 INJECTION, SOLUTION INTRAVENOUS ONCE
Status: COMPLETED | OUTPATIENT
Start: 2021-10-06 | End: 2021-10-06

## 2021-10-06 RX ORDER — CYANOCOBALAMIN 1000 UG/ML
1000 INJECTION INTRAMUSCULAR; SUBCUTANEOUS ONCE
Status: COMPLETED | OUTPATIENT
Start: 2021-10-06 | End: 2021-10-06

## 2021-10-06 RX ADMIN — SODIUM CHLORIDE 200 MG: 9 INJECTION, SOLUTION INTRAVENOUS at 12:56

## 2021-10-06 RX ADMIN — SODIUM CHLORIDE 20 ML/HR: 0.9 INJECTION, SOLUTION INTRAVENOUS at 11:42

## 2021-10-06 RX ADMIN — CARBOPLATIN 519.5 MG: 10 INJECTION, SOLUTION INTRAVENOUS at 13:57

## 2021-10-06 RX ADMIN — CYANOCOBALAMIN 1000 MCG: 1000 INJECTION, SOLUTION INTRAMUSCULAR at 12:51

## 2021-10-06 RX ADMIN — PEGFILGRASTIM 6 MG: KIT SUBCUTANEOUS at 15:00

## 2021-10-06 RX ADMIN — DEXAMETHASONE SODIUM PHOSPHATE: 10 INJECTION, SOLUTION INTRAMUSCULAR; INTRAVENOUS at 11:42

## 2021-10-06 RX ADMIN — FOSAPREPITANT 150 MG: 150 INJECTION, POWDER, LYOPHILIZED, FOR SOLUTION INTRAVENOUS at 12:10

## 2021-10-06 RX ADMIN — SODIUM CHLORIDE 1000 MG: 9 INJECTION, SOLUTION INTRAVENOUS at 13:41

## 2021-10-08 LAB
BACTERIA UR CULT: ABNORMAL
Lab: ABNORMAL
SL AMB ANTIMICROBIAL SUSCEPTIBILITY: ABNORMAL

## 2021-10-11 ENCOUNTER — OFFICE VISIT (OUTPATIENT)
Dept: HEMATOLOGY ONCOLOGY | Facility: HOSPITAL | Age: 71
End: 2021-10-11
Payer: MEDICARE

## 2021-10-11 VITALS
RESPIRATION RATE: 18 BRPM | HEIGHT: 68 IN | SYSTOLIC BLOOD PRESSURE: 128 MMHG | BODY MASS INDEX: 26.61 KG/M2 | WEIGHT: 175.6 LBS | OXYGEN SATURATION: 98 % | DIASTOLIC BLOOD PRESSURE: 64 MMHG | TEMPERATURE: 98.1 F | HEART RATE: 81 BPM

## 2021-10-11 DIAGNOSIS — C34.90 ADENOCARCINOMA OF LUNG, UNSPECIFIED LATERALITY (HCC): Primary | ICD-10-CM

## 2021-10-11 DIAGNOSIS — R91.8 MASS OF UPPER LOBE OF RIGHT LUNG: ICD-10-CM

## 2021-10-11 DIAGNOSIS — C34.11 MALIGNANT NEOPLASM OF UPPER LOBE OF RIGHT LUNG (HCC): ICD-10-CM

## 2021-10-11 PROBLEM — T45.1X5A CHEMOTHERAPY INDUCED NEUTROPENIA (HCC): Status: ACTIVE | Noted: 2021-10-11

## 2021-10-11 PROBLEM — D70.1 CHEMOTHERAPY INDUCED NEUTROPENIA (HCC): Status: ACTIVE | Noted: 2021-10-11

## 2021-10-11 PROCEDURE — 99214 OFFICE O/P EST MOD 30 MIN: CPT | Performed by: INTERNAL MEDICINE

## 2021-10-12 ENCOUNTER — TELEPHONE (OUTPATIENT)
Dept: NUTRITION | Facility: CLINIC | Age: 71
End: 2021-10-12

## 2021-10-13 ENCOUNTER — CLINICAL SUPPORT (OUTPATIENT)
Dept: UROLOGY | Facility: AMBULATORY SURGERY CENTER | Age: 71
End: 2021-10-13
Payer: MEDICARE

## 2021-10-13 VITALS — BODY MASS INDEX: 27.1 KG/M2 | HEIGHT: 68 IN | WEIGHT: 178.8 LBS

## 2021-10-13 DIAGNOSIS — N13.8 BPH WITH URINARY OBSTRUCTION: ICD-10-CM

## 2021-10-13 DIAGNOSIS — N40.1 BPH WITH URINARY OBSTRUCTION: ICD-10-CM

## 2021-10-13 DIAGNOSIS — R33.9 URINARY RETENTION: Primary | ICD-10-CM

## 2021-10-13 DIAGNOSIS — Z97.8 FOLEY CATHETER IN PLACE: ICD-10-CM

## 2021-10-13 LAB — POST-VOID RESIDUAL VOLUME, ML POC: 44 ML

## 2021-10-13 PROCEDURE — 51798 US URINE CAPACITY MEASURE: CPT

## 2021-10-14 ENCOUNTER — PATIENT OUTREACH (OUTPATIENT)
Dept: CASE MANAGEMENT | Facility: OTHER | Age: 71
End: 2021-10-14

## 2021-10-15 ENCOUNTER — TELEPHONE (OUTPATIENT)
Dept: HEMATOLOGY ONCOLOGY | Facility: CLINIC | Age: 71
End: 2021-10-15

## 2021-10-15 DIAGNOSIS — I82.403 ACUTE DEEP VEIN THROMBOSIS (DVT) OF BOTH LOWER EXTREMITIES, UNSPECIFIED VEIN (HCC): ICD-10-CM

## 2021-10-18 ENCOUNTER — TELEPHONE (OUTPATIENT)
Dept: FAMILY MEDICINE CLINIC | Facility: HOSPITAL | Age: 71
End: 2021-10-18

## 2021-10-20 ENCOUNTER — APPOINTMENT (OUTPATIENT)
Dept: LAB | Facility: CLINIC | Age: 71
End: 2021-10-20
Payer: MEDICARE

## 2021-10-20 ENCOUNTER — TELEPHONE (OUTPATIENT)
Dept: HEMATOLOGY ONCOLOGY | Facility: CLINIC | Age: 71
End: 2021-10-20

## 2021-10-20 DIAGNOSIS — C34.11 MALIGNANT NEOPLASM OF UPPER LOBE OF RIGHT LUNG (HCC): Primary | ICD-10-CM

## 2021-10-20 DIAGNOSIS — C34.90 MALIGNANT NEOPLASM OF BRONCHUS AND LUNG (HCC): ICD-10-CM

## 2021-10-20 DIAGNOSIS — C34.90 ADENOCARCINOMA OF LUNG, UNSPECIFIED LATERALITY (HCC): ICD-10-CM

## 2021-10-20 DIAGNOSIS — C34.90 ADENOCARCINOMA OF LUNG, UNSPECIFIED LATERALITY (HCC): Primary | ICD-10-CM

## 2021-10-20 DIAGNOSIS — R91.8 MASS OF UPPER LOBE OF RIGHT LUNG: ICD-10-CM

## 2021-10-20 LAB
ALBUMIN SERPL BCP-MCNC: 2.3 G/DL (ref 3.5–5)
ALP SERPL-CCNC: 70 U/L (ref 46–116)
ALT SERPL W P-5'-P-CCNC: 50 U/L (ref 12–78)
ANION GAP SERPL CALCULATED.3IONS-SCNC: 5 MMOL/L (ref 4–13)
ANISOCYTOSIS BLD QL SMEAR: PRESENT
ARTIFACT: PRESENT
AST SERPL W P-5'-P-CCNC: 21 U/L (ref 5–45)
BASOPHILS # BLD MANUAL: 0 THOUSAND/UL (ref 0–0.1)
BASOPHILS NFR MAR MANUAL: 0 % (ref 0–1)
BILIRUB SERPL-MCNC: 0.32 MG/DL (ref 0.2–1)
BUN SERPL-MCNC: 10 MG/DL (ref 5–25)
CALCIUM ALBUM COR SERPL-MCNC: 12.4 MG/DL (ref 8.3–10.1)
CALCIUM SERPL-MCNC: 11 MG/DL (ref 8.3–10.1)
CHLORIDE SERPL-SCNC: 103 MMOL/L (ref 100–108)
CO2 SERPL-SCNC: 28 MMOL/L (ref 21–32)
CREAT SERPL-MCNC: 0.92 MG/DL (ref 0.6–1.3)
EOSINOPHIL # BLD MANUAL: 0.29 THOUSAND/UL (ref 0–0.4)
EOSINOPHIL NFR BLD MANUAL: 2 % (ref 0–6)
ERYTHROCYTE [DISTWIDTH] IN BLOOD BY AUTOMATED COUNT: 18.6 % (ref 11.6–15.1)
GFR SERPL CREATININE-BSD FRML MDRD: 84 ML/MIN/1.73SQ M
GLUCOSE SERPL-MCNC: 97 MG/DL (ref 65–140)
HCT VFR BLD AUTO: 32.3 % (ref 36.5–49.3)
HGB BLD-MCNC: 9.7 G/DL (ref 12–17)
LYMPHOCYTES # BLD AUTO: 1.62 THOUSAND/UL (ref 0.6–4.47)
LYMPHOCYTES # BLD AUTO: 11 % (ref 14–44)
MCH RBC QN AUTO: 25.9 PG (ref 26.8–34.3)
MCHC RBC AUTO-ENTMCNC: 30 G/DL (ref 31.4–37.4)
MCV RBC AUTO: 86 FL (ref 82–98)
MONOCYTES # BLD AUTO: 1.18 THOUSAND/UL (ref 0–1.22)
MONOCYTES NFR BLD: 8 % (ref 4–12)
MYELOCYTES NFR BLD MANUAL: 1 % (ref 0–1)
NEUTROPHILS # BLD MANUAL: 11.5 THOUSAND/UL (ref 1.85–7.62)
NEUTS BAND NFR BLD MANUAL: 4 % (ref 0–8)
NEUTS SEG NFR BLD AUTO: 74 % (ref 43–75)
PLATELET # BLD AUTO: 353 THOUSANDS/UL (ref 149–390)
PLATELET BLD QL SMEAR: ADEQUATE
PMV BLD AUTO: 10.1 FL (ref 8.9–12.7)
POLYCHROMASIA BLD QL SMEAR: PRESENT
POTASSIUM SERPL-SCNC: 4.2 MMOL/L (ref 3.5–5.3)
PROT SERPL-MCNC: 7.4 G/DL (ref 6.4–8.2)
RBC # BLD AUTO: 3.75 MILLION/UL (ref 3.88–5.62)
RBC MORPH BLD: PRESENT
SODIUM SERPL-SCNC: 136 MMOL/L (ref 136–145)
T3FREE SERPL-MCNC: 1.67 PG/ML (ref 2.3–4.2)
TSH SERPL DL<=0.05 MIU/L-ACNC: 1.92 UIU/ML (ref 0.36–3.74)
WBC # BLD AUTO: 14.74 THOUSAND/UL (ref 4.31–10.16)

## 2021-10-20 PROCEDURE — 84481 FREE ASSAY (FT-3): CPT

## 2021-10-20 PROCEDURE — 85027 COMPLETE CBC AUTOMATED: CPT

## 2021-10-20 PROCEDURE — 85007 BL SMEAR W/DIFF WBC COUNT: CPT

## 2021-10-20 PROCEDURE — 36415 COLL VENOUS BLD VENIPUNCTURE: CPT

## 2021-10-20 PROCEDURE — 80053 COMPREHEN METABOLIC PANEL: CPT

## 2021-10-20 PROCEDURE — 84443 ASSAY THYROID STIM HORMONE: CPT

## 2021-10-20 RX ORDER — SODIUM CHLORIDE 9 MG/ML
20 INJECTION, SOLUTION INTRAVENOUS ONCE
Status: CANCELLED | OUTPATIENT
Start: 2021-11-03

## 2021-10-21 ENCOUNTER — HOSPITAL ENCOUNTER (OUTPATIENT)
Dept: INFUSION CENTER | Facility: HOSPITAL | Age: 71
Discharge: HOME | End: 2021-10-21
Attending: INTERNAL MEDICINE
Payer: MEDICARE

## 2021-10-21 VITALS
SYSTOLIC BLOOD PRESSURE: 140 MMHG | DIASTOLIC BLOOD PRESSURE: 71 MMHG | BODY MASS INDEX: 26.53 KG/M2 | HEART RATE: 84 BPM | RESPIRATION RATE: 18 BRPM | WEIGHT: 175.04 LBS | OXYGEN SATURATION: 94 % | HEIGHT: 68 IN

## 2021-10-21 DIAGNOSIS — E83.52 HYPERCALCEMIA OF MALIGNANCY: Primary | ICD-10-CM

## 2021-10-21 PROCEDURE — 96401 CHEMO ANTI-NEOPL SQ/IM: CPT

## 2021-10-21 RX ADMIN — DENOSUMAB 120 MG: 120 INJECTION SUBCUTANEOUS at 15:33

## 2021-10-25 ENCOUNTER — OFFICE VISIT (OUTPATIENT)
Dept: FAMILY MEDICINE CLINIC | Facility: HOSPITAL | Age: 71
End: 2021-10-25
Payer: MEDICARE

## 2021-10-25 ENCOUNTER — HOSPITAL ENCOUNTER (OUTPATIENT)
Dept: RADIOLOGY | Facility: HOSPITAL | Age: 71
Discharge: HOME | End: 2021-10-25
Payer: MEDICARE

## 2021-10-25 VITALS
HEART RATE: 94 BPM | OXYGEN SATURATION: 89 % | WEIGHT: 172.6 LBS | HEIGHT: 68 IN | TEMPERATURE: 99 F | SYSTOLIC BLOOD PRESSURE: 122 MMHG | BODY MASS INDEX: 26.16 KG/M2 | DIASTOLIC BLOOD PRESSURE: 70 MMHG

## 2021-10-25 DIAGNOSIS — R22.9 SKIN MASS: ICD-10-CM

## 2021-10-25 DIAGNOSIS — R05.9 COUGHING: ICD-10-CM

## 2021-10-25 DIAGNOSIS — R50.9 FEVER AND CHILLS: ICD-10-CM

## 2021-10-25 DIAGNOSIS — C34.11 MALIGNANT NEOPLASM OF UPPER LOBE OF RIGHT LUNG (HCC): ICD-10-CM

## 2021-10-25 DIAGNOSIS — J96.11 CHRONIC RESPIRATORY FAILURE WITH HYPOXIA (HCC): ICD-10-CM

## 2021-10-25 DIAGNOSIS — C34.11 MALIGNANT NEOPLASM OF UPPER LOBE OF RIGHT LUNG (HCC): Primary | ICD-10-CM

## 2021-10-25 PROCEDURE — U0003 INFECTIOUS AGENT DETECTION BY NUCLEIC ACID (DNA OR RNA); SEVERE ACUTE RESPIRATORY SYNDROME CORONAVIRUS 2 (SARS-COV-2) (CORONAVIRUS DISEASE [COVID-19]), AMPLIFIED PROBE TECHNIQUE, MAKING USE OF HIGH THROUGHPUT TECHNOLOGIES AS DESCRIBED BY CMS-2020-01-R: HCPCS | Performed by: FAMILY MEDICINE

## 2021-10-25 PROCEDURE — 99214 OFFICE O/P EST MOD 30 MIN: CPT | Performed by: FAMILY MEDICINE

## 2021-10-25 PROCEDURE — U0005 INFEC AGEN DETEC AMPLI PROBE: HCPCS | Performed by: FAMILY MEDICINE

## 2021-10-25 PROCEDURE — 71046 X-RAY EXAM CHEST 2 VIEWS: CPT

## 2021-10-26 LAB — SARS-COV-2 RNA RESP QL NAA+PROBE: NEGATIVE

## 2021-10-27 ENCOUNTER — OFFICE VISIT (OUTPATIENT)
Dept: FAMILY MEDICINE CLINIC | Facility: HOSPITAL | Age: 71
End: 2021-10-27
Payer: MEDICARE

## 2021-10-27 ENCOUNTER — HOSPITAL ENCOUNTER (OUTPATIENT)
Dept: INFUSION CENTER | Facility: HOSPITAL | Age: 71
Discharge: HOME | End: 2021-10-27
Attending: INTERNAL MEDICINE

## 2021-10-27 ENCOUNTER — OFFICE VISIT (OUTPATIENT)
Dept: HEMATOLOGY ONCOLOGY | Facility: HOSPITAL | Age: 71
End: 2021-10-27
Payer: MEDICARE

## 2021-10-27 VITALS
SYSTOLIC BLOOD PRESSURE: 130 MMHG | BODY MASS INDEX: 26.52 KG/M2 | RESPIRATION RATE: 16 BRPM | HEART RATE: 85 BPM | DIASTOLIC BLOOD PRESSURE: 84 MMHG | TEMPERATURE: 96.8 F | WEIGHT: 175 LBS | OXYGEN SATURATION: 95 % | HEIGHT: 68 IN

## 2021-10-27 VITALS
DIASTOLIC BLOOD PRESSURE: 62 MMHG | HEIGHT: 68 IN | SYSTOLIC BLOOD PRESSURE: 118 MMHG | BODY MASS INDEX: 26.37 KG/M2 | WEIGHT: 174 LBS | TEMPERATURE: 96.9 F | HEART RATE: 79 BPM

## 2021-10-27 DIAGNOSIS — D70.1 CHEMOTHERAPY INDUCED NEUTROPENIA (HCC): ICD-10-CM

## 2021-10-27 DIAGNOSIS — C34.90 ADENOCARCINOMA OF LUNG, UNSPECIFIED LATERALITY (HCC): Primary | ICD-10-CM

## 2021-10-27 DIAGNOSIS — J39.2 NASOPHARYNGEAL MASS: ICD-10-CM

## 2021-10-27 DIAGNOSIS — C34.11 MALIGNANT NEOPLASM OF UPPER LOBE OF RIGHT LUNG (HCC): ICD-10-CM

## 2021-10-27 DIAGNOSIS — J98.8 BACTERIAL RESPIRATORY INFECTION: Primary | ICD-10-CM

## 2021-10-27 DIAGNOSIS — R91.8 MASS OF UPPER LOBE OF RIGHT LUNG: ICD-10-CM

## 2021-10-27 DIAGNOSIS — Z88.9 DRUG ALLERGY: ICD-10-CM

## 2021-10-27 DIAGNOSIS — T45.1X5A CHEMOTHERAPY INDUCED NEUTROPENIA (HCC): ICD-10-CM

## 2021-10-27 DIAGNOSIS — B96.89 BACTERIAL RESPIRATORY INFECTION: Primary | ICD-10-CM

## 2021-10-27 PROCEDURE — 99214 OFFICE O/P EST MOD 30 MIN: CPT | Performed by: FAMILY MEDICINE

## 2021-10-27 PROCEDURE — 99215 OFFICE O/P EST HI 40 MIN: CPT | Performed by: NURSE PRACTITIONER

## 2021-10-27 RX ORDER — AMOXICILLIN AND CLAVULANATE POTASSIUM 875; 125 MG/1; MG/1
1 TABLET, FILM COATED ORAL EVERY 12 HOURS SCHEDULED
Qty: 20 TABLET | Refills: 0 | Status: SHIPPED | OUTPATIENT
Start: 2021-10-27 | End: 2021-11-06

## 2021-10-29 ENCOUNTER — TELEPHONE (OUTPATIENT)
Dept: SURGICAL ONCOLOGY | Facility: CLINIC | Age: 71
End: 2021-10-29

## 2021-10-29 ENCOUNTER — TELEPHONE (OUTPATIENT)
Dept: OTHER | Facility: OTHER | Age: 71
End: 2021-10-29

## 2021-10-29 ENCOUNTER — APPOINTMENT (OUTPATIENT)
Dept: LAB | Facility: CLINIC | Age: 71
End: 2021-10-29
Payer: MEDICARE

## 2021-10-29 DIAGNOSIS — C34.90 ADENOCARCINOMA OF LUNG, UNSPECIFIED LATERALITY (HCC): ICD-10-CM

## 2021-10-29 DIAGNOSIS — C34.11 MALIGNANT NEOPLASM OF UPPER LOBE OF RIGHT LUNG (HCC): Primary | ICD-10-CM

## 2021-10-29 DIAGNOSIS — R91.8 MASS OF UPPER LOBE OF RIGHT LUNG: ICD-10-CM

## 2021-10-29 LAB
ALBUMIN SERPL BCP-MCNC: 1.9 G/DL (ref 3.5–5)
ALP SERPL-CCNC: 55 U/L (ref 46–116)
ALT SERPL W P-5'-P-CCNC: 20 U/L (ref 12–78)
ANION GAP SERPL CALCULATED.3IONS-SCNC: 4 MMOL/L (ref 4–13)
AST SERPL W P-5'-P-CCNC: 16 U/L (ref 5–45)
BASOPHILS # BLD AUTO: 0.14 THOUSANDS/ΜL (ref 0–0.1)
BASOPHILS NFR BLD AUTO: 1 % (ref 0–1)
BILIRUB SERPL-MCNC: 0.35 MG/DL (ref 0.2–1)
BUN SERPL-MCNC: 10 MG/DL (ref 5–25)
CALCIUM ALBUM COR SERPL-MCNC: 11.1 MG/DL (ref 8.3–10.1)
CALCIUM SERPL-MCNC: 9.4 MG/DL (ref 8.3–10.1)
CHLORIDE SERPL-SCNC: 107 MMOL/L (ref 100–108)
CO2 SERPL-SCNC: 27 MMOL/L (ref 21–32)
CREAT SERPL-MCNC: 0.74 MG/DL (ref 0.6–1.3)
EOSINOPHIL # BLD AUTO: 0.01 THOUSAND/ΜL (ref 0–0.61)
EOSINOPHIL NFR BLD AUTO: 0 % (ref 0–6)
ERYTHROCYTE [DISTWIDTH] IN BLOOD BY AUTOMATED COUNT: 19.2 % (ref 11.6–15.1)
GFR SERPL CREATININE-BSD FRML MDRD: 93 ML/MIN/1.73SQ M
GLUCOSE SERPL-MCNC: 99 MG/DL (ref 65–140)
HCT VFR BLD AUTO: 31.3 % (ref 36.5–49.3)
HGB BLD-MCNC: 9.1 G/DL (ref 12–17)
IMM GRANULOCYTES # BLD AUTO: 0.18 THOUSAND/UL (ref 0–0.2)
IMM GRANULOCYTES NFR BLD AUTO: 2 % (ref 0–2)
LYMPHOCYTES # BLD AUTO: 2.28 THOUSANDS/ΜL (ref 0.6–4.47)
LYMPHOCYTES NFR BLD AUTO: 21 % (ref 14–44)
MCH RBC QN AUTO: 25.2 PG (ref 26.8–34.3)
MCHC RBC AUTO-ENTMCNC: 29.1 G/DL (ref 31.4–37.4)
MCV RBC AUTO: 87 FL (ref 82–98)
MONOCYTES # BLD AUTO: 1.74 THOUSAND/ΜL (ref 0.17–1.22)
MONOCYTES NFR BLD AUTO: 16 % (ref 4–12)
NEUTROPHILS # BLD AUTO: 6.74 THOUSANDS/ΜL (ref 1.85–7.62)
NEUTS SEG NFR BLD AUTO: 60 % (ref 43–75)
NRBC BLD AUTO-RTO: 0 /100 WBCS
PLATELET # BLD AUTO: 1059 THOUSANDS/UL (ref 149–390)
PMV BLD AUTO: 9.5 FL (ref 8.9–12.7)
POTASSIUM SERPL-SCNC: 4 MMOL/L (ref 3.5–5.3)
PROT SERPL-MCNC: 6.9 G/DL (ref 6.4–8.2)
RBC # BLD AUTO: 3.61 MILLION/UL (ref 3.88–5.62)
SODIUM SERPL-SCNC: 138 MMOL/L (ref 136–145)
T3FREE SERPL-MCNC: 1.64 PG/ML (ref 2.3–4.2)
TSH SERPL DL<=0.05 MIU/L-ACNC: 1.08 UIU/ML (ref 0.36–3.74)
WBC # BLD AUTO: 11.09 THOUSAND/UL (ref 4.31–10.16)

## 2021-10-29 PROCEDURE — 85025 COMPLETE CBC W/AUTO DIFF WBC: CPT

## 2021-10-29 PROCEDURE — 84443 ASSAY THYROID STIM HORMONE: CPT

## 2021-10-29 PROCEDURE — 36415 COLL VENOUS BLD VENIPUNCTURE: CPT

## 2021-10-29 PROCEDURE — 80053 COMPREHEN METABOLIC PANEL: CPT

## 2021-10-29 PROCEDURE — 84481 FREE ASSAY (FT-3): CPT

## 2021-10-30 ENCOUNTER — TELEPHONE (OUTPATIENT)
Dept: HEMATOLOGY ONCOLOGY | Facility: CLINIC | Age: 71
End: 2021-10-30

## 2021-11-01 ENCOUNTER — OFFICE VISIT (OUTPATIENT)
Dept: FAMILY MEDICINE CLINIC | Facility: HOSPITAL | Age: 71
End: 2021-11-01
Payer: COMMERCIAL

## 2021-11-01 VITALS
TEMPERATURE: 98.1 F | SYSTOLIC BLOOD PRESSURE: 136 MMHG | OXYGEN SATURATION: 93 % | HEIGHT: 68 IN | WEIGHT: 175.8 LBS | HEART RATE: 83 BPM | BODY MASS INDEX: 26.64 KG/M2 | DIASTOLIC BLOOD PRESSURE: 82 MMHG

## 2021-11-01 DIAGNOSIS — J18.9 PNEUMONIA OF RIGHT LOWER LOBE DUE TO INFECTIOUS ORGANISM: Primary | ICD-10-CM

## 2021-11-01 DIAGNOSIS — C34.11 MALIGNANT NEOPLASM OF UPPER LOBE OF RIGHT LUNG (HCC): ICD-10-CM

## 2021-11-01 PROCEDURE — 99214 OFFICE O/P EST MOD 30 MIN: CPT | Performed by: FAMILY MEDICINE

## 2021-11-03 ENCOUNTER — HOSPITAL ENCOUNTER (OUTPATIENT)
Dept: INFUSION CENTER | Facility: HOSPITAL | Age: 71
Discharge: HOME | End: 2021-11-03
Attending: INTERNAL MEDICINE
Payer: COMMERCIAL

## 2021-11-03 VITALS
HEART RATE: 66 BPM | DIASTOLIC BLOOD PRESSURE: 71 MMHG | SYSTOLIC BLOOD PRESSURE: 137 MMHG | BODY MASS INDEX: 26.67 KG/M2 | HEIGHT: 68 IN | TEMPERATURE: 97.4 F | RESPIRATION RATE: 16 BRPM | WEIGHT: 176 LBS | OXYGEN SATURATION: 96 %

## 2021-11-03 DIAGNOSIS — C34.11 MALIGNANT NEOPLASM OF UPPER LOBE OF RIGHT LUNG (HCC): Primary | ICD-10-CM

## 2021-11-03 DIAGNOSIS — D70.1 CHEMOTHERAPY INDUCED NEUTROPENIA (HCC): ICD-10-CM

## 2021-11-03 DIAGNOSIS — T45.1X5A CHEMOTHERAPY INDUCED NEUTROPENIA (HCC): ICD-10-CM

## 2021-11-03 DIAGNOSIS — R91.8 MASS OF UPPER LOBE OF RIGHT LUNG: ICD-10-CM

## 2021-11-03 DIAGNOSIS — C34.90 ADENOCARCINOMA OF LUNG, UNSPECIFIED LATERALITY (HCC): ICD-10-CM

## 2021-11-03 LAB
BASOPHILS # BLD AUTO: 0.08 THOUSANDS/ΜL (ref 0–0.1)
BASOPHILS NFR BLD AUTO: 1 % (ref 0–1)
EOSINOPHIL # BLD AUTO: 0.01 THOUSAND/ΜL (ref 0–0.61)
EOSINOPHIL NFR BLD AUTO: 0 % (ref 0–6)
ERYTHROCYTE [DISTWIDTH] IN BLOOD BY AUTOMATED COUNT: 19.7 % (ref 11.6–15.1)
HCT VFR BLD AUTO: 34.7 % (ref 36.5–49.3)
HGB BLD-MCNC: 9.7 G/DL (ref 12–17)
IMM GRANULOCYTES # BLD AUTO: 0.13 THOUSAND/UL (ref 0–0.2)
IMM GRANULOCYTES NFR BLD AUTO: 1 % (ref 0–2)
LYMPHOCYTES # BLD AUTO: 2.6 THOUSANDS/ΜL (ref 0.6–4.47)
LYMPHOCYTES NFR BLD AUTO: 20 % (ref 14–44)
MCH RBC QN AUTO: 24.9 PG (ref 26.8–34.3)
MCHC RBC AUTO-ENTMCNC: 28 G/DL (ref 31.4–37.4)
MCV RBC AUTO: 89 FL (ref 82–98)
MONOCYTES # BLD AUTO: 1.76 THOUSAND/ΜL (ref 0.17–1.22)
MONOCYTES NFR BLD AUTO: 14 % (ref 4–12)
NEUTROPHILS # BLD AUTO: 8.4 THOUSANDS/ΜL (ref 1.85–7.62)
NEUTS SEG NFR BLD AUTO: 64 % (ref 43–75)
NRBC BLD AUTO-RTO: 0 /100 WBCS
PLATELET # BLD AUTO: 796 THOUSANDS/UL (ref 149–390)
PMV BLD AUTO: 10.5 FL (ref 8.9–12.7)
RBC # BLD AUTO: 3.89 MILLION/UL (ref 3.88–5.62)
WBC # BLD AUTO: 12.98 THOUSAND/UL (ref 4.31–10.16)

## 2021-11-03 PROCEDURE — 96413 CHEMO IV INFUSION 1 HR: CPT

## 2021-11-03 PROCEDURE — 96377 APPLICATON ON-BODY INJECTOR: CPT

## 2021-11-03 PROCEDURE — 96367 TX/PROPH/DG ADDL SEQ IV INF: CPT

## 2021-11-03 PROCEDURE — 96411 CHEMO IV PUSH ADDL DRUG: CPT

## 2021-11-03 PROCEDURE — 85025 COMPLETE CBC W/AUTO DIFF WBC: CPT

## 2021-11-03 PROCEDURE — 96417 CHEMO IV INFUS EACH ADDL SEQ: CPT

## 2021-11-03 RX ORDER — SODIUM CHLORIDE 9 MG/ML
20 INJECTION, SOLUTION INTRAVENOUS ONCE
Status: COMPLETED | OUTPATIENT
Start: 2021-11-03 | End: 2021-11-03

## 2021-11-03 RX ADMIN — SODIUM CHLORIDE 1000 MG: 9 INJECTION, SOLUTION INTRAVENOUS at 13:50

## 2021-11-03 RX ADMIN — PEGFILGRASTIM 6 MG: KIT SUBCUTANEOUS at 15:09

## 2021-11-03 RX ADMIN — SODIUM CHLORIDE 200 MG: 9 INJECTION, SOLUTION INTRAVENOUS at 12:59

## 2021-11-03 RX ADMIN — DEXAMETHASONE SODIUM PHOSPHATE: 10 INJECTION, SOLUTION INTRAMUSCULAR; INTRAVENOUS at 11:42

## 2021-11-03 RX ADMIN — CARBOPLATIN 604.5 MG: 10 INJECTION, SOLUTION INTRAVENOUS at 14:05

## 2021-11-03 RX ADMIN — FOSAPREPITANT DIMEGLUMINE 150 MG: 150 INJECTION, POWDER, LYOPHILIZED, FOR SOLUTION INTRAVENOUS at 12:05

## 2021-11-03 RX ADMIN — SODIUM CHLORIDE 20 ML/HR: 0.9 INJECTION, SOLUTION INTRAVENOUS at 11:40

## 2021-11-08 DIAGNOSIS — I63.9 ACUTE CVA (CEREBROVASCULAR ACCIDENT) (HCC): ICD-10-CM

## 2021-11-08 RX ORDER — ATORVASTATIN CALCIUM 40 MG/1
40 TABLET, FILM COATED ORAL
Qty: 90 TABLET | Refills: 2 | Status: SHIPPED | OUTPATIENT
Start: 2021-11-08

## 2021-11-08 RX ORDER — ATORVASTATIN CALCIUM 40 MG/1
TABLET, FILM COATED ORAL
Qty: 30 TABLET | Refills: 2 | OUTPATIENT
Start: 2021-11-08

## 2021-11-10 ENCOUNTER — PATIENT OUTREACH (OUTPATIENT)
Dept: CASE MANAGEMENT | Facility: OTHER | Age: 71
End: 2021-11-10

## 2021-11-11 DIAGNOSIS — I82.403 ACUTE DEEP VEIN THROMBOSIS (DVT) OF BOTH LOWER EXTREMITIES, UNSPECIFIED VEIN (HCC): ICD-10-CM

## 2021-11-17 NOTE — PROGRESS NOTES
11/17/21 1242   Hello, [Guardians Name / Marybel Newberry, this is [Caller Patric Dalton from [facility], and our clinical care team wanted to check on you / your child after your recent visit to the hospital  It will only take 3-5 minutes  Is this a good time? Discharge Call Type/ Specific Diagnosis: ARC 90 Day Call   ARC 90 Day Discharge Call   Assessment Source 2   Call Complete for 90 Days call back? Complete   Call Complete   Hi, This is ________ from [facility]  This is just a courtesy call, and there is no need to call us back  Have a great day     (Called by [facility])

## 2021-11-18 ENCOUNTER — APPOINTMENT (OUTPATIENT)
Dept: LAB | Facility: CLINIC | Age: 71
End: 2021-11-18
Payer: COMMERCIAL

## 2021-11-18 DIAGNOSIS — C34.11 MALIGNANT NEOPLASM OF UPPER LOBE OF RIGHT LUNG (HCC): ICD-10-CM

## 2021-11-18 DIAGNOSIS — C34.90 ADENOCARCINOMA OF LUNG, UNSPECIFIED LATERALITY (HCC): ICD-10-CM

## 2021-11-18 DIAGNOSIS — R91.8 MASS OF UPPER LOBE OF RIGHT LUNG: ICD-10-CM

## 2021-11-18 LAB
ALBUMIN SERPL BCP-MCNC: 2.3 G/DL (ref 3.5–5)
ALP SERPL-CCNC: 92 U/L (ref 46–116)
ALT SERPL W P-5'-P-CCNC: 51 U/L (ref 12–78)
ANION GAP SERPL CALCULATED.3IONS-SCNC: 5 MMOL/L (ref 4–13)
ANISOCYTOSIS BLD QL SMEAR: PRESENT
AST SERPL W P-5'-P-CCNC: 18 U/L (ref 5–45)
BASOPHILS # BLD MANUAL: 0.17 THOUSAND/UL (ref 0–0.1)
BASOPHILS NFR MAR MANUAL: 1 % (ref 0–1)
BILIRUB SERPL-MCNC: 0.16 MG/DL (ref 0.2–1)
BUN SERPL-MCNC: 12 MG/DL (ref 5–25)
CALCIUM ALBUM COR SERPL-MCNC: 11.1 MG/DL (ref 8.3–10.1)
CALCIUM SERPL-MCNC: 9.7 MG/DL (ref 8.3–10.1)
CHLORIDE SERPL-SCNC: 105 MMOL/L (ref 100–108)
CO2 SERPL-SCNC: 28 MMOL/L (ref 21–32)
CREAT SERPL-MCNC: 0.79 MG/DL (ref 0.6–1.3)
EOSINOPHIL # BLD MANUAL: 0.33 THOUSAND/UL (ref 0–0.4)
EOSINOPHIL NFR BLD MANUAL: 2 % (ref 0–6)
ERYTHROCYTE [DISTWIDTH] IN BLOOD BY AUTOMATED COUNT: 19.8 % (ref 11.6–15.1)
GFR SERPL CREATININE-BSD FRML MDRD: 91 ML/MIN/1.73SQ M
GLUCOSE P FAST SERPL-MCNC: 132 MG/DL (ref 65–99)
HCT VFR BLD AUTO: 32.2 % (ref 36.5–49.3)
HGB BLD-MCNC: 9.4 G/DL (ref 12–17)
LYMPHOCYTES # BLD AUTO: 14 % (ref 14–44)
LYMPHOCYTES # BLD AUTO: 2.33 THOUSAND/UL (ref 0.6–4.47)
MCH RBC QN AUTO: 26 PG (ref 26.8–34.3)
MCHC RBC AUTO-ENTMCNC: 29.2 G/DL (ref 31.4–37.4)
MCV RBC AUTO: 89 FL (ref 82–98)
METAMYELOCYTES NFR BLD MANUAL: 2 % (ref 0–1)
MONOCYTES # BLD AUTO: 0.83 THOUSAND/UL (ref 0–1.22)
MONOCYTES NFR BLD: 5 % (ref 4–12)
MYELOCYTES NFR BLD MANUAL: 1 % (ref 0–1)
NEUTROPHILS # BLD MANUAL: 12.49 THOUSAND/UL (ref 1.85–7.62)
NEUTS BAND NFR BLD MANUAL: 4 % (ref 0–8)
NEUTS SEG NFR BLD AUTO: 71 % (ref 43–75)
PLATELET # BLD AUTO: 305 THOUSANDS/UL (ref 149–390)
PLATELET BLD QL SMEAR: ADEQUATE
PMV BLD AUTO: 10.1 FL (ref 8.9–12.7)
POIKILOCYTOSIS BLD QL SMEAR: PRESENT
POLYCHROMASIA BLD QL SMEAR: PRESENT
POTASSIUM SERPL-SCNC: 4.1 MMOL/L (ref 3.5–5.3)
PROT SERPL-MCNC: 7.2 G/DL (ref 6.4–8.2)
RBC # BLD AUTO: 3.62 MILLION/UL (ref 3.88–5.62)
RBC MORPH BLD: PRESENT
SODIUM SERPL-SCNC: 138 MMOL/L (ref 136–145)
T3FREE SERPL-MCNC: 1.9 PG/ML (ref 2.3–4.2)
TSH SERPL DL<=0.05 MIU/L-ACNC: 3.2 UIU/ML (ref 0.36–3.74)
WBC # BLD AUTO: 16.65 THOUSAND/UL (ref 4.31–10.16)

## 2021-11-18 PROCEDURE — 84481 FREE ASSAY (FT-3): CPT

## 2021-11-18 PROCEDURE — 85007 BL SMEAR W/DIFF WBC COUNT: CPT

## 2021-11-18 PROCEDURE — 36415 COLL VENOUS BLD VENIPUNCTURE: CPT

## 2021-11-18 PROCEDURE — 80053 COMPREHEN METABOLIC PANEL: CPT

## 2021-11-18 PROCEDURE — 84443 ASSAY THYROID STIM HORMONE: CPT

## 2021-11-18 PROCEDURE — 85027 COMPLETE CBC AUTOMATED: CPT

## 2021-11-19 RX ORDER — SODIUM CHLORIDE 9 MG/ML
20 INJECTION, SOLUTION INTRAVENOUS ONCE
Status: CANCELLED | OUTPATIENT
Start: 2021-11-24

## 2021-11-19 RX ORDER — CYANOCOBALAMIN 1000 UG/ML
1000 INJECTION INTRAMUSCULAR; SUBCUTANEOUS ONCE
Status: CANCELLED | OUTPATIENT
Start: 2021-11-24 | End: 2021-11-24

## 2021-11-23 DIAGNOSIS — C34.11 MALIGNANT NEOPLASM OF UPPER LOBE OF RIGHT LUNG (HCC): ICD-10-CM

## 2021-11-23 RX ORDER — DEXAMETHASONE 4 MG/1
TABLET ORAL
Qty: 16 TABLET | Refills: 0 | Status: SHIPPED | OUTPATIENT
Start: 2021-11-23 | End: 2022-01-03 | Stop reason: SDUPTHER

## 2021-11-24 ENCOUNTER — HOSPITAL ENCOUNTER (OUTPATIENT)
Dept: INFUSION CENTER | Facility: HOSPITAL | Age: 71
Discharge: HOME | End: 2021-11-24
Attending: INTERNAL MEDICINE
Payer: COMMERCIAL

## 2021-11-24 ENCOUNTER — PATIENT OUTREACH (OUTPATIENT)
Dept: HEMATOLOGY ONCOLOGY | Facility: CLINIC | Age: 71
End: 2021-11-24

## 2021-11-24 VITALS
HEIGHT: 68 IN | HEART RATE: 63 BPM | OXYGEN SATURATION: 96 % | BODY MASS INDEX: 25.89 KG/M2 | RESPIRATION RATE: 16 BRPM | TEMPERATURE: 97.2 F | WEIGHT: 170.86 LBS | DIASTOLIC BLOOD PRESSURE: 67 MMHG | SYSTOLIC BLOOD PRESSURE: 146 MMHG

## 2021-11-24 DIAGNOSIS — T45.1X5A CHEMOTHERAPY INDUCED NEUTROPENIA (HCC): ICD-10-CM

## 2021-11-24 DIAGNOSIS — D70.1 CHEMOTHERAPY INDUCED NEUTROPENIA (HCC): ICD-10-CM

## 2021-11-24 DIAGNOSIS — C34.90 ADENOCARCINOMA OF LUNG, UNSPECIFIED LATERALITY (HCC): ICD-10-CM

## 2021-11-24 DIAGNOSIS — R91.8 MASS OF UPPER LOBE OF RIGHT LUNG: ICD-10-CM

## 2021-11-24 DIAGNOSIS — C34.11 MALIGNANT NEOPLASM OF UPPER LOBE OF RIGHT LUNG (HCC): Primary | ICD-10-CM

## 2021-11-24 DIAGNOSIS — E83.52 HYPERCALCEMIA OF MALIGNANCY: ICD-10-CM

## 2021-11-24 PROCEDURE — 96367 TX/PROPH/DG ADDL SEQ IV INF: CPT

## 2021-11-24 PROCEDURE — 96411 CHEMO IV PUSH ADDL DRUG: CPT

## 2021-11-24 PROCEDURE — 96401 CHEMO ANTI-NEOPL SQ/IM: CPT

## 2021-11-24 PROCEDURE — 96377 APPLICATON ON-BODY INJECTOR: CPT

## 2021-11-24 PROCEDURE — 96413 CHEMO IV INFUSION 1 HR: CPT

## 2021-11-24 PROCEDURE — 96417 CHEMO IV INFUS EACH ADDL SEQ: CPT

## 2021-11-24 PROCEDURE — 96372 THER/PROPH/DIAG INJ SC/IM: CPT

## 2021-11-24 RX ORDER — CYANOCOBALAMIN 1000 UG/ML
1000 INJECTION INTRAMUSCULAR; SUBCUTANEOUS ONCE
Status: COMPLETED | OUTPATIENT
Start: 2021-11-24 | End: 2021-11-24

## 2021-11-24 RX ORDER — SODIUM CHLORIDE 9 MG/ML
20 INJECTION, SOLUTION INTRAVENOUS ONCE
Status: COMPLETED | OUTPATIENT
Start: 2021-11-24 | End: 2021-11-24

## 2021-11-24 RX ADMIN — CYANOCOBALAMIN 1000 MCG: 1000 INJECTION, SOLUTION INTRAMUSCULAR at 12:52

## 2021-11-24 RX ADMIN — CARBOPLATIN 574 MG: 10 INJECTION, SOLUTION INTRAVENOUS at 15:20

## 2021-11-24 RX ADMIN — DENOSUMAB 120 MG: 120 INJECTION SUBCUTANEOUS at 13:12

## 2021-11-24 RX ADMIN — DEXAMETHASONE SODIUM PHOSPHATE: 10 INJECTION, SOLUTION INTRAMUSCULAR; INTRAVENOUS at 12:47

## 2021-11-24 RX ADMIN — SODIUM CHLORIDE 20 ML/HR: 0.9 INJECTION, SOLUTION INTRAVENOUS at 12:46

## 2021-11-24 RX ADMIN — PEGFILGRASTIM 6 MG: KIT SUBCUTANEOUS at 16:04

## 2021-11-24 RX ADMIN — SODIUM CHLORIDE 1000 MG: 9 INJECTION, SOLUTION INTRAVENOUS at 15:02

## 2021-11-24 RX ADMIN — SODIUM CHLORIDE 200 MG: 9 INJECTION, SOLUTION INTRAVENOUS at 14:10

## 2021-11-24 RX ADMIN — FOSAPREPITANT DIMEGLUMINE 150 MG: 150 INJECTION, POWDER, LYOPHILIZED, FOR SOLUTION INTRAVENOUS at 13:35

## 2021-11-25 ENCOUNTER — HOSPITAL ENCOUNTER (EMERGENCY)
Facility: HOSPITAL | Age: 71
Discharge: HOME | End: 2021-11-25
Attending: EMERGENCY MEDICINE | Admitting: EMERGENCY MEDICINE
Payer: COMMERCIAL

## 2021-11-25 ENCOUNTER — TELEPHONE (OUTPATIENT)
Dept: OTHER | Facility: OTHER | Age: 71
End: 2021-11-25

## 2021-11-25 VITALS
TEMPERATURE: 97.9 F | HEIGHT: 68 IN | RESPIRATION RATE: 20 BRPM | DIASTOLIC BLOOD PRESSURE: 81 MMHG | SYSTOLIC BLOOD PRESSURE: 133 MMHG | OXYGEN SATURATION: 96 % | WEIGHT: 159 LBS | BODY MASS INDEX: 24.1 KG/M2 | HEART RATE: 76 BPM

## 2021-11-25 DIAGNOSIS — T50.901A ACCIDENTAL MEDICATION ERROR, INITIAL ENCOUNTER: Primary | ICD-10-CM

## 2021-11-25 PROCEDURE — 99282 EMERGENCY DEPT VISIT SF MDM: CPT | Performed by: EMERGENCY MEDICINE

## 2021-11-25 PROCEDURE — 99282 EMERGENCY DEPT VISIT SF MDM: CPT

## 2021-11-26 ENCOUNTER — OFFICE VISIT (OUTPATIENT)
Dept: HEMATOLOGY ONCOLOGY | Facility: HOSPITAL | Age: 71
End: 2021-11-26
Payer: COMMERCIAL

## 2021-11-26 ENCOUNTER — HOSPITAL ENCOUNTER (OUTPATIENT)
Dept: INFUSION CENTER | Facility: HOSPITAL | Age: 71
Discharge: HOME | End: 2021-11-26
Attending: INTERNAL MEDICINE
Payer: COMMERCIAL

## 2021-11-26 VITALS
OXYGEN SATURATION: 97 % | HEART RATE: 59 BPM | RESPIRATION RATE: 14 BRPM | HEIGHT: 68 IN | SYSTOLIC BLOOD PRESSURE: 200 MMHG | WEIGHT: 172 LBS | DIASTOLIC BLOOD PRESSURE: 110 MMHG | BODY MASS INDEX: 26.07 KG/M2 | TEMPERATURE: 96.6 F

## 2021-11-26 VITALS — TEMPERATURE: 96.3 F

## 2021-11-26 DIAGNOSIS — C34.90 ADENOCARCINOMA OF LUNG, UNSPECIFIED LATERALITY (HCC): Primary | ICD-10-CM

## 2021-11-26 DIAGNOSIS — R91.8 MASS OF UPPER LOBE OF RIGHT LUNG: ICD-10-CM

## 2021-11-26 DIAGNOSIS — T45.1X5A CHEMOTHERAPY INDUCED NEUTROPENIA (HCC): ICD-10-CM

## 2021-11-26 DIAGNOSIS — C34.90 ADENOCARCINOMA OF LUNG, UNSPECIFIED LATERALITY (HCC): ICD-10-CM

## 2021-11-26 DIAGNOSIS — D70.1 CHEMOTHERAPY INDUCED NEUTROPENIA (HCC): ICD-10-CM

## 2021-11-26 DIAGNOSIS — T45.1X5A CHEMOTHERAPY INDUCED NEUTROPENIA (HCC): Primary | ICD-10-CM

## 2021-11-26 DIAGNOSIS — C34.11 MALIGNANT NEOPLASM OF UPPER LOBE OF RIGHT LUNG (HCC): ICD-10-CM

## 2021-11-26 DIAGNOSIS — D70.1 CHEMOTHERAPY INDUCED NEUTROPENIA (HCC): Primary | ICD-10-CM

## 2021-11-26 DIAGNOSIS — C34.11 MALIGNANT NEOPLASM OF UPPER LOBE OF RIGHT LUNG (HCC): Primary | ICD-10-CM

## 2021-11-26 DIAGNOSIS — J39.2 NASOPHARYNGEAL MASS: ICD-10-CM

## 2021-11-26 PROCEDURE — 99214 OFFICE O/P EST MOD 30 MIN: CPT | Performed by: INTERNAL MEDICINE

## 2021-11-26 PROCEDURE — 96372 THER/PROPH/DIAG INJ SC/IM: CPT

## 2021-11-26 RX ADMIN — PEGFILGRASTIM 6 MG: 6 INJECTION SUBCUTANEOUS at 13:31

## 2021-12-05 DIAGNOSIS — N13.8 BPH WITH URINARY OBSTRUCTION: ICD-10-CM

## 2021-12-05 DIAGNOSIS — N40.1 BPH WITH URINARY OBSTRUCTION: ICD-10-CM

## 2021-12-06 RX ORDER — TAMSULOSIN HYDROCHLORIDE 0.4 MG/1
CAPSULE ORAL
Qty: 30 CAPSULE | Refills: 3 | Status: SHIPPED | OUTPATIENT
Start: 2021-12-06 | End: 2022-03-08

## 2021-12-09 ENCOUNTER — APPOINTMENT (OUTPATIENT)
Dept: LAB | Facility: CLINIC | Age: 71
End: 2021-12-09
Payer: COMMERCIAL

## 2021-12-09 DIAGNOSIS — C34.90 ADENOCARCINOMA OF LUNG, UNSPECIFIED LATERALITY (HCC): ICD-10-CM

## 2021-12-09 DIAGNOSIS — R91.8 MASS OF UPPER LOBE OF RIGHT LUNG: ICD-10-CM

## 2021-12-09 DIAGNOSIS — E83.52 HYPERCALCEMIA OF MALIGNANCY: ICD-10-CM

## 2021-12-09 DIAGNOSIS — C34.11 MALIGNANT NEOPLASM OF UPPER LOBE OF RIGHT LUNG (HCC): ICD-10-CM

## 2021-12-09 LAB
ALBUMIN SERPL BCP-MCNC: 3 G/DL (ref 3.5–5)
ALP SERPL-CCNC: 149 U/L (ref 46–116)
ALT SERPL W P-5'-P-CCNC: 60 U/L (ref 12–78)
ANION GAP SERPL CALCULATED.3IONS-SCNC: 5 MMOL/L (ref 4–13)
AST SERPL W P-5'-P-CCNC: 23 U/L (ref 5–45)
BASOPHILS # BLD AUTO: 0.11 THOUSANDS/ΜL (ref 0–0.1)
BASOPHILS NFR BLD AUTO: 1 % (ref 0–1)
BILIRUB SERPL-MCNC: 0.23 MG/DL (ref 0.2–1)
BUN SERPL-MCNC: 11 MG/DL (ref 5–25)
CALCIUM ALBUM COR SERPL-MCNC: 11.7 MG/DL (ref 8.3–10.1)
CALCIUM SERPL-MCNC: 10.9 MG/DL (ref 8.3–10.1)
CHLORIDE SERPL-SCNC: 107 MMOL/L (ref 100–108)
CO2 SERPL-SCNC: 29 MMOL/L (ref 21–32)
CREAT SERPL-MCNC: 0.74 MG/DL (ref 0.6–1.3)
EOSINOPHIL # BLD AUTO: 0.1 THOUSAND/ΜL (ref 0–0.61)
EOSINOPHIL NFR BLD AUTO: 1 % (ref 0–6)
ERYTHROCYTE [DISTWIDTH] IN BLOOD BY AUTOMATED COUNT: 23.5 % (ref 11.6–15.1)
GFR SERPL CREATININE-BSD FRML MDRD: 93 ML/MIN/1.73SQ M
GLUCOSE P FAST SERPL-MCNC: 90 MG/DL (ref 65–99)
HCT VFR BLD AUTO: 33.4 % (ref 36.5–49.3)
HGB BLD-MCNC: 10.1 G/DL (ref 12–17)
IMM GRANULOCYTES # BLD AUTO: 0.35 THOUSAND/UL (ref 0–0.2)
IMM GRANULOCYTES NFR BLD AUTO: 2 % (ref 0–2)
LYMPHOCYTES # BLD AUTO: 3.18 THOUSANDS/ΜL (ref 0.6–4.47)
LYMPHOCYTES NFR BLD AUTO: 21 % (ref 14–44)
MCH RBC QN AUTO: 27.7 PG (ref 26.8–34.3)
MCHC RBC AUTO-ENTMCNC: 30.2 G/DL (ref 31.4–37.4)
MCV RBC AUTO: 92 FL (ref 82–98)
MONOCYTES # BLD AUTO: 1.13 THOUSAND/ΜL (ref 0.17–1.22)
MONOCYTES NFR BLD AUTO: 7 % (ref 4–12)
NEUTROPHILS # BLD AUTO: 10.36 THOUSANDS/ΜL (ref 1.85–7.62)
NEUTS SEG NFR BLD AUTO: 68 % (ref 43–75)
NRBC BLD AUTO-RTO: 0 /100 WBCS
PLATELET # BLD AUTO: 186 THOUSANDS/UL (ref 149–390)
PMV BLD AUTO: 10.3 FL (ref 8.9–12.7)
POTASSIUM SERPL-SCNC: 4.3 MMOL/L (ref 3.5–5.3)
PROT SERPL-MCNC: 7.4 G/DL (ref 6.4–8.2)
RBC # BLD AUTO: 3.65 MILLION/UL (ref 3.88–5.62)
SODIUM SERPL-SCNC: 141 MMOL/L (ref 136–145)
T3FREE SERPL-MCNC: 2.13 PG/ML (ref 2.3–4.2)
TSH SERPL DL<=0.05 MIU/L-ACNC: 2.59 UIU/ML (ref 0.36–3.74)
WBC # BLD AUTO: 15.23 THOUSAND/UL (ref 4.31–10.16)

## 2021-12-09 PROCEDURE — 84481 FREE ASSAY (FT-3): CPT

## 2021-12-09 PROCEDURE — 85025 COMPLETE CBC W/AUTO DIFF WBC: CPT

## 2021-12-09 PROCEDURE — 36415 COLL VENOUS BLD VENIPUNCTURE: CPT

## 2021-12-09 PROCEDURE — 84443 ASSAY THYROID STIM HORMONE: CPT

## 2021-12-09 PROCEDURE — 80053 COMPREHEN METABOLIC PANEL: CPT

## 2021-12-10 DIAGNOSIS — I82.403 ACUTE DEEP VEIN THROMBOSIS (DVT) OF BOTH LOWER EXTREMITIES, UNSPECIFIED VEIN (HCC): ICD-10-CM

## 2021-12-10 DIAGNOSIS — C34.90 ADENOCARCINOMA OF LUNG, UNSPECIFIED LATERALITY (HCC): Primary | ICD-10-CM

## 2021-12-10 RX ORDER — SODIUM CHLORIDE 9 MG/ML
20 INJECTION, SOLUTION INTRAVENOUS ONCE
Status: CANCELLED | OUTPATIENT
Start: 2021-12-15

## 2021-12-13 DIAGNOSIS — I82.403 ACUTE DEEP VEIN THROMBOSIS (DVT) OF BOTH LOWER EXTREMITIES, UNSPECIFIED VEIN (HCC): ICD-10-CM

## 2021-12-15 ENCOUNTER — HOSPITAL ENCOUNTER (OUTPATIENT)
Dept: INFUSION CENTER | Facility: HOSPITAL | Age: 71
Discharge: HOME | End: 2021-12-15
Attending: INTERNAL MEDICINE
Payer: COMMERCIAL

## 2021-12-15 VITALS
RESPIRATION RATE: 14 BRPM | OXYGEN SATURATION: 99 % | SYSTOLIC BLOOD PRESSURE: 130 MMHG | HEIGHT: 68 IN | TEMPERATURE: 98 F | WEIGHT: 171.52 LBS | BODY MASS INDEX: 25.99 KG/M2 | DIASTOLIC BLOOD PRESSURE: 62 MMHG | HEART RATE: 66 BPM

## 2021-12-15 DIAGNOSIS — D70.1 CHEMOTHERAPY INDUCED NEUTROPENIA (HCC): ICD-10-CM

## 2021-12-15 DIAGNOSIS — T45.1X5A CHEMOTHERAPY INDUCED NEUTROPENIA (HCC): ICD-10-CM

## 2021-12-15 DIAGNOSIS — C34.90 ADENOCARCINOMA OF LUNG, UNSPECIFIED LATERALITY (HCC): ICD-10-CM

## 2021-12-15 DIAGNOSIS — C34.11 MALIGNANT NEOPLASM OF UPPER LOBE OF RIGHT LUNG (HCC): Primary | ICD-10-CM

## 2021-12-15 DIAGNOSIS — R91.8 MASS OF UPPER LOBE OF RIGHT LUNG: ICD-10-CM

## 2021-12-15 PROCEDURE — 96367 TX/PROPH/DG ADDL SEQ IV INF: CPT

## 2021-12-15 PROCEDURE — 96413 CHEMO IV INFUSION 1 HR: CPT

## 2021-12-15 PROCEDURE — 96377 APPLICATON ON-BODY INJECTOR: CPT

## 2021-12-15 PROCEDURE — 96411 CHEMO IV PUSH ADDL DRUG: CPT

## 2021-12-15 PROCEDURE — 96417 CHEMO IV INFUS EACH ADDL SEQ: CPT

## 2021-12-15 RX ORDER — SODIUM CHLORIDE 9 MG/ML
20 INJECTION, SOLUTION INTRAVENOUS ONCE
Status: COMPLETED | OUTPATIENT
Start: 2021-12-15 | End: 2021-12-15

## 2021-12-15 RX ADMIN — SODIUM CHLORIDE 20 ML/HR: 0.9 INJECTION, SOLUTION INTRAVENOUS at 11:40

## 2021-12-15 RX ADMIN — CARBOPLATIN 600 MG: 10 INJECTION, SOLUTION INTRAVENOUS at 13:52

## 2021-12-15 RX ADMIN — FOSAPREPITANT DIMEGLUMINE 150 MG: 150 INJECTION, POWDER, LYOPHILIZED, FOR SOLUTION INTRAVENOUS at 12:10

## 2021-12-15 RX ADMIN — PEGFILGRASTIM 6 MG: KIT SUBCUTANEOUS at 14:56

## 2021-12-15 RX ADMIN — DEXAMETHASONE SODIUM PHOSPHATE: 10 INJECTION, SOLUTION INTRAMUSCULAR; INTRAVENOUS at 11:40

## 2021-12-15 RX ADMIN — SODIUM CHLORIDE 1000 MG: 9 INJECTION, SOLUTION INTRAVENOUS at 13:39

## 2021-12-15 RX ADMIN — SODIUM CHLORIDE 200 MG: 9 INJECTION, SOLUTION INTRAVENOUS at 12:44

## 2021-12-17 ENCOUNTER — PROCEDURE VISIT (OUTPATIENT)
Dept: UROLOGY | Facility: HOSPITAL | Age: 71
End: 2021-12-17
Payer: COMMERCIAL

## 2021-12-17 VITALS
WEIGHT: 179 LBS | HEIGHT: 68 IN | HEART RATE: 56 BPM | SYSTOLIC BLOOD PRESSURE: 120 MMHG | DIASTOLIC BLOOD PRESSURE: 60 MMHG | BODY MASS INDEX: 27.13 KG/M2

## 2021-12-17 DIAGNOSIS — N40.1 BPH WITH URINARY OBSTRUCTION: Primary | ICD-10-CM

## 2021-12-17 DIAGNOSIS — N13.8 BPH WITH URINARY OBSTRUCTION: Primary | ICD-10-CM

## 2021-12-17 LAB
SL AMB  POCT GLUCOSE, UA: NORMAL
SL AMB LEUKOCYTE ESTERASE,UA: NORMAL
SL AMB POCT BILIRUBIN,UA: NORMAL
SL AMB POCT BLOOD,UA: NORMAL
SL AMB POCT CLARITY,UA: CLEAR
SL AMB POCT COLOR,UA: YELLOW
SL AMB POCT KETONES,UA: NORMAL
SL AMB POCT NITRITE,UA: NORMAL
SL AMB POCT PH,UA: 5
SL AMB POCT SPECIFIC GRAVITY,UA: 1.02
SL AMB POCT URINE PROTEIN: NORMAL
SL AMB POCT UROBILINOGEN: 0.2

## 2021-12-17 PROCEDURE — 52000 CYSTOURETHROSCOPY: CPT | Performed by: UROLOGY

## 2021-12-17 PROCEDURE — 99213 OFFICE O/P EST LOW 20 MIN: CPT | Performed by: UROLOGY

## 2021-12-17 PROCEDURE — 81002 URINALYSIS NONAUTO W/O SCOPE: CPT | Performed by: UROLOGY

## 2021-12-20 ENCOUNTER — HOSPITAL ENCOUNTER (OUTPATIENT)
Dept: CT IMAGING | Facility: HOSPITAL | Age: 71
Discharge: HOME | End: 2021-12-20
Attending: INTERNAL MEDICINE
Payer: COMMERCIAL

## 2021-12-20 DIAGNOSIS — C34.11 MALIGNANT NEOPLASM OF UPPER LOBE OF RIGHT LUNG (HCC): ICD-10-CM

## 2021-12-20 DIAGNOSIS — C34.90 ADENOCARCINOMA OF LUNG, UNSPECIFIED LATERALITY (HCC): ICD-10-CM

## 2021-12-20 DIAGNOSIS — J39.2 NASOPHARYNGEAL MASS: ICD-10-CM

## 2021-12-20 PROCEDURE — 71260 CT THORAX DX C+: CPT

## 2021-12-20 PROCEDURE — G1004 CDSM NDSC: HCPCS

## 2021-12-20 PROCEDURE — 70491 CT SOFT TISSUE NECK W/DYE: CPT

## 2021-12-20 PROCEDURE — 70470 CT HEAD/BRAIN W/O & W/DYE: CPT

## 2021-12-20 PROCEDURE — 74177 CT ABD & PELVIS W/CONTRAST: CPT

## 2021-12-20 RX ORDER — FOLIC ACID 1 MG/1
TABLET ORAL
Qty: 30 TABLET | Refills: 3 | Status: SHIPPED | OUTPATIENT
Start: 2021-12-20 | End: 2022-04-25

## 2021-12-20 RX ADMIN — IOHEXOL 100 ML: 350 INJECTION, SOLUTION INTRAVENOUS at 13:14

## 2021-12-27 ENCOUNTER — OFFICE VISIT (OUTPATIENT)
Dept: HEMATOLOGY ONCOLOGY | Facility: HOSPITAL | Age: 71
End: 2021-12-27
Payer: COMMERCIAL

## 2021-12-27 ENCOUNTER — TELEPHONE (OUTPATIENT)
Dept: HEMATOLOGY ONCOLOGY | Facility: HOSPITAL | Age: 71
End: 2021-12-27

## 2021-12-27 VITALS
HEIGHT: 68 IN | RESPIRATION RATE: 14 BRPM | WEIGHT: 171.8 LBS | DIASTOLIC BLOOD PRESSURE: 80 MMHG | HEART RATE: 64 BPM | TEMPERATURE: 97.7 F | OXYGEN SATURATION: 96 % | BODY MASS INDEX: 26.04 KG/M2 | SYSTOLIC BLOOD PRESSURE: 120 MMHG

## 2021-12-27 DIAGNOSIS — C34.11 MALIGNANT NEOPLASM OF UPPER LOBE OF RIGHT LUNG (HCC): Primary | ICD-10-CM

## 2021-12-27 DIAGNOSIS — C34.90 ADENOCARCINOMA OF LUNG, UNSPECIFIED LATERALITY (HCC): ICD-10-CM

## 2021-12-27 PROCEDURE — 99214 OFFICE O/P EST MOD 30 MIN: CPT | Performed by: INTERNAL MEDICINE

## 2021-12-30 ENCOUNTER — APPOINTMENT (OUTPATIENT)
Dept: LAB | Facility: CLINIC | Age: 71
End: 2021-12-30
Payer: COMMERCIAL

## 2021-12-30 DIAGNOSIS — C34.90 ADENOCARCINOMA OF LUNG, UNSPECIFIED LATERALITY (HCC): ICD-10-CM

## 2021-12-30 DIAGNOSIS — R91.8 MASS OF UPPER LOBE OF RIGHT LUNG: ICD-10-CM

## 2021-12-30 DIAGNOSIS — C34.11 MALIGNANT NEOPLASM OF UPPER LOBE OF RIGHT LUNG (HCC): ICD-10-CM

## 2021-12-30 LAB
ALBUMIN SERPL BCP-MCNC: 3.3 G/DL (ref 3.5–5)
ALP SERPL-CCNC: 107 U/L (ref 46–116)
ALT SERPL W P-5'-P-CCNC: 65 U/L (ref 12–78)
ANION GAP SERPL CALCULATED.3IONS-SCNC: 1 MMOL/L (ref 4–13)
AST SERPL W P-5'-P-CCNC: 21 U/L (ref 5–45)
BASOPHILS # BLD AUTO: 0.07 THOUSANDS/ΜL (ref 0–0.1)
BASOPHILS NFR BLD AUTO: 1 % (ref 0–1)
BILIRUB SERPL-MCNC: 0.57 MG/DL (ref 0.2–1)
BUN SERPL-MCNC: 10 MG/DL (ref 5–25)
CALCIUM ALBUM COR SERPL-MCNC: 10.8 MG/DL (ref 8.3–10.1)
CALCIUM SERPL-MCNC: 10.2 MG/DL (ref 8.3–10.1)
CHLORIDE SERPL-SCNC: 108 MMOL/L (ref 100–108)
CO2 SERPL-SCNC: 31 MMOL/L (ref 21–32)
CREAT SERPL-MCNC: 0.81 MG/DL (ref 0.6–1.3)
EOSINOPHIL # BLD AUTO: 0.05 THOUSAND/ΜL (ref 0–0.61)
EOSINOPHIL NFR BLD AUTO: 1 % (ref 0–6)
ERYTHROCYTE [DISTWIDTH] IN BLOOD BY AUTOMATED COUNT: 23.9 % (ref 11.6–15.1)
GFR SERPL CREATININE-BSD FRML MDRD: 90 ML/MIN/1.73SQ M
GLUCOSE P FAST SERPL-MCNC: 106 MG/DL (ref 65–99)
HCT VFR BLD AUTO: 33.2 % (ref 36.5–49.3)
HGB BLD-MCNC: 10 G/DL (ref 12–17)
IMM GRANULOCYTES # BLD AUTO: 0.06 THOUSAND/UL (ref 0–0.2)
IMM GRANULOCYTES NFR BLD AUTO: 1 % (ref 0–2)
LYMPHOCYTES # BLD AUTO: 2.9 THOUSANDS/ΜL (ref 0.6–4.47)
LYMPHOCYTES NFR BLD AUTO: 32 % (ref 14–44)
MCH RBC QN AUTO: 29 PG (ref 26.8–34.3)
MCHC RBC AUTO-ENTMCNC: 30.1 G/DL (ref 31.4–37.4)
MCV RBC AUTO: 96 FL (ref 82–98)
MONOCYTES # BLD AUTO: 0.83 THOUSAND/ΜL (ref 0.17–1.22)
MONOCYTES NFR BLD AUTO: 9 % (ref 4–12)
NEUTROPHILS # BLD AUTO: 5.19 THOUSANDS/ΜL (ref 1.85–7.62)
NEUTS SEG NFR BLD AUTO: 56 % (ref 43–75)
NRBC BLD AUTO-RTO: 0 /100 WBCS
PLATELET # BLD AUTO: 115 THOUSANDS/UL (ref 149–390)
PMV BLD AUTO: 10.2 FL (ref 8.9–12.7)
POTASSIUM SERPL-SCNC: 4.1 MMOL/L (ref 3.5–5.3)
PROT SERPL-MCNC: 6.8 G/DL (ref 6.4–8.2)
RBC # BLD AUTO: 3.45 MILLION/UL (ref 3.88–5.62)
SODIUM SERPL-SCNC: 140 MMOL/L (ref 136–145)
T3FREE SERPL-MCNC: 2.32 PG/ML (ref 2.3–4.2)
TSH SERPL DL<=0.05 MIU/L-ACNC: 2.61 UIU/ML (ref 0.36–3.74)
WBC # BLD AUTO: 9.1 THOUSAND/UL (ref 4.31–10.16)

## 2021-12-30 PROCEDURE — 80053 COMPREHEN METABOLIC PANEL: CPT

## 2021-12-30 PROCEDURE — 85025 COMPLETE CBC W/AUTO DIFF WBC: CPT

## 2021-12-30 PROCEDURE — 84481 FREE ASSAY (FT-3): CPT

## 2021-12-30 PROCEDURE — 84443 ASSAY THYROID STIM HORMONE: CPT

## 2021-12-30 PROCEDURE — 36415 COLL VENOUS BLD VENIPUNCTURE: CPT

## 2022-01-03 ENCOUNTER — TELEPHONE (OUTPATIENT)
Dept: HEMATOLOGY ONCOLOGY | Facility: CLINIC | Age: 72
End: 2022-01-03

## 2022-01-03 DIAGNOSIS — C34.11 MALIGNANT NEOPLASM OF UPPER LOBE OF RIGHT LUNG (HCC): ICD-10-CM

## 2022-01-03 RX ORDER — SODIUM CHLORIDE 9 MG/ML
20 INJECTION, SOLUTION INTRAVENOUS ONCE
Status: CANCELLED | OUTPATIENT
Start: 2022-01-05

## 2022-01-03 RX ORDER — DEXAMETHASONE 4 MG/1
4 TABLET ORAL 2 TIMES DAILY WITH MEALS
Qty: 8 TABLET | Refills: 0 | Status: SHIPPED | OUTPATIENT
Start: 2022-01-03 | End: 2022-03-16

## 2022-01-03 NOTE — TELEPHONE ENCOUNTER
Per instruction:  "TAKE 1 TABLET BY MOUTH TWICE A DAY WITH MEALS  THE DAY BEFORE AND THE DAY AFTER CHEMOTHERAPY FOR 4 CYCLES" Pt is currently on her 5th cycle

## 2022-01-03 NOTE — TELEPHONE ENCOUNTER
Spoke with pt's wife informing that pt will need to keep taking Decadron before and after his chemotherapy  Bekah Masterson also refilled him more rx  Pt verbalized understanding and agreement

## 2022-01-05 ENCOUNTER — HOSPITAL ENCOUNTER (OUTPATIENT)
Dept: INFUSION CENTER | Facility: HOSPITAL | Age: 72
Discharge: HOME | End: 2022-01-05
Attending: INTERNAL MEDICINE
Payer: COMMERCIAL

## 2022-01-05 VITALS
SYSTOLIC BLOOD PRESSURE: 144 MMHG | WEIGHT: 177.91 LBS | BODY MASS INDEX: 26.96 KG/M2 | RESPIRATION RATE: 16 BRPM | HEIGHT: 68 IN | DIASTOLIC BLOOD PRESSURE: 68 MMHG | HEART RATE: 62 BPM | TEMPERATURE: 97.8 F | OXYGEN SATURATION: 99 %

## 2022-01-05 DIAGNOSIS — D70.1 CHEMOTHERAPY INDUCED NEUTROPENIA (HCC): ICD-10-CM

## 2022-01-05 DIAGNOSIS — R91.8 MASS OF UPPER LOBE OF RIGHT LUNG: ICD-10-CM

## 2022-01-05 DIAGNOSIS — T45.1X5A CHEMOTHERAPY INDUCED NEUTROPENIA (HCC): ICD-10-CM

## 2022-01-05 DIAGNOSIS — C34.90 ADENOCARCINOMA OF LUNG, UNSPECIFIED LATERALITY (HCC): ICD-10-CM

## 2022-01-05 DIAGNOSIS — C34.11 MALIGNANT NEOPLASM OF UPPER LOBE OF RIGHT LUNG (HCC): Primary | ICD-10-CM

## 2022-01-05 DIAGNOSIS — E83.52 HYPERCALCEMIA OF MALIGNANCY: ICD-10-CM

## 2022-01-05 PROCEDURE — 96413 CHEMO IV INFUSION 1 HR: CPT

## 2022-01-05 PROCEDURE — 96417 CHEMO IV INFUS EACH ADDL SEQ: CPT

## 2022-01-05 PROCEDURE — 96367 TX/PROPH/DG ADDL SEQ IV INF: CPT

## 2022-01-05 PROCEDURE — 96411 CHEMO IV PUSH ADDL DRUG: CPT

## 2022-01-05 PROCEDURE — 96377 APPLICATON ON-BODY INJECTOR: CPT

## 2022-01-05 RX ORDER — SODIUM CHLORIDE 9 MG/ML
20 INJECTION, SOLUTION INTRAVENOUS ONCE
Status: COMPLETED | OUTPATIENT
Start: 2022-01-05 | End: 2022-01-05

## 2022-01-05 RX ADMIN — SODIUM CHLORIDE 200 MG: 9 INJECTION, SOLUTION INTRAVENOUS at 12:25

## 2022-01-05 RX ADMIN — FOSAPREPITANT DIMEGLUMINE 150 MG: 150 INJECTION, POWDER, LYOPHILIZED, FOR SOLUTION INTRAVENOUS at 11:45

## 2022-01-05 RX ADMIN — PEGFILGRASTIM 6 MG: KIT SUBCUTANEOUS at 14:47

## 2022-01-05 RX ADMIN — SODIUM CHLORIDE 20 ML/HR: 0.9 INJECTION, SOLUTION INTRAVENOUS at 11:26

## 2022-01-05 RX ADMIN — CARBOPLATIN 563 MG: 10 INJECTION, SOLUTION INTRAVENOUS at 13:32

## 2022-01-05 RX ADMIN — DEXAMETHASONE SODIUM PHOSPHATE: 10 INJECTION, SOLUTION INTRAMUSCULAR; INTRAVENOUS at 11:26

## 2022-01-05 RX ADMIN — SODIUM CHLORIDE 1000 MG: 9 INJECTION, SOLUTION INTRAVENOUS at 13:16

## 2022-01-05 NOTE — PROGRESS NOTES
Pt tolerated infusions w/o AR seen  Up to void prn  PIV removed intact  Neulasta Onpro applied to JELLY  Instructions reviewed  Disch amb to home, steady gait

## 2022-01-06 ENCOUNTER — TELEPHONE (OUTPATIENT)
Dept: HEMATOLOGY ONCOLOGY | Facility: CLINIC | Age: 72
End: 2022-01-06

## 2022-01-06 NOTE — TELEPHONE ENCOUNTER
Patient's wife calling in wanting to verify if it would be safe for patient to get a covid booster shot    Best call back number [phone]

## 2022-01-12 DIAGNOSIS — I82.403 ACUTE DEEP VEIN THROMBOSIS (DVT) OF BOTH LOWER EXTREMITIES, UNSPECIFIED VEIN (HCC): ICD-10-CM

## 2022-01-19 ENCOUNTER — APPOINTMENT (OUTPATIENT)
Dept: LAB | Facility: CLINIC | Age: 72
End: 2022-01-19
Payer: COMMERCIAL

## 2022-01-19 DIAGNOSIS — E83.52 HYPERCALCEMIA OF MALIGNANCY: ICD-10-CM

## 2022-01-19 DIAGNOSIS — C34.11 MALIGNANT NEOPLASM OF UPPER LOBE OF RIGHT LUNG (HCC): ICD-10-CM

## 2022-01-19 DIAGNOSIS — C34.90 ADENOCARCINOMA OF LUNG, UNSPECIFIED LATERALITY (HCC): ICD-10-CM

## 2022-01-19 DIAGNOSIS — R91.8 MASS OF UPPER LOBE OF RIGHT LUNG: ICD-10-CM

## 2022-01-19 LAB
ALBUMIN SERPL BCP-MCNC: 3.4 G/DL (ref 3.5–5)
ALP SERPL-CCNC: 97 U/L (ref 46–116)
ALT SERPL W P-5'-P-CCNC: 48 U/L (ref 12–78)
ANION GAP SERPL CALCULATED.3IONS-SCNC: 4 MMOL/L (ref 4–13)
AST SERPL W P-5'-P-CCNC: 18 U/L (ref 5–45)
BASOPHILS # BLD AUTO: 0.06 THOUSANDS/ΜL (ref 0–0.1)
BASOPHILS NFR BLD AUTO: 1 % (ref 0–1)
BILIRUB SERPL-MCNC: 0.5 MG/DL (ref 0.2–1)
BUN SERPL-MCNC: 11 MG/DL (ref 5–25)
CALCIUM ALBUM COR SERPL-MCNC: 10.5 MG/DL (ref 8.3–10.1)
CALCIUM SERPL-MCNC: 10 MG/DL (ref 8.3–10.1)
CHLORIDE SERPL-SCNC: 110 MMOL/L (ref 100–108)
CO2 SERPL-SCNC: 28 MMOL/L (ref 21–32)
CREAT SERPL-MCNC: 0.87 MG/DL (ref 0.6–1.3)
EOSINOPHIL # BLD AUTO: 0.05 THOUSAND/ΜL (ref 0–0.61)
EOSINOPHIL NFR BLD AUTO: 0 % (ref 0–6)
ERYTHROCYTE [DISTWIDTH] IN BLOOD BY AUTOMATED COUNT: 22.6 % (ref 11.6–15.1)
GFR SERPL CREATININE-BSD FRML MDRD: 87 ML/MIN/1.73SQ M
GLUCOSE SERPL-MCNC: 94 MG/DL (ref 65–140)
HCT VFR BLD AUTO: 32.2 % (ref 36.5–49.3)
HGB BLD-MCNC: 9.9 G/DL (ref 12–17)
IMM GRANULOCYTES # BLD AUTO: 0.18 THOUSAND/UL (ref 0–0.2)
IMM GRANULOCYTES NFR BLD AUTO: 1 % (ref 0–2)
LYMPHOCYTES # BLD AUTO: 3.54 THOUSANDS/ΜL (ref 0.6–4.47)
LYMPHOCYTES NFR BLD AUTO: 27 % (ref 14–44)
MCH RBC QN AUTO: 31.2 PG (ref 26.8–34.3)
MCHC RBC AUTO-ENTMCNC: 30.7 G/DL (ref 31.4–37.4)
MCV RBC AUTO: 102 FL (ref 82–98)
MONOCYTES # BLD AUTO: 1.35 THOUSAND/ΜL (ref 0.17–1.22)
MONOCYTES NFR BLD AUTO: 10 % (ref 4–12)
NEUTROPHILS # BLD AUTO: 7.98 THOUSANDS/ΜL (ref 1.85–7.62)
NEUTS SEG NFR BLD AUTO: 61 % (ref 43–75)
NRBC BLD AUTO-RTO: 0 /100 WBCS
PLATELET # BLD AUTO: 134 THOUSANDS/UL (ref 149–390)
PMV BLD AUTO: 10.9 FL (ref 8.9–12.7)
POTASSIUM SERPL-SCNC: 4.5 MMOL/L (ref 3.5–5.3)
PROT SERPL-MCNC: 7.2 G/DL (ref 6.4–8.2)
RBC # BLD AUTO: 3.17 MILLION/UL (ref 3.88–5.62)
SODIUM SERPL-SCNC: 142 MMOL/L (ref 136–145)
T3FREE SERPL-MCNC: 2.25 PG/ML (ref 2.3–4.2)
TSH SERPL DL<=0.05 MIU/L-ACNC: 2.2 UIU/ML (ref 0.36–3.74)
WBC # BLD AUTO: 13.16 THOUSAND/UL (ref 4.31–10.16)

## 2022-01-19 PROCEDURE — 80053 COMPREHEN METABOLIC PANEL: CPT

## 2022-01-19 PROCEDURE — 84481 FREE ASSAY (FT-3): CPT

## 2022-01-19 PROCEDURE — 84443 ASSAY THYROID STIM HORMONE: CPT

## 2022-01-19 PROCEDURE — 85025 COMPLETE CBC W/AUTO DIFF WBC: CPT

## 2022-01-19 PROCEDURE — 36415 COLL VENOUS BLD VENIPUNCTURE: CPT

## 2022-01-20 RX ORDER — SODIUM CHLORIDE 9 MG/ML
20 INJECTION, SOLUTION INTRAVENOUS ONCE
Status: CANCELLED | OUTPATIENT
Start: 2022-01-26

## 2022-01-20 RX ORDER — CYANOCOBALAMIN 1000 UG/ML
1000 INJECTION INTRAMUSCULAR; SUBCUTANEOUS ONCE
Status: CANCELLED | OUTPATIENT
Start: 2022-01-26 | End: 2022-01-26

## 2022-01-21 ENCOUNTER — HOSPITAL ENCOUNTER (OUTPATIENT)
Dept: INFUSION CENTER | Facility: HOSPITAL | Age: 72
Discharge: HOME | End: 2022-01-21
Attending: INTERNAL MEDICINE
Payer: COMMERCIAL

## 2022-01-21 VITALS
BODY MASS INDEX: 27.83 KG/M2 | TEMPERATURE: 96.4 F | HEIGHT: 68 IN | OXYGEN SATURATION: 98 % | WEIGHT: 183.6 LBS | RESPIRATION RATE: 12 BRPM | HEART RATE: 63 BPM | DIASTOLIC BLOOD PRESSURE: 73 MMHG | SYSTOLIC BLOOD PRESSURE: 153 MMHG

## 2022-01-21 DIAGNOSIS — E83.52 HYPERCALCEMIA OF MALIGNANCY: Primary | ICD-10-CM

## 2022-01-21 PROCEDURE — 96372 THER/PROPH/DIAG INJ SC/IM: CPT

## 2022-01-21 RX ADMIN — DENOSUMAB 120 MG: 120 INJECTION SUBCUTANEOUS at 13:02

## 2022-01-21 NOTE — PROGRESS NOTES
CrCl 66 7- in parameters for tx  Pt received Xgeva inj subQ in R arm, tolerated without incident   Pt aware of next appt, declined AVS

## 2022-01-24 ENCOUNTER — OFFICE VISIT (OUTPATIENT)
Dept: FAMILY MEDICINE CLINIC | Facility: HOSPITAL | Age: 72
End: 2022-01-24
Payer: COMMERCIAL

## 2022-01-24 VITALS
DIASTOLIC BLOOD PRESSURE: 70 MMHG | HEART RATE: 67 BPM | WEIGHT: 182.8 LBS | BODY MASS INDEX: 27.71 KG/M2 | TEMPERATURE: 98.4 F | HEIGHT: 68 IN | SYSTOLIC BLOOD PRESSURE: 136 MMHG

## 2022-01-24 DIAGNOSIS — I82.4Z3 ACUTE DEEP VEIN THROMBOSIS (DVT) OF DISTAL VEIN OF BOTH LOWER EXTREMITIES (HCC): ICD-10-CM

## 2022-01-24 DIAGNOSIS — Z00.00 MEDICARE ANNUAL WELLNESS VISIT, INITIAL: Primary | ICD-10-CM

## 2022-01-24 DIAGNOSIS — C34.11 MALIGNANT NEOPLASM OF UPPER LOBE OF RIGHT LUNG (HCC): ICD-10-CM

## 2022-01-24 DIAGNOSIS — C34.90 ADENOCARCINOMA OF LUNG, UNSPECIFIED LATERALITY (HCC): ICD-10-CM

## 2022-01-24 PROBLEM — Z79.01 CHRONIC ANTICOAGULATION: Status: ACTIVE | Noted: 2022-01-24

## 2022-01-24 PROCEDURE — 99214 OFFICE O/P EST MOD 30 MIN: CPT | Performed by: FAMILY MEDICINE

## 2022-01-24 PROCEDURE — G0439 PPPS, SUBSEQ VISIT: HCPCS | Performed by: FAMILY MEDICINE

## 2022-01-24 NOTE — PATIENT INSTRUCTIONS
Medicare Preventive Visit Patient Instructions  Thank you for completing your Welcome to Medicare Visit or Medicare Annual Wellness Visit today  Your next wellness visit will be due in one year (1/25/2023)  The screening/preventive services that you may require over the next 5-10 years are detailed below  Some tests may not apply to you based off risk factors and/or age  Screening tests ordered at today's visit but not completed yet may show as past due  Also, please note that scanned in results may not display below  Preventive Screenings:  Service Recommendations Previous Testing/Comments   Colorectal Cancer Screening  · Colonoscopy    · Fecal Occult Blood Test (FOBT)/Fecal Immunochemical Test (FIT)  · Fecal DNA/Cologuard Test  · Flexible Sigmoidoscopy Age: 54-65 years old   Colonoscopy: every 10 years (May be performed more frequently if at higher risk)  OR  FOBT/FIT: every 1 year  OR  Cologuard: every 3 years  OR  Sigmoidoscopy: every 5 years  Screening may be recommended earlier than age 48 if at higher risk for colorectal cancer  Also, an individualized decision between you and your healthcare provider will decide whether screening between the ages of 74-80 would be appropriate   Colonoscopy: Not on file  FOBT/FIT: Not on file  Cologuard: Not on file  Sigmoidoscopy: Not on file          Prostate Cancer Screening Individualized decision between patient and health care provider in men between ages of 53-78   Medicare will cover every 12 months beginning on the day after your 50th birthday PSA: No results in last 5 years           Hepatitis C Screening Once for adults born between [facility]  More frequently in patients at high risk for Hepatitis C Hep C Antibody: Not on file        Diabetes Screening 1-2 times per year if you're at risk for diabetes or have pre-diabetes Fasting glucose: 106 mg/dL   A1C: 5 7 %    Screening Current   Cholesterol Screening Once every 5 years if you don't have a lipid disorder  May order more often based on risk factors  Lipid panel: 08/01/2021    Screening Current      Other Preventive Screenings Covered by Medicare:  1  Abdominal Aortic Aneurysm (AAA) Screening: covered once if your at risk  You're considered to be at risk if you have a family history of AAA or a male between the age of 73-68 who smoking at least 100 cigarettes in your lifetime  2  Lung Cancer Screening: covers low dose CT scan once per year if you meet all of the following conditions: (1) Age 50-69; (2) No signs or symptoms of lung cancer; (3) Current smoker or have quit smoking within the last 15 years; (4) You have a tobacco smoking history of at least 30 pack years (packs per day x number of years you smoked); (5) You get a written order from a healthcare provider  3  Glaucoma Screening: covered annually if you're considered high risk: (1) You have diabetes OR (2) Family history of glaucoma OR (3)  aged 48 and older OR (3)  American aged 72 and older  3  Osteoporosis Screening: covered every 2 years if you meet one of the following conditions: (1) Have a vertebral abnormality; (2) On glucocorticoid therapy for more than 3 months; (3) Have primary hyperparathyroidism; (4) On osteoporosis medications and need to assess response to drug therapy  5  HIV Screening: covered annually if you're between the age of 12-76  Also covered annually if you are younger than 13 and older than 72 with risk factors for HIV infection  For pregnant patients, it is covered up to 3 times per pregnancy      Immunizations:  Immunization Recommendations   Influenza Vaccine Annual influenza vaccination during flu season is recommended for all persons aged >= 6 months who do not have contraindications   Pneumococcal Vaccine (Prevnar and Pneumovax)  * Prevnar = PCV13  * Pneumovax = PPSV23 Adults 25-60 years old: 1-3 doses may be recommended based on certain risk factors  Adults 72 years old: Prevnar (PCV13) vaccine recommended followed by Pneumovax (PPSV23) vaccine  If already received PPSV23 since turning 65, then PCV13 recommended at least one year after PPSV23 dose  Hepatitis B Vaccine 3 dose series if at intermediate or high risk (ex: diabetes, end stage renal disease, liver disease)   Tetanus (Td) Vaccine - COST NOT COVERED BY MEDICARE PART B Following completion of primary series, a booster dose should be given every 10 years to maintain immunity against tetanus  Td may also be given as tetanus wound prophylaxis  Tdap Vaccine - COST NOT COVERED BY MEDICARE PART B Recommended at least once for all adults  For pregnant patients, recommended with each pregnancy  Shingles Vaccine (Shingrix) - COST NOT COVERED BY MEDICARE PART B  2 shot series recommended in those aged 48 and above     Health Maintenance Due:      Topic Date Due    Hepatitis C Screening  Never done    Colorectal Cancer Screening  Never done     Immunizations Due:      Topic Date Due    Pneumococcal Vaccine: 65+ Years (1 of 2 - PPSV23) Never done    DTaP,Tdap,and Td Vaccines (1 - Tdap) Never done    Influenza Vaccine (1) Never done     Advance Directives   What are advance directives? Advance directives are legal documents that state your wishes and plans for medical care  These plans are made ahead of time in case you lose your ability to make decisions for yourself  Advance directives can apply to any medical decision, such as the treatments you want, and if you want to donate organs  What are the types of advance directives? There are many types of advance directives, and each state has rules about how to use them  You may choose a combination of any of the following:  · Living will: This is a written record of the treatment you want  You can also choose which treatments you do not want, which to limit, and which to stop at a certain time  This includes surgery, medicine, IV fluid, and tube feedings     · Durable power of  for [facility]): This is a written record that states who you want to make healthcare choices for you when you are unable to make them for yourself  This person, called a proxy, is usually a family member or a friend  You may choose more than 1 proxy  · Do not resuscitate (DNR) order:  A DNR order is used in case your heart stops beating or you stop breathing  It is a request not to have certain forms of treatment, such as CPR  A DNR order may be included in other types of advance directives  · Medical directive: This covers the care that you want if you are in a coma, near death, or unable to make decisions for yourself  You can list the treatments you want for each condition  Treatment may include pain medicine, surgery, blood transfusions, dialysis, IV or tube feedings, and a ventilator (breathing machine)  · Values history: This document has questions about your views, beliefs, and how you feel and think about life  This information can help others choose the care that you would choose  Why are advance directives important? An advance directive helps you control your care  Although spoken wishes may be used, it is better to have your wishes written down  Spoken wishes can be misunderstood, or not followed  Treatments may be given even if you do not want them  An advance directive may make it easier for your family to make difficult choices about your care  Weight Management   Why it is important to manage your weight:  Being overweight increases your risk of health conditions such as heart disease, high blood pressure, type 2 diabetes, and certain types of cancer  It can also increase your risk for osteoarthritis, sleep apnea, and other respiratory problems  Aim for a slow, steady weight loss  Even a small amount of weight loss can lower your risk of health problems  How to lose weight safely:  A safe and healthy way to lose weight is to eat fewer calories and get regular exercise   You can lose up about 1 pound a week by decreasing the number of calories you eat by 500 calories each day  Healthy meal plan for weight management:  A healthy meal plan includes a variety of foods, contains fewer calories, and helps you stay healthy  A healthy meal plan includes the following:  · Eat whole-grain foods more often  A healthy meal plan should contain fiber  Fiber is the part of grains, fruits, and vegetables that is not broken down by your body  Whole-grain foods are healthy and provide extra fiber in your diet  Some examples of whole-grain foods are whole-wheat breads and pastas, oatmeal, brown rice, and bulgur  · Eat a variety of vegetables every day  Include dark, leafy greens such as spinach, kale, aga greens, and mustard greens  Eat yellow and orange vegetables such as carrots, sweet potatoes, and winter squash  · Eat a variety of fruits every day  Choose fresh or canned fruit (canned in its own juice or light syrup) instead of juice  Fruit juice has very little or no fiber  · Eat low-fat dairy foods  Drink fat-free (skim) milk or 1% milk  Eat fat-free yogurt and low-fat cottage cheese  Try low-fat cheeses such as mozzarella and other reduced-fat cheeses  · Choose meat and other protein foods that are low in fat  Choose beans or other legumes such as split peas or lentils  Choose fish, skinless poultry (chicken or turkey), or lean cuts of red meat (beef or pork)  Before you cook meat or poultry, cut off any visible fat  · Use less fat and oil  Try baking foods instead of frying them  Add less fat, such as margarine, sour cream, regular salad dressing and mayonnaise to foods  Eat fewer high-fat foods  Some examples of high-fat foods include french fries, doughnuts, ice cream, and cakes  · Eat fewer sweets  Limit foods and drinks that are high in sugar  This includes candy, cookies, regular soda, and sweetened drinks  Exercise:  Exercise at least 30 minutes per day on most days of the week  Some examples of exercise include walking, biking, dancing, and swimming  You can also fit in more physical activity by taking the stairs instead of the elevator or parking farther away from stores  Ask your healthcare provider about the best exercise plan for you  © Copyright [employer] 2018 Information is for End User's use only and may not be sold, redistributed or otherwise used for commercial purposes  All illustrations and images included in CareNotes® are the copyrighted property of [employer]  or [facility] Preventive Visit Patient Instructions  Thank you for completing your Welcome to Medicare Visit or Medicare Annual Wellness Visit today  Your next wellness visit will be due in one year (1/25/2023)  The screening/preventive services that you may require over the next 5-10 years are detailed below  Some tests may not apply to you based off risk factors and/or age  Screening tests ordered at today's visit but not completed yet may show as past due  Also, please note that scanned in results may not display below  Preventive Screenings:  Service Recommendations Previous Testing/Comments   Colorectal Cancer Screening  · Colonoscopy    · Fecal Occult Blood Test (FOBT)/Fecal Immunochemical Test (FIT)  · Fecal DNA/Cologuard Test  · Flexible Sigmoidoscopy Age: 54-65 years old   Colonoscopy: every 10 years (May be performed more frequently if at higher risk)  OR  FOBT/FIT: every 1 year  OR  Cologuard: every 3 years  OR  Sigmoidoscopy: every 5 years  Screening may be recommended earlier than age 48 if at higher risk for colorectal cancer  Also, an individualized decision between you and your healthcare provider will decide whether screening between the ages of 74-80 would be appropriate   Colonoscopy: Not on file  FOBT/FIT: Not on file  Cologuard: Not on file  Sigmoidoscopy: Not on file    Patient Declines     Prostate Cancer Screening Individualized decision between patient and health care provider in men between ages of 53-78   Medicare will cover every 12 months beginning on the day after your 50th birthday PSA: No results in last 5 years     Risks and Benefits Discussed  Due for PSA     Hepatitis C Screening Once for adults born between 1945 and 1965  More frequently in patients at high risk for Hepatitis C Hep C Antibody: Not on file        Diabetes Screening 1-2 times per year if you're at risk for diabetes or have pre-diabetes Fasting glucose: 106 mg/dL   A1C: 5 7 %    Screening Current   Cholesterol Screening Once every 5 years if you don't have a lipid disorder  May order more often based on risk factors  Lipid panel: 08/01/2021    Screening Current      Other Preventive Screenings Covered by Medicare:  6  Abdominal Aortic Aneurysm (AAA) Screening: covered once if your at risk  You're considered to be at risk if you have a family history of AAA or a male between the age of 73-68 who smoking at least 100 cigarettes in your lifetime  7  Lung Cancer Screening: covers low dose CT scan once per year if you meet all of the following conditions: (1) Age 50-69; (2) No signs or symptoms of lung cancer; (3) Current smoker or have quit smoking within the last 15 years; (4) You have a tobacco smoking history of at least 30 pack years (packs per day x number of years you smoked); (5) You get a written order from a healthcare provider  8  Glaucoma Screening: covered annually if you're considered high risk: (1) You have diabetes OR (2) Family history of glaucoma OR (3)  aged 48 and older OR (3)  American aged 72 and older  5  Osteoporosis Screening: covered every 2 years if you meet one of the following conditions: (1) Have a vertebral abnormality; (2) On glucocorticoid therapy for more than 3 months; (3) Have primary hyperparathyroidism; (4) On osteoporosis medications and need to assess response to drug therapy    10  HIV Screening: covered annually if you're between the age of 15-65  Also covered annually if you are younger than 13 and older than 72 with risk factors for HIV infection  For pregnant patients, it is covered up to 3 times per pregnancy  Immunizations:  Immunization Recommendations   Influenza Vaccine Annual influenza vaccination during flu season is recommended for all persons aged >= 6 months who do not have contraindications   Pneumococcal Vaccine (Prevnar and Pneumovax)  * Prevnar = PCV13  * Pneumovax = PPSV23 Adults 25-60 years old: 1-3 doses may be recommended based on certain risk factors  Adults 72 years old: Prevnar (PCV13) vaccine recommended followed by Pneumovax (PPSV23) vaccine  If already received PPSV23 since turning 65, then PCV13 recommended at least one year after PPSV23 dose  Hepatitis B Vaccine 3 dose series if at intermediate or high risk (ex: diabetes, end stage renal disease, liver disease)   Tetanus (Td) Vaccine - COST NOT COVERED BY MEDICARE PART B Following completion of primary series, a booster dose should be given every 10 years to maintain immunity against tetanus  Td may also be given as tetanus wound prophylaxis  Tdap Vaccine - COST NOT COVERED BY MEDICARE PART B Recommended at least once for all adults  For pregnant patients, recommended with each pregnancy  Shingles Vaccine (Shingrix) - COST NOT COVERED BY MEDICARE PART B  2 shot series recommended in those aged 48 and above     Health Maintenance Due:      Topic Date Due    Hepatitis C Screening  Never done    Colorectal Cancer Screening  Never done     Immunizations Due:      Topic Date Due    Pneumococcal Vaccine: 65+ Years (1 of 2 - PPSV23) Never done    DTaP,Tdap,and Td Vaccines (1 - Tdap) Never done    Influenza Vaccine (1) Never done     Advance Directives   What are advance directives? Advance directives are legal documents that state your wishes and plans for medical care   These plans are made ahead of time in case you lose your ability to make decisions for yourself  Advance directives can apply to any medical decision, such as the treatments you want, and if you want to donate organs  What are the types of advance directives? There are many types of advance directives, and each state has rules about how to use them  You may choose a combination of any of the following:  · Living will: This is a written record of the treatment you want  You can also choose which treatments you do not want, which to limit, and which to stop at a certain time  This includes surgery, medicine, IV fluid, and tube feedings  · Durable power of  for healthcare [facility]): This is a written record that states who you want to make healthcare choices for you when you are unable to make them for yourself  This person, called a proxy, is usually a family member or a friend  You may choose more than 1 proxy  · Do not resuscitate (DNR) order:  A DNR order is used in case your heart stops beating or you stop breathing  It is a request not to have certain forms of treatment, such as CPR  A DNR order may be included in other types of advance directives  · Medical directive: This covers the care that you want if you are in a coma, near death, or unable to make decisions for yourself  You can list the treatments you want for each condition  Treatment may include pain medicine, surgery, blood transfusions, dialysis, IV or tube feedings, and a ventilator (breathing machine)  · Values history: This document has questions about your views, beliefs, and how you feel and think about life  This information can help others choose the care that you would choose  Why are advance directives important? An advance directive helps you control your care  Although spoken wishes may be used, it is better to have your wishes written down  Spoken wishes can be misunderstood, or not followed  Treatments may be given even if you do not want them   An advance directive may make it easier for your family to make difficult choices about your care  Weight Management   Why it is important to manage your weight:  Being overweight increases your risk of health conditions such as heart disease, high blood pressure, type 2 diabetes, and certain types of cancer  It can also increase your risk for osteoarthritis, sleep apnea, and other respiratory problems  Aim for a slow, steady weight loss  Even a small amount of weight loss can lower your risk of health problems  How to lose weight safely:  A safe and healthy way to lose weight is to eat fewer calories and get regular exercise  You can lose up about 1 pound a week by decreasing the number of calories you eat by 500 calories each day  Healthy meal plan for weight management:  A healthy meal plan includes a variety of foods, contains fewer calories, and helps you stay healthy  A healthy meal plan includes the following:  · Eat whole-grain foods more often  A healthy meal plan should contain fiber  Fiber is the part of grains, fruits, and vegetables that is not broken down by your body  Whole-grain foods are healthy and provide extra fiber in your diet  Some examples of whole-grain foods are whole-wheat breads and pastas, oatmeal, brown rice, and bulgur  · Eat a variety of vegetables every day  Include dark, leafy greens such as spinach, kale, aga greens, and mustard greens  Eat yellow and orange vegetables such as carrots, sweet potatoes, and winter squash  · Eat a variety of fruits every day  Choose fresh or canned fruit (canned in its own juice or light syrup) instead of juice  Fruit juice has very little or no fiber  · Eat low-fat dairy foods  Drink fat-free (skim) milk or 1% milk  Eat fat-free yogurt and low-fat cottage cheese  Try low-fat cheeses such as mozzarella and other reduced-fat cheeses  · Choose meat and other protein foods that are low in fat  Choose beans or other legumes such as split peas or lentils   Choose fish, skinless poultry (chicken or turkey), or lean cuts of red meat (beef or pork)  Before you cook meat or poultry, cut off any visible fat  · Use less fat and oil  Try baking foods instead of frying them  Add less fat, such as margarine, sour cream, regular salad dressing and mayonnaise to foods  Eat fewer high-fat foods  Some examples of high-fat foods include french fries, doughnuts, ice cream, and cakes  · Eat fewer sweets  Limit foods and drinks that are high in sugar  This includes candy, cookies, regular soda, and sweetened drinks  Exercise:  Exercise at least 30 minutes per day on most days of the week  Some examples of exercise include walking, biking, dancing, and swimming  You can also fit in more physical activity by taking the stairs instead of the elevator or parking farther away from stores  Ask your healthcare provider about the best exercise plan for you  © Copyright [employer] 2018 Information is for End User's use only and may not be sold, redistributed or otherwise used for commercial purposes   All illustrations and images included in CareNotes® are the copyrighted property of [employer]  or [address] [place] [address] [employer]

## 2022-01-24 NOTE — PROGRESS NOTES
Assessment/Plan:      Problem List Items Addressed This Visit        Respiratory    Adenocarcinoma of lung    Malignant neoplasm of upper lobe of right lung [facility])       Cardiovascular and Mediastinum    Bilateral lower extremity DVTs      Other Visit Diagnoses     Medicare annual wellness visit, initial    -  Primary           Plan/Discussion:    Chronic anticoagulation  currenlty on lovenox due to bilateral lower dvts in the setting of lung ca  This is lifetime  Failure of eliquis  Discussed I do think lovenox is a better option in his case compared to coumadin  Continue fu with Oncology and has apt with radiation oncology  Continue with palliative medicine as well  Copd  Stable  Reviewed advance care planning  Fu in 3 months  Subjective:   Chief Complaint   Patient presents with    Follow-up     3 month     Medicare Wellness Visit     Initial         Patient ID: Pricila Hoffmann is a 80 y o  male  Pt seen for fu of chronic conditions  Overall doing well  Sees some lumps over the area o flovenox injections  On review he had a dvt while taking eliquis  Understanding that he will need to continue to use lovenox for treatment of dvts  Seeing oncology for lung ca and now has apt with raidation oncology  No new concerns  The following portions of the patient's history were reviewed and updated as appropriate: allergies, current medications, past family history, past medical history, past social history, past surgical history and problem list     Review of Systems   Constitutional: Negative  Negative for activity change, appetite change, chills and diaphoresis  HENT: Negative for congestion and dental problem  Respiratory: Negative  Negative for apnea, chest tightness, shortness of breath and wheezing  Cardiovascular: Negative  Negative for chest pain, palpitations and leg swelling  Gastrointestinal: Negative    Negative for abdominal distention, abdominal pain, constipation, diarrhea and nausea  Genitourinary: Negative  Negative for difficulty urinating, dysuria and frequency  Objective:  Vitals:    01/24/22 1155   BP: 136/70   Pulse: 67   Temp: 98 4 °F (36 9 °C)   Weight: 82 9 kg (182 lb 12 8 oz)   Height: 5' 8" (1 727 m)     BP Readings from Last 6 Encounters:   01/24/22 136/70   01/21/22 153/73   01/05/22 144/68   12/27/21 120/80   12/17/21 120/60   12/15/21 130/62      Wt Readings from Last 6 Encounters:   01/24/22 82 9 kg (182 lb 12 8 oz)   01/21/22 83 3 kg (183 lb 9 6 oz)   01/05/22 80 7 kg (177 lb 14 6 oz)   12/27/21 77 9 kg (171 lb 12 8 oz)   12/17/21 81 2 kg (179 lb)   12/15/21 77 8 kg (171 lb 8 3 oz)             Physical Exam  Vitals and nursing note reviewed  Constitutional:       General: He is not in acute distress  Appearance: Normal appearance  He is well-developed  He is not ill-appearing  HENT:      Head: Normocephalic and atraumatic  Right Ear: External ear normal       Left Ear: External ear normal       Nose: Nose normal  No congestion or rhinorrhea  Mouth/Throat:      Mouth: Mucous membranes are moist       Pharynx: No oropharyngeal exudate or posterior oropharyngeal erythema  Eyes:      Extraocular Movements: Extraocular movements intact  Conjunctiva/sclera: Conjunctivae normal       Pupils: Pupils are equal, round, and reactive to light  Cardiovascular:      Rate and Rhythm: Normal rate and regular rhythm  Heart sounds: Normal heart sounds  No murmur heard  No friction rub  No gallop  Pulmonary:      Effort: Pulmonary effort is normal  No respiratory distress  Breath sounds: Normal breath sounds  No wheezing or rales  Chest:      Chest wall: No tenderness  Abdominal:      General: Bowel sounds are normal  There is no distension  Palpations: Abdomen is soft  There is no mass  Tenderness: There is no abdominal tenderness  There is no guarding or rebound  Musculoskeletal:         General: Normal range of motion  Cervical back: Normal range of motion and neck supple  Skin:     General: Skin is warm  Capillary Refill: Capillary refill takes less than 2 seconds  Neurological:      Mental Status: He is alert and oriented to person, place, and time     Psychiatric:         Mood and Affect: Mood normal          Behavior: Behavior normal

## 2022-01-24 NOTE — PROGRESS NOTES
Assessment and Plan:     Problem List Items Addressed This Visit        Respiratory    Adenocarcinoma of lung    Malignant neoplasm of upper lobe of right lung [facility])       Cardiovascular and Mediastinum    Bilateral lower extremity DVTs      Other Visit Diagnoses     Medicare annual wellness visit, initial    -  Primary           Preventive health issues were discussed with patient, and age appropriate screening tests were ordered as noted in patient's After Visit Summary  Personalized health advice and appropriate referrals for health education or preventive services given if needed, as noted in patient's After Visit Summary       History of Present Illness:     Patient presents for Medicare Annual Wellness visit    Patient Care Team:  Dominic Johnson MD as PCP - General (Family Medicine)  Mariano Slater MD (Radiation Oncology)  Dandre David MD as Medical Oncologist (Oncology)  Alisha Silver RD (Nutrition)  Joshua Rawls as  Care Manager ()     Problem List:     Patient Active Problem List   Diagnosis    Recent cerebrovascular accident    Bilateral lower extremity DVTs    Nasopharyngeal mass    Dysphagia    Mass of upper lobe of right lung    Urinary retention    Constipation    Anemia of chronic disease    [facility] [address] [facility]    Chronic respiratory failure with hypoxia ([facility])    Impaired cognition    Impaired mobility and ADLs    Adenocarcinoma of lung    Malignant neoplasm of upper lobe of right lung ([facility] [address] )    COPD mixed type ([address] )    Chemotherapy induced neutropenia ([facility] [address] )    Hypercalcemia of malignancy      Past Medical and Surgical History:     Past Medical History:   Diagnosis Date    Lung cancer ([address] )     Stroke [facility])      Past Surgical History:   Procedure Laterality Date    IR BIOPSY LUNG  8/5/2021      Family History:     Family History   Problem Relation Age of Onset    Prostate cancer Brother     Lung cancer Brother       Social History:     Social History     Socioeconomic History    Marital status: /Civil Union     Spouse name: None    Number of children: None    Years of education: None    Highest education level: None   Occupational History    None   Tobacco Use    Smoking status: Former Smoker     Packs/day: 2 50     Years: 30 00     Pack years: 75 00     Quit date:      Years since quittin 0    Smokeless tobacco: Never Used   Vaping Use    Vaping Use: Never used   Substance and Sexual Activity    Alcohol use: Yes     Comment: social    Drug use: Never    Sexual activity: Yes     Partners: Female   Other Topics Concern    None   Social History Narrative    None     Social Determinants of Health     Financial Resource Strain: Not on file   Food Insecurity: Not on file   Transportation Needs: Not on file   Physical Activity: Not on file   Stress: Not on file   Social Connections: Not on file   Intimate Partner Violence: Not on file   Housing Stability: Not on file      Medications and Allergies:     Current Outpatient Medications   Medication Sig Dispense Refill    acetaminophen (TYLENOL) 325 mg tablet Take 2 tablets (650 mg total) by mouth 4 (four) times a day as needed for mild pain, headaches or fever  0    atorvastatin (LIPITOR) 40 mg tablet Take 1 tablet (40 mg total) by mouth daily with dinner 90 tablet 2    dexamethasone (DECADRON) 4 mg tablet Take 1 tablet (4 mg total) by mouth 2 (two) times a day with meals The day before and the day after treatment 8 tablet 0    enoxaparin (LOVENOX) 80 mg/0 8 mL INJECT 0 8 ML (80 MG TOTAL) UNDER THE SKIN EVERY 12 (TWELVE) HOURS 48 mL 0    folic acid (FOLVITE) 1 mg tablet TAKE 1 TABLET BY MOUTH EVERY DAY 30 tablet 3    prochlorperazine (COMPAZINE) 10 mg tablet Take 1 tablet (10 mg total) by mouth every 6 (six) hours as needed for nausea or vomiting 45 tablet 3    tamsulosin (FLOMAX) 0 4 mg TAKE 1 CAPSULE BY MOUTH DAILY AFTER SUPPER 30 capsule 3    enoxaparin (LOVENOX) 80 mg/0 8 mL INJECT 0 8 ML (80 MG TOTAL) UNDER THE SKIN EVERY 12 (TWELVE) HOURS 48 mL 0     No current facility-administered medications for this visit  Allergies   Allergen Reactions    Doxycycline Rash      Immunizations:     Immunization History   Administered Date(s) Administered    COVID-19 PFIZER VACCINE 0 3 ML IM 03/27/2021, 04/17/2021, 01/22/2022      Health Maintenance:         Topic Date Due    Hepatitis C Screening  Never done    Colorectal Cancer Screening  Never done         Topic Date Due    Pneumococcal Vaccine: 65+ Years (1 of 2 - PPSV23) Never done    DTaP,Tdap,and Td Vaccines (1 - Tdap) Never done    Influenza Vaccine (1) Never done      Medicare Health Risk Assessment:     /70   Pulse 67   Temp 98 4 °F (36 9 °C)   Ht 5' 8" (1 727 m)   Wt 82 9 kg (182 lb 12 8 oz)   BMI 27 79 kg/m²      [address] is here for his Initial Wellness visit  Health Risk Assessment:   Patient rates overall health as excellent  Patient feels that their physical health rating is same  Patient is very satisfied with their life  Eyesight was rated as same  Hearing was rated as same  Patient feels that their emotional and mental health rating is same  Patients states they are never, rarely angry  Patient states they are never, rarely unusually tired/fatigued  Pain experienced in the last 7 days has been none  Patient states that he has experienced no weight loss or gain in last 6 months  Depression Screening:   PHQ-2 Score: 0      Fall Risk Screening: In the past year, patient has experienced: no history of falling in past year      Home Safety:  Patient does not have trouble with stairs inside or outside of their home  Patient has working smoke alarms and has no working carbon monoxide detector  Home safety hazards include: none  Nutrition:   Current diet is Regular  Medications:   Patient is currently taking over-the-counter supplements  OTC medications include: Multivitamin   Patient is able to manage medications  Activities of Daily Living (ADLs)/Instrumental Activities of Daily Living (IADLs):   Walk and transfer into and out of bed and chair?: Yes  Dress and groom yourself?: Yes    Bathe or shower yourself?: Yes    Feed yourself? Yes  Do your laundry/housekeeping?: Yes  Manage your money, pay your bills and track your expenses?: Yes  Make your own meals?: Yes    Do your own shopping?: Yes    Previous Hospitalizations:   Any hospitalizations or ED visits within the last 12 months?: Yes    How many hospitalizations have you had in the last year?: 1-2    Advance Care Planning:   Living will: No    Durable POA for healthcare: Yes    Advanced directive: No    Advanced directive counseling given: Yes    End of Life Decisions reviewed with patient: Yes      PREVENTIVE SCREENINGS      Cardiovascular Screening:    General: Screening Current      Diabetes Screening:     General: Screening Current      Colorectal Cancer Screening:     General: Patient Declines      Prostate Cancer Screening:    General: Risks and Benefits Discussed    Due for: PSA      Osteoporosis Screening:    General: Screening Not Indicated      Abdominal Aortic Aneurysm (AAA) Screening:    Risk factors include: age between 73-69 yo and tobacco use        General: Screening Not Indicated      Lung Cancer Screening:     General: Screening Not Indicated and History Lung Cancer    Screening, Brief Intervention, and Referral to Treatment (SBIRT)    Screening  Typical number of drinks in a day: 0  Typical number of drinks in a week: 0  Interpretation: Low risk drinking behavior      Single Item Drug Screening:  How often have you used an illegal drug (including marijuana) or a prescription medication for non-medical reasons in the past year? never    Single Item Drug Screen Score: 0  Interpretation: Negative screen for possible drug use disorder      Mickey Jim MD

## 2022-01-26 ENCOUNTER — HOSPITAL ENCOUNTER (OUTPATIENT)
Dept: INFUSION CENTER | Facility: HOSPITAL | Age: 72
Discharge: HOME | End: 2022-01-26
Attending: INTERNAL MEDICINE
Payer: COMMERCIAL

## 2022-01-26 VITALS
SYSTOLIC BLOOD PRESSURE: 156 MMHG | DIASTOLIC BLOOD PRESSURE: 85 MMHG | HEART RATE: 66 BPM | HEIGHT: 68 IN | OXYGEN SATURATION: 98 % | WEIGHT: 182.76 LBS | TEMPERATURE: 96.8 F | RESPIRATION RATE: 18 BRPM | BODY MASS INDEX: 27.7 KG/M2

## 2022-01-26 DIAGNOSIS — C34.11 MALIGNANT NEOPLASM OF UPPER LOBE OF RIGHT LUNG (HCC): Primary | ICD-10-CM

## 2022-01-26 DIAGNOSIS — D70.1 CHEMOTHERAPY INDUCED NEUTROPENIA (HCC): ICD-10-CM

## 2022-01-26 DIAGNOSIS — C34.90 ADENOCARCINOMA OF LUNG, UNSPECIFIED LATERALITY (HCC): ICD-10-CM

## 2022-01-26 DIAGNOSIS — T45.1X5A CHEMOTHERAPY INDUCED NEUTROPENIA (HCC): ICD-10-CM

## 2022-01-26 DIAGNOSIS — R91.8 MASS OF UPPER LOBE OF RIGHT LUNG: ICD-10-CM

## 2022-01-26 PROCEDURE — 96413 CHEMO IV INFUSION 1 HR: CPT

## 2022-01-26 PROCEDURE — 96417 CHEMO IV INFUS EACH ADDL SEQ: CPT

## 2022-01-26 PROCEDURE — 96372 THER/PROPH/DIAG INJ SC/IM: CPT

## 2022-01-26 PROCEDURE — 96411 CHEMO IV PUSH ADDL DRUG: CPT

## 2022-01-26 PROCEDURE — 96367 TX/PROPH/DG ADDL SEQ IV INF: CPT

## 2022-01-26 RX ORDER — SODIUM CHLORIDE 9 MG/ML
20 INJECTION, SOLUTION INTRAVENOUS ONCE
Status: COMPLETED | OUTPATIENT
Start: 2022-01-26 | End: 2022-01-26

## 2022-01-26 RX ORDER — CYANOCOBALAMIN 1000 UG/ML
1000 INJECTION INTRAMUSCULAR; SUBCUTANEOUS ONCE
Status: COMPLETED | OUTPATIENT
Start: 2022-01-26 | End: 2022-01-26

## 2022-01-26 RX ADMIN — SODIUM CHLORIDE 1000 MG: 9 INJECTION, SOLUTION INTRAVENOUS at 14:01

## 2022-01-26 RX ADMIN — CYANOCOBALAMIN 1000 MCG: 1000 INJECTION, SOLUTION INTRAMUSCULAR at 14:58

## 2022-01-26 RX ADMIN — SODIUM CHLORIDE 200 MG: 9 INJECTION, SOLUTION INTRAVENOUS at 12:59

## 2022-01-26 RX ADMIN — SODIUM CHLORIDE 20 ML/HR: 0.9 INJECTION, SOLUTION INTRAVENOUS at 11:46

## 2022-01-26 RX ADMIN — DEXAMETHASONE SODIUM PHOSPHATE: 10 INJECTION, SOLUTION INTRAMUSCULAR; INTRAVENOUS at 11:46

## 2022-01-26 RX ADMIN — FOSAPREPITANT DIMEGLUMINE 150 MG: 150 INJECTION, POWDER, LYOPHILIZED, FOR SOLUTION INTRAVENOUS at 12:05

## 2022-01-26 RX ADMIN — CARBOPLATIN 533 MG: 10 INJECTION, SOLUTION INTRAVENOUS at 14:18

## 2022-01-27 ENCOUNTER — CLINICAL SUPPORT (OUTPATIENT)
Dept: RADIATION ONCOLOGY | Facility: HOSPITAL | Age: 72
End: 2022-01-27
Attending: INTERNAL MEDICINE
Payer: COMMERCIAL

## 2022-01-27 ENCOUNTER — RADIATION ONCOLOGY CONSULT (OUTPATIENT)
Dept: RADIATION ONCOLOGY | Facility: HOSPITAL | Age: 72
End: 2022-01-27
Attending: INTERNAL MEDICINE

## 2022-01-27 ENCOUNTER — TELEPHONE (OUTPATIENT)
Dept: RADIATION ONCOLOGY | Facility: HOSPITAL | Age: 72
End: 2022-01-27

## 2022-01-27 VITALS
WEIGHT: 186 LBS | HEART RATE: 65 BPM | TEMPERATURE: 97.2 F | HEIGHT: 68 IN | BODY MASS INDEX: 28.19 KG/M2 | RESPIRATION RATE: 20 BRPM | SYSTOLIC BLOOD PRESSURE: 124 MMHG | DIASTOLIC BLOOD PRESSURE: 78 MMHG | OXYGEN SATURATION: 95 %

## 2022-01-27 DIAGNOSIS — C34.90 ADENOCARCINOMA OF LUNG, UNSPECIFIED LATERALITY (HCC): Primary | ICD-10-CM

## 2022-01-27 DIAGNOSIS — C34.11 MALIGNANT NEOPLASM OF UPPER LOBE OF RIGHT LUNG (HCC): Primary | ICD-10-CM

## 2022-01-27 PROCEDURE — 99204 OFFICE O/P NEW MOD 45 MIN: CPT | Performed by: INTERNAL MEDICINE

## 2022-01-27 NOTE — PROGRESS NOTES
Moon Miners 1950 is a 80 y o  male    MelanieOutside.ine Miners presents to the office for radiation oncology consult for adenocarcinoma of the right lung  He referred by Dr Jovan Bonds to discuss possible concurrent chemoradiation  Pt was seen in Sept 2021 for Radiation Oncology consult  Dx: Lung Ca    Patient is a 79 yo male treated with neoadjuvant chemotherapy to decrease the size of the tumor prior to consideration of concurrent chemoradiation  Recent imaging after 4 chemo cycles shows a positive response  Plan consult with Rad Oncology to consider concurrent chemoradiation after six cycles of full dose chemotherapy to further consolidate for local control prior to the start of immunotherapy alone  PMH includes: Recent CVA, B/L LE DVT's , nasopharyngeal mass, dysphagia, mass of upper lobe of right lung, adenocarcinoma of lung, anemia of chronic disease, PE, CRF w/hypoxia  9 21 21 PET CT  IMPRESSION:  1  Enlarging right upper lobe mass in keeping with biopsy proven adenocarcinoma  2  Extensive adenopathy is identified, including the neck base bilaterally, thoracic inlets, mediastinum, right hilum  3  Small right effusion, and small pericardial effusion  4  No hypermetabolic metastases identified below the diaphragms  No evidence of skeletal metastasis  5  The right-sided nasopharyngeal mass demonstrates no abnormal glucose activity  12 20 21 CT C A P  IMPRESSION:  CHEST:  1  Decrease in size of the known biopsy-proven right upper lobe lung adenocarcinoma, now measuring 58 x 34 mm, previously 65 x 37 mm   2   Decrease in size of previously enlarged mediastinal lymph nodes, which no longer meet size criteria for enlargement  3   Moderate right pleural effusion  ABDOMEN AND PELVIS:  No metastases  12 20 21 CT HEAD  IMPRESSION:  No acute intracranial abnormality  No pathologic intracranial enhancement to suggest metastatic disease  Chronic left lentiform nucleus infarction    Partially visualized known right nasopharyngeal lesion  12 27 21 Dr Elias/Med Onc/OV   Plan:  He has had documented thrombosis on Eliquis so is currently on Lovenox with an active malignancy I e  lung cancer with 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  I advised him he needs to stay on indefinite anticoagulation for now with Lovenox since he failed Eliquis  He could consider Coumadin if his primary care physician wishes to change him from Lovenox but I would not go to novel oral anticoagulant at this point since he failed his previous 1 probably secondary to a hypercoagulable state from the malignancy  He will continue to get his prescriptions through his primary care physician for this  In terms of his tumor we decided to treat him with neoadjuvant chemotherapy upfront to shrink it down prior to consideration of concurrent chemoradiation based on discussions with Radiation Oncology  his most recent imaging after 4 cycles shows a nice response  I will have him see our colleagues in Radiation Oncology and hopefully we will consider concurrent chemoradiation after 6 cycles of full-dose chemotherapy to further consolidate for local control prior to putting him on immunotherapy alone  I will continue the immunotherapy through his radiation  I will also continue his current plan unless we hear from radiation  Again the plan is for 6 cycles of full-dose systemic chemotherapy in addition to immunotherapy and then possibly concurrent chemoradiation along with immunotherapy followed by immunotherapy alone  Last chemo tx-1 26 22    Appts     2 18 22 Infusion-[facility]  2 4 22 Dr Merlos Faster  2 7 22 Fam Med   2 18 22 Pulm  6 20 22 Urology            Oncology History   Adenocarcinoma of lung   8/2021 Initial Diagnosis    Adenocarcinoma of lung     8/5/2021 Biopsy    Right lung apex, image-guided core needle biopsy:  - Adenocarcinoma      10/6/2021 -  Chemotherapy    cyanocobalamin, 1,000 mcg, Intramuscular, Once, 3 of 3 cycles  Administration: 1,000 mcg (11/24/2021), 1,000 mcg (1/26/2022), 1,000 mcg (10/6/2021)  pegfilgrastim ([place] [employer]), 6 mg, Subcutaneous, Once, 1 of 1 cycle  Administration: 6 mg (11/26/2021)  pegfilgrastim (Risa Ruckergle), 6 mg, Subcutaneous, Once, 6 of 6 cycles  Administration: 6 mg (10/6/2021), 6 mg (11/3/2021), 6 mg (11/24/2021), 6 mg (12/15/2021), 6 mg (1/5/2022)  fosaprepitant (EMEND) IVPB, 150 mg, Intravenous, Once, 6 of 6 cycles  Administration: 150 mg (10/6/2021), 150 mg (11/24/2021), 150 mg (12/15/2021), 150 mg (1/26/2022), 150 mg (11/3/2021), 150 mg (1/5/2022)  CARBOplatin (PARAPLATIN) IVPB ([facility] AUC DOSING), 519 5 mg, Intravenous, Once, 6 of 6 cycles  Administration: 519 5 mg (10/6/2021), 574 mg (11/24/2021), 600 mg (12/15/2021), 533 mg (1/26/2022), 604 5 mg (11/3/2021), 563 mg (1/5/2022)  pemetrexed (ALIMTA) chemo infusion, 970 mg, Intravenous, Once, 6 of 6 cycles  Administration: 1,000 mg (10/6/2021), 1,000 mg (11/24/2021), 1,000 mg (12/15/2021), 1,000 mg (1/26/2022), 1,000 mg (11/3/2021), 1,000 mg (1/5/2022)  pembrolizumab (KEYTRUDA) IVPB, 200 mg, Intravenous, Once, 6 of 6 cycles  Administration: 200 mg (10/6/2021), 200 mg (11/24/2021), 200 mg (12/15/2021), 200 mg (1/26/2022), 200 mg (11/3/2021), 200 mg (1/5/2022)     Malignant neoplasm of upper lobe of right lung ([facility] [address] )   9/3/2021 Initial Diagnosis    Malign[facility] )     9/23/2021 -  Cancer Staged    Staging form: Lung, AJCC 8th Edition  - Clinical stage from 9/23/2021: Stage IIIC (cT3, cN3, cM0) - Signed by Deangelo Mijares MD on 9/23/2021  Histopathologic type:  Adenocarcinoma, NOS       10/6/2021 -  Chemotherapy    cyanocobalamin, 1,000 mcg, Intramuscular, Once, 3 of 3 cycles  Administration: 1,000 mcg (11/24/2021), 1,000 mcg (1/26/2022), 1,000 mcg (10/6/2021)  pegfilgrastim ([place] [employer]), 6 mg, Subcutaneous, Once, 1 of 1 cycle  Administration: 6 mg (11/26/2021)  pegfilgrastim (NEULASTA ONPRO), 6 mg, Subcutaneous, Once, 6 of 6 cycles  Administration: 6 mg (10/6/2021), 6 mg (11/3/2021), 6 mg (11/24/2021), 6 mg (12/15/2021), 6 mg (1/5/2022)  fosaprepitant (EMEND) IVPB, 150 mg, Intravenous, Once, 6 of 6 cycles  Administration: 150 mg (10/6/2021), 150 mg (11/24/2021), 150 mg (12/15/2021), 150 mg (1/26/2022), 150 mg (11/3/2021), 150 mg (1/5/2022)  CARBOplatin (PARAPLATIN) IVPB ([facility] AUC DOSING), 519 5 mg, Intravenous, Once, 6 of 6 cycles  Administration: 519 5 mg (10/6/2021), 574 mg (11/24/2021), 600 mg (12/15/2021), 533 mg (1/26/2022), 604 5 mg (11/3/2021), 563 mg (1/5/2022)  pemetrexed (ALIMTA) chemo infusion, 970 mg, Intravenous, Once, 6 of 6 cycles  Administration: 1,000 mg (10/6/2021), 1,000 mg (11/24/2021), 1,000 mg (12/15/2021), 1,000 mg (1/26/2022), 1,000 mg (11/3/2021), 1,000 mg (1/5/2022)  pembrolizumab (KEYTRUDA) IVPB, 200 mg, Intravenous, Once, 6 of 6 cycles  Administration: 200 mg (10/6/2021), 200 mg (11/24/2021), 200 mg (12/15/2021), 200 mg (1/26/2022), 200 mg (11/3/2021), 200 mg (1/5/2022)         Review of Systems:  Review of Systems   Constitutional: Negative  HENT: Negative  Eyes:        Wears glasses    Respiratory: Negative  Cardiovascular: Negative  Gastrointestinal: Negative  Endocrine: Positive for heat intolerance (Night sweats x few months )  Genitourinary: Negative  Musculoskeletal: Negative  Skin: Negative  Allergic/Immunologic: Negative  Neurological: Negative  Hematological: Bruises/bleeds easily (Easy bruising/Lovenox )  Psychiatric/Behavioral: Negative  Clinical Trial: no      Pain assessment: 0    PFT    Imaging:No images are attached to the encounter       Prior Radiation  No previous Rad    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     MST    Implantable Devices (Port, pacemaker, pain stimulator)  No     Hip Replacement   No     Covid Vaccine Status   Vaccinated/Booster     Health Maintenance   Topic Date Due    Hepatitis C Screening  Never done    Pneumococcal Vaccine: 65+ Years (1 of 2 - PPSV23) Never done    BMI: Followup Plan  Never done    DTaP,Tdap,and Td Vaccines (1 - Tdap) Never done    Colorectal Cancer Screening  Never done    Influenza Vaccine (1) Never done    Fall Risk  2023    Medicare Annual Wellness Visit (AWV)  2023    Depression Screening  2023    BMI: Adult  2023    COVID-19 Vaccine  Completed    HIB Vaccine  Aged Out    Hepatitis B Vaccine  Aged Out    IPV Vaccine  Aged Out    Hepatitis A Vaccine  Aged Out    Meningococcal ACWY Vaccine  Aged Out    HPV Vaccine  Aged Out       Past Medical History:   Diagnosis Date    [facility] [address] )     Stroke ([facility] [address] )        Past Surgical History:   Procedure Laterality Date    IR BIOPSY LUNG  2021       Family History   Problem Relation Age of Onset    Prostate cancer Brother     Lung cancer Brother        Social History     Tobacco Use    Smoking status: Former Smoker     Packs/day: 2 50     Years: 30 00     Pack years: 75 00     Quit date:      Years since quittin 0    Smokeless tobacco: Never Used   Vaping Use    Vaping Use: Never used   Substance Use Topics    Alcohol use: Not Currently     Comment: No ETOH since Summer 2021     Drug use: Never          Current Outpatient Medications:     acetaminophen (TYLENOL) 325 mg tablet, Take 2 tablets (650 mg total) by mouth 4 (four) times a day as needed for mild pain, headaches or fever, Disp: , Rfl: 0    atorvastatin (LIPITOR) 40 mg tablet, Take 1 tablet (40 mg total) by mouth daily with dinner, Disp: 90 tablet, Rfl: 2    dexamethasone (DECADRON) 4 mg tablet, Take 1 tablet (4 mg total) by mouth 2 (two) times a day with meals The day before and the day after treatment, Disp: 8 tablet, Rfl: 0    enoxaparin (LOVENOX) 80 mg/0 8 mL, INJECT 0 8 ML (80 MG TOTAL) UNDER THE SKIN EVERY 12 (TWELVE) HOURS, Disp: 48 mL, Rfl: 0    folic acid (FOLVITE) 1 mg tablet, TAKE 1 TABLET BY MOUTH EVERY DAY, Disp: 30 tablet, Rfl: 3    prochlorperazine (COMPAZINE) 10 mg tablet, Take 1 tablet (10 mg total) by mouth every 6 (six) hours as needed for nausea or vomiting, Disp: 45 tablet, Rfl: 3    tamsulosin (FLOMAX) 0 4 mg, TAKE 1 CAPSULE BY MOUTH DAILY AFTER SUPPER, Disp: 30 capsule, Rfl: 3    enoxaparin (LOVENOX) 80 mg/0 8 mL, INJECT 0 8 ML (80 MG TOTAL) UNDER THE SKIN EVERY 12 (TWELVE) HOURS, Disp: 48 mL, Rfl: 0  No current facility-administered medications for this visit      Allergies   Allergen Reactions    Doxycycline Rash        Vitals:    01/27/22 1227   BP: 124/78   Pulse: 65   Resp: 20   Temp: (!) 97 2 °F (36 2 °C)   SpO2: 95%   Weight: 84 4 kg (186 lb)   Height: 5' 8" (1 727 m)       Pain Score: 0-No pain

## 2022-01-27 NOTE — LETTER
2022     Koffi Matthews [address] [place] [address]    Patient: Sara Clark   YOB: 1950   Date of Visit: 2022       Dear Dr Ramón Wallace:    Thank you for referring Sara Clark to me for evaluation  Below are my notes for this consultation  If you have questions, please do not hesitate to call me  I look forward to following your patient along with you  Sincerely,        Teresita Varela MD        CC: No Recipients  Teresita Varela MD  2022  8:14 PM  Sign when Signing Visit  Consultation - Radiation Oncology      Patient Name: Sara Clark [id number] : 1950  Encounter: [id number]  Referring Provider: Mee Oglesby MD    CHIEF COMPLAINT  Chief Complaint   Patient presents with    Consult     Rad Onc     Cancer Staging  Malignant neoplasm of upper lobe of right lung [facility])  Staging form: Lung, AJCC 8th Edition  - Clinical stage from 2021: Stage IIIC (cT3, cN3, cM0) - Signed by Teresita Varela MD on 2021  Histopathologic type: Adenocarcinoma, NOS    History of Present Illness  Alexia Jamarcus to the office for radiation oncology consult for adenocarcinoma of the right lung  He referred by Dr Fermin Jensen to discuss possible concurrent chemoradiation  Pt was seen in 2021 for Radiation Oncology consult  Dx: Lung Ca    Patient is a 79 yo male treated with neoadjuvant chemotherapy to decrease the size of the tumor prior to consideration of concurrent chemoradiation  Recent imaging after 4 chemo cycles shows a positive response  Plan consult with Rad Oncology to consider concurrent chemoradiation after six cycles of full dose chemotherapy to further consolidate for local control prior to the start of immunotherapy alone  PMH includes: Recent CVA, B/L LE DVT's , nasopharyngeal mass, dysphagia, mass of upper lobe of right lung, adenocarcinoma of lung, anemia of chronic disease, PE, CRF w/hypoxia        9 21 21 PET CT   IMPRESSION:  1  Enlarging right upper lobe mass in keeping with biopsy proven adenocarcinoma  2  Extensive adenopathy is identified, including the neck base bilaterally, thoracic inlets, mediastinum, right hilum  3  Small right effusion, and small pericardial effusion  4  No hypermetabolic metastases identified below the diaphragms   No evidence of skeletal metastasis  5  The right-sided nasopharyngeal mass demonstrates no abnormal glucose activity  12 20 21 CT C A P  CHEST:  1   Decrease in size of the known biopsy-proven right upper lobe lung adenocarcinoma, now measuring 58 x 34 mm, previously 65 x 37 mm  2   Decrease in size of previously enlarged mediastinal lymph nodes, which no longer meet size criteria for enlargement  3   Moderate right pleural effusion  ABDOMEN AND PELVIS:  No metastases  12 20 21 CT HEAD  IMPRESSION:  No acute intracranial abnormality  No pathologic intracranial enhancement to suggest metastatic disease  Chronic left lentiform nucleus infarction  Partially visualized known right nasopharyngeal lesion      12 27 21 Dr Elias/Med Onc/OV   Plan:  He has had documented thrombosis on Eliquis so is currently on Lovenox with an active malignancy I e  lung cancer with 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   I advised him he needs to stay on indefinite anticoagulation for now with Lovenox since he failed [place] could consider Coumadin if his primary care physician wishes to change him from Lovenox but I would not go to novel oral anticoagulant at this point since he failed his previous 1 probably secondary to a hypercoagulable state from the malignancy  Kalyani Franklin will continue to get his prescriptions through his primary care physician for this   In terms of his tumor we decided to treat him with neoadjuvant chemotherapy upfront to shrink it down prior to consideration of concurrent chemoradiation based on discussions with Radiation Oncology    his most recent imaging after 4 cycles shows a nice response   [facility] [id number]  [facility] and hopefully we will consider concurrent chemoradiation after 6 cycles of full-dose chemotherapy to further consolidate for local control prior to putting him on immunotherapy alone  Nickie Tirado will continue the immunotherapy through his radiation  [place] Tirado will also continue his current plan unless we hear from radiation   Again the plan is for 6 cycles of full-dose systemic chemotherapy in addition to immunotherapy and then possibly concurrent chemoradiation along with immunotherapy followed by immunotherapy alone  Last chemo tx-1 26 22    Appts  2 18 22 Infusion-[facility]  2 4 22 Dr Damon Serrano  2 7 22 Fam Med   2 18 22 Pulm  6 20 22 Urology    Today he reports feeling well overall  He does not have significant shortness of breath or chest pain  No esophagitis  Mild fatigue  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 s/p an accident in his youth  Otherwise, he has no complaints  He presents with his wife, they live in [facility] History   Adenocarcinoma of lung   8/2021 Initial Diagnosis    Adenocarcinoma of lung     8/5/2021 Biopsy    Right lung apex, image-guided core needle biopsy:  - Adenocarcinoma      10/6/2021 -  Chemotherapy    cyanocobalamin, 1,000 mcg, Intramuscular, Once, 3 of 3 cycles  Administration: 1,000 mcg (11/24/2021), 1,000 mcg (1/26/2022), 1,000 mcg (10/6/2021)  pegfilgrastim (Edward Anderson), 6 mg, Subcutaneous, Once, 1 of 1 cycle  Administration: 6 mg (11/26/2021)  pegfilgrastim (Giuliana Maria), 6 mg, Subcutaneous, Once, 6 of 6 cycles  Administration: 6 mg (10/6/2021), 6 mg (11/3/2021), 6 mg (11/24/2021), 6 mg (12/15/2021), 6 mg (1/5/2022)  fosaprepitant (EMEND) IVPB, 150 mg, Intravenous, Once, 6 of 6 cycles  Administration: 150 mg (10/6/2021), 150 mg (11/24/2021), 150 mg (12/15/2021), 150 mg (1/26/2022), 150 mg (11/3/2021), 150 mg (1/5/2022)  CARBOplatin (PARAPLATIN) IVPB ([facility] AUC DOSING), 519 5 mg, Intravenous, Once, 6 of 6 cycles  Administration: 519 5 mg (10/6/2021), 574 mg (11/24/2021), 600 mg (12/15/2021), 533 mg (1/26/2022), 604 5 mg (11/3/2021), 563 mg (1/5/2022)  pemetrexed (ALIMTA) chemo infusion, 970 mg, Intravenous, Once, 6 of 6 cycles  Administration: 1,000 mg (10/6/2021), 1,000 mg (11/24/2021), 1,000 mg (12/15/2021), 1,000 mg (1/26/2022), 1,000 mg (11/3/2021), 1,000 mg (1/5/2022)  pembrolizumab (KEYTRUDA) IVPB, 200 mg, Intravenous, Once, 6 of 6 cycles  Administration: 200 mg (10/6/2021), 200 mg (11/24/2021), 200 mg (12/15/2021), 200 mg (1/26/2022), 200 mg (11/3/2021), 200 mg (1/5/2022)     Malignant neoplasm of upper lobe of right lung ([facility] [address] )   9/3/2021 Initial Diagnosis    Malignant neoplasm of upper lobe of right lung ([facility] [address] )     9/23/2021 -  Cancer Staged    Staging form: Lung, AJCC 8th Edition  - Clinical stage from 9/23/2021: Stage IIIC (cT3, cN3, cM0) - Signed by Deandra Orta MD on 9/23/2021  Histopathologic type:  Adenocarcinoma, NOS       10/6/2021 -  Chemotherapy    cyanocobalamin, 1,000 mcg, Intramuscular, Once, 3 of 3 cycles  Administration: 1,000 mcg (11/24/2021), 1,000 mcg (1/26/2022), 1,000 mcg (10/6/2021)  pegfilgrastim (Mitcheal Marques), 6 mg, Subcutaneous, Once, 1 of 1 cycle  Administration: 6 mg (11/26/2021)  pegfilgrastim (Lashaun Anger), 6 mg, Subcutaneous, Once, 6 of 6 cycles  Administration: 6 mg (10/6/2021), 6 mg (11/3/2021), 6 mg (11/24/2021), 6 mg (12/15/2021), 6 mg (1/5/2022)  fosaprepitant (EMEND) IVPB, 150 mg, Intravenous, Once, 6 of 6 cycles  Administration: 150 mg (10/6/2021), 150 mg (11/24/2021), 150 mg (12/15/2021), 150 mg (1/26/2022), 150 mg (11/3/2021), 150 mg (1/5/2022)  CARBOplatin (PARAPLATIN) IVPB ([facility] AUC DOSING), 519 5 mg, Intravenous, Once, 6 of 6 cycles  Administration: 519 5 mg (10/6/2021), 574 mg (11/24/2021), 600 mg (12/15/2021), 533 mg (1/26/2022), 604 5 mg (11/3/2021), 563 mg (1/5/2022)  pemetrexed (ALIMTA) chemo infusion, 970 mg, Intravenous, Once, 6 of 6 cycles  Administration: 1,000 mg (10/6/2021), 1,000 mg (2021), 1,000 mg (12/15/2021), 1,000 mg (2022), 1,000 mg (11/3/2021), 1,000 mg (2022)  pembrolizumab (KEYTRUDA) IVPB, 200 mg, Intravenous, Once, 6 of 6 cycles  Administration: 200 mg (10/6/2021), 200 mg (2021), 200 mg (12/15/2021), 200 mg (2022), 200 mg (11/3/2021), 200 mg (2022)       Historical Information   Past Medical History:   Diagnosis Date    Lung cancer ([facility] [address] )     Stroke [facility])      Past Surgical History:   Procedure Laterality Date    IR BIOPSY LUNG  2021     Family History   Problem Relation Age of Onset    Prostate cancer Brother     Lung cancer Brother      Social History   Social History     Substance and Sexual Activity   Alcohol Use Not Currently    Comment: No ETOH since Summer 2021      Social History     Substance and Sexual Activity   Drug Use Never     Social History     Tobacco Use   Smoking Status Former Smoker    Packs/day: 2 50    Years: 30 00    Pack years: 75 00    Quit date:     Years since quittin 0   Smokeless Tobacco Never Used     Meds/Allergies     Current Outpatient Medications:     acetaminophen (TYLENOL) 325 mg tablet, Take 2 tablets (650 mg total) by mouth 4 (four) times a day as needed for mild pain, headaches or fever, Disp: , Rfl: 0    atorvastatin (LIPITOR) 40 mg tablet, Take 1 tablet (40 mg total) by mouth daily with dinner, Disp: 90 tablet, Rfl: 2    dexamethasone (DECADRON) 4 mg tablet, Take 1 tablet (4 mg total) by mouth 2 (two) times a day with meals The day before and the day after treatment, Disp: 8 tablet, Rfl: 0    enoxaparin (LOVENOX) 80 mg/0 8 mL, INJECT 0 8 ML (80 MG TOTAL) UNDER THE SKIN EVERY 12 (TWELVE) HOURS, Disp: 48 mL, Rfl: 0    folic acid (FOLVITE) 1 mg tablet, TAKE 1 TABLET BY MOUTH EVERY DAY, Disp: 30 tablet, Rfl: 3    prochlorperazine (COMPAZINE) 10 mg tablet, Take 1 tablet (10 mg total) by mouth every 6 (six) hours as needed for nausea or vomiting, Disp: 45 tablet, Rfl: 3    tamsulosin (FLOMAX) 0 4 mg, TAKE 1 CAPSULE BY MOUTH DAILY AFTER SUPPER, Disp: 30 capsule, Rfl: 3    enoxaparin (LOVENOX) 80 mg/0 8 mL, INJECT 0 8 ML (80 MG TOTAL) UNDER THE SKIN EVERY 12 (TWELVE) HOURS, Disp: 48 mL, Rfl: 0  Allergies   Allergen Reactions    Doxycycline Rash       Lab Results/Imaging Studies       Chemistry        Component Value Date/Time    K 4 5 2022 1418     (H) 2022 1418    CO2 28 2022 1418    BUN 11 2022 1418    CREATININE 0 87 2022 1418        Component Value Date/Time    CALCIUM 10 0 2022 1418    ALKPHOS 97 2022 1418    AST 18 2022 1418    ALT 48 2022 1418          Lab Results   Component Value Date    WBC 13 16 (H) 2022    HGB 9 9 (L) 2022    HCT 32 2 (L) 2022     (H) 2022     (L) 2022     Imaging Studies  No results found  Review of Systems  Constitutional: Negative  HENT: Negative  Eyes:        Wears glasses    Respiratory: Negative  Cardiovascular: Negative  Gastrointestinal: Negative  Endocrine: Positive for heat intolerance (Night sweats x few months )  Genitourinary: Negative  Musculoskeletal: Negative  Skin: Negative  Allergic/Immunologic: Negative  Neurological: Negative  Hematological: Bruises/bleeds easily (Easy bruising/Lovenox )  Psychiatric/Behavioral: Negative  OBJECTIVE:   /78   Pulse 65   Temp (!) 97 2 °F (36 2 °C)   Resp 20   Ht 5' 8" (1 727 m)   Wt 84 4 kg (186 lb)   SpO2 95%   BMI 28 28 kg/m²   Pain Assessment:  0  Performance Status: ECO - Symptomatic but completely ambulatory    Physical Exam  General Appearance:  Alert, cooperative, no distress, appears stated age  HEENT: normocephalic/atraumatic, neck supple  Cardiovascular:  Extremities warm and well perfused, no lower extremity edema  Lungs: CTAB   Respirations unlabored, no cyanosis, able to speak in complete sentences without dyspnea  Abdomen: Soft, non-distended  Extremities: No cyanosis or edema, no joint swelling  Skin: No rash or dermatitis  Neurologic: AAOx3, CNII-XII grossly intact, except blindness in the left eye, muscle strength full and equal bilaterally  Pathology:  See above  ASSESSMENT  1  Malignant neoplasm of upper lobe of right lung ([address] )  NM PET CT skull base to mid thigh    Radiation Simulation Treatment       Cancer Staging  Malignant neoplasm of upper lobe of right lung [facility])  Staging form: Lung, AJCC 8th Edition  - Clinical stage from 9/23/2021: Stage IIIC (cT3, cN3, cM0) - Signed by Chelo Moreira MD on 9/23/2021  Histopathologic type: Adenocarcinoma, NOS    PLAN  Tee Ruiz is a 80 y o  male with newly diagnosed [id number] (IIIC) NSCLC (adenocarcinoma) of the RUL, with PET/CT demonstrating 6 4cm RUL mass with extensive adenopathy at the neck base bilaterally, thoracic inlets, mediastinum, and right hilum  Incidentally, MRI imaging of the head for his CVA had revealed a mass in the right nasopharynx, which did not demonstrate uptake on the PET/CT  ENT evaluation on 9/28/21 was not suspicious for malignancy  He was initially planned for concurrent chemoRT but given his significant burden of disease, his RT plan did not meet lung, heart, or esophageal constraints  Thus, he was instead planned for sequential chemotherapy for cytoreduction followed by concurrent chemoRT  He underwent 6 cycles of CarboAlimtaKeytruda, last cycle was yesterday 1/26/22  CT C/A/P on 12/20/21 showed decrease in the size of the RUL cancer, mediastinal lymph nodes, and redemonstrated supraclavicular nodes  I re-discussed concurrent chemoradiation therapy  The patient asked many appropriate questions regarding the targeting, toxicity, and benefit of radiotherapy  I recommend 6 weeks of external beam radiation therapy (60Gy in 30fx) in an effort to control this tumor   We discussed the potential acute and late side effects, which included, but are not limited to fatigue, radiation esophagitis, radiation pneumonitis, acute skin reaction, pulmonary fibrosis, increased risk of cardiac events and secondary malignancy  I explained that the disease may progress despite chemotherapy and radiation therapy       He agreed to proceed with RT, and informed consent was signed  He is scheduled for a PET/CT on 2/7/22 to guide treatment planning  He will be scheduled for CT simulation with contrast at [facility] on 2/11/22, with treatments to be scheduled at [facility] per his preference  Thank you for the opportunity to participate in the care of this patient  Lindsay Zelaya MD  Department of [id number] [id number]    Orders Placed This Encounter   Procedures    NM PET CT skull base to mid thigh    Radiation Simulation Treatment      Portions of the record may have been created with voice recognition software  Occasional wrong word or "sound a like" substitutions may have occurred due to the inherent limitations of voice recognition software  Read the chart carefully and recognize, using context, where substitutions have occurred

## 2022-01-27 NOTE — PROGRESS NOTES
Consultation - Radiation Oncology      Patient Name: Shell Winkler YAQ:[id number] : 1950  Encounter: [id number]  Referring Provider: Ridge Barrios MD    CHIEF COMPLAINT  Chief Complaint   Patient presents with    Consult     Rad Onc     Cancer Staging  Malignant neoplasm of upper lobe of right lung [facility])  Staging form: Lung, AJCC 8th Edition  - Clinical stage from 2021: Stage IIIC (cT3, cN3, cM0) - Signed by Padmini Go MD on 2021  Histopathologic type: Adenocarcinoma, NOS    History of Present Illness  Juan Francisco Pablo to the office for radiation oncology consult for adenocarcinoma of the right lung  He referred by Dr Maldonado De Oliveira to discuss possible concurrent chemoradiation  Pt was seen in 2021 for Radiation Oncology consult  Dx: Lung Ca    Patient is a 79 yo male treated with neoadjuvant chemotherapy to decrease the size of the tumor prior to consideration of concurrent chemoradiation  Recent imaging after 4 chemo cycles shows a positive response  Plan consult with Rad Oncology to consider concurrent chemoradiation after six cycles of full dose chemotherapy to further consolidate for local control prior to the start of immunotherapy alone  PMH includes: Recent CVA, B/L LE DVT's , nasopharyngeal mass, dysphagia, mass of upper lobe of right lung, adenocarcinoma of lung, anemia of chronic disease, PE, CRF w/hypoxia  9  PET CT  IMPRESSION:  1  Enlarging right upper lobe mass in keeping with biopsy proven adenocarcinoma  2  Extensive adenopathy is identified, including the neck base bilaterally, thoracic inlets, mediastinum, right hilum  3  Small right effusion, and small pericardial effusion  4  No hypermetabolic metastases identified below the diaphragms   No evidence of skeletal metastasis  5  The right-sided nasopharyngeal mass demonstrates no abnormal glucose activity      21 CT C A P  CHEST:  1   Decrease in size of the known biopsy-proven right upper lobe lung adenocarcinoma, now measuring 58 x 34 mm, previously 65 x 37 mm  2   Decrease in size of previously enlarged mediastinal lymph nodes, which no longer meet size criteria for enlargement  3   Moderate right pleural effusion  ABDOMEN AND PELVIS:  No metastases  12 20 21 CT HEAD  IMPRESSION:  No acute intracranial abnormality  No pathologic intracranial enhancement to suggest metastatic disease  Chronic left lentiform nucleus infarction  Partially visualized known right nasopharyngeal lesion      12 27 21 Dr Elias/Med Onc/OV   Plan:  He has had documented thrombosis on Eliquis so is currently on Lovenox with an active malignancy I e  lung cancer with 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   I advised him he needs to stay on indefinite anticoagulation for now with Lovenox since he failed Lucrezia Be could consider Coumadin if his primary care physician wishes to change him from Lovenox but I would not go to novel oral anticoagulant at this point since he failed his previous 1 probably secondary to a hypercoagulable state from the malignancy  Paige Jason will continue to get his prescriptions through his primary care physician for this   In terms of his tumor we decided to treat him with neoadjuvant chemotherapy upfront to shrink it down prior to consideration of concurrent chemoradiation based on discussions with Radiation Oncology    his most recent imaging after 4 cycles shows a nice response   I will have him see our colleagues in Radiation Oncology and hopefully we will consider concurrent chemoradiation after 6 cycles of full-dose chemotherapy to further consolidate for local control prior to putting him on immunotherapy alone  Alecia Selby will continue the immunotherapy through his radiation  Alecia Selby will also continue his current plan unless we hear from radiation   Again the plan is for 6 cycles of full-dose systemic chemotherapy in addition to immunotherapy and then possibly concurrent chemoradiation along with immunotherapy followed by immunotherapy alone  Last chemo tx-1 26 22    Appts  2 18 22 Infusion-[facility]  2 4 22 Dr Doug Schulte  2 7 22 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 s/p an accident in his youth  Otherwise, he has no complaints  He presents with his wife, they live in [place]       Oncology History   Adenocarcinoma of lung   8/2021 Initial Diagnosis    Adenocarcinoma of lung     8/5/2021 Biopsy    Right lung apex, image-guided core needle biopsy:  - Adenocarcinoma      10/6/2021 -  Chemotherapy    cyanocobalamin, 1,000 mcg, Intramuscular, Once, 3 of 3 cycles  Administration: 1,000 mcg (11/24/2021), 1,000 mcg (1/26/2022), 1,000 mcg (10/6/2021)  pegfilgrastim (Nonda Flor), 6 mg, Subcutaneous, Once, 1 of 1 cycle  Administration: 6 mg (11/26/2021)  pegfilgrastim (Viva Po), 6 mg, Subcutaneous, Once, 6 of 6 cycles  Administration: 6 mg (10/6/2021), 6 mg (11/3/2021), 6 mg (11/24/2021), 6 mg (12/15/2021), 6 mg (1/5/2022)  fosaprepitant (EMEND) IVPB, 150 mg, Intravenous, Once, 6 of 6 cycles  Administration: 150 mg (10/6/2021), 150 mg (11/24/2021), 150 mg (12/15/2021), 150 mg (1/26/2022), 150 mg (11/3/2021), 150 mg (1/5/2022)  CARBOplatin (PARAPLATIN) IVPB (GOG AUC DOSING), 519 5 mg, Intravenous, Once, 6 of 6 cycles  Administration: 519 5 mg (10/6/2021), 574 mg (11/24/2021), 600 mg (12/15/2021), 533 mg (1/26/2022), 604 5 mg (11/3/2021), 563 mg (1/5/2022)  pemetrexed (ALIMTA) chemo infusion, 970 mg, Intravenous, Once, 6 of 6 cycles  Administration: 1,000 mg (10/6/2021), 1,000 mg (11/24/2021), 1,000 mg (12/15/2021), 1,000 mg (1/26/2022), 1,000 mg (11/3/2021), 1,000 mg (1/5/2022)  pembrolizumab (KEYTRUDA) IVPB, 200 mg, Intravenous, Once, 6 of 6 cycles  Administration: 200 mg (10/6/2021), 200 mg (11/24/2021), 200 mg (12/15/2021), 200 mg (1/26/2022), 200 mg (11/3/2021), 200 mg (1/5/2022)     Malignant neoplasm of upper lobe of right lung ([facility] [address] )   9/3/2021 Initial Diagnosis    Malignant neoplasm of upper lobe of right lung ([facility] [address] )     9/23/2021 -  Cancer Staged    Staging form: Lung, AJCC 8th Edition  - Clinical stage from 9/23/2021: Stage IIIC (cT3, cN3, cM0) - Signed by Carmela Figueroa MD on 9/23/2021  Histopathologic type:  Adenocarcinoma, NOS       10/6/2021 -  Chemotherapy    cyanocobalamin, 1,000 mcg, Intramuscular, Once, 3 of 3 cycles  Administration: 1,000 mcg (11/24/2021), 1,000 mcg (1/26/2022), 1,000 mcg (10/6/2021)  pegfilgrastim (Nonda Flor), 6 mg, Subcutaneous, Once, 1 of 1 cycle  Administration: 6 mg (11/26/2021)  pegfilgrastim (Viva Po), 6 mg, Subcutaneous, Once, 6 of 6 cycles  Administration: 6 mg (10/6/2021), 6 mg (11/3/2021), 6 mg (11/24/2021), 6 mg (12/15/2021), 6 mg (1/5/2022)  fosaprepitant (EMEND) IVPB, 150 mg, Intravenous, Once, 6 of 6 cycles  Administration: 150 mg (10/6/2021), 150 mg (11/24/2021), 150 mg (12/15/2021), 150 mg (1/26/2022), 150 mg (11/3/2021), 150 mg (1/5/2022)  CARBOplatin (PARAPLATIN) IVPB (GOG AUC DOSING), 519 5 mg, Intravenous, Once, 6 of 6 cycles  Administration: 519 5 mg (10/6/2021), 574 mg (11/24/2021), 600 mg (12/15/2021), 533 mg (1/26/2022), 604 5 mg (11/3/2021), 563 mg (1/5/2022)  pemetrexed (ALIMTA) chemo infusion, 970 mg, Intravenous, Once, 6 of 6 cycles  Administration: 1,000 mg (10/6/2021), 1,000 mg (11/24/2021), 1,000 mg (12/15/2021), 1,000 mg (1/26/2022), 1,000 mg (11/3/2021), 1,000 mg (1/5/2022)  pembrolizumab (KEYTRUDA) IVPB, 200 mg, Intravenous, Once, 6 of 6 cycles  Administration: 200 mg (10/6/2021), 200 mg (11/24/2021), 200 mg (12/15/2021), 200 mg (1/26/2022), 200 mg (11/3/2021), 200 mg (1/5/2022)       Historical Information   Past Medical History:   Diagnosis Date    Lung cancer ([facility] [address] )     Stroke [facility])      Past Surgical History:   Procedure Laterality Date    IR BIOPSY LUNG  2021     Family History   Problem Relation Age of Onset    Prostate cancer Brother     Lung cancer Brother      Social History   Social History     Substance and Sexual Activity   Alcohol Use Not Currently    Comment: No ETOH since Summer 2021      Social History     Substance and Sexual Activity   Drug Use Never     Social History     Tobacco Use   Smoking Status Former Smoker    Packs/day: 2 50    Years: 30 00    Pack years: 75 00    Quit date:     Years since quittin 0   Smokeless Tobacco Never Used     Meds/Allergies     Current Outpatient Medications:     acetaminophen (TYLENOL) 325 mg tablet, Take 2 tablets (650 mg total) by mouth 4 (four) times a day as needed for mild pain, headaches or fever, Disp: , Rfl: 0    atorvastatin (LIPITOR) 40 mg tablet, Take 1 tablet (40 mg total) by mouth daily with dinner, Disp: 90 tablet, Rfl: 2    dexamethasone (DECADRON) 4 mg tablet, Take 1 tablet (4 mg total) by mouth 2 (two) times a day with meals The day before and the day after treatment, Disp: 8 tablet, Rfl: 0    enoxaparin (LOVENOX) 80 mg/0 8 mL, INJECT 0 8 ML (80 MG TOTAL) UNDER THE SKIN EVERY 12 (TWELVE) HOURS, Disp: 48 mL, Rfl: 0    folic acid (FOLVITE) 1 mg tablet, TAKE 1 TABLET BY MOUTH EVERY DAY, Disp: 30 tablet, Rfl: 3    prochlorperazine (COMPAZINE) 10 mg tablet, Take 1 tablet (10 mg total) by mouth every 6 (six) hours as needed for nausea or vomiting, Disp: 45 tablet, Rfl: 3    tamsulosin (FLOMAX) 0 4 mg, TAKE 1 CAPSULE BY MOUTH DAILY AFTER SUPPER, Disp: 30 capsule, Rfl: 3    enoxaparin (LOVENOX) 80 mg/0 8 mL, INJECT 0 8 ML (80 MG TOTAL) UNDER THE SKIN EVERY 12 (TWELVE) HOURS, Disp: 48 mL, Rfl: 0  Allergies   Allergen Reactions    Doxycycline Rash       Lab Results/Imaging Studies       Chemistry        Component Value Date/Time    K 4 5 2022 1418     (H) 2022 1418    CO2 28 2022 1418    BUN 11 2022 1418    CREATININE 0 87 2022 1418 Component Value Date/Time    CALCIUM 10 0 2022 1418    ALKPHOS 97 2022 1418    AST 18 2022 1418    ALT 48 2022 1418          Lab Results   Component Value Date    WBC 13 16 (H) 2022    HGB 9 9 (L) 2022    HCT 32 2 (L) 2022     (H) 2022     (L) 2022     Imaging Studies  No results found  Review of Systems  Constitutional: Negative  HENT: Negative  Eyes:        Wears glasses    Respiratory: Negative  Cardiovascular: Negative  Gastrointestinal: Negative  Endocrine: Positive for heat intolerance (Night sweats x few months )  Genitourinary: Negative  Musculoskeletal: Negative  Skin: Negative  Allergic/Immunologic: Negative  Neurological: Negative  Hematological: Bruises/bleeds easily (Easy bruising/Lovenox )  Psychiatric/Behavioral: Negative  OBJECTIVE:   /78   Pulse 65   Temp (!) 97 2 °F (36 2 °C)   Resp 20   Ht 5' 8" (1 727 m)   Wt 84 4 kg (186 lb)   SpO2 95%   BMI 28 28 kg/m²   Pain Assessment:  0  Performance Status: ECO - Symptomatic but completely ambulatory    Physical Exam  General Appearance:  Alert, cooperative, no distress, appears stated age  HEENT: normocephalic/atraumatic, neck supple  Cardiovascular:  Extremities warm and well perfused, no lower extremity edema  Lungs: CTAB  Respirations unlabored, no cyanosis, able to speak in complete sentences without dyspnea  Abdomen: Soft, non-distended  Extremities: No cyanosis or edema, no joint swelling  Skin: No rash or dermatitis  Neurologic: AAOx3, CNII-XII grossly intact, except blindness in the left eye, muscle strength full and equal bilaterally  Pathology:  See above  ASSESSMENT  1   Malignant neoplasm of upper lobe of right lung ([address] )  NM PET CT skull base to mid thigh    Radiation Simulation Treatment       Cancer Staging  Malignant neoplasm of upper lobe of right lung ([address] )  Staging form: Lung, AJCC 8th Edition  - Clinical stage from 9/23/2021: Stage IIIC (cT3, cN3, cM0) - Signed by Silvestre Hurtado MD on 9/23/2021  Histopathologic type: Adenocarcinoma, NOS    PLAN  Moon Monet is a 80 y o  male with newly diagnosed [id number] (IIIC) NSCLC (adenocarcinoma) of the RUL, with PET/CT demonstrating 6 4cm RUL mass with extensive adenopathy at the neck base bilaterally, thoracic inlets, mediastinum, and right hilum  Incidentally, MRI imaging of the head for his CVA had revealed a mass in the right nasopharynx, which did not demonstrate uptake on the PET/CT  ENT evaluation on 9/28/21 was not suspicious for malignancy  He was initially planned for concurrent chemoRT but given his significant burden of disease, his RT plan did not meet lung, heart, or esophageal constraints  Thus, he was instead planned for sequential chemotherapy for cytoreduction followed by concurrent chemoRT  He underwent 6 cycles of CarboAlimtaKeytruda, last cycle was yesterday 1/26/22  CT C/A/P on 12/20/21 showed decrease in the size of the RUL cancer, mediastinal lymph nodes, and redemonstrated supraclavicular nodes  I re-discussed concurrent chemoradiation therapy  The patient asked many appropriate questions regarding the targeting, toxicity, and benefit of radiotherapy  I recommend 6 weeks of external beam radiation therapy (60Gy in 30fx) in an effort to control this tumor  We discussed the potential acute and late side effects, which included, but are not limited to fatigue, radiation esophagitis, radiation pneumonitis, acute skin reaction, pulmonary fibrosis, increased risk of cardiac events and secondary malignancy  I explained that the disease may progress despite chemotherapy and radiation therapy       He agreed to proceed with RT, and informed consent was signed  He is scheduled for a PET/CT on 2/7/22 to guide treatment planning   He will be scheduled for CT simulation with contrast at [facility] on 2/11/22, with treatments to be scheduled at [facility] per his preference  Thank you for the opportunity to participate in the care of this patient  Mary Jo Pryor MD  Department of [address] [address]    Orders Placed This Encounter   Procedures    NM PET CT skull base to mid thigh    Radiation Simulation Treatment      Portions of the record may have been created with voice recognition software  Occasional wrong word or "sound a like" substitutions may have occurred due to the inherent limitations of voice recognition software  Read the chart carefully and recognize, using context, where substitutions have occurred

## 2022-01-31 ENCOUNTER — TELEPHONE (OUTPATIENT)
Dept: FAMILY MEDICINE CLINIC | Facility: HOSPITAL | Age: 72
End: 2022-01-31

## 2022-01-31 PROCEDURE — U0003 INFECTIOUS AGENT DETECTION BY NUCLEIC ACID (DNA OR RNA); SEVERE ACUTE RESPIRATORY SYNDROME CORONAVIRUS 2 (SARS-COV-2) (CORONAVIRUS DISEASE [COVID-19]), AMPLIFIED PROBE TECHNIQUE, MAKING USE OF HIGH THROUGHPUT TECHNOLOGIES AS DESCRIBED BY CMS-2020-01-R: HCPCS | Performed by: FAMILY MEDICINE

## 2022-01-31 PROCEDURE — U0005 INFEC AGEN DETEC AMPLI PROBE: HCPCS | Performed by: FAMILY MEDICINE

## 2022-01-31 NOTE — TELEPHONE ENCOUNTER
BARRIE HAS BEEN EXPERIENCING HEAD COLD SYMPTOMS SINCE Saturday - HE WAS EXPOSED - HE IS VACCINATED, NO FEVER  - HE DOES HAVE CANCER

## 2022-02-02 ENCOUNTER — HOSPITAL ENCOUNTER (OUTPATIENT)
Dept: INFUSION CENTER | Facility: HOSPITAL | Age: 72
End: 2022-02-02
Attending: INTERNAL MEDICINE

## 2022-02-02 ENCOUNTER — OFFICE VISIT (OUTPATIENT)
Dept: FAMILY MEDICINE CLINIC | Facility: HOSPITAL | Age: 72
End: 2022-02-02
Payer: COMMERCIAL

## 2022-02-02 VITALS — OXYGEN SATURATION: 99 % | HEIGHT: 68 IN | BODY MASS INDEX: 27.58 KG/M2 | WEIGHT: 182 LBS

## 2022-02-02 DIAGNOSIS — U07.1 COVID-19 VIRUS INFECTION: Primary | ICD-10-CM

## 2022-02-02 PROCEDURE — 99213 OFFICE O/P EST LOW 20 MIN: CPT | Performed by: FAMILY MEDICINE

## 2022-02-02 NOTE — PROGRESS NOTES
COVID-19 Outpatient Progress Note    Assessment/Plan:    Problem List Items Addressed This Visit     None         Disposition:     Discussed symptom directed medication options with patient  pcr (+) for covid  Patient meets criteria for PAXLOVID and they have been counseled appropriately according to EUA documentation released by the FDA  After discussion, patient agrees to treatment  Emmy Castillo is an investigational medicine used to treat mild-to-moderate COVID-19 in adults and children (15years of age and older weighing at least 80 pounds (40 kg)) with positive results of direct SARS-CoV-2 viral testing, and who are at high risk for progression to severe COVID-19, including hospitalization or death  PAXLOVID is investigational because it is still being studied  There is limited information about the safety and effectiveness of using PAXLOVID to treat people with mild-to-moderate COVID-19  The FDA has authorized the emergency use of PAXLOVID for the treatment of mild-tomoderate COVID-19 in adults and children (15years of age and older weighing at least 80 pounds (40 kg)) with a positive test for the virus that causes COVID-19, and who are at high risk for progression to severe COVID-19, including hospitalization or death, under an EUA  What should I tell my healthcare provider before I take PAXLOVID? Tell your healthcare provider if you:  - Have any allergies  - Have liver or kidney disease  - Are pregnant or plan to become pregnant  - Are breastfeeding a child  - Have any serious illnesses    Tell your healthcare provider about all the medicines you take, including prescription and over-the-counter medicines, vitamins, and herbal supplements  Some medicines may interact with PAXLOVID and may cause serious side effects  Keep a list of your medicines to show your healthcare provider and pharmacist when you get a new medicine      You can ask your healthcare provider or pharmacist for a list of medicines that interact with PAXLOVID  Do not start taking a new medicine without telling your healthcare provider  Your healthcare provider can tell you if it is safe to take PAXLOVID with other medicines  Tell your healthcare provider if you are taking combined hormonal contraceptive  PAXLOVID may affect how your birth control pills work  Females who are able to become pregnant should use another effective alternative form of contraception or an additional barrier method of contraception  Talk to your healthcare provider if you have any questions about contraceptive methods that might be right for you  How do I take PAXLOVID? PAXLOVID consists of 2 medicines: nirmatrelvir and ritonavir  - Take 2 pink tablets of nirmatrelvir with 1 white tablet of ritonavir by mouth 2 times each day (in the morning and in the evening) for 5 days  For each dose, take all 3 tablets at the same time  - If you have kidney disease, talk to your healthcare provider  You may need a different dose  - Swallow the tablets whole  Do not chew, break, or crush the tablets  - Take PAXLOVID with or without food  - Do not stop taking PAXLOVID without talking to your healthcare provider, even if you feel better  - If you miss a dose of PAXLOVID within 8 hours of the time it is usually taken, take it as soon as you remember  If you miss a dose by more than 8 hours, skip the missed dose and take the next dose at your regular time  Do not take 2 doses of PAXLOVID at the same time  - If you take too much PAXLOVID, call your healthcare provider or go to the nearest hospital emergency room right away  - If you are taking a ritonavir- or cobicistat-containing medicine to treat hepatitis C or Human Immunodeficiency Virus (HIV), you should continue to take your medicine as prescribed by your healthcare provider   - Talk to your healthcare provider if you do not feel better or if you feel worse after 5 days      Who should generally not take PAXLOVID? Do not take PAXLOVID if:  You are allergic to nirmatrelvir, ritonavir, or any of the ingredients in PAXLOVID  You are taking any of the following medicines:  - Alfuzosin  - Pethidine, piroxicam, propoxyphene  - Ranolazine  - Amiodarone, dronedarone, flecainide, propafenone, quinidine  - Colchicine  - Lurasidone, pimozide, clozapine  - Dihydroergotamine, ergotamine, methylergonovine  - Lovastatin, simvastatin  - Sildenafil (Revatio®) for pulmonary arterial hypertension (PAH)  - Triazolam, oral midazolam  - Apalutamide  - Carbamazepine, phenobarbital, phenytoin  - Rifampin  - St  Mandeeps Wort (hypericum perforatum)    What are the important possible side effects of PAXLOVID? Possible side effects of PAXLOVID are:  - Liver Problems  Tell your healthcare provider right away if you have any of these signs and symptoms of liver problems: loss of appetite, yellowing of your skin and the whites of eyes (jaundice), dark-colored urine, pale colored stools and itchy skin, stomach area (abdominal) pain  - Resistance to HIV Medicines  If you have untreated HIV infection, PAXLOVID may lead to some HIV medicines not working as well in the future  - Other possible side effects include: altered sense of taste, diarrhea, high blood pressure, or muscle aches    These are not all the possible side effects of PAXLOVID  Not many people have taken PAXLOVID  Serious and unexpected side effects may happen  Gracie Hendrickson is still being studied, so it is possible that all of the risks are not known at this time  What other treatment choices are there? Like Kaylan Telles may allow for the emergency use of other medicines to treat people with COVID-19  Go to https://[employer]/ for information on the emergency use of other medicines that are authorized by FDA to treat people with COVID-19   Your healthcare provider may talk with you about clinical trials for which you may be eligible  It is your choice to be treated or not to be treated with PAXLOVID  Should you decide not to receive it or for your child not to receive it, it will not change your standard medical care  What if I am pregnant or breastfeeding? There is no experience treating pregnant women or breastfeeding mothers with PAXLOVID  For a mother and unborn baby, the benefit of taking PAXLOVID may be greater than the risk from the treatment  If you are pregnant, discuss your options and specific situation with your healthcare provider  It is recommended that you use effective barrier contraception or do not have sexual activity while taking PAXLOVID  If you are breastfeeding, discuss your options and specific situation with your healthcare provider  How do I report side effects with PAXLOVID? Contact your healthcare provider if you have any side effects that bother you or do not go away  Report side effects to FDA MedWatch at www [employer] gov/medwatch or call 3-472-GGO9008 or you can report side effects to [facility]  at the contact information provided below  Website Fax number Telephone number   [employer] [phone]     How should I store [address]? Store PAXLOVID tablets at room temperature between 68°F to 77°F (20°C to 25°C)  Full fact sheet for patients, parents, and caregivers can be found at: [email or web address] sebastian    I have spent 12 minutes directly with the patient  Greater than 50% of this time was spent in counseling/coordination of care regarding: diagnostic results, prognosis, risks and benefits of treatment options, instructions for management and impressions  Discussed treatment options to include paxlovid and monoclonal antibody  Pt is 80year old with lung ca, recently completed chemotherapy  Overall milder sytmpoms , agreed on paxlovid     Hold atrovastatin and flomax x 5 days while on paxlovid  Fu in 2 days  Encounter provider Fern Espinoza MD    Provider located at [address]   [address] [facility] [id number]    Recent Visits  No visits were found meeting these conditions  Showing recent visits within past 7 days and meeting all other requirements  Today's Visits  Date Type Provider Dept   02/02/22 Office Visit Fern Espinoza MD [facility]   Showing today's visits and meeting all other requirements  Future Appointments  No visits were found meeting these conditions  Showing future appointments within next 150 days and meeting all other requirements     This virtual check-in was done via telephone and he agrees to proceed  Patient agrees to participate in a virtual check in via telephone or video visit instead of presenting to the office to address urgent/immediate medical needs  Patient is aware this is a billable service  After connecting through Telephone, the patient was identified by name and date of birth  Jacquie Peña was informed that this was a telemedicine visit and that the exam was being conducted confidentially over secure lines  My office door was closed  No one else was in the room  Jacquie Peña acknowledged consent and understanding of privacy and security of the telemedicine visit  I informed the patient that I have reviewed his record in Epic and presented the opportunity for him to ask any questions regarding the visit today  The patient agreed to participate  Verification of patient location:  Patient is located in the following state in which I hold an active license: [place]    Subjective:   Jacquie Peña is a 80 y o  male who is concerned about COVID-19  Patient's symptoms include nasal congestion, rhinorrhea and cough   Patient denies chills, fatigue, malaise, sore throat, anosmia, loss of taste, shortness of breath, abdominal pain, nausea, vomiting, diarrhea, myalgias and headaches  - Date of symptom onset: 1/29/2022      COVID-19 vaccination status: Fully vaccinated with booster    Exposure:   Contact with a person who is under investigation (PUI) for or who is positive for COVID-19 within the last 14 days?: No    Hospitalized recently for fever and/or lower respiratory symptoms?: No      Currently a healthcare worker that is involved in direct patient care?: No      Works in a special setting where the risk of COVID-19 transmission may be high? (this may include long-term care, correctional and skilled nursing facilities; homeless shelters; assisted-living facilities and group homes ): No      Resident in a special setting where the risk of COVID-19 transmission may be high? (this may include long-term care, correctional and skilled nursing facilities; homeless shelters; assisted-living facilities and group homes ): No      Pt with knonw lung CA  Completed chemotherapy about a week ago  No sob on exertion  No wheezing  No fever  Ongoing sinus congestion       Lab Results   Component Value Date    SARSCOV2 Positive (A) 01/31/2022     Past Medical History:   Diagnosis Date    Lung cancer ([facility] [address] )     Stroke [facility])      Past Surgical History:   Procedure Laterality Date    IR BIOPSY LUNG  8/5/2021     Current Outpatient Medications   Medication Sig Dispense Refill    acetaminophen (TYLENOL) 325 mg tablet Take 2 tablets (650 mg total) by mouth 4 (four) times a day as needed for mild pain, headaches or fever  0    atorvastatin (LIPITOR) 40 mg tablet Take 1 tablet (40 mg total) by mouth daily with dinner 90 tablet 2    dexamethasone (DECADRON) 4 mg tablet Take 1 tablet (4 mg total) by mouth 2 (two) times a day with meals The day before and the day after treatment 8 tablet 0    enoxaparin (LOVENOX) 80 mg/0 8 mL INJECT 0 8 ML (80 MG TOTAL) UNDER THE SKIN EVERY 12 (TWELVE) HOURS 48 mL 0    folic acid (FOLVITE) 1 mg tablet TAKE 1 TABLET BY MOUTH EVERY DAY 30 tablet 3    prochlorperazine (COMPAZINE) 10 mg tablet Take 1 tablet (10 mg total) by mouth every 6 (six) hours as needed for nausea or vomiting 45 tablet 3    tamsulosin (FLOMAX) 0 4 mg TAKE 1 CAPSULE BY MOUTH DAILY AFTER SUPPER 30 capsule 3    enoxaparin (LOVENOX) 80 mg/0 8 mL INJECT 0 8 ML (80 MG TOTAL) UNDER THE SKIN EVERY 12 (TWELVE) HOURS 48 mL 0     No current facility-administered medications for this visit  Allergies   Allergen Reactions    Doxycycline Rash       Review of Systems   Constitutional: Negative for activity change, appetite change, chills and fatigue  HENT: Positive for congestion and rhinorrhea  Negative for sore throat  Respiratory: Positive for cough  Negative for shortness of breath  Gastrointestinal: Negative for abdominal pain, diarrhea, nausea and vomiting  Musculoskeletal: Negative for myalgias  Neurological: Negative for headaches  Objective:    Vitals:    02/02/22 0920   SpO2: 99%   Weight: 82 6 kg (182 lb)   Height: 5' 8" (1 727 m)       Physical Exam  Neurological:      Mental Status: He is alert  VIRTUAL VISIT DISCLAIMER    Lelo Ca verbally agrees to participate in [facility]  Pt is aware that [facility] could be limited without vital signs or the ability to perform a full hands-on physical exam  Corin Gibson understands he or the provider may request at any time to terminate the video visit and request the patient to seek care or treatment in person

## 2022-02-07 ENCOUNTER — OFFICE VISIT (OUTPATIENT)
Dept: FAMILY MEDICINE CLINIC | Facility: HOSPITAL | Age: 72
End: 2022-02-07
Payer: COMMERCIAL

## 2022-02-07 ENCOUNTER — HOSPITAL ENCOUNTER (OUTPATIENT)
Dept: RADIOLOGY | Age: 72
Discharge: HOME | End: 2022-02-07
Payer: COMMERCIAL

## 2022-02-07 VITALS
OXYGEN SATURATION: 98 % | HEIGHT: 68 IN | SYSTOLIC BLOOD PRESSURE: 122 MMHG | BODY MASS INDEX: 27.28 KG/M2 | DIASTOLIC BLOOD PRESSURE: 70 MMHG | WEIGHT: 180 LBS | TEMPERATURE: 96.3 F | HEART RATE: 60 BPM

## 2022-02-07 DIAGNOSIS — R91.8 MASS OF UPPER LOBE OF RIGHT LUNG: ICD-10-CM

## 2022-02-07 DIAGNOSIS — C34.11 MALIGNANT NEOPLASM OF UPPER LOBE OF RIGHT LUNG (HCC): ICD-10-CM

## 2022-02-07 DIAGNOSIS — U07.1 COVID-19 VIRUS INFECTION: Primary | ICD-10-CM

## 2022-02-07 LAB — GLUCOSE SERPL-MCNC: 66 MG/DL (ref 65–140)

## 2022-02-07 PROCEDURE — G1004 CDSM NDSC: HCPCS

## 2022-02-07 PROCEDURE — A9552 F18 FDG: HCPCS

## 2022-02-07 PROCEDURE — 99214 OFFICE O/P EST MOD 30 MIN: CPT | Performed by: FAMILY MEDICINE

## 2022-02-07 PROCEDURE — 78815 PET IMAGE W/CT SKULL-THIGH: CPT

## 2022-02-07 PROCEDURE — 82948 REAGENT STRIP/BLOOD GLUCOSE: CPT

## 2022-02-07 NOTE — PROGRESS NOTES
Assessment/Plan:      Problem List Items Addressed This Visit        Respiratory    Malignant neoplasm of upper lobe of right lung (HCC)       Other    Mass of upper lobe of right lung      Other Visit Diagnoses     COVID-19 virus infection    -  Primary           Plan/Discussion:  Patient is day 9 and is recovered clinically from covid-19 infection  S/p paxlovid  I do not seen any reason to hold pet scan scheduled today  Lung ca  S/p chemo  Plan for radiation therapy  Fu in 3 months  Subjective:   Chief Complaint   Patient presents with    Follow-up        Patient ID: Jessica Hardy is a 80 y o  male  Pt seen for fu    covid sytmoms 1/29/2022  Completed course of paxlovid  Reports imprvoement with its use  No fever, no chills  Breathing well  No sob, no dyspnea on exertion  Minimal coughing  No wheezing  No n/v/d/c/  No new concerns     Lung ca  Pet scan today  S/p chemo  Plan for radiation  The following portions of the patient's history were reviewed and updated as appropriate: allergies, current medications, past family history, past medical history, past social history, past surgical history and problem list     Review of Systems   Constitutional: Negative  Negative for activity change, appetite change, chills and diaphoresis  HENT: Negative for congestion and dental problem  Respiratory: Negative  Negative for apnea, chest tightness, shortness of breath and wheezing  Cardiovascular: Negative  Negative for chest pain, palpitations and leg swelling  Gastrointestinal: Negative  Negative for abdominal distention, abdominal pain, constipation, diarrhea and nausea  Genitourinary: Negative  Negative for difficulty urinating, dysuria and frequency           Objective:  Vitals:    02/07/22 0955   BP: 122/70   Pulse: 60   Temp: (!) 96 3 °F (35 7 °C)   SpO2: 98%   Weight: 81 6 kg (180 lb)   Height: 5' 8" (1 727 m)     BP Readings from Last 6 Encounters:   02/07/22 122/70   01/27/22 124/78   01/26/22 156/85   01/24/22 136/70   01/21/22 153/73   01/05/22 144/68      Wt Readings from Last 6 Encounters:   02/07/22 81 6 kg (180 lb)   02/02/22 82 6 kg (182 lb)   01/27/22 84 4 kg (186 lb)   01/26/22 82 9 kg (182 lb 12 2 oz)   01/24/22 82 9 kg (182 lb 12 8 oz)   01/21/22 83 3 kg (183 lb 9 6 oz)             Physical Exam  Vitals reviewed  Constitutional:       Appearance: Normal appearance  HENT:      Head: Normocephalic  Nose: Nose normal    Eyes:      Extraocular Movements: Extraocular movements intact  Pupils: Pupils are equal, round, and reactive to light  Cardiovascular:      Rate and Rhythm: Normal rate and regular rhythm  Pulses: Normal pulses  Heart sounds: Normal heart sounds  Pulmonary:      Effort: Pulmonary effort is normal       Breath sounds: Normal breath sounds  Skin:     General: Skin is warm  Capillary Refill: Capillary refill takes less than 2 seconds  Neurological:      General: No focal deficit present  Mental Status: He is alert and oriented to person, place, and time     Psychiatric:         Mood and Affect: Mood normal          Behavior: Behavior normal

## 2022-02-11 ENCOUNTER — APPOINTMENT (OUTPATIENT)
Dept: RADIATION ONCOLOGY | Facility: HOSPITAL | Age: 72
End: 2022-02-11
Attending: INTERNAL MEDICINE
Payer: COMMERCIAL

## 2022-02-11 ENCOUNTER — TELEPHONE (OUTPATIENT)
Dept: HEMATOLOGY ONCOLOGY | Facility: HOSPITAL | Age: 72
End: 2022-02-11

## 2022-02-11 DIAGNOSIS — D70.1 CHEMOTHERAPY INDUCED NEUTROPENIA (HCC): ICD-10-CM

## 2022-02-11 DIAGNOSIS — T45.1X5A CHEMOTHERAPY INDUCED NEUTROPENIA (HCC): ICD-10-CM

## 2022-02-11 DIAGNOSIS — C34.11 MALIGNANT NEOPLASM OF UPPER LOBE OF RIGHT LUNG (HCC): Primary | ICD-10-CM

## 2022-02-11 DIAGNOSIS — R91.8 MASS OF UPPER LOBE OF RIGHT LUNG: ICD-10-CM

## 2022-02-11 DIAGNOSIS — C34.90 ADENOCARCINOMA OF LUNG, UNSPECIFIED LATERALITY (HCC): ICD-10-CM

## 2022-02-11 PROCEDURE — 77334 RADIATION TREATMENT AID(S): CPT | Performed by: STUDENT IN AN ORGANIZED HEALTH CARE EDUCATION/TRAINING PROGRAM

## 2022-02-11 PROCEDURE — 77399 UNLISTED PX MED RADJ PHYSICS: CPT | Performed by: INTERNAL MEDICINE

## 2022-02-11 NOTE — TELEPHONE ENCOUNTER
02/11/22    Spoke with patient reminding updated labs prior to appt  Advising pt to go to any [facility] walk-in labs to get blood work done  Pt stated he will get labs done today or tomorrow

## 2022-02-11 NOTE — TELEPHONE ENCOUNTER
Called patient spoke with patient's wife and explained provider will not be in that patient would see Pito Lee  Patient's wife confirmed this appt

## 2022-02-12 ENCOUNTER — APPOINTMENT (OUTPATIENT)
Dept: LAB | Facility: HOSPITAL | Age: 72
End: 2022-02-12
Attending: INTERNAL MEDICINE
Payer: COMMERCIAL

## 2022-02-12 DIAGNOSIS — C34.11 MALIGNANT NEOPLASM OF UPPER LOBE OF RIGHT LUNG (HCC): ICD-10-CM

## 2022-02-12 LAB
ALBUMIN SERPL BCP-MCNC: 3.2 G/DL (ref 3.5–5)
ALP SERPL-CCNC: 66 U/L (ref 46–116)
ALT SERPL W P-5'-P-CCNC: 34 U/L (ref 12–78)
ANION GAP SERPL CALCULATED.3IONS-SCNC: 4 MMOL/L (ref 4–13)
AST SERPL W P-5'-P-CCNC: 18 U/L (ref 5–45)
BASOPHILS # BLD AUTO: 0.02 THOUSANDS/ΜL (ref 0–0.1)
BASOPHILS NFR BLD AUTO: 0 % (ref 0–1)
BILIRUB SERPL-MCNC: 0.2 MG/DL (ref 0.2–1)
BUN SERPL-MCNC: 10 MG/DL (ref 5–25)
CALCIUM ALBUM COR SERPL-MCNC: 10.2 MG/DL (ref 8.3–10.1)
CALCIUM SERPL-MCNC: 9.6 MG/DL (ref 8.3–10.1)
CHLORIDE SERPL-SCNC: 106 MMOL/L (ref 100–108)
CO2 SERPL-SCNC: 31 MMOL/L (ref 21–32)
CREAT SERPL-MCNC: 0.86 MG/DL (ref 0.6–1.3)
EOSINOPHIL # BLD AUTO: 0.03 THOUSAND/ΜL (ref 0–0.61)
EOSINOPHIL NFR BLD AUTO: 1 % (ref 0–6)
ERYTHROCYTE [DISTWIDTH] IN BLOOD BY AUTOMATED COUNT: 17.9 % (ref 11.6–15.1)
GFR SERPL CREATININE-BSD FRML MDRD: 87 ML/MIN/1.73SQ M
GLUCOSE P FAST SERPL-MCNC: 89 MG/DL (ref 65–99)
HCT VFR BLD AUTO: 32.9 % (ref 36.5–49.3)
HGB BLD-MCNC: 9.9 G/DL (ref 12–17)
IMM GRANULOCYTES # BLD AUTO: 0.01 THOUSAND/UL (ref 0–0.2)
IMM GRANULOCYTES NFR BLD AUTO: 0 % (ref 0–2)
LYMPHOCYTES # BLD AUTO: 1.95 THOUSANDS/ΜL (ref 0.6–4.47)
LYMPHOCYTES NFR BLD AUTO: 39 % (ref 14–44)
MCH RBC QN AUTO: 30.9 PG (ref 26.8–34.3)
MCHC RBC AUTO-ENTMCNC: 30.1 G/DL (ref 31.4–37.4)
MCV RBC AUTO: 103 FL (ref 82–98)
MONOCYTES # BLD AUTO: 0.84 THOUSAND/ΜL (ref 0.17–1.22)
MONOCYTES NFR BLD AUTO: 17 % (ref 4–12)
NEUTROPHILS # BLD AUTO: 2.11 THOUSANDS/ΜL (ref 1.85–7.62)
NEUTS SEG NFR BLD AUTO: 43 % (ref 43–75)
NRBC BLD AUTO-RTO: 0 /100 WBCS
PLATELET # BLD AUTO: 172 THOUSANDS/UL (ref 149–390)
PMV BLD AUTO: 10 FL (ref 8.9–12.7)
POTASSIUM SERPL-SCNC: 4.2 MMOL/L (ref 3.5–5.3)
PROT SERPL-MCNC: 7.2 G/DL (ref 6.4–8.2)
RBC # BLD AUTO: 3.2 MILLION/UL (ref 3.88–5.62)
SODIUM SERPL-SCNC: 141 MMOL/L (ref 136–145)
WBC # BLD AUTO: 4.96 THOUSAND/UL (ref 4.31–10.16)

## 2022-02-12 PROCEDURE — 80053 COMPREHEN METABOLIC PANEL: CPT

## 2022-02-12 PROCEDURE — 85025 COMPLETE CBC W/AUTO DIFF WBC: CPT

## 2022-02-12 PROCEDURE — 36415 COLL VENOUS BLD VENIPUNCTURE: CPT

## 2022-02-13 NOTE — PROGRESS NOTES
Hematology/Oncology Outpatient Follow- up Note  Carmita Driscoll, 1950, [id number]  2/14/2022        Chief Complaint   Patient presents with    Follow-up       HPI:  aCrmita Driscoll is a 80year old male who follows with medical oncology for a history of adenocarcinoma of the lung  Per the note from Dr Eddie Ordaz:  "This is a 60-year-old gentleman with the history of tobacco abuse, the patient was admitted to the hospital as a stroke alert   During the hospital stay he had multiple imaging studies including CTA of the brain which showed mass in the right nasopharyngeal region measuring 2 7 cm in greatest dimension   He also had a Doppler ultrasound of the lower extremities which showed acute deep vein thrombosis in both lower extremities  CT scan of the chest abdomen pelvis without contrast on 07/31 showed 6 4 cm solid irregular right upper lobe pulmonary mass with extensive right hilar and mediastinal adenopathy   He also seems to have small right pleural effusion and moderate emphysema  MRI of the brain on 08/03/2021 showed multifocal diffusion abnormalities in several separate vascular territories   No metastatic disease to the brain was mentioned  [facility] does seem to have 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  CT-guided biopsy of the right lung mass was done on 08/05   The pathology showed adenocarcinoma of lung primary   The patient is currently on Lovenox injection for anticoagulation  In terms of his malignancy we were considering concurrent chemoradiation but the tumor is quite large and in the end discussion between Radiation Oncology and myself was had and we decided to give him chemotherapy upfront in an effort to shrink this down and then consider concurrent chemoradiation if possible  The nasopharyngeal mass did not light up and on exam with our colleagues in ENT surgery there was no main mass seen    At this point we will continue with his chemotherapy in combination with immunotherapy  I will reimage him after 4 cycles of treatment    If he has a nice response we will consider concurrent chemoradiation "       Previous Hematologic/ Oncologic History:    Oncology History   Adenocarcinoma of lung   8/2021 Initial Diagnosis    Adenocarcinoma of lung     8/5/2021 Biopsy    Right lung apex, image-guided core needle biopsy:  - Adenocarcinoma      10/6/2021 - 1/26/2022 Chemotherapy    cyanocobalamin, 1,000 mcg, Intramuscular, Once, 3 of 3 cycles  Administration: 1,000 mcg (11/24/2021), 1,000 mcg (1/26/2022), 1,000 mcg (10/6/2021)  pegfilgrastim (Antoni Porter), 6 mg, Subcutaneous, Once, 1 of 1 cycle  Administration: 6 mg (11/26/2021)  pegfilgrastim (Jeffrey Jay), 6 mg, Subcutaneous, Once, 6 of 6 cycles  Administration: 6 mg (10/6/2021), 6 mg (11/3/2021), 6 mg (11/24/2021), 6 mg (12/15/2021), 6 mg (1/5/2022)  fosaprepitant (EMEND) IVPB, 150 mg, Intravenous, Once, 6 of 6 cycles  Administration: 150 mg (10/6/2021), 150 mg (11/24/2021), 150 mg (12/15/2021), 150 mg (1/26/2022), 150 mg (11/3/2021), 150 mg (1/5/2022)  CARBOplatin (PARAPLATIN) IVPB (GOG AUC DOSING), 519 5 mg, Intravenous, Once, 6 of 6 cycles  Administration: 519 5 mg (10/6/2021), 574 mg (11/24/2021), 600 mg (12/15/2021), 533 mg (1/26/2022), 604 5 mg (11/3/2021), 563 mg (1/5/2022)  pemetrexed (ALIMTA) chemo infusion, 970 mg, Intravenous, Once, 6 of 6 cycles  Administration: 1,000 mg (10/6/2021), 1,000 mg (11/24/2021), 1,000 mg (12/15/2021), 1,000 mg (1/26/2022), 1,000 mg (11/3/2021), 1,000 mg (1/5/2022)  pembrolizumab (KEYTRUDA) IVPB, 200 mg, Intravenous, Once, 6 of 6 cycles  Administration: 200 mg (10/6/2021), 200 mg (11/24/2021), 200 mg (12/15/2021), 200 mg (1/26/2022), 200 mg (11/3/2021), 200 mg (1/5/2022)     2/24/2022 -  Chemotherapy    CARBOplatin (PARAPLATIN) IVPB (GOG AUC DOSING), , Intravenous, Once, 0 of 7 cycles  PACLItaxel (TAXOL) chemo IVPB, 50 mg/m2, Intravenous, Once, 0 of 7 cycles     Malignant neoplasm of upper lobe of right lung ([facility] [address] )   9/3/2021 Initial Diagnosis    Malignant neoplasm of upper lobe of right lung ([facility] [address] )     9/23/2021 -  Cancer Staged    Staging form: Lung, AJCC 8th Edition  - Clinical stage from 9/23/2021: Stage IIIC (cT3, cN3, cM0) - Signed by Marti Hyatt MD on 9/23/2021  Histopathologic type:  Adenocarcinoma, NOS       10/6/2021 - 1/26/2022 Chemotherapy    cyanocobalamin, 1,000 mcg, Intramuscular, Once, 3 of 3 cycles  Administration: 1,000 mcg (11/24/2021), 1,000 mcg (1/26/2022), 1,000 mcg (10/6/2021)  pegfilgrastim (Delsie Ensenada), 6 mg, Subcutaneous, Once, 1 of 1 cycle  Administration: 6 mg (11/26/2021)  pegfilgrastim (Somerset Rajinder), 6 mg, Subcutaneous, Once, 6 of 6 cycles  Administration: 6 mg (10/6/2021), 6 mg (11/3/2021), 6 mg (11/24/2021), 6 mg (12/15/2021), 6 mg (1/5/2022)  fosaprepitant (EMEND) IVPB, 150 mg, Intravenous, Once, 6 of 6 cycles  Administration: 150 mg (10/6/2021), 150 mg (11/24/2021), 150 mg (12/15/2021), 150 mg (1/26/2022), 150 mg (11/3/2021), 150 mg (1/5/2022)  CARBOplatin (PARAPLATIN) IVPB (GO AUC DOSING), 519 5 mg, Intravenous, Once, 6 of 6 cycles  Administration: 519 5 mg (10/6/2021), 574 mg (11/24/2021), 600 mg (12/15/2021), 533 mg (1/26/2022), 604 5 mg (11/3/2021), 563 mg (1/5/2022)  pemetrexed (ALIMTA) chemo infusion, 970 mg, Intravenous, Once, 6 of 6 cycles  Administration: 1,000 mg (10/6/2021), 1,000 mg (11/24/2021), 1,000 mg (12/15/2021), 1,000 mg (1/26/2022), 1,000 mg (11/3/2021), 1,000 mg (1/5/2022)  pembrolizumab (KEYTRUDA) IVPB, 200 mg, Intravenous, Once, 6 of 6 cycles  Administration: 200 mg (10/6/2021), 200 mg (11/24/2021), 200 mg (12/15/2021), 200 mg (1/26/2022), 200 mg (11/3/2021), 200 mg (1/5/2022)     2/24/2022 -  Chemotherapy    CARBOplatin (PARAPLATIN) IVPB (GOG AUC DOSING), , Intravenous, Once, 0 of 7 cycles  PACLItaxel (TAXOL) chemo IVPB, 50 mg/m2, Intravenous, Once, 0 of 7 cycles     Carbo, Alimta, Keytruda x 6 cycles     Current Hematologic/ Oncologic Treatment:    Will start concurrent chemoradiation  ECO - Symptomatic but completely ambulatory    Interval History:   The patient presents for routine follow up  He was last seen by Dr Bhargav Duarte on 2021  He has now completed 6 cycles of carbo Alimta and [facility]  Patient was evaluated by our colleagues in [address] [place] [address] for consideration of concurrent chemoradiation for local control of his disease  He underwent PET CT simulation on 22 which show significant interval metabolic response to therapy  He has persistent but improving hypermetabolic right upper lobe lung mass with mild residual FDG activity in the right hilar region suspicious for persistent darien malignancy  New nonspecific mildly FDG avid soft tissue in the left posterior nasopharyngeal region of uncertain clinical significance  Stable non-FDG avid soft tissue in the right posterior nasopharyngeal region  Moderate to large right pleural effusion  I will refer him back to Dr Azeem Nye with ENT for further evaluation of this new nonspecific FDG activity that is noted in the left posterior nasopharyngeal region    He is scheduled to begin 6 weeks of external beam radiation therapy (60Gy in 30fx) in an effort to control this tumor next week on   Patient has previously been consented to chemotherapy with carboplatin at an AUC of 2 with Taxol at 50 mg/m2 concurrently with radiation to the lung and mediastinum  I did review these medications with him today  Per Dr Bhargav Duarte is last office visit note he does mention use of concurrent chemoradiation along with immunotherapy followed by immunotherapy alone  I will clarify his recommendations when he returns in office  Patient is tentatively scheduled to begin weekly chemo on     Overall, patient states he feels well  He denies any chest pain, shortness of breath, hemoptysis  His appetite is good  Energy levels are good    He denies any concerning symptoms today     Cancer Staging:  Cancer Staging  Malignant neoplasm of upper lobe of right lung [facility])  Staging form: Lung, AJCC 8th Edition  - Clinical stage from 9/23/2021: Stage IIIC (cT3, cN3, cM0) - Signed by Silvestre Hurtado MD on 9/23/2021  Histopathologic type: Adenocarcinoma, NOS      Molecular Testing:       Test Results:    Imaging:   PET/CT SCAN 2/7/2022     INDICATION:  C34 11: Malignant neoplasm of upper lobe, right bronchus or lung   , restaging status post chemotherapy      45-year-old male with locally advanced NSCLC s/p 6 cycles of induction chemotherapy, assess treatment response to guide radiation planning ; Non-small cell lung cancer (NSCLC), non-metastatic, assess treatment response     MODIFIER: PS      COMPARISON: CT of neck, chest, abdomen, and pelvis dated 12/20/2021 and PET/CT dated 9/21/2021     CELL TYPE:  Adenocarcinoma     TECHNIQUE:   8 4 mCi F-18-FDG administered IV  Multiplanar attenuation corrected and non attenuation corrected PET images were acquired 60 minutes post injection  Contiguous, low dose, axial CT sections were obtained from the skull vertex through the   femurs   Intravenous contrast material was not utilized    This examination, like all CT scans performed in the [facility], was performed utilizing techniques to minimize radiation dose exposure, including the use of iterative   reconstruction and automated exposure control       Fasting serum glucose: 66 mg/dl     FINDINGS:      VISUALIZED BRAIN:     No acute abnormalities are seen      HEAD/NECK:     On image 58 of series 3, there is a stable in size non-FDG avid right-sided nasopharyngeal mass      There is new prominent mildly FDG avid left posterior nasopharyngeal soft tissue measuring 2 4 x 1 8 cm on image 56 of series 3 with maximum SUV of 2 4      Previously noted bilateral lower cervical/supraclavicular hypermetabolic lymphadenopathy has metabolically resolved with corresponding interval decrease in size of lymph nodes which are now within range of normal variation      CT images: There is stable asymmetric enlargement of the left lateral ventricle  Intracranial atherosclerotic calcification is noted      CHEST:     Patient's known right upper lobe primary bronchogenic carcinoma has decreased in size in FDG avidity since the prior PET/CT, currently measuring 4 8 x 2 2 cm with Max SUV of 7 6, previously 7 5 x 5 2 cm with Max SUV of 18  3      Previously noted hypermetabolic mediastinal lymphadenopathy has metabolically resolved with corresponding interval near complete normalization of mediastinal lymph nodes      There is mild residual FDG activity in the right hilar region with max SUV of 2 8 with significantly improving but mild residual soft tissue in the right hilar region which is difficult to measure on low-dose unenhanced CT and is suspicious for mild   residual right hilar darien metastatic disease      CT images: Moderate to large right pleural effusion  Mild to moderate emphysematous changes  Borderline mild cardiomegaly  Atherosclerotic vascular calcifications including those of the coronary arteries are noted      ABDOMEN:     No FDG avid soft tissue lesions are seen      CT images: Left renal cyst  Atherosclerotic vascular calcifications are noted  Diverticulosis coli      PELVIS:   No FDG avid soft tissue lesions are seen      CT images: Mild enlargement of the prostate gland      OSSEOUS STRUCTURES:  No FDG avid lesions are seen      CT images: Velpen right scoliosis to the lumbar spine  Degenerative changes are noted involving the spine      IMPRESSION:     1  Significant interval metabolic response to therapy  Persistent but improving hypermetabolic right upper lobe lung mass with mild residual FDG activity in the right hilar region suspicious for persistent darien malignancy      2    New nonspecific mildly FDG avid soft tissue in the left posterior nasopharyngeal region of uncertain clinical significance  Stable non-FDG avid soft tissue in the right posterior nasopharyngeal region  Consider correlation with direct visualization   to exclude developing malignancy      3  Moderate to large right pleural effusion      The study was marked in EPIC for significant notification    Labs:   Lab Results   Component Value Date    WBC 4 96 02/12/2022    HGB 9 9 (L) 02/12/2022    HCT 32 9 (L) 02/12/2022     (H) 02/12/2022     02/12/2022     Lab Results   Component Value Date    K 4 2 02/12/2022     02/12/2022    CO2 31 02/12/2022    BUN 10 02/12/2022    CREATININE 0 86 02/12/2022    GLUF 89 02/12/2022    CALCIUM 9 6 02/12/2022    CORRECTEDCA 10 2 (H) 02/12/2022    AST 18 02/12/2022    ALT 34 02/12/2022    ALKPHOS 66 02/12/2022    EGFR 87 02/12/2022           Review of Systems      Active Problems:   Patient Active Problem List   Diagnosis    Recent cerebrovascular accident    Bilateral lower extremity DVTs    Nasopharyngeal mass    Dysphagia    Mass of upper lobe of right lung    Urinary retention    Constipation    Anemia of chronic disease    Pulmonary embolism (HCC)    Chronic respiratory failure with hypoxia ([facility])    Impaired cognition    Impaired mobility and ADLs    Adenocarcinoma of lung    Malignant neoplasm of upper lobe of right lung (HCC)    COPD mixed type (HCC)    Chemotherapy induced neutropenia (HCC)    Hypercalcemia of malignancy    Chronic anticoagulation       Past Medical History:   Past Medical History:   Diagnosis Date    Lung cancer ([address] )     Stroke [facility])        Surgical History:   Past Surgical History:   Procedure Laterality Date    IR BIOPSY LUNG  8/5/2021       Family History:    Family History   Problem Relation Age of Onset    Prostate cancer Brother     Lung cancer Brother        Cancer-related family history includes Lung cancer in his brother; Prostate cancer in his brother      Social History:   Social History     Socioeconomic History    Marital status: /Civil Union     Spouse name: Not on file    Number of children: Not on file    Years of education: Not on file    Highest education level: Not on file   Occupational History    Not on file   Tobacco Use    Smoking status: Former Smoker     Packs/day: 2 50     Years: 30 00     Pack years: 75 00     Quit date:      Years since quittin 1    Smokeless tobacco: Never Used   Vaping Use    Vaping Use: Never used   Substance and Sexual Activity    Alcohol use: Not Currently     Comment: No ETOH since Summer 2021     Drug use: Never    Sexual activity: Yes     Partners: Female   Other Topics Concern    Not on file   Social History Narrative    Not on file     Social Determinants of Health     Financial Resource Strain: Not on file   Food Insecurity: Not on file   Transportation Needs: Not on file   Physical Activity: Not on file   Stress: Not on file   Social Connections: Not on file   Intimate Partner Violence: Not on file   Housing Stability: Not on file       Current Medications:   Current Outpatient Medications   Medication Sig Dispense Refill    acetaminophen (TYLENOL) 325 mg tablet Take 2 tablets (650 mg total) by mouth 4 (four) times a day as needed for mild pain, headaches or fever  0    atorvastatin (LIPITOR) 40 mg tablet Take 1 tablet (40 mg total) by mouth daily with dinner 90 tablet 2    dexamethasone (DECADRON) 4 mg tablet Take 1 tablet (4 mg total) by mouth 2 (two) times a day with meals The day before and the day after treatment 8 tablet 0    enoxaparin (LOVENOX) 80 mg/0 8 mL INJECT 0 8 ML (80 MG TOTAL) UNDER THE SKIN EVERY 12 (TWELVE) HOURS 48 mL 0    folic acid (FOLVITE) 1 mg tablet TAKE 1 TABLET BY MOUTH EVERY DAY 30 tablet 3    prochlorperazine (COMPAZINE) 10 mg tablet Take 1 tablet (10 mg total) by mouth every 6 (six) hours as needed for nausea or vomiting 45 tablet 3    tamsulosin (FLOMAX) 0 4 mg TAKE 1 CAPSULE BY MOUTH DAILY AFTER SUPPER 30 capsule 3  enoxaparin (LOVENOX) 80 mg/0 8 mL INJECT 0 8 ML (80 MG TOTAL) UNDER THE SKIN EVERY 12 (TWELVE) HOURS (Patient not taking: Reported on 2/14/2022 ) 48 mL 0     No current facility-administered medications for this visit  Allergies: Allergies   Allergen Reactions    Doxycycline Rash       Physical Exam:  /80 (BP Location: Left arm, Patient Position: Sitting, Cuff Size: Adult)   Pulse 70   Temp 97 5 °F (36 4 °C) (Temporal)   Resp 16   Ht 5' 8" (1 727 m)   Wt 84 4 kg (186 lb)   SpO2 95%   BMI 28 28 kg/m²   Body surface area is 1 98 meters squared  Wt Readings from Last 3 Encounters:   02/14/22 84 4 kg (186 lb)   02/07/22 81 6 kg (180 lb)   02/02/22 82 6 kg (182 lb)           Physical Exam    Assessment / Plan:    1  Malignant neoplasm of upper lobe of right lung ([address] )    2  Adenocarcinoma of lung, unspecified laterality ([address] )    3  Nasopharyngeal mass      The patient is a 80year old male with newly diagnosed biopsy proven adenocarcinoma of the lung with mediastinal adenopathy and a nasopharyngeal mass  PET CT scan completed on 9/21 showed enlarging right upper lobe mass in keeping with biopsy proven adenocarcinoma, extensive adenopathy in the neck base bilaterally, thoracic inlets, mediastinum, right hilum  Small right effusion, and small pericardial effusion  No hypermetabolic metastases identified below the diaphragms  No evidence of skeletal metastasis  The right-sided nasopharyngeal mass demonstrates no abnormal glucose activity  He has been treated with 6 cycles of systemic therapy with Carbo, Alimta and Keytruda  Imaging after 4 cycles of chemo showed a positive response to therapy    More recently he underwent PET CT simulation which shows a very nice treatment response with a decrease in size of his right upper lobe primary lung cancer and resolution of previously noted mediastinal lymph adenopathy      He has been evaluated by our colleagues at [address] [place] [address] and they are in agreement with treatment with concurrent chemoradiation in an effort to control his tumor  He is plan to begin 6 weeks of external beam radiation therapy (60Gy in 30fx) next Tuesday to 2/22  Previously it was discussed with patient and Dr Shelia Guardado that Chemotherapy would consist of carboplatin at an AUC of 2 with Taxol at 50 milligrams/meter subcutaneously concurrently with radiation to the lung and mediastinum  I reviewed with patient and his spouse today possible side effects to include but not limited to nausea, vomiting, diarrhea, mouth sores, hair loss, fatigue, mucositis, neuropathy, low blood counts, risk of infection and even death  The patient re-signed a consent form  Per Dr Santiago White last note, he did make mention of continuing immunotherapy during radiation  I will discuss this with Dr Shelia Guardado inpatient treatment will be modified if needed  He is tentatively scheduled to begin chemotherapy next Thursday to 2/24    In regards to new nonspecific FDG activity that is noted in the left posterior nasopharyngeal region, I will refer him back to ENT for further evaluation  Patient of the possible eyes understanding and agreement with plan of care as discussed today  He will continue on Xgeva for his history of hypercalcemia  He will also continue on Lovenox and understands he requires indefinite anticoagulation for now with Lovenox since he failed Eliquis and has a hypercoagulable state from the malignancy    Patient will have weekly blood work  I will discuss the final treatment plan with Dr Shelia Guardado once he returns to the office  Goals and Barriers:  Current Goal:  Prolong Survival from lung cancer  Barriers: None  Patient's Capacity to Self Care:  Patient  able to self care  Portions of the record may have been created with voice recognition software  Occasional wrong word or "sound a like" substitutions may have occurred due to the inherent limitations of voice recognition software    Read the chart carefully and recognize, using context, where substitutions have occurred

## 2022-02-14 ENCOUNTER — OFFICE VISIT (OUTPATIENT)
Dept: HEMATOLOGY ONCOLOGY | Facility: HOSPITAL | Age: 72
End: 2022-02-14
Payer: COMMERCIAL

## 2022-02-14 VITALS
HEART RATE: 70 BPM | SYSTOLIC BLOOD PRESSURE: 134 MMHG | BODY MASS INDEX: 28.19 KG/M2 | RESPIRATION RATE: 16 BRPM | OXYGEN SATURATION: 95 % | HEIGHT: 68 IN | WEIGHT: 186 LBS | DIASTOLIC BLOOD PRESSURE: 80 MMHG | TEMPERATURE: 97.5 F

## 2022-02-14 DIAGNOSIS — C34.90 ADENOCARCINOMA OF LUNG, UNSPECIFIED LATERALITY (HCC): ICD-10-CM

## 2022-02-14 DIAGNOSIS — C34.11 MALIGNANT NEOPLASM OF UPPER LOBE OF RIGHT LUNG (HCC): Primary | ICD-10-CM

## 2022-02-14 DIAGNOSIS — J39.2 NASOPHARYNGEAL MASS: ICD-10-CM

## 2022-02-14 PROCEDURE — 99215 OFFICE O/P EST HI 40 MIN: CPT | Performed by: NURSE PRACTITIONER

## 2022-02-15 ENCOUNTER — TELEPHONE (OUTPATIENT)
Dept: HEMATOLOGY ONCOLOGY | Facility: CLINIC | Age: 72
End: 2022-02-15

## 2022-02-15 DIAGNOSIS — I82.403 ACUTE DEEP VEIN THROMBOSIS (DVT) OF BOTH LOWER EXTREMITIES, UNSPECIFIED VEIN (HCC): ICD-10-CM

## 2022-02-15 NOTE — TELEPHONE ENCOUNTER
Patient's wife Jenny Banerjee would like to let Kiah Hansen know that patient is currently scheduled with ENT Darwyn Merlin on 3-11-22 @ [facility] would like to know if this is ok   Please call danny back at [phone]

## 2022-02-18 ENCOUNTER — HOSPITAL ENCOUNTER (OUTPATIENT)
Dept: INFUSION CENTER | Facility: HOSPITAL | Age: 72
Discharge: HOME | End: 2022-02-18
Attending: INTERNAL MEDICINE
Payer: COMMERCIAL

## 2022-02-18 VITALS
WEIGHT: 186.73 LBS | DIASTOLIC BLOOD PRESSURE: 68 MMHG | HEIGHT: 68 IN | HEART RATE: 70 BPM | SYSTOLIC BLOOD PRESSURE: 134 MMHG | TEMPERATURE: 98.2 F | RESPIRATION RATE: 14 BRPM | BODY MASS INDEX: 28.3 KG/M2 | OXYGEN SATURATION: 98 %

## 2022-02-18 DIAGNOSIS — E83.52 HYPERCALCEMIA OF MALIGNANCY: Primary | ICD-10-CM

## 2022-02-18 PROCEDURE — 96372 THER/PROPH/DIAG INJ SC/IM: CPT

## 2022-02-18 RX ADMIN — DENOSUMAB 120 MG: 120 INJECTION SUBCUTANEOUS at 13:36

## 2022-02-18 NOTE — PROGRESS NOTES
Patient here for Marino Mercado  Calcium and creatinine clearance meet parameters  Injection given SQ left arm  Dressing CD&I  Declined AVS, left unit ambulatory with steady gait

## 2022-02-21 ENCOUNTER — TELEPHONE (OUTPATIENT)
Dept: SURGICAL ONCOLOGY | Facility: CLINIC | Age: 72
End: 2022-02-21

## 2022-02-21 NOTE — TELEPHONE ENCOUNTER
Patients wife called in and would like to know when he is supposed to get his bloodwork done  She can be reached at [phone]

## 2022-02-21 NOTE — TELEPHONE ENCOUNTER
Chace Lopez refers to the labs required for pt's chemo  He will have chemo next Mon and I advised him to have labs done on previous Thu or Fri  Pt verbalized understanding and agreement

## 2022-02-22 ENCOUNTER — TELEPHONE (OUTPATIENT)
Dept: RADIATION ONCOLOGY | Facility: CLINIC | Age: 72
End: 2022-02-22

## 2022-02-22 ENCOUNTER — APPOINTMENT (OUTPATIENT)
Dept: RADIATION ONCOLOGY | Facility: HOSPITAL | Age: 72
End: 2022-02-22
Attending: INTERNAL MEDICINE
Payer: COMMERCIAL

## 2022-02-22 DIAGNOSIS — C34.11 MALIGNANT NEOPLASM OF UPPER LOBE OF RIGHT LUNG (HCC): Primary | ICD-10-CM

## 2022-02-22 NOTE — TELEPHONE ENCOUNTER
1350-Call to wife, Laurel Bennett  Unable to contact Rc Faust on his cell phone x 2  Pt presented to [facility]-RT for new start tx  Increase in right pleural effusion noted  Pt is asymptomatic at time of visit  Wife made aware order and call placed to IR team for consult/possible thoracentesis  Pt  to receive call back with appointment time, date, place  Rc Faust is present at time of call to his wife per spouse  Leopoldo Grim understanding information above and in agreement with the plan

## 2022-02-22 NOTE — PROGRESS NOTES
Karen Hammer a 80 y  o  male with [id number] (IIIC) NSCLC (adenocarcinoma) of the RUL, with PET/CT demonstrating 6 4cm RUL mass with extensive adenopathy at the neck base bilaterally, thoracic inlets, mediastinum, and right hilum  He underwent 6 cycles of CarboAlimtaKeytruda, last cycle 1/26/22  CT C/A/P on 12/20/21 showed decrease in the size of the RUL primary, mediastinal lymph nodes, and redemonstrated supraclavicular nodes  Patient presented to start RT today at [facility], but CBCT imaging on the machine showed dramatic worsening of his known right pleural effusion  Treatment was held and IR Thorascentesis orders placed  Per the [facility] team, Mr Kym Kessler felt well overall, denied shortness of breath, and was able to ambulate down for radiation without any respiratory symptoms  Patient notified by nursing and  staff aware to facilitate prompt scheduling with IR

## 2022-02-23 ENCOUNTER — TELEPHONE (OUTPATIENT)
Dept: RADIATION ONCOLOGY | Facility: HOSPITAL | Age: 72
End: 2022-02-23

## 2022-02-23 ENCOUNTER — APPOINTMENT (OUTPATIENT)
Dept: RADIATION ONCOLOGY | Facility: HOSPITAL | Age: 72
End: 2022-02-23
Payer: COMMERCIAL

## 2022-02-23 ENCOUNTER — TELEPHONE (OUTPATIENT)
Dept: SURGERY | Facility: HOSPITAL | Age: 72
End: 2022-02-23

## 2022-02-23 ENCOUNTER — TELEPHONE (OUTPATIENT)
Dept: FAMILY MEDICINE CLINIC | Facility: HOSPITAL | Age: 72
End: 2022-02-23

## 2022-02-23 NOTE — TELEPHONE ENCOUNTER
I had a Ligia call from Nataliia Adams about a fax that someone sent her- pt is Annie Guzman she would like a call back her number is [phone]

## 2022-02-23 NOTE — TELEPHONE ENCOUNTER
Return call to pt's spouse, Rachana Gilbert  Reviewed no RT txmt today or tomorrow  IR-Thoracentesis is scheduled for 2 24 22  Call to  [facility]-pt is scheduled at [facility] for IR procedure-no pre-visit phone call per call to [facility]  Pt will return for RT tx/new films? on Friday, 2 25 22 post thoracentesis  [id number] of information above  Reviewed w/physician  Spouse verbalizes   Understanding information above and in agreement with the plan

## 2022-02-23 NOTE — TELEPHONE ENCOUNTER
Pt wife would like a call back from Kennedi Zamorano is going for a procedure tomorrow 2/24 and she has a few questions inregards to elizabeth GUEVARA# [phone]

## 2022-02-23 NOTE — TELEPHONE ENCOUNTER
Deepa called in regards to a home health order  I stated we do not have a record of a home health order in our system  Deepa states this was the only contact number she would be able to find  Nonetheless, disregard

## 2022-02-24 ENCOUNTER — HOSPITAL ENCOUNTER (OUTPATIENT)
Dept: INTERVENTIONAL RADIOLOGY/VASCULAR | Facility: HOSPITAL | Age: 72
Discharge: HOME | End: 2022-02-24
Admitting: RADIOLOGY
Payer: COMMERCIAL

## 2022-02-24 ENCOUNTER — APPOINTMENT (OUTPATIENT)
Dept: RADIATION ONCOLOGY | Facility: HOSPITAL | Age: 72
End: 2022-02-24
Payer: COMMERCIAL

## 2022-02-24 VITALS
WEIGHT: 182.2 LBS | SYSTOLIC BLOOD PRESSURE: 127 MMHG | OXYGEN SATURATION: 98 % | HEART RATE: 83 BPM | RESPIRATION RATE: 16 BRPM | DIASTOLIC BLOOD PRESSURE: 75 MMHG | TEMPERATURE: 98 F | HEIGHT: 69 IN | BODY MASS INDEX: 26.98 KG/M2

## 2022-02-24 DIAGNOSIS — C34.11 MALIGNANT NEOPLASM OF UPPER LOBE OF RIGHT LUNG (HCC): ICD-10-CM

## 2022-02-24 DIAGNOSIS — C34.11 MALIGNANT NEOPLASM OF UPPER LOBE OF RIGHT LUNG (HCC): Primary | ICD-10-CM

## 2022-02-24 PROCEDURE — 32555 ASPIRATE PLEURA W/ IMAGING: CPT | Performed by: RADIOLOGY

## 2022-02-24 PROCEDURE — 88112 CYTOPATH CELL ENHANCE TECH: CPT | Performed by: PATHOLOGY

## 2022-02-24 PROCEDURE — 32555 ASPIRATE PLEURA W/ IMAGING: CPT

## 2022-02-24 PROCEDURE — 88305 TISSUE EXAM BY PATHOLOGIST: CPT | Performed by: PATHOLOGY

## 2022-02-24 RX ORDER — DIPHENOXYLATE HYDROCHLORIDE AND ATROPINE SULFATE 2.5; .025 MG/1; MG/1
1 TABLET ORAL DAILY
COMMUNITY

## 2022-02-24 NOTE — DISCHARGE INSTRUCTIONS
Thoracentesis   AMBULATORY CARE:   A thoracentesis  is a procedure to remove extra fluid or air from between your lungs and your inner chest wall  Air or fluid buildup may make it hard for you to breathe  A thoracentesis allows your lungs to expand fully so you can breathe more easily  How to prepare for a thoracentesis:   · Your healthcare provider will tell you how to prepare for your procedure  You may need tests to check how well your blood clots  Tell your provider about all medicines you are taking, including blood thinners  You may need to stop taking certain medicines before the procedure  Your provider will tell you if you need to stop any medicines, and when to stop them  He or she will also tell you when to start taking them again after your procedure  · Tell your healthcare provider if you have ever had an allergic reaction to anesthesia or a numbing medicine  Also tell your provider about any lung conditions you have, such as COPD  · If you will be getting sedation, arrange to have someone drive you home after the procedure and stay with you  The person will need to watch for any reaction to the medicine  · Your provider will talk to you about what to expect during the procedure  You may feel some pressure in your chest as the fluid or air is being removed  You will be asked not to cough or take a deep breath during the procedure  This is to help prevent a lung injury  A thoracentesis usually only takes about 15 minutes  Tell your provider if you have any concerns about being able to stay still and breathe as directed during the procedure  What will happen during a thoracentesis:   · You will be asked to sit in a chair and rest your arms on a table in front of you  Local anesthesia will be given to numb the area where the needle will be inserted  Your healthcare provider will insert the needle and move it between your ribs  He or she may use an ultrasound to help guide the needle      · If your procedure is done to collect fluid for tests, a small amount of fluid is removed  Then the needle is removed and a dressing is placed over the area  · If more fluid needs to be drained, a small plastic tube will be placed and the needle removed  The tube will be connected to a collection device and the fluid will slowly drain out  A device may be used to measure the pressure inside your chest  This will help your healthcare provider check if enough fluid has been drained  When enough fluid has drained out, the tube will be removed and the area will be covered with a bandage  What to expect after a thoracentesis:  A chest x-ray may be needed to check that your lungs were not damaged during the procedure  You may also need any of the following after your procedure:  · A chest tube  may be placed into your chest to drain extra fluid  The chest tube is attached to a container to collect the fluid  · You may need extra oxygen  if your blood oxygen level is lower than it should be  You may get oxygen through a mask placed over your nose and mouth or through small tubes placed in your nostrils  Ask your healthcare provider before you take off the mask or oxygen tubing  · Pain medicine  may be given  · Antibiotics  help fight or prevent an infection  · Breathing treatments  may help open your airways so you can breathe easier  A machine is used to change liquid medicine into a mist  You will breathe the mist into your lungs through tubing and a mouthpiece  Inhaled mist medicines act quickly on your airways and lungs to relieve your symptoms  Risks of a thoracentesis:  Your lung may be punctured by the needle and collapse  You may bleed more than expected or get an infection from the procedure  You may also have chest pain, a cough, nausea, or feel lightheaded  Nerves, blood vessels, and nearby organs, such as your liver and spleen, may get damaged   Even after your procedure, the air or fluid in your chest may not drain completely  The air or fluid may build up again and you may need another thoracentesis  Seek care immediately if:   · Blood soaks through your bandage  · There is blood in your saliva  · You had a chest tube removed and your stitches come apart  · You have sudden trouble breathing  · You have severe chest pain  Contact your healthcare provider if:   · You have a fever  · Your puncture site is red, warm, swollen, or draining pus  · You have questions or concerns about your procedure, medicine, or care  Self-care:   · Rest as needed  Return to your daily activities as directed  · Do not smoke  Nicotine and other chemicals in cigarettes, e-cigarettes, and cigars can cause lung damage  Ask your healthcare provider for information if you currently smoke and need help to quit  Smokeless tobacco still contains nicotine  Talk to your healthcare provider before you use these products  Follow up with your doctor as directed:  Write down your questions so you remember to ask them during your visits  © Copyright [employer] 2021 Information is for End User's use only and may not be sold, redistributed or otherwise used for commercial purposes  All illustrations and images included in CareNotes® are the copyrighted property of A D A M , Inc  or [address] [place] [address]  The above information is an  only  It is not intended as medical advice for individual conditions or treatments  Talk to your doctor, nurse or pharmacist before following any medical regimen to see if it is safe and effective for you  Thoracentesis   WHAT YOU NEED TO KNOW:   A thoracentesis is a procedure to remove extra fluid or air from between your lungs and your inner chest wall  Air or fluid buildup may make it hard for you to breathe  A thoracentesis allows your lungs to expand fully so you can breathe more easily    DISCHARGE INSTRUCTIONS:   Seek care immediately if:   · Blood soaks through your bandage  · There is blood in your saliva  · You had a chest tube removed and your stitches come apart  · You have sudden trouble breathing  · You have severe chest pain  Contact your healthcare provider if:   · You have a fever  · Your puncture site is red, warm, swollen, or draining pus  · You have questions or concerns about your procedure, medicine, or care  Medicines: You may need any of the following:  · Prescription pain medicine  may be given  Ask your healthcare provider how to take this medicine safely  Some prescription pain medicines contain acetaminophen  Do not take other medicines that contain acetaminophen without talking to your healthcare provider  Too much acetaminophen may cause liver damage  Prescription pain medicine may cause constipation  Ask your healthcare provider how to prevent or treat constipation  · Antibiotics  help fight or prevent an infection  · Take your medicine as directed  Contact your healthcare provider if you think your medicine is not helping or if you have side effects  Tell him or her if you are allergic to any medicine  Keep a list of the medicines, vitamins, and herbs you take  Include the amounts, and when and why you take them  Bring the list or the pill bottles to follow-up visits  Carry your medicine list with you in case of an emergency  Self-care:   · Rest as needed  Return to your daily activities as directed  · Do not smoke  Nicotine and other chemicals in cigarettes, e-cigarettes, and cigars can cause lung damage  Ask your healthcare provider for information if you currently smoke and need help to quit  Smokeless tobacco still contains nicotine  Talk to your healthcare provider before you use these products  Follow up with your doctor as directed:  Write down your questions so you remember to ask them during your visits     © Copyright [employer] 2021 Information is for End User's use only and may not be sold, redistributed or otherwise used for commercial purposes  All illustrations and images included in CareNotes® are the copyrighted property of [employer] M [employer]  or Benjamin Thorpe  The above information is an  only  It is not intended as medical advice for individual conditions or treatments  Talk to your doctor, nurse or pharmacist before following any medical regimen to see if it is safe and effective for you  Thoracentesis   WHAT YOU NEED TO KNOW:   A thoracentesis is a procedure to remove extra fluid or air from between your lungs and your inner chest wall  Air or fluid buildup may make it hard for you to breathe  A thoracentesis allows your lungs to expand fully so you can breathe more easily  HOW TO PREPARE:   The week before your procedure:   · Write down the correct date, time, and location of your procedure  · Arrange a ride home  Ask a family member or friend to drive you home after your surgery or procedure  Do not drive yourself home  · Ask your caregiver if you need to stop using aspirin or any other prescribed or over-the-counter medicine before your procedure or surgery  · Tell your healthcare provider if you are allergic to any medicines  Tell him about any medical conditions you may have, such as diabetes or a bleeding disorder  · You may need to have blood tests, chest x-rays, or a CT scan  Ask your healthcare provider for more information about these tests  Write down the date, time, and location of each test   The day of your procedure:   · Caregivers may insert an intravenous tube (IV) into your vein  A vein in the arm is usually chosen  Through the IV tube, you may be given liquids and medicine  · You or a close family member will be asked to sign a legal document called a consent form  It gives caregivers permission to do the procedure or surgery  It also explains the problems that may happen, and your choices   Make sure all your questions are answered before you sign this form  WHAT WILL HAPPEN:   What will happen:   · You will be given local anesthesia to numb the area where the needle will be inserted  Your healthcare provider will insert the needle and move it between your ribs  He may use an ultrasound to help guide the needle  · If your procedure is done to collect fluid for tests, a small amount of fluid is removed  Then the needle is removed and a dressing is placed over the area  If more fluid needs to be drained, a small plastic tube will be placed and the needle removed  The tube will be connected to a collection device and the fluid will slowly drain out  A device may be used to measure the pressure inside your chest  This will help your healthcare provider check if enough fluid has been drained  When enough fluid has drained out, the tube will be removed and the area will be covered with a dressing  After your procedure: You will need to stay in bed and rest after your procedure  When your healthcare provider sees that you are okay, you will be allowed to go home  If your healthcare provider wants you to stay in the hospital, you will be taken to your hospital room  A chest x-ray may be needed to check that your lungs were not damaged during the procedure  CONTACT YOUR HEALTHCARE PROVIDER IF:   · You cannot make it to your procedure  · You get a cold, the flu, or have a fever  · You have questions or concerns about your procedure or care  SEEK CARE IMMEDIATELY IF:   · The problems for which you are having the procedure get worse  RISKS:   · Your lung may be punctured by the needle and collapse  You may bleed more than expected or get an infection from the procedure  You may also have chest pain, a cough, nausea, or feel lightheaded  Nerves, blood vessels, and nearby organs, such as your liver and spleen, may get damaged  Even after your procedure, the air or fluid in your chest may not drain completely   The air or fluid may build up again and you may need another thoracentesis  · Without a thoracentesis, the extra air or fluid may continue to build up in your chest  Your breathing problems will get worse  You may need to have other procedures to drain the air or fluid  Your healthcare provider may not be able to learn more about the cause of your condition  You may not get the treatment that you need  CARE AGREEMENT:   You have the right to help plan your care  Learn about your health condition and how it may be treated  Discuss treatment options with your caregivers to decide what care you want to receive  You always have the right to refuse treatment  © 2017 [facility] Information is for End User's use only and may not be sold, redistributed or otherwise used for commercial purposes  All illustrations and images included in CareNotes® are the copyrighted property of [employer]  or Trevor Leon  The above information is an  only  It is not intended as medical advice for individual conditions or treatments  Talk to your doctor, nurse or pharmacist before following any medical regimen to see if it is safe and effective for you

## 2022-02-25 ENCOUNTER — APPOINTMENT (OUTPATIENT)
Dept: RADIATION ONCOLOGY | Facility: HOSPITAL | Age: 72
End: 2022-02-25
Payer: COMMERCIAL

## 2022-02-28 ENCOUNTER — HOSPITAL ENCOUNTER (OUTPATIENT)
Dept: INFUSION CENTER | Facility: HOSPITAL | Age: 72
Discharge: HOME | End: 2022-02-28
Attending: INTERNAL MEDICINE

## 2022-02-28 ENCOUNTER — APPOINTMENT (OUTPATIENT)
Dept: RADIATION ONCOLOGY | Facility: HOSPITAL | Age: 72
End: 2022-02-28
Attending: INTERNAL MEDICINE
Payer: COMMERCIAL

## 2022-02-28 ENCOUNTER — APPOINTMENT (OUTPATIENT)
Dept: RADIATION ONCOLOGY | Facility: HOSPITAL | Age: 72
End: 2022-02-28
Payer: COMMERCIAL

## 2022-02-28 ENCOUNTER — TELEPHONE (OUTPATIENT)
Dept: RADIATION ONCOLOGY | Facility: HOSPITAL | Age: 72
End: 2022-02-28

## 2022-02-28 VITALS
OXYGEN SATURATION: 93 % | TEMPERATURE: 97.5 F | BODY MASS INDEX: 27.3 KG/M2 | HEIGHT: 69 IN | DIASTOLIC BLOOD PRESSURE: 84 MMHG | WEIGHT: 184.3 LBS | HEART RATE: 74 BPM | RESPIRATION RATE: 16 BRPM | SYSTOLIC BLOOD PRESSURE: 142 MMHG

## 2022-02-28 DIAGNOSIS — T45.1X5A CHEMOTHERAPY INDUCED NEUTROPENIA (HCC): ICD-10-CM

## 2022-02-28 DIAGNOSIS — R91.8 MASS OF UPPER LOBE OF RIGHT LUNG: ICD-10-CM

## 2022-02-28 DIAGNOSIS — C34.11 MALIGNANT NEOPLASM OF UPPER LOBE OF RIGHT LUNG (HCC): ICD-10-CM

## 2022-02-28 DIAGNOSIS — C34.90 ADENOCARCINOMA OF LUNG, UNSPECIFIED LATERALITY (HCC): ICD-10-CM

## 2022-02-28 DIAGNOSIS — D70.1 CHEMOTHERAPY INDUCED NEUTROPENIA (HCC): ICD-10-CM

## 2022-02-28 NOTE — TELEPHONE ENCOUNTER
PT wife would like a call back from nurse or Gordon Hou  she wants to know if his tx are going to be cancelled because that what they where told   # [phone]

## 2022-02-28 NOTE — PROGRESS NOTES
Pt arrived on unit for Chemo  Pt originally scheduled for radiation after chemo today, but per pt wife this was cancelled  Per pt wife they were also told not to get lab work done  Reached out to Kg Ingram RN w/ Dr Lynda Lynne office  Per Davin Sheppard, she reached out to Dr Doree Goltz office and radiation is being held until result of Thoracentesis cultures  Since chemo is being given concurrently with radiation, chemo will also be held at this time  Pt to report to radiation this AM for more films

## 2022-03-01 ENCOUNTER — APPOINTMENT (OUTPATIENT)
Dept: RADIATION ONCOLOGY | Facility: HOSPITAL | Age: 72
End: 2022-03-01
Attending: INTERNAL MEDICINE
Payer: COMMERCIAL

## 2022-03-02 ENCOUNTER — APPOINTMENT (OUTPATIENT)
Dept: RADIATION ONCOLOGY | Facility: HOSPITAL | Age: 72
End: 2022-03-02
Attending: INTERNAL MEDICINE
Payer: COMMERCIAL

## 2022-03-03 ENCOUNTER — APPOINTMENT (OUTPATIENT)
Dept: LAB | Facility: CLINIC | Age: 72
End: 2022-03-03
Payer: COMMERCIAL

## 2022-03-03 ENCOUNTER — APPOINTMENT (OUTPATIENT)
Dept: RADIATION ONCOLOGY | Facility: HOSPITAL | Age: 72
End: 2022-03-03
Attending: INTERNAL MEDICINE
Payer: COMMERCIAL

## 2022-03-03 DIAGNOSIS — C34.11 MALIGNANT NEOPLASM OF UPPER LOBE OF RIGHT LUNG (HCC): Primary | ICD-10-CM

## 2022-03-03 DIAGNOSIS — D70.1 NEUTROPENIA ASSOCIATED WITH MUCOSITIS DUE TO ANTINEOPLASTIC THERAPY (HCC): ICD-10-CM

## 2022-03-03 DIAGNOSIS — R91.8 MASS OF UPPER LOBE OF RIGHT LUNG: ICD-10-CM

## 2022-03-03 DIAGNOSIS — D70.1 CHEMOTHERAPY-INDUCED NEUTROPENIA (HCC): ICD-10-CM

## 2022-03-03 DIAGNOSIS — C34.90 SQUAMOUS CELL CARCINOMA OF LUNG, UNSPECIFIED LATERALITY (HCC): ICD-10-CM

## 2022-03-03 DIAGNOSIS — T45.1X5S NEUTROPENIA ASSOCIATED WITH MUCOSITIS DUE TO ANTINEOPLASTIC THERAPY (HCC): ICD-10-CM

## 2022-03-03 DIAGNOSIS — T45.1X5A CHEMOTHERAPY-INDUCED NEUTROPENIA (HCC): ICD-10-CM

## 2022-03-03 DIAGNOSIS — K12.31 NEUTROPENIA ASSOCIATED WITH MUCOSITIS DUE TO ANTINEOPLASTIC THERAPY (HCC): ICD-10-CM

## 2022-03-03 DIAGNOSIS — C34.90 ADENOCARCINOMA OF LUNG, UNSPECIFIED LATERALITY (HCC): Primary | ICD-10-CM

## 2022-03-03 LAB
ALBUMIN SERPL BCP-MCNC: 2.5 G/DL (ref 3.5–5)
ALP SERPL-CCNC: 59 U/L (ref 46–116)
ALT SERPL W P-5'-P-CCNC: 20 U/L (ref 12–78)
ANION GAP SERPL CALCULATED.3IONS-SCNC: 5 MMOL/L (ref 4–13)
AST SERPL W P-5'-P-CCNC: 12 U/L (ref 5–45)
BASOPHILS # BLD AUTO: 0.07 THOUSANDS/ΜL (ref 0–0.1)
BASOPHILS NFR BLD AUTO: 1 % (ref 0–1)
BILIRUB SERPL-MCNC: 0.27 MG/DL (ref 0.2–1)
BUN SERPL-MCNC: 11 MG/DL (ref 5–25)
CALCIUM ALBUM COR SERPL-MCNC: 11.9 MG/DL (ref 8.3–10.1)
CALCIUM SERPL-MCNC: 10.7 MG/DL (ref 8.3–10.1)
CHLORIDE SERPL-SCNC: 108 MMOL/L (ref 100–108)
CO2 SERPL-SCNC: 27 MMOL/L (ref 21–32)
CREAT SERPL-MCNC: 0.9 MG/DL (ref 0.6–1.3)
EOSINOPHIL # BLD AUTO: 0.07 THOUSAND/ΜL (ref 0–0.61)
EOSINOPHIL NFR BLD AUTO: 1 % (ref 0–6)
ERYTHROCYTE [DISTWIDTH] IN BLOOD BY AUTOMATED COUNT: 15.4 % (ref 11.6–15.1)
GFR SERPL CREATININE-BSD FRML MDRD: 86 ML/MIN/1.73SQ M
GLUCOSE P FAST SERPL-MCNC: 92 MG/DL (ref 65–99)
HCT VFR BLD AUTO: 33.1 % (ref 36.5–49.3)
HGB BLD-MCNC: 10.5 G/DL (ref 12–17)
IMM GRANULOCYTES # BLD AUTO: 0.05 THOUSAND/UL (ref 0–0.2)
IMM GRANULOCYTES NFR BLD AUTO: 1 % (ref 0–2)
LYMPHOCYTES # BLD AUTO: 2.07 THOUSANDS/ΜL (ref 0.6–4.47)
LYMPHOCYTES NFR BLD AUTO: 27 % (ref 14–44)
MCH RBC QN AUTO: 30.3 PG (ref 26.8–34.3)
MCHC RBC AUTO-ENTMCNC: 31.7 G/DL (ref 31.4–37.4)
MCV RBC AUTO: 95 FL (ref 82–98)
MONOCYTES # BLD AUTO: 0.99 THOUSAND/ΜL (ref 0.17–1.22)
MONOCYTES NFR BLD AUTO: 13 % (ref 4–12)
NEUTROPHILS # BLD AUTO: 4.39 THOUSANDS/ΜL (ref 1.85–7.62)
NEUTS SEG NFR BLD AUTO: 57 % (ref 43–75)
NRBC BLD AUTO-RTO: 0 /100 WBCS
PLATELET # BLD AUTO: 346 THOUSANDS/UL (ref 149–390)
PMV BLD AUTO: 9.7 FL (ref 8.9–12.7)
POTASSIUM SERPL-SCNC: 4.2 MMOL/L (ref 3.5–5.3)
PROT SERPL-MCNC: 7.3 G/DL (ref 6.4–8.2)
RBC # BLD AUTO: 3.47 MILLION/UL (ref 3.88–5.62)
SODIUM SERPL-SCNC: 140 MMOL/L (ref 136–145)
T3FREE SERPL-MCNC: 1.71 PG/ML (ref 2.3–4.2)
TSH SERPL DL<=0.05 MIU/L-ACNC: 1.78 UIU/ML (ref 0.36–3.74)
WBC # BLD AUTO: 7.64 THOUSAND/UL (ref 4.31–10.16)

## 2022-03-03 PROCEDURE — 36415 COLL VENOUS BLD VENIPUNCTURE: CPT

## 2022-03-03 PROCEDURE — 84443 ASSAY THYROID STIM HORMONE: CPT

## 2022-03-03 PROCEDURE — 80053 COMPREHEN METABOLIC PANEL: CPT

## 2022-03-03 PROCEDURE — 85025 COMPLETE CBC W/AUTO DIFF WBC: CPT

## 2022-03-03 PROCEDURE — 84481 FREE ASSAY (FT-3): CPT

## 2022-03-03 RX ORDER — SODIUM CHLORIDE 9 MG/ML
20 INJECTION, SOLUTION INTRAVENOUS ONCE
Status: CANCELLED | OUTPATIENT
Start: 2022-03-07

## 2022-03-04 ENCOUNTER — APPOINTMENT (OUTPATIENT)
Dept: RADIATION ONCOLOGY | Facility: HOSPITAL | Age: 72
End: 2022-03-04
Attending: INTERNAL MEDICINE
Payer: COMMERCIAL

## 2022-03-04 PROCEDURE — 77427 RADIATION TX MANAGEMENT X5: CPT | Performed by: INTERNAL MEDICINE

## 2022-03-04 PROCEDURE — 77014 CHG CT GUIDANCE PLACEMENT RAD THERAPY FIELDS: CPT | Performed by: STUDENT IN AN ORGANIZED HEALTH CARE EDUCATION/TRAINING PROGRAM

## 2022-03-04 PROCEDURE — 77386 HB NTSTY MODUL RAD TX DLVR CPLX: CPT | Performed by: STUDENT IN AN ORGANIZED HEALTH CARE EDUCATION/TRAINING PROGRAM

## 2022-03-07 ENCOUNTER — APPOINTMENT (OUTPATIENT)
Dept: RADIATION ONCOLOGY | Facility: HOSPITAL | Age: 72
End: 2022-03-07
Attending: INTERNAL MEDICINE
Payer: COMMERCIAL

## 2022-03-07 ENCOUNTER — HOSPITAL ENCOUNTER (OUTPATIENT)
Dept: INFUSION CENTER | Facility: HOSPITAL | Age: 72
Discharge: HOME | End: 2022-03-07
Attending: INTERNAL MEDICINE
Payer: COMMERCIAL

## 2022-03-07 VITALS
SYSTOLIC BLOOD PRESSURE: 120 MMHG | RESPIRATION RATE: 18 BRPM | HEART RATE: 69 BPM | WEIGHT: 180.56 LBS | BODY MASS INDEX: 26.74 KG/M2 | HEIGHT: 69 IN | DIASTOLIC BLOOD PRESSURE: 68 MMHG

## 2022-03-07 DIAGNOSIS — C34.90 ADENOCARCINOMA OF LUNG, UNSPECIFIED LATERALITY (HCC): ICD-10-CM

## 2022-03-07 DIAGNOSIS — C34.11 MALIGNANT NEOPLASM OF UPPER LOBE OF RIGHT LUNG (HCC): Primary | ICD-10-CM

## 2022-03-07 DIAGNOSIS — T45.1X5A CHEMOTHERAPY INDUCED NEUTROPENIA (HCC): ICD-10-CM

## 2022-03-07 DIAGNOSIS — D70.1 CHEMOTHERAPY INDUCED NEUTROPENIA (HCC): ICD-10-CM

## 2022-03-07 DIAGNOSIS — R91.8 MASS OF UPPER LOBE OF RIGHT LUNG: ICD-10-CM

## 2022-03-07 PROCEDURE — 77386 HB NTSTY MODUL RAD TX DLVR CPLX: CPT | Performed by: INTERNAL MEDICINE

## 2022-03-07 PROCEDURE — 96417 CHEMO IV INFUS EACH ADDL SEQ: CPT

## 2022-03-07 PROCEDURE — 96367 TX/PROPH/DG ADDL SEQ IV INF: CPT

## 2022-03-07 PROCEDURE — 96413 CHEMO IV INFUSION 1 HR: CPT

## 2022-03-07 PROCEDURE — 77014 CHG CT GUIDANCE PLACEMENT RAD THERAPY FIELDS: CPT | Performed by: INTERNAL MEDICINE

## 2022-03-07 RX ORDER — SODIUM CHLORIDE 9 MG/ML
20 INJECTION, SOLUTION INTRAVENOUS ONCE
Status: CANCELLED | OUTPATIENT
Start: 2022-03-14

## 2022-03-07 RX ORDER — SODIUM CHLORIDE 9 MG/ML
20 INJECTION, SOLUTION INTRAVENOUS ONCE
Status: COMPLETED | OUTPATIENT
Start: 2022-03-07 | End: 2022-03-07

## 2022-03-07 RX ADMIN — DIPHENHYDRAMINE HYDROCHLORIDE 25 MG: 50 INJECTION, SOLUTION INTRAMUSCULAR; INTRAVENOUS at 13:01

## 2022-03-07 RX ADMIN — SODIUM CHLORIDE 200 MG: 9 INJECTION, SOLUTION INTRAVENOUS at 13:51

## 2022-03-07 RX ADMIN — PACLITAXEL 99 MG: 6 INJECTION, SOLUTION INTRAVENOUS at 14:42

## 2022-03-07 RX ADMIN — DEXAMETHASONE SODIUM PHOSPHATE: 10 INJECTION, SOLUTION INTRAMUSCULAR; INTRAVENOUS at 12:39

## 2022-03-07 RX ADMIN — FAMOTIDINE 20 MG: 10 INJECTION INTRAVENOUS at 13:24

## 2022-03-07 RX ADMIN — CARBOPLATIN 211.6 MG: 10 INJECTION, SOLUTION INTRAVENOUS at 15:46

## 2022-03-07 RX ADMIN — SODIUM CHLORIDE 20 ML/HR: 0.9 INJECTION, SOLUTION INTRAVENOUS at 12:39

## 2022-03-08 ENCOUNTER — APPOINTMENT (OUTPATIENT)
Dept: RADIATION ONCOLOGY | Facility: HOSPITAL | Age: 72
End: 2022-03-08
Attending: INTERNAL MEDICINE
Payer: COMMERCIAL

## 2022-03-08 DIAGNOSIS — N40.1 BPH WITH URINARY OBSTRUCTION: ICD-10-CM

## 2022-03-08 DIAGNOSIS — N13.8 BPH WITH URINARY OBSTRUCTION: ICD-10-CM

## 2022-03-08 PROCEDURE — 77014 CHG CT GUIDANCE PLACEMENT RAD THERAPY FIELDS: CPT | Performed by: STUDENT IN AN ORGANIZED HEALTH CARE EDUCATION/TRAINING PROGRAM

## 2022-03-08 PROCEDURE — 77386 HB NTSTY MODUL RAD TX DLVR CPLX: CPT | Performed by: STUDENT IN AN ORGANIZED HEALTH CARE EDUCATION/TRAINING PROGRAM

## 2022-03-08 RX ORDER — TAMSULOSIN HYDROCHLORIDE 0.4 MG/1
CAPSULE ORAL
Qty: 90 CAPSULE | Refills: 1 | Status: SHIPPED | OUTPATIENT
Start: 2022-03-08 | End: 2022-06-20

## 2022-03-09 ENCOUNTER — APPOINTMENT (OUTPATIENT)
Dept: RADIATION ONCOLOGY | Facility: HOSPITAL | Age: 72
End: 2022-03-09
Attending: INTERNAL MEDICINE
Payer: COMMERCIAL

## 2022-03-09 PROCEDURE — 77386 HB NTSTY MODUL RAD TX DLVR CPLX: CPT | Performed by: INTERNAL MEDICINE

## 2022-03-09 PROCEDURE — 77014 CHG CT GUIDANCE PLACEMENT RAD THERAPY FIELDS: CPT | Performed by: INTERNAL MEDICINE

## 2022-03-10 ENCOUNTER — APPOINTMENT (OUTPATIENT)
Dept: LAB | Facility: CLINIC | Age: 72
End: 2022-03-10
Payer: COMMERCIAL

## 2022-03-10 ENCOUNTER — APPOINTMENT (OUTPATIENT)
Dept: RADIATION ONCOLOGY | Facility: HOSPITAL | Age: 72
End: 2022-03-10
Attending: INTERNAL MEDICINE
Payer: COMMERCIAL

## 2022-03-10 DIAGNOSIS — C34.11 MALIGNANT NEOPLASM OF UPPER LOBE OF RIGHT LUNG (HCC): Primary | ICD-10-CM

## 2022-03-10 LAB
ALBUMIN SERPL BCP-MCNC: 2.6 G/DL (ref 3.5–5)
ALP SERPL-CCNC: 57 U/L (ref 46–116)
ALT SERPL W P-5'-P-CCNC: 17 U/L (ref 12–78)
ANION GAP SERPL CALCULATED.3IONS-SCNC: 3 MMOL/L (ref 4–13)
AST SERPL W P-5'-P-CCNC: 13 U/L (ref 5–45)
BASOPHILS # BLD AUTO: 0.03 THOUSANDS/ΜL (ref 0–0.1)
BASOPHILS NFR BLD AUTO: 0 % (ref 0–1)
BILIRUB SERPL-MCNC: 0.24 MG/DL (ref 0.2–1)
BUN SERPL-MCNC: 13 MG/DL (ref 5–25)
CALCIUM ALBUM COR SERPL-MCNC: 11.1 MG/DL (ref 8.3–10.1)
CALCIUM SERPL-MCNC: 10 MG/DL (ref 8.3–10.1)
CHLORIDE SERPL-SCNC: 107 MMOL/L (ref 100–108)
CO2 SERPL-SCNC: 28 MMOL/L (ref 21–32)
CREAT SERPL-MCNC: 0.92 MG/DL (ref 0.6–1.3)
EOSINOPHIL # BLD AUTO: 0.08 THOUSAND/ΜL (ref 0–0.61)
EOSINOPHIL NFR BLD AUTO: 1 % (ref 0–6)
ERYTHROCYTE [DISTWIDTH] IN BLOOD BY AUTOMATED COUNT: 15.7 % (ref 11.6–15.1)
GFR SERPL CREATININE-BSD FRML MDRD: 83 ML/MIN/1.73SQ M
GLUCOSE SERPL-MCNC: 121 MG/DL (ref 65–140)
HCT VFR BLD AUTO: 32 % (ref 36.5–49.3)
HGB BLD-MCNC: 10.5 G/DL (ref 12–17)
IMM GRANULOCYTES # BLD AUTO: 0.03 THOUSAND/UL (ref 0–0.2)
IMM GRANULOCYTES NFR BLD AUTO: 0 % (ref 0–2)
LYMPHOCYTES # BLD AUTO: 1.46 THOUSANDS/ΜL (ref 0.6–4.47)
LYMPHOCYTES NFR BLD AUTO: 20 % (ref 14–44)
MCH RBC QN AUTO: 30.3 PG (ref 26.8–34.3)
MCHC RBC AUTO-ENTMCNC: 32.8 G/DL (ref 31.4–37.4)
MCV RBC AUTO: 93 FL (ref 82–98)
MONOCYTES # BLD AUTO: 0.45 THOUSAND/ΜL (ref 0.17–1.22)
MONOCYTES NFR BLD AUTO: 6 % (ref 4–12)
NEUTROPHILS # BLD AUTO: 5.14 THOUSANDS/ΜL (ref 1.85–7.62)
NEUTS SEG NFR BLD AUTO: 73 % (ref 43–75)
NRBC BLD AUTO-RTO: 0 /100 WBCS
PLATELET # BLD AUTO: 375 THOUSANDS/UL (ref 149–390)
PMV BLD AUTO: 10.3 FL (ref 8.9–12.7)
POTASSIUM SERPL-SCNC: 4.3 MMOL/L (ref 3.5–5.3)
PROT SERPL-MCNC: 7.1 G/DL (ref 6.4–8.2)
RBC # BLD AUTO: 3.46 MILLION/UL (ref 3.88–5.62)
SODIUM SERPL-SCNC: 138 MMOL/L (ref 136–145)
WBC # BLD AUTO: 7.19 THOUSAND/UL (ref 4.31–10.16)

## 2022-03-10 PROCEDURE — 80053 COMPREHEN METABOLIC PANEL: CPT

## 2022-03-10 PROCEDURE — 77336 RADIATION PHYSICS CONSULT: CPT | Performed by: INTERNAL MEDICINE

## 2022-03-10 PROCEDURE — 36415 COLL VENOUS BLD VENIPUNCTURE: CPT

## 2022-03-10 PROCEDURE — 77014 CHG CT GUIDANCE PLACEMENT RAD THERAPY FIELDS: CPT | Performed by: RADIOLOGY

## 2022-03-10 PROCEDURE — 85025 COMPLETE CBC W/AUTO DIFF WBC: CPT

## 2022-03-10 PROCEDURE — 77386 HB NTSTY MODUL RAD TX DLVR CPLX: CPT | Performed by: RADIOLOGY

## 2022-03-11 ENCOUNTER — APPOINTMENT (OUTPATIENT)
Dept: RADIATION ONCOLOGY | Facility: HOSPITAL | Age: 72
End: 2022-03-11
Attending: INTERNAL MEDICINE
Payer: COMMERCIAL

## 2022-03-11 PROCEDURE — 77386 HB NTSTY MODUL RAD TX DLVR CPLX: CPT | Performed by: RADIOLOGY

## 2022-03-11 PROCEDURE — 77014 CHG CT GUIDANCE PLACEMENT RAD THERAPY FIELDS: CPT | Performed by: RADIOLOGY

## 2022-03-11 PROCEDURE — 77427 RADIATION TX MANAGEMENT X5: CPT | Performed by: INTERNAL MEDICINE

## 2022-03-14 ENCOUNTER — APPOINTMENT (OUTPATIENT)
Dept: RADIATION ONCOLOGY | Facility: HOSPITAL | Age: 72
End: 2022-03-14
Attending: INTERNAL MEDICINE
Payer: COMMERCIAL

## 2022-03-14 ENCOUNTER — HOSPITAL ENCOUNTER (OUTPATIENT)
Dept: INFUSION CENTER | Facility: HOSPITAL | Age: 72
Discharge: HOME | End: 2022-03-14
Attending: INTERNAL MEDICINE
Payer: COMMERCIAL

## 2022-03-14 VITALS
DIASTOLIC BLOOD PRESSURE: 68 MMHG | SYSTOLIC BLOOD PRESSURE: 130 MMHG | WEIGHT: 179.45 LBS | BODY MASS INDEX: 26.58 KG/M2 | HEIGHT: 69 IN | HEART RATE: 71 BPM | RESPIRATION RATE: 16 BRPM | TEMPERATURE: 97 F

## 2022-03-14 DIAGNOSIS — R91.8 MASS OF UPPER LOBE OF RIGHT LUNG: ICD-10-CM

## 2022-03-14 DIAGNOSIS — C34.11 MALIGNANT NEOPLASM OF UPPER LOBE OF RIGHT LUNG (HCC): Primary | ICD-10-CM

## 2022-03-14 DIAGNOSIS — C34.90 ADENOCARCINOMA OF LUNG, UNSPECIFIED LATERALITY (HCC): ICD-10-CM

## 2022-03-14 DIAGNOSIS — E83.52 HYPERCALCEMIA OF MALIGNANCY: ICD-10-CM

## 2022-03-14 DIAGNOSIS — D70.1 CHEMOTHERAPY INDUCED NEUTROPENIA (HCC): ICD-10-CM

## 2022-03-14 DIAGNOSIS — T45.1X5A CHEMOTHERAPY INDUCED NEUTROPENIA (HCC): ICD-10-CM

## 2022-03-14 PROCEDURE — 96367 TX/PROPH/DG ADDL SEQ IV INF: CPT

## 2022-03-14 PROCEDURE — 96413 CHEMO IV INFUSION 1 HR: CPT

## 2022-03-14 PROCEDURE — 77386 HB NTSTY MODUL RAD TX DLVR CPLX: CPT | Performed by: INTERNAL MEDICINE

## 2022-03-14 PROCEDURE — 96417 CHEMO IV INFUS EACH ADDL SEQ: CPT

## 2022-03-14 PROCEDURE — 77014 CHG CT GUIDANCE PLACEMENT RAD THERAPY FIELDS: CPT | Performed by: INTERNAL MEDICINE

## 2022-03-14 RX ORDER — SODIUM CHLORIDE 9 MG/ML
20 INJECTION, SOLUTION INTRAVENOUS ONCE
Status: CANCELLED | OUTPATIENT
Start: 2022-03-21

## 2022-03-14 RX ORDER — SODIUM CHLORIDE 9 MG/ML
20 INJECTION, SOLUTION INTRAVENOUS ONCE
Status: COMPLETED | OUTPATIENT
Start: 2022-03-14 | End: 2022-03-14

## 2022-03-14 RX ADMIN — DEXAMETHASONE SODIUM PHOSPHATE: 10 INJECTION, SOLUTION INTRAMUSCULAR; INTRAVENOUS at 09:49

## 2022-03-14 RX ADMIN — FAMOTIDINE 20 MG: 10 INJECTION INTRAVENOUS at 10:17

## 2022-03-14 RX ADMIN — CARBOPLATIN 208 MG: 10 INJECTION, SOLUTION INTRAVENOUS at 12:36

## 2022-03-14 RX ADMIN — PACLITAXEL 99 MG: 6 INJECTION, SOLUTION INTRAVENOUS at 11:32

## 2022-03-14 RX ADMIN — SODIUM CHLORIDE 20 ML/HR: 0.9 INJECTION, SOLUTION INTRAVENOUS at 10:08

## 2022-03-14 RX ADMIN — DIPHENHYDRAMINE HYDROCHLORIDE 25 MG: 50 INJECTION, SOLUTION INTRAMUSCULAR; INTRAVENOUS at 10:49

## 2022-03-15 ENCOUNTER — APPOINTMENT (OUTPATIENT)
Dept: RADIATION ONCOLOGY | Facility: HOSPITAL | Age: 72
End: 2022-03-15
Attending: INTERNAL MEDICINE
Payer: COMMERCIAL

## 2022-03-15 DIAGNOSIS — I82.403 ACUTE DEEP VEIN THROMBOSIS (DVT) OF BOTH LOWER EXTREMITIES, UNSPECIFIED VEIN (HCC): ICD-10-CM

## 2022-03-15 DIAGNOSIS — C34.11 MALIGNANT NEOPLASM OF UPPER LOBE OF RIGHT LUNG (HCC): ICD-10-CM

## 2022-03-15 PROCEDURE — 77014 CHG CT GUIDANCE PLACEMENT RAD THERAPY FIELDS: CPT | Performed by: STUDENT IN AN ORGANIZED HEALTH CARE EDUCATION/TRAINING PROGRAM

## 2022-03-15 PROCEDURE — 77386 HB NTSTY MODUL RAD TX DLVR CPLX: CPT | Performed by: STUDENT IN AN ORGANIZED HEALTH CARE EDUCATION/TRAINING PROGRAM

## 2022-03-16 ENCOUNTER — APPOINTMENT (OUTPATIENT)
Dept: RADIATION ONCOLOGY | Facility: HOSPITAL | Age: 72
End: 2022-03-16
Attending: INTERNAL MEDICINE
Payer: COMMERCIAL

## 2022-03-16 PROCEDURE — 77386 HB NTSTY MODUL RAD TX DLVR CPLX: CPT | Performed by: INTERNAL MEDICINE

## 2022-03-16 PROCEDURE — 77014 CHG CT GUIDANCE PLACEMENT RAD THERAPY FIELDS: CPT | Performed by: INTERNAL MEDICINE

## 2022-03-16 RX ORDER — DEXAMETHASONE 4 MG/1
4 TABLET ORAL 2 TIMES DAILY WITH MEALS
Qty: 8 TABLET | Refills: 0 | Status: SHIPPED | OUTPATIENT
Start: 2022-03-16

## 2022-03-17 ENCOUNTER — APPOINTMENT (OUTPATIENT)
Dept: RADIATION ONCOLOGY | Facility: HOSPITAL | Age: 72
End: 2022-03-17
Attending: INTERNAL MEDICINE
Payer: COMMERCIAL

## 2022-03-17 ENCOUNTER — APPOINTMENT (OUTPATIENT)
Dept: LAB | Facility: CLINIC | Age: 72
End: 2022-03-17
Payer: COMMERCIAL

## 2022-03-17 DIAGNOSIS — C34.11 MALIGNANT NEOPLASM OF UPPER LOBE OF RIGHT LUNG (HCC): ICD-10-CM

## 2022-03-17 LAB
ALBUMIN SERPL BCP-MCNC: 2.8 G/DL (ref 3.5–5)
ALP SERPL-CCNC: 60 U/L (ref 46–116)
ALT SERPL W P-5'-P-CCNC: 15 U/L (ref 12–78)
ANION GAP SERPL CALCULATED.3IONS-SCNC: 4 MMOL/L (ref 4–13)
AST SERPL W P-5'-P-CCNC: 13 U/L (ref 5–45)
BASOPHILS # BLD AUTO: 0.04 THOUSANDS/ΜL (ref 0–0.1)
BASOPHILS NFR BLD AUTO: 1 % (ref 0–1)
BILIRUB SERPL-MCNC: 0.28 MG/DL (ref 0.2–1)
BUN SERPL-MCNC: 14 MG/DL (ref 5–25)
CALCIUM ALBUM COR SERPL-MCNC: 10.9 MG/DL (ref 8.3–10.1)
CALCIUM SERPL-MCNC: 9.9 MG/DL (ref 8.3–10.1)
CHLORIDE SERPL-SCNC: 108 MMOL/L (ref 100–108)
CO2 SERPL-SCNC: 28 MMOL/L (ref 21–32)
CREAT SERPL-MCNC: 0.77 MG/DL (ref 0.6–1.3)
EOSINOPHIL # BLD AUTO: 0.08 THOUSAND/ΜL (ref 0–0.61)
EOSINOPHIL NFR BLD AUTO: 2 % (ref 0–6)
ERYTHROCYTE [DISTWIDTH] IN BLOOD BY AUTOMATED COUNT: 16.5 % (ref 11.6–15.1)
GFR SERPL CREATININE-BSD FRML MDRD: 91 ML/MIN/1.73SQ M
GLUCOSE SERPL-MCNC: 85 MG/DL (ref 65–140)
HCT VFR BLD AUTO: 31.3 % (ref 36.5–49.3)
HGB BLD-MCNC: 9.9 G/DL (ref 12–17)
IMM GRANULOCYTES # BLD AUTO: 0.02 THOUSAND/UL (ref 0–0.2)
IMM GRANULOCYTES NFR BLD AUTO: 0 % (ref 0–2)
LYMPHOCYTES # BLD AUTO: 1.06 THOUSANDS/ΜL (ref 0.6–4.47)
LYMPHOCYTES NFR BLD AUTO: 22 % (ref 14–44)
MCH RBC QN AUTO: 29.9 PG (ref 26.8–34.3)
MCHC RBC AUTO-ENTMCNC: 31.6 G/DL (ref 31.4–37.4)
MCV RBC AUTO: 95 FL (ref 82–98)
MONOCYTES # BLD AUTO: 0.47 THOUSAND/ΜL (ref 0.17–1.22)
MONOCYTES NFR BLD AUTO: 10 % (ref 4–12)
NEUTROPHILS # BLD AUTO: 3.17 THOUSANDS/ΜL (ref 1.85–7.62)
NEUTS SEG NFR BLD AUTO: 65 % (ref 43–75)
NRBC BLD AUTO-RTO: 0 /100 WBCS
PLATELET # BLD AUTO: 293 THOUSANDS/UL (ref 149–390)
PMV BLD AUTO: 10.4 FL (ref 8.9–12.7)
POTASSIUM SERPL-SCNC: 4.6 MMOL/L (ref 3.5–5.3)
PROT SERPL-MCNC: 7.1 G/DL (ref 6.4–8.2)
RBC # BLD AUTO: 3.31 MILLION/UL (ref 3.88–5.62)
SODIUM SERPL-SCNC: 140 MMOL/L (ref 136–145)
T3FREE SERPL-MCNC: 2.24 PG/ML (ref 2.3–4.2)
TSH SERPL DL<=0.05 MIU/L-ACNC: 1.34 UIU/ML (ref 0.36–3.74)
WBC # BLD AUTO: 4.84 THOUSAND/UL (ref 4.31–10.16)

## 2022-03-17 PROCEDURE — 80053 COMPREHEN METABOLIC PANEL: CPT

## 2022-03-17 PROCEDURE — 77336 RADIATION PHYSICS CONSULT: CPT | Performed by: INTERNAL MEDICINE

## 2022-03-17 PROCEDURE — 84443 ASSAY THYROID STIM HORMONE: CPT

## 2022-03-17 PROCEDURE — 77014 CHG CT GUIDANCE PLACEMENT RAD THERAPY FIELDS: CPT | Performed by: RADIOLOGY

## 2022-03-17 PROCEDURE — 36415 COLL VENOUS BLD VENIPUNCTURE: CPT

## 2022-03-17 PROCEDURE — 84481 FREE ASSAY (FT-3): CPT

## 2022-03-17 PROCEDURE — 77386 HB NTSTY MODUL RAD TX DLVR CPLX: CPT | Performed by: RADIOLOGY

## 2022-03-17 PROCEDURE — 85025 COMPLETE CBC W/AUTO DIFF WBC: CPT

## 2022-03-18 ENCOUNTER — APPOINTMENT (OUTPATIENT)
Dept: RADIATION ONCOLOGY | Facility: HOSPITAL | Age: 72
End: 2022-03-18
Attending: INTERNAL MEDICINE
Payer: COMMERCIAL

## 2022-03-18 PROCEDURE — 77427 RADIATION TX MANAGEMENT X5: CPT | Performed by: INTERNAL MEDICINE

## 2022-03-18 PROCEDURE — 77014 CHG CT GUIDANCE PLACEMENT RAD THERAPY FIELDS: CPT | Performed by: RADIOLOGY

## 2022-03-18 PROCEDURE — 77386 HB NTSTY MODUL RAD TX DLVR CPLX: CPT | Performed by: RADIOLOGY

## 2022-03-21 ENCOUNTER — APPOINTMENT (OUTPATIENT)
Dept: RADIATION ONCOLOGY | Facility: HOSPITAL | Age: 72
End: 2022-03-21
Attending: INTERNAL MEDICINE
Payer: COMMERCIAL

## 2022-03-21 ENCOUNTER — HOSPITAL ENCOUNTER (OUTPATIENT)
Dept: INFUSION CENTER | Facility: HOSPITAL | Age: 72
Discharge: HOME | End: 2022-03-21
Attending: INTERNAL MEDICINE
Payer: COMMERCIAL

## 2022-03-21 ENCOUNTER — PATIENT OUTREACH (OUTPATIENT)
Dept: HEMATOLOGY ONCOLOGY | Facility: CLINIC | Age: 72
End: 2022-03-21

## 2022-03-21 VITALS
DIASTOLIC BLOOD PRESSURE: 74 MMHG | HEART RATE: 77 BPM | WEIGHT: 181 LBS | RESPIRATION RATE: 16 BRPM | BODY MASS INDEX: 26.81 KG/M2 | SYSTOLIC BLOOD PRESSURE: 124 MMHG | TEMPERATURE: 97.3 F | HEIGHT: 69 IN

## 2022-03-21 DIAGNOSIS — R91.8 MASS OF UPPER LOBE OF RIGHT LUNG: ICD-10-CM

## 2022-03-21 DIAGNOSIS — T45.1X5A CHEMOTHERAPY INDUCED NEUTROPENIA (HCC): ICD-10-CM

## 2022-03-21 DIAGNOSIS — C34.90 ADENOCARCINOMA OF LUNG, UNSPECIFIED LATERALITY (HCC): ICD-10-CM

## 2022-03-21 DIAGNOSIS — D70.1 CHEMOTHERAPY INDUCED NEUTROPENIA (HCC): ICD-10-CM

## 2022-03-21 DIAGNOSIS — C34.11 MALIGNANT NEOPLASM OF UPPER LOBE OF RIGHT LUNG (HCC): Primary | ICD-10-CM

## 2022-03-21 PROCEDURE — 96417 CHEMO IV INFUS EACH ADDL SEQ: CPT

## 2022-03-21 PROCEDURE — 96413 CHEMO IV INFUSION 1 HR: CPT

## 2022-03-21 PROCEDURE — 77386 HB NTSTY MODUL RAD TX DLVR CPLX: CPT | Performed by: INTERNAL MEDICINE

## 2022-03-21 PROCEDURE — 77014 CHG CT GUIDANCE PLACEMENT RAD THERAPY FIELDS: CPT | Performed by: INTERNAL MEDICINE

## 2022-03-21 PROCEDURE — 96367 TX/PROPH/DG ADDL SEQ IV INF: CPT

## 2022-03-21 RX ORDER — SODIUM CHLORIDE 9 MG/ML
20 INJECTION, SOLUTION INTRAVENOUS ONCE
Status: COMPLETED | OUTPATIENT
Start: 2022-03-21 | End: 2022-03-21

## 2022-03-21 RX ADMIN — FAMOTIDINE 20 MG: 10 INJECTION INTRAVENOUS at 11:03

## 2022-03-21 RX ADMIN — CARBOPLATIN 238.8 MG: 10 INJECTION, SOLUTION INTRAVENOUS at 12:40

## 2022-03-21 RX ADMIN — SODIUM CHLORIDE 20 ML/HR: 9 INJECTION, SOLUTION INTRAVENOUS at 10:19

## 2022-03-21 RX ADMIN — PACLITAXEL 99 MG: 6 INJECTION, SOLUTION INTRAVENOUS at 11:39

## 2022-03-21 RX ADMIN — DIPHENHYDRAMINE HYDROCHLORIDE 25 MG: 50 INJECTION, SOLUTION INTRAMUSCULAR; INTRAVENOUS at 10:44

## 2022-03-21 RX ADMIN — DEXAMETHASONE SODIUM PHOSPHATE: 10 INJECTION, SOLUTION INTRAMUSCULAR; INTRAVENOUS at 10:19

## 2022-03-21 NOTE — PROGRESS NOTES
MSW met with the pt and his wife in the infusion center this day  They were both open and receptive to chairside conversation  Pt denied having any pain or discomfort but shares that he has been feeling more lethargic  Pt has also started his radiation and is aware that his lethargy could be a result of his treatment  The pt has been riding with [employer] and shared that this has been a great service for him as he would not have the strength to make the daily trip to [place]  Pt and his wife spoke about the pt's health issues that they have been dealing with since last speaking with this MSW  The pt had pneumonia and COVID and while he felt challenged by this, he reports the most challenging for him was having fluid on his lungs  Both the pt and his wife shared how frightening that was for them and how that was the one that scared them the most   MSW spent time listening as they expressed their thoughts and feelings  Despite all the pt has been through, they appear to be coping well at this time  They report no new issues or concerns  MSW provided emotional support and will continue to follow

## 2022-03-22 ENCOUNTER — APPOINTMENT (OUTPATIENT)
Dept: RADIATION ONCOLOGY | Facility: HOSPITAL | Age: 72
End: 2022-03-22
Attending: INTERNAL MEDICINE
Payer: COMMERCIAL

## 2022-03-22 PROCEDURE — 77386 HB NTSTY MODUL RAD TX DLVR CPLX: CPT | Performed by: STUDENT IN AN ORGANIZED HEALTH CARE EDUCATION/TRAINING PROGRAM

## 2022-03-22 PROCEDURE — 77014 CHG CT GUIDANCE PLACEMENT RAD THERAPY FIELDS: CPT | Performed by: STUDENT IN AN ORGANIZED HEALTH CARE EDUCATION/TRAINING PROGRAM

## 2022-03-23 ENCOUNTER — APPOINTMENT (OUTPATIENT)
Dept: RADIATION ONCOLOGY | Facility: HOSPITAL | Age: 72
End: 2022-03-23
Attending: INTERNAL MEDICINE
Payer: COMMERCIAL

## 2022-03-23 PROCEDURE — 77014 CHG CT GUIDANCE PLACEMENT RAD THERAPY FIELDS: CPT | Performed by: INTERNAL MEDICINE

## 2022-03-23 PROCEDURE — 77386 HB NTSTY MODUL RAD TX DLVR CPLX: CPT | Performed by: INTERNAL MEDICINE

## 2022-03-24 ENCOUNTER — APPOINTMENT (OUTPATIENT)
Dept: RADIATION ONCOLOGY | Facility: HOSPITAL | Age: 72
End: 2022-03-24
Attending: INTERNAL MEDICINE
Payer: COMMERCIAL

## 2022-03-24 PROCEDURE — 77336 RADIATION PHYSICS CONSULT: CPT | Performed by: INTERNAL MEDICINE

## 2022-03-24 PROCEDURE — 77386 HB NTSTY MODUL RAD TX DLVR CPLX: CPT | Performed by: RADIOLOGY

## 2022-03-24 PROCEDURE — 77014 CHG CT GUIDANCE PLACEMENT RAD THERAPY FIELDS: CPT | Performed by: RADIOLOGY

## 2022-03-25 ENCOUNTER — TELEPHONE (OUTPATIENT)
Dept: HEMATOLOGY ONCOLOGY | Facility: HOSPITAL | Age: 72
End: 2022-03-25

## 2022-03-25 ENCOUNTER — APPOINTMENT (OUTPATIENT)
Dept: RADIATION ONCOLOGY | Facility: HOSPITAL | Age: 72
End: 2022-03-25
Attending: INTERNAL MEDICINE
Payer: COMMERCIAL

## 2022-03-25 ENCOUNTER — APPOINTMENT (OUTPATIENT)
Dept: LAB | Facility: CLINIC | Age: 72
End: 2022-03-25
Payer: COMMERCIAL

## 2022-03-25 DIAGNOSIS — C34.11 MALIGNANT NEOPLASM OF UPPER LOBE OF RIGHT LUNG (HCC): ICD-10-CM

## 2022-03-25 LAB
ALBUMIN SERPL BCP-MCNC: 2.9 G/DL (ref 3.5–5)
ALP SERPL-CCNC: 67 U/L (ref 46–116)
ALT SERPL W P-5'-P-CCNC: 20 U/L (ref 12–78)
ANION GAP SERPL CALCULATED.3IONS-SCNC: 3 MMOL/L (ref 4–13)
AST SERPL W P-5'-P-CCNC: 10 U/L (ref 5–45)
BASOPHILS # BLD AUTO: 0.03 THOUSANDS/ΜL (ref 0–0.1)
BASOPHILS NFR BLD AUTO: 1 % (ref 0–1)
BILIRUB SERPL-MCNC: 0.3 MG/DL (ref 0.2–1)
BUN SERPL-MCNC: 13 MG/DL (ref 5–25)
CALCIUM ALBUM COR SERPL-MCNC: 10.7 MG/DL (ref 8.3–10.1)
CALCIUM SERPL-MCNC: 9.8 MG/DL (ref 8.3–10.1)
CHLORIDE SERPL-SCNC: 106 MMOL/L (ref 100–108)
CO2 SERPL-SCNC: 29 MMOL/L (ref 21–32)
CREAT SERPL-MCNC: 0.9 MG/DL (ref 0.6–1.3)
EOSINOPHIL # BLD AUTO: 0.02 THOUSAND/ΜL (ref 0–0.61)
EOSINOPHIL NFR BLD AUTO: 1 % (ref 0–6)
ERYTHROCYTE [DISTWIDTH] IN BLOOD BY AUTOMATED COUNT: 17.6 % (ref 11.6–15.1)
GFR SERPL CREATININE-BSD FRML MDRD: 86 ML/MIN/1.73SQ M
GLUCOSE SERPL-MCNC: 116 MG/DL (ref 65–140)
HCT VFR BLD AUTO: 31 % (ref 36.5–49.3)
HGB BLD-MCNC: 9.6 G/DL (ref 12–17)
IMM GRANULOCYTES # BLD AUTO: 0.01 THOUSAND/UL (ref 0–0.2)
IMM GRANULOCYTES NFR BLD AUTO: 0 % (ref 0–2)
LYMPHOCYTES # BLD AUTO: 0.8 THOUSANDS/ΜL (ref 0.6–4.47)
LYMPHOCYTES NFR BLD AUTO: 29 % (ref 14–44)
MCH RBC QN AUTO: 30.1 PG (ref 26.8–34.3)
MCHC RBC AUTO-ENTMCNC: 31 G/DL (ref 31.4–37.4)
MCV RBC AUTO: 97 FL (ref 82–98)
MONOCYTES # BLD AUTO: 0.22 THOUSAND/ΜL (ref 0.17–1.22)
MONOCYTES NFR BLD AUTO: 8 % (ref 4–12)
NEUTROPHILS # BLD AUTO: 1.72 THOUSANDS/ΜL (ref 1.85–7.62)
NEUTS SEG NFR BLD AUTO: 61 % (ref 43–75)
NRBC BLD AUTO-RTO: 0 /100 WBCS
PLATELET # BLD AUTO: 174 THOUSANDS/UL (ref 149–390)
PMV BLD AUTO: 10 FL (ref 8.9–12.7)
POTASSIUM SERPL-SCNC: 4 MMOL/L (ref 3.5–5.3)
PROT SERPL-MCNC: 6.8 G/DL (ref 6.4–8.2)
RBC # BLD AUTO: 3.19 MILLION/UL (ref 3.88–5.62)
SODIUM SERPL-SCNC: 138 MMOL/L (ref 136–145)
T3FREE SERPL-MCNC: 2.65 PG/ML (ref 2.3–4.2)
TSH SERPL DL<=0.05 MIU/L-ACNC: 1.71 UIU/ML (ref 0.36–3.74)
WBC # BLD AUTO: 2.8 THOUSAND/UL (ref 4.31–10.16)

## 2022-03-25 PROCEDURE — 77014 CHG CT GUIDANCE PLACEMENT RAD THERAPY FIELDS: CPT | Performed by: RADIOLOGY

## 2022-03-25 PROCEDURE — 80053 COMPREHEN METABOLIC PANEL: CPT

## 2022-03-25 PROCEDURE — 85025 COMPLETE CBC W/AUTO DIFF WBC: CPT

## 2022-03-25 PROCEDURE — 77386 HB NTSTY MODUL RAD TX DLVR CPLX: CPT | Performed by: RADIOLOGY

## 2022-03-25 PROCEDURE — 84443 ASSAY THYROID STIM HORMONE: CPT

## 2022-03-25 PROCEDURE — 84481 FREE ASSAY (FT-3): CPT

## 2022-03-25 PROCEDURE — 77427 RADIATION TX MANAGEMENT X5: CPT | Performed by: INTERNAL MEDICINE

## 2022-03-25 NOTE — TELEPHONE ENCOUNTER
Called and spoke to spouse of pt  Advised to get blood work done prior to Monday appointment  Spouse stated that pt had blood work done today

## 2022-03-28 ENCOUNTER — APPOINTMENT (OUTPATIENT)
Dept: RADIATION ONCOLOGY | Facility: HOSPITAL | Age: 72
End: 2022-03-28
Attending: INTERNAL MEDICINE
Payer: COMMERCIAL

## 2022-03-28 ENCOUNTER — HOSPITAL ENCOUNTER (OUTPATIENT)
Dept: INFUSION CENTER | Facility: HOSPITAL | Age: 72
Discharge: HOME | End: 2022-03-28
Attending: INTERNAL MEDICINE
Payer: COMMERCIAL

## 2022-03-28 VITALS
SYSTOLIC BLOOD PRESSURE: 126 MMHG | HEART RATE: 76 BPM | WEIGHT: 181 LBS | RESPIRATION RATE: 18 BRPM | DIASTOLIC BLOOD PRESSURE: 70 MMHG | OXYGEN SATURATION: 97 % | BODY MASS INDEX: 26.81 KG/M2 | TEMPERATURE: 97.9 F | HEIGHT: 69 IN

## 2022-03-28 DIAGNOSIS — C34.90 ADENOCARCINOMA OF LUNG, UNSPECIFIED LATERALITY (HCC): ICD-10-CM

## 2022-03-28 DIAGNOSIS — D70.1 CHEMOTHERAPY INDUCED NEUTROPENIA (HCC): ICD-10-CM

## 2022-03-28 DIAGNOSIS — C34.11 MALIGNANT NEOPLASM OF UPPER LOBE OF RIGHT LUNG (HCC): Primary | ICD-10-CM

## 2022-03-28 DIAGNOSIS — T45.1X5A CHEMOTHERAPY INDUCED NEUTROPENIA (HCC): ICD-10-CM

## 2022-03-28 DIAGNOSIS — R91.8 MASS OF UPPER LOBE OF RIGHT LUNG: ICD-10-CM

## 2022-03-28 PROCEDURE — 77386 HB NTSTY MODUL RAD TX DLVR CPLX: CPT | Performed by: INTERNAL MEDICINE

## 2022-03-28 PROCEDURE — 96367 TX/PROPH/DG ADDL SEQ IV INF: CPT

## 2022-03-28 PROCEDURE — 96413 CHEMO IV INFUSION 1 HR: CPT

## 2022-03-28 PROCEDURE — 77014 CHG CT GUIDANCE PLACEMENT RAD THERAPY FIELDS: CPT | Performed by: INTERNAL MEDICINE

## 2022-03-28 PROCEDURE — 96417 CHEMO IV INFUS EACH ADDL SEQ: CPT

## 2022-03-28 RX ORDER — SODIUM CHLORIDE 9 MG/ML
20 INJECTION, SOLUTION INTRAVENOUS ONCE
Status: COMPLETED | OUTPATIENT
Start: 2022-03-28 | End: 2022-03-28

## 2022-03-28 RX ORDER — SODIUM CHLORIDE 9 MG/ML
20 INJECTION, SOLUTION INTRAVENOUS ONCE
Status: CANCELLED | OUTPATIENT
Start: 2022-03-28

## 2022-03-28 RX ADMIN — SODIUM CHLORIDE 20 ML/HR: 0.9 INJECTION, SOLUTION INTRAVENOUS at 10:55

## 2022-03-28 RX ADMIN — SODIUM CHLORIDE 200 MG: 9 INJECTION, SOLUTION INTRAVENOUS at 12:26

## 2022-03-28 RX ADMIN — DEXAMETHASONE SODIUM PHOSPHATE: 10 INJECTION, SOLUTION INTRAMUSCULAR; INTRAVENOUS at 11:07

## 2022-03-28 RX ADMIN — CARBOPLATIN 211.6 MG: 10 INJECTION, SOLUTION INTRAVENOUS at 14:15

## 2022-03-28 RX ADMIN — PACLITAXEL 99 MG: 6 INJECTION, SOLUTION INTRAVENOUS at 13:13

## 2022-03-28 RX ADMIN — DIPHENHYDRAMINE HYDROCHLORIDE 25 MG: 50 INJECTION, SOLUTION INTRAMUSCULAR; INTRAVENOUS at 11:30

## 2022-03-28 RX ADMIN — FAMOTIDINE 20 MG: 10 INJECTION INTRAVENOUS at 11:52

## 2022-03-28 NOTE — PROGRESS NOTES
Patient to infusion for chemo, he offers no complaints  Labs reviewed from 3/25/22 and meet parameters for treatment today  IV placed with some difficulty  Call bell within reach, will continue to monitor

## 2022-03-29 ENCOUNTER — APPOINTMENT (OUTPATIENT)
Dept: RADIATION ONCOLOGY | Facility: HOSPITAL | Age: 72
End: 2022-03-29
Attending: INTERNAL MEDICINE
Payer: COMMERCIAL

## 2022-03-29 PROCEDURE — 77386 HB NTSTY MODUL RAD TX DLVR CPLX: CPT | Performed by: STUDENT IN AN ORGANIZED HEALTH CARE EDUCATION/TRAINING PROGRAM

## 2022-03-29 PROCEDURE — 77014 CHG CT GUIDANCE PLACEMENT RAD THERAPY FIELDS: CPT | Performed by: STUDENT IN AN ORGANIZED HEALTH CARE EDUCATION/TRAINING PROGRAM

## 2022-03-29 RX ORDER — SODIUM CHLORIDE 9 MG/ML
20 INJECTION, SOLUTION INTRAVENOUS ONCE
Status: CANCELLED | OUTPATIENT
Start: 2022-04-04

## 2022-03-30 ENCOUNTER — APPOINTMENT (OUTPATIENT)
Dept: RADIATION ONCOLOGY | Facility: HOSPITAL | Age: 72
End: 2022-03-30
Attending: INTERNAL MEDICINE
Payer: COMMERCIAL

## 2022-03-30 PROCEDURE — 77386 HB NTSTY MODUL RAD TX DLVR CPLX: CPT | Performed by: INTERNAL MEDICINE

## 2022-03-30 PROCEDURE — 77014 CHG CT GUIDANCE PLACEMENT RAD THERAPY FIELDS: CPT | Performed by: INTERNAL MEDICINE

## 2022-03-31 ENCOUNTER — APPOINTMENT (OUTPATIENT)
Dept: RADIATION ONCOLOGY | Facility: HOSPITAL | Age: 72
End: 2022-03-31
Attending: INTERNAL MEDICINE
Payer: COMMERCIAL

## 2022-03-31 PROCEDURE — 77386 HB NTSTY MODUL RAD TX DLVR CPLX: CPT | Performed by: RADIOLOGY

## 2022-03-31 PROCEDURE — 77336 RADIATION PHYSICS CONSULT: CPT | Performed by: INTERNAL MEDICINE

## 2022-03-31 PROCEDURE — 77014 CHG CT GUIDANCE PLACEMENT RAD THERAPY FIELDS: CPT | Performed by: RADIOLOGY

## 2022-04-01 ENCOUNTER — APPOINTMENT (OUTPATIENT)
Dept: RADIATION ONCOLOGY | Facility: HOSPITAL | Age: 72
End: 2022-04-01
Attending: INTERNAL MEDICINE
Payer: COMMERCIAL

## 2022-04-01 ENCOUNTER — APPOINTMENT (OUTPATIENT)
Dept: LAB | Facility: CLINIC | Age: 72
End: 2022-04-01
Payer: COMMERCIAL

## 2022-04-01 DIAGNOSIS — C34.11 MALIGNANT NEOPLASM OF UPPER LOBE OF RIGHT LUNG (HCC): ICD-10-CM

## 2022-04-01 LAB
ALBUMIN SERPL BCP-MCNC: 3.1 G/DL (ref 3.5–5)
ALP SERPL-CCNC: 61 U/L (ref 46–116)
ALT SERPL W P-5'-P-CCNC: 20 U/L (ref 12–78)
ANION GAP SERPL CALCULATED.3IONS-SCNC: 3 MMOL/L (ref 4–13)
AST SERPL W P-5'-P-CCNC: 15 U/L (ref 5–45)
BASOPHILS # BLD AUTO: 0.02 THOUSANDS/ΜL (ref 0–0.1)
BASOPHILS NFR BLD AUTO: 1 % (ref 0–1)
BILIRUB SERPL-MCNC: 0.48 MG/DL (ref 0.2–1)
BUN SERPL-MCNC: 14 MG/DL (ref 5–25)
CALCIUM ALBUM COR SERPL-MCNC: 10.9 MG/DL (ref 8.3–10.1)
CALCIUM SERPL-MCNC: 10.2 MG/DL (ref 8.3–10.1)
CHLORIDE SERPL-SCNC: 106 MMOL/L (ref 100–108)
CO2 SERPL-SCNC: 30 MMOL/L (ref 21–32)
CREAT SERPL-MCNC: 0.88 MG/DL (ref 0.6–1.3)
EOSINOPHIL # BLD AUTO: 0.02 THOUSAND/ΜL (ref 0–0.61)
EOSINOPHIL NFR BLD AUTO: 1 % (ref 0–6)
ERYTHROCYTE [DISTWIDTH] IN BLOOD BY AUTOMATED COUNT: 18.1 % (ref 11.6–15.1)
GFR SERPL CREATININE-BSD FRML MDRD: 87 ML/MIN/1.73SQ M
GLUCOSE P FAST SERPL-MCNC: 100 MG/DL (ref 65–99)
HCT VFR BLD AUTO: 31.4 % (ref 36.5–49.3)
HGB BLD-MCNC: 9.8 G/DL (ref 12–17)
IMM GRANULOCYTES # BLD AUTO: 0.01 THOUSAND/UL (ref 0–0.2)
IMM GRANULOCYTES NFR BLD AUTO: 0 % (ref 0–2)
LYMPHOCYTES # BLD AUTO: 0.83 THOUSANDS/ΜL (ref 0.6–4.47)
LYMPHOCYTES NFR BLD AUTO: 30 % (ref 14–44)
MCH RBC QN AUTO: 30 PG (ref 26.8–34.3)
MCHC RBC AUTO-ENTMCNC: 31.2 G/DL (ref 31.4–37.4)
MCV RBC AUTO: 96 FL (ref 82–98)
MONOCYTES # BLD AUTO: 0.29 THOUSAND/ΜL (ref 0.17–1.22)
MONOCYTES NFR BLD AUTO: 11 % (ref 4–12)
NEUTROPHILS # BLD AUTO: 1.59 THOUSANDS/ΜL (ref 1.85–7.62)
NEUTS SEG NFR BLD AUTO: 57 % (ref 43–75)
NRBC BLD AUTO-RTO: 0 /100 WBCS
PLATELET # BLD AUTO: 136 THOUSANDS/UL (ref 149–390)
PMV BLD AUTO: 10.1 FL (ref 8.9–12.7)
POTASSIUM SERPL-SCNC: 4.3 MMOL/L (ref 3.5–5.3)
PROT SERPL-MCNC: 6.9 G/DL (ref 6.4–8.2)
RBC # BLD AUTO: 3.27 MILLION/UL (ref 3.88–5.62)
SODIUM SERPL-SCNC: 139 MMOL/L (ref 136–145)
WBC # BLD AUTO: 2.76 THOUSAND/UL (ref 4.31–10.16)

## 2022-04-01 PROCEDURE — 77427 RADIATION TX MANAGEMENT X5: CPT | Performed by: INTERNAL MEDICINE

## 2022-04-01 PROCEDURE — 77014 CHG CT GUIDANCE PLACEMENT RAD THERAPY FIELDS: CPT | Performed by: INTERNAL MEDICINE

## 2022-04-01 PROCEDURE — 77386 HB NTSTY MODUL RAD TX DLVR CPLX: CPT | Performed by: INTERNAL MEDICINE

## 2022-04-01 PROCEDURE — 80053 COMPREHEN METABOLIC PANEL: CPT

## 2022-04-01 PROCEDURE — 36415 COLL VENOUS BLD VENIPUNCTURE: CPT

## 2022-04-01 PROCEDURE — 85025 COMPLETE CBC W/AUTO DIFF WBC: CPT

## 2022-04-04 ENCOUNTER — HOSPITAL ENCOUNTER (OUTPATIENT)
Dept: INFUSION CENTER | Facility: HOSPITAL | Age: 72
Discharge: HOME | End: 2022-04-04
Attending: INTERNAL MEDICINE
Payer: COMMERCIAL

## 2022-04-04 ENCOUNTER — APPOINTMENT (OUTPATIENT)
Dept: RADIATION ONCOLOGY | Facility: HOSPITAL | Age: 72
End: 2022-04-04
Attending: INTERNAL MEDICINE
Payer: COMMERCIAL

## 2022-04-04 VITALS
HEART RATE: 79 BPM | BODY MASS INDEX: 27.1 KG/M2 | TEMPERATURE: 98.5 F | RESPIRATION RATE: 18 BRPM | DIASTOLIC BLOOD PRESSURE: 80 MMHG | HEIGHT: 69 IN | WEIGHT: 182.98 LBS | SYSTOLIC BLOOD PRESSURE: 130 MMHG

## 2022-04-04 DIAGNOSIS — E83.52 HYPERCALCEMIA OF MALIGNANCY: ICD-10-CM

## 2022-04-04 DIAGNOSIS — T45.1X5A CHEMOTHERAPY INDUCED NEUTROPENIA (HCC): ICD-10-CM

## 2022-04-04 DIAGNOSIS — C34.11 MALIGNANT NEOPLASM OF UPPER LOBE OF RIGHT LUNG (HCC): Primary | ICD-10-CM

## 2022-04-04 DIAGNOSIS — C34.90 ADENOCARCINOMA OF LUNG, UNSPECIFIED LATERALITY (HCC): ICD-10-CM

## 2022-04-04 DIAGNOSIS — R91.8 MASS OF UPPER LOBE OF RIGHT LUNG: ICD-10-CM

## 2022-04-04 DIAGNOSIS — D70.1 CHEMOTHERAPY INDUCED NEUTROPENIA (HCC): ICD-10-CM

## 2022-04-04 PROCEDURE — 96372 THER/PROPH/DIAG INJ SC/IM: CPT

## 2022-04-04 PROCEDURE — 96413 CHEMO IV INFUSION 1 HR: CPT

## 2022-04-04 PROCEDURE — 77386 HB NTSTY MODUL RAD TX DLVR CPLX: CPT | Performed by: INTERNAL MEDICINE

## 2022-04-04 PROCEDURE — 77014 CHG CT GUIDANCE PLACEMENT RAD THERAPY FIELDS: CPT | Performed by: INTERNAL MEDICINE

## 2022-04-04 PROCEDURE — 96417 CHEMO IV INFUS EACH ADDL SEQ: CPT

## 2022-04-04 PROCEDURE — 96367 TX/PROPH/DG ADDL SEQ IV INF: CPT

## 2022-04-04 RX ORDER — SODIUM CHLORIDE 9 MG/ML
20 INJECTION, SOLUTION INTRAVENOUS ONCE
Status: CANCELLED | OUTPATIENT
Start: 2022-04-18

## 2022-04-04 RX ORDER — SODIUM CHLORIDE 9 MG/ML
20 INJECTION, SOLUTION INTRAVENOUS ONCE
Status: COMPLETED | OUTPATIENT
Start: 2022-04-04 | End: 2022-04-04

## 2022-04-04 RX ADMIN — SODIUM CHLORIDE 20 ML/HR: 9 INJECTION, SOLUTION INTRAVENOUS at 10:30

## 2022-04-04 RX ADMIN — CARBOPLATIN 215.2 MG: 10 INJECTION, SOLUTION INTRAVENOUS at 13:15

## 2022-04-04 RX ADMIN — PACLITAXEL 99 MG: 6 INJECTION, SOLUTION INTRAVENOUS at 12:08

## 2022-04-04 RX ADMIN — FAMOTIDINE 20 MG: 10 INJECTION INTRAVENOUS at 11:35

## 2022-04-04 RX ADMIN — DENOSUMAB 120 MG: 120 INJECTION SUBCUTANEOUS at 13:49

## 2022-04-04 RX ADMIN — DEXAMETHASONE SODIUM PHOSPHATE: 10 INJECTION, SOLUTION INTRAMUSCULAR; INTRAVENOUS at 10:36

## 2022-04-04 RX ADMIN — DIPHENHYDRAMINE HYDROCHLORIDE 25 MG: 50 INJECTION, SOLUTION INTRAMUSCULAR; INTRAVENOUS at 11:11

## 2022-04-04 NOTE — PROGRESS NOTES
Patient here for chemo and it was noted that patient did not receive Xgeva injection that was due on 03/18/2022  Xgeva injection given to patient today  Office notified of missed injection  Patient tolerated chemo without incident  Patient declined AVS, aware of next appointment

## 2022-04-05 ENCOUNTER — APPOINTMENT (OUTPATIENT)
Dept: RADIATION ONCOLOGY | Facility: HOSPITAL | Age: 72
End: 2022-04-05
Attending: INTERNAL MEDICINE
Payer: COMMERCIAL

## 2022-04-05 PROCEDURE — 77386 HB NTSTY MODUL RAD TX DLVR CPLX: CPT | Performed by: INTERNAL MEDICINE

## 2022-04-05 PROCEDURE — 77014 CHG CT GUIDANCE PLACEMENT RAD THERAPY FIELDS: CPT | Performed by: INTERNAL MEDICINE

## 2022-04-06 ENCOUNTER — APPOINTMENT (OUTPATIENT)
Dept: RADIATION ONCOLOGY | Facility: HOSPITAL | Age: 72
End: 2022-04-06
Attending: INTERNAL MEDICINE
Payer: COMMERCIAL

## 2022-04-06 PROCEDURE — 77014 CHG CT GUIDANCE PLACEMENT RAD THERAPY FIELDS: CPT | Performed by: RADIOLOGY

## 2022-04-06 PROCEDURE — 77386 HB NTSTY MODUL RAD TX DLVR CPLX: CPT | Performed by: RADIOLOGY

## 2022-04-07 ENCOUNTER — APPOINTMENT (OUTPATIENT)
Dept: RADIATION ONCOLOGY | Facility: HOSPITAL | Age: 72
End: 2022-04-07
Attending: INTERNAL MEDICINE
Payer: COMMERCIAL

## 2022-04-07 PROCEDURE — 77336 RADIATION PHYSICS CONSULT: CPT | Performed by: INTERNAL MEDICINE

## 2022-04-07 PROCEDURE — 77014 CHG CT GUIDANCE PLACEMENT RAD THERAPY FIELDS: CPT | Performed by: RADIOLOGY

## 2022-04-07 PROCEDURE — 77386 HB NTSTY MODUL RAD TX DLVR CPLX: CPT | Performed by: RADIOLOGY

## 2022-04-08 ENCOUNTER — APPOINTMENT (OUTPATIENT)
Dept: RADIATION ONCOLOGY | Facility: HOSPITAL | Age: 72
End: 2022-04-08
Attending: INTERNAL MEDICINE
Payer: COMMERCIAL

## 2022-04-08 ENCOUNTER — APPOINTMENT (OUTPATIENT)
Dept: LAB | Facility: CLINIC | Age: 72
End: 2022-04-08
Payer: COMMERCIAL

## 2022-04-08 DIAGNOSIS — C34.11 MALIGNANT NEOPLASM OF UPPER LOBE OF RIGHT LUNG (HCC): ICD-10-CM

## 2022-04-08 LAB
ALBUMIN SERPL BCP-MCNC: 2.9 G/DL (ref 3.5–5)
ALP SERPL-CCNC: 53 U/L (ref 46–116)
ALT SERPL W P-5'-P-CCNC: 23 U/L (ref 12–78)
ANION GAP SERPL CALCULATED.3IONS-SCNC: 4 MMOL/L (ref 4–13)
AST SERPL W P-5'-P-CCNC: 14 U/L (ref 5–45)
BILIRUB SERPL-MCNC: 0.34 MG/DL (ref 0.2–1)
BUN SERPL-MCNC: 13 MG/DL (ref 5–25)
CALCIUM ALBUM COR SERPL-MCNC: 10.7 MG/DL (ref 8.3–10.1)
CALCIUM SERPL-MCNC: 9.8 MG/DL (ref 8.3–10.1)
CHLORIDE SERPL-SCNC: 108 MMOL/L (ref 100–108)
CO2 SERPL-SCNC: 27 MMOL/L (ref 21–32)
CREAT SERPL-MCNC: 0.92 MG/DL (ref 0.6–1.3)
GFR SERPL CREATININE-BSD FRML MDRD: 83 ML/MIN/1.73SQ M
GLUCOSE P FAST SERPL-MCNC: 113 MG/DL (ref 65–99)
POTASSIUM SERPL-SCNC: 4.1 MMOL/L (ref 3.5–5.3)
PROT SERPL-MCNC: 6.8 G/DL (ref 6.4–8.2)
SODIUM SERPL-SCNC: 139 MMOL/L (ref 136–145)
T3FREE SERPL-MCNC: 2.49 PG/ML (ref 2.3–4.2)
TSH SERPL DL<=0.05 MIU/L-ACNC: 1.59 UIU/ML (ref 0.45–4.5)

## 2022-04-08 PROCEDURE — 80053 COMPREHEN METABOLIC PANEL: CPT

## 2022-04-08 PROCEDURE — 77427 RADIATION TX MANAGEMENT X5: CPT | Performed by: INTERNAL MEDICINE

## 2022-04-08 PROCEDURE — 77014 CHG CT GUIDANCE PLACEMENT RAD THERAPY FIELDS: CPT | Performed by: STUDENT IN AN ORGANIZED HEALTH CARE EDUCATION/TRAINING PROGRAM

## 2022-04-08 PROCEDURE — 36415 COLL VENOUS BLD VENIPUNCTURE: CPT

## 2022-04-08 PROCEDURE — 84443 ASSAY THYROID STIM HORMONE: CPT

## 2022-04-08 PROCEDURE — 84481 FREE ASSAY (FT-3): CPT

## 2022-04-08 PROCEDURE — 85025 COMPLETE CBC W/AUTO DIFF WBC: CPT

## 2022-04-08 PROCEDURE — 77386 HB NTSTY MODUL RAD TX DLVR CPLX: CPT | Performed by: STUDENT IN AN ORGANIZED HEALTH CARE EDUCATION/TRAINING PROGRAM

## 2022-04-09 LAB
BASOPHILS # BLD AUTO: 0.02 THOUSANDS/ΜL (ref 0–0.1)
BASOPHILS NFR BLD AUTO: 1 % (ref 0–1)
EOSINOPHIL # BLD AUTO: 0.01 THOUSAND/ΜL (ref 0–0.61)
EOSINOPHIL NFR BLD AUTO: 1 % (ref 0–6)
ERYTHROCYTE [DISTWIDTH] IN BLOOD BY AUTOMATED COUNT: 18.9 % (ref 11.6–15.1)
HCT VFR BLD AUTO: 29.4 % (ref 36.5–49.3)
HGB BLD-MCNC: 9.1 G/DL (ref 12–17)
IMM GRANULOCYTES # BLD AUTO: 0 THOUSAND/UL (ref 0–0.2)
IMM GRANULOCYTES NFR BLD AUTO: 0 % (ref 0–2)
LYMPHOCYTES # BLD AUTO: 0.75 THOUSANDS/ΜL (ref 0.6–4.47)
LYMPHOCYTES NFR BLD AUTO: 42 % (ref 14–44)
MCH RBC QN AUTO: 29.9 PG (ref 26.8–34.3)
MCHC RBC AUTO-ENTMCNC: 31 G/DL (ref 31.4–37.4)
MCV RBC AUTO: 97 FL (ref 82–98)
MONOCYTES # BLD AUTO: 0.29 THOUSAND/ΜL (ref 0.17–1.22)
MONOCYTES NFR BLD AUTO: 16 % (ref 4–12)
NEUTROPHILS # BLD AUTO: 0.7 THOUSANDS/ΜL (ref 1.85–7.62)
NEUTS SEG NFR BLD AUTO: 40 % (ref 43–75)
NRBC BLD AUTO-RTO: 0 /100 WBCS
PLATELET # BLD AUTO: 118 THOUSANDS/UL (ref 149–390)
PMV BLD AUTO: 10.7 FL (ref 8.9–12.7)
RBC # BLD AUTO: 3.04 MILLION/UL (ref 3.88–5.62)
WBC # BLD AUTO: 1.77 THOUSAND/UL (ref 4.31–10.16)

## 2022-04-11 ENCOUNTER — HOSPITAL ENCOUNTER (OUTPATIENT)
Dept: INFUSION CENTER | Facility: HOSPITAL | Age: 72
Discharge: HOME | End: 2022-04-11
Attending: INTERNAL MEDICINE

## 2022-04-11 ENCOUNTER — TELEPHONE (OUTPATIENT)
Dept: HEMATOLOGY ONCOLOGY | Facility: HOSPITAL | Age: 72
End: 2022-04-11

## 2022-04-11 DIAGNOSIS — C34.11 MALIGNANT NEOPLASM OF UPPER LOBE OF RIGHT LUNG (HCC): Primary | ICD-10-CM

## 2022-04-11 RX ORDER — SODIUM CHLORIDE 9 MG/ML
20 INJECTION, SOLUTION INTRAVENOUS ONCE
Status: CANCELLED | OUTPATIENT
Start: 2022-04-25

## 2022-04-11 NOTE — TELEPHONE ENCOUNTER
Called and spoke to pt spouse in regards to hold pt treatment today per Elaina Valladares and Dr Jayne Kolb orders  Pt ANC 0 70 and below parameters to treat less than 1000  Infusion made aware, pt will still get radiation therapy today  Will contact office staff to have pt scheduled for in office f/u appt soon

## 2022-04-11 NOTE — TELEPHONE ENCOUNTER
Called patient and talked to patient's wife Lalito Pavon  Scheduled F/U appt  Lalito Pavon confirmed the date and time of the appt

## 2022-04-12 ENCOUNTER — APPOINTMENT (OUTPATIENT)
Dept: RADIATION ONCOLOGY | Facility: HOSPITAL | Age: 72
End: 2022-04-12
Attending: INTERNAL MEDICINE
Payer: COMMERCIAL

## 2022-04-13 ENCOUNTER — APPOINTMENT (OUTPATIENT)
Dept: LAB | Facility: CLINIC | Age: 72
End: 2022-04-13
Payer: COMMERCIAL

## 2022-04-13 ENCOUNTER — TELEPHONE (OUTPATIENT)
Dept: RADIATION ONCOLOGY | Facility: HOSPITAL | Age: 72
End: 2022-04-13

## 2022-04-13 ENCOUNTER — APPOINTMENT (OUTPATIENT)
Dept: RADIATION ONCOLOGY | Facility: HOSPITAL | Age: 72
End: 2022-04-13
Attending: INTERNAL MEDICINE
Payer: COMMERCIAL

## 2022-04-13 DIAGNOSIS — C34.11 MALIGNANT NEOPLASM OF UPPER LOBE OF RIGHT LUNG (HCC): Primary | ICD-10-CM

## 2022-04-13 LAB
BASOPHILS # BLD AUTO: 0.02 THOUSANDS/ΜL (ref 0–0.1)
BASOPHILS NFR BLD AUTO: 1 % (ref 0–1)
EOSINOPHIL # BLD AUTO: 0.01 THOUSAND/ΜL (ref 0–0.61)
EOSINOPHIL NFR BLD AUTO: 1 % (ref 0–6)
ERYTHROCYTE [DISTWIDTH] IN BLOOD BY AUTOMATED COUNT: 20.3 % (ref 11.6–15.1)
HCT VFR BLD AUTO: 29 % (ref 36.5–49.3)
HGB BLD-MCNC: 8.8 G/DL (ref 12–17)
IMM GRANULOCYTES # BLD AUTO: 0.03 THOUSAND/UL (ref 0–0.2)
IMM GRANULOCYTES NFR BLD AUTO: 2 % (ref 0–2)
LYMPHOCYTES # BLD AUTO: 0.68 THOUSANDS/ΜL (ref 0.6–4.47)
LYMPHOCYTES NFR BLD AUTO: 33 % (ref 14–44)
MCH RBC QN AUTO: 29.5 PG (ref 26.8–34.3)
MCHC RBC AUTO-ENTMCNC: 30.3 G/DL (ref 31.4–37.4)
MCV RBC AUTO: 97 FL (ref 82–98)
MONOCYTES # BLD AUTO: 0.66 THOUSAND/ΜL (ref 0.17–1.22)
MONOCYTES NFR BLD AUTO: 33 % (ref 4–12)
NEUTROPHILS # BLD AUTO: 0.6 THOUSANDS/ΜL (ref 1.85–7.62)
NEUTS SEG NFR BLD AUTO: 30 % (ref 43–75)
NRBC BLD AUTO-RTO: 4 /100 WBCS
PLATELET # BLD AUTO: 126 THOUSANDS/UL (ref 149–390)
PMV BLD AUTO: 10.3 FL (ref 8.9–12.7)
RBC # BLD AUTO: 2.98 MILLION/UL (ref 3.88–5.62)
WBC # BLD AUTO: 2 THOUSAND/UL (ref 4.31–10.16)

## 2022-04-13 PROCEDURE — 85025 COMPLETE CBC W/AUTO DIFF WBC: CPT

## 2022-04-13 PROCEDURE — 36415 COLL VENOUS BLD VENIPUNCTURE: CPT

## 2022-04-13 NOTE — TELEPHONE ENCOUNTER
1600- Call to pt-labs dated 4 13 22-Platelets [id number] RT hold  Plan stat labs on 4 15 22 in the morning  Await results, tx accordingly  Pt also has Med Onc appt on 4 15 22  Pt verbalizes understanding information above and in agreement with the plan  TT to [facility], RN/Med Onc-info above  Reviewed above w/physician

## 2022-04-14 ENCOUNTER — APPOINTMENT (OUTPATIENT)
Dept: RADIATION ONCOLOGY | Facility: HOSPITAL | Age: 72
End: 2022-04-14
Attending: INTERNAL MEDICINE
Payer: COMMERCIAL

## 2022-04-15 ENCOUNTER — OFFICE VISIT (OUTPATIENT)
Dept: HEMATOLOGY ONCOLOGY | Facility: HOSPITAL | Age: 72
End: 2022-04-15
Payer: COMMERCIAL

## 2022-04-15 ENCOUNTER — APPOINTMENT (OUTPATIENT)
Dept: LAB | Facility: HOSPITAL | Age: 72
End: 2022-04-15
Payer: COMMERCIAL

## 2022-04-15 ENCOUNTER — APPOINTMENT (OUTPATIENT)
Dept: RADIATION ONCOLOGY | Facility: HOSPITAL | Age: 72
End: 2022-04-15
Attending: INTERNAL MEDICINE
Payer: COMMERCIAL

## 2022-04-15 VITALS
DIASTOLIC BLOOD PRESSURE: 100 MMHG | SYSTOLIC BLOOD PRESSURE: 150 MMHG | BODY MASS INDEX: 27.55 KG/M2 | HEIGHT: 69 IN | HEART RATE: 74 BPM | TEMPERATURE: 97.6 F | WEIGHT: 186 LBS | RESPIRATION RATE: 14 BRPM | OXYGEN SATURATION: 95 %

## 2022-04-15 DIAGNOSIS — D70.1 CHEMOTHERAPY INDUCED NEUTROPENIA (HCC): Primary | ICD-10-CM

## 2022-04-15 DIAGNOSIS — T45.1X5A CHEMOTHERAPY INDUCED NEUTROPENIA (HCC): Primary | ICD-10-CM

## 2022-04-15 DIAGNOSIS — C34.90 ADENOCARCINOMA OF LUNG, UNSPECIFIED LATERALITY (HCC): ICD-10-CM

## 2022-04-15 DIAGNOSIS — C34.11 MALIGNANT NEOPLASM OF UPPER LOBE OF RIGHT LUNG (HCC): ICD-10-CM

## 2022-04-15 DIAGNOSIS — R91.8 MASS OF UPPER LOBE OF RIGHT LUNG: ICD-10-CM

## 2022-04-15 LAB
ALBUMIN SERPL BCP-MCNC: 3.1 G/DL (ref 3.5–5)
ALP SERPL-CCNC: 61 U/L (ref 46–116)
ALT SERPL W P-5'-P-CCNC: 20 U/L (ref 12–78)
ANION GAP SERPL CALCULATED.3IONS-SCNC: 4 MMOL/L (ref 4–13)
AST SERPL W P-5'-P-CCNC: 13 U/L (ref 5–45)
BASOPHILS # BLD AUTO: 0.01 THOUSANDS/ΜL (ref 0–0.1)
BASOPHILS NFR BLD AUTO: 0 % (ref 0–1)
BILIRUB SERPL-MCNC: 0.4 MG/DL (ref 0.2–1)
BUN SERPL-MCNC: 12 MG/DL (ref 5–25)
CALCIUM ALBUM COR SERPL-MCNC: 9.8 MG/DL (ref 8.3–10.1)
CALCIUM SERPL-MCNC: 9.1 MG/DL (ref 8.3–10.1)
CHLORIDE SERPL-SCNC: 106 MMOL/L (ref 100–108)
CO2 SERPL-SCNC: 30 MMOL/L (ref 21–32)
CREAT SERPL-MCNC: 0.89 MG/DL (ref 0.6–1.3)
EOSINOPHIL # BLD AUTO: 0.02 THOUSAND/ΜL (ref 0–0.61)
EOSINOPHIL NFR BLD AUTO: 1 % (ref 0–6)
ERYTHROCYTE [DISTWIDTH] IN BLOOD BY AUTOMATED COUNT: 20.8 % (ref 11.6–15.1)
GFR SERPL CREATININE-BSD FRML MDRD: 86 ML/MIN/1.73SQ M
GLUCOSE P FAST SERPL-MCNC: 92 MG/DL (ref 65–99)
HCT VFR BLD AUTO: 30.5 % (ref 36.5–49.3)
HGB BLD-MCNC: 9.6 G/DL (ref 12–17)
IMM GRANULOCYTES # BLD AUTO: 0.01 THOUSAND/UL (ref 0–0.2)
IMM GRANULOCYTES NFR BLD AUTO: 0 % (ref 0–2)
LYMPHOCYTES # BLD AUTO: 0.69 THOUSANDS/ΜL (ref 0.6–4.47)
LYMPHOCYTES NFR BLD AUTO: 28 % (ref 14–44)
MCH RBC QN AUTO: 30.8 PG (ref 26.8–34.3)
MCHC RBC AUTO-ENTMCNC: 31.5 G/DL (ref 31.4–37.4)
MCV RBC AUTO: 98 FL (ref 82–98)
MONOCYTES # BLD AUTO: 0.84 THOUSAND/ΜL (ref 0.17–1.22)
MONOCYTES NFR BLD AUTO: 34 % (ref 4–12)
NEUTROPHILS # BLD AUTO: 0.91 THOUSANDS/ΜL (ref 1.85–7.62)
NEUTS SEG NFR BLD AUTO: 37 % (ref 43–75)
NRBC BLD AUTO-RTO: 0 /100 WBCS
PLATELET # BLD AUTO: 133 THOUSANDS/UL (ref 149–390)
PMV BLD AUTO: 10.2 FL (ref 8.9–12.7)
POTASSIUM SERPL-SCNC: 4.6 MMOL/L (ref 3.5–5.3)
PROT SERPL-MCNC: 6.4 G/DL (ref 6.4–8.2)
RBC # BLD AUTO: 3.12 MILLION/UL (ref 3.88–5.62)
SODIUM SERPL-SCNC: 140 MMOL/L (ref 136–145)
WBC # BLD AUTO: 2.48 THOUSAND/UL (ref 4.31–10.16)

## 2022-04-15 PROCEDURE — 85025 COMPLETE CBC W/AUTO DIFF WBC: CPT

## 2022-04-15 PROCEDURE — 36415 COLL VENOUS BLD VENIPUNCTURE: CPT

## 2022-04-15 PROCEDURE — 99214 OFFICE O/P EST MOD 30 MIN: CPT | Performed by: INTERNAL MEDICINE

## 2022-04-15 PROCEDURE — 80053 COMPREHEN METABOLIC PANEL: CPT

## 2022-04-15 NOTE — PROGRESS NOTES
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 measuring 2 7 cm in greatest dimension   He also had a Doppler ultrasound of the lower extremities which showed acute deep vein thrombosis in both lower extremities  CT scan of the chest abdomen pelvis without contrast on 07/31 showed 6 4 cm solid irregular right upper lobe pulmonary mass with extensive right hilar and mediastinal adenopathy   He also seems to have small right pleural effusion and moderate emphysema  MRI of the brain on 08/03/2021 showed multifocal diffusion abnormalities in several separate vascular territories   No metastatic disease to the brain was mentioned  Nahid Molina does seem to have 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    CT-guided biopsy of the right lung mass was done on 08/05   The pathology showed adenocarcinoma of lung primary       Previous Hematologic/ Oncologic History:    Oncology History   Adenocarcinoma of lung   8/2021 Initial Diagnosis    Adenocarcinoma of lung     8/5/2021 Biopsy    Right lung apex, image-guided core needle biopsy:  - Adenocarcinoma      10/6/2021 - 1/26/2022 Chemotherapy    cyanocobalamin, 1,000 mcg, Intramuscular, Once, 3 of 3 cycles  Administration: 1,000 mcg (11/24/2021), 1,000 mcg (1/26/2022), 1,000 mcg (10/6/2021)  pegfilgrastim (Joselin Olive), 6 mg, Subcutaneous, Once, 1 of 1 cycle  Administration: 6 mg (11/26/2021)  pegfilgrastim (Isac Rubins), 6 mg, Subcutaneous, Once, 6 of 6 cycles  Administration: 6 mg (10/6/2021), 6 mg (11/3/2021), 6 mg (11/24/2021), 6 mg (12/15/2021), 6 mg (1/5/2022)  fosaprepitant (EMEND) IVPB, 150 mg, Intravenous, Once, 6 of 6 cycles  Administration: 150 mg (10/6/2021), 150 mg (11/24/2021), 150 mg (12/15/2021), 150 mg (1/26/2022), 150 mg (11/3/2021), 150 mg (1/5/2022)  CARBOplatin (PARAPLATIN) IVPB (GOG AUC DOSING), 519 5 mg, Intravenous, Once, 6 of 6 cycles  Administration: 519 5 mg (10/6/2021), 574 mg (11/24/2021), 600 mg (12/15/2021), 533 mg (1/26/2022), 604 5 mg (11/3/2021), 563 mg (1/5/2022)  pemetrexed (ALIMTA) chemo infusion, 970 mg, Intravenous, Once, 6 of 6 cycles  Administration: 1,000 mg (10/6/2021), 1,000 mg (11/24/2021), 1,000 mg (12/15/2021), 1,000 mg (1/26/2022), 1,000 mg (11/3/2021), 1,000 mg (1/5/2022)  pembrolizumab (KEYTRUDA) IVPB, 200 mg, Intravenous, Once, 6 of 6 cycles  Administration: 200 mg (10/6/2021), 200 mg (11/24/2021), 200 mg (12/15/2021), 200 mg (1/26/2022), 200 mg (11/3/2021), 200 mg (1/5/2022)     3/7/2022 -  Chemotherapy    CARBOplatin (PARAPLATIN) IVPB (GOG AUC DOSING), , Intravenous, Once, 6 of 14 cycles  Administration: 211 6 mg (3/7/2022), 208 mg (3/14/2022), 238 8 mg (3/21/2022), 211 6 mg (3/28/2022), 215 2 mg (4/4/2022)  PACLItaxel (TAXOL) chemo IVPB, 50 mg/m2 = 99 mg, Intravenous, Once, 6 of 14 cycles  Administration: 99 mg (3/7/2022), 99 mg (3/14/2022), 99 mg (3/21/2022), 99 mg (3/28/2022), 99 mg (4/4/2022)  pembrolizumab (KEYTRUDA) IVPB, 200 mg, Intravenous, Once, 2 of 10 cycles  Administration: 200 mg (3/7/2022), 200 mg (3/28/2022)     Malignant neoplasm of upper lobe of right lung ([facility] [address] )   9/3/2021 Initial Diagnosis    Malignant neoplasm of upper lobe of right lung ([facility] [address] )     9/23/2021 -  Cancer Staged    Staging form: Lung, AJCC 8th Edition  - Clinical stage from 9/23/2021: Stage IIIC (cT3, cN3, cM0) - Signed by Claudio Em MD on 9/23/2021  Histopathologic type:  Adenocarcinoma, NOS       10/6/2021 - 1/26/2022 Chemotherapy    cyanocobalamin, 1,000 mcg, Intramuscular, Once, 3 of 3 cycles  Administration: 1,000 mcg (11/24/2021), 1,000 mcg (1/26/2022), 1,000 mcg (10/6/2021)  pegfilgrastim (Carine Pouch), 6 mg, Subcutaneous, Once, 1 of 1 cycle  Administration: 6 mg (11/26/2021)  pegfilgrastim (Donna Milch), 6 mg, Subcutaneous, Once, 6 of 6 cycles  Administration: 6 mg (10/6/2021), 6 mg (11/3/2021), 6 mg (11/24/2021), 6 mg (12/15/2021), 6 mg (1/5/2022)  fosaprepitant (EMEND) IVPB, 150 mg, Intravenous, Once, 6 of 6 cycles  Administration: 150 mg (10/6/2021), 150 mg (11/24/2021), 150 mg (12/15/2021), 150 mg (1/26/2022), 150 mg (11/3/2021), 150 mg (1/5/2022)  CARBOplatin (PARAPLATIN) IVPB (GOG AUC DOSING), 519 5 mg, Intravenous, Once, 6 of 6 cycles  Administration: 519 5 mg (10/6/2021), 574 mg (11/24/2021), 600 mg (12/15/2021), 533 mg (1/26/2022), 604 5 mg (11/3/2021), 563 mg (1/5/2022)  pemetrexed (ALIMTA) chemo infusion, 970 mg, Intravenous, Once, 6 of 6 cycles  Administration: 1,000 mg (10/6/2021), 1,000 mg (11/24/2021), 1,000 mg (12/15/2021), 1,000 mg (1/26/2022), 1,000 mg (11/3/2021), 1,000 mg (1/5/2022)  pembrolizumab (KEYTRUDA) IVPB, 200 mg, Intravenous, Once, 6 of 6 cycles  Administration: 200 mg (10/6/2021), 200 mg (11/24/2021), 200 mg (12/15/2021), 200 mg (1/26/2022), 200 mg (11/3/2021), 200 mg (1/5/2022)     3/7/2022 -  Chemotherapy    CARBOplatin (PARAPLATIN) IVPB (GOG AUC DOSING), , Intravenous, Once, 6 of 14 cycles  Administration: 211 6 mg (3/7/2022), 208 mg (3/14/2022), 238 8 mg (3/21/2022), 211 6 mg (3/28/2022), 215 2 mg (4/4/2022)  PACLItaxel (TAXOL) chemo IVPB, 50 mg/m2 = 99 mg, Intravenous, Once, 6 of 14 cycles  Administration: 99 mg (3/7/2022), 99 mg (3/14/2022), 99 mg (3/21/2022), 99 mg (3/28/2022), 99 mg (4/4/2022)  pembrolizumab (KEYTRUDA) IVPB, 200 mg, Intravenous, Once, 2 of 10 cycles  Administration: 200 mg (3/7/2022), 200 mg (3/28/2022)         Current Hematologic/ Oncologic Treatment:    Concurrent chemotherapy with carboplatin and Taxol with Keytruda and radiation  Carboplatin Taxol is every week  [place] is every 3 weeks  This will end after next week and the patient will go to [place] every 3 weeks  The dates have been adjusted based on his counts  Interval History:    Patient returns for follow-up visit  He is off of treatment currently secondary to his blood counts  Hopefully he will restart radiation next week  Denies any nausea denies any vomiting denies any diarrhea  Overall is doing reasonably well  Is getting Keytruda every 3 weeks with carboplatin and Taxol weekly  Overall he states he is doing reasonably well  Does not feel short of breath currently  Still has fluid on the right side on exam   The rest of his 14 point review of systems today was negative  Cancer Staging:  Cancer Staging  Malignant neoplasm of upper lobe of right lung [facility])  Staging form: Lung, AJCC 8th Edition  - Clinical stage from 9/23/2021: Stage IIIC (cT3, cN3, cM0) - Signed by Cielo Farris MD on 9/23/2021  Histopathologic type: Adenocarcinoma, NOS      Test Results:    Imaging: No results found  Labs:   Lab Results   Component Value Date    WBC 2 00 (L) 04/13/2022    HGB 8 8 (L) 04/13/2022    HCT 29 0 (L) 04/13/2022    MCV 97 04/13/2022     (L) 04/13/2022     Lab Results   Component Value Date    K 4 1 04/08/2022     04/08/2022    CO2 27 04/08/2022    BUN 13 04/08/2022    CREATININE 0 92 04/08/2022    GLUF 113 (H) 04/08/2022    CALCIUM 9 8 04/08/2022    CORRECTEDCA 10 7 (H) 04/08/2022    AST 14 04/08/2022    ALT 23 04/08/2022    ALKPHOS 53 04/08/2022    EGFR 83 04/08/2022       Lab Results   Component Value Date    CNNVSWEW81 1,514 (H) 08/12/2021     ROS: As stated in the history of present illness otherwise his 14 point review of systems today was negative        Active Problems:   Patient Active Problem List   Diagnosis    Recent cerebrovascular accident    Bilateral lower extremity DVTs    Nasopharyngeal mass    Dysphagia    Mass of upper lobe of right lung    Urinary retention    Constipation    Anemia of chronic disease    Pulmonary embolism (HCC)    Chronic respiratory failure with hypoxia ([facility])    Impaired cognition    Impaired mobility and ADLs    Adenocarcinoma of lung    Malignant neoplasm of upper lobe of right lung (HCC)    COPD mixed type (HCC)    Chemotherapy induced neutropenia (HCC)    Hypercalcemia of malignancy    Chronic anticoagulation       Past Medical History:   Past Medical History:   Diagnosis Date    Lung cancer ([address] )     Stroke [facility])        Surgical History:   Past Surgical History:   Procedure Laterality Date    IR BIOPSY LUNG  2021    IR THORACENTESIS  2022       Family History:    Family History   Problem Relation Age of Onset    Prostate cancer Brother     Lung cancer Brother        Cancer-related family history includes Lung cancer in his brother; Prostate cancer in his brother      Social History:   Social History     Socioeconomic History    Marital status: /Civil Union     Spouse name: Not on file    Number of children: Not on file    Years of education: Not on file    Highest education level: Not on file   Occupational History    Not on file   Tobacco Use    Smoking status: Former Smoker     Packs/day: 2 50     Years: 30 00     Pack years: 75 00     Quit date:      Years since quittin 2    Smokeless tobacco: Never Used   Vaping Use    Vaping Use: Never used   Substance and Sexual Activity    Alcohol use: Not Currently     Comment: No ETOH since Summer 2021     Drug use: Never    Sexual activity: Yes     Partners: Female   Other Topics Concern    Not on file   Social History Narrative    Not on file     Social Determinants of Health     Financial Resource Strain: Not on file   Food Insecurity: Not on file   Transportation Needs: Not on file   Physical Activity: Not on file   Stress: Not on file   Social Connections: Not on file   Intimate Partner Violence: Not on file   Housing Stability: Not on file       Current Medications:   Current Outpatient Medications   Medication Sig Dispense Refill    atorvastatin (LIPITOR) 40 mg tablet Take 1 tablet (40 mg total) by mouth daily with dinner 90 tablet 2    dexamethasone (DECADRON) 4 mg tablet TAKE 1 TABLET (4 MG TOTAL) BY MOUTH 2 (TWO) TIMES A DAY WITH MEALS THE DAY BEFORE AND THE DAY AFTER TREATMENT 8 tablet 0    enoxaparin (LOVENOX) 80 mg/0 8 mL INJECT 0 8 ML (80 MG TOTAL) UNDER THE SKIN EVERY 12 (TWELVE) HOURS 48 mL 0    folic acid (FOLVITE) 1 mg tablet TAKE 1 TABLET BY MOUTH EVERY DAY 30 tablet 3    multivitamin (THERAGRAN) TABS Take 1 tablet by mouth daily      tamsulosin (FLOMAX) 0 4 mg TAKE 1 CAPSULE BY MOUTH DAILY AFTER SUPPER 90 capsule 1    acetaminophen (TYLENOL) 325 mg tablet Take 2 tablets (650 mg total) by mouth 4 (four) times a day as needed for mild pain, headaches or fever (Patient not taking: Reported on 4/15/2022 )  0    enoxaparin (LOVENOX) 80 mg/0 8 mL INJECT 0 8 ML (80 MG TOTAL) UNDER THE SKIN EVERY 12 (TWELVE) HOURS (Patient not taking: Reported on 2/14/2022 ) 48 mL 0    prochlorperazine (COMPAZINE) 10 mg tablet Take 1 tablet (10 mg total) by mouth every 6 (six) hours as needed for nausea or vomiting (Patient not taking: Reported on 4/15/2022 ) 45 tablet 3     No current facility-administered medications for this visit  Allergies: Allergies   Allergen Reactions    Doxycycline Rash       Physical Exam:    Body surface area is 2 meters squared      Wt Readings from Last 3 Encounters:   04/15/22 84 4 kg (186 lb)   04/04/22 83 kg (182 lb 15 7 oz)   03/28/22 82 1 kg (181 lb)        Temp Readings from Last 3 Encounters:   04/15/22 97 6 °F (36 4 °C) (Tympanic)   04/04/22 98 5 °F (36 9 °C) (Temporal)   03/28/22 97 9 °F (36 6 °C) (Temporal)        BP Readings from Last 3 Encounters:   04/15/22 150/100   04/04/22 130/80   03/28/22 126/70         Pulse Readings from Last 3 Encounters: 04/15/22 74   04/04/22 79   03/28/22 76         Physical Exam     Constitutional   General appearance: No acute distress, well appearing and well nourished  Eyes   Conjunctiva and lids: No swelling, erythema or discharge  Pupils and irises: Equal, round and reactive to light  Ears, Nose, Mouth, and Throat   External inspection of ears and nose: Normal     Nasal mucosa, septum, and turbinates: Normal without edema or erythema  Oropharynx: Normal with no erythema, edema, exudate or lesions  Pulmonary   Respiratory effort: No increased work of breathing or signs of respiratory distress  Auscultation of lungs:  Decreased breath sounds at right base   Cardiovascular   Palpation of heart: Normal PMI, no thrills  Auscultation of heart: Normal rate and rhythm, normal S1 and S2, without murmurs  Examination of extremities for edema and/or varicosities: Normal     Carotid pulses: Normal     Abdomen   Abdomen: Non-tender, no masses  Liver and spleen: No hepatomegaly or splenomegaly  Lymphatic   Palpation of lymph nodes in neck: No lymphadenopathy  Musculoskeletal   Gait and station: Normal     Digits and nails: Normal without clubbing or cyanosis  Inspection/palpation of joints, bones, and muscles: Normal     Skin   Skin and subcutaneous tissue: Normal without rashes or lesions  Neurologic   Cranial nerves: Cranial nerves 2-12 intact  Sensation: No sensory loss  Psychiatric   Orientation to person, place, and time: Normal     Mood and affect: Normal         Assessment / Plan:    Per the note from [facility]  The patient is a 80year old male with newly diagnosed biopsy proven adenocarcinoma of the lung with mediastinal adenopathy and a nasopharyngeal mass  PET CT scan completed on 9/21 showed enlarging right upper lobe mass in keeping with biopsy proven adenocarcinoma, extensive adenopathy in the neck base bilaterally, thoracic inlets, mediastinum, right hilum   Small right effusion, and small pericardial effusion  No hypermetabolic metastases identified below the diaphragms   No evidence of skeletal metastasis  The right-sided nasopharyngeal mass demonstrates no abnormal glucose activity  Cytology from the fluid was suspicious for malignancy      He was treated with 6 cycles of systemic therapy with Carbo, Alimta and Keytruda  Imaging after 4 cycles of chemo showed a positive response to therapy  More recently he underwent PET CT simulation which showed a very nice treatment response with a decrease in size of his right upper lobe primary lung cancer and resolution of previously noted mediastinal lymph adenopathy  The patient was then seen by our colleagues in [address] [place] [address] and was started on concurrent chemoradiation with carboplatin and Taxol  This was supposed to be his last week but it was on hold secondary to blood counts  He will now get this next week  He is also getting Keytruda  Cielo Calzada will continue after his concurrent chemoradiation ends every 3 weeks indefinitely  The patient is in agreement with this  He will finish up concurrent chemoradiation next week and then come back to [facility] to just get single agent Keytruda every 3 weeks  The patient is in agreement with this plan  I will see him back in 3-4 weeks  Until then if he has any questions he will call our office  Goals and Barriers:  Current Goal:  Prolong Survival from metastatic adenocarcinoma of the lung   Barriers: None  Patient's Capacity to Self Care:  Patient  able to self care  Portions of the record may have been created with voice recognition software  Occasional wrong word or "sound a like" substitutions may have occurred due to the inherent limitations of voice recognition software  Read the chart carefully and recognize, using context, where substitutions have occurred

## 2022-04-16 DIAGNOSIS — I82.403 ACUTE DEEP VEIN THROMBOSIS (DVT) OF BOTH LOWER EXTREMITIES, UNSPECIFIED VEIN (HCC): ICD-10-CM

## 2022-04-18 ENCOUNTER — APPOINTMENT (OUTPATIENT)
Dept: RADIATION ONCOLOGY | Facility: HOSPITAL | Age: 72
End: 2022-04-18
Attending: INTERNAL MEDICINE
Payer: COMMERCIAL

## 2022-04-18 ENCOUNTER — HOSPITAL ENCOUNTER (OUTPATIENT)
Dept: INFUSION CENTER | Facility: HOSPITAL | Age: 72
Discharge: HOME | End: 2022-04-18
Attending: INTERNAL MEDICINE
Payer: COMMERCIAL

## 2022-04-18 VITALS
HEART RATE: 84 BPM | HEIGHT: 69 IN | WEIGHT: 182.98 LBS | SYSTOLIC BLOOD PRESSURE: 134 MMHG | BODY MASS INDEX: 27.1 KG/M2 | TEMPERATURE: 98.1 F | RESPIRATION RATE: 16 BRPM | DIASTOLIC BLOOD PRESSURE: 90 MMHG | OXYGEN SATURATION: 97 %

## 2022-04-18 DIAGNOSIS — T45.1X5A CHEMOTHERAPY INDUCED NEUTROPENIA (HCC): ICD-10-CM

## 2022-04-18 DIAGNOSIS — D70.1 CHEMOTHERAPY INDUCED NEUTROPENIA (HCC): ICD-10-CM

## 2022-04-18 DIAGNOSIS — R91.8 MASS OF UPPER LOBE OF RIGHT LUNG: ICD-10-CM

## 2022-04-18 DIAGNOSIS — C34.11 MALIGNANT NEOPLASM OF UPPER LOBE OF RIGHT LUNG (HCC): Primary | ICD-10-CM

## 2022-04-18 DIAGNOSIS — C34.90 ADENOCARCINOMA OF LUNG, UNSPECIFIED LATERALITY (HCC): ICD-10-CM

## 2022-04-18 PROCEDURE — 77014 CHG CT GUIDANCE PLACEMENT RAD THERAPY FIELDS: CPT | Performed by: INTERNAL MEDICINE

## 2022-04-18 PROCEDURE — 77386 HB NTSTY MODUL RAD TX DLVR CPLX: CPT | Performed by: INTERNAL MEDICINE

## 2022-04-18 PROCEDURE — 96417 CHEMO IV INFUS EACH ADDL SEQ: CPT

## 2022-04-18 PROCEDURE — 96413 CHEMO IV INFUSION 1 HR: CPT

## 2022-04-18 PROCEDURE — 96367 TX/PROPH/DG ADDL SEQ IV INF: CPT

## 2022-04-18 RX ORDER — SODIUM CHLORIDE 9 MG/ML
20 INJECTION, SOLUTION INTRAVENOUS ONCE
Status: COMPLETED | OUTPATIENT
Start: 2022-04-18 | End: 2022-04-18

## 2022-04-18 RX ADMIN — CARBOPLATIN 213.4 MG: 10 INJECTION, SOLUTION INTRAVENOUS at 12:15

## 2022-04-18 RX ADMIN — DIPHENHYDRAMINE HYDROCHLORIDE 25 MG: 50 INJECTION, SOLUTION INTRAMUSCULAR; INTRAVENOUS at 09:37

## 2022-04-18 RX ADMIN — FAMOTIDINE 20 MG: 10 INJECTION INTRAVENOUS at 10:05

## 2022-04-18 RX ADMIN — SODIUM CHLORIDE 20 ML/HR: 0.9 INJECTION, SOLUTION INTRAVENOUS at 09:37

## 2022-04-18 RX ADMIN — DEXAMETHASONE SODIUM PHOSPHATE: 10 INJECTION, SOLUTION INTRAMUSCULAR; INTRAVENOUS at 10:35

## 2022-04-18 RX ADMIN — PACLITAXEL 99 MG: 6 INJECTION, SOLUTION, CONCENTRATE INTRAVENOUS at 11:09

## 2022-04-19 ENCOUNTER — APPOINTMENT (OUTPATIENT)
Dept: RADIATION ONCOLOGY | Facility: HOSPITAL | Age: 72
End: 2022-04-19
Attending: INTERNAL MEDICINE
Payer: COMMERCIAL

## 2022-04-19 PROCEDURE — 77014 CHG CT GUIDANCE PLACEMENT RAD THERAPY FIELDS: CPT | Performed by: RADIOLOGY

## 2022-04-19 PROCEDURE — 77386 HB NTSTY MODUL RAD TX DLVR CPLX: CPT | Performed by: RADIOLOGY

## 2022-04-20 ENCOUNTER — APPOINTMENT (OUTPATIENT)
Dept: RADIATION ONCOLOGY | Facility: HOSPITAL | Age: 72
End: 2022-04-20
Attending: INTERNAL MEDICINE
Payer: COMMERCIAL

## 2022-04-20 PROCEDURE — 77014 CHG CT GUIDANCE PLACEMENT RAD THERAPY FIELDS: CPT | Performed by: INTERNAL MEDICINE

## 2022-04-20 PROCEDURE — 77386 HB NTSTY MODUL RAD TX DLVR CPLX: CPT | Performed by: INTERNAL MEDICINE

## 2022-04-21 ENCOUNTER — APPOINTMENT (OUTPATIENT)
Dept: RADIATION ONCOLOGY | Facility: HOSPITAL | Age: 72
End: 2022-04-21
Attending: INTERNAL MEDICINE
Payer: COMMERCIAL

## 2022-04-21 PROCEDURE — 77386 HB NTSTY MODUL RAD TX DLVR CPLX: CPT | Performed by: RADIOLOGY

## 2022-04-21 PROCEDURE — 77014 CHG CT GUIDANCE PLACEMENT RAD THERAPY FIELDS: CPT | Performed by: RADIOLOGY

## 2022-04-21 PROCEDURE — 77336 RADIATION PHYSICS CONSULT: CPT | Performed by: INTERNAL MEDICINE

## 2022-04-22 ENCOUNTER — TELEPHONE (OUTPATIENT)
Dept: HEMATOLOGY ONCOLOGY | Facility: CLINIC | Age: 72
End: 2022-04-22

## 2022-04-22 DIAGNOSIS — C34.90 ADENOCARCINOMA OF LUNG, UNSPECIFIED LATERALITY (HCC): Primary | ICD-10-CM

## 2022-04-23 ENCOUNTER — APPOINTMENT (OUTPATIENT)
Dept: LAB | Facility: HOSPITAL | Age: 72
End: 2022-04-23
Attending: INTERNAL MEDICINE
Payer: COMMERCIAL

## 2022-04-23 DIAGNOSIS — C34.90 ADENOCARCINOMA OF LUNG, UNSPECIFIED LATERALITY (HCC): ICD-10-CM

## 2022-04-23 LAB
ALBUMIN SERPL BCP-MCNC: 2.9 G/DL (ref 3.5–5)
ALP SERPL-CCNC: 52 U/L (ref 46–116)
ALT SERPL W P-5'-P-CCNC: 18 U/L (ref 12–78)
ANION GAP SERPL CALCULATED.3IONS-SCNC: 4 MMOL/L (ref 4–13)
AST SERPL W P-5'-P-CCNC: 9 U/L (ref 5–45)
BASOPHILS # BLD AUTO: 0.02 THOUSANDS/ΜL (ref 0–0.1)
BASOPHILS NFR BLD AUTO: 1 % (ref 0–1)
BILIRUB SERPL-MCNC: 0.4 MG/DL (ref 0.2–1)
BUN SERPL-MCNC: 11 MG/DL (ref 5–25)
CALCIUM ALBUM COR SERPL-MCNC: 10.3 MG/DL (ref 8.3–10.1)
CALCIUM SERPL-MCNC: 9.4 MG/DL (ref 8.3–10.1)
CHLORIDE SERPL-SCNC: 107 MMOL/L (ref 100–108)
CO2 SERPL-SCNC: 31 MMOL/L (ref 21–32)
CREAT SERPL-MCNC: 0.81 MG/DL (ref 0.6–1.3)
EOSINOPHIL # BLD AUTO: 0.01 THOUSAND/ΜL (ref 0–0.61)
EOSINOPHIL NFR BLD AUTO: 1 % (ref 0–6)
ERYTHROCYTE [DISTWIDTH] IN BLOOD BY AUTOMATED COUNT: 20.1 % (ref 11.6–15.1)
GFR SERPL CREATININE-BSD FRML MDRD: 90 ML/MIN/1.73SQ M
GLUCOSE P FAST SERPL-MCNC: 87 MG/DL (ref 65–99)
HCT VFR BLD AUTO: 29.1 % (ref 36.5–49.3)
HGB BLD-MCNC: 8.9 G/DL (ref 12–17)
IMM GRANULOCYTES # BLD AUTO: 0.01 THOUSAND/UL (ref 0–0.2)
IMM GRANULOCYTES NFR BLD AUTO: 1 % (ref 0–2)
LYMPHOCYTES # BLD AUTO: 0.65 THOUSANDS/ΜL (ref 0.6–4.47)
LYMPHOCYTES NFR BLD AUTO: 32 % (ref 14–44)
MCH RBC QN AUTO: 29.9 PG (ref 26.8–34.3)
MCHC RBC AUTO-ENTMCNC: 30.6 G/DL (ref 31.4–37.4)
MCV RBC AUTO: 98 FL (ref 82–98)
MONOCYTES # BLD AUTO: 0.36 THOUSAND/ΜL (ref 0.17–1.22)
MONOCYTES NFR BLD AUTO: 18 % (ref 4–12)
NEUTROPHILS # BLD AUTO: 0.96 THOUSANDS/ΜL (ref 1.85–7.62)
NEUTS SEG NFR BLD AUTO: 47 % (ref 43–75)
NRBC BLD AUTO-RTO: 0 /100 WBCS
PLATELET # BLD AUTO: 151 THOUSANDS/UL (ref 149–390)
PMV BLD AUTO: 10.2 FL (ref 8.9–12.7)
POTASSIUM SERPL-SCNC: 4.2 MMOL/L (ref 3.5–5.3)
PROT SERPL-MCNC: 6.5 G/DL (ref 6.4–8.2)
RBC # BLD AUTO: 2.98 MILLION/UL (ref 3.88–5.62)
SODIUM SERPL-SCNC: 142 MMOL/L (ref 136–145)
T3FREE SERPL-MCNC: 2.59 PG/ML (ref 2.3–4.2)
TSH SERPL DL<=0.05 MIU/L-ACNC: 1.66 UIU/ML (ref 0.45–4.5)
WBC # BLD AUTO: 2.01 THOUSAND/UL (ref 4.31–10.16)

## 2022-04-23 PROCEDURE — 85025 COMPLETE CBC W/AUTO DIFF WBC: CPT

## 2022-04-23 PROCEDURE — 84443 ASSAY THYROID STIM HORMONE: CPT

## 2022-04-23 PROCEDURE — 84481 FREE ASSAY (FT-3): CPT

## 2022-04-23 PROCEDURE — 36415 COLL VENOUS BLD VENIPUNCTURE: CPT

## 2022-04-23 PROCEDURE — 80053 COMPREHEN METABOLIC PANEL: CPT

## 2022-04-25 ENCOUNTER — HOSPITAL ENCOUNTER (OUTPATIENT)
Dept: INFUSION CENTER | Facility: HOSPITAL | Age: 72
Discharge: HOME | End: 2022-04-25
Attending: INTERNAL MEDICINE
Payer: COMMERCIAL

## 2022-04-25 VITALS
HEART RATE: 74 BPM | SYSTOLIC BLOOD PRESSURE: 131 MMHG | BODY MASS INDEX: 27.76 KG/M2 | TEMPERATURE: 97.7 F | WEIGHT: 187.4 LBS | OXYGEN SATURATION: 96 % | DIASTOLIC BLOOD PRESSURE: 74 MMHG | HEIGHT: 69 IN | RESPIRATION RATE: 12 BRPM

## 2022-04-25 DIAGNOSIS — C34.11 MALIGNANT NEOPLASM OF UPPER LOBE OF RIGHT LUNG (HCC): Primary | ICD-10-CM

## 2022-04-25 DIAGNOSIS — R91.8 MASS OF UPPER LOBE OF RIGHT LUNG: ICD-10-CM

## 2022-04-25 DIAGNOSIS — D70.1 CHEMOTHERAPY INDUCED NEUTROPENIA (HCC): ICD-10-CM

## 2022-04-25 DIAGNOSIS — C34.11 MALIGNANT NEOPLASM OF UPPER LOBE OF RIGHT LUNG (HCC): ICD-10-CM

## 2022-04-25 DIAGNOSIS — T45.1X5A CHEMOTHERAPY INDUCED NEUTROPENIA (HCC): ICD-10-CM

## 2022-04-25 DIAGNOSIS — C34.90 ADENOCARCINOMA OF LUNG, UNSPECIFIED LATERALITY (HCC): ICD-10-CM

## 2022-04-25 PROCEDURE — 96413 CHEMO IV INFUSION 1 HR: CPT

## 2022-04-25 RX ORDER — SODIUM CHLORIDE 9 MG/ML
20 INJECTION, SOLUTION INTRAVENOUS ONCE
Status: COMPLETED | OUTPATIENT
Start: 2022-04-25 | End: 2022-04-25

## 2022-04-25 RX ORDER — FOLIC ACID 1 MG/1
TABLET ORAL
Qty: 30 TABLET | Refills: 3 | Status: SHIPPED | OUTPATIENT
Start: 2022-04-25

## 2022-04-25 RX ADMIN — SODIUM CHLORIDE 20 ML/HR: 9 INJECTION, SOLUTION INTRAVENOUS at 09:10

## 2022-04-25 RX ADMIN — SODIUM CHLORIDE 200 MG: 9 INJECTION, SOLUTION INTRAVENOUS at 09:13

## 2022-04-25 NOTE — PROGRESS NOTES
Pt tolerated keytruda without complication  PIV removed and bandage applied  Next appts scheduled with avs printed  Left ambulatory with steady gait for d/c

## 2022-05-09 ENCOUNTER — OFFICE VISIT (OUTPATIENT)
Dept: HEMATOLOGY ONCOLOGY | Facility: HOSPITAL | Age: 72
End: 2022-05-09
Payer: COMMERCIAL

## 2022-05-09 ENCOUNTER — OFFICE VISIT (OUTPATIENT)
Dept: FAMILY MEDICINE CLINIC | Facility: HOSPITAL | Age: 72
End: 2022-05-09
Payer: COMMERCIAL

## 2022-05-09 VITALS
HEIGHT: 69 IN | BODY MASS INDEX: 27.91 KG/M2 | SYSTOLIC BLOOD PRESSURE: 118 MMHG | WEIGHT: 188.4 LBS | HEART RATE: 70 BPM | TEMPERATURE: 97.4 F | DIASTOLIC BLOOD PRESSURE: 74 MMHG

## 2022-05-09 VITALS
WEIGHT: 188 LBS | RESPIRATION RATE: 16 BRPM | OXYGEN SATURATION: 93 % | HEART RATE: 70 BPM | HEIGHT: 69 IN | BODY MASS INDEX: 27.85 KG/M2 | SYSTOLIC BLOOD PRESSURE: 116 MMHG | TEMPERATURE: 96.6 F | DIASTOLIC BLOOD PRESSURE: 80 MMHG

## 2022-05-09 DIAGNOSIS — C34.90 ADENOCARCINOMA OF LUNG, UNSPECIFIED LATERALITY (HCC): ICD-10-CM

## 2022-05-09 DIAGNOSIS — Z79.01 CHRONIC ANTICOAGULATION: ICD-10-CM

## 2022-05-09 DIAGNOSIS — I26.99 OTHER PULMONARY EMBOLISM WITHOUT ACUTE COR PULMONALE, UNSPECIFIED CHRONICITY (HCC): ICD-10-CM

## 2022-05-09 DIAGNOSIS — I82.4Z3 ACUTE DEEP VEIN THROMBOSIS (DVT) OF DISTAL VEIN OF BOTH LOWER EXTREMITIES (HCC): Primary | ICD-10-CM

## 2022-05-09 DIAGNOSIS — Z23 ENCOUNTER FOR IMMUNIZATION: ICD-10-CM

## 2022-05-09 DIAGNOSIS — J44.9 COPD MIXED TYPE (HCC): ICD-10-CM

## 2022-05-09 DIAGNOSIS — C34.90 ADENOCARCINOMA OF LUNG, UNSPECIFIED LATERALITY (HCC): Primary | ICD-10-CM

## 2022-05-09 DIAGNOSIS — C34.11 MALIGNANT NEOPLASM OF UPPER LOBE OF RIGHT LUNG (HCC): ICD-10-CM

## 2022-05-09 DIAGNOSIS — J39.2 NASOPHARYNGEAL MASS: ICD-10-CM

## 2022-05-09 PROBLEM — J96.11 CHRONIC RESPIRATORY FAILURE WITH HYPOXIA (HCC): Status: RESOLVED | Noted: 2021-08-09 | Resolved: 2022-05-09

## 2022-05-09 PROBLEM — Z74.09 IMPAIRED MOBILITY AND ADLS: Status: RESOLVED | Noted: 2021-08-13 | Resolved: 2022-05-09

## 2022-05-09 PROBLEM — Z78.9 IMPAIRED MOBILITY AND ADLS: Status: RESOLVED | Noted: 2021-08-13 | Resolved: 2022-05-09

## 2022-05-09 PROCEDURE — 90677 PCV20 VACCINE IM: CPT

## 2022-05-09 PROCEDURE — 99214 OFFICE O/P EST MOD 30 MIN: CPT | Performed by: INTERNAL MEDICINE

## 2022-05-09 PROCEDURE — 99214 OFFICE O/P EST MOD 30 MIN: CPT | Performed by: FAMILY MEDICINE

## 2022-05-09 PROCEDURE — G0009 ADMIN PNEUMOCOCCAL VACCINE: HCPCS

## 2022-05-09 RX ORDER — SODIUM CHLORIDE 9 MG/ML
20 INJECTION, SOLUTION INTRAVENOUS ONCE
Status: CANCELLED | OUTPATIENT
Start: 2022-05-16

## 2022-05-09 NOTE — PROGRESS NOTES
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 measuring 2 7 cm in greatest dimension   He also had a Doppler ultrasound of the lower extremities which showed acute deep vein thrombosis in both lower extremities  CT scan of the chest abdomen pelvis without contrast on 07/31 showed 6 4 cm solid irregular right upper lobe pulmonary mass with extensive right hilar and mediastinal adenopathy   He also seems to have small right pleural effusion and moderate emphysema  MRI of the brain on 08/03/2021 showed multifocal diffusion abnormalities in several separate vascular territories   No metastatic disease to the brain was mentioned  Mariel Arriaga does seem to have 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    CT-guided biopsy of the right lung mass was done on 08/05   The pathology showed adenocarcinoma of lung primary       Previous Hematologic/ Oncologic History:    Oncology History   Adenocarcinoma of lung   8/2021 Initial Diagnosis    Adenocarcinoma of lung     8/5/2021 Biopsy    Right lung apex, image-guided core needle biopsy:  - Adenocarcinoma      10/6/2021 - 1/26/2022 Chemotherapy    cyanocobalamin, 1,000 mcg, Intramuscular, Once, 3 of 3 cycles  Administration: 1,000 mcg (11/24/2021), 1,000 mcg (1/26/2022), 1,000 mcg (10/6/2021)  pegfilgrastim ([employer]), 6 mg, Subcutaneous, Once, 1 of 1 cycle  Administration: 6 mg (11/26/2021)  pegfilgrastim (Louana Svetlana), 6 mg, Subcutaneous, Once, 6 of 6 cycles  Administration: 6 mg (10/6/2021), 6 mg (11/3/2021), 6 mg (11/24/2021), 6 mg (12/15/2021), 6 mg (1/5/2022)  fosaprepitant (EMEND) IVPB, 150 mg, Intravenous, Once, 6 of 6 cycles  Administration: 150 mg (10/6/2021), 150 mg (11/24/2021), 150 mg (12/15/2021), 150 mg (1/26/2022), 150 mg (11/3/2021), 150 mg (1/5/2022)  CARBOplatin (PARAPLATIN) IVPB (GOG AUC DOSING), 519 5 mg, Intravenous, Once, 6 of 6 cycles  Administration: 519 5 mg (10/6/2021), 574 mg (11/24/2021), 600 mg (12/15/2021), 533 mg (1/26/2022), 604 5 mg (11/3/2021), 563 mg (1/5/2022)  pemetrexed (ALIMTA) chemo infusion, 970 mg, Intravenous, Once, 6 of 6 cycles  Administration: 1,000 mg (10/6/2021), 1,000 mg (11/24/2021), 1,000 mg (12/15/2021), 1,000 mg (1/26/2022), 1,000 mg (11/3/2021), 1,000 mg (1/5/2022)  pembrolizumab (KEYTRUDA) IVPB, 200 mg, Intravenous, Once, 6 of 6 cycles  Administration: 200 mg (10/6/2021), 200 mg (11/24/2021), 200 mg (12/15/2021), 200 mg (1/26/2022), 200 mg (11/3/2021), 200 mg (1/5/2022)     3/7/2022 -  Chemotherapy    CARBOplatin (PARAPLATIN) IVPB (GOG AUC DOSING), , Intravenous, Once, 6 of 6 cycles  Administration: 211 6 mg (3/7/2022), 208 mg (3/14/2022), 238 8 mg (3/21/2022), 211 6 mg (3/28/2022), 215 2 mg (4/4/2022), 213 4 mg (4/18/2022)  PACLItaxel (TAXOL) chemo IVPB, 50 mg/m2 = 99 mg, Intravenous, Once, 6 of 6 cycles  Administration: 99 mg (3/7/2022), 99 mg (3/14/2022), 99 mg (3/21/2022), 99 mg (3/28/2022), 99 mg (4/4/2022), 99 mg (4/18/2022)  pembrolizumab (KEYTRUDA) IVPB, 200 mg, Intravenous, Once, 3 of 11 cycles  Administration: 200 mg (3/7/2022), 200 mg (3/28/2022), 200 mg (4/25/2022)     Malignant neoplasm of upper lobe of right lung ([facility] [address] )   9/3/2021 Initial Diagnosis    Malignant neoplasm of upper lobe of right lung ([facility] [address] )     9/23/2021 -  Cancer Staged    Staging form: Lung, AJCC 8th Edition  - Clinical stage from 9/23/2021: Stage IIIC (cT3, cN3, cM0) - Signed by Efrain Pinedo MD on 9/23/2021  Histopathologic type:  Adenocarcinoma, NOS       10/6/2021 - 1/26/2022 Chemotherapy    cyanocobalamin, 1,000 mcg, Intramuscular, Once, 3 of 3 cycles  Administration: 1,000 mcg (11/24/2021), 1,000 mcg (1/26/2022), 1,000 mcg (10/6/2021)  pegfilgrastim (Rishi Graven), 6 mg, Subcutaneous, Once, 1 of 1 cycle  Administration: 6 mg (11/26/2021)  pegfilgrastim (Lucila Boston), 6 mg, Subcutaneous, Once, 6 of 6 cycles  Administration: 6 mg (10/6/2021), 6 mg (11/3/2021), 6 mg (11/24/2021), 6 mg (12/15/2021), 6 mg (1/5/2022)  fosaprepitant (EMEND) IVPB, 150 mg, Intravenous, Once, 6 of 6 cycles  Administration: 150 mg (10/6/2021), 150 mg (11/24/2021), 150 mg (12/15/2021), 150 mg (1/26/2022), 150 mg (11/3/2021), 150 mg (1/5/2022)  CARBOplatin (PARAPLATIN) IVPB ([facility] AUC DOSING), 519 5 mg, Intravenous, Once, 6 of 6 cycles  Administration: 519 5 mg (10/6/2021), 574 mg (11/24/2021), 600 mg (12/15/2021), 533 mg (1/26/2022), 604 5 mg (11/3/2021), 563 mg (1/5/2022)  pemetrexed (ALIMTA) chemo infusion, 970 mg, Intravenous, Once, 6 of 6 cycles  Administration: 1,000 mg (10/6/2021), 1,000 mg (11/24/2021), 1,000 mg (12/15/2021), 1,000 mg (1/26/2022), 1,000 mg (11/3/2021), 1,000 mg (1/5/2022)  pembrolizumab (KEYTRUDA) IVPB, 200 mg, Intravenous, Once, 6 of 6 cycles  Administration: 200 mg (10/6/2021), 200 mg (11/24/2021), 200 mg (12/15/2021), 200 mg (1/26/2022), 200 mg (11/3/2021), 200 mg (1/5/2022)     3/7/2022 -  Chemotherapy    CARBOplatin (PARAPLATIN) IVPB (GOG AUC DOSING), , Intravenous, Once, 6 of 6 cycles  Administration: 211 6 mg (3/7/2022), 208 mg (3/14/2022), 238 8 mg (3/21/2022), 211 6 mg (3/28/2022), 215 2 mg (4/4/2022), 213 4 mg (4/18/2022)  PACLItaxel (TAXOL) chemo IVPB, 50 mg/m2 = 99 mg, Intravenous, Once, 6 of 6 cycles  Administration: 99 mg (3/7/2022), 99 mg (3/14/2022), 99 mg (3/21/2022), 99 mg (3/28/2022), 99 mg (4/4/2022), 99 mg (4/18/2022)  pembrolizumab (KEYTRUDA) IVPB, 200 mg, Intravenous, Once, 3 of 11 cycles  Administration: 200 mg (3/7/2022), 200 mg (3/28/2022), 200 mg (4/25/2022)       Concurrent chemotherapy with carboplatin and Taxol with Keytruda and radiation  Current Hematologic/ Oncologic Treatment:    Keytruda every 3 weeks    Interval History:    Patient returns for follow-up visit  He is doing well  He is on single agent Keytruda  He is tolerating it well with no issues  Denies any nausea denies any vomiting denies any diarrhea  Overall is doing well  Denies any focal neurological signs  Has healed up from his radiation  The rest of his 14 point review of systems today was negative  Cancer Staging:  Cancer Staging  Malignant neoplasm of upper lobe of right lung [facility])  Staging form: Lung, AJCC 8th Edition  - Clinical stage from 9/23/2021: Stage IIIC (cT3, cN3, cM0) - Signed by Annette Gonzalez MD on 9/23/2021  Histopathologic type: Adenocarcinoma, NOS      Test Results:    Imaging: No results found  Labs:   Lab Results   Component Value Date    WBC 2 01 (L) 04/23/2022    HGB 8 9 (L) 04/23/2022    HCT 29 1 (L) 04/23/2022    MCV 98 04/23/2022     04/23/2022     Lab Results   Component Value Date    K 4 2 04/23/2022     04/23/2022    CO2 31 04/23/2022    BUN 11 04/23/2022    CREATININE 0 81 04/23/2022    GLUF 87 04/23/2022    CALCIUM 9 4 04/23/2022    CORRECTEDCA 10 3 (H) 04/23/2022    AST 9 04/23/2022    ALT 18 04/23/2022    ALKPHOS 52 04/23/2022    EGFR 90 04/23/2022       Lab Results   Component Value Date    PNGQYXZY74 1,514 (H) 08/12/2021         ROS: As stated in the history of present illness otherwise his 14 point review of systems today was negative        Active Problems:   Patient Active Problem List   Diagnosis    Recent cerebrovascular accident    Bilateral lower extremity DVTs    Nasopharyngeal mass    Dysphagia    Mass of upper lobe of right lung    Urinary retention    Constipation    Anemia of chronic disease    Pulmonary embolism (HCC)    Chronic respiratory failure with hypoxia ([facility])    Impaired cognition    Impaired mobility and ADLs    Adenocarcinoma of lung    Malignant neoplasm of upper lobe of right lung (HCC)    COPD mixed type (HCC)    Chemotherapy induced neutropenia (HCC)    Hypercalcemia of malignancy    Chronic anticoagulation       Past Medical History:   Past Medical History:   Diagnosis Date    Lung cancer ([address] )     Stroke [facility])        Surgical History:   Past Surgical History:   Procedure Laterality Date    IR BIOPSY LUNG  2021    IR THORACENTESIS  2022       Family History:    Family History   Problem Relation Age of Onset    Prostate cancer Brother     Lung cancer Brother        Cancer-related family history includes Lung cancer in his brother; Prostate cancer in his brother      Social History:   Social History     Socioeconomic History    Marital status: /Civil Union     Spouse name: Not on file    Number of children: Not on file    Years of education: Not on file    Highest education level: Not on file   Occupational History    Not on file   Tobacco Use    Smoking status: Former Smoker     Packs/day: 2 50     Years: 30 00     Pack years: 75 00     Quit date:      Years since quittin 3    Smokeless tobacco: Never Used   Vaping Use    Vaping Use: Never used   Substance and Sexual Activity    Alcohol use: Not Currently     Comment: No ETOH since Summer 2021     Drug use: Never    Sexual activity: Yes     Partners: Female   Other Topics Concern    Not on file   Social History Narrative    Not on file     Social Determinants of Health     Financial Resource Strain: Not on file   Food Insecurity: Not on file   Transportation Needs: Not on file   Physical Activity: Not on file   Stress: Not on file   Social Connections: Not on file   Intimate Partner Violence: Not on file   Housing Stability: Not on file       Current Medications:   Current Outpatient Medications   Medication Sig Dispense Refill    atorvastatin (LIPITOR) 40 mg tablet Take 1 tablet (40 mg total) by mouth daily with dinner 90 tablet 2    enoxaparin (LOVENOX) 80 mg/0 8 mL INJECT 0 8 ML (80 MG TOTAL) UNDER THE SKIN EVERY 12 (TWELVE) HOURS 48 mL 0    folic acid (FOLVITE) 1 mg tablet TAKE 1 TABLET BY MOUTH EVERY DAY 30 tablet 3    multivitamin (THERAGRAN) TABS Take 1 tablet by mouth daily      tamsulosin (FLOMAX) 0 4 mg TAKE 1 CAPSULE BY MOUTH DAILY AFTER SUPPER 90 capsule 1    acetaminophen (TYLENOL) 325 mg tablet Take 2 tablets (650 mg total) by mouth 4 (four) times a day as needed for mild pain, headaches or fever (Patient not taking: Reported on 4/15/2022 )  0    dexamethasone (DECADRON) 4 mg tablet TAKE 1 TABLET (4 MG TOTAL) BY MOUTH 2 (TWO) TIMES A DAY WITH MEALS THE DAY BEFORE AND THE DAY AFTER TREATMENT 8 tablet 0    enoxaparin (LOVENOX) 80 mg/0 8 mL INJECT 0 8 ML (80 MG TOTAL) UNDER THE SKIN EVERY 12 (TWELVE) HOURS (Patient not taking: Reported on 2/14/2022 ) 48 mL 0    prochlorperazine (COMPAZINE) 10 mg tablet Take 1 tablet (10 mg total) by mouth every 6 (six) hours as needed for nausea or vomiting (Patient not taking: Reported on 4/15/2022 ) 45 tablet 3     No current facility-administered medications for this visit  Allergies: Allergies   Allergen Reactions    Doxycycline Rash       Physical Exam:    Body surface area is 2 01 meters squared  Wt Readings from Last 3 Encounters:   05/09/22 85 3 kg (188 lb)   04/25/22 85 kg (187 lb 6 4 oz)   04/18/22 83 kg (182 lb 15 7 oz)        Temp Readings from Last 3 Encounters:   05/09/22 (!) 96 6 °F (35 9 °C) (Tympanic)   04/25/22 97 7 °F (36 5 °C) (Temporal)   04/18/22 98 1 °F (36 7 °C) (Tympanic)        BP Readings from Last 3 Encounters:   05/09/22 116/80   04/25/22 131/74   04/18/22 134/90         Pulse Readings from Last 3 Encounters:   05/09/22 70   04/25/22 74   04/18/22 84         Physical Exam     Constitutional   General appearance: No acute distress, well appearing and well nourished      Eyes   Conjunctiva and lids: No swelling, erythema or discharge  Pupils and irises: Equal, round and reactive to light  Ears, Nose, Mouth, and Throat   External inspection of ears and nose: Normal     Nasal mucosa, septum, and turbinates: Normal without edema or erythema  Oropharynx: Normal with no erythema, edema, exudate or lesions  Pulmonary   Respiratory effort: No increased work of breathing or signs of respiratory distress  Auscultation of lungs: Clear to auscultation  Cardiovascular   Palpation of heart: Normal PMI, no thrills  Auscultation of heart: Normal rate and rhythm, normal S1 and S2, without murmurs  Examination of extremities for edema and/or varicosities: Normal     Carotid pulses: Normal     Abdomen   Abdomen: Non-tender, no masses  Liver and spleen: No hepatomegaly or splenomegaly  Lymphatic   Palpation of lymph nodes in neck: No lymphadenopathy  Musculoskeletal   Gait and station: Normal     Digits and nails: Normal without clubbing or cyanosis  Inspection/palpation of joints, bones, and muscles: Normal     Skin   Skin and subcutaneous tissue: Normal without rashes or lesions  Neurologic   Cranial nerves: Cranial nerves 2-12 intact  Sensation: No sensory loss  Psychiatric   Orientation to person, place, and time: Normal     Mood and affect: Normal         Assessment / Plan:      Per the note from [facility]  The patient is a 80year old male with newly diagnosed biopsy proven adenocarcinoma of the lung with mediastinal adenopathy and a nasopharyngeal mass  PET CT scan completed on 9/21 showed enlarging right upper lobe mass in keeping with biopsy proven adenocarcinoma, extensive adenopathy in the neck base bilaterally, thoracic inlets, mediastinum, right hilum  Small right effusion, and small pericardial effusion  No hypermetabolic metastases identified below the diaphragms   No evidence of skeletal metastasis   The right-sided nasopharyngeal mass demonstrates no abnormal glucose activity  Cytology from the fluid was suspicious for malignancy      He was treated with 6 cycles of systemic therapy with Carbo, Alimta and Keytruda   Imaging after 4 cycles of chemo showed a positive response to therapy  The patient was then seen by our colleagues in [id number] [place] [address] and was started on concurrent chemoradiation with carboplatin and Taxol  He finished this up and is now on single agent Keytruda  He will stay on this  He is due for imaging again in June post chemoradiation and treatment  I will set this up  I will see him back in 6 weeks with repeat imaging  Until then if he has any questions he will call our office  This will be a PET-CT scan to compare to his previous PET-CT scan  This is post treatment imaging  Goals and Barriers:  Current Goal:  Prolong Survival from adenocarcinoma of the lung  Barriers: None  Patient's Capacity to Self Care:  Patient able to self care  Portions of the record may have been created with voice recognition software  Occasional wrong word or "sound a like" substitutions may have occurred due to the inherent limitations of voice recognition software  Read the chart carefully and recognize, using context, where substitutions have occurred

## 2022-05-09 NOTE — PROGRESS NOTES
Assessment/Plan:      Problem List Items Addressed This Visit        Respiratory    Adenocarcinoma of lung    COPD mixed type ([address] )    Malignant neoplasm of upper lobe of right lung [facility])       Cardiovascular and Mediastinum    Bilateral lower extremity DVTs - Primary    Pulmonary embolism (HCC)       Other    Chronic anticoagulation           Plan/Discussion:  Dvt/PE with underlying malignancy and failure of eliquis  Will need to continue with lovenox indefinitely  Lung ca  Doing well  Update imaging later this year  Ongoing fu w Oncology  Copd   Stable  Quit smoking about 18 years ago  No exacerbations  Subjective:   Chief Complaint   Patient presents with    Follow-up     3 month         Patient ID: Rafael Lehman is a 80 y o  male  Here for fu of chronic condtions  Overall reports doingw ell  Ongoing fu with oncology for lung ca  Reviewed notes/latest labs  The following portions of the patient's history were reviewed and updated as appropriate: allergies, current medications, past family history, past medical history, past social history, past surgical history and problem list     Review of Systems   Constitutional: Negative  Negative for activity change, appetite change, chills and diaphoresis  HENT: Negative for congestion and dental problem  Respiratory: Negative  Negative for apnea, chest tightness, shortness of breath and wheezing  Cardiovascular: Negative  Negative for chest pain, palpitations and leg swelling  Gastrointestinal: Negative  Negative for abdominal distention, abdominal pain, constipation, diarrhea and nausea  Genitourinary: Negative  Negative for difficulty urinating, dysuria and frequency           Objective:  Vitals:    05/09/22 0929   BP: 118/74   Pulse: 70   Temp: (!) 97 4 °F (36 3 °C)   Weight: 85 5 kg (188 lb 6 4 oz)   Height: 5' 9" (1 753 m)     BP Readings from Last 6 Encounters:   05/09/22 118/74 05/09/22 116/80   04/25/22 131/74   04/18/22 134/90   04/15/22 150/100   04/04/22 130/80      Wt Readings from Last 6 Encounters:   05/09/22 85 5 kg (188 lb 6 4 oz)   05/09/22 85 3 kg (188 lb)   04/25/22 85 kg (187 lb 6 4 oz)   04/18/22 83 kg (182 lb 15 7 oz)   04/15/22 84 4 kg (186 lb)   04/04/22 83 kg (182 lb 15 7 oz)             Physical Exam  Vitals and nursing note reviewed  Constitutional:       General: He is not in acute distress  Appearance: He is well-developed  He is not ill-appearing  HENT:      Head: Normocephalic and atraumatic  Right Ear: External ear normal       Left Ear: External ear normal       Nose: Nose normal  No congestion or rhinorrhea  Mouth/Throat:      Mouth: Mucous membranes are moist       Pharynx: No oropharyngeal exudate or posterior oropharyngeal erythema  Eyes:      Extraocular Movements: Extraocular movements intact  Conjunctiva/sclera: Conjunctivae normal       Pupils: Pupils are equal, round, and reactive to light  Cardiovascular:      Rate and Rhythm: Normal rate and regular rhythm  Heart sounds: Normal heart sounds  No murmur heard  No friction rub  No gallop  Pulmonary:      Effort: Pulmonary effort is normal  No respiratory distress  Breath sounds: Normal breath sounds  No wheezing or rales  Chest:      Chest wall: No tenderness  Abdominal:      General: Bowel sounds are normal  There is no distension  Palpations: Abdomen is soft  There is no mass  Tenderness: There is no abdominal tenderness  There is no guarding or rebound  Musculoskeletal:         General: Normal range of motion  Cervical back: Normal range of motion and neck supple  Skin:     General: Skin is warm  Capillary Refill: Capillary refill takes less than 2 seconds  Neurological:      Mental Status: He is alert and oriented to person, place, and time     Psychiatric:         Mood and Affect: Mood normal          Behavior: Behavior normal

## 2022-05-13 ENCOUNTER — APPOINTMENT (OUTPATIENT)
Dept: LAB | Facility: CLINIC | Age: 72
End: 2022-05-13
Payer: COMMERCIAL

## 2022-05-13 DIAGNOSIS — T45.1X5A CHEMOTHERAPY-INDUCED NEUTROPENIA (HCC): ICD-10-CM

## 2022-05-13 DIAGNOSIS — C34.11 MALIGNANT NEOPLASM OF UPPER LOBE OF RIGHT LUNG (HCC): Primary | ICD-10-CM

## 2022-05-13 DIAGNOSIS — R91.8 MASS OF UPPER LOBE OF RIGHT LUNG: ICD-10-CM

## 2022-05-13 DIAGNOSIS — C34.90 ADENOCARCINOMA OF LUNG, UNSPECIFIED LATERALITY (HCC): ICD-10-CM

## 2022-05-13 DIAGNOSIS — D70.1 CHEMOTHERAPY-INDUCED NEUTROPENIA (HCC): ICD-10-CM

## 2022-05-13 LAB
ALBUMIN SERPL BCP-MCNC: 3.1 G/DL (ref 3.5–5)
ALP SERPL-CCNC: 49 U/L (ref 46–116)
ALT SERPL W P-5'-P-CCNC: 20 U/L (ref 12–78)
ANION GAP SERPL CALCULATED.3IONS-SCNC: 5 MMOL/L (ref 4–13)
AST SERPL W P-5'-P-CCNC: 14 U/L (ref 5–45)
BASOPHILS # BLD AUTO: 0.02 THOUSANDS/ΜL (ref 0–0.1)
BASOPHILS NFR BLD AUTO: 1 % (ref 0–1)
BILIRUB SERPL-MCNC: 0.54 MG/DL (ref 0.2–1)
BUN SERPL-MCNC: 10 MG/DL (ref 5–25)
CALCIUM ALBUM COR SERPL-MCNC: 10.6 MG/DL (ref 8.3–10.1)
CALCIUM SERPL-MCNC: 9.9 MG/DL (ref 8.3–10.1)
CHLORIDE SERPL-SCNC: 109 MMOL/L (ref 100–108)
CO2 SERPL-SCNC: 28 MMOL/L (ref 21–32)
CREAT SERPL-MCNC: 1.03 MG/DL (ref 0.6–1.3)
EOSINOPHIL # BLD AUTO: 0.04 THOUSAND/ΜL (ref 0–0.61)
EOSINOPHIL NFR BLD AUTO: 1 % (ref 0–6)
ERYTHROCYTE [DISTWIDTH] IN BLOOD BY AUTOMATED COUNT: 20.8 % (ref 11.6–15.1)
GFR SERPL CREATININE-BSD FRML MDRD: 73 ML/MIN/1.73SQ M
GLUCOSE P FAST SERPL-MCNC: 83 MG/DL (ref 65–99)
HCT VFR BLD AUTO: 33.8 % (ref 36.5–49.3)
HGB BLD-MCNC: 10 G/DL (ref 12–17)
IMM GRANULOCYTES # BLD AUTO: 0.02 THOUSAND/UL (ref 0–0.2)
IMM GRANULOCYTES NFR BLD AUTO: 1 % (ref 0–2)
LYMPHOCYTES # BLD AUTO: 0.7 THOUSANDS/ΜL (ref 0.6–4.47)
LYMPHOCYTES NFR BLD AUTO: 20 % (ref 14–44)
MCH RBC QN AUTO: 30.4 PG (ref 26.8–34.3)
MCHC RBC AUTO-ENTMCNC: 29.6 G/DL (ref 31.4–37.4)
MCV RBC AUTO: 103 FL (ref 82–98)
MONOCYTES # BLD AUTO: 0.75 THOUSAND/ΜL (ref 0.17–1.22)
MONOCYTES NFR BLD AUTO: 22 % (ref 4–12)
NEUTROPHILS # BLD AUTO: 1.93 THOUSANDS/ΜL (ref 1.85–7.62)
NEUTS SEG NFR BLD AUTO: 55 % (ref 43–75)
NRBC BLD AUTO-RTO: 0 /100 WBCS
PLATELET # BLD AUTO: 193 THOUSANDS/UL (ref 149–390)
PMV BLD AUTO: 10 FL (ref 8.9–12.7)
POTASSIUM SERPL-SCNC: 4.2 MMOL/L (ref 3.5–5.3)
PROT SERPL-MCNC: 6.5 G/DL (ref 6.4–8.2)
RBC # BLD AUTO: 3.29 MILLION/UL (ref 3.88–5.62)
SODIUM SERPL-SCNC: 142 MMOL/L (ref 136–145)
T3FREE SERPL-MCNC: 2.47 PG/ML (ref 2.3–4.2)
TSH SERPL DL<=0.05 MIU/L-ACNC: 2.18 UIU/ML (ref 0.45–4.5)
WBC # BLD AUTO: 3.46 THOUSAND/UL (ref 4.31–10.16)

## 2022-05-13 PROCEDURE — 84443 ASSAY THYROID STIM HORMONE: CPT

## 2022-05-13 PROCEDURE — 84481 FREE ASSAY (FT-3): CPT

## 2022-05-13 PROCEDURE — 80053 COMPREHEN METABOLIC PANEL: CPT

## 2022-05-13 PROCEDURE — 85025 COMPLETE CBC W/AUTO DIFF WBC: CPT

## 2022-05-13 PROCEDURE — 36415 COLL VENOUS BLD VENIPUNCTURE: CPT

## 2022-05-16 ENCOUNTER — HOSPITAL ENCOUNTER (OUTPATIENT)
Dept: INFUSION CENTER | Facility: HOSPITAL | Age: 72
Discharge: HOME | End: 2022-05-16
Attending: INTERNAL MEDICINE
Payer: COMMERCIAL

## 2022-05-16 VITALS
RESPIRATION RATE: 16 BRPM | OXYGEN SATURATION: 94 % | HEART RATE: 68 BPM | SYSTOLIC BLOOD PRESSURE: 113 MMHG | DIASTOLIC BLOOD PRESSURE: 61 MMHG | BODY MASS INDEX: 28.15 KG/M2 | TEMPERATURE: 97.5 F | WEIGHT: 190.04 LBS | HEIGHT: 69 IN

## 2022-05-16 DIAGNOSIS — R91.8 MASS OF UPPER LOBE OF RIGHT LUNG: ICD-10-CM

## 2022-05-16 DIAGNOSIS — E83.52 HYPERCALCEMIA OF MALIGNANCY: ICD-10-CM

## 2022-05-16 DIAGNOSIS — T45.1X5A CHEMOTHERAPY INDUCED NEUTROPENIA (HCC): ICD-10-CM

## 2022-05-16 DIAGNOSIS — C34.90 ADENOCARCINOMA OF LUNG, UNSPECIFIED LATERALITY (HCC): ICD-10-CM

## 2022-05-16 DIAGNOSIS — I82.403 ACUTE DEEP VEIN THROMBOSIS (DVT) OF BOTH LOWER EXTREMITIES, UNSPECIFIED VEIN (HCC): ICD-10-CM

## 2022-05-16 DIAGNOSIS — D70.1 CHEMOTHERAPY INDUCED NEUTROPENIA (HCC): ICD-10-CM

## 2022-05-16 DIAGNOSIS — C34.11 MALIGNANT NEOPLASM OF UPPER LOBE OF RIGHT LUNG (HCC): Primary | ICD-10-CM

## 2022-05-16 PROCEDURE — 96372 THER/PROPH/DIAG INJ SC/IM: CPT

## 2022-05-16 PROCEDURE — 96413 CHEMO IV INFUSION 1 HR: CPT

## 2022-05-16 RX ORDER — SODIUM CHLORIDE 9 MG/ML
20 INJECTION, SOLUTION INTRAVENOUS ONCE
Status: COMPLETED | OUTPATIENT
Start: 2022-05-16 | End: 2022-05-16

## 2022-05-16 RX ORDER — ENOXAPARIN SODIUM 100 MG/ML
80 INJECTION SUBCUTANEOUS EVERY 12 HOURS
Qty: 48 ML | Refills: 0 | Status: SHIPPED | OUTPATIENT
Start: 2022-05-16 | End: 2022-06-06 | Stop reason: SDUPTHER

## 2022-05-16 RX ADMIN — SODIUM CHLORIDE 20 ML/HR: 9 INJECTION, SOLUTION INTRAVENOUS at 09:16

## 2022-05-16 RX ADMIN — DENOSUMAB 120 MG: 120 INJECTION SUBCUTANEOUS at 09:21

## 2022-05-16 RX ADMIN — SODIUM CHLORIDE 200 MG: 9 INJECTION, SOLUTION INTRAVENOUS at 09:17

## 2022-05-16 NOTE — TELEPHONE ENCOUNTER
[facility] stated an authorization is needed for generic Lovenox  Eli Antunez needs this Rx today

## 2022-05-16 NOTE — PROGRESS NOTES
Pt tolerated Keytruda infusion and Xgeva injection with no adverse reaction  Pt left unit ambulatory with steady gait  AVS printed and given to pt

## 2022-06-01 RX ORDER — SODIUM CHLORIDE 9 MG/ML
20 INJECTION, SOLUTION INTRAVENOUS ONCE
Status: CANCELLED | OUTPATIENT
Start: 2022-06-06

## 2022-06-03 ENCOUNTER — APPOINTMENT (OUTPATIENT)
Dept: LAB | Facility: CLINIC | Age: 72
End: 2022-06-03
Payer: COMMERCIAL

## 2022-06-03 DIAGNOSIS — D70.1 CHEMOTHERAPY-INDUCED NEUTROPENIA (HCC): ICD-10-CM

## 2022-06-03 DIAGNOSIS — C34.11 MALIGNANT NEOPLASM OF UPPER LOBE OF RIGHT LUNG (HCC): ICD-10-CM

## 2022-06-03 DIAGNOSIS — T45.1X5A CHEMOTHERAPY-INDUCED NEUTROPENIA (HCC): ICD-10-CM

## 2022-06-03 DIAGNOSIS — C34.90 ADENOCARCINOMA OF LUNG, UNSPECIFIED LATERALITY (HCC): ICD-10-CM

## 2022-06-03 DIAGNOSIS — R91.8 MASS OF UPPER LOBE OF RIGHT LUNG: ICD-10-CM

## 2022-06-03 LAB
ALBUMIN SERPL BCP-MCNC: 3.2 G/DL (ref 3.5–5)
ALP SERPL-CCNC: 52 U/L (ref 46–116)
ALT SERPL W P-5'-P-CCNC: 20 U/L (ref 12–78)
ANION GAP SERPL CALCULATED.3IONS-SCNC: 6 MMOL/L (ref 4–13)
AST SERPL W P-5'-P-CCNC: 17 U/L (ref 5–45)
BASOPHILS # BLD AUTO: 0.03 THOUSANDS/ΜL (ref 0–0.1)
BASOPHILS NFR BLD AUTO: 1 % (ref 0–1)
BILIRUB SERPL-MCNC: 0.31 MG/DL (ref 0.2–1)
BUN SERPL-MCNC: 10 MG/DL (ref 5–25)
CALCIUM ALBUM COR SERPL-MCNC: 10.3 MG/DL (ref 8.3–10.1)
CALCIUM SERPL-MCNC: 9.7 MG/DL (ref 8.3–10.1)
CHLORIDE SERPL-SCNC: 109 MMOL/L (ref 100–108)
CO2 SERPL-SCNC: 26 MMOL/L (ref 21–32)
CREAT SERPL-MCNC: 0.96 MG/DL (ref 0.6–1.3)
EOSINOPHIL # BLD AUTO: 0.06 THOUSAND/ΜL (ref 0–0.61)
EOSINOPHIL NFR BLD AUTO: 1 % (ref 0–6)
ERYTHROCYTE [DISTWIDTH] IN BLOOD BY AUTOMATED COUNT: 17.2 % (ref 11.6–15.1)
GFR SERPL CREATININE-BSD FRML MDRD: 79 ML/MIN/1.73SQ M
GLUCOSE P FAST SERPL-MCNC: 84 MG/DL (ref 65–99)
HCT VFR BLD AUTO: 36.7 % (ref 36.5–49.3)
HGB BLD-MCNC: 11.4 G/DL (ref 12–17)
IMM GRANULOCYTES # BLD AUTO: 0.02 THOUSAND/UL (ref 0–0.2)
IMM GRANULOCYTES NFR BLD AUTO: 0 % (ref 0–2)
LYMPHOCYTES # BLD AUTO: 0.93 THOUSANDS/ΜL (ref 0.6–4.47)
LYMPHOCYTES NFR BLD AUTO: 19 % (ref 14–44)
MCH RBC QN AUTO: 31 PG (ref 26.8–34.3)
MCHC RBC AUTO-ENTMCNC: 31.1 G/DL (ref 31.4–37.4)
MCV RBC AUTO: 100 FL (ref 82–98)
MONOCYTES # BLD AUTO: 0.81 THOUSAND/ΜL (ref 0.17–1.22)
MONOCYTES NFR BLD AUTO: 16 % (ref 4–12)
NEUTROPHILS # BLD AUTO: 3.16 THOUSANDS/ΜL (ref 1.85–7.62)
NEUTS SEG NFR BLD AUTO: 63 % (ref 43–75)
NRBC BLD AUTO-RTO: 0 /100 WBCS
PLATELET # BLD AUTO: 221 THOUSANDS/UL (ref 149–390)
PMV BLD AUTO: 9.6 FL (ref 8.9–12.7)
POTASSIUM SERPL-SCNC: 4.5 MMOL/L (ref 3.5–5.3)
PROT SERPL-MCNC: 6.8 G/DL (ref 6.4–8.2)
RBC # BLD AUTO: 3.68 MILLION/UL (ref 3.88–5.62)
SODIUM SERPL-SCNC: 141 MMOL/L (ref 136–145)
T3FREE SERPL-MCNC: 2.43 PG/ML (ref 2.3–4.2)
TSH SERPL DL<=0.05 MIU/L-ACNC: 3.2 UIU/ML (ref 0.45–4.5)
WBC # BLD AUTO: 5.01 THOUSAND/UL (ref 4.31–10.16)

## 2022-06-03 PROCEDURE — 84443 ASSAY THYROID STIM HORMONE: CPT

## 2022-06-03 PROCEDURE — 84481 FREE ASSAY (FT-3): CPT

## 2022-06-03 PROCEDURE — 80053 COMPREHEN METABOLIC PANEL: CPT

## 2022-06-03 PROCEDURE — 85025 COMPLETE CBC W/AUTO DIFF WBC: CPT

## 2022-06-03 PROCEDURE — 36415 COLL VENOUS BLD VENIPUNCTURE: CPT

## 2022-06-06 ENCOUNTER — HOSPITAL ENCOUNTER (OUTPATIENT)
Dept: INFUSION CENTER | Facility: HOSPITAL | Age: 72
Discharge: HOME | End: 2022-06-06
Attending: INTERNAL MEDICINE
Payer: COMMERCIAL

## 2022-06-06 ENCOUNTER — PATIENT OUTREACH (OUTPATIENT)
Dept: HEMATOLOGY ONCOLOGY | Facility: CLINIC | Age: 72
End: 2022-06-06

## 2022-06-06 ENCOUNTER — TELEMEDICINE (OUTPATIENT)
Dept: RADIATION ONCOLOGY | Facility: HOSPITAL | Age: 72
End: 2022-06-06
Attending: INTERNAL MEDICINE

## 2022-06-06 VITALS
WEIGHT: 191.14 LBS | TEMPERATURE: 97.5 F | DIASTOLIC BLOOD PRESSURE: 70 MMHG | HEART RATE: 64 BPM | HEIGHT: 69 IN | BODY MASS INDEX: 28.31 KG/M2 | RESPIRATION RATE: 16 BRPM | OXYGEN SATURATION: 94 % | SYSTOLIC BLOOD PRESSURE: 125 MMHG

## 2022-06-06 DIAGNOSIS — C34.11 MALIGNANT NEOPLASM OF UPPER LOBE OF RIGHT LUNG (HCC): Primary | ICD-10-CM

## 2022-06-06 DIAGNOSIS — D70.1 CHEMOTHERAPY INDUCED NEUTROPENIA (HCC): ICD-10-CM

## 2022-06-06 DIAGNOSIS — C34.90 ADENOCARCINOMA OF LUNG, UNSPECIFIED LATERALITY (HCC): ICD-10-CM

## 2022-06-06 DIAGNOSIS — T45.1X5A CHEMOTHERAPY INDUCED NEUTROPENIA (HCC): ICD-10-CM

## 2022-06-06 DIAGNOSIS — I82.403 ACUTE DEEP VEIN THROMBOSIS (DVT) OF BOTH LOWER EXTREMITIES, UNSPECIFIED VEIN (HCC): ICD-10-CM

## 2022-06-06 DIAGNOSIS — R91.8 MASS OF UPPER LOBE OF RIGHT LUNG: ICD-10-CM

## 2022-06-06 PROCEDURE — 96413 CHEMO IV INFUSION 1 HR: CPT

## 2022-06-06 PROCEDURE — 99024 POSTOP FOLLOW-UP VISIT: CPT | Performed by: INTERNAL MEDICINE

## 2022-06-06 RX ORDER — SODIUM CHLORIDE 9 MG/ML
20 INJECTION, SOLUTION INTRAVENOUS ONCE
Status: COMPLETED | OUTPATIENT
Start: 2022-06-06 | End: 2022-06-06

## 2022-06-06 RX ADMIN — SODIUM CHLORIDE 200 MG: 9 INJECTION, SOLUTION INTRAVENOUS at 09:23

## 2022-06-06 RX ADMIN — SODIUM CHLORIDE 20 ML/HR: 9 INJECTION, SOLUTION INTRAVENOUS at 08:40

## 2022-06-06 NOTE — PROGRESS NOTES
MSW met with the pt in the infusion center this day  The pt is now receiving his Veronique Burrwos and reports to feeling well  He was present with his wife and he looks well and states that he has been able to return to mowing the 2+ acres that they have at home, something he has not been able to do since he received his diagnosis last July  The pt's wife was present at this time and she shared how pleased she is that the pt is catia to mow as she had to rely on her grandson in the interim  She states that she has not enjoyed "feeling needy" and is also happy to see her  returning to the activities he did prior top his diagnosis  They both report that they feel more of a sense of "normalcy" within their home at this time  Pt and his wife have no specific social work needs and MSW will now remove the pt from ongoing case management  If the pt has any issues moving forward, this MSW will be available to him  Support offered at this visit

## 2022-06-06 NOTE — PROGRESS NOTES
Infusion tolerated well  PIV removed and pt discharged to home with steady gait  AVS in hand, pt aware of next several appts

## 2022-06-06 NOTE — PROGRESS NOTES
Virtual Brief Visit - Radiation Oncology   Celena Duarte 1950 80 y o  male [id number]    Verification of patient location: Patient is located in the following state in which I hold an active license [place]  Encounter Provider: Sandra Ya MD  Provider Located at   [address] [place] [address]    After connecting through Telephone, the patient was identified by name and date of birth  Patient was informed that this is a telemedicine visit and that the visit is being conducted through Telephone  My office door was closed  No one else was in the room  He acknowledged consent and understanding of privacy and security of the platform  The patient has agreed to participate and understands he can discontinue the visit at any time  Patient is aware this is a billable service  Cancer Staging  Malignant neoplasm of upper lobe of right lung [facility])  Staging form: Lung, AJCC 8th Edition  - Clinical stage from 9/23/2021: Stage IIIC (cT3, cN3, cM0) - Signed by Sandra Ya MD on 9/23/2021  Histopathologic type: Adenocarcinoma, NOS    Assessment/Plan:  Celena Duarte is a 80 y o  male with newly diagnosed [id number] (IIIC) NSCLC (adenocarcinoma) of the RUL, with PET/CT demonstrating 6 4cm RUL mass with extensive adenopathy at the neck base bilaterally, thoracic inlets, mediastinum, and right hilum  Incidentally, MRI imaging of the head for his CVA had revealed a mass in the right nasopharynx, which did not demonstrate uptake on the PET/CT  ENT evaluation on 9/28/21 was not suspicious for malignancy  He was initially planned for concurrent chemoRT but given his significant burden of disease, his RT plan did not meet lung, heart, or esophageal constraints  Thus, he was instead planned for sequential chemotherapy for cytoreduction followed by concurrent chemoRT  He underwent 6 cycles of CarboAlimtaKeytruda, last cycle was 1/26/22   CT C/A/P on 12/20/21 showed decrease in the size of the RUL cancer, mediastinal lymph nodes, and redemonstrated supraclavicular nodes  he completed RT on 4/21/22  He reports feeling quite well today, approximately 1 month s/p completion of RT  He is scheduled for PET/CT tomorrow on 6/7/22  He continues on Keytruda  Continue clinical and radiographic surveillance  6 6 22    Infusion  6 7 22    PET  6 13 22  Med Onc  6 13 22  Infusion  6 20 22  Urology  RTC in 6 months  No orders of the defined types were placed in this encounter  History of Present Illness   Interval History:  Emma Moscoso presents for RT EOT phone follow up visit  Dx: Right Lung Ca  Rad Tx- 3 4-4 21 22     5 9 22 Med Onc-  Ali/OV  Plan: Pt was treated with 6 cycles of systemic therapy with Carbo, Alimta and Keytruda  Imaging after 4 cycles of chemo showed a positive response to therapy  The patient was then seen by our colleagues in [address] [place] [address] and was started on concurrent chemoradiation with carboplatin and Taxol  He finished this up and is now on single agent Keytruda  He will stay on this  He is due for imaging again in June post chemoradiation and treatment  Follow up in 6 weeks with repeat imaging  He feels well today  He denies problems with breathing or any new or worsening symptoms since completion of RT      Historical Information   Oncology History   Adenocarcinoma of lung   8/2021 Initial Diagnosis    Adenocarcinoma of lung     8/5/2021 Biopsy    Right lung apex, image-guided core needle biopsy:  - Adenocarcinoma      10/6/2021 - 1/26/2022 Chemotherapy    cyanocobalamin, 1,000 mcg, Intramuscular, Once, 3 of 3 cycles  Administration: 1,000 mcg (11/24/2021), 1,000 mcg (1/26/2022), 1,000 mcg (10/6/2021)  pegfilgrastim (Kiley Potash), 6 mg, Subcutaneous, Once, 1 of 1 cycle  Administration: 6 mg (11/26/2021)  pegfilgrastim (Aylin Novel), 6 mg, Subcutaneous, Once, 6 of 6 cycles  Administration: 6 mg (10/6/2021), 6 mg (11/3/2021), 6 mg (11/24/2021), 6 mg (12/15/2021), 6 mg (1/5/2022)  fosaprepitant (EMEND) IVPB, 150 mg, Intravenous, Once, 6 of 6 cycles  Administration: 150 mg (10/6/2021), 150 mg (11/24/2021), 150 mg (12/15/2021), 150 mg (1/26/2022), 150 mg (11/3/2021), 150 mg (1/5/2022)  CARBOplatin (PARAPLATIN) IVPB (GOG AUC DOSING), 519 5 mg, Intravenous, Once, 6 of 6 cycles  Administration: 519 5 mg (10/6/2021), 574 mg (11/24/2021), 600 mg (12/15/2021), 533 mg (1/26/2022), 604 5 mg (11/3/2021), 563 mg (1/5/2022)  pemetrexed (ALIMTA) chemo infusion, 970 mg, Intravenous, Once, 6 of 6 cycles  Administration: 1,000 mg (10/6/2021), 1,000 mg (11/24/2021), 1,000 mg (12/15/2021), 1,000 mg (1/26/2022), 1,000 mg (11/3/2021), 1,000 mg (1/5/2022)  pembrolizumab (KEYTRUDA) IVPB, 200 mg, Intravenous, Once, 6 of 6 cycles  Administration: 200 mg (10/6/2021), 200 mg (11/24/2021), 200 mg (12/15/2021), 200 mg (1/26/2022), 200 mg (11/3/2021), 200 mg (1/5/2022)     3/7/2022 -  Chemotherapy    CARBOplatin (PARAPLATIN) IVPB (GOG AUC DOSING), , Intravenous, Once, 6 of 6 cycles  Administration: 211 6 mg (3/7/2022), 208 mg (3/14/2022), 238 8 mg (3/21/2022), 211 6 mg (3/28/2022), 215 2 mg (4/4/2022), 213 4 mg (4/18/2022)  PACLItaxel (TAXOL) chemo IVPB, 50 mg/m2 = 99 mg, Intravenous, Once, 6 of 6 cycles  Administration: 99 mg (3/7/2022), 99 mg (3/14/2022), 99 mg (3/21/2022), 99 mg (3/28/2022), 99 mg (4/4/2022), 99 mg (4/18/2022)  pembrolizumab (KEYTRUDA) IVPB, 200 mg, Intravenous, Once, 5 of 11 cycles  Administration: 200 mg (3/7/2022), 200 mg (3/28/2022), 200 mg (4/25/2022), 200 mg (5/16/2022), 200 mg (6/6/2022)     Malignant neoplasm of upper lobe of right lung ([facility] [address] )   9/3/2021 Initial Diagnosis    Malignant neoplasm of upper lobe of right lung ([facility] [address] [address] )     9/23/2021 -  Cancer Staged    Staging form: Lung, AJCC 8th Edition  - Clinical stage from 9/23/2021: Stage IIIC (cT3, cN3, cM0) - Signed by Nikko Delgado MD on 9/23/2021  Histopathologic type:  Adenocarcinoma, NOS       10/6/2021 - 1/26/2022 Chemotherapy    cyanocobalamin, 1,000 mcg, Intramuscular, Once, 3 of 3 cycles  Administration: 1,000 mcg (11/24/2021), 1,000 mcg (1/26/2022), 1,000 mcg (10/6/2021)  pegfilgrastim (Joselin Olive), 6 mg, Subcutaneous, Once, 1 of 1 cycle  Administration: 6 mg (11/26/2021)  pegfilgrastim (Isac Rubins), 6 mg, Subcutaneous, Once, 6 of 6 cycles  Administration: 6 mg (10/6/2021), 6 mg (11/3/2021), 6 mg (11/24/2021), 6 mg (12/15/2021), 6 mg (1/5/2022)  fosaprepitant (EMEND) IVPB, 150 mg, Intravenous, Once, 6 of 6 cycles  Administration: 150 mg (10/6/2021), 150 mg (11/24/2021), 150 mg (12/15/2021), 150 mg (1/26/2022), 150 mg (11/3/2021), 150 mg (1/5/2022)  CARBOplatin (PARAPLATIN) IVPB ([facility] AUC DOSING), 519 5 mg, Intravenous, Once, 6 of 6 cycles  Administration: 519 5 mg (10/6/2021), 574 mg (11/24/2021), 600 mg (12/15/2021), 533 mg (1/26/2022), 604 5 mg (11/3/2021), 563 mg (1/5/2022)  pemetrexed (ALIMTA) chemo infusion, 970 mg, Intravenous, Once, 6 of 6 cycles  Administration: 1,000 mg (10/6/2021), 1,000 mg (11/24/2021), 1,000 mg (12/15/2021), 1,000 mg (1/26/2022), 1,000 mg (11/3/2021), 1,000 mg (1/5/2022)  pembrolizumab (KEYTRUDA) IVPB, 200 mg, Intravenous, Once, 6 of 6 cycles  Administration: 200 mg (10/6/2021), 200 mg (11/24/2021), 200 mg (12/15/2021), 200 mg (1/26/2022), 200 mg (11/3/2021), 200 mg (1/5/2022)     3/4/2022 - 4/21/2022 Radiation    The patient saw @[facility]@ for radiation treatment   This is the current list of radiation treatment:  Plan ID Energy Fractions Dose per Fraction (cGy) Dose Correction (cGy) Total Dose Delivered (cGy) Elapsed Days   R LUNGMED REV 6X 30 / 30 200 0 6,000 48      Treatment dates:  C1: 3/4/2022 - 4/21/2022         3/7/2022 -  Chemotherapy    CARBOplatin (PARAPLATIN) IVPB (GOG AUC DOSING), , Intravenous, Once, 6 of 6 cycles  Administration: 211 6 mg (3/7/2022), 208 mg (3/14/2022), 238 8 mg (3/21/2022), 211 6 mg (3/28/2022), 215 2 mg (4/4/2022), 213 4 mg (4/18/2022)  PACLItaxel (TAXOL) chemo IVPB, 50 mg/m2 = 99 mg, Intravenous, Once, 6 of 6 cycles  Administration: 99 mg (3/7/2022), 99 mg (3/14/2022), 99 mg (3/21/2022), 99 mg (3/28/2022), 99 mg (2022), 99 mg (2022)  pembrolizumab (KEYTRUDA) IVPB, 200 mg, Intravenous, Once, 5 of 11 cycles  Administration: 200 mg (3/7/2022), 200 mg (3/28/2022), 200 mg (2022), 200 mg (2022), 200 mg (2022)       Past Medical History:   Diagnosis Date    Chronic respiratory failure with hypoxia ([facility] ) 2021    Impaired mobility and ADLs 2021    Lung cancer ([facility] )     Stroke [facility])      Past Surgical History:   Procedure Laterality Date    IR BIOPSY LUNG  2021    IR THORACENTESIS  2022     Social History   Social History     Substance and Sexual Activity   Alcohol Use Not Currently    Comment: No ETOH since Summer 2021      Social History     Substance and Sexual Activity   Drug Use Never     Social History     Tobacco Use   Smoking Status Former Smoker    Packs/day: 2 50    Years: 30 00    Pack years: 75 00    Quit date:     Years since quittin 4   Smokeless Tobacco Never Used     Meds/Allergies     Current Outpatient Medications:     acetaminophen (TYLENOL) 325 mg tablet, Take 2 tablets (650 mg total) by mouth 4 (four) times a day as needed for mild pain, headaches or fever (Patient not taking: Reported on 4/15/2022 ), Disp: , Rfl: 0    atorvastatin (LIPITOR) 40 mg tablet, Take 1 tablet (40 mg total) by mouth daily with dinner, Disp: 90 tablet, Rfl: 2    dexamethasone (DECADRON) 4 mg tablet, TAKE 1 TABLET (4 MG TOTAL) BY MOUTH 2 (TWO) TIMES A DAY WITH MEALS THE DAY BEFORE AND THE DAY AFTER TREATMENT, Disp: 8 tablet, Rfl: 0    enoxaparin (enoxaparin) 80 mg/0 8 mL, Inject 0 8 mL (80 mg total) under the skin every 12 (twelve) hours, Disp: 48 mL, Rfl: 0    folic acid (FOLVITE) 1 mg tablet, TAKE 1 TABLET BY MOUTH EVERY DAY, Disp: 30 tablet, Rfl: 3    multivitamin (THERAGRAN) TABS, Take 1 tablet by mouth daily, Disp: , Rfl:     prochlorperazine (COMPAZINE) 10 mg tablet, Take 1 tablet (10 mg total) by mouth every 6 (six) hours as needed for nausea or vomiting (Patient not taking: Reported on 4/15/2022 ), Disp: 45 tablet, Rfl: 3    tamsulosin (FLOMAX) 0 4 mg, TAKE 1 CAPSULE BY MOUTH DAILY AFTER SUPPER, Disp: 90 capsule, Rfl: 1  No current facility-administered medications for this visit    Allergies   Allergen Reactions    Doxycycline Rash         OBJECTIVE:   RESULTS  Lab Results:   Recent Results (from the past 672 hour(s))   T3, free    Collection Time: 05/13/22  9:09 AM   Result Value Ref Range    T3, Free 2 47 2 30 - 4 20 pg/mL   TSH, 3rd generation with Free T4 reflex    Collection Time: 05/13/22  9:09 AM   Result Value Ref Range    TSH 3RD GENERATON 2 180 0 450 - 4 500 uIU/mL   CBC and differential    Collection Time: 05/13/22  9:09 AM   Result Value Ref Range    WBC 3 46 (L) 4 31 - 10 16 Thousand/uL    RBC 3 29 (L) 3 88 - 5 62 Million/uL    Hemoglobin 10 0 (L) 12 0 - 17 0 g/dL    Hematocrit 33 8 (L) 36 5 - 49 3 %     (H) 82 - 98 fL    MCH 30 4 26 8 - 34 3 pg    MCHC 29 6 (L) 31 4 - 37 4 g/dL    RDW 20 8 (H) 11 6 - 15 1 %    MPV 10 0 8 9 - 12 7 fL    Platelets 964 680 - 640 Thousands/uL    nRBC 0 /100 WBCs    Neutrophils Relative 55 43 - 75 %    Immat GRANS % 1 0 - 2 %    Lymphocytes Relative 20 14 - 44 %    Monocytes Relative 22 (H) 4 - 12 %    Eosinophils Relative 1 0 - 6 %    Basophils Relative 1 0 - 1 %    Neutrophils Absolute 1 93 1 85 - 7 62 Thousands/µL    Immature Grans Absolute 0 02 0 00 - 0 20 Thousand/uL    Lymphocytes Absolute 0 70 0 60 - 4 47 Thousands/µL    Monocytes Absolute 0 75 0 17 - 1 22 Thousand/µL    Eosinophils Absolute 0 04 0 00 - 0 61 Thousand/µL    Basophils Absolute 0 02 0 00 - 0 10 Thousands/µL   Comprehensive metabolic panel    Collection Time: 05/13/22  9:09 AM   Result Value Ref Range    Sodium 142 136 - 145 mmol/L    Potassium 4 2 3 5 - 5 3 mmol/L    Chloride 109 (H) 100 - 108 mmol/L    CO2 28 21 - 32 mmol/L    ANION GAP 5 4 - 13 mmol/L    BUN 10 5 - 25 mg/dL    Creatinine 1 03 0 60 - 1 30 mg/dL    Glucose, Fasting 83 65 - 99 mg/dL    Calcium 9 9 8 3 - 10 1 mg/dL    Corrected Calcium 10 6 (H) 8 3 - 10 1 mg/dL    AST 14 5 - 45 U/L    ALT 20 12 - 78 U/L    Alkaline Phosphatase 49 46 - 116 U/L    Total Protein 6 5 6 4 - 8 2 g/dL    Albumin 3 1 (L) 3 5 - 5 0 g/dL    Total Bilirubin 0 54 0 20 - 1 00 mg/dL    eGFR 73 ml/min/1 73sq m   CBC and differential    Collection Time: 06/03/22  9:32 AM   Result Value Ref Range    WBC 5 01 4 31 - 10 16 Thousand/uL    RBC 3 68 (L) 3 88 - 5 62 Million/uL    Hemoglobin 11 4 (L) 12 0 - 17 0 g/dL    Hematocrit 36 7 36 5 - 49 3 %     (H) 82 - 98 fL    MCH 31 0 26 8 - 34 3 pg    MCHC 31 1 (L) 31 4 - 37 4 g/dL    RDW 17 2 (H) 11 6 - 15 1 %    MPV 9 6 8 9 - 12 7 fL    Platelets 297 360 - 343 Thousands/uL    nRBC 0 /100 WBCs    Neutrophils Relative 63 43 - 75 %    Immat GRANS % 0 0 - 2 %    Lymphocytes Relative 19 14 - 44 %    Monocytes Relative 16 (H) 4 - 12 %    Eosinophils Relative 1 0 - 6 %    Basophils Relative 1 0 - 1 %    Neutrophils Absolute 3 16 1 85 - 7 62 Thousands/µL    Immature Grans Absolute 0 02 0 00 - 0 20 Thousand/uL    Lymphocytes Absolute 0 93 0 60 - 4 47 Thousands/µL    Monocytes Absolute 0 81 0 17 - 1 22 Thousand/µL    Eosinophils Absolute 0 06 0 00 - 0 61 Thousand/µL    Basophils Absolute 0 03 0 00 - 0 10 Thousands/µL   Comprehensive metabolic panel    Collection Time: 06/03/22  9:32 AM   Result Value Ref Range    Sodium 141 136 - 145 mmol/L    Potassium 4 5 3 5 - 5 3 mmol/L    Chloride 109 (H) 100 - 108 mmol/L    CO2 26 21 - 32 mmol/L    ANION GAP 6 4 - 13 mmol/L    BUN 10 5 - 25 mg/dL    Creatinine 0 96 0 60 - 1 30 mg/dL    Glucose, Fasting 84 65 - 99 mg/dL    Calcium 9 7 8 3 - 10 1 mg/dL    Corrected Calcium 10 3 (H) 8 3 - 10 1 mg/dL    AST 17 5 - 45 U/L    ALT 20 12 - 78 U/L    Alkaline Phosphatase 52 46 - 116 U/L    Total Protein 6 8 6 4 - 8 2 g/dL    Albumin 3 2 (L) 3 5 - 5 0 g/dL    Total Bilirubin 0 31 0 20 - 1 00 mg/dL    eGFR 79 ml/min/1 73sq m   T3, free    Collection Time: 06/03/22  9:32 AM   Result Value Ref Range    T3, Free 2 43 2 30 - 4 20 pg/mL   TSH, 3rd generation with Free T4 reflex    Collection Time: 06/03/22  9:32 AM   Result Value Ref Range    TSH 3RD GENERATON 3 200 0 450 - 4 500 uIU/mL     Imaging Studies:No results found  Macrina Lepe MD  6/6/2022,3:36 PM    VIRTUAL VISIT DISCLAIMER  Patient verbally agrees to participate in [employer]  Pt is aware that [employer] could be limited without vital signs or the ability to perform a full hands-on physical exam  Patient understands he or the provider may request at any time to terminate the video visit and request the patient to seek care or treatment in person  Portions of the record may have been created with voice recognition software  Occasional wrong word or "sound a like" substitutions may have occurred due to the inherent limitations of voice recognition software  Read the chart carefully and recognize, using context, where substitutions have occurred

## 2022-06-07 ENCOUNTER — HOSPITAL ENCOUNTER (OUTPATIENT)
Dept: RADIOLOGY | Age: 72
Discharge: HOME | End: 2022-06-07
Payer: COMMERCIAL

## 2022-06-07 DIAGNOSIS — I82.403 ACUTE DEEP VEIN THROMBOSIS (DVT) OF BOTH LOWER EXTREMITIES, UNSPECIFIED VEIN (HCC): ICD-10-CM

## 2022-06-07 DIAGNOSIS — C34.90 ADENOCARCINOMA OF LUNG, UNSPECIFIED LATERALITY (HCC): ICD-10-CM

## 2022-06-07 DIAGNOSIS — J39.2 NASOPHARYNGEAL MASS: ICD-10-CM

## 2022-06-07 PROCEDURE — G1004 CDSM NDSC: HCPCS

## 2022-06-07 PROCEDURE — A9552 F18 FDG: HCPCS

## 2022-06-07 PROCEDURE — 78815 PET IMAGE W/CT SKULL-THIGH: CPT

## 2022-06-08 RX ORDER — ENOXAPARIN SODIUM 100 MG/ML
80 INJECTION SUBCUTANEOUS EVERY 12 HOURS
Qty: 48 ML | Refills: 0 | OUTPATIENT
Start: 2022-06-08 | End: 2022-07-08

## 2022-06-08 RX ORDER — ENOXAPARIN SODIUM 100 MG/ML
80 INJECTION SUBCUTANEOUS EVERY 12 HOURS
Qty: 48 ML | Refills: 0 | Status: SHIPPED | OUTPATIENT
Start: 2022-06-08 | End: 2022-07-15

## 2022-06-13 ENCOUNTER — OFFICE VISIT (OUTPATIENT)
Dept: HEMATOLOGY ONCOLOGY | Facility: HOSPITAL | Age: 72
End: 2022-06-13
Payer: COMMERCIAL

## 2022-06-13 ENCOUNTER — HOSPITAL ENCOUNTER (OUTPATIENT)
Dept: INFUSION CENTER | Facility: HOSPITAL | Age: 72
Discharge: HOME | End: 2022-06-13
Attending: INTERNAL MEDICINE
Payer: COMMERCIAL

## 2022-06-13 VITALS
HEIGHT: 69 IN | TEMPERATURE: 97.8 F | RESPIRATION RATE: 14 BRPM | BODY MASS INDEX: 28.14 KG/M2 | SYSTOLIC BLOOD PRESSURE: 120 MMHG | WEIGHT: 190 LBS | HEART RATE: 74 BPM | OXYGEN SATURATION: 92 % | DIASTOLIC BLOOD PRESSURE: 72 MMHG

## 2022-06-13 VITALS
OXYGEN SATURATION: 96 % | WEIGHT: 190.48 LBS | HEART RATE: 69 BPM | DIASTOLIC BLOOD PRESSURE: 70 MMHG | SYSTOLIC BLOOD PRESSURE: 128 MMHG | TEMPERATURE: 97.8 F | BODY MASS INDEX: 28.21 KG/M2 | HEIGHT: 69 IN | RESPIRATION RATE: 14 BRPM

## 2022-06-13 DIAGNOSIS — C34.11 MALIGNANT NEOPLASM OF UPPER LOBE OF RIGHT LUNG (HCC): Primary | ICD-10-CM

## 2022-06-13 DIAGNOSIS — E83.52 HYPERCALCEMIA OF MALIGNANCY: Primary | ICD-10-CM

## 2022-06-13 DIAGNOSIS — C34.90 ADENOCARCINOMA OF LUNG, UNSPECIFIED LATERALITY (HCC): ICD-10-CM

## 2022-06-13 PROCEDURE — 96372 THER/PROPH/DIAG INJ SC/IM: CPT

## 2022-06-13 PROCEDURE — 99214 OFFICE O/P EST MOD 30 MIN: CPT | Performed by: INTERNAL MEDICINE

## 2022-06-13 RX ADMIN — DENOSUMAB 120 MG: 120 INJECTION SUBCUTANEOUS at 14:18

## 2022-06-13 NOTE — PROGRESS NOTES
Patient here for Nabil Morales  Calcium and creatinine clearance meet parameters for treatment  Given SQ left arm  Next appointments made, declined AVS  Left unit ambulatory with steady gait

## 2022-06-20 ENCOUNTER — OFFICE VISIT (OUTPATIENT)
Dept: UROLOGY | Facility: HOSPITAL | Age: 72
End: 2022-06-20
Payer: COMMERCIAL

## 2022-06-20 VITALS
DIASTOLIC BLOOD PRESSURE: 78 MMHG | WEIGHT: 192 LBS | SYSTOLIC BLOOD PRESSURE: 122 MMHG | OXYGEN SATURATION: 96 % | HEART RATE: 65 BPM | BODY MASS INDEX: 28.44 KG/M2 | HEIGHT: 69 IN

## 2022-06-20 DIAGNOSIS — N40.1 BPH WITH URINARY OBSTRUCTION: Primary | ICD-10-CM

## 2022-06-20 DIAGNOSIS — N13.8 BPH WITH URINARY OBSTRUCTION: Primary | ICD-10-CM

## 2022-06-20 LAB — POST-VOID RESIDUAL VOLUME, ML POC: 43 ML

## 2022-06-20 PROCEDURE — 99213 OFFICE O/P EST LOW 20 MIN: CPT | Performed by: NURSE PRACTITIONER

## 2022-06-20 PROCEDURE — 51798 US URINE CAPACITY MEASURE: CPT | Performed by: NURSE PRACTITIONER

## 2022-06-20 RX ORDER — SODIUM CHLORIDE 9 MG/ML
20 INJECTION, SOLUTION INTRAVENOUS ONCE
Status: CANCELLED | OUTPATIENT
Start: 2022-06-27

## 2022-06-20 RX ORDER — TAMSULOSIN HYDROCHLORIDE 0.4 MG/1
0.4 CAPSULE ORAL
Qty: 90 CAPSULE | Refills: 3 | Status: SHIPPED | OUTPATIENT
Start: 2022-06-20

## 2022-06-20 NOTE — PROGRESS NOTES
06/20/22    Yunior Waters   1950   [id number]     Assessment  1 BPH with history of urinary retention     Discussion/Plan  1 BPH with history of urinary retention    Continue Tamsulosin 0 4 mg once daily   PVR 43    Follow up 1 year or sooner if needed  Subjective  HPI   Yunior Waters is a 80year old male who presents to the office for PVR evaluation  He has history of urinary retention in 2021 after CVA  He failed one void trial  Cystoscopy showed significant BPH with adequate emptying in December 2021  He remains on Tamsulosin once daily  He denies pain, gross hematuria, dysuria, or troubles emptying  He has no acute complaints  Component      Latest Ref Rng & Units 10/5/2021 10/13/2021 6/20/2022          10:29 AM  3:01 PM 10:06 AM   POST-VOID RESIDUAL VOLUME, ML POC      mL 402 44 43       Review of Systems - History obtained from chart review and the patient  General ROS: negative  Psychological ROS: negative  Endocrine ROS: negative  Respiratory ROS: no cough, shortness of breath, or wheezing  Cardiovascular ROS: negative  Gastrointestinal ROS: no abdominal pain, change in bowel habits, or black or bloody stools  Genito-Urinary ROS: negative  Musculoskeletal ROS: negative  Neurological ROS: negative  Dermatological ROS: negative       Objective  Physical Exam  Vitals and nursing note reviewed  Constitutional:       General: He is awake  He is not in acute distress  Appearance: Normal appearance  He is well-developed, well-groomed and normal weight  He is not ill-appearing, toxic-appearing or diaphoretic  Pulmonary:      Effort: Pulmonary effort is normal    Abdominal:      Tenderness: There is no right CVA tenderness or left CVA tenderness  Musculoskeletal:         General: Normal range of motion  Cervical back: Normal range of motion and neck supple  Skin:     General: Skin is warm  Neurological:      General: No focal deficit present        Mental Status: He is alert and oriented to person, place, and time  Mental status is at baseline  Psychiatric:         Attention and Perception: Attention normal          Mood and Affect: Mood normal          Speech: Speech normal          Behavior: Behavior normal  Behavior is cooperative  Thought Content: Thought content normal          Cognition and Memory: Cognition normal          Judgment: Judgment normal              CRISTOFER Vasques     I have spent 15 minutes with Patient and family today in which greater than 50% of this time was spent in counseling/coordination of care regarding Intructions for management

## 2022-06-24 ENCOUNTER — APPOINTMENT (OUTPATIENT)
Dept: LAB | Facility: CLINIC | Age: 72
End: 2022-06-24
Payer: COMMERCIAL

## 2022-06-24 DIAGNOSIS — D70.1 CHEMOTHERAPY-INDUCED NEUTROPENIA (HCC): ICD-10-CM

## 2022-06-24 DIAGNOSIS — R91.8 MASS OF UPPER LOBE OF RIGHT LUNG: ICD-10-CM

## 2022-06-24 DIAGNOSIS — C34.90 ADENOCARCINOMA OF LUNG, UNSPECIFIED LATERALITY (HCC): ICD-10-CM

## 2022-06-24 DIAGNOSIS — T45.1X5A CHEMOTHERAPY-INDUCED NEUTROPENIA (HCC): ICD-10-CM

## 2022-06-24 DIAGNOSIS — C34.11 MALIGNANT NEOPLASM OF UPPER LOBE OF RIGHT LUNG (HCC): ICD-10-CM

## 2022-06-24 LAB
ALBUMIN SERPL BCP-MCNC: 3 G/DL (ref 3.5–5)
ALP SERPL-CCNC: 55 U/L (ref 46–116)
ALT SERPL W P-5'-P-CCNC: 19 U/L (ref 12–78)
ANION GAP SERPL CALCULATED.3IONS-SCNC: 5 MMOL/L (ref 4–13)
AST SERPL W P-5'-P-CCNC: 17 U/L (ref 5–45)
BASOPHILS # BLD AUTO: 0.04 THOUSANDS/ΜL (ref 0–0.1)
BASOPHILS NFR BLD AUTO: 1 % (ref 0–1)
BILIRUB SERPL-MCNC: 0.27 MG/DL (ref 0.2–1)
BUN SERPL-MCNC: 11 MG/DL (ref 5–25)
CALCIUM ALBUM COR SERPL-MCNC: 10.9 MG/DL (ref 8.3–10.1)
CALCIUM SERPL-MCNC: 10.1 MG/DL (ref 8.3–10.1)
CHLORIDE SERPL-SCNC: 107 MMOL/L (ref 100–108)
CO2 SERPL-SCNC: 26 MMOL/L (ref 21–32)
CREAT SERPL-MCNC: 1.05 MG/DL (ref 0.6–1.3)
EOSINOPHIL # BLD AUTO: 0.11 THOUSAND/ΜL (ref 0–0.61)
EOSINOPHIL NFR BLD AUTO: 2 % (ref 0–6)
ERYTHROCYTE [DISTWIDTH] IN BLOOD BY AUTOMATED COUNT: 13.9 % (ref 11.6–15.1)
GFR SERPL CREATININE-BSD FRML MDRD: 71 ML/MIN/1.73SQ M
GLUCOSE SERPL-MCNC: 94 MG/DL (ref 65–140)
HCT VFR BLD AUTO: 37.2 % (ref 36.5–49.3)
HGB BLD-MCNC: 11.6 G/DL (ref 12–17)
IMM GRANULOCYTES # BLD AUTO: 0.03 THOUSAND/UL (ref 0–0.2)
IMM GRANULOCYTES NFR BLD AUTO: 1 % (ref 0–2)
LYMPHOCYTES # BLD AUTO: 0.8 THOUSANDS/ΜL (ref 0.6–4.47)
LYMPHOCYTES NFR BLD AUTO: 15 % (ref 14–44)
MCH RBC QN AUTO: 31 PG (ref 26.8–34.3)
MCHC RBC AUTO-ENTMCNC: 31.2 G/DL (ref 31.4–37.4)
MCV RBC AUTO: 100 FL (ref 82–98)
MONOCYTES # BLD AUTO: 0.81 THOUSAND/ΜL (ref 0.17–1.22)
MONOCYTES NFR BLD AUTO: 15 % (ref 4–12)
NEUTROPHILS # BLD AUTO: 3.58 THOUSANDS/ΜL (ref 1.85–7.62)
NEUTS SEG NFR BLD AUTO: 66 % (ref 43–75)
NRBC BLD AUTO-RTO: 0 /100 WBCS
PLATELET # BLD AUTO: 224 THOUSANDS/UL (ref 149–390)
PMV BLD AUTO: 9.8 FL (ref 8.9–12.7)
POTASSIUM SERPL-SCNC: 4.2 MMOL/L (ref 3.5–5.3)
PROT SERPL-MCNC: 6.8 G/DL (ref 6.4–8.2)
RBC # BLD AUTO: 3.74 MILLION/UL (ref 3.88–5.62)
SODIUM SERPL-SCNC: 138 MMOL/L (ref 136–145)
T3FREE SERPL-MCNC: 2.34 PG/ML (ref 2.3–4.2)
TSH SERPL DL<=0.05 MIU/L-ACNC: 3.39 UIU/ML (ref 0.45–4.5)
WBC # BLD AUTO: 5.37 THOUSAND/UL (ref 4.31–10.16)

## 2022-06-24 PROCEDURE — 80053 COMPREHEN METABOLIC PANEL: CPT

## 2022-06-24 PROCEDURE — 84481 FREE ASSAY (FT-3): CPT

## 2022-06-24 PROCEDURE — 85025 COMPLETE CBC W/AUTO DIFF WBC: CPT

## 2022-06-24 PROCEDURE — 36415 COLL VENOUS BLD VENIPUNCTURE: CPT

## 2022-06-24 PROCEDURE — 84443 ASSAY THYROID STIM HORMONE: CPT

## 2022-06-27 ENCOUNTER — HOSPITAL ENCOUNTER (OUTPATIENT)
Dept: INFUSION CENTER | Facility: HOSPITAL | Age: 72
Discharge: HOME | End: 2022-06-27
Attending: INTERNAL MEDICINE
Payer: COMMERCIAL

## 2022-06-27 VITALS
HEIGHT: 69 IN | WEIGHT: 191.14 LBS | DIASTOLIC BLOOD PRESSURE: 65 MMHG | BODY MASS INDEX: 28.31 KG/M2 | RESPIRATION RATE: 16 BRPM | SYSTOLIC BLOOD PRESSURE: 124 MMHG | HEART RATE: 75 BPM | OXYGEN SATURATION: 97 % | TEMPERATURE: 96.9 F

## 2022-06-27 DIAGNOSIS — C34.90 ADENOCARCINOMA OF LUNG, UNSPECIFIED LATERALITY (HCC): ICD-10-CM

## 2022-06-27 DIAGNOSIS — T45.1X5A CHEMOTHERAPY INDUCED NEUTROPENIA (HCC): ICD-10-CM

## 2022-06-27 DIAGNOSIS — C34.11 MALIGNANT NEOPLASM OF UPPER LOBE OF RIGHT LUNG (HCC): Primary | ICD-10-CM

## 2022-06-27 DIAGNOSIS — R91.8 MASS OF UPPER LOBE OF RIGHT LUNG: ICD-10-CM

## 2022-06-27 DIAGNOSIS — D70.1 CHEMOTHERAPY INDUCED NEUTROPENIA (HCC): ICD-10-CM

## 2022-06-27 PROCEDURE — 96413 CHEMO IV INFUSION 1 HR: CPT

## 2022-06-27 RX ORDER — SODIUM CHLORIDE 9 MG/ML
20 INJECTION, SOLUTION INTRAVENOUS ONCE
Status: COMPLETED | OUTPATIENT
Start: 2022-06-27 | End: 2022-06-27

## 2022-06-27 RX ADMIN — SODIUM CHLORIDE 20 ML/HR: 9 INJECTION, SOLUTION INTRAVENOUS at 09:09

## 2022-06-27 RX ADMIN — SODIUM CHLORIDE 200 MG: 9 INJECTION, SOLUTION INTRAVENOUS at 09:12

## 2022-06-27 NOTE — PROGRESS NOTES
Patient completed Keytruda infusion with no adverse reactions  PIV site removed, dressing CD&I  AVS provided, left unit ambulatory with steady gait

## 2022-07-11 ENCOUNTER — HOSPITAL ENCOUNTER (OUTPATIENT)
Dept: INFUSION CENTER | Facility: HOSPITAL | Age: 72
Discharge: HOME | End: 2022-07-11
Attending: INTERNAL MEDICINE
Payer: COMMERCIAL

## 2022-07-11 VITALS
DIASTOLIC BLOOD PRESSURE: 67 MMHG | HEIGHT: 69 IN | OXYGEN SATURATION: 93 % | HEART RATE: 69 BPM | SYSTOLIC BLOOD PRESSURE: 121 MMHG | TEMPERATURE: 97.9 F | RESPIRATION RATE: 16 BRPM | BODY MASS INDEX: 28.28 KG/M2 | WEIGHT: 190.92 LBS

## 2022-07-11 DIAGNOSIS — E83.52 HYPERCALCEMIA OF MALIGNANCY: Primary | ICD-10-CM

## 2022-07-11 PROCEDURE — 96372 THER/PROPH/DIAG INJ SC/IM: CPT

## 2022-07-11 RX ADMIN — DENOSUMAB 120 MG: 120 INJECTION SUBCUTANEOUS at 13:41

## 2022-07-11 NOTE — PROGRESS NOTES
Pt arrived to unit for Xgeva injection  CrCl 57  Parameters met for treatment  Xgeva given in [facility] with no adverse reaction  Pt denied AVS and left unit ambulatory with steady gait

## 2022-07-14 DIAGNOSIS — I82.403 ACUTE DEEP VEIN THROMBOSIS (DVT) OF BOTH LOWER EXTREMITIES, UNSPECIFIED VEIN (HCC): ICD-10-CM

## 2022-07-15 ENCOUNTER — APPOINTMENT (OUTPATIENT)
Dept: LAB | Facility: CLINIC | Age: 72
End: 2022-07-15
Payer: COMMERCIAL

## 2022-07-15 DIAGNOSIS — C34.90 ADENOCARCINOMA OF LUNG, UNSPECIFIED LATERALITY (HCC): ICD-10-CM

## 2022-07-15 DIAGNOSIS — I82.403 ACUTE DEEP VEIN THROMBOSIS (DVT) OF BOTH LOWER EXTREMITIES, UNSPECIFIED VEIN (HCC): ICD-10-CM

## 2022-07-15 DIAGNOSIS — D70.1 CHEMOTHERAPY-INDUCED NEUTROPENIA (HCC): ICD-10-CM

## 2022-07-15 DIAGNOSIS — C34.11 MALIGNANT NEOPLASM OF UPPER LOBE OF RIGHT LUNG (HCC): ICD-10-CM

## 2022-07-15 DIAGNOSIS — T45.1X5A CHEMOTHERAPY-INDUCED NEUTROPENIA (HCC): ICD-10-CM

## 2022-07-15 DIAGNOSIS — R91.8 MASS OF UPPER LOBE OF RIGHT LUNG: ICD-10-CM

## 2022-07-15 LAB
ALBUMIN SERPL BCP-MCNC: 3.1 G/DL (ref 3.5–5)
ALP SERPL-CCNC: 59 U/L (ref 46–116)
ALT SERPL W P-5'-P-CCNC: 18 U/L (ref 12–78)
ANION GAP SERPL CALCULATED.3IONS-SCNC: 3 MMOL/L (ref 4–13)
AST SERPL W P-5'-P-CCNC: 13 U/L (ref 5–45)
BASOPHILS # BLD AUTO: 0.04 THOUSANDS/ΜL (ref 0–0.1)
BASOPHILS NFR BLD AUTO: 1 % (ref 0–1)
BILIRUB SERPL-MCNC: 0.63 MG/DL (ref 0.2–1)
BUN SERPL-MCNC: 13 MG/DL (ref 5–25)
CALCIUM ALBUM COR SERPL-MCNC: 10.7 MG/DL (ref 8.3–10.1)
CALCIUM SERPL-MCNC: 10 MG/DL (ref 8.3–10.1)
CHLORIDE SERPL-SCNC: 110 MMOL/L (ref 100–108)
CO2 SERPL-SCNC: 27 MMOL/L (ref 21–32)
CREAT SERPL-MCNC: 0.98 MG/DL (ref 0.6–1.3)
EOSINOPHIL # BLD AUTO: 0.14 THOUSAND/ΜL (ref 0–0.61)
EOSINOPHIL NFR BLD AUTO: 3 % (ref 0–6)
ERYTHROCYTE [DISTWIDTH] IN BLOOD BY AUTOMATED COUNT: 13.1 % (ref 11.6–15.1)
GFR SERPL CREATININE-BSD FRML MDRD: 77 ML/MIN/1.73SQ M
GLUCOSE P FAST SERPL-MCNC: 94 MG/DL (ref 65–99)
HCT VFR BLD AUTO: 36.9 % (ref 36.5–49.3)
HGB BLD-MCNC: 11.4 G/DL (ref 12–17)
IMM GRANULOCYTES # BLD AUTO: 0.03 THOUSAND/UL (ref 0–0.2)
IMM GRANULOCYTES NFR BLD AUTO: 1 % (ref 0–2)
LYMPHOCYTES # BLD AUTO: 0.79 THOUSANDS/ΜL (ref 0.6–4.47)
LYMPHOCYTES NFR BLD AUTO: 15 % (ref 14–44)
MCH RBC QN AUTO: 30.1 PG (ref 26.8–34.3)
MCHC RBC AUTO-ENTMCNC: 30.9 G/DL (ref 31.4–37.4)
MCV RBC AUTO: 97 FL (ref 82–98)
MONOCYTES # BLD AUTO: 0.9 THOUSAND/ΜL (ref 0.17–1.22)
MONOCYTES NFR BLD AUTO: 17 % (ref 4–12)
NEUTROPHILS # BLD AUTO: 3.52 THOUSANDS/ΜL (ref 1.85–7.62)
NEUTS SEG NFR BLD AUTO: 63 % (ref 43–75)
NRBC BLD AUTO-RTO: 0 /100 WBCS
PLATELET # BLD AUTO: 210 THOUSANDS/UL (ref 149–390)
PMV BLD AUTO: 9.6 FL (ref 8.9–12.7)
POTASSIUM SERPL-SCNC: 4.1 MMOL/L (ref 3.5–5.3)
PROT SERPL-MCNC: 7 G/DL (ref 6.4–8.2)
RBC # BLD AUTO: 3.79 MILLION/UL (ref 3.88–5.62)
SODIUM SERPL-SCNC: 140 MMOL/L (ref 136–145)
T3FREE SERPL-MCNC: 2.36 PG/ML (ref 2.3–4.2)
TSH SERPL DL<=0.05 MIU/L-ACNC: 3.53 UIU/ML (ref 0.45–4.5)
WBC # BLD AUTO: 5.42 THOUSAND/UL (ref 4.31–10.16)

## 2022-07-15 PROCEDURE — 84443 ASSAY THYROID STIM HORMONE: CPT

## 2022-07-15 PROCEDURE — 36415 COLL VENOUS BLD VENIPUNCTURE: CPT

## 2022-07-15 PROCEDURE — 84481 FREE ASSAY (FT-3): CPT

## 2022-07-15 PROCEDURE — 85025 COMPLETE CBC W/AUTO DIFF WBC: CPT

## 2022-07-15 PROCEDURE — 80053 COMPREHEN METABOLIC PANEL: CPT

## 2022-07-15 RX ORDER — SODIUM CHLORIDE 9 MG/ML
20 INJECTION, SOLUTION INTRAVENOUS ONCE
Status: CANCELLED | OUTPATIENT
Start: 2022-07-18

## 2022-07-15 RX ORDER — ENOXAPARIN SODIUM 100 MG/ML
80 INJECTION SUBCUTANEOUS EVERY 12 HOURS
Qty: 0.8 ML | Refills: 2 | Status: SHIPPED | OUTPATIENT
Start: 2022-07-15 | End: 2022-07-15

## 2022-07-15 RX ORDER — ENOXAPARIN SODIUM 100 MG/ML
80 INJECTION SUBCUTANEOUS EVERY 12 HOURS
Qty: 0.8 ML | Refills: 2 | Status: SHIPPED | OUTPATIENT
Start: 2022-07-15 | End: 2022-07-19 | Stop reason: SDUPTHER

## 2022-07-18 ENCOUNTER — TELEPHONE (OUTPATIENT)
Dept: FAMILY MEDICINE CLINIC | Facility: HOSPITAL | Age: 72
End: 2022-07-18

## 2022-07-18 ENCOUNTER — HOSPITAL ENCOUNTER (OUTPATIENT)
Dept: INFUSION CENTER | Facility: HOSPITAL | Age: 72
Discharge: HOME | End: 2022-07-18
Attending: INTERNAL MEDICINE
Payer: COMMERCIAL

## 2022-07-18 VITALS
WEIGHT: 190.92 LBS | OXYGEN SATURATION: 93 % | SYSTOLIC BLOOD PRESSURE: 122 MMHG | BODY MASS INDEX: 28.28 KG/M2 | HEIGHT: 69 IN | HEART RATE: 75 BPM | TEMPERATURE: 96.4 F | RESPIRATION RATE: 16 BRPM | DIASTOLIC BLOOD PRESSURE: 65 MMHG

## 2022-07-18 DIAGNOSIS — R91.8 MASS OF UPPER LOBE OF RIGHT LUNG: ICD-10-CM

## 2022-07-18 DIAGNOSIS — T45.1X5A CHEMOTHERAPY INDUCED NEUTROPENIA (HCC): ICD-10-CM

## 2022-07-18 DIAGNOSIS — C34.90 ADENOCARCINOMA OF LUNG, UNSPECIFIED LATERALITY (HCC): ICD-10-CM

## 2022-07-18 DIAGNOSIS — D70.1 CHEMOTHERAPY INDUCED NEUTROPENIA (HCC): ICD-10-CM

## 2022-07-18 DIAGNOSIS — C34.11 MALIGNANT NEOPLASM OF UPPER LOBE OF RIGHT LUNG (HCC): Primary | ICD-10-CM

## 2022-07-18 PROCEDURE — 96413 CHEMO IV INFUSION 1 HR: CPT

## 2022-07-18 RX ORDER — SODIUM CHLORIDE 9 MG/ML
20 INJECTION, SOLUTION INTRAVENOUS ONCE
Status: COMPLETED | OUTPATIENT
Start: 2022-07-18 | End: 2022-07-18

## 2022-07-18 RX ADMIN — SODIUM CHLORIDE 20 ML/HR: 9 INJECTION, SOLUTION INTRAVENOUS at 08:40

## 2022-07-18 RX ADMIN — SODIUM CHLORIDE 200 MG: 9 INJECTION, SOLUTION INTRAVENOUS at 09:43

## 2022-07-18 NOTE — PLAN OF CARE
Problem: Potential for Falls  Goal: Patient will remain free of falls  Description: INTERVENTIONS:  - Educate patient/family on patient safety including physical limitations  - Instruct patient to call for assistance with activity   - Consult OT/PT to assist with strengthening/mobility   - Keep Call bell within reach  - Keep bed low and locked with side rails adjusted as appropriate  - Keep care items and personal belongings within reach  - Initiate and maintain comfort rounds  - Make Fall Risk Sign visible to staff  - Offer Toileting every  Hours, in advance of need  - Initiate/Maintain alarm  - Obtain necessary fall risk management equipment:   - Apply yellow socks and bracelet for high fall risk patients  - Consider moving patient to room near nurses station  7/18/2022 1204 by Will Barthel, RN  Outcome: Progressing  7/18/2022 0807 by Will Barthel, RN  Outcome: Progressing     Problem: Knowledge Deficit  Goal: Patient/family/caregiver demonstrates understanding of disease process, treatment plan, medications, and discharge instructions  Description: Complete learning assessment and assess knowledge base    Interventions:  - Provide teaching at level of understanding  - Provide teaching via preferred learning methods  7/18/2022 1204 by Will Barthel, RN  Outcome: Progressing  7/18/2022 0807 by Will Barthel, RN  Outcome: Progressing

## 2022-07-18 NOTE — PROGRESS NOTES
Pt here for Keytruda infusion  kristen well w/o AR seen  PIV removed intact  Pressure dssg applied  Disch amb to home, with family, steady gait

## 2022-07-18 NOTE — TELEPHONE ENCOUNTER
Patient states the quantity was wrong on the enoxaparin injection Rx sent to [facility]  Please call Ana Pelletier, wife

## 2022-07-19 DIAGNOSIS — I82.403 ACUTE DEEP VEIN THROMBOSIS (DVT) OF BOTH LOWER EXTREMITIES, UNSPECIFIED VEIN (HCC): ICD-10-CM

## 2022-07-19 RX ORDER — ENOXAPARIN SODIUM 100 MG/ML
80 INJECTION SUBCUTANEOUS EVERY 12 HOURS
Qty: 48 ML | Refills: 0 | Status: SHIPPED | OUTPATIENT
Start: 2022-07-19 | End: 2022-08-17

## 2022-07-20 NOTE — TELEPHONE ENCOUNTER
Please call and clarify where the error was  He has been on lovenox 80 twice a day for a while now  Or was this already corrected

## 2022-07-20 NOTE — TELEPHONE ENCOUNTER
Wife just called - [facility] will be faxing an exception form for the Lovenox  Please have signed and fax back asap

## 2022-07-26 ENCOUNTER — TELEPHONE (OUTPATIENT)
Dept: HEMATOLOGY ONCOLOGY | Facility: HOSPITAL | Age: 72
End: 2022-07-26

## 2022-07-26 NOTE — TELEPHONE ENCOUNTER
Called patient and spoke to patient's wife Chace Lopez and rescheduled f/u appt due to provider being out  Chace Lopez confirmed the rescheduled appt, date and time

## 2022-07-27 ENCOUNTER — TELEPHONE (OUTPATIENT)
Dept: HEMATOLOGY ONCOLOGY | Facility: CLINIC | Age: 72
End: 2022-07-27

## 2022-07-27 NOTE — TELEPHONE ENCOUNTER
Received VM from [facility] asking for a return call  Spoke with [facility] [facility] who stated [facility]'s O2 level is currently 90% with their pulse oxygen machine  Pt denies any SOB, chest pain, lightheaded/diziness or headaches  Pt has no current complaints  Asked spouse to check a different finger, new reading was 93%  She reported that this has been the patient baseline  Pt was advised to proceed to the ED with any SOB, chest pain, or O2 level persistently below 92%  Pt was also instructed to contact PCP in regards to evaluation for potential oxygen supplementation  She verbalized understanding and was agreeable to the plan

## 2022-07-28 ENCOUNTER — OFFICE VISIT (OUTPATIENT)
Dept: FAMILY MEDICINE CLINIC | Facility: HOSPITAL | Age: 72
End: 2022-07-28
Payer: COMMERCIAL

## 2022-07-28 ENCOUNTER — HOSPITAL ENCOUNTER (OUTPATIENT)
Dept: RADIOLOGY | Facility: HOSPITAL | Age: 72
Discharge: HOME | End: 2022-07-28
Payer: COMMERCIAL

## 2022-07-28 VITALS
OXYGEN SATURATION: 88 % | HEIGHT: 69 IN | WEIGHT: 187 LBS | TEMPERATURE: 99.9 F | HEART RATE: 93 BPM | BODY MASS INDEX: 27.7 KG/M2 | DIASTOLIC BLOOD PRESSURE: 62 MMHG | SYSTOLIC BLOOD PRESSURE: 128 MMHG

## 2022-07-28 DIAGNOSIS — C34.11 MALIGNANT NEOPLASM OF UPPER LOBE OF RIGHT LUNG (HCC): ICD-10-CM

## 2022-07-28 DIAGNOSIS — S06.9X0D BRAIN INJURY, WITHOUT LOSS OF CONSCIOUSNESS, SUBSEQUENT ENCOUNTER: ICD-10-CM

## 2022-07-28 DIAGNOSIS — J90 PLEURAL EFFUSION: Primary | ICD-10-CM

## 2022-07-28 DIAGNOSIS — J44.9 COPD MIXED TYPE (HCC): ICD-10-CM

## 2022-07-28 DIAGNOSIS — R09.02 HYPOXIA: ICD-10-CM

## 2022-07-28 DIAGNOSIS — J90 PLEURAL EFFUSION: ICD-10-CM

## 2022-07-28 DIAGNOSIS — I50.812 CHRONIC RIGHT-SIDED HEART FAILURE (HCC): ICD-10-CM

## 2022-07-28 PROCEDURE — 71046 X-RAY EXAM CHEST 2 VIEWS: CPT

## 2022-07-28 PROCEDURE — 99214 OFFICE O/P EST MOD 30 MIN: CPT | Performed by: FAMILY MEDICINE

## 2022-07-29 DIAGNOSIS — J90 PLEURAL EFFUSION: ICD-10-CM

## 2022-07-29 DIAGNOSIS — R09.02 HYPOXIA: Primary | ICD-10-CM

## 2022-07-29 DIAGNOSIS — C34.11 MALIGNANT NEOPLASM OF UPPER LOBE OF RIGHT LUNG (HCC): ICD-10-CM

## 2022-07-29 NOTE — PROGRESS NOTES
Assessment/Plan:      Problem List Items Addressed This Visit        Respiratory    COPD mixed type ([address] )    Malignant neoplasm of upper lobe of right lung [facility])       Cardiovascular and Mediastinum    Chronic right-sided heart failure (HCC)       Nervous and Auditory    Brain injury, without loss of consciousness, subsequent encounter      Other Visit Diagnoses     Pleural effusion    -  Primary    Relevant Orders    XR chest pa & lateral (Completed)    Hypoxia        Relevant Orders    XR chest pa & lateral (Completed)           Plan/Discussion:  With hypooxia on exertion  Improves at rest    Overall reports no significant shortness of breath although on walking in the office he has dyspnea on exertion  Known lung CA and underlying COPD  Hx of pleural effusion  Check CXR and plan for referral to IR for thoracentesis  I do think this will be therapeutic for him  Eventually will likley need o2  ADD: CXR completed  Right pleural effusion, no significant change since June 2022 on Pet scan  Will go ahead and refer to IR throacentesis  Subjective:   Chief Complaint   Patient presents with    Low oxygen saturation        Patient ID: Manju Leonard is a 80 y o  male  Pt here for concerns about low o2  He has normally been in the 90s with pulse ox  Lately he has had periods where it would drop in the 80s  Today he reports no significant change in shortness of breath  No wheezing, no coughing  No feve,r no chills  No uri sytmpoms  Known COPD and lung CA  Hx of pleural effusion requiring thoracentesis  The following portions of the patient's history were reviewed and updated as appropriate: allergies, current medications, past family history, past medical history, past social history, past surgical history and problem list     Review of Systems   Constitutional: Negative  Negative for activity change, appetite change, chills and diaphoresis     HENT: Negative for congestion and dental problem  Respiratory: Positive for shortness of breath  Negative for apnea, chest tightness and wheezing  Cardiovascular: Negative  Negative for chest pain, palpitations and leg swelling  Gastrointestinal: Negative  Negative for abdominal distention, abdominal pain, constipation, diarrhea and nausea  Genitourinary: Negative  Negative for difficulty urinating, dysuria and frequency  Objective:  Vitals:    07/28/22 1510   BP: 128/62   Pulse: 93   Temp: 99 9 °F (37 7 °C)   SpO2: (!) 88%   Weight: 84 8 kg (187 lb)   Height: 5' 8 5" (1 74 m)     BP Readings from Last 6 Encounters:   07/28/22 128/62   07/18/22 122/65   07/11/22 121/67   06/27/22 124/65   06/20/22 122/78   06/13/22 128/70      Wt Readings from Last 6 Encounters:   07/28/22 84 8 kg (187 lb)   07/18/22 86 6 kg (190 lb 14 7 oz)   07/11/22 86 6 kg (190 lb 14 7 oz)   06/27/22 86 7 kg (191 lb 2 2 oz)   06/20/22 87 1 kg (192 lb)   06/13/22 86 4 kg (190 lb 7 6 oz)        Pulse ox: at rest 98% ( after a few minutes of sitting)  Dropped to 88-90% after walking down the [address] in our office  Noticeable short of breath during this time  Improved to > 90% at rest       Physical Exam  Vitals and nursing note reviewed  Constitutional:       General: He is not in acute distress  Appearance: He is well-developed  He is not ill-appearing  HENT:      Head: Normocephalic and atraumatic  Right Ear: Tympanic membrane, ear canal and external ear normal       Left Ear: Tympanic membrane, ear canal and external ear normal       Nose: Nose normal  No congestion or rhinorrhea  Mouth/Throat:      Mouth: Mucous membranes are moist       Pharynx: No oropharyngeal exudate or posterior oropharyngeal erythema  Eyes:      Extraocular Movements: Extraocular movements intact  Conjunctiva/sclera: Conjunctivae normal       Pupils: Pupils are equal, round, and reactive to light     Cardiovascular:      Rate and Rhythm: Normal rate and regular rhythm  Heart sounds: Normal heart sounds  No murmur heard  No friction rub  No gallop  Pulmonary:      Effort: Pulmonary effort is normal  No respiratory distress  Breath sounds: Examination of the right-middle field reveals decreased breath sounds  Examination of the right-lower field reveals decreased breath sounds  Decreased breath sounds present  No wheezing or rales  Chest:      Chest wall: No tenderness  Abdominal:      General: Bowel sounds are normal  There is no distension  Palpations: Abdomen is soft  There is no mass  Tenderness: There is no abdominal tenderness  There is no guarding or rebound  Musculoskeletal:         General: Normal range of motion  Cervical back: Normal range of motion and neck supple  Skin:     General: Skin is warm  Capillary Refill: Capillary refill takes less than 2 seconds  Neurological:      Mental Status: He is alert and oriented to person, place, and time  Psychiatric:         Mood and Affect: Mood normal          Behavior: Behavior normal

## 2022-08-01 ENCOUNTER — HOSPITAL ENCOUNTER (OUTPATIENT)
Dept: INTERVENTIONAL RADIOLOGY/VASCULAR | Facility: HOSPITAL | Age: 72
Discharge: HOME | End: 2022-08-01
Admitting: RADIOLOGY
Payer: COMMERCIAL

## 2022-08-01 VITALS
DIASTOLIC BLOOD PRESSURE: 75 MMHG | SYSTOLIC BLOOD PRESSURE: 113 MMHG | OXYGEN SATURATION: 93 % | HEART RATE: 95 BPM | RESPIRATION RATE: 22 BRPM

## 2022-08-01 DIAGNOSIS — R09.02 HYPOXIA: ICD-10-CM

## 2022-08-01 DIAGNOSIS — J90 PLEURAL EFFUSION: ICD-10-CM

## 2022-08-01 DIAGNOSIS — C34.11 MALIGNANT NEOPLASM OF UPPER LOBE OF RIGHT LUNG (HCC): ICD-10-CM

## 2022-08-01 PROCEDURE — 32555 ASPIRATE PLEURA W/ IMAGING: CPT | Performed by: RADIOLOGY

## 2022-08-01 PROCEDURE — 32555 ASPIRATE PLEURA W/ IMAGING: CPT

## 2022-08-01 RX ORDER — SODIUM CHLORIDE 9 MG/ML
20 INJECTION, SOLUTION INTRAVENOUS ONCE
Status: CANCELLED | OUTPATIENT
Start: 2022-08-08

## 2022-08-01 NOTE — DISCHARGE INSTRUCTIONS
Thoracentesis   WHAT YOU NEED TO KNOW:   A thoracentesis is a procedure to remove extra fluid or air from between your lungs and your inner chest wall  Air or fluid buildup may make it hard for you to breathe  A thoracentesis allows your lungs to expand fully so you can breathe more easily  DISCHARGE INSTRUCTIONS:   Medicines:   Pain medicine: You may be given a prescription medicine to decrease pain  Do not wait until the pain is severe before you take your medicine  Antibiotics: This medicine helps fight or prevent an infection  Take your medicine as directed  Call your healthcare provider if you think your medicine is not helping or if you have side effects  Tell him if you are allergic to any medicine  Keep a list of the medicines, vitamins, and herbs you take  Include the amounts, and when and why you take them  Bring the list or the pill bottles to follow-up visits  Carry your medicine list with you in case of an emergency  Follow up with your healthcare provider as directed:  Write down your questions so you remember to ask them during your visits  Rest:  Rest when you feel it is needed  Slowly start to do more each day  Return to your daily activities as directed  Do not smoke: If you smoke, it is never too late to quit  Ask for information about how to stop smoking if you need help  Contact your healthcare provider if:   You have a fever  Your puncture site is red, warm, swollen, or draining pus  You have questions or concerns about your procedure, medicine, or care  If you have any questions regarding, call the IR department @ [phone]  Seek care immediately or call 911 if:   Blood soaks through your bandage  There is blood in your spit

## 2022-08-05 ENCOUNTER — TELEPHONE (OUTPATIENT)
Dept: HEMATOLOGY ONCOLOGY | Facility: HOSPITAL | Age: 72
End: 2022-08-05

## 2022-08-05 ENCOUNTER — APPOINTMENT (OUTPATIENT)
Dept: LAB | Facility: CLINIC | Age: 72
End: 2022-08-05
Payer: COMMERCIAL

## 2022-08-05 DIAGNOSIS — T45.1X5A CHEMOTHERAPY-INDUCED NEUTROPENIA (HCC): ICD-10-CM

## 2022-08-05 DIAGNOSIS — C34.90 ADENOCARCINOMA OF LUNG, UNSPECIFIED LATERALITY (HCC): ICD-10-CM

## 2022-08-05 DIAGNOSIS — C34.11 MALIGNANT NEOPLASM OF UPPER LOBE OF RIGHT LUNG (HCC): ICD-10-CM

## 2022-08-05 DIAGNOSIS — R91.8 MASS OF UPPER LOBE OF RIGHT LUNG: ICD-10-CM

## 2022-08-05 DIAGNOSIS — E83.52 HYPERCALCEMIA OF MALIGNANCY: ICD-10-CM

## 2022-08-05 DIAGNOSIS — D70.1 CHEMOTHERAPY-INDUCED NEUTROPENIA (HCC): ICD-10-CM

## 2022-08-05 LAB
ALBUMIN SERPL BCP-MCNC: 2.4 G/DL (ref 3.5–5)
ALP SERPL-CCNC: 51 U/L (ref 46–116)
ALT SERPL W P-5'-P-CCNC: 24 U/L (ref 12–78)
ANION GAP SERPL CALCULATED.3IONS-SCNC: 6 MMOL/L (ref 4–13)
AST SERPL W P-5'-P-CCNC: 23 U/L (ref 5–45)
BASOPHILS # BLD AUTO: 0.04 THOUSANDS/ΜL (ref 0–0.1)
BASOPHILS NFR BLD AUTO: 1 % (ref 0–1)
BILIRUB SERPL-MCNC: 0.53 MG/DL (ref 0.2–1)
BUN SERPL-MCNC: 12 MG/DL (ref 5–25)
CALCIUM ALBUM COR SERPL-MCNC: 10.9 MG/DL (ref 8.3–10.1)
CALCIUM SERPL-MCNC: 9.6 MG/DL (ref 8.3–10.1)
CHLORIDE SERPL-SCNC: 105 MMOL/L (ref 96–108)
CO2 SERPL-SCNC: 28 MMOL/L (ref 21–32)
CREAT SERPL-MCNC: 1.09 MG/DL (ref 0.6–1.3)
EOSINOPHIL # BLD AUTO: 0.14 THOUSAND/ΜL (ref 0–0.61)
EOSINOPHIL NFR BLD AUTO: 3 % (ref 0–6)
ERYTHROCYTE [DISTWIDTH] IN BLOOD BY AUTOMATED COUNT: 13.2 % (ref 11.6–15.1)
GFR SERPL CREATININE-BSD FRML MDRD: 68 ML/MIN/1.73SQ M
GLUCOSE P FAST SERPL-MCNC: 105 MG/DL (ref 65–99)
HCT VFR BLD AUTO: 35.6 % (ref 36.5–49.3)
HGB BLD-MCNC: 10.9 G/DL (ref 12–17)
IMM GRANULOCYTES # BLD AUTO: 0.03 THOUSAND/UL (ref 0–0.2)
IMM GRANULOCYTES NFR BLD AUTO: 1 % (ref 0–2)
LYMPHOCYTES # BLD AUTO: 0.6 THOUSANDS/ΜL (ref 0.6–4.47)
LYMPHOCYTES NFR BLD AUTO: 11 % (ref 14–44)
MCH RBC QN AUTO: 28.5 PG (ref 26.8–34.3)
MCHC RBC AUTO-ENTMCNC: 30.6 G/DL (ref 31.4–37.4)
MCV RBC AUTO: 93 FL (ref 82–98)
MONOCYTES # BLD AUTO: 0.91 THOUSAND/ΜL (ref 0.17–1.22)
MONOCYTES NFR BLD AUTO: 17 % (ref 4–12)
NEUTROPHILS # BLD AUTO: 3.62 THOUSANDS/ΜL (ref 1.85–7.62)
NEUTS SEG NFR BLD AUTO: 67 % (ref 43–75)
NRBC BLD AUTO-RTO: 0 /100 WBCS
PLATELET # BLD AUTO: 273 THOUSANDS/UL (ref 149–390)
PMV BLD AUTO: 9.7 FL (ref 8.9–12.7)
POTASSIUM SERPL-SCNC: 3.9 MMOL/L (ref 3.5–5.3)
PROT SERPL-MCNC: 6.9 G/DL (ref 6.4–8.4)
RBC # BLD AUTO: 3.83 MILLION/UL (ref 3.88–5.62)
SODIUM SERPL-SCNC: 139 MMOL/L (ref 135–147)
T3FREE SERPL-MCNC: 1.68 PG/ML (ref 2.3–4.2)
TSH SERPL DL<=0.05 MIU/L-ACNC: 2.7 UIU/ML (ref 0.45–4.5)
WBC # BLD AUTO: 5.34 THOUSAND/UL (ref 4.31–10.16)

## 2022-08-05 PROCEDURE — 36415 COLL VENOUS BLD VENIPUNCTURE: CPT

## 2022-08-05 PROCEDURE — 80053 COMPREHEN METABOLIC PANEL: CPT

## 2022-08-05 PROCEDURE — 84443 ASSAY THYROID STIM HORMONE: CPT

## 2022-08-05 PROCEDURE — 85025 COMPLETE CBC W/AUTO DIFF WBC: CPT

## 2022-08-05 PROCEDURE — 84481 FREE ASSAY (FT-3): CPT

## 2022-08-05 NOTE — TELEPHONE ENCOUNTER
Message sent to pt PCP to reach out and scheduled follow up appointment to address T3 went from 2 36 down to 1 68 and has not been trending like that and has decreased

## 2022-08-08 ENCOUNTER — HOSPITAL ENCOUNTER (OUTPATIENT)
Dept: INFUSION CENTER | Facility: HOSPITAL | Age: 72
Discharge: HOME | End: 2022-08-08
Attending: INTERNAL MEDICINE
Payer: COMMERCIAL

## 2022-08-08 VITALS
RESPIRATION RATE: 20 BRPM | HEART RATE: 81 BPM | TEMPERATURE: 97.1 F | BODY MASS INDEX: 27.36 KG/M2 | SYSTOLIC BLOOD PRESSURE: 116 MMHG | DIASTOLIC BLOOD PRESSURE: 59 MMHG | WEIGHT: 184.75 LBS | HEIGHT: 69 IN | OXYGEN SATURATION: 90 %

## 2022-08-08 DIAGNOSIS — E83.52 HYPERCALCEMIA OF MALIGNANCY: ICD-10-CM

## 2022-08-08 DIAGNOSIS — C34.11 MALIGNANT NEOPLASM OF UPPER LOBE OF RIGHT LUNG (HCC): Primary | ICD-10-CM

## 2022-08-08 DIAGNOSIS — R91.8 MASS OF UPPER LOBE OF RIGHT LUNG: ICD-10-CM

## 2022-08-08 DIAGNOSIS — D70.1 CHEMOTHERAPY INDUCED NEUTROPENIA (HCC): ICD-10-CM

## 2022-08-08 DIAGNOSIS — T45.1X5A CHEMOTHERAPY INDUCED NEUTROPENIA (HCC): ICD-10-CM

## 2022-08-08 DIAGNOSIS — C34.90 ADENOCARCINOMA OF LUNG, UNSPECIFIED LATERALITY (HCC): ICD-10-CM

## 2022-08-08 PROCEDURE — 96372 THER/PROPH/DIAG INJ SC/IM: CPT

## 2022-08-08 PROCEDURE — 96413 CHEMO IV INFUSION 1 HR: CPT

## 2022-08-08 RX ORDER — SODIUM CHLORIDE 9 MG/ML
20 INJECTION, SOLUTION INTRAVENOUS ONCE
Status: COMPLETED | OUTPATIENT
Start: 2022-08-08 | End: 2022-08-08

## 2022-08-08 RX ADMIN — SODIUM CHLORIDE 200 MG: 9 INJECTION, SOLUTION INTRAVENOUS at 09:19

## 2022-08-08 RX ADMIN — SODIUM CHLORIDE 20 ML/HR: 9 INJECTION, SOLUTION INTRAVENOUS at 09:16

## 2022-08-08 RX ADMIN — DENOSUMAB 120 MG: 120 INJECTION SUBCUTANEOUS at 10:20

## 2022-08-08 NOTE — PLAN OF CARE
Problem: Potential for Falls  Goal: Patient will remain free of falls  Description: INTERVENTIONS:  - Educate patient/family on patient safety including physical limitations  - Instruct patient to call for assistance with activity   - Keep Call bell within reach  - Keep bed low and locked with side rails adjusted as appropriate  - Keep care items and personal belongings within reach  - Initiate and maintain comfort rounds  - Make Fall Risk Sign visible to staff  - Consider moving patient to room near nurses station  Outcome: Progressing     Problem: Knowledge Deficit  Goal: Patient/family/caregiver demonstrates understanding of disease process, treatment plan, medications, and discharge instructions  Description: Complete learning assessment and assess knowledge base    Interventions:  - Provide teaching at level of understanding  - Provide teaching via preferred learning methods  Outcome: Progressing

## 2022-08-08 NOTE — PROGRESS NOTES
Pt here today for [place] and xgeva tolerated well no adverse reactions AVS provided and pt left ambulatory with steady gait

## 2022-08-11 ENCOUNTER — OFFICE VISIT (OUTPATIENT)
Dept: HEMATOLOGY ONCOLOGY | Facility: HOSPITAL | Age: 72
End: 2022-08-11
Payer: COMMERCIAL

## 2022-08-11 ENCOUNTER — OFFICE VISIT (OUTPATIENT)
Dept: FAMILY MEDICINE CLINIC | Facility: HOSPITAL | Age: 72
End: 2022-08-11
Payer: COMMERCIAL

## 2022-08-11 VITALS
WEIGHT: 182 LBS | TEMPERATURE: 98.9 F | SYSTOLIC BLOOD PRESSURE: 120 MMHG | BODY MASS INDEX: 26.96 KG/M2 | DIASTOLIC BLOOD PRESSURE: 76 MMHG | HEIGHT: 69 IN

## 2022-08-11 VITALS
SYSTOLIC BLOOD PRESSURE: 122 MMHG | TEMPERATURE: 98.3 F | DIASTOLIC BLOOD PRESSURE: 70 MMHG | HEIGHT: 69 IN | HEART RATE: 81 BPM | WEIGHT: 181.8 LBS | BODY MASS INDEX: 26.93 KG/M2

## 2022-08-11 DIAGNOSIS — C34.11 MALIGNANT NEOPLASM OF UPPER LOBE OF RIGHT LUNG (HCC): ICD-10-CM

## 2022-08-11 DIAGNOSIS — J96.11 CHRONIC RESPIRATORY FAILURE WITH HYPOXIA (HCC): Primary | ICD-10-CM

## 2022-08-11 DIAGNOSIS — T45.1X5A CHEMOTHERAPY INDUCED NEUTROPENIA (HCC): ICD-10-CM

## 2022-08-11 DIAGNOSIS — R91.8 MASS OF UPPER LOBE OF RIGHT LUNG: ICD-10-CM

## 2022-08-11 DIAGNOSIS — J44.9 COPD MIXED TYPE (HCC): ICD-10-CM

## 2022-08-11 DIAGNOSIS — C34.90 ADENOCARCINOMA OF LUNG, UNSPECIFIED LATERALITY (HCC): ICD-10-CM

## 2022-08-11 DIAGNOSIS — C34.90 ADENOCARCINOMA OF LUNG, UNSPECIFIED LATERALITY (HCC): Primary | ICD-10-CM

## 2022-08-11 DIAGNOSIS — S06.9X0D BRAIN INJURY, WITHOUT LOSS OF CONSCIOUSNESS, SUBSEQUENT ENCOUNTER: ICD-10-CM

## 2022-08-11 DIAGNOSIS — D70.1 CHEMOTHERAPY INDUCED NEUTROPENIA (HCC): ICD-10-CM

## 2022-08-11 PROCEDURE — 99214 OFFICE O/P EST MOD 30 MIN: CPT | Performed by: INTERNAL MEDICINE

## 2022-08-11 PROCEDURE — 99214 OFFICE O/P EST MOD 30 MIN: CPT | Performed by: FAMILY MEDICINE

## 2022-08-11 RX ORDER — ALBUTEROL SULFATE 90 UG/1
2 AEROSOL, METERED RESPIRATORY (INHALATION) EVERY 6 HOURS PRN
Qty: 8 G | Refills: 0 | Status: SHIPPED | OUTPATIENT
Start: 2022-08-11

## 2022-08-11 NOTE — PROGRESS NOTES
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 measuring 2 7 cm in greatest dimension   He also had a Doppler ultrasound of the lower extremities which showed acute deep vein thrombosis in both lower extremities  CT scan of the chest abdomen pelvis without contrast on 07/31 showed 6 4 cm solid irregular right upper lobe pulmonary mass with extensive right hilar and mediastinal adenopathy   He also seems to have small right pleural effusion and moderate emphysema  MRI of the brain on 08/03/2021 showed multifocal diffusion abnormalities in several separate vascular territories   No metastatic disease to the brain was mentioned  [facility] does seem to have 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    CT-guided biopsy of the right lung mass was done on 08/05   The pathology showed adenocarcinoma of lung primary       Previous Hematologic/ Oncologic History:    Oncology History   Adenocarcinoma of lung   8/2021 Initial Diagnosis    Adenocarcinoma of lung     8/5/2021 Biopsy    Right lung apex, image-guided core needle biopsy:  - Adenocarcinoma      10/6/2021 - 1/26/2022 Chemotherapy    cyanocobalamin, 1,000 mcg, Intramuscular, Once, 3 of 3 cycles  Administration: 1,000 mcg (11/24/2021), 1,000 mcg (1/26/2022), 1,000 mcg (10/6/2021)  pegfilgrastim (Osmel Fear), 6 mg, Subcutaneous, Once, 1 of 1 cycle  Administration: 6 mg (11/26/2021)  pegfilgrastim (Miriam Dasha), 6 mg, Subcutaneous, Once, 6 of 6 cycles  Administration: 6 mg (10/6/2021), 6 mg (11/3/2021), 6 mg (11/24/2021), 6 mg (12/15/2021), 6 mg (1/5/2022)  fosaprepitant (EMEND) IVPB, 150 mg, Intravenous, Once, 6 of 6 cycles  Administration: 150 mg (10/6/2021), 150 mg (11/24/2021), 150 mg (12/15/2021), 150 mg (1/26/2022), 150 mg (11/3/2021), 150 mg (1/5/2022)  CARBOplatin (PARAPLATIN) IVPB (GOG AUC DOSING), 519 5 mg, Intravenous, Once, 6 of 6 cycles  Administration: 519 5 mg (10/6/2021), 574 mg (11/24/2021), 600 mg (12/15/2021), 533 mg (1/26/2022), 604 5 mg (11/3/2021), 563 mg (1/5/2022)  pemetrexed (ALIMTA) chemo infusion, 970 mg, Intravenous, Once, 6 of 6 cycles  Administration: 1,000 mg (10/6/2021), 1,000 mg (11/24/2021), 1,000 mg (12/15/2021), 1,000 mg (1/26/2022), 1,000 mg (11/3/2021), 1,000 mg (1/5/2022)  pembrolizumab (KEYTRUDA) IVPB, 200 mg, Intravenous, Once, 6 of 6 cycles  Administration: 200 mg (10/6/2021), 200 mg (11/24/2021), 200 mg (12/15/2021), 200 mg (1/26/2022), 200 mg (11/3/2021), 200 mg (1/5/2022)     3/7/2022 -  Chemotherapy    CARBOplatin (PARAPLATIN) IVPB (GOG AUC DOSING), , Intravenous, Once, 6 of 6 cycles  Administration: 211 6 mg (3/7/2022), 208 mg (3/14/2022), 238 8 mg (3/21/2022), 211 6 mg (3/28/2022), 215 2 mg (4/4/2022), 213 4 mg (4/18/2022)  PACLItaxel (TAXOL) chemo IVPB, 50 mg/m2 = 99 mg, Intravenous, Once, 6 of 6 cycles  Administration: 99 mg (3/7/2022), 99 mg (3/14/2022), 99 mg (3/21/2022), 99 mg (3/28/2022), 99 mg (4/4/2022), 99 mg (4/18/2022)  pembrolizumab (KEYTRUDA) IVPB, 200 mg, Intravenous, Once, 8 of 14 cycles  Administration: 200 mg (3/7/2022), 200 mg (3/28/2022), 200 mg (4/25/2022), 200 mg (5/16/2022), 200 mg (6/6/2022), 200 mg (6/27/2022), 200 mg (7/18/2022), 200 mg (8/8/2022)     Malignant neoplasm of upper lobe of right lung ([facility] [address] )   9/3/2021 Initial Diagnosis    Malignant neoplasm of upper lobe of right lung ([facility] [address] )     9/23/2021 -  Cancer Staged    Staging form: Lung, AJCC 8th Edition  - Clinical stage from 9/23/2021: Stage IIIC (cT3, cN3, cM0) - Signed by Ray Qureshi MD on 9/23/2021  Histopathologic type: Adenocarcinoma, NOS       10/6/2021 - 1/26/2022 Chemotherapy    cyanocobalamin, 1,000 mcg, Intramuscular, Once, 3 of 3 cycles  Administration: 1,000 mcg (11/24/2021), 1,000 mcg (1/26/2022), 1,000 mcg (10/6/2021)  pegfilgrastim (Papi Yahaira), 6 mg, Subcutaneous, Once, 1 of 1 cycle  Administration: 6 mg (11/26/2021)  pegfilgrastim (Cher Sherman), 6 mg, Subcutaneous, Once, 6 of 6 cycles  Administration: 6 mg (10/6/2021), 6 mg (11/3/2021), 6 mg (11/24/2021), 6 mg (12/15/2021), 6 mg (1/5/2022)  fosaprepitant (EMEND) IVPB, 150 mg, Intravenous, Once, 6 of 6 cycles  Administration: 150 mg (10/6/2021), 150 mg (11/24/2021), 150 mg (12/15/2021), 150 mg (1/26/2022), 150 mg (11/3/2021), 150 mg (1/5/2022)  CARBOplatin (PARAPLATIN) IVPB (GOG AUC DOSING), 519 5 mg, Intravenous, Once, 6 of 6 cycles  Administration: 519 5 mg (10/6/2021), 574 mg (11/24/2021), 600 mg (12/15/2021), 533 mg (1/26/2022), 604 5 mg (11/3/2021), 563 mg (1/5/2022)  pemetrexed (ALIMTA) chemo infusion, 970 mg, Intravenous, Once, 6 of 6 cycles  Administration: 1,000 mg (10/6/2021), 1,000 mg (11/24/2021), 1,000 mg (12/15/2021), 1,000 mg (1/26/2022), 1,000 mg (11/3/2021), 1,000 mg (1/5/2022)  pembrolizumab (KEYTRUDA) IVPB, 200 mg, Intravenous, Once, 6 of 6 cycles  Administration: 200 mg (10/6/2021), 200 mg (11/24/2021), 200 mg (12/15/2021), 200 mg (1/26/2022), 200 mg (11/3/2021), 200 mg (1/5/2022)     3/4/2022 - 4/21/2022 Radiation    The patient [facility]   This is the current list of radiation treatment:  Plan ID Energy Fractions Dose per Fraction (cGy) Dose Correction (cGy) Total Dose Delivered (cGy) Elapsed Days   R LUNGMED REV 6X 30 / 30 200 0 6,000 48      Treatment dates:  C1: 3/4/2022 - 4/21/2022         3/7/2022 -  Chemotherapy    CARBOplatin (PARAPLATIN) IVPB (GOG AUC DOSING), , Intravenous, Once, 6 of 6 cycles  Administration: 211 6 mg (3/7/2022), 208 mg (3/14/2022), 238 8 mg (3/21/2022), 211 6 mg (3/28/2022), 215 2 mg (4/4/2022), 213 4 mg (4/18/2022)  PACLItaxel (TAXOL) chemo IVPB, 50 mg/m2 = 99 mg, Intravenous, Once, 6 of 6 cycles  Administration: 99 mg (3/7/2022), 99 mg (3/14/2022), 99 mg (3/21/2022), 99 mg (3/28/2022), 99 mg (4/4/2022), 99 mg (4/18/2022)  pembrolizumab (KEYTRUDA) IVPB, 200 mg, Intravenous, Once, 8 of 14 cycles  Administration: 200 mg (3/7/2022), 200 mg (3/28/2022), 200 mg (4/25/2022), 200 mg (5/16/2022), 200 mg (6/6/2022), 200 mg (6/27/2022), 200 mg (7/18/2022), 200 mg (8/8/2022)       Concurrent chemotherapy with carboplatin and Taxol with Keytruda and radiation      Current Hematologic/ Oncologic Treatment:    Keytruda every 3 weeks  Interval History:    Patient returns for follow-up visit  Last imaging had shown decreasing activity in the right upper lung mass suggesting response to therapy  There was pleural effusion that was unchanged  He stayed on observation  Recently had the pleural effusion tapped to see if this would help with symptoms of low oxygen when he exerts himself  He states he feel the same  Really denies any complaints today  Denies any nausea denies any vomiting denies any diarrhea  The rest of his 14 point review of systems today was negative  Cancer Staging:  Cancer Staging  Malignant neoplasm of upper lobe of right lung [facility])  Staging form: Lung, AJCC 8th Edition  - Clinical stage from 9/23/2021: Stage IIIC (cT3, cN3, cM0) - Signed by Faye Lopez MD on 9/23/2021  Histopathologic type: Adenocarcinoma, NOS      Test Results:    Imaging: XR chest pa & lateral    Result Date: 7/28/2022  Narrative: CHEST INDICATION:   J90: Pleural effusion, not elsewhere classified R09 02: Hypoxemia  COMPARISON:  Chest radiograph October 25, 2021  Correlation with PET/CT June 7, 2022 EXAM PERFORMED/VIEWS:  XR CHEST PA & LATERAL FINDINGS: Cardiomediastinal silhouette appears unremarkable  Opacity in the right upper lung   Moderate to large right pleural effusion is not significantly changed since the June 7, 2022 PET/CT  No pneumothorax  Osseous structures appear within normal limits for patient age  Impression: Moderate-sized right pleural effusion is not significant changed since the June 7, 2022 PET/CT  Right upper lung opacity corresponding to mass better seen on the prior PET/CT  Workstation performed: [id number]     IR thoracentesis    Result Date: 8/2/2022  Narrative: INDICATION: Recurrent pleural effusion PROCEDURE: 1  Ultrasound-guided thoracentesis FINDINGS: 1   700 mL red-tinged fluid removed from the right pleural space  Impression: Thoracentesis  _________________________________________________________________ COMPARISON: Chest x-ray 7/28/2022 PROCEDURE DETAILS: Operators: Dr Juarez Setting Anesthesia:  Local Medications: 1% lidocaine COMMENTS: The patient was placed in a sitting position  A preprocedure timeout was performed per [facility] protocol  The back was prepped and draped in the usual sterile fashion  5-Hungarian Yueh catheter was advanced using ultrasound guidance into the pleural space  Following drainage, skin was cleansed and sterile dressings were applied  Workstation performed: [id number]       Labs:   Lab Results   Component Value Date    WBC 5 34 08/05/2022    HGB 10 9 (L) 08/05/2022    HCT 35 6 (L) 08/05/2022    MCV 93 08/05/2022     08/05/2022     Lab Results   Component Value Date    K 3 9 08/05/2022     08/05/2022    CO2 28 08/05/2022    BUN 12 08/05/2022    CREATININE 1 09 08/05/2022    GLUF 105 (H) 08/05/2022    CALCIUM 9 6 08/05/2022    CORRECTEDCA 10 9 (H) 08/05/2022    AST 23 08/05/2022    ALT 24 08/05/2022    ALKPHOS 51 08/05/2022    EGFR 68 08/05/2022       Lab Results   Component Value Date    PUUWDBUD12 1,514 (H) 08/12/2021         ROS: As stated in the history of present illness otherwise his 14 point review of systems today was negative        Active Problems:   Patient Active Problem List Diagnosis    Recent cerebrovascular accident    Bilateral lower extremity DVTs    Nasopharyngeal mass    Dysphagia    Mass of upper lobe of right lung    Urinary retention    Constipation    Anemia of chronic disease    Pulmonary embolism (HCC)    Impaired cognition    Adenocarcinoma of lung    Malignant neoplasm of upper lobe of right lung (HCC)    COPD mixed type (HCC)    Chemotherapy induced neutropenia (HCC)    Hypercalcemia of malignancy    Chronic anticoagulation    Chronic right-sided heart failure ([facility] [address] )    Brain injury, without loss of consciousness, subsequent encounter       Past Medical History:   Past Medical History:   Diagnosis Date    Chronic respiratory failure with hypoxia ([facility] [address] ) 2021    Impaired mobility and ADLs 2021    Lung cancer ([facility] [address] )     Stroke [facility])        Surgical History:   Past Surgical History:   Procedure Laterality Date    IR BIOPSY LUNG  2021    IR THORACENTESIS  2022    IR THORACENTESIS  2022       Family History:    Family History   Problem Relation Age of Onset    Prostate cancer Brother     Lung cancer Brother        Cancer-related family history includes Lung cancer in his brother; Prostate cancer in his brother      Social History:   Social History     Socioeconomic History    Marital status: /Civil Union     Spouse name: Not on file    Number of children: Not on file    Years of education: Not on file    Highest education level: Not on file   Occupational History    Not on file   Tobacco Use    Smoking status: Former Smoker     Packs/day: 2 50     Years: 30 00     Pack years: 75 00     Quit date:      Years since quittin 6    Smokeless tobacco: Never Used   Vaping Use    Vaping Use: Never used   Substance and Sexual Activity    Alcohol use: Not Currently     Comment: No ETOH since Summer 2021     Drug use: Never    Sexual activity: Yes     Partners: Female   Other Topics Concern    Not on file   Social History Narrative    Not on file     Social Determinants of Health     Financial Resource Strain: Not on file   Food Insecurity: Not on file   Transportation Needs: Not on file   Physical Activity: Not on file   Stress: Not on file   Social Connections: Not on file   Intimate Partner Violence: Not on file   Housing Stability: Not on file       Current Medications:   Current Outpatient Medications   Medication Sig Dispense Refill    enoxaparin (LOVENOX) 80 mg/0 8 mL Inject 0 8 mL (80 mg total) under the skin every 12 (twelve) hours 48 mL 0    acetaminophen (TYLENOL) 325 mg tablet Take 2 tablets (650 mg total) by mouth 4 (four) times a day as needed for mild pain, headaches or fever (Patient not taking: Reported on 8/11/2022)  0    albuterol (ProAir HFA) 90 mcg/act inhaler Inhale 2 puffs every 6 (six) hours as needed for wheezing or shortness of breath 8 g 0    atorvastatin (LIPITOR) 40 mg tablet Take 1 tablet (40 mg total) by mouth daily with dinner 90 tablet 2    folic acid (FOLVITE) 1 mg tablet TAKE 1 TABLET BY MOUTH EVERY DAY 30 tablet 3    multivitamin (THERAGRAN) TABS Take 1 tablet by mouth daily      tamsulosin (FLOMAX) 0 4 mg Take 1 capsule (0 4 mg total) by mouth daily after dinner 90 capsule 3     No current facility-administered medications for this visit  Allergies: Allergies   Allergen Reactions    Doxycycline Rash       Physical Exam:    Body surface area is 1 97 meters squared      Wt Readings from Last 3 Encounters:   08/11/22 82 6 kg (182 lb)   08/11/22 82 5 kg (181 lb 12 8 oz)   08/08/22 83 8 kg (184 lb 11 9 oz)        Temp Readings from Last 3 Encounters:   08/11/22 98 9 °F (37 2 °C) (Tympanic)   08/11/22 98 3 °F (36 8 °C)   08/08/22 (!) 97 1 °F (36 2 °C) (Temporal)        BP Readings from Last 3 Encounters:   08/11/22 120/76   08/11/22 122/70   08/08/22 116/59         Pulse Readings from Last 3 Encounters:   08/11/22 81   08/08/22 81   08/01/22 95        Physical Exam Constitutional   General appearance: No acute distress, well appearing and well nourished  Eyes   Conjunctiva and lids: No swelling, erythema or discharge  Pupils and irises: Equal, round and reactive to light  Ears, Nose, Mouth, and Throat   External inspection of ears and nose: Normal     Nasal mucosa, septum, and turbinates: Normal without edema or erythema  Oropharynx: Normal with no erythema, edema, exudate or lesions  Pulmonary   Respiratory effort: No increased work of breathing or signs of respiratory distress  Auscultation of lungs:  Decreased breath sounds at right base consistent with probable effusion    Cardiovascular   Palpation of heart: Normal PMI, no thrills  Auscultation of heart: Normal rate and rhythm, normal S1 and S2, without murmurs  Examination of extremities for edema and/or varicosities: Normal     Carotid pulses: Normal     Abdomen   Abdomen: Non-tender, no masses  Liver and spleen: No hepatomegaly or splenomegaly  Lymphatic   Palpation of lymph nodes in neck: No lymphadenopathy  Musculoskeletal   Gait and station: Normal     Digits and nails: Normal without clubbing or cyanosis  Inspection/palpation of joints, bones, and muscles: Normal     Skin   Skin and subcutaneous tissue: Normal without rashes or lesions  Neurologic   Cranial nerves: Cranial nerves 2-12 intact  Sensation: No sensory loss  Psychiatric   Orientation to person, place, and time: Normal     Mood and affect: Normal         Assessment / Plan:    P  The patient is a 80year old male with newly diagnosed biopsy proven adenocarcinoma of the lung with mediastinal adenopathy and a nasopharyngeal mass  PET CT scan completed on 9/21 showed enlarging right upper lobe mass in keeping with biopsy proven adenocarcinoma, extensive adenopathy in the neck base bilaterally, thoracic inlets, mediastinum, right hilum  Small right effusion, and small pericardial effusion   No hypermetabolic metastases identified below the diaphragms   No evidence of skeletal metastasis  The right-sided nasopharyngeal mass demonstrates no abnormal glucose activity   Cytology from the fluid was suspicious for malignancy      He was treated with 6 cycles of systemic therapy with Carbo, Alimta and Keytruda   Imaging after 4 cycles of chemo showed a positive response to therapy  The patient was then seen by our colleagues in [address] [address] and was started on concurrent chemoradiation with carboplatin and Taxol   He finished this up and is now on single agent Keytruda  Had a therapeutic thoracentesis  Is currently doing well  We will continue his treatment  I will see him back in 6-9 weeks  Next imaging will be in 3-4 months  He was started on oxygen by his primary care physician which he will be going on in the next few days  He himself is doing well  I will see him back in 6-9 weeks  Goals and Barriers:  Current Goal:  Prolong Survival from lung cancer  Barriers: None  Patient's Capacity to Self Care:  Patient  able to self care  Portions of the record may have been created with voice recognition software  Occasional wrong word or "sound a like" substitutions may have occurred due to the inherent limitations of voice recognition software  Read the chart carefully and recognize, using context, where substitutions have occurred

## 2022-08-11 NOTE — ADDENDUM NOTE
Addended byRossi Kuo on: 8/11/2022 02:11 PM     Modules accepted: Orders
Patient/Caregiver provided printed discharge information.

## 2022-08-12 DIAGNOSIS — C34.11 MALIGNANT NEOPLASM OF UPPER LOBE OF RIGHT LUNG (HCC): ICD-10-CM

## 2022-08-12 RX ORDER — FOLIC ACID 1 MG/1
TABLET ORAL
Qty: 30 TABLET | Refills: 1 | Status: SHIPPED | OUTPATIENT
Start: 2022-08-12 | End: 2022-09-06

## 2022-08-12 NOTE — PROGRESS NOTES
Assessment/Plan:      Problem List Items Addressed This Visit        Respiratory    Adenocarcinoma of lung    Relevant Medications    albuterol (ProAir HFA) 90 mcg/act inhaler    COPD mixed type (HCC)    Relevant Medications    albuterol (ProAir HFA) 90 mcg/act inhaler    Other Relevant Orders    Ambulatory Referral to Pulmonology    Malignant neoplasm of upper lobe of right lung (HCC)    Relevant Medications    albuterol (ProAir HFA) 90 mcg/act inhaler    Other Relevant Orders    Ambulatory Referral to Pulmonology       Nervous and Auditory    Brain injury, without loss of consciousness, subsequent encounter      Other Visit Diagnoses     Chronic respiratory failure with hypoxia ([facility] [address] )    -  Primary           Plan/Discussion:  Patient is having episodes of hypoxia  This is during exertion and improves at rest    Multifactorial with copd, known lung ca, recurrent pleural effusion  Will go ahead and start o2, on exertion  Orders placed  Will refer to pulmonology for further evaluation  Continue with albuterol as needed for COPD  Subjective:   Chief Complaint   Patient presents with    Follow-up     Follow up labs and low O2 SAT        Patient ID: Shell Winkler is a 80 y o  male  Here for fu  Reports some improvement after thoracententsis  Reviewed IR procedure  He does get low po2 readings  This is usually on exertion  Reports however at rest he feels well  No singificant sob at rest    Daughter does not he has difficulty with ambulation although Emmanuel Barron does not always think so  Today in the office the po2 was 88 but after rest it is measured at 99%  No new complaints today  The following portions of the patient's history were reviewed and updated as appropriate: allergies, current medications, past family history, past medical history, past social history, past surgical history and problem list     Review of Systems   Constitutional: Negative    Negative for activity change, appetite change, chills and diaphoresis  HENT: Negative for congestion and dental problem  Respiratory: Positive for shortness of breath  Negative for apnea, chest tightness and wheezing  Cardiovascular: Negative  Negative for chest pain, palpitations and leg swelling  Gastrointestinal: Negative  Negative for abdominal distention, abdominal pain, constipation, diarrhea and nausea  Genitourinary: Negative  Negative for difficulty urinating, dysuria and frequency  Objective:  Vitals:    08/11/22 1014   BP: 122/70   Pulse: 81   Temp: 98 3 °F (36 8 °C)   Weight: 82 5 kg (181 lb 12 8 oz)   Height: 5' 8 5" (1 74 m)     BP Readings from Last 6 Encounters:   08/11/22 120/76   08/11/22 122/70   08/08/22 116/59   08/01/22 113/75   07/28/22 128/62   07/18/22 122/65      Wt Readings from Last 6 Encounters:   08/11/22 82 6 kg (182 lb)   08/11/22 82 5 kg (181 lb 12 8 oz)   08/08/22 83 8 kg (184 lb 11 9 oz)   07/28/22 84 8 kg (187 lb)   07/18/22 86 6 kg (190 lb 14 7 oz)   07/11/22 86 6 kg (190 lb 14 7 oz)             Physical Exam  Vitals and nursing note reviewed  Constitutional:       General: He is not in acute distress  Appearance: Normal appearance  He is well-developed and normal weight  He is not ill-appearing  HENT:      Head: Normocephalic and atraumatic  Right Ear: External ear normal       Left Ear: External ear normal       Nose: Nose normal  No congestion or rhinorrhea  Mouth/Throat:      Mouth: Mucous membranes are moist       Pharynx: No oropharyngeal exudate or posterior oropharyngeal erythema  Eyes:      Extraocular Movements: Extraocular movements intact  Conjunctiva/sclera: Conjunctivae normal       Pupils: Pupils are equal, round, and reactive to light  Cardiovascular:      Rate and Rhythm: Normal rate and regular rhythm  Heart sounds: Normal heart sounds  No murmur heard  No friction rub  No gallop     Pulmonary:      Effort: Pulmonary effort is normal  No respiratory distress  Breath sounds: Normal breath sounds  No wheezing or rales  Chest:      Chest wall: No tenderness  Abdominal:      General: Bowel sounds are normal  There is no distension  Palpations: Abdomen is soft  There is no mass  Tenderness: There is no abdominal tenderness  There is no guarding or rebound  Musculoskeletal:         General: Normal range of motion  Cervical back: Normal range of motion and neck supple  Skin:     General: Skin is warm  Capillary Refill: Capillary refill takes less than 2 seconds  Neurological:      Mental Status: He is alert and oriented to person, place, and time     Psychiatric:         Mood and Affect: Mood normal          Behavior: Behavior normal

## 2022-08-13 DIAGNOSIS — I82.403 ACUTE DEEP VEIN THROMBOSIS (DVT) OF BOTH LOWER EXTREMITIES, UNSPECIFIED VEIN (HCC): ICD-10-CM

## 2022-08-15 LAB

## 2022-08-16 ENCOUNTER — TELEPHONE (OUTPATIENT)
Dept: FAMILY MEDICINE CLINIC | Facility: HOSPITAL | Age: 72
End: 2022-08-16

## 2022-08-17 RX ORDER — ENOXAPARIN SODIUM 100 MG/ML
80 INJECTION SUBCUTANEOUS EVERY 12 HOURS
Qty: 144 ML | Refills: 1 | Status: SHIPPED | OUTPATIENT
Start: 2022-08-17 | End: 2022-09-16 | Stop reason: SDUPTHER

## 2022-08-22 ENCOUNTER — CONSULT (OUTPATIENT)
Dept: PULMONOLOGY | Facility: HOSPITAL | Age: 72
End: 2022-08-22
Payer: COMMERCIAL

## 2022-08-22 VITALS
DIASTOLIC BLOOD PRESSURE: 60 MMHG | HEIGHT: 68 IN | RESPIRATION RATE: 18 BRPM | OXYGEN SATURATION: 93 % | WEIGHT: 181 LBS | BODY MASS INDEX: 27.43 KG/M2 | SYSTOLIC BLOOD PRESSURE: 110 MMHG | TEMPERATURE: 97.5 F | HEART RATE: 84 BPM

## 2022-08-22 DIAGNOSIS — J44.9 COPD MIXED TYPE (HCC): ICD-10-CM

## 2022-08-22 DIAGNOSIS — J90 RECURRENT RIGHT PLEURAL EFFUSION: Primary | ICD-10-CM

## 2022-08-22 DIAGNOSIS — C34.11 MALIGNANT NEOPLASM OF UPPER LOBE OF RIGHT LUNG (HCC): ICD-10-CM

## 2022-08-22 DIAGNOSIS — I82.4Z3 ACUTE DEEP VEIN THROMBOSIS (DVT) OF DISTAL VEIN OF BOTH LOWER EXTREMITIES (HCC): ICD-10-CM

## 2022-08-22 PROCEDURE — 99214 OFFICE O/P EST MOD 30 MIN: CPT | Performed by: INTERNAL MEDICINE

## 2022-08-22 RX ORDER — SODIUM CHLORIDE 9 MG/ML
20 INJECTION, SOLUTION INTRAVENOUS ONCE
Status: CANCELLED | OUTPATIENT
Start: 2022-08-29

## 2022-08-22 NOTE — ASSESSMENT & PLAN NOTE
Likely all related to right lung cancer  I explained to the patient that this effusion is likely to reoccur  However, he has only had 2 thoracenteses this year  He had only accumulates 700 mL in 6 months    He does not need referral for ASEPT catheter at this time

## 2022-08-22 NOTE — ASSESSMENT & PLAN NOTE
[id number] (IIIC) NSCLC (adenocarcinoma) of the RUL s/p 3 rounds of Carbo/Taxol    XRT,  and now on [address]

## 2022-08-22 NOTE — ASSESSMENT & PLAN NOTE
Agree with lifelong anticoagulation - on full dose Lovenox at this time 80mg q12hr  Failed Eliquis in the past

## 2022-08-22 NOTE — PROGRESS NOTES
Pulmonary Outpatient Note   Elle Sandoval 80 y o  male MRN: [id number]  8/23/2022      Referring Physician: Raisa Spears MD    Reason for Consultation:    Chief Complaint   Patient presents with    Emphysema       Assessment/Plan:    1  Recurrent right pleural effusion  Assessment & Plan:  Likely all related to right lung cancer  I explained to the patient that this effusion is likely to reoccur  However, he has only had 2 thoracenteses this year  He had only accumulates 700 mL in 6 months  He does not need referral for ASEPT catheter at this time      2  COPD mixed type [facility])  Assessment & Plan:  He has not had pulmonary function testing so we will start with that to establish a diagnosis  He also needs a 6 minute walk test to determine ambulatory oxygen needs  His SpO2 was 93-95% on room air while in the office at rest   This indicates that he does not need supplemental oxygen at rest   However I have asked the patient to wear 2 L of oxygen with exertion for now until we can complete a 6 minute walk test   They asked if he would need oxygen at night  I explained that nocturnal hypoxia is very common in patients like himself  I recommend a ambulatory nocturnal pulse oximetry in which we will attempt to arrange  He is on albuterol p r n     After pulmonary function testing we can determine whether he needs other bronchodilators or adjunct COPD medications, and or pulmonary rehab  Orders:  -     Ambulatory Referral to Pulmonology  -     Complete PFT with post Bronchodilator and Six Minute walk; Future    3  Malignant neoplasm of upper lobe of right lung [facility])  Assessment & Plan:  [id number] (IIIC) NSCLC (adenocarcinoma) of the RUL s/p 3 rounds of Carbo/Taxol  XRT,  and now on Keytruda [id number]    Orders:  -     Ambulatory Referral to Pulmonology    4   Bilateral lower extremity DVTs  Assessment & Plan:  Agree with lifelong anticoagulation - on full dose Lovenox at this time 80mg q12hr  Failed Eliquis in the past      Orders Placed This Encounter   Procedures    Complete PFT with post Bronchodilator and Six Minute walk       Health Maintenance  Immunization History   Administered Date(s) Administered    COVID-19 PFIZER VACCINE 0 3 ML IM 03/27/2021, 04/17/2021, 01/22/2022    Pneumococcal Conjugate Vaccine 20-valent (Pcv20), Polysace 05/09/2022, 05/09/2022        CT Lung Cancer Screening:    Return in about 3 months (around 11/22/2022)  History of Present Illness   HPI:  Dhruv Daily is a 80 y o  male w/ hx of tobacco use since quit, DVT (2021) on Lovenox, Emphysema, [id number] (IIIC) NSCLC (adenocarcinoma) of the RUL s/p chemo/XRT and now on Keytruda who is here to establish care with pulmonary and new oxygen need  Pt last saw Dr Lisa Paulino in our office in 2021  Patient was initially admitted to the hospital as a stroke alert in 7/2021 and subsequently had multiple imaging studies including CTA of the brain that showed a right nasopharyngeal mass measuring 2 7 centimeters  Who also found to have DVTs in both legs at that time  Subsequent CT chest of the chest abdomen pelvis on July 31, 2021 showed a 6 4 centimeter right upper lobe mass with extensive hilar mediastinal adenopathy  In addition there was a right pleural effusion moderate emphysema  Brain MRI August 2021 showed multifocal diffusion abnormalities in vascular territories but no metastatic disease to the brain was seen  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 was seen  CT-guided biopsy right lung mass on August 5, 2021 show adenocarcinoma of the lung  He underwent chemotherapy with carboplatin, Taxol in March 2022  He received XRT as well  He initiated [place] in March 2022 and has been receiving it every 3 weeks  He just had a PET scan in June 7th that showed decreased FDG in the mass without any hypermetabolic metastasis in the neck, abdomen, pelvis      Recent ENT eval showed no masses in the fossa of [place]i    In the meantime has had 2 thoracentesis in 2/2022 (2L removed) and 8/2022, last time with 700cc removed  Recently had a visit with his primary care physician and was found to be 88% SpO2 on room air which increased to 99% with further rest   He was provided oxygen and asked to wear supplemental oxygen with exertion  He has a concentrator, portable oxygen tanks, nasal cannula at home  From a pulmonary standpoint he has no history of child lung disease and was not born premature  His brother had lung cancer  He has not had any urgent care, emergency room, hospitalizations for respiratory failure  His most exertional activities to walk up a flight of stairs  He has no dyspnea at rest   He has a chronic nonproductive cough but is without hemoptysis  No history of rheumatologic diagnosis no, no significant joint pain, Raynaud's, sicca symptoms, skin rashes or lesions  He denies any dysphagia or reflux, odynophagia, regurgitation  He usually eats dinner around 6:00 p m  and goes to bed around 8:30 p m  he had no leg swelling  He lives with his wife, has a cat at home, no down comforters or feather pillows, no significant mold, no mice for bunch infestation  He has never had tuberculosis  He is not on any herbal supplements  He does not vape and does not use E cigarettes    Started smoking at age 16 and quit 18 years ago  He was smoking up to about 3 packs a day at the most    He has only been prescribed an albuterol inhaler which he never uses  He has never had pulmonary function testing before      Review of Systems   Constitutional: Positive for fatigue  Negative for chills and fever  HENT: Negative for ear pain and sore throat  Eyes: Negative for pain and visual disturbance  Respiratory: Positive for cough  Negative for shortness of breath  Dyspnea with exertion   Cardiovascular: Negative for chest pain and palpitations     Gastrointestinal: Negative for abdominal pain and vomiting  Genitourinary: Negative for dysuria and hematuria  Musculoskeletal: Negative for arthralgias, back pain, gait problem and joint swelling  Skin: Negative for color change and rash  Neurological: Negative for seizures and syncope  Psychiatric/Behavioral: Negative for agitation  All other systems reviewed and are negative        Historical Information   Past Medical History:   Diagnosis Date    Chronic respiratory failure with hypoxia ([address] ) 8/9/2021    Impaired mobility and ADLs 8/13/2021    Lung cancer (HCC)     Stroke 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[employer])      Meds/Allergies     Current Outpatient Medications:     atorvastatin (LIPITOR) 40 mg tablet, Take 1 tablet (40 mg total) by mouth daily with dinner, Disp: 90 tablet, Rfl: 2    enoxaparin (LOVENOX) 80 mg/0 8 mL, Inject 0 8 mL (80 mg total) under the skin every 12 (twelve) hours, Disp: 144 mL, Rfl: 1    folic acid (FOLVITE) 1 mg tablet, TAKE 1 TABLET BY MOUTH EVERY DAY, Disp: 30 tablet, Rfl: 1    multivitamin (THERAGRAN) TABS, Take 1 tablet by mouth daily, Disp: , Rfl:     tamsulosin (FLOMAX) 0 4 mg, Take 1 capsule (0 4 mg total) by mouth daily after dinner, Disp: 90 capsule, Rfl: 3    acetaminophen (TYLENOL) 325 mg tablet, Take 2 tablets (650 mg total) by mouth 4 (four) times a day as needed for mild pain, headaches or fever (Patient not taking: No sig reported), Disp: , Rfl: 0    albuterol (ProAir HFA) 90 mcg/act inhaler, Inhale 2 puffs every 6 (six) hours as needed for wheezing or shortness of breath (Patient not taking: Reported on 8/22/2022), Disp: 8 g, Rfl: 0  Allergies   Allergen Reactions  Doxycycline Rash       Vitals: Blood pressure 110/60, pulse 84, temperature 97 5 °F (36 4 °C), temperature source Tympanic, resp  rate 18, height 5' 8" (1 727 m), weight 82 1 kg (181 lb), SpO2 93 %  Body mass index is 27 52 kg/m²  Oxygen Therapy  SpO2: 93 %  Oxygen Therapy: None (Room air)      Physical Exam  Physical Exam  Vitals and nursing note reviewed  Constitutional:       Appearance: He is well-developed  Comments: Some temporal and facial muscle atrophy   HENT:      Head: Normocephalic and atraumatic  Right Ear: External ear normal       Left Ear: External ear normal       Nose: Nose normal       Mouth/Throat:      Mouth: Mucous membranes are moist    Eyes:      Conjunctiva/sclera: Conjunctivae normal    Cardiovascular:      Rate and Rhythm: Normal rate and regular rhythm  Heart sounds: No murmur heard  Pulmonary:      Effort: Pulmonary effort is normal  No respiratory distress  Breath sounds: Normal breath sounds  Abdominal:      General: There is no distension  Palpations: Abdomen is soft  Tenderness: There is no abdominal tenderness  Musculoskeletal:         General: No swelling  Normal range of motion  Cervical back: Normal range of motion and neck supple  Right lower leg: No edema  Left lower leg: No edema  Skin:     General: Skin is warm and dry  Neurological:      General: No focal deficit present  Mental Status: He is alert and oriented to person, place, and time  Psychiatric:         Mood and Affect: Mood normal          Thought Content: Thought content normal          Labs: I have personally reviewed pertinent lab results      ABG: No results found for: PHART, HHY9OLJ, PO2ART, CQC0CDR, K0UGINPA, BEART, SOURCE,   BNP: No results found for: BNP,   CBC:  Lab Results   Component Value Date    WBC 5 34 08/05/2022    HGB 10 9 (L) 08/05/2022    HCT 35 6 (L) 08/05/2022    MCV 93 08/05/2022     08/05/2022    EOSPCT 3 08/05/2022 EOSABS 0 14 08/05/2022    NEUTOPHILPCT 67 08/05/2022    LYMPHOPCT 11 (L) 08/05/2022   ,   CMP:   Lab Results   Component Value Date    SODIUM 139 08/05/2022    K 3 9 08/05/2022     08/05/2022    CO2 28 08/05/2022    BUN 12 08/05/2022    CREATININE 1 09 08/05/2022    CALCIUM 9 6 08/05/2022    AST 23 08/05/2022    ALT 24 08/05/2022    ALKPHOS 51 08/05/2022    EGFR 68 08/05/2022   ,   PT/INR:   Lab Results   Component Value Date    INR 1 13 08/23/2021   ,   Troponin:   Lab Results   Component Value Date    TROPONINI 0 38 (H) 08/21/2021         Imaging and other studies:  PET 6/2022  IMPRESSION:  1  Decreasing FDG activity of the right upper lung mass, suggesting response to therapy  Adjacent hypermetabolic pleural thickening may be inflammatory  No significant change in mild right perihilar activity  Otherwise no new hypermetabolic lesions   in the thorax  Continued PET CT follow-up recommended to exclude viable tumor  2   Persistent moderate to large right pleural effusion  3   No new hypermetabolic metastases in the neck, abdomen, pelvis  4   No significant change in posterior nasopharyngeal soft tissue lesions  CT Chest 12/2021  IMPRESSION:  CHEST:  1  Decrease in size of the known biopsy-proven right upper lobe lung adenocarcinoma, now measuring 58 x 34 mm, previously 65 x 37 mm   2   Decrease in size of previously enlarged mediastinal lymph nodes, which no longer meet size criteria for enlargement  3   Moderate right pleural effusion  Pulmonary function testing:   Pulmonary Functions Testing Results:  None on file      EKG, Pathology, and Other Studies: I have personally reviewed pertinent reports  and I have personally reviewed pertinent films in PACS    Braeden Mejia MD  Pulmonary, Critical Care and Sleep Medicine  [facility]     Portions of the record may have been created with voice recognition software   Occasional wrong word or "sound a like" substitutions may have occurred due to the inherent limitations of voice recognition software  Please read the chart carefully and recognize, using context, where substitutions have occurred

## 2022-08-26 ENCOUNTER — APPOINTMENT (OUTPATIENT)
Dept: LAB | Facility: CLINIC | Age: 72
End: 2022-08-26
Payer: COMMERCIAL

## 2022-08-26 ENCOUNTER — TELEPHONE (OUTPATIENT)
Dept: HEMATOLOGY ONCOLOGY | Facility: CLINIC | Age: 72
End: 2022-08-26

## 2022-08-26 DIAGNOSIS — C34.90 ADENOCARCINOMA OF LUNG, UNSPECIFIED LATERALITY (HCC): ICD-10-CM

## 2022-08-26 LAB
ALBUMIN SERPL BCP-MCNC: 2.4 G/DL (ref 3.5–5)
ALP SERPL-CCNC: 60 U/L (ref 46–116)
ALT SERPL W P-5'-P-CCNC: 18 U/L (ref 12–78)
ANION GAP SERPL CALCULATED.3IONS-SCNC: 6 MMOL/L (ref 4–13)
AST SERPL W P-5'-P-CCNC: 17 U/L (ref 5–45)
BASOPHILS # BLD AUTO: 0.05 THOUSANDS/ΜL (ref 0–0.1)
BASOPHILS NFR BLD AUTO: 1 % (ref 0–1)
BILIRUB SERPL-MCNC: 0.43 MG/DL (ref 0.2–1)
BUN SERPL-MCNC: 10 MG/DL (ref 5–25)
CALCIUM ALBUM COR SERPL-MCNC: 11.1 MG/DL (ref 8.3–10.1)
CALCIUM SERPL-MCNC: 9.8 MG/DL (ref 8.3–10.1)
CHLORIDE SERPL-SCNC: 109 MMOL/L (ref 96–108)
CO2 SERPL-SCNC: 25 MMOL/L (ref 21–32)
CREAT SERPL-MCNC: 0.98 MG/DL (ref 0.6–1.3)
EOSINOPHIL # BLD AUTO: 0.18 THOUSAND/ΜL (ref 0–0.61)
EOSINOPHIL NFR BLD AUTO: 3 % (ref 0–6)
ERYTHROCYTE [DISTWIDTH] IN BLOOD BY AUTOMATED COUNT: 14.5 % (ref 11.6–15.1)
GFR SERPL CREATININE-BSD FRML MDRD: 77 ML/MIN/1.73SQ M
GLUCOSE P FAST SERPL-MCNC: 109 MG/DL (ref 65–99)
HCT VFR BLD AUTO: 36.5 % (ref 36.5–49.3)
HGB BLD-MCNC: 10.8 G/DL (ref 12–17)
IMM GRANULOCYTES # BLD AUTO: 0.07 THOUSAND/UL (ref 0–0.2)
IMM GRANULOCYTES NFR BLD AUTO: 1 % (ref 0–2)
LYMPHOCYTES # BLD AUTO: 0.86 THOUSANDS/ΜL (ref 0.6–4.47)
LYMPHOCYTES NFR BLD AUTO: 13 % (ref 14–44)
MCH RBC QN AUTO: 27.2 PG (ref 26.8–34.3)
MCHC RBC AUTO-ENTMCNC: 29.6 G/DL (ref 31.4–37.4)
MCV RBC AUTO: 92 FL (ref 82–98)
MONOCYTES # BLD AUTO: 0.78 THOUSAND/ΜL (ref 0.17–1.22)
MONOCYTES NFR BLD AUTO: 12 % (ref 4–12)
NEUTROPHILS # BLD AUTO: 4.53 THOUSANDS/ΜL (ref 1.85–7.62)
NEUTS SEG NFR BLD AUTO: 70 % (ref 43–75)
NRBC BLD AUTO-RTO: 0 /100 WBCS
PLATELET # BLD AUTO: 362 THOUSANDS/UL (ref 149–390)
PMV BLD AUTO: 9.4 FL (ref 8.9–12.7)
POTASSIUM SERPL-SCNC: 4.3 MMOL/L (ref 3.5–5.3)
PROT SERPL-MCNC: 7.1 G/DL (ref 6.4–8.4)
RBC # BLD AUTO: 3.97 MILLION/UL (ref 3.88–5.62)
SODIUM SERPL-SCNC: 140 MMOL/L (ref 135–147)
T3FREE SERPL-MCNC: 2.1 PG/ML (ref 2.3–4.2)
TSH SERPL DL<=0.05 MIU/L-ACNC: 4.07 UIU/ML (ref 0.45–4.5)
WBC # BLD AUTO: 6.47 THOUSAND/UL (ref 4.31–10.16)

## 2022-08-26 PROCEDURE — 36415 COLL VENOUS BLD VENIPUNCTURE: CPT

## 2022-08-26 PROCEDURE — 85025 COMPLETE CBC W/AUTO DIFF WBC: CPT

## 2022-08-26 PROCEDURE — 84481 FREE ASSAY (FT-3): CPT

## 2022-08-26 PROCEDURE — 80053 COMPREHEN METABOLIC PANEL: CPT

## 2022-08-26 PROCEDURE — 84443 ASSAY THYROID STIM HORMONE: CPT

## 2022-08-26 NOTE — TELEPHONE ENCOUNTER
Called patient regarding hypercalcemia  Spoke with wife  She states patient is feeling fine, no mental status changes, confusion  Reviewed treatment schedule  No further questions at this time

## 2022-08-29 ENCOUNTER — HOSPITAL ENCOUNTER (OUTPATIENT)
Dept: INFUSION CENTER | Facility: HOSPITAL | Age: 72
Discharge: HOME | End: 2022-08-29
Attending: INTERNAL MEDICINE
Payer: COMMERCIAL

## 2022-08-29 VITALS
RESPIRATION RATE: 16 BRPM | HEIGHT: 68 IN | OXYGEN SATURATION: 93 % | SYSTOLIC BLOOD PRESSURE: 121 MMHG | WEIGHT: 180.78 LBS | HEART RATE: 71 BPM | TEMPERATURE: 97.5 F | BODY MASS INDEX: 27.4 KG/M2 | DIASTOLIC BLOOD PRESSURE: 69 MMHG

## 2022-08-29 DIAGNOSIS — T45.1X5A CHEMOTHERAPY INDUCED NEUTROPENIA (HCC): ICD-10-CM

## 2022-08-29 DIAGNOSIS — C34.11 MALIGNANT NEOPLASM OF UPPER LOBE OF RIGHT LUNG (HCC): Primary | ICD-10-CM

## 2022-08-29 DIAGNOSIS — R91.8 MASS OF UPPER LOBE OF RIGHT LUNG: ICD-10-CM

## 2022-08-29 DIAGNOSIS — C34.90 ADENOCARCINOMA OF LUNG, UNSPECIFIED LATERALITY (HCC): ICD-10-CM

## 2022-08-29 DIAGNOSIS — D70.1 CHEMOTHERAPY INDUCED NEUTROPENIA (HCC): ICD-10-CM

## 2022-08-29 PROCEDURE — 96413 CHEMO IV INFUSION 1 HR: CPT

## 2022-08-29 RX ORDER — SODIUM CHLORIDE 9 MG/ML
20 INJECTION, SOLUTION INTRAVENOUS ONCE
Status: COMPLETED | OUTPATIENT
Start: 2022-08-29 | End: 2022-08-29

## 2022-08-29 RX ADMIN — SODIUM CHLORIDE 200 MG: 9 INJECTION, SOLUTION INTRAVENOUS at 09:59

## 2022-08-29 RX ADMIN — SODIUM CHLORIDE 20 ML/HR: 9 INJECTION, SOLUTION INTRAVENOUS at 09:59

## 2022-08-29 NOTE — PROGRESS NOTES
Pt here today for chemo tolerated well no adverse reactions AVS provided and pt left ambulatory with steady gait

## 2022-09-06 DIAGNOSIS — C34.11 MALIGNANT NEOPLASM OF UPPER LOBE OF RIGHT LUNG (HCC): ICD-10-CM

## 2022-09-06 RX ORDER — FOLIC ACID 1 MG/1
TABLET ORAL
Qty: 90 TABLET | Refills: 1 | Status: SHIPPED | OUTPATIENT
Start: 2022-09-06

## 2022-09-12 DIAGNOSIS — N13.8 BPH WITH URINARY OBSTRUCTION: ICD-10-CM

## 2022-09-12 DIAGNOSIS — N40.1 BPH WITH URINARY OBSTRUCTION: ICD-10-CM

## 2022-09-12 RX ORDER — TAMSULOSIN HYDROCHLORIDE 0.4 MG/1
0.4 CAPSULE ORAL
Qty: 90 CAPSULE | Refills: 0 | Status: SHIPPED | OUTPATIENT
Start: 2022-09-12

## 2022-09-12 RX ORDER — SODIUM CHLORIDE 9 MG/ML
20 INJECTION, SOLUTION INTRAVENOUS ONCE
Status: CANCELLED | OUTPATIENT
Start: 2022-09-19

## 2022-09-16 ENCOUNTER — APPOINTMENT (OUTPATIENT)
Dept: LAB | Facility: CLINIC | Age: 72
End: 2022-09-16
Payer: COMMERCIAL

## 2022-09-16 DIAGNOSIS — C34.11 MALIGNANT NEOPLASM OF UPPER LOBE OF RIGHT LUNG (HCC): ICD-10-CM

## 2022-09-16 DIAGNOSIS — T45.1X5A CHEMOTHERAPY-INDUCED NEUTROPENIA (HCC): ICD-10-CM

## 2022-09-16 DIAGNOSIS — D70.1 CHEMOTHERAPY-INDUCED NEUTROPENIA (HCC): ICD-10-CM

## 2022-09-16 DIAGNOSIS — I82.403 ACUTE DEEP VEIN THROMBOSIS (DVT) OF BOTH LOWER EXTREMITIES, UNSPECIFIED VEIN (HCC): ICD-10-CM

## 2022-09-16 DIAGNOSIS — R91.8 MASS OF UPPER LOBE OF RIGHT LUNG: ICD-10-CM

## 2022-09-16 DIAGNOSIS — C34.90 ADENOCARCINOMA OF LUNG, UNSPECIFIED LATERALITY (HCC): ICD-10-CM

## 2022-09-16 LAB
ALBUMIN SERPL BCP-MCNC: 2.7 G/DL (ref 3.5–5)
ALP SERPL-CCNC: 63 U/L (ref 46–116)
ALT SERPL W P-5'-P-CCNC: 18 U/L (ref 12–78)
ANION GAP SERPL CALCULATED.3IONS-SCNC: 4 MMOL/L (ref 4–13)
AST SERPL W P-5'-P-CCNC: 10 U/L (ref 5–45)
BASOPHILS # BLD AUTO: 0.06 THOUSANDS/ΜL (ref 0–0.1)
BASOPHILS NFR BLD AUTO: 1 % (ref 0–1)
BILIRUB SERPL-MCNC: 0.38 MG/DL (ref 0.2–1)
BUN SERPL-MCNC: 11 MG/DL (ref 5–25)
CALCIUM ALBUM COR SERPL-MCNC: 10.7 MG/DL (ref 8.3–10.1)
CALCIUM SERPL-MCNC: 9.7 MG/DL (ref 8.3–10.1)
CHLORIDE SERPL-SCNC: 108 MMOL/L (ref 96–108)
CO2 SERPL-SCNC: 27 MMOL/L (ref 21–32)
CREAT SERPL-MCNC: 0.95 MG/DL (ref 0.6–1.3)
EOSINOPHIL # BLD AUTO: 0.14 THOUSAND/ΜL (ref 0–0.61)
EOSINOPHIL NFR BLD AUTO: 2 % (ref 0–6)
ERYTHROCYTE [DISTWIDTH] IN BLOOD BY AUTOMATED COUNT: 16.6 % (ref 11.6–15.1)
GFR SERPL CREATININE-BSD FRML MDRD: 80 ML/MIN/1.73SQ M
GLUCOSE P FAST SERPL-MCNC: 85 MG/DL (ref 65–99)
HCT VFR BLD AUTO: 36.5 % (ref 36.5–49.3)
HGB BLD-MCNC: 10.9 G/DL (ref 12–17)
IMM GRANULOCYTES # BLD AUTO: 0.05 THOUSAND/UL (ref 0–0.2)
IMM GRANULOCYTES NFR BLD AUTO: 1 % (ref 0–2)
LYMPHOCYTES # BLD AUTO: 0.79 THOUSANDS/ΜL (ref 0.6–4.47)
LYMPHOCYTES NFR BLD AUTO: 11 % (ref 14–44)
MCH RBC QN AUTO: 27.6 PG (ref 26.8–34.3)
MCHC RBC AUTO-ENTMCNC: 29.9 G/DL (ref 31.4–37.4)
MCV RBC AUTO: 92 FL (ref 82–98)
MONOCYTES # BLD AUTO: 0.83 THOUSAND/ΜL (ref 0.17–1.22)
MONOCYTES NFR BLD AUTO: 12 % (ref 4–12)
NEUTROPHILS # BLD AUTO: 5.12 THOUSANDS/ΜL (ref 1.85–7.62)
NEUTS SEG NFR BLD AUTO: 73 % (ref 43–75)
NRBC BLD AUTO-RTO: 0 /100 WBCS
PLATELET # BLD AUTO: 313 THOUSANDS/UL (ref 149–390)
PMV BLD AUTO: 9.4 FL (ref 8.9–12.7)
POTASSIUM SERPL-SCNC: 4 MMOL/L (ref 3.5–5.3)
PROT SERPL-MCNC: 6.9 G/DL (ref 6.4–8.4)
RBC # BLD AUTO: 3.95 MILLION/UL (ref 3.88–5.62)
SODIUM SERPL-SCNC: 139 MMOL/L (ref 135–147)
T3FREE SERPL-MCNC: 2.28 PG/ML (ref 2.3–4.2)
TSH SERPL DL<=0.05 MIU/L-ACNC: 3.66 UIU/ML (ref 0.45–4.5)
WBC # BLD AUTO: 6.99 THOUSAND/UL (ref 4.31–10.16)

## 2022-09-16 PROCEDURE — 85025 COMPLETE CBC W/AUTO DIFF WBC: CPT

## 2022-09-16 PROCEDURE — 36415 COLL VENOUS BLD VENIPUNCTURE: CPT

## 2022-09-16 PROCEDURE — 84481 FREE ASSAY (FT-3): CPT

## 2022-09-16 PROCEDURE — 84443 ASSAY THYROID STIM HORMONE: CPT

## 2022-09-16 PROCEDURE — 80053 COMPREHEN METABOLIC PANEL: CPT

## 2022-09-19 ENCOUNTER — HOSPITAL ENCOUNTER (OUTPATIENT)
Dept: INFUSION CENTER | Facility: HOSPITAL | Age: 72
Discharge: HOME | End: 2022-09-19
Attending: INTERNAL MEDICINE
Payer: COMMERCIAL

## 2022-09-19 VITALS
RESPIRATION RATE: 16 BRPM | WEIGHT: 182.98 LBS | HEIGHT: 68 IN | BODY MASS INDEX: 27.73 KG/M2 | OXYGEN SATURATION: 92 % | DIASTOLIC BLOOD PRESSURE: 59 MMHG | SYSTOLIC BLOOD PRESSURE: 118 MMHG | TEMPERATURE: 96.8 F | HEART RATE: 70 BPM

## 2022-09-19 DIAGNOSIS — C34.90 ADENOCARCINOMA OF LUNG, UNSPECIFIED LATERALITY (HCC): ICD-10-CM

## 2022-09-19 DIAGNOSIS — D70.1 CHEMOTHERAPY INDUCED NEUTROPENIA (HCC): ICD-10-CM

## 2022-09-19 DIAGNOSIS — E83.52 HYPERCALCEMIA OF MALIGNANCY: ICD-10-CM

## 2022-09-19 DIAGNOSIS — T45.1X5A CHEMOTHERAPY INDUCED NEUTROPENIA (HCC): ICD-10-CM

## 2022-09-19 DIAGNOSIS — R91.8 MASS OF UPPER LOBE OF RIGHT LUNG: ICD-10-CM

## 2022-09-19 DIAGNOSIS — C34.11 MALIGNANT NEOPLASM OF UPPER LOBE OF RIGHT LUNG (HCC): Primary | ICD-10-CM

## 2022-09-19 PROCEDURE — 96372 THER/PROPH/DIAG INJ SC/IM: CPT

## 2022-09-19 PROCEDURE — 96413 CHEMO IV INFUSION 1 HR: CPT

## 2022-09-19 RX ORDER — ENOXAPARIN SODIUM 100 MG/ML
80 INJECTION SUBCUTANEOUS EVERY 12 HOURS
Qty: 144 ML | Refills: 0 | Status: SHIPPED | OUTPATIENT
Start: 2022-09-19 | End: 2022-10-14 | Stop reason: SDUPTHER

## 2022-09-19 RX ORDER — SODIUM CHLORIDE 9 MG/ML
20 INJECTION, SOLUTION INTRAVENOUS ONCE
Status: COMPLETED | OUTPATIENT
Start: 2022-09-19 | End: 2022-09-19

## 2022-09-19 RX ADMIN — SODIUM CHLORIDE 20 ML/HR: 9 INJECTION, SOLUTION INTRAVENOUS at 09:14

## 2022-09-19 RX ADMIN — DENOSUMAB 120 MG: 120 INJECTION SUBCUTANEOUS at 10:15

## 2022-09-19 RX ADMIN — SODIUM CHLORIDE 200 MG: 9 INJECTION, SOLUTION INTRAVENOUS at 09:13

## 2022-09-19 NOTE — PROGRESS NOTES
Pt arrived to unit for Keytruda infusion  Pt tolerated treatment well without complication  Xgeva given in left arm  AVS given, pt left unit ambulatory with steady gait

## 2022-09-19 NOTE — PLAN OF CARE
Problem: Potential for Falls  Goal: Patient will remain free of falls  Description: INTERVENTIONS:  - Educate patient/family on patient safety including physical limitations  - Instruct patient to call for assistance with activity   - Consult OT/PT to assist with strengthening/mobility   - Keep Call bell within reach  - Keep bed low and locked with side rails adjusted as appropriate  - Keep care items and personal belongings within reach  - Initiate and maintain comfort rounds  Outcome: Progressing     Problem: Knowledge Deficit  Goal: Patient/family/caregiver demonstrates understanding of disease process, treatment plan, medications, and discharge instructions  Description: Complete learning assessment and assess knowledge base    Interventions:  - Provide teaching at level of understanding  - Provide teaching via preferred learning methods  Outcome: Progressing

## 2022-09-29 NOTE — TELEPHONE ENCOUNTER
Daniela Arthur was made aware labs are needed prior to each treatment  She stated they use [facility]  Standing orders placed for every 21 days  She stated they will go tomorrow morning to [facility] [address] and have these completed 
no

## 2022-09-30 ENCOUNTER — TELEPHONE (OUTPATIENT)
Dept: HEMATOLOGY ONCOLOGY | Facility: HOSPITAL | Age: 72
End: 2022-09-30

## 2022-09-30 ENCOUNTER — DOCUMENTATION (OUTPATIENT)
Dept: HEMATOLOGY ONCOLOGY | Facility: HOSPITAL | Age: 72
End: 2022-09-30

## 2022-09-30 ENCOUNTER — OFFICE VISIT (OUTPATIENT)
Dept: HEMATOLOGY ONCOLOGY | Facility: HOSPITAL | Age: 72
End: 2022-09-30
Payer: COMMERCIAL

## 2022-09-30 VITALS
WEIGHT: 173 LBS | SYSTOLIC BLOOD PRESSURE: 132 MMHG | TEMPERATURE: 96.4 F | BODY MASS INDEX: 26.22 KG/M2 | HEIGHT: 68 IN | RESPIRATION RATE: 18 BRPM | HEART RATE: 69 BPM | DIASTOLIC BLOOD PRESSURE: 64 MMHG | OXYGEN SATURATION: 96 %

## 2022-09-30 DIAGNOSIS — C34.11 MALIGNANT NEOPLASM OF UPPER LOBE OF RIGHT LUNG (HCC): Primary | ICD-10-CM

## 2022-09-30 DIAGNOSIS — C34.90 ADENOCARCINOMA OF LUNG, UNSPECIFIED LATERALITY (HCC): ICD-10-CM

## 2022-09-30 PROCEDURE — 99214 OFFICE O/P EST MOD 30 MIN: CPT | Performed by: INTERNAL MEDICINE

## 2022-09-30 NOTE — TELEPHONE ENCOUNTER
Called patient and spoke to patient's wife Shavon Samaniego confirmed the CT scan appt date and time and location  Shavon Samaniego will come into the office Monday and  the barium

## 2022-09-30 NOTE — PROGRESS NOTES
Left voicemail for patient that I would be checking him in for his appointment today with Dr Sharon Boxer  I asked that he call us back if any changes or unable to make the appointment today

## 2022-09-30 NOTE — PROGRESS NOTES
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 measuring 2 7 cm in greatest dimension   He also had a Doppler ultrasound of the lower extremities which showed acute deep vein thrombosis in both lower extremities  CT scan of the chest abdomen pelvis without contrast on 07/31 showed 6 4 cm solid irregular right upper lobe pulmonary mass with extensive right hilar and mediastinal adenopathy   He also seems to have small right pleural effusion and moderate emphysema  MRI of the brain on 08/03/2021 showed multifocal diffusion abnormalities in several separate vascular territories   No metastatic disease to the brain was mentioned  Avinash Army does seem to have 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    CT-guided biopsy of the right lung mass was done on 08/05   The pathology showed adenocarcinoma of lung primary       Previous Hematologic/ Oncologic History:    Oncology History   Adenocarcinoma of lung   8/2021 Initial Diagnosis    Adenocarcinoma of lung     8/5/2021 Biopsy    Right lung apex, image-guided core needle biopsy:  - Adenocarcinoma      10/6/2021 - 1/26/2022 Chemotherapy    cyanocobalamin, 1,000 mcg, Intramuscular, Once, 3 of 3 cycles  Administration: 1,000 mcg (11/24/2021), 1,000 mcg (1/26/2022), 1,000 mcg (10/6/2021)  pegfilgrastim (Wilberto Aviva), 6 mg, Subcutaneous, Once, 1 of 1 cycle  Administration: 6 mg (11/26/2021)  pegfilgrastim (Larinda Mingle), 6 mg, Subcutaneous, Once, 6 of 6 cycles  Administration: 6 mg (10/6/2021), 6 mg (11/3/2021), 6 mg (11/24/2021), 6 mg (12/15/2021), 6 mg (1/5/2022)  fosaprepitant (EMEND) IVPB, 150 mg, Intravenous, Once, 6 of 6 cycles  Administration: 150 mg (10/6/2021), 150 mg (11/24/2021), 150 mg (12/15/2021), 150 mg (1/26/2022), 150 mg (11/3/2021), 150 mg (1/5/2022)  CARBOplatin (PARAPLATIN) IVPB (GOG AUC DOSING), 519 5 mg, Intravenous, Once, 6 of 6 cycles  Administration: 519 5 mg (10/6/2021), 574 mg (11/24/2021), 600 mg (12/15/2021), 533 mg (1/26/2022), 604 5 mg (11/3/2021), 563 mg (1/5/2022)  pemetrexed (ALIMTA) chemo infusion, 970 mg, Intravenous, Once, 6 of 6 cycles  Administration: 1,000 mg (10/6/2021), 1,000 mg (11/24/2021), 1,000 mg (12/15/2021), 1,000 mg (1/26/2022), 1,000 mg (11/3/2021), 1,000 mg (1/5/2022)  pembrolizumab (KEYTRUDA) IVPB, 200 mg, Intravenous, Once, 6 of 6 cycles  Administration: 200 mg (10/6/2021), 200 mg (11/24/2021), 200 mg (12/15/2021), 200 mg (1/26/2022), 200 mg (11/3/2021), 200 mg (1/5/2022)     3/7/2022 -  Chemotherapy    CARBOplatin (PARAPLATIN) IVPB (GOG AUC DOSING), , Intravenous, Once, 6 of 6 cycles  Administration: 211 6 mg (3/7/2022), 208 mg (3/14/2022), 238 8 mg (3/21/2022), 211 6 mg (3/28/2022), 215 2 mg (4/4/2022), 213 4 mg (4/18/2022)  PACLItaxel (TAXOL) chemo IVPB, 50 mg/m2 = 99 mg, Intravenous, Once, 6 of 6 cycles  Administration: 99 mg (3/7/2022), 99 mg (3/14/2022), 99 mg (3/21/2022), 99 mg (3/28/2022), 99 mg (4/4/2022), 99 mg (4/18/2022)  pembrolizumab (KEYTRUDA) IVPB, 200 mg, Intravenous, Once, 10 of 17 cycles  Administration: 200 mg (3/7/2022), 200 mg (3/28/2022), 200 mg (4/25/2022), 200 mg (5/16/2022), 200 mg (6/6/2022), 200 mg (6/27/2022), 200 mg (7/18/2022), 200 mg (8/8/2022), 200 mg (8/29/2022), 200 mg (9/19/2022)     Malignant neoplasm of upper lobe of right lung ([facility] [address] )   9/3/2021 Initial Diagnosis    Malignant neoplasm of upper lobe of right lung ([facility] [address] )     9/23/2021 -  Cancer Staged    Staging form: Lung, AJCC 8th Edition  - Clinical stage from 9/23/2021: Stage IIIC (cT3, cN3, cM0) - Signed by Deandra Orta MD on 9/23/2021  Histopathologic type: Adenocarcinoma, NOS       10/6/2021 - 1/26/2022 Chemotherapy    cyanocobalamin, 1,000 mcg, Intramuscular, Once, 3 of 3 cycles  Administration: 1,000 mcg (11/24/2021), 1,000 mcg (1/26/2022), 1,000 mcg (10/6/2021)  pegfilgrastim (Mitcheal Marques), 6 mg, Subcutaneous, Once, 1 of 1 cycle  Administration: 6 mg (11/26/2021)  pegfilgrastim (Lashaun Anger), 6 mg, Subcutaneous, Once, 6 of 6 cycles  Administration: 6 mg (10/6/2021), 6 mg (11/3/2021), 6 mg (11/24/2021), 6 mg (12/15/2021), 6 mg (1/5/2022)  fosaprepitant (EMEND) IVPB, 150 mg, Intravenous, Once, 6 of 6 cycles  Administration: 150 mg (10/6/2021), 150 mg (11/24/2021), 150 mg (12/15/2021), 150 mg (1/26/2022), 150 mg (11/3/2021), 150 mg (1/5/2022)  CARBOplatin (PARAPLATIN) IVPB (GOG AUC DOSING), 519 5 mg, Intravenous, Once, 6 of 6 cycles  Administration: 519 5 mg (10/6/2021), 574 mg (11/24/2021), 600 mg (12/15/2021), 533 mg (1/26/2022), 604 5 mg (11/3/2021), 563 mg (1/5/2022)  pemetrexed (ALIMTA) chemo infusion, 970 mg, Intravenous, Once, 6 of 6 cycles  Administration: 1,000 mg (10/6/2021), 1,000 mg (11/24/2021), 1,000 mg (12/15/2021), 1,000 mg (1/26/2022), 1,000 mg (11/3/2021), 1,000 mg (1/5/2022)  pembrolizumab (KEYTRUDA) IVPB, 200 mg, Intravenous, Once, 6 of 6 cycles  Administration: 200 mg (10/6/2021), 200 mg (11/24/2021), 200 mg (12/15/2021), 200 mg (1/26/2022), 200 mg (11/3/2021), 200 mg (1/5/2022)     3/4/2022 - 4/21/2022 Radiation    The patient saw @[facility] for radiation treatment   This is the current list of radiation treatment:  Plan ID Energy Fractions Dose per Fraction (cGy) Dose Correction (cGy) Total Dose Delivered (cGy) Elapsed Days   R LUNGMED REV 6X 30 / 30 200 0 6,000 48      Treatment dates:  C1: 3/4/2022 - 4/21/2022         3/7/2022 -  Chemotherapy    CARBOplatin (PARAPLATIN) IVPB (GOG AUC DOSING), , Intravenous, Once, 6 of 6 cycles  Administration: 211 6 mg (3/7/2022), 208 mg (3/14/2022), 238 8 mg (3/21/2022), 211 6 mg (3/28/2022), 215 2 mg (4/4/2022), 213 4 mg (4/18/2022)  PACLItaxel (TAXOL) chemo IVPB, 50 mg/m2 = 99 mg, Intravenous, Once, 6 of 6 cycles  Administration: 99 mg (3/7/2022), 99 mg (3/14/2022), 99 mg (3/21/2022), 99 mg (3/28/2022), 99 mg (4/4/2022), 99 mg (4/18/2022)  pembrolizumab (KEYTRUDA) IVPB, 200 mg, Intravenous, Once, 10 of 17 cycles  Administration: 200 mg (3/7/2022), 200 mg (3/28/2022), 200 mg (4/25/2022), 200 mg (5/16/2022), 200 mg (6/6/2022), 200 mg (6/27/2022), 200 mg (7/18/2022), 200 mg (8/8/2022), 200 mg (8/29/2022), 200 mg (9/19/2022)       Concurrent chemotherapy with carboplatin and Taxol with Keytruda and radiation      Current Hematologic/ Oncologic Treatment:      Keytruda every 3 weeks      Interval History:       Patient returns for follow-up visit  He states he is doing well  His breathing is actually better than previously  Denies any nausea denies any vomiting denies any diarrhea  Is tolerating his immunotherapy quite well with no issues  Overall he states he feels good and is very functional   Quality of life is good  The rest of his 14 point review of systems today was negative  Cancer Staging:  Cancer Staging  Malignant neoplasm of upper lobe of right lung [facility])  Staging form: Lung, AJCC 8th Edition  - Clinical stage from 9/23/2021: Stage IIIC (cT3, cN3, cM0) - Signed by Chelo Moreira MD on 9/23/2021  Histopathologic type: Adenocarcinoma, NOS    Test Results:    Imaging: No results found      Labs:   Lab Results   Component Value Date    WBC 6 99 09/16/2022    HGB 10 9 (L) 09/16/2022    HCT 36 5 09/16/2022    MCV 92 09/16/2022     09/16/2022     Lab Results   Component Value Date    K 4 0 09/16/2022     09/16/2022    CO2 27 09/16/2022    BUN 11 09/16/2022    CREATININE 0 95 09/16/2022    GLUF 85 09/16/2022    CALCIUM 9 7 09/16/2022    CORRECTEDCA 10 7 (H) 09/16/2022    AST 10 09/16/2022    ALT 18 09/16/2022    ALKPHOS 63 09/16/2022    EGFR 80 09/16/2022       Lab Results   Component Value Date    WKJJOWUU02 1,514 (H) 08/12/2021         ROS: As stated in the history of present illness otherwise his 14 point review of systems today was negative  Active Problems:   Patient Active Problem List   Diagnosis    Recent cerebrovascular accident    Bilateral lower extremity DVTs    Nasopharyngeal mass    Dysphagia    Mass of upper lobe of right lung    Urinary retention    Constipation    Anemia of chronic disease    Pulmonary embolism (HCC)    Impaired cognition    Adenocarcinoma of lung    Malignant neoplasm of upper lobe of right lung ([facility] [address] )    COPD mixed type (HCC)    Chemotherapy induced neutropenia (HCC)    Hypercalcemia of malignancy    Chronic anticoagulation    Chronic right-sided heart failure ([facility] [address] )    Brain injury, without loss of consciousness, subsequent encounter    Recurrent right pleural effusion       Past Medical History:   Past Medical History:   Diagnosis Date    Chronic respiratory failure with hypoxia ([facility]) 8/9/2021    Impaired mobility and ADLs 8/13/2021    Lung cancer (HCC)     [facility])        Surgical History:   Past Surgical History:   Procedure Laterality Date    IR BIOPSY LUNG  8/5/2021    IR THORACENTESIS  2/24/2022    IR THORACENTESIS  8/1/2022       Family History:    Family History   Problem Relation Age of Onset    Prostate cancer Brother     Lung cancer Brother        Cancer-related family history includes Lung cancer in his brother; Prostate cancer in his brother      Social History:   Social History     Socioeconomic History    Marital status: /Civil Union     Spouse name: Not on file    Number of children: Not on file    Years of education: Not on file    Highest education level: Not on file   Occupational History    Not on file   Tobacco Use    Smoking status: Former Smoker     Packs/day: 3 00     Years: 39 00     Pack years: 117 00 Types: Cigarettes     Start date: 66     Quit date:      Years since quittin 7    Smokeless tobacco: Never Used   Vaping Use    Vaping Use: Never used   Substance and Sexual Activity    Alcohol use: Not Currently     Comment: No ETOH since Summer 2021     Drug use: Never    Sexual activity: Yes     Partners: Female   Other Topics Concern    Not on file   Social History Narrative    Not on file     Social Determinants of Health     Financial Resource Strain: Not on file   Food Insecurity: Not on file   Transportation Needs: Not on file   Physical Activity: Not on file   Stress: Not on file   Social Connections: Not on file   Intimate Partner Violence: Not on file   Housing Stability: Not on file       Current Medications:   Current Outpatient Medications   Medication Sig Dispense Refill    acetaminophen (TYLENOL) 325 mg tablet Take 2 tablets (650 mg total) by mouth 4 (four) times a day as needed for mild pain, headaches or fever  0    albuterol (ProAir HFA) 90 mcg/act inhaler Inhale 2 puffs every 6 (six) hours as needed for wheezing or shortness of breath 8 g 0    atorvastatin (LIPITOR) 40 mg tablet Take 1 tablet (40 mg total) by mouth daily with dinner 90 tablet 2    enoxaparin (LOVENOX) 80 mg/0 8 mL Inject 0 8 mL (80 mg total) under the skin every 12 (twelve) hours 230 mL 0    folic acid (FOLVITE) 1 mg tablet TAKE 1 TABLET BY MOUTH EVERY DAY 90 tablet 1    multivitamin (THERAGRAN) TABS Take 1 tablet by mouth daily      tamsulosin (FLOMAX) 0 4 mg Take 1 capsule (0 4 mg total) by mouth daily after dinner 90 capsule 0     No current facility-administered medications for this visit  Allergies: Allergies   Allergen Reactions    Doxycycline Rash       Physical Exam:    Body surface area is 1 92 meters squared      Wt Readings from Last 3 Encounters:   22 78 5 kg (173 lb)   22 83 kg (182 lb 15 7 oz)   22 82 kg (180 lb 12 4 oz)        Temp Readings from Last 3 Encounters:   09/30/22 (!) 96 4 °F (35 8 °C) (Tympanic)   09/19/22 (!) 96 8 °F (36 °C) (Temporal)   08/29/22 97 5 °F (36 4 °C) (Temporal)        BP Readings from Last 3 Encounters:   09/30/22 132/64   09/19/22 118/59   08/29/22 121/69         Pulse Readings from Last 3 Encounters:   09/30/22 69   09/19/22 70   08/29/22 71         Physical Exam     Constitutional   General appearance: No acute distress, well appearing and well nourished  Eyes   Conjunctiva and lids: No swelling, erythema or discharge  Pupils and irises: Equal, round and reactive to light  Ears, Nose, Mouth, and Throat   External inspection of ears and nose: Normal     Nasal mucosa, septum, and turbinates: Normal without edema or erythema  Oropharynx: Normal with no erythema, edema, exudate or lesions  Pulmonary   Respiratory effort: No increased work of breathing or signs of respiratory distress  Auscultation of lungs: Clear to auscultation  Cardiovascular   Palpation of heart: Normal PMI, no thrills  Auscultation of heart: Normal rate and rhythm, normal S1 and S2, without murmurs  Examination of extremities for edema and/or varicosities: Normal     Carotid pulses: Normal     Abdomen   Abdomen: Non-tender, no masses  Liver and spleen: No hepatomegaly or splenomegaly  Lymphatic   Palpation of lymph nodes in neck: No lymphadenopathy  Musculoskeletal   Gait and station: Normal     Digits and nails: Normal without clubbing or cyanosis  Inspection/palpation of joints, bones, and muscles: Normal     Skin   Skin and subcutaneous tissue: Normal without rashes or lesions  Neurologic   Cranial nerves: Cranial nerves 2-12 intact  Sensation: No sensory loss      Psychiatric   Orientation to person, place, and time: Normal     Mood and affect: Normal         Assessment / Plan:      The patient is a 80year old male with newly diagnosed biopsy proven adenocarcinoma of the lung with mediastinal adenopathy and a nasopharyngeal mass  PET CT scan completed on 9/21 showed enlarging right upper lobe mass in keeping with biopsy proven adenocarcinoma, extensive adenopathy in the neck base bilaterally, thoracic inlets, mediastinum, right hilum  Small right effusion, and small pericardial effusion  No hypermetabolic metastases identified below the diaphragms   No evidence of skeletal metastasis  The right-sided nasopharyngeal mass demonstrates no abnormal glucose activity   Cytology from the fluid was suspicious for malignancy      He was treated with 6 cycles of systemic therapy with Carbo, Alimta and Keytruda   Imaging after 4 cycles of chemo showed a positive response to therapy  The patient was then seen by our colleagues in [address] [place] [address] and was started on concurrent chemoradiation with carboplatin and Taxol   He finished this up and is now on single agent Keytruda    We will continue his treatment  I will see him back in 6-9 weeks  Next imaging will be in 3-4 months  He was started on oxygen by his primary care physician which he will be going on in the next few days  He himself is doing well  I will see him back in 9 weeks  We will repeat imaging around that time  Goals and Barriers:  Current Goal:  Prolong Survival from   Adenocarcinoma of the lung metastatic   Barriers: None  Patient's Capacity to Self Care:  Patient  able to self care  Portions of the record may have been created with voice recognition software  Occasional wrong word or "sound a like" substitutions may have occurred due to the inherent limitations of voice recognition software  Read the chart carefully and recognize, using context, where substitutions have occurred

## 2022-10-03 RX ORDER — SODIUM CHLORIDE 9 MG/ML
20 INJECTION, SOLUTION INTRAVENOUS ONCE
Status: CANCELLED | OUTPATIENT
Start: 2022-10-10

## 2022-10-03 NOTE — TELEPHONE ENCOUNTER
He is due 10/17 so it can be scheduled on that Monday, it doesn't have to be the same day as his [facility] ([place])

## 2022-10-03 NOTE — TELEPHONE ENCOUNTER
Spoke with wife, she said she would check MyChart for dates & times   Has my phone number for any questions

## 2022-10-03 NOTE — TELEPHONE ENCOUNTER
I scheduled all the [place] as close to 8 as I could  Where do you want the Xgevas scheduled, they do not fall due on the same days as the [place] [address] now  Pts last Xgeva was on 9/19

## 2022-10-07 ENCOUNTER — APPOINTMENT (OUTPATIENT)
Dept: LAB | Facility: CLINIC | Age: 72
End: 2022-10-07
Payer: COMMERCIAL

## 2022-10-07 DIAGNOSIS — T45.1X5A CHEMOTHERAPY-INDUCED NEUTROPENIA (HCC): ICD-10-CM

## 2022-10-07 DIAGNOSIS — C34.11 MALIGNANT NEOPLASM OF UPPER LOBE OF RIGHT LUNG (HCC): ICD-10-CM

## 2022-10-07 DIAGNOSIS — R91.8 MASS OF UPPER LOBE OF RIGHT LUNG: ICD-10-CM

## 2022-10-07 DIAGNOSIS — C34.90 ADENOCARCINOMA OF LUNG, UNSPECIFIED LATERALITY (HCC): ICD-10-CM

## 2022-10-07 DIAGNOSIS — D70.1 CHEMOTHERAPY-INDUCED NEUTROPENIA (HCC): ICD-10-CM

## 2022-10-07 LAB
ALBUMIN SERPL BCP-MCNC: 2.7 G/DL (ref 3.5–5)
ALP SERPL-CCNC: 60 U/L (ref 46–116)
ALT SERPL W P-5'-P-CCNC: 17 U/L (ref 12–78)
ANION GAP SERPL CALCULATED.3IONS-SCNC: 5 MMOL/L (ref 4–13)
AST SERPL W P-5'-P-CCNC: 15 U/L (ref 5–45)
BASOPHILS # BLD AUTO: 0.05 THOUSANDS/ΜL (ref 0–0.1)
BASOPHILS NFR BLD AUTO: 1 % (ref 0–1)
BILIRUB SERPL-MCNC: 0.44 MG/DL (ref 0.2–1)
BUN SERPL-MCNC: 11 MG/DL (ref 5–25)
CALCIUM ALBUM COR SERPL-MCNC: 11.2 MG/DL (ref 8.3–10.1)
CALCIUM SERPL-MCNC: 10.2 MG/DL (ref 8.3–10.1)
CHLORIDE SERPL-SCNC: 108 MMOL/L (ref 96–108)
CO2 SERPL-SCNC: 26 MMOL/L (ref 21–32)
CREAT SERPL-MCNC: 0.96 MG/DL (ref 0.6–1.3)
EOSINOPHIL # BLD AUTO: 0.13 THOUSAND/ΜL (ref 0–0.61)
EOSINOPHIL NFR BLD AUTO: 2 % (ref 0–6)
ERYTHROCYTE [DISTWIDTH] IN BLOOD BY AUTOMATED COUNT: 17.1 % (ref 11.6–15.1)
GFR SERPL CREATININE-BSD FRML MDRD: 79 ML/MIN/1.73SQ M
GLUCOSE P FAST SERPL-MCNC: 106 MG/DL (ref 65–99)
HCT VFR BLD AUTO: 35.2 % (ref 36.5–49.3)
HGB BLD-MCNC: 10.4 G/DL (ref 12–17)
IMM GRANULOCYTES # BLD AUTO: 0.05 THOUSAND/UL (ref 0–0.2)
IMM GRANULOCYTES NFR BLD AUTO: 1 % (ref 0–2)
LYMPHOCYTES # BLD AUTO: 0.84 THOUSANDS/ΜL (ref 0.6–4.47)
LYMPHOCYTES NFR BLD AUTO: 12 % (ref 14–44)
MCH RBC QN AUTO: 26.7 PG (ref 26.8–34.3)
MCHC RBC AUTO-ENTMCNC: 29.5 G/DL (ref 31.4–37.4)
MCV RBC AUTO: 91 FL (ref 82–98)
MONOCYTES # BLD AUTO: 0.88 THOUSAND/ΜL (ref 0.17–1.22)
MONOCYTES NFR BLD AUTO: 13 % (ref 4–12)
NEUTROPHILS # BLD AUTO: 4.87 THOUSANDS/ΜL (ref 1.85–7.62)
NEUTS SEG NFR BLD AUTO: 71 % (ref 43–75)
NRBC BLD AUTO-RTO: 0 /100 WBCS
PLATELET # BLD AUTO: 294 THOUSANDS/UL (ref 149–390)
PMV BLD AUTO: 9.5 FL (ref 8.9–12.7)
POTASSIUM SERPL-SCNC: 4.1 MMOL/L (ref 3.5–5.3)
PROT SERPL-MCNC: 7 G/DL (ref 6.4–8.4)
RBC # BLD AUTO: 3.89 MILLION/UL (ref 3.88–5.62)
SODIUM SERPL-SCNC: 139 MMOL/L (ref 135–147)
T3FREE SERPL-MCNC: 2.11 PG/ML (ref 2.3–4.2)
TSH SERPL DL<=0.05 MIU/L-ACNC: 3.27 UIU/ML (ref 0.45–4.5)
WBC # BLD AUTO: 6.82 THOUSAND/UL (ref 4.31–10.16)

## 2022-10-07 PROCEDURE — 84443 ASSAY THYROID STIM HORMONE: CPT

## 2022-10-07 PROCEDURE — 85025 COMPLETE CBC W/AUTO DIFF WBC: CPT

## 2022-10-07 PROCEDURE — 80053 COMPREHEN METABOLIC PANEL: CPT

## 2022-10-07 PROCEDURE — 36415 COLL VENOUS BLD VENIPUNCTURE: CPT

## 2022-10-07 PROCEDURE — 84481 FREE ASSAY (FT-3): CPT

## 2022-10-10 ENCOUNTER — HOSPITAL ENCOUNTER (OUTPATIENT)
Dept: INFUSION CENTER | Facility: HOSPITAL | Age: 72
Discharge: HOME | End: 2022-10-10
Attending: INTERNAL MEDICINE
Payer: COMMERCIAL

## 2022-10-10 ENCOUNTER — HOSPITAL ENCOUNTER (OUTPATIENT)
Dept: PULMONOLOGY | Facility: HOSPITAL | Age: 72
Discharge: HOME | End: 2022-10-10
Payer: COMMERCIAL

## 2022-10-10 DIAGNOSIS — J44.9 COPD MIXED TYPE (HCC): ICD-10-CM

## 2022-10-10 DIAGNOSIS — R91.8 MASS OF UPPER LOBE OF RIGHT LUNG: ICD-10-CM

## 2022-10-10 DIAGNOSIS — C34.11 MALIGNANT NEOPLASM OF UPPER LOBE OF RIGHT LUNG (HCC): Primary | ICD-10-CM

## 2022-10-10 DIAGNOSIS — D70.1 CHEMOTHERAPY INDUCED NEUTROPENIA (HCC): ICD-10-CM

## 2022-10-10 DIAGNOSIS — T45.1X5A CHEMOTHERAPY INDUCED NEUTROPENIA (HCC): ICD-10-CM

## 2022-10-10 DIAGNOSIS — C34.90 ADENOCARCINOMA OF LUNG, UNSPECIFIED LATERALITY (HCC): ICD-10-CM

## 2022-10-10 PROCEDURE — 94618 PULMONARY STRESS TESTING: CPT | Performed by: INTERNAL MEDICINE

## 2022-10-10 PROCEDURE — 94729 DIFFUSING CAPACITY: CPT | Performed by: INTERNAL MEDICINE

## 2022-10-10 PROCEDURE — 94060 EVALUATION OF WHEEZING: CPT | Performed by: INTERNAL MEDICINE

## 2022-10-10 PROCEDURE — 94761 N-INVAS EAR/PLS OXIMETRY MLT: CPT

## 2022-10-10 PROCEDURE — 94729 DIFFUSING CAPACITY: CPT

## 2022-10-10 PROCEDURE — 94726 PLETHYSMOGRAPHY LUNG VOLUMES: CPT | Performed by: INTERNAL MEDICINE

## 2022-10-10 PROCEDURE — 94726 PLETHYSMOGRAPHY LUNG VOLUMES: CPT

## 2022-10-10 PROCEDURE — 96413 CHEMO IV INFUSION 1 HR: CPT

## 2022-10-10 PROCEDURE — 94060 EVALUATION OF WHEEZING: CPT

## 2022-10-10 RX ORDER — ALBUTEROL SULFATE 2.5 MG/3ML
2.5 SOLUTION RESPIRATORY (INHALATION) EVERY 6 HOURS PRN
Status: DISCONTINUED | OUTPATIENT
Start: 2022-10-10 | End: 2022-10-14 | Stop reason: HOSPADM

## 2022-10-10 RX ORDER — SODIUM CHLORIDE 9 MG/ML
20 INJECTION, SOLUTION INTRAVENOUS ONCE
Status: COMPLETED | OUTPATIENT
Start: 2022-10-10 | End: 2022-10-10

## 2022-10-10 RX ADMIN — SODIUM CHLORIDE 20 ML/HR: 9 INJECTION, SOLUTION INTRAVENOUS at 09:16

## 2022-10-10 RX ADMIN — ALBUTEROL SULFATE 2.5 MG: 2.5 SOLUTION RESPIRATORY (INHALATION) at 12:32

## 2022-10-10 RX ADMIN — SODIUM CHLORIDE 200 MG: 9 INJECTION, SOLUTION INTRAVENOUS at 09:17

## 2022-10-10 NOTE — PROGRESS NOTES
Pt tolerated chemo tx without difficulty  PIV removed and bandage applied  Xgeva scheduled week of 10/16; additional chemo appts scheduled  Pt left ambulatory with steady gait for d/c

## 2022-10-11 ENCOUNTER — TELEPHONE (OUTPATIENT)
Dept: OTHER | Facility: HOSPITAL | Age: 72
End: 2022-10-11

## 2022-10-11 NOTE — TELEPHONE ENCOUNTER
Relayed results to Chace Lopez of his PFTs - restrictive disease probably contributed by a mixture of emphysema, right pleural effusion, compressive atelectasis  Chace Lopez says Dudley Esquivel is doing well without much respiratory symptoms other than an occasional dry cough  No fevers/sputum  He can continue albuterol PRN    Next CT Chest/Abd/Pelvis - we'll determine whether he could use another thoracentesis for the R sided malignancy related pleural effusion

## 2022-10-11 NOTE — TELEPHONE ENCOUNTER
Also relayed that pt walked 980 feet without O2 need  Lowest SpO2 93%    Pt does not need oxygen with exertion

## 2022-10-12 DIAGNOSIS — E83.52 HYPERCALCEMIA OF MALIGNANCY: Primary | ICD-10-CM

## 2022-10-14 DIAGNOSIS — I82.403 ACUTE DEEP VEIN THROMBOSIS (DVT) OF BOTH LOWER EXTREMITIES, UNSPECIFIED VEIN (HCC): ICD-10-CM

## 2022-10-14 RX ORDER — ENOXAPARIN SODIUM 100 MG/ML
80 INJECTION SUBCUTANEOUS EVERY 12 HOURS
Qty: 144 ML | Refills: 0 | Status: SHIPPED | OUTPATIENT
Start: 2022-10-14 | End: 2023-04-12

## 2022-10-17 ENCOUNTER — HOSPITAL ENCOUNTER (OUTPATIENT)
Dept: INFUSION CENTER | Facility: HOSPITAL | Age: 72
Discharge: HOME | End: 2022-10-17
Attending: INTERNAL MEDICINE
Payer: COMMERCIAL

## 2022-10-17 VITALS — WEIGHT: 182.8 LBS | BODY MASS INDEX: 27.78 KG/M2 | TEMPERATURE: 96.9 F

## 2022-10-17 DIAGNOSIS — E83.52 HYPERCALCEMIA OF MALIGNANCY: Primary | ICD-10-CM

## 2022-10-17 PROCEDURE — 96372 THER/PROPH/DIAG INJ SC/IM: CPT

## 2022-10-17 RX ADMIN — DENOSUMAB 120 MG: 120 INJECTION SUBCUTANEOUS at 12:56

## 2022-10-17 NOTE — PROGRESS NOTES
Pt  Denies new symptoms or concerns today    CrCl 60 2  José Miguel 10 2  Xgeva 120 mg ordered today

## 2022-10-24 RX ORDER — SODIUM CHLORIDE 9 MG/ML
20 INJECTION, SOLUTION INTRAVENOUS ONCE
Status: CANCELLED | OUTPATIENT
Start: 2022-10-31

## 2022-10-27 DIAGNOSIS — I63.9 ACUTE CVA (CEREBROVASCULAR ACCIDENT) (HCC): ICD-10-CM

## 2022-10-28 ENCOUNTER — APPOINTMENT (OUTPATIENT)
Dept: LAB | Facility: CLINIC | Age: 72
End: 2022-10-28
Payer: COMMERCIAL

## 2022-10-28 DIAGNOSIS — K12.31 NEUTROPENIA ASSOCIATED WITH MUCOSITIS DUE TO ANTINEOPLASTIC THERAPY (HCC): ICD-10-CM

## 2022-10-28 DIAGNOSIS — J98.4 METHOTREXATE LUNG: ICD-10-CM

## 2022-10-28 DIAGNOSIS — D70.1 NEUTROPENIA ASSOCIATED WITH MUCOSITIS DUE TO ANTINEOPLASTIC THERAPY (HCC): ICD-10-CM

## 2022-10-28 DIAGNOSIS — C34.11 MALIGNANT NEOPLASM OF UPPER LOBE OF RIGHT LUNG (HCC): ICD-10-CM

## 2022-10-28 DIAGNOSIS — T45.1X5S NEUTROPENIA ASSOCIATED WITH MUCOSITIS DUE TO ANTINEOPLASTIC THERAPY (HCC): ICD-10-CM

## 2022-10-28 DIAGNOSIS — T45.1X5A METHOTREXATE LUNG: ICD-10-CM

## 2022-10-28 DIAGNOSIS — C34.90 MALIGNANT NEOPLASM OF BRONCHUS AND LUNG (HCC): ICD-10-CM

## 2022-10-28 DIAGNOSIS — R91.8 LUNG MASS: ICD-10-CM

## 2022-10-28 LAB
ALBUMIN SERPL BCP-MCNC: 3.1 G/DL (ref 3.5–5)
ALP SERPL-CCNC: 67 U/L (ref 46–116)
ALT SERPL W P-5'-P-CCNC: 20 U/L (ref 12–78)
ANION GAP SERPL CALCULATED.3IONS-SCNC: 2 MMOL/L (ref 4–13)
AST SERPL W P-5'-P-CCNC: 14 U/L (ref 5–45)
BASOPHILS # BLD AUTO: 0.05 THOUSANDS/ÂΜL (ref 0–0.1)
BASOPHILS NFR BLD AUTO: 1 % (ref 0–1)
BILIRUB SERPL-MCNC: 0.45 MG/DL (ref 0.2–1)
BUN SERPL-MCNC: 13 MG/DL (ref 5–25)
CALCIUM ALBUM COR SERPL-MCNC: 11 MG/DL (ref 8.3–10.1)
CALCIUM SERPL-MCNC: 10.3 MG/DL (ref 8.3–10.1)
CHLORIDE SERPL-SCNC: 107 MMOL/L (ref 96–108)
CO2 SERPL-SCNC: 27 MMOL/L (ref 21–32)
CREAT SERPL-MCNC: 0.96 MG/DL (ref 0.6–1.3)
EOSINOPHIL # BLD AUTO: 0.11 THOUSAND/ÂΜL (ref 0–0.61)
EOSINOPHIL NFR BLD AUTO: 2 % (ref 0–6)
ERYTHROCYTE [DISTWIDTH] IN BLOOD BY AUTOMATED COUNT: 16.8 % (ref 11.6–15.1)
GFR SERPL CREATININE-BSD FRML MDRD: 79 ML/MIN/1.73SQ M
GLUCOSE P FAST SERPL-MCNC: 107 MG/DL (ref 65–99)
HCT VFR BLD AUTO: 35.5 % (ref 36.5–49.3)
HGB BLD-MCNC: 10.6 G/DL (ref 12–17)
IMM GRANULOCYTES # BLD AUTO: 0.03 THOUSAND/UL (ref 0–0.2)
IMM GRANULOCYTES NFR BLD AUTO: 1 % (ref 0–2)
LYMPHOCYTES # BLD AUTO: 0.71 THOUSANDS/ÂΜL (ref 0.6–4.47)
LYMPHOCYTES NFR BLD AUTO: 11 % (ref 14–44)
MCH RBC QN AUTO: 27 PG (ref 26.8–34.3)
MCHC RBC AUTO-ENTMCNC: 29.9 G/DL (ref 31.4–37.4)
MCV RBC AUTO: 91 FL (ref 82–98)
MONOCYTES # BLD AUTO: 0.81 THOUSAND/ÂΜL (ref 0.17–1.22)
MONOCYTES NFR BLD AUTO: 13 % (ref 4–12)
NEUTROPHILS # BLD AUTO: 4.53 THOUSANDS/ÂΜL (ref 1.85–7.62)
NEUTS SEG NFR BLD AUTO: 72 % (ref 43–75)
NRBC BLD AUTO-RTO: 0 /100 WBCS
PLATELET # BLD AUTO: 339 THOUSANDS/UL (ref 149–390)
PMV BLD AUTO: 9.3 FL (ref 8.9–12.7)
POTASSIUM SERPL-SCNC: 4.3 MMOL/L (ref 3.5–5.3)
PROT SERPL-MCNC: 7.1 G/DL (ref 6.4–8.4)
RBC # BLD AUTO: 3.92 MILLION/UL (ref 3.88–5.62)
SODIUM SERPL-SCNC: 136 MMOL/L (ref 135–147)
T3FREE SERPL-MCNC: 2.35 PG/ML (ref 2.3–4.2)
TSH SERPL DL<=0.05 MIU/L-ACNC: 4.48 UIU/ML (ref 0.45–4.5)
WBC # BLD AUTO: 6.24 THOUSAND/UL (ref 4.31–10.16)

## 2022-10-28 PROCEDURE — 84481 FREE ASSAY (FT-3): CPT

## 2022-10-28 PROCEDURE — 84443 ASSAY THYROID STIM HORMONE: CPT

## 2022-10-28 PROCEDURE — 36415 COLL VENOUS BLD VENIPUNCTURE: CPT

## 2022-10-28 PROCEDURE — 85025 COMPLETE CBC W/AUTO DIFF WBC: CPT

## 2022-10-28 PROCEDURE — 80053 COMPREHEN METABOLIC PANEL: CPT

## 2022-10-28 RX ORDER — ATORVASTATIN CALCIUM 40 MG/1
40 TABLET, FILM COATED ORAL
Qty: 90 TABLET | Refills: 0 | Status: SHIPPED | OUTPATIENT
Start: 2022-10-28

## 2022-10-31 ENCOUNTER — HOSPITAL ENCOUNTER (OUTPATIENT)
Dept: INFUSION CENTER | Facility: HOSPITAL | Age: 72
Discharge: HOME | End: 2022-10-31
Attending: INTERNAL MEDICINE

## 2022-10-31 VITALS
HEART RATE: 68 BPM | BODY MASS INDEX: 27.7 KG/M2 | SYSTOLIC BLOOD PRESSURE: 128 MMHG | OXYGEN SATURATION: 98 % | TEMPERATURE: 96.6 F | WEIGHT: 182.76 LBS | HEIGHT: 68 IN | DIASTOLIC BLOOD PRESSURE: 62 MMHG | RESPIRATION RATE: 16 BRPM

## 2022-10-31 DIAGNOSIS — C34.90 ADENOCARCINOMA OF LUNG, UNSPECIFIED LATERALITY (HCC): ICD-10-CM

## 2022-10-31 DIAGNOSIS — C34.11 MALIGNANT NEOPLASM OF UPPER LOBE OF RIGHT LUNG (HCC): Primary | ICD-10-CM

## 2022-10-31 DIAGNOSIS — T45.1X5A CHEMOTHERAPY INDUCED NEUTROPENIA (HCC): ICD-10-CM

## 2022-10-31 DIAGNOSIS — D70.1 CHEMOTHERAPY INDUCED NEUTROPENIA (HCC): ICD-10-CM

## 2022-10-31 DIAGNOSIS — R91.8 MASS OF UPPER LOBE OF RIGHT LUNG: ICD-10-CM

## 2022-10-31 RX ORDER — SODIUM CHLORIDE 9 MG/ML
20 INJECTION, SOLUTION INTRAVENOUS ONCE
Status: COMPLETED | OUTPATIENT
Start: 2022-10-31 | End: 2022-10-31

## 2022-10-31 RX ADMIN — SODIUM CHLORIDE 200 MG: 9 INJECTION, SOLUTION INTRAVENOUS at 09:43

## 2022-10-31 RX ADMIN — SODIUM CHLORIDE 20 ML/HR: 900 INJECTION, SOLUTION INTRAVENOUS at 09:43

## 2022-10-31 NOTE — PROGRESS NOTES
Patient completed Keytruda infusion with no adverse reactions  Dressing CD&I  Declined AVS, left unit ambulatory with steady gait

## 2022-11-01 RX ORDER — SODIUM CHLORIDE 9 MG/ML
20 INJECTION, SOLUTION INTRAVENOUS ONCE
OUTPATIENT
Start: 2022-11-21

## 2022-11-14 ENCOUNTER — HOSPITAL ENCOUNTER (OUTPATIENT)
Dept: INFUSION CENTER | Facility: HOSPITAL | Age: 72
Discharge: HOME | End: 2022-11-14
Attending: INTERNAL MEDICINE

## 2022-11-14 VITALS
TEMPERATURE: 96.5 F | HEART RATE: 74 BPM | OXYGEN SATURATION: 97 % | BODY MASS INDEX: 27.52 KG/M2 | DIASTOLIC BLOOD PRESSURE: 57 MMHG | WEIGHT: 185.8 LBS | RESPIRATION RATE: 16 BRPM | HEIGHT: 69 IN | SYSTOLIC BLOOD PRESSURE: 120 MMHG

## 2022-11-14 DIAGNOSIS — E83.52 HYPERCALCEMIA OF MALIGNANCY: Primary | ICD-10-CM

## 2022-11-14 RX ADMIN — DENOSUMAB 120 MG: 120 INJECTION SUBCUTANEOUS at 10:44

## 2022-11-14 NOTE — PROGRESS NOTES
Pt here for XGEVA tolerated well no adverse reactions declined AVS and left ambulatory with steady gait

## 2022-11-18 ENCOUNTER — APPOINTMENT (OUTPATIENT)
Dept: LAB | Facility: CLINIC | Age: 72
End: 2022-11-18

## 2022-11-18 DIAGNOSIS — T45.1X5S NEUTROPENIA ASSOCIATED WITH MUCOSITIS DUE TO ANTINEOPLASTIC THERAPY (HCC): ICD-10-CM

## 2022-11-18 DIAGNOSIS — C34.11 MALIGNANT NEOPLASM OF UPPER LOBE OF RIGHT LUNG (HCC): ICD-10-CM

## 2022-11-18 DIAGNOSIS — R91.8 LUNG MASS: ICD-10-CM

## 2022-11-18 DIAGNOSIS — T45.1X5A ADVERSE EFFECT OF ANTINEOPLASTIC ANTIBIOTIC, INITIAL ENCOUNTER: ICD-10-CM

## 2022-11-18 DIAGNOSIS — K12.31 NEUTROPENIA ASSOCIATED WITH MUCOSITIS DUE TO ANTINEOPLASTIC THERAPY (HCC): ICD-10-CM

## 2022-11-18 DIAGNOSIS — D70.1 NEUTROPENIA ASSOCIATED WITH MUCOSITIS DUE TO ANTINEOPLASTIC THERAPY (HCC): ICD-10-CM

## 2022-11-18 LAB
ALBUMIN SERPL BCP-MCNC: 2.8 G/DL (ref 3.5–5)
ALP SERPL-CCNC: 64 U/L (ref 46–116)
ALT SERPL W P-5'-P-CCNC: 16 U/L (ref 12–78)
ANION GAP SERPL CALCULATED.3IONS-SCNC: 3 MMOL/L (ref 4–13)
AST SERPL W P-5'-P-CCNC: 15 U/L (ref 5–45)
BASOPHILS # BLD AUTO: 0.03 THOUSANDS/ÂΜL (ref 0–0.1)
BASOPHILS NFR BLD AUTO: 0 % (ref 0–1)
BILIRUB SERPL-MCNC: 0.37 MG/DL (ref 0.2–1)
BUN SERPL-MCNC: 12 MG/DL (ref 5–25)
CALCIUM ALBUM COR SERPL-MCNC: 11.3 MG/DL (ref 8.3–10.1)
CALCIUM SERPL-MCNC: 10.3 MG/DL (ref 8.3–10.1)
CHLORIDE SERPL-SCNC: 107 MMOL/L (ref 96–108)
CO2 SERPL-SCNC: 26 MMOL/L (ref 21–32)
CREAT SERPL-MCNC: 1.03 MG/DL (ref 0.6–1.3)
EOSINOPHIL # BLD AUTO: 0.09 THOUSAND/ÂΜL (ref 0–0.61)
EOSINOPHIL NFR BLD AUTO: 1 % (ref 0–6)
ERYTHROCYTE [DISTWIDTH] IN BLOOD BY AUTOMATED COUNT: 15.9 % (ref 11.6–15.1)
GFR SERPL CREATININE-BSD FRML MDRD: 72 ML/MIN/1.73SQ M
GLUCOSE P FAST SERPL-MCNC: 99 MG/DL (ref 65–99)
HCT VFR BLD AUTO: 35.5 % (ref 36.5–49.3)
HGB BLD-MCNC: 10.7 G/DL (ref 12–17)
IMM GRANULOCYTES # BLD AUTO: 0.02 THOUSAND/UL (ref 0–0.2)
IMM GRANULOCYTES NFR BLD AUTO: 0 % (ref 0–2)
LYMPHOCYTES # BLD AUTO: 0.74 THOUSANDS/ÂΜL (ref 0.6–4.47)
LYMPHOCYTES NFR BLD AUTO: 11 % (ref 14–44)
MCH RBC QN AUTO: 26.9 PG (ref 26.8–34.3)
MCHC RBC AUTO-ENTMCNC: 30.1 G/DL (ref 31.4–37.4)
MCV RBC AUTO: 89 FL (ref 82–98)
MONOCYTES # BLD AUTO: 0.88 THOUSAND/ÂΜL (ref 0.17–1.22)
MONOCYTES NFR BLD AUTO: 13 % (ref 4–12)
NEUTROPHILS # BLD AUTO: 4.96 THOUSANDS/ÂΜL (ref 1.85–7.62)
NEUTS SEG NFR BLD AUTO: 75 % (ref 43–75)
NRBC BLD AUTO-RTO: 0 /100 WBCS
PLATELET # BLD AUTO: 326 THOUSANDS/UL (ref 149–390)
PMV BLD AUTO: 9.6 FL (ref 8.9–12.7)
POTASSIUM SERPL-SCNC: 4 MMOL/L (ref 3.5–5.3)
PROT SERPL-MCNC: 7.8 G/DL (ref 6.4–8.4)
RBC # BLD AUTO: 3.98 MILLION/UL (ref 3.88–5.62)
SODIUM SERPL-SCNC: 136 MMOL/L (ref 135–147)
T3FREE SERPL-MCNC: 1.94 PG/ML (ref 2.3–4.2)
TSH SERPL DL<=0.05 MIU/L-ACNC: 4.16 UIU/ML (ref 0.45–4.5)
WBC # BLD AUTO: 6.72 THOUSAND/UL (ref 4.31–10.16)

## 2022-11-21 ENCOUNTER — HOSPITAL ENCOUNTER (OUTPATIENT)
Dept: INFUSION CENTER | Facility: HOSPITAL | Age: 72
Discharge: HOME | End: 2022-11-21
Attending: INTERNAL MEDICINE

## 2022-11-21 VITALS
HEART RATE: 76 BPM | DIASTOLIC BLOOD PRESSURE: 59 MMHG | SYSTOLIC BLOOD PRESSURE: 127 MMHG | BODY MASS INDEX: 27.07 KG/M2 | TEMPERATURE: 97.2 F | RESPIRATION RATE: 16 BRPM | WEIGHT: 182.76 LBS | OXYGEN SATURATION: 94 % | HEIGHT: 69 IN

## 2022-11-21 DIAGNOSIS — C34.90 ADENOCARCINOMA OF LUNG, UNSPECIFIED LATERALITY (HCC): ICD-10-CM

## 2022-11-21 DIAGNOSIS — R91.8 MASS OF UPPER LOBE OF RIGHT LUNG: ICD-10-CM

## 2022-11-21 DIAGNOSIS — D70.1 CHEMOTHERAPY INDUCED NEUTROPENIA (HCC): ICD-10-CM

## 2022-11-21 DIAGNOSIS — T45.1X5A CHEMOTHERAPY INDUCED NEUTROPENIA (HCC): ICD-10-CM

## 2022-11-21 DIAGNOSIS — C34.11 MALIGNANT NEOPLASM OF UPPER LOBE OF RIGHT LUNG (HCC): Primary | ICD-10-CM

## 2022-11-21 RX ORDER — SODIUM CHLORIDE 9 MG/ML
20 INJECTION, SOLUTION INTRAVENOUS ONCE
Status: COMPLETED | OUTPATIENT
Start: 2022-11-21 | End: 2022-11-21

## 2022-11-21 RX ADMIN — SODIUM CHLORIDE 200 MG: 9 INJECTION, SOLUTION INTRAVENOUS at 11:19

## 2022-11-21 RX ADMIN — SODIUM CHLORIDE 20 ML/HR: 9 INJECTION, SOLUTION INTRAVENOUS at 11:19

## 2022-11-21 NOTE — PROGRESS NOTES
Patient completed Keytruda infusion with no adverse reactions  Dressing CD&I  AVS declined, left unit ambulatory with steady gait

## 2022-11-22 ENCOUNTER — HOSPITAL ENCOUNTER (OUTPATIENT)
Dept: CT IMAGING | Facility: HOSPITAL | Age: 72
Discharge: HOME | End: 2022-11-22
Attending: INTERNAL MEDICINE

## 2022-11-22 DIAGNOSIS — C34.11 MALIGNANT NEOPLASM OF UPPER LOBE OF RIGHT LUNG (HCC): ICD-10-CM

## 2022-11-22 DIAGNOSIS — C34.90 ADENOCARCINOMA OF LUNG, UNSPECIFIED LATERALITY (HCC): ICD-10-CM

## 2022-11-22 RX ADMIN — IOHEXOL 100 ML: 350 INJECTION, SOLUTION INTRAVENOUS at 11:00

## 2022-11-28 ENCOUNTER — OFFICE VISIT (OUTPATIENT)
Dept: HEMATOLOGY ONCOLOGY | Facility: HOSPITAL | Age: 72
End: 2022-11-28

## 2022-11-28 VITALS
BODY MASS INDEX: 27.55 KG/M2 | TEMPERATURE: 98 F | SYSTOLIC BLOOD PRESSURE: 122 MMHG | DIASTOLIC BLOOD PRESSURE: 64 MMHG | OXYGEN SATURATION: 97 % | HEART RATE: 63 BPM | HEIGHT: 69 IN | RESPIRATION RATE: 18 BRPM | WEIGHT: 186 LBS

## 2022-11-28 DIAGNOSIS — C34.90 ADENOCARCINOMA OF LUNG, UNSPECIFIED LATERALITY (HCC): Primary | ICD-10-CM

## 2022-11-28 DIAGNOSIS — C34.11 MALIGNANT NEOPLASM OF UPPER LOBE OF RIGHT LUNG (HCC): ICD-10-CM

## 2022-11-28 DIAGNOSIS — D70.1 CHEMOTHERAPY INDUCED NEUTROPENIA (HCC): ICD-10-CM

## 2022-11-28 DIAGNOSIS — T45.1X5A CHEMOTHERAPY INDUCED NEUTROPENIA (HCC): ICD-10-CM

## 2022-11-28 DIAGNOSIS — R91.8 MASS OF UPPER LOBE OF RIGHT LUNG: ICD-10-CM

## 2022-11-28 RX ORDER — SODIUM CHLORIDE 9 MG/ML
20 INJECTION, SOLUTION INTRAVENOUS ONCE
OUTPATIENT
Start: 2022-12-12

## 2022-11-28 NOTE — PROGRESS NOTES
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 does seem to have 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    CT-guided biopsy of the right lung mass was done on 08/05   The pathology showed adenocarcinoma of lung primary       Previous Hematologic/ Oncologic History:    Oncology History   Adenocarcinoma of lung   8/2021 Initial Diagnosis    Adenocarcinoma of lung     8/5/2021 Biopsy    Right lung apex, image-guided core needle biopsy:  - Adenocarcinoma      10/6/2021 - 1/26/2022 Chemotherapy    cyanocobalamin, 1,000 mcg, Intramuscular, Once, 3 of 3 cycles  Administration: 1,000 mcg (11/24/2021), 1,000 mcg (1/26/2022), 1,000 mcg (10/6/2021)  pegfilgrastim (Elane Rummer), 6 mg, Subcutaneous, Once, 1 of 1 cycle  Administration: 6 mg (11/26/2021)  pegfilgrastim (Jem Snipe), 6 mg, Subcutaneous, Once, 6 of 6 cycles  Administration: 6 mg (10/6/2021), 6 mg (11/3/2021), 6 mg (11/24/2021), 6 mg (12/15/2021), 6 mg (1/5/2022)  fosaprepitant (EMEND) IVPB, 150 mg, Intravenous, Once, 6 of 6 cycles  Administration: 150 mg (10/6/2021), 150 mg (11/24/2021), 150 mg (12/15/2021), 150 mg (1/26/2022), 150 mg (11/3/2021), 150 mg (1/5/2022)  CARBOplatin (PARAPLATIN) IVPB (GOG AUC DOSING), 519 5 mg, Intravenous, Once, 6 of 6 cycles  Administration: 519 5 mg (10/6/2021), 574 mg (11/24/2021), 600 mg (12/15/2021), 533 mg (1/26/2022), 604 5 mg (11/3/2021), 563 mg (1/5/2022)  pemetrexed (ALIMTA) chemo infusion, 970 mg, Intravenous, Once, 6 of 6 cycles  Administration: 1,000 mg (10/6/2021), 1,000 mg (11/24/2021), 1,000 mg (12/15/2021), 1,000 mg (1/26/2022), 1,000 mg (11/3/2021), 1,000 mg (1/5/2022)  pembrolizumab (KEYTRUDA) IVPB, 200 mg, Intravenous, Once, 6 of 6 cycles  Administration: 200 mg (10/6/2021), 200 mg (11/24/2021), 200 mg (12/15/2021), 200 mg (1/26/2022), 200 mg (11/3/2021), 200 mg (1/5/2022)     3/7/2022 -  Chemotherapy    CARBOplatin (PARAPLATIN) IVPB (GOG AUC DOSING), , Intravenous, Once, 6 of 6 cycles  Administration: 211 6 mg (3/7/2022), 208 mg (3/14/2022), 238 8 mg (3/21/2022), 211 6 mg (3/28/2022), 215 2 mg (4/4/2022), 213 4 mg (4/18/2022)  PACLItaxel (TAXOL) chemo IVPB, 50 mg/m2 = 99 mg, Intravenous, Once, 6 of 6 cycles  Administration: 99 mg (3/7/2022), 99 mg (3/14/2022), 99 mg (3/21/2022), 99 mg (3/28/2022), 99 mg (4/4/2022), 99 mg (4/18/2022)  pembrolizumab (KEYTRUDA) IVPB, 200 mg, Intravenous, Once, 13 of 17 cycles  Administration: 200 mg (3/7/2022), 200 mg (3/28/2022), 200 mg (4/25/2022), 200 mg (5/16/2022), 200 mg (6/6/2022), 200 mg (6/27/2022), 200 mg (7/18/2022), 200 mg (8/8/2022), 200 mg (8/29/2022), 200 mg (9/19/2022), 200 mg (10/10/2022), 200 mg (10/31/2022), 200 mg (11/21/2022)     Malignant neoplasm of upper lobe of right lung ([address] )   9/3/2021 Initial Diagnosis    Malignant neoplasm of upper lobe of right lung ([address] )     9/23/2021 -  Cancer Staged Staging form: Lung, AJCC 8th Edition  - Clinical stage from 9/23/2021: Stage IIIC (cT3, cN3, cM0) - Signed by Maynor Antoine MD on 9/23/2021  Histopathologic type: Adenocarcinoma, NOS       10/6/2021 - 1/26/2022 Chemotherapy    cyanocobalamin, 1,000 mcg, Intramuscular, Once, 3 of 3 cycles  Administration: 1,000 mcg (11/24/2021), 1,000 mcg (1/26/2022), 1,000 mcg (10/6/2021)  pegfilgrastim (Latham Daunt), 6 mg, Subcutaneous, Once, 1 of 1 cycle  Administration: 6 mg (11/26/2021)  pegfilgrastim ([place]), 6 mg, Subcutaneous, Once, 6 of 6 cycles  Administration: 6 mg (10/6/2021), 6 mg (11/3/2021), 6 mg (11/24/2021), 6 mg (12/15/2021), 6 mg (1/5/2022)  fosaprepitant (EMEND) IVPB, 150 mg, Intravenous, Once, 6 of 6 cycles  Administration: 150 mg (10/6/2021), 150 mg (11/24/2021), 150 mg (12/15/2021), 150 mg (1/26/2022), 150 mg (11/3/2021), 150 mg (1/5/2022)  CARBOplatin (PARAPLATIN) IVPB (GO AUC DOSING), 519 5 mg, Intravenous, Once, 6 of 6 cycles  Administration: 519 5 mg (10/6/2021), 574 mg (11/24/2021), 600 mg (12/15/2021), 533 mg (1/26/2022), 604 5 mg (11/3/2021), 563 mg (1/5/2022)  pemetrexed (ALIMTA) chemo infusion, 970 mg, Intravenous, Once, 6 of 6 cycles  Administration: 1,000 mg (10/6/2021), 1,000 mg (11/24/2021), 1,000 mg (12/15/2021), 1,000 mg (1/26/2022), 1,000 mg (11/3/2021), 1,000 mg (1/5/2022)  pembrolizumab (KEYTRUDA) IVPB, 200 mg, Intravenous, Once, 6 of 6 cycles  Administration: 200 mg (10/6/2021), 200 mg (11/24/2021), 200 mg (12/15/2021), 200 mg (1/26/2022), 200 mg (11/3/2021), 200 mg (1/5/2022)     3/4/2022 - 4/21/2022 Radiation    The patient saw @[facility]@ for radiation treatment   This is the current list of radiation treatment:  Plan ID Energy Fractions Dose per Fraction (cGy) Dose Correction (cGy) Total Dose Delivered (cGy) Elapsed Days   R LUNGMED REV 6X 30 / 30 200 0 6,000 48      Treatment dates:  C1: 3/4/2022 - 4/21/2022         3/7/2022 -  Chemotherapy    CARBOplatin (PARAPLATIN) IVPB (GOG AUC DOSING), , Intravenous, Once, 6 of 6 cycles  Administration: 211 6 mg (3/7/2022), 208 mg (3/14/2022), 238 8 mg (3/21/2022), 211 6 mg (3/28/2022), 215 2 mg (4/4/2022), 213 4 mg (4/18/2022)  PACLItaxel (TAXOL) chemo IVPB, 50 mg/m2 = 99 mg, Intravenous, Once, 6 of 6 cycles  Administration: 99 mg (3/7/2022), 99 mg (3/14/2022), 99 mg (3/21/2022), 99 mg (3/28/2022), 99 mg (4/4/2022), 99 mg (4/18/2022)  pembrolizumab (KEYTRUDA) IVPB, 200 mg, Intravenous, Once, 13 of 17 cycles  Administration: 200 mg (3/7/2022), 200 mg (3/28/2022), 200 mg (4/25/2022), 200 mg (5/16/2022), 200 mg (6/6/2022), 200 mg (6/27/2022), 200 mg (7/18/2022), 200 mg (8/8/2022), 200 mg (8/29/2022), 200 mg (9/19/2022), 200 mg (10/10/2022), 200 mg (10/31/2022), 200 mg (11/21/2022)       Concurrent chemotherapy with carboplatin and Taxol with Keytruda and radiation      Current Hematologic/ Oncologic Treatment:    Keytruda every 3 weeks      Interval History:    Patient returns for follow-up visit  He states he is doing well  Denies any nausea denies any vomiting denies any diarrhea  Is tolerating his chemotherapy well  Blood work is within acceptable limits  Imaging shows continued shrinkage of primary mass which changes consistent with post radiation change  There is pleural effusion which is not enlarging  There is some thickening which may indicate debris/blood or even possibility involvement with malignancy but again this is not worsening  Has no disease progressing outside of the lung  Overall this would be a stable to improved scan  Denies any other complaints in the rest of his 14 point review of systems today was negative  Cancer Staging:  Cancer Staging   Malignant neoplasm of upper lobe of right lung [facility])  Staging form: Lung, AJCC 8th Edition  - Clinical stage from 9/23/2021: Stage IIIC (cT3, cN3, cM0) - Signed by Cheo Meehan MD on 9/23/2021  Histopathologic type:  Adenocarcinoma, NOS      Test Results:    Imaging: CT chest abdomen pelvis w contrast    Result Date: 11/25/2022  Narrative: CT CHEST, ABDOMEN, AND PELVIS WITH IV CONTRAST INDICATION:   C34 11: Malignant neoplasm of upper lobe, right bronchus or lung C34 90: Malignant neoplasm of unspecified part of unspecified bronchus or lung  COMPARISON:  CT chest/abdomen/pelvis 12/20/2021  PET/CT 6/7/2022  TECHNIQUE: CT examination of the chest, abdomen, and pelvis was performed  In addition to portal venous phase postcontrast scanning through the chest, abdomen, and pelvis, delayed phase postcontrast scanning was performed through the upper abdominal viscera  Sheila Bunn Axial, sagittal, and coronal 2D reformatted images were created from the source data and submitted for interpretation  Radiation dose length product (DLP) for this visit:  920 04 mGy-cm   This examination, like all CT scans performed in the [place], was performed utilizing techniques to minimize radiation dose exposure, including the use of iterative  reconstruction and automated exposure control  IV Contrast:  100 mL of iohexol (OMNIPAQUE) Enteric Contrast:  Enteric contrast was administered  FINDINGS: CHEST: LARGE AIRWAYS: Large airways are clear with no tracheal or endobronchial lesion  LUNGS:  Moderate apically predominant background emphysematous changes  There is progressive volume loss and new dense heterogeneous consolidation throughout the right lung predominantly in the posterior upper lobe, perihilar middle lobe, and apical lower lobe, compatible with radiation-induced change  The previously seen irregular mass with a coarse calcification is partially obscured by the surrounding opacities but has again decreased in size measuring approximately 4 6 x 1 7 cm (previously 5 2 x  2 3 cm/2022, from 5 8 x 3 4 cm in 12/2021) and shows no differential enhancement, on series 3/37  There is new paramediastinal consolidation/atelectasis in the left upper lobe also presumably reflecting post radiation change  No new suspicious findings  PLEURA:  A moderate-sized right pleural effusion is likely unchanged in size when accounting for redistribution, however now shows diffuse pleural thickening with irregular peripheral hyperdensities (e g  series 2 image 34, 42, 51) that could be artifactual (though similarly seen on delayed phase such as series 6/6), reflect debris/blood products, or represent irregular thickening/nodularity  No pneumothorax  HEART: Normal cardiac size and morphology  Severe coronary artery calcification  Trace pericardial effusion similar to prior  VESSELS: Normal caliber thoracic aorta with mild atherosclerotic plaque  Common origin of the brachiocephalic and left common carotid arteries, a normal variant  MEDIASTINUM AND JONH:  There has been interval rightward mediastinal shift due to volume loss  No mediastinal lymphadenopathy; the previously referenced conglomerate right lower paratracheal lymph nodes have again decreased and are nearly imperceptible (in short axis previously measuring 1 cm in 12/20/2021, from 2 cm in 7/31/2021)  CHEST WALL AND LOWER NECK:   Mild bilateral gynecomastia  ABDOMEN: LIVER: Hepatomegaly measuring 20 cm craniocaudal  Numerous small cysts as well small sharply circumscribed hypodensities which are too small to characterize but unchanged (where comparable) since prior noncontrast study of 7/31/2021  No new or suspicious  finding  BILIARY: No intrahepatic biliary ductal dilatation  Normal caliber common bile duct  GALLBLADDER: No calcified gallstones  Normal wall thickness  No pericholecystic inflammatory changes  SPLEEN: Within normal limits  No suspicious lesion  Normal spleen size  PANCREAS: Pancreatic parenchyma is within normal limits  No main pancreatic ductal dilatation  No pancreatic/peripancreatic inflammation  ADRENAL GLANDS: Within normal limits  KIDNEYS/URETERS: Normal size and position  Symmetric enhancement  No suspicious lesion   Unchanged left lower pole simple cyst measuring 6 cm  No calcified stones or hydronephrosis  Ureters within normal limits  STOMACH AND BOWEL: Stomach is grossly within normal limits  Normal caliber small bowel  Normal caliber large bowel  Colonic diverticulosis without acute diverticulitis  No evidence of active small or large bowel inflammatory process  APPENDIX: Normal air-filled appendix which lies partially within an indirect right inguinal hernia  ABDOMINOPELVIC CAVITY: No ascites  No intraperitoneal free air  No lymphadenopathy  No retroperitoneal hematoma  VESSELS: Normal caliber abdominal aorta with moderate to severe atherosclerotic plaque  The celiac, SMA, and NANCY are patent, with mild uncalcified plaque at the SMA origin  The portal veins are patent  The SMV and splenic vein are patent  The hepatic veins are patent  PELVIS REPRODUCTIVE ORGANS:  Enlarged prostate  Symmetric seminal vesicles  URINARY BLADDER:  Within normal limits  No calculi  ABDOMINAL WALL/INGUINAL REGIONS:  Again seen are small foci of air in the bilateral anterior abdominal wall subcutaneous tissues consistent with medication injections, but there has been development of associated nodular soft tissue densities compatible with lipohypertrophy, which may impair the absorption of insulin  Small bilateral indirect inguinal hernias containing fat on the left, fat and the vermiform appendix in the right (Amyand hernia)  BONES:  Moderate multilevel degenerative changes in the spine  Vertebral body height is maintained  No acute fracture or destructive osseous lesion  Impression: 1  Heterogeneous consolidation and volume loss throughout the right lung compatible with postradiation change, with continued decrease in size of partially obscured right upper lobe mass  2   Probably unchanged size of the right-sided pleural effusion with new diffuse pleural thickening which could represent postradiation change   However, there are areas of juxtapleural hyperdensity which could be artifactual, reflect blood products/debris, or represent irregular thickening/nodularity which would be concerning for malignant effusion  Recommend attention on imaging follow-up versus cytology if future therapeutic thoracentesis is performed  3   No evidence of disease in the abdomen or pelvis  Workstation performed: [id number]       Labs:   Lab Results   Component Value Date    WBC 6 72 11/18/2022    HGB 10 7 (L) 11/18/2022    HCT 35 5 (L) 11/18/2022    MCV 89 11/18/2022     11/18/2022     Lab Results   Component Value Date    K 4 0 11/18/2022     11/18/2022    CO2 26 11/18/2022    BUN 12 11/18/2022    CREATININE 1 03 11/18/2022    GLUF 99 11/18/2022    CALCIUM 10 3 (H) 11/18/2022    CORRECTEDCA 11 3 (H) 11/18/2022    AST 15 11/18/2022    ALT 16 11/18/2022    ALKPHOS 64 11/18/2022    EGFR 72 11/18/2022       Lab Results   Component Value Date    HTEMIJGJ63 1,514 (H) 08/12/2021         ROS: As stated in the history of present illness otherwise his 14 point review of systems today was negative        Active Problems:   Patient Active Problem List   Diagnosis   • Recent cerebrovascular accident   • Bilateral lower extremity DVTs   • Nasopharyngeal mass   • Dysphagia   • Mass of upper lobe of right lung   • Urinary retention   • Constipation   • Anemia of chronic disease   • Pulmonary embolism (HCC)   • Impaired cognition   • Adenocarcinoma of lung   • Malignant neoplasm of upper lobe of right lung (HCC)   • COPD mixed type (HCC)   • Chemotherapy induced neutropenia (HCC)   • Hypercalcemia of malignancy   • Chronic anticoagulation   • Chronic right-sided heart failure (HCC)   • Brain injury, without loss of consciousness, subsequent encounter   • Recurrent right pleural effusion       Past Medical History:   Past Medical History:   Diagnosis Date   • Chronic respiratory failure with hypoxia ([facility] ) 8/9/2021   • Impaired mobility and ADLs 8/13/2021   • Lung cancer ([facility] )    • Stroke ([facility] ) Surgical History:   Past Surgical History:   Procedure Laterality Date   • IR BIOPSY LUNG  2021   • IR THORACENTESIS  2022   • IR THORACENTESIS  2022       Family History:    Family History   Problem Relation Age of Onset   • Prostate cancer Brother    • Lung cancer Brother        Cancer-related family history includes Lung cancer in his brother; Prostate cancer in his brother      Social History:   Social History     Socioeconomic History   • Marital status: /Civil Union     Spouse name: Not on file   • Number of children: Not on file   • Years of education: Not on file   • Highest education level: Not on file   Occupational History   • Not on file   Tobacco Use   • Smoking status: Former     Packs/day: 3 00     Years: 39 00     Pack years: 117 00     Types: Cigarettes     Start date:      Quit date:      Years since quittin 9   • Smokeless tobacco: Never   Vaping Use   • Vaping Use: Never used   Substance and Sexual Activity   • Alcohol use: Not Currently     Comment: No ETOH since Summer 2021    • Drug use: Never   • Sexual activity: Yes     Partners: Female   Other Topics Concern   • Not on file   Social History Narrative   • Not on file     Social Determinants of Health     Financial Resource Strain: Not on file   Food Insecurity: Not on file   Transportation Needs: Not on file   Physical Activity: Not on file   Stress: Not on file   Social Connections: Not on file   Intimate Partner Violence: Not on file   Housing Stability: Not on file       Current Medications:   Current Outpatient Medications   Medication Sig Dispense Refill   • acetaminophen (TYLENOL) 325 mg tablet Take 2 tablets (650 mg total) by mouth 4 (four) times a day as needed for mild pain, headaches or fever  0   • albuterol (ProAir HFA) 90 mcg/act inhaler Inhale 2 puffs every 6 (six) hours as needed for wheezing or shortness of breath 8 g 0   • atorvastatin (LIPITOR) 40 mg tablet Take 1 tablet (40 mg total) by mouth daily with dinner 90 tablet 0   • enoxaparin (LOVENOX) 80 mg/0 8 mL Inject 0 8 mL (80 mg total) under the skin every 12 (twelve) hours 898 mL 0   • folic acid (FOLVITE) 1 mg tablet TAKE 1 TABLET BY MOUTH EVERY DAY 90 tablet 1   • multivitamin (THERAGRAN) TABS Take 1 tablet by mouth daily     • tamsulosin (FLOMAX) 0 4 mg Take 1 capsule (0 4 mg total) by mouth daily after dinner 90 capsule 0     No current facility-administered medications for this visit  Allergies: Allergies   Allergen Reactions   • Doxycycline Rash       Physical Exam:    Body surface area is 2 meters squared  Wt Readings from Last 3 Encounters:   11/28/22 84 4 kg (186 lb)   11/21/22 82 9 kg (182 lb 12 2 oz)   11/14/22 84 3 kg (185 lb 12 8 oz)        Temp Readings from Last 3 Encounters:   11/28/22 98 °F (36 7 °C)   11/21/22 (!) 97 2 °F (36 2 °C) (Temporal)   11/14/22 (!) 96 5 °F (35 8 °C) (Temporal)        BP Readings from Last 3 Encounters:   11/28/22 122/64   11/21/22 127/59   11/14/22 120/57         Pulse Readings from Last 3 Encounters:   11/28/22 63   11/21/22 76   11/14/22 74        Physical Exam     Constitutional   General appearance: No acute distress, well appearing and well nourished  Eyes   Conjunctiva and lids: No swelling, erythema or discharge  Pupils and irises: Equal, round and reactive to light  Ears, Nose, Mouth, and Throat   External inspection of ears and nose: Normal     Nasal mucosa, septum, and turbinates: Normal without edema or erythema  Oropharynx: Normal with no erythema, edema, exudate or lesions  Pulmonary   Respiratory effort: No increased work of breathing or signs of respiratory distress  Auscultation of lungs:   mild crackles and decreased breath sounds in right middle lung  Cardiovascular   Palpation of heart: Normal PMI, no thrills  Auscultation of heart: Normal rate and rhythm, normal S1 and S2, without murmurs      Examination of extremities for edema and/or varicosities: Normal     Carotid pulses: Normal     Abdomen   Abdomen: Non-tender, no masses  Liver and spleen: No hepatomegaly or splenomegaly  Lymphatic   Palpation of lymph nodes in neck: No lymphadenopathy  Musculoskeletal   Gait and station: Normal     Digits and nails: Normal without clubbing or cyanosis  Inspection/palpation of joints, bones, and muscles: Normal     Skin   Skin and subcutaneous tissue: Normal without rashes or lesions  Neurologic   Cranial nerves: Cranial nerves 2-12 intact  Sensation: No sensory loss  Psychiatric   Orientation to person, place, and time: Normal     Mood and affect: Normal         Assessment / Plan:      The patient is a 71year old male with newly diagnosed biopsy proven adenocarcinoma of the lung with mediastinal adenopathy and a nasopharyngeal mass  PET CT scan completed on 9/21 showed enlarging right upper lobe mass in keeping with biopsy proven adenocarcinoma, extensive adenopathy in the neck base bilaterally, thoracic inlets, mediastinum, right hilum  Small right effusion, and small pericardial effusion  No hypermetabolic metastases identified below the diaphragms   No evidence of skeletal metastasis  The right-sided nasopharyngeal mass demonstrates no abnormal glucose activity   Cytology from the fluid was suspicious for malignancy      He was treated with 6 cycles of systemic therapy with Carbo, Alimta and Keytruda   Imaging after 4 cycles of chemo showed a positive response to therapy  The patient was then seen by our colleagues in [address] [place] [address] and was started on concurrent chemoradiation with carboplatin and Taxol   He finished this up and is now on single agent Keytruda  Imaging shows continued decrease in size of partially obscured right upper lobe mass  No evidence of disease in the abdomen or pelvis  Pleural effusion was unchanged in size probably with new diffuse pleural thickening which could represent post radiation change    At this point patient will stay on current regimen  I will see him back in 6-9 weeks  We will repeat imaging in 4 months  Nasopharyngeal evaluation by ENT in April of 2022 had shown no evidence of disease on nasopharyngoscopy  Next imaging will be a PET-CT scan so hopefully this area can be re-evaluated  It had no uptake previously  Goals and Barriers:  Current Goal:  Prolong Survival from lung cancer  Barriers: None  Patient's Capacity to Self Care:  Patient  able to self care  Portions of the record may have been created with voice recognition software  Occasional wrong word or "sound a like" substitutions may have occurred due to the inherent limitations of voice recognition software  Read the chart carefully and recognize, using context, where substitutions have occurred

## 2022-12-07 ENCOUNTER — APPOINTMENT (OUTPATIENT)
Dept: RADIATION ONCOLOGY | Facility: HOSPITAL | Age: 72
End: 2022-12-07

## 2022-12-07 ENCOUNTER — OFFICE VISIT (OUTPATIENT)
Dept: CARDIOLOGY CLINIC | Facility: CLINIC | Age: 72
End: 2022-12-07

## 2022-12-07 VITALS
WEIGHT: 188.2 LBS | DIASTOLIC BLOOD PRESSURE: 62 MMHG | BODY MASS INDEX: 28.52 KG/M2 | HEIGHT: 68 IN | HEART RATE: 62 BPM | SYSTOLIC BLOOD PRESSURE: 132 MMHG

## 2022-12-07 DIAGNOSIS — E78.00 PURE HYPERCHOLESTEROLEMIA: Primary | ICD-10-CM

## 2022-12-07 DIAGNOSIS — I25.10 CORONARY ARTERIOSCLEROSIS: ICD-10-CM

## 2022-12-07 DIAGNOSIS — I10 PRIMARY HYPERTENSION: ICD-10-CM

## 2022-12-07 NOTE — PROGRESS NOTES
Cardiology Follow Up    Aby Sweeney  1950  [id number]  [facility] Dayan Valero  [address]  [place] [phone]    1  Pure hypercholesterolemia  POCT ECG    NM myocardial perfusion spect (rx stress and/or rest)      2  Primary hypertension  NM myocardial perfusion spect (rx stress and/or rest)      3  Coronary arteriosclerosis            Interval History: Patient is here for a follow-up visit at the request of his oncologist  He had been seen by our group in August 2021  He had presented with CVA at that point  He had nasopharyngeal mass and right upper lobe mass with mediastinal adenopathy and hepatic lesions at that time  At that time he was placed on Eliquis and statin by neurology  He follows with hematology oncology with eventual diagnosis of adenocarcinoma of the lung as primary  He did receive 6 cycles of systemic therapy  Echocardiogram done September 2021 demonstrated LVEF of 55% with no significant valvular heart disease  Mild dilatation of the aortic root and ascending aorta were noted  Improvement in RV function was noted compared to a prior study done in August 2021  He had received treatment for DVT and presumed PEs 8/2022  He is currently on indefinite Lovenox therapy  Patient has had no chest pain or significant dyspnea  EKG today demonstrates sinus rhythm and is a normal tracing  A CAT scan of the chest and abdomen done November 2022 incidentally noted coronary artery calcification which had also been noted on a CAT scan done December 2021  We will check a pharmacologic nuclear stress test to exclude severe obstructive CAD    Patient Active Problem List   Diagnosis   • Recent cerebrovascular accident   • Bilateral lower extremity DVTs   • Nasopharyngeal mass   • Dysphagia   • Mass of upper lobe of right lung   • Urinary retention   • Constipation   • Anemia of chronic disease • Pulmonary embolism (HCC)   • Impaired cognition   • Adenocarcinoma of lung   • Malignant neoplasm of upper lobe of right lung (HCC)   • COPD mixed type (HCC)   • Chemotherapy induced neutropenia (HCC)   • Hypercalcemia of malignancy   • Chronic anticoagulation   • Chronic right-sided heart failure (HCC)   • Brain injury, without loss of consciousness, subsequent encounter   • Recurrent right pleural effusion     Past Medical History:   Diagnosis Date   • Chronic respiratory failure with hypoxia ([facility]) 2021   • Impaired mobility and ADLs 2021   • Lung cancer (HCC)    • Stroke [facility])      Social History     Socioeconomic History   • Marital status: /Civil Union     Spouse name: Not on file   • Number of children: Not on file   • Years of education: Not on file   • Highest education level: Not on file   Occupational History   • Not on file   Tobacco Use   • Smoking status: Former     Packs/day: 3 00     Years: 39 00     Pack years: 117 00     Types: Cigarettes     Start date: 66     Quit date:      Years since quittin 9   • Smokeless tobacco: Never   Vaping Use   • Vaping Use: Never used   Substance and Sexual Activity   • Alcohol use: Not Currently     Comment: No ETOH since Summer 2021    • Drug use: Never   • Sexual activity: Yes     Partners: Female   Other Topics Concern   • Not on file   Social History Narrative   • Not on file     Social Determinants of Health     Financial Resource Strain: Not on file   Food Insecurity: Not on file   Transportation Needs: Not on file   Physical Activity: Not on file   Stress: Not on file   Social Connections: Not on file   Intimate Partner Violence: Not on file   Housing Stability: Not on file      Family History   Problem Relation Age of Onset   • Prostate cancer Brother    • Lung cancer Brother      Past Surgical History:   Procedure Laterality Date   • IR BIOPSY LUNG  2021   • IR THORACENTESIS  2022   • IR THORACENTESIS  2022 Current Outpatient Medications:   •  acetaminophen (TYLENOL) 325 mg tablet, Take 2 tablets (650 mg total) by mouth 4 (four) times a day as needed for mild pain, headaches or fever, Disp: , Rfl: 0  •  albuterol (ProAir HFA) 90 mcg/act inhaler, Inhale 2 puffs every 6 (six) hours as needed for wheezing or shortness of breath, Disp: 8 g, Rfl: 0  •  atorvastatin (LIPITOR) 40 mg tablet, Take 1 tablet (40 mg total) by mouth daily with dinner, Disp: 90 tablet, Rfl: 0  •  enoxaparin (LOVENOX) 80 mg/0 8 mL, Inject 0 8 mL (80 mg total) under the skin every 12 (twelve) hours, Disp: 144 mL, Rfl: 0  •  folic acid (FOLVITE) 1 mg tablet, TAKE 1 TABLET BY MOUTH EVERY DAY, Disp: 90 tablet, Rfl: 1  •  multivitamin (THERAGRAN) TABS, Take 1 tablet by mouth daily, Disp: , Rfl:   •  tamsulosin (FLOMAX) 0 4 mg, Take 1 capsule (0 4 mg total) by mouth daily after dinner, Disp: 90 capsule, Rfl: 0  Allergies   Allergen Reactions   • Doxycycline Rash       Labs:not applicable  Imaging: CT chest abdomen pelvis w contrast    Result Date: 11/25/2022  Narrative: CT CHEST, ABDOMEN, AND PELVIS WITH IV CONTRAST INDICATION:   C34 11: Malignant neoplasm of upper lobe, right bronchus or lung C34 90: Malignant neoplasm of unspecified part of unspecified bronchus or lung  COMPARISON:  CT chest/abdomen/pelvis 12/20/2021  PET/CT 6/7/2022  TECHNIQUE: CT examination of the chest, abdomen, and pelvis was performed  In addition to portal venous phase postcontrast scanning through the chest, abdomen, and pelvis, delayed phase postcontrast scanning was performed through the upper abdominal viscera  [facility] Axial, sagittal, and coronal 2D reformatted images were created from the source data and submitted for interpretation  Radiation dose length product (DLP) for this visit:  920 04 mGy-cm     This examination, like all CT scans performed in the [facility], was performed utilizing techniques to minimize radiation dose exposure, including the use of iterative  reconstruction and automated exposure control  IV Contrast:  100 mL of iohexol (OMNIPAQUE) Enteric Contrast:  Enteric contrast was administered  FINDINGS: CHEST: LARGE AIRWAYS: Large airways are clear with no tracheal or endobronchial lesion  LUNGS:  Moderate apically predominant background emphysematous changes  There is progressive volume loss and new dense heterogeneous consolidation throughout the right lung predominantly in the posterior upper lobe, perihilar middle lobe, and apical lower lobe, compatible with radiation-induced change  The previously seen irregular mass with a coarse calcification is partially obscured by the surrounding opacities but has again decreased in size measuring approximately 4 6 x 1 7 cm (previously 5 2 x  2 3 cm/2022, from 5 8 x 3 4 cm in 12/2021) and shows no differential enhancement, on series 3/37  There is new paramediastinal consolidation/atelectasis in the left upper lobe also presumably reflecting post radiation change  No new suspicious findings  PLEURA:  A moderate-sized right pleural effusion is likely unchanged in size when accounting for redistribution, however now shows diffuse pleural thickening with irregular peripheral hyperdensities (e g  series 2 image 34, 42, 51) that could be artifactual (though similarly seen on delayed phase such as series 6/6), reflect debris/blood products, or represent irregular thickening/nodularity  No pneumothorax  HEART: Normal cardiac size and morphology  Severe coronary artery calcification  Trace pericardial effusion similar to prior  VESSELS: Normal caliber thoracic aorta with mild atherosclerotic plaque  Common origin of the brachiocephalic and left common carotid arteries, a normal variant  MEDIASTINUM AND JONH:  There has been interval rightward mediastinal shift due to volume loss   No mediastinal lymphadenopathy; the previously referenced conglomerate right lower paratracheal lymph nodes have again decreased and are nearly imperceptible (in short axis previously measuring 1 cm in 12/20/2021, from 2 cm in 7/31/2021)  CHEST WALL AND LOWER NECK:   Mild bilateral gynecomastia  ABDOMEN: LIVER: Hepatomegaly measuring 20 cm craniocaudal  Numerous small cysts as well small sharply circumscribed hypodensities which are too small to characterize but unchanged (where comparable) since prior noncontrast study of 7/31/2021  No new or suspicious  finding  BILIARY: No intrahepatic biliary ductal dilatation  Normal caliber common bile duct  GALLBLADDER: No calcified gallstones  Normal wall thickness  No pericholecystic inflammatory changes  SPLEEN: Within normal limits  No suspicious lesion  Normal spleen size  PANCREAS: Pancreatic parenchyma is within normal limits  No main pancreatic ductal dilatation  No pancreatic/peripancreatic inflammation  ADRENAL GLANDS: Within normal limits  KIDNEYS/URETERS: Normal size and position  Symmetric enhancement  No suspicious lesion  Unchanged left lower pole simple cyst measuring 6 cm  No calcified stones or hydronephrosis  Ureters within normal limits  STOMACH AND BOWEL: Stomach is grossly within normal limits  Normal caliber small bowel  Normal caliber large bowel  Colonic diverticulosis without acute diverticulitis  No evidence of active small or large bowel inflammatory process  APPENDIX: Normal air-filled appendix which lies partially within an indirect right inguinal hernia  ABDOMINOPELVIC CAVITY: No ascites  No intraperitoneal free air  No lymphadenopathy  No retroperitoneal hematoma  VESSELS: Normal caliber abdominal aorta with moderate to severe atherosclerotic plaque  The celiac, SMA, and NANCY are patent, with mild uncalcified plaque at the SMA origin  The portal veins are patent  The SMV and splenic vein are patent  The hepatic veins are patent  PELVIS REPRODUCTIVE ORGANS:  Enlarged prostate  Symmetric seminal vesicles  URINARY BLADDER:  Within normal limits  No calculi   ABDOMINAL WALL/INGUINAL REGIONS:  Again seen are small foci of air in the bilateral anterior abdominal wall subcutaneous tissues consistent with medication injections, but there has been development of associated nodular soft tissue densities compatible with lipohypertrophy, which may impair the absorption of insulin  Small bilateral indirect inguinal hernias containing fat on the left, fat and the vermiform appendix in the right (Amyand hernia)  BONES:  Moderate multilevel degenerative changes in the spine  Vertebral body height is maintained  No acute fracture or destructive osseous lesion  Impression: 1  Heterogeneous consolidation and volume loss throughout the right lung compatible with postradiation change, with continued decrease in size of partially obscured right upper lobe mass  2   Probably unchanged size of the right-sided pleural effusion with new diffuse pleural thickening which could represent postradiation change  However, there are areas of juxtapleural hyperdensity which could be artifactual, reflect blood products/debris, or represent irregular thickening/nodularity which would be concerning for malignant effusion  Recommend attention on imaging follow-up versus cytology if future therapeutic thoracentesis is performed  3   No evidence of disease in the abdomen or pelvis  Workstation performed: [id number]       Review of Systems:  Review of Systems   All other systems reviewed and are negative  Physical Exam:  /62 (BP Location: Right arm, Patient Position: Sitting, Cuff Size: Standard)   Pulse 62   Ht 5' 8" (1 727 m)   Wt 85 4 kg (188 lb 3 2 oz)   BMI 28 62 kg/m²   Physical Exam  Vitals reviewed  Constitutional:       Appearance: He is well-developed  HENT:      Head: Normocephalic and atraumatic  Cardiovascular:      Rate and Rhythm: Normal rate  Heart sounds: Normal heart sounds  Pulmonary:      Effort: Pulmonary effort is normal       Breath sounds: Normal breath sounds  Musculoskeletal:      Cervical back: Normal range of motion  Skin:     General: Skin is warm and dry  Neurological:      Mental Status: He is alert and oriented to person, place, and time  Discussion/Summary: We will continue his present medical regimen  In reference to coronary artery calcification we will check a pharmacologic nuclear stress test   I have asked the patient to call if there is a problem in the interim otherwise I will see him back in 6 months time and sooner as is necessary  His wife was here today

## 2022-12-08 ENCOUNTER — CLINICAL SUPPORT (OUTPATIENT)
Dept: RADIATION ONCOLOGY | Facility: HOSPITAL | Age: 72
End: 2022-12-08
Attending: INTERNAL MEDICINE

## 2022-12-08 ENCOUNTER — DOCUMENTATION (OUTPATIENT)
Dept: PULMONOLOGY | Facility: CLINIC | Age: 72
End: 2022-12-08

## 2022-12-08 ENCOUNTER — OFFICE VISIT (OUTPATIENT)
Dept: PULMONOLOGY | Facility: HOSPITAL | Age: 72
End: 2022-12-08

## 2022-12-08 VITALS
TEMPERATURE: 97.2 F | OXYGEN SATURATION: 96 % | HEIGHT: 68 IN | DIASTOLIC BLOOD PRESSURE: 84 MMHG | SYSTOLIC BLOOD PRESSURE: 128 MMHG | BODY MASS INDEX: 28.13 KG/M2 | RESPIRATION RATE: 20 BRPM | HEART RATE: 79 BPM | WEIGHT: 185.6 LBS

## 2022-12-08 VITALS
SYSTOLIC BLOOD PRESSURE: 142 MMHG | DIASTOLIC BLOOD PRESSURE: 90 MMHG | BODY MASS INDEX: 28.19 KG/M2 | WEIGHT: 186 LBS | OXYGEN SATURATION: 97 % | HEIGHT: 68 IN | HEART RATE: 68 BPM

## 2022-12-08 DIAGNOSIS — I26.99 OTHER PULMONARY EMBOLISM WITHOUT ACUTE COR PULMONALE, UNSPECIFIED CHRONICITY (HCC): ICD-10-CM

## 2022-12-08 DIAGNOSIS — G47.34 NOCTURNAL HYPOXEMIA: ICD-10-CM

## 2022-12-08 DIAGNOSIS — F17.201: ICD-10-CM

## 2022-12-08 DIAGNOSIS — J90 RECURRENT RIGHT PLEURAL EFFUSION: Primary | ICD-10-CM

## 2022-12-08 DIAGNOSIS — J98.4 RESTRICTIVE LUNG DISEASE: ICD-10-CM

## 2022-12-08 DIAGNOSIS — C34.11 MALIGNANT NEOPLASM OF UPPER LOBE OF RIGHT LUNG (HCC): Primary | ICD-10-CM

## 2022-12-08 LAB
DME PARACHUTE DELIVERY DATE REQUESTED: NORMAL
DME PARACHUTE ITEM DESCRIPTION: NORMAL
DME PARACHUTE ORDER STATUS: NORMAL
DME PARACHUTE SUPPLIER NAME: NORMAL
DME PARACHUTE SUPPLIER PHONE: NORMAL

## 2022-12-08 NOTE — PROGRESS NOTES
Bessie Malone 1950 is a 71 y o  male   diagnosed [id number] (IIIC) NSCLC (adenocarcinoma) of the RUL, with PET/CT demonstrating 6 4cm RUL mass with extensive adenopathy at the neck base bilaterally, thoracic inlets, mediastinum, and right hilum  Incidentally, MRI imaging of the head for his CVA had revealed a mass in the right nasopharynx, which did not demonstrate uptake on the PET/CT  ENT evaluation on 9/28/21 was not suspicious for malignancy  He was initially planned for concurrent chemoRT but given his significant burden of disease, his RT plan did not meet lung, heart, or esophageal constraints  Thus, he was instead planned for sequential chemotherapy for cytoreduction followed by concurrent chemoRT  He underwent 6 cycles of CarboAlimtaKeytruda, last cycle was yesterday 1/26/22  CT C/A/P on 12/20/21 showed decrease in the size of the RUL cancer, mediastinal lymph nodes, and redemonstrated supraclavicular nodes  The pt completed a course of radiation on 04/21/22  He was last seen in radiation on 06/06/22 via telemedicine visit  06/07/22 - NM PET CT skull base to mid thigh  IMPRESSION:  1  Decreasing FDG activity of the right upper lung mass, suggesting response to therapy  Adjacent hypermetabolic pleural thickening may be inflammatory  No significant change in mild right perihilar activity  Otherwise no new hypermetabolic lesions   in the thorax  Continued PET CT follow-up recommended to exclude viable tumor  2   Persistent moderate to large right pleural effusion  3   No new hypermetabolic metastases in the neck, abdomen, pelvis  4   No significant change in posterior nasopharyngeal soft tissue lesions  06/13/22 - Hem Onc, Ali  Pt remains on Keytruda  Most recent imaging shows no evidence of progression  Follow up in 6-9 weeks      07/28/22 - XR chest pa & lateral   IMPRESSION:  Moderate-sized right pleural effusion is not significant changed since the June 7, 2022 PET/CT    Right upper lung opacity corresponding to mass better seen on the prior PET/CT     22 - Hem Mireille Barnhart  Pt is doing well  Will follow up with repeat imaging in 3-4 months     22 - Pulmonary Disease, Ebltran  Recurrent right pleural effusion - likely related to right lung cancer - pt has had 2 thoracenteses this year  Pt to have PFT    22 - Hem OncJada  Pt started on O2 by PCP  Pt is doing well    22 - CT chest abdomen pelvis w contrast  IMPRESSION:  1  Heterogeneous consolidation and volume loss throughout the right lung compatible with postradiation change, with continued decrease in size of partially obscured right upper lobe mass  2   Probably unchanged size of the right-sided pleural effusion with new diffuse pleural thickening which could represent postradiation change  However, there are areas of juxtapleural hyperdensity which could be artifactual, reflect blood   products/debris, or represent irregular thickening/nodularity which would be concerning for malignant effusion  Recommend attention on imaging follow-up versus cytology if future therapeutic thoracentesis is performed  3   No evidence of disease in the abdomen or pelvis    22 - Hem Onc Ali  Pt to stay on current regimen  Most recent imaging shows decrease in size of obscured right upper lobe mass  He does seem to have 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  Nasopharyngeal evaluation by ENT in 2022 had shown no evidence of disease on nasopharyngoscopy  Next imaging will be a PET-CT scan so hopefully this area can be re-evaluated  It had no uptake previously    Follow up in 6-9 weeks    Upcomin22 - PulmonologyYuliana  22 - Infusion  23 - Infusion  22 - Infusion  23 - Hem Oly Riley  23 - Infusion        Follow up visit     Oncology History   Adenocarcinoma of lung   2021 Initial Diagnosis    Adenocarcinoma of lung     2021 Biopsy    Right lung apex, image-guided core needle biopsy:  - Adenocarcinoma      10/6/2021 - 1/26/2022 Chemotherapy    cyanocobalamin, 1,000 mcg, Intramuscular, Once, 3 of 3 cycles  Administration: 1,000 mcg (11/24/2021), 1,000 mcg (1/26/2022), 1,000 mcg (10/6/2021)  pegfilgrastim (Daphne Gillespie), 6 mg, Subcutaneous, Once, 1 of 1 cycle  Administration: 6 mg (11/26/2021)  pegfilgrastim (Sherol Molt), 6 mg, Subcutaneous, Once, 6 of 6 cycles  Administration: 6 mg (10/6/2021), 6 mg (11/3/2021), 6 mg (11/24/2021), 6 mg (12/15/2021), 6 mg (1/5/2022)  fosaprepitant (EMEND) IVPB, 150 mg, Intravenous, Once, 6 of 6 cycles  Administration: 150 mg (10/6/2021), 150 mg (11/24/2021), 150 mg (12/15/2021), 150 mg (1/26/2022), 150 mg (11/3/2021), 150 mg (1/5/2022)  CARBOplatin (PARAPLATIN) IVPB (GO AUC DOSING), 519 5 mg, Intravenous, Once, 6 of 6 cycles  Administration: 519 5 mg (10/6/2021), 574 mg (11/24/2021), 600 mg (12/15/2021), 533 mg (1/26/2022), 604 5 mg (11/3/2021), 563 mg (1/5/2022)  pemetrexed (ALIMTA) chemo infusion, 970 mg, Intravenous, Once, 6 of 6 cycles  Administration: 1,000 mg (10/6/2021), 1,000 mg (11/24/2021), 1,000 mg (12/15/2021), 1,000 mg (1/26/2022), 1,000 mg (11/3/2021), 1,000 mg (1/5/2022)  pembrolizumab (KEYTRUDA) IVPB, 200 mg, Intravenous, Once, 6 of 6 cycles  Administration: 200 mg (10/6/2021), 200 mg (11/24/2021), 200 mg (12/15/2021), 200 mg (1/26/2022), 200 mg (11/3/2021), 200 mg (1/5/2022)     3/7/2022 -  Chemotherapy    CARBOplatin (PARAPLATIN) IVPB (GOG AUC DOSING), , Intravenous, Once, 6 of 6 cycles  Administration: 211 6 mg (3/7/2022), 208 mg (3/14/2022), 238 8 mg (3/21/2022), 211 6 mg (3/28/2022), 215 2 mg (4/4/2022), 213 4 mg (4/18/2022)  PACLItaxel (TAXOL) chemo IVPB, 50 mg/m2 = 99 mg, Intravenous, Once, 6 of 6 cycles  Administration: 99 mg (3/7/2022), 99 mg (3/14/2022), 99 mg (3/21/2022), 99 mg (3/28/2022), 99 mg (4/4/2022), 99 mg (4/18/2022)  pembrolizumab (KEYTRUDA) IVPB, 200 mg, Intravenous, Once, 13 of 21 cycles  Administration: 200 mg (3/7/2022), 200 mg (3/28/2022), 200 mg (4/25/2022), 200 mg (5/16/2022), 200 mg (6/6/2022), 200 mg (6/27/2022), 200 mg (7/18/2022), 200 mg (8/8/2022), 200 mg (8/29/2022), 200 mg (9/19/2022), 200 mg (10/10/2022), 200 mg (10/31/2022), 200 mg (11/21/2022)     Malignant neoplasm of upper lobe of right lung ([facility] [address] )   9/3/2021 Initial Diagnosis    Malignant neoplasm of upper lobe of right lung ([facility] [address] )     9/23/2021 -  Cancer Staged    Staging form: Lung, AJCC 8th Edition  - Clinical stage from 9/23/2021: Stage IIIC (cT3, cN3, cM0) - Signed by Teresita Varela MD on 9/23/2021  Histopathologic type:  Adenocarcinoma, NOS       10/6/2021 - 1/26/2022 Chemotherapy    cyanocobalamin, 1,000 mcg, Intramuscular, Once, 3 of 3 cycles  Administration: 1,000 mcg (11/24/2021), 1,000 mcg (1/26/2022), 1,000 mcg (10/6/2021)  pegfilgrastim (Vallie Spittle), 6 mg, Subcutaneous, Once, 1 of 1 cycle  Administration: 6 mg (11/26/2021)  pegfilgrastim (Rafael Salm), 6 mg, Subcutaneous, Once, 6 of 6 cycles  Administration: 6 mg (10/6/2021), 6 mg (11/3/2021), 6 mg (11/24/2021), 6 mg (12/15/2021), 6 mg (1/5/2022)  fosaprepitant (EMEND) IVPB, 150 mg, Intravenous, Once, 6 of 6 cycles  Administration: 150 mg (10/6/2021), 150 mg (11/24/2021), 150 mg (12/15/2021), 150 mg (1/26/2022), 150 mg (11/3/2021), 150 mg (1/5/2022)  CARBOplatin (PARAPLATIN) IVPB (GO AUC DOSING), 519 5 mg, Intravenous, Once, 6 of 6 cycles  Administration: 519 5 mg (10/6/2021), 574 mg (11/24/2021), 600 mg (12/15/2021), 533 mg (1/26/2022), 604 5 mg (11/3/2021), 563 mg (1/5/2022)  pemetrexed (ALIMTA) chemo infusion, 970 mg, Intravenous, Once, 6 of 6 cycles  Administration: 1,000 mg (10/6/2021), 1,000 mg (11/24/2021), 1,000 mg (12/15/2021), 1,000 mg (1/26/2022), 1,000 mg (11/3/2021), 1,000 mg (1/5/2022)  pembrolizumab (KEYTRUDA) IVPB, 200 mg, Intravenous, Once, 6 of 6 cycles  Administration: 200 mg (10/6/2021), 200 mg (11/24/2021), 200 mg (12/15/2021), 200 mg (1/26/2022), 200 mg (11/3/2021), 200 mg (1/5/2022)     3/4/2022 - 4/21/2022 Radiation    [facility]  This is the current list of radiation treatment:  Plan ID Energy Fractions Dose per Fraction (cGy) Dose Correction (cGy) Total Dose Delivered (cGy) Elapsed Days   R LUNGMED REV 6X 30 / 30 200 0 6,000 48      Treatment dates:  C1: 3/4/2022 - 4/21/2022         3/7/2022 -  Chemotherapy    CARBOplatin (PARAPLATIN) IVPB (GOG AUC DOSING), , Intravenous, Once, 6 of 6 cycles  Administration: 211 6 mg (3/7/2022), 208 mg (3/14/2022), 238 8 mg (3/21/2022), 211 6 mg (3/28/2022), 215 2 mg (4/4/2022), 213 4 mg (4/18/2022)  PACLItaxel (TAXOL) chemo IVPB, 50 mg/m2 = 99 mg, Intravenous, Once, 6 of 6 cycles  Administration: 99 mg (3/7/2022), 99 mg (3/14/2022), 99 mg (3/21/2022), 99 mg (3/28/2022), 99 mg (4/4/2022), 99 mg (4/18/2022)  pembrolizumab (KEYTRUDA) IVPB, 200 mg, Intravenous, Once, 13 of 21 cycles  Administration: 200 mg (3/7/2022), 200 mg (3/28/2022), 200 mg (4/25/2022), 200 mg (5/16/2022), 200 mg (6/6/2022), 200 mg (6/27/2022), 200 mg (7/18/2022), 200 mg (8/8/2022), 200 mg (8/29/2022), 200 mg (9/19/2022), 200 mg (10/10/2022), 200 mg (10/31/2022), 200 mg (11/21/2022)         Review of Systems:  Review of Systems   Constitutional: Negative  HENT: Negative  Eyes:        Wears glasses    Respiratory: Positive for cough  No recent use of inhalers  Daily cough  Quit smoking x 18 yrs ago    Cardiovascular: Negative  Gastrointestinal: Negative  Endocrine: Negative  Genitourinary: Negative  Musculoskeletal: Negative  Skin: Negative  Allergic/Immunologic: Negative  Neurological: Negative  Hematological: Negative  Psychiatric/Behavioral: Negative          Clinical Trial: no      Teaching    Health Maintenance   Topic Date Due   • Hepatitis C Screening  Never done   • Hepatitis B Vaccine (1 of 3 - 3-dose series) Never done   • BMI: Followup Plan  Never done   • COVID-19 Vaccine (4 - Booster for Pfizer series) 04/16/2022   • Influenza Vaccine (1) Never done   • Fall Risk  01/24/2023   • Medicare Annual Wellness Visit (AWV)  01/24/2023   • Depression Screening  01/27/2023   • Colorectal Cancer Screening  06/13/2023 (Originally 9/5/1995)   • BMI: Adult  12/08/2023   • Pneumococcal Vaccine: 65+ Years  Completed   • HIB Vaccine  Aged Out   • IPV Vaccine  Aged Out   • Hepatitis A Vaccine  Aged Out   • Meningococcal ACWY Vaccine  Aged Out   • HPV Vaccine  Aged Out     Patient Active Problem List   Diagnosis   • Recent cerebrovascular accident   • Bilateral lower extremity DVTs   • Nasopharyngeal mass   • Dysphagia   • Mass of upper lobe of right lung   • Urinary retention   • Constipation   • Anemia of chronic disease   • Pulmonary embolism (HCC)   • Impaired cognition   • Adenocarcinoma of lung   • Malignant neoplasm of upper lobe of right lung (HCC)   • COPD mixed type ([address] )   • Chemotherapy induced neutropenia (HCC)   • Hypercalcemia of malignancy   • Chronic anticoagulation   • Chronic right-sided heart failure (HCC)   • Brain injury, without loss of consciousness, subsequent encounter   • Recurrent right pleural effusion     Past Medical History:   Diagnosis Date   • Chronic respiratory failure with hypoxia ([facility] [address] ) 8/9/2021   • Impaired mobility and ADLs 8/13/2021   • Lung cancer ([facility] [address] )    • Stroke [facility])      Past Surgical History:   Procedure Laterality Date   • IR BIOPSY LUNG  8/5/2021   • IR THORACENTESIS  2/24/2022   • IR THORACENTESIS  8/1/2022     Family History   Problem Relation Age of Onset   • Prostate cancer Brother    • Lung cancer Brother      Social History     Socioeconomic History   • Marital status: /Civil Union     Spouse name: Not on file   • Number of children: Not on file   • Years of education: Not on file   • Highest education level: Not on file   Occupational History   • Not on file   Tobacco Use   • Smoking status: Former     Packs/day: 3 00     Years: 39 00     Pack years: 117 00     Types: Cigarettes     Start date: 66     Quit date: 2004     Years since quittin 9   • Smokeless tobacco: Never   Vaping Use   • Vaping Use: Never used   Substance and Sexual Activity   • Alcohol use: Not Currently     Comment: No ETOH since Summer 2021    • Drug use: Never   • Sexual activity: Yes     Partners: Female   Other Topics Concern   • Not on file   Social History Narrative   • Not on file     Social Determinants of Health     Financial Resource Strain: Not on file   Food Insecurity: Not on file   Transportation Needs: Not on file   Physical Activity: Not on file   Stress: Not on file   Social Connections: Not on file   Intimate Partner Violence: Not on file   Housing Stability: Not on file       Current Outpatient Medications:   •  albuterol (ProAir HFA) 90 mcg/act inhaler, Inhale 2 puffs every 6 (six) hours as needed for wheezing or shortness of breath, Disp: 8 g, Rfl: 0  •  atorvastatin (LIPITOR) 40 mg tablet, Take 1 tablet (40 mg total) by mouth daily with dinner, Disp: 90 tablet, Rfl: 0  •  enoxaparin (LOVENOX) 80 mg/0 8 mL, Inject 0 8 mL (80 mg total) under the skin every 12 (twelve) hours, Disp: 144 mL, Rfl: 0  •  folic acid (FOLVITE) 1 mg tablet, TAKE 1 TABLET BY MOUTH EVERY DAY, Disp: 90 tablet, Rfl: 1  •  multivitamin (THERAGRAN) TABS, Take 1 tablet by mouth daily, Disp: , Rfl:   •  tamsulosin (FLOMAX) 0 4 mg, Take 1 capsule (0 4 mg total) by mouth daily after dinner, Disp: 90 capsule, Rfl: 0  •  acetaminophen (TYLENOL) 325 mg tablet, Take 2 tablets (650 mg total) by mouth 4 (four) times a day as needed for mild pain, headaches or fever (Patient not taking: Reported on 2022), Disp: , Rfl: 0  Allergies   Allergen Reactions   • Doxycycline Rash     Vitals:    22 1445   BP: 128/84   Pulse: 79   Resp: 20   Temp: (!) 97 2 °F (36 2 °C)   SpO2: 96%   Weight: 84 2 kg (185 lb 9 6 oz)   Height: 5' 8" (1 727 m)      Pain Score: 0-No pain

## 2022-12-08 NOTE — PROGRESS NOTES
Pulmonary Outpatient Follow Up Note   Marbin Baker 71 y o  male MRN: [id number]  12/9/2022      Assessment/Plan:    1  Recurrent right pleural effusion  Assessment & Plan:  Cytology 2/2022 + for suspected carcinoma  This is recurrent but he has only required 2 thoracentesis in 2022  Last thoracentesis was 700mL  Repeat imaging shows now that the fluid is becoming more loculated likely due to chronic inflammatory changes and pleural changes due to radiation  We discussed the risks and benefits of indwelling pleural catheters today and due to his relative low symptom profile we will hold off  I don't think he would benefit much from quality of life standpoint and the likelihood of full lung expansion is not likely looking at the pleura on the recent imaging  We will reevaluate the size of his pleural effusion within the next set of imaging acquired by his oncologist, likely in March 2023  We will likely refer him for IR thoracentesis at that time unless the fluid has decreased in size significantly  2  Nocturnal hypoxemia  Assessment & Plan:  The patient does not endorse any symptoms consistent with sleep apnea, such as snoring, gasping, witnessed apneas  He does not have excessive daytime sleepiness  He was told to wear oxygen during sleep prior  We do not have a nocturnal oximetry to confirm that he does not need oxygen overnight  I have ordered a nocturnal pulse ox to be done to determine if the patient needs oxygen overnight  He sleeps in a reclined position  I recommended to refrain from sleeping in a fully recumbent position  Orders:  -     Pulse oximetry overnight    3  Restrictive lung disease  Assessment & Plan: This is likely due to malignancy, radiation related changes to the parenchyma and pleura, recurrent pleural effusion          4  Other pulmonary embolism without acute cor pulmonale, unspecified chronicity ([address] )  Assessment & Plan:  Patient has a history of pulmonary embolism, failed Eliquis therapy  He is on full dose Lovenox 1 mg/kg every 12 hours  Agree with lifelong therapy  5  Tobacco use disorder, severe, in sustained remission, dependence  Assessment & Plan:  Patient remains abstinent from tobacco   Pulmonary function testing does not show a definitive obstructive pattern  Given his lack of respiratory symptoms I will hold off from prescribing inhalers specific to COPD  Health Maintenance  Immunization History   Administered Date(s) Administered   • COVID-19 PFIZER VACCINE 0 3 ML IM 03/27/2021, 04/17/2021, 01/22/2022   • Pneumococcal Conjugate Vaccine 20-valent (Pcv20), Polysace 05/09/2022, 05/09/2022       Return in about 6 months (around 6/8/2023)  History of Present Illness   HPI:  Marbin Baker is a 71 y o  male who tobacco use since quit, DVT (2021) on Lovenox, Emphysema, [id number] ([facility]) NSCLC (adenocarcinoma) of the RUL s/p chemo/XRT (carboplatin, pemetrexed 10/6-1/26/22, XRT 3/4/22-4/21/22) and now on Keytruda who I last saw in 9/2022 for oxygen need, recurrent pleural effusion  Since our last visit there hasn't been much change to his respiratory symptoms  He has no dyspnea at rest  He does not have any significant coughing with sputum or hemoptysis  Occasional nonproductive cough  He is no longer using oxygen at rest or with exertion (I called him in 10/11/22 to tell him to stop O2 with exertion) based on 6 minute walk  He is able to do house work, lawn mowing etc and takes care of himself without any issues  He is not overly concerned regarding his breathing a history and activity level  Patient continues with Kathleen Dang, last infusion 11/21/22  Surveillance chest CT performed on 11/22/2022, pleural thickening with diffuse radiation related changes to the lung parenchyma and pleura  He sleeps on an incline  No witnessed apneas  No snoring  Review of Systems   Constitutional: Positive for fatigue   Negative for chills and fever    HENT: Negative for ear pain and sore throat  Eyes: Negative for pain and visual disturbance  Respiratory: Positive for cough  Negative for apnea, chest tightness, shortness of breath and wheezing  Cardiovascular: Negative for chest pain and palpitations  Gastrointestinal: Negative for abdominal pain, blood in stool, diarrhea and vomiting  Genitourinary: Negative for difficulty urinating, dysuria, flank pain, frequency and hematuria  Musculoskeletal: Negative for arthralgias and back pain  Skin: Negative for color change and rash  Neurological: Negative for seizures and syncope  All other systems reviewed and are negative          Historical Information   Past Medical History:   Diagnosis Date   • Chronic respiratory failure with hypoxia ([facility] [address] ) 8/9/2021   • Impaired mobility and ADLs 8/13/2021   • Lung cancer ([facility] )    • Stroke [facility])      Past Surgical History:   Procedure Laterality Date   • IR BIOPSY LUNG  8/5/2021   • IR THORACENTESIS  2/24/2022   • IR THORACENTESIS  8/1/2022     Family History   Problem Relation Age of Onset   • Prostate cancer Brother    • Lung cancer Brother        Meds/Allergies     Current Outpatient Medications:   •  acetaminophen (TYLENOL) 325 mg tablet, Take 2 tablets (650 mg total) by mouth 4 (four) times a day as needed for mild pain, headaches or fever (Patient not taking: Reported on 12/8/2022), Disp: , Rfl: 0  •  albuterol (ProAir HFA) 90 mcg/act inhaler, Inhale 2 puffs every 6 (six) hours as needed for wheezing or shortness of breath, Disp: 8 g, Rfl: 0  •  atorvastatin (LIPITOR) 40 mg tablet, Take 1 tablet (40 mg total) by mouth daily with dinner, Disp: 90 tablet, Rfl: 0  •  enoxaparin (LOVENOX) 80 mg/0 8 mL, Inject 0 8 mL (80 mg total) under the skin every 12 (twelve) hours, Disp: 144 mL, Rfl: 0  •  folic acid (FOLVITE) 1 mg tablet, TAKE 1 TABLET BY MOUTH EVERY DAY, Disp: 90 tablet, Rfl: 1  •  multivitamin (THERAGRAN) TABS, Take 1 tablet by mouth daily, Disp: , Rfl:   •  tamsulosin (FLOMAX) 0 4 mg, TAKE 1 CAPSULE (0 4 MG TOTAL) BY MOUTH DAILY AFTER DINNER, Disp: 90 capsule, Rfl: 0  Allergies   Allergen Reactions   • Doxycycline Rash       Vitals: Blood pressure 142/90, pulse 68, height 5' 8" (1 727 m), weight 84 4 kg (186 lb), SpO2 97 %  Body mass index is 28 28 kg/m²  Oxygen Therapy  SpO2: 97 %  Oxygen Therapy: None (Room air)    Physical Exam  Constitutional:       General: He is not in acute distress  Appearance: Normal appearance  He is normal weight  He is not ill-appearing, toxic-appearing or diaphoretic  HENT:      Head: Normocephalic and atraumatic  Right Ear: External ear normal       Left Ear: External ear normal       Nose: Nose normal       Mouth/Throat:      Mouth: Mucous membranes are moist       Pharynx: Oropharynx is clear  No oropharyngeal exudate  Eyes:      General: No scleral icterus  Extraocular Movements: Extraocular movements intact  Conjunctiva/sclera: Conjunctivae normal    Cardiovascular:      Rate and Rhythm: Normal rate and regular rhythm  Heart sounds: Normal heart sounds  Pulmonary:      Effort: Pulmonary effort is normal  No respiratory distress  Breath sounds: Normal breath sounds  No stridor  No wheezing, rhonchi or rales  Comments: Decreased breath sounds at the base of the right lung  Abdominal:      General: Abdomen is flat  There is no distension  Palpations: Abdomen is soft  Tenderness: There is no guarding  Musculoskeletal:         General: No swelling or deformity  Normal range of motion  Cervical back: Normal range of motion and neck supple  Right lower leg: No edema  Left lower leg: No edema  Skin:     General: Skin is warm and dry  Capillary Refill: Capillary refill takes less than 2 seconds  Coloration: Skin is not jaundiced or pale  Findings: No lesion or rash  Neurological:      General: No focal deficit present        Mental Status: He is alert and oriented to person, place, and time  Mental status is at baseline  Motor: No weakness  Psychiatric:         Mood and Affect: Mood normal          Behavior: Behavior normal          Thought Content: Thought content normal          Labs: I have personally reviewed pertinent lab results  ABG: No results found for: PHART, MDO3CPN, PO2ART, WLP8EVX, U8RBIREC, BEART, SOURCE,   BNP: No results found for: BNP,   CBC:  Lab Results   Component Value Date    WBC 6 48 12/09/2022    HGB 10 7 (L) 12/09/2022    HCT 36 5 12/09/2022    MCV 91 12/09/2022     12/09/2022    EOSPCT 2 12/09/2022    EOSABS 0 10 12/09/2022    NEUTOPHILPCT 67 12/09/2022    LYMPHOPCT 16 12/09/2022   ,   CMP:   Lab Results   Component Value Date    SODIUM 139 12/09/2022    K 5 1 12/09/2022     12/09/2022    CO2 28 12/09/2022    BUN 11 12/09/2022    CREATININE 0 93 12/09/2022    CALCIUM 10 2 (H) 12/09/2022    AST 13 12/09/2022    ALT 19 12/09/2022    ALKPHOS 70 12/09/2022    EGFR 82 12/09/2022   ,   PT/INR:   Lab Results   Component Value Date    INR 1 13 08/23/2021   ,   Troponin:   Lab Results   Component Value Date    TROPONINI 0 38 (H) 08/21/2021         Imaging and other studies: I have personally reviewed pertinent reports  and I have personally reviewed pertinent films in PACS  11/22/2022 CT chest abdomen pelvis with contrast -no pulmonary embolism  Consolidation and volume loss throughout the right lung compatible with postradiation changes  Partially obscured right upper lobe mass is decreasing in size  Unchanged size of the right-sided pleural effusion now with increased loculation and diffuse pleural thickening  Irregular thickening and nodularity in the effusion concerning for malignancy    No evidence of disease in the abdomen pelvis      Pulmonary function testing:   Pulmonary Functions Testing Results:  Results:  FEV1/FVC Ratio: 87 %  Forced Vital Capacity: 1 92 L    48 % predicted  FEV1: 1 66 L     55 % predicted  Post bronchodilator response: no increase     Lung volumes by body plethysmography:   Total Lung Capacity 53 % predicted   Residual volume 70 % predicted     DLCO corrected for patients hemoglobin level: 47 %     Interpretation:     • Spirometry suggestive of restriction     • No significant improvement in airflow or forced vital capacity in response to the administration to bronchodilator per ATS standards       • Moderate restriction as indicated by the lung volumes     • Moderate decrease in diffusion capacity     • Flow-volume loop consistent with restriction       Transthoracic Echo:  LEFT VENTRICLE:  Systolic function was normal by visual assessment  Ejection fraction was estimated to be 55 %  There were no regional wall motion abnormalities      MITRAL VALVE:  There was trace regurgitation      TRICUSPID VALVE:  There was trace regurgitation      PULMONIC VALVE:  There was trace regurgitation      AORTA:  The root exhibited mild dilatation  There was mild dilatation of the ascending aorta      PERICARDIUM:  A trace pericardial effusion was identified        Chelsea Arguello MD  Pulmonary, Critical Care and Sleep Medicine  [facility]

## 2022-12-09 ENCOUNTER — APPOINTMENT (OUTPATIENT)
Dept: LAB | Facility: CLINIC | Age: 72
End: 2022-12-09

## 2022-12-09 DIAGNOSIS — N40.1 BPH WITH URINARY OBSTRUCTION: ICD-10-CM

## 2022-12-09 DIAGNOSIS — N13.8 BPH WITH URINARY OBSTRUCTION: ICD-10-CM

## 2022-12-09 DIAGNOSIS — C34.90 ADENOCARCINOMA OF LUNG, UNSPECIFIED LATERALITY (HCC): ICD-10-CM

## 2022-12-09 PROBLEM — G47.34 NOCTURNAL HYPOXEMIA: Status: ACTIVE | Noted: 2022-12-09

## 2022-12-09 PROBLEM — J98.4 RESTRICTIVE LUNG DISEASE: Status: ACTIVE | Noted: 2022-12-09

## 2022-12-09 PROBLEM — F17.201: Status: ACTIVE | Noted: 2022-12-09

## 2022-12-09 LAB
ALBUMIN SERPL BCP-MCNC: 3.1 G/DL (ref 3.5–5)
ALP SERPL-CCNC: 70 U/L (ref 46–116)
ALT SERPL W P-5'-P-CCNC: 19 U/L (ref 12–78)
ANION GAP SERPL CALCULATED.3IONS-SCNC: 3 MMOL/L (ref 4–13)
AST SERPL W P-5'-P-CCNC: 13 U/L (ref 5–45)
BASOPHILS # BLD AUTO: 0.06 THOUSANDS/ÂΜL (ref 0–0.1)
BASOPHILS NFR BLD AUTO: 1 % (ref 0–1)
BILIRUB SERPL-MCNC: 0.35 MG/DL (ref 0.2–1)
BUN SERPL-MCNC: 11 MG/DL (ref 5–25)
CALCIUM ALBUM COR SERPL-MCNC: 10.9 MG/DL (ref 8.3–10.1)
CALCIUM SERPL-MCNC: 10.2 MG/DL (ref 8.3–10.1)
CHLORIDE SERPL-SCNC: 108 MMOL/L (ref 96–108)
CO2 SERPL-SCNC: 28 MMOL/L (ref 21–32)
CREAT SERPL-MCNC: 0.93 MG/DL (ref 0.6–1.3)
EOSINOPHIL # BLD AUTO: 0.1 THOUSAND/ÂΜL (ref 0–0.61)
EOSINOPHIL NFR BLD AUTO: 2 % (ref 0–6)
ERYTHROCYTE [DISTWIDTH] IN BLOOD BY AUTOMATED COUNT: 16.1 % (ref 11.6–15.1)
GFR SERPL CREATININE-BSD FRML MDRD: 82 ML/MIN/1.73SQ M
GLUCOSE P FAST SERPL-MCNC: 100 MG/DL (ref 65–99)
HCT VFR BLD AUTO: 36.5 % (ref 36.5–49.3)
HGB BLD-MCNC: 10.7 G/DL (ref 12–17)
IMM GRANULOCYTES # BLD AUTO: 0.04 THOUSAND/UL (ref 0–0.2)
IMM GRANULOCYTES NFR BLD AUTO: 1 % (ref 0–2)
LYMPHOCYTES # BLD AUTO: 1.02 THOUSANDS/ÂΜL (ref 0.6–4.47)
LYMPHOCYTES NFR BLD AUTO: 16 % (ref 14–44)
MCH RBC QN AUTO: 26.6 PG (ref 26.8–34.3)
MCHC RBC AUTO-ENTMCNC: 29.3 G/DL (ref 31.4–37.4)
MCV RBC AUTO: 91 FL (ref 82–98)
MONOCYTES # BLD AUTO: 0.84 THOUSAND/ÂΜL (ref 0.17–1.22)
MONOCYTES NFR BLD AUTO: 13 % (ref 4–12)
NEUTROPHILS # BLD AUTO: 4.42 THOUSANDS/ÂΜL (ref 1.85–7.62)
NEUTS SEG NFR BLD AUTO: 67 % (ref 43–75)
NRBC BLD AUTO-RTO: 0 /100 WBCS
PLATELET # BLD AUTO: 349 THOUSANDS/UL (ref 149–390)
PMV BLD AUTO: 9.5 FL (ref 8.9–12.7)
POTASSIUM SERPL-SCNC: 5.1 MMOL/L (ref 3.5–5.3)
PROT SERPL-MCNC: 7.1 G/DL (ref 6.4–8.4)
RBC # BLD AUTO: 4.03 MILLION/UL (ref 3.88–5.62)
SODIUM SERPL-SCNC: 139 MMOL/L (ref 135–147)
T3FREE SERPL-MCNC: 2.42 PG/ML (ref 2.3–4.2)
TSH SERPL DL<=0.05 MIU/L-ACNC: 4.33 UIU/ML (ref 0.45–4.5)
WBC # BLD AUTO: 6.48 THOUSAND/UL (ref 4.31–10.16)

## 2022-12-09 RX ORDER — TAMSULOSIN HYDROCHLORIDE 0.4 MG/1
0.4 CAPSULE ORAL
Qty: 90 CAPSULE | Refills: 0 | Status: SHIPPED | OUTPATIENT
Start: 2022-12-09

## 2022-12-09 NOTE — ASSESSMENT & PLAN NOTE
Cytology 2/2022 + for suspected carcinoma  This is recurrent but he has only required 2 thoracentesis in 2022  Last thoracentesis was 700mL  Repeat imaging shows now that the fluid is becoming more loculated likely due to chronic inflammatory changes and pleural changes due to radiation  We discussed the risks and benefits of indwelling pleural catheters today and due to his relative low symptom profile we will hold off  I don't think he would benefit much from quality of life standpoint and the likelihood of full lung expansion is not likely looking at the pleura on the recent imaging  We will reevaluate the size of his pleural effusion within the next set of imaging acquired by his oncologist, likely in March 2023  We will likely refer him for IR thoracentesis at that time unless the fluid has decreased in size significantly

## 2022-12-09 NOTE — ASSESSMENT & PLAN NOTE
This is likely due to malignancy, radiation related changes to the parenchyma and pleura, recurrent pleural effusion

## 2022-12-09 NOTE — ASSESSMENT & PLAN NOTE
Patient remains abstinent from tobacco   Pulmonary function testing does not show a definitive obstructive pattern  Given his lack of respiratory symptoms I will hold off from prescribing inhalers specific to COPD

## 2022-12-09 NOTE — ASSESSMENT & PLAN NOTE
Patient has a history of pulmonary embolism, failed Eliquis therapy  He is on full dose Lovenox 1 mg/kg every 12 hours  Agree with lifelong therapy

## 2022-12-09 NOTE — ASSESSMENT & PLAN NOTE
The patient does not endorse any symptoms consistent with sleep apnea, such as snoring, gasping, witnessed apneas  He does not have excessive daytime sleepiness  He was told to wear oxygen during sleep prior  We do not have a nocturnal oximetry to confirm that he does not need oxygen overnight  I have ordered a nocturnal pulse ox to be done to determine if the patient needs oxygen overnight  He sleeps in a reclined position  I recommended to refrain from sleeping in a fully recumbent position

## 2022-12-12 ENCOUNTER — HOSPITAL ENCOUNTER (OUTPATIENT)
Dept: INFUSION CENTER | Facility: HOSPITAL | Age: 72
Discharge: HOME | End: 2022-12-12
Attending: INTERNAL MEDICINE

## 2022-12-12 VITALS
TEMPERATURE: 96.6 F | RESPIRATION RATE: 18 BRPM | HEIGHT: 69 IN | WEIGHT: 184.75 LBS | DIASTOLIC BLOOD PRESSURE: 62 MMHG | HEART RATE: 62 BPM | SYSTOLIC BLOOD PRESSURE: 131 MMHG | BODY MASS INDEX: 27.36 KG/M2

## 2022-12-12 DIAGNOSIS — E83.52 HYPERCALCEMIA OF MALIGNANCY: ICD-10-CM

## 2022-12-12 DIAGNOSIS — C34.90 ADENOCARCINOMA OF LUNG, UNSPECIFIED LATERALITY (HCC): ICD-10-CM

## 2022-12-12 DIAGNOSIS — R91.8 MASS OF UPPER LOBE OF RIGHT LUNG: ICD-10-CM

## 2022-12-12 DIAGNOSIS — T45.1X5A CHEMOTHERAPY INDUCED NEUTROPENIA (HCC): ICD-10-CM

## 2022-12-12 DIAGNOSIS — D70.1 CHEMOTHERAPY INDUCED NEUTROPENIA (HCC): ICD-10-CM

## 2022-12-12 DIAGNOSIS — C34.11 MALIGNANT NEOPLASM OF UPPER LOBE OF RIGHT LUNG (HCC): Primary | ICD-10-CM

## 2022-12-12 RX ORDER — SODIUM CHLORIDE 9 MG/ML
20 INJECTION, SOLUTION INTRAVENOUS ONCE
Status: COMPLETED | OUTPATIENT
Start: 2022-12-12 | End: 2022-12-12

## 2022-12-12 RX ADMIN — SODIUM CHLORIDE 20 ML/HR: 9 INJECTION, SOLUTION INTRAVENOUS at 08:52

## 2022-12-12 RX ADMIN — DENOSUMAB 120 MG: 120 INJECTION SUBCUTANEOUS at 08:49

## 2022-12-12 RX ADMIN — SODIUM CHLORIDE 200 MG: 9 INJECTION, SOLUTION INTRAVENOUS at 09:00

## 2022-12-14 NOTE — PROGRESS NOTES
Patient:   ANNE HOLLIS            MRN: [id number]            FIN: [id number]               Age:   55 years     Sex:  Male     :  1965   Associated Diagnoses:   None   Author:   Car Rivas MD      Basic Information   Additional information.   History of Present Illness   I was wearing a mask, eye protection, and gloves, and patient was wearing a mask during the entire encounter.     55-year-old male with no reported past medical history presenting to the ER with shortness of breath and some chest tightness.  Started in the middle the night, feels like he cannot take a deep breath then, has some mild discomfort in the center of the chest which feels like pressure, slightly worse with deep breaths.  No cough, no fevers or chills, no nausea or vomiting.  Pain does not radiate anywhere just stays in the center of the chest/epigastric area.  No lightheadedness or dizziness or passing out.  No history of prior symptoms.    PCP: Dorita      Review of Systems   General: Negative except as noted above in HPI  Skin: Negative except as noted above in HPI  Eyes: Negative except as noted above in HPI  ENT: Negative except as noted above in HPI  Neck: Negative except as noted above in HPI  Respiratory: Negative except as noted above in HPI  Cardiac: Negative except as noted above in HPI  Gastrointestinal: Negative except as noted above in HPI  Urinary: Negative except as noted above in HPI  Musculoskeletal: Negative except as noted above in HPI  Neurologic: Negative except as noted above in HPI  All other systems reviewed and negative      Health Status   Allergies:    Allergic Reactions (Selected)  NKA.      Past Medical/ Family/ Social History   Problem list:    No qualifying data available  .   Social history: Occasional tobacco and alcohol use, no illicit drug use      Physical Examination               Vital Signs   Vital Signs   2020 17:05          Temperature Tympanic      97.8 F                         Follow-up - Radiation Oncology   Moon Mata 1950 71 y o  male [id number]    Cancer Staging   Malignant neoplasm of upper lobe of right lung [facility])  Staging form: Lung, AJCC 8th Edition  - Clinical stage from 9/23/2021: Stage IIIC (cT3, cN3, cM0) - Signed by Marcelo Kingston MD on 9/23/2021  Histopathologic type: Adenocarcinoma, NOS    Assessment/Plan:  Moon Mata 1950 is a 71 y o  male   diagnosed [id number] (IIIC) NSCLC (adenocarcinoma) of the RUL, with PET/CT demonstrating 6 4cm RUL mass with extensive adenopathy at the neck base bilaterally, thoracic inlets, mediastinum, and right hilum  Incidentally, MRI imaging of the head for his CVA had revealed a mass in the right nasopharynx, which did not demonstrate uptake on the PET/CT  ENT evaluation on 9/28/21 was not suspicious for malignancy  He was initially planned for concurrent chemoRT but given his significant burden of disease, his RT plan did not meet lung, heart, or esophageal constraints  Thus, he was instead planned for sequential chemotherapy for cytoreduction followed by concurrent chemoRT  He underwent 6 cycles of CarboAlimtaKeytruda, last cycle was yesterday 1/26/22  CT C/A/P on 12/20/21 showed decrease in the size of the RUL cancer, mediastinal lymph nodes, and redemonstrated supraclavicular nodes  The pt completed a course of radiation on 04/21/22  He presents for 8 month f/u  He is doing well overall, and has minimal residual acute toxicity of RT  He denies signs suggestive of pneumonitis  His imaging shows volume loss and decreased size of the treated RUL mass  He continues to have an unchanged pleural effusion however with some juxtapleural hyperdensity which could be artifactual or nodularity concerning for disease   He has seen medical oncology and pulmonology and the plan is to continue systemic therapy at this time    -Agree with close follow-up of pleural findings on next scan   -Continue systemic therapy  12/08/22 - Pulmonology,      Peripheral Pulse Rate     117 bpm  HI                             Respiratory Rate          16 br/min                             Systolic Blood Pressure   152 mmHg  HI                             Diastolic Blood Pressure  87 mmHg                             Mean Arterial Pressure    109 mmHg                             Oxygen Saturation         100 %  .   Pulse Ox > 95% on Room Air which is normal for this patient.   General Appearance: AAOx3  Skin: No rash, no bruising  HEENT: Normocephalic/atraumatic, sclera anicteric, mucous membranes moist  Neck: Supple, normal range of motion  Chest and Lungs: Clear to auscultation bilaterally, no wheezing, rales, or rhonchi  Cardiovascular: tachycardic rate, regular rhythm, 4/6 systolic ejection murmur best heard at the apex  Abdomen: Soft, non-tender, nondistended, no rebound or guarding  Back: No midline tenderness, no CVA tendereness  Musculoskeletal: No edema or tenderness  Neurologic: Awake, alert, no obvious deficits, moving all extremities  Psychiatric: Appropriate, cooperative      Medical Decision Making   EKG Interpretation  Medical indication: SOB, chest pain  12 lead EKG, as interpreted by me, shows sinus tachycardia with PACs, rate of 112, no acute ischemic changes.    Differential diagnosis includes but is not limited to: PNA, pulmonary edema, pneumothorax, cardiac issues (ACS, CHF, valve problem), COPD/asthma, bronchitis.     Patient feeling short of breath and some mild chest discomfort, he is tachycardic but no hypoxia.  Lungs sound clear no crackles or rhonchi, no wheezing.  He does have a very loud holosystolic murmur heard best at the apex, states that he has no history of heart murmur in the past.  No peripheral edema on exam.  EKG nonischemic just tachycardic.  Labs obtained, troponin elevated 0.06, BNP slightly elevated, otherwise generally unremarkable.  Chest x-ray does show some pulmonary edema.  Will check CT scan of the chest to evaluate for  Tammy Navarro  12/12/22 - Infusion  01/03/23 - Infusion  01/23/22 - Infusion  02/03/23 - Hem OncJada  02/13/23 - Infusion  RTC in 6 months  No orders of the defined types were placed in this encounter  History of Present Illness   Interval History:  He was last seen in radiation on 06/06/22 via telemedicine visit      06/07/22 - NM PET CT skull base to mid thigh  IMPRESSION:  1   Decreasing FDG activity of the right upper lung mass, suggesting response to therapy   Adjacent hypermetabolic pleural thickening may be inflammatory   No significant change in mild right perihilar activity   Otherwise no new hypermetabolic lesions   in the thorax   Continued PET CT follow-up recommended to exclude viable tumor  2   Persistent moderate to large right pleural effusion  3   No new hypermetabolic metastases in the neck, abdomen, pelvis  4   No significant change in posterior nasopharyngeal soft tissue lesions      06/13/22 - Hem Onc, Ali  Pt remains on Keytruda  Most recent imaging shows no evidence of progression  Follow up in 6-9 weeks       07/28/22 - XR chest pa & lateral   IMPRESSION:  Moderate-sized right pleural effusion is not significant changed since the June 7, 2022 PET/CT  Right upper lung opacity corresponding to mass better seen on the prior PET/CT      08/11/22 - Hem OncJada  Pt is doing well  Will follow up with repeat imaging in 3-4 months      08/22/22 - Pulmonary Disease, Beltran  Recurrent right pleural effusion - likely related to right lung cancer - pt has had 2 thoracenteses this year  Pt to have PFT     09/30/22 - Hem Onc Ali  Pt started on O2 by PCP  Pt is doing well     11/22/22 - CT chest abdomen pelvis w contrast  IMPRESSION:  1   Heterogeneous consolidation and volume loss throughout the right lung compatible with postradiation change, with continued decrease in size of partially obscured right upper lobe mass    2   Probably unchanged size of the right-sided pleural effusion with new diffuse PE.  Will need admission, cardiology consultation.    CT of the chest shows no large PE but is limited by motion, no aortic dissection or aneurysm, no pericardial effusion.  Patient did become slightly hypoxic in the ER was placed on 2 L nasal cannula.  He felt a little better after Ativan.  Discussed with cardiology Dr. Modi, will start heparin drip as well as Aspirin and Lasix.  Rapid COVID testing ordered for possible procedure tomorrow, n.p.o. after midnight.  Discussed with the hospitalist for [facility].        Notes:  Critical care was provided to prevent clinically significant and life-threatening deterioration of the patients condition due to pulmonary edema, and NSTEMI, hypoxia, requiring supplemental oxygen administration and heparin drip and admission. Pt required greater than 35 minutes of critical care time including initial evaluation, time spent at the bedside, multiple re-evaluations, discussion with the patient, family and consultants.  This time also including time reviewing the medical records, laboratory and x-ray results, and time spent ducumenting.  This time excluded any and all procedures.   .      Impression and Plan   Diagnosis   NSTEMI, CHF, hypoxia   Plan   Condition: Stable.    Disposition: Admit time  8/14/2020 19:01:00, Admit to Inpatient Telemetry Unit.             Electronically Signed On 08.14.2020 20:31  ___________________________________________________   Rob RUANO, Car     pleural thickening which could represent postradiation change  However, there are areas of juxtapleural hyperdensity which could be artifactual, reflect blood   products/debris, or represent irregular thickening/nodularity which would be concerning for malignant effusion  Recommend attention on imaging follow-up versus cytology if future therapeutic thoracentesis is performed  3   No evidence of disease in the abdomen or pelvis     11/28/22 - Hem Onc, Ali  Pt to stay on current regimen  Most recent imaging shows decrease in size of obscured right upper lobe mass  He does seem to have 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  Nasopharyngeal evaluation by ENT in April of 2022 had shown no evidence of disease on nasopharyngoscopy   Next imaging will be a PET-CT scan so hopefully this area can be re-evaluated   It had no uptake previously  Follow up in 6-9 weeks     Today, Mr Cecilio Lopez feels quite well  No challenges with breathing  He no longer requires oxygen  He is ambulating well  No pain  No dysphagia      Historical Information   Oncology History   Adenocarcinoma of lung   8/2021 Initial Diagnosis    Adenocarcinoma of lung     8/5/2021 Biopsy    Right lung apex, image-guided core needle biopsy:  - Adenocarcinoma      10/6/2021 - 1/26/2022 Chemotherapy    cyanocobalamin, 1,000 mcg, Intramuscular, Once, 3 of 3 cycles  Administration: 1,000 mcg (11/24/2021), 1,000 mcg (1/26/2022), 1,000 mcg (10/6/2021)  pegfilgrastim (Curly Imus), 6 mg, Subcutaneous, Once, 1 of 1 cycle  Administration: 6 mg (11/26/2021)  pegfilgrastim (Gabby Lavell), 6 mg, Subcutaneous, Once, 6 of 6 cycles  Administration: 6 mg (10/6/2021), 6 mg (11/3/2021), 6 mg (11/24/2021), 6 mg (12/15/2021), 6 mg (1/5/2022)  fosaprepitant (EMEND) IVPB, 150 mg, Intravenous, Once, 6 of 6 cycles  Administration: 150 mg (10/6/2021), 150 mg (11/24/2021), 150 mg (12/15/2021), 150 mg (1/26/2022), 150 mg (11/3/2021), 150 mg (1/5/2022)  CARBOplatin (PARAPLATIN) IVPB (GOG AUC DOSING), 519 5 mg, Intravenous, Once, 6 of 6 cycles  Administration: 519 5 mg (10/6/2021), 574 mg (11/24/2021), 600 mg (12/15/2021), 533 mg (1/26/2022), 604 5 mg (11/3/2021), 563 mg (1/5/2022)  pemetrexed (ALIMTA) chemo infusion, 970 mg, Intravenous, Once, 6 of 6 cycles  Administration: 1,000 mg (10/6/2021), 1,000 mg (11/24/2021), 1,000 mg (12/15/2021), 1,000 mg (1/26/2022), 1,000 mg (11/3/2021), 1,000 mg (1/5/2022)  pembrolizumab (KEYTRUDA) IVPB, 200 mg, Intravenous, Once, 6 of 6 cycles  Administration: 200 mg (10/6/2021), 200 mg (11/24/2021), 200 mg (12/15/2021), 200 mg (1/26/2022), 200 mg (11/3/2021), 200 mg (1/5/2022)     3/7/2022 -  Chemotherapy    CARBOplatin (PARAPLATIN) IVPB (GOG AUC DOSING), , Intravenous, Once, 6 of 6 cycles  Administration: 211 6 mg (3/7/2022), 208 mg (3/14/2022), 238 8 mg (3/21/2022), 211 6 mg (3/28/2022), 215 2 mg (4/4/2022), 213 4 mg (4/18/2022)  PACLItaxel (TAXOL) chemo IVPB, 50 mg/m2 = 99 mg, Intravenous, Once, 6 of 6 cycles  Administration: 99 mg (3/7/2022), 99 mg (3/14/2022), 99 mg (3/21/2022), 99 mg (3/28/2022), 99 mg (4/4/2022), 99 mg (4/18/2022)  pembrolizumab (KEYTRUDA) IVPB, 200 mg, Intravenous, Once, 14 of 21 cycles  Administration: 200 mg (3/7/2022), 200 mg (3/28/2022), 200 mg (4/25/2022), 200 mg (5/16/2022), 200 mg (6/6/2022), 200 mg (6/27/2022), 200 mg (7/18/2022), 200 mg (8/8/2022), 200 mg (8/29/2022), 200 mg (9/19/2022), 200 mg (10/10/2022), 200 mg (10/31/2022), 200 mg (11/21/2022), 200 mg (12/12/2022)     Malignant neoplasm of upper lobe of right lung ([facility] [address] )   9/3/2021 Initial Diagnosis    [facility]     9/23/2021 -  Cancer Staged    Staging form: Lung, AJCC 8th Edition  - Clinical stage from 9/23/2021: Stage IIIC (cT3, cN3, cM0) - Signed by Jaya Segura MD on 9/23/2021  Histopathologic type:  Adenocarcinoma, NOS       10/6/2021 - 1/26/2022 Chemotherapy    cyanocobalamin, 1,000 mcg, Intramuscular, Once, 3 of 3 cycles  Administration: 1,000 mcg (11/24/2021), 1,000 mcg (1/26/2022), 1,000 mcg (10/6/2021)  pegfilgrastim (Lendel Jordyn), 6 mg, Subcutaneous, Once, 1 of 1 cycle  Administration: 6 mg (11/26/2021)  pegfilgrastim (Nicole Francois), 6 mg, Subcutaneous, Once, 6 of 6 cycles  Administration: 6 mg (10/6/2021), 6 mg (11/3/2021), 6 mg (11/24/2021), 6 mg (12/15/2021), 6 mg (1/5/2022)  fosaprepitant (EMEND) IVPB, 150 mg, Intravenous, Once, 6 of 6 cycles  Administration: 150 mg (10/6/2021), 150 mg (11/24/2021), 150 mg (12/15/2021), 150 mg (1/26/2022), 150 mg (11/3/2021), 150 mg (1/5/2022)  CARBOplatin (PARAPLATIN) IVPB (GOG AUC DOSING), 519 5 mg, Intravenous, Once, 6 of 6 cycles  Administration: 519 5 mg (10/6/2021), 574 mg (11/24/2021), 600 mg (12/15/2021), 533 mg (1/26/2022), 604 5 mg (11/3/2021), 563 mg (1/5/2022)  pemetrexed (ALIMTA) chemo infusion, 970 mg, Intravenous, Once, 6 of 6 cycles  Administration: 1,000 mg (10/6/2021), 1,000 mg (11/24/2021), 1,000 mg (12/15/2021), 1,000 mg (1/26/2022), 1,000 mg (11/3/2021), 1,000 mg (1/5/2022)  pembrolizumab (KEYTRUDA) IVPB, 200 mg, Intravenous, Once, 6 of 6 cycles  Administration: 200 mg (10/6/2021), 200 mg (11/24/2021), 200 mg (12/15/2021), 200 mg (1/26/2022), 200 mg (11/3/2021), 200 mg (1/5/2022)     3/4/2022 - 4/21/2022 Radiation    The patient saw @[facility]@ for radiation treatment   This is the current list of radiation treatment:  Plan ID Energy Fractions Dose per Fraction (cGy) Dose Correction (cGy) Total Dose Delivered (cGy) Elapsed Days   R LUNGMED REV 6X 30 / 30 200 0 6,000 48      Treatment dates:  C1: 3/4/2022 - 4/21/2022         3/7/2022 -  Chemotherapy    CARBOplatin (PARAPLATIN) IVPB (GOG AUC DOSING), , Intravenous, Once, 6 of 6 cycles  Administration: 211 6 mg (3/7/2022), 208 mg (3/14/2022), 238 8 mg (3/21/2022), 211 6 mg (3/28/2022), 215 2 mg (4/4/2022), 213 4 mg (4/18/2022)  PACLItaxel (TAXOL) chemo IVPB, 50 mg/m2 = 99 mg, Intravenous, Once, 6 of 6 cycles  Administration: 99 mg (3/7/2022), 99 mg (3/14/2022), 99 mg (3/21/2022), 99 mg (3/28/2022), 99 mg (2022), 99 mg (2022)  pembrolizumab (KEYTRUDA) IVPB, 200 mg, Intravenous, Once, 14 of 21 cycles  Administration: 200 mg (3/7/2022), 200 mg (3/28/2022), 200 mg (2022), 200 mg (2022), 200 mg (2022), 200 mg (2022), 200 mg (2022), 200 mg (2022), 200 mg (2022), 200 mg (2022), 200 mg (10/10/2022), 200 mg (10/31/2022), 200 mg (2022), 200 mg (2022)         Past Medical History:   Diagnosis Date   • Chronic respiratory failure with hypoxia ([facility]) 2021   • Impaired mobility and ADLs 2021   • Lung cancer ([facility] [address] )    • Stroke [facility])      Past Surgical History:   Procedure Laterality Date   • IR BIOPSY LUNG  2021   • IR THORACENTESIS  2022   • IR THORACENTESIS  2022       Social History   Social History     Substance and Sexual Activity   Alcohol Use Not Currently    Comment: No ETOH since Summer 2021      Social History     Substance and Sexual Activity   Drug Use Never     Social History     Tobacco Use   Smoking Status Former   • Packs/day: 3 00   • Years: 39 00   • Pack years: 117 00   • Types: Cigarettes   • Start date:    • Quit date:    • Years since quittin 9   Smokeless Tobacco Never         Meds/Allergies     Current Outpatient Medications:   •  albuterol (ProAir HFA) 90 mcg/act inhaler, Inhale 2 puffs every 6 (six) hours as needed for wheezing or shortness of breath, Disp: 8 g, Rfl: 0  •  atorvastatin (LIPITOR) 40 mg tablet, Take 1 tablet (40 mg total) by mouth daily with dinner, Disp: 90 tablet, Rfl: 0  •  enoxaparin (LOVENOX) 80 mg/0 8 mL, Inject 0 8 mL (80 mg total) under the skin every 12 (twelve) hours, Disp: 144 mL, Rfl: 0  •  folic acid (FOLVITE) 1 mg tablet, TAKE 1 TABLET BY MOUTH EVERY DAY, Disp: 90 tablet, Rfl: 1  •  multivitamin (THERAGRAN) TABS, Take 1 tablet by mouth daily, Disp: , Rfl:   • acetaminophen (TYLENOL) 325 mg tablet, Take 2 tablets (650 mg total) by mouth 4 (four) times a day as needed for mild pain, headaches or fever (Patient not taking: Reported on 12/8/2022), Disp: , Rfl: 0  •  tamsulosin (FLOMAX) 0 4 mg, TAKE 1 CAPSULE (0 4 MG TOTAL) BY MOUTH DAILY AFTER DINNER, Disp: 90 capsule, Rfl: 0  Allergies   Allergen Reactions   • Doxycycline Rash         Review of Systems   Constitutional: Negative  HENT: Negative  Eyes:        Wears glasses    Respiratory: Positive for cough  No recent use of inhalers  Daily cough  Quit smoking x 18 yrs ago    Cardiovascular: Negative  Gastrointestinal: Negative  Endocrine: Negative  Genitourinary: Negative  Musculoskeletal: Negative  Skin: Negative  Allergic/Immunologic: Negative  Neurological: Negative  Hematological: Negative  Psychiatric/Behavioral: Negative  OBJECTIVE:   /84   Pulse 79   Temp (!) 97 2 °F (36 2 °C)   Resp 20   Ht 5' 8" (1 727 m)   Wt 84 2 kg (185 lb 9 6 oz)   SpO2 96%   BMI 28 22 kg/m²   Pain Assessment:  0  ECOG/Zubrod/WHO: 0 - Asymptomatic  Physical Exam   General Appearance:  Alert, cooperative, no distress, appears stated age  Cardiovascular:  Extremities warm and well perfused  Lungs: Decreased breath sounds on the right upper lung fields  No crackles or wheezes throughout  Respirations unlabored, no cyanosis, able to speak in complete sentences without dyspnea  Abdomen: Non-distended  Extremities: No cyanosis or edema  Skin: No generalized rash or dermatitis  Neurologic: ANOx3, speech and cognition intact      RESULTS    Lab Results:   Recent Results (from the past 672 hour(s))   CBC and differential    Collection Time: 11/18/22  9:13 AM   Result Value Ref Range    WBC 6 72 4 31 - 10 16 Thousand/uL    RBC 3 98 3 88 - 5 62 Million/uL    Hemoglobin 10 7 (L) 12 0 - 17 0 g/dL    Hematocrit 35 5 (L) 36 5 - 49 3 %    MCV 89 82 - 98 fL    MCH 26 9 26 8 - 34 3 pg    MCHC 30 1 (L) 31 4 - 37 4 g/dL    RDW 15 9 (H) 11 6 - 15 1 %    MPV 9 6 8 9 - 12 7 fL    Platelets 384 519 - 844 Thousands/uL    nRBC 0 /100 WBCs    Neutrophils Relative 75 43 - 75 %    Immat GRANS % 0 0 - 2 %    Lymphocytes Relative 11 (L) 14 - 44 %    Monocytes Relative 13 (H) 4 - 12 %    Eosinophils Relative 1 0 - 6 %    Basophils Relative 0 0 - 1 %    Neutrophils Absolute 4 96 1 85 - 7 62 Thousands/µL    Immature Grans Absolute 0 02 0 00 - 0 20 Thousand/uL    Lymphocytes Absolute 0 74 0 60 - 4 47 Thousands/µL    Monocytes Absolute 0 88 0 17 - 1 22 Thousand/µL    Eosinophils Absolute 0 09 0 00 - 0 61 Thousand/µL    Basophils Absolute 0 03 0 00 - 0 10 Thousands/µL   Comprehensive metabolic panel    Collection Time: 11/18/22  9:13 AM   Result Value Ref Range    Sodium 136 135 - 147 mmol/L    Potassium 4 0 3 5 - 5 3 mmol/L    Chloride 107 96 - 108 mmol/L    CO2 26 21 - 32 mmol/L    ANION GAP 3 (L) 4 - 13 mmol/L    BUN 12 5 - 25 mg/dL    Creatinine 1 03 0 60 - 1 30 mg/dL    Glucose, Fasting 99 65 - 99 mg/dL    Calcium 10 3 (H) 8 3 - 10 1 mg/dL    Corrected Calcium 11 3 (H) 8 3 - 10 1 mg/dL    AST 15 5 - 45 U/L    ALT 16 12 - 78 U/L    Alkaline Phosphatase 64 46 - 116 U/L    Total Protein 7 8 6 4 - 8 4 g/dL    Albumin 2 8 (L) 3 5 - 5 0 g/dL    Total Bilirubin 0 37 0 20 - 1 00 mg/dL    eGFR 72 ml/min/1 73sq m   TSH, 3rd generation with Free T4 reflex    Collection Time: 11/18/22  9:13 AM   Result Value Ref Range    TSH 3RD GENERATON 4 160 0 450 - 4 500 uIU/mL   T3, free    Collection Time: 11/18/22  9:13 AM   Result Value Ref Range    T3, Free 1 94 (L) 2 30 - 4 20 pg/mL   Overnight Oximetry Test    Collection Time: 12/08/22  2:19 PM   Result Value Ref Range    Supplier Name [employer]     Supplier Phone Number [phone]     Order Status Contacting Patient     Delivery Note      Delivery Request Date 12/08/2022     Item Description Overnight Oximetry Test    CBC and differential    Collection Time: 12/09/22 10:07 AM   Result Value Ref Range    WBC 6 48 4 31 - 10 16 Thousand/uL    RBC 4 03 3 88 - 5 62 Million/uL    Hemoglobin 10 7 (L) 12 0 - 17 0 g/dL    Hematocrit 36 5 36 5 - 49 3 %    MCV 91 82 - 98 fL    MCH 26 6 (L) 26 8 - 34 3 pg    MCHC 29 3 (L) 31 4 - 37 4 g/dL    RDW 16 1 (H) 11 6 - 15 1 %    MPV 9 5 8 9 - 12 7 fL    Platelets 847 043 - 233 Thousands/uL    nRBC 0 /100 WBCs    Neutrophils Relative 67 43 - 75 %    Immat GRANS % 1 0 - 2 %    Lymphocytes Relative 16 14 - 44 %    Monocytes Relative 13 (H) 4 - 12 %    Eosinophils Relative 2 0 - 6 %    Basophils Relative 1 0 - 1 %    Neutrophils Absolute 4 42 1 85 - 7 62 Thousands/µL    Immature Grans Absolute 0 04 0 00 - 0 20 Thousand/uL    Lymphocytes Absolute 1 02 0 60 - 4 47 Thousands/µL    Monocytes Absolute 0 84 0 17 - 1 22 Thousand/µL    Eosinophils Absolute 0 10 0 00 - 0 61 Thousand/µL    Basophils Absolute 0 06 0 00 - 0 10 Thousands/µL   Comprehensive metabolic panel    Collection Time: 12/09/22 10:07 AM   Result Value Ref Range    Sodium 139 135 - 147 mmol/L    Potassium 5 1 3 5 - 5 3 mmol/L    Chloride 108 96 - 108 mmol/L    CO2 28 21 - 32 mmol/L    ANION GAP 3 (L) 4 - 13 mmol/L    BUN 11 5 - 25 mg/dL    Creatinine 0 93 0 60 - 1 30 mg/dL    Glucose, Fasting 100 (H) 65 - 99 mg/dL    Calcium 10 2 (H) 8 3 - 10 1 mg/dL    Corrected Calcium 10 9 (H) 8 3 - 10 1 mg/dL    AST 13 5 - 45 U/L    ALT 19 12 - 78 U/L    Alkaline Phosphatase 70 46 - 116 U/L    Total Protein 7 1 6 4 - 8 4 g/dL    Albumin 3 1 (L) 3 5 - 5 0 g/dL    Total Bilirubin 0 35 0 20 - 1 00 mg/dL    eGFR 82 ml/min/1 73sq m   T3, free    Collection Time: 12/09/22 10:07 AM   Result Value Ref Range    T3, Free 2 42 2 30 - 4 20 pg/mL   TSH, 3rd generation with Free T4 reflex    Collection Time: 12/09/22 10:07 AM   Result Value Ref Range    TSH 3RD GENERATON 4 330 0 450 - 4 500 uIU/mL       Imaging Studies:CT chest abdomen pelvis w contrast    Result Date: 11/25/2022  Narrative: CT CHEST, ABDOMEN, AND PELVIS WITH IV CONTRAST INDICATION:   C34 11: Malignant neoplasm of upper lobe, right bronchus or lung C34 90: Malignant neoplasm of unspecified part of unspecified bronchus or lung  COMPARISON:  CT chest/abdomen/pelvis 12/20/2021  PET/CT 6/7/2022  TECHNIQUE: CT examination of the chest, abdomen, and pelvis was performed  In addition to portal venous phase postcontrast scanning through the chest, abdomen, and pelvis, delayed phase postcontrast scanning was performed through the upper abdominal viscera  [facility] Axial, sagittal, and coronal 2D reformatted images were created from the source data and submitted for interpretation  Radiation dose length product (DLP) for this visit:  920 04 mGy-cm   This examination, like all CT scans performed in the [facility], was performed utilizing techniques to minimize radiation dose exposure, including the use of iterative  reconstruction and automated exposure control  IV Contrast:  100 mL of iohexol (OMNIPAQUE) Enteric Contrast:  Enteric contrast was administered  FINDINGS: CHEST: LARGE AIRWAYS: Large airways are clear with no tracheal or endobronchial lesion  LUNGS:  Moderate apically predominant background emphysematous changes  There is progressive volume loss and new dense heterogeneous consolidation throughout the right lung predominantly in the posterior upper lobe, perihilar middle lobe, and apical lower lobe, compatible with radiation-induced change  The previously seen irregular mass with a coarse calcification is partially obscured by the surrounding opacities but has again decreased in size measuring approximately 4 6 x 1 7 cm (previously 5 2 x  2 3 cm/2022, from 5 8 x 3 4 cm in 12/2021) and shows no differential enhancement, on series 3/37  There is new paramediastinal consolidation/atelectasis in the left upper lobe also presumably reflecting post radiation change  No new suspicious findings   PLEURA:  A moderate-sized right pleural effusion is likely unchanged in size when accounting for redistribution, however now shows diffuse pleural thickening with irregular peripheral hyperdensities (e g  series 2 image 34, 42, 51) that could be artifactual (though similarly seen on delayed phase such as series 6/6), reflect debris/blood products, or represent irregular thickening/nodularity  No pneumothorax  HEART: Normal cardiac size and morphology  Severe coronary artery calcification  Trace pericardial effusion similar to prior  VESSELS: Normal caliber thoracic aorta with mild atherosclerotic plaque  Common origin of the brachiocephalic and left common carotid arteries, a normal variant  MEDIASTINUM AND JONH:  There has been interval rightward mediastinal shift due to volume loss  No mediastinal lymphadenopathy; the previously referenced conglomerate right lower paratracheal lymph nodes have again decreased and are nearly imperceptible (in short axis previously measuring 1 cm in 12/20/2021, from 2 cm in 7/31/2021)  CHEST WALL AND LOWER NECK:   Mild bilateral gynecomastia  ABDOMEN: LIVER: Hepatomegaly measuring 20 cm craniocaudal  Numerous small cysts as well small sharply circumscribed hypodensities which are too small to characterize but unchanged (where comparable) since prior noncontrast study of 7/31/2021  No new or suspicious  finding  BILIARY: No intrahepatic biliary ductal dilatation  Normal caliber common bile duct  GALLBLADDER: No calcified gallstones  Normal wall thickness  No pericholecystic inflammatory changes  SPLEEN: Within normal limits  No suspicious lesion  Normal spleen size  PANCREAS: Pancreatic parenchyma is within normal limits  No main pancreatic ductal dilatation  No pancreatic/peripancreatic inflammation  ADRENAL GLANDS: Within normal limits  KIDNEYS/URETERS: Normal size and position  Symmetric enhancement  No suspicious lesion  Unchanged left lower pole simple cyst measuring 6 cm  No calcified stones or hydronephrosis   Ureters within normal limits  STOMACH AND BOWEL: Stomach is grossly within normal limits  Normal caliber small bowel  Normal caliber large bowel  Colonic diverticulosis without acute diverticulitis  No evidence of active small or large bowel inflammatory process  APPENDIX: Normal air-filled appendix which lies partially within an indirect right inguinal hernia  ABDOMINOPELVIC CAVITY: No ascites  No intraperitoneal free air  No lymphadenopathy  No retroperitoneal hematoma  VESSELS: Normal caliber abdominal aorta with moderate to severe atherosclerotic plaque  The celiac, SMA, and NANCY are patent, with mild uncalcified plaque at the SMA origin  The portal veins are patent  The SMV and splenic vein are patent  The hepatic veins are patent  PELVIS REPRODUCTIVE ORGANS:  Enlarged prostate  Symmetric seminal vesicles  URINARY BLADDER:  Within normal limits  No calculi  ABDOMINAL WALL/INGUINAL REGIONS:  Again seen are small foci of air in the bilateral anterior abdominal wall subcutaneous tissues consistent with medication injections, but there has been development of associated nodular soft tissue densities compatible with lipohypertrophy, which may impair the absorption of insulin  Small bilateral indirect inguinal hernias containing fat on the left, fat and the vermiform appendix in the right (Amyand hernia)  BONES:  Moderate multilevel degenerative changes in the spine  Vertebral body height is maintained  No acute fracture or destructive osseous lesion  Impression: 1  Heterogeneous consolidation and volume loss throughout the right lung compatible with postradiation change, with continued decrease in size of partially obscured right upper lobe mass  2   Probably unchanged size of the right-sided pleural effusion with new diffuse pleural thickening which could represent postradiation change   However, there are areas of juxtapleural hyperdensity which could be artifactual, reflect blood products/debris, or represent irregular thickening/nodularity which would be concerning for malignant effusion  Recommend attention on imaging follow-up versus cytology if future therapeutic thoracentesis is performed  3   No evidence of disease in the abdomen or pelvis  Workstation performed: Salina Cooper MD  12/14/2022,1:56 PM    Portions of the record may have been created with voice recognition software  Occasional wrong word or "sound a like" substitutions may have occurred due to the inherent limitations of voice recognition software  Read the chart carefully and recognize, using context, where substitutions have occurred

## 2022-12-15 LAB
DME PARACHUTE DELIVERY DATE EXPECTED: NORMAL
DME PARACHUTE DELIVERY DATE REQUESTED: NORMAL
DME PARACHUTE ITEM DESCRIPTION: NORMAL
DME PARACHUTE ORDER STATUS: NORMAL
DME PARACHUTE SUPPLIER NAME: NORMAL
DME PARACHUTE SUPPLIER PHONE: NORMAL

## 2022-12-24 DIAGNOSIS — G47.34 NOCTURNAL HYPOXEMIA: Primary | ICD-10-CM

## 2022-12-24 NOTE — PROGRESS NOTES
Called the patient and his wife regarding overnight pulse ox - we are unsure why during the majority of the night from 8pm to 10:15pm and then subsequently after 11:15pm the SpO2 remained above 90% for the most part  There was a period of desaturation from 10:15pm to 11:15pm that was quite dramatic and not consistent with the pattern for the rest of the night  Patient and his wife said he was sleeping in the chair the whole time but could have switched positions during the period of desaturation  Given the inconsistent pattern without any corroborating information from the patient and his wife to suggest otherwise, I suspect the desaturation was due to pulse oximetry artifact although impossible for me to know  I offered an in-lab PSG to elucidate but the patient declined to sleep in a lab  Therefore, I will order a home sleep study instead due to the nocturnal desaturations on the ambulatory pulse oximetry to see if there is a component of sleep apnea and to reevaluate the nocturnal desaturations

## 2022-12-30 ENCOUNTER — APPOINTMENT (OUTPATIENT)
Dept: LAB | Facility: CLINIC | Age: 72
End: 2022-12-30

## 2022-12-30 DIAGNOSIS — C34.90 ADENOCARCINOMA OF LUNG, UNSPECIFIED LATERALITY (HCC): ICD-10-CM

## 2022-12-30 DIAGNOSIS — E83.52 HYPERCALCEMIA OF MALIGNANCY: ICD-10-CM

## 2022-12-30 LAB
ALBUMIN SERPL BCP-MCNC: 3 G/DL (ref 3.5–5)
ALP SERPL-CCNC: 69 U/L (ref 46–116)
ALT SERPL W P-5'-P-CCNC: 19 U/L (ref 12–78)
ANION GAP SERPL CALCULATED.3IONS-SCNC: 7 MMOL/L (ref 4–13)
AST SERPL W P-5'-P-CCNC: 17 U/L (ref 5–45)
BASOPHILS # BLD AUTO: 0.06 THOUSANDS/ÂΜL (ref 0–0.1)
BASOPHILS NFR BLD AUTO: 1 % (ref 0–1)
BILIRUB SERPL-MCNC: 0.36 MG/DL (ref 0.2–1)
BUN SERPL-MCNC: 13 MG/DL (ref 5–25)
CALCIUM ALBUM COR SERPL-MCNC: 10.7 MG/DL (ref 8.3–10.1)
CALCIUM SERPL-MCNC: 9.9 MG/DL (ref 8.3–10.1)
CHLORIDE SERPL-SCNC: 106 MMOL/L (ref 96–108)
CO2 SERPL-SCNC: 26 MMOL/L (ref 21–32)
CREAT SERPL-MCNC: 0.99 MG/DL (ref 0.6–1.3)
EOSINOPHIL # BLD AUTO: 0.09 THOUSAND/ÂΜL (ref 0–0.61)
EOSINOPHIL NFR BLD AUTO: 2 % (ref 0–6)
ERYTHROCYTE [DISTWIDTH] IN BLOOD BY AUTOMATED COUNT: 16.8 % (ref 11.6–15.1)
GFR SERPL CREATININE-BSD FRML MDRD: 76 ML/MIN/1.73SQ M
GLUCOSE P FAST SERPL-MCNC: 96 MG/DL (ref 65–99)
HCT VFR BLD AUTO: 35.2 % (ref 36.5–49.3)
HGB BLD-MCNC: 10.3 G/DL (ref 12–17)
IMM GRANULOCYTES # BLD AUTO: 0.01 THOUSAND/UL (ref 0–0.2)
IMM GRANULOCYTES NFR BLD AUTO: 0 % (ref 0–2)
LYMPHOCYTES # BLD AUTO: 0.9 THOUSANDS/ÂΜL (ref 0.6–4.47)
LYMPHOCYTES NFR BLD AUTO: 15 % (ref 14–44)
MCH RBC QN AUTO: 26.7 PG (ref 26.8–34.3)
MCHC RBC AUTO-ENTMCNC: 29.3 G/DL (ref 31.4–37.4)
MCV RBC AUTO: 91 FL (ref 82–98)
MONOCYTES # BLD AUTO: 0.79 THOUSAND/ÂΜL (ref 0.17–1.22)
MONOCYTES NFR BLD AUTO: 13 % (ref 4–12)
NEUTROPHILS # BLD AUTO: 4.28 THOUSANDS/ÂΜL (ref 1.85–7.62)
NEUTS SEG NFR BLD AUTO: 69 % (ref 43–75)
NRBC BLD AUTO-RTO: 0 /100 WBCS
PLATELET # BLD AUTO: 292 THOUSANDS/UL (ref 149–390)
PMV BLD AUTO: 9.6 FL (ref 8.9–12.7)
POTASSIUM SERPL-SCNC: 3.9 MMOL/L (ref 3.5–5.3)
PROT SERPL-MCNC: 6.9 G/DL (ref 6.4–8.4)
RBC # BLD AUTO: 3.86 MILLION/UL (ref 3.88–5.62)
SODIUM SERPL-SCNC: 139 MMOL/L (ref 135–147)
T3FREE SERPL-MCNC: 2.5 PG/ML (ref 2.3–4.2)
T4 FREE SERPL-MCNC: 1.03 NG/DL (ref 0.76–1.46)
TSH SERPL DL<=0.05 MIU/L-ACNC: 5.3 UIU/ML (ref 0.45–4.5)
WBC # BLD AUTO: 6.13 THOUSAND/UL (ref 4.31–10.16)

## 2023-01-01 DIAGNOSIS — D70.1 CHEMOTHERAPY INDUCED NEUTROPENIA: Primary | ICD-10-CM

## 2023-01-01 DIAGNOSIS — C34.90 ADENOCARCINOMA OF LUNG, UNSPECIFIED LATERALITY (HCC): ICD-10-CM

## 2023-01-01 DIAGNOSIS — C34.11 MALIGNANT NEOPLASM OF UPPER LOBE OF RIGHT LUNG (HCC): ICD-10-CM

## 2023-01-01 DIAGNOSIS — R91.8 MASS OF UPPER LOBE OF RIGHT LUNG: ICD-10-CM

## 2023-01-01 DIAGNOSIS — C34.11 MALIGNANT NEOPLASM OF UPPER LOBE OF RIGHT LUNG (HCC): Primary | ICD-10-CM

## 2023-01-01 DIAGNOSIS — T45.1X5A CHEMOTHERAPY INDUCED NEUTROPENIA: Primary | ICD-10-CM

## 2023-01-01 RX ORDER — HALOPERIDOL 2 MG/ML
1 SOLUTION ORAL EVERY 4 HOURS PRN
Qty: 15 ML | Refills: 1 | Status: SHIPPED | OUTPATIENT
Start: 2023-01-01 | End: 2023-11-18 | Stop reason: CLARIF

## 2023-01-01 RX ORDER — MORPHINE SULFATE 20 MG/5ML
10 SOLUTION ORAL EVERY 2 HOUR PRN
Qty: 15 ML | Refills: 0 | Status: SHIPPED | OUTPATIENT
Start: 2023-01-01 | End: 2023-11-18 | Stop reason: CLARIF

## 2023-01-03 ENCOUNTER — HOSPITAL ENCOUNTER (OUTPATIENT)
Dept: INFUSION CENTER | Facility: HOSPITAL | Age: 73
Discharge: HOME | End: 2023-01-03
Attending: INTERNAL MEDICINE

## 2023-01-03 VITALS
RESPIRATION RATE: 16 BRPM | SYSTOLIC BLOOD PRESSURE: 118 MMHG | DIASTOLIC BLOOD PRESSURE: 72 MMHG | TEMPERATURE: 97.1 F | OXYGEN SATURATION: 98 % | HEART RATE: 71 BPM

## 2023-01-03 DIAGNOSIS — R91.8 MASS OF UPPER LOBE OF RIGHT LUNG: ICD-10-CM

## 2023-01-03 DIAGNOSIS — T45.1X5A CHEMOTHERAPY INDUCED NEUTROPENIA (HCC): ICD-10-CM

## 2023-01-03 DIAGNOSIS — D70.1 CHEMOTHERAPY INDUCED NEUTROPENIA (HCC): ICD-10-CM

## 2023-01-03 DIAGNOSIS — C34.11 MALIGNANT NEOPLASM OF UPPER LOBE OF RIGHT LUNG (HCC): Primary | ICD-10-CM

## 2023-01-03 DIAGNOSIS — C34.90 ADENOCARCINOMA OF LUNG, UNSPECIFIED LATERALITY (HCC): ICD-10-CM

## 2023-01-03 RX ORDER — SODIUM CHLORIDE 9 MG/ML
20 INJECTION, SOLUTION INTRAVENOUS ONCE
Status: COMPLETED | OUTPATIENT
Start: 2023-01-03 | End: 2023-01-03

## 2023-01-03 RX ORDER — SODIUM CHLORIDE 9 MG/ML
20 INJECTION, SOLUTION INTRAVENOUS ONCE
Status: CANCELLED | OUTPATIENT
Start: 2023-01-03

## 2023-01-03 RX ADMIN — SODIUM CHLORIDE 200 MG: 9 INJECTION, SOLUTION INTRAVENOUS at 10:48

## 2023-01-03 RX ADMIN — SODIUM CHLORIDE 20 ML/HR: 9 INJECTION, SOLUTION INTRAVENOUS at 10:48

## 2023-01-03 NOTE — PROGRESS NOTES
Pt tolerated Keytruda infusion with no adverse reaction  Pt left unit ambulatory with steady gait  Refused AVS  Aware of next appt

## 2023-01-10 ENCOUNTER — TELEPHONE (OUTPATIENT)
Dept: OTHER | Facility: OTHER | Age: 73
End: 2023-01-10

## 2023-01-10 ENCOUNTER — HOSPITAL ENCOUNTER (OUTPATIENT)
Dept: INFUSION CENTER | Facility: HOSPITAL | Age: 73
Discharge: HOME | End: 2023-01-10
Attending: INTERNAL MEDICINE

## 2023-01-10 VITALS
SYSTOLIC BLOOD PRESSURE: 117 MMHG | DIASTOLIC BLOOD PRESSURE: 62 MMHG | HEART RATE: 75 BPM | HEIGHT: 69 IN | BODY MASS INDEX: 28.24 KG/M2 | RESPIRATION RATE: 16 BRPM | WEIGHT: 190.7 LBS | TEMPERATURE: 97.2 F | OXYGEN SATURATION: 95 %

## 2023-01-10 DIAGNOSIS — E83.52 HYPERCALCEMIA OF MALIGNANCY: Primary | ICD-10-CM

## 2023-01-10 DIAGNOSIS — I82.403 ACUTE DEEP VEIN THROMBOSIS (DVT) OF BOTH LOWER EXTREMITIES, UNSPECIFIED VEIN (HCC): ICD-10-CM

## 2023-01-10 RX ADMIN — DENOSUMAB 120 MG: 120 INJECTION SUBCUTANEOUS at 12:46

## 2023-01-10 NOTE — PROGRESS NOTES
Pt here today for XGEVA tolerated well no adverse reactions AVS provided and pt left ambulatory with steady gait

## 2023-01-11 ENCOUNTER — HOSPITAL ENCOUNTER (OUTPATIENT)
Dept: NUCLEAR MEDICINE | Facility: HOSPITAL | Age: 73
Discharge: HOME | End: 2023-01-11
Attending: INTERNAL MEDICINE

## 2023-01-11 ENCOUNTER — HOSPITAL ENCOUNTER (OUTPATIENT)
Dept: NON INVASIVE DIAGNOSTICS | Facility: HOSPITAL | Age: 73
Discharge: HOME | End: 2023-01-11
Attending: INTERNAL MEDICINE

## 2023-01-11 DIAGNOSIS — E78.00 PURE HYPERCHOLESTEROLEMIA: ICD-10-CM

## 2023-01-11 DIAGNOSIS — I10 PRIMARY HYPERTENSION: ICD-10-CM

## 2023-01-11 LAB
NUC STRESS EJECTION FRACTION: 68 %
SL CV REST NUCLEAR ISOTOPE DOSE: 10.08 MCI
SL CV STRESS NUCLEAR ISOTOPE DOSE: 32.1 MCI
STRESS ANGINA INDEX: 0
STRESS BASELINE HR: 75 BPM
STRESS ST DEPRESSION: 0 MM
STRESS/REST PERFUSION RATIO: 1

## 2023-01-11 RX ORDER — ENOXAPARIN SODIUM 100 MG/ML
80 INJECTION SUBCUTANEOUS EVERY 12 HOURS
Qty: 144 ML | Refills: 1 | Status: SHIPPED | OUTPATIENT
Start: 2023-01-11 | End: 2023-07-10

## 2023-01-11 RX ADMIN — REGADENOSON 0.4 MG: 0.08 INJECTION, SOLUTION INTRAVENOUS at 13:30

## 2023-01-12 LAB
CHEST PAIN STATEMENT: NORMAL
CHEST PAIN STATEMENT: NORMAL
MAX DIASTOLIC BP: 80 MMHG
MAX DIASTOLIC BP: 80 MMHG
MAX HEART RATE: 108 BPM
MAX HEART RATE: 108 BPM
MAX PREDICTED HEART RATE: 149 BPM
MAX PREDICTED HEART RATE: 149 BPM
MAX. SYSTOLIC BP: 123 MMHG
MAX. SYSTOLIC BP: 123 MMHG
PROTOCOL NAME: NORMAL
PROTOCOL NAME: NORMAL
REASON FOR TERMINATION: NORMAL
REASON FOR TERMINATION: NORMAL
TARGET HR FORMULA: NORMAL
TARGET HR FORMULA: NORMAL
TEST INDICATION: NORMAL
TEST INDICATION: NORMAL
TIME IN EXERCISE PHASE: NORMAL
TIME IN EXERCISE PHASE: NORMAL

## 2023-01-17 RX ORDER — SODIUM CHLORIDE 9 MG/ML
20 INJECTION, SOLUTION INTRAVENOUS ONCE
Status: CANCELLED | OUTPATIENT
Start: 2023-01-23

## 2023-01-20 ENCOUNTER — APPOINTMENT (OUTPATIENT)
Dept: LAB | Facility: CLINIC | Age: 73
End: 2023-01-20

## 2023-01-20 DIAGNOSIS — C34.90 ADENOCARCINOMA OF LUNG, UNSPECIFIED LATERALITY (HCC): ICD-10-CM

## 2023-01-20 LAB
ALBUMIN SERPL BCP-MCNC: 3 G/DL (ref 3.5–5)
ALP SERPL-CCNC: 67 U/L (ref 46–116)
ALT SERPL W P-5'-P-CCNC: 21 U/L (ref 12–78)
ANION GAP SERPL CALCULATED.3IONS-SCNC: 5 MMOL/L (ref 4–13)
AST SERPL W P-5'-P-CCNC: 14 U/L (ref 5–45)
BASOPHILS # BLD AUTO: 0.04 THOUSANDS/ÂΜL (ref 0–0.1)
BASOPHILS NFR BLD AUTO: 1 % (ref 0–1)
BILIRUB SERPL-MCNC: 0.41 MG/DL (ref 0.2–1)
BUN SERPL-MCNC: 13 MG/DL (ref 5–25)
CALCIUM ALBUM COR SERPL-MCNC: 11.1 MG/DL (ref 8.3–10.1)
CALCIUM SERPL-MCNC: 10.3 MG/DL (ref 8.3–10.1)
CHLORIDE SERPL-SCNC: 106 MMOL/L (ref 96–108)
CO2 SERPL-SCNC: 28 MMOL/L (ref 21–32)
CREAT SERPL-MCNC: 1.03 MG/DL (ref 0.6–1.3)
EOSINOPHIL # BLD AUTO: 0.07 THOUSAND/ÂΜL (ref 0–0.61)
EOSINOPHIL NFR BLD AUTO: 1 % (ref 0–6)
ERYTHROCYTE [DISTWIDTH] IN BLOOD BY AUTOMATED COUNT: 16.8 % (ref 11.6–15.1)
GFR SERPL CREATININE-BSD FRML MDRD: 72 ML/MIN/1.73SQ M
GLUCOSE P FAST SERPL-MCNC: 97 MG/DL (ref 65–99)
HCT VFR BLD AUTO: 37.5 % (ref 36.5–49.3)
HGB BLD-MCNC: 10.8 G/DL (ref 12–17)
IMM GRANULOCYTES # BLD AUTO: 0.04 THOUSAND/UL (ref 0–0.2)
IMM GRANULOCYTES NFR BLD AUTO: 1 % (ref 0–2)
LYMPHOCYTES # BLD AUTO: 0.89 THOUSANDS/ÂΜL (ref 0.6–4.47)
LYMPHOCYTES NFR BLD AUTO: 12 % (ref 14–44)
MCH RBC QN AUTO: 26.5 PG (ref 26.8–34.3)
MCHC RBC AUTO-ENTMCNC: 28.8 G/DL (ref 31.4–37.4)
MCV RBC AUTO: 92 FL (ref 82–98)
MONOCYTES # BLD AUTO: 0.86 THOUSAND/ÂΜL (ref 0.17–1.22)
MONOCYTES NFR BLD AUTO: 12 % (ref 4–12)
NEUTROPHILS # BLD AUTO: 5.3 THOUSANDS/ÂΜL (ref 1.85–7.62)
NEUTS SEG NFR BLD AUTO: 73 % (ref 43–75)
NRBC BLD AUTO-RTO: 0 /100 WBCS
PLATELET # BLD AUTO: 346 THOUSANDS/UL (ref 149–390)
PMV BLD AUTO: 10.5 FL (ref 8.9–12.7)
POTASSIUM SERPL-SCNC: 4.1 MMOL/L (ref 3.5–5.3)
PROT SERPL-MCNC: 7.1 G/DL (ref 6.4–8.4)
RBC # BLD AUTO: 4.08 MILLION/UL (ref 3.88–5.62)
SODIUM SERPL-SCNC: 139 MMOL/L (ref 135–147)
T3FREE SERPL-MCNC: 2.23 PG/ML (ref 2.3–4.2)
T4 FREE SERPL-MCNC: 1.08 NG/DL (ref 0.76–1.46)
TSH SERPL DL<=0.05 MIU/L-ACNC: 4.64 UIU/ML (ref 0.45–4.5)
WBC # BLD AUTO: 7.2 THOUSAND/UL (ref 4.31–10.16)

## 2023-01-21 DIAGNOSIS — I63.9 ACUTE CVA (CEREBROVASCULAR ACCIDENT) (HCC): ICD-10-CM

## 2023-01-21 RX ORDER — ATORVASTATIN CALCIUM 40 MG/1
40 TABLET, FILM COATED ORAL
Qty: 90 TABLET | Refills: 0 | Status: CANCELLED | OUTPATIENT
Start: 2023-01-21

## 2023-01-23 ENCOUNTER — HOSPITAL ENCOUNTER (OUTPATIENT)
Dept: INFUSION CENTER | Facility: HOSPITAL | Age: 73
Discharge: HOME | End: 2023-01-23
Attending: INTERNAL MEDICINE

## 2023-01-23 VITALS
OXYGEN SATURATION: 98 % | RESPIRATION RATE: 16 BRPM | SYSTOLIC BLOOD PRESSURE: 122 MMHG | HEART RATE: 69 BPM | TEMPERATURE: 97.1 F | HEIGHT: 69 IN | WEIGHT: 189.38 LBS | DIASTOLIC BLOOD PRESSURE: 63 MMHG | BODY MASS INDEX: 28.05 KG/M2

## 2023-01-23 DIAGNOSIS — C34.11 MALIGNANT NEOPLASM OF UPPER LOBE OF RIGHT LUNG (HCC): Primary | ICD-10-CM

## 2023-01-23 DIAGNOSIS — D70.1 CHEMOTHERAPY INDUCED NEUTROPENIA (HCC): ICD-10-CM

## 2023-01-23 DIAGNOSIS — R91.8 MASS OF UPPER LOBE OF RIGHT LUNG: ICD-10-CM

## 2023-01-23 DIAGNOSIS — T45.1X5A CHEMOTHERAPY INDUCED NEUTROPENIA (HCC): ICD-10-CM

## 2023-01-23 DIAGNOSIS — C34.90 ADENOCARCINOMA OF LUNG, UNSPECIFIED LATERALITY (HCC): ICD-10-CM

## 2023-01-23 RX ORDER — ATORVASTATIN CALCIUM 40 MG/1
TABLET, FILM COATED ORAL
Qty: 90 TABLET | Refills: 0 | Status: SHIPPED | OUTPATIENT
Start: 2023-01-23

## 2023-01-23 RX ORDER — SODIUM CHLORIDE 9 MG/ML
20 INJECTION, SOLUTION INTRAVENOUS ONCE
Status: COMPLETED | OUTPATIENT
Start: 2023-01-23 | End: 2023-01-23

## 2023-01-23 RX ADMIN — SODIUM CHLORIDE 20 ML/HR: 9 INJECTION, SOLUTION INTRAVENOUS at 09:15

## 2023-01-23 RX ADMIN — SODIUM CHLORIDE 200 MG: 9 INJECTION, SOLUTION INTRAVENOUS at 09:15

## 2023-01-23 NOTE — PROGRESS NOTES
Pt tolerated Keytruda infusion with no adverse reaction  Dressing CD&I  Pt left unit ambulatory with steady gait  AVS provided

## 2023-02-03 ENCOUNTER — APPOINTMENT (EMERGENCY)
Dept: CT IMAGING | Facility: HOSPITAL | Age: 73
End: 2023-02-03

## 2023-02-03 ENCOUNTER — APPOINTMENT (EMERGENCY)
Dept: RADIOLOGY | Facility: HOSPITAL | Age: 73
End: 2023-02-03

## 2023-02-03 ENCOUNTER — HOSPITAL ENCOUNTER (INPATIENT)
Facility: HOSPITAL | Age: 73
LOS: 3 days | Discharge: HOME | End: 2023-02-06
Attending: INTERNAL MEDICINE | Admitting: INTERNAL MEDICINE

## 2023-02-03 ENCOUNTER — HOSPITAL ENCOUNTER (EMERGENCY)
Facility: HOSPITAL | Age: 73
End: 2023-02-03
Attending: EMERGENCY MEDICINE

## 2023-02-03 ENCOUNTER — OFFICE VISIT (OUTPATIENT)
Dept: HEMATOLOGY ONCOLOGY | Facility: HOSPITAL | Age: 73
End: 2023-02-03

## 2023-02-03 VITALS
WEIGHT: 187 LBS | HEIGHT: 69 IN | RESPIRATION RATE: 14 BRPM | SYSTOLIC BLOOD PRESSURE: 100 MMHG | TEMPERATURE: 98 F | OXYGEN SATURATION: 95 % | DIASTOLIC BLOOD PRESSURE: 60 MMHG | BODY MASS INDEX: 27.7 KG/M2 | HEART RATE: 75 BPM

## 2023-02-03 VITALS
DIASTOLIC BLOOD PRESSURE: 73 MMHG | OXYGEN SATURATION: 93 % | TEMPERATURE: 97.6 F | SYSTOLIC BLOOD PRESSURE: 120 MMHG | HEART RATE: 69 BPM | RESPIRATION RATE: 18 BRPM

## 2023-02-03 DIAGNOSIS — C79.31 BRAIN METASTASES (HCC): ICD-10-CM

## 2023-02-03 DIAGNOSIS — C79.31 BRAIN METASTASIS (HCC): ICD-10-CM

## 2023-02-03 DIAGNOSIS — R29.898 RIGHT HAND WEAKNESS: Primary | ICD-10-CM

## 2023-02-03 DIAGNOSIS — C34.11 MALIGNANT NEOPLASM OF UPPER LOBE OF RIGHT LUNG (HCC): ICD-10-CM

## 2023-02-03 DIAGNOSIS — C78.2 PLEURAL METASTASIS (HCC): ICD-10-CM

## 2023-02-03 DIAGNOSIS — C34.90 ADENOCARCINOMA OF LUNG, UNSPECIFIED LATERALITY (HCC): Primary | ICD-10-CM

## 2023-02-03 DIAGNOSIS — G93.9 LESION OF FRONTAL LOBE OF BRAIN: ICD-10-CM

## 2023-02-03 DIAGNOSIS — C34.90 PRIMARY LUNG ADENOCARCINOMA (HCC): Primary | ICD-10-CM

## 2023-02-03 DIAGNOSIS — R29.898 RIGHT HAND WEAKNESS: ICD-10-CM

## 2023-02-03 DIAGNOSIS — G93.6 VASOGENIC EDEMA (HCC): ICD-10-CM

## 2023-02-03 PROBLEM — E78.5 HYPERLIPIDEMIA: Status: ACTIVE | Noted: 2023-02-03

## 2023-02-03 PROBLEM — Z86.718 HISTORY OF VENOUS THROMBOEMBOLISM: Status: ACTIVE | Noted: 2023-02-03

## 2023-02-03 PROBLEM — Z86.73 HISTORY OF STROKE: Status: ACTIVE | Noted: 2021-07-31

## 2023-02-03 LAB
ALBUMIN SERPL BCP-MCNC: 2.9 G/DL (ref 3.5–5)
ALP SERPL-CCNC: 68 U/L (ref 46–116)
ALT SERPL W P-5'-P-CCNC: 21 U/L (ref 12–78)
ANION GAP SERPL CALCULATED.3IONS-SCNC: 7 MMOL/L (ref 4–13)
AST SERPL W P-5'-P-CCNC: 15 U/L (ref 5–45)
ATRIAL RATE: 71 BPM
BASOPHILS # BLD AUTO: 0.05 THOUSANDS/ÂΜL (ref 0–0.1)
BASOPHILS NFR BLD AUTO: 1 % (ref 0–1)
BILIRUB SERPL-MCNC: 0.3 MG/DL (ref 0.2–1)
BUN SERPL-MCNC: 13 MG/DL (ref 5–25)
CALCIUM ALBUM COR SERPL-MCNC: 10.9 MG/DL (ref 8.3–10.1)
CALCIUM SERPL-MCNC: 10 MG/DL (ref 8.3–10.1)
CHLORIDE SERPL-SCNC: 103 MMOL/L (ref 96–108)
CO2 SERPL-SCNC: 29 MMOL/L (ref 21–32)
CREAT SERPL-MCNC: 1 MG/DL (ref 0.6–1.3)
EOSINOPHIL # BLD AUTO: 0.07 THOUSAND/ÂΜL (ref 0–0.61)
EOSINOPHIL NFR BLD AUTO: 1 % (ref 0–6)
ERYTHROCYTE [DISTWIDTH] IN BLOOD BY AUTOMATED COUNT: 16 % (ref 11.6–15.1)
GFR SERPL CREATININE-BSD FRML MDRD: 75 ML/MIN/1.73SQ M
GLUCOSE SERPL-MCNC: 77 MG/DL (ref 65–140)
GLUCOSE SERPL-MCNC: 89 MG/DL (ref 65–140)
HCT VFR BLD AUTO: 34 % (ref 36.5–49.3)
HGB BLD-MCNC: 10.1 G/DL (ref 12–17)
IMM GRANULOCYTES # BLD AUTO: 0.04 THOUSAND/UL (ref 0–0.2)
IMM GRANULOCYTES NFR BLD AUTO: 1 % (ref 0–2)
LYMPHOCYTES # BLD AUTO: 0.74 THOUSANDS/ÂΜL (ref 0.6–4.47)
LYMPHOCYTES NFR BLD AUTO: 12 % (ref 14–44)
MCH RBC QN AUTO: 26.2 PG (ref 26.8–34.3)
MCHC RBC AUTO-ENTMCNC: 29.7 G/DL (ref 31.4–37.4)
MCV RBC AUTO: 88 FL (ref 82–98)
MONOCYTES # BLD AUTO: 0.81 THOUSAND/ÂΜL (ref 0.17–1.22)
MONOCYTES NFR BLD AUTO: 13 % (ref 4–12)
NEUTROPHILS # BLD AUTO: 4.55 THOUSANDS/ÂΜL (ref 1.85–7.62)
NEUTS SEG NFR BLD AUTO: 72 % (ref 43–75)
NRBC BLD AUTO-RTO: 0 /100 WBCS
P AXIS: 45 DEGREES
PLATELET # BLD AUTO: 333 THOUSANDS/UL (ref 149–390)
PMV BLD AUTO: 9 FL (ref 8.9–12.7)
POTASSIUM SERPL-SCNC: 4.3 MMOL/L (ref 3.5–5.3)
PR INTERVAL: 176 MS
PROT SERPL-MCNC: 7.7 G/DL (ref 6.4–8.4)
QRS AXIS: 48 DEGREES
QRSD INTERVAL: 74 MS
QT INTERVAL: 396 MS
QTC INTERVAL: 430 MS
RBC # BLD AUTO: 3.85 MILLION/UL (ref 3.88–5.62)
SODIUM SERPL-SCNC: 139 MMOL/L (ref 135–147)
T WAVE AXIS: 37 DEGREES
VENTRICULAR RATE: 71 BPM
WBC # BLD AUTO: 6.26 THOUSAND/UL (ref 4.31–10.16)

## 2023-02-03 RX ORDER — DEXAMETHASONE SODIUM PHOSPHATE 4 MG/ML
4 INJECTION, SOLUTION INTRA-ARTICULAR; INTRALESIONAL; INTRAMUSCULAR; INTRAVENOUS; SOFT TISSUE EVERY 6 HOURS SCHEDULED
Status: DISCONTINUED | OUTPATIENT
Start: 2023-02-04 | End: 2023-02-06 | Stop reason: HOSPADM

## 2023-02-03 RX ORDER — ONDANSETRON 2 MG/ML
4 INJECTION INTRAMUSCULAR; INTRAVENOUS EVERY 4 HOURS PRN
Status: DISCONTINUED | OUTPATIENT
Start: 2023-02-03 | End: 2023-02-06 | Stop reason: HOSPADM

## 2023-02-03 RX ORDER — ENOXAPARIN SODIUM 100 MG/ML
80 INJECTION SUBCUTANEOUS EVERY 12 HOURS
Status: DISCONTINUED | OUTPATIENT
Start: 2023-02-03 | End: 2023-02-06 | Stop reason: HOSPADM

## 2023-02-03 RX ORDER — TAMSULOSIN HYDROCHLORIDE 0.4 MG/1
0.4 CAPSULE ORAL
Status: DISCONTINUED | OUTPATIENT
Start: 2023-02-04 | End: 2023-02-06 | Stop reason: HOSPADM

## 2023-02-03 RX ORDER — ACETAMINOPHEN 325 MG/1
650 TABLET ORAL EVERY 6 HOURS PRN
Status: DISCONTINUED | OUTPATIENT
Start: 2023-02-03 | End: 2023-02-06 | Stop reason: HOSPADM

## 2023-02-03 RX ORDER — DEXAMETHASONE SODIUM PHOSPHATE 4 MG/ML
10 INJECTION, SOLUTION INTRA-ARTICULAR; INTRALESIONAL; INTRAMUSCULAR; INTRAVENOUS; SOFT TISSUE ONCE
Status: COMPLETED | OUTPATIENT
Start: 2023-02-03 | End: 2023-02-03

## 2023-02-03 RX ORDER — FOLIC ACID 1 MG/1
1000 TABLET ORAL DAILY
Status: DISCONTINUED | OUTPATIENT
Start: 2023-02-04 | End: 2023-02-06 | Stop reason: HOSPADM

## 2023-02-03 RX ORDER — ATORVASTATIN CALCIUM 40 MG/1
40 TABLET, FILM COATED ORAL
Status: DISCONTINUED | OUTPATIENT
Start: 2023-02-04 | End: 2023-02-06 | Stop reason: HOSPADM

## 2023-02-03 RX ORDER — ALBUTEROL SULFATE 90 UG/1
2 AEROSOL, METERED RESPIRATORY (INHALATION) EVERY 6 HOURS PRN
Status: DISCONTINUED | OUTPATIENT
Start: 2023-02-03 | End: 2023-02-06 | Stop reason: HOSPADM

## 2023-02-03 RX ADMIN — ENOXAPARIN SODIUM 80 MG: 80 INJECTION SUBCUTANEOUS at 23:52

## 2023-02-03 RX ADMIN — LEVETIRACETAM 1000 MG: 500 INJECTION, SOLUTION INTRAVENOUS at 17:25

## 2023-02-03 RX ADMIN — DEXAMETHASONE SODIUM PHOSPHATE 10 MG: 4 INJECTION, SOLUTION INTRAMUSCULAR; INTRAVENOUS at 17:15

## 2023-02-03 RX ADMIN — IOHEXOL 75 ML: 350 INJECTION, SOLUTION INTRAVENOUS at 14:05

## 2023-02-03 NOTE — ED PROVIDER NOTES
History  Chief Complaint   Patient presents with   • Extremity Weakness     Patient states"on Monday he started with right hand weakness"  Patient has a history of stroke about a year and a half ago with right sided deficits"  Patient states"he was seen at his oncology and sent to ER to be evaluated for stroke symptoms"  81-year-old male with history of adenocarcinoma of the lung presents for evaluation of right hand weakness which began 4 days ago, 1 week after Keytruda infusion  Patient had initially attributed his symptoms to his infusion and therefore did not seek medical attention as he knew he had an appointment with his oncologist today  Patient has prior history of CVA with right-sided deficits which had fully resolved prior to onset of his symptoms 4 days ago  Prior to Admission Medications   Prescriptions Last Dose Informant Patient Reported?  Taking?   acetaminophen (TYLENOL) 325 mg tablet   No No   Sig: Take 2 tablets (650 mg total) by mouth 4 (four) times a day as needed for mild pain, headaches or fever   albuterol (ProAir HFA) 90 mcg/act inhaler   No Yes   Sig: Inhale 2 puffs every 6 (six) hours as needed for wheezing or shortness of breath   atorvastatin (LIPITOR) 40 mg tablet   No Yes   Sig: TAKE 1 TABLET BY MOUTH DAILY WITH DINNER   enoxaparin (LOVENOX) 80 mg/0 8 mL   No Yes   Sig: INJECT 0 8 ML (80 MG TOTAL) UNDER THE SKIN EVERY 12 (TWELVE) HOURS   folic acid (FOLVITE) 1 mg tablet   No Yes   Sig: TAKE 1 TABLET BY MOUTH EVERY DAY   multivitamin (THERAGRAN) TABS   Yes Yes   Sig: Take 1 tablet by mouth daily   tamsulosin (FLOMAX) 0 4 mg   No Yes   Sig: TAKE 1 CAPSULE (0 4 MG TOTAL) BY MOUTH DAILY AFTER DINNER      Facility-Administered Medications: None       Past Medical History:   Diagnosis Date   • Chronic respiratory failure with hypoxia ([facility]) 8/9/2021   • Impaired mobility and ADLs 8/13/2021   • Lung cancer (HCC)    • Stroke [facility])        Past Surgical History:   Procedure Laterality Date   • IR BIOPSY LUNG  2021   • IR THORACENTESIS  2022   • IR THORACENTESIS  2022       Family History   Problem Relation Age of Onset   • Prostate cancer Brother    • Lung cancer Brother      I have reviewed and agree with the history as documented  E-Cigarette/Vaping   • E-Cigarette Use Never User      E-Cigarette/Vaping Substances     Social History     Tobacco Use   • Smoking status: Former     Packs/day: 3 00     Years: 39 00     Pack years: 117 00     Types: Cigarettes     Start date:      Quit date:      Years since quittin 1   • Smokeless tobacco: Never   Vaping Use   • Vaping Use: Never used   Substance Use Topics   • Alcohol use: Not Currently     Comment: No ETOH since Summer 2021    • Drug use: Never       Review of Systems    Physical Exam  Physical Exam  Vitals and nursing note reviewed  HENT:      Head: Atraumatic  Eyes:      Conjunctiva/sclera: Conjunctivae normal       Comments: 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   Cardiovascular:      Rate and Rhythm: Normal rate and regular rhythm  Pulses: Normal pulses  Pulmonary:      Effort: Pulmonary effort is normal    Abdominal:      Palpations: Abdomen is soft  Tenderness: There is no abdominal tenderness  Skin:     General: Skin is warm and dry  Neurological:      Mental Status: He is alert and oriented to person, place, and time  Cranial Nerves: Cranial nerves 2-12 are intact  Sensory: Sensation is intact  Comments: Right arm 4+/5 strength  Left arm 5/5 strength  Bilateral lower extremities 5/5 strength  No drift in any extremities  Past pointing on finger-to-nose testing right arm  Normal finger-to-nose left arm and normal heel-to-shin bilaterally            Vital Signs  ED Triage Vitals   Temperature Pulse Respirations Blood Pressure SpO2   23 1157 23 1157 23 1157 23 1157 23 1157   97 6 °F (36 4 °C) 77 18 124/58 99 %      Temp src Heart Rate Source Patient Position - Orthostatic VS BP Location FiO2 (%)   -- 02/03/23 1430 02/03/23 1709 02/03/23 1709 --    Monitor Lying Right arm       Pain Score       02/03/23 1157       No Pain           Vitals:    02/03/23 1315 02/03/23 1330 02/03/23 1430 02/03/23 1709   BP: 121/75 123/74 132/58 120/76   Pulse: 71 69 71 75   Patient Position - Orthostatic VS:    Lying         Visual Acuity  Visual Acuity    Flowsheet Row Most Recent Value   L Pupil Size (mm) 3   R Pupil Size (mm) 2  [blind in right eye]          ED Medications  Medications   iohexol (OMNIPAQUE) 350 MG/ML injection (MULTI-DOSE) 100 mL (75 mL Intravenous Given 2/3/23 1405)   iohexol (OMNIPAQUE) 350 MG/ML injection (SINGLE-DOSE) 100 mL (75 mL Intravenous Given 2/3/23 1405)   dexamethasone (DECADRON) injection 10 mg (10 mg Intravenous Given 2/3/23 1715)   levETIRAcetam (KEPPRA) 1,000 mg in sodium chloride 0 9 % 100 mL IVPB (1,000 mg Intravenous New Bag 2/3/23 1725)       Diagnostic Studies  Results Reviewed     Procedure Component Value Units Date/Time    Comprehensive metabolic panel [[id number]]  (Abnormal) Collected: 02/03/23 1229    Lab Status: Final result Specimen: Blood from Arm, Left Updated: 02/03/23 1256     Sodium 139 mmol/L      Potassium 4 3 mmol/L      Chloride 103 mmol/L      CO2 29 mmol/L      ANION GAP 7 mmol/L      BUN 13 mg/dL      Creatinine 1 00 mg/dL      Glucose 89 mg/dL      Calcium 10 0 mg/dL      Corrected Calcium 10 9 mg/dL      AST 15 U/L      ALT 21 U/L      Alkaline Phosphatase 68 U/L      Total Protein 7 7 g/dL      Albumin 2 9 g/dL      Total Bilirubin 0 30 mg/dL      eGFR 75 ml/min/1 73sq m     Narrative:      [place] guidelines for Chronic Kidney Disease (CKD):   •  Stage 1 with normal or high GFR (GFR > 90 mL/min/1 73 square meters)  •  Stage 2 Mild CKD (GFR = 60-89 mL/min/1 73 square meters)  •  Stage 3A Moderate CKD (GFR = 45-59 mL/min/1 73 square meters)  •  Stage 3B Moderate CKD (GFR = 30-44 mL/min/1 73 square meters)  •  Stage 4 Severe CKD (GFR = 15-29 mL/min/1 73 square meters)  •  Stage 5 End Stage CKD (GFR <15 mL/min/1 73 square meters)  Note: GFR calculation is accurate only with a steady state creatinine    Fingerstick Glucose (POCT) [[id number]]  (Normal) Collected: 02/03/23 1237    Lab Status: Final result Updated: 02/03/23 1238     POC Glucose 77 mg/dl     CBC and differential [[id number]]  (Abnormal) Collected: 02/03/23 1229    Lab Status: Final result Specimen: Blood from Arm, Left Updated: 02/03/23 1234     WBC 6 26 Thousand/uL      RBC 3 85 Million/uL      Hemoglobin 10 1 g/dL      Hematocrit 34 0 %      MCV 88 fL      MCH 26 2 pg      MCHC 29 7 g/dL      RDW 16 0 %      MPV 9 0 fL      Platelets 169 Thousands/uL      nRBC 0 /100 WBCs      Neutrophils Relative 72 %      Immat GRANS % 1 %      Lymphocytes Relative 12 %      Monocytes Relative 13 %      Eosinophils Relative 1 %      Basophils Relative 1 %      Neutrophils Absolute 4 55 Thousands/µL      Immature Grans Absolute 0 04 Thousand/uL      Lymphocytes Absolute 0 74 Thousands/µL      Monocytes Absolute 0 81 Thousand/µL      Eosinophils Absolute 0 07 Thousand/µL      Basophils Absolute 0 05 Thousands/µL                  CTA head and neck with and without contrast   Final Result by Satnam Piedra MD (02/03 4672)      1  No acute intracranial CT abnormality  2   A 1 cm posterior left frontal lobe gray-white matter junction lesion with subjacent vasogenic edema most concerning for metastasis  No significant mass effect or midline shift  Recommend further assessment with brain MRI without and with contrast       3   Patent major vasculature of the [place] without high-grade stenosis  No aneurysm  4   No hemodynamically significant stenosis or dissection of cervical carotid and vertebral arteries  Left greater than right atherosclerotic change of cervical carotid arteries        5   No significant change in size of known right nasopharyngeal mass compared to neck CT 12/20/2021       6   Redemonstrated posttreatment change and partially obscure known right upper lobe mass in partially imaged upper chest   See the report of concurrent CTA chest/abdomen/pelvis  The study was marked in [facility] for immediate notification                  Workstation performed: [id number]         CTA dissection protocol chest abdomen pelvis w wo contrast   Final Result by Ezekiel Chapa MD (02/03 1601)   No evidence of thoracic aortic dissection      No pulmonary embolism in the main pulmonary arteries      Chronic loculated left effusion which demonstrates enlarging nodular densities within it, suggest pleural metastatic disease      Post treatment changes in the right lung with chronic consolidation in the right paramediastinal location with the air bronchogram and volume loss, as seen on the previous study             I personally discussed this study with Dr Laura Monreal on 2/3/2023 at 4:00 PM                      Workstation performed: [id number]         XR chest 1 view portable   ED Interpretation by Clemencia Vaughn MD (02/03 1313)   Abnormal   Increased right sided opacities with more pronounced deviation of mediastinal structures when compared to prior study                 Procedures  ECG 12 Lead Documentation Only    Date/Time: 2/3/2023 12:34 PM  Performed by: Clemencia Vaughn MD  Authorized by: Clemencia Vaughn MD     Indications / Diagnosis:  Possible stroke  ECG reviewed by me, the ED Provider: yes    Patient location:  ED  Previous ECG:     Previous ECG:  Compared to current    Comparison ECG info:  8/21/21 normal sinus rhythm with right axis deviation    Similarity:  Changes noted  Interpretation:     Interpretation: normal    Rate:     ECG rate:  71    ECG rate assessment: normal    Rhythm:     Rhythm: sinus rhythm    Ectopy:     Ectopy: none    QRS:     QRS axis:  Normal QRS intervals:  Normal  Conduction:     Conduction: normal    ST segments:     ST segments:  Normal  T waves:     T waves: normal      CriticalCare Time  Performed by: Kurtis June MD  Authorized by: Kurtis June MD     Critical care provider statement:     Critical care time (minutes):  43    Critical care time was exclusive of:  Separately billable procedures and treating other patients and teaching time    Critical care was necessary to treat or prevent imminent or life-threatening deterioration of the following conditions:  CNS failure or compromise    Critical care was time spent personally by me on the following activities:  Obtaining history from patient or surrogate, discussions with consultants, examination of patient, ordering and review of radiographic studies, ordering and review of laboratory studies and ordering and performing treatments and interventions             ED Course  ED Course as of 02/03/23 1754   Fri Feb 03, 2023   1235 Hemoglobin(!): 10 1  Stable chronic anemia  10 8 two weeks ago   1655 Case discussed with neuro critical care who recommends 10 mg iv decadron, 1 g keppra, mri w/out marjorie which can be ordered by [facility] at [facility]   Recommends transfer to [facility] under med/surg or step down 2                  Stroke Assessment     Row Name 02/03/23 1229             NIH Stroke Scale    Interval Baseline      Level of Consciousness (1a ) 0      LOC Questions (1b ) 0      LOC Commands (1c ) 0      Best Gaze (2 ) 1      Visual (3 ) 0  baseline blindness in right eye      Facial Palsy (4 ) 0      Motor Arm, Left (5a ) 0      Motor Arm, Right (5b ) 0      Motor Leg, Left (6a ) 0      Motor Leg, Right (6b ) 0      Limb Ataxia (7 ) 1      Sensory (8 ) 0      Best Language (9 ) 0      Dysarthria (10 ) 0      Extinction and Inattention (11 ) (Formerly Neglect) 0      Total 2              Flowsheet Row Most Recent Value   Thrombolytic Decision Options    Thrombolytic Decision Patient not a candidate  Patient is not a candidate options Unclear time of onset outside appropriate time window  SBIRT 20yo+    Flowsheet Row Most Recent Value   SBIRT (25 yo +)    In order to provide better care to our patients, we are screening all of our patients for alcohol and drug use  Would it be okay to ask you these screening questions? No Filed at: 02/03/2023 1230                    Medical Decision Making  73-year-old male presents for evaluation of right hand weakness beginning 4 days ago  NIH stroke scale 2 secondary to disconjugate gaze (baseline blindness) and mild ataxia in the right upper extremity  Patient is not a TNKase candidate as symptoms began well outside of the treatment window  Discussed case with neurology  Suspect symptoms are secondary to metastasis rather than CVA  Case then discussed with neurosurgery who recommended discussion with neuro critical care  Patient started on Decadron and Keppra  Transferred to [facility] for further evaluation and management  Lesion of frontal lobe of brain: acute illness or injury  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  Right hand weakness: acute illness or injury  Amount and/or Complexity of Data Reviewed  Labs: ordered  Decision-making details documented in ED Course  Radiology: ordered and independent interpretation performed  Risk  Prescription drug management            Disposition  Final diagnoses:   Right hand weakness   Lesion of frontal lobe of brain   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[address] )     Time reflects when diagnosis was documented in both MDM as applicable and the Disposition within this note     Time User Action Codes Description Comment    2/3/2023  4:12 PM Constanza Rudi Add [R29 898] Right hand weakness     2/3/2023  4:13 PM Constanza Rudi Add [G93 9] Lesion of frontal lobe of brain     2/3/2023  5:05 PM Crow Barnett Add [C78 2] Pleural metastasis [facility])       ED Disposition     ED Disposition   Transfer to Another Facility-In Network    Condition   --    Date/Time   Fri Feb 3, 2023  5:05 PM    Comment   Deborah Sosa should be transferred out to [facility]  MD Baljeet Coronel Friday Most Recent Value   Patient Condition The patient has been stabilized such that within reasonable medical probability, no material deterioration of the patient condition or the condition of the unborn child(pia) is likely to result from the transfer   Benefits of Transfer Specialized equipment and/or services available at the receiving facility (Include comment)________________________  [neurosurgery]   Risks of Transfer Potential for delay in receiving treatment, Potential deterioration of medical condition, Loss of IV, Increased discomfort during transfer, Possible worsening of condition or death during transfer   Accepting Physician Yanci Burns [place]   Sending MD Dr Kristopher Cohen   Provider Certification General risk, such as traffic hazards, adverse weather conditions, rough terrain or turbulence, possible failure of equipment (including vehicle or aircraft), or consequences of actions of persons outside the control of the transport personnel, Unanticipated needs of medical equipment and personnel during transport, Risk of worsening condition, The possibility of a transport vehicle being unavailable      RN Documentation    73 Yanci Goodman [place]      Follow-up Information    None         Patient's Medications   Discharge Prescriptions    No medications on file       No discharge procedures on file      PDMP Review     None          ED Provider  Electronically Signed by           Matilda Fitzpatrick MD  02/03/23 6249

## 2023-02-03 NOTE — PROGRESS NOTES
Hematology/Oncology Outpatient Follow- up Note  Laura Shipley 71 y o  male MRN: @ Encounter: [id number]        Date:  2/3/2023    Presenting Complaint/Diagnosis : Adenocarcinoma of the lung    HPI:    per the note from Dr Florina Phillips     This is a 80-year-old gentleman with the history of tobacco abuse, the patient was admitted to the hospital as a stroke alert   During the hospital stay he had multiple imaging studies including CTA of the brain which showed mass in the right nasopharyngeal region measuring 2 7 cm in greatest dimension   He also had a Doppler ultrasound of the lower extremities which showed acute deep vein thrombosis in both lower extremities  CT scan of the chest abdomen pelvis without contrast on 07/31 showed 6 4 cm solid irregular right upper lobe pulmonary mass with extensive right hilar and mediastinal adenopathy   He also seems to have small right pleural effusion and moderate emphysema  MRI of the brain on 08/03/2021 showed multifocal diffusion abnormalities in several separate vascular territories   No metastatic disease to the brain was mentioned  Juan Tucker does seem to have 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    CT-guided biopsy of the right lung mass was done on 08/05   The pathology showed adenocarcinoma of lung primary       Previous Hematologic/ Oncologic History:    Oncology History   Adenocarcinoma of lung   8/2021 Initial Diagnosis    Adenocarcinoma of lung     8/5/2021 Biopsy    Right lung apex, image-guided core needle biopsy:  - Adenocarcinoma      10/6/2021 - 1/26/2022 Chemotherapy    cyanocobalamin, 1,000 mcg, Intramuscular, Once, 3 of 3 cycles  Administration: 1,000 mcg (11/24/2021), 1,000 mcg (1/26/2022), 1,000 mcg (10/6/2021)  pegfilgrastim (Janae Dueñas), 6 mg, Subcutaneous, Once, 1 of 1 cycle  Administration: 6 mg (11/26/2021)  pegfilgrastim (Paxton Taveras), 6 mg, Subcutaneous, Once, 6 of 6 cycles  Administration: 6 mg (10/6/2021), 6 mg (11/3/2021), 6 mg (11/24/2021), 6 mg (12/15/2021), 6 mg (1/5/2022)  fosaprepitant (EMEND) IVPB, 150 mg, Intravenous, Once, 6 of 6 cycles  Administration: 150 mg (10/6/2021), 150 mg (11/24/2021), 150 mg (12/15/2021), 150 mg (1/26/2022), 150 mg (11/3/2021), 150 mg (1/5/2022)  CARBOplatin (PARAPLATIN) IVPB (GOG AUC DOSING), 519 5 mg, Intravenous, Once, 6 of 6 cycles  Administration: 519 5 mg (10/6/2021), 574 mg (11/24/2021), 600 mg (12/15/2021), 533 mg (1/26/2022), 604 5 mg (11/3/2021), 563 mg (1/5/2022)  pemetrexed (ALIMTA) chemo infusion, 970 mg, Intravenous, Once, 6 of 6 cycles  Administration: 1,000 mg (10/6/2021), 1,000 mg (11/24/2021), 1,000 mg (12/15/2021), 1,000 mg (1/26/2022), 1,000 mg (11/3/2021), 1,000 mg (1/5/2022)  pembrolizumab (KEYTRUDA) IVPB, 200 mg, Intravenous, Once, 6 of 6 cycles  Administration: 200 mg (10/6/2021), 200 mg (11/24/2021), 200 mg (12/15/2021), 200 mg (1/26/2022), 200 mg (11/3/2021), 200 mg (1/5/2022)     3/7/2022 -  Chemotherapy    CARBOplatin (PARAPLATIN) IVPB (GOG AUC DOSING), , Intravenous, Once, 6 of 6 cycles  Administration: 211 6 mg (3/7/2022), 208 mg (3/14/2022), 238 8 mg (3/21/2022), 211 6 mg (3/28/2022), 215 2 mg (4/4/2022), 213 4 mg (4/18/2022)  PACLItaxel (TAXOL) chemo IVPB, 50 mg/m2 = 99 mg, Intravenous, Once, 6 of 6 cycles  Administration: 99 mg (3/7/2022), 99 mg (3/14/2022), 99 mg (3/21/2022), 99 mg (3/28/2022), 99 mg (4/4/2022), 99 mg (4/18/2022)  pembrolizumab (KEYTRUDA) IVPB, 200 mg, Intravenous, Once, 16 of 21 cycles  Administration: 200 mg (3/7/2022), 200 mg (3/28/2022), 200 mg (4/25/2022), 200 mg (5/16/2022), 200 mg (6/6/2022), 200 mg (6/27/2022), 200 mg (7/18/2022), 200 mg (8/8/2022), 200 mg (8/29/2022), 200 mg (9/19/2022), 200 mg (10/10/2022), 200 mg (10/31/2022), 200 mg (11/21/2022), 200 mg (12/12/2022), 200 mg (1/3/2023), 200 mg (1/23/2023)     Malignant neoplasm of upper lobe of right lung ([facility] [address] )   9/3/2021 Initial Diagnosis    Malignant neoplasm of upper lobe of right lung ([facility] [address] )     2021 -  Cancer Staged    Staging form: Lung, AJCC 8th Edition  - Clinical stage from 2021: Stage IIIC (cT3, cN3, cM0) - Signed by Glendora Apgar, MD on 2021  Histopathologic type: Adenocarcinoma, NOS       10/6/2021 - 2022 Chemotherapy    cyanocobalamin, 1,000 mcg, Intramuscular, Once, 3 of 3 cycles  Administration: 1,000 mcg (2021), 1,000 mcg (2022), 1,000 mcg (10/6/2021)  pegfilgrastim ([place]), 6 mg, Subcutaneous, Once, 1 of 1 cycle  Administration: 6 mg (2021)  pegfilgrastim (Noralyn Francescaman), 6 mg, Subcutaneous, Once, 6 of 6 cycles  Administration: 6 mg (10/6/2021), 6 mg (11/3/2021), 6 mg (2021), 6 mg (12/15/2021), 6 mg (2022)  fosaprepitant (EMEND) IVPB, 150 mg, Intravenous, Once, 6 of 6 cycles  Administration: 150 mg (10/6/2021), 150 mg (2021), 150 mg (12/15/2021), 150 mg (2022), 150 mg (11/3/2021), 150 mg (2022)  CARBOplatin (PARAPLATIN) IVPB ([facility] AUC DOSING), 519 5 mg, Intravenous, Once, 6 of 6 cycles  Administration: 519 5 mg (10/6/2021), 574 mg (2021), 600 mg (12/15/2021), 533 mg (2022), 604 5 mg (11/3/2021), 563 mg (2022)  pemetrexed (ALIMTA) chemo infusion, 970 mg, Intravenous, Once, 6 of 6 cycles  Administration: 1,000 mg (10/6/2021), 1,000 mg (2021), 1,000 mg (12/15/2021), 1,000 mg (2022), 1,000 mg (11/3/2021), 1,000 mg (2022)  pembrolizumab (KEYTRUDA) IVPB, 200 mg, Intravenous, Once, 6 of 6 cycles  Administration: 200 mg (10/6/2021), 200 mg (2021), 200 mg (12/15/2021), 200 mg (2022), 200 mg (11/3/2021), 200 mg (2022)     3/4/2022 - 2022 Radiation    The patient saw @[facility]@ for radiation treatment   This is the current list of radiation treatment:  Plan ID Energy Fractions Dose per Fraction (cGy) Dose Correction (cGy) Total Dose Delivered (cGy) Elapsed Days   R LUNGMED REV 6X 30 / 30 200 0 6,000 48      Treatment dates:  C1: 3/4/2022 - 2022         3/7/2022 - Chemotherapy    CARBOplatin (PARAPLATIN) IVPB (GOG AUC DOSING), , Intravenous, Once, 6 of 6 cycles  Administration: 211 6 mg (3/7/2022), 208 mg (3/14/2022), 238 8 mg (3/21/2022), 211 6 mg (3/28/2022), 215 2 mg (4/4/2022), 213 4 mg (4/18/2022)  PACLItaxel (TAXOL) chemo IVPB, 50 mg/m2 = 99 mg, Intravenous, Once, 6 of 6 cycles  Administration: 99 mg (3/7/2022), 99 mg (3/14/2022), 99 mg (3/21/2022), 99 mg (3/28/2022), 99 mg (4/4/2022), 99 mg (4/18/2022)  pembrolizumab (KEYTRUDA) IVPB, 200 mg, Intravenous, Once, 16 of 21 cycles  Administration: 200 mg (3/7/2022), 200 mg (3/28/2022), 200 mg (4/25/2022), 200 mg (5/16/2022), 200 mg (6/6/2022), 200 mg (6/27/2022), 200 mg (7/18/2022), 200 mg (8/8/2022), 200 mg (8/29/2022), 200 mg (9/19/2022), 200 mg (10/10/2022), 200 mg (10/31/2022), 200 mg (11/21/2022), 200 mg (12/12/2022), 200 mg (1/3/2023), 200 mg (1/23/2023)       Concurrent chemotherapy with carboplatin and Taxol with Keytruda and radiation      Current Hematologic/ Oncologic Treatment:      Keytruda every 3 weeks      Interval History:    Patient returns for follow-up visit  He is due for imaging in 2 months and I will arrange this  Last imaging was actually stable to improved  Pleural effusion was present at the time which was stable  In terms of symptoms he is tolerating his single agent Keytruda quite well  Denies any nausea denies any vomiting denies any diarrhea  Blood work is within acceptable limits  He will be due for imaging in 2 months and I will arrange this  The patient's only complaint is he feels he cannot hold things with his right hand the way he could previously  This started on Monday  He initially thought it was temporary but it has persisted  I am concerned for a stroke or even metastatic disease to the brain so I am sending him straight to the emergency room for an MRI and evaluation  I explained this to the patient and he is agreeable to going to the ER immediately    He does have decreased breath sounds on the right and does have a pleural effusion on exam   This is chronic  The rest of his 14 point review of systems today was negative  Cancer Staging:  Cancer Staging   Malignant neoplasm of upper lobe of right lung [facility])  Staging form: Lung, AJCC 8th Edition  - Clinical stage from 9/23/2021: Stage IIIC (cT3, cN3, cM0) - Signed by Anita Farley MD on 9/23/2021  Histopathologic type: Adenocarcinoma, NOS    Test Results:    Imaging: Stress strip    Result Date: 1/12/2023  Narrative: Confirmed by GUILLE ALEJO ([id number]),  Da Valentine ([id number]) on 1/12/2023 10:34:02 AM    Stress strip    Result Date: 1/12/2023  Narrative: Confirmed by GUILLE ALEJO ([id number]), editor Da Valentine ([id number]) on 1/12/2023 10:33:54 AM    NM myocardial perfusion spect (rx stress and/or rest)    Result Date: 1/11/2023  Narrative: •  Stress ECG: No diagnostic ST deviation is noted  The ECG was not diagnostic due to pharmacological (vasodilator) stress  •  Perfusion: There is a left ventricular perfusion defect that is medium in size with mild reduction in uptake present in the mid to apical inferior location(s) that is paradoxical and completely corrects on prone stress images  The defect appears to be an artifact caused by diaphragmatic activity and subdiaphragmatic activity  •  Stress Function: Left ventricular function post-stress is normal  Post-stress ejection fraction is 68 %  •  Stress Combined Conclusion: Left ventricular perfusion is normal  Normal myocardial perfusion study  No evidence of ischemia or infarction  Normal left ventricular cavity size, function, and wall thickening  Calculated LVEF >65%  No TID  Non-diagnostic stress ECG due to pharmacologic stress  No ST change or arrhythmias noted with Lexiscan  No prior study available for comparison        Labs:   Lab Results   Component Value Date    WBC 7 20 01/20/2023    HGB 10 8 (L) 01/20/2023    HCT 37 5 01/20/2023    MCV 92 01/20/2023  01/20/2023     Lab Results   Component Value Date    K 4 1 01/20/2023     01/20/2023    CO2 28 01/20/2023    BUN 13 01/20/2023    CREATININE 1 03 01/20/2023    GLUF 97 01/20/2023    CALCIUM 10 3 (H) 01/20/2023    CORRECTEDCA 11 1 (H) 01/20/2023    AST 14 01/20/2023    ALT 21 01/20/2023    ALKPHOS 67 01/20/2023    EGFR 72 01/20/2023       Lab Results   Component Value Date    QPXWGIUP99 1,514 (H) 08/12/2021         ROS: As stated in the history of present illness otherwise his 14 point review of systems today was negative        Active Problems:   Patient Active Problem List   Diagnosis   • Recent cerebrovascular accident   • Bilateral lower extremity DVTs   • Nasopharyngeal mass   • Dysphagia   • Mass of upper lobe of right lung   • Urinary retention   • Constipation   • Anemia of chronic disease   • Pulmonary embolism (HCC)   • Impaired cognition   • Adenocarcinoma of lung   • Malignant neoplasm of upper lobe of right lung (HCC)   • COPD mixed type (HCC)   • Chemotherapy induced neutropenia (HCC)   • Hypercalcemia of malignancy   • Chronic anticoagulation   • Chronic right-sided heart failure (HCC)   • Brain injury, without loss of consciousness, subsequent encounter   • Recurrent right pleural effusion   • Restrictive lung disease   • Nocturnal hypoxemia   • Tobacco use disorder, severe, in sustained remission, dependence       Past Medical History:   Past Medical History:   Diagnosis Date   • Chronic respiratory failure with hypoxia ([facility] [address] ) 8/9/2021   • Impaired mobility and ADLs 8/13/2021   • Lung cancer ([facility] [address] )    • Stroke [facility])        Surgical History:   Past Surgical History:   Procedure Laterality Date   • IR BIOPSY LUNG  8/5/2021   • IR THORACENTESIS  2/24/2022   • IR THORACENTESIS  8/1/2022       Family History:    Family History   Problem Relation Age of Onset   • Prostate cancer Brother    • Lung cancer Brother        Cancer-related family history includes Lung cancer in his brother; Prostate cancer in his brother      Social History:   Social History     Socioeconomic History   • Marital status: /Civil Union     Spouse name: Not on file   • Number of children: Not on file   • Years of education: Not on file   • Highest education level: Not on file   Occupational History   • Not on file   Tobacco Use   • Smoking status: Former     Packs/day: 3 00     Years: 39 00     Pack years: 117 00     Types: Cigarettes     Start date: 66     Quit date: 2004     Years since quittin 1   • Smokeless tobacco: Never   Vaping Use   • Vaping Use: Never used   Substance and Sexual Activity   • Alcohol use: Not Currently     Comment: No ETOH since Summer 2021    • Drug use: Never   • Sexual activity: Yes     Partners: Female   Other Topics Concern   • Not on file   Social History Narrative   • Not on file     Social Determinants of Health     Financial Resource Strain: Not on file   Food Insecurity: Not on file   Transportation Needs: Not on file   Physical Activity: Not on file   Stress: Not on file   Social Connections: Not on file   Intimate Partner Violence: Not on file   Housing Stability: Not on file       Current Medications:   Current Outpatient Medications   Medication Sig Dispense Refill   • albuterol (ProAir HFA) 90 mcg/act inhaler Inhale 2 puffs every 6 (six) hours as needed for wheezing or shortness of breath 8 g 0   • atorvastatin (LIPITOR) 40 mg tablet TAKE 1 TABLET BY MOUTH DAILY WITH DINNER 90 tablet 0   • enoxaparin (LOVENOX) 80 mg/0 8 mL INJECT 0 8 ML (80 MG TOTAL) UNDER THE SKIN EVERY 12 (TWELVE) HOURS 176 mL 1   • folic acid (FOLVITE) 1 mg tablet TAKE 1 TABLET BY MOUTH EVERY DAY 90 tablet 1   • multivitamin (THERAGRAN) TABS Take 1 tablet by mouth daily     • tamsulosin (FLOMAX) 0 4 mg TAKE 1 CAPSULE (0 4 MG TOTAL) BY MOUTH DAILY AFTER DINNER 90 capsule 0   • acetaminophen (TYLENOL) 325 mg tablet Take 2 tablets (650 mg total) by mouth 4 (four) times a day as needed for mild pain, headaches or fever (Patient not taking: Reported on 12/8/2022)  0     No current facility-administered medications for this visit  Allergies: Allergies   Allergen Reactions   • Doxycycline Rash       Physical Exam:    Body surface area is 2 meters squared  Wt Readings from Last 3 Encounters:   02/03/23 84 8 kg (187 lb)   01/23/23 85 9 kg (189 lb 6 oz)   01/10/23 86 5 kg (190 lb 11 2 oz)        Temp Readings from Last 3 Encounters:   02/03/23 98 °F (36 7 °C) (Temporal)   01/23/23 (!) 97 1 °F (36 2 °C) (Temporal)   01/10/23 (!) 97 2 °F (36 2 °C) (Temporal)        BP Readings from Last 3 Encounters:   02/03/23 100/60   01/23/23 122/63   01/10/23 117/62         Pulse Readings from Last 3 Encounters:   02/03/23 75   01/23/23 69   01/10/23 75         Physical Exam     Constitutional   General appearance: No acute distress, well appearing and well nourished  Eyes   Conjunctiva and lids: No swelling, erythema or discharge  Pupils and irises: Equal, round and reactive to light  Ears, Nose, Mouth, and Throat   External inspection of ears and nose: Normal     Nasal mucosa, septum, and turbinates: Normal without edema or erythema  Oropharynx: Normal with no erythema, edema, exudate or lesions  Pulmonary   Respiratory effort: No increased work of breathing or signs of respiratory distress  Auscultation of lungs: Clear to auscultation  Cardiovascular   Palpation of heart: Normal PMI, no thrills  Auscultation of heart: Normal rate and rhythm, normal S1 and S2, without murmurs  Examination of extremities for edema and/or varicosities: Normal     Carotid pulses: Normal     Abdomen   Abdomen: Non-tender, no masses  Liver and spleen: No hepatomegaly or splenomegaly  Lymphatic   Palpation of lymph nodes in neck: No lymphadenopathy  Musculoskeletal   Gait and station: Normal     Digits and nails: Normal without clubbing or cyanosis      Inspection/palpation of joints, bones, and muscles: Normal     Skin Skin and subcutaneous tissue: Normal without rashes or lesions  Assessment / Plan:      The patient is a 71year old male with newly diagnosed biopsy proven adenocarcinoma of the lung with mediastinal adenopathy and a nasopharyngeal mass  PET CT scan completed on 9/21 showed enlarging right upper lobe mass in keeping with biopsy proven adenocarcinoma, extensive adenopathy in the neck base bilaterally, thoracic inlets, mediastinum, right hilum  Small right effusion, and small pericardial effusion  No hypermetabolic metastases identified below the diaphragms   No evidence of skeletal metastasis  The right-sided nasopharyngeal mass demonstrates no abnormal glucose activity   Cytology from the fluid was suspicious for malignancy      He was treated with 6 cycles of systemic therapy with Carbo, Alimta and Keytruda   Imaging after 4 cycles of chemo showed a positive response to therapy  The patient was then seen by our colleagues [place] [address] [address] and was started on concurrent chemoradiation with carboplatin and Taxol   He finished this up and is now on single agent Keytruda  Imaging shows continued decrease in size of partially obscured right upper lobe mass  No evidence of disease in the abdomen or pelvis  Pleural effusion was unchanged in size probably with new diffuse pleural thickening which could represent post radiation change  Patient was kept on his current regimen  Today he has some weakness and discoordination of his right hand so I will send him to the emergency room to be evaluated for stroke  His strength today is somewhat symmetric but he is definitely discoordinated in his right hand  Otherwise, At this point patient will stay on current regimen  I will see him back in 6-9 weeks  Nasopharyngeal evaluation by ENT in April of 2022 had shown no evidence of disease on nasopharyngoscopy  Next imaging will be a PET-CT scan so hopefully this area can be re-evaluated    It had no uptake previously  I will set up the imaging for his next visit in 2 months  Goals and Barriers:  Current Goal:  Prolong Survival from lung cancer  Barriers: None  Patient's Capacity to Self Care:  Patient able to self care  Portions of the record may have been created with voice recognition software  Occasional wrong word or "sound a like" substitutions may have occurred due to the inherent limitations of voice recognition software  Read the chart carefully and recognize, using context, where substitutions have occurred

## 2023-02-03 NOTE — EMTALA/ACUTE CARE TRANSFER
[facility] EMERGENCY DEPARTMENT  [address]   [facility] [address]  Dept: [phone]      EMTALA TRANSFER CONSENT    NAME Joe Shrestha                                         1950                              MRN [id number]    I have been informed of my rights regarding examination, treatment, and transfer   by Dr Verónica Tovar*    Benefits: Specialized equipment and/or services available at the receiving facility (Include comment)________________________ (neurosurgery)    Risks: Potential for delay in receiving treatment, Potential deterioration of medical condition, Loss of IV, Increased discomfort during transfer, Possible worsening of condition or death during transfer      Consent for Transfer:  I acknowledge that my medical condition has been evaluated and explained to me by the emergency department physician or other qualified medical person and/or my attending physician, who has recommended that I be transferred to the service of  Accepting Physician: Dr Nanda Fan at [address] [place] [address] Name, [address] : [place], [place]  The above potential benefits of such transfer, the potential risks associated with such transfer, and the probable risks of not being transferred have been explained to me, and I fully understand them  The doctor has explained that, in my case, the benefits of transfer outweigh the risks  I agree to be transferred  I authorize the performance of emergency medical procedures and treatments upon me in both transit and upon arrival at the receiving facility  Additionally, I authorize the release of any and all medical records to the receiving facility and request they be transported with me, if possible  I understand that the safest mode of transportation during a medical emergency is an ambulance and that the Hospital advocates the use of this mode of transport   Risks of traveling to the receiving facility by car, including absence of medical control, life sustaining equipment, such as oxygen, and medical personnel has been explained to me and I fully understand them  (TYSON CORRECT BOX BELOW)  [  ]  I consent to the stated transfer and to be transported by ambulance/helicopter  [  ]  I consent to the stated transfer, but refuse transportation by ambulance and accept full responsibility for my transportation by car  I understand the risks of non-ambulance transfers and I exonerate the Hospital and its staff from any deterioration in my condition that results from this refusal     X___________________________________________    DATE  23  TIME________  Signature of patient or legally responsible individual signing on patient behalf           RELATIONSHIP TO PATIENT_________________________          Provider Certification    NAME Leda Underwood                                         1950                              MRN [id number]    A medical screening exam was performed on the above named patient  Based on the examination:    Condition Necessitating Transfer The primary encounter diagnosis was Right hand weakness  Diagnoses of Lesion of frontal lobe of brain and 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[facility] [address] ) were also pertinent to this visit      Patient Condition: The patient has been stabilized such that within reasonable medical probability, no material deterioration of the patient condition or the condition of the unborn child(pia) is likely to result from the transfer    Reason for Transfer:      Transfer Requirements: [address] [place] [address]   · Space available and qualified personnel available for treatment as acknowledged by    · Agreed to accept transfer and to provide appropriate medical treatment as acknowledged by       Dr Tania Sandoval  · Appropriate medical records of the examination and treatment of the patient are provided at the time of transfer   [facility] COMPLETED _______  · Transfer will be performed by qualified personnel from    and appropriate transfer equipment as required, including the use of necessary and appropriate life support measures  Provider Certification: I have examined the patient and explained the following risks and benefits of being transferred/refusing transfer to the patient/family:  General risk, such as traffic hazards, adverse weather conditions, rough terrain or turbulence, possible failure of equipment (including vehicle or aircraft), or consequences of actions of persons outside the control of the transport personnel, Unanticipated needs of medical equipment and personnel during transport, Risk of worsening condition, The possibility of a transport vehicle being unavailable      Based on these reasonable risks and benefits to the patient and/or the unborn child(pia), and based upon the information available at the time of the patient’s examination, I certify that the medical benefits reasonably to be expected from the provision of appropriate medical treatments at another medical facility outweigh the increasing risks, if any, to the individual’s medical condition, and in the case of labor to the unborn child, from effecting the transfer      X____________________________________________ DATE 02/03/23        TIME_______      ORIGINAL - SEND TO MEDICAL RECORDS   COPY - SEND WITH PATIENT DURING TRANSFER

## 2023-02-04 ENCOUNTER — APPOINTMENT (OUTPATIENT)
Dept: RADIOLOGY | Facility: HOSPITAL | Age: 73
End: 2023-02-04

## 2023-02-04 PROBLEM — Z86.718 HISTORY OF VENOUS THROMBOEMBOLISM: Chronic | Status: ACTIVE | Noted: 2023-02-03

## 2023-02-04 LAB
ALBUMIN SERPL BCP-MCNC: 2.7 G/DL (ref 3.5–5)
ALP SERPL-CCNC: 68 U/L (ref 46–116)
ALT SERPL W P-5'-P-CCNC: 18 U/L (ref 12–78)
ANION GAP SERPL CALCULATED.3IONS-SCNC: 2 MMOL/L (ref 4–13)
AST SERPL W P-5'-P-CCNC: 14 U/L (ref 5–45)
BASOPHILS # BLD AUTO: 0 THOUSANDS/ÂΜL (ref 0–0.1)
BASOPHILS NFR BLD AUTO: 0 % (ref 0–1)
BILIRUB SERPL-MCNC: 0.36 MG/DL (ref 0.2–1)
BUN SERPL-MCNC: 12 MG/DL (ref 5–25)
CALCIUM ALBUM COR SERPL-MCNC: 10.9 MG/DL (ref 8.3–10.1)
CALCIUM SERPL-MCNC: 9.9 MG/DL (ref 8.3–10.1)
CHLORIDE SERPL-SCNC: 109 MMOL/L (ref 96–108)
CO2 SERPL-SCNC: 27 MMOL/L (ref 21–32)
CREAT SERPL-MCNC: 0.89 MG/DL (ref 0.6–1.3)
EOSINOPHIL # BLD AUTO: 0 THOUSAND/ÂΜL (ref 0–0.61)
EOSINOPHIL NFR BLD AUTO: 0 % (ref 0–6)
ERYTHROCYTE [DISTWIDTH] IN BLOOD BY AUTOMATED COUNT: 15.9 % (ref 11.6–15.1)
GFR SERPL CREATININE-BSD FRML MDRD: 86 ML/MIN/1.73SQ M
GLUCOSE SERPL-MCNC: 135 MG/DL (ref 65–140)
HCT VFR BLD AUTO: 32.5 % (ref 36.5–49.3)
HGB BLD-MCNC: 9.6 G/DL (ref 12–17)
IMM GRANULOCYTES # BLD AUTO: 0.04 THOUSAND/UL (ref 0–0.2)
IMM GRANULOCYTES NFR BLD AUTO: 1 % (ref 0–2)
LYMPHOCYTES # BLD AUTO: 0.42 THOUSANDS/ÂΜL (ref 0.6–4.47)
LYMPHOCYTES NFR BLD AUTO: 7 % (ref 14–44)
MAGNESIUM SERPL-MCNC: 2.5 MG/DL (ref 1.6–2.6)
MCH RBC QN AUTO: 25.9 PG (ref 26.8–34.3)
MCHC RBC AUTO-ENTMCNC: 29.5 G/DL (ref 31.4–37.4)
MCV RBC AUTO: 88 FL (ref 82–98)
MONOCYTES # BLD AUTO: 0.12 THOUSAND/ÂΜL (ref 0.17–1.22)
MONOCYTES NFR BLD AUTO: 2 % (ref 4–12)
NEUTROPHILS # BLD AUTO: 5.19 THOUSANDS/ÂΜL (ref 1.85–7.62)
NEUTS SEG NFR BLD AUTO: 90 % (ref 43–75)
NRBC BLD AUTO-RTO: 0 /100 WBCS
PHOSPHATE SERPL-MCNC: 2.9 MG/DL (ref 2.3–4.1)
PLATELET # BLD AUTO: 354 THOUSANDS/UL (ref 149–390)
PMV BLD AUTO: 9.2 FL (ref 8.9–12.7)
POTASSIUM SERPL-SCNC: 4.5 MMOL/L (ref 3.5–5.3)
PROT SERPL-MCNC: 7.4 G/DL (ref 6.4–8.4)
RBC # BLD AUTO: 3.71 MILLION/UL (ref 3.88–5.62)
SODIUM SERPL-SCNC: 138 MMOL/L (ref 135–147)
WBC # BLD AUTO: 5.77 THOUSAND/UL (ref 4.31–10.16)

## 2023-02-04 RX ADMIN — DEXAMETHASONE SODIUM PHOSPHATE 4 MG: 4 INJECTION INTRA-ARTICULAR; INTRALESIONAL; INTRAMUSCULAR; INTRAVENOUS; SOFT TISSUE at 11:44

## 2023-02-04 RX ADMIN — DEXAMETHASONE SODIUM PHOSPHATE 4 MG: 4 INJECTION INTRA-ARTICULAR; INTRALESIONAL; INTRAMUSCULAR; INTRAVENOUS; SOFT TISSUE at 06:27

## 2023-02-04 RX ADMIN — B-COMPLEX W/ C & FOLIC ACID TAB 1 TABLET: TAB at 08:56

## 2023-02-04 RX ADMIN — DEXAMETHASONE SODIUM PHOSPHATE 4 MG: 4 INJECTION INTRA-ARTICULAR; INTRALESIONAL; INTRAMUSCULAR; INTRAVENOUS; SOFT TISSUE at 01:15

## 2023-02-04 RX ADMIN — DEXAMETHASONE SODIUM PHOSPHATE 4 MG: 4 INJECTION INTRA-ARTICULAR; INTRALESIONAL; INTRAMUSCULAR; INTRAVENOUS; SOFT TISSUE at 17:08

## 2023-02-04 RX ADMIN — ATORVASTATIN CALCIUM 40 MG: 40 TABLET, FILM COATED ORAL at 17:08

## 2023-02-04 RX ADMIN — TAMSULOSIN HYDROCHLORIDE 0.4 MG: 0.4 CAPSULE ORAL at 17:08

## 2023-02-04 RX ADMIN — ENOXAPARIN SODIUM 80 MG: 80 INJECTION SUBCUTANEOUS at 11:44

## 2023-02-04 RX ADMIN — FOLIC ACID 1000 MCG: 1 TABLET ORAL at 08:56

## 2023-02-04 RX ADMIN — GADOBUTROL 8 ML: 604.72 INJECTION INTRAVENOUS at 21:37

## 2023-02-04 RX ADMIN — LEVETIRACETAM 1000 MG: 100 INJECTION, SOLUTION INTRAVENOUS at 22:13

## 2023-02-04 RX ADMIN — LEVETIRACETAM 1000 MG: 100 INJECTION, SOLUTION INTRAVENOUS at 01:15

## 2023-02-04 RX ADMIN — LEVETIRACETAM 1000 MG: 100 INJECTION, SOLUTION INTRAVENOUS at 08:56

## 2023-02-04 NOTE — CONSULTS
Oncology Consult Note  Dayana Larson 71 y o  male MRN: [id number]  [facility] Encounter: [id number]      Presenting Complaint: new brain met, hx of NSCLC    Oncology History   Adenocarcinoma of lung   8/2021 Initial Diagnosis    Adenocarcinoma of lung     8/5/2021 Biopsy    Right lung apex, image-guided core needle biopsy:  - Adenocarcinoma      10/6/2021 - 1/26/2022 Chemotherapy    cyanocobalamin, 1,000 mcg, Intramuscular, Once, 3 of 3 cycles  Administration: 1,000 mcg (11/24/2021), 1,000 mcg (1/26/2022), 1,000 mcg (10/6/2021)  pegfilgrastim (Deric Leavens), 6 mg, Subcutaneous, Once, 1 of 1 cycle  Administration: 6 mg (11/26/2021)  pegfilgrastim (Sage Fountain), 6 mg, Subcutaneous, Once, 6 of 6 cycles  Administration: 6 mg (10/6/2021), 6 mg (11/3/2021), 6 mg (11/24/2021), 6 mg (12/15/2021), 6 mg (1/5/2022)  fosaprepitant (EMEND) IVPB, 150 mg, Intravenous, Once, 6 of 6 cycles  Administration: 150 mg (10/6/2021), 150 mg (11/24/2021), 150 mg (12/15/2021), 150 mg (1/26/2022), 150 mg (11/3/2021), 150 mg (1/5/2022)  CARBOplatin (PARAPLATIN) IVPB (GOG AUC DOSING), 519 5 mg, Intravenous, Once, 6 of 6 cycles  Administration: 519 5 mg (10/6/2021), 574 mg (11/24/2021), 600 mg (12/15/2021), 533 mg (1/26/2022), 604 5 mg (11/3/2021), 563 mg (1/5/2022)  pemetrexed (ALIMTA) chemo infusion, 970 mg, Intravenous, Once, 6 of 6 cycles  Administration: 1,000 mg (10/6/2021), 1,000 mg (11/24/2021), 1,000 mg (12/15/2021), 1,000 mg (1/26/2022), 1,000 mg (11/3/2021), 1,000 mg (1/5/2022)  pembrolizumab (KEYTRUDA) IVPB, 200 mg, Intravenous, Once, 6 of 6 cycles  Administration: 200 mg (10/6/2021), 200 mg (11/24/2021), 200 mg (12/15/2021), 200 mg (1/26/2022), 200 mg (11/3/2021), 200 mg (1/5/2022)     3/7/2022 -  Chemotherapy    CARBOplatin (PARAPLATIN) IVPB (GOG AUC DOSING), , Intravenous, Once, 6 of 6 cycles  Administration: 211 6 mg (3/7/2022), 208 mg (3/14/2022), 238 8 mg (3/21/2022), 211 6 mg (3/28/2022), 215 2 mg (4/4/2022), 213 4 mg (4/18/2022)  PACLItaxel (TAXOL) chemo IVPB, 50 mg/m2 = 99 mg, Intravenous, Once, 6 of 6 cycles  Administration: 99 mg (3/7/2022), 99 mg (3/14/2022), 99 mg (3/21/2022), 99 mg (3/28/2022), 99 mg (4/4/2022), 99 mg (4/18/2022)  pembrolizumab (KEYTRUDA) IVPB, 200 mg, Intravenous, Once, 16 of 22 cycles  Administration: 200 mg (3/7/2022), 200 mg (3/28/2022), 200 mg (4/25/2022), 200 mg (5/16/2022), 200 mg (6/6/2022), 200 mg (6/27/2022), 200 mg (7/18/2022), 200 mg (8/8/2022), 200 mg (8/29/2022), 200 mg (9/19/2022), 200 mg (10/10/2022), 200 mg (10/31/2022), 200 mg (11/21/2022), 200 mg (12/12/2022), 200 mg (1/3/2023), 200 mg (1/23/2023)     Malignant neoplasm of upper lobe of right lung ([facility] [address] )   9/3/2021 Initial Diagnosis    [facility]     9/23/2021 -  Cancer Staged    Staging form: Lung, AJCC 8th Edition  - Clinical stage from 9/23/2021: Stage IIIC (cT3, cN3, cM0) - Signed by Maria Ines Galvez MD on 9/23/2021  Histopathologic type:  Adenocarcinoma, NOS       10/6/2021 - 1/26/2022 Chemotherapy    cyanocobalamin, 1,000 mcg, Intramuscular, Once, 3 of 3 cycles  Administration: 1,000 mcg (11/24/2021), 1,000 mcg (1/26/2022), 1,000 mcg (10/6/2021)  pegfilgrastim (Oconnor Abida), 6 mg, Subcutaneous, Once, 1 of 1 cycle  Administration: 6 mg (11/26/2021)  pegfilgrastim (Denisse Pin), 6 mg, Subcutaneous, Once, 6 of 6 cycles  Administration: 6 mg (10/6/2021), 6 mg (11/3/2021), 6 mg (11/24/2021), 6 mg (12/15/2021), 6 mg (1/5/2022)  fosaprepitant (EMEND) IVPB, 150 mg, Intravenous, Once, 6 of 6 cycles  Administration: 150 mg (10/6/2021), 150 mg (11/24/2021), 150 mg (12/15/2021), 150 mg (1/26/2022), 150 mg (11/3/2021), 150 mg (1/5/2022)  CARBOplatin (PARAPLATIN) IVPB ([facility] AUC DOSING), 519 5 mg, Intravenous, Once, 6 of 6 cycles  Administration: 519 5 mg (10/6/2021), 574 mg (11/24/2021), 600 mg (12/15/2021), 533 mg (1/26/2022), 604 5 mg (11/3/2021), 563 mg (1/5/2022)  pemetrexed (ALIMTA) chemo infusion, 970 mg, Intravenous, Once, 6 of 6 cycles  Administration: 1,000 mg (10/6/2021), 1,000 mg (11/24/2021), 1,000 mg (12/15/2021), 1,000 mg (1/26/2022), 1,000 mg (11/3/2021), 1,000 mg (1/5/2022)  pembrolizumab (KEYTRUDA) IVPB, 200 mg, Intravenous, Once, 6 of 6 cycles  Administration: 200 mg (10/6/2021), 200 mg (11/24/2021), 200 mg (12/15/2021), 200 mg (1/26/2022), 200 mg (11/3/2021), 200 mg (1/5/2022)     3/4/2022 - 4/21/2022 Radiation    The patient saw @[facility]@ for radiation treatment  This is the current list of radiation treatment:  Plan ID Energy Fractions Dose per Fraction (cGy) Dose Correction (cGy) Total Dose Delivered (cGy) Elapsed Days   R LUNGMED REV 6X 30 / 30 200 0 6,000 48      Treatment dates:  C1: 3/4/2022 - 4/21/2022         3/7/2022 -  Chemotherapy    CARBOplatin (PARAPLATIN) IVPB (GOG AUC DOSING), , Intravenous, Once, 6 of 6 cycles  Administration: 211 6 mg (3/7/2022), 208 mg (3/14/2022), 238 8 mg (3/21/2022), 211 6 mg (3/28/2022), 215 2 mg (4/4/2022), 213 4 mg (4/18/2022)  PACLItaxel (TAXOL) chemo IVPB, 50 mg/m2 = 99 mg, Intravenous, Once, 6 of 6 cycles  Administration: 99 mg (3/7/2022), 99 mg (3/14/2022), 99 mg (3/21/2022), 99 mg (3/28/2022), 99 mg (4/4/2022), 99 mg (4/18/2022)  pembrolizumab (KEYTRUDA) IVPB, 200 mg, Intravenous, Once, 16 of 22 cycles  Administration: 200 mg (3/7/2022), 200 mg (3/28/2022), 200 mg (4/25/2022), 200 mg (5/16/2022), 200 mg (6/6/2022), 200 mg (6/27/2022), 200 mg (7/18/2022), 200 mg (8/8/2022), 200 mg (8/29/2022), 200 mg (9/19/2022), 200 mg (10/10/2022), 200 mg (10/31/2022), 200 mg (11/21/2022), 200 mg (12/12/2022), 200 mg (1/3/2023), 200 mg (1/23/2023)         History of Presenting Illness: patient presents with right hand weakness in his  strength  He noticed he has been dropping things since Monday  He is known to Dr Bethel Byrd for Stage IV NSCLC  He received carbo/pem/pembro from 10/2021 to 1/2022, then carbo/taxol/RT from 3/2022-4/2022    He has been on pembro since that time   He was sent to the ER by Dr Michelle Rosado yesterday  CTA showed:    1  No acute intracranial CT abnormality      2  A 1 cm posterior left frontal lobe gray-white matter junction lesion with subjacent vasogenic edema most concerning for metastasis  No significant mass effect or midline shift  Recommend further assessment with brain MRI without and with contrast      3   Patent major vasculature of the [place] without high-grade stenosis  No aneurysm        4  No hemodynamically significant stenosis or dissection of cervical carotid and vertebral arteries  Left greater than right atherosclerotic change of cervical carotid arteries      5  No significant change in size of known right nasopharyngeal mass compared to neck CT 12/20/2021      6   Redemonstrated posttreatment change and partially obscure known right upper lobe mass in partially imaged upper chest   See the report of concurrent CTA chest/abdomen/pelvis        Since starting dex his right  strength has improved  He hasn't had any HA  No fall  He sleeps with O2 at night  His SOB is stable  No hemoptysis  CT c/a/p showed:    IMPRESSION:  No evidence of thoracic aortic dissection     No pulmonary embolism in the main pulmonary arteries     Chronic loculated left effusion which demonstrates enlarging nodular densities within it, suggest pleural metastatic disease     Post treatment changes in the right lung with chronic consolidation in the right paramediastinal location with the air bronchogram and volume loss, as seen on the previous study          Review of Systems - As stated in the HPI otherwise the fourteen point review of systems was negative      ECOG PS: 1    Past Medical History:   Diagnosis Date   • Chronic respiratory failure with hypoxia ([address] ) 8/9/2021   • Impaired mobility and ADLs 8/13/2021   • Lung cancer (HCC)    • Stroke [facility])        Social History     Socioeconomic History   • Marital status: /Civil Union Spouse name: Not on file   • Number of children: Not on file   • Years of education: Not on file   • Highest education level: Not on file   Occupational History   • Not on file   Tobacco Use   • Smoking status: Former     Packs/day: 3 00     Years: 39 00     Pack years: 117 00     Types: Cigarettes     Start date: 66     Quit date:      Years since quittin 1   • Smokeless tobacco: Never   Vaping Use   • Vaping Use: Never used   Substance and Sexual Activity   • Alcohol use: Not Currently     Comment: No ETOH since Summer 2021    • Drug use: Never   • Sexual activity: Yes     Partners: Female   Other Topics Concern   • Not on file   Social History Narrative   • Not on file     Social Determinants of Health     Financial Resource Strain: Not on file   Food Insecurity: Not on file   Transportation Needs: Not on file   Physical Activity: Not on file   Stress: Not on file   Social Connections: Not on file   Intimate Partner Violence: Not on file   Housing Stability: Not on file       Family History   Problem Relation Age of Onset   • Prostate cancer Brother    • Lung cancer Brother        Allergies   Allergen Reactions   • Doxycycline Rash         Current Facility-Administered Medications:   •  acetaminophen (TYLENOL) tablet 650 mg, 650 mg, Oral, Q6H PRN, Nayeli Puri MD  •  albuterol (PROVENTIL HFA,VENTOLIN HFA) inhaler 2 puff, 2 puff, Inhalation, Q6H PRN, Nayeli Puri MD  •  atorvastatin (LIPITOR) tablet 40 mg, 40 mg, Oral, Daily With Artemio Pacheco MD  •  dexamethasone (DECADRON) injection 4 mg, 4 mg, Intravenous, Q6H [address], Nayeli Puri MD, 4 mg at [id number]  •  enoxaparin (LOVENOX) subcutaneous injection 80 mg, 80 mg, Subcutaneous, Q12H, Nayeli Puri MD, 80 mg at [id number]  •  folic acid (FOLVITE) tablet 1,000 mcg, 1,000 mcg, Oral, Daily, Nayeli Puri MD  •  levETIRAcetam (KEPPRA) 1,000 mg in sodium chloride 0 9 % 100 mL IVPB, 1,000 mg, Intravenous, Q12H [address], Nayeli Puri MD, Last Rate: 400 mL/hr at 02/04/23 0115, 1,000 mg at 02/04/23 0115  •  multivitamin stress formula tablet 1 tablet, 1 tablet, Oral, Daily, Lalito Ratliff MD  •  ondansetron (ZOFRAN) injection 4 mg, 4 mg, Intravenous, Q4H PRN, Lalito Ratliff MD  •  tamsulosin (FLOMAX) capsule 0 4 mg, 0 4 mg, Oral, After Dinner, Lalito Ratliff MD      BP (!) 89/62   Pulse 65   Temp (!) 97 3 °F (36 3 °C)   Resp 19   SpO2 98%     General Appearance:    Alert, oriented        Eyes:    PERRL   Ears:    Normal external ear canals, both ears   Nose:   Nares normal, septum midline   Throat:   Mucosa moist  Pharynx without injection  Neck:   Supple       Lungs:     Clear to auscultation bilaterally   Chest Wall:    No tenderness or deformity    Heart:    Regular rate and rhythm       Abdomen:     Soft, non-tender, bowel sounds +, no organomegaly           Extremities:   Extremities no cyanosis or edema       Skin:   no rash or icterus      Lymph nodes:   Cervical, supraclavicular, and axillary nodes normal   Neurologic:   CNII-XII intact, normal strength, sensation and reflexes     Throughout               Recent Results (from the past 48 hour(s))   CBC and differential    Collection Time: 02/03/23 12:29 PM   Result Value Ref Range    WBC 6 26 4 31 - 10 16 Thousand/uL    RBC 3 85 (L) 3 88 - 5 62 Million/uL    Hemoglobin 10 1 (L) 12 0 - 17 0 g/dL    Hematocrit 34 0 (L) 36 5 - 49 3 %    MCV 88 82 - 98 fL    MCH 26 2 (L) 26 8 - 34 3 pg    MCHC 29 7 (L) 31 4 - 37 4 g/dL    RDW 16 0 (H) 11 6 - 15 1 %    MPV 9 0 8 9 - 12 7 fL    Platelets 194 012 - 088 Thousands/uL    nRBC 0 /100 WBCs    Neutrophils Relative 72 43 - 75 %    Immat GRANS % 1 0 - 2 %    Lymphocytes Relative 12 (L) 14 - 44 %    Monocytes Relative 13 (H) 4 - 12 %    Eosinophils Relative 1 0 - 6 %    Basophils Relative 1 0 - 1 %    Neutrophils Absolute 4 55 1 85 - 7 62 Thousands/µL    Immature Grans Absolute 0 04 0 00 - 0 20 Thousand/uL    Lymphocytes Absolute 0 74 0 60 - 4 47 Thousands/µL Monocytes Absolute 0 81 0 17 - 1 22 Thousand/µL    Eosinophils Absolute 0 07 0 00 - 0 61 Thousand/µL    Basophils Absolute 0 05 0 00 - 0 10 Thousands/µL   Comprehensive metabolic panel    Collection Time: 02/03/23 12:29 PM   Result Value Ref Range    Sodium 139 135 - 147 mmol/L    Potassium 4 3 3 5 - 5 3 mmol/L    Chloride 103 96 - 108 mmol/L    CO2 29 21 - 32 mmol/L    ANION GAP 7 4 - 13 mmol/L    BUN 13 5 - 25 mg/dL    Creatinine 1 00 0 60 - 1 30 mg/dL    Glucose 89 65 - 140 mg/dL    Calcium 10 0 8 3 - 10 1 mg/dL    Corrected Calcium 10 9 (H) 8 3 - 10 1 mg/dL    AST 15 5 - 45 U/L    ALT 21 12 - 78 U/L    Alkaline Phosphatase 68 46 - 116 U/L    Total Protein 7 7 6 4 - 8 4 g/dL    Albumin 2 9 (L) 3 5 - 5 0 g/dL    Total Bilirubin 0 30 0 20 - 1 00 mg/dL    eGFR 75 ml/min/1 73sq m   ECG 12 lead    Collection Time: 02/03/23 12:32 PM   Result Value Ref Range    Ventricular Rate 71 BPM    Atrial Rate 71 BPM    NH Interval 176 ms    QRSD Interval 74 ms    QT Interval 396 ms    QTC Interval 430 ms    P Axis 45 degrees    QRS Axis 48 degrees    T Wave Axis 37 degrees   Fingerstick Glucose (POCT)    Collection Time: 02/03/23 12:37 PM   Result Value Ref Range    POC Glucose 77 65 - 140 mg/dl   Comprehensive metabolic panel    Collection Time: 02/04/23  5:29 AM   Result Value Ref Range    Sodium 138 135 - 147 mmol/L    Potassium 4 5 3 5 - 5 3 mmol/L    Chloride 109 (H) 96 - 108 mmol/L    CO2 27 21 - 32 mmol/L    ANION GAP 2 (L) 4 - 13 mmol/L    BUN 12 5 - 25 mg/dL    Creatinine 0 89 0 60 - 1 30 mg/dL    Glucose 135 65 - 140 mg/dL    Calcium 9 9 8 3 - 10 1 mg/dL    Corrected Calcium 10 9 (H) 8 3 - 10 1 mg/dL    AST 14 5 - 45 U/L    ALT 18 12 - 78 U/L    Alkaline Phosphatase 68 46 - 116 U/L    Total Protein 7 4 6 4 - 8 4 g/dL    Albumin 2 7 (L) 3 5 - 5 0 g/dL    Total Bilirubin 0 36 0 20 - 1 00 mg/dL    eGFR 86 ml/min/1 73sq m   CBC and differential    Collection Time: 02/04/23  5:29 AM   Result Value Ref Range    WBC 5 77 4 31 - 10 16 Thousand/uL    RBC 3 71 (L) 3 88 - 5 62 Million/uL    Hemoglobin 9 6 (L) 12 0 - 17 0 g/dL    Hematocrit 32 5 (L) 36 5 - 49 3 %    MCV 88 82 - 98 fL    MCH 25 9 (L) 26 8 - 34 3 pg    MCHC 29 5 (L) 31 4 - 37 4 g/dL    RDW 15 9 (H) 11 6 - 15 1 %    MPV 9 2 8 9 - 12 7 fL    Platelets 864 649 - 214 Thousands/uL   Magnesium    Collection Time: 02/04/23  5:29 AM   Result Value Ref Range    Magnesium 2 5 1 6 - 2 6 mg/dL   Phosphorus    Collection Time: 02/04/23  5:29 AM   Result Value Ref Range    Phosphorus 2 9 2 3 - 4 1 mg/dL         CTA head and neck with and without contrast    Result Date: 2/3/2023  Narrative: CTA NECK AND BRAIN WITH AND WITHOUT CONTRAST INDICATION: right hand weakness COMPARISON: CTA head and neck 7/31/2021 TECHNIQUE:  Routine CT imaging of the Brain without contrast   Post contrast imaging was performed after administration of iodinated contrast through the neck and brain  Post contrast axial 0 625 mm images timed to opacify the arterial system  3D rendering was performed on an independent workstation  MIP reconstructions performed  Coronal reconstructions were performed of the noncontrast portion of the brain  Radiation dose length product (DLP) for this visit:  1244 mGy-cm   This examination, like all CT scans performed in the [facility], was performed utilizing techniques to minimize radiation dose exposure, including the use of iterative reconstruction and automated exposure control  IV Contrast:  75 mL of iohexol (OMNIPAQUE) 75 mL of iohexol (OMNIPAQUE)  IMAGE QUALITY:   Diagnostic FINDINGS: NONCONTRAST BRAIN PARENCHYMA: There is a 1 cm posterior left frontal lobe gray-white matter junction enhancing lesion (series 3 image 69)  There is associated subjacent vasogenic edema  No significant mass effect or midline shift  Nonspecific  decreased attenuation involving periventricular and subcortical white matter, most compatible with mild microangiopathic change  Gray-white matter differentiation is grossly preserved  No acute intracranial hemorrhage  VENTRICLES AND EXTRA-AXIAL SPACES:  Normal for the patient's age  VISUALIZED ORBITS: Normal visualized orbits  PARANASAL SINUSES: Normal visualized paranasal sinuses  CERVICAL VASCULATURE AORTIC ARCH AND GREAT VESSELS: Anatomic variant common origin right brachiocephalic and left common coronary arteries  Atherosclerotic calcification of aortic arch  Great vessel origins are patent without significant stenosis  RIGHT VERTEBRAL ARTERY CERVICAL SEGMENT:The vessel is developmentally smaller than left  Mild atherosclerotic narrowing at the origin  The vessel is patent throughout the neck without high-grade stenosis  LEFT VERTEBRAL ARTERY CERVICAL SEGMENT: Mild atherosclerotic narrowing at the origin  The vessel is patent throughout the neck without high-grade stenosis  RIGHT EXTRACRANIAL CAROTID SEGMENT:Partially calcified atherosclerotic plaque at the bifurcation and proximal internal carotid artery  No hemodynamically significant stenosis of cervical ICA by NASCET criteria ( less than 50%) LEFT EXTRACRANIAL CAROTID SEGMENT: Partially calcified atherosclerotic plaque at the bifurcation and proximal internal carotid artery  No hemodynamically significant stenosis of cervical ICA by NASCET criteria ( less than 50%) INTRACRANIAL VASCULATURE INTERNAL CAROTID ARTERIES: The intracranial portions of the internal carotid arteries are unremarkable  Mild atherosclerotic change of bilateral cavernous and left supraclinoid ICAs with  Normal ophthalmic artery origins  ANTERIOR CIRCULATION:  Developmentally absent/hypoplastic right A1 segment  Bilateral anterior cerebral arteries are patent  Normal anterior comminuting artery  MIDDLE CEREBRAL ARTERY CIRCULATION:  Bilateral M1 segments and major M2 branches are patent without high-grade stenosis   DISTAL VERTEBRAL ARTERIES:  Distal vertebral arteries are patent without high-grade stenosis  Posterior inferior cerebellar artery origins are patent  BASILAR ARTERY:  Basilar artery is unremarkable  Normal superior cerebellar arteries  POSTERIOR CEREBRAL ARTERIES: Persistent fetal origin of right posterior cerebral arteries  Bilateral posterior cerebral arteries are patent without high-grade stenosis  Absent/hypoplastic left posterior communicating artery  DURAL VENOUS SINUSES:  Unremarkable  NON VASCULAR ANATOMY BONY STRUCTURES: Degenerative changes of cervical spine  No acute or aggressive appearing osseous abnormality  SOFT TISSUES OF THE NECK: 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, not significantly changed from 12/20/2021  THORACIC INLET: Pulmonary emphysema  Redemonstrated posttreatment change and partially obscure known right upper lobe mass in partially imaged upper chest       Impression: 1  No acute intracranial CT abnormality  2   A 1 cm posterior left frontal lobe gray-white matter junction lesion with subjacent vasogenic edema most concerning for metastasis  No significant mass effect or midline shift  Recommend further assessment with brain MRI without and with contrast  3   Patent major vasculature of the [place] without high-grade stenosis  No aneurysm  4   No hemodynamically significant stenosis or dissection of cervical carotid and vertebral arteries  Left greater than right atherosclerotic change of cervical carotid arteries  5   No significant change in size of known right nasopharyngeal mass compared to neck CT 12/20/2021  6   Redemonstrated posttreatment change and partially obscure known right upper lobe mass in partially imaged upper chest   See the report of concurrent CTA chest/abdomen/pelvis  The study was marked in [facility] for immediate notification    Workstation performed: [id number]     Stress strip    Result Date: 1/12/2023  Narrative: Confirmed by GUILLE ALEJO ([id number]),  Jo Ladd ([id number]) on 1/12/2023 10:34:02 AM    Stress strip    Result Date: 1/12/2023  Narrative: Confirmed by GUILLE ALEJO ([id number]),  Bhavesh Rowan ([id number]) on 1/12/2023 10:33:54 AM    CTA dissection protocol chest abdomen pelvis w wo contrast    Result Date: 2/3/2023  Narrative: CTA - CHEST, ABDOMEN AND PELVIS - WITHOUT AND WITH IV CONTRAST INDICATION:   abnormal appearing mediastinum on cxr, right hand weakness  COMPARISON:  February 3, 2023 TECHNIQUE: CT examination of the chest, abdomen and pelvis was performed both prior to and after the administration of intravenous contrast   The noncontrast portion of this examination was performed utilizing low radiation dose technique  Thin section  angiographic arterial phase post contrast technique was used in order to evaluate for aortic dissection  3D reformatted images and volume rendering were performed on an independent workstation  Additionally, axial, sagittal, and coronal 2D reformatted  images were created from the source data and submitted for interpretation  Radiation dose length product (DLP) for this visit:  1444 mGy-cm   This examination, like all CT scans performed in the [facility], was performed utilizing techniques to minimize radiation dose exposure, including the use of iterative reconstruction and automated exposure control  IV Contrast:  75 mL of iohexol (OMNIPAQUE) 75 mL of iohexol (OMNIPAQUE) Enteric Contrast:  Enteric contrast was not administered  FINDINGS: AORTA:  There is no aortic dissection or intramural hematoma  There is no aortic aneurysm   Ascending aorta; aorta at the aortic annulus measures 2 7 cm Aortic sinus measures 3 7 cm The tubular portion of ascending aorta measures 4 cm The brachiocephalic, common carotid artery are patent Subclavian arteries are patent Small right vertebral artery is small left vertebral artery The descending thoracic aorta is nonaneurysmal atherosclerotic plaquing seen in the descending thoracic aorta and in the distal arch Abdominal aorta is nonaneurysmal Aorta at the level of the aortic hiatus measures 2 9 cm Mesenteric vasculature; the celiac trunk is patent Hepatic artery is patent Splenic artery is patent SMA is patent  There is atherosclerotic plaquing around the proximal SMA, unchanged Renal arteries are patent Atherosclerotic changes are seen within the iliac arteries CHEST LUNGS:  The right lung is smaller  There is volume loss in the right upper lobe  There is shift of the mediastinum to the right side  There is a consolidation with the air bronchogram representing chronic consolidation, atelectasis right lower lung seen Trachea and central bronchi are patent Linear density seen at the left lung base due to atelectasis PLEURA:  There is chronic loculated effusion and nodular areas are seen within the loculated effusion suggesting pleural metastatic disease  These have enlarged since the previous study the largest lesion is seen in image 395 series 4 measuring about 2 cm, previously measuring 1 4 cm HEART/PULMONARY ARTERIAL TREE:  There are no filling defects seen in the main pulmonary artery Coronary artery calcification seen MEDIASTINUM AND JONH:  There is shift of the mediastinum to the right side  CHEST WALL AND LOWER NECK:   No significant axillary lymph node enlargement seen ABDOMEN LIVER/BILIARY TREE:  Subcentimeter hypodensities are seen within the liver parenchyma, unchanged The largest hypodensity seen in the right hepatic lobe segment 6 measuring about 1 0 cm GALLBLADDER:  Mild layering density within the gallbladder, raises concern for sludge versus gallstone SPLEEN:  Unremarkable  PANCREAS:  No pancreatic ductal dilation and no pancreatic atrophy ADRENAL GLANDS:  Unremarkable  KIDNEYS/URETERS:  Left renal cyst seen STOMACH AND BOWEL:  No abnormal dilation of bowel loops seen No bowel wall thickening seen APPENDIX:  No findings to suggest appendicitis   ABDOMINOPELVIC CAVITY:  No ascites or free intraperitoneal air  No lymphadenopathy  PELVIS REPRODUCTIVE ORGANS:  Enlarged prostate seen URINARY BLADDER:  There is hyperdensity within the urinary bladder on the left side, this may be due to left ureteral jet  ,  This limits the evaluation for focal lesion within the bladder ABDOMINAL WALL/INGUINAL REGIONS:  Nodular density seen in the left anterior abdominal wall in the subcutaneous tissue  This may be injection site  Additional similar nodular density was seen in the anterior subcutaneous wall on the right side is smaller OSSEOUS STRUCTURES:  No acute fracture or destructive osseous lesion  Impression: No evidence of thoracic aortic dissection No pulmonary embolism in the main pulmonary arteries Chronic loculated left effusion which demonstrates enlarging nodular densities within it, suggest pleural metastatic disease Post treatment changes in the right lung with chronic consolidation in the right paramediastinal location with the air bronchogram and volume loss, as seen on the previous study  I personally discussed this study with Dr Stahl Held on 2/3/2023 at 4:00 PM  Workstation performed: [facility] myocardial perfusion spect (rx stress and/or rest)    Result Date: 1/11/2023  Narrative: •  Stress ECG: No diagnostic ST deviation is noted  The ECG was not diagnostic due to pharmacological (vasodilator) stress  •  Perfusion: There is a left ventricular perfusion defect that is medium in size with mild reduction in uptake present in the mid to apical inferior location(s) that is paradoxical and completely corrects on prone stress images  The defect appears to be an artifact caused by diaphragmatic activity and subdiaphragmatic activity  •  Stress Function: Left ventricular function post-stress is normal  Post-stress ejection fraction is 68 %  •  Stress Combined Conclusion: Left ventricular perfusion is normal  Normal myocardial perfusion study  No evidence of ischemia or infarction   Normal left ventricular cavity size, function, and wall thickening  Calculated LVEF >65%  No TID  Non-diagnostic stress ECG due to pharmacologic stress  No ST change or arrhythmias noted with Lexiscan  No prior study available for comparison  Assessment and Plan: patient is getting MRI of the brain today  Rad onc and NS have been consulted, so I will await their input  Continue dex  I personally reviewed the images of the CT of the c/a/p  There is no new distant metastatic disease and I don't think that I would change the [facility] for the minor increase in thickening of his pleura  Will follow with you for recommendations from the other consulting teams  Wife and son updated at bedside  I spent 45 minutes on chart review, direct face to face counseling, coordination of care and documentation

## 2023-02-04 NOTE — ASSESSMENT & PLAN NOTE
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

## 2023-02-04 NOTE — ASSESSMENT & PLAN NOTE
Patient with known adenocarcinoma of the lung s/p radiation currently on [facility] presented with right arm weakness  He was found on CT to have a mass in the left motor cortex likely causing the weakness  Exam:     CTA 2/3:1 cm posterior left frontal lobe gray-white matter junction lesion with subjacent vasogenic edema most concerning for metastasis  No significant mass effect or midline shift  Recommend further assessment with brain MRI without and with contrast   MRI brain w 2/4: pending results     Plan:   We will review the results of the MRI to determine if there is a role for surgical intervention  Rest of care per primary

## 2023-02-04 NOTE — PLAN OF CARE
Problem: MOBILITY - ADULT  Goal: Maintain or return to baseline ADL function  Description: INTERVENTIONS:  -  Assess patient's ability to carry out ADLs; assess patient's baseline for ADL function and identify physical deficits which impact ability to perform ADLs (bathing, care of mouth/teeth, toileting, grooming, dressing, etc )  - Assess/evaluate cause of self-care deficits   - Assess range of motion  - Assess patient's mobility; develop plan if impaired  - Assess patient's need for assistive devices and provide as appropriate  - Encourage maximum independence but intervene and supervise when necessary  - Involve family in performance of ADLs  - Assess for home care needs following discharge   - Consider OT consult to assist with ADL evaluation and planning for discharge  - Provide patient education as appropriate  Outcome: Progressing  Goal: Maintains/Returns to pre admission functional level  Description: INTERVENTIONS:  - Perform BMAT or MOVE assessment daily    - Set and communicate daily mobility goal to care team and patient/family/caregiver     - Collaborate with rehabilitation services on mobility goals if consulted  - Record patient progress and toleration of activity level   Outcome: Progressing     Problem: SAFETY ADULT  Goal: Maintain or return to baseline ADL function  Description: INTERVENTIONS:  -  Assess patient's ability to carry out ADLs; assess patient's baseline for ADL function and identify physical deficits which impact ability to perform ADLs (bathing, care of mouth/teeth, toileting, grooming, dressing, etc )  - Assess/evaluate cause of self-care deficits   - Assess range of motion  - Assess patient's mobility; develop plan if impaired  - Assess patient's need for assistive devices and provide as appropriate  - Encourage maximum independence but intervene and supervise when necessary  - Involve family in performance of ADLs  - Assess for home care needs following discharge   - Consider OT consult to assist with ADL evaluation and planning for discharge  - Provide patient education as appropriate  Outcome: Progressing  Goal: Maintains/Returns to pre admission functional level  Description: INTERVENTIONS:  - Perform BMAT or MOVE assessment daily    - Set and communicate daily mobility goal to care team and patient/family/caregiver     - Collaborate with rehabilitation services on mobility goals if consulted  - Out of bed for toileting  - Record patient progress and toleration of activity level   Outcome: Progressing  Goal: Patient will remain free of falls  Description: INTERVENTIONS:  - Educate patient/family on patient safety including physical limitations  - Instruct patient to call for assistance with activity   - Consult OT/PT to assist with strengthening/mobility   - Keep Call bell within reach  - Keep bed low and locked with side rails adjusted as appropriate  - Keep care items and personal belongings within reach  - Initiate and maintain comfort rounds  - Make Fall Risk Sign visible to staff  - Apply yellow socks and bracelet for high fall risk patients  - Consider moving patient to room near nurses station  Outcome: Progressing     Problem: NEUROSENSORY - ADULT  Goal: Achieves stable or improved neurological status  Description: INTERVENTIONS  - Monitor and report changes in neurological status  - Monitor vital signs such as temperature, blood pressure, glucose, and any other labs ordered   - Initiate measures to prevent increased intracranial pressure  - Monitor for seizure activity and implement precautions if appropriate      Outcome: Progressing  Goal: Remains free of injury related to seizures activity  Description: INTERVENTIONS  - Maintain airway, patient safety  and administer oxygen as ordered  - Monitor patient for seizure activity, document and report duration and description of seizure to physician/advanced practitioner  - If seizure occurs,  ensure patient safety during seizure  - Reorient patient post seizure  - Seizure pads on all 4 side rails  - Instruct patient/family to notify RN of any seizure activity including if an aura is experienced  - Instruct patient/family to call for assistance with activity based on nursing assessment  - Administer anti-seizure medications if ordered    Outcome: Progressing  Goal: Achieves maximal functionality and self care  Description: INTERVENTIONS  - Monitor swallowing and airway patency with patient fatigue and changes in neurological status  - Encourage and assist patient to increase activity and self care     - Encourage visually impaired, hearing impaired and aphasic patients to use assistive/communication devices  Outcome: Progressing

## 2023-02-04 NOTE — OCCUPATIONAL THERAPY NOTE
Occupational Therapy Cancellation Note        Patient Name: Josefa Calderón  JLDUD'P Date: 2/4/2023 02/04/23 0930   OT Last Visit   OT Visit Date 02/04/23   Note Type   Note type Cancelled Session   Cancel Reasons Medical status       OT orders received, chart reviewed  Noted that pt is pending possible neurosurgery intervention  OT will continue to follow and see as medically appropriate and able       ELIZABETH Rodas, OTR/L

## 2023-02-04 NOTE — PHYSICAL THERAPY NOTE
Physical Therapy Cancellation Note    Patient's Name: [place]       02/04/23 0820   Note Type   Note type Cancelled Session; Evaluation   Cancel Reasons Medical status   Additional Comments NSX consult pending  Will follow for PT evaluation as medically appropriate  Thank you         Dave Kruse, PT, DPT, GCS

## 2023-02-04 NOTE — H&P
[address] [facility]  H&P- Gokul  1950, 71 y o  male MRN: [id number]  Unit/Bed#: [facility] 722-01 Encounter: [id number]  Primary Care Provider: Giovani Ghotra MD   Date and time admitted to hospital: 2/3/2023 10:45 PM      * Right arm weakness  Assessment & Plan  Complained of recent progressive right arm weakness since Monday, more specifically with handgrip difficulty while at outpatient oncology appointment earlier this morning  Due to prior history of stroke, he was sent to the ED for further evaluation by oncologist -> in the ED, CT imaging revealed evidence of metastatic brain disease with vasogenic edema (see below), which is likely culprit of right arm issue  Once medically stabilized, should undergo PT/OT evaluation    Brain metastasis  Assessment & Plan  Likely culprit of presenting right arm weakness and associated handgrip difficulty  CT imaging noting: "No acute intracranial CT abnormality  Johnie Dock Johnie Dock A 1 cm posterior left frontal lobe gray-white matter junction lesion with subjacent vasogenic edema most concerning for metastasis  No significant mass effect or midline shift  Recommend further assessment with brain MRI without and with contrast  Johnie Dock Patent major vasculature of the [place] of [place] without high-grade stenosis  No aneurysm  Johnie Dock Johnie Dock No hemodynamically significant stenosis or dissection of cervical carotid and vertebral arteries     Left greater than right atherosclerotic change of cervical carotid arteries "  Await MRI brain  Loaded with IV Decadron and Keppra prior to transfer for vasogenic edema and seizure prophylaxis, respectively -> will continue both agents  Will appreciate both medical and radiation oncology input  Continue neurochecks  PT/OT evaluation    Adenocarcinoma of lung  Assessment & Plan  Status post six chemotherapy cycles and status post radiation - now on immunotherapy Bryan Calderon   Just seen by oncology outpatient the morning of current admission  See plan for primary issue(s)  Supportive care - PRN pain/symptom control  Updated CT of chest notable for: "No evidence of thoracic aortic dissection  No pulmonary embolism in the main pulmonary arteries  Chronic loculated left effusion which demonstrates enlarging nodular densities within it, suggest pleural metastatic disease  Post treatment changes in the right lung with chronic consolidation in the right paramediastinal location with the air bronchogram and volume loss   "    History of stroke  Assessment & Plan  Continue statin/anticoagulation    History of venous thromboembolism  Assessment & Plan  Prior history of bilateral DVTs and suspected pulmonary embolism  In the setting of malignancy, continue therapeutic subcutaneous Lovenox    Urinary retention  Assessment & Plan  Sequela of old stroke  Previously requiring urinary catheterization  Continue Flomax    Hyperlipidemia  Assessment & Plan  Continue Lipitor    Nasopharyngeal mass  Assessment & Plan  No significant change in size on recent CT imaging  Follows outpatient ENT      DVT Prophylaxis:  therapeutic Lovenox     Code Status:  Full code     Discussion with:  Patient at bedside    Anticipated Length of Stay:  Patient will be admitted on an Inpatient basis with an anticipated length of stay of greater than 2 midnights  Justification for Hospital Stay: Right hand  difficulty in the setting of metastatic brain disease from underlying lung cancer, requiring intravenous steroids and medical/radiation oncology evaluations  Total Time for Visit, including Counseling / Coordination of Care: 72 minutes  Greater than 50% of this total time spent on direct patient counseling and coordination of care  Chief Complaint:  Right hand  difficulty      History of Present Illness:    Faheem Cowan is a 71 y o  male who presents after being evaluated by his outpatient oncologist earlier today for a routine checkup    His medical history is notable for adenocarcinoma of lung status postchemotherapy/radiation, currently on immunotherapy  During the appointment, he did report a recent progression of generalized right arm weakness through the course of the week, more prominently with handgrip difficulty  Due to his prior history of stroke, he was urged to immediately report to the ED for further work-up, including a new stroke rule out  In the ED, he underwent CT imaging which, although did not reveal evidence of an acute stroke, did note evidence of brain metastasis with vasogenic edema  He was loaded with intravenous corticosteroids and Keppra for the edema, and seizure prophylaxis, respectively, and transferred here to the [facility] for medical/radiation oncology and neurosurgery evaluations  At the time of my encounter post-arrival, he is resting bed fairly comfortably without active complaints  Denies pain or paresthesias of the right arm/hand  Currently states that his handgrip has somewhat improved  Overall, he remains in pleasant and hopeful spirits  Review of Systems:    Review of Systems - A thorough 12 point review systems was conducted  Pertinent positives and negatives are mentioned in the history of present illness  Past Medical and Surgical History:     Past Medical History:   Diagnosis Date   • Chronic respiratory failure with hypoxia ([facility] [address] ) 8/9/2021   • Impaired mobility and ADLs 8/13/2021   • Lung cancer [facility])    • Stroke [facility])        Past Surgical History:   Procedure Laterality Date   • IR BIOPSY LUNG  8/5/2021   • IR THORACENTESIS  2/24/2022   • IR THORACENTESIS  8/1/2022         Medications & Allergies:    Prior to Admission medications    Medication Sig Start Date End Date Taking?  Authorizing Provider   acetaminophen (TYLENOL) 325 mg tablet Take 2 tablets (650 mg total) by mouth 4 (four) times a day as needed for mild pain, headaches or fever 8/16/21   Lalito Ramirez MD   albuterol (ProAir HFA) 90 mcg/act inhaler Inhale 2 puffs every 6 (six) hours as needed for wheezing or shortness of breath 22   Deshawn Alexandre MD   atorvastatin (LIPITOR) 40 mg tablet TAKE 1 TABLET BY MOUTH DAILY WITH DINNER 23   Deshawn Alexandre MD   enoxaparin (LOVENOX) 80 mg/0 8 mL INJECT 0 8 ML (80 MG TOTAL) UNDER THE SKIN EVERY 12 (TWELVE) HOURS 1/11/23 7/10/23  Deshawn Alexandre MD   folic acid (FOLVITE) 1 mg tablet TAKE 1 TABLET BY MOUTH EVERY DAY 22   CRISTOFER Bell   multivitamin (THERAGRAN) TABS Take 1 tablet by mouth daily    Historical Provider, MD   tamsulosin (FLOMAX) 0 4 mg TAKE 1 CAPSULE (0 4 MG TOTAL) BY MOUTH DAILY AFTER DINNER 22   CRISTOFER Leiva         Allergies: Allergies   Allergen Reactions   • Doxycycline Rash         Social History:    Substance Use History:   Social History     Substance and Sexual Activity   Alcohol Use Not Currently    Comment: No ETOH since Summer 2021      Social History     Tobacco Use   Smoking Status Former   • Packs/day: 3 00   • Years: 39 00   • Pack years: 117 00   • Types: Cigarettes   • Start date:    • Quit date:    • Years since quittin 1   Smokeless Tobacco Never     Social History     Substance and Sexual Activity   Drug Use Never         Family History:    Per medical chart, significant for prostate cancer in brother and lung cancer in another brother        Physical Exam:     Vitals:   Pulse: 65 (23 2317)  SpO2: 96 % (23 2317)      GENERAL   Well-developed/nourished - no immediate distress at rest   HEAD   Normocephalic - atraumatic   EYES  blindness of right eye   MOUTH   Mucosa moist   NECK   Supple - full range of motion   CARDIAC   Regular rate/rhythm - S1/S2 positive   PULMONARY    Clear breath sounds bilaterally - nonlabored respirations   ABDOMEN   Soft - nontender/nondistended - active bowel sounds   MUSCULOSKELETAL   Motor strength/range of motion intact   NEUROLOGIC   Alert/oriented at baseline - mildly diminished right distal arm strength to active resistance, however, handgrip strength intact compared to left   SKIN   Chronic wrinkles/blemishes    PSYCHIATRIC   Mood/affect stable         Additional Data:     Labs & Recent Cultures:    Results from last 7 days   Lab Units 02/03/23  1229   WBC Thousand/uL 6 26   HEMOGLOBIN g/dL 10 1*   HEMATOCRIT % 34 0*   PLATELETS Thousands/uL 333   NEUTROS PCT % 72   LYMPHS PCT % 12*   MONOS PCT % 13*   EOS PCT % 1     Results from last 7 days   Lab Units 02/03/23  1229   SODIUM mmol/L 139   POTASSIUM mmol/L 4 3   CHLORIDE mmol/L 103   CO2 mmol/L 29   BUN mg/dL 13   CREATININE mg/dL 1 00   ANION GAP mmol/L 7   CALCIUM mg/dL 10 0   ALBUMIN g/dL 2 9*   TOTAL BILIRUBIN mg/dL 0 30   ALK PHOS U/L 68   ALT U/L 21   AST U/L 15   GLUCOSE RANDOM mg/dL 89         Results from last 7 days   Lab Units 02/03/23  1237   POC GLUCOSE mg/dl 77                         Lines/Drains:  Invasive Devices     Peripheral Intravenous Line  Duration           Peripheral IV 02/03/23 Left Antecubital <1 day                  Imaging:     CTA head and neck with and without contrast    Result Date: 2/3/2023  Narrative: CTA NECK AND BRAIN WITH AND WITHOUT CONTRAST INDICATION: right hand weakness COMPARISON: CTA head and neck 7/31/2021 TECHNIQUE:  Routine CT imaging of the Brain without contrast   Post contrast imaging was performed after administration of iodinated contrast through the neck and brain  Post contrast axial 0 625 mm images timed to opacify the arterial system  3D rendering was performed on an independent workstation  MIP reconstructions performed  Coronal reconstructions were performed of the noncontrast portion of the brain  Radiation dose length product (DLP) for this visit:  1244 mGy-cm     This examination, like all CT scans performed in the [facility], was performed utilizing techniques to minimize radiation dose exposure, including the use of iterative reconstruction and automated exposure control  IV Contrast:  75 mL of iohexol (OMNIPAQUE) 75 mL of iohexol (OMNIPAQUE)  IMAGE QUALITY:   Diagnostic FINDINGS: NONCONTRAST BRAIN PARENCHYMA: There is a 1 cm posterior left frontal lobe gray-white matter junction enhancing lesion (series 3 image 69)  There is associated subjacent vasogenic edema  No significant mass effect or midline shift  Nonspecific  decreased attenuation involving periventricular and subcortical white matter, most compatible with mild microangiopathic change  Gray-white matter differentiation is grossly preserved  No acute intracranial hemorrhage  VENTRICLES AND EXTRA-AXIAL SPACES:  Normal for the patient's age  VISUALIZED ORBITS: Normal visualized orbits  PARANASAL SINUSES: Normal visualized paranasal sinuses  CERVICAL VASCULATURE AORTIC ARCH AND GREAT VESSELS: Anatomic variant common origin right brachiocephalic and left common coronary arteries  Atherosclerotic calcification of aortic arch  Great vessel origins are patent without significant stenosis  RIGHT VERTEBRAL ARTERY CERVICAL SEGMENT:The vessel is developmentally smaller than left  Mild atherosclerotic narrowing at the origin  The vessel is patent throughout the neck without high-grade stenosis  LEFT VERTEBRAL ARTERY CERVICAL SEGMENT: Mild atherosclerotic narrowing at the origin  The vessel is patent throughout the neck without high-grade stenosis  RIGHT EXTRACRANIAL CAROTID SEGMENT:Partially calcified atherosclerotic plaque at the bifurcation and proximal internal carotid artery  No hemodynamically significant stenosis of cervical ICA by NASCET criteria ( less than 50%) LEFT EXTRACRANIAL CAROTID SEGMENT: Partially calcified atherosclerotic plaque at the bifurcation and proximal internal carotid artery   No hemodynamically significant stenosis of cervical ICA by NASCET criteria ( less than 50%) INTRACRANIAL VASCULATURE INTERNAL CAROTID ARTERIES: The intracranial portions of the internal carotid arteries are unremarkable  Mild atherosclerotic change of bilateral cavernous and left supraclinoid ICAs with  Normal ophthalmic artery origins  ANTERIOR CIRCULATION:  Developmentally absent/hypoplastic right A1 segment  Bilateral anterior cerebral arteries are patent  Normal anterior comminuting artery  MIDDLE CEREBRAL ARTERY CIRCULATION:  Bilateral M1 segments and major M2 branches are patent without high-grade stenosis  DISTAL VERTEBRAL ARTERIES:  Distal vertebral arteries are patent without high-grade stenosis  Posterior inferior cerebellar artery origins are patent  BASILAR ARTERY:  Basilar artery is unremarkable  Normal superior cerebellar arteries  POSTERIOR CEREBRAL ARTERIES: Persistent fetal origin of right posterior cerebral arteries  Bilateral posterior cerebral arteries are patent without high-grade stenosis  Absent/hypoplastic left posterior communicating artery  DURAL VENOUS SINUSES:  Unremarkable  NON VASCULAR ANATOMY BONY STRUCTURES: Degenerative changes of cervical spine  No acute or aggressive appearing osseous abnormality  SOFT TISSUES OF THE NECK: 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, not significantly changed from 12/20/2021  THORACIC INLET: Pulmonary emphysema  Redemonstrated posttreatment change and partially obscure known right upper lobe mass in partially imaged upper chest       Impression: 1  No acute intracranial CT abnormality  2   A 1 cm posterior left frontal lobe gray-white matter junction lesion with subjacent vasogenic edema most concerning for metastasis  No significant mass effect or midline shift  Recommend further assessment with brain MRI without and with contrast  3   Patent major vasculature of the [place] of [place] without high-grade stenosis  No aneurysm  4   No hemodynamically significant stenosis or dissection of cervical carotid and vertebral arteries  Left greater than right atherosclerotic change of cervical carotid arteries   5   No significant change in size of known right nasopharyngeal mass compared to neck CT 12/20/2021  6   Redemonstrated posttreatment change and partially obscure known right upper lobe mass in partially imaged upper chest   See the report of concurrent CTA chest/abdomen/pelvis  The study was marked in [facility] for immediate notification  Workstation performed: [id number]       CTA dissection protocol chest abdomen pelvis w wo contrast    Result Date: 2/3/2023  Narrative: CTA - CHEST, ABDOMEN AND PELVIS - WITHOUT AND WITH IV CONTRAST INDICATION:   abnormal appearing mediastinum on cxr, right hand weakness  COMPARISON:  February 3, 2023 TECHNIQUE: CT examination of the chest, abdomen and pelvis was performed both prior to and after the administration of intravenous contrast   The noncontrast portion of this examination was performed utilizing low radiation dose technique  Thin section  angiographic arterial phase post contrast technique was used in order to evaluate for aortic dissection  3D reformatted images and volume rendering were performed on an independent workstation  Additionally, axial, sagittal, and coronal 2D reformatted  images were created from the source data and submitted for interpretation  Radiation dose length product (DLP) for this visit:  1444 mGy-cm   This examination, like all CT scans performed in the [facility], was performed utilizing techniques to minimize radiation dose exposure, including the use of iterative reconstruction and automated exposure control  IV Contrast:  75 mL of iohexol (OMNIPAQUE) 75 mL of iohexol (OMNIPAQUE) Enteric Contrast:  Enteric contrast was not administered  FINDINGS: AORTA:  There is no aortic dissection or intramural hematoma  There is no aortic aneurysm   Ascending aorta; aorta at the aortic annulus measures 2 7 cm Aortic sinus measures 3 7 cm The tubular portion of ascending aorta measures 4 cm The brachiocephalic, common carotid artery are patent Subclavian arteries are patent Small right vertebral artery is small left vertebral artery The descending thoracic aorta is nonaneurysmal atherosclerotic plaquing seen in the descending thoracic aorta and in the distal arch Abdominal aorta is nonaneurysmal Aorta at the level of the aortic hiatus measures 2 9 cm Mesenteric vasculature; the celiac trunk is patent Hepatic artery is patent Splenic artery is patent SMA is patent  There is atherosclerotic plaquing around the proximal SMA, unchanged Renal arteries are patent Atherosclerotic changes are seen within the iliac arteries CHEST LUNGS:  The right lung is smaller  There is volume loss in the right upper lobe  There is shift of the mediastinum to the right side  There is a consolidation with the air bronchogram representing chronic consolidation, atelectasis right lower lung seen Trachea and central bronchi are patent Linear density seen at the left lung base due to atelectasis PLEURA:  There is chronic loculated effusion and nodular areas are seen within the loculated effusion suggesting pleural metastatic disease  These have enlarged since the previous study the largest lesion is seen in image 395 series 4 measuring about 2 cm, previously measuring 1 4 cm HEART/PULMONARY ARTERIAL TREE:  There are no filling defects seen in the main pulmonary artery Coronary artery calcification seen MEDIASTINUM AND JONH:  There is shift of the mediastinum to the right side  CHEST WALL AND LOWER NECK:   No significant axillary lymph node enlargement seen ABDOMEN LIVER/BILIARY TREE:  Subcentimeter hypodensities are seen within the liver parenchyma, unchanged The largest hypodensity seen in the right hepatic lobe segment 6 measuring about 1 0 cm GALLBLADDER:  Mild layering density within the gallbladder, raises concern for sludge versus gallstone SPLEEN:  Unremarkable  PANCREAS:  No pancreatic ductal dilation and no pancreatic atrophy ADRENAL GLANDS:  Unremarkable  KIDNEYS/URETERS:  Left renal cyst seen STOMACH AND BOWEL:  No abnormal dilation of bowel loops seen No bowel wall thickening seen APPENDIX:  No findings to suggest appendicitis  ABDOMINOPELVIC CAVITY:  No ascites or free intraperitoneal air  No lymphadenopathy  PELVIS REPRODUCTIVE ORGANS:  Enlarged prostate seen URINARY BLADDER:  There is hyperdensity within the urinary bladder on the left side, this may be due to left ureteral jet  ,  This limits the evaluation for focal lesion within the bladder ABDOMINAL WALL/INGUINAL REGIONS:  Nodular density seen in the left anterior abdominal wall in the subcutaneous tissue  This may be injection site  Additional similar nodular density was seen in the anterior subcutaneous wall on the right side is smaller OSSEOUS STRUCTURES:  No acute fracture or destructive osseous lesion  Impression: No evidence of thoracic aortic dissection No pulmonary embolism in the main pulmonary arteries Chronic loculated left effusion which demonstrates enlarging nodular densities within it, suggest pleural metastatic disease Post treatment changes in the right lung with chronic consolidation in the right paramediastinal location with the air bronchogram and volume loss, as seen on the previous study  I personally discussed this study with Dr Janice Khan on 2/3/2023 at 4:00 PM  Workstation performed: [id number]         ** Please Note: This note is constructed using a voice recognition dictation system  An occasional wrong word/phrase or “sound-a-like” substitution may have been picked up by dictation device due to the inherent limitations of voice recognition software  Read the chart carefully and recognize, using reasonable context, where substitutions may have occurred  **

## 2023-02-04 NOTE — PROGRESS NOTES
[address] [facility]  Progress Note Eugene Campos 1950, 71 y o  male MRN: [id number]  Unit/Bed#: [facility] 722-01 Encounter: [id number]  Primary Care Provider: Derrek Parekh MD   Date and time admitted to hospital: 2/3/2023 10:45 PM    * Right hand weakness  Assessment & Plan  · Sent to the ED by his oncologist on 2/3 for right hand weakness since 4 days  · H/o adenocarcinoma of the lung  · CTA head and neck - A 1 cm posterior left frontal lobe gray-white matter junction lesion with subjacent vasogenic edema most concerning for metastasis  · Received Decadron 10 mg IV and maintained on 4 mg q 6 hrs IV with improvement in weakness  · On keppra  · F/u MRI brain with and without contrast  · Neurosurgery/med ical oncology input appreciated  · Rad-onc consult pending  · Neurochecks        History of stroke  Assessment & Plan  · Continue statin, therapeutic Lovenox   · D/w neurology - follow above plan for right hand weakness with brain met    Adenocarcinoma of lung  Assessment & Plan  · Lung mass initially noted on 7/31/21 when admitted with CVA  · S/p 6 cycles of systemic chemotherapy with Carbo, Alimta and Keytruda  · Then treated with concurrent chemoradiation with Carboplatin and taxol  · Now on Keytruda  · New brain noted as above  · CT chest/abdomen/pelvis (2/3/23) - Chronic loculated left effusion which demonstrates enlarging nodular densities within it, suggest pleural metastatic disease  · F/u brain MRI  · Oncology following      History of venous thromboembolism  Assessment & Plan  · Prior history of bilateral DVTs and suspected pulmonary embolism  · Ct therapeutic Lovenox      Hyperlipidemia  Assessment & Plan  · Continue Lipitor    Nasopharyngeal mass  Assessment & Plan  · No malignancy noted on evaluation by ENT on nasopharyngoscopy   · PET-Ct with stable uptake  · Repeat PET-CT ordered by oncology  · Outpatient  ENT/oncology follow-up    VTE Pharmacologic Prophylaxis: VTE Score: 8 High Risk (Score >/= 5) - Pharmacological DVT Prophylaxis Ordered: enoxaparin (Lovenox)  Sequential Compression Devices Ordered  Patient Centered Rounds: Discussed with RN  Discussions with Specialists or Other Care Team Provider: Discussed with neurology    Education and Discussions with Family / Patient: Updated  (wife) at bedside  Time Spent for Care: 20 minutes  More than 50% of total time spent on counseling and coordination of care as described above  Current Length of Stay: 1 day(s)  Current Patient Status: Inpatient   Certification Statement: The patient will continue to require additional inpatient hospital stay due to Right hand weakness     Code Status: Level 1 - Full Code    Subjective:   Right hand weakness improved    Objective:     Vitals:   Temp (24hrs), Av 7 °F (36 5 °C), Min:97 3 °F (36 3 °C), Max:98 1 °F (36 7 °C)    Temp:  [97 3 °F (36 3 °C)-98 1 °F (36 7 °C)] 97 9 °F (36 6 °C)  HR:  [61-81] 72  Resp:  [16-21] 19  BP: ()/(62-89) 131/79  SpO2:  [93 %-100 %] 97 %  Body mass index is 27 61 kg/m²  Physical Exam:   Physical Exam  Vitals reviewed  HENT:      Head: Normocephalic  Nose: Nose normal       Mouth/Throat:      Mouth: Mucous membranes are moist    Eyes:      Extraocular Movements: Extraocular movements intact  Cardiovascular:      Rate and Rhythm: Normal rate and regular rhythm  Pulmonary:      Effort: Pulmonary effort is normal  No respiratory distress  Breath sounds: Normal breath sounds  No wheezing  Abdominal:      General: Bowel sounds are normal  There is no distension  Palpations: Abdomen is soft  Tenderness: There is no abdominal tenderness  Musculoskeletal:         General: No swelling  Cervical back: Neck supple  Skin:     General: Skin is warm and dry  Neurological:      Mental Status: He is alert and oriented to person, place, and time        Comments: Right hand weakness   Psychiatric: Mood and Affect: Mood normal          Behavior: Behavior normal           Additional Data:     Labs:  Results from last 7 days   Lab Units 02/04/23  0529   WBC Thousand/uL 5 77   HEMOGLOBIN g/dL 9 6*   HEMATOCRIT % 32 5*   PLATELETS Thousands/uL 354   NEUTROS PCT % 90*   LYMPHS PCT % 7*   MONOS PCT % 2*   EOS PCT % 0     Results from last 7 days   Lab Units 02/04/23  0529   SODIUM mmol/L 138   POTASSIUM mmol/L 4 5   CHLORIDE mmol/L 109*   CO2 mmol/L 27   BUN mg/dL 12   CREATININE mg/dL 0 89   ANION GAP mmol/L 2*   CALCIUM mg/dL 9 9   ALBUMIN g/dL 2 7*   TOTAL BILIRUBIN mg/dL 0 36   ALK PHOS U/L 68   ALT U/L 18   AST U/L 14   GLUCOSE RANDOM mg/dL 135         Results from last 7 days   Lab Units 02/03/23  1237   POC GLUCOSE mg/dl 77               Lines/Drains:  Invasive Devices     Peripheral Intravenous Line  Duration           Peripheral IV 02/03/23 Left Antecubital 1 day              Last 24 Hours Medication List:   Current Facility-Administered Medications   Medication Dose Route Frequency Provider Last Rate   • acetaminophen  650 mg Oral Q6H PRN Levon Montana MD     • albuterol  2 puff Inhalation Q6H PRN Levon Montana MD     • atorvastatin  40 mg Oral Daily With Tong Davis MD     • dexamethasone  4 mg Intravenous Q6H [address] Levon Montana MD     • enoxaparin  80 mg Subcutaneous Q12H Levon Montana MD     • folic acid  2,307 mcg Oral Daily Levon Montana MD     • levETIRAcetam  1,000 mg Intravenous Q12H [address] Levon Montana MD 1,000 mg (02/04/23 2224)   • multivitamin stress formula  1 tablet Oral Daily Levon Montana MD     • ondansetron  4 mg Intravenous Q4H PRN Levon Montana MD     • tamsulosin  0 4 mg Oral After Tong Davis MD          Today, Patient Was Seen By: Max Hugo MD    **Please Note: This note may have been constructed using a voice recognition system  **

## 2023-02-04 NOTE — PLAN OF CARE
Problem: MOBILITY - ADULT  Goal: Maintain or return to baseline ADL function  Description: INTERVENTIONS:  -  Assess patient's ability to carry out ADLs; assess patient's baseline for ADL function and identify physical deficits which impact ability to perform ADLs (bathing, care of mouth/teeth, toileting, grooming, dressing, etc )  - Assess/evaluate cause of self-care deficits   - Assess range of motion  - Assess patient's mobility; develop plan if impaired  - Assess patient's need for assistive devices and provide as appropriate  - Encourage maximum independence but intervene and supervise when necessary  - Involve family in performance of ADLs  - Assess for home care needs following discharge   - Consider OT consult to assist with ADL evaluation and planning for discharge  - Provide patient education as appropriate  Outcome: Progressing  Goal: Maintains/Returns to pre admission functional level  Description: INTERVENTIONS:  - Perform BMAT or MOVE assessment daily    - Set and communicate daily mobility goal to care team and patient/family/caregiver     - Collaborate with rehabilitation services on mobility goals if consulted  - Out of bed for toileting  - Record patient progress and toleration of activity level   Outcome: Progressing     Problem: PAIN - ADULT  Goal: Verbalizes/displays adequate comfort level or baseline comfort level  Description: Interventions:  - Encourage patient to monitor pain and request assistance  - Assess pain using appropriate pain scale  - Administer analgesics based on type and severity of pain and evaluate response  - Implement non-pharmacological measures as appropriate and evaluate response  - Consider cultural and social influences on pain and pain management  - Notify physician/advanced practitioner if interventions unsuccessful or patient reports new pain  Outcome: Progressing     Problem: INFECTION - ADULT  Goal: Absence or prevention of progression during hospitalization  Description: INTERVENTIONS:  - Assess and monitor for signs and symptoms of infection  - Monitor lab/diagnostic results  - Monitor all insertion sites, i e  indwelling lines, tubes, and drains  - Monitor endotracheal if appropriate and nasal secretions for changes in amount and color  - Garner appropriate cooling/warming therapies per order  - Administer medications as ordered  - Instruct and encourage patient and family to use good hand hygiene technique  - Identify and instruct in appropriate isolation precautions for identified infection/condition  Outcome: Progressing  Goal: Absence of fever/infection during neutropenic period  Description: INTERVENTIONS:  - Monitor WBC    Outcome: Progressing     Problem: SAFETY ADULT  Goal: Maintain or return to baseline ADL function  Description: INTERVENTIONS:  -  Assess patient's ability to carry out ADLs; assess patient's baseline for ADL function and identify physical deficits which impact ability to perform ADLs (bathing, care of mouth/teeth, toileting, grooming, dressing, etc )  - Assess/evaluate cause of self-care deficits   - Assess range of motion  - Assess patient's mobility; develop plan if impaired  - Assess patient's need for assistive devices and provide as appropriate  - Encourage maximum independence but intervene and supervise when necessary  - Involve family in performance of ADLs  - Assess for home care needs following discharge   - Consider OT consult to assist with ADL evaluation and planning for discharge  - Provide patient education as appropriate  Outcome: Progressing  Goal: Maintains/Returns to pre admission functional level  Description: INTERVENTIONS:  - Perform BMAT or MOVE assessment daily    - Set and communicate daily mobility goal to care team and patient/family/caregiver     - Collaborate with rehabilitation services on mobility goals if consulted  - Out of bed for toileting  - Record patient progress and toleration of activity level   Outcome: Progressing  Goal: Patient will remain free of falls  Description: INTERVENTIONS:  - Educate patient/family on patient safety including physical limitations  - Instruct patient to call for assistance with activity   - Consult OT/PT to assist with strengthening/mobility   - Keep Call bell within reach  - Keep bed low and locked with side rails adjusted as appropriate  - Keep care items and personal belongings within reach  - Initiate and maintain comfort rounds  - Make Fall Risk Sign visible to staff  - Apply yellow socks and bracelet for high fall risk patients  - Consider moving patient to room near nurses station  Outcome: Progressing     Problem: DISCHARGE PLANNING  Goal: Discharge to home or other facility with appropriate resources  Description: INTERVENTIONS:  - Identify barriers to discharge w/patient and caregiver  - Arrange for needed discharge resources and transportation as appropriate  - Identify discharge learning needs (meds, wound care, etc )  - Arrange for interpretive services to assist at discharge as needed  - Refer to Case Management Department for coordinating discharge planning if the patient needs post-hospital services based on physician/advanced practitioner order or complex needs related to functional status, cognitive ability, or social support system  Outcome: Progressing     Problem: Knowledge Deficit  Goal: Patient/family/caregiver demonstrates understanding of disease process, treatment plan, medications, and discharge instructions  Description: Complete learning assessment and assess knowledge base    Interventions:  - Provide teaching at level of understanding  - Provide teaching via preferred learning methods  Outcome: Progressing     Problem: NEUROSENSORY - ADULT  Goal: Achieves stable or improved neurological status  Description: INTERVENTIONS  - Monitor and report changes in neurological status  - Monitor vital signs such as temperature, blood pressure, glucose, and any other labs ordered   - Initiate measures to prevent increased intracranial pressure  - Monitor for seizure activity and implement precautions if appropriate      Outcome: Progressing  Goal: Remains free of injury related to seizures activity  Description: INTERVENTIONS  - Maintain airway, patient safety  and administer oxygen as ordered  - Monitor patient for seizure activity, document and report duration and description of seizure to physician/advanced practitioner  - If seizure occurs,  ensure patient safety during seizure  - Reorient patient post seizure  - Seizure pads on all 4 side rails  - Instruct patient/family to notify RN of any seizure activity including if an aura is experienced  - Instruct patient/family to call for assistance with activity based on nursing assessment  - Administer anti-seizure medications if ordered    Outcome: Progressing  Goal: Achieves maximal functionality and self care  Description: INTERVENTIONS  - Monitor swallowing and airway patency with patient fatigue and changes in neurological status  - Encourage and assist patient to increase activity and self care     - Encourage visually impaired, hearing impaired and aphasic patients to use assistive/communication devices  Outcome: Progressing

## 2023-02-04 NOTE — ASSESSMENT & PLAN NOTE
· Lung mass initially noted on 7/31/21 when admitted with CVA  · S/p 6 cycles of systemic chemotherapy with Carbo, Alimta and Keytruda  · Then treated with concurrent chemoradiation with Carboplatin and taxol  · Now on Keytruda  · New brain noted as above  · CT chest/abdomen/pelvis (2/3/23) - Chronic loculated left effusion which demonstrates enlarging nodular densities within it, suggest pleural metastatic disease  · F/u brain MRI  · Oncology following

## 2023-02-04 NOTE — ASSESSMENT & PLAN NOTE
· No malignancy noted on evaluation by ENT on nasopharyngoscopy   · PET-CT with stable uptake  · Repeat PET-CT ordered by oncology  · Outpatient  ENT/oncology follow-up

## 2023-02-04 NOTE — ASSESSMENT & PLAN NOTE
· Sent to the ED by his oncologist on 2/3 for right hand weakness since 4 days  · H/o adenocarcinoma of the lung  · CTA head and neck - A 1 cm posterior left frontal lobe gray-white matter junction lesion with subjacent vasogenic edema most concerning for metastasis  · Received Decadron 10 mg IV and maintained on 4 mg q 6 hrs IV with improvement in weakness  · On keppra  · F/u MRI brain with and without contrast  · Neurosurgery/med ical oncology input appreciated  · Rad-onc consult pending  · Neurochecks

## 2023-02-04 NOTE — ASSESSMENT & PLAN NOTE
· Continue statin, therapeutic Lovenox   · D/w neurology - follow above plan for right hand weakness with brain met

## 2023-02-04 NOTE — ASSESSMENT & PLAN NOTE
Status post six chemotherapy cycles and status post radiation - now on immunotherapy Katt George   Just seen by oncology outpatient the morning of current admission  See plan for primary issue(s)  Supportive care - PRN pain/symptom control  Updated CT of chest notable for: "No evidence of thoracic aortic dissection  No pulmonary embolism in the main pulmonary arteries  Chronic loculated left effusion which demonstrates enlarging nodular densities within it, suggest pleural metastatic disease  Post treatment changes in the right lung with chronic consolidation in the right paramediastinal location with the air bronchogram and volume loss   "

## 2023-02-04 NOTE — CONSULTS
[address] [facility] 1950, 71 y o  male MRN: [id number]  Unit/Bed#: [facility] 722-01 Encounter: [id number]  Primary Care Provider: Belgica Acosta MD   Date and time admitted to hospital: 2/3/2023 10:45 PM    Inpatient consult to Neurosurgery  Consult performed by: Valerio Adams MD  Consult ordered by: Balbina Sullivan MD          Brain metastasis  Assessment & Plan  Patient with known adenocarcinoma of the lung s/p radiation currently on Michelle Dace presented with right arm weakness  He was found on CT to have a mass in the left motor cortex likely causing the weakness  Exam:     CTA 2/3:1 cm posterior left frontal lobe gray-white matter junction lesion with subjacent vasogenic edema most concerning for metastasis  No significant mass effect or midline shift  Recommend further assessment with brain MRI without and with contrast   MRI brain w 2/4: pending results     Plan: We will review the results of the MRI to determine if there is a role for surgical intervention  Rest of care per primary         History of Present Illness     HPI: Coral Jarrett is a 71y o  year old male with PMH including adenocarcinoma of the lung who presents with complaint of RUE weakness  Patient was found to have a lesion at the grey-white matter junction consistent with metastasis  Patient denies pain  MRI pending  Review of Systems   Neurological: Positive for weakness            Historical Information   Past Medical History:   Diagnosis Date   • Chronic respiratory failure with hypoxia ([address] ) 8/9/2021   • Impaired mobility and ADLs 8/13/2021   • Lung cancer [facility])    • Stroke [facility])      Past Surgical History:   Procedure Laterality Date   • IR BIOPSY LUNG  8/5/2021   • IR THORACENTESIS  2/24/2022   • IR THORACENTESIS  8/1/2022     Social History     Substance and Sexual Activity   Alcohol Use Not Currently    Comment: No ETOH since Summer 2021      Social History Substance and Sexual Activity   Drug Use Never     Social History     Tobacco Use   Smoking Status Former   • Packs/day: 3 00   • Years: 39 00   • Pack years: 117 00   • Types: Cigarettes   • Start date: 66   • Quit date:    • Years since quittin 1   Smokeless Tobacco Never     Family History   Problem Relation Age of Onset   • Prostate cancer Brother    • Lung cancer Brother        Meds/Allergies   all current active meds have been reviewed  Allergies   Allergen Reactions   • Doxycycline Rash       Objective   I/O     None          Physical Exam  Constitutional:       Appearance: Normal appearance  Eyes:      Extraocular Movements: EOM normal       Pupils: Pupils are equal, round, and reactive to light  Cardiovascular:      Rate and Rhythm: Normal rate  Skin:     General: Skin is warm  Capillary Refill: Capillary refill takes less than 2 seconds  Neurological:      Mental Status: He is alert and oriented to person, place, and time  Motor: Motor strength is normal       Coordination: Finger-Nose-Finger Test abnormal        Neurologic Exam     Mental Status   Oriented to person, place, and time  Level of consciousness: alert  Knowledge: good  Cranial Nerves     CN III, IV, VI   Pupils are equal, round, and reactive to light  Extraocular motions are normal      CN V   Facial sensation intact  CN VII   Facial expression full, symmetric  CN VIII   CN VIII normal      CN IX, X   CN IX normal    CN X normal      CN XI   CN XI normal      CN XII   CN XII normal    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      Motor Exam     Strength   Strength 5/5 throughout  Patient has recovered full strength in RUE with effort     Sensory Exam   Light touch normal      Gait, Coordination, and Reflexes     Coordination   Finger to nose coordination: abnormal      Vitals:Blood pressure (!) 89/62, pulse 65, temperature (!) 97 3 °F (36 3 °C), resp  rate 19, SpO2 98 %  ,There is no height or weight on file to calculate BMI  Lab Results:   Results from last 7 days   Lab Units 02/04/23  0529 02/03/23  1229   WBC Thousand/uL 5 77 6 26   HEMOGLOBIN g/dL 9 6* 10 1*   HEMATOCRIT % 32 5* 34 0*   PLATELETS Thousands/uL 354 333   NEUTROS PCT %  --  72   MONOS PCT %  --  13*     Results from last 7 days   Lab Units 02/04/23  0529 02/03/23  1229   POTASSIUM mmol/L 4 5 4 3   CHLORIDE mmol/L 109* 103   CO2 mmol/L 27 29   BUN mg/dL 12 13   CREATININE mg/dL 0 89 1 00   CALCIUM mg/dL 9 9 10 0   ALK PHOS U/L 68 68   ALT U/L 18 21   AST U/L 14 15     Results from last 7 days   Lab Units 02/04/23  0529   MAGNESIUM mg/dL 2 5     Results from last 7 days   Lab Units 02/04/23  0529   PHOSPHORUS mg/dL 2 9         No results found for: TROPONINT  ABG:No results found for: PHART, HJW5FWQ, PO2ART, NRG7YYT, A6DGZOVT, BEART, SOURCE    Imaging Studies: I have personally reviewed pertinent reports  EKG, Pathology, and Other Studies: I have personally reviewed pertinent reports  VTE Prophylaxis: Sequential compression device Brianna Muniz)     Code Status: Level 1 - Full Code  Advance Directive and Living Will:      Power of :    POLST:      Counseling / Coordination of Care  I spent 15 minutes with the patient

## 2023-02-04 NOTE — ASSESSMENT & PLAN NOTE
Complained of recent progressive right arm weakness since Monday, more specifically with handgrip difficulty while at outpatient oncology appointment earlier this morning  Due to prior history of stroke, he was sent to the ED for further evaluation by oncologist -> in the ED, CT imaging revealed evidence of metastatic brain disease with vasogenic edema (see below), which is likely culprit of right arm issue  Once medically stabilized, should undergo PT/OT evaluation

## 2023-02-04 NOTE — ASSESSMENT & PLAN NOTE
Likely culprit of presenting right arm weakness and associated handgrip difficulty  CT imaging noting: "No acute intracranial CT abnormality  Bhavesh Brian A 1 cm posterior left frontal lobe gray-white matter junction lesion with subjacent vasogenic edema most concerning for metastasis  No significant mass effect or midline shift  Recommend further assessment with brain MRI without and with contrast  Bhavesh Brian Patent major vasculature of the [place] without high-grade stenosis  No aneurysm  Bhavesh Brian No hemodynamically significant stenosis or dissection of cervical carotid and vertebral arteries     Left greater than right atherosclerotic change of cervical carotid arteries "  Await MRI brain  Loaded with IV Decadron and Keppra prior to transfer for vasogenic edema and seizure prophylaxis, respectively -> will continue both agents  Will appreciate both medical and radiation oncology input  Continue neurochecks  PT/OT evaluation

## 2023-02-05 PROBLEM — E83.39 HYPOPHOSPHATEMIA: Status: ACTIVE | Noted: 2023-02-05

## 2023-02-05 LAB
ALBUMIN SERPL BCP-MCNC: 2.5 G/DL (ref 3.5–5)
ALP SERPL-CCNC: 58 U/L (ref 46–116)
ALT SERPL W P-5'-P-CCNC: 16 U/L (ref 12–78)
ANION GAP SERPL CALCULATED.3IONS-SCNC: 6 MMOL/L (ref 4–13)
AST SERPL W P-5'-P-CCNC: 11 U/L (ref 5–45)
BASOPHILS # BLD AUTO: 0.01 THOUSANDS/ÂΜL (ref 0–0.1)
BASOPHILS NFR BLD AUTO: 0 % (ref 0–1)
BILIRUB SERPL-MCNC: 0.16 MG/DL (ref 0.2–1)
BUN SERPL-MCNC: 13 MG/DL (ref 5–25)
CALCIUM ALBUM COR SERPL-MCNC: 11 MG/DL (ref 8.3–10.1)
CALCIUM SERPL-MCNC: 9.8 MG/DL (ref 8.3–10.1)
CHLORIDE SERPL-SCNC: 110 MMOL/L (ref 96–108)
CO2 SERPL-SCNC: 25 MMOL/L (ref 21–32)
CREAT SERPL-MCNC: 0.91 MG/DL (ref 0.6–1.3)
EOSINOPHIL # BLD AUTO: 0 THOUSAND/ÂΜL (ref 0–0.61)
EOSINOPHIL NFR BLD AUTO: 0 % (ref 0–6)
ERYTHROCYTE [DISTWIDTH] IN BLOOD BY AUTOMATED COUNT: 15.9 % (ref 11.6–15.1)
GFR SERPL CREATININE-BSD FRML MDRD: 84 ML/MIN/1.73SQ M
GLUCOSE SERPL-MCNC: 133 MG/DL (ref 65–140)
HCT VFR BLD AUTO: 32.3 % (ref 36.5–49.3)
HGB BLD-MCNC: 9.8 G/DL (ref 12–17)
IMM GRANULOCYTES # BLD AUTO: 0.07 THOUSAND/UL (ref 0–0.2)
IMM GRANULOCYTES NFR BLD AUTO: 1 % (ref 0–2)
LYMPHOCYTES # BLD AUTO: 0.43 THOUSANDS/ÂΜL (ref 0.6–4.47)
LYMPHOCYTES NFR BLD AUTO: 4 % (ref 14–44)
MAGNESIUM SERPL-MCNC: 2.6 MG/DL (ref 1.6–2.6)
MCH RBC QN AUTO: 26.6 PG (ref 26.8–34.3)
MCHC RBC AUTO-ENTMCNC: 30.3 G/DL (ref 31.4–37.4)
MCV RBC AUTO: 88 FL (ref 82–98)
MONOCYTES # BLD AUTO: 0.57 THOUSAND/ÂΜL (ref 0.17–1.22)
MONOCYTES NFR BLD AUTO: 5 % (ref 4–12)
NEUTROPHILS # BLD AUTO: 10.91 THOUSANDS/ÂΜL (ref 1.85–7.62)
NEUTS SEG NFR BLD AUTO: 90 % (ref 43–75)
NRBC BLD AUTO-RTO: 0 /100 WBCS
PHOSPHATE SERPL-MCNC: 2.1 MG/DL (ref 2.3–4.1)
PLATELET # BLD AUTO: 342 THOUSANDS/UL (ref 149–390)
PMV BLD AUTO: 8.8 FL (ref 8.9–12.7)
POTASSIUM SERPL-SCNC: 4.1 MMOL/L (ref 3.5–5.3)
PROT SERPL-MCNC: 6.8 G/DL (ref 6.4–8.4)
RBC # BLD AUTO: 3.68 MILLION/UL (ref 3.88–5.62)
SODIUM SERPL-SCNC: 141 MMOL/L (ref 135–147)
WBC # BLD AUTO: 11.99 THOUSAND/UL (ref 4.31–10.16)

## 2023-02-05 RX ADMIN — ENOXAPARIN SODIUM 80 MG: 80 INJECTION SUBCUTANEOUS at 11:49

## 2023-02-05 RX ADMIN — DEXAMETHASONE SODIUM PHOSPHATE 4 MG: 4 INJECTION INTRA-ARTICULAR; INTRALESIONAL; INTRAMUSCULAR; INTRAVENOUS; SOFT TISSUE at 17:43

## 2023-02-05 RX ADMIN — DEXAMETHASONE SODIUM PHOSPHATE 4 MG: 4 INJECTION INTRA-ARTICULAR; INTRALESIONAL; INTRAMUSCULAR; INTRAVENOUS; SOFT TISSUE at 11:49

## 2023-02-05 RX ADMIN — B-COMPLEX W/ C & FOLIC ACID TAB 1 TABLET: TAB at 09:41

## 2023-02-05 RX ADMIN — DEXAMETHASONE SODIUM PHOSPHATE 4 MG: 4 INJECTION INTRA-ARTICULAR; INTRALESIONAL; INTRAMUSCULAR; INTRAVENOUS; SOFT TISSUE at 23:34

## 2023-02-05 RX ADMIN — ENOXAPARIN SODIUM 80 MG: 80 INJECTION SUBCUTANEOUS at 00:56

## 2023-02-05 RX ADMIN — DIBASIC SODIUM PHOSPHATE, MONOBASIC POTASSIUM PHOSPHATE AND MONOBASIC SODIUM PHOSPHATE 2 TABLET: 852; 155; 130 TABLET ORAL at 17:43

## 2023-02-05 RX ADMIN — DEXAMETHASONE SODIUM PHOSPHATE 4 MG: 4 INJECTION INTRA-ARTICULAR; INTRALESIONAL; INTRAMUSCULAR; INTRAVENOUS; SOFT TISSUE at 05:53

## 2023-02-05 RX ADMIN — FOLIC ACID 1000 MCG: 1 TABLET ORAL at 09:41

## 2023-02-05 RX ADMIN — TAMSULOSIN HYDROCHLORIDE 0.4 MG: 0.4 CAPSULE ORAL at 17:43

## 2023-02-05 RX ADMIN — DIBASIC SODIUM PHOSPHATE, MONOBASIC POTASSIUM PHOSPHATE AND MONOBASIC SODIUM PHOSPHATE 2 TABLET: 852; 155; 130 TABLET ORAL at 09:41

## 2023-02-05 RX ADMIN — LEVETIRACETAM 1000 MG: 100 INJECTION, SOLUTION INTRAVENOUS at 09:51

## 2023-02-05 RX ADMIN — ATORVASTATIN CALCIUM 40 MG: 40 TABLET, FILM COATED ORAL at 17:43

## 2023-02-05 RX ADMIN — LEVETIRACETAM 1000 MG: 100 INJECTION, SOLUTION INTRAVENOUS at 20:59

## 2023-02-05 RX ADMIN — DEXAMETHASONE SODIUM PHOSPHATE 4 MG: 4 INJECTION INTRA-ARTICULAR; INTRALESIONAL; INTRAMUSCULAR; INTRAVENOUS; SOFT TISSUE at 00:56

## 2023-02-05 RX ADMIN — ENOXAPARIN SODIUM 80 MG: 80 INJECTION SUBCUTANEOUS at 23:34

## 2023-02-05 NOTE — ASSESSMENT & PLAN NOTE
1 2 cm left precentral gyrus mass  · In setting of known adenocarcinoma of the lung s/p radiation currently on Kaytruda  · Presented with right arm weakness  Imaging:  · CTA 2/3:1 cm posterior left frontal lobe gray-white matter junction lesion with subjacent vasogenic edema most concerning for metastasis  No significant mass effect or midline shift  Recommend further assessment with brain MRI without and with contrast   · MRI brain w/wo 2/4: 1 2 cm avidly enhancing lesion in left precentral gyrus with marked surrounding perilesional vasogenic edema, concerning for brain metastasis  Questionable tiny cortical enhancing lesion in left posterior frontal lobe only seen on bravo sequences slightly obscured by mild motion artifact   This could represent tiny brain metastasis or cortical vessel  Attention on follow-up  Plan:  · Continue to monitor neuro exam closely  · Stat CT head with decline in GCS greater than 2 points in 1 hour  · Continue dexamethasone 4 mg every 6 hours  · Continue Keppra in setting of extensive edema  · Recommend radiation oncology consult  · Appreciate hematology oncology consultation  · Ongoing discussion regarding ideal treatment for this lesion including SRS versus resection  We will discuss more extensively tomorrow with surgeons  Discussed extensively with wife Diane Lo  · Mobilize with PT/OT  · DVT prophylaxis: SCDs, Lovenox  Neurosurgery will continue to follow  Please call with any questions or concerns

## 2023-02-05 NOTE — PLAN OF CARE
Problem: MOBILITY - ADULT  Goal: Maintain or return to baseline ADL function  Description: INTERVENTIONS:  -  Assess patient's ability to carry out ADLs; assess patient's baseline for ADL function and identify physical deficits which impact ability to perform ADLs (bathing, care of mouth/teeth, toileting, grooming, dressing, etc )  - Assess/evaluate cause of self-care deficits   - Assess range of motion  - Assess patient's mobility; develop plan if impaired  - Assess patient's need for assistive devices and provide as appropriate  - Encourage maximum independence but intervene and supervise when necessary  - Involve family in performance of ADLs  - Assess for home care needs following discharge   - Consider OT consult to assist with ADL evaluation and planning for discharge  - Provide patient education as appropriate  Outcome: Progressing  Goal: Maintains/Returns to pre admission functional level  Description: INTERVENTIONS:  - Perform BMAT or MOVE assessment daily    - Set and communicate daily mobility goal to care team and patient/family/caregiver     - Collaborate with rehabilitation services on mobility goals if consulted  - Out of bed for toileting  - Record patient progress and toleration of activity level   Outcome: Progressing     Problem: NEUROSENSORY - ADULT  Goal: Achieves stable or improved neurological status  Description: INTERVENTIONS  - Monitor and report changes in neurological status  - Monitor vital signs such as temperature, blood pressure, glucose, and any other labs ordered   - Initiate measures to prevent increased intracranial pressure  - Monitor for seizure activity and implement precautions if appropriate      Outcome: Progressing  Goal: Remains free of injury related to seizures activity  Description: INTERVENTIONS  - Maintain airway, patient safety  and administer oxygen as ordered  - Monitor patient for seizure activity, document and report duration and description of seizure to physician/advanced practitioner  - If seizure occurs,  ensure patient safety during seizure  - Reorient patient post seizure  - Seizure pads on all 4 side rails  - Instruct patient/family to notify RN of any seizure activity including if an aura is experienced  - Instruct patient/family to call for assistance with activity based on nursing assessment  - Administer anti-seizure medications if ordered    Outcome: Progressing  Goal: Achieves maximal functionality and self care  Description: INTERVENTIONS  - Monitor swallowing and airway patency with patient fatigue and changes in neurological status  - Encourage and assist patient to increase activity and self care     - Encourage visually impaired, hearing impaired and aphasic patients to use assistive/communication devices  Outcome: Progressing

## 2023-02-05 NOTE — OCCUPATIONAL THERAPY NOTE
Occupational Therapy Cancel Note        Patient Name: Dayana PERES Date: 2/5/2023 02/05/23 0936   OT Last Visit   OT Visit Date 02/05/23   Note Type   Note type Cancelled Session   Cancel Reasons Medical status       OT orders were received and chart reviewed  PT spoke with neurosurgery who reports unsure of treatment route at this time; radiation vs surgical intervention  OT will hold and continue to follow and see as medically appropriate and able       ISRAEL Alvarado/KIKI

## 2023-02-05 NOTE — ASSESSMENT & PLAN NOTE
· Continue statin, therapeutic Lovenox   · D/w neurology on 2/4 - follow above plan for right hand weakness with brain met

## 2023-02-05 NOTE — ASSESSMENT & PLAN NOTE
· Sent to the ED by his oncologist on 2/3 for right hand weakness since 4 days  · H/o adenocarcinoma of the lung  · CTA head and neck - A 1 cm posterior left frontal lobe gray-white matter junction lesion with subjacent vasogenic edema most concerning for metastasis  · MRI brain (2/4) - 1 2 cm avidly enhancing lesion in left precentral gyrus with marked surrounding perilesional vasogenic edema, concerning for brain metastasis  Questionable tiny cortical enhancing lesion in left posterior frontal lobe only seen on bravo sequences slightly obscured by mild motion artifact   This could represent tiny brain metastasis or cortical vessel     · Received Decadron 10 mg IV and maintained on 4 mg q 6 hrs IV with improvement in weakness  · On Keppra (D#2)  · Discussed with neurosurgery - plan to be determined on 2/6  · Rad-onc consult pending  · Neurosurgery/medical oncology input appreciated  · Neurochecks

## 2023-02-05 NOTE — ASSESSMENT & PLAN NOTE
· No malignancy noted on evaluation by ENT on nasopharyngoscopy   · PET-CT with stable uptake  · MRI (2/4) - 2 6 cm bilobed lesion in right nasopharynx with internal debris, likely complex mucus retention cyst  · Repeat PET-CT ordered by oncology  · Outpatient  ENT/oncology follow-up

## 2023-02-05 NOTE — ASSESSMENT & PLAN NOTE
· Lung mass initially noted on 7/31/21 when admitted with CVA  · S/p 6 cycles of systemic chemotherapy with Carbo, Alimta and Keytruda  · Then treated with concurrent chemoradiation with Carboplatin and taxol  · Now on Keytruda  · New brain noted as above  · CT chest/abdomen/pelvis (2/3/23) - Chronic loculated left effusion which demonstrates enlarging nodular densities within it, suggest pleural metastatic disease  · Brain MRI with met as above  · Oncology following  · Repeat PET-CT has been ordered by his outpatient oncologist

## 2023-02-05 NOTE — ASSESSMENT & PLAN NOTE
Stage IIIC malignant adenocarcinoma of right lung diagnosed in 2021  S/p chemo and radiation  Follows with Dr Negrita Willoughby

## 2023-02-05 NOTE — PROGRESS NOTES
[address] [facility]  Progress Note Eddi Osman 1950, 71 y o  male MRN: [id number]  Unit/Bed#: [facility] 722-01 Encounter: [id number]  Primary Care Provider: Anaid Lafleur MD   Date and time admitted to hospital: 2/3/2023 10:45 PM    Brain metastasis  Assessment & Plan  1 2 cm left precentral gyrus mass  · In setting of known adenocarcinoma of the lung s/p radiation currently on Kaytruda  · Presented with right arm weakness  Imaging:  · CTA 2/3:1 cm posterior left frontal lobe gray-white matter junction lesion with subjacent vasogenic edema most concerning for metastasis  No significant mass effect or midline shift  Recommend further assessment with brain MRI without and with contrast   · MRI brain w/wo 2/4: 1 2 cm avidly enhancing lesion in left precentral gyrus with marked surrounding perilesional vasogenic edema, concerning for brain metastasis  Questionable tiny cortical enhancing lesion in left posterior frontal lobe only seen on bravo sequences slightly obscured by mild motion artifact   This could represent tiny brain metastasis or cortical vessel  Attention on follow-up  Plan:  · Continue to monitor neuro exam closely  · Stat CT head with decline in GCS greater than 2 points in 1 hour  · Continue dexamethasone 4 mg every 6 hours  · Continue Keppra in setting of extensive edema  · Recommend radiation oncology consult  · Appreciate hematology oncology consultation  · Ongoing discussion regarding ideal treatment for this lesion including SRS versus resection  We will discuss more extensively tomorrow with surgeons  Discussed extensively with wife Veronica Matthew  · Mobilize with PT/OT  · DVT prophylaxis: SCDs, Lovenox  Neurosurgery will continue to follow  Please call with any questions or concerns  Adenocarcinoma of lung  Assessment & Plan  Stage IIIC malignant adenocarcinoma of right lung diagnosed in 2021  S/p chemo and radiation    Follows with   Nancy Charles    Subjective/Objective   Chief Complaint: "I want to go home "    Subjective: States right hand weakness has improved significantly with medications  States he was able to use his hand to eat breakfast for the first time today  Denies any other symptoms  Objective: NAD  Resting in bed comfortably  I/O       02/03 0701 02/04 0700 02/04 0701 02/05 0700 02/05 0701 02/06 0700    P  O   240     Total Intake(mL/kg)  240 (2 8)     Net  +240            Unmeasured Urine Occurrence  2 x           Invasive Devices     Peripheral Intravenous Line  Duration           Peripheral IV 02/03/23 Left Antecubital 1 day                Physical Exam:  Vitals: Blood pressure 118/79, pulse 58, temperature (!) 97 3 °F (36 3 °C), temperature source Oral, resp  rate 19, height 5' 9" (1 753 m), weight 84 8 kg (186 lb 15 2 oz), SpO2 98 %  ,Body mass index is 27 61 kg/m²          General appearance: alert, appears stated age, cooperative and no distress  Head: Normocephalic, without obvious abnormality, atraumatic  Eyes: PERRL 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  Neck: supple, symmetrical, trachea midline and NT  Back: no kyphosis present, no tenderness to percussion or palpation  Lungs: non labored breathing  Heart: regular heart rate  Neurologic:   Mental status: Alert, oriented x3, thought content appropriate  Cranial nerves: grossly intact (Cranial nerves II-XII)  Sensory: normal to light touch  Motor: Right  weakness 4/5 otherwise 5/5        Lab Results:  Results from last 7 days   Lab Units 02/05/23 0445 02/04/23 0529 02/03/23  1229   WBC Thousand/uL 11 99* 5 77 6 26   HEMOGLOBIN g/dL 9 8* 9 6* 10 1*   HEMATOCRIT % 32 3* 32 5* 34 0*   PLATELETS Thousands/uL 342 354 333   NEUTROS PCT % 90* 90* 72   MONOS PCT % 5 2* 13*     Results from last 7 days   Lab Units 02/05/23 0445 02/04/23 0529 02/03/23  1229   POTASSIUM mmol/L 4 1 4 5 4 3   CHLORIDE mmol/L 110* 109* 103   CO2 mmol/L 25 27 29   BUN mg/dL 13 12 13   CREATININE mg/dL 0 91 0 89 1 00   CALCIUM mg/dL 9 8 9 9 10 0   ALK PHOS U/L 58 68 68   ALT U/L 16 18 21   AST U/L 11 14 15     Results from last 7 days   Lab Units 02/05/23  0445 02/04/23  0529   MAGNESIUM mg/dL 2 6 2 5     Results from last 7 days   Lab Units 02/05/23  0445 02/04/23  0529   PHOSPHORUS mg/dL 2 1* 2 9         No results found for: TROPONINT  ABG:No results found for: PHART, MJW8THY, PO2ART, JWX0TQX, N0NFDBHP, BEART, SOURCE    Imaging Studies: I have personally reviewed pertinent reports  and I have personally reviewed pertinent films in PACS    CTA head and neck with and without contrast    Result Date: 2/3/2023  Impression: 1  No acute intracranial CT abnormality  2   A 1 cm posterior left frontal lobe gray-white matter junction lesion with subjacent vasogenic edema most concerning for metastasis  No significant mass effect or midline shift  Recommend further assessment with brain MRI without and with contrast  3   Patent major vasculature of the [place] without high-grade stenosis  No aneurysm  4   No hemodynamically significant stenosis or dissection of cervical carotid and vertebral arteries  Left greater than right atherosclerotic change of cervical carotid arteries  5   No significant change in size of known right nasopharyngeal mass compared to neck CT 12/20/2021  6   Redemonstrated posttreatment change and partially obscure known right upper lobe mass in partially imaged upper chest   See the report of concurrent CTA chest/abdomen/pelvis  The study was marked in [facility] for immediate notification  Workstation performed: [id number]     XR chest 1 view portable    Result Date: 2/4/2023  Impression: Stable chronic right upper lobe consolidation, consistent with posttreatment changes Stable large right pleural effusion    Workstation performed: [id number]     MRI brain w wo contrast    Result Date: 2/5/2023  Impression: 1 2 cm avidly enhancing lesion in left precentral gyrus with marked surrounding perilesional vasogenic edema, concerning for brain metastasis  Questionable tiny cortical enhancing lesion in left posterior frontal lobe only seen on bravo sequences slightly obscured by mild motion artifact   This could represent tiny brain metastasis or cortical vessel  Attention on follow-up  2 6 cm bilobed lesion in right nasopharynx with internal debris, likely complex mucus retention cyst   Recommend evaluation by ENT  The study was marked in [facility] for immediate notification  Workstation performed: [id number]     CTA dissection protocol chest abdomen pelvis w wo contrast    Result Date: 2/3/2023  Impression: No evidence of thoracic aortic dissection No pulmonary embolism in the main pulmonary arteries Chronic loculated left effusion which demonstrates enlarging nodular densities within it, suggest pleural metastatic disease Post treatment changes in the right lung with chronic consolidation in the right paramediastinal location with the air bronchogram and volume loss, as seen on the previous study  I personally discussed this study with Dr Sulema Lang on 2/3/2023 at 4:00 PM  Workstation performed: [id number]       EKG, Pathology, and Other Studies: I have personally reviewed pertinent reports        VTE Pharmacologic Prophylaxis: Sequential compression device (Venodyne)  and Enoxaparin (Lovenox)    VTE Mechanical Prophylaxis: sequential compression device

## 2023-02-05 NOTE — PROGRESS NOTES
[address] [facility]  Progress Note Diallo Gibson 1950, 71 y o  male MRN: [id number]  Unit/Bed#: [facility] 722-01 Encounter: [id number]  Primary Care Provider: Hipolito Noonan MD   Date and time admitted to hospital: 2/3/2023 10:45 PM    * Right hand weakness  Assessment & Plan  · Sent to the ED by his oncologist on 2/3 for right hand weakness since 4 days  · H/o adenocarcinoma of the lung  · CTA head and neck - A 1 cm posterior left frontal lobe gray-white matter junction lesion with subjacent vasogenic edema most concerning for metastasis  · MRI brain (2/4) - 1 2 cm avidly enhancing lesion in left precentral gyrus with marked surrounding perilesional vasogenic edema, concerning for brain metastasis  Questionable tiny cortical enhancing lesion in left posterior frontal lobe only seen on bravo sequences slightly obscured by mild motion artifact   This could represent tiny brain metastasis or cortical vessel     · Received Decadron 10 mg IV and maintained on 4 mg q 6 hrs IV with improvement in weakness  · On Keppra (D#2)  · Discussed with neurosurgery - plan to be determined on 2/6  · Rad-onc consult pending  · Neurosurgery/medical oncology input appreciated  · Neurochecks        History of stroke  Assessment & Plan  · Continue statin, therapeutic Lovenox   · D/w neurology on 2/4 - follow above plan for right hand weakness with brain met    Adenocarcinoma of lung  Assessment & Plan  · Lung mass initially noted on 7/31/21 when admitted with CVA  · S/p 6 cycles of systemic chemotherapy with Carbo, Alimta and Keytruda  · Then treated with concurrent chemoradiation with Carboplatin and taxol  · Now on Keytruda  · New brain noted as above  · CT chest/abdomen/pelvis (2/3/23) - Chronic loculated left effusion which demonstrates enlarging nodular densities within it, suggest pleural metastatic disease  · Brain MRI with met as above  · Oncology following  · Repeat PET-CT has been ordered by his outpatient oncologist      History of venous thromboembolism  Assessment & Plan  · Prior history of bilateral DVTs and suspected pulmonary embolism  · Ct therapeutic Lovenox      Hyperlipidemia  Assessment & Plan  · Continue Lipitor    Nasopharyngeal mass  Assessment & Plan  · No malignancy noted on evaluation by ENT on nasopharyngoscopy   · PET-CT with stable uptake  · MRI () - 2 6 cm bilobed lesion in right nasopharynx with internal debris, likely complex mucus retention cyst  · Repeat PET-CT ordered by oncology  · Outpatient  ENT/oncology follow-up      Hypophosphatemia  Assessment & Plan  · Replete  VTE Pharmacologic Prophylaxis: VTE Score: 8 High Risk (Score >/= 5) - Pharmacological DVT Prophylaxis Ordered: enoxaparin (Lovenox)  Sequential Compression Devices Ordered  Patient Centered Rounds: Discussed with RN  Discussions with Specialists or Other Care Team Provider: Discussed with neurosurgery    Education and Discussions with Family / Patient: Updated  (wife) at bedside  Time Spent for Care: 20 minutes  More than 50% of total time spent on counseling and coordination of care as described above  Current Length of Stay: 2 day(s)  Current Patient Status: Inpatient   Certification Statement: The patient will continue to require additional inpatient hospital stay due to brain met with vasogenic edema    Code Status: Level 1 - Full Code    Subjective:   Right hand weakness improving  No headache    Objective:     Vitals:   Temp (24hrs), Av 5 °F (36 4 °C), Min:97 3 °F (36 3 °C), Max:97 9 °F (36 6 °C)    Temp:  [97 3 °F (36 3 °C)-97 9 °F (36 6 °C)] 97 9 °F (36 6 °C)  HR:  [58-70] 63  Resp:  [14] 14  BP: (118-130)/(77-79) 123/78  SpO2:  [97 %-98 %] 98 %  Body mass index is 27 61 kg/m²  Physical Exam:   Physical Exam  Vitals reviewed  HENT:      Head: Normocephalic        Nose: Nose normal       Mouth/Throat:      Mouth: Mucous membranes are moist    Eyes:      Extraocular Movements: Extraocular movements intact  Cardiovascular:      Rate and Rhythm: Normal rate and regular rhythm  Pulmonary:      Effort: Pulmonary effort is normal  No respiratory distress  Breath sounds: Normal breath sounds  No wheezing  Abdominal:      General: Bowel sounds are normal  There is no distension  Palpations: Abdomen is soft  Tenderness: There is no abdominal tenderness  Musculoskeletal:         General: No swelling  Cervical back: Neck supple  Skin:     General: Skin is warm and dry  Neurological:      Mental Status: He is alert        Comments: Right hand weakness improved   Psychiatric:         Mood and Affect: Mood normal          Behavior: Behavior normal           Additional Data:     Labs:  Results from last 7 days   Lab Units 02/05/23  0445   WBC Thousand/uL 11 99*   HEMOGLOBIN g/dL 9 8*   HEMATOCRIT % 32 3*   PLATELETS Thousands/uL 342   NEUTROS PCT % 90*   LYMPHS PCT % 4*   MONOS PCT % 5   EOS PCT % 0     Results from last 7 days   Lab Units 02/05/23  0445   SODIUM mmol/L 141   POTASSIUM mmol/L 4 1   CHLORIDE mmol/L 110*   CO2 mmol/L 25   BUN mg/dL 13   CREATININE mg/dL 0 91   ANION GAP mmol/L 6   CALCIUM mg/dL 9 8   ALBUMIN g/dL 2 5*   TOTAL BILIRUBIN mg/dL 0 16*   ALK PHOS U/L 58   ALT U/L 16   AST U/L 11   GLUCOSE RANDOM mg/dL 133         Results from last 7 days   Lab Units 02/03/23  1237   POC GLUCOSE mg/dl 77               Lines/Drains:  Invasive Devices     Peripheral Intravenous Line  Duration           Peripheral IV 02/03/23 Left Antecubital 2 days                Last 24 Hours Medication List:   Current Facility-Administered Medications   Medication Dose Route Frequency Provider Last Rate   • acetaminophen  650 mg Oral Q6H PRN Isa Hatch MD     • albuterol  2 puff Inhalation Q6H PRN Isa Hatch MD     • atorvastatin  40 mg Oral Daily With Eddie Trejo MD     • dexamethasone  4 mg Intravenous Q6H [facility] Isa Hatch MD     • enoxaparin  80 mg Subcutaneous Q12H Messi Cooney MD     • folic acid  0,272 mcg Oral Daily Messi Cooney MD     • levETIRAcetam  1,000 mg Intravenous Q12H [facility] Messi Cooney MD 1,000 mg (02/05/23 8244)   • multivitamin stress formula  1 tablet Oral Daily Messi Cooney MD     • ondansetron  4 mg Intravenous Q4H PRN Messi Cooney MD     • potassium-sodium phosphateS  2 tablet Oral BID With Meals Elvis Day MD     • tamsulosin  0 4 mg Oral After Nelson Liriano MD          Today, Patient Was Seen By: Elvis Day MD    **Please Note: This note may have been constructed using a voice recognition system  **

## 2023-02-06 ENCOUNTER — PATIENT OUTREACH (OUTPATIENT)
Dept: RADIATION ONCOLOGY | Facility: HOSPITAL | Age: 73
End: 2023-02-06

## 2023-02-06 VITALS
SYSTOLIC BLOOD PRESSURE: 124 MMHG | BODY MASS INDEX: 27.69 KG/M2 | HEIGHT: 69 IN | WEIGHT: 186.95 LBS | OXYGEN SATURATION: 94 % | HEART RATE: 71 BPM | RESPIRATION RATE: 16 BRPM | DIASTOLIC BLOOD PRESSURE: 73 MMHG | TEMPERATURE: 97.7 F

## 2023-02-06 PROBLEM — Z87.898 HISTORY OF URINARY RETENTION: Status: ACTIVE | Noted: 2021-08-06

## 2023-02-06 PROBLEM — E83.39 HYPOPHOSPHATEMIA: Status: RESOLVED | Noted: 2023-02-05 | Resolved: 2023-02-06

## 2023-02-06 LAB
ALBUMIN SERPL BCP-MCNC: 2.4 G/DL (ref 3.5–5)
ALP SERPL-CCNC: 63 U/L (ref 46–116)
ALT SERPL W P-5'-P-CCNC: 16 U/L (ref 12–78)
ANION GAP SERPL CALCULATED.3IONS-SCNC: 3 MMOL/L (ref 4–13)
AST SERPL W P-5'-P-CCNC: 9 U/L (ref 5–45)
BASOPHILS # BLD AUTO: 0.01 THOUSANDS/ÂΜL (ref 0–0.1)
BASOPHILS NFR BLD AUTO: 0 % (ref 0–1)
BILIRUB SERPL-MCNC: 0.15 MG/DL (ref 0.2–1)
BUN SERPL-MCNC: 15 MG/DL (ref 5–25)
CALCIUM ALBUM COR SERPL-MCNC: 11 MG/DL (ref 8.3–10.1)
CALCIUM SERPL-MCNC: 9.7 MG/DL (ref 8.3–10.1)
CHLORIDE SERPL-SCNC: 110 MMOL/L (ref 96–108)
CO2 SERPL-SCNC: 28 MMOL/L (ref 21–32)
CREAT SERPL-MCNC: 0.86 MG/DL (ref 0.6–1.3)
EOSINOPHIL # BLD AUTO: 0 THOUSAND/ÂΜL (ref 0–0.61)
EOSINOPHIL NFR BLD AUTO: 0 % (ref 0–6)
ERYTHROCYTE [DISTWIDTH] IN BLOOD BY AUTOMATED COUNT: 16.1 % (ref 11.6–15.1)
GFR SERPL CREATININE-BSD FRML MDRD: 87 ML/MIN/1.73SQ M
GLUCOSE SERPL-MCNC: 149 MG/DL (ref 65–140)
HCT VFR BLD AUTO: 31.6 % (ref 36.5–49.3)
HGB BLD-MCNC: 9.4 G/DL (ref 12–17)
IMM GRANULOCYTES # BLD AUTO: 0.15 THOUSAND/UL (ref 0–0.2)
IMM GRANULOCYTES NFR BLD AUTO: 1 % (ref 0–2)
LYMPHOCYTES # BLD AUTO: 0.5 THOUSANDS/ÂΜL (ref 0.6–4.47)
LYMPHOCYTES NFR BLD AUTO: 5 % (ref 14–44)
MAGNESIUM SERPL-MCNC: 2.4 MG/DL (ref 1.6–2.6)
MCH RBC QN AUTO: 26.2 PG (ref 26.8–34.3)
MCHC RBC AUTO-ENTMCNC: 29.7 G/DL (ref 31.4–37.4)
MCV RBC AUTO: 88 FL (ref 82–98)
MONOCYTES # BLD AUTO: 0.52 THOUSAND/ÂΜL (ref 0.17–1.22)
MONOCYTES NFR BLD AUTO: 5 % (ref 4–12)
NEUTROPHILS # BLD AUTO: 9.67 THOUSANDS/ÂΜL (ref 1.85–7.62)
NEUTS SEG NFR BLD AUTO: 89 % (ref 43–75)
NRBC BLD AUTO-RTO: 0 /100 WBCS
PHOSPHATE SERPL-MCNC: 2.4 MG/DL (ref 2.3–4.1)
PLATELET # BLD AUTO: 346 THOUSANDS/UL (ref 149–390)
PMV BLD AUTO: 9.2 FL (ref 8.9–12.7)
POTASSIUM SERPL-SCNC: 3.8 MMOL/L (ref 3.5–5.3)
PROT SERPL-MCNC: 6.6 G/DL (ref 6.4–8.4)
RBC # BLD AUTO: 3.59 MILLION/UL (ref 3.88–5.62)
SODIUM SERPL-SCNC: 141 MMOL/L (ref 135–147)
WBC # BLD AUTO: 10.85 THOUSAND/UL (ref 4.31–10.16)

## 2023-02-06 RX ORDER — LEVETIRACETAM 1000 MG/1
1000 TABLET ORAL 2 TIMES DAILY
Qty: 60 TABLET | Refills: 0 | Status: SHIPPED | OUTPATIENT
Start: 2023-02-06 | End: 2023-02-06 | Stop reason: SDUPTHER

## 2023-02-06 RX ORDER — LEVETIRACETAM 1000 MG/1
1000 TABLET ORAL 2 TIMES DAILY
Qty: 60 TABLET | Refills: 0 | Status: SHIPPED | OUTPATIENT
Start: 2023-02-06

## 2023-02-06 RX ORDER — PANTOPRAZOLE SODIUM 40 MG/1
40 TABLET, DELAYED RELEASE ORAL DAILY
Qty: 35 TABLET | Refills: 0 | Status: SHIPPED | OUTPATIENT
Start: 2023-02-06 | End: 2023-03-13

## 2023-02-06 RX ORDER — DEXAMETHASONE 2 MG/1
4 TABLET ORAL EVERY 6 HOURS
Qty: 109 TABLET | Refills: 0 | Status: SHIPPED | OUTPATIENT
Start: 2023-02-06 | End: 2023-02-13

## 2023-02-06 RX ORDER — DEXAMETHASONE 2 MG/1
4 TABLET ORAL EVERY 6 HOURS
Qty: 109 TABLET | Refills: 0 | Status: SHIPPED | OUTPATIENT
Start: 2023-02-06 | End: 2023-02-06 | Stop reason: SDUPTHER

## 2023-02-06 RX ORDER — PANTOPRAZOLE SODIUM 40 MG/1
40 TABLET, DELAYED RELEASE ORAL DAILY
Qty: 35 TABLET | Refills: 0 | Status: SHIPPED | OUTPATIENT
Start: 2023-02-06 | End: 2023-02-06 | Stop reason: SDUPTHER

## 2023-02-06 RX ADMIN — LEVETIRACETAM 1000 MG: 100 INJECTION, SOLUTION INTRAVENOUS at 08:54

## 2023-02-06 RX ADMIN — DEXAMETHASONE SODIUM PHOSPHATE 4 MG: 4 INJECTION INTRA-ARTICULAR; INTRALESIONAL; INTRAMUSCULAR; INTRAVENOUS; SOFT TISSUE at 12:07

## 2023-02-06 RX ADMIN — FOLIC ACID 1000 MCG: 1 TABLET ORAL at 08:53

## 2023-02-06 RX ADMIN — DEXAMETHASONE SODIUM PHOSPHATE 4 MG: 4 INJECTION INTRA-ARTICULAR; INTRALESIONAL; INTRAMUSCULAR; INTRAVENOUS; SOFT TISSUE at 05:49

## 2023-02-06 RX ADMIN — ENOXAPARIN SODIUM 80 MG: 80 INJECTION SUBCUTANEOUS at 12:07

## 2023-02-06 RX ADMIN — B-COMPLEX W/ C & FOLIC ACID TAB 1 TABLET: TAB at 08:53

## 2023-02-06 NOTE — CASE MANAGEMENT
Case Management Assessment & Discharge Planning Note    Patient name Joe Shrestha  Location [address] [place] [address] 7[facility] [id number]  : 1950 Date 2023       Current Admission Date: 2/3/2023  Current Admission Diagnosis:Brain metastasis   Patient Active Problem List    Diagnosis Date Noted   • Brain metastasis 2023   • History of venous thromboembolism 2023   • Hyperlipidemia 2023   • Restrictive lung disease 2022   • Nocturnal hypoxemia 2022   • Tobacco use disorder, severe, in sustained remission, dependence 2022   • Recurrent right pleural effusion 2022   • Chronic right-sided heart failure ([facility] [address] ) 2022   • Brain injury, without loss of consciousness, subsequent encounter 2022   • Chronic anticoagulation 2022   • Hypercalcemia of malignancy 10/21/2021   • Chemotherapy induced neutropenia ([facility] [address] ) 10/11/2021   • COPD mixed type ([facility] [address] ) 2021   • Malignant neoplasm of upper lobe of right lung ([facility] [address] ) 2021   • Adenocarcinoma of lung 2021   • Impaired cognition 2021   • Constipation 2021   • Anemia of chronic disease 2021   • Pulmonary embolism ([facility] [address] ) 2021   • History of urinary retention 2021   • Mass of upper lobe of right lung 2021   • History of stroke 2021   • Bilateral lower extremity DVTs 2021   • Nasopharyngeal mass 2021   • Dysphagia 2021      LOS (days): 3  Geometric Mean LOS (GMLOS) (days): 3 80  Days to GMLOS:1 2     OBJECTIVE:    Risk of Unplanned Readmission Score: 16 84         Current admission status: Inpatient       Preferred Pharmacy:   [address] [place] [address] Road  Phone: [phone] Fax: [phone] Vanessa Reyes, [address] [place] [address] Drive [address] [place], [place] [id number]  Phone: [phone] Fax: [phone]    Primary Care Provider: Erica Song MD [place]    Primary Insurance: MEDICARE  Secondary Insurance: COMMERCIAL MISCELLANEOUS    ASSESSMENT:  [address], [address] - Spouse   Primary Phone: [phone] (Mobile)                         Readmission Root Cause  30 Day Readmission: No    Patient Information  Admitted from[de-identified] Home  Mental Status: Alert  During Assessment patient was accompanied by: Spouse, Daughter, Other-Comment (MILO)  Assessment information provided by[de-identified] Patient, Spouse, Daughter  Primary Caregiver: Self  Support Systems: Self, Spouse/significant other, Children, April Jung 62 of Residence: [employer] [facility] do you live in?: [place]  Type of Current Residence:  (private home)  In the last 12 months, was there a time when you were not able to pay the mortgage or rent on time?: No  In the last 12 months, how many places have you lived?: 1  In the last 12 months, was there a time when you did not have a steady place to sleep or slept in a shelter (including now)?: No  Homeless/housing insecurity resource given?: N/A  Living Arrangements: Lives w/ Spouse/significant other  Is patient a ?: No    Activities of Daily Living Prior to Admission  Functional Status: Independent  Completes ADLs independently?: Yes  Ambulates independently?: Yes  Does patient use assisted devices?: No  Does patient currently own DME?: No  Does patient have a history of Outpatient Therapy (PT/OT)?: No  Does the patient have a history of Short-Term Rehab?: No  Does patient have a history of HHC?: No  Does patient currently have [facility]?: No         Patient Information Continued  Income Source: Pension/group home  Does patient have prescription coverage?: Yes  Within the past 12 months, you worried that your food would run out before you got the money to buy more : Never true  Within the past 12 months, the food you bought just didn't last and you didn't have money to get more : Never true  Food insecurity resource given?: N/A  Does patient receive dialysis treatments?: No  Does patient have a history of substance abuse?: No  Does patient have a history of Mental Health Diagnosis?: No         Means of Transportation  Means of Transport to Appts[de-identified] Family transport  In the past 12 months, has lack of transportation kept you from medical appointments or from getting medications?: No  In the past 12 months, has lack of transportation kept you from meetings, work, or from getting things needed for daily living?: No  Was application for public transport provided?: N/A        DISCHARGE DETAILS:    Discharge planning discussed with[de-identified] pt and family bedside  Freedom of Choice: Yes  Comments - Freedom of Choice: Discussed FOC no aftercare reccs  CM contacted family/caregiver?: No- see comments (family was bedside)  Were Treatment Team discharge recommendations reviewed with patient/caregiver?: Yes  Did patient/caregiver verbalize understanding of patient care needs?: Yes  Were patient/caregiver advised of the risks associated with not following Treatment Team discharge recommendations?: Yes    Contacts  Patient Contacts: Sonali Stroud  Relationship to Patient[de-identified] Family (spouse)  Contact Method: Phone  Phone Number: [phone]  Reason/Outcome: Emergency Contact, Discharge [address]         Is the patient interested in [address] at discharge?: No    DME Referral Provided  Referral made for DME?: No    Other Referral/Resources/Interventions Provided:  Interventions: None Indicated    Would you like to participate in our [address] service program?  : No - Declined    Treatment Team Recommendation: Home  Discharge Destination Plan[de-identified] Home  Transport at Discharge : Family           ETA of Transport (Date): 02/06/23  ETA of Transport (Time): 1400        Accompanied by: Family member

## 2023-02-06 NOTE — PROGRESS NOTES
[facility]  Progress Note Trina Álvarez 1950, 71 y o  male MRN: [id number]  Unit/Bed#: [facility] 722-01 Encounter: [id number]  Primary Care Provider: Dick Sumner MD   Date and time admitted to hospital: 2/3/2023 10:45 PM    * Brain metastasis  Assessment & Plan  1 2 cm left precentral gyrus mass  · In setting of known adenocarcinoma of the lung s/p radiation currently on Kaytruda  · Presented with right arm weakness  Imaging:  · CTA 2/3:1 cm posterior left frontal lobe gray-white matter junction lesion with subjacent vasogenic edema most concerning for metastasis  No significant mass effect or midline shift  Recommend further assessment with brain MRI without and with contrast   · MRI brain w/wo 2/4: 1 2 cm avidly enhancing lesion in left precentral gyrus with marked surrounding perilesional vasogenic edema, concerning for brain metastasis  Questionable tiny cortical enhancing lesion in left posterior frontal lobe only seen on bravo sequences slightly obscured by mild motion artifact   This could represent tiny brain metastasis or cortical vessel  Plan:  · Continue to monitor neuro exam closely  · Stat CT head with decline in GCS greater than 2 points in 1 hour  · Continue Keppra and Decadron  Wean per [employer]  · Mobilize with PT/OT  · DVT prophylaxis: SCDs, Lovenox  · Appreciate hematology oncology consultation and input  · Rad Onc was consulted and recommend [facility]  Per report the neuro Rad Onc coordinator was notified to arrange outpt f/u with Rad Onc  · Given the small size of the lesion and location in the brain tumor motor cortex, surgical intervention is not recommended  Recommend radiation therapy with SRS and ongoing medical mgt per Heme Onc  Discussed plan of care with patient and his family at bedside  Neurosurgery will sign off  Follow up on PRN basis  Pt to continue follow up with Rad Onc and Heme Onc    Please call with any questions or concerns  Subjective/Objective     Chief Complaint: "My right arm strenght is getting better"    Subjective: Pt reports he has some improvement in the RUE strength though continues to have some mild RUE weakness  Patient denies any headache, dizziness, lightheadedness or visual disturbance  Patient denies any new numbness or tingling  Pt denies changes in gait or balance  Pt denies nausea or vomiting  Objective: Alert and awake, No acute distress  I/O       02/04 0701 02/05 0700 02/05 0701 02/06 0700 02/06 0701 02/07 0700    P  O  240      Total Intake(mL/kg) 240 (2 8)      Net +240             Unmeasured Urine Occurrence 2 x            Invasive Devices     Peripheral Intravenous Line  Duration           Peripheral IV 02/03/23 Left Antecubital 2 days                Physical Exam:  Vitals: Blood pressure 124/73, pulse 71, temperature 97 7 °F (36 5 °C), resp  rate 16, height 5' 9" (1 753 m), weight 84 8 kg (186 lb 15 2 oz), SpO2 94 %  ,Body mass index is 27 61 kg/m²  General appearance:  Appears stated age  Head: Normocephalic, without obvious abnormality, atraumatic  Eyes: EOMI, PERRL  Neck: supple, symmetrical, trachea midline   Lungs: non labored breathing  Heart: regular heart rate  Neurologic:   Mental status: Alert, oriented x 3, thought content appropriate  Cranial nerves: grossly intact (Cranial nerves II-XII)  Sensory: normal to LT X 4  Motor: moving all extremities, Strength 5/5 except RUE 4+-5/5  Coordination: Slight RUE drift, no drift LUE       Lab Results:  Results from last 7 days   Lab Units 02/06/23  0603 02/05/23 0445 02/04/23  0529   WBC Thousand/uL 10 85* 11 99* 5 77   HEMOGLOBIN g/dL 9 4* 9 8* 9 6*   HEMATOCRIT % 31 6* 32 3* 32 5*   PLATELETS Thousands/uL 346 342 354   NEUTROS PCT % 89* 90* 90*   MONOS PCT % 5 5 2*     Results from last 7 days   Lab Units 02/06/23  0603 02/05/23 0445 02/04/23  0529   POTASSIUM mmol/L 3 8 4 1 4 5   CHLORIDE mmol/L 110* 110* 109*   CO2 mmol/L 28 25 27   BUN mg/dL 15 13 12   CREATININE mg/dL 0 86 0 91 0 89   CALCIUM mg/dL 9 7 9 8 9 9   ALK PHOS U/L 63 58 68   ALT U/L 16 16 18   AST U/L 9 11 14     Results from last 7 days   Lab Units 02/06/23  0603 02/05/23  0445 02/04/23  0529   MAGNESIUM mg/dL 2 4 2 6 2 5     Results from last 7 days   Lab Units 02/06/23  0603 02/05/23  0445 02/04/23  0529   PHOSPHORUS mg/dL 2 4 2 1* 2 9           Imaging Studies: I have personally reviewed pertinent reports and I have personally reviewed pertinent films in PACS    EKG, Pathology, and Other Studies: I have personally reviewed pertinent reports  PLEASE NOTE:  This encounter may have been completed utilizing the [employer] Direct Speech Voice Recognition Software  Grammatical errors, random word insertions, pronoun errors and incomplete sentences are occasional consequences of the system due to software limitations, ambient noise and hardware issues  These may be missed by proof reading prior to affixing electronic signature  Any questions or concerns about the content, text or information contained within the body of this dictation should be directly addressed to the advanced practitioner or physician for clarification

## 2023-02-06 NOTE — ASSESSMENT & PLAN NOTE
1 2 cm left precentral gyrus mass  · In setting of known adenocarcinoma of the lung s/p radiation currently on Kaytruda  · Presented with right arm weakness  Imaging:  · CTA 2/3:1 cm posterior left frontal lobe gray-white matter junction lesion with subjacent vasogenic edema most concerning for metastasis  No significant mass effect or midline shift  Recommend further assessment with brain MRI without and with contrast   · MRI brain w/wo 2/4: 1 2 cm avidly enhancing lesion in left precentral gyrus with marked surrounding perilesional vasogenic edema, concerning for brain metastasis  Questionable tiny cortical enhancing lesion in left posterior frontal lobe only seen on bravo sequences slightly obscured by mild motion artifact   This could represent tiny brain metastasis or cortical vessel  Plan:  · Continue to monitor neuro exam closely  · Stat CT head with decline in GCS greater than 2 points in 1 hour  · Continue Keppra and Decadron  Wean per [employer]  · Mobilize with PT/OT  · DVT prophylaxis: SCDs, Lovenox  · Appreciate hematology oncology consultation and input  · Rad Onc was consulted and recommend [facility]  Per report the neuro Rad Onc coordinator was notified to arrange outpt f/u with Rad Onc  · Given the small size of the lesion and location in the brain tumor motor cortex, surgical intervention is not recommended  Recommend radiation therapy with SRS and ongoing medical mgt per Heme Onc  Discussed plan of care with patient and his family at bedside  Neurosurgery will sign off  Follow up on PRN basis  Pt to continue follow up with Rad Onc and Heme Onc  Please call with any questions or concerns

## 2023-02-06 NOTE — PLAN OF CARE
Problem: MOBILITY - ADULT  Goal: Maintain or return to baseline ADL function  Description: INTERVENTIONS:  -  Assess patient's ability to carry out ADLs; assess patient's baseline for ADL function and identify physical deficits which impact ability to perform ADLs (bathing, care of mouth/teeth, toileting, grooming, dressing, etc )  - Assess/evaluate cause of self-care deficits   - Assess range of motion  - Assess patient's mobility; develop plan if impaired  - Assess patient's need for assistive devices and provide as appropriate  - Encourage maximum independence but intervene and supervise when necessary  - Involve family in performance of ADLs  - Assess for home care needs following discharge   - Consider OT consult to assist with ADL evaluation and planning for discharge  - Provide patient education as appropriate  Outcome: Progressing  Goal: Maintains/Returns to pre admission functional level  Description: INTERVENTIONS:  - Perform BMAT or MOVE assessment daily    - Set and communicate daily mobility goal to care team and patient/family/caregiver  - Collaborate with rehabilitation services on mobility goals if consulted  - Perform Range of Motion 2 times a day  - Reposition patient every 2 hours    - Dangle patient 2 times a day  - Stand patient 2 times a day  - Ambulate patient 2 times a day  - Out of bed to chair 2 times a day   - Out of bed for meals 2 times a day  - Out of bed for toileting  - Record patient progress and toleration of activity level   Outcome: Progressing     Problem: PAIN - ADULT  Goal: Verbalizes/displays adequate comfort level or baseline comfort level  Description: Interventions:  - Encourage patient to monitor pain and request assistance  - Assess pain using appropriate pain scale  - Administer analgesics based on type and severity of pain and evaluate response  - Implement non-pharmacological measures as appropriate and evaluate response  - Consider cultural and social influences on pain and pain management  - Notify physician/advanced practitioner if interventions unsuccessful or patient reports new pain  Outcome: Progressing     Problem: INFECTION - ADULT  Goal: Absence or prevention of progression during hospitalization  Description: INTERVENTIONS:  - Assess and monitor for signs and symptoms of infection  - Monitor lab/diagnostic results  - Monitor all insertion sites, i e  indwelling lines, tubes, and drains  - Monitor endotracheal if appropriate and nasal secretions for changes in amount and color  - Norris appropriate cooling/warming therapies per order  - Administer medications as ordered  - Instruct and encourage patient and family to use good hand hygiene technique  - Identify and instruct in appropriate isolation precautions for identified infection/condition  Outcome: Progressing  Goal: Absence of fever/infection during neutropenic period  Description: INTERVENTIONS:  - Monitor WBC    Outcome: Progressing     Problem: SAFETY ADULT  Goal: Maintain or return to baseline ADL function  Description: INTERVENTIONS:  -  Assess patient's ability to carry out ADLs; assess patient's baseline for ADL function and identify physical deficits which impact ability to perform ADLs (bathing, care of mouth/teeth, toileting, grooming, dressing, etc )  - Assess/evaluate cause of self-care deficits   - Assess range of motion  - Assess patient's mobility; develop plan if impaired  - Assess patient's need for assistive devices and provide as appropriate  - Encourage maximum independence but intervene and supervise when necessary  - Involve family in performance of ADLs  - Assess for home care needs following discharge   - Consider OT consult to assist with ADL evaluation and planning for discharge  - Provide patient education as appropriate  Outcome: Progressing  Goal: Maintains/Returns to pre admission functional level  Description: INTERVENTIONS:  - Perform BMAT or MOVE assessment daily    - Set and communicate daily mobility goal to care team and patient/family/caregiver  - Collaborate with rehabilitation services on mobility goals if consulted  - Perform Range of Motion 2 times a day  - Reposition patient every 2 hours    - Dangle patient 2 times a day  - Stand patient 2 times a day  - Ambulate patient 2 times a day  - Out of bed to chair 2 times a day   - Out of bed for meals 2 times a day  - Out of bed for toileting  - Record patient progress and toleration of activity level   Outcome: Progressing  Goal: Patient will remain free of falls  Description: INTERVENTIONS:  - Educate patient/family on patient safety including physical limitations  - Instruct patient to call for assistance with activity   - Consult OT/PT to assist with strengthening/mobility   - Keep Call bell within reach  - Keep bed low and locked with side rails adjusted as appropriate  - Keep care items and personal belongings within reach  - Initiate and maintain comfort rounds  - Make Fall Risk Sign visible to staff  - Offer Toileting every 2 Hours, in advance of need  - Apply yellow socks and bracelet for high fall risk patients  - Consider moving patient to room near nurses station  Outcome: Progressing     Problem: DISCHARGE PLANNING  Goal: Discharge to home or other facility with appropriate resources  Description: INTERVENTIONS:  - Identify barriers to discharge w/patient and caregiver  - Arrange for needed discharge resources and transportation as appropriate  - Identify discharge learning needs (meds, wound care, etc )  - Arrange for interpretive services to assist at discharge as needed  - Refer to Case Management Department for coordinating discharge planning if the patient needs post-hospital services based on physician/advanced practitioner order or complex needs related to functional status, cognitive ability, or social support system  Outcome: Progressing     Problem: Knowledge Deficit  Goal: Patient/family/caregiver demonstrates understanding of disease process, treatment plan, medications, and discharge instructions  Description: Complete learning assessment and assess knowledge base  Interventions:  - Provide teaching at level of understanding  - Provide teaching via preferred learning methods  Outcome: Progressing     Problem: NEUROSENSORY - ADULT  Goal: Achieves stable or improved neurological status  Description: INTERVENTIONS  - Monitor and report changes in neurological status  - Monitor vital signs such as temperature, blood pressure, glucose, and any other labs ordered   - Initiate measures to prevent increased intracranial pressure  - Monitor for seizure activity and implement precautions if appropriate      Outcome: Progressing  Goal: Remains free of injury related to seizures activity  Description: INTERVENTIONS  - Maintain airway, patient safety  and administer oxygen as ordered  - Monitor patient for seizure activity, document and report duration and description of seizure to physician/advanced practitioner  - If seizure occurs,  ensure patient safety during seizure  - Reorient patient post seizure  - Seizure pads on all 4 side rails  - Instruct patient/family to notify RN of any seizure activity including if an aura is experienced  - Instruct patient/family to call for assistance with activity based on nursing assessment  - Administer anti-seizure medications if ordered    Outcome: Progressing  Goal: Achieves maximal functionality and self care  Description: INTERVENTIONS  - Monitor swallowing and airway patency with patient fatigue and changes in neurological status  - Encourage and assist patient to increase activity and self care     - Encourage visually impaired, hearing impaired and aphasic patients to use assistive/communication devices  Outcome: Progressing

## 2023-02-06 NOTE — RESTORATIVE TECHNICIAN NOTE
Restorative Technician Note      Patient Name: Justina Watkins     Note Type: Mobility  Patient Position Upon Consult: Supine  Activity Performed: Ambulated; Dangled; Stood  Assistive Device: Other (Comment) (none)  Education Provided: Yes  Patient Position at End of Consult: Supine;  All needs within reach; Bed/Chair alarm activated      Dominguez BLUNT, Restorative Technician, [employer]

## 2023-02-06 NOTE — PROGRESS NOTES
Outreach to patient and his wife Arie Miller, introduced myself, contact information given  Radiation oncology referral received today from patient's [facility] physician, Dr Larayne Gowers for newly found brain lesion  Patient scheduled for radiation consult on 2/15/23 at the [facility] location/address/phone number provided  We briefly discussed what to expect at the visit  Patient is scheduled for discharge today  Instructed patient and wife to call with any questions/concerns  They were appreciative of call

## 2023-02-06 NOTE — PHYSICAL THERAPY NOTE
PT orders received  Chart reviewed  Pt pending possible neurosurgical intervention, will hold   Will continue to follow  Arabella St, PT, DPT     02/06/23 0749   PT Last Visit   PT Visit Date 02/06/23   Note Type   Note type Cancelled Session   Cancel Reasons Medical status

## 2023-02-07 ENCOUNTER — TRANSITIONAL CARE MANAGEMENT (OUTPATIENT)
Dept: FAMILY MEDICINE CLINIC | Facility: HOSPITAL | Age: 73
End: 2023-02-07

## 2023-02-07 NOTE — DISCHARGE SUMMARY
Discharging Physician / Practitioner: Obi Hsieh MD  PCP: Liban Izquierdo MD  Admission Date:   Admission Orders (From admission, onward)     Ordered        02/03/23 2254  Inpatient Admission  Once                      Discharge Date: 02/06/23     Principal discharge diagnoses:  1  Brain metastasis  2  Vasogenic edema    Secondary diagnoses:  1  Metastatic adenocarcinoma of the lung  2  History of stroke  3  History of DVT and suspected pulmonary embolism  4  Hyperlipidemia  5  Nasopharyngeal mass    Consultations During Hospital Stay:  Neurosurgery  Medical oncology    Procedures Performed:   1  Chest Xray - Stable chronic right upper lobe consolidation, consistent with posttreatment changes  Stable large right pleural effusion  2  CTA head and neck - No acute intracranial CT abnormality  A 1 cm posterior left frontal lobe gray-white matter junction lesion with subjacent vasogenic edema most concerning for metastasis  No significant mass effect or midline shift  Patent major vasculature of the [place] without high-grade stenosis  No aneurysm  No hemodynamically significant stenosis or dissection of cervical carotid and vertebral arteries  Left greater than right atherosclerotic change of cervical carotid arteries  No significant change in size of known right nasopharyngeal mass compared to neck CT 12/20/2021  Redemonstrated posttreatment change and partially obscure known right upper lobe mass in partially imaged upper chest   See the report of concurrent CTA chest/abdomen/pelvis  3  CTA chest/abdomen/pelvis - No evidence of thoracic aortic dissection  No pulmonary embolism in the main pulmonary arteries     Chronic loculated left effusion which demonstrates enlarging nodular densities within it, suggest pleural metastatic disease   Post treatment changes in the right lung with chronic consolidation in the right paramediastinal location with the air bronchogram and volume loss, as seen on the previous study  4  MRI brain with and without contrast - 1 2 cm avidly enhancing lesion in left precentral gyrus with marked surrounding perilesional vasogenic edema, concerning for brain metastasis  Questionable tiny cortical enhancing lesion in left posterior frontal lobe only seen on bravo sequences slightly obscured by mild motion artifact   This could represent tiny brain metastasis or cortical vessel  Attention on follow-up  2 6 cm bilobed lesion in right nasopharynx with internal debris, likely complex mucus retention cyst   Recommend evaluation by ENT  Hospital Course:   Deborah Sosa is a 71 y o  male patient who originally presented to the hospital on 2/3/2023 due to right hand weakness since 4 days  He was found to have a 1 2 cm metastatic lesion in the left precentral gyrus with marked surrounding edema  He was begun on Decadron with improvement in his weakness  He was reviewed by neurosurgery and radiation-oncology with plan for [facility] an outpatient  Appointment has been scheduled with radiation-oncology as outpatient  He was also started on Keppra which he was advised to continue till further instructions are given by his outpatient radiation-oncologist      Condition at Discharge: stable    Discharge Day Visit / Exam:   Subjective:    Vitals: Blood Pressure: 124/73 (02/06/23 1144)  Pulse: 71 (02/06/23 1144)  Temperature: 97 7 °F (36 5 °C) (02/06/23 1144)  Temp Source: Axillary (02/05/23 2109)  Respirations: 16 (02/05/23 2109)  Height: 5' 9" (175 3 cm) (02/04/23 0800)  Weight - Scale: 84 8 kg (186 lb 15 2 oz) (02/04/23 0800)  SpO2: 94 % (02/06/23 1144)  Exam:   Physical Exam  Vitals reviewed  HENT:      Head: Normocephalic  Nose: Nose normal       Mouth/Throat:      Mouth: Mucous membranes are moist    Eyes:      Extraocular Movements: Extraocular movements intact  Cardiovascular:      Rate and Rhythm: Normal rate and regular rhythm     Pulmonary:      Effort: Pulmonary effort is normal  No respiratory distress  Breath sounds: Normal breath sounds  Abdominal:      General: Bowel sounds are normal  There is no distension  Palpations: Abdomen is soft  Tenderness: There is no abdominal tenderness  Musculoskeletal:         General: No swelling  Cervical back: Neck supple  Skin:     General: Skin is warm and dry  Neurological:      Mental Status: He is alert  Comments: Right hand weakness improved   Psychiatric:         Mood and Affect: Mood normal           Discussion with Family: Updated  (wife) at bedside  Discharge instructions/Information to patient and family:   See after visit summary for information provided to patient and family  Provisions for Follow-Up Care:  See after visit summary for information related to follow-up care and any pertinent home health orders  Disposition:   Home    Planned Readmission: No     Discharge Statement:  I spent 40 minutes discharging the patient  This time was spent on the day of discharge  I had direct contact with the patient on the day of discharge  Greater than 50% of the total time was spent examining patient, answering all patient questions, arranging and discussing plan of care with patient as well as directly providing post-discharge instructions  Additional time then spent on discharge activities  Discharge Medications:  See after visit summary for reconciled discharge medications provided to patient and/or family        **Please Note: This note may have been constructed using a voice recognition system**

## 2023-02-10 ENCOUNTER — DOCUMENTATION (OUTPATIENT)
Dept: HEMATOLOGY ONCOLOGY | Facility: CLINIC | Age: 73
End: 2023-02-10

## 2023-02-10 ENCOUNTER — APPOINTMENT (OUTPATIENT)
Dept: LAB | Facility: CLINIC | Age: 73
End: 2023-02-10

## 2023-02-10 DIAGNOSIS — T45.1X5A CHEMOTHERAPY-INDUCED NEUTROPENIA (HCC): ICD-10-CM

## 2023-02-10 DIAGNOSIS — R91.8 MASS OF UPPER LOBE OF RIGHT LUNG: ICD-10-CM

## 2023-02-10 DIAGNOSIS — C34.90 ADENOCARCINOMA OF LUNG, UNSPECIFIED LATERALITY (HCC): ICD-10-CM

## 2023-02-10 DIAGNOSIS — D70.1 CHEMOTHERAPY-INDUCED NEUTROPENIA (HCC): ICD-10-CM

## 2023-02-10 DIAGNOSIS — C34.11 MALIGNANT NEOPLASM OF UPPER LOBE OF RIGHT LUNG (HCC): ICD-10-CM

## 2023-02-10 LAB
ALBUMIN SERPL BCP-MCNC: 2.7 G/DL (ref 3.5–5)
ALP SERPL-CCNC: 59 U/L (ref 46–116)
ALT SERPL W P-5'-P-CCNC: 23 U/L (ref 12–78)
ANION GAP SERPL CALCULATED.3IONS-SCNC: 4 MMOL/L (ref 4–13)
ANISOCYTOSIS BLD QL SMEAR: PRESENT
AST SERPL W P-5'-P-CCNC: 20 U/L (ref 5–45)
BASOPHILS # BLD MANUAL: 0 THOUSAND/UL (ref 0–0.1)
BASOPHILS NFR MAR MANUAL: 0 % (ref 0–1)
BILIRUB SERPL-MCNC: 0.19 MG/DL (ref 0.2–1)
BUN SERPL-MCNC: 17 MG/DL (ref 5–25)
BURR CELLS BLD QL SMEAR: PRESENT
CALCIUM ALBUM COR SERPL-MCNC: 11 MG/DL (ref 8.3–10.1)
CALCIUM SERPL-MCNC: 10 MG/DL (ref 8.3–10.1)
CHLORIDE SERPL-SCNC: 108 MMOL/L (ref 96–108)
CO2 SERPL-SCNC: 27 MMOL/L (ref 21–32)
CREAT SERPL-MCNC: 0.96 MG/DL (ref 0.6–1.3)
EOSINOPHIL # BLD MANUAL: 0 THOUSAND/UL (ref 0–0.4)
EOSINOPHIL NFR BLD MANUAL: 0 % (ref 0–6)
ERYTHROCYTE [DISTWIDTH] IN BLOOD BY AUTOMATED COUNT: 18.4 % (ref 11.6–15.1)
GFR SERPL CREATININE-BSD FRML MDRD: 79 ML/MIN/1.73SQ M
GLUCOSE P FAST SERPL-MCNC: 121 MG/DL (ref 65–99)
HCT VFR BLD AUTO: 38.7 % (ref 36.5–49.3)
HGB BLD-MCNC: 11.3 G/DL (ref 12–17)
HYPERCHROMIA BLD QL SMEAR: PRESENT
LYMPHOCYTES # BLD AUTO: 0.9 THOUSAND/UL (ref 0.6–4.47)
LYMPHOCYTES # BLD AUTO: 8 % (ref 14–44)
MCH RBC QN AUTO: 26.9 PG (ref 26.8–34.3)
MCHC RBC AUTO-ENTMCNC: 29.2 G/DL (ref 31.4–37.4)
MCV RBC AUTO: 92 FL (ref 82–98)
MONOCYTES # BLD AUTO: 0.68 THOUSAND/UL (ref 0–1.22)
MONOCYTES NFR BLD: 6 % (ref 4–12)
NEUTROPHILS # BLD MANUAL: 9.73 THOUSAND/UL (ref 1.85–7.62)
NEUTS SEG NFR BLD AUTO: 86 % (ref 43–75)
PLATELET # BLD AUTO: 448 THOUSANDS/UL (ref 149–390)
PLATELET BLD QL SMEAR: ABNORMAL
PMV BLD AUTO: 10 FL (ref 8.9–12.7)
POIKILOCYTOSIS BLD QL SMEAR: PRESENT
POTASSIUM SERPL-SCNC: 4.3 MMOL/L (ref 3.5–5.3)
PROT SERPL-MCNC: 6.4 G/DL (ref 6.4–8.4)
RBC # BLD AUTO: 4.2 MILLION/UL (ref 3.88–5.62)
RBC MORPH BLD: PRESENT
SODIUM SERPL-SCNC: 139 MMOL/L (ref 135–147)
T3FREE SERPL-MCNC: 1.11 PG/ML (ref 2.3–4.2)
TSH SERPL DL<=0.05 MIU/L-ACNC: 1.53 UIU/ML (ref 0.45–4.5)
WBC # BLD AUTO: 11.31 THOUSAND/UL (ref 4.31–10.16)

## 2023-02-10 NOTE — PROGRESS NOTES
In-basket message received from Arleth Hampton RN on behalf of Dr Bekah Douglass to add pt to 2/15/2023  tumor board  Chart Reviewed and tumor board prep completed

## 2023-02-13 ENCOUNTER — TELEPHONE (OUTPATIENT)
Dept: HEMATOLOGY ONCOLOGY | Facility: HOSPITAL | Age: 73
End: 2023-02-13

## 2023-02-13 ENCOUNTER — OFFICE VISIT (OUTPATIENT)
Dept: HEMATOLOGY ONCOLOGY | Facility: HOSPITAL | Age: 73
End: 2023-02-13

## 2023-02-13 ENCOUNTER — HOSPITAL ENCOUNTER (OUTPATIENT)
Dept: INFUSION CENTER | Facility: HOSPITAL | Age: 73
Discharge: HOME | End: 2023-02-13
Attending: INTERNAL MEDICINE

## 2023-02-13 VITALS
DIASTOLIC BLOOD PRESSURE: 69 MMHG | BODY MASS INDEX: 28.98 KG/M2 | RESPIRATION RATE: 18 BRPM | OXYGEN SATURATION: 97 % | TEMPERATURE: 97.3 F | WEIGHT: 196.21 LBS | HEART RATE: 61 BPM | SYSTOLIC BLOOD PRESSURE: 151 MMHG

## 2023-02-13 VITALS
HEART RATE: 88 BPM | WEIGHT: 199 LBS | BODY MASS INDEX: 29.47 KG/M2 | OXYGEN SATURATION: 95 % | DIASTOLIC BLOOD PRESSURE: 80 MMHG | TEMPERATURE: 98.2 F | HEIGHT: 69 IN | SYSTOLIC BLOOD PRESSURE: 128 MMHG | RESPIRATION RATE: 16 BRPM

## 2023-02-13 DIAGNOSIS — D70.1 CHEMOTHERAPY INDUCED NEUTROPENIA (HCC): ICD-10-CM

## 2023-02-13 DIAGNOSIS — R91.8 MASS OF UPPER LOBE OF RIGHT LUNG: ICD-10-CM

## 2023-02-13 DIAGNOSIS — C34.11 MALIGNANT NEOPLASM OF UPPER LOBE OF RIGHT LUNG (HCC): ICD-10-CM

## 2023-02-13 DIAGNOSIS — D70.1 CHEMOTHERAPY INDUCED NEUTROPENIA (HCC): Primary | ICD-10-CM

## 2023-02-13 DIAGNOSIS — T45.1X5A CHEMOTHERAPY INDUCED NEUTROPENIA (HCC): ICD-10-CM

## 2023-02-13 DIAGNOSIS — C34.11 MALIGNANT NEOPLASM OF UPPER LOBE OF RIGHT LUNG (HCC): Primary | ICD-10-CM

## 2023-02-13 DIAGNOSIS — C34.90 ADENOCARCINOMA OF LUNG, UNSPECIFIED LATERALITY (HCC): ICD-10-CM

## 2023-02-13 DIAGNOSIS — T45.1X5A CHEMOTHERAPY INDUCED NEUTROPENIA (HCC): Primary | ICD-10-CM

## 2023-02-13 DIAGNOSIS — E83.52 HYPERCALCEMIA OF MALIGNANCY: ICD-10-CM

## 2023-02-13 RX ORDER — SODIUM CHLORIDE 9 MG/ML
20 INJECTION, SOLUTION INTRAVENOUS ONCE
Status: CANCELLED | OUTPATIENT
Start: 2023-02-13

## 2023-02-13 RX ORDER — SODIUM CHLORIDE 9 MG/ML
20 INJECTION, SOLUTION INTRAVENOUS ONCE
OUTPATIENT
Start: 2023-03-06

## 2023-02-13 RX ORDER — SODIUM CHLORIDE 9 MG/ML
20 INJECTION, SOLUTION INTRAVENOUS ONCE
Status: COMPLETED | OUTPATIENT
Start: 2023-02-13 | End: 2023-02-13

## 2023-02-13 RX ADMIN — SODIUM CHLORIDE 200 MG: 9 INJECTION, SOLUTION INTRAVENOUS at 10:09

## 2023-02-13 RX ADMIN — SODIUM CHLORIDE 20 ML/HR: 9 INJECTION, SOLUTION INTRAVENOUS at 10:09

## 2023-02-13 RX ADMIN — DENOSUMAB 120 MG: 120 INJECTION SUBCUTANEOUS at 09:27

## 2023-02-13 NOTE — TELEPHONE ENCOUNTER
Per Dr Phoebe Chapa, patient can get treatment today  Dr Phoebe Chapa saw the patient while in the hospital and patient is cleared  Patient will see Dr Phoebe Chapa this afternoon

## 2023-02-13 NOTE — PROGRESS NOTES
Pt tolerated infusion and xgeva without any difficulty  Ambulated with a steady gait  AVS printed and given to pt

## 2023-02-15 ENCOUNTER — DOCUMENTATION (OUTPATIENT)
Dept: RADIATION ONCOLOGY | Facility: HOSPITAL | Age: 73
End: 2023-02-15

## 2023-02-15 ENCOUNTER — CLINICAL SUPPORT (OUTPATIENT)
Dept: RADIATION ONCOLOGY | Facility: HOSPITAL | Age: 73
End: 2023-02-15
Attending: RADIOLOGY

## 2023-02-15 ENCOUNTER — RADIATION ONCOLOGY CONSULT (OUTPATIENT)
Dept: RADIATION ONCOLOGY | Facility: HOSPITAL | Age: 73
End: 2023-02-15
Attending: RADIOLOGY

## 2023-02-15 VITALS
HEART RATE: 96 BPM | RESPIRATION RATE: 18 BRPM | TEMPERATURE: 98.6 F | HEIGHT: 68 IN | OXYGEN SATURATION: 99 % | BODY MASS INDEX: 30.28 KG/M2 | WEIGHT: 199.8 LBS

## 2023-02-15 DIAGNOSIS — C79.31 BRAIN METASTASIS (HCC): Primary | ICD-10-CM

## 2023-02-15 DIAGNOSIS — C79.31 BRAIN METASTASIS: Primary | ICD-10-CM

## 2023-02-15 DIAGNOSIS — C79.31 BRAIN METASTASIS (HCC): ICD-10-CM

## 2023-02-15 RX ORDER — DEXAMETHASONE 2 MG/1
2 TABLET ORAL 2 TIMES DAILY WITH MEALS
COMMUNITY

## 2023-02-15 NOTE — PROGRESS NOTES
Liberty Sawant 1950 is a 71 y o  male Radiation Oncology consult for newly diagnosed brain metastasis (1 2 cm lesion in left precentral gyrus), referred by Keith Can, hospitalist for [facility]  71year old male with history of adenocarcinoma NSCLC of right upper lobe diagnosed in August 2021  Previous radiation under the care of Dr Katerina Nobles, last rad onc follow-up on 12/8/22 with stable CT in Nov 2022  He completed chemotherapy for cytoreduction followed by concurrent chemoRT completed 4/21/22  He continued with systemic immunotherapy with Keytruda  2/3/23 Med Onc, Dr Marylee Hacker  Pt is tolerating single agent Keytruda quite well  Plan to order repeat imaging in 2 months  The patient's only complaint was that he feels he cannot hold things with his right hand the way he could previously  This started on Monday  He initially thought it was temporary but it has persisted  Pt sent to the ER for MRI as concerned for a stroke or metastatic disease to the brain       2/3 - 2/6/23 Hospital admission  He was found to have a 1 2 cm metastatic lesion in the left precentral gyrus with marked surrounding edema  Right hand weakness and  strength improved with Decadron  He was reviewed by neurosurgery and radiation-oncology with plan for [facility] as an outpatient  He was also started on Keppra  2/3/23 CTA head and neck w wo contrast  1  No acute intracranial CT abnormality  2   A 1 cm posterior left frontal lobe gray-white matter junction lesion with subjacent vasogenic edema most concerning for metastasis  No significant mass effect or midline shift  Recommend further assessment with brain MRI without and with contrast    3   Patent major vasculature of the [place] without high-grade stenosis  No aneurysm  4   No hemodynamically significant stenosis or dissection of cervical carotid and vertebral arteries  Left greater than right atherosclerotic change of cervical carotid arteries     5   No significant change in size of known right nasopharyngeal mass compared to neck CT 12/20/2021    6   Redemonstrated posttreatment change and partially obscure known right upper lobe mass in partially imaged upper chest   See the report of concurrent CTA chest/abdomen/pelvis  2/3/23 CTA chest abd pelvis w wo contrast  No evidence of thoracic aortic dissection   No pulmonary embolism in the main pulmonary arteries   Chronic loculated left effusion which demonstrates enlarging nodular densities within it, suggest pleural metastatic disease   Post treatment changes in the right lung with chronic consolidation in the right paramediastinal location with the air bronchogram and volume loss, as seen on the previous study      2/4/23 MRI brain w wo contrast  1 2 cm avidly enhancing lesion in left precentral gyrus with marked surrounding perilesional vasogenic edema, concerning for brain metastasis  Questionable tiny cortical enhancing lesion in left posterior frontal lobe only seen on bravo sequences slightly obscured by mild motion artifact   This could represent tiny brain metastasis or cortical vessel  Attention on follow-up  2 6 cm bilobed lesion in right nasopharynx with internal debris, likely complex mucus retention cyst   Recommend evaluation by ENT  2/13/23 Infusion, Keytruda      Upcoming:  3/6/23 Infusion   6/8/23 Dr Jeniffer Medellin, Rad Onc      Oncology History Overview Note   Radiation Oncology consult for newly diagnosed brain metastasis (1 2 cm lesion in left precentral gyrus), referred by Ledy Newsome, hospitalist for [facility]  71year old male with history of adenocarcinoma NSCLC of right upper lobe diagnosed in August 2021  Previous radiation under the care of Dr Jeniffer Medellin, last rad onc follow-up on 12/8/22 with stable CT in Nov 2022  He completed chemotherapy for cytoreduction followed by concurrent chemoRT completed 4/21/22  He continued with systemic immunotherapy with Keytruda       2/3/23 Narda Meng Dr  Dorysbhavin Toney  Pt is tolerating single agent Keytruda quite well  Plan to order repeat imaging in 2 months  The patient's only complaint was that he feels he cannot hold things with his right hand the way he could previously  This started on Monday  He initially thought it was temporary but it has persisted  Pt sent to the ER for MRI as concerned for a stroke or metastatic disease to the brain       2/3 - 2/6/23 Hospital admission  He was found to have a 1 2 cm metastatic lesion in the left precentral gyrus with marked surrounding edema  Right hand weakness and  strength improved with Decadron  He was reviewed by neurosurgery and radiation-oncology with plan for [facility] as an outpatient  He was also started on Keppra  2/3/23 CTA head and neck w wo contrast  1  No acute intracranial CT abnormality  2   A 1 cm posterior left frontal lobe gray-white matter junction lesion with subjacent vasogenic edema most concerning for metastasis  No significant mass effect or midline shift  Recommend further assessment with brain MRI without and with contrast    3   Patent major vasculature of the [place] without high-grade stenosis  No aneurysm  4   No hemodynamically significant stenosis or dissection of cervical carotid and vertebral arteries  Left greater than right atherosclerotic change of cervical carotid arteries  5   No significant change in size of known right nasopharyngeal mass compared to neck CT 12/20/2021    6   Redemonstrated posttreatment change and partially obscure known right upper lobe mass in partially imaged upper chest   See the report of concurrent CTA chest/abdomen/pelvis         2/3/23 CTA chest abd pelvis w wo contrast  No evidence of thoracic aortic dissection   No pulmonary embolism in the main pulmonary arteries   Chronic loculated left effusion which demonstrates enlarging nodular densities within it, suggest pleural metastatic disease   Post treatment changes in the right lung with chronic consolidation in the right paramediastinal location with the air bronchogram and volume loss, as seen on the previous study      23 MRI brain w wo contrast  1 2 cm avidly enhancing lesion in left precentral gyrus with marked surrounding perilesional vasogenic edema, concerning for brain metastasis  Questionable tiny cortical enhancing lesion in left posterior frontal lobe only seen on bravo sequences slightly obscured by mild motion artifact   This could represent tiny brain metastasis or cortical vessel  Attention on follow-up  2 6 cm bilobed lesion in right nasopharynx with internal debris, likely complex mucus retention cyst   Recommend evaluation by ENT      23 Infusion, Keytruda      Upcoming:  3/6/23 Infusion   23 Dr Stanford Jimenez, Rad Onc     Adenocarcinoma of lung   2021 Initial Diagnosis    Adenocarcinoma of lung     2021 Biopsy    Right lung apex, image-guided core needle biopsy:  - Adenocarcinoma      10/6/2021 - 2022 Chemotherapy    cyanocobalamin, 1,000 mcg, Intramuscular, Once, 3 of 3 cycles  Administration: 1,000 mcg (2021), 1,000 mcg (2022), 1,000 mcg (10/6/2021)  pegfilgrastim (Alex Rios), 6 mg, Subcutaneous, Once, 1 of 1 cycle  Administration: 6 mg (2021)  pegfilgrastim (Ronnie Apgar), 6 mg, Subcutaneous, Once, 6 of 6 cycles  Administration: 6 mg (10/6/2021), 6 mg (11/3/2021), 6 mg (2021), 6 mg (12/15/2021), 6 mg (2022)  fosaprepitant (EMEND) IVPB, 150 mg, Intravenous, Once, 6 of 6 cycles  Administration: 150 mg (10/6/2021), 150 mg (2021), 150 mg (12/15/2021), 150 mg (2022), 150 mg (11/3/2021), 150 mg (2022)  CARBOplatin (PARAPLATIN) IVPB (GOG AUC DOSING), 519 5 mg, Intravenous, Once, 6 of 6 cycles  Administration: 519 5 mg (10/6/2021), 574 mg (2021), 600 mg (12/15/2021), 533 mg (2022), 604 5 mg (11/3/2021), 563 mg (2022)  pemetrexed (ALIMTA) chemo infusion, 970 mg, Intravenous, Once, 6 of 6 cycles  Administration: 1,000 mg (10/6/2021), 1,000 mg (11/24/2021), 1,000 mg (12/15/2021), 1,000 mg (1/26/2022), 1,000 mg (11/3/2021), 1,000 mg (1/5/2022)  pembrolizumab (KEYTRUDA) IVPB, 200 mg, Intravenous, Once, 6 of 6 cycles  Administration: 200 mg (10/6/2021), 200 mg (11/24/2021), 200 mg (12/15/2021), 200 mg (1/26/2022), 200 mg (11/3/2021), 200 mg (1/5/2022)     3/7/2022 -  Chemotherapy    CARBOplatin (PARAPLATIN) IVPB ([facility] AUC DOSING), , Intravenous, Once, 6 of 6 cycles  Administration: 211 6 mg (3/7/2022), 208 mg (3/14/2022), 238 8 mg (3/21/2022), 211 6 mg (3/28/2022), 215 2 mg (4/4/2022), 213 4 mg (4/18/2022)  PACLItaxel (TAXOL) chemo IVPB, 50 mg/m2 = 99 mg, Intravenous, Once, 6 of 6 cycles  Administration: 99 mg (3/7/2022), 99 mg (3/14/2022), 99 mg (3/21/2022), 99 mg (3/28/2022), 99 mg (4/4/2022), 99 mg (4/18/2022)  pembrolizumab (KEYTRUDA) IVPB, 200 mg, Intravenous, Once, 17 of 22 cycles  Administration: 200 mg (3/7/2022), 200 mg (3/28/2022), 200 mg (4/25/2022), 200 mg (5/16/2022), 200 mg (6/6/2022), 200 mg (6/27/2022), 200 mg (7/18/2022), 200 mg (8/8/2022), 200 mg (8/29/2022), 200 mg (9/19/2022), 200 mg (10/10/2022), 200 mg (10/31/2022), 200 mg (11/21/2022), 200 mg (12/12/2022), 200 mg (1/3/2023), 200 mg (1/23/2023), 200 mg (2/13/2023)     Malignant neoplasm of upper lobe of right lung ([facility] [address] )   9/3/2021 Initial Diagnosis    Malignant [facility] of right [facility] )     9/23/2021 -  Cancer Staged    Staging form: Lung, AJCC 8th Edition  - Clinical stage from 9/23/2021: Stage IIIC (cT3, cN3, cM0) - Signed by Melissa Anderson MD on 9/23/2021  Histopathologic type:  Adenocarcinoma, NOS       10/6/2021 - 1/26/2022 Chemotherapy    cyanocobalamin, 1,000 mcg, Intramuscular, Once, 3 of 3 cycles  Administration: 1,000 mcg (11/24/2021), 1,000 mcg (1/26/2022), 1,000 mcg (10/6/2021)  pegfilgrastim (NEULASTA), 6 mg, Subcutaneous, Once, 1 of 1 cycle  Administration: 6 mg (11/26/2021)  pegfilgrastim (Deric Leavens ONPRO), 6 mg, Subcutaneous, Once, 6 of 6 cycles  Administration: 6 mg (10/6/2021), 6 mg (11/3/2021), 6 mg (11/24/2021), 6 mg (12/15/2021), 6 mg (1/5/2022)  fosaprepitant (EMEND) IVPB, 150 mg, Intravenous, Once, 6 of 6 cycles  Administration: 150 mg (10/6/2021), 150 mg (11/24/2021), 150 mg (12/15/2021), 150 mg (1/26/2022), 150 mg (11/3/2021), 150 mg (1/5/2022)  CARBOplatin (PARAPLATIN) IVPB ([facility] AUC DOSING), 519 5 mg, Intravenous, Once, 6 of 6 cycles  Administration: 519 5 mg (10/6/2021), 574 mg (11/24/2021), 600 mg (12/15/2021), 533 mg (1/26/2022), 604 5 mg (11/3/2021), 563 mg (1/5/2022)  pemetrexed (ALIMTA) chemo infusion, 970 mg, Intravenous, Once, 6 of 6 cycles  Administration: 1,000 mg (10/6/2021), 1,000 mg (11/24/2021), 1,000 mg (12/15/2021), 1,000 mg (1/26/2022), 1,000 mg (11/3/2021), 1,000 mg (1/5/2022)  pembrolizumab (KEYTRUDA) IVPB, 200 mg, Intravenous, Once, 6 of 6 cycles  Administration: 200 mg (10/6/2021), 200 mg (11/24/2021), 200 mg (12/15/2021), 200 mg (1/26/2022), 200 mg (11/3/2021), 200 mg (1/5/2022)     3/4/2022 - 4/21/2022 Radiation    The patient saw @[facility]@ for radiation treatment   This is the current list of radiation treatment:  Plan ID Energy Fractions Dose per Fraction (cGy) Dose Correction (cGy) Total Dose Delivered (cGy) Elapsed Days   R LUNGMED REV 6X 30 / 30 200 0 6,000 48      Treatment dates:  C1: 3/4/2022 - 4/21/2022         3/7/2022 -  Chemotherapy    CARBOplatin (PARAPLATIN) IVPB (GOG AUC DOSING), , Intravenous, Once, 6 of 6 cycles  Administration: 211 6 mg (3/7/2022), 208 mg (3/14/2022), 238 8 mg (3/21/2022), 211 6 mg (3/28/2022), 215 2 mg (4/4/2022), 213 4 mg (4/18/2022)  PACLItaxel (TAXOL) chemo IVPB, 50 mg/m2 = 99 mg, Intravenous, Once, 6 of 6 cycles  Administration: 99 mg (3/7/2022), 99 mg (3/14/2022), 99 mg (3/21/2022), 99 mg (3/28/2022), 99 mg (4/4/2022), 99 mg (4/18/2022)  pembrolizumab (KEYTRUDA) IVPB, 200 mg, Intravenous, Once, 17 of 22 cycles  Administration: 200 mg (3/7/2022), 200 mg (3/28/2022), 200 mg (4/25/2022), 200 mg (5/16/2022), 200 mg (6/6/2022), 200 mg (6/27/2022), 200 mg (7/18/2022), 200 mg (8/8/2022), 200 mg (8/29/2022), 200 mg (9/19/2022), 200 mg (10/10/2022), 200 mg (10/31/2022), 200 mg (11/21/2022), 200 mg (12/12/2022), 200 mg (1/3/2023), 200 mg (1/23/2023), 200 mg (2/13/2023)     Brain metastasis   2/3/2023 Initial Diagnosis    Brain metastasis         Review of Systems:  Review of Systems   Constitutional: Negative  HENT: Negative  Eyes: Negative  Respiratory: Negative  Cardiovascular: Negative  Gastrointestinal: Negative  Endocrine: Negative  Genitourinary: Negative  Musculoskeletal: Negative  Skin: Negative  Allergic/Immunologic: Negative  Neurological:        Right hand improved 75%   Hematological: Negative  Psychiatric/Behavioral: Positive for confusion (Patient denies, spouse nodding head yes  )         Clinical Trial: no    IPSS Questionnaire (AUA-7):    Pain assessment: 0    PFT    Prior Radiation lung 4/2022    Teaching    MST    Implantable Devices (Port, pacemaker, pain stimulator) no    Hip Replacement no    Health Maintenance   Topic Date Due   • Hepatitis C Screening  Never done   • Hepatitis A Vaccine (1 of 2 - Risk 2-dose series) Never done   • BMI: Followup Plan  Never done   • Hepatitis B Vaccine (1 of 3 - Risk 3-dose series) Never done   • COVID-19 Vaccine (4 - Booster for Pfizer series) 03/19/2022   • Influenza Vaccine (1) Never done   • Fall Risk  01/24/2023   • Medicare Annual Wellness Visit (AWV)  01/24/2023   • Depression Screening  01/27/2023   • Colorectal Cancer Screening  06/13/2023 (Originally 9/5/1995)   • BMI: Adult  02/13/2024   • Pneumococcal Vaccine: 65+ Years  Completed   • HIB Vaccine  Aged Out   • IPV Vaccine  Aged Out   • Meningococcal ACWY Vaccine  Aged Out   • HPV Vaccine  Aged Out       Past Medical History:   Diagnosis Date   • Chronic respiratory failure with hypoxia ([address] ) 8/9/2021   • Impaired mobility and ADLs 2021   • Lung cancer [facility])    • Stroke [facility])        Past Surgical History:   Procedure Laterality Date   • IR BIOPSY LUNG  2021   • IR THORACENTESIS  2022   • IR THORACENTESIS  2022       Family History   Problem Relation Age of Onset   • Prostate cancer Brother    • Lung cancer Brother        Social History     Tobacco Use   • Smoking status: Former     Packs/day: 3 00     Years: 39 00     Pack years: 117 00     Types: Cigarettes     Start date:      Quit date:      Years since quittin 1   • Smokeless tobacco: Never   Vaping Use   • Vaping Use: Never used   Substance Use Topics   • Alcohol use: Not Currently     Comment: No ETOH since Summer 2021    • Drug use: Never          Current Outpatient Medications:   •  acetaminophen (TYLENOL) 325 mg tablet, Take 2 tablets (650 mg total) by mouth 4 (four) times a day as needed for mild pain, headaches or fever, Disp: , Rfl: 0  •  albuterol (ProAir HFA) 90 mcg/act inhaler, Inhale 2 puffs every 6 (six) hours as needed for wheezing or shortness of breath, Disp: 8 g, Rfl: 0  •  atorvastatin (LIPITOR) 40 mg tablet, TAKE 1 TABLET BY MOUTH DAILY WITH DINNER, Disp: 90 tablet, Rfl: 0  •  dexamethasone (DECADRON) 2 mg tablet, Take 2 mg by mouth 2 (two) times a day with meals, Disp: , Rfl:   •  enoxaparin (LOVENOX) 80 mg/0 8 mL, INJECT 0 8 ML (80 MG TOTAL) UNDER THE SKIN EVERY 12 (TWELVE) HOURS, Disp: 144 mL, Rfl: 1  •  folic acid (FOLVITE) 1 mg tablet, TAKE 1 TABLET BY MOUTH EVERY DAY, Disp: 90 tablet, Rfl: 1  •  levETIRAcetam (Keppra) 1000 MG tablet, Take 1 tablet (1,000 mg total) by mouth 2 (two) times a day, Disp: 60 tablet, Rfl: 0  •  multivitamin (THERAGRAN) TABS, Take 1 tablet by mouth daily, Disp: , Rfl:   •  pantoprazole (PROTONIX) 40 mg tablet, Take 1 tablet (40 mg total) by mouth daily, Disp: 35 tablet, Rfl: 0  •  tamsulosin (FLOMAX) 0 4 mg, TAKE 1 CAPSULE (0 4 MG TOTAL) BY MOUTH DAILY AFTER DINNER, Disp: 90 capsule, Rfl: 0    Allergies   Allergen Reactions   • Doxycycline Rash        Vitals:    02/15/23 0934   Pulse: 96   Resp: 18   Temp: 98 6 °F (37 °C)   SpO2: 99%   Weight: 90 6 kg (199 lb 12 8 oz)   Height: 5' 8" (1 727 m)       Pain Score: 0-No pain

## 2023-02-15 NOTE — LETTER
February 15, 2023     Max Hugo MD  [address]  [address] [address]    Patient: Araceli Barr   YOB: 1950   Date of Visit: 2/15/2023       Dear Dr Elian Arzate: Thank you for referring Araceli Barr to me for evaluation  Below are my notes for this consultation  If you have questions, please do not hesitate to call me  I look forward to following your patient along with you  Sincerely,        Keith Rader MD        CC: No Recipients  Keith Rader MD  2/15/2023 10:40 AM  Sign when Signing Visit  Consultation - Radiation Oncology      RWV:[id number] : 1950  Encounter: [id number]  Patient Information: [address]  Chief Complaint   Patient presents with   • Brain Tumor     Cancer Staging   Malignant neoplasm of upper lobe of right lung [facility])  Staging form: Lung, AJCC 8th Edition  - Clinical stage from 2021: Stage IIIC (cT3, cN3, cM0) - Signed by Scott Cueto MD on 2021  Histopathologic type: Adenocarcinoma, NOS           History of Present Illness     Araceli Barr 1950 is a 71 y o  male Radiation Oncology consult for newly diagnosed brain metastasis (1 2 cm lesion in left precentral gyrus), referred by Max Hugo, hospitalist for [facility]     71year old male with history of adenocarcinoma NSCLC of right upper lobe diagnosed in 2021  Previous radiation under the care of Dr Ryan Mehta, last rad onc follow-up on 22 with stable CT in 2022  He completed chemotherapy for cytoreduction followed by concurrent chemoRT completed 22  He continued with systemic immunotherapy with Keytruda       2/3/23 Med Onc, Dr Anahi Dee  Pt is tolerating single agent Keytruda quite well  Plan to order repeat imaging in 2 months   The patient's only complaint was that he feels he cannot hold things with his right hand the way he could previously   This started on Monday  Rich Antunez initially thought it was temporary but it has persisted   Pt sent to the ER for MRI as concerned for a stroke or metastatic disease to the brain         2/3 - 2/6/23 Hospital admission  He was found to have a 1 2 cm metastatic lesion in the left precentral gyrus with marked surrounding edema  Right hand weakness and  strength improved with Decadron  He was reviewed by neurosurgery and radiation-oncology with plan for [facility] as an outpatient  He was also started on Keppra          2/3/23 CTA head and neck w wo contrast  1   No acute intracranial CT abnormality    2   A 1 cm posterior left frontal lobe gray-white matter junction lesion with subjacent vasogenic edema most concerning for metastasis   No significant mass effect or midline shift   Recommend further assessment with brain MRI without and with contrast    3   Patent major vasculature of the [place] without high-grade stenosis   No aneurysm      4   No hemodynamically significant stenosis or dissection of cervical carotid and vertebral arteries    Left greater than right atherosclerotic change of cervical carotid arteries    5   No significant change in size of known right nasopharyngeal mass compared to neck CT 12/20/2021    6   Redemonstrated posttreatment change and partially obscure known right upper lobe mass in partially imaged upper chest   See the report of concurrent CTA chest/abdomen/pelvis         2/3/23 CTA chest abd pelvis w wo contrast  No evidence of thoracic aortic dissection   No pulmonary embolism in the main pulmonary arteries   Chronic loculated left effusion which demonstrates enlarging nodular densities within it, suggest pleural metastatic disease   Post treatment changes in the right lung with chronic consolidation in the right paramediastinal location with the air bronchogram and volume loss, as seen on the previous study  2/4/23 MRI brain w wo contrast  1 2 cm avidly enhancing lesion in left precentral gyrus with marked surrounding perilesional vasogenic edema, concerning for brain metastasis     Questionable tiny cortical enhancing lesion in left posterior frontal lobe only seen on bravo sequences slightly obscured by mild motion artifact   This could represent tiny brain metastasis or cortical vessel   Attention on follow-up    2 6 cm bilobed lesion in right nasopharynx with internal debris, likely complex mucus retention cyst   Recommend evaluation by ENT      2/13/23 Infusion, Keytruda        Upcoming:  3/6/23 Infusion   6/8/23 Dr Sourav Garnett, Rad Onc         Historical Information   Oncology History   Adenocarcinoma of lung   8/2021 Initial Diagnosis    Adenocarcinoma of lung     8/5/2021 Biopsy    Right lung apex, image-guided core needle biopsy:  - Adenocarcinoma      10/6/2021 - 1/26/2022 Chemotherapy    cyanocobalamin, 1,000 mcg, Intramuscular, Once, 3 of 3 cycles  Administration: 1,000 mcg (11/24/2021), 1,000 mcg (1/26/2022), 1,000 mcg (10/6/2021)  pegfilgrastim (Jorene Scales), 6 mg, Subcutaneous, Once, 1 of 1 cycle  Administration: 6 mg (11/26/2021)  pegfilgrastim (Durwin Cotton), 6 mg, Subcutaneous, Once, 6 of 6 cycles  Administration: 6 mg (10/6/2021), 6 mg (11/3/2021), 6 mg (11/24/2021), 6 mg (12/15/2021), 6 mg (1/5/2022)  fosaprepitant (EMEND) IVPB, 150 mg, Intravenous, Once, 6 of 6 cycles  Administration: 150 mg (10/6/2021), 150 mg (11/24/2021), 150 mg (12/15/2021), 150 mg (1/26/2022), 150 mg (11/3/2021), 150 mg (1/5/2022)  CARBOplatin (PARAPLATIN) IVPB (GOG AUC DOSING), 519 5 mg, Intravenous, Once, 6 of 6 cycles  Administration: 519 5 mg (10/6/2021), 574 mg (11/24/2021), 600 mg (12/15/2021), 533 mg (1/26/2022), 604 5 mg (11/3/2021), 563 mg (1/5/2022)  pemetrexed (ALIMTA) chemo infusion, 970 mg, Intravenous, Once, 6 of 6 cycles  Administration: 1,000 mg (10/6/2021), 1,000 mg (11/24/2021), 1,000 mg (12/15/2021), 1,000 mg (1/26/2022), 1,000 mg (11/3/2021), 1,000 mg (1/5/2022)  pembrolizumab (KEYTRUDA) IVPB, 200 mg, Intravenous, Once, 6 of 6 cycles  Administration: 200 mg (10/6/2021), 200 mg (11/24/2021), 200 mg (12/15/2021), 200 mg (1/26/2022), 200 mg (11/3/2021), 200 mg (1/5/2022)     3/7/2022 -  Chemotherapy    CARBOplatin (PARAPLATIN) IVPB (GOG AUC DOSING), , Intravenous, Once, 6 of 6 cycles  Administration: 211 6 mg (3/7/2022), 208 mg (3/14/2022), 238 8 mg (3/21/2022), 211 6 mg (3/28/2022), 215 2 mg (4/4/2022), 213 4 mg (4/18/2022)  PACLItaxel (TAXOL) chemo IVPB, 50 mg/m2 = 99 mg, Intravenous, Once, 6 of 6 cycles  Administration: 99 mg (3/7/2022), 99 mg (3/14/2022), 99 mg (3/21/2022), 99 mg (3/28/2022), 99 mg (4/4/2022), 99 mg (4/18/2022)  pembrolizumab (KEYTRUDA) IVPB, 200 mg, Intravenous, Once, 17 of 22 cycles  Administration: 200 mg (3/7/2022), 200 mg (3/28/2022), 200 mg (4/25/2022), 200 mg (5/16/2022), 200 mg (6/6/2022), 200 mg (6/27/2022), 200 mg (7/18/2022), 200 mg (8/8/2022), 200 mg (8/29/2022), 200 mg (9/19/2022), 200 mg (10/10/2022), 200 mg (10/31/2022), 200 mg (11/21/2022), 200 mg (12/12/2022), 200 mg (1/3/2023), 200 mg (1/23/2023), 200 mg (2/13/2023)     Malignant neoplasm of upper lobe of right lung ([facility] [address] )   9/3/2021 Initial Diagnosis    Malignant neoplasm of upper lobe of right lung ([facility] [address] )     9/23/2021 -  Cancer Staged    Staging form: Lung, AJCC 8th Edition  - Clinical stage from 9/23/2021: Stage IIIC (cT3, cN3, cM0) - Signed by Tricia Berrios MD on 9/23/2021  Histopathologic type:  Adenocarcinoma, NOS       10/6/2021 - 1/26/2022 Chemotherapy    cyanocobalamin, 1,000 mcg, Intramuscular, Once, 3 of 3 cycles  Administration: 1,000 mcg (11/24/2021), 1,000 mcg (1/26/2022), 1,000 mcg (10/6/2021)  pegfilgrastim (Claretha Zoraida), 6 mg, Subcutaneous, Once, 1 of 1 cycle  Administration: 6 mg (11/26/2021)  pegfilgrastim (Rani Broaden), 6 mg, Subcutaneous, Once, 6 of 6 cycles  Administration: 6 mg (10/6/2021), 6 mg (11/3/2021), 6 mg (11/24/2021), 6 mg (12/15/2021), 6 mg (1/5/2022)  fosaprepitant (EMEND) IVPB, 150 mg, Intravenous, Once, 6 of 6 cycles  Administration: 150 mg (10/6/2021), 150 mg (11/24/2021), 150 mg (12/15/2021), 150 mg (1/26/2022), 150 mg (11/3/2021), 150 mg (1/5/2022)  CARBOplatin (PARAPLATIN) IVPB (GOG AUC DOSING), 519 5 mg, Intravenous, Once, 6 of 6 cycles  Administration: 519 5 mg (10/6/2021), 574 mg (11/24/2021), 600 mg (12/15/2021), 533 mg (1/26/2022), 604 5 mg (11/3/2021), 563 mg (1/5/2022)  pemetrexed (ALIMTA) chemo infusion, 970 mg, Intravenous, Once, 6 of 6 cycles  Administration: 1,000 mg (10/6/2021), 1,000 mg (11/24/2021), 1,000 mg (12/15/2021), 1,000 mg (1/26/2022), 1,000 mg (11/3/2021), 1,000 mg (1/5/2022)  pembrolizumab (KEYTRUDA) IVPB, 200 mg, Intravenous, Once, 6 of 6 cycles  Administration: 200 mg (10/6/2021), 200 mg (11/24/2021), 200 mg (12/15/2021), 200 mg (1/26/2022), 200 mg (11/3/2021), 200 mg (1/5/2022)     3/4/2022 - 4/21/2022 Radiation    The patient saw @[facility]@ for radiation treatment   This is the current list of radiation treatment:  Plan ID Energy Fractions Dose per Fraction (cGy) Dose Correction (cGy) Total Dose Delivered (cGy) Elapsed Days   R LUNGMED REV 6X 30 / 30 200 0 6,000 48      Treatment dates:  C1: 3/4/2022 - 4/21/2022         3/7/2022 -  Chemotherapy    CARBOplatin (PARAPLATIN) IVPB (GOG AUC DOSING), , Intravenous, Once, 6 of 6 cycles  Administration: 211 6 mg (3/7/2022), 208 mg (3/14/2022), 238 8 mg (3/21/2022), 211 6 mg (3/28/2022), 215 2 mg (4/4/2022), 213 4 mg (4/18/2022)  PACLItaxel (TAXOL) chemo IVPB, 50 mg/m2 = 99 mg, Intravenous, Once, 6 of 6 cycles  Administration: 99 mg (3/7/2022), 99 mg (3/14/2022), 99 mg (3/21/2022), 99 mg (3/28/2022), 99 mg (4/4/2022), 99 mg (4/18/2022)  pembrolizumab (KEYTRUDA) IVPB, 200 mg, Intravenous, Once, 17 of 22 cycles  Administration: 200 mg (3/7/2022), 200 mg (3/28/2022), 200 mg (4/25/2022), 200 mg (5/16/2022), 200 mg (6/6/2022), 200 mg (6/27/2022), 200 mg (7/18/2022), 200 mg (8/8/2022), 200 mg (8/29/2022), 200 mg (9/19/2022), 200 mg (10/10/2022), 200 mg (10/31/2022), 200 mg (11/21/2022), 200 mg (12/12/2022), 200 mg (1/3/2023), 200 mg (2023), 200 mg (2023)     Brain metastasis   2/3/2023 Initial Diagnosis    Brain metastasis           Past Medical History:   Diagnosis Date   • Chronic respiratory failure with hypoxia ([facility] [address] ) 2021   • Impaired mobility and ADLs 2021   • Lung cancer ([facility] [address] )    • Stroke [facility])      Past Surgical History:   Procedure Laterality Date   • IR BIOPSY LUNG  2021   • IR THORACENTESIS  2022   • IR THORACENTESIS  2022       Family History   Problem Relation Age of Onset   • Prostate cancer Brother    • Lung cancer Brother        Social History   Social History     Substance and Sexual Activity   Alcohol Use Not Currently    Comment: No ETOH since Summer 2021      Social History     Substance and Sexual Activity   Drug Use Never     Social History     Tobacco Use   Smoking Status Former   • Packs/day: 3 00   • Years: 39 00   • Pack years: 117 00   • Types: Cigarettes   • Start date: 66   • Quit date:    • Years since quittin 1   Smokeless Tobacco Never         Meds/Allergies      Current Outpatient Medications:   •  acetaminophen (TYLENOL) 325 mg tablet, Take 2 tablets (650 mg total) by mouth 4 (four) times a day as needed for mild pain, headaches or fever, Disp: , Rfl: 0  •  albuterol (ProAir HFA) 90 mcg/act inhaler, Inhale 2 puffs every 6 (six) hours as needed for wheezing or shortness of breath, Disp: 8 g, Rfl: 0  •  atorvastatin (LIPITOR) 40 mg tablet, TAKE 1 TABLET BY MOUTH DAILY WITH DINNER, Disp: 90 tablet, Rfl: 0  •  dexamethasone (DECADRON) 2 mg tablet, Take 2 mg by mouth 2 (two) times a day with meals, Disp: , Rfl:   •  enoxaparin (LOVENOX) 80 mg/0 8 mL, INJECT 0 8 ML (80 MG TOTAL) UNDER THE SKIN EVERY 12 (TWELVE) HOURS, Disp: 144 mL, Rfl: 1  •  folic acid (FOLVITE) 1 mg tablet, TAKE 1 TABLET BY MOUTH EVERY DAY, Disp: 90 tablet, Rfl: 1  •  levETIRAcetam (Keppra) 1000 MG tablet, Take 1 tablet (1,000 mg total) by mouth 2 (two) times a day, Disp: 60 tablet, Rfl: 0  •  multivitamin (THERAGRAN) TABS, Take 1 tablet by mouth daily, Disp: , Rfl:   •  pantoprazole (PROTONIX) 40 mg tablet, Take 1 tablet (40 mg total) by mouth daily, Disp: 35 tablet, Rfl: 0  •  tamsulosin (FLOMAX) 0 4 mg, TAKE 1 CAPSULE (0 4 MG TOTAL) BY MOUTH DAILY AFTER DINNER, Disp: 90 capsule, Rfl: 0  Allergies   Allergen Reactions   • Doxycycline Rash         Review of Systems   Constitutional: Negative  HENT: Negative  Eyes: Negative  Respiratory: Negative  Cardiovascular: Negative  Gastrointestinal: Negative  Endocrine: Negative  Genitourinary: Negative  Musculoskeletal: Negative  Skin: Negative  Allergic/Immunologic: Negative  Neurological:        Right hand improved 75%   Hematological: Negative  Psychiatric/Behavioral: Positive for confusion (Patient denies, spouse nodding head yes )  OBJECTIVE:   Pulse 96   Temp 98 6 °F (37 °C)   Resp 18   Ht 5' 8" (1 727 m)   Wt 90 6 kg (199 lb 12 8 oz)   SpO2 99%   BMI 30 38 kg/m²   Pain Assessment:  0  Performance Status: ECOG/Zubrod/WHO: 1 - Symptomatic but completely ambulatory    Physical Exam   He is conversing appropriately  His breathing is unlabored  He is moving his right arm appropriately  Minimal weakness in the right hand  He does have slight difficulty in writing which she does slowly  Ambulating dependently        RESULTS  Lab Results    Chemistry        Component Value Date/Time    K 4 3 02/10/2023 0837     02/10/2023 0837    CO2 27 02/10/2023 0837    BUN 17 02/10/2023 0837    CREATININE 0 96 02/10/2023 0837        Component Value Date/Time    CALCIUM 10 0 02/10/2023 0837    ALKPHOS 59 02/10/2023 0837    AST 20 02/10/2023 0837    ALT 23 02/10/2023 0837            Lab Results   Component Value Date    WBC 11 31 (H) 02/10/2023    HGB 11 3 (L) 02/10/2023    HCT 38 7 02/10/2023    MCV 92 02/10/2023     (H) 02/10/2023         Imaging Studies  CTA head and neck with and without contrast    Result Date: 2/3/2023  Narrative: CTA NECK AND BRAIN WITH AND WITHOUT CONTRAST INDICATION: right hand weakness COMPARISON: CTA head and neck 7/31/2021 TECHNIQUE:  Routine CT imaging of the Brain without contrast   Post contrast imaging was performed after administration of iodinated contrast through the neck and brain  Post contrast axial 0 625 mm images timed to opacify the arterial system  3D rendering was performed on an independent workstation  MIP reconstructions performed  Coronal reconstructions were performed of the noncontrast portion of the brain  Radiation dose length product (DLP) for this visit:  1244 mGy-cm   This examination, like all CT scans performed in the [facility], was performed utilizing techniques to minimize radiation dose exposure, including the use of iterative reconstruction and automated exposure control  IV Contrast:  75 mL of iohexol (OMNIPAQUE) 75 mL of iohexol (OMNIPAQUE)  IMAGE QUALITY:   Diagnostic FINDINGS: NONCONTRAST BRAIN PARENCHYMA: There is a 1 cm posterior left frontal lobe gray-white matter junction enhancing lesion (series 3 image 69)  There is associated subjacent vasogenic edema  No significant mass effect or midline shift  Nonspecific  decreased attenuation involving periventricular and subcortical white matter, most compatible with mild microangiopathic change  Gray-white matter differentiation is grossly preserved  No acute intracranial hemorrhage  VENTRICLES AND EXTRA-AXIAL SPACES:  Normal for the patient's age  VISUALIZED ORBITS: Normal visualized orbits  PARANASAL SINUSES: Normal visualized paranasal sinuses  CERVICAL VASCULATURE AORTIC ARCH AND GREAT VESSELS: Anatomic variant common origin right brachiocephalic and left common coronary arteries  Atherosclerotic calcification of aortic arch  Great vessel origins are patent without significant stenosis   RIGHT VERTEBRAL ARTERY CERVICAL SEGMENT:The vessel is developmentally smaller than left  Mild atherosclerotic narrowing at the origin  The vessel is patent throughout the neck without high-grade stenosis  LEFT VERTEBRAL ARTERY CERVICAL SEGMENT: Mild atherosclerotic narrowing at the origin  The vessel is patent throughout the neck without high-grade stenosis  RIGHT EXTRACRANIAL CAROTID SEGMENT:Partially calcified atherosclerotic plaque at the bifurcation and proximal internal carotid artery  No hemodynamically significant stenosis of cervical ICA by NASCET criteria ( less than 50%) LEFT EXTRACRANIAL CAROTID SEGMENT: Partially calcified atherosclerotic plaque at the bifurcation and proximal internal carotid artery  No hemodynamically significant stenosis of cervical ICA by NASCET criteria ( less than 50%) INTRACRANIAL VASCULATURE INTERNAL CAROTID ARTERIES: The intracranial portions of the internal carotid arteries are unremarkable  Mild atherosclerotic change of bilateral cavernous and left supraclinoid ICAs with  Normal ophthalmic artery origins  ANTERIOR CIRCULATION:  Developmentally absent/hypoplastic right A1 segment  Bilateral anterior cerebral arteries are patent  Normal anterior comminuting artery  MIDDLE CEREBRAL ARTERY CIRCULATION:  Bilateral M1 segments and major M2 branches are patent without high-grade stenosis  DISTAL VERTEBRAL ARTERIES:  Distal vertebral arteries are patent without high-grade stenosis  Posterior inferior cerebellar artery origins are patent  BASILAR ARTERY:  Basilar artery is unremarkable  Normal superior cerebellar arteries  POSTERIOR CEREBRAL ARTERIES: Persistent fetal origin of right posterior cerebral arteries  Bilateral posterior cerebral arteries are patent without high-grade stenosis  Absent/hypoplastic left posterior communicating artery  DURAL VENOUS SINUSES:  Unremarkable  NON VASCULAR ANATOMY BONY STRUCTURES: Degenerative changes of cervical spine  No acute or aggressive appearing osseous abnormality     SOFT TISSUES OF THE NECK: 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, not significantly changed from 12/20/2021  THORACIC INLET: Pulmonary emphysema  Redemonstrated posttreatment change and partially obscure known right upper lobe mass in partially imaged upper chest       Impression: 1  No acute intracranial CT abnormality  2   A 1 cm posterior left frontal lobe gray-white matter junction lesion with subjacent vasogenic edema most concerning for metastasis  No significant mass effect or midline shift  Recommend further assessment with brain MRI without and with contrast  3   Patent major vasculature of the [place] without high-grade stenosis  No aneurysm  4   No hemodynamically significant stenosis or dissection of cervical carotid and vertebral arteries  Left greater than right atherosclerotic change of cervical carotid arteries  5   No significant change in size of known right nasopharyngeal mass compared to neck CT 12/20/2021  6   Redemonstrated posttreatment change and partially obscure known right upper lobe mass in partially imaged upper chest   See the report of concurrent CTA chest/abdomen/pelvis  The study was marked in [facility] for immediate notification  Workstation performed: [id number]     XR chest 1 view portable    Result Date: 2/4/2023  Narrative: CHEST INDICATION:   possible stroke  COMPARISON:  CT 11/22/2022 EXAM PERFORMED/VIEWS:  XR CHEST PORTABLE FINDINGS: Cardiomediastinal silhouette appears unremarkable  Right upper lobe chronic consolidation is stable  Large right pleural effusion is stable  Left lung is clear  Osseous structures appear within normal limits for patient age  Impression: Stable chronic right upper lobe consolidation, consistent with posttreatment changes Stable large right pleural effusion    Workstation performed: [id number]     MRI brain w wo contrast    Result Date: 2/5/2023  Narrative: MRI BRAIN WITH AND WITHOUT CONTRAST INDICATION: Decreased right arm/hand  strength with suspected brain metastasis  COMPARISON: CTA head and neck February 3, 2023 CT stroke alert brain and CTA stroke alert head neck July 31, 2021  MRI brain with and without contrast August 3, 2021  TECHNIQUE: Multiplanar, multisequence imaging of the brain was performed before and after gadolinium administration  IV Contrast:  8 mL of Gadobutrol injection (SINGLE-DOSE)  IMAGE QUALITY:   Diagnostic  FINDINGS: BRAIN PARENCHYMA: 1 2 x 1 0 cm avidly enhancing lesion in left precentral gyrus with marked surrounding perilesional vasogenic edema in left frontoparietal lobe (10:25, 11:124)  Questionable tiny cortical enhancing lesion in left posterior frontal lobe only seen on bravo sequences slightly obscured by mild motion artifact (11:125)  Chronic infarct in left basal ganglia with hemosiderin deposition  Chronic tiny lacunar infarct in right putamen with hemosiderin deposition  No mass effect or midline shift  No acute intracranial hemorrhage  No diffusion-weighted signal abnormality to suggest acute infarction  Small dural calcification and left frontoparietal vertex  Right cerebellar developmental venous anomaly  Small scattered hyperintensities on T2/FLAIR imaging are noted in the periventricular and subcortical white matter demonstrating an appearance that is statistically most likely to represent mild microangiopathic change  VENTRICLES:  Ex-vacuo dilatation of left lateral ventricle  No acute intraventricular hemorrhage  SELLA AND PITUITARY GLAND:  Normal  ORBITS:  Sequela of right cataract surgery  Left orbit is normal  PARANASAL SINUSES:  Normal  NASOPHARYNX: 2 6 x 1 1 cm bilobed lesion in right nasopharynx with internal debris (5:5)  VASCULATURE:  Evaluation of the major intracranial vasculature demonstrates appropriate flow voids   CALVARIUM AND SKULL BASE:  Normal  EXTRACRANIAL SOFT TISSUES:  Normal      Impression: 1 2 cm avidly enhancing lesion in left precentral gyrus with marked surrounding perilesional vasogenic edema, concerning for brain metastasis  Questionable tiny cortical enhancing lesion in left posterior frontal lobe only seen on bravo sequences slightly obscured by mild motion artifact   This could represent tiny brain metastasis or cortical vessel  Attention on follow-up  2 6 cm bilobed lesion in right nasopharynx with internal debris, likely complex mucus retention cyst   Recommend evaluation by ENT  The study was marked in [facility] for immediate notification  Workstation performed: [id number]     CTA dissection protocol chest abdomen pelvis w wo contrast    Result Date: 2/3/2023  Narrative: CTA - CHEST, ABDOMEN AND PELVIS - WITHOUT AND WITH IV CONTRAST INDICATION:   abnormal appearing mediastinum on cxr, right hand weakness  COMPARISON:  February 3, 2023 TECHNIQUE: CT examination of the chest, abdomen and pelvis was performed both prior to and after the administration of intravenous contrast   The noncontrast portion of this examination was performed utilizing low radiation dose technique  Thin section  angiographic arterial phase post contrast technique was used in order to evaluate for aortic dissection  3D reformatted images and volume rendering were performed on an independent workstation  Additionally, axial, sagittal, and coronal 2D reformatted  images were created from the source data and submitted for interpretation  Radiation dose length product (DLP) for this visit:  1444 mGy-cm   This examination, like all CT scans performed in the [facility], was performed utilizing techniques to minimize radiation dose exposure, including the use of iterative reconstruction and automated exposure control  IV Contrast:  75 mL of iohexol (OMNIPAQUE) 75 mL of iohexol (OMNIPAQUE) Enteric Contrast:  Enteric contrast was not administered  FINDINGS: AORTA:  There is no aortic dissection or intramural hematoma  There is no aortic aneurysm   Ascending aorta; aorta at the aortic annulus measures 2 7 cm Aortic sinus measures 3 7 cm The tubular portion of ascending aorta measures 4 cm The brachiocephalic, common carotid artery are patent Subclavian arteries are patent Small right vertebral artery is small left vertebral artery The descending thoracic aorta is nonaneurysmal atherosclerotic plaquing seen in the descending thoracic aorta and in the distal arch Abdominal aorta is nonaneurysmal Aorta at the level of the aortic hiatus measures 2 9 cm Mesenteric vasculature; the celiac trunk is patent Hepatic artery is patent Splenic artery is patent SMA is patent  There is atherosclerotic plaquing around the proximal SMA, unchanged Renal arteries are patent Atherosclerotic changes are seen within the iliac arteries CHEST LUNGS:  The right lung is smaller  There is volume loss in the right upper lobe  There is shift of the mediastinum to the right side  There is a consolidation with the air bronchogram representing chronic consolidation, atelectasis right lower lung seen Trachea and central bronchi are patent Linear density seen at the left lung base due to atelectasis PLEURA:  There is chronic loculated effusion and nodular areas are seen within the loculated effusion suggesting pleural metastatic disease  These have enlarged since the previous study the largest lesion is seen in image 395 series 4 measuring about 2 cm, previously measuring 1 4 cm HEART/PULMONARY ARTERIAL TREE:  There are no filling defects seen in the main pulmonary artery Coronary artery calcification seen MEDIASTINUM AND JONH:  There is shift of the mediastinum to the right side   CHEST WALL AND LOWER NECK:   No significant axillary lymph node enlargement seen ABDOMEN LIVER/BILIARY TREE:  Subcentimeter hypodensities are seen within the liver parenchyma, unchanged The largest hypodensity seen in the right hepatic lobe segment 6 measuring about 1 0 cm GALLBLADDER:  Mild layering density within the gallbladder, raises concern for sludge versus gallstone SPLEEN:  Unremarkable  PANCREAS:  No pancreatic ductal dilation and no pancreatic atrophy ADRENAL GLANDS:  Unremarkable  KIDNEYS/URETERS:  Left renal cyst seen STOMACH AND BOWEL:  No abnormal dilation of bowel loops seen No bowel wall thickening seen APPENDIX:  No findings to suggest appendicitis  ABDOMINOPELVIC CAVITY:  No ascites or free intraperitoneal air  No lymphadenopathy  PELVIS REPRODUCTIVE ORGANS:  Enlarged prostate seen URINARY BLADDER:  There is hyperdensity within the urinary bladder on the left side, this may be due to left ureteral jet  ,  This limits the evaluation for focal lesion within the bladder ABDOMINAL WALL/INGUINAL REGIONS:  Nodular density seen in the left anterior abdominal wall in the subcutaneous tissue  This may be injection site  Additional similar nodular density was seen in the anterior subcutaneous wall on the right side is smaller OSSEOUS STRUCTURES:  No acute fracture or destructive osseous lesion  Impression: No evidence of thoracic aortic dissection No pulmonary embolism in the main pulmonary arteries Chronic loculated left effusion which demonstrates enlarging nodular densities within it, suggest pleural metastatic disease Post treatment changes in the right lung with chronic consolidation in the right paramediastinal location with the air bronchogram and volume loss, as seen on the previous study  I personally discussed this study with Dr Mao Alegria on 2/3/2023 at 4:00 PM  Workstation performed: [address] [place] [address]  1  Brain metastasis  Ambulatory Referral to Radiation Oncology        Cancer Staging   Malignant neoplasm of upper lobe of right lung [facility])  Staging form: Lung, AJCC 8th Edition  - Clinical stage from 9/23/2021: Stage IIIC (cT3, cN3, cM0) - Signed by Tami Marques MD on 9/23/2021  Histopathologic type: Adenocarcinoma, NOS        PLAN/DISCUSSION  No orders of the defined types were placed in this encounter  Mario Griffith is a 71y o  year old male with stage IV non-small cell lung carcinoma  His case was reviewed at neuro-oncology rounds this morning  MRI of the brain reveals a solitary metastasis in the left precentral gyrus region with surrounding edema  On Bravo sequence there was questionable motion artifact versus small satellite lesion  As this is used for treatment planning I have recommended repeating the Peters sequence for precise tumor delineation for his SRS planning  I reviewed with him CT simulation procedure at which time a customized frameless radiosurgery mask will be made  Possible side effects both acute and long-term were reviewed with him  This can include but not limited to fatigue, swelling, radionecrosis  He was symptomatic and placed on steroids with improvement in his symptoms in his right hand  He is currently on a Decadron taper 4 mg twice daily  We discussed continuation of his taper down to 2 mg twice daily at time of treatment if tolerated  He is agreeable to the above outlined plan  Ofelia Gore MD  2/66/2400,2:11 AM      Portions of the record may have been created with voice recognition software  Occasional wrong word or "sound a like" substitutions may have occurred due to the inherent limitations of voice recognition software  Read the chart carefully and recognize, using context, where substitutions have occurred

## 2023-02-15 NOTE — PROGRESS NOTES
NEURO ONCOLOGY CASE REVIEW     DATE: 2/15/2023  PRESENTING DOCTOR: Dr Tricia Berrios    DIAGNOSIS: Stage IV adenocarcinoma of the lung; Brain metastasis        Gokul Miles is a 71 y o  male who was presented at Neuro Oncology Case Review today  History of adenocarcinoma NSCLC of right upper lobe diagnosed in August 2021  He completed chemotherapy for cytoreduction followed by concurrent chemoRT, completed 4/21/2022  He continued with systemic immunotherapy with Keytruda  He was followed by Dr Yaniv Christina, and recently transitioned to Dr Angel Davison  2/3/2023 Admitted with progressive right hand weakness and handgrip difficulty  Brain imaging revealed 1 2 cm metastatic lesion in the left precentral gyrus with marked surrounding edema  He was presented today to review MRI brain imaging/discuss treatment plan  PHYSICIAN RECOMMENDED PLAN:   - Offer stereotactic radiosurgery [facility])  - Repeat MRI brain with BRAVO sequencing for [facility] RT planning    2/15/2023 Radiation oncology consult, Dr Raoul Killian  2/27/2023 Medical oncology follow up, Dr Ace Gross    Imaging Reviewed:   2/4/2023 MRI brain: 1 2 cm avidly enhancing lesion in left precentral gyrus with marked surrounding perilesional vasogenic edema, concerning for brain metastasis     Questionable tiny cortical enhancing lesion in left posterior frontal lobe only seen on bravo sequences slightly obscured by mild motion artifact   This could represent tiny brain metastasis or cortical vessel  Attention on follow-up    2 6 cm bilobed lesion in right nasopharynx with internal debris, likely complex mucus retention cyst   Recommend evaluation by ENT     8/3/2021 MRI brain        Team agreed to plan  NCCN guidelines were readily available for review at this discussion  The final treatment plan will be left at the discretion of the patient and the treating physician       DISCLAIMERS:  TO THE TREATING PHYSICIAN:  This conference is a meeting of clinicians from various specialty areas who evaluate and discuss patients for whom a multidisciplinary treatment approach is being considered  Please note that the above opinion was a consensus of the conference attendees and is intended only to assist in quality care of the discussed patient  The responsibility for follow up on the input given during the conference, along with any final decisions regarding plan of care, is that of the patient and the patient's provider  Accordingly, appointments have only been recommended based on this information; and have NOT been scheduled unless otherwise noted  TO THE PATIENT:  This summary is a brief record of major aspects of your cancer treatment  You may choose to can share a your copy with any of your doctors or nurses  However, this is not a detailed or comprehensive record of your care

## 2023-02-16 ENCOUNTER — HOSPITAL ENCOUNTER (OUTPATIENT)
Dept: MRI IMAGING | Facility: HOSPITAL | Age: 73
End: 2023-02-16

## 2023-02-16 DIAGNOSIS — C79.31 BRAIN METASTASIS (HCC): ICD-10-CM

## 2023-02-16 RX ADMIN — GADOBUTROL 10 ML: 604.72 INJECTION INTRAVENOUS at 19:19

## 2023-02-16 NOTE — PROGRESS NOTES
HEMATOLOGY / [address] FOLLOW UP NOTE    Primary Care Provider: Epifanio Paula MD  Referring Provider:    MRN: [id number]  : 1950    Reason for Encounter: follow up Stage IV NSCLC       Oncology History Overview Note   2021 - Stage IV adenocarcinoma of the lung    10/2021 - 2022 - carbo/pem/pebro    3/2022 - 2022 - carbo/taxol/RT    2022 - maintenance pembro    2023 - solitary brain met - plan SBRT     Adenocarcinoma of lung   2021 Initial Diagnosis    Adenocarcinoma of lung     2021 Biopsy    Right lung apex, image-guided core needle biopsy:  - Adenocarcinoma      10/6/2021 - 2022 Chemotherapy    cyanocobalamin, 1,000 mcg, Intramuscular, Once, 3 of 3 cycles  Administration: 1,000 mcg (2021), 1,000 mcg (2022), 1,000 mcg (10/6/2021)  pegfilgrastim (Vinnie Shawl), 6 mg, Subcutaneous, Once, 1 of 1 cycle  Administration: 6 mg (2021)  pegfilgrastim (Kavya Crumble), 6 mg, Subcutaneous, Once, 6 of 6 cycles  Administration: 6 mg (10/6/2021), 6 mg (11/3/2021), 6 mg (2021), 6 mg (12/15/2021), 6 mg (2022)  fosaprepitant (EMEND) IVPB, 150 mg, Intravenous, Once, 6 of 6 cycles  Administration: 150 mg (10/6/2021), 150 mg (2021), 150 mg (12/15/2021), 150 mg (2022), 150 mg (11/3/2021), 150 mg (2022)  CARBOplatin (PARAPLATIN) IVPB (GOG AUC DOSING), 519 5 mg, Intravenous, Once, 6 of 6 cycles  Administration: 519 5 mg (10/6/2021), 574 mg (2021), 600 mg (12/15/2021), 533 mg (2022), 604 5 mg (11/3/2021), 563 mg (2022)  pemetrexed (ALIMTA) chemo infusion, 970 mg, Intravenous, Once, 6 of 6 cycles  Administration: 1,000 mg (10/6/2021), 1,000 mg (2021), 1,000 mg (12/15/2021), 1,000 mg (2022), 1,000 mg (11/3/2021), 1,000 mg (2022)  pembrolizumab (KEYTRUDA) IVPB, 200 mg, Intravenous, Once, 6 of 6 cycles  Administration: 200 mg (10/6/2021), 200 mg (2021), 200 mg (12/15/2021), 200 mg (2022), 200 mg (11/3/2021), 200 mg (1/5/2022)     3/7/2022 -  Chemotherapy    CARBOplatin (PARAPLATIN) IVPB (GOG AUC DOSING), , Intravenous, Once, 6 of 6 cycles  Administration: 211 6 mg (3/7/2022), 208 mg (3/14/2022), 238 8 mg (3/21/2022), 211 6 mg (3/28/2022), 215 2 mg (4/4/2022), 213 4 mg (4/18/2022)  PACLItaxel (TAXOL) chemo IVPB, 50 mg/m2 = 99 mg, Intravenous, Once, 6 of 6 cycles  Administration: 99 mg (3/7/2022), 99 mg (3/14/2022), 99 mg (3/21/2022), 99 mg (3/28/2022), 99 mg (4/4/2022), 99 mg (4/18/2022)  pembrolizumab (KEYTRUDA) IVPB, 200 mg, Intravenous, Once, 17 of 22 cycles  Administration: 200 mg (3/7/2022), 200 mg (3/28/2022), 200 mg (4/25/2022), 200 mg (5/16/2022), 200 mg (6/6/2022), 200 mg (6/27/2022), 200 mg (7/18/2022), 200 mg (8/8/2022), 200 mg (8/29/2022), 200 mg (9/19/2022), 200 mg (10/10/2022), 200 mg (10/31/2022), 200 mg (11/21/2022), 200 mg (12/12/2022), 200 mg (1/3/2023), 200 mg (1/23/2023), 200 mg (2/13/2023)     Malignant neoplasm of upper lobe of right lung ([facility] [address] )   9/3/2021 Initial Diagnosis    Malignant neoplasm of upper lobe of right lung ([facility] [address] )     9/23/2021 -  Cancer Staged    Staging form: Lung, AJCC 8th Edition  - Clinical stage from 9/23/2021: Stage IIIC (cT3, cN3, cM0) - Signed by Ga Nathan MD on 9/23/2021  Histopathologic type:  Adenocarcinoma, NOS       10/6/2021 - 1/26/2022 Chemotherapy    cyanocobalamin, 1,000 mcg, Intramuscular, Once, 3 of 3 cycles  Administration: 1,000 mcg (11/24/2021), 1,000 mcg (1/26/2022), 1,000 mcg (10/6/2021)  pegfilgrastim (Rangely Gottron), 6 mg, Subcutaneous, Once, 1 of 1 cycle  Administration: 6 mg (11/26/2021)  pegfilgrastim (Cheri Chill), 6 mg, Subcutaneous, Once, 6 of 6 cycles  Administration: 6 mg (10/6/2021), 6 mg (11/3/2021), 6 mg (11/24/2021), 6 mg (12/15/2021), 6 mg (1/5/2022)  fosaprepitant (EMEND) IVPB, 150 mg, Intravenous, Once, 6 of 6 cycles  Administration: 150 mg (10/6/2021), 150 mg (11/24/2021), 150 mg (12/15/2021), 150 mg (1/26/2022), 150 mg (11/3/2021), 150 mg (1/5/2022)  CARBOplatin (PARAPLATIN) IVPB (GOG AUC DOSING), 519 5 mg, Intravenous, Once, 6 of 6 cycles  Administration: 519 5 mg (10/6/2021), 574 mg (11/24/2021), 600 mg (12/15/2021), 533 mg (1/26/2022), 604 5 mg (11/3/2021), 563 mg (1/5/2022)  pemetrexed (ALIMTA) chemo infusion, 970 mg, Intravenous, Once, 6 of 6 cycles  Administration: 1,000 mg (10/6/2021), 1,000 mg (11/24/2021), 1,000 mg (12/15/2021), 1,000 mg (1/26/2022), 1,000 mg (11/3/2021), 1,000 mg (1/5/2022)  pembrolizumab (KEYTRUDA) IVPB, 200 mg, Intravenous, Once, 6 of 6 cycles  Administration: 200 mg (10/6/2021), 200 mg (11/24/2021), 200 mg (12/15/2021), 200 mg (1/26/2022), 200 mg (11/3/2021), 200 mg (1/5/2022)     3/4/2022 - 4/21/2022 Radiation    The patient saw @[facility]@ for radiation treatment   This is the current list of radiation treatment:  Plan ID Energy Fractions Dose per Fraction (cGy) Dose Correction (cGy) Total Dose Delivered (cGy) Elapsed Days   R LUNGMED REV 6X 30 / 30 200 0 6,000 48      Treatment dates:  C1: 3/4/2022 - 4/21/2022         3/7/2022 -  Chemotherapy    CARBOplatin (PARAPLATIN) IVPB (GOG AUC DOSING), , Intravenous, Once, 6 of 6 cycles  Administration: 211 6 mg (3/7/2022), 208 mg (3/14/2022), 238 8 mg (3/21/2022), 211 6 mg (3/28/2022), 215 2 mg (4/4/2022), 213 4 mg (4/18/2022)  PACLItaxel (TAXOL) chemo IVPB, 50 mg/m2 = 99 mg, Intravenous, Once, 6 of 6 cycles  Administration: 99 mg (3/7/2022), 99 mg (3/14/2022), 99 mg (3/21/2022), 99 mg (3/28/2022), 99 mg (4/4/2022), 99 mg (4/18/2022)  pembrolizumab (KEYTRUDA) IVPB, 200 mg, Intravenous, Once, 17 of 22 cycles  Administration: 200 mg (3/7/2022), 200 mg (3/28/2022), 200 mg (4/25/2022), 200 mg (5/16/2022), 200 mg (6/6/2022), 200 mg (6/27/2022), 200 mg (7/18/2022), 200 mg (8/8/2022), 200 mg (8/29/2022), 200 mg (9/19/2022), 200 mg (10/10/2022), 200 mg (10/31/2022), 200 mg (11/21/2022), 200 mg (12/12/2022), 200 mg (1/3/2023), 200 mg (1/23/2023), 200 mg (2/13/2023)     Brain metastasis 2/3/2023 Initial Diagnosis    Brain metastasis         Interval History: patient presents for hospital follow up after presenting to the hospital last week with right sided hand weakness  He was found to have a soliarty 1 2 cm brain met in the left pre-central gyrus  He is currently taking 4 mg q 6 hours since discharge  His right hand weakness has improved since start steroids  He had a CT of the c/a/p that showed some thicked of the pleura in the area of known disease  He is eating well  No falls since he has been home  No N/V/D/C  No HA  He got [place] before this visit  REVIEW OF SYSTEMS:  Please note that a 14-point review of systems was performed to include Constitutional, HEENT, Respiratory, CVS, GI, , Musculoskeletal, Integumentary, Neurologic, Rheumatologic, Endocrinologic, Psychiatric, Lymphatic, and Hematologic/Oncologic systems were reviewed and are negative unless otherwise stated in HPI  Positive and negative findings pertinent to this evaluation are incorporated into the history of present illness        ECOG PS: 1    PROBLEM LIST:  Patient Active Problem List   Diagnosis   • History of stroke   • Bilateral lower extremity DVTs   • Nasopharyngeal mass   • Dysphagia   • Mass of upper lobe of right lung   • History of urinary retention   • Constipation   • Anemia of chronic disease   • Pulmonary embolism (HCC)   • Impaired cognition   • Adenocarcinoma of lung   • Malignant neoplasm of upper lobe of right lung (HCC)   • COPD mixed type (HCC)   • Chemotherapy induced neutropenia (HCC)   • Hypercalcemia of malignancy   • Chronic anticoagulation   • Chronic right-sided heart failure (HCC)   • Brain injury, without loss of consciousness, subsequent encounter   • Recurrent right pleural effusion   • Restrictive lung disease   • Nocturnal hypoxemia   • Tobacco use disorder, severe, in sustained remission, dependence   • Brain metastasis   • History of venous thromboembolism   • Hyperlipidemia       Assessment / Plan: The plan is for SBRT  He sees rad onc this week and they will let me know what the date is for the treatment  I may need to adjust his pembro  His pleura is slightly thicker on CT scan done in the hospital, but it was not enough of a change to make me stop pembro  We will scan him in 4 mos to keep a close eye the disease outside of the CNS  I told him that he can cut the dex back to 4 mg BID  I will see him back in 3 weeks in follow up  He is tolerating [place] well with no autoimmune side effects  I spent 30 minutes on chart review, face to face counseling time, coordination of care and documentation  Past Medical History:   has a past medical history of Chronic respiratory failure with hypoxia ([facility] [address] ) (8/9/2021), Impaired mobility and ADLs (8/13/2021), Lung cancer ([facility] [address] ), and Stroke ([facility] [address] )  PAST SURGICAL HISTORY:   has a past surgical history that includes IR biopsy lung (8/5/2021); IR thoracentesis (2/24/2022); and IR thoracentesis (8/1/2022)      CURRENT MEDICATIONS  Current Outpatient Medications   Medication Sig Dispense Refill   • acetaminophen (TYLENOL) 325 mg tablet Take 2 tablets (650 mg total) by mouth 4 (four) times a day as needed for mild pain, headaches or fever  0   • albuterol (ProAir HFA) 90 mcg/act inhaler Inhale 2 puffs every 6 (six) hours as needed for wheezing or shortness of breath 8 g 0   • atorvastatin (LIPITOR) 40 mg tablet TAKE 1 TABLET BY MOUTH DAILY WITH DINNER 90 tablet 0   • enoxaparin (LOVENOX) 80 mg/0 8 mL INJECT 0 8 ML (80 MG TOTAL) UNDER THE SKIN EVERY 12 (TWELVE) HOURS 010 mL 1   • folic acid (FOLVITE) 1 mg tablet TAKE 1 TABLET BY MOUTH EVERY DAY 90 tablet 1   • levETIRAcetam (Keppra) 1000 MG tablet Take 1 tablet (1,000 mg total) by mouth 2 (two) times a day 60 tablet 0   • multivitamin (THERAGRAN) TABS Take 1 tablet by mouth daily     • pantoprazole (PROTONIX) 40 mg tablet Take 1 tablet (40 mg total) by mouth daily 35 tablet 0   • tamsulosin (FLOMAX) 0 4 mg TAKE 1 CAPSULE (0 4 MG TOTAL) BY MOUTH DAILY AFTER DINNER 90 capsule 0   • dexamethasone (DECADRON) 2 mg tablet Take 2 mg by mouth 2 (two) times a day with meals       No current facility-administered medications for this visit  [unfilled]    SOCIAL HISTORY:   reports that he quit smoking about 19 years ago  His smoking use included cigarettes  He started smoking about 58 years ago  He has a 117 00 pack-year smoking history  He has never used smokeless tobacco  He reports that he does not currently use alcohol  He reports that he does not use drugs  FAMILY HISTORY:  family history includes Lung cancer in his brother; Prostate cancer in his brother  ALLERGIES:  is allergic to doxycycline  Physical Exam:  Vital Signs:   Visit Vitals  /80 (BP Location: Left arm, Patient Position: Sitting, Cuff Size: Adult)   Pulse 88   Temp 98 2 °F (36 8 °C) (Tympanic)   Resp 16   Ht 5' 9" (1 753 m)   Wt 90 3 kg (199 lb)   SpO2 95%   BMI 29 39 kg/m²   Smoking Status Former   BSA 2 06 m²     Body mass index is 29 39 kg/m²  Body surface area is 2 06 meters squared  GEN: Alert, awake oriented x3, in no acute distress  HEENT- No pallor, icterus, cyanosis, no oral mucosal lesions,   LAD - no palpable cervical, clavicle, axillary, inguinal LAD  Heart- normal S1 S2, regular rate and rhythm, No murmur, rubs     Lungs- clear breathing sound bilateral    Abdomen- soft, Non tender, bowel sounds present  Extremities- No cyanosis, clubbing, edema  Neuro- subtle  strength weakness on exam    Labs:  Lab Results   Component Value Date    WBC 11 31 (H) 02/10/2023    HGB 11 3 (L) 02/10/2023    HCT 38 7 02/10/2023    MCV 92 02/10/2023     (H) 02/10/2023     Lab Results   Component Value Date    SODIUM 139 02/10/2023    K 4 3 02/10/2023     02/10/2023    CO2 27 02/10/2023    AGAP 4 02/10/2023    BUN 17 02/10/2023    CREATININE 0 96 02/10/2023    GLUC 149 (H) 02/06/2023    GLUF 121 (H) 02/10/2023    CALCIUM 10 0 02/10/2023    AST 20 02/10/2023    ALT 23 02/10/2023    ALKPHOS 59 02/10/2023    TP 6 4 02/10/2023    TBILI 0 19 (L) 02/10/2023    EGFR 79 02/10/2023

## 2023-02-17 ENCOUNTER — RADIATION THERAPY TREATMENT (OUTPATIENT)
Dept: RADIATION ONCOLOGY | Facility: HOSPITAL | Age: 73
End: 2023-02-17
Attending: RADIOLOGY

## 2023-02-20 ENCOUNTER — OFFICE VISIT (OUTPATIENT)
Dept: FAMILY MEDICINE CLINIC | Facility: HOSPITAL | Age: 73
End: 2023-02-20

## 2023-02-20 VITALS
BODY MASS INDEX: 29.8 KG/M2 | TEMPERATURE: 96.5 F | HEART RATE: 78 BPM | DIASTOLIC BLOOD PRESSURE: 82 MMHG | SYSTOLIC BLOOD PRESSURE: 130 MMHG | HEIGHT: 69 IN | WEIGHT: 201.2 LBS

## 2023-02-20 DIAGNOSIS — I50.812 CHRONIC RIGHT-SIDED HEART FAILURE (HCC): ICD-10-CM

## 2023-02-20 DIAGNOSIS — S06.9X0D BRAIN INJURY, WITHOUT LOSS OF CONSCIOUSNESS, SUBSEQUENT ENCOUNTER: ICD-10-CM

## 2023-02-20 DIAGNOSIS — G93.6 VASOGENIC EDEMA (HCC): ICD-10-CM

## 2023-02-20 DIAGNOSIS — J44.9 COPD MIXED TYPE (HCC): ICD-10-CM

## 2023-02-20 DIAGNOSIS — C34.90 ADENOCARCINOMA OF LUNG, UNSPECIFIED LATERALITY (HCC): Primary | ICD-10-CM

## 2023-02-20 DIAGNOSIS — C79.31 BRAIN METASTASIS (HCC): ICD-10-CM

## 2023-02-20 NOTE — PROGRESS NOTES
Name: Richard Swain      : 1950      MRN: [id number]  Encounter Provider: Nina Braden MD  Encounter Date: 2023   Encounter department: [facility]      1  Adenocarcinoma of lung, unspecified laterality ([facility] )  -     Ambulatory Referral to Palliative Care; Future    2  Brain injury, without loss of consciousness, subsequent encounter    3  Brain metastasis  -     Ambulatory Referral to Palliative Care; Future    4  Vasogenic edema (HCC)    5  COPD mixed type ([facility] )    6  Chronic right-sided heart failure [facility])     Patient with adenocarcinoma with brain metastasis  [place] has been doing okay currently  He has been seeing radiation oncology  With treatment scheduled in a few days for his brain metastasis  Vasogenic edema  Improved  Continuing on Decadron  We will continue follow-up with radiation oncology regarding Decadron as well as Keppra  Discussed the role of palliative medicine  Referral placed  Right-sided heart failure  Currently euvolemic  Continue with the same  Continue to monitor  COPD  Stable  On as needed albuterol  Subjective      Patient is 71-year-old with known lung cancer  Was recently in the hospital with finding of brain metastasis  This was associated with vasogenic edema  Currently on Decadron  Left-sided weakness and other symptoms have resolved  He has since seen radiation oncology  Has treatment scheduled for this Wednesday  Reviewed hospital records  Reviewed neurosurgery consultation  Reviewed radiation oncology consultation  Reviewed hospital discharge  Reviewed oncology consultation  Review of Systems   Constitutional: Negative  Negative for activity change, appetite change, chills and diaphoresis  HENT: Negative for congestion and dental problem  Respiratory: Negative  Negative for apnea, chest tightness, shortness of breath and wheezing      Cardiovascular: Negative  Negative for chest pain, palpitations and leg swelling  Gastrointestinal: Negative  Negative for abdominal distention, abdominal pain, constipation, diarrhea and nausea  Genitourinary: Negative  Negative for difficulty urinating, dysuria and frequency  Current Outpatient Medications on File Prior to Visit   Medication Sig   • acetaminophen (TYLENOL) 325 mg tablet Take 2 tablets (650 mg total) by mouth 4 (four) times a day as needed for mild pain, headaches or fever   • albuterol (ProAir HFA) 90 mcg/act inhaler Inhale 2 puffs every 6 (six) hours as needed for wheezing or shortness of breath   • atorvastatin (LIPITOR) 40 mg tablet TAKE 1 TABLET BY MOUTH DAILY WITH DINNER   • dexamethasone (DECADRON) 2 mg tablet Take 2 mg by mouth 2 (two) times a day with meals   • enoxaparin (LOVENOX) 80 mg/0 8 mL INJECT 0 8 ML (80 MG TOTAL) UNDER THE SKIN EVERY 12 (TWELVE) HOURS   • folic acid (FOLVITE) 1 mg tablet TAKE 1 TABLET BY MOUTH EVERY DAY   • levETIRAcetam (Keppra) 1000 MG tablet Take 1 tablet (1,000 mg total) by mouth 2 (two) times a day   • multivitamin (THERAGRAN) TABS Take 1 tablet by mouth daily   • pantoprazole (PROTONIX) 40 mg tablet Take 1 tablet (40 mg total) by mouth daily   • tamsulosin (FLOMAX) 0 4 mg TAKE 1 CAPSULE (0 4 MG TOTAL) BY MOUTH DAILY AFTER DINNER       Objective     /82   Pulse 78   Temp (!) 96 5 °F (35 8 °C)   Ht 5' 9" (1 753 m)   Wt 91 3 kg (201 lb 3 2 oz)   BMI 29 71 kg/m²     Physical Exam  Vitals and nursing note reviewed  Constitutional:       General: He is not in acute distress  Appearance: He is well-developed  He is not ill-appearing  HENT:      Head: Normocephalic and atraumatic  Right Ear: External ear normal       Left Ear: External ear normal       Nose: Nose normal  No congestion or rhinorrhea  Mouth/Throat:      Mouth: Mucous membranes are moist       Pharynx: No oropharyngeal exudate or posterior oropharyngeal erythema     Eyes: Extraocular Movements: Extraocular movements intact  Conjunctiva/sclera: Conjunctivae normal       Pupils: Pupils are equal, round, and reactive to light  Cardiovascular:      Rate and Rhythm: Normal rate and regular rhythm  Heart sounds: Normal heart sounds  No murmur heard  No friction rub  No gallop  Pulmonary:      Effort: Pulmonary effort is normal  No respiratory distress  Breath sounds: Normal breath sounds  No wheezing or rales  Chest:      Chest wall: No tenderness  Abdominal:      General: Bowel sounds are normal  There is no distension  Palpations: Abdomen is soft  There is no mass  Tenderness: There is no abdominal tenderness  There is no guarding or rebound  Musculoskeletal:         General: Normal range of motion  Cervical back: Normal range of motion and neck supple  Skin:     General: Skin is warm  Capillary Refill: Capillary refill takes less than 2 seconds  Neurological:      Mental Status: He is alert and oriented to person, place, and time     Psychiatric:         Mood and Affect: Mood normal          Behavior: Behavior normal        Chris Ramesh MD

## 2023-02-22 ENCOUNTER — APPOINTMENT (OUTPATIENT)
Dept: RADIATION ONCOLOGY | Facility: HOSPITAL | Age: 73
End: 2023-02-22
Attending: RADIOLOGY

## 2023-02-22 ENCOUNTER — PROCEDURE VISIT (OUTPATIENT)
Dept: NEUROSURGERY | Facility: CLINIC | Age: 73
End: 2023-02-22

## 2023-02-22 ENCOUNTER — APPOINTMENT (OUTPATIENT)
Dept: RADIATION ONCOLOGY | Facility: HOSPITAL | Age: 73
End: 2023-02-22

## 2023-02-22 ENCOUNTER — TELEPHONE (OUTPATIENT)
Dept: FAMILY MEDICINE CLINIC | Facility: HOSPITAL | Age: 73
End: 2023-02-22

## 2023-02-22 DIAGNOSIS — C79.31 BRAIN METASTASIS: Primary | ICD-10-CM

## 2023-02-22 DIAGNOSIS — C79.31 BRAIN METASTASES (HCC): Primary | ICD-10-CM

## 2023-02-22 NOTE — PROGRESS NOTES
PATIENT NAME: Sukhdeep Casper  : 1950  MRN: [id number]  PROCEDURE DATE: 2023    Stereotactic Radiosurgery [facility]) Operative Note    Preop Diagnosis: left frontal brain metastasis    Postop Diagnosis: Same    Procedure Details: Frameless Stereotactic Radiosurgery for left frontal brain metastasis    Surgeon: Jessica Nava MD, PhD     Assistants: none    No qualified resident was available to assist with this case  Radiation Oncologist(s): Dr Sivan Matute    Estimated Blood Loss:  None           Specimens: None    Drains: None           Total IV Fluids: None              Findings: As above  Complications:  None    Anesthesia: None      DETAILS OF PROCEDURE    The patient presented to the outpatient area of the Department of Radiation Oncology where an open faced immobilization mask was created  The patient then underwent a stereotactic head CT while immobilized in the mask  The patient was then released from the Department  The patient’s stereotactic CT and previous stereotactic MRI scans were fused in the [address], which was used to develop the radiosurgical plan  The radiosurgical prescription called for a dose of 20 Gray to be delivered to the PTV  The PTV was created by expanding the GTV, as contoured by myself and/or Radiation Oncologist  The radiosurgical plan utilized the mini-multileaf columnator on the TrueBeam machine at the [employer]  When the final treatment plan had been developed and approved, the patient returned to the Department for their frameless SRS treatment  The patient was positioned on the treatment couch  The Optic Surface Monitoring System (OSMS) was used initially to align the patient  The patient was then immobilized in their open faced mask  kV and then cone beam CT imaging was used to further align the patient   Once the radiation oncologist, physicist and I agreed the patient was in correct position, the fields were treated sequentially without complications  The OSMS was used during the treatment to assure continued correct positioning of the patient, and if it detected patient motion, interrupted the treatment beam until the patient was back in alignment  When the [facility] treatment had been delivered, the patient was recovered from the treatment room, and discharged from the department  There was no blood loss and no specimen        SIGNATURE: Boston Taylor MD, PhD  DATE: 2/22/2023   TIME: 1:58 PM

## 2023-02-22 NOTE — TELEPHONE ENCOUNTER
----- Message from Sosa Hardin MD sent at 2/22/2023  3:07 PM EST -----   Please call  Radiation onco will be managing the decacron  I will continue him on the keppra for now  Continue for now  Plan to wean off once new imaging is showing improvement after radiation

## 2023-02-23 ENCOUNTER — TELEPHONE (OUTPATIENT)
Dept: HEMATOLOGY ONCOLOGY | Facility: HOSPITAL | Age: 73
End: 2023-02-23

## 2023-02-23 ENCOUNTER — TELEPHONE (OUTPATIENT)
Dept: RADIATION ONCOLOGY | Facility: HOSPITAL | Age: 73
End: 2023-02-23

## 2023-02-23 ENCOUNTER — TELEPHONE (OUTPATIENT)
Dept: HEMATOLOGY ONCOLOGY | Facility: CLINIC | Age: 73
End: 2023-02-23

## 2023-02-23 NOTE — TELEPHONE ENCOUNTER
Patient's wife, Saeid Gerber returned call  She states that patient is feeling well  He has had no headache, fatigue, nausea/vomiting, lightheadedness, dizziness, seizures, tremors, no new neurologic changes  She states that he slept well last night and ate a good breakfast this morning  He has no right hand weakness  Patient is on dexamethasone 2 mg BID and will begin steroid taper Monday, 2/27/23  Taper calendar given to patient yesterday  Saeid Gerber also informed that a neurology referral has been placed for management of his Keppra and until patient is seen, his primary care physician, Dr Neeta Rock has agreed to write for refills of the [place]  Patient was reminded of radiation follow up on  4/26/23 and MRI of the brain on 4/21/23  Patient instructed to call if any problems/concerns, phone number provided  She was informed that we will touch base again next week to check his status during steroid taper  Saeid Gerber verbalized understanding

## 2023-02-23 NOTE — TELEPHONE ENCOUNTER
CALL RETURN FORM   Reason for patient call? Patient's wife, Rain Rowan, is calling in requesting for patient's appointment to be rescheduled to 03/06    Patient's primary oncologist? Dr Jodi Quiñones    Name of person the patient was calling for? Violeta   Any additional information to add, if applicable? n/a   Informed patient that the message will be forwarded to the team and someone will get back to them as soon as possible    Did you relay this information to the patient?  Yes

## 2023-02-23 NOTE — TELEPHONE ENCOUNTER
LM for Rain Rowan to call back with update on patient's status, s/p SRS to brain for left precentral gyrus lesion yesterday  Direct phone number provided

## 2023-03-02 DIAGNOSIS — C34.11 MALIGNANT NEOPLASM OF UPPER LOBE OF RIGHT LUNG (HCC): ICD-10-CM

## 2023-03-02 RX ORDER — FOLIC ACID 1 MG/1
TABLET ORAL
Qty: 90 TABLET | Refills: 2 | Status: SHIPPED | OUTPATIENT
Start: 2023-03-02

## 2023-03-03 ENCOUNTER — APPOINTMENT (OUTPATIENT)
Dept: LAB | Facility: CLINIC | Age: 73
End: 2023-03-03

## 2023-03-03 ENCOUNTER — TELEPHONE (OUTPATIENT)
Dept: HEMATOLOGY ONCOLOGY | Facility: CLINIC | Age: 73
End: 2023-03-03

## 2023-03-03 ENCOUNTER — TELEPHONE (OUTPATIENT)
Dept: RADIATION ONCOLOGY | Facility: HOSPITAL | Age: 73
End: 2023-03-03

## 2023-03-03 DIAGNOSIS — C34.90 ADENOCARCINOMA OF LUNG, UNSPECIFIED LATERALITY (HCC): ICD-10-CM

## 2023-03-03 DIAGNOSIS — C34.11 MALIGNANT NEOPLASM OF UPPER LOBE OF RIGHT LUNG (HCC): ICD-10-CM

## 2023-03-03 DIAGNOSIS — D70.1 CHEMOTHERAPY-INDUCED NEUTROPENIA (HCC): ICD-10-CM

## 2023-03-03 DIAGNOSIS — T45.1X5A CHEMOTHERAPY-INDUCED NEUTROPENIA (HCC): ICD-10-CM

## 2023-03-03 DIAGNOSIS — R91.8 MASS OF UPPER LOBE OF RIGHT LUNG: ICD-10-CM

## 2023-03-03 DIAGNOSIS — C79.31 BRAIN METASTASIS (HCC): Primary | ICD-10-CM

## 2023-03-03 LAB
ALBUMIN SERPL BCP-MCNC: 2.7 G/DL (ref 3.5–5)
ALP SERPL-CCNC: 53 U/L (ref 46–116)
ALT SERPL W P-5'-P-CCNC: 22 U/L (ref 12–78)
ANION GAP SERPL CALCULATED.3IONS-SCNC: 2 MMOL/L (ref 4–13)
AST SERPL W P-5'-P-CCNC: 15 U/L (ref 5–45)
BASOPHILS # BLD MANUAL: 0.05 THOUSAND/UL (ref 0–0.1)
BASOPHILS NFR MAR MANUAL: 1 % (ref 0–1)
BILIRUB SERPL-MCNC: 0.28 MG/DL (ref 0.2–1)
BUN SERPL-MCNC: 18 MG/DL (ref 5–25)
BURR CELLS BLD QL SMEAR: PRESENT
CALCIUM ALBUM COR SERPL-MCNC: 10.4 MG/DL (ref 8.3–10.1)
CALCIUM SERPL-MCNC: 9.4 MG/DL (ref 8.3–10.1)
CHLORIDE SERPL-SCNC: 109 MMOL/L (ref 96–108)
CO2 SERPL-SCNC: 29 MMOL/L (ref 21–32)
CREAT SERPL-MCNC: 0.9 MG/DL (ref 0.6–1.3)
EOSINOPHIL # BLD MANUAL: 0.19 THOUSAND/UL (ref 0–0.4)
EOSINOPHIL NFR BLD MANUAL: 4 % (ref 0–6)
ERYTHROCYTE [DISTWIDTH] IN BLOOD BY AUTOMATED COUNT: 21.6 % (ref 11.6–15.1)
GFR SERPL CREATININE-BSD FRML MDRD: 85 ML/MIN/1.73SQ M
GLUCOSE P FAST SERPL-MCNC: 95 MG/DL (ref 65–99)
HCT VFR BLD AUTO: 37.9 % (ref 36.5–49.3)
HGB BLD-MCNC: 11.5 G/DL (ref 12–17)
LYMPHOCYTES # BLD AUTO: 0.56 THOUSAND/UL (ref 0.6–4.47)
LYMPHOCYTES # BLD AUTO: 12 % (ref 14–44)
MCH RBC QN AUTO: 28.2 PG (ref 26.8–34.3)
MCHC RBC AUTO-ENTMCNC: 30.3 G/DL (ref 31.4–37.4)
MCV RBC AUTO: 93 FL (ref 82–98)
METAMYELOCYTES NFR BLD MANUAL: 1 % (ref 0–1)
MONOCYTES # BLD AUTO: 0.32 THOUSAND/UL (ref 0–1.22)
MONOCYTES NFR BLD: 7 % (ref 4–12)
MYELOCYTES NFR BLD MANUAL: 2 % (ref 0–1)
NEUTROPHILS # BLD MANUAL: 3.33 THOUSAND/UL (ref 1.85–7.62)
NEUTS BAND NFR BLD MANUAL: 8 % (ref 0–8)
NEUTS SEG NFR BLD AUTO: 64 % (ref 43–75)
OVALOCYTES BLD QL SMEAR: PRESENT
PLATELET # BLD AUTO: 161 THOUSANDS/UL (ref 149–390)
PLATELET BLD QL SMEAR: ADEQUATE
PMV BLD AUTO: 9.3 FL (ref 8.9–12.7)
POIKILOCYTOSIS BLD QL SMEAR: PRESENT
POLYCHROMASIA BLD QL SMEAR: PRESENT
POTASSIUM SERPL-SCNC: 4.2 MMOL/L (ref 3.5–5.3)
PROMYELOCYTES NFR BLD MANUAL: 1 % (ref 0–0)
PROT SERPL-MCNC: 5.9 G/DL (ref 6.4–8.4)
RBC # BLD AUTO: 4.08 MILLION/UL (ref 3.88–5.62)
SICKLE CELLS BLD QL SMEAR: PRESENT
SODIUM SERPL-SCNC: 140 MMOL/L (ref 135–147)
T3FREE SERPL-MCNC: 1.5 PG/ML (ref 2.3–4.2)
T4 FREE SERPL-MCNC: 0.91 NG/DL (ref 0.76–1.46)
TSH SERPL DL<=0.05 MIU/L-ACNC: 6.04 UIU/ML (ref 0.45–4.5)
WBC # BLD AUTO: 4.63 THOUSAND/UL (ref 4.31–10.16)

## 2023-03-03 RX ORDER — DEXAMETHASONE 2 MG/1
2 TABLET ORAL 2 TIMES DAILY WITH MEALS
Qty: 30 TABLET | Refills: 0 | Status: SHIPPED | OUTPATIENT
Start: 2023-03-03 | End: 2023-03-06 | Stop reason: SDUPTHER

## 2023-03-03 NOTE — TELEPHONE ENCOUNTER
Per Dr Laura Cm, patient and spouse instructed to go back to previous dexamethasone dose of 2 mg BID x1 week  They are aware that I will check back with them next week to get update on status  I instructed them to call before then if he has any worsening of symptoms or concerns  Mrs Nidia Tucker requested refill for dexamethasone since there are only 5 tablets left  Refill order sent to Dr Laura Cm to approve  TT sent to Dr Laura Cm to notify her  They verbalized understanding of above

## 2023-03-03 NOTE — TELEPHONE ENCOUNTER
Call from patient and his spouse, he is s/p [facility] 2/22/23 and has been on dexamethasone taper  Since [facility] he was taking dexamethasone 2 mg BID; he decreased this on on Monday (2/27/23)  to 2 mg AM and 1 mg PM  Since Monday, they feel his right hand is weaker/less dexterity and has had a slower gait  He denies headache, N/V, dizziness, vision changes  No falls  He is not dropping things  Denies any other neurologic changes  Patient and spouse informed I will reach out to Dr Tao Hernandez to get recommendations and call back with further instructions  They verbalized understanding  TT also sent to provider regarding above

## 2023-03-03 NOTE — TELEPHONE ENCOUNTER
Spoke with patient's wife to confirm appt with us on Monday at 15 and infusion appt after at 1230  Patient's wife verbalized understanding

## 2023-03-04 DIAGNOSIS — N13.8 BPH WITH URINARY OBSTRUCTION: ICD-10-CM

## 2023-03-04 DIAGNOSIS — N40.1 BPH WITH URINARY OBSTRUCTION: ICD-10-CM

## 2023-03-06 ENCOUNTER — TELEPHONE (OUTPATIENT)
Age: 73
End: 2023-03-06

## 2023-03-06 ENCOUNTER — HOSPITAL ENCOUNTER (OUTPATIENT)
Dept: NON INVASIVE DIAGNOSTICS | Facility: HOSPITAL | Age: 73
Discharge: HOME | End: 2023-03-06
Attending: INTERNAL MEDICINE

## 2023-03-06 ENCOUNTER — OFFICE VISIT (OUTPATIENT)
Age: 73
End: 2023-03-06

## 2023-03-06 ENCOUNTER — HOSPITAL ENCOUNTER (OUTPATIENT)
Facility: MEDICAL CENTER | Age: 73
Discharge: HOME | End: 2023-03-06

## 2023-03-06 ENCOUNTER — HOSPITAL ENCOUNTER (OUTPATIENT)
Dept: INFUSION CENTER | Facility: HOSPITAL | Age: 73
Discharge: HOME | End: 2023-03-06
Attending: INTERNAL MEDICINE

## 2023-03-06 VITALS
SYSTOLIC BLOOD PRESSURE: 124 MMHG | DIASTOLIC BLOOD PRESSURE: 82 MMHG | TEMPERATURE: 98 F | RESPIRATION RATE: 18 BRPM | OXYGEN SATURATION: 95 % | HEIGHT: 69 IN | WEIGHT: 208 LBS | HEART RATE: 78 BPM | BODY MASS INDEX: 30.81 KG/M2

## 2023-03-06 DIAGNOSIS — C34.11 MALIGNANT NEOPLASM OF UPPER LOBE OF RIGHT LUNG (HCC): ICD-10-CM

## 2023-03-06 DIAGNOSIS — C79.31 BRAIN METASTASIS (HCC): ICD-10-CM

## 2023-03-06 DIAGNOSIS — R60.9 EDEMA, UNSPECIFIED TYPE: ICD-10-CM

## 2023-03-06 DIAGNOSIS — C34.11 MALIGNANT NEOPLASM OF UPPER LOBE OF RIGHT LUNG (HCC): Primary | ICD-10-CM

## 2023-03-06 DIAGNOSIS — I82.4Y3 DEEP VEIN THROMBOSIS (DVT) OF PROXIMAL VEIN OF BOTH LOWER EXTREMITIES, UNSPECIFIED CHRONICITY (HCC): ICD-10-CM

## 2023-03-06 DIAGNOSIS — I82.4Y3 DEEP VEIN THROMBOSIS (DVT) OF PROXIMAL VEIN OF BOTH LOWER EXTREMITIES, UNSPECIFIED CHRONICITY (HCC): Primary | ICD-10-CM

## 2023-03-06 RX ORDER — DEXAMETHASONE 2 MG/1
2 TABLET ORAL 2 TIMES DAILY WITH MEALS
Qty: 60 TABLET | Refills: 1 | Status: SHIPPED | OUTPATIENT
Start: 2023-03-06 | End: 2023-03-15 | Stop reason: DRUGHIGH

## 2023-03-06 RX ORDER — TAMSULOSIN HYDROCHLORIDE 0.4 MG/1
0.4 CAPSULE ORAL
Qty: 90 CAPSULE | Refills: 0 | Status: SHIPPED | OUTPATIENT
Start: 2023-03-06

## 2023-03-06 RX ORDER — ENOXAPARIN SODIUM 100 MG/ML
1 INJECTION SUBCUTANEOUS EVERY 12 HOURS
Qty: 60 ML | Refills: 5 | Status: SHIPPED | OUTPATIENT
Start: 2023-03-06

## 2023-03-06 RX ADMIN — GADOBUTROL 10 ML: 604.72 INJECTION INTRAVENOUS at 16:53

## 2023-03-06 NOTE — TELEPHONE ENCOUNTER
Patient was seen in the office today with Dr Guille Dye being held due to decreased neurological function and lower extremity edema  No other changes  Crystal at [address] [facility] infusion aware his appointment today is cancelled

## 2023-03-06 NOTE — PROGRESS NOTES
HEMATOLOGY / [address] [address] FOLLOW UP NOTE    Primary Care Provider: Melissa Gurrola MD  Referring Provider:    MRN: [id number]  : 1950    Reason for Encounter: follow up Stage IV adeno of lung       Oncology History Overview Note   2021 - Stage IV adenocarcinoma of the lung    10/2021 - 2022 - carbo/pem/pebro    3/2022 - 2022 - carbo/taxol/RT    2022 - maintenance pembro    2023 - solitary brain met - SBRT     Adenocarcinoma of lung   2021 Initial Diagnosis    Adenocarcinoma of lung     2021 Biopsy    Right lung apex, image-guided core needle biopsy:  - Adenocarcinoma      10/6/2021 - 2022 Chemotherapy    cyanocobalamin, 1,000 mcg, Intramuscular, Once, 3 of 3 cycles  Administration: 1,000 mcg (2021), 1,000 mcg (2022), 1,000 mcg (10/6/2021)  pegfilgrastim ([place] [employer]), 6 mg, Subcutaneous, Once, 1 of 1 cycle  Administration: 6 mg (2021)  pegfilgrastim (Cheri Chill), 6 mg, Subcutaneous, Once, 6 of 6 cycles  Administration: 6 mg (10/6/2021), 6 mg (11/3/2021), 6 mg (2021), 6 mg (12/15/2021), 6 mg (2022)  fosaprepitant (EMEND) IVPB, 150 mg, Intravenous, Once, 6 of 6 cycles  Administration: 150 mg (10/6/2021), 150 mg (2021), 150 mg (12/15/2021), 150 mg (2022), 150 mg (11/3/2021), 150 mg (2022)  CARBOplatin (PARAPLATIN) IVPB (GOG AUC DOSING), 519 5 mg, Intravenous, Once, 6 of 6 cycles  Administration: 519 5 mg (10/6/2021), 574 mg (2021), 600 mg (12/15/2021), 533 mg (2022), 604 5 mg (11/3/2021), 563 mg (2022)  pemetrexed (ALIMTA) chemo infusion, 970 mg, Intravenous, Once, 6 of 6 cycles  Administration: 1,000 mg (10/6/2021), 1,000 mg (2021), 1,000 mg (12/15/2021), 1,000 mg (2022), 1,000 mg (11/3/2021), 1,000 mg (2022)  pembrolizumab (KEYTRUDA) IVPB, 200 mg, Intravenous, Once, 6 of 6 cycles  Administration: 200 mg (10/6/2021), 200 mg (2021), 200 mg (12/15/2021), 200 mg (2022), 200 mg (11/3/2021), 200 mg (1/5/2022)     3/7/2022 -  Chemotherapy    CARBOplatin (PARAPLATIN) IVPB (GOG AUC DOSING), , Intravenous, Once, 6 of 6 cycles  Administration: 211 6 mg (3/7/2022), 208 mg (3/14/2022), 238 8 mg (3/21/2022), 211 6 mg (3/28/2022), 215 2 mg (4/4/2022), 213 4 mg (4/18/2022)  PACLItaxel (TAXOL) chemo IVPB, 50 mg/m2 = 99 mg, Intravenous, Once, 6 of 6 cycles  Administration: 99 mg (3/7/2022), 99 mg (3/14/2022), 99 mg (3/21/2022), 99 mg (3/28/2022), 99 mg (4/4/2022), 99 mg (4/18/2022)  pembrolizumab (KEYTRUDA) IVPB, 200 mg, Intravenous, Once, 17 of 22 cycles  Administration: 200 mg (3/7/2022), 200 mg (3/28/2022), 200 mg (4/25/2022), 200 mg (5/16/2022), 200 mg (6/6/2022), 200 mg (6/27/2022), 200 mg (7/18/2022), 200 mg (8/8/2022), 200 mg (8/29/2022), 200 mg (9/19/2022), 200 mg (10/10/2022), 200 mg (10/31/2022), 200 mg (11/21/2022), 200 mg (12/12/2022), 200 mg (1/3/2023), 200 mg (1/23/2023), 200 mg (2/13/2023)     Malignant neoplasm of upper lobe of right lung ([facility] )   9/3/2021 Initial Diagnosis    Malignant neoplasm of upper lobe of right lung ([facility] )     9/23/2021 -  Cancer Staged    Staging form: Lung, AJCC 8th Edition  - Clinical stage from 9/23/2021: Stage IIIC (cT3, cN3, cM0) - Signed by Angella Cuellar MD on 9/23/2021  Histopathologic type:  Adenocarcinoma, NOS       10/6/2021 - 1/26/2022 Chemotherapy    cyanocobalamin, 1,000 mcg, Intramuscular, Once, 3 of 3 cycles  Administration: 1,000 mcg (11/24/2021), 1,000 mcg (1/26/2022), 1,000 mcg (10/6/2021)  pegfilgrastim (Mauricio Plana), 6 mg, Subcutaneous, Once, 1 of 1 cycle  Administration: 6 mg (11/26/2021)  pegfilgrastim (Елена Nguyen), 6 mg, Subcutaneous, Once, 6 of 6 cycles  Administration: 6 mg (10/6/2021), 6 mg (11/3/2021), 6 mg (11/24/2021), 6 mg (12/15/2021), 6 mg (1/5/2022)  fosaprepitant (EMEND) IVPB, 150 mg, Intravenous, Once, 6 of 6 cycles  Administration: 150 mg (10/6/2021), 150 mg (11/24/2021), 150 mg (12/15/2021), 150 mg (1/26/2022), 150 mg (11/3/2021), 150 mg (1/5/2022)  CARBOplatin (PARAPLATIN) IVPB (GOG AUC DOSING), 519 5 mg, Intravenous, Once, 6 of 6 cycles  Administration: 519 5 mg (10/6/2021), 574 mg (11/24/2021), 600 mg (12/15/2021), 533 mg (1/26/2022), 604 5 mg (11/3/2021), 563 mg (1/5/2022)  pemetrexed (ALIMTA) chemo infusion, 970 mg, Intravenous, Once, 6 of 6 cycles  Administration: 1,000 mg (10/6/2021), 1,000 mg (11/24/2021), 1,000 mg (12/15/2021), 1,000 mg (1/26/2022), 1,000 mg (11/3/2021), 1,000 mg (1/5/2022)  pembrolizumab (KEYTRUDA) IVPB, 200 mg, Intravenous, Once, 6 of 6 cycles  Administration: 200 mg (10/6/2021), 200 mg (11/24/2021), 200 mg (12/15/2021), 200 mg (1/26/2022), 200 mg (11/3/2021), 200 mg (1/5/2022)     3/4/2022 - 4/21/2022 Radiation    The patient saw @[facility]@ for radiation treatment   This is the current list of radiation treatment:  Plan ID Energy Fractions Dose per Fraction (cGy) Dose Correction (cGy) Total Dose Delivered (cGy) Elapsed Days   R LUNGMED REV 6X 30 / 30 200 0 6,000 48      Treatment dates:  C1: 3/4/2022 - 4/21/2022         3/7/2022 -  Chemotherapy    CARBOplatin (PARAPLATIN) IVPB (GOG AUC DOSING), , Intravenous, Once, 6 of 6 cycles  Administration: 211 6 mg (3/7/2022), 208 mg (3/14/2022), 238 8 mg (3/21/2022), 211 6 mg (3/28/2022), 215 2 mg (4/4/2022), 213 4 mg (4/18/2022)  PACLItaxel (TAXOL) chemo IVPB, 50 mg/m2 = 99 mg, Intravenous, Once, 6 of 6 cycles  Administration: 99 mg (3/7/2022), 99 mg (3/14/2022), 99 mg (3/21/2022), 99 mg (3/28/2022), 99 mg (4/4/2022), 99 mg (4/18/2022)  pembrolizumab (KEYTRUDA) IVPB, 200 mg, Intravenous, Once, 17 of 22 cycles  Administration: 200 mg (3/7/2022), 200 mg (3/28/2022), 200 mg (4/25/2022), 200 mg (5/16/2022), 200 mg (6/6/2022), 200 mg (6/27/2022), 200 mg (7/18/2022), 200 mg (8/8/2022), 200 mg (8/29/2022), 200 mg (9/19/2022), 200 mg (10/10/2022), 200 mg (10/31/2022), 200 mg (11/21/2022), 200 mg (12/12/2022), 200 mg (1/3/2023), 200 mg (1/23/2023), 200 mg (2/13/2023)     Brain metastasis 2/3/2023 Initial Diagnosis    Brain metastasis         Interval History: patient presents for follow up of his Stage IV adenocarcinoma of the lung  Patient starting having swelling in his right hand 1 week ago and his b/l LE 2 days ago  He is on lovenox for a history of DVT at a dose of 80 mg BID  His current weight is 94 kg  He is also having a decrease in cognition  He is having worse word finding problems as well  His right arm is not coordinated  When he was on higher dose of dex in the past, his right arm was functioning normally  He denies any headaches  No seizure activity  REVIEW OF SYSTEMS:  Please note that a 14-point review of systems was performed to include Constitutional, HEENT, Respiratory, CVS, GI, , Musculoskeletal, Integumentary, Neurologic, Rheumatologic, Endocrinologic, Psychiatric, Lymphatic, and Hematologic/Oncologic systems were reviewed and are negative unless otherwise stated in HPI  Positive and negative findings pertinent to this evaluation are incorporated into the history of present illness        ECOG PS: 2    PROBLEM LIST:  Patient Active Problem List   Diagnosis   • History of stroke   • Bilateral lower extremity DVTs   • Nasopharyngeal mass   • Dysphagia   • Mass of upper lobe of right lung   • History of urinary retention   • Constipation   • Anemia of chronic disease   • Pulmonary embolism (HCC)   • Impaired cognition   • Adenocarcinoma of lung   • Malignant neoplasm of upper lobe of right lung (HCC)   • COPD mixed type (HCC)   • Chemotherapy induced neutropenia (HCC)   • Hypercalcemia of malignancy   • Chronic anticoagulation   • Chronic right-sided heart failure (HCC)   • Brain injury, without loss of consciousness, subsequent encounter   • Recurrent right pleural effusion   • Restrictive lung disease   • Nocturnal hypoxemia   • Tobacco use disorder, severe, in sustained remission, dependence   • Brain metastasis   • History of venous thromboembolism   • Hyperlipidemia   • Vasogenic edema (HCC)       Assessment / Plan: I am concerned about the worsening of his neurologic symptoms  I am going to get a stat MRI of the brain  I also ordered LE dopplers due to his new LE edema  I will call him these results  I am going to put his [place] on hold for now  I have sometimes seen [place] cause joint edema as an autoimmune phenomena, so I would like to have a better idea of the cause before giving more [place]  I also increased his dose of lovenox to 90 mg BID due his increase in weight  I spent 30 minutes on chart review, face to face counseling time, coordination of care and documentation  Past Medical History:   has a past medical history of Chronic respiratory failure with hypoxia ([facility] [address] ) (8/9/2021), Impaired mobility and ADLs (8/13/2021), Lung cancer ([facility] [address] ), and Stroke ([facility] )  PAST SURGICAL HISTORY:   has a past surgical history that includes IR biopsy lung (8/5/2021); IR thoracentesis (2/24/2022); and IR thoracentesis (8/1/2022)      CURRENT MEDICATIONS  Current Outpatient Medications   Medication Sig Dispense Refill   • acetaminophen (TYLENOL) 325 mg tablet Take 2 tablets (650 mg total) by mouth 4 (four) times a day as needed for mild pain, headaches or fever  0   • albuterol (ProAir HFA) 90 mcg/act inhaler Inhale 2 puffs every 6 (six) hours as needed for wheezing or shortness of breath 8 g 0   • atorvastatin (LIPITOR) 40 mg tablet TAKE 1 TABLET BY MOUTH DAILY WITH DINNER 90 tablet 0   • dexamethasone (DECADRON) 2 mg tablet Take 1 tablet (2 mg total) by mouth 2 (two) times a day with meals 60 tablet 1   • enoxaparin (LOVENOX) 100 mg/mL Inject 0 9 mL (90 mg total) under the skin every 12 (twelve) hours 60 mL 5   • folic acid (FOLVITE) 1 mg tablet TAKE 1 TABLET BY MOUTH EVERY DAY 90 tablet 2   • levETIRAcetam (Keppra) 1000 MG tablet Take 1 tablet (1,000 mg total) by mouth 2 (two) times a day 60 tablet 0   • multivitamin (THERAGRAN) TABS Take 1 tablet by mouth daily     • pantoprazole (PROTONIX) 40 mg tablet Take 1 tablet (40 mg total) by mouth daily 35 tablet 0   • tamsulosin (FLOMAX) 0 4 mg TAKE 1 CAPSULE (0 4 MG TOTAL) BY MOUTH DAILY AFTER DINNER 90 capsule 0     No current facility-administered medications for this visit  [unfilled]    SOCIAL HISTORY:   reports that he quit smoking about 19 years ago  His smoking use included cigarettes  He started smoking about 58 years ago  He has a 117 00 pack-year smoking history  He has never used smokeless tobacco  He reports that he does not currently use alcohol  He reports that he does not use drugs  FAMILY HISTORY:  family history includes Lung cancer in his brother; Prostate cancer in his brother  ALLERGIES:  is allergic to doxycycline  Physical Exam:  Vital Signs:   Visit Vitals  /82 (BP Location: Left arm, Patient Position: Sitting, Cuff Size: Large)   Pulse 78   Temp 98 °F (36 7 °C) (Temporal)   Resp 18   Ht 5' 9" (1 753 m)   Wt 94 3 kg (208 lb)   SpO2 95%   BMI 30 72 kg/m²   Smoking Status Former   BSA 2 1 m²     Body mass index is 30 72 kg/m²  Body surface area is 2 1 meters squared  GEN: Alert, awake oriented x3, in no acute distress  HEENT- No pallor, icterus, cyanosis, no oral mucosal lesions,   LAD - no palpable cervical, clavicle, axillary, inguinal LAD  Heart- normal S1 S2, regular rate and rhythm, No murmur, rubs     Lungs- clear breathing sound bilateral    Abdomen- soft, Non tender, bowel sounds present  Extremities- No cyanosis, clubbing, edema  Neuro- No focal neurological deficit    Labs:  Lab Results   Component Value Date    WBC 4 63 03/03/2023    HGB 11 5 (L) 03/03/2023    HCT 37 9 03/03/2023    MCV 93 03/03/2023     03/03/2023     Lab Results   Component Value Date    SODIUM 140 03/03/2023    K 4 2 03/03/2023     (H) 03/03/2023    CO2 29 03/03/2023    AGAP 2 (L) 03/03/2023    BUN 18 03/03/2023    CREATININE 0 90 03/03/2023    GLUC 149 (H) 02/06/2023    GLUF 95 03/03/2023    CALCIUM 9 4 03/03/2023    AST 15 03/03/2023    ALT 22 03/03/2023    ALKPHOS 53 03/03/2023    TP 5 9 (L) 03/03/2023    TBILI 0 28 03/03/2023    EGFR 85 03/03/2023

## 2023-03-07 ENCOUNTER — TELEPHONE (OUTPATIENT)
Dept: HEMATOLOGY ONCOLOGY | Facility: CLINIC | Age: 73
End: 2023-03-07

## 2023-03-07 NOTE — TELEPHONE ENCOUNTER
I spoke with the patient's wife about results  There was no evidence of DVT on doppler  MRI shows increasing edema at the known lesion and no new areas of cancer in the brain  I am going to increase his dexamethasone to 4 mg BID and I will see him in 2 weeks to assess his response  He should continue protonix  He was started on keppra in the hospital for seizure prophylaxis  This is something that I do not manage  I will see what Dr Kiko Reilly thinks of weaning it and I will run the results of the MRI by her as well

## 2023-03-09 ENCOUNTER — HOSPITAL ENCOUNTER (EMERGENCY)
Facility: HOSPITAL | Age: 73
Discharge: HOME | End: 2023-03-09
Attending: EMERGENCY MEDICINE

## 2023-03-09 ENCOUNTER — TELEPHONE (OUTPATIENT)
Dept: HEMATOLOGY ONCOLOGY | Facility: CLINIC | Age: 73
End: 2023-03-09

## 2023-03-09 ENCOUNTER — APPOINTMENT (EMERGENCY)
Dept: CT IMAGING | Facility: HOSPITAL | Age: 73
End: 2023-03-09

## 2023-03-09 ENCOUNTER — APPOINTMENT (EMERGENCY)
Dept: RADIOLOGY | Facility: HOSPITAL | Age: 73
End: 2023-03-09

## 2023-03-09 VITALS
SYSTOLIC BLOOD PRESSURE: 133 MMHG | RESPIRATION RATE: 20 BRPM | TEMPERATURE: 98.9 F | DIASTOLIC BLOOD PRESSURE: 88 MMHG | HEART RATE: 82 BPM | BODY MASS INDEX: 30.81 KG/M2 | WEIGHT: 208 LBS | HEIGHT: 69 IN | OXYGEN SATURATION: 95 %

## 2023-03-09 DIAGNOSIS — G93.6 VASOGENIC BRAIN EDEMA (HCC): ICD-10-CM

## 2023-03-09 DIAGNOSIS — R26.2 AMBULATORY DYSFUNCTION: Primary | ICD-10-CM

## 2023-03-09 DIAGNOSIS — G93.6 VASOGENIC EDEMA (HCC): ICD-10-CM

## 2023-03-09 DIAGNOSIS — C34.90 ADENOCARCINOMA OF LUNG, UNSPECIFIED LATERALITY (HCC): Primary | ICD-10-CM

## 2023-03-09 DIAGNOSIS — C79.31 BRAIN METASTASIS (HCC): Primary | ICD-10-CM

## 2023-03-09 LAB
ALBUMIN SERPL BCP-MCNC: 3.1 G/DL (ref 3.5–5)
ALP SERPL-CCNC: 52 U/L (ref 34–104)
ALT SERPL W P-5'-P-CCNC: 18 U/L (ref 7–52)
ANION GAP SERPL CALCULATED.3IONS-SCNC: 5 MMOL/L (ref 4–13)
ANISOCYTOSIS BLD QL SMEAR: PRESENT
AST SERPL W P-5'-P-CCNC: 16 U/L (ref 13–39)
BASOPHILS # BLD MANUAL: 0 THOUSAND/UL (ref 0–0.1)
BASOPHILS NFR MAR MANUAL: 0 % (ref 0–1)
BILIRUB SERPL-MCNC: 0.27 MG/DL (ref 0.2–1)
BNP SERPL-MCNC: 60 PG/ML (ref 0–100)
BUN SERPL-MCNC: 19 MG/DL (ref 5–25)
CALCIUM ALBUM COR SERPL-MCNC: 9.6 MG/DL (ref 8.3–10.1)
CALCIUM SERPL-MCNC: 8.9 MG/DL (ref 8.4–10.2)
CARDIAC TROPONIN I PNL SERPL HS: 3 NG/L
CHLORIDE SERPL-SCNC: 108 MMOL/L (ref 96–108)
CO2 SERPL-SCNC: 27 MMOL/L (ref 21–32)
CREAT SERPL-MCNC: 0.81 MG/DL (ref 0.6–1.3)
EOSINOPHIL # BLD MANUAL: 0 THOUSAND/UL (ref 0–0.4)
EOSINOPHIL NFR BLD MANUAL: 0 % (ref 0–6)
ERYTHROCYTE [DISTWIDTH] IN BLOOD BY AUTOMATED COUNT: 20.9 % (ref 11.6–15.1)
GFR SERPL CREATININE-BSD FRML MDRD: 89 ML/MIN/1.73SQ M
GLUCOSE SERPL-MCNC: 111 MG/DL (ref 65–140)
HCT VFR BLD AUTO: 38.9 % (ref 36.5–49.3)
HGB BLD-MCNC: 11.6 G/DL (ref 12–17)
LYMPHOCYTES # BLD AUTO: 0.52 THOUSAND/UL (ref 0.6–4.47)
LYMPHOCYTES # BLD AUTO: 7 % (ref 14–44)
MACROCYTES BLD QL AUTO: PRESENT
MCH RBC QN AUTO: 28 PG (ref 26.8–34.3)
MCHC RBC AUTO-ENTMCNC: 29.8 G/DL (ref 31.4–37.4)
MCV RBC AUTO: 94 FL (ref 82–98)
MONOCYTES # BLD AUTO: 0.6 THOUSAND/UL (ref 0–1.22)
MONOCYTES NFR BLD: 8 % (ref 4–12)
MYELOCYTES NFR BLD MANUAL: 1 % (ref 0–1)
NEUTROPHILS # BLD MANUAL: 6.28 THOUSAND/UL (ref 1.85–7.62)
NEUTS BAND NFR BLD MANUAL: 2 % (ref 0–8)
NEUTS SEG NFR BLD AUTO: 82 % (ref 43–75)
NRBC BLD AUTO-RTO: 4 /100 WBC (ref 0–2)
OVALOCYTES BLD QL SMEAR: PRESENT
PLATELET # BLD AUTO: 202 THOUSANDS/UL (ref 149–390)
PLATELET BLD QL SMEAR: ADEQUATE
PMV BLD AUTO: 9.3 FL (ref 8.9–12.7)
POLYCHROMASIA BLD QL SMEAR: PRESENT
POTASSIUM SERPL-SCNC: 4.2 MMOL/L (ref 3.5–5.3)
PROT SERPL-MCNC: 5.8 G/DL (ref 6.4–8.4)
RBC # BLD AUTO: 4.15 MILLION/UL (ref 3.88–5.62)
SODIUM SERPL-SCNC: 140 MMOL/L (ref 135–147)
WBC # BLD AUTO: 7.48 THOUSAND/UL (ref 4.31–10.16)

## 2023-03-09 NOTE — ED PROVIDER NOTES
History  Chief Complaint   Patient presents with   • Gait Problem     Patient presents to the ED with c/o gait problem, states received radiation to brain at the end of February and has had problems since  States MRI showed swelling in brain due to radiation      Patient is a 71year old male who presents with RUE weakness and ambulatory dysfunction  Since having radiation, patient has had weakness in the RUE as well as difficulty walking and maintaining his balance  Despite increase in dose of decadron, patient not having improvement, and came very close to falling at least twice today  Prior to Admission Medications   Prescriptions Last Dose Informant Patient Reported?  Taking?   acetaminophen (TYLENOL) 325 mg tablet   No No   Sig: Take 2 tablets (650 mg total) by mouth 4 (four) times a day as needed for mild pain, headaches or fever   albuterol (ProAir HFA) 90 mcg/act inhaler   No No   Sig: Inhale 2 puffs every 6 (six) hours as needed for wheezing or shortness of breath   atorvastatin (LIPITOR) 40 mg tablet   No No   Sig: TAKE 1 TABLET BY MOUTH DAILY WITH DINNER   dexamethasone (DECADRON) 2 mg tablet   No No   Sig: Take 1 tablet (2 mg total) by mouth 2 (two) times a day with meals   enoxaparin (LOVENOX) 100 mg/mL   No No   Sig: Inject 0 9 mL (90 mg total) under the skin every 12 (twelve) hours   folic acid (FOLVITE) 1 mg tablet   No No   Sig: TAKE 1 TABLET BY MOUTH EVERY DAY   levETIRAcetam (Keppra) 1000 MG tablet   No No   Sig: Take 1 tablet (1,000 mg total) by mouth 2 (two) times a day   multivitamin (THERAGRAN) TABS   Yes No   Sig: Take 1 tablet by mouth daily   pantoprazole (PROTONIX) 40 mg tablet   No No   Sig: Take 1 tablet (40 mg total) by mouth daily   tamsulosin (FLOMAX) 0 4 mg   No No   Sig: TAKE 1 CAPSULE (0 4 MG TOTAL) BY MOUTH DAILY AFTER DINNER      Facility-Administered Medications: None       Past Medical History:   Diagnosis Date   • Chronic respiratory failure with hypoxia ([facility]) 2021   • Impaired mobility and ADLs 2021   • Lung cancer [facility])    • Stroke [facility])        Past Surgical History:   Procedure Laterality Date   • IR BIOPSY LUNG  2021   • IR THORACENTESIS  2022   • IR THORACENTESIS  2022       Family History   Problem Relation Age of Onset   • Prostate cancer Brother    • Lung cancer Brother      I have reviewed and agree with the history as documented  E-Cigarette/Vaping   • E-Cigarette Use Never User      E-Cigarette/Vaping Substances     Social History     Tobacco Use   • Smoking status: Former     Packs/day: 3 00     Years: 39 00     Pack years: 117 00     Types: Cigarettes     Start date:      Quit date:      Years since quittin 2   • Smokeless tobacco: Never   Vaping Use   • Vaping Use: Never used   Substance Use Topics   • Alcohol use: Not Currently     Comment: No ETOH since Summer 2021    • Drug use: Never       Review of Systems   Constitutional: Positive for fatigue  Musculoskeletal: Positive for gait problem  Neurological: Positive for weakness (RUE)  Negative for dizziness, light-headedness and headaches  Physical Exam  Physical Exam  Vitals and nursing note reviewed  Constitutional:       General: He is not in acute distress  Appearance: Normal appearance  He is ill-appearing (chronically ill appearing)  He is not toxic-appearing or diaphoretic  HENT:      Head: Normocephalic and atraumatic  Mouth/Throat:      Mouth: Mucous membranes are moist    Eyes:      Conjunctiva/sclera: Conjunctivae normal    Cardiovascular:      Rate and Rhythm: Normal rate and regular rhythm  Pulses: Normal pulses  Heart sounds: Normal heart sounds  Pulmonary:      Effort: Pulmonary effort is normal  No respiratory distress  Breath sounds: Normal breath sounds  No stridor  No wheezing, rhonchi or rales  Chest:      Chest wall: No tenderness  Abdominal:      General: Bowel sounds are normal  There is no distension  Palpations: Abdomen is soft  Tenderness: There is no abdominal tenderness  There is no guarding or rebound  Musculoskeletal:      Cervical back: Neck supple  Right lower leg: Edema present  Left lower leg: Edema present  Skin:     General: Skin is warm and dry  Neurological:      General: No focal deficit present  Mental Status: He is alert and oriented to person, place, and time  Mental status is at baseline  Sensory: Sensation is intact  Motor: Weakness and pronator drift present        Gait: Gait abnormal       Comments: Alert and oriented x 3  Decreased    Psychiatric:         Mood and Affect: Mood normal          Behavior: Behavior normal          Vital Signs  ED Triage Vitals [03/09/23 1717]   Temperature Pulse Respirations Blood Pressure SpO2   98 9 °F (37 2 °C) 83 18 129/74 100 %      Temp Source Heart Rate Source Patient Position - Orthostatic VS BP Location FiO2 (%)   Temporal Monitor Sitting Left arm --      Pain Score       No Pain           Vitals:    03/09/23 1830 03/09/23 1900 03/09/23 2000 03/09/23 2030   BP: 140/82 152/87 145/83 133/88   Pulse: 78 78 76 82   Patient Position - Orthostatic VS: Sitting Sitting Sitting Sitting         Visual Acuity  Visual Acuity    Flowsheet Row Most Recent Value   L Pupil Size (mm) 3   R Pupil Size (mm) 3          ED Medications  Medications - No data to display    Diagnostic Studies  Results Reviewed     Procedure Component Value Units Date/Time    HS Troponin 0hr (reflex protocol) [[id number]]  (Normal) Collected: 03/09/23 1905    Lab Status: Final result Specimen: Blood from Arm, Right Updated: 03/09/23 1941     hs TnI 0hr 3 ng/L     B-Type Natriuretic Peptide(BNP) [[id number]]  (Normal) Collected: 03/09/23 1808    Lab Status: Final result Specimen: Blood from Arm, Left Updated: 03/09/23 1936     BNP 60 pg/mL     Comprehensive metabolic panel [[id number]]  (Abnormal) Collected: 03/09/23 1905    Lab Status: Final result Specimen: Blood from Arm, Right Updated: 03/09/23 1933     Sodium 140 mmol/L      Potassium 4 2 mmol/L      Chloride 108 mmol/L      CO2 27 mmol/L      ANION GAP 5 mmol/L      BUN 19 mg/dL      Creatinine 0 81 mg/dL      Glucose 111 mg/dL      Calcium 8 9 mg/dL      Corrected Calcium 9 6 mg/dL      AST 16 U/L      ALT 18 U/L      Alkaline Phosphatase 52 U/L      Total Protein 5 8 g/dL      Albumin 3 1 g/dL      Total Bilirubin 0 27 mg/dL      eGFR 89 ml/min/1 73sq m     Narrative:      [facility] guidelines for Chronic Kid[facility] Disease (CKD):   •  Stage 1 with normal or high GFR (GFR > 90 mL/min/1 73 square meters)  •  Stage 2 Mild CKD (GFR = 60-89 mL/min/1 73 square meters)  •  Stage 3A Moderate CKD (GFR = 45-59 mL/min/1 73 square meters)  •  Stage 3B Moderate CKD (GFR = 30-44 mL/min/1 73 square meters)  •  Stage 4 Severe CKD (GFR = 15-29 mL/min/1 73 square meters)  •  Stage 5 End Stage CKD (GFR <15 mL/min/1 73 square meters)  Note: GFR calculation is accurate only with a steady state creatinine    CBC and differential [[id number]]  (Abnormal) Collected: 03/09/23 1808    Lab Status: Final result Specimen: Blood from Arm, Left Updated: 03/09/23 1912     WBC 7 48 Thousand/uL      RBC 4 15 Million/uL      Hemoglobin 11 6 g/dL      Hematocrit 38 9 %      MCV 94 fL      MCH 28 0 pg      MCHC 29 8 g/dL      RDW 20 9 %      MPV 9 3 fL      Platelets 245 Thousands/uL     Narrative: This is an appended report  These results have been appended to a previously verified report      Manual Differential(PHLEBS Do Not Order) [[id number]]  (Abnormal) Collected: 03/09/23 1808    Lab Status: Final result Specimen: Blood from Arm, Left Updated: 03/09/23 1912     Segmented % 82 %      Bands % 2 %      Lymphocytes % 7 %      Monocytes % 8 %      Eosinophils, % 0 %      Basophils % 0 %      Myelocytes % 1 %      Absolute Neutrophils 6 28 Thousand/uL      Lymphocytes Absolute 0 52 Thousand/uL      Monocytes Absolute 0 60 Thousand/uL      Eosinophils Absolute 0 00 Thousand/uL      Basophils Absolute 0 00 Thousand/uL      Total Counted --     nRBC 4 /100 WBC      Anisocytosis Present     Macrocytes Present     Ovalocytes Present     Polychromasia Present     Platelet Estimate Adequate                 CT head without contrast   Final Result by Katerina Friend MD (03/09 2017)      Left frontal region vasogenic edema again seen not significantly changed from 2/3/2023  No acute intracranial abnormality                  Workstation performed: [id number]         XR chest 1 view portable    (Results Pending)              Procedures  ECG 12 Lead Documentation Only    Date/Time: 3/9/2023 10:20 PM  Performed by: Natali Middleton DO  Authorized by: Natali Middleton DO     Indications / Diagnosis:  Vasogenic edema   ECG reviewed by me, the ED Provider: yes    Patient location:  ED  Interpretation:     Interpretation: non-specific    Rate:     ECG rate:  83    ECG rate assessment: normal    Rhythm:     Rhythm: sinus rhythm    Ectopy:     Ectopy: none    QRS:     QRS axis:  Normal    QRS intervals:  Normal  Conduction:     Conduction: normal    ST segments:     ST segments:  Non-specific  T waves:     T waves: non-specific    Comments:      qtc 420               ED Course  ED Course as of 03/10/23 0024   Thu Mar 09, 2023   1842 In DC/AMA papers provided to patient "Please call neurology and oncology to schedule appointment ASAP  Please continue to take the Decadron as prescribed  Please return to the emergency department for the following, but not limited to confusion/change in mental status, worsening difficulties walking, new weakness or numbness, persistent vomiting "   1846 Patient's daughter called patient and wife and convinced them to stay in the ER for further eval and admission if recommended     2059 Discussed with patient wife                               SBIRT 20yo+    Flowsheet Row Most Recent Value   SBIRT (23 yo +) In order to provide better care to our patients, we are screening all of our patients for alcohol and drug use  Would it be okay to ask you these screening questions? Yes Filed at: 03/09/2023 1812   Initial Alcohol Screen: US AUDIT-C     1  How often do you have a drink containing alcohol? 0 Filed at: 03/09/2023 1812   2  How many drinks containing alcohol do you have on a typical day you are drinking? 0 Filed at: 03/09/2023 1812   3a  Male UNDER 65: How often do you have five or more drinks on one occasion? 0 Filed at: 03/09/2023 1812   3b  FEMALE Any Age, or MALE 65+: How often do you have 4 or more drinks on one occassion? 0 Filed at: 03/09/2023 1812   Audit-C Score 0 Filed at: 03/09/2023 1812   EDWIN: How many times in the past year have you    Used an illegal drug or used a prescription medication for non-medical reasons? Never Filed at: 03/09/2023 1812                    Medical Decision Making  Assessment and Plan:   Persistent gait dysfunction and RUE weakness in setting of known vasogenic edema due to metastasis  Will get labs to assess for leukocytosis, anemia, kidney and liver function, ct head to assess for change in edema; ekg to assess for arrythmia/ ischemia, cxr to assess for pulmonary vascular congestion  Review of medical records shows that the patient has a past medical history significant for CA with mets to brain s/p stereotactic radiation on 2/22/23, had MRI on 3/6/23 that shows vasogenic edema for which he was prescribed 2 mg decadron BID, which was increased to 4mg BID on 3/7/23  Patient initially tried to sign out AMA prior to work up starting, but I was able to convince him to stay for the workup  After completion of workup, discussed case with neurology, Dr Carlito Xiao, who recommended admission for monitoring response to an increased dose of decadron to 4mg q6 h   I also believe that the patient should be admitted and assessed by pT/OT for STR as patient is having such a difficult time walking and cannot use a walker due to RUE weakness, and is too unsteady for a cane  Patient became upset with recommendation for admission and decided to leave AMA at this time  Discussed with patient, his wife at bedside, and daughter via telephone risk of worsening edema, fall due to unsteady gait and being unable to use a walker due to RUE weakness that may result in injury such as fracture or even intracranial hemorrhage which could result in permanent disability or death  Patient and wife both verbalized understanding, but patient does not want admission at this time  Counseled patient to follow up with oncology, neurology, and neurosurgery in outpatient setting  [place]-tex[place]ileen Shell neurosurgery PA to inform of patient's ER visit, decision to leave AMA, and ask that neurosurgery office call patient to follow up with him tomorrow  Amount and/or Complexity of Data Reviewed  Labs: ordered  Radiology: ordered  Disposition  Final diagnoses:   Ambulatory dysfunction   Vasogenic brain edema (HCC)   Vasogenic edema ([address] )     Time reflects when diagnosis was documented in both MDM as applicable and the Disposition within this note     Time User Action Codes Description Comment    3/9/2023  6:39 PM Tonie Panning Add [R26 2] Ambulatory dysfunction     3/9/2023  6:40 PM Tonie Panning Add [G93 6] Vasogenic brain edema ([facility] [address] )     3/9/2023  6:40 PM Tonie Panning Add [G93 6] Vasogenic edema [facility])       ED Disposition     ED Disposition   AMA    Condition   --    Date/Time   Thu Mar 9, 2023  8:52 PM    Comment   Date: 3/9/2023  Patient: Deborah Sosa  Admitted: 3/9/2023  5:31 PM  Attending Provider: Saji Ford DO    Deborah Sosa or his authorized caregiver has made the decision for the patient to leave the emergency department against the advice of his  attending physician   He or his authorized caregiver has been informed and understands the inherent risks, including death, permanent disability, worsening brain swelling  He or his authorized caregiver has decided to accept the responsibility for t his decision  Kathleen Velez and all necessary parties have been advised that he may return for further evaluation or treatment  His condition at time of discharge was stable    Kathleen Velez had current vital signs as follows:  /74 (BP Location: L eft arm)   Pulse 83   Temp 98 9 °F (37 2 °C) (Temporal)   Resp 18   Ht 5' 9" (1 753 m)   Wt 94 3 kg (208 lb)            Follow-up Information     Follow up With Specialties Details Why Contact Info Additional Priscilla Connolly 1723 Emergency Department Emergency Medicine Go to  As needed, If symptoms worsen, for re-evaluation [address] [place],[address]D [phone]  [address] [facility] Emergency [facility] Emergency Department, [address] [place], [place], [place] [address]    [facility] [place] [facility] Schedule an appointment as soon as possible for a visit in 1 day for re-evaluation [address] [facility] [address] [phone]  [address] [facility] Neurology [address], [address], [address], [place], [place], [address]    Lizeth Davis MD Family Medicine Schedule an appointment as soon as possible for a visit in 3 days for re-evaluation [address]  [address] [facility]  [address] [phone] [address] [facility] Neurosurgery Call in 1 day for re-evaluation [address] [facility] [phone] [facility] [address]  [facility], [address] [place], [place], [id number] [phone]          Discharge Medication List as of 3/9/2023  9:02 PM      CONTINUE these medications which have NOT CHANGED    Details   acetaminophen (TYLENOL) 325 mg tablet Take 2 tablets (650 mg total) by mouth 4 (four) times a day as needed for mild pain, headaches or fever, Starting Mon 8/16/2021, No Print      albuterol (ProAir HFA) 90 mcg/act inhaler Inhale 2 puffs every 6 (six) hours as needed for wheezing or shortness of breath, Starting Thu 8/11/2022, Normal      atorvastatin (LIPITOR) 40 mg tablet TAKE 1 TABLET BY MOUTH DAILY WITH DINNER, Normal      dexamethasone (DECADRON) 2 mg tablet Take 1 tablet (2 mg total) by mouth 2 (two) times a day with meals, Starting Mon 3/6/2023, Normal      enoxaparin (LOVENOX) 100 mg/mL Inject 0 9 mL (90 mg total) under the skin every 12 (twelve) hours, Starting Mon 8/6/4504, Normal      folic acid (FOLVITE) 1 mg tablet TAKE 1 TABLET BY MOUTH EVERY DAY, Normal      levETIRAcetam (Keppra) 1000 MG tablet Take 1 tablet (1,000 mg total) by mouth 2 (two) times a day, Starting Mon 2/6/2023, Normal      multivitamin (THERAGRAN) TABS Take 1 tablet by mouth daily, Historical Med      pantoprazole (PROTONIX) 40 mg tablet Take 1 tablet (40 mg total) by mouth daily, Starting Mon 2/6/2023, Until Mon 3/13/2023, Normal      tamsulosin (FLOMAX) 0 4 mg TAKE 1 CAPSULE (0 4 MG TOTAL) BY MOUTH DAILY AFTER DINNER, Starting Mon 3/6/2023, Normal                 PDMP Review     None          ED Provider  Electronically Signed by           Olive Romero DO  03/10/23 0025

## 2023-03-09 NOTE — Clinical Note
Date: 3/9/2023  Patient: Laura Shipley  Admitted: 3/9/2023  5:31 PM  Attending Provider: Elen Ernandez DO    Laura Shipley or his authorized caregiver has made the decision for the patient to leave the emergency department against the advice of his  attending physician  He or his authorized caregiver has been informed and understands the inherent risks, including death, permanent disability, worsening brain swelling  He or his authorized caregiver has decided to accept the responsibility for t his decision  Laura Shipley and all necessary parties have been advised that he may return for further evaluation or treatment  His condition at time of discharge was stable    Laura Shipley had current vital signs as follows:  /74 (BP Location: L eft arm)   Pulse 83   Temp 98 9 °F (37 2 °C) (Temporal)   Resp 18   Ht 5' 9" (1 753 m)   Wt 94 3 kg (208 lb)

## 2023-03-09 NOTE — DISCHARGE INSTRUCTIONS
Please call neurology and oncology to schedule appointment ASAP  Please take decadron 4mg every 6 hours as recommended by neurology  Please return to the emergency department for the following, but not limited to confusion/change in mental status, worsening difficulties walking, new weakness or numbness, persistent vomiting

## 2023-03-09 NOTE — TELEPHONE ENCOUNTER
Patient's wife left a message on the voicemail requesting a call back  She seems to think his symptoms that he presented to our office with on Monday are getting worse  She said that "his walking ability is bad " No falls but he almost fell twice since his appointment  The swelling in his feet is no worse but no better despite elevating his legs  He is oriented  He is also not using his right arm, has limited strength, and says "it just hangs there "   He has been taking dexamethasone 4mg BID  Per Dr Martinez Holes, patient should be evaluated by neurology in the ED  They will be going to [address] now  ADT order entered

## 2023-03-10 ENCOUNTER — TELEPHONE (OUTPATIENT)
Dept: NEUROLOGY | Facility: CLINIC | Age: 73
End: 2023-03-10

## 2023-03-10 ENCOUNTER — DOCUMENTATION (OUTPATIENT)
Dept: RADIATION ONCOLOGY | Facility: HOSPITAL | Age: 73
End: 2023-03-10

## 2023-03-10 ENCOUNTER — TELEPHONE (OUTPATIENT)
Dept: OTHER | Facility: HOSPITAL | Age: 73
End: 2023-03-10

## 2023-03-10 DIAGNOSIS — C79.31 BRAIN METASTASES (HCC): ICD-10-CM

## 2023-03-10 DIAGNOSIS — C79.31 BRAIN METASTASIS (HCC): Primary | ICD-10-CM

## 2023-03-10 RX ORDER — PANTOPRAZOLE SODIUM 40 MG/1
40 TABLET, DELAYED RELEASE ORAL DAILY
Qty: 35 TABLET | Refills: 0 | Status: SHIPPED | OUTPATIENT
Start: 2023-03-10 | End: 2023-04-14

## 2023-03-10 NOTE — PROGRESS NOTES
Called patient as I was informed that he was in the emergency room yesterday  He underwent imaging which revealed lesion treated with SRS essentially stable  He had mildly increased surrounding edema  He had been on a Decadron taper and was placed on 4 mg every 6 hours yesterday  He states he feels better today  We will continue current regimen until next week  The patient and his wife state that they will contact our neuro nurse navigator Álvaro Conn with his status next week so that we can try slow taper

## 2023-03-10 NOTE — QUICK NOTE
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 who presented to the ED at the request of his oncologist due to worsening cerebral edema as well as weakness and gait instability  Recommendations  -CT head completed in the ER did not demonstrate any significant change in terms of edema from prior CT  -Recent MRI completed on 3/6 which demonstrated mild increase in vasogenic edema  -Recommended patient's steroids be increased to 4 mg every 6 hours and that he be admitted for monitoring steroid response as well as PT/OT evaluations  -Update 3/10/2023: Patient did not want to be admitted and left against medical advice  Discussed with ED and recommended continuing increased dose of steroids temporarily with close follow-up with oncology and neurosurgery  No neurology outpatient follow-up at this time    I discussed this case with the managing team from a remote location  I did not personally see or examine this patient   I have provided limited recommendations as above, but ultimate patient disposition as per managing team

## 2023-03-10 NOTE — TELEPHONE ENCOUNTER
Patient calling to schedule new patient appointment for vasogenic edema  Testing done  Patient seen in ER  Triage intake sent

## 2023-03-10 NOTE — TELEPHONE ENCOUNTER
Contacted by ED provider at approximately 10:30 PM on 3/9/2023  Patient was [facility] emergency department for ambulatory dysfunction  He is status post SRS on 2/22  He is having progressive worsening symptoms as of recently  He was recommended for admission to the hospital for medical therapy but declined and left with his wife JACOB  ED provider contacted neurosurgical provider to ensure establishment of follow-up appointments  This provider contacted radiation oncology who will contact patient and make necessary medication adjustments such as increased steroid therapy if necessary

## 2023-03-10 NOTE — ED NOTES
ECG 12 lead was preformed in triage   Provider made aware and said ER staff does not need to do another in room at this time      Elaine Winkler RN  03/09/23 3959

## 2023-03-11 LAB
ATRIAL RATE: 83 BPM
P AXIS: 41 DEGREES
PR INTERVAL: 166 MS
QRS AXIS: 40 DEGREES
QRSD INTERVAL: 70 MS
QT INTERVAL: 358 MS
QTC INTERVAL: 420 MS
T WAVE AXIS: 41 DEGREES
VENTRICULAR RATE: 83 BPM

## 2023-03-14 ENCOUNTER — TELEPHONE (OUTPATIENT)
Dept: HEMATOLOGY ONCOLOGY | Facility: CLINIC | Age: 73
End: 2023-03-14

## 2023-03-14 NOTE — TELEPHONE ENCOUNTER
Patient's wife left a message on the voicemail requesting a call back  She said she just had some questions to run past Dr Adrián Schulte  She said she is supposed to call rad onc tomorrow to talk about weaning him off his dexamethasone  She wants to know if Dr Adrián Schulte agrees with this  Also she said they have referred him to see a neurosurgeon and she is not sure why because he was initially supposed to see neurology  I let her know I would ask Dr Adrián Schulte and give her a call back

## 2023-03-14 NOTE — TELEPHONE ENCOUNTER
Spoke to wife  Dr Shanna Gil ok to start weaning off dexamethasone  Wife aware  Let her know that an appointment with neurosurgery isn't needed at this time  Wife verbalized understanding

## 2023-03-15 ENCOUNTER — TELEPHONE (OUTPATIENT)
Dept: RADIATION ONCOLOGY | Facility: HOSPITAL | Age: 73
End: 2023-03-15

## 2023-03-15 DIAGNOSIS — C79.31 BRAIN METASTASIS (HCC): Primary | ICD-10-CM

## 2023-03-15 RX ORDER — DEXAMETHASONE 4 MG/1
4 TABLET ORAL EVERY 6 HOURS
Qty: 60 TABLET | Refills: 1 | Status: SHIPPED | OUTPATIENT
Start: 2023-03-15

## 2023-03-15 NOTE — TELEPHONE ENCOUNTER
Currently taking dexamethasone 4 mg every 6 hours ([id number])  Patient's spouse, Keiry Zimmer instructed starting tonight to decrease the 0300 dose to 2 mg x3 days,  then stop the 0300 dose  At that point will he will be on 4 mg every 8 hours ([id number]) until patient's status is checked next week and further taper instructions given  Keiry Zimmer informed that a new dexamethasone 4 mg script has been sent to [facility] [address] in [place]  Steroid taper calendar emailed to wife at [email or web address]  Instructed to call if any problems/concerns, radiation office phone number and my direct number given Wife verbalized understanding of above

## 2023-03-15 NOTE — TELEPHONE ENCOUNTER
Call from patient's wife, Richmond Cohen reports that patient continues to take dexamethasone 4 mg every 6 hours per instructions given at [facility] ED on 3/9/2023  She states that he is doing better, walking around better, no falls  He does not use a cane or walker  She also reports that he has had improvement with his right hand weakness since being on the higher dosage of dexamethasone  She reports that he finished his Keppra 3 days ago  Denies headaches, N/V, no tremors/seizures, no dizziness/lightheadedness  Appetite is good  She states that he's been waking up around 3:30 in the morning and then is usually up for the day  She reports that they got a call from neurology office and was told that Aylin Guadarrama needs to see a neurosurgeon not neurology  She said she spoke with Dr Yudy Guallpa office yesterday to get this clarified, and was told he does not need to see neurosurgery  Aylin Guadarrama has a scheduled follow up with Dr Ace Gross on Monday, 3/20/23  Richmond Cohen informed that I will notify Dr Raoul Killian of above and get back to her with further steroid instructions  She verbalized understanding

## 2023-03-17 ENCOUNTER — PATIENT OUTREACH (OUTPATIENT)
Dept: HEMATOLOGY ONCOLOGY | Facility: CLINIC | Age: 73
End: 2023-03-17

## 2023-03-17 NOTE — PROGRESS NOTES
Pt was re-referred to Social Work since he had a transfer in care with his Oncologist   MSW made outreach this day and pt was not able to speak however, his wife was open to conversation  Pt's wife shared that they were pleased with the new Oncologist and recognized that this change was easier than they anticipated  She reports that her  had some physical issues but she is feeling hopeful that he is doing better and on a better path at this time  Pt's wife reports no OSW needs or concerns at this time  MSW provided contact number and encouraged the pt's wife to call as needed  Emotional support offered

## 2023-03-20 ENCOUNTER — OFFICE VISIT (OUTPATIENT)
Age: 73
End: 2023-03-20

## 2023-03-20 ENCOUNTER — TELEPHONE (OUTPATIENT)
Age: 73
End: 2023-03-20

## 2023-03-20 VITALS
WEIGHT: 212 LBS | OXYGEN SATURATION: 94 % | DIASTOLIC BLOOD PRESSURE: 92 MMHG | HEART RATE: 94 BPM | HEIGHT: 69 IN | BODY MASS INDEX: 31.4 KG/M2 | SYSTOLIC BLOOD PRESSURE: 146 MMHG | RESPIRATION RATE: 14 BRPM | TEMPERATURE: 98.6 F

## 2023-03-20 DIAGNOSIS — C34.11 MALIGNANT NEOPLASM OF UPPER LOBE OF RIGHT LUNG (HCC): Primary | ICD-10-CM

## 2023-03-20 DIAGNOSIS — C34.90 ADENOCARCINOMA OF LUNG, UNSPECIFIED LATERALITY (HCC): Primary | ICD-10-CM

## 2023-03-20 DIAGNOSIS — C79.31 BRAIN METASTASIS: ICD-10-CM

## 2023-03-20 RX ORDER — SODIUM CHLORIDE 9 MG/ML
20 INJECTION, SOLUTION INTRAVENOUS ONCE
Status: CANCELLED | OUTPATIENT
Start: 2023-03-27

## 2023-03-20 RX ORDER — SODIUM CHLORIDE 9 MG/ML
20 INJECTION, SOLUTION INTRAVENOUS ONCE
Status: CANCELLED | OUTPATIENT
Start: 2023-04-17

## 2023-03-20 NOTE — TELEPHONE ENCOUNTER
Please schedule treatment for patient  While we try to accommodate patient requests, our priority is to schedule treatment according to Doctor's orders and site availability  1  Does the Provider use the intake sheet or checkout note? Note  2  What would be a preferred day of the week that would work best for your infusion appointment? Monday  3  Do you prefer mornings or afternoons for your appointments? Mornings  4  Are there any days or dates that do not work for your schedule, including any upcoming vacations? None  5  We are going to try our best to schedule you at the infusion center closest to your home  In the event that we are unable to what would be your next preferred infusion site or sites? [place]    1  [place]  2      6  Do you have transportation to take you to all of your appointments? Yes  7   Would you like the infusion center to draw labs from your port? (disregard if patient doesn't have a port or need labs for infusion appointment) No

## 2023-03-21 ENCOUNTER — TELEPHONE (OUTPATIENT)
Dept: LAB | Facility: HOSPITAL | Age: 73
End: 2023-03-21

## 2023-03-21 ENCOUNTER — TELEPHONE (OUTPATIENT)
Dept: HEMATOLOGY ONCOLOGY | Facility: CLINIC | Age: 73
End: 2023-03-21

## 2023-03-21 DIAGNOSIS — C34.90 ADENOCARCINOMA OF LUNG, UNSPECIFIED LATERALITY (HCC): ICD-10-CM

## 2023-03-21 DIAGNOSIS — C34.11 MALIGNANT NEOPLASM OF UPPER LOBE OF RIGHT LUNG (HCC): ICD-10-CM

## 2023-03-21 DIAGNOSIS — T45.1X5A CHEMOTHERAPY INDUCED NEUTROPENIA (HCC): Primary | ICD-10-CM

## 2023-03-21 DIAGNOSIS — D70.1 CHEMOTHERAPY INDUCED NEUTROPENIA (HCC): Primary | ICD-10-CM

## 2023-03-21 DIAGNOSIS — R91.8 MASS OF UPPER LOBE OF RIGHT LUNG: ICD-10-CM

## 2023-03-21 NOTE — TELEPHONE ENCOUNTER
Reached out to [facility] to give them patient's information to call and get mobile labs scheduled for the patient  They will call and schedule

## 2023-03-21 NOTE — TELEPHONE ENCOUNTER
Spoke with wife, confirmed all upcoming dates & times   Requested [facility], I gave [facility] patient information

## 2023-03-21 NOTE — PROGRESS NOTES
HEMATOLOGY / [address] FOLLOW UP NOTE    Primary Care Provider: Pablo Aranda MD  Referring Provider:    MRN: [id number]  : 1950    Reason for Encounter:       Oncology History Overview Note   2021 - Stage IV adenocarcinoma of the lung    10/2021 - 2022 - carbo/pem/pebro    3/2022 - 2022 - carbo/taxol/RT    2022 - maintenance pembro    2023 - solitary brain met - SBRT     Adenocarcinoma of lung   2021 Initial Diagnosis    Adenocarcinoma of lung     2021 Biopsy    Right lung apex, image-guided core needle biopsy:  - Adenocarcinoma      10/6/2021 - 2022 Chemotherapy    cyanocobalamin, 1,000 mcg, Intramuscular, Once, 3 of 3 cycles  Administration: 1,000 mcg (2021), 1,000 mcg (2022), 1,000 mcg (10/6/2021)  pegfilgrastim (Nadege Ridgel), 6 mg, Subcutaneous, Once, 1 of 1 cycle  Administration: 6 mg (2021)  pegfilgrastim (Manuel Winters), 6 mg, Subcutaneous, Once, 6 of 6 cycles  Administration: 6 mg (10/6/2021), 6 mg (11/3/2021), 6 mg (2021), 6 mg (12/15/2021), 6 mg (2022)  fosaprepitant (EMEND) IVPB, 150 mg, Intravenous, Once, 6 of 6 cycles  Administration: 150 mg (10/6/2021), 150 mg (2021), 150 mg (12/15/2021), 150 mg (2022), 150 mg (11/3/2021), 150 mg (2022)  CARBOplatin (PARAPLATIN) IVPB (GOG AUC DOSING), 519 5 mg, Intravenous, Once, 6 of 6 cycles  Administration: 519 5 mg (10/6/2021), 574 mg (2021), 600 mg (12/15/2021), 533 mg (2022), 604 5 mg (11/3/2021), 563 mg (2022)  pemetrexed (ALIMTA) chemo infusion, 970 mg, Intravenous, Once, 6 of 6 cycles  Administration: 1,000 mg (10/6/2021), 1,000 mg (2021), 1,000 mg (12/15/2021), 1,000 mg (2022), 1,000 mg (11/3/2021), 1,000 mg (2022)  pembrolizumab (KEYTRUDA) IVPB, 200 mg, Intravenous, Once, 6 of 6 cycles  Administration: 200 mg (10/6/2021), 200 mg (2021), 200 mg (12/15/2021), 200 mg (2022), 200 mg (11/3/2021), 200 mg (2022)     3/7/2022 -  Chemotherapy CARBOplatin (PARAPLATIN) IVPB (GOG AUC DOSING), , Intravenous, Once, 6 of 6 cycles  Administration: 211 6 mg (3/7/2022), 208 mg (3/14/2022), 238 8 mg (3/21/2022), 211 6 mg (3/28/2022), 215 2 mg (4/4/2022), 213 4 mg (4/18/2022)  PACLItaxel (TAXOL) chemo IVPB, 50 mg/m2 = 99 mg, Intravenous, Once, 6 of 6 cycles  Administration: 99 mg (3/7/2022), 99 mg (3/14/2022), 99 mg (3/21/2022), 99 mg (3/28/2022), 99 mg (4/4/2022), 99 mg (4/18/2022)  pembrolizumab (KEYTRUDA) IVPB, 200 mg, Intravenous, Once, 18 of 22 cycles  Administration: 200 mg (3/7/2022), 200 mg (3/28/2022), 200 mg (4/25/2022), 200 mg (5/16/2022), 200 mg (6/6/2022), 200 mg (6/27/2022), 200 mg (7/18/2022), 200 mg (8/8/2022), 200 mg (8/29/2022), 200 mg (9/19/2022), 200 mg (10/10/2022), 200 mg (10/31/2022), 200 mg (11/21/2022), 200 mg (12/12/2022), 200 mg (1/3/2023), 200 mg (1/23/2023), 200 mg (2/13/2023)     Malignant neoplasm of upper lobe of right lung ([facility] [address] )   9/3/2021 Initial Diagnosis    Malignant neoplasm of upper lobe of right lung ([facility] [address] )     9/23/2021 -  Cancer Staged    Staging form: Lung, AJCC 8th Edition  - Clinical stage from 9/23/2021: Stage IIIC (cT3, cN3, cM0) - Signed by Mitzy Cruz MD on 9/23/2021  Histopathologic type:  Adenocarcinoma, NOS       10/6/2021 - 1/26/2022 Chemotherapy    cyanocobalamin, 1,000 mcg, Intramuscular, Once, 3 of 3 cycles  Administration: 1,000 mcg (11/24/2021), 1,000 mcg (1/26/2022), 1,000 mcg (10/6/2021)  pegfilgrastim (Rojean Stagers), 6 mg, Subcutaneous, Once, 1 of 1 cycle  Administration: 6 mg (11/26/2021)  pegfilgrastim (Erman See), 6 mg, Subcutaneous, Once, 6 of 6 cycles  Administration: 6 mg (10/6/2021), 6 mg (11/3/2021), 6 mg (11/24/2021), 6 mg (12/15/2021), 6 mg (1/5/2022)  fosaprepitant (EMEND) IVPB, 150 mg, Intravenous, Once, 6 of 6 cycles  Administration: 150 mg (10/6/2021), 150 mg (11/24/2021), 150 mg (12/15/2021), 150 mg (1/26/2022), 150 mg (11/3/2021), 150 mg (1/5/2022)  CARBOplatin (PARAPLATIN) IVPB (GOG AUC DOSING), 519 5 mg, Intravenous, Once, 6 of 6 cycles  Administration: 519 5 mg (10/6/2021), 574 mg (11/24/2021), 600 mg (12/15/2021), 533 mg (1/26/2022), 604 5 mg (11/3/2021), 563 mg (1/5/2022)  pemetrexed (ALIMTA) chemo infusion, 970 mg, Intravenous, Once, 6 of 6 cycles  Administration: 1,000 mg (10/6/2021), 1,000 mg (11/24/2021), 1,000 mg (12/15/2021), 1,000 mg (1/26/2022), 1,000 mg (11/3/2021), 1,000 mg (1/5/2022)  pembrolizumab (KEYTRUDA) IVPB, 200 mg, Intravenous, Once, 6 of 6 cycles  Administration: 200 mg (10/6/2021), 200 mg (11/24/2021), 200 mg (12/15/2021), 200 mg (1/26/2022), 200 mg (11/3/2021), 200 mg (1/5/2022)     3/4/2022 - 4/21/2022 Radiation    The patient saw @[facility]@ for radiation treatment   This is the current list of radiation treatment:  Plan ID Energy Fractions Dose per Fraction (cGy) Dose Correction (cGy) Total Dose Delivered (cGy) Elapsed Days   R LUNGMED REV 6X 30 / 30 200 0 6,000 48      Treatment dates:  C1: 3/4/2022 - 4/21/2022         3/7/2022 -  Chemotherapy    CARBOplatin (PARAPLATIN) IVPB (GOG AUC DOSING), , Intravenous, Once, 6 of 6 cycles  Administration: 211 6 mg (3/7/2022), 208 mg (3/14/2022), 238 8 mg (3/21/2022), 211 6 mg (3/28/2022), 215 2 mg (4/4/2022), 213 4 mg (4/18/2022)  PACLItaxel (TAXOL) chemo IVPB, 50 mg/m2 = 99 mg, Intravenous, Once, 6 of 6 cycles  Administration: 99 mg (3/7/2022), 99 mg (3/14/2022), 99 mg (3/21/2022), 99 mg (3/28/2022), 99 mg (4/4/2022), 99 mg (4/18/2022)  pembrolizumab (KEYTRUDA) IVPB, 200 mg, Intravenous, Once, 18 of 22 cycles  Administration: 200 mg (3/7/2022), 200 mg (3/28/2022), 200 mg (4/25/2022), 200 mg (5/16/2022), 200 mg (6/6/2022), 200 mg (6/27/2022), 200 mg (7/18/2022), 200 mg (8/8/2022), 200 mg (8/29/2022), 200 mg (9/19/2022), 200 mg (10/10/2022), 200 mg (10/31/2022), 200 mg (11/21/2022), 200 mg (12/12/2022), 200 mg (1/3/2023), 200 mg (1/23/2023), 200 mg (2/13/2023)     Brain metastasis   2/3/2023 Initial Diagnosis    Brain metastasis Interval History:         REVIEW OF SYSTEMS:  Please note that a 14-point review of systems was performed to include Constitutional, HEENT, Respiratory, CVS, GI, , Musculoskeletal, Integumentary, Neurologic, Rheumatologic, Endocrinologic, Psychiatric, Lymphatic, and Hematologic/Oncologic systems were reviewed and are negative unless otherwise stated in HPI  Positive and negative findings pertinent to this evaluation are incorporated into the history of present illness  ECOG PS: 1    PROBLEM LIST:  Patient Active Problem List   Diagnosis   • History of stroke   • Bilateral lower extremity DVTs   • Nasopharyngeal mass   • Dysphagia   • Mass of upper lobe of right lung   • History of urinary retention   • Constipation   • Anemia of chronic disease   • Pulmonary embolism (HCC)   • Impaired cognition   • Adenocarcinoma of lung   • Malignant neoplasm of upper lobe of right lung (HCC)   • COPD mixed type (HCC)   • Chemotherapy induced neutropenia (HCC)   • Hypercalcemia of malignancy   • Chronic anticoagulation   • Chronic right-sided heart failure (HCC)   • Brain injury, without loss of consciousness, subsequent encounter   • Recurrent right pleural effusion   • Restrictive lung disease   • Nocturnal hypoxemia   • Tobacco use disorder, severe, in sustained remission, dependence   • Brain metastasis   • History of venous thromboembolism   • Hyperlipidemia   • Vasogenic edema (HCC)       Assessment / Plan:        I spent 30 minutes on chart review, face to face counseling time, coordination of care and documentation  Past Medical History:   has a past medical history of Chronic respiratory failure with hypoxia ([facility] [address] ) (8/9/2021), Impaired mobility and ADLs (8/13/2021), Lung cancer ([facility] [address] ), and Stroke ([facility] [address] )  PAST SURGICAL HISTORY:   has a past surgical history that includes IR biopsy lung (8/5/2021); IR thoracentesis (2/24/2022); and IR thoracentesis (8/1/2022)      CURRENT MEDICATIONS  Current Outpatient Medications   Medication Sig Dispense Refill   • acetaminophen (TYLENOL) 325 mg tablet Take 2 tablets (650 mg total) by mouth 4 (four) times a day as needed for mild pain, headaches or fever  0   • albuterol (ProAir HFA) 90 mcg/act inhaler Inhale 2 puffs every 6 (six) hours as needed for wheezing or shortness of breath 8 g 0   • atorvastatin (LIPITOR) 40 mg tablet TAKE 1 TABLET BY MOUTH DAILY WITH DINNER 90 tablet 0   • dexamethasone (DECADRON) 4 mg tablet Take 1 tablet (4 mg total) by mouth every 6 (six) hours 60 tablet 1   • enoxaparin (LOVENOX) 100 mg/mL Inject 0 9 mL (90 mg total) under the skin every 12 (twelve) hours 60 mL 5   • folic acid (FOLVITE) 1 mg tablet TAKE 1 TABLET BY MOUTH EVERY DAY 90 tablet 2   • levETIRAcetam (Keppra) 1000 MG tablet Take 1 tablet (1,000 mg total) by mouth 2 (two) times a day 60 tablet 0   • multivitamin (THERAGRAN) TABS Take 1 tablet by mouth daily     • pantoprazole (PROTONIX) 40 mg tablet Take 1 tablet (40 mg total) by mouth daily 35 tablet 0   • tamsulosin (FLOMAX) 0 4 mg TAKE 1 CAPSULE (0 4 MG TOTAL) BY MOUTH DAILY AFTER DINNER 90 capsule 0     No current facility-administered medications for this visit  [unfilled]    SOCIAL HISTORY:   reports that he quit smoking about 19 years ago  His smoking use included cigarettes  He started smoking about 58 years ago  He has a 117 00 pack-year smoking history  He has never used smokeless tobacco  He reports that he does not currently use alcohol  He reports that he does not use drugs  FAMILY HISTORY:  family history includes Lung cancer in his brother; Prostate cancer in his brother  ALLERGIES:  is allergic to doxycycline        Physical Exam:  Vital Signs:   Visit Vitals  /92 (BP Location: Right arm, Patient Position: Sitting, Cuff Size: Large)   Pulse 94   Temp 98 6 °F (37 °C) (Temporal)   Resp 14   Ht 5' 9" (1 753 m)   Wt 96 2 kg (212 lb)   SpO2 94%   BMI 31 31 kg/m²   Smoking Status Former   BSA 2 12 m²     Body mass index is 31 31 kg/m²  Body surface area is 2 12 meters squared  GEN: Alert, awake oriented x3, in no acute distress  HEENT- No pallor, icterus, cyanosis, no oral mucosal lesions,   LAD - no palpable cervical, clavicle, axillary, inguinal LAD  Heart- normal S1 S2, regular rate and rhythm, No murmur, rubs     Lungs- clear breathing sound bilateral    Abdomen- soft, Non tender, bowel sounds present  Extremities- No cyanosis, clubbing, edema  Neuro- No focal neurological deficit    Labs:  Lab Results   Component Value Date    WBC 7 48 03/09/2023    HGB 11 6 (L) 03/09/2023    HCT 38 9 03/09/2023    MCV 94 03/09/2023     03/09/2023     Lab Results   Component Value Date    SODIUM 140 03/09/2023    K 4 2 03/09/2023     03/09/2023    CO2 27 03/09/2023    AGAP 5 03/09/2023    BUN 19 03/09/2023    CREATININE 0 81 03/09/2023    GLUC 111 03/09/2023    GLUF 95 03/03/2023    CALCIUM 8 9 03/09/2023    AST 16 03/09/2023    ALT 18 03/09/2023    ALKPHOS 52 03/09/2023    TP 5 8 (L) 03/09/2023    TBILI 0 27 03/09/2023    EGFR 89 03/09/2023 Component Value Date    WBC 7 48 03/09/2023    HGB 11 6 (L) 03/09/2023    HCT 38 9 03/09/2023    MCV 94 03/09/2023     03/09/2023     Lab Results   Component Value Date    SODIUM 140 03/09/2023    K 4 2 03/09/2023     03/09/2023    CO2 27 03/09/2023    AGAP 5 03/09/2023    BUN 19 03/09/2023    CREATININE 0 81 03/09/2023    GLUC 111 03/09/2023    GLUF 95 03/03/2023    CALCIUM 8 9 03/09/2023    AST 16 03/09/2023    ALT 18 03/09/2023    ALKPHOS 52 03/09/2023    TP 5 8 (L) 03/09/2023    TBILI 0 27 03/09/2023    EGFR 89 03/09/2023

## 2023-03-22 ENCOUNTER — HOME HEALTH ADMISSION (OUTPATIENT)
Dept: HOME HEALTH SERVICES | Facility: HOME HEALTHCARE | Age: 73
End: 2023-03-22

## 2023-03-22 ENCOUNTER — TELEPHONE (OUTPATIENT)
Dept: RADIATION ONCOLOGY | Facility: HOSPITAL | Age: 73
End: 2023-03-22

## 2023-03-22 NOTE — TELEPHONE ENCOUNTER
Per Dr Ninoska Garcia, patient's spouse given steroid taper instructions to decrease dexamethasone by 2 mg every 3 days  This will start today with cutting back the midnight dose from 4 mg to 2 mg  Verbal instructions given and steroid taper calendar to be emailed to spouse with taper schedule for the next week  Kristen Beckford informed that this nurse will call again to check patient's status mid-week next week, and that she can call if any worsening of symptoms/concerns before than if needed  Kristen Beckford verbalized understanding

## 2023-03-22 NOTE — TELEPHONE ENCOUNTER
Spoke with spouse, Deborah Ortega for update on patient's status  She reports that patient is doing a little better  She states that right hand weakness has improved some and that she is seeing him using his right hand more  He is not dropping things  She reports that his walking is about the same as last week  He has had no falls  She states that he was seen by Dr Boyd Hugo on 3/20 and that she suggested he try physical therapy to help with ambulation  Deborah Ortega states that Andres Arevalo also feels that physical therapy might help with his right hand weakness  She denies that he has had any headaches, N/V, no tremors/seizures, no dizziness/lightheadedness  Appetite remains good  She states that he continues to wake up anywhere from [id number] and then is usually up for the day  She also mentioned that Dr Boyd Hugo felt that his scheduled MRI of the brain on 4/21/23 might be to soon and can probably be pushed out farther since he had a CT of the head recently on 3/9 when seen in the ED  I explained that an MRI is the preferred imaging when following up/tracking brain metastasis  I also mentioned that the CT of the head on 3/9 was done without contrast  I informed her that I will run this by Dr Eduin Zimmer to make sure an MRI of the brain is still required  Patient has also been scheduled for CT chest, abdomen and pelvis on 4/19/23, ordered by Dr Boyd Hugo  Patient is currently on dexamethasone 4mg q8h ([id number])  Deborah Ortega was informed that I will notify Dr Eduin Zimmer of above and call her back with further steroid taper instructions  She verbalized understanding of above        S/p [facility] 2/22/23 [facility]      Routed to provider

## 2023-03-23 ENCOUNTER — HOME CARE VISIT (OUTPATIENT)
Dept: HOME HEALTH SERVICES | Facility: HOME HEALTHCARE | Age: 73
End: 2023-03-23

## 2023-03-24 ENCOUNTER — HOME CARE VISIT (OUTPATIENT)
Dept: HOME HEALTH SERVICES | Facility: HOME HEALTHCARE | Age: 73
End: 2023-03-24

## 2023-03-24 ENCOUNTER — APPOINTMENT (OUTPATIENT)
Dept: LAB | Facility: CLINIC | Age: 73
End: 2023-03-24

## 2023-03-24 VITALS
SYSTOLIC BLOOD PRESSURE: 140 MMHG | RESPIRATION RATE: 20 BRPM | OXYGEN SATURATION: 99 % | DIASTOLIC BLOOD PRESSURE: 80 MMHG | HEART RATE: 103 BPM

## 2023-03-24 DIAGNOSIS — E83.52 HYPERCALCEMIA OF MALIGNANCY: ICD-10-CM

## 2023-03-24 DIAGNOSIS — C34.11 MALIGNANT NEOPLASM OF UPPER LOBE OF RIGHT LUNG (HCC): ICD-10-CM

## 2023-03-24 DIAGNOSIS — R91.8 MASS OF UPPER LOBE OF RIGHT LUNG: ICD-10-CM

## 2023-03-24 DIAGNOSIS — C34.90 ADENOCARCINOMA OF LUNG, UNSPECIFIED LATERALITY (HCC): ICD-10-CM

## 2023-03-24 DIAGNOSIS — D70.1 CHEMOTHERAPY-INDUCED NEUTROPENIA (HCC): ICD-10-CM

## 2023-03-24 DIAGNOSIS — T45.1X5A CHEMOTHERAPY-INDUCED NEUTROPENIA (HCC): ICD-10-CM

## 2023-03-24 LAB
ALBUMIN SERPL BCP-MCNC: 2.7 G/DL (ref 3.5–5)
ALP SERPL-CCNC: 60 U/L (ref 46–116)
ALT SERPL W P-5'-P-CCNC: 32 U/L (ref 12–78)
ANION GAP SERPL CALCULATED.3IONS-SCNC: 3 MMOL/L (ref 4–13)
AST SERPL W P-5'-P-CCNC: 21 U/L (ref 5–45)
BASOPHILS # BLD MANUAL: 0 THOUSAND/UL (ref 0–0.1)
BASOPHILS NFR MAR MANUAL: 0 % (ref 0–1)
BILIRUB SERPL-MCNC: 0.29 MG/DL (ref 0.2–1)
BUN SERPL-MCNC: 29 MG/DL (ref 5–25)
BURR CELLS BLD QL SMEAR: PRESENT
CALCIUM ALBUM COR SERPL-MCNC: 10.6 MG/DL (ref 8.3–10.1)
CALCIUM SERPL-MCNC: 9.6 MG/DL (ref 8.3–10.1)
CHLORIDE SERPL-SCNC: 107 MMOL/L (ref 96–108)
CO2 SERPL-SCNC: 29 MMOL/L (ref 21–32)
CREAT SERPL-MCNC: 1.03 MG/DL (ref 0.6–1.3)
DACRYOCYTES BLD QL SMEAR: PRESENT
EOSINOPHIL # BLD MANUAL: 0 THOUSAND/UL (ref 0–0.4)
EOSINOPHIL NFR BLD MANUAL: 0 % (ref 0–6)
ERYTHROCYTE [DISTWIDTH] IN BLOOD BY AUTOMATED COUNT: 21.2 % (ref 11.6–15.1)
GFR SERPL CREATININE-BSD FRML MDRD: 72 ML/MIN/1.73SQ M
GLUCOSE P FAST SERPL-MCNC: 102 MG/DL (ref 65–99)
HCT VFR BLD AUTO: 43.6 % (ref 36.5–49.3)
HGB BLD-MCNC: 13.4 G/DL (ref 12–17)
LYMPHOCYTES # BLD AUTO: 0.43 THOUSAND/UL (ref 0.6–4.47)
LYMPHOCYTES # BLD AUTO: 4 % (ref 14–44)
MCH RBC QN AUTO: 28.8 PG (ref 26.8–34.3)
MCHC RBC AUTO-ENTMCNC: 30.7 G/DL (ref 31.4–37.4)
MCV RBC AUTO: 94 FL (ref 82–98)
MONOCYTES # BLD AUTO: 0.43 THOUSAND/UL (ref 0–1.22)
MONOCYTES NFR BLD: 4 % (ref 4–12)
NEUTROPHILS # BLD MANUAL: 9.51 THOUSAND/UL (ref 1.85–7.62)
NEUTS BAND NFR BLD MANUAL: 11 % (ref 0–8)
NEUTS SEG NFR BLD AUTO: 78 % (ref 43–75)
OVALOCYTES BLD QL SMEAR: PRESENT
PLATELET # BLD AUTO: 219 THOUSANDS/UL (ref 149–390)
PLATELET BLD QL SMEAR: ADEQUATE
PMV BLD AUTO: 10 FL (ref 8.9–12.7)
POIKILOCYTOSIS BLD QL SMEAR: PRESENT
POLYCHROMASIA BLD QL SMEAR: PRESENT
POTASSIUM SERPL-SCNC: 5.2 MMOL/L (ref 3.5–5.3)
PROT SERPL-MCNC: 6 G/DL (ref 6.4–8.4)
RBC # BLD AUTO: 4.65 MILLION/UL (ref 3.88–5.62)
RBC MORPH BLD: PRESENT
SODIUM SERPL-SCNC: 139 MMOL/L (ref 135–147)
T3FREE SERPL-MCNC: 0.99 PG/ML (ref 2.3–4.2)
TSH SERPL DL<=0.05 MIU/L-ACNC: 1.7 UIU/ML (ref 0.45–4.5)
VARIANT LYMPHS # BLD AUTO: 3 %
WBC # BLD AUTO: 10.69 THOUSAND/UL (ref 4.31–10.16)

## 2023-03-24 RX ORDER — SODIUM CHLORIDE 9 MG/ML
20 INJECTION, SOLUTION INTRAVENOUS ONCE
Status: CANCELLED | OUTPATIENT
Start: 2023-03-27

## 2023-03-25 NOTE — CASE COMMUNICATION
[facility] has admitted your patient to [address] [place] [address] service with the following disciplines:      PT and OT  Adding OT intervention for RUE strengthening and bathroom DME recommendations   Response needed, please respond via  tiger text if not in agreement to OT eval  Primary focus of home health care right hemiparesis and gait abnormality  Patient stated goals of care I want to get stronger and regain my independence  Anticipated v isit pattern and next visit date 1x1wk 2 x 4wks   See medication list -  Significant clinical findings  none  Potential barriers to goal achievement  fatigue, right hemiparesis  Other pertinent information  none     Thank you for allowing us to participate in the care of your patient        Daphne Barroso PT

## 2023-03-27 ENCOUNTER — TELEPHONE (OUTPATIENT)
Age: 73
End: 2023-03-27

## 2023-03-27 ENCOUNTER — HOME CARE VISIT (OUTPATIENT)
Dept: HOME HEALTH SERVICES | Facility: HOME HEALTHCARE | Age: 73
End: 2023-03-27

## 2023-03-27 ENCOUNTER — HOSPITAL ENCOUNTER (OUTPATIENT)
Dept: INFUSION CENTER | Facility: HOSPITAL | Age: 73
Discharge: HOME | End: 2023-03-27
Attending: INTERNAL MEDICINE

## 2023-03-27 VITALS
DIASTOLIC BLOOD PRESSURE: 86 MMHG | BODY MASS INDEX: 31.97 KG/M2 | TEMPERATURE: 97.1 F | RESPIRATION RATE: 18 BRPM | HEIGHT: 69 IN | OXYGEN SATURATION: 95 % | WEIGHT: 215.83 LBS | SYSTOLIC BLOOD PRESSURE: 139 MMHG | HEART RATE: 91 BPM

## 2023-03-27 DIAGNOSIS — R91.8 MASS OF UPPER LOBE OF RIGHT LUNG: ICD-10-CM

## 2023-03-27 DIAGNOSIS — T45.1X5A CHEMOTHERAPY INDUCED NEUTROPENIA (HCC): ICD-10-CM

## 2023-03-27 DIAGNOSIS — E83.52 HYPERCALCEMIA OF MALIGNANCY: Primary | ICD-10-CM

## 2023-03-27 DIAGNOSIS — C34.90 ADENOCARCINOMA OF LUNG, UNSPECIFIED LATERALITY (HCC): ICD-10-CM

## 2023-03-27 DIAGNOSIS — D70.1 CHEMOTHERAPY INDUCED NEUTROPENIA (HCC): ICD-10-CM

## 2023-03-27 DIAGNOSIS — C34.11 MALIGNANT NEOPLASM OF UPPER LOBE OF RIGHT LUNG (HCC): ICD-10-CM

## 2023-03-27 RX ORDER — SODIUM CHLORIDE 9 MG/ML
20 INJECTION, SOLUTION INTRAVENOUS ONCE
Status: COMPLETED | OUTPATIENT
Start: 2023-03-27 | End: 2023-03-27

## 2023-03-27 RX ADMIN — SODIUM CHLORIDE 200 MG: 9 INJECTION, SOLUTION INTRAVENOUS at 11:53

## 2023-03-27 RX ADMIN — SODIUM CHLORIDE 20 ML/HR: 9 INJECTION, SOLUTION INTRAVENOUS at 11:54

## 2023-03-27 NOTE — TELEPHONE ENCOUNTER
Called patient and spoke to patients wife Mayte Holly wanted appointment rescheduled due to radiation was on the same day  Mayte Holly confirmed the appointment, date, time and location

## 2023-03-27 NOTE — PROGRESS NOTES
Pt tolerated chemo treatment no adverse reaction  Pt left unit ambulatory with steady gait using cane   Refused AVS

## 2023-03-27 NOTE — PLAN OF CARE
Problem: Knowledge Deficit  Goal: Patient/family/caregiver demonstrates understanding of disease process, treatment plan, medications, and discharge instructions  Description: Complete learning assessment and assess knowledge base    Interventions:  - Provide teaching at level of understanding  - Provide teaching via preferred learning methods  3/27/2023 1117 by Maureen Ulloa RN  Outcome: Progressing  3/27/2023 1117 by Maureen Ulloa RN  Outcome: Progressing

## 2023-03-28 ENCOUNTER — HOME CARE VISIT (OUTPATIENT)
Dept: HOME HEALTH SERVICES | Facility: HOME HEALTHCARE | Age: 73
End: 2023-03-28

## 2023-03-28 VITALS — SYSTOLIC BLOOD PRESSURE: 142 MMHG | DIASTOLIC BLOOD PRESSURE: 80 MMHG

## 2023-03-29 ENCOUNTER — TELEPHONE (OUTPATIENT)
Dept: RADIATION ONCOLOGY | Facility: HOSPITAL | Age: 73
End: 2023-03-29

## 2023-03-29 NOTE — TELEPHONE ENCOUNTER
Patient currently on dexamethasone taper, decreasing dose by 2 mg every 3 days  Currently taking 4 mg in the AM and 2 mg in the PM  Spoke with wife, Palak, she reports patient is doing well, he's using his right hand more, using it to eat  Walking has also improved since last week and he did start going for physical therapy this week  Using a cane as needed  No other neurologic symptoms reported  Poyen informed that this nurse will call back with further steroid taper instructions  Wife verbalized understanding

## 2023-03-30 ENCOUNTER — HOME CARE VISIT (OUTPATIENT)
Dept: HOME HEALTH SERVICES | Facility: HOME HEALTHCARE | Age: 73
End: 2023-03-30

## 2023-03-30 VITALS — SYSTOLIC BLOOD PRESSURE: 142 MMHG | DIASTOLIC BLOOD PRESSURE: 80 MMHG

## 2023-03-30 NOTE — TELEPHONE ENCOUNTER
Per Dr Eva Concepcion, spoke with Kori Marie and instructed her to continue steroid taper as discussed yesterday  The last dose of dexamethasone 2 mg will be next Wednesday, 4/5/23  Instructed to call if any worsening of symptoms  She was informed this nurse will touch base with them again next week for update on status  Kori Marie verbalized instructions given  Dexamethasone taper calendar emailed to Kori Marie at [email or web address]

## 2023-03-31 ENCOUNTER — HOSPITAL ENCOUNTER (OUTPATIENT)
Dept: RADIOLOGY | Facility: HOSPITAL | Age: 73
End: 2023-03-31

## 2023-03-31 ENCOUNTER — OFFICE VISIT (OUTPATIENT)
Dept: FAMILY MEDICINE CLINIC | Facility: HOSPITAL | Age: 73
End: 2023-03-31

## 2023-03-31 VITALS
HEART RATE: 100 BPM | BODY MASS INDEX: 33.27 KG/M2 | DIASTOLIC BLOOD PRESSURE: 82 MMHG | SYSTOLIC BLOOD PRESSURE: 144 MMHG | HEIGHT: 69 IN | WEIGHT: 224.6 LBS | OXYGEN SATURATION: 96 % | TEMPERATURE: 97.6 F

## 2023-03-31 DIAGNOSIS — J90 RECURRENT RIGHT PLEURAL EFFUSION: ICD-10-CM

## 2023-03-31 DIAGNOSIS — I50.812 CHRONIC RIGHT-SIDED HEART FAILURE (HCC): ICD-10-CM

## 2023-03-31 DIAGNOSIS — C34.11 MALIGNANT NEOPLASM OF UPPER LOBE OF RIGHT LUNG (HCC): ICD-10-CM

## 2023-03-31 DIAGNOSIS — R06.09 DOE (DYSPNEA ON EXERTION): Primary | ICD-10-CM

## 2023-03-31 DIAGNOSIS — R06.09 DOE (DYSPNEA ON EXERTION): ICD-10-CM

## 2023-03-31 RX ORDER — FUROSEMIDE 20 MG/1
TABLET ORAL
Qty: 30 TABLET | Refills: 1 | Status: SHIPPED | OUTPATIENT
Start: 2023-03-31

## 2023-03-31 NOTE — ASSESSMENT & PLAN NOTE
Currently undergoing tx, on O2 at night and wife reports O2 sats good at home, monitor O2 sat and call if < 88%

## 2023-03-31 NOTE — PROGRESS NOTES
Name: Samira Go      : 1950      MRN: [id number]  Encounter Provider: Beto Allen DO  Encounter Date: 3/31/2023   Encounter department: [address] [place] [address]  203     Assessment & Plan     1  CALDERON (dyspnea on exertion)  Comments:  Likely multifactorial and predominantly seems to be significantly fluid overloaded, will check STAT CXR to eval worsening pleural effusion and start Lasix, urged low sodium diet and elevate LE at rest, O2 sat good at home and will con't to monitor and call if O2 sat < 88%  Orders:  -     XR chest pa & lateral; Future; Expected date: 2023    2  Chronic right-sided heart failure [facility])  Assessment & Plan:  Currently signs of fluid overload present - start Lasix 20 mg 1 tab PO tonight then 1 tab PO bid Sat and Sun and back to 1 tab PO q am on Mon and through next week, check BMP on Mon or Tue - BW order given, urged to elevate LE and to avoid adding salt to diet/higher sodium foods, if finishing steroid taper next week which should help as well, to ED with new/worse symptoms    Orders:  -     XR chest pa & lateral; Future; Expected date: 2023  -     furosemide (LASIX) 20 mg tablet; 1 tab PO bid x 2 days then 1 tab PO q am from then on  -     Basic metabolic panel; Future; Expected date: 2023    3  Recurrent right pleural effusion  Assessment & Plan:  Dec BS R base - check STAT CXR to ensure not significantly worse, will follow    Orders:  -     XR chest pa & lateral; Future; Expected date: 2023    4  Malignant neoplasm of upper lobe of right lung [facility])  Assessment & Plan:  Currently undergoing tx, on O2 at night and wife reports O2 sats good at home, monitor O2 sat and call if < 88%    Orders:  -     XR chest pa & lateral; Future; Expected date: 2023         Subjective      HPI Pt here with his wife for an acute visit    Pt noting new/worse SOB with exertion for approx 6-8 wk    He noted symptoms started gradually after he had radiation of the brain and was started on steroids  He has gained 23 lbs  He notes no F/C but does have a cough in the am and brings up some phlegm in the am   He notes no chronic cough during the day  He does not wheeze and has no ST/ear pain/congestion/runny nose  He has h/o HF and has had some increase in LE edema around that time as well  He notes no specific issues breathing with he lies down/gasping in night for air  Last Echo 9/21 and EF was 55%  He finishes with his steroid on Tue next week  He has albuterol inhaler but has not used it at all  He is on Lovenox bid for h/o DVT  He has a pulse ox at home and his wife states he is usually > 96%  Review of Systems   Constitutional: Positive for fatigue and unexpected weight change  Negative for chills and fever  HENT: Negative for congestion, ear pain and sore throat  Eyes: Negative for pain and visual disturbance  Respiratory: Positive for cough and shortness of breath  Negative for wheezing  Cardiovascular: Positive for leg swelling  Negative for chest pain and palpitations  Gastrointestinal: Negative for abdominal pain, constipation, diarrhea, nausea and vomiting  Genitourinary: Negative for difficulty urinating and dysuria  Musculoskeletal: Positive for gait problem  Skin: Negative for rash and wound  Neurological: Negative for dizziness and headaches  Hematological: Does not bruise/bleed easily  Psychiatric/Behavioral: Negative for confusion         Current Outpatient Medications on File Prior to Visit   Medication Sig   • acetaminophen (TYLENOL) 325 mg tablet Take 2 tablets (650 mg total) by mouth 4 (four) times a day as needed for mild pain, headaches or fever   • albuterol (ProAir HFA) 90 mcg/act inhaler Inhale 2 puffs every 6 (six) hours as needed for wheezing or shortness of breath   • atorvastatin (LIPITOR) 40 mg tablet TAKE 1 TABLET BY MOUTH DAILY WITH DINNER   • dexamethasone (DECADRON) 4 mg tablet Take 1 tablet (4 "mg total) by mouth every 6 (six) hours (Patient taking differently: Take 1 tablet by mouth every 6 (six) hours  1 tab in am 1 at 4  and 1/2  at 12 am  Indications: Swelling of Brain)   • enoxaparin (LOVENOX) 100 mg/mL Inject 0 9 mL (90 mg total) under the skin every 12 (twelve) hours   • folic acid (FOLVITE) 1 mg tablet TAKE 1 TABLET BY MOUTH EVERY DAY   • multivitamin (THERAGRAN) TABS Take 1 tablet by mouth daily   • pantoprazole (PROTONIX) 40 mg tablet Take 1 tablet (40 mg total) by mouth daily   • tamsulosin (FLOMAX) 0 4 mg TAKE 1 CAPSULE (0 4 MG TOTAL) BY MOUTH DAILY AFTER DINNER   • [DISCONTINUED] levETIRAcetam (Keppra) 1000 MG tablet Take 1 tablet (1,000 mg total) by mouth 2 (two) times a day (Patient not taking: Reported on 3/24/2023)       Objective     /82   Pulse 100   Temp 97 6 °F (36 4 °C) (Tympanic)   Ht 5' 9\" (1 753 m)   Wt 102 kg (224 lb 9 6 oz)   SpO2 96%   BMI 33 17 kg/m²     Physical Exam  Vitals and nursing note reviewed  Constitutional:       General: He is not in acute distress  Appearance: He is well-developed  Comments: Chronically ill appearing   HENT:      Head: Normocephalic and atraumatic  Eyes:      General:         Right eye: No discharge  Left eye: No discharge  Conjunctiva/sclera: Conjunctivae normal    Neck:      Vascular: No JVD  Trachea: No tracheal deviation  Cardiovascular:      Rate and Rhythm: Regular rhythm  Tachycardia present  Heart sounds: No murmur heard  Pulmonary:      Effort: Pulmonary effort is normal  No accessory muscle usage or respiratory distress  Breath sounds: Examination of the right-lower field reveals decreased breath sounds and rhonchi  Decreased breath sounds and rhonchi present  No wheezing or rales  Abdominal:      General: There is no distension  Palpations: Abdomen is soft  Musculoskeletal:         General: No deformity or signs of injury  Cervical back: Neck supple        Right lower " leg: Edema present  Left lower leg: Edema present  Comments: + 1 B/L pitting edema   Lymphadenopathy:      Cervical: No cervical adenopathy  Skin:     General: Skin is warm and dry  Coloration: Skin is not pale  Findings: No rash  Neurological:      Mental Status: He is alert  Mental status is at baseline  Motor: No abnormal muscle tone  Gait: Gait normal       Comments: Ambulates with cane   Psychiatric:         Thought Content:  Thought content normal          Judgment: Judgment normal       Comments: Flat affect but answers questions appropriately       Tere Light, DO

## 2023-03-31 NOTE — ASSESSMENT & PLAN NOTE
Currently signs of fluid overload present - start Lasix 20 mg 1 tab PO tonight then 1 tab PO bid Sat and Sun and back to 1 tab PO q am on Mon and through next week, check BMP on Mon or Tue - BW order given, urged to elevate LE and to avoid adding salt to diet/higher sodium foods, if finishing steroid taper next week which should help as well, to ED with new/worse symptoms

## 2023-04-04 ENCOUNTER — HOME CARE VISIT (OUTPATIENT)
Dept: HOME HEALTH SERVICES | Facility: HOME HEALTHCARE | Age: 73
End: 2023-04-04

## 2023-04-04 ENCOUNTER — APPOINTMENT (OUTPATIENT)
Dept: LAB | Facility: CLINIC | Age: 73
End: 2023-04-04

## 2023-04-04 VITALS — SYSTOLIC BLOOD PRESSURE: 138 MMHG | DIASTOLIC BLOOD PRESSURE: 80 MMHG

## 2023-04-04 DIAGNOSIS — E83.52 HYPERCALCEMIA OF MALIGNANCY: Primary | ICD-10-CM

## 2023-04-04 DIAGNOSIS — I50.812 CHRONIC RIGHT-SIDED HEART FAILURE (HCC): ICD-10-CM

## 2023-04-04 DIAGNOSIS — I50.812 CHRONIC RIGHT-SIDED HEART FAILURE (HCC): Primary | ICD-10-CM

## 2023-04-04 LAB
ANION GAP SERPL CALCULATED.3IONS-SCNC: 2 MMOL/L (ref 4–13)
BUN SERPL-MCNC: 24 MG/DL (ref 5–25)
CALCIUM SERPL-MCNC: 9.8 MG/DL (ref 8.3–10.1)
CHLORIDE SERPL-SCNC: 106 MMOL/L (ref 96–108)
CO2 SERPL-SCNC: 31 MMOL/L (ref 21–32)
CREAT SERPL-MCNC: 0.89 MG/DL (ref 0.6–1.3)
GFR SERPL CREATININE-BSD FRML MDRD: 86 ML/MIN/1.73SQ M
GLUCOSE P FAST SERPL-MCNC: 85 MG/DL (ref 65–99)
POTASSIUM SERPL-SCNC: 3.7 MMOL/L (ref 3.5–5.3)
SODIUM SERPL-SCNC: 139 MMOL/L (ref 135–147)

## 2023-04-05 ENCOUNTER — TELEPHONE (OUTPATIENT)
Dept: RADIATION ONCOLOGY | Facility: HOSPITAL | Age: 73
End: 2023-04-05

## 2023-04-05 ENCOUNTER — HOME CARE VISIT (OUTPATIENT)
Dept: HOME HEALTH SERVICES | Facility: HOME HEALTHCARE | Age: 73
End: 2023-04-05

## 2023-04-05 NOTE — TELEPHONE ENCOUNTER
Patient currently on dexamethasone taper, decreasing dose by 2 mg every 3 days  Today is the  last dose of 2 mg daily, and then instructed to stop per Dr Castillo's instructions last week  Call to patient, spoke with spouse, Jud Go, she reports that his walking continues to improve, and he is getting out of the house more  She states that he has full use of right hand  His last home physical therapy session is scheduled for this Thursday  She denies that he has any other neurologic problems  He was seen by his primary care physician on 3/31/23 for c/o increased SOB on exertion  He went for stat CXR and she reports that they did get the results  He does have a CT chest abdomen and pelvis coming up on 4/19/23  He was started on Lasix and since then he has had less SOB  She states that yesterday, the physical therapist also noticed  that his breathing was better  She states that he gained 23 pounds in the last month and he does have swelling in his LE's  Recommended that he keep legs elevated as much as possible when sitting  He also continues with sleeplessness  Discussed that hopefully being off the steroids will help alleviate some of the LE swelling and difficulty sleeping  Jud Kc was reminded of upcoming MRI brain on 4/21/23 and radiation follow up on 4/26/23  Jud Kc verbalized understanding that steroid taper is complete and today is his last dose  Instructed to call if they have any concerns

## 2023-04-06 ENCOUNTER — HOME CARE VISIT (OUTPATIENT)
Dept: HOME HEALTH SERVICES | Facility: HOME HEALTHCARE | Age: 73
End: 2023-04-06

## 2023-04-06 VITALS — DIASTOLIC BLOOD PRESSURE: 78 MMHG | SYSTOLIC BLOOD PRESSURE: 138 MMHG

## 2023-04-09 ENCOUNTER — TELEPHONE (OUTPATIENT)
Dept: OTHER | Facility: OTHER | Age: 73
End: 2023-04-09

## 2023-04-10 DIAGNOSIS — I50.812 CHRONIC RIGHT-SIDED HEART FAILURE (HCC): Primary | ICD-10-CM

## 2023-04-14 ENCOUNTER — APPOINTMENT (OUTPATIENT)
Dept: LAB | Facility: CLINIC | Age: 73
End: 2023-04-14

## 2023-04-14 DIAGNOSIS — R91.8 MASS OF UPPER LOBE OF RIGHT LUNG: ICD-10-CM

## 2023-04-14 DIAGNOSIS — D70.1 CHEMOTHERAPY-INDUCED NEUTROPENIA (HCC): ICD-10-CM

## 2023-04-14 DIAGNOSIS — C34.11 MALIGNANT NEOPLASM OF UPPER LOBE OF RIGHT LUNG (HCC): ICD-10-CM

## 2023-04-14 DIAGNOSIS — C34.90 ADENOCARCINOMA OF LUNG, UNSPECIFIED LATERALITY (HCC): ICD-10-CM

## 2023-04-14 DIAGNOSIS — I50.812 CHRONIC RIGHT-SIDED HEART FAILURE (HCC): ICD-10-CM

## 2023-04-14 DIAGNOSIS — T45.1X5A CHEMOTHERAPY-INDUCED NEUTROPENIA (HCC): ICD-10-CM

## 2023-04-14 PROBLEM — N39.0 UTI (URINARY TRACT INFECTION): Status: ACTIVE | Noted: 2023-04-14

## 2023-04-14 PROBLEM — A41.9 SEPSIS (HCC): Status: ACTIVE | Noted: 2023-04-14

## 2023-04-14 PROBLEM — J18.9 PNEUMONIA: Status: ACTIVE | Noted: 2023-04-14

## 2023-04-14 PROBLEM — R82.71 ASYMPTOMATIC BACTERIURIA: Status: ACTIVE | Noted: 2023-04-14

## 2023-04-14 LAB
ALBUMIN SERPL BCP-MCNC: 2.3 G/DL (ref 3.5–5)
ALP SERPL-CCNC: 56 U/L (ref 46–116)
ALT SERPL W P-5'-P-CCNC: 30 U/L (ref 12–78)
ANION GAP SERPL CALCULATED.3IONS-SCNC: 3 MMOL/L (ref 4–13)
AST SERPL W P-5'-P-CCNC: 22 U/L (ref 5–45)
BASOPHILS # BLD AUTO: 0.03 THOUSANDS/ΜL (ref 0–0.1)
BASOPHILS NFR BLD AUTO: 1 % (ref 0–1)
BILIRUB SERPL-MCNC: 0.4 MG/DL (ref 0.2–1)
BUN SERPL-MCNC: 12 MG/DL (ref 5–25)
CALCIUM ALBUM COR SERPL-MCNC: 10.4 MG/DL (ref 8.3–10.1)
CALCIUM SERPL-MCNC: 9 MG/DL (ref 8.3–10.1)
CHLORIDE SERPL-SCNC: 105 MMOL/L (ref 96–108)
CO2 SERPL-SCNC: 29 MMOL/L (ref 21–32)
CREAT SERPL-MCNC: 1.1 MG/DL (ref 0.6–1.3)
EOSINOPHIL # BLD AUTO: 0.01 THOUSAND/ΜL (ref 0–0.61)
EOSINOPHIL NFR BLD AUTO: 0 % (ref 0–6)
ERYTHROCYTE [DISTWIDTH] IN BLOOD BY AUTOMATED COUNT: 19.6 % (ref 11.6–15.1)
GFR SERPL CREATININE-BSD FRML MDRD: 67 ML/MIN/1.73SQ M
GLUCOSE P FAST SERPL-MCNC: 124 MG/DL (ref 65–99)
HCT VFR BLD AUTO: 29.6 % (ref 36.5–49.3)
HGB BLD-MCNC: 8.9 G/DL (ref 12–17)
IMM GRANULOCYTES # BLD AUTO: 0.08 THOUSAND/UL (ref 0–0.2)
IMM GRANULOCYTES NFR BLD AUTO: 2 % (ref 0–2)
LYMPHOCYTES # BLD AUTO: 0.98 THOUSANDS/ΜL (ref 0.6–4.47)
LYMPHOCYTES NFR BLD AUTO: 20 % (ref 14–44)
MCH RBC QN AUTO: 28.4 PG (ref 26.8–34.3)
MCHC RBC AUTO-ENTMCNC: 30.1 G/DL (ref 31.4–37.4)
MCV RBC AUTO: 95 FL (ref 82–98)
MONOCYTES # BLD AUTO: 0.62 THOUSAND/ΜL (ref 0.17–1.22)
MONOCYTES NFR BLD AUTO: 13 % (ref 4–12)
NEUTROPHILS # BLD AUTO: 3.13 THOUSANDS/ΜL (ref 1.85–7.62)
NEUTS SEG NFR BLD AUTO: 64 % (ref 43–75)
NRBC BLD AUTO-RTO: 2 /100 WBCS
PLATELET # BLD AUTO: 175 THOUSANDS/UL (ref 149–390)
PMV BLD AUTO: 10.3 FL (ref 8.9–12.7)
POTASSIUM SERPL-SCNC: 3.9 MMOL/L (ref 3.5–5.3)
PROT SERPL-MCNC: 6.7 G/DL (ref 6.4–8.4)
RBC # BLD AUTO: 3.13 MILLION/UL (ref 3.88–5.62)
SODIUM SERPL-SCNC: 137 MMOL/L (ref 135–147)
T3FREE SERPL-MCNC: 1.63 PG/ML (ref 2.3–4.2)
TSH SERPL DL<=0.05 MIU/L-ACNC: 4.31 UIU/ML (ref 0.45–4.5)
WBC # BLD AUTO: 4.85 THOUSAND/UL (ref 4.31–10.16)

## 2023-04-15 PROBLEM — D64.9 ACUTE ON CHRONIC ANEMIA: Status: RESOLVED | Noted: 2021-08-22 | Resolved: 2021-09-24

## 2023-04-15 PROBLEM — A41.9 SEPSIS (HCC): Status: RESOLVED | Noted: 2023-04-14 | Resolved: 2023-04-15

## 2023-04-15 PROBLEM — D61.818 PANCYTOPENIA (HCC): Status: ACTIVE | Noted: 2023-04-15

## 2023-04-15 PROBLEM — D64.9 ACUTE ON CHRONIC ANEMIA: Status: ACTIVE | Noted: 2021-08-22

## 2023-04-17 PROBLEM — J96.01 ACUTE RESPIRATORY FAILURE WITH HYPOXIA (HCC): Status: ACTIVE | Noted: 2021-07-31

## 2023-04-18 DIAGNOSIS — J44.9 COPD MIXED TYPE (HCC): ICD-10-CM

## 2023-04-18 DIAGNOSIS — J96.01 ACUTE RESPIRATORY FAILURE WITH HYPOXIA (HCC): Primary | ICD-10-CM

## 2023-04-18 RX ORDER — ALBUTEROL SULFATE 2.5 MG/3ML
2.5 SOLUTION RESPIRATORY (INHALATION) EVERY 6 HOURS PRN
Qty: 3 ML | Refills: 2 | Status: SHIPPED | OUTPATIENT
Start: 2023-04-18

## 2023-04-21 ENCOUNTER — HOSPITAL ENCOUNTER (OUTPATIENT)
Dept: MRI IMAGING | Facility: HOSPITAL | Age: 73
Discharge: HOME | End: 2023-04-21

## 2023-04-21 DIAGNOSIS — C79.31 MALIGNANT NEOPLASM METASTATIC TO BRAIN (HCC): ICD-10-CM

## 2023-04-21 PROBLEM — N18.31 STAGE 3A CHRONIC KIDNEY DISEASE (HCC): Status: ACTIVE | Noted: 2023-04-21

## 2023-04-21 RX ADMIN — GADOBUTROL 9 ML: 604.72 INJECTION INTRAVENOUS at 13:39

## 2023-04-24 ENCOUNTER — OFFICE VISIT (OUTPATIENT)
Age: 73
End: 2023-04-24

## 2023-04-24 ENCOUNTER — TELEPHONE (OUTPATIENT)
Age: 73
End: 2023-04-24

## 2023-04-24 VITALS
WEIGHT: 214 LBS | HEART RATE: 91 BPM | HEIGHT: 69 IN | RESPIRATION RATE: 17 BRPM | DIASTOLIC BLOOD PRESSURE: 80 MMHG | TEMPERATURE: 98 F | SYSTOLIC BLOOD PRESSURE: 120 MMHG | OXYGEN SATURATION: 99 % | BODY MASS INDEX: 31.7 KG/M2

## 2023-04-24 DIAGNOSIS — C34.11 MALIGNANT NEOPLASM OF UPPER LOBE OF RIGHT LUNG (HCC): Primary | ICD-10-CM

## 2023-04-24 RX ORDER — SODIUM CHLORIDE 9 MG/ML
20 INJECTION, SOLUTION INTRAVENOUS ONCE
OUTPATIENT
Start: 2023-05-17

## 2023-04-24 RX ORDER — SODIUM CHLORIDE 9 MG/ML
20 INJECTION, SOLUTION INTRAVENOUS ONCE
OUTPATIENT
Start: 2023-06-07

## 2023-04-24 NOTE — TELEPHONE ENCOUNTER
Please move up patient's Keytruda infusion to asap per Dr Ema Shaikh  Patient has missed doses due to hospitalizations

## 2023-04-25 ENCOUNTER — APPOINTMENT (OUTPATIENT)
Dept: LAB | Facility: CLINIC | Age: 73
End: 2023-04-25

## 2023-04-25 ENCOUNTER — TELEPHONE (OUTPATIENT)
Age: 73
End: 2023-04-25

## 2023-04-25 DIAGNOSIS — R91.8 MASS OF UPPER LOBE OF RIGHT LUNG: ICD-10-CM

## 2023-04-25 DIAGNOSIS — C34.11 MALIGNANT NEOPLASM OF UPPER LOBE OF RIGHT LUNG (HCC): ICD-10-CM

## 2023-04-25 DIAGNOSIS — D70.1 CHEMOTHERAPY-INDUCED NEUTROPENIA (HCC): ICD-10-CM

## 2023-04-25 DIAGNOSIS — C79.31 MALIGNANT NEOPLASM METASTATIC TO BRAIN (HCC): ICD-10-CM

## 2023-04-25 DIAGNOSIS — T45.1X5A CHEMOTHERAPY-INDUCED NEUTROPENIA (HCC): ICD-10-CM

## 2023-04-25 DIAGNOSIS — C34.90 ADENOCARCINOMA OF LUNG, UNSPECIFIED LATERALITY (HCC): ICD-10-CM

## 2023-04-25 DIAGNOSIS — E83.52 HYPERCALCEMIA OF MALIGNANCY: ICD-10-CM

## 2023-04-25 LAB
ANISOCYTOSIS BLD QL SMEAR: PRESENT
BASOPHILS # BLD MANUAL: 0.15 THOUSAND/UL (ref 0–0.1)
BASOPHILS NFR MAR MANUAL: 2 % (ref 0–1)
EOSINOPHIL # BLD MANUAL: 0.15 THOUSAND/UL (ref 0–0.4)
EOSINOPHIL NFR BLD MANUAL: 2 % (ref 0–6)
ERYTHROCYTE [DISTWIDTH] IN BLOOD BY AUTOMATED COUNT: 19.3 % (ref 11.6–15.1)
HCT VFR BLD AUTO: 30.8 % (ref 36.5–49.3)
HGB BLD-MCNC: 8.9 G/DL (ref 12–17)
LYMPHOCYTES # BLD AUTO: 0.37 THOUSAND/UL (ref 0.6–4.47)
LYMPHOCYTES # BLD AUTO: 5 % (ref 14–44)
MCH RBC QN AUTO: 27.6 PG (ref 26.8–34.3)
MCHC RBC AUTO-ENTMCNC: 28.9 G/DL (ref 31.4–37.4)
MCV RBC AUTO: 96 FL (ref 82–98)
MONOCYTES # BLD AUTO: 0.52 THOUSAND/UL (ref 0–1.22)
MONOCYTES NFR BLD: 7 % (ref 4–12)
NEUTROPHILS # BLD MANUAL: 5.5 THOUSAND/UL (ref 1.85–7.62)
NEUTS BAND NFR BLD MANUAL: 17 % (ref 0–8)
NEUTS SEG NFR BLD AUTO: 57 % (ref 43–75)
NRBC BLD AUTO-RTO: 2 /100 WBC (ref 0–2)
PLATELET # BLD AUTO: 406 THOUSANDS/UL (ref 149–390)
PLATELET BLD QL SMEAR: ADEQUATE
PMV BLD AUTO: 9.6 FL (ref 8.9–12.7)
POLYCHROMASIA BLD QL SMEAR: PRESENT
RBC # BLD AUTO: 3.22 MILLION/UL (ref 3.88–5.62)
RBC MORPH BLD: PRESENT
VARIANT LYMPHS # BLD AUTO: 10 %
WBC # BLD AUTO: 7.43 THOUSAND/UL (ref 4.31–10.16)

## 2023-04-25 NOTE — TELEPHONE ENCOUNTER
Talked to wife Anita Mojica  She is going to take Meenakshi Mehta to [facility] to get labs for his treatment tomorrow

## 2023-04-25 NOTE — PROGRESS NOTES
HEMATOLOGY / ONCOLOGY CLINIC FOLLOW UP NOTE    Primary Care Provider: Jose Antonio Kim MD  Referring Provider:    MRN: [id number]  : 1950    Reason for Encounter:       Oncology History Overview Note   Returns for 2 month follow-up with repeat MRI brain s/p SRS to left precentral gyrus brain metastases on 23      71year old male with history of adenocarcinoma NSCLC of right upper lobe diagnosed in 2021  Previous radiation under the care of Dr Jack Amador, he completed chemotherapy for cytoreduction followed by concurrent chemoRT to right lung/mediastinum completed 22  He continues with systemic immunotherapy with Keytruda  He was recently found to have a solitary metastasis in the left precentral gyrus region with surrounding edema  He completed SRS to this lesion on 23  He was given a Dex taper at discharge  3/3/23 Call from pt's spouse, he has been on dex taper and recently decreased to 2 mg AM, 1 mg PM and reports right hand weakness, slower gait  Per Dr Amber Gentile, go back to previous dose of 2 mg BID x 1 week  3/6/23 Med Onc, Dr Shital Spaulding  concerned about the worsening of his neurologic symptoms  Ordered stat MRI of the brain and LE dopplers due to his new LE edema  Will call him these results  Will put keytruda on hold for now  Sometimes Lyle Ours causes joint edema as an autoimmune phenomena, want to evaluate the cause before giving more Lyle Ours  3/6/23 MRI brain w wo contrast  1 6 cm metastasis in the posterior left frontal lobe is not significantly changed in size but surrounding vasogenic edema is mildly increased    No significant mass effect, shift or herniation  No new intracranial metastases  Continued stability of lesion in the posterior right nasopharynx, possible retention cyst      3/6/23 VAS lower limb venous duplex, bilateral  No evidence of acute or chronic dvt    3/7/23 Dr Shital Spaulding - called pt's wife with results, no evidence of DVT     MRI shows increased edema at known lesion  Increased Dex to 4 mg BID  Continue protonix  Follow-up in 2 weeks  3/9/23 ED visit with c/o worsening gait instability and RUE weakness  CT head showed edema not significantly changed from MRI  No acute abnormality  Neurology consulted and recommended pt be admitted, increase Dex to 4 mg q 6hrs and admission for steroid monitoring as well as PT/OT evaluation  Patient left AMA  3/20/23 Med Onc Dr Shonna De Los Santos  Refer for home PT for his weakness and imbalance    23 Patient currently on dexamethasone taper, decreasing dose by 2 mg every 3 days  Today is the  last dose of 2 mg daily, and then instructed to stop per Dr Castillo's instructions last week  23 ER with left arm swelling   Duplex negative for DVT  CT chest stable  No concern for neurogenic cause of TOS  Recommended rest, ice, elevation  Follow up with oncology team ASAP   - 23 Hospital admission   Pt presented with fever, cough, fatigue  Found to have ERON pneumonia and UTI - treated with abx  Pt left AMA, was discharged with levaquin, nebulizers and home oxygen     23 CT chest wo contrast  1  Worsening ground glass opacity in the left upper lobe, concerning for pneumonia  2   Posttreatment changes in the right lung with consolidation and volume loss, similar to multiple prior studies without definite new right lung opacity  3  Stable chronic complex loculated pleural effusion with nodular densities again concerning for pleural metastases  23 CT abdomen pelvis w contrast  Smooth lateral wall thickening with increased enhancement  Recommend correlation with urinalysis to exclude infection  Otherwise no acute findings  Stable heterogeneous right pleural effusion with right middle and lower lobe atelectasis        23 MRI brain w wo contrast        Upcomin23 Med Onc  23 Infusion  23 Dr Umm Monroe, Rad Onc       Adenocarcinoma of lung 8/2021 Initial Diagnosis    Adenocarcinoma of lung     8/5/2021 Biopsy    Right lung apex, image-guided core needle biopsy:  - Adenocarcinoma      10/6/2021 - 1/26/2022 Chemotherapy    cyanocobalamin, 1,000 mcg, Intramuscular, Once, 3 of 3 cycles  Administration: 1,000 mcg (11/24/2021), 1,000 mcg (1/26/2022), 1,000 mcg (10/6/2021)  pegfilgrastim (Merle Stair), 6 mg, Subcutaneous, Once, 1 of 1 cycle  Administration: 6 mg (11/26/2021)  pegfilgrastim (Jolanta Jeanie), 6 mg, Subcutaneous, Once, 6 of 6 cycles  Administration: 6 mg (10/6/2021), 6 mg (11/3/2021), 6 mg (11/24/2021), 6 mg (12/15/2021), 6 mg (1/5/2022)  fosaprepitant (EMEND) IVPB, 150 mg, Intravenous, Once, 6 of 6 cycles  Administration: 150 mg (10/6/2021), 150 mg (11/24/2021), 150 mg (12/15/2021), 150 mg (1/26/2022), 150 mg (11/3/2021), 150 mg (1/5/2022)  CARBOplatin (PARAPLATIN) IVPB ([facility] AUC DOSING), 519 5 mg, Intravenous, Once, 6 of 6 cycles  Administration: 519 5 mg (10/6/2021), 574 mg (11/24/2021), 600 mg (12/15/2021), 533 mg (1/26/2022), 604 5 mg (11/3/2021), 563 mg (1/5/2022)  pemetrexed (ALIMTA) chemo infusion, 970 mg, Intravenous, Once, 6 of 6 cycles  Administration: 1,000 mg (10/6/2021), 1,000 mg (11/24/2021), 1,000 mg (12/15/2021), 1,000 mg (1/26/2022), 1,000 mg (11/3/2021), 1,000 mg (1/5/2022)  pembrolizumab (KEYTRUDA) IVPB, 200 mg, Intravenous, Once, 6 of 6 cycles  Administration: 200 mg (10/6/2021), 200 mg (11/24/2021), 200 mg (12/15/2021), 200 mg (1/26/2022), 200 mg (11/3/2021), 200 mg (1/5/2022)     3/7/2022 -  Chemotherapy    CARBOplatin (PARAPLATIN) IVPB ([facility] AUC DOSING), , Intravenous, Once, 6 of 6 cycles  Administration: 211 6 mg (3/7/2022), 208 mg (3/14/2022), 238 8 mg (3/21/2022), 211 6 mg (3/28/2022), 215 2 mg (4/4/2022), 213 4 mg (4/18/2022)  PACLItaxel (TAXOL) chemo IVPB, 50 mg/m2 = 99 mg, Intravenous, Once, 6 of 6 cycles  Administration: 99 mg (3/7/2022), 99 mg (3/14/2022), 99 mg (3/21/2022), 99 mg (3/28/2022), 99 mg (4/4/2022), 99 mg (4/18/2022)  pembrolizumab (KEYTRUDA) IVPB, 200 mg, Intravenous, Once, 18 of 22 cycles  Administration: 200 mg (3/7/2022), 200 mg (3/28/2022), 200 mg (4/25/2022), 200 mg (5/16/2022), 200 mg (6/6/2022), 200 mg (6/27/2022), 200 mg (7/18/2022), 200 mg (8/8/2022), 200 mg (8/29/2022), 200 mg (9/19/2022), 200 mg (10/10/2022), 200 mg (10/31/2022), 200 mg (11/21/2022), 200 mg (12/12/2022), 200 mg (1/3/2023), 200 mg (1/23/2023), 200 mg (2/13/2023), 200 mg (3/27/2023)     2/22/2023 - 2/22/2023 Radiation    SRS left precentral gyrus   2000 cGy    Dr Laura Holman     Malignant neoplasm of upper lobe of right lung ([facility] [address] )   9/3/2021 Initial Diagnosis    Malignant neoplasm of upper lobe of right lung ([facility] [address] )     9/23/2021 -  Cancer Staged    Staging form: Lung, AJCC 8th Edition  - Clinical stage from 9/23/2021: Stage IIIC (cT3, cN3, cM0) - Signed by Svetlana Aguilar MD on 9/23/2021  Histopathologic type:  Adenocarcinoma, NOS       10/6/2021 - 1/26/2022 Chemotherapy    cyanocobalamin, 1,000 mcg, Intramuscular, Once, 3 of 3 cycles  Administration: 1,000 mcg (11/24/2021), 1,000 mcg (1/26/2022), 1,000 mcg (10/6/2021)  pegfilgrastim (Laura Chavarria), 6 mg, Subcutaneous, Once, 1 of 1 cycle  Administration: 6 mg (11/26/2021)  pegfilgrastim (Sherrilee Sand), 6 mg, Subcutaneous, Once, 6 of 6 cycles  Administration: 6 mg (10/6/2021), 6 mg (11/3/2021), 6 mg (11/24/2021), 6 mg (12/15/2021), 6 mg (1/5/2022)  fosaprepitant (EMEND) IVPB, 150 mg, Intravenous, Once, 6 of 6 cycles  Administration: 150 mg (10/6/2021), 150 mg (11/24/2021), 150 mg (12/15/2021), 150 mg (1/26/2022), 150 mg (11/3/2021), 150 mg (1/5/2022)  CARBOplatin (PARAPLATIN) IVPB ([facility] AUC DOSING), 519 5 mg, Intravenous, Once, 6 of 6 cycles  Administration: 519 5 mg (10/6/2021), 574 mg (11/24/2021), 600 mg (12/15/2021), 533 mg (1/26/2022), 604 5 mg (11/3/2021), 563 mg (1/5/2022)  pemetrexed (ALIMTA) chemo infusion, 970 mg, Intravenous, Once, 6 of 6 cycles  Administration: 1,000 mg (10/6/2021), 1,000 mg (11/24/2021), 1,000 mg (12/15/2021), 1,000 mg (1/26/2022), 1,000 mg (11/3/2021), 1,000 mg (1/5/2022)  pembrolizumab (KEYTRUDA) IVPB, 200 mg, Intravenous, Once, 6 of 6 cycles  Administration: 200 mg (10/6/2021), 200 mg (11/24/2021), 200 mg (12/15/2021), 200 mg (1/26/2022), 200 mg (11/3/2021), 200 mg (1/5/2022)     3/4/2022 - 4/21/2022 Radiation    The patient saw [facility] for radiation treatment   This is the current list of radiation treatment:  Plan ID Energy Fractions Dose per Fraction (cGy) Dose Correction (cGy) Total Dose Delivered (cGy) Elapsed Days   R LUNGMED REV 6X 30 / 30 200 0 6,000 48      Treatment dates:  C1: 3/4/2022 - 4/21/2022         3/7/2022 -  Chemotherapy    CARBOplatin (PARAPLATIN) IVPB ([facility] AUC DOSING), , Intravenous, Once, 6 of 6 cycles  Administration: 211 6 mg (3/7/2022), 208 mg (3/14/2022), 238 8 mg (3/21/2022), 211 6 mg (3/28/2022), 215 2 mg (4/4/2022), 213 4 mg (4/18/2022)  PACLItaxel (TAXOL) chemo IVPB, 50 mg/m2 = 99 mg, Intravenous, Once, 6 of 6 cycles  Administration: 99 mg (3/7/2022), 99 mg (3/14/2022), 99 mg (3/21/2022), 99 mg (3/28/2022), 99 mg (4/4/2022), 99 mg (4/18/2022)  pembrolizumab (KEYTRUDA) IVPB, 200 mg, Intravenous, Once, 18 of 22 cycles  Administration: 200 mg (3/7/2022), 200 mg (3/28/2022), 200 mg (4/25/2022), 200 mg (5/16/2022), 200 mg (6/6/2022), 200 mg (6/27/2022), 200 mg (7/18/2022), 200 mg (8/8/2022), 200 mg (8/29/2022), 200 mg (9/19/2022), 200 mg (10/10/2022), 200 mg (10/31/2022), 200 mg (11/21/2022), 200 mg (12/12/2022), 200 mg (1/3/2023), 200 mg (1/23/2023), 200 mg (2/13/2023), 200 mg (3/27/2023)     2/22/2023 - 2/22/2023 Radiation    SRS left precentral gyrus   2000 cGy    Dr Randa Whaley     Brain metastasis   2/3/2023 Initial Diagnosis    Brain metastasis     2/22/2023 - 2/22/2023 Radiation    SRS left precentral gyrus   2000 cGy    Dr Randa Whaley         Interval History:         REVIEW OF SYSTEMS:  Please note that a 14-point review of systems was performed to include Constitutional, HEENT, Respiratory, CVS, GI, , Musculoskeletal, Integumentary, Neurologic, Rheumatologic, Endocrinologic, Psychiatric, Lymphatic, and Hematologic/Oncologic systems were reviewed and are negative unless otherwise stated in HPI  Positive and negative findings pertinent to this evaluation are incorporated into the history of present illness  ECOG PS: 2    PROBLEM LIST:  Patient Active Problem List   Diagnosis   • History of stroke   • Bilateral lower extremity DVTs   • Acute respiratory failure with hypoxia ([facility])   • Dysphagia   • Mass of upper lobe of right lung   • History of urinary retention   • Constipation   • Anemia of chronic disease   • Pulmonary embolism (HCC)   • Impaired cognition   • Adenocarcinoma of lung   • Acute on chronic anemia   • Malignant neoplasm of upper lobe of right lung (HCC)   • COPD mixed type (HCC)   • Chemotherapy induced neutropenia (HCC)   • Hypercalcemia of malignancy   • Chronic anticoagulation   • Chronic right-sided heart failure (HCC)   • Brain injury, without loss of consciousness, subsequent encounter   • Recurrent right pleural effusion   • Restrictive lung disease   • Nocturnal hypoxemia   • Tobacco use disorder, severe, in sustained remission, dependence   • Brain metastasis   • History of venous thromboembolism   • Hyperlipidemia   • Vasogenic edema (HCC)   • UTI (urinary tract infection)   • Pneumonia   • Sepsis (HCC)   • Stage 3a chronic kidney disease (HCC)       Assessment / Plan:        I spent 20 minutes on chart review, face to face counseling time, coordination of care and documentation  Past Medical History:   has a past medical history of Chronic respiratory failure with hypoxia ([facility] [address] ) (8/9/2021), Impaired mobility and ADLs (8/13/2021), Lung cancer ([facility] [address] ), and Stroke ([facility] [address] )  PAST SURGICAL HISTORY:   has a past surgical history that includes IR biopsy lung (8/5/2021); IR thoracentesis (2/24/2022); and IR thoracentesis (8/1/2022)      CURRENT "MEDICATIONS  Current Outpatient Medications   Medication Sig Dispense Refill   • albuterol (2 5 mg/3 mL) 0 083 % nebulizer solution Take 3 mL (2 5 mg total) by nebulization every 6 (six) hours as needed for wheezing or shortness of breath 3 mL 2   • atorvastatin (LIPITOR) 40 mg tablet TAKE 1 TABLET BY MOUTH DAILY WITH DINNER 90 tablet 0   • enoxaparin (LOVENOX) 100 mg/mL Inject 0 9 mL (90 mg total) under the skin every 12 (twelve) hours 60 mL 5   • folic acid (FOLVITE) 1 mg tablet TAKE 1 TABLET BY MOUTH EVERY DAY 90 tablet 2   • multivitamin (THERAGRAN) TABS Take 1 tablet by mouth daily     • sodium chloride 3 % inhalation solution Take 4 mL by nebulization as needed for cough 4 mL 0   • tamsulosin (FLOMAX) 0 4 mg TAKE 1 CAPSULE (0 4 MG TOTAL) BY MOUTH DAILY AFTER DINNER 90 capsule 0   • acetaminophen (TYLENOL) 325 mg tablet Take 2 tablets (650 mg total) by mouth 4 (four) times a day as needed for mild pain, headaches or fever (Patient not taking: Reported on 4/14/2023)  0   • albuterol (ProAir HFA) 90 mcg/act inhaler Inhale 2 puffs every 6 (six) hours as needed for wheezing or shortness of breath (Patient not taking: Reported on 4/14/2023) 8 g 0     No current facility-administered medications for this visit  [unfilled]    SOCIAL HISTORY:   reports that he quit smoking about 19 years ago  His smoking use included cigarettes  He started smoking about 58 years ago  He has a 117 00 pack-year smoking history  He has never used smokeless tobacco  He reports that he does not currently use alcohol  He reports that he does not use drugs  FAMILY HISTORY:  family history includes Lung cancer in his brother; Prostate cancer in his brother  ALLERGIES:  is allergic to doxycycline        Physical Exam:  Vital Signs:   Visit Vitals  /80 (BP Location: Left arm, Patient Position: Sitting, Cuff Size: Adult)   Pulse 91   Temp 98 °F (36 7 °C)   Resp 17   Ht 5' 9\" (1 753 m)   Wt 97 1 kg (214 lb)   SpO2 99%   BMI 31 60 " kg/m²   Smoking Status Former   BSA 2 13 m²     Body mass index is 31 6 kg/m²  Body surface area is 2 13 meters squared  GEN: Alert, awake oriented x3, in no acute distress  HEENT- No pallor, icterus, cyanosis, no oral mucosal lesions,   LAD - no palpable cervical, clavicle, axillary, inguinal LAD  Heart- normal S1 S2, regular rate and rhythm, No murmur, rubs     Lungs- clear breathing sound bilateral    Abdomen- soft, Non tender, bowel sounds present  Extremities- No cyanosis, clubbing, edema  Neuro- No focal neurological deficit    Labs:  Lab Results   Component Value Date    WBC 4 77 04/18/2023    HGB 7 7 (L) 04/18/2023    HCT 24 1 (L) 04/18/2023    MCV 95 04/18/2023     04/18/2023     Lab Results   Component Value Date    SODIUM 142 04/18/2023    K 3 6 04/18/2023     04/18/2023    CO2 31 04/18/2023    AGAP 4 04/18/2023    BUN 12 04/18/2023    CREATININE 1 38 (H) 04/18/2023    GLUC 105 04/18/2023    GLUF 124 (H) 04/14/2023    CALCIUM 8 8 04/18/2023    AST 21 04/18/2023    ALT 31 04/18/2023    ALKPHOS 48 04/18/2023    TP 5 9 (L) 04/18/2023    TBILI 0 30 04/18/2023    EGFR 51 04/18/2023

## 2023-04-26 ENCOUNTER — HOSPITAL ENCOUNTER (OUTPATIENT)
Dept: INFUSION CENTER | Facility: HOSPITAL | Age: 73
Discharge: HOME | End: 2023-04-26
Attending: INTERNAL MEDICINE

## 2023-04-26 ENCOUNTER — TELEPHONE (OUTPATIENT)
Dept: OTHER | Facility: OTHER | Age: 73
End: 2023-04-26

## 2023-04-26 ENCOUNTER — RADIATION ONCOLOGY FOLLOW-UP (OUTPATIENT)
Dept: RADIATION ONCOLOGY | Facility: HOSPITAL | Age: 73
End: 2023-04-26
Attending: RADIOLOGY

## 2023-04-26 ENCOUNTER — CLINICAL SUPPORT (OUTPATIENT)
Dept: RADIATION ONCOLOGY | Facility: HOSPITAL | Age: 73
End: 2023-04-26
Attending: RADIOLOGY

## 2023-04-26 VITALS
HEART RATE: 99 BPM | OXYGEN SATURATION: 93 % | WEIGHT: 209 LBS | BODY MASS INDEX: 30.85 KG/M2 | RESPIRATION RATE: 18 BRPM | TEMPERATURE: 98.2 F | DIASTOLIC BLOOD PRESSURE: 82 MMHG | SYSTOLIC BLOOD PRESSURE: 144 MMHG

## 2023-04-26 VITALS
RESPIRATION RATE: 18 BRPM | BODY MASS INDEX: 31.28 KG/M2 | HEART RATE: 95 BPM | SYSTOLIC BLOOD PRESSURE: 129 MMHG | TEMPERATURE: 96.9 F | OXYGEN SATURATION: 99 % | WEIGHT: 211.2 LBS | DIASTOLIC BLOOD PRESSURE: 81 MMHG | HEIGHT: 69 IN

## 2023-04-26 DIAGNOSIS — C34.11 MALIGNANT NEOPLASM OF UPPER LOBE OF RIGHT LUNG (HCC): Primary | ICD-10-CM

## 2023-04-26 DIAGNOSIS — D70.1 CHEMOTHERAPY INDUCED NEUTROPENIA (HCC): ICD-10-CM

## 2023-04-26 DIAGNOSIS — C34.90 ADENOCARCINOMA OF LUNG, UNSPECIFIED LATERALITY (HCC): ICD-10-CM

## 2023-04-26 DIAGNOSIS — T45.1X5A CHEMOTHERAPY INDUCED NEUTROPENIA (HCC): ICD-10-CM

## 2023-04-26 DIAGNOSIS — C79.31 MALIGNANT NEOPLASM METASTATIC TO BRAIN (HCC): Primary | ICD-10-CM

## 2023-04-26 DIAGNOSIS — R91.8 MASS OF UPPER LOBE OF RIGHT LUNG: ICD-10-CM

## 2023-04-26 LAB
ALBUMIN SERPL BCP-MCNC: 2.5 G/DL (ref 3.5–5)
ALP SERPL-CCNC: 63 U/L (ref 46–116)
ALT SERPL W P-5'-P-CCNC: 22 U/L (ref 12–78)
ANION GAP SERPL CALCULATED.3IONS-SCNC: 7 MMOL/L (ref 4–13)
AST SERPL W P-5'-P-CCNC: 25 U/L (ref 5–45)
BILIRUB SERPL-MCNC: 0.31 MG/DL (ref 0.2–1)
BUN SERPL-MCNC: 12 MG/DL (ref 5–25)
CALCIUM ALBUM COR SERPL-MCNC: 12.2 MG/DL (ref 8.3–10.1)
CALCIUM SERPL-MCNC: 11 MG/DL (ref 8.3–10.1)
CHLORIDE SERPL-SCNC: 103 MMOL/L (ref 96–108)
CO2 SERPL-SCNC: 27 MMOL/L (ref 21–32)
CREAT SERPL-MCNC: 1.58 MG/DL (ref 0.6–1.3)
GFR SERPL CREATININE-BSD FRML MDRD: 43 ML/MIN/1.73SQ M
GLUCOSE SERPL-MCNC: 92 MG/DL (ref 65–140)
POTASSIUM SERPL-SCNC: 4.3 MMOL/L (ref 3.5–5.3)
PROT SERPL-MCNC: 7.2 G/DL (ref 6.4–8.4)
SODIUM SERPL-SCNC: 137 MMOL/L (ref 135–147)
T3FREE SERPL-MCNC: 1.98 PG/ML (ref 2.3–4.2)
T4 FREE SERPL-MCNC: 1.11 NG/DL (ref 0.76–1.46)
TSH SERPL DL<=0.05 MIU/L-ACNC: 8.24 UIU/ML (ref 0.45–4.5)

## 2023-04-26 RX ORDER — SODIUM CHLORIDE 9 MG/ML
20 INJECTION, SOLUTION INTRAVENOUS ONCE
Status: COMPLETED | OUTPATIENT
Start: 2023-04-26 | End: 2023-04-26

## 2023-04-26 RX ADMIN — SODIUM CHLORIDE 200 MG: 9 INJECTION, SOLUTION INTRAVENOUS at 09:52

## 2023-04-26 RX ADMIN — SODIUM CHLORIDE 20 ML/HR: 9 INJECTION, SOLUTION INTRAVENOUS at 09:49

## 2023-04-26 NOTE — PROGRESS NOTES
Follow-up - Radiation Oncology   Yohan Hutchison 1950 71 y o  male [id number]      History of Present Illness   Cancer Staging   Malignant neoplasm of upper lobe of right lung [facility])  Staging form: Lung, AJCC 8th Edition  - Clinical stage from 9/23/2021: Stage IIIC (cT3, cN3, cM0) - Signed by Kishan Gibson MD on 9/23/2021  Histopathologic type: Adenocarcinoma, NOS            Interval History:    Yohan Hutchison 1950 is a 71 y o  male eturns for 2 month follow-up with repeat MRI brain s/p SRS to left precentral gyrus brain metastases on 2/22/23       71year old male with history of adenocarcinoma NSCLC of right upper lobe diagnosed in August 2021  Previous radiation under the care of Dr Cynthia Brooke, he completed chemotherapy for cytoreduction followed by concurrent chemoRT to right lung/mediastinum completed 4/21/22  He continues with systemic immunotherapy with Keytruda  He was recently found to have a solitary metastasis in the left precentral gyrus region with surrounding edema   He completed SRS to this lesion on 2/22/23  He was given a Dex taper at discharge       3/3/23 Call from pt's spouse, he has been on dex taper and recently decreased to 2 mg AM, 1 mg PM and reports right hand weakness, slower gait  Per Dr Eva Concepcion, go back to previous dose of 2 mg BID x 1 week       3/6/23 Med Onc, Dr Chantale Infante  concerned about the worsening of his neurologic symptoms   Ordered stat MRI of the brain and LE dopplers due to his new LE edema   Will call him these results     Will put [place] on hold for now   Sometimes keytruda causes joint edema as an autoimmune phenomena, want to evaluate the cause before giving more [place]        3/6/23 MRI brain w wo contrast  1 6 cm metastasis in the posterior left frontal lobe is not significantly changed in size but surrounding vasogenic edema is mildly increased    No significant mass effect, shift or herniation   No new intracranial metastases    Continued stability of lesion in the posterior right nasopharynx, possible retention cyst        3/6/23 VAS lower limb venous duplex, bilateral  No evidence of acute or chronic dvt     3/7/23 Dr Jason Murphy - called pt's wife with results, no evidence of DVT  MRI shows increased edema at known lesion  Increased Dex to 4 mg BID  Continue protonix  Follow-up in 2 weeks       3/9/23 ED visit with c/o worsening gait instability and RUE weakness  CT head showed edema not significantly changed from MRI  No acute abnormality  Neurology consulted and recommended pt be admitted, increase Dex to 4 mg q 6hrs and admission for steroid monitoring as well as PT/OT evaluation  Patient left AMA       3/20/23 Med Onc Dr Holden Boucher  Refer for home PT for his weakness and imbalance     4/5/23 Patient currently on dexamethasone taper, decreasing dose by 2 mg every 3 days  Today is the  last dose of 2 mg daily, and then instructed to stop per Dr Castillo's instructions last week        4/9/23 ER with left arm swelling   Duplex negative for DVT  CT chest stable  No concern for neurogenic cause of TOS  Recommended rest, ice, elevation  4/14 - 4/18/23 Hospital admission   Pt presented with fever, cough, fatigue  Found to have ERON pneumonia and UTI - treated with abx  Pt left AMA, was discharged with levaquin, nebulizers and home oxygen      4/14/23 CT chest wo contrast  1   Worsening ground glass opacity in the left upper lobe, concerning for pneumonia    2   Posttreatment changes in the right lung with consolidation and volume loss, similar to multiple prior studies without definite new right lung opacity      3   Stable chronic complex loculated pleural effusion with nodular densities again concerning for pleural metastases      4/14/23 CT abdomen pelvis w contrast  Smooth lateral wall thickening with increased enhancement   Recommend correlation with urinalysis to exclude infection   Otherwise no acute findings    Stable heterogeneous right pleural effusion with right middle and lower lobe atelectasis          23 MRI brain w wo contrast  1   Decreased size of SRS treated posterior left frontal lobe cortical metastatic lesion with markedly improved perilesional edema    2   No new lesion or pathologic enhancement        Upcomin23 Med Onc  23 Infusion  23 Dr Jaime Antunez, Rad Onc       Historical Information   Oncology History   Adenocarcinoma of lung   2021 Initial Diagnosis    Adenocarcinoma of lung     2021 Biopsy    Right lung apex, image-guided core needle biopsy:  - Adenocarcinoma      10/6/2021 - 2022 Chemotherapy    cyanocobalamin, 1,000 mcg, Intramuscular, Once, 3 of 3 cycles  Administration: 1,000 mcg (2021), 1,000 mcg (2022), 1,000 mcg (10/6/2021)  pegfilgrastim (Idelia Hedge), 6 mg, Subcutaneous, Once, 1 of 1 cycle  Administration: 6 mg (2021)  pegfilgrastim ([place]), 6 mg, Subcutaneous, Once, 6 of 6 cycles  Administration: 6 mg (10/6/2021), 6 mg (11/3/2021), 6 mg (2021), 6 mg (12/15/2021), 6 mg (2022)  fosaprepitant (EMEND) IVPB, 150 mg, Intravenous, Once, 6 of 6 cycles  Administration: 150 mg (10/6/2021), 150 mg (2021), 150 mg (12/15/2021), 150 mg (2022), 150 mg (11/3/2021), 150 mg (2022)  CARBOplatin (PARAPLATIN) IVPB (GOG AUC DOSING), 519 5 mg, Intravenous, Once, 6 of 6 cycles  Administration: 519 5 mg (10/6/2021), 574 mg (2021), 600 mg (12/15/2021), 533 mg (2022), 604 5 mg (11/3/2021), 563 mg (2022)  pemetrexed (ALIMTA) chemo infusion, 970 mg, Intravenous, Once, 6 of 6 cycles  Administration: 1,000 mg (10/6/2021), 1,000 mg (2021), 1,000 mg (12/15/2021), 1,000 mg (2022), 1,000 mg (11/3/2021), 1,000 mg (2022)  pembrolizumab (KEYTRUDA) IVPB, 200 mg, Intravenous, Once, 6 of 6 cycles  Administration: 200 mg (10/6/2021), 200 mg (2021), 200 mg (12/15/2021), 200 mg (2022), 200 mg (11/3/2021), 200 mg (2022)     3/7/2022 -  Chemotherapy CARBOplatin (PARAPLATIN) IVPB (GOG AUC DOSING), , Intravenous, Once, 6 of 6 cycles  Administration: 211 6 mg (3/7/2022), 208 mg (3/14/2022), 238 8 mg (3/21/2022), 211 6 mg (3/28/2022), 215 2 mg (4/4/2022), 213 4 mg (4/18/2022)  PACLItaxel (TAXOL) chemo IVPB, 50 mg/m2 = 99 mg, Intravenous, Once, 6 of 6 cycles  Administration: 99 mg (3/7/2022), 99 mg (3/14/2022), 99 mg (3/21/2022), 99 mg (3/28/2022), 99 mg (4/4/2022), 99 mg (4/18/2022)  pembrolizumab (KEYTRUDA) IVPB, 200 mg, Intravenous, Once, 19 of 22 cycles  Administration: 200 mg (3/7/2022), 200 mg (3/28/2022), 200 mg (4/25/2022), 200 mg (5/16/2022), 200 mg (6/6/2022), 200 mg (6/27/2022), 200 mg (7/18/2022), 200 mg (8/8/2022), 200 mg (8/29/2022), 200 mg (9/19/2022), 200 mg (10/10/2022), 200 mg (10/31/2022), 200 mg (11/21/2022), 200 mg (12/12/2022), 200 mg (1/3/2023), 200 mg (1/23/2023), 200 mg (2/13/2023), 200 mg (3/27/2023), 200 mg (4/26/2023)     2/22/2023 - 2/22/2023 Radiation    SRS left precentral gyrus   2000 cGy    Dr Randa Whaley     Malignant neoplasm of upper lobe of right lung ([facility] [address] )   9/3/2021 Initial Diagnosis    Malignant neoplasm of upper lobe of right lung ([facility] [address] )     9/23/2021 -  Cancer Staged    Staging form: Lung, AJCC 8th Edition  - Clinical stage from 9/23/2021: Stage IIIC (cT3, cN3, cM0) - Signed by Cynthia Argueta MD on 9/23/2021  Histopathologic type:  Adenocarcinoma, NOS       10/6/2021 - 1/26/2022 Chemotherapy    cyanocobalamin, 1,000 mcg, Intramuscular, Once, 3 of 3 cycles  Administration: 1,000 mcg (11/24/2021), 1,000 mcg (1/26/2022), 1,000 mcg (10/6/2021)  pegfilgrastim (Shivani Cherry), 6 mg, Subcutaneous, Once, 1 of 1 cycle  Administration: 6 mg (11/26/2021)  pegfilgrastim (Leighton Hopson), 6 mg, Subcutaneous, Once, 6 of 6 cycles  Administration: 6 mg (10/6/2021), 6 mg (11/3/2021), 6 mg (11/24/2021), 6 mg (12/15/2021), 6 mg (1/5/2022)  fosaprepitant (EMEND) IVPB, 150 mg, Intravenous, Once, 6 of 6 cycles  Administration: 150 mg (10/6/2021), 150 mg (11/24/2021), 150 mg (12/15/2021), 150 mg (1/26/2022), 150 mg (11/3/2021), 150 mg (1/5/2022)  CARBOplatin (PARAPLATIN) IVPB (GOG AUC DOSING), 519 5 mg, Intravenous, Once, 6 of 6 cycles  Administration: 519 5 mg (10/6/2021), 574 mg (11/24/2021), 600 mg (12/15/2021), 533 mg (1/26/2022), 604 5 mg (11/3/2021), 563 mg (1/5/2022)  pemetrexed (ALIMTA) chemo infusion, 970 mg, Intravenous, Once, 6 of 6 cycles  Administration: 1,000 mg (10/6/2021), 1,000 mg (11/24/2021), 1,000 mg (12/15/2021), 1,000 mg (1/26/2022), 1,000 mg (11/3/2021), 1,000 mg (1/5/2022)  pembrolizumab (KEYTRUDA) IVPB, 200 mg, Intravenous, Once, 6 of 6 cycles  Administration: 200 mg (10/6/2021), 200 mg (11/24/2021), 200 mg (12/15/2021), 200 mg (1/26/2022), 200 mg (11/3/2021), 200 mg (1/5/2022)     3/4/2022 - 4/21/2022 Radiation    The patient saw @[facility]@ for radiation treatment   This is the current list of radiation treatment:  Plan ID Energy Fractions Dose per Fraction (cGy) Dose Correction (cGy) Total Dose Delivered (cGy) Elapsed Days   R LUNGMED REV 6X 30 / 30 200 0 6,000 48      Treatment dates:  C1: 3/4/2022 - 4/21/2022         3/7/2022 -  Chemotherapy    CARBOplatin (PARAPLATIN) IVPB (GOG AUC DOSING), , Intravenous, Once, 6 of 6 cycles  Administration: 211 6 mg (3/7/2022), 208 mg (3/14/2022), 238 8 mg (3/21/2022), 211 6 mg (3/28/2022), 215 2 mg (4/4/2022), 213 4 mg (4/18/2022)  PACLItaxel (TAXOL) chemo IVPB, 50 mg/m2 = 99 mg, Intravenous, Once, 6 of 6 cycles  Administration: 99 mg (3/7/2022), 99 mg (3/14/2022), 99 mg (3/21/2022), 99 mg (3/28/2022), 99 mg (4/4/2022), 99 mg (4/18/2022)  pembrolizumab (KEYTRUDA) IVPB, 200 mg, Intravenous, Once, 19 of 22 cycles  Administration: 200 mg (3/7/2022), 200 mg (3/28/2022), 200 mg (4/25/2022), 200 mg (5/16/2022), 200 mg (6/6/2022), 200 mg (6/27/2022), 200 mg (7/18/2022), 200 mg (8/8/2022), 200 mg (8/29/2022), 200 mg (9/19/2022), 200 mg (10/10/2022), 200 mg (10/31/2022), 200 mg (11/21/2022), 200 mg (12/12/2022), 200 mg (1/3/2023), 200 mg (2023), 200 mg (2023), 200 mg (3/27/2023), 200 mg (2023)     2023 - 2023 Radiation    SRS left precentral gyrus   2000 cGy    Dr Jose G De Oliveira     Brain metastasis   2/3/2023 Initial Diagnosis    Brain metastasis     2023 - 2023 Radiation    SRS left precentral gyrus    cGy    Dr Jose G De Oliveira         Past Medical History:   Diagnosis Date   • Chronic respiratory failure with hypoxia ([facility] [address] ) 2021   • Impaired mobility and ADLs 2021   • Lung cancer ([facility] [address] )    • Stroke [facility])      Past Surgical History:   Procedure Laterality Date   • IR BIOPSY LUNG  2021   • IR THORACENTESIS  2022   • IR THORACENTESIS  2022       Social History   Social History     Substance and Sexual Activity   Alcohol Use Not Currently    Comment: No ETOH since Summer 2021      Social History     Substance and Sexual Activity   Drug Use Never     Social History     Tobacco Use   Smoking Status Former   • Packs/day: 3 00   • Years: 39 00   • Pack years: 117 00   • Types: Cigarettes   • Start date:    • Quit date:    • Years since quittin 3   Smokeless Tobacco Never         Meds/Allergies     Current Outpatient Medications:   •  acetaminophen (TYLENOL) 325 mg tablet, Take 2 tablets (650 mg total) by mouth 4 (four) times a day as needed for mild pain, headaches or fever, Disp: , Rfl: 0  •  albuterol (2 5 mg/3 mL) 0 083 % nebulizer solution, Take 3 mL (2 5 mg total) by nebulization every 6 (six) hours as needed for wheezing or shortness of breath, Disp: 3 mL, Rfl: 2  •  atorvastatin (LIPITOR) 40 mg tablet, TAKE 1 TABLET BY MOUTH DAILY WITH DINNER, Disp: 90 tablet, Rfl: 0  •  enoxaparin (LOVENOX) 100 mg/mL, Inject 0 9 mL (90 mg total) under the skin every 12 (twelve) hours, Disp: 60 mL, Rfl: 5  •  folic acid (FOLVITE) 1 mg tablet, TAKE 1 TABLET BY MOUTH EVERY DAY, Disp: 90 tablet, Rfl: 2  •  multivitamin (THERAGRAN) TABS, Take 1 tablet by mouth daily, Disp: , Rfl:   •  sodium chloride 3 % inhalation solution, Take 4 mL by nebulization as needed for cough, Disp: 4 mL, Rfl: 0  •  tamsulosin (FLOMAX) 0 4 mg, TAKE 1 CAPSULE (0 4 MG TOTAL) BY MOUTH DAILY AFTER DINNER, Disp: 90 capsule, Rfl: 0  •  albuterol (ProAir HFA) 90 mcg/act inhaler, Inhale 2 puffs every 6 (six) hours as needed for wheezing or shortness of breath (Patient not taking: Reported on 4/26/2023), Disp: 8 g, Rfl: 0  No current facility-administered medications for this visit  Allergies   Allergen Reactions   • Doxycycline Rash         Review of Systems   Constitutional: Positive for appetite change (decreased since weaned off steroids) and fatigue  HENT: Negative  Eyes: Negative  Wears glasses   Respiratory: Positive for shortness of breath  2L oxygen since recent pneumonia hospitalization  Supplemental oxygen with activity and during sleep   Cardiovascular:        Swelling left arm/hand   Gastrointestinal: Positive for diarrhea (loose since taking abx)  Negative for nausea and vomiting  Endocrine: Negative  Genitourinary: Negative  Musculoskeletal: Positive for arthralgias  Skin: Negative  Allergic/Immunologic: Negative  Neurological: Positive for weakness (left arm and leg weakness)  Negative for dizziness, numbness and headaches  Psychiatric/Behavioral: Positive for confusion (chronic, short term memory ), decreased concentration and sleep disturbance (wakes frequently)            OBJECTIVE:   /82 (BP Location: Left arm)   Pulse 99   Temp 98 2 °F (36 8 °C) (Temporal)   Resp 18   Wt 94 8 kg (209 lb)   SpO2 93%   BMI 30 85 kg/m²   Pain Assessment:  0  ECOG/Zubrod/WHO: 1 - Symptomatic but completely ambulatory    Physical Exam   The patient is on nasal oxygen  He is conversing appropriately          RESULTS    Lab Results:   Recent Results (from the past 672 hour(s))   Basic metabolic panel    Collection Time: 04/04/23  8:56 AM   Result Value Ref Range    Sodium 139 135 - 147 mmol/L    Potassium 3 7 3 5 - 5 3 mmol/L    Chloride 106 96 - 108 mmol/L    CO2 31 21 - 32 mmol/L    ANION GAP 2 (L) 4 - 13 mmol/L    BUN 24 5 - 25 mg/dL    Creatinine 0 89 0 60 - 1 30 mg/dL    Glucose, Fasting 85 65 - 99 mg/dL    Calcium 9 8 8 3 - 10 1 mg/dL    eGFR 86 ml/min/1 73sq m   CBC and differential    Collection Time: 04/09/23 11:25 AM   Result Value Ref Range    WBC 4 80 4 31 - 10 16 Thousand/uL    RBC 3 49 (L) 3 88 - 5 62 Million/uL    Hemoglobin 9 9 (L) 12 0 - 17 0 g/dL    Hematocrit 32 5 (L) 36 5 - 49 3 %    MCV 93 82 - 98 fL    MCH 28 4 26 8 - 34 3 pg    MCHC 30 5 (L) 31 4 - 37 4 g/dL    RDW 20 3 (H) 11 6 - 15 1 %    MPV 9 0 8 9 - 12 7 fL    Platelets 983 (L) 762 - 390 Thousands/uL   Protime-INR    Collection Time: 04/09/23 11:25 AM   Result Value Ref Range    Protime 12 7 11 6 - 14 5 seconds    INR 0 89 0 84 - 1 19   APTT    Collection Time: 04/09/23 11:25 AM   Result Value Ref Range    PTT 32 23 - 37 seconds   Comprehensive metabolic panel    Collection Time: 04/09/23 11:25 AM   Result Value Ref Range    Sodium 141 135 - 147 mmol/L    Potassium 4 2 3 5 - 5 3 mmol/L    Chloride 104 96 - 108 mmol/L    CO2 33 (H) 21 - 32 mmol/L    ANION GAP 4 4 - 13 mmol/L    BUN 17 5 - 25 mg/dL    Creatinine 0 99 0 60 - 1 30 mg/dL    Glucose 83 65 - 140 mg/dL    Calcium 9 3 8 4 - 10 2 mg/dL    Corrected Calcium 9 9 8 3 - 10 1 mg/dL    AST 20 13 - 39 U/L    ALT 24 7 - 52 U/L    Alkaline Phosphatase 53 34 - 104 U/L    Total Protein 6 5 6 4 - 8 4 g/dL    Albumin 3 2 (L) 3 5 - 5 0 g/dL    Total Bilirubin 0 45 0 20 - 1 00 mg/dL    eGFR 76 ml/min/1 73sq m   Manual Differential(PHLEBS Do Not Order)    Collection Time: 04/09/23 11:25 AM   Result Value Ref Range    Segmented % 71 43 - 75 %    Bands % 5 0 - 8 %    Lymphocytes % 12 (L) 14 - 44 %    Monocytes % 7 4 - 12 %    Eosinophils, % 2 0 - 6 %    Basophils % 0 0 - 1 %    Atypical Lymphocytes % 3 (H) <=0 %    Absolute Neutrophils 3 65 1 85 - 7 62 Thousand/uL Lymphocytes Absolute 0 58 (L) 0 60 - 4 47 Thousand/uL    Monocytes Absolute 0 34 0 00 - 1 22 Thousand/uL    Eosinophils Absolute 0 10 0 00 - 0 40 Thousand/uL    Basophils Absolute 0 00 0 00 - 0 10 Thousand/uL    Total Counted      nRBC 1 0 - 2 /100 WBC    RBC Morphology Present     Anisocytosis Present     Platelet Estimate Adequate Adequate   T3, free    Collection Time: 04/14/23  9:40 AM   Result Value Ref Range    T3, Free 1 63 (L) 2 30 - 4 20 pg/mL   TSH, 3rd generation with Free T4 reflex    Collection Time: 04/14/23  9:40 AM   Result Value Ref Range    TSH 3RD GENERATON 4 310 0 450 - 4 500 uIU/mL   CBC and differential    Collection Time: 04/14/23  9:40 AM   Result Value Ref Range    WBC 4 85 4 31 - 10 16 Thousand/uL    RBC 3 13 (L) 3 88 - 5 62 Million/uL    Hemoglobin 8 9 (L) 12 0 - 17 0 g/dL    Hematocrit 29 6 (L) 36 5 - 49 3 %    MCV 95 82 - 98 fL    MCH 28 4 26 8 - 34 3 pg    MCHC 30 1 (L) 31 4 - 37 4 g/dL    RDW 19 6 (H) 11 6 - 15 1 %    MPV 10 3 8 9 - 12 7 fL    Platelets 652 784 - 910 Thousands/uL    nRBC 2 /100 WBCs    Neutrophils Relative 64 43 - 75 %    Immat GRANS % 2 0 - 2 %    Lymphocytes Relative 20 14 - 44 %    Monocytes Relative 13 (H) 4 - 12 %    Eosinophils Relative 0 0 - 6 %    Basophils Relative 1 0 - 1 %    Neutrophils Absolute 3 13 1 85 - 7 62 Thousands/µL    Immature Grans Absolute 0 08 0 00 - 0 20 Thousand/uL    Lymphocytes Absolute 0 98 0 60 - 4 47 Thousands/µL    Monocytes Absolute 0 62 0 17 - 1 22 Thousand/µL    Eosinophils Absolute 0 01 0 00 - 0 61 Thousand/µL    Basophils Absolute 0 03 0 00 - 0 10 Thousands/µL   Comprehensive metabolic panel    Collection Time: 04/14/23  9:40 AM   Result Value Ref Range    Sodium 137 135 - 147 mmol/L    Potassium 3 9 3 5 - 5 3 mmol/L    Chloride 105 96 - 108 mmol/L    CO2 29 21 - 32 mmol/L    ANION GAP 3 (L) 4 - 13 mmol/L    BUN 12 5 - 25 mg/dL    Creatinine 1 10 0 60 - 1 30 mg/dL    Glucose, Fasting 124 (H) 65 - 99 mg/dL    Calcium 9 0 8 3 - 10 1 mg/dL    Corrected Calcium 10 4 (H) 8 3 - 10 1 mg/dL    AST 22 5 - 45 U/L    ALT 30 12 - 78 U/L    Alkaline Phosphatase 56 46 - 116 U/L    Total Protein 6 7 6 4 - 8 4 g/dL    Albumin 2 3 (L) 3 5 - 5 0 g/dL    Total Bilirubin 0 40 0 20 - 1 00 mg/dL    eGFR 67 ml/min/1 73sq m   Procalcitonin    Collection Time: 04/14/23  9:40 AM   Result Value Ref Range    Procalcitonin 0 09 <=0 25 ng/ml   ECG 12 lead    Collection Time: 04/14/23  1:55 PM   Result Value Ref Range    Ventricular Rate 105 BPM    Atrial Rate 105 BPM    VA Interval 160 ms    QRSD Interval 64 ms    QT Interval 312 ms    QTC Interval 412 ms    P [place] 53 degrees    QRS Axis 51 degrees    T Wave Axis 51 degrees   CBC and differential    Collection Time: 04/14/23  2:15 PM   Result Value Ref Range    WBC 4 77 4 31 - 10 16 Thousand/uL    RBC 3 21 (L) 3 88 - 5 62 Million/uL    Hemoglobin 9 1 (L) 12 0 - 17 0 g/dL    Hematocrit 30 1 (L) 36 5 - 49 3 %    MCV 94 82 - 98 fL    MCH 28 3 26 8 - 34 3 pg    MCHC 30 2 (L) 31 4 - 37 4 g/dL    RDW 19 2 (H) 11 6 - 15 1 %    MPV 9 3 8 9 - 12 7 fL    Platelets 496 526 - 847 Thousands/uL    nRBC 2 /100 WBCs    Neutrophils Relative 65 43 - 75 %    Immat GRANS % 1 0 - 2 %    Lymphocytes Relative 22 14 - 44 %    Monocytes Relative 11 4 - 12 %    Eosinophils Relative 0 0 - 6 %    Basophils Relative 1 0 - 1 %    Neutrophils Absolute 3 09 1 85 - 7 62 Thousands/µL    Immature Grans Absolute 0 06 0 00 - 0 20 Thousand/uL    Lymphocytes Absolute 1 04 0 60 - 4 47 Thousands/µL    Monocytes Absolute 0 52 0 17 - 1 22 Thousand/µL    Eosinophils Absolute 0 02 0 00 - 0 61 Thousand/µL    Basophils Absolute 0 04 0 00 - 0 10 Thousands/µL   Comprehensive metabolic panel    Collection Time: 04/14/23  2:15 PM   Result Value Ref Range    Sodium 138 135 - 147 mmol/L    Potassium 4 6 3 5 - 5 3 mmol/L    Chloride 103 96 - 108 mmol/L    CO2 29 21 - 32 mmol/L    ANION GAP 6 4 - 13 mmol/L    BUN 13 5 - 25 mg/dL    Creatinine 1 16 0 60 - 1 30 mg/dL    Glucose "102 65 - 140 mg/dL    Calcium 9 0 8 4 - 10 2 mg/dL    Corrected Calcium 9 6 8 3 - 10 1 mg/dL    AST 33 13 - 39 U/L    ALT 26 7 - 52 U/L    Alkaline Phosphatase 50 34 - 104 U/L    Total Protein 6 8 6 4 - 8 4 g/dL    Albumin 3 2 (L) 3 5 - 5 0 g/dL    Total Bilirubin 0 45 0 20 - 1 00 mg/dL    eGFR 63 ml/min/1 73sq m   Lactic acid    Collection Time: 04/14/23  2:15 PM   Result Value Ref Range    LACTIC ACID 1 7 0 5 - 2 0 mmol/L   Protime-INR    Collection Time: 04/14/23  2:15 PM   Result Value Ref Range    Protime 12 9 11 6 - 14 5 seconds    INR 0 91 0 84 - 1 19   APTT    Collection Time: 04/14/23  2:15 PM   Result Value Ref Range    PTT 32 23 - 37 seconds   Blood culture #1    Collection Time: 04/14/23  2:15 PM    Specimen: Arm, Right; Blood   Result Value Ref Range    Blood Culture Staphylococcus coagulase negative (A)     Blood Culture Gram-positive jamie (A)     Gram Stain Result Gram positive cocci in clusters (A)     Gram Stain Result Gram Positive Rods Resembling Diphtheroids (A)    Blood culture #2    Collection Time: 04/14/23  2:15 PM    Specimen: Arm, Left; Blood   Result Value Ref Range    Blood Culture No Growth After 5 Days      Blood Culture Identification Panel    Collection Time: 04/14/23  2:15 PM    Specimen: Arm, Right; Blood   Result Value Ref Range    Staphylococcus epidermidis Detected (A) Not Detected   Blood Culture Identification Panel    Collection Time: 04/14/23  2:15 PM    Specimen: Arm, Right; Blood   Result Value Ref Range    ALL TARGETS Not Detected    FLU/RSV/COVID - if FLU/RSV clinically relevant    Collection Time: 04/14/23  2:56 PM    Specimen: Nose; Nares   Result Value Ref Range    SARS-CoV-2 Negative Negative    INFLUENZA A PCR Negative Negative    INFLUENZA B PCR Negative Negative    RSV PCR Negative Negative   HS Troponin 0hr (reflex protocol)    Collection Time: 04/14/23  2:56 PM   Result Value Ref Range    hs TnI 0hr 10 \"Refer to ACS Flowchart\"- see link ng/L   UA w Reflex to " "Microscopic w Reflex to Culture    Collection Time: 04/14/23  3:59 PM    Specimen: Urine, Clean Catch   Result Value Ref Range    Color, UA Yellow     Clarity, UA Slightly Cloudy     Specific [place], UA 1 020 1 005 - 1 030    pH, UA 6 0 4 5, 5 0, 5 5, 6 0, 6 5, 7 0, 7 5, 8 0    Leukocytes, UA Moderate (A) Negative    Nitrite, UA Negative Negative    Protein,  (2+) (A) Negative mg/dl    Glucose, UA Negative Negative mg/dl    Ketones, UA Negative Negative mg/dl    Urobilinogen, UA <2 0 <2 0 mg/dl mg/dl    Bilirubin, UA Negative Negative    Occult Blood, UA Trace (A) Negative   Urine Microscopic    Collection Time: 04/14/23  3:59 PM   Result Value Ref Range    RBC, UA 2-4 None Seen, 0-1, 1-2, 2-4, 0-5 /hpf    WBC, UA 30-50 (A) None Seen, 0-1, 1-2, 0-5, 2-4 /hpf    Epithelial Cells Occasional None Seen, Occasional /hpf    Bacteria, UA Innumerable (A) None Seen, Occasional /hpf    OTHER OBSERVATIONS WBCs Clumped    Urine culture    Collection Time: 04/14/23  3:59 PM    Specimen: Urine, Clean Catch   Result Value Ref Range    Urine Culture >100,000 cfu/ml Enterococcus faecalis (A)        Susceptibility    Enterococcus faecalis - ARNOL     ZID Performed Yes       Ampicillin ($$) <=2 00 Susceptible ug/ml     Levofloxacin ($) 1 00 Susceptible ug/ml     Nitrofurantoin <=32 Susceptible ug/ml     Tetracycline <=2 Susceptible ug/ml     Vancomycin ($) 1 00 Susceptible ug/ml   HS Troponin I 2hr    Collection Time: 04/14/23  5:39 PM   Result Value Ref Range    hs TnI 2hr 12 \"Refer to ACS Flowchart\"- see link ng/L    Delta 2hr hsTnI 2 <20 ng/L   Strep Pneumoniae, Urine    Collection Time: 04/14/23  5:39 PM    Specimen: Urine, Clean Catch   Result Value Ref Range    Strep pneumoniae antigen, urine Negative Negative   Legionella antigen, Urine    Collection Time: 04/14/23  5:39 PM    Specimen: Urine, Clean Catch   Result Value Ref Range    Legionella Urinary Antigen Negative Negative   HS Troponin I 4hr    Collection Time: " "04/14/23  7:26 PM   Result Value Ref Range    hs TnI 4hr 12 \"Refer to ACS Flowchart\"- see link ng/L    Delta 4hr hsTnI 2 <20 ng/L   Basic metabolic panel, AM Draw, Tomorrow    Collection Time: 04/15/23  3:20 AM   Result Value Ref Range    Sodium 137 135 - 147 mmol/L    Potassium 3 5 3 5 - 5 3 mmol/L    Chloride 105 96 - 108 mmol/L    CO2 26 21 - 32 mmol/L    ANION GAP 6 4 - 13 mmol/L    BUN 9 5 - 25 mg/dL    Creatinine 0 89 0 60 - 1 30 mg/dL    Glucose 91 65 - 140 mg/dL    Calcium 8 0 (L) 8 4 - 10 2 mg/dL    eGFR 86 ml/min/1 73sq m   CBC and Platelet, AM Draw, Tomorrow    Collection Time: 04/15/23  3:20 AM   Result Value Ref Range    WBC 4 00 (L) 4 31 - 10 16 Thousand/uL    RBC 2 78 (L) 3 88 - 5 62 Million/uL    Hemoglobin 7 9 (L) 12 0 - 17 0 g/dL    Hematocrit 26 0 (L) 36 5 - 49 3 %    MCV 94 82 - 98 fL    MCH 28 4 26 8 - 34 3 pg    MCHC 30 4 (L) 31 4 - 37 4 g/dL    RDW 19 4 (H) 11 6 - 15 1 %    Platelets 229 (L) 522 - 390 Thousands/uL    MPV 9 0 8 9 - 12 7 fL   Procalcitonin, Next Day AM Collection    Collection Time: 04/15/23  3:20 AM   Result Value Ref Range    Procalcitonin 0 11 <=0 25 ng/ml   Sputum culture and Gram stain    Collection Time: 04/15/23  9:37 AM    Specimen: Expectorated Sputum   Result Value Ref Range    Sputum Culture 3+ Growth of     Gram Stain Result 2+ Epithelial cells per low power field (A)     Gram Stain Result 1+ Gram positive cocci in clusters (A)     Gram Stain Result 1+ Yeast (A)     Gram Stain Result Rare Gram positive rods (A)     Gram Stain Result No polys seen (A)    Basic metabolic panel    Collection Time: 04/16/23  3:17 AM   Result Value Ref Range    Sodium 138 135 - 147 mmol/L    Potassium 4 3 3 5 - 5 3 mmol/L    Chloride 106 96 - 108 mmol/L    CO2 27 21 - 32 mmol/L    ANION GAP 5 4 - 13 mmol/L    BUN 9 5 - 25 mg/dL    Creatinine 0 88 0 60 - 1 30 mg/dL    Glucose 96 65 - 140 mg/dL    Calcium 8 0 (L) 8 4 - 10 2 mg/dL    eGFR 86 ml/min/1 73sq m   CBC and differential    " Collection Time: 04/16/23  3:17 AM   Result Value Ref Range    WBC 4 38 4 31 - 10 16 Thousand/uL    RBC 2 66 (L) 3 88 - 5 62 Million/uL    Hemoglobin 7 5 (L) 12 0 - 17 0 g/dL    Hematocrit 25 1 (L) 36 5 - 49 3 %    MCV 94 82 - 98 fL    MCH 28 2 26 8 - 34 3 pg    MCHC 29 9 (L) 31 4 - 37 4 g/dL    RDW 19 2 (H) 11 6 - 15 1 %    MPV 10 0 8 9 - 12 7 fL    Platelets 674 436 - 532 Thousands/uL    nRBC 2 /100 WBCs    Neutrophils Relative 69 43 - 75 %    Immat GRANS % 2 0 - 2 %    Lymphocytes Relative 16 14 - 44 %    Monocytes Relative 12 4 - 12 %    Eosinophils Relative 0 0 - 6 %    Basophils Relative 1 0 - 1 %    Neutrophils Absolute 3 06 1 85 - 7 62 Thousands/µL    Immature Grans Absolute 0 07 0 00 - 0 20 Thousand/uL    Lymphocytes Absolute 0 70 0 60 - 4 47 Thousands/µL    Monocytes Absolute 0 52 0 17 - 1 22 Thousand/µL    Eosinophils Absolute 0 01 0 00 - 0 61 Thousand/µL    Basophils Absolute 0 02 0 00 - 0 10 Thousands/µL   CBC and differential    Collection Time: 04/17/23  4:42 AM   Result Value Ref Range    WBC 5 34 4 31 - 10 16 Thousand/uL    RBC 2 80 (L) 3 88 - 5 62 Million/uL    Hemoglobin 8 1 (L) 12 0 - 17 0 g/dL    Hematocrit 25 7 (L) 36 5 - 49 3 %    MCV 92 82 - 98 fL    MCH 28 9 26 8 - 34 3 pg    MCHC 31 5 31 4 - 37 4 g/dL    RDW 18 7 (H) 11 6 - 15 1 %    MPV 9 2 8 9 - 12 7 fL    Platelets 001 329 - 661 Thousands/uL    nRBC 1 /100 WBCs    Neutrophils Relative 68 43 - 75 %    Immat GRANS % 2 0 - 2 %    Lymphocytes Relative 16 14 - 44 %    Monocytes Relative 13 (H) 4 - 12 %    Eosinophils Relative 0 0 - 6 %    Basophils Relative 1 0 - 1 %    Neutrophils Absolute 3 68 1 85 - 7 62 Thousands/µL    Immature Grans Absolute 0 08 0 00 - 0 20 Thousand/uL    Lymphocytes Absolute 0 86 0 60 - 4 47 Thousands/µL    Monocytes Absolute 0 68 0 17 - 1 22 Thousand/µL    Eosinophils Absolute 0 01 0 00 - 0 61 Thousand/µL    Basophils Absolute 0 03 0 00 - 0 10 Thousands/µL   Basic metabolic panel    Collection Time: 04/17/23  4:42 AM Result Value Ref Range    Sodium 140 135 - 147 mmol/L    Potassium 3 4 (L) 3 5 - 5 3 mmol/L    Chloride 106 96 - 108 mmol/L    CO2 27 21 - 32 mmol/L    ANION GAP 7 4 - 13 mmol/L    BUN 10 5 - 25 mg/dL    Creatinine 1 22 0 60 - 1 30 mg/dL    Glucose 116 65 - 140 mg/dL    Calcium 8 5 8 4 - 10 2 mg/dL    eGFR 59 ml/min/1 73sq m   Echo complete w/ contrast if indicated    Collection Time: 04/17/23  2:51 PM   Result Value Ref Range    A4C EF 52 %    LV Diastolic Volume (BP) 386 mL    LV Systolic Volume (BP) 46 mL    EF 54 %    LVIDd 4 60 cm    LVIDS 3 20 cm    IVSd 1 00 cm    LVPWd 1 10 cm    FS 30 28 - 44    MV E' Tissue Velocity Septal 7 cm/s    E wave deceleration time 182 ms    MV Peak E Tristan 70 cm/s    MV Peak A Tristan 0 86 m/s    RVID d 3 4 cm    Tricuspid annular plane systolic excursion 0 01 cm    LA size 2 7 cm    LA/Ao Ratio 2D 0 84     MV stenosis pressure 1/2 time 53 ms    MV valve area p 1/2 method 4 15     TR Peak Tristan 3 1 m/s    Triscuspid Valve Regurgitation Peak Gradient 38 0 mmHg    Ao root 3 20 cm    Asc Ao 3 9 cm    Tricuspid valve peak regurgitation velocity 3 08 m/s    Left ventricular stroke volume (2D) 58 00 mL    IVS 1 cm    LEFT VENTRICLE SYSTOLIC VOLUME (MOD BIPLANE) 2D 40 mL    LV DIASTOLIC VOLUME (MOD BIPLANE) 2D 98 mL    Left Atrium Area-systolic Four Chamber 9 9 cm2    Left Atrium Area-systolic Apical Two Chamber 7 7 cm2    LVSV, 2D 58 mL    LV EF 55    Adult Nebulizer Package    Collection Time: 04/17/23  6:02 PM   Result Value Ref Range    Supplier Name [facility]/[address]     Supplier Phone Number [phone]     Order Status Delivery Successful     Delivery Note      Delivery Request Date 04/17/2023     Date Delivered  04/18/2023     Item Description Nebulizer Compressor with Mouthpiece Only     Item Description Nebulizer Set, Reusable     Item Description Mouthpiece Only, N/A    Blood culture    Collection Time: 04/17/23 11:12 PM    Specimen: Arm, Left; Blood   Result Value Ref Range    Blood Culture No Growth After 5 Days  Blood culture    Collection Time: 04/17/23 11:12 PM    Specimen: Hand, Left; Blood   Result Value Ref Range    Blood Culture No Growth After 5 Days      Comprehensive metabolic panel    Collection Time: 04/17/23 11:12 PM   Result Value Ref Range    Sodium 140 135 - 147 mmol/L    Potassium 3 5 3 5 - 5 3 mmol/L    Chloride 106 96 - 108 mmol/L    CO2 26 21 - 32 mmol/L    ANION GAP 8 4 - 13 mmol/L    BUN 14 5 - 25 mg/dL    Creatinine 1 39 (H) 0 60 - 1 30 mg/dL    Glucose 121 65 - 140 mg/dL    Calcium 8 9 8 4 - 10 2 mg/dL    Corrected Calcium 9 8 8 3 - 10 1 mg/dL    AST 25 13 - 39 U/L    ALT 34 7 - 52 U/L    Alkaline Phosphatase 52 34 - 104 U/L    Total Protein 6 3 (L) 6 4 - 8 4 g/dL    Albumin 2 9 (L) 3 5 - 5 0 g/dL    Total Bilirubin 0 26 0 20 - 1 00 mg/dL    eGFR 50 ml/min/1 73sq m   CBC and differential    Collection Time: 04/17/23 11:12 PM   Result Value Ref Range    WBC 5 02 4 31 - 10 16 Thousand/uL    RBC 2 91 (L) 3 88 - 5 62 Million/uL    Hemoglobin 8 2 (L) 12 0 - 17 0 g/dL    Hematocrit 27 4 (L) 36 5 - 49 3 %    MCV 94 82 - 98 fL    MCH 28 2 26 8 - 34 3 pg    MCHC 29 9 (L) 31 4 - 37 4 g/dL    RDW 19 0 (H) 11 6 - 15 1 %    MPV 9 1 8 9 - 12 7 fL    Platelets 064 784 - 781 Thousands/uL    nRBC 1 /100 WBCs    Neutrophils Relative 71 43 - 75 %    Immat GRANS % 2 0 - 2 %    Lymphocytes Relative 13 (L) 14 - 44 %    Monocytes Relative 12 4 - 12 %    Eosinophils Relative 1 0 - 6 %    Basophils Relative 1 0 - 1 %    Neutrophils Absolute 3 64 1 85 - 7 62 Thousands/µL    Immature Grans Absolute 0 08 0 00 - 0 20 Thousand/uL    Lymphocytes Absolute 0 64 0 60 - 4 47 Thousands/µL    Monocytes Absolute 0 60 0 17 - 1 22 Thousand/µL    Eosinophils Absolute 0 03 0 00 - 0 61 Thousand/µL    Basophils Absolute 0 03 0 00 - 0 10 Thousands/µL   Lactic acid, plasma (w/reflex if result > 2 0)    Collection Time: 04/17/23 11:12 PM   Result Value Ref Range    LACTIC ACID 1 1 0 5 - 2 0 mmol/L   Vancomycin, trough Please draw peripherally 30 minutes before the dose, call pharmacy if the result is <10 or >20  If any question please contact pharmacy  Thank you      Collection Time: 04/18/23  4:47 AM   Result Value Ref Range    Vancomycin Tr 15 1 10 0 - 20 0 ug/mL   CBC    Collection Time: 04/18/23  4:47 AM   Result Value Ref Range    WBC 4 77 4 31 - 10 16 Thousand/uL    RBC 2 54 (L) 3 88 - 5 62 Million/uL    Hemoglobin 7 7 (L) 12 0 - 17 0 g/dL    Hematocrit 24 1 (L) 36 5 - 49 3 %    MCV 95 82 - 98 fL    MCH 28 3 26 8 - 34 3 pg    MCHC 29 9 (L) 31 4 - 37 4 g/dL    Platelets 781 879 - 450 Thousands/uL   Comprehensive metabolic panel    Collection Time: 04/18/23  4:47 AM   Result Value Ref Range    Sodium 142 135 - 147 mmol/L    Potassium 3 6 3 5 - 5 3 mmol/L    Chloride 107 96 - 108 mmol/L    CO2 31 21 - 32 mmol/L    ANION GAP 4 4 - 13 mmol/L    BUN 12 5 - 25 mg/dL    Creatinine 1 38 (H) 0 60 - 1 30 mg/dL    Glucose 105 65 - 140 mg/dL    Calcium 8 8 8 4 - 10 2 mg/dL    Corrected Calcium 9 8 8 3 - 10 1 mg/dL    AST 21 13 - 39 U/L    ALT 31 7 - 52 U/L    Alkaline Phosphatase 48 34 - 104 U/L    Total Protein 5 9 (L) 6 4 - 8 4 g/dL    Albumin 2 7 (L) 3 5 - 5 0 g/dL    Total Bilirubin 0 30 0 20 - 1 00 mg/dL    eGFR 51 ml/min/1 73sq m   COVID/FLU/RSV    Collection Time: 04/18/23  9:57 AM    Specimen: Nose; Nares   Result Value Ref Range    SARS-CoV-2 Negative Negative    INFLUENZA A PCR Negative Negative    INFLUENZA B PCR Negative Negative    RSV PCR Negative Negative   Home O2 Setup    Collection Time: 04/18/23  2:48 PM   Result Value Ref Range    Supplier Name On license of [facility]/[address]     Supplier Phone Number [phone]     Order Status Delivery Successful     Delivery Note      Delivery Request Date 04/18/2023     Date Delivered  04/26/2023     Supplier Name 04/26/2023     Item Description       Home Oxygen Concentrator with Portability, Adult, Standard Liter Flow    Item Description Portable Gaseous Oxygen System     Item Description Portable O2 Contents, Gas     Item Description No Conserving Device     Item Description O2 Humidifier Bottle, Standard Liter Flow    Comprehensive metabolic panel    Collection Time: 04/25/23  1:37 PM   Result Value Ref Range    Sodium 137 135 - 147 mmol/L    Potassium 4 3 3 5 - 5 3 mmol/L    Chloride 103 96 - 108 mmol/L    CO2 27 21 - 32 mmol/L    ANION GAP 7 4 - 13 mmol/L    BUN 12 5 - 25 mg/dL    Creatinine 1 58 (H) 0 60 - 1 30 mg/dL    Glucose 92 65 - 140 mg/dL    Calcium 11 0 (H) 8 3 - 10 1 mg/dL    Corrected Calcium 12 2 (HH) 8 3 - 10 1 mg/dL    AST 25 5 - 45 U/L    ALT 22 12 - 78 U/L    Alkaline Phosphatase 63 46 - 116 U/L    Total Protein 7 2 6 4 - 8 4 g/dL    Albumin 2 5 (L) 3 5 - 5 0 g/dL    Total Bilirubin 0 31 0 20 - 1 00 mg/dL    eGFR 43 ml/min/1 73sq m   T3, free    Collection Time: 04/25/23  1:37 PM   Result Value Ref Range    T3, Free 1 98 (L) 2 30 - 4 20 pg/mL   TSH, 3rd generation with Free T4 reflex    Collection Time: 04/25/23  1:37 PM   Result Value Ref Range    TSH 3RD GENERATON 8 240 (H) 0 450 - 4 500 uIU/mL   CBC and differential    Collection Time: 04/25/23  1:37 PM   Result Value Ref Range    WBC 7 43 4 31 - 10 16 Thousand/uL    RBC 3 22 (L) 3 88 - 5 62 Million/uL    Hemoglobin 8 9 (L) 12 0 - 17 0 g/dL    Hematocrit 30 8 (L) 36 5 - 49 3 %    MCV 96 82 - 98 fL    MCH 27 6 26 8 - 34 3 pg    MCHC 28 9 (L) 31 4 - 37 4 g/dL    RDW 19 3 (H) 11 6 - 15 1 %    MPV 9 6 8 9 - 12 7 fL    Platelets 049 (H) 138 - 390 Thousands/uL   Manual Differential(PHLEBS Do Not Order)    Collection Time: 04/25/23  1:37 PM   Result Value Ref Range    Segmented % 57 43 - 75 %    Bands % 17 (H) 0 - 8 %    Lymphocytes % 5 (L) 14 - 44 %    Monocytes % 7 4 - 12 %    Eosinophils, % 2 0 - 6 %    Basophils % 2 (H) 0 - 1 %    Atypical Lymphocytes % 10 (H) <=0 %    Absolute Neutrophils 5 50 1 85 - 7 62 Thousand/uL    Lymphocytes Absolute 0 37 (L) 0 60 - 4 47 Thousand/uL Monocytes Absolute 0 52 0 00 - 1 22 Thousand/uL    Eosinophils Absolute 0 15 0 00 - 0 40 Thousand/uL    Basophils Absolute 0 15 (H) 0 00 - 0 10 Thousand/uL    Total Counted      nRBC 2 0 - 2 /100 WBC    RBC Morphology Present     Anisocytosis Present     Polychromasia Present     Platelet Estimate Adequate Adequate   T4, free    Collection Time: 04/25/23  1:37 PM   Result Value Ref Range    Free T4 1 11 0 76 - 1 46 ng/dL       Imaging Studies:XR chest portable    Result Date: 4/19/2023  Narrative: CHEST INDICATION:   worsening sepsis reeval pna on amanda  COMPARISON:  4/14/2023 EXAM PERFORMED/VIEWS:  XR CHEST PORTABLE FINDINGS: Heart shadow is obscured by adjacent opacity  No significant interval change in extensive right lung opacity corresponding to known loculated right pleural effusion and radiation fibrosis on CT  The left lung is clear  Osseous structures appear within normal limits for patient age  Impression: Stable chest x-ray with no new findings  Workstation performed: [id number]     XR chest 1 view portable    Result Date: 4/14/2023  Narrative: CHEST INDICATION:   cough, fever  COMPARISON:  CXR and CT 4/9/2023  EXAM PERFORMED/VIEWS:  XR CHEST PORTABLE  FINDINGS: Cardiomediastinal silhouette normal  No change in extensive opacity in the right hemithorax corresponding with a loculated right effusion and radiation fibrosis on CT  Left lung clear  No pneumothorax  Upper abdomen normal  Bones normal for age  Impression: No change in extensive opacity in the right hemithorax corresponding with a loculated right effusion and radiation fibrosis on CT  Workstation performed: [id number]     XR chest 1 view portable    Result Date: 4/10/2023  Narrative: CHEST INDICATION:   Left arm swelling  COMPARISON:  3/31/2023, CT chest, abdomen and pelvis 2/3/2023 EXAM PERFORMED/VIEWS:  XR CHEST PORTABLE Images: 2 FINDINGS: Heart shadow is obscured on the right by adjacent opacity   Shift of the mediastinum to the right as before  Redemonstration of radiation fibrosis right lung with stable lung aeration  Persistent moderate loculated right pleural effusion  No pneumothorax  The left lung is clear  Osseous structures appear within normal limits for patient age  Impression: No acute cardiopulmonary disease with stable radiation fibrosis and loculated pleural effusion right hemithorax  Workstation performed: [id number]     XR chest pa & lateral    Result Date: 4/1/2023  Narrative: CHEST INDICATION:   R06 09: Other forms of dyspnea I50 812: Chronic right heart failure J90: Pleural effusion, not elsewhere classified C34 11: Malignant neoplasm of upper lobe, right bronchus or lung  COMPARISON:  CXR 3/9/2023 and chest CT 2/3/2023  EXAM PERFORMED/VIEWS:  XR CHEST PA & LATERAL  DUAL ENERGY SUBTRACTION  FINDINGS: Moderate cardiomegaly  Redemonstration of radiation fibrosis in the right upper lung  Persistent moderate loculated right effusion  No pneumothorax  Upper abdomen normal  Bones normal for age  Impression: No acute disease with redemonstration of radiation fibrosis in the right upper lung and moderate loculated right effusion  Workstation performed: [id number]     CT chest without contrast    Result Date: 4/14/2023  Narrative: CT CHEST WITHOUT IV CONTRAST INDICATION:   right sided PNA  COMPARISON:  Chest CT 4/9/2023  TECHNIQUE: CT examination of the chest was performed without intravenous contrast  Multiplanar 2D reformatted images were created from the source data  Radiation dose length product (DLP) for this visit:  510 mGy-cm   This examination, like all CT scans performed in the [facility], was performed utilizing techniques to minimize radiation dose exposure, including the use of iterative reconstruction and automated exposure control  FINDINGS: LUNGS:  Again seen are post treatment changes in the right lung with consolidation and volume loss, similar to multiple prior studies    No definite new right lung opacity  Groundglass opacity in the left upper lobe, worsened since 4/9/2023  Possible minimal groundglass opacification in the left lower lobe, noting difficult evaluation due to respiratory motion  Mild emphysema  PLEURA:  Stable appearance of a chronic complex loculated right pleural effusion with multiple nodular hyperdense areas, and suggesting pleural metastatic disease  HEART/GREAT VESSELS: Atherosclerotic aortic and coronary artery calcification is noted  Heart is otherwise unremarkable  There is fusiform ectasia of the ascending thoracic aorta measuring up to 42 mm, stable  Recommendation is for follow-up low radiation dose chest CT in one year  MEDIASTINUM AND JONH:  Unremarkable  CHEST WALL AND LOWER NECK:  Unremarkable  VISUALIZED STRUCTURES IN THE UPPER ABDOMEN:  Stable scattered subcentimeter hypodensities in the liver  OSSEOUS STRUCTURES:  No acute fracture or destructive osseous lesion  Impression: 1  Worsening ground glass opacity in the left upper lobe, concerning for pneumonia  2   Posttreatment changes in the right lung with consolidation and volume loss, similar to multiple prior studies without definite new right lung opacity  3  Stable chronic complex loculated pleural effusion with nodular densities again concerning for pleural metastases  Workstation performed: [id number]     CT chest w contrast    Result Date: 4/9/2023  Narrative: CT CHEST WITH IV CONTRAST INDICATION:   Thoracic outlet syndrome, venous left arm swelling, lung ca, concern for thoracic outlet syndrome  COMPARISON:  2/3/2023  TECHNIQUE: CT examination of the chest was performed  Multiplanar 2D reformatted images were created from the source data  Radiation dose length product (DLP) for this visit:  575 61 mGy-cm     This examination, like all CT scans performed in the [facility], was performed utilizing techniques to minimize radiation dose exposure, including the use of iterative reconstruction and automated exposure control  IV Contrast:  90 mL of iohexol (OMNIPAQUE) FINDINGS: LUNGS:  Chronic right lung posttreatment related changes including consolidation and volume loss  Chronic complex right pleural collection redemonstrated with nodular regions again concerning for pleural metastases  Left lung emphysematous disease  PLEURA:  Unremarkable  HEART/GREAT VESSELS: Heart is unremarkable for patient's age  No thoracic aortic aneurysm  Left subclavian artery and vein both appear grossly patent  MEDIASTINUM AND JONH:  Rightward mediastinal shift due to right-sided volume loss  CHEST WALL AND LOWER NECK:  Unremarkable  VISUALIZED STRUCTURES IN THE UPPER ABDOMEN:  Enlarged fatty liver with small indeterminate hypodensities, statistically likely cysts  OSSEOUS STRUCTURES:  No acute fracture or destructive osseous lesion  Impression: 1  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 with volume loss as well as a complex loculated pleural effusion with nodular densities again concerning for pleural metastases  2   COPD  3   Left pulmonary artery and vein both appear patent  Brachial plexus study advised if there is clinical concern for neurogenic cause of thoracic outlet syndrome  Workstation performed: [id number]     MRI brain w wo contrast    Result Date: 4/24/2023  Narrative: MRI BRAIN WITH AND WITHOUT CONTRAST INDICATION: C79 31: Secondary malignant neoplasm of brain  COMPARISON:  Brain MRI 3/6/2023 TECHNIQUE: Multiplanar, multisequence imaging of the brain was performed before and after gadolinium administration  IV Contrast:  9 mL of Gadobutrol injection (SINGLE-DOSE)  IMAGE QUALITY:   Diagnostic  FINDINGS: BRAIN PARENCHYMA: Decreased size of posterior left temporal lobe enhancing cortical lesion measuring 0 7 x 0 7 x 0 6 cm (series 11 image 114), previously measured 1 9 x 1 7 x 1 5 cm  There is interval decreased relative cerebral blood flow in ASL images  Significantly improved perilesional edema with mild residual edema in the posterior left temporal lobe  Chronic bilateral basal ganglia lacunar infarcts  No significant change in nonspecific foci of T2/FLAIR hyperintensities involving periventricular and subcortical white matter, most compatible with mild microangiopathic change  Diffusion imaging is unremarkable  No intracranial hemorrhage  VENTRICLES:  Normal for the patient's age  SELLA AND PITUITARY GLAND:  Normal  ORBITS:  Prior right sided cataract surgery  PARANASAL SINUSES:  Normal  VASCULATURE:  Evaluation of the major intracranial vasculature demonstrates appropriate flow voids  CALVARIUM AND SKULL BASE:  Normal  EXTRACRANIAL SOFT TISSUES:  Normal      Impression: 1  Decreased size of SRS treated posterior left frontal lobe cortical metastatic lesion with markedly improved perilesional edema  2   No new lesion or pathologic enhancement  Workstation performed: [id number]     VAS upper limb venous duplex scan, unilateral/limited    Result Date: 4/9/2023  Narrative:  [facility] REPORT CLINICAL: Indications:  Patient presents to ED with left upper extremity swelling as of this morning  Patient has history of DVT and is on lovenox  Operative History: Patient denies previous cardiovascular surgery Risk Factors The patient has history of Hyperlipidemia, DVT and previous smoking (quit >10yrs ago)  CONCLUSION:  Impression RIGHT UPPER LIMB LIMITED: Evaluation shows no evidence of thrombus in the internal jugular vein, subclavian vein, and the innominate vein  LEFT UPPER LIMB: No evidence of acute or chronic deep vein thrombosis  No evidence of superficial thrombophlebitis noted  Doppler evaluation shows a normal response to augmentation maneuvers  There is no previous study for comparison    SIGNATURE: Electronically Signed by: Mónica Alex MD on 2023-04-09 07:50:22 PM    CT abdomen pelvis w contrast    Result Date: 4/15/2023  Narrative: CT ABDOMEN AND PELVIS WITH IV CONTRAST INDICATION:   Abdominal pain, acute, nonlocalized abdominal pain/distention  COMPARISON:  Multiple prior exams  The most recent of the abdomen and pelvis is a CT scan dated February 3, 2023  TECHNIQUE:  CT examination of the abdomen and pelvis was performed  Multiplanar 2D reformatted images were created from the source data  Radiation dose length product (DLP) for this visit:  1147 08 mGy-cm   This examination, like all CT scans performed in the [facility], was performed utilizing techniques to minimize radiation dose exposure, including the use of iterative reconstruction and automated exposure control  IV Contrast:  100 mL of iohexol (OMNIPAQUE) Enteric Contrast:  Enteric contrast was not administered  FINDINGS: ABDOMEN LOWER CHEST:  Heterogeneous loculated right pleural effusion unchanged with associated right middle and lower lobe compressive atelectasis  Cardiomegaly  LIVER/BILIARY TREE:  Scattered subcentimeter low-density lesions in the liver are unchanged  No new or enlarging lesions  GALLBLADDER:  No calcified gallstones  No pericholecystic inflammatory change  SPLEEN:  Unremarkable  PANCREAS:  Unremarkable  ADRENAL GLANDS:  Unremarkable  KIDNEYS/URETERS:  Large left lower pole cyst which measures approximately 6 cm  STOMACH AND BOWEL:  Unremarkable  APPENDIX:  No findings to suggest appendicitis  ABDOMINOPELVIC CAVITY:  No ascites  No pneumoperitoneum  No lymphadenopathy  VESSELS:  Atherosclerotic changes are present  No evidence of aneurysm  PELVIS REPRODUCTIVE ORGANS:  Unremarkable for patient's age  URINARY BLADDER:  Mild smooth thickening and increased enhancement of the bladder wall  Recommend correlation with urinalysis to exclude infection  ABDOMINAL WALL/INGUINAL REGIONS:  Again shown are nodular areas within the anterior abdominal wall in the upper pelvis most likely representing injection sites   OSSEOUS STRUCTURES:  No acute fracture or destructive osseous lesion  Spinal degenerative changes are noted  Impression: Smooth lateral wall thickening with increased enhancement  Recommend correlation with urinalysis to exclude infection  Otherwise no acute findings  Stable heterogeneous right pleural effusion with right middle and lower lobe atelectasis  Workstation performed: [id number]     Echo complete w/ contrast if indicated    Result Date: 4/18/2023  Narrative: •  Left Ventricle: Left ventricle is not well visualized  Left ventricular cavity size is normal  Wall thickness is mildly increased  The left ventricular ejection fraction is 55%  Systolic function is normal  Although no diagnostic regional wall motion abnormality was identified, this possibility cannot be completely excluded on the basis of this study  Diastolic function is mildly abnormal, consistent with grade I (abnormal) relaxation  •  Mitral Valve: There is mild regurgitation  •  Tricuspid Valve: There is mild regurgitation  Assessment/Plan:  No orders of the defined types were placed in this encounter  Foy Ganser is a 71y o  year old male who is 2 months status post SRS to the left precentral gyrus for brain metastasis  Posttreatment he has been in the hospital on several occasions  He required Decadron taper which he is now completed for approximately 2 weeks  He had lower extremity edema with Doppler negative  He was hospitalized and discharged last week he was discharged last week with pneumonia and has been on nasal oxygen when walking longer distances  He has resumed Keytruda  I reviewed his MRI of the brain which reveals improvement in the treated brain metastasis  There is also significant improvement in his surrounding edema  No new lesions identified  I have ordered follow-up MRI of the brain in 3 months and he will return to neuro-oncology clinic thereafter    Noemi Brandon MD  4/26/2023,3:08 PM    Portions of the record may have been created "with voice recognition software   Occasional wrong word or \"sound a like\" substitutions may have occurred due to the inherent limitations of voice recognition software   Read the chart carefully and recognize, using context, where substitutions have occurred        "

## 2023-04-26 NOTE — PROGRESS NOTES
Patient completed keytruda infusion with no adverse reactions  PIV site removed, dressing CD&I  AVS provided, patient left unit ambulatory with cane, accompanied by wife

## 2023-04-26 NOTE — PROGRESS NOTES
John Waters 1950 is a 71 y o  male eturns for 2 month follow-up with repeat MRI brain s/p SRS to left precentral gyrus brain metastases on 2/22/23      71year old male with history of adenocarcinoma NSCLC of right upper lobe diagnosed in August 2021  Previous radiation under the care of Dr Shanda Umaña, he completed chemotherapy for cytoreduction followed by concurrent chemoRT to right lung/mediastinum completed 4/21/22  He continues with systemic immunotherapy with Keytruda  He was recently found to have a solitary metastasis in the left precentral gyrus region with surrounding edema  He completed SRS to this lesion on 2/22/23  He was given a Dex taper at discharge  3/3/23 Call from pt's spouse, he has been on dex taper and recently decreased to 2 mg AM, 1 mg PM and reports right hand weakness, slower gait  Per Dr Gareth Hinojosa, go back to previous dose of 2 mg BID x 1 week  3/6/23 Med Onc, Dr Enrico Novoa  concerned about the worsening of his neurologic symptoms  Ordered stat MRI of the brain and LE dopplers due to his new LE edema  Will call him these results  Will put keytruda on hold for now  Sometimes Rainell Chock causes joint edema as an autoimmune phenomena, want to evaluate the cause before giving more Rainell Chock  3/6/23 MRI brain w wo contrast  1 6 cm metastasis in the posterior left frontal lobe is not significantly changed in size but surrounding vasogenic edema is mildly increased    No significant mass effect, shift or herniation  No new intracranial metastases  Continued stability of lesion in the posterior right nasopharynx, possible retention cyst      3/6/23 VAS lower limb venous duplex, bilateral  No evidence of acute or chronic dvt    3/7/23 Dr Enrico Novoa - called pt's wife with results, no evidence of DVT  MRI shows increased edema at known lesion  Increased Dex to 4 mg BID  Continue protonix  Follow-up in 2 weeks       3/9/23 ED visit with c/o worsening gait instability and RUE weakness  CT head showed edema not significantly changed from MRI  No acute abnormality  Neurology consulted and recommended pt be admitted, increase Dex to 4 mg q 6hrs and admission for steroid monitoring as well as PT/OT evaluation  Patient left AMA  3/20/23 Med Onc Dr Candice Joseph  Refer for home PT for his weakness and imbalance    23 Patient currently on dexamethasone taper, decreasing dose by 2 mg every 3 days  Today is the  last dose of 2 mg daily, and then instructed to stop per Dr Castillo's instructions last week  23 ER with left arm swelling   Duplex negative for DVT  CT chest stable  No concern for neurogenic cause of TOS  Recommended rest, ice, elevation  Follow up with oncology team ASAP   - 23 Hospital admission   Pt presented with fever, cough, fatigue  Found to have ERON pneumonia and UTI - treated with abx  Pt left AMA, was discharged with levaquin, nebulizers and home oxygen     23 CT chest wo contrast  1  Worsening ground glass opacity in the left upper lobe, concerning for pneumonia  2   Posttreatment changes in the right lung with consolidation and volume loss, similar to multiple prior studies without definite new right lung opacity  3  Stable chronic complex loculated pleural effusion with nodular densities again concerning for pleural metastases  23 CT abdomen pelvis w contrast  Smooth lateral wall thickening with increased enhancement  Recommend correlation with urinalysis to exclude infection  Otherwise no acute findings  Stable heterogeneous right pleural effusion with right middle and lower lobe atelectasis  23 MRI brain w wo contrast  1  Decreased size of SRS treated posterior left frontal lobe cortical metastatic lesion with markedly improved perilesional edema     2   No new lesion or pathologic enhancement      Upcomin23 Med Onc  23 Infusion  23 Dr Jack Amador, [facility]            Oncology History Adenocarcinoma of lung   8/2021 Initial Diagnosis    Adenocarcinoma of lung     8/5/2021 Biopsy    Right lung apex, image-guided core needle biopsy:  - Adenocarcinoma      10/6/2021 - 1/26/2022 Chemotherapy    cyanocobalamin, 1,000 mcg, Intramuscular, Once, 3 of 3 cycles  Administration: 1,000 mcg (11/24/2021), 1,000 mcg (1/26/2022), 1,000 mcg (10/6/2021)  pegfilgrastim (Carry Every), 6 mg, Subcutaneous, Once, 1 of 1 cycle  Administration: 6 mg (11/26/2021)  pegfilgrastim (Liz Sellar), 6 mg, Subcutaneous, Once, 6 of 6 cycles  Administration: 6 mg (10/6/2021), 6 mg (11/3/2021), 6 mg (11/24/2021), 6 mg (12/15/2021), 6 mg (1/5/2022)  fosaprepitant (EMEND) IVPB, 150 mg, Intravenous, Once, 6 of 6 cycles  Administration: 150 mg (10/6/2021), 150 mg (11/24/2021), 150 mg (12/15/2021), 150 mg (1/26/2022), 150 mg (11/3/2021), 150 mg (1/5/2022)  CARBOplatin (PARAPLATIN) IVPB (GO AUC DOSING), 519 5 mg, Intravenous, Once, 6 of 6 cycles  Administration: 519 5 mg (10/6/2021), 574 mg (11/24/2021), 600 mg (12/15/2021), 533 mg (1/26/2022), 604 5 mg (11/3/2021), 563 mg (1/5/2022)  pemetrexed (ALIMTA) chemo infusion, 970 mg, Intravenous, Once, 6 of 6 cycles  Administration: 1,000 mg (10/6/2021), 1,000 mg (11/24/2021), 1,000 mg (12/15/2021), 1,000 mg (1/26/2022), 1,000 mg (11/3/2021), 1,000 mg (1/5/2022)  pembrolizumab (KEYTRUDA) IVPB, 200 mg, Intravenous, Once, 6 of 6 cycles  Administration: 200 mg (10/6/2021), 200 mg (11/24/2021), 200 mg (12/15/2021), 200 mg (1/26/2022), 200 mg (11/3/2021), 200 mg (1/5/2022)     3/7/2022 -  Chemotherapy    CARBOplatin (PARAPLATIN) IVPB ([facility] AUC DOSING), , Intravenous, Once, 6 of 6 cycles  Administration: 211 6 mg (3/7/2022), 208 mg (3/14/2022), 238 8 mg (3/21/2022), 211 6 mg (3/28/2022), 215 2 mg (4/4/2022), 213 4 mg (4/18/2022)  PACLItaxel (TAXOL) chemo IVPB, 50 mg/m2 = 99 mg, Intravenous, Once, 6 of 6 cycles  Administration: 99 mg (3/7/2022), 99 mg (3/14/2022), 99 mg (3/21/2022), 99 mg (3/28/2022), 99 mg (4/4/2022), 99 mg (4/18/2022)  pembrolizumab (KEYTRUDA) IVPB, 200 mg, Intravenous, Once, 19 of 22 cycles  Administration: 200 mg (3/7/2022), 200 mg (3/28/2022), 200 mg (4/25/2022), 200 mg (5/16/2022), 200 mg (6/6/2022), 200 mg (6/27/2022), 200 mg (7/18/2022), 200 mg (8/8/2022), 200 mg (8/29/2022), 200 mg (9/19/2022), 200 mg (10/10/2022), 200 mg (10/31/2022), 200 mg (11/21/2022), 200 mg (12/12/2022), 200 mg (1/3/2023), 200 mg (1/23/2023), 200 mg (2/13/2023), 200 mg (3/27/2023), 200 mg (4/26/2023)     2/22/2023 - 2/22/2023 Radiation    SRS left precentral gyrus   2000 cGy    Dr Jonathon Naylor     Malignant neoplasm of upper lobe of right lung ([facility] [address] )   9/3/2021 Initial Diagnosis    Malignant neoplasm of upper lobe of right lung ([facility] [address] )     9/23/2021 -  Cancer Staged    Staging form: Lung, AJCC 8th Edition  - Clinical stage from 9/23/2021: Stage IIIC (cT3, cN3, cM0) - Signed by Julian Prater MD on 9/23/2021  Histopathologic type:  Adenocarcinoma, NOS       10/6/2021 - 1/26/2022 Chemotherapy    cyanocobalamin, 1,000 mcg, Intramuscular, Once, 3 of 3 cycles  Administration: 1,000 mcg (11/24/2021), 1,000 mcg (1/26/2022), 1,000 mcg (10/6/2021)  pegfilgrastim ([place]), 6 mg, Subcutaneous, Once, 1 of 1 cycle  Administration: 6 mg (11/26/2021)  pegfilgrastim (Demaris Plough), 6 mg, Subcutaneous, Once, 6 of 6 cycles  Administration: 6 mg (10/6/2021), 6 mg (11/3/2021), 6 mg (11/24/2021), 6 mg (12/15/2021), 6 mg (1/5/2022)  fosaprepitant (EMEND) IVPB, 150 mg, Intravenous, Once, 6 of 6 cycles  Administration: 150 mg (10/6/2021), 150 mg (11/24/2021), 150 mg (12/15/2021), 150 mg (1/26/2022), 150 mg (11/3/2021), 150 mg (1/5/2022)  CARBOplatin (PARAPLATIN) IVPB ([facility] AUC DOSING), 519 5 mg, Intravenous, Once, 6 of 6 cycles  Administration: 519 5 mg (10/6/2021), 574 mg (11/24/2021), 600 mg (12/15/2021), 533 mg (1/26/2022), 604 5 mg (11/3/2021), 563 mg (1/5/2022)  pemetrexed (ALIMTA) chemo infusion, 970 mg, Intravenous, Once, 6 of 6 cycles  Administration: 1,000 mg (10/6/2021), 1,000 mg (11/24/2021), 1,000 mg (12/15/2021), 1,000 mg (1/26/2022), 1,000 mg (11/3/2021), 1,000 mg (1/5/2022)  pembrolizumab (KEYTRUDA) IVPB, 200 mg, Intravenous, Once, 6 of 6 cycles  Administration: 200 mg (10/6/2021), 200 mg (11/24/2021), 200 mg (12/15/2021), 200 mg (1/26/2022), 200 mg (11/3/2021), 200 mg (1/5/2022)     3/4/2022 - 4/21/2022 Radiation    The patient saw @[facility] for radiation treatment   This is the current list of radiation treatment:  Plan ID Energy Fractions Dose per Fraction (cGy) Dose Correction (cGy) Total Dose Delivered (cGy) Elapsed Days   R LUNGMED REV 6X 30 / 30 200 0 6,000 48      Treatment dates:  C1: 3/4/2022 - 4/21/2022         3/7/2022 -  Chemotherapy    CARBOplatin (PARAPLATIN) IVPB ([facility] AUC DOSING), , Intravenous, Once, 6 of 6 cycles  Administration: 211 6 mg (3/7/2022), 208 mg (3/14/2022), 238 8 mg (3/21/2022), 211 6 mg (3/28/2022), 215 2 mg (4/4/2022), 213 4 mg (4/18/2022)  PACLItaxel (TAXOL) chemo IVPB, 50 mg/m2 = 99 mg, Intravenous, Once, 6 of 6 cycles  Administration: 99 mg (3/7/2022), 99 mg (3/14/2022), 99 mg (3/21/2022), 99 mg (3/28/2022), 99 mg (4/4/2022), 99 mg (4/18/2022)  pembrolizumab (KEYTRUDA) IVPB, 200 mg, Intravenous, Once, 19 of 22 cycles  Administration: 200 mg (3/7/2022), 200 mg (3/28/2022), 200 mg (4/25/2022), 200 mg (5/16/2022), 200 mg (6/6/2022), 200 mg (6/27/2022), 200 mg (7/18/2022), 200 mg (8/8/2022), 200 mg (8/29/2022), 200 mg (9/19/2022), 200 mg (10/10/2022), 200 mg (10/31/2022), 200 mg (11/21/2022), 200 mg (12/12/2022), 200 mg (1/3/2023), 200 mg (1/23/2023), 200 mg (2/13/2023), 200 mg (3/27/2023), 200 mg (4/26/2023)     2/22/2023 - 2/22/2023 Radiation    SRS left precentral gyrus   2000 cGy    Dr Aisha Prado     Brain metastasis   2/3/2023 Initial Diagnosis    Brain metastasis     2/22/2023 - 2/22/2023 Radiation    SRS left precentral gyrus   2000 cGy    Dr Aisha Prado         Review of Systems:  Review of Systems Constitutional: Positive for appetite change (decreased since weaned off steroids) and fatigue  HENT: Negative  Eyes: Negative  Wears glasses   Respiratory: Positive for shortness of breath  2L oxygen since recent pneumonia hospitalization  Supplemental oxygen with activity and during sleep   Cardiovascular:        Swelling left arm/hand   Gastrointestinal: Positive for diarrhea (loose since taking abx)  Negative for nausea and vomiting  Endocrine: Negative  Genitourinary: Negative  Musculoskeletal: Positive for arthralgias  Skin: Negative  Allergic/Immunologic: Negative  Neurological: Positive for weakness (left arm and leg weakness)  Negative for dizziness, numbness and headaches  Psychiatric/Behavioral: Positive for confusion (chronic, short term memory ), decreased concentration and sleep disturbance (wakes frequently)         Clinical Trial: no      Health Maintenance   Topic Date Due   • Hepatitis C Screening  Never done   • Hepatitis A Vaccine (1 of 2 - Risk 2-dose series) Never done   • BMI: Followup Plan  Never done   • Hepatitis B Vaccine (1 of 3 - Risk 3-dose series) Never done   • COVID-19 Vaccine (4 - Booster for Pfizer series) 03/19/2022   • Influenza Vaccine (1) Never done   • Medicare Annual Wellness Visit (AWV)  01/24/2023   • Colorectal Cancer Screening  06/13/2023 (Originally 9/5/1995)   • Fall Risk  02/20/2024   • Depression Screening  04/26/2024   • BMI: Adult  04/26/2024   • Pneumococcal Vaccine: 65+ Years  Completed   • HIB Vaccine  Aged Out   • IPV Vaccine  Aged Out   • Meningococcal ACWY Vaccine  Aged Out   • HPV Vaccine  Aged Out     Patient Active Problem List   Diagnosis   • History of stroke   • Bilateral lower extremity DVTs   • Acute respiratory failure with hypoxia ([address] )   • Dysphagia   • Mass of upper lobe of right lung   • History of urinary retention   • Constipation   • Anemia of chronic disease   • Pulmonary embolism (HCC)   • Impaired cognition   • Adenocarcinoma of lung   • Acute on chronic anemia   • Malignant neoplasm of upper lobe of right lung (HCC)   • COPD mixed type (HCC)   • Chemotherapy induced neutropenia (HCC)   • Hypercalcemia of malignancy   • Chronic anticoagulation   • Chronic right-sided heart failure (HCC)   • Brain injury, without loss of consciousness, subsequent encounter   • Recurrent right pleural effusion   • Restrictive lung disease   • Nocturnal hypoxemia   • Tobacco use disorder, severe, in sustained remission, dependence   • Brain metastasis   • History of venous thromboembolism   • Hyperlipidemia   • Vasogenic edema (HCC)   • UTI (urinary tract infection)   • Pneumonia   • Sepsis (HCC)   • Stage 3a chronic kidney disease (HCC)     Past Medical History:   Diagnosis Date   • Chronic respiratory failure with hypoxia ([facility] [address] ) 2021   • Impaired mobility and ADLs 2021   • Lung cancer ([facility] [address] )    • Stroke [facility])      Past Surgical History:   Procedure Laterality Date   • IR BIOPSY LUNG  2021   • IR THORACENTESIS  2022   • IR THORACENTESIS  2022     Family History   Problem Relation Age of Onset   • Prostate cancer Brother    • Lung cancer Brother      Social History     Socioeconomic History   • Marital status: /Civil Union     Spouse name: Not on file   • Number of children: Not on file   • Years of education: Not on file   • Highest education level: Not on file   Occupational History   • Not on file   Tobacco Use   • Smoking status: Former     Packs/day: 3 00     Years: 39 00     Pack years: 117 00     Types: Cigarettes     Start date:      Quit date:      Years since quittin 3   • Smokeless tobacco: Never   Vaping Use   • Vaping Use: Never used   Substance and Sexual Activity   • Alcohol use: Not Currently     Comment: No ETOH since Summer 2021    • Drug use: Never   • Sexual activity: Yes     Partners: Female   Other Topics Concern   • Not on file   Social History Narrative   • Not on file     Social Determinants of Health     Financial Resource Strain: Not on file   Food Insecurity: No Food Insecurity   • Worried About Running Out of Food in the Last Year: Never true   • Ran Out of Food in the Last Year: Never true   Transportation Needs: No Transportation Needs   • Lack of Transportation (Medical): No   • Lack of Transportation (Non-Medical):  No   Physical Activity: Not on file   Stress: Not on file   Social Connections: Not on file   Intimate Partner Violence: Not on file   Housing Stability: Low Risk    • Unable to Pay for Housing in the Last Year: No   • Number of Places Lived in the Last Year: 1   • Unstable Housing in the Last Year: No       Current Outpatient Medications:   •  acetaminophen (TYLENOL) 325 mg tablet, Take 2 tablets (650 mg total) by mouth 4 (four) times a day as needed for mild pain, headaches or fever, Disp: , Rfl: 0  •  albuterol (2 5 mg/3 mL) 0 083 % nebulizer solution, Take 3 mL (2 5 mg total) by nebulization every 6 (six) hours as needed for wheezing or shortness of breath, Disp: 3 mL, Rfl: 2  •  atorvastatin (LIPITOR) 40 mg tablet, TAKE 1 TABLET BY MOUTH DAILY WITH DINNER, Disp: 90 tablet, Rfl: 0  •  enoxaparin (LOVENOX) 100 mg/mL, Inject 0 9 mL (90 mg total) under the skin every 12 (twelve) hours, Disp: 60 mL, Rfl: 5  •  folic acid (FOLVITE) 1 mg tablet, TAKE 1 TABLET BY MOUTH EVERY DAY, Disp: 90 tablet, Rfl: 2  •  multivitamin (THERAGRAN) TABS, Take 1 tablet by mouth daily, Disp: , Rfl:   •  sodium chloride 3 % inhalation solution, Take 4 mL by nebulization as needed for cough, Disp: 4 mL, Rfl: 0  •  tamsulosin (FLOMAX) 0 4 mg, TAKE 1 CAPSULE (0 4 MG TOTAL) BY MOUTH DAILY AFTER DINNER, Disp: 90 capsule, Rfl: 0  •  albuterol (ProAir HFA) 90 mcg/act inhaler, Inhale 2 puffs every 6 (six) hours as needed for wheezing or shortness of breath (Patient not taking: Reported on 4/26/2023), Disp: 8 g, Rfl: 0  No current facility-administered medications for this visit    Allergies   Allergen Reactions   • Doxycycline Rash     Vitals:    04/26/23 1300   BP: 144/82   BP Location: Left arm   Pulse: 99   Resp: 18   Temp: 98 2 °F (36 8 °C)   TempSrc: Temporal   SpO2: 93%   Weight: 94 8 kg (209 lb)

## 2023-04-26 NOTE — TELEPHONE ENCOUNTER
Lab Result: Corrective Calcium 12 2   Date/Time Drawn: 04-25-23 at [address]   Ordering Provider: Dr Rosie Banuelos Name: [phone]/MICHAEL Gomez       The following critical/stat result was read back to the lab as stated above and [employer] to the on-call provider  The provider confirmed receipt of the message

## 2023-04-27 ENCOUNTER — TELEPHONE (OUTPATIENT)
Dept: HEMATOLOGY ONCOLOGY | Facility: CLINIC | Age: 73
End: 2023-04-27

## 2023-04-27 ENCOUNTER — TELEPHONE (OUTPATIENT)
Age: 73
End: 2023-04-27

## 2023-04-27 ENCOUNTER — APPOINTMENT (OUTPATIENT)
Dept: LAB | Facility: CLINIC | Age: 73
End: 2023-04-27

## 2023-04-27 DIAGNOSIS — C34.11 MALIGNANT NEOPLASM OF UPPER LOBE OF RIGHT LUNG (HCC): ICD-10-CM

## 2023-04-27 DIAGNOSIS — C34.11 MALIGNANT NEOPLASM OF UPPER LOBE OF RIGHT LUNG (HCC): Primary | ICD-10-CM

## 2023-04-27 DIAGNOSIS — I50.812 CHRONIC RIGHT-SIDED HEART FAILURE (HCC): ICD-10-CM

## 2023-04-27 LAB
ALBUMIN SERPL BCP-MCNC: 2.5 G/DL (ref 3.5–5)
ALP SERPL-CCNC: 63 U/L (ref 46–116)
ALT SERPL W P-5'-P-CCNC: 19 U/L (ref 12–78)
ANION GAP SERPL CALCULATED.3IONS-SCNC: 4 MMOL/L (ref 4–13)
AST SERPL W P-5'-P-CCNC: 28 U/L (ref 5–45)
BILIRUB SERPL-MCNC: 0.38 MG/DL (ref 0.2–1)
BUN SERPL-MCNC: 11 MG/DL (ref 5–25)
CALCIUM ALBUM COR SERPL-MCNC: 11.7 MG/DL (ref 8.3–10.1)
CALCIUM SERPL-MCNC: 10.5 MG/DL (ref 8.3–10.1)
CHLORIDE SERPL-SCNC: 107 MMOL/L (ref 96–108)
CO2 SERPL-SCNC: 27 MMOL/L (ref 21–32)
CREAT SERPL-MCNC: 1.44 MG/DL (ref 0.6–1.3)
GFR SERPL CREATININE-BSD FRML MDRD: 48 ML/MIN/1.73SQ M
GLUCOSE P FAST SERPL-MCNC: 83 MG/DL (ref 65–99)
POTASSIUM SERPL-SCNC: 4.4 MMOL/L (ref 3.5–5.3)
PROT SERPL-MCNC: 7 G/DL (ref 6.4–8.4)
SODIUM SERPL-SCNC: 138 MMOL/L (ref 135–147)

## 2023-04-27 NOTE — TELEPHONE ENCOUNTER
Yes, the date had to be changed and sent back to me to finish the rescheduling  Will take care of this now  Thanks

## 2023-04-27 NOTE — TELEPHONE ENCOUNTER
Spoke with patient's spouse to let her know that the patient's calcium level was high on his labs from the other days and we would like them to get a new level drawn at the lab either today or tomorrow  Patient's wife verbalized understanding and they will go to the lab

## 2023-04-27 NOTE — TELEPHONE ENCOUNTER
----- Message from Liam Moore DO sent at 4/26/2023  6:23 PM EDT -----  I am still a bit perplexed by why this patient's calcium jumped up on his last labs  Could you have him get a repeat cmp done in a day or two  I want to make sure it isn't a lab error and that it's not increasing further  Thanks!

## 2023-04-28 DIAGNOSIS — E83.52 HYPERCALCEMIA: Primary | ICD-10-CM

## 2023-05-09 DIAGNOSIS — C34.11 MALIGNANT NEOPLASM OF UPPER LOBE OF RIGHT LUNG (HCC): ICD-10-CM

## 2023-05-09 DIAGNOSIS — I63.9 ACUTE CVA (CEREBROVASCULAR ACCIDENT) (HCC): ICD-10-CM

## 2023-05-10 RX ORDER — ATORVASTATIN CALCIUM 40 MG/1
40 TABLET, FILM COATED ORAL
Qty: 90 TABLET | Refills: 0 | Status: SHIPPED | OUTPATIENT
Start: 2023-05-10

## 2023-05-10 RX ORDER — FOLIC ACID 1 MG/1
1000 TABLET ORAL DAILY
Qty: 90 TABLET | Refills: 0 | Status: SHIPPED | OUTPATIENT
Start: 2023-05-10

## 2023-05-15 ENCOUNTER — APPOINTMENT (OUTPATIENT)
Dept: LAB | Facility: CLINIC | Age: 73
End: 2023-05-15

## 2023-05-15 DIAGNOSIS — T45.1X5D ADVERSE EFFECT OF ANTINEOPLASTIC ANTIBIOTIC, SUBSEQUENT ENCOUNTER: ICD-10-CM

## 2023-05-15 DIAGNOSIS — C34.91 MALIGNANT NEOPLASM OF RIGHT LUNG, UNSPECIFIED PART OF LUNG (HCC): ICD-10-CM

## 2023-05-15 DIAGNOSIS — E83.52 HYPERCALCEMIA: ICD-10-CM

## 2023-05-15 DIAGNOSIS — C34.90 MALIGNANT NEOPLASM OF BRONCHUS AND LUNG (HCC): ICD-10-CM

## 2023-05-15 DIAGNOSIS — D70.1 AGRANULOCYTOSIS SECONDARY TO CANCER CHEMOTHERAPY (CODE) (HCC): ICD-10-CM

## 2023-05-15 LAB
ALBUMIN SERPL BCP-MCNC: 2.4 G/DL (ref 3.5–5)
ALP SERPL-CCNC: 62 U/L (ref 46–116)
ALT SERPL W P-5'-P-CCNC: 25 U/L (ref 12–78)
ANION GAP SERPL CALCULATED.3IONS-SCNC: 3 MMOL/L (ref 4–13)
AST SERPL W P-5'-P-CCNC: 27 U/L (ref 5–45)
BASOPHILS # BLD AUTO: 0.04 THOUSANDS/ÂΜL (ref 0–0.1)
BASOPHILS NFR BLD AUTO: 0 % (ref 0–1)
BILIRUB SERPL-MCNC: 0.41 MG/DL (ref 0.2–1)
BUN SERPL-MCNC: 10 MG/DL (ref 5–25)
CA-I BLD-SCNC: 1.23 MMOL/L (ref 1.12–1.32)
CALCIUM ALBUM COR SERPL-MCNC: 11.3 MG/DL (ref 8.3–10.1)
CALCIUM SERPL-MCNC: 10 MG/DL (ref 8.3–10.1)
CHLORIDE SERPL-SCNC: 106 MMOL/L (ref 96–108)
CO2 SERPL-SCNC: 27 MMOL/L (ref 21–32)
CREAT SERPL-MCNC: 1.06 MG/DL (ref 0.6–1.3)
EOSINOPHIL # BLD AUTO: 0.09 THOUSAND/ÂΜL (ref 0–0.61)
EOSINOPHIL NFR BLD AUTO: 1 % (ref 0–6)
ERYTHROCYTE [DISTWIDTH] IN BLOOD BY AUTOMATED COUNT: 18.6 % (ref 11.6–15.1)
GFR SERPL CREATININE-BSD FRML MDRD: 70 ML/MIN/1.73SQ M
GLUCOSE P FAST SERPL-MCNC: 98 MG/DL (ref 65–99)
HCT VFR BLD AUTO: 28.8 % (ref 36.5–49.3)
HGB BLD-MCNC: 8.3 G/DL (ref 12–17)
IMM GRANULOCYTES # BLD AUTO: 0.09 THOUSAND/UL (ref 0–0.2)
IMM GRANULOCYTES NFR BLD AUTO: 1 % (ref 0–2)
LYMPHOCYTES # BLD AUTO: 1.26 THOUSANDS/ÂΜL (ref 0.6–4.47)
LYMPHOCYTES NFR BLD AUTO: 14 % (ref 14–44)
MCH RBC QN AUTO: 27.9 PG (ref 26.8–34.3)
MCHC RBC AUTO-ENTMCNC: 28.8 G/DL (ref 31.4–37.4)
MCV RBC AUTO: 97 FL (ref 82–98)
MONOCYTES # BLD AUTO: 1.06 THOUSAND/ÂΜL (ref 0.17–1.22)
MONOCYTES NFR BLD AUTO: 12 % (ref 4–12)
NEUTROPHILS # BLD AUTO: 6.47 THOUSANDS/ÂΜL (ref 1.85–7.62)
NEUTS SEG NFR BLD AUTO: 72 % (ref 43–75)
NRBC BLD AUTO-RTO: 0 /100 WBCS
PLATELET # BLD AUTO: 396 THOUSANDS/UL (ref 149–390)
PMV BLD AUTO: 9.9 FL (ref 8.9–12.7)
POTASSIUM SERPL-SCNC: 4.4 MMOL/L (ref 3.5–5.3)
PROT SERPL-MCNC: 7.1 G/DL (ref 6.4–8.4)
PTH-INTACT SERPL-MCNC: 139.1 PG/ML (ref 18.4–80.1)
RBC # BLD AUTO: 2.98 MILLION/UL (ref 3.88–5.62)
SODIUM SERPL-SCNC: 136 MMOL/L (ref 135–147)
T3FREE SERPL-MCNC: 2.08 PG/ML (ref 2.3–4.2)
T4 FREE SERPL-MCNC: 1.17 NG/DL (ref 0.76–1.46)
TSH SERPL DL<=0.05 MIU/L-ACNC: 6.18 UIU/ML (ref 0.45–4.5)
WBC # BLD AUTO: 9.01 THOUSAND/UL (ref 4.31–10.16)

## 2023-05-16 DIAGNOSIS — E83.52 HYPERCALCEMIA OF MALIGNANCY: Primary | ICD-10-CM

## 2023-05-17 ENCOUNTER — RA CDI HCC (OUTPATIENT)
Dept: OTHER | Facility: HOSPITAL | Age: 73
End: 2023-05-17

## 2023-05-17 ENCOUNTER — HOSPITAL ENCOUNTER (OUTPATIENT)
Dept: INFUSION CENTER | Facility: HOSPITAL | Age: 73
Discharge: HOME | End: 2023-05-17
Attending: INTERNAL MEDICINE

## 2023-05-17 VITALS
OXYGEN SATURATION: 94 % | WEIGHT: 207.01 LBS | HEART RATE: 100 BPM | RESPIRATION RATE: 18 BRPM | TEMPERATURE: 99.5 F | BODY MASS INDEX: 30.66 KG/M2 | SYSTOLIC BLOOD PRESSURE: 127 MMHG | HEIGHT: 69 IN | DIASTOLIC BLOOD PRESSURE: 83 MMHG

## 2023-05-17 DIAGNOSIS — C34.90 ADENOCARCINOMA OF LUNG, UNSPECIFIED LATERALITY (HCC): ICD-10-CM

## 2023-05-17 DIAGNOSIS — C34.11 MALIGNANT NEOPLASM OF UPPER LOBE OF RIGHT LUNG (HCC): Primary | ICD-10-CM

## 2023-05-17 DIAGNOSIS — D70.1 CHEMOTHERAPY INDUCED NEUTROPENIA (HCC): ICD-10-CM

## 2023-05-17 DIAGNOSIS — E83.52 HYPERCALCEMIA OF MALIGNANCY: ICD-10-CM

## 2023-05-17 DIAGNOSIS — T45.1X5A CHEMOTHERAPY INDUCED NEUTROPENIA (HCC): ICD-10-CM

## 2023-05-17 DIAGNOSIS — R91.8 MASS OF UPPER LOBE OF RIGHT LUNG: ICD-10-CM

## 2023-05-17 RX ORDER — SODIUM CHLORIDE 9 MG/ML
20 INJECTION, SOLUTION INTRAVENOUS ONCE
Status: COMPLETED | OUTPATIENT
Start: 2023-05-17 | End: 2023-05-17

## 2023-05-17 RX ADMIN — DENOSUMAB 120 MG: 120 INJECTION SUBCUTANEOUS at 10:54

## 2023-05-17 RX ADMIN — SODIUM CHLORIDE 200 MG: 9 INJECTION, SOLUTION INTRAVENOUS at 11:38

## 2023-05-17 RX ADMIN — SODIUM CHLORIDE 20 ML/HR: 0.9 INJECTION, SOLUTION INTRAVENOUS at 10:57

## 2023-05-17 NOTE — PROGRESS NOTES
Pt received xgeva and keytruda without difficulty  PIV removed, bandage applied  Pt and spouse aware of next appt  Left ambulatory with steady gait for d/c

## 2023-05-17 NOTE — PROGRESS NOTES
[address]  coding opportunities       Chart reviewed, no opportunity found: CHART REVIEWED, NO OPPORTUNITY FOUND        Patients Insurance     Medicare Insurance: Medicare

## 2023-05-19 ENCOUNTER — TELEPHONE (OUTPATIENT)
Age: 73
End: 2023-05-19

## 2023-05-19 NOTE — TELEPHONE ENCOUNTER
Patient's wife called in to let us know that because Corin's lovenox dosage was increased from [de-identified] to 100, the pharmacy need a new prior authorization  Will reach out to our finance team to get started on this

## 2023-05-24 ENCOUNTER — OFFICE VISIT (OUTPATIENT)
Dept: FAMILY MEDICINE CLINIC | Facility: HOSPITAL | Age: 73
End: 2023-05-24

## 2023-05-24 ENCOUNTER — TELEPHONE (OUTPATIENT)
Age: 73
End: 2023-05-24

## 2023-05-24 VITALS
SYSTOLIC BLOOD PRESSURE: 114 MMHG | HEIGHT: 69 IN | WEIGHT: 204.4 LBS | DIASTOLIC BLOOD PRESSURE: 82 MMHG | OXYGEN SATURATION: 95 % | BODY MASS INDEX: 30.27 KG/M2 | TEMPERATURE: 97.8 F | HEART RATE: 93 BPM

## 2023-05-24 DIAGNOSIS — Z13.6 SCREENING FOR CARDIOVASCULAR CONDITION: ICD-10-CM

## 2023-05-24 DIAGNOSIS — C34.11 MALIGNANT NEOPLASM OF UPPER LOBE OF RIGHT LUNG (HCC): ICD-10-CM

## 2023-05-24 DIAGNOSIS — Z00.00 MEDICARE ANNUAL WELLNESS VISIT, SUBSEQUENT: ICD-10-CM

## 2023-05-24 DIAGNOSIS — Z12.11 SCREENING FOR COLON CANCER: Primary | ICD-10-CM

## 2023-05-24 DIAGNOSIS — J44.9 COPD MIXED TYPE (HCC): ICD-10-CM

## 2023-05-24 DIAGNOSIS — J96.01 ACUTE RESPIRATORY FAILURE WITH HYPOXIA (HCC): ICD-10-CM

## 2023-05-24 DIAGNOSIS — Z12.5 SCREENING PSA (PROSTATE SPECIFIC ANTIGEN): ICD-10-CM

## 2023-05-24 DIAGNOSIS — Z01.818 PRE-OP EXAMINATION: ICD-10-CM

## 2023-05-24 NOTE — PROGRESS NOTES
Assessment and Plan:     Problem List Items Addressed This Visit        Respiratory    Acute respiratory failure with hypoxia ([facility] [address] )    COPD mixed type ([facility] [address] )    Malignant neoplasm of upper lobe of right lung ([facility] [address] )   Other Visit Diagnoses     Screening for colon cancer    -  Primary    Relevant Orders    Ambulatory referral to Gastroenterology    Medicare annual wellness visit, subsequent        Screening for cardiovascular condition        Relevant Orders    Lipid Panel with Direct LDL reflex    Pre-op examination        Screening PSA (prostate specific antigen)        Relevant Orders    PSA, Total Screen      Ongoing follow-up with oncology, radiation oncology, neurosurgery  Currently on Keytruda  Doing as expected at this point in time  He is satisfied with his current treatments  Continues on Lovenox as prophylaxis  Preventive health issues were discussed with patient, and age appropriate screening tests were ordered as noted in patient's After Visit Summary  Personalized health advice and appropriate referrals for health education or preventive services given if needed, as noted in patient's After Visit Summary  History of Present Illness:     Patient presents for a Medicare Wellness Visit    Patient is here for follow-up of chronic conditions as well as annual wellness visit  Overall he feels he is doing well  He is on as needed oxygen during periods of exertion  Also doing this at nighttime  Needing renewal of his oxygen  However no new complaints today  He continues to follow-up with oncology, radiation oncology  Off of steroids  3-month MRIs for brain lesion  Patient Care Team:  Viviann Klinefelter, MD as PCP - General (Family Medicine)  Ale Garcia MD (Radiation Oncology)  Osmar Guardado RD (Nutrition)  Clare Howard MD (Radiation Oncology)  Deena Puentes DO (Oncology)     Review of Systems:     Review of Systems   Constitutional: Negative    Negative for activity change, appetite change, chills and diaphoresis  HENT: Negative for congestion and dental problem  Respiratory: Negative  Negative for apnea, chest tightness, shortness of breath and wheezing  Cardiovascular: Negative  Negative for chest pain, palpitations and leg swelling  Gastrointestinal: Negative  Negative for abdominal distention, abdominal pain, constipation, diarrhea and nausea  Genitourinary: Negative  Negative for difficulty urinating, dysuria and frequency          Problem List:     Patient Active Problem List   Diagnosis   • History of stroke   • Bilateral lower extremity DVTs   • Acute respiratory failure with hypoxia ([facility])   • Dysphagia   • Mass of upper lobe of right lung   • History of urinary retention   • Constipation   • Anemia of chronic disease   • Pulmonary embolism (HCC)   • Impaired cognition   • Adenocarcinoma of lung   • Acute on chronic anemia   • Malignant neoplasm of upper lobe of right lung (HCC)   • COPD mixed type (HCC)   • Chemotherapy induced neutropenia (HCC)   • Hypercalcemia of malignancy   • Chronic anticoagulation   • Chronic right-sided heart failure (HCC)   • Brain injury, without loss of consciousness, subsequent encounter   • Recurrent right pleural effusion   • Restrictive lung disease   • Nocturnal hypoxemia   • Tobacco use disorder, severe, in sustained remission, dependence   • Brain metastasis   • History of venous thromboembolism   • Hyperlipidemia   • Vasogenic edema (HCC)   • UTI (urinary tract infection)   • Pneumonia   • Sepsis (HCC)   • Stage 3a chronic kidney disease ([facility] [address] )      Past Medical and Surgical History:     Past Medical History:   Diagnosis Date   • Chronic respiratory failure with hypoxia ([address] ) 8/9/2021   • Impaired mobility and ADLs 8/13/2021   • Lung cancer ([facility] [address] )    • Stroke [facility])      Past Surgical History:   Procedure Laterality Date   • IR BIOPSY LUNG  8/5/2021   • IR THORACENTESIS  2/24/2022   • IR THORACENTESIS  8/1/2022      Family History:     Family History   Problem Relation Age of Onset   • Prostate cancer Brother    • Lung cancer Brother       Social History:     Social History     Socioeconomic History   • Marital status: /Civil Union     Spouse name: None   • Number of children: None   • Years of education: None   • Highest education level: None   Occupational History   • None   Tobacco Use   • Smoking status: Former     Packs/day: 3 00     Years: 39 00     Total pack years: 117 00     Types: Cigarettes     Start date:      Quit date:      Years since quittin 4   • Smokeless tobacco: Never   Vaping Use   • Vaping Use: Never used   Substance and Sexual Activity   • Alcohol use: Not Currently     Comment: No ETOH since Summer 2021    • Drug use: Never   • Sexual activity: Yes     Partners: Female   Other Topics Concern   • None   Social History Narrative   • None     Social Determinants of Health     Financial Resource Strain: Low Risk  (2023)    Overall Financial Resource Strain (CARDIA)    • Difficulty of Paying Living Expenses: Not hard at all   Food Insecurity: No Food Insecurity (2023)    Hunger Vital Sign    • Worried About Running Out of Food in the Last Year: Never true    • Ran Out of Food in the Last Year: Never true   Transportation Needs: No Transportation Needs (2023)    PRAPARE - Transportation    • Lack of Transportation (Medical): No    • Lack of Transportation (Non-Medical):  No   Physical Activity: Not on file   Stress: Not on file   Social Connections: Not on file   Intimate Partner Violence: Not on file   Housing Stability: Low Risk  (2023)    Housing Stability Vital Sign    • Unable to Pay for Housing in the Last Year: No    • Number of Places Lived in the Last Year: 1    • Unstable Housing in the Last Year: No      Medications and Allergies:     Current Outpatient Medications   Medication Sig Dispense Refill   • acetaminophen (TYLENOL) 325 mg tablet Take 2 tablets (650 mg total) by mouth 4 (four) times a day as needed for mild pain, headaches or fever  0   • albuterol (2 5 mg/3 mL) 0 083 % nebulizer solution Take 3 mL (2 5 mg total) by nebulization every 6 (six) hours as needed for wheezing or shortness of breath 3 mL 2   • atorvastatin (LIPITOR) 40 mg tablet Take 1 tablet (40 mg total) by mouth daily with dinner 90 tablet 0   • enoxaparin (LOVENOX) 100 mg/mL Inject 0 9 mL (90 mg total) under the skin every 12 (twelve) hours 60 mL 5   • folic acid (FOLVITE) 1 mg tablet Take 1 tablet (1,000 mcg total) by mouth daily 90 tablet 0   • multivitamin (THERAGRAN) TABS Take 1 tablet by mouth daily     • sodium chloride 3 % inhalation solution Take 4 mL by nebulization as needed for cough 4 mL 0   • tamsulosin (FLOMAX) 0 4 mg TAKE 1 CAPSULE (0 4 MG TOTAL) BY MOUTH DAILY AFTER DINNER 90 capsule 0   • albuterol (ProAir HFA) 90 mcg/act inhaler Inhale 2 puffs every 6 (six) hours as needed for wheezing or shortness of breath (Patient not taking: Reported on 4/26/2023) 8 g 0     No current facility-administered medications for this visit  Allergies   Allergen Reactions   • Doxycycline Rash      Immunizations:     Immunization History   Administered Date(s) Administered   • COVID-19 PFIZER VACCINE 0 3 ML IM 03/27/2021, 04/17/2021, 01/22/2022   • Pneumococcal Conjugate Vaccine 20-valent (Pcv20), Polysace 05/09/2022, 05/09/2022   • Tdap 12/10/2017      Health Maintenance:         Topic Date Due   • Hepatitis C Screening  Never done   • Colorectal Cancer Screening  06/13/2023 (Originally 9/5/1995)         Topic Date Due   • Hepatitis A Vaccine (1 of 2 - Risk 2-dose series) Never done   • Hepatitis B Vaccine (1 of 3 - Risk 3-dose series) Never done   • COVID-19 Vaccine (4 - Booster for Pfizer series) 03/19/2022      Medicare Screening Tests and Risk Assessments:     Corey Hannah is here for his Subsequent Wellness visit  Health Risk Assessment:   Patient rates overall health as excellent   Patient feels that their physical health rating is same  Patient is very satisfied with their life  Eyesight was rated as same  Hearing was rated as same  Patient feels that their emotional and mental health rating is same  Patients states they are never, rarely angry  Patient states they are never, rarely unusually tired/fatigued  Pain experienced in the last 7 days has been none  Patient states that he has experienced no weight loss or gain in last 6 months  Depression Screening:   PHQ-2 Score: 0      Fall Risk Screening: In the past year, patient has experienced: no history of falling in past year      Home Safety:  Patient does not have trouble with stairs inside or outside of their home  Patient has working smoke alarms and has no working carbon monoxide detector  Home safety hazards include: none  Nutrition:   Current diet is Regular  Medications:   Patient is currently taking over-the-counter supplements  OTC medications include: see medication list  Patient is able to manage medications  Activities of Daily Living (ADLs)/Instrumental Activities of Daily Living (IADLs):   Walk and transfer into and out of bed and chair?: Yes  Dress and groom yourself?: Yes    Bathe or shower yourself?: Yes    Feed yourself? Yes  Do your laundry/housekeeping?: No  Manage your money, pay your bills and track your expenses?: Yes  Make your own meals?: Yes    Do your own shopping?: Yes    Previous Hospitalizations:   Any hospitalizations or ED visits within the last 12 months?: Yes    How many hospitalizations have you had in the last year?: 1-2    Advance Care Planning:   Living will: Yes    Durable POA for healthcare:  Yes    Advanced directive: Yes      Cognitive Screening:   Provider or family/friend/caregiver concerned regarding cognition?: No    PREVENTIVE SCREENINGS      Cardiovascular Screening:    General: Screening Not Indicated and History Lipid Disorder      Diabetes Screening:     General: Screening Current "Colorectal Cancer Screening:     General: Patient Declines      Prostate Cancer Screening:    General: Risks and Benefits Discussed      Osteoporosis Screening:    General: Patient Declines      Abdominal Aortic Aneurysm (AAA) Screening:    Risk factors include: age between 73-69 yo and tobacco use        General: Screening Current and Screening Not Indicated      Lung Cancer Screening:     General: Screening Not Indicated and History Lung Cancer      Preventive Screening Comments: Has had ct abdomen  No aneurysm  Screening, Brief Intervention, and Referral to Treatment (SBIRT)    Screening  Typical number of drinks in a day: 0  Typical number of drinks in a week: 0  Interpretation: Low risk drinking behavior  AUDIT-C Screenin) How often did you have a drink containing alcohol in the past year? never  2) How many drinks did you have on a typical day when you were drinking in the past year? 0  3) How often did you have 6 or more drinks on one occasion in the past year? never    AUDIT-C Score: 0  Interpretation: Score 0-3 (male): Negative screen for alcohol misuse    Single Item Drug Screening:  How often have you used an illegal drug (including marijuana) or a prescription medication for non-medical reasons in the past year? never    Single Item Drug Screen Score: 0  Interpretation: Negative screen for possible drug use disorder    Vision Screening    Right eye Left eye Both eyes   Without correction      With correction Blind 20/40 20/40        Physical Exam:     /82   Pulse 93   Temp 97 8 °F (36 6 °C)   Ht 5' 9 02\" (1 753 m)   Wt 92 7 kg (204 lb 6 4 oz)   SpO2 95%   BMI 30 17 kg/m²     Physical Exam  Vitals and nursing note reviewed  Constitutional:       General: He is not in acute distress  Appearance: He is well-developed  He is not ill-appearing  HENT:      Head: Normocephalic and atraumatic        Right Ear: External ear normal       Left Ear: External ear normal       " Mouth/Throat:      Mouth: Mucous membranes are moist       Pharynx: Oropharynx is clear  Eyes:      Extraocular Movements: Extraocular movements intact  Conjunctiva/sclera: Conjunctivae normal       Pupils: Pupils are equal, round, and reactive to light  Cardiovascular:      Rate and Rhythm: Normal rate and regular rhythm  Heart sounds: Normal heart sounds  No murmur heard  Pulmonary:      Effort: Pulmonary effort is normal       Breath sounds: Normal breath sounds  Abdominal:      General: Bowel sounds are normal  There is no distension  Palpations: Abdomen is soft  There is no mass  Tenderness: There is no abdominal tenderness  There is no guarding  Hernia: No hernia is present  Genitourinary:     Penis: Normal     Musculoskeletal:         General: Normal range of motion  Cervical back: Normal range of motion and neck supple  Skin:     General: Skin is warm and dry  Capillary Refill: Capillary refill takes less than 2 seconds  Neurological:      General: No focal deficit present  Mental Status: He is alert and oriented to person, place, and time     Psychiatric:         Mood and Affect: Mood normal          Behavior: Behavior normal           Rosa Douglass MD

## 2023-05-24 NOTE — PATIENT INSTRUCTIONS
Medicare Preventive Visit Patient Instructions  Thank you for completing your Welcome to Medicare Visit or Medicare Annual Wellness Visit today  Your next wellness visit will be due in one year (5/24/2024)  The screening/preventive services that you may require over the next 5-10 years are detailed below  Some tests may not apply to you based off risk factors and/or age  Screening tests ordered at today's visit but not completed yet may show as past due  Also, please note that scanned in results may not display below  Preventive Screenings:  Service Recommendations Previous Testing/Comments   Colorectal Cancer Screening  · Colonoscopy    · Fecal Occult Blood Test (FOBT)/Fecal Immunochemical Test (FIT)  · Fecal DNA/Cologuard Test  · Flexible Sigmoidoscopy Age: 39-70 years old   Colonoscopy: every 10 years (May be performed more frequently if at higher risk)  OR  FOBT/FIT: every 1 year  OR  Cologuard: every 3 years  OR  Sigmoidoscopy: every 5 years  Screening may be recommended earlier than age 39 if at higher risk for colorectal cancer  Also, an individualized decision between you and your healthcare provider will decide whether screening between the ages of 74-80 would be appropriate   Colonoscopy: Not on file  FOBT/FIT: Not on file  Cologuard: Not on file  Sigmoidoscopy: Not on file          Prostate Cancer Screening Individualized decision between patient and health care provider in men between ages of 53-78   Medicare will cover every 12 months beginning on the day after your 50th birthday PSA: No results in last 5 years           Hepatitis C Screening Once for adults born between 80 and 1965  More frequently in patients at high risk for Hepatitis C Hep C Antibody: Not on file        Diabetes Screening 1-2 times per year if you're at risk for diabetes or have pre-diabetes Fasting glucose: 98 mg/dL (5/15/2023)  A1C: 5 7 % (8/1/2021)  Screening Current   Cholesterol Screening Once every 5 years if you don't have a lipid disorder  May order more often based on risk factors  Lipid panel: 08/01/2021  Screening Not Indicated  History Lipid Disorder      Other Preventive Screenings Covered by Medicare:  1  Abdominal Aortic Aneurysm (AAA) Screening: covered once if your at risk  You're considered to be at risk if you have a family history of AAA or a male between the age of 73-68 who smoking at least 100 cigarettes in your lifetime  2  Lung Cancer Screening: covers low dose CT scan once per year if you meet all of the following conditions: (1) Age 50-69; (2) No signs or symptoms of lung cancer; (3) Current smoker or have quit smoking within the last 15 years; (4) You have a tobacco smoking history of at least 20 pack years (packs per day x number of years you smoked); (5) You get a written order from a healthcare provider  3  Glaucoma Screening: covered annually if you're considered high risk: (1) You have diabetes OR (2) Family history of glaucoma OR (3)  aged 48 and older OR (3)  American aged 72 and older  3  Osteoporosis Screening: covered every 2 years if you meet one of the following conditions: (1) Have a vertebral abnormality; (2) On glucocorticoid therapy for more than 3 months; (3) Have primary hyperparathyroidism; (4) On osteoporosis medications and need to assess response to drug therapy  5  HIV Screening: covered annually if you're between the age of 12-76  Also covered annually if you are younger than 13 and older than 72 with risk factors for HIV infection  For pregnant patients, it is covered up to 3 times per pregnancy      Immunizations:  Immunization Recommendations   Influenza Vaccine Annual influenza vaccination during flu season is recommended for all persons aged >= 6 months who do not have contraindications   Pneumococcal Vaccine   * Pneumococcal conjugate vaccine = PCV13 (Prevnar 13), PCV15 (Vaxneuvance), PCV20 (Prevnar 20)  * Pneumococcal polysaccharide vaccine = PPSV23 (Pneumovax) Adults 2364 years old: 1-3 doses may be recommended based on certain risk factors  Adults 72 years old: 1-2 doses may be recommended based off what pneumonia vaccine you previously received   Hepatitis B Vaccine 3 dose series if at intermediate or high risk (ex: diabetes, end stage renal disease, liver disease)   Tetanus (Td) Vaccine - COST NOT COVERED BY MEDICARE PART B Following completion of primary series, a booster dose should be given every 10 years to maintain immunity against tetanus  Td may also be given as tetanus wound prophylaxis  Tdap Vaccine - COST NOT COVERED BY MEDICARE PART B Recommended at least once for all adults  For pregnant patients, recommended with each pregnancy  Shingles Vaccine (Shingrix) - COST NOT COVERED BY MEDICARE PART B  2 shot series recommended in those aged 48 and above     Health Maintenance Due:      Topic Date Due   • Hepatitis C Screening  Never done   • Colorectal Cancer Screening  06/13/2023 (Originally 9/5/1995)     Immunizations Due:      Topic Date Due   • Hepatitis A Vaccine (1 of 2 - Risk 2-dose series) Never done   • Hepatitis B Vaccine (1 of 3 - Risk 3-dose series) Never done   • COVID-19 Vaccine (4 - Booster for [place] series) 03/19/2022     Advance Directives   What are advance directives? Advance directives are legal documents that state your wishes and plans for medical care  These plans are made ahead of time in case you lose your ability to make decisions for yourself  Advance directives can apply to any medical decision, such as the treatments you want, and if you want to donate organs  What are the types of advance directives? There are many types of advance directives, and each state has rules about how to use them  You may choose a combination of any of the following:  · Living will: This is a written record of the treatment you want   You can also choose which treatments you do not want, which to limit, and which to stop at a certain time  This includes surgery, medicine, IV fluid, and tube feedings  · Durable power of  for healthcare [facility]): This is a written record that states who you want to make healthcare choices for you when you are unable to make them for yourself  This person, called a proxy, is usually a family member or a friend  You may choose more than 1 proxy  · Do not resuscitate (DNR) order:  A DNR order is used in case your heart stops beating or you stop breathing  It is a request not to have certain forms of treatment, such as CPR  A DNR order may be included in other types of advance directives  · Medical directive: This covers the care that you want if you are in a coma, near death, or unable to make decisions for yourself  You can list the treatments you want for each condition  Treatment may include pain medicine, surgery, blood transfusions, dialysis, IV or tube feedings, and a ventilator (breathing machine)  · Values history: This document has questions about your views, beliefs, and how you feel and think about life  This information can help others choose the care that you would choose  Why are advance directives important? An advance directive helps you control your care  Although spoken wishes may be used, it is better to have your wishes written down  Spoken wishes can be misunderstood, or not followed  Treatments may be given even if you do not want them  An advance directive may make it easier for your family to make difficult choices about your care  Weight Management   Why it is important to manage your weight:  Being overweight increases your risk of health conditions such as heart disease, high blood pressure, type 2 diabetes, and certain types of cancer  It can also increase your risk for osteoarthritis, sleep apnea, and other respiratory problems  Aim for a slow, steady weight loss  Even a small amount of weight loss can lower your risk of health problems    How to lose weight safely:  A safe and healthy way to lose weight is to eat fewer calories and get regular exercise  You can lose up about 1 pound a week by decreasing the number of calories you eat by 500 calories each day  Healthy meal plan for weight management:  A healthy meal plan includes a variety of foods, contains fewer calories, and helps you stay healthy  A healthy meal plan includes the following:  · Eat whole-grain foods more often  A healthy meal plan should contain fiber  Fiber is the part of grains, fruits, and vegetables that is not broken down by your body  Whole-grain foods are healthy and provide extra fiber in your diet  Some examples of whole-grain foods are whole-wheat breads and pastas, oatmeal, brown rice, and bulgur  · Eat a variety of vegetables every day  Include dark, leafy greens such as spinach, kale, aga greens, and mustard greens  Eat yellow and orange vegetables such as carrots, sweet potatoes, and winter squash  · Eat a variety of fruits every day  Choose fresh or canned fruit (canned in its own juice or light syrup) instead of juice  Fruit juice has very little or no fiber  · Eat low-fat dairy foods  Drink fat-free (skim) milk or 1% milk  Eat fat-free yogurt and low-fat cottage cheese  Try low-fat cheeses such as mozzarella and other reduced-fat cheeses  · Choose meat and other protein foods that are low in fat  Choose beans or other legumes such as split peas or lentils  Choose fish, skinless poultry (chicken or turkey), or lean cuts of red meat (beef or pork)  Before you cook meat or poultry, cut off any visible fat  · Use less fat and oil  Try baking foods instead of frying them  Add less fat, such as margarine, sour cream, regular salad dressing and mayonnaise to foods  Eat fewer high-fat foods  Some examples of high-fat foods include french fries, doughnuts, ice cream, and cakes  · Eat fewer sweets  Limit foods and drinks that are high in sugar   This includes candy, cookies, regular soda, and sweetened drinks  Exercise:  Exercise at least 30 minutes per day on most days of the week  Some examples of exercise include walking, biking, dancing, and swimming  You can also fit in more physical activity by taking the stairs instead of the elevator or parking farther away from stores  Ask your healthcare provider about the best exercise plan for you  © Copyright [address] 2018 Information is for End User's use only and may not be sold, redistributed or otherwise used for commercial purposes   All illustrations and images included in CareNotes® are the copyrighted property of A D [employer] M [employer]  or [address] [place] [address]

## 2023-05-24 NOTE — TELEPHONE ENCOUNTER
Spoke with the pharmacy to see if a PA is needed for the patient's Lovenox  The pharmacist stated that the secondary insurance is requiring a PA for the Lovenox medication  E-mailed the oncology financial team to check on an update for the Lovenox  I was told to call KANDI for an update since this was faxed over yesterday  Spoke with KANDI who stated that there is no update on this and I can call tomorrow morning or Friday morning to check and see if there is an update yet  Spoke with the pharmacy again to see if they could give the patient 2 days worth of Lovenox until the PA is completed  The pharmacist stated that they can do this and it will be ready for pickup shortly  Spoke with patient's wife and let her know that the pharmacy will give them 2 days worth of Lovenox to last until the PA is completed and then he will be able to get the whole month's full  Patient's wife verbalized understanding

## 2023-05-24 NOTE — TELEPHONE ENCOUNTER
Rec'd call from patient's wife inquiring about the lovenox injections  I informed her that I will send an asap email to the oncology finance team to see what is going on and call her back  Patient's wife verbalized understanding

## 2023-06-05 ENCOUNTER — APPOINTMENT (OUTPATIENT)
Dept: LAB | Facility: CLINIC | Age: 73
End: 2023-06-05
Payer: MEDICARE

## 2023-06-05 DIAGNOSIS — C34.90 MALIGNANT NEOPLASM OF BRONCHUS AND LUNG (HCC): ICD-10-CM

## 2023-06-05 DIAGNOSIS — T45.1X5A ADVERSE EFFECT OF ANTINEOPLASTIC ANTIBIOTIC, INITIAL ENCOUNTER: ICD-10-CM

## 2023-06-05 DIAGNOSIS — R91.8 LUNG MASS: ICD-10-CM

## 2023-06-05 DIAGNOSIS — C34.00 MALIGNANT NEOPLASM OF MAIN BRONCHUS, UNSPECIFIED LATERALITY (HCC): ICD-10-CM

## 2023-06-05 LAB
ALBUMIN SERPL BCP-MCNC: 2.7 G/DL (ref 3.5–5)
ALP SERPL-CCNC: 59 U/L (ref 46–116)
ALT SERPL W P-5'-P-CCNC: 12 U/L (ref 12–78)
ANION GAP SERPL CALCULATED.3IONS-SCNC: 2 MMOL/L (ref 4–13)
AST SERPL W P-5'-P-CCNC: 13 U/L (ref 5–45)
BASOPHILS # BLD AUTO: 0.05 THOUSANDS/ÂΜL (ref 0–0.1)
BASOPHILS NFR BLD AUTO: 1 % (ref 0–1)
BILIRUB SERPL-MCNC: 0.34 MG/DL (ref 0.2–1)
BUN SERPL-MCNC: 11 MG/DL (ref 5–25)
CALCIUM ALBUM COR SERPL-MCNC: 11.2 MG/DL (ref 8.3–10.1)
CALCIUM SERPL-MCNC: 10.2 MG/DL (ref 8.3–10.1)
CHLORIDE SERPL-SCNC: 110 MMOL/L (ref 96–108)
CO2 SERPL-SCNC: 28 MMOL/L (ref 21–32)
CREAT SERPL-MCNC: 1.02 MG/DL (ref 0.6–1.3)
EOSINOPHIL # BLD AUTO: 0.14 THOUSAND/ÂΜL (ref 0–0.61)
EOSINOPHIL NFR BLD AUTO: 2 % (ref 0–6)
ERYTHROCYTE [DISTWIDTH] IN BLOOD BY AUTOMATED COUNT: 18.2 % (ref 11.6–15.1)
GFR SERPL CREATININE-BSD FRML MDRD: 73 ML/MIN/1.73SQ M
GLUCOSE SERPL-MCNC: 93 MG/DL (ref 65–140)
HCT VFR BLD AUTO: 30.6 % (ref 36.5–49.3)
HGB BLD-MCNC: 8.5 G/DL (ref 12–17)
IMM GRANULOCYTES # BLD AUTO: 0.06 THOUSAND/UL (ref 0–0.2)
IMM GRANULOCYTES NFR BLD AUTO: 1 % (ref 0–2)
LYMPHOCYTES # BLD AUTO: 1.37 THOUSANDS/ÂΜL (ref 0.6–4.47)
LYMPHOCYTES NFR BLD AUTO: 17 % (ref 14–44)
MCH RBC QN AUTO: 26.5 PG (ref 26.8–34.3)
MCHC RBC AUTO-ENTMCNC: 27.8 G/DL (ref 31.4–37.4)
MCV RBC AUTO: 95 FL (ref 82–98)
MONOCYTES # BLD AUTO: 1.01 THOUSAND/ÂΜL (ref 0.17–1.22)
MONOCYTES NFR BLD AUTO: 12 % (ref 4–12)
NEUTROPHILS # BLD AUTO: 5.58 THOUSANDS/ÂΜL (ref 1.85–7.62)
NEUTS SEG NFR BLD AUTO: 67 % (ref 43–75)
NRBC BLD AUTO-RTO: 0 /100 WBCS
PLATELET # BLD AUTO: 447 THOUSANDS/UL (ref 149–390)
PMV BLD AUTO: 10.8 FL (ref 8.9–12.7)
POTASSIUM SERPL-SCNC: 3.9 MMOL/L (ref 3.5–5.3)
PROT SERPL-MCNC: 7.1 G/DL (ref 6.4–8.4)
RBC # BLD AUTO: 3.21 MILLION/UL (ref 3.88–5.62)
SODIUM SERPL-SCNC: 140 MMOL/L (ref 135–147)
T3FREE SERPL-MCNC: 3.01 PG/ML (ref 2.5–3.9)
T4 FREE SERPL-MCNC: 0.82 NG/DL (ref 0.61–1.12)
TSH SERPL DL<=0.05 MIU/L-ACNC: 7.24 UIU/ML (ref 0.45–4.5)
WBC # BLD AUTO: 8.21 THOUSAND/UL (ref 4.31–10.16)

## 2023-06-05 PROCEDURE — 36415 COLL VENOUS BLD VENIPUNCTURE: CPT

## 2023-06-05 PROCEDURE — 84439 ASSAY OF FREE THYROXINE: CPT

## 2023-06-05 PROCEDURE — 84443 ASSAY THYROID STIM HORMONE: CPT

## 2023-06-05 PROCEDURE — 85025 COMPLETE CBC W/AUTO DIFF WBC: CPT

## 2023-06-05 PROCEDURE — 80053 COMPREHEN METABOLIC PANEL: CPT

## 2023-06-05 PROCEDURE — 84481 FREE ASSAY (FT-3): CPT

## 2023-06-07 ENCOUNTER — HOSPITAL ENCOUNTER (OUTPATIENT)
Dept: INFUSION CENTER | Facility: HOSPITAL | Age: 73
Discharge: HOME | End: 2023-06-07
Attending: INTERNAL MEDICINE
Payer: MEDICARE

## 2023-06-07 VITALS
WEIGHT: 202.16 LBS | SYSTOLIC BLOOD PRESSURE: 130 MMHG | OXYGEN SATURATION: 96 % | HEIGHT: 69 IN | RESPIRATION RATE: 18 BRPM | TEMPERATURE: 96.6 F | DIASTOLIC BLOOD PRESSURE: 73 MMHG | HEART RATE: 93 BPM | BODY MASS INDEX: 29.94 KG/M2

## 2023-06-07 DIAGNOSIS — T45.1X5A CHEMOTHERAPY INDUCED NEUTROPENIA (HCC): ICD-10-CM

## 2023-06-07 DIAGNOSIS — C34.11 MALIGNANT NEOPLASM OF UPPER LOBE OF RIGHT LUNG (HCC): Primary | ICD-10-CM

## 2023-06-07 DIAGNOSIS — R91.8 MASS OF UPPER LOBE OF RIGHT LUNG: ICD-10-CM

## 2023-06-07 DIAGNOSIS — D70.1 CHEMOTHERAPY INDUCED NEUTROPENIA (HCC): ICD-10-CM

## 2023-06-07 DIAGNOSIS — C34.90 ADENOCARCINOMA OF LUNG, UNSPECIFIED LATERALITY (HCC): ICD-10-CM

## 2023-06-07 RX ORDER — SODIUM CHLORIDE 9 MG/ML
20 INJECTION, SOLUTION INTRAVENOUS ONCE
Status: COMPLETED | OUTPATIENT
Start: 2023-06-07 | End: 2023-06-07

## 2023-06-07 RX ADMIN — SODIUM CHLORIDE 200 MG: 9 INJECTION, SOLUTION INTRAVENOUS at 11:03

## 2023-06-07 RX ADMIN — SODIUM CHLORIDE 20 ML/HR: 9 INJECTION, SOLUTION INTRAVENOUS at 10:53

## 2023-06-12 ENCOUNTER — OFFICE VISIT (OUTPATIENT)
Age: 73
End: 2023-06-12
Payer: MEDICARE

## 2023-06-12 ENCOUNTER — TELEPHONE (OUTPATIENT)
Age: 73
End: 2023-06-12

## 2023-06-12 VITALS
TEMPERATURE: 98 F | HEIGHT: 69 IN | BODY MASS INDEX: 29.33 KG/M2 | SYSTOLIC BLOOD PRESSURE: 124 MMHG | WEIGHT: 198 LBS | RESPIRATION RATE: 18 BRPM | DIASTOLIC BLOOD PRESSURE: 76 MMHG | HEART RATE: 112 BPM

## 2023-06-12 DIAGNOSIS — C34.90 ADENOCARCINOMA OF LUNG, UNSPECIFIED LATERALITY (HCC): ICD-10-CM

## 2023-06-12 DIAGNOSIS — D70.1 CHEMOTHERAPY INDUCED NEUTROPENIA (HCC): ICD-10-CM

## 2023-06-12 DIAGNOSIS — R91.8 MASS OF UPPER LOBE OF RIGHT LUNG: ICD-10-CM

## 2023-06-12 DIAGNOSIS — T45.1X5A CHEMOTHERAPY INDUCED NEUTROPENIA (HCC): ICD-10-CM

## 2023-06-12 DIAGNOSIS — C34.11 MALIGNANT NEOPLASM OF UPPER LOBE OF RIGHT LUNG (HCC): Primary | ICD-10-CM

## 2023-06-12 PROCEDURE — 99214 OFFICE O/P EST MOD 30 MIN: CPT | Performed by: INTERNAL MEDICINE

## 2023-06-12 RX ORDER — SODIUM CHLORIDE 9 MG/ML
20 INJECTION, SOLUTION INTRAVENOUS ONCE
OUTPATIENT
Start: 2023-06-28
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
## 2023-06-16 ENCOUNTER — APPOINTMENT (OUTPATIENT)
Dept: LAB | Facility: CLINIC | Age: 73
End: 2023-06-16
Payer: MEDICARE

## 2023-06-16 DIAGNOSIS — E83.52 HYPERCALCEMIA OF MALIGNANCY: Primary | ICD-10-CM

## 2023-06-16 DIAGNOSIS — E83.52 HYPERCALCEMIA OF MALIGNANCY: ICD-10-CM

## 2023-06-16 LAB
ALBUMIN SERPL BCP-MCNC: 2.5 G/DL (ref 3.5–5)
ALP SERPL-CCNC: 58 U/L (ref 46–116)
ALT SERPL W P-5'-P-CCNC: 11 U/L (ref 12–78)
ANION GAP SERPL CALCULATED.3IONS-SCNC: 4 MMOL/L (ref 4–13)
AST SERPL W P-5'-P-CCNC: 20 U/L (ref 5–45)
BILIRUB SERPL-MCNC: 0.32 MG/DL (ref 0.2–1)
BUN SERPL-MCNC: 10 MG/DL (ref 5–25)
CALCIUM ALBUM COR SERPL-MCNC: 11 MG/DL (ref 8.3–10.1)
CALCIUM SERPL-MCNC: 9.8 MG/DL (ref 8.3–10.1)
CHLORIDE SERPL-SCNC: 109 MMOL/L (ref 96–108)
CO2 SERPL-SCNC: 26 MMOL/L (ref 21–32)
CREAT SERPL-MCNC: 0.96 MG/DL (ref 0.6–1.3)
GFR SERPL CREATININE-BSD FRML MDRD: 79 ML/MIN/1.73SQ M
GLUCOSE P FAST SERPL-MCNC: 105 MG/DL (ref 65–99)
POTASSIUM SERPL-SCNC: 3.8 MMOL/L (ref 3.5–5.3)
PROT SERPL-MCNC: 7.1 G/DL (ref 6.4–8.4)
SODIUM SERPL-SCNC: 139 MMOL/L (ref 135–147)

## 2023-06-16 PROCEDURE — 80053 COMPREHEN METABOLIC PANEL: CPT

## 2023-06-16 PROCEDURE — 36415 COLL VENOUS BLD VENIPUNCTURE: CPT

## 2023-06-17 PROBLEM — J18.9 PNEUMONIA: Status: RESOLVED | Noted: 2023-04-14 | Resolved: 2023-06-17

## 2023-06-17 PROBLEM — N39.0 UTI (URINARY TRACT INFECTION): Status: RESOLVED | Noted: 2023-04-14 | Resolved: 2023-06-17

## 2023-06-17 PROBLEM — A41.9 SEPSIS (HCC): Status: RESOLVED | Noted: 2023-04-14 | Resolved: 2023-06-17

## 2023-06-19 ENCOUNTER — HOSPITAL ENCOUNTER (OUTPATIENT)
Dept: INFUSION CENTER | Facility: HOSPITAL | Age: 73
Discharge: HOME | End: 2023-06-19
Attending: INTERNAL MEDICINE
Payer: MEDICARE

## 2023-06-19 VITALS
BODY MASS INDEX: 29.36 KG/M2 | HEART RATE: 93 BPM | TEMPERATURE: 97.9 F | SYSTOLIC BLOOD PRESSURE: 125 MMHG | WEIGHT: 198.19 LBS | OXYGEN SATURATION: 92 % | HEIGHT: 69 IN | RESPIRATION RATE: 18 BRPM | DIASTOLIC BLOOD PRESSURE: 60 MMHG

## 2023-06-19 DIAGNOSIS — E83.52 HYPERCALCEMIA OF MALIGNANCY: Primary | ICD-10-CM

## 2023-06-19 RX ADMIN — DENOSUMAB 120 MG: 120 INJECTION SUBCUTANEOUS at 09:12

## 2023-06-19 NOTE — PROGRESS NOTES
Xgeva given as ordered, creatinine clearance 56 6 ml/min  Next appointments made, declined schedule  Left unit ambulatory accompanied by wife

## 2023-06-26 ENCOUNTER — APPOINTMENT (OUTPATIENT)
Dept: LAB | Facility: CLINIC | Age: 73
End: 2023-06-26
Payer: MEDICARE

## 2023-06-26 DIAGNOSIS — Z12.5 SCREENING PSA (PROSTATE SPECIFIC ANTIGEN): ICD-10-CM

## 2023-06-26 DIAGNOSIS — C34.90 ADENOCARCINOMA OF LUNG, UNSPECIFIED LATERALITY (HCC): ICD-10-CM

## 2023-06-26 DIAGNOSIS — D70.1 CHEMOTHERAPY INDUCED NEUTROPENIA (HCC): ICD-10-CM

## 2023-06-26 DIAGNOSIS — T45.1X5A CHEMOTHERAPY INDUCED NEUTROPENIA (HCC): ICD-10-CM

## 2023-06-26 DIAGNOSIS — Z13.6 SCREENING FOR CARDIOVASCULAR CONDITION: ICD-10-CM

## 2023-06-26 DIAGNOSIS — R91.8 MASS OF UPPER LOBE OF RIGHT LUNG: ICD-10-CM

## 2023-06-26 DIAGNOSIS — C34.11 MALIGNANT NEOPLASM OF UPPER LOBE OF RIGHT LUNG (HCC): ICD-10-CM

## 2023-06-26 LAB
ALBUMIN SERPL BCP-MCNC: 2.5 G/DL (ref 3.5–5)
ALP SERPL-CCNC: 59 U/L (ref 46–116)
ALT SERPL W P-5'-P-CCNC: 13 U/L (ref 12–78)
ANION GAP SERPL CALCULATED.3IONS-SCNC: 5 MMOL/L
AST SERPL W P-5'-P-CCNC: 16 U/L (ref 5–45)
BASOPHILS # BLD AUTO: 0.07 THOUSANDS/ÂΜL (ref 0–0.1)
BASOPHILS NFR BLD AUTO: 1 % (ref 0–1)
BILIRUB SERPL-MCNC: 0.34 MG/DL (ref 0.2–1)
BUN SERPL-MCNC: 11 MG/DL (ref 5–25)
CALCIUM ALBUM COR SERPL-MCNC: 11 MG/DL (ref 8.3–10.1)
CALCIUM SERPL-MCNC: 9.8 MG/DL (ref 8.3–10.1)
CHLORIDE SERPL-SCNC: 109 MMOL/L (ref 96–108)
CHOLEST SERPL-MCNC: 118 MG/DL
CO2 SERPL-SCNC: 27 MMOL/L (ref 21–32)
CREAT SERPL-MCNC: 0.93 MG/DL (ref 0.6–1.3)
EOSINOPHIL # BLD AUTO: 0.1 THOUSAND/ÂΜL (ref 0–0.61)
EOSINOPHIL NFR BLD AUTO: 1 % (ref 0–6)
ERYTHROCYTE [DISTWIDTH] IN BLOOD BY AUTOMATED COUNT: 18.1 % (ref 11.6–15.1)
FERRITIN SERPL-MCNC: 490 NG/ML (ref 24–336)
FOLATE SERPL-MCNC: >22.3 NG/ML
GFR SERPL CREATININE-BSD FRML MDRD: 82 ML/MIN/1.73SQ M
GLUCOSE P FAST SERPL-MCNC: 95 MG/DL (ref 65–99)
HCT VFR BLD AUTO: 31.7 % (ref 36.5–49.3)
HDLC SERPL-MCNC: 38 MG/DL
HGB BLD-MCNC: 8.5 G/DL (ref 12–17)
IMM GRANULOCYTES # BLD AUTO: 0.05 THOUSAND/UL (ref 0–0.2)
IMM GRANULOCYTES NFR BLD AUTO: 1 % (ref 0–2)
IRON SATN MFR SERPL: 12 % (ref 20–50)
IRON SERPL-MCNC: 27 UG/DL (ref 65–175)
LDLC SERPL CALC-MCNC: 62 MG/DL (ref 0–100)
LYMPHOCYTES # BLD AUTO: 1.2 THOUSANDS/ÂΜL (ref 0.6–4.47)
LYMPHOCYTES NFR BLD AUTO: 15 % (ref 14–44)
MCH RBC QN AUTO: 24.9 PG (ref 26.8–34.3)
MCHC RBC AUTO-ENTMCNC: 26.8 G/DL (ref 31.4–37.4)
MCV RBC AUTO: 93 FL (ref 82–98)
MONOCYTES # BLD AUTO: 0.89 THOUSAND/ÂΜL (ref 0.17–1.22)
MONOCYTES NFR BLD AUTO: 11 % (ref 4–12)
NEUTROPHILS # BLD AUTO: 5.85 THOUSANDS/ÂΜL (ref 1.85–7.62)
NEUTS SEG NFR BLD AUTO: 71 % (ref 43–75)
NRBC BLD AUTO-RTO: 0 /100 WBCS
PLATELET # BLD AUTO: 482 THOUSANDS/UL (ref 149–390)
PMV BLD AUTO: 9.7 FL (ref 8.9–12.7)
POTASSIUM SERPL-SCNC: 4.5 MMOL/L (ref 3.5–5.3)
PROT SERPL-MCNC: 7.2 G/DL (ref 6.4–8.4)
PSA SERPL-MCNC: 1.97 NG/ML (ref 0–4)
RBC # BLD AUTO: 3.41 MILLION/UL (ref 3.88–5.62)
SODIUM SERPL-SCNC: 141 MMOL/L (ref 135–147)
T3FREE SERPL-MCNC: 2.73 PG/ML (ref 2.5–3.9)
T4 FREE SERPL-MCNC: 0.85 NG/DL (ref 0.61–1.12)
TIBC SERPL-MCNC: 224 UG/DL (ref 250–450)
TRIGL SERPL-MCNC: 92 MG/DL
TSH SERPL DL<=0.05 MIU/L-ACNC: 5.87 UIU/ML (ref 0.45–4.5)
VIT B12 SERPL-MCNC: 295 PG/ML (ref 180–914)
WBC # BLD AUTO: 8.16 THOUSAND/UL (ref 4.31–10.16)

## 2023-06-26 PROCEDURE — 80053 COMPREHEN METABOLIC PANEL: CPT

## 2023-06-26 PROCEDURE — 83540 ASSAY OF IRON: CPT

## 2023-06-26 PROCEDURE — 85025 COMPLETE CBC W/AUTO DIFF WBC: CPT

## 2023-06-26 PROCEDURE — 80061 LIPID PANEL: CPT

## 2023-06-26 PROCEDURE — 83550 IRON BINDING TEST: CPT

## 2023-06-26 PROCEDURE — 82746 ASSAY OF FOLIC ACID SERUM: CPT

## 2023-06-26 PROCEDURE — 84443 ASSAY THYROID STIM HORMONE: CPT

## 2023-06-26 PROCEDURE — 82728 ASSAY OF FERRITIN: CPT

## 2023-06-26 PROCEDURE — 84439 ASSAY OF FREE THYROXINE: CPT

## 2023-06-26 PROCEDURE — 82607 VITAMIN B-12: CPT

## 2023-06-26 PROCEDURE — 36415 COLL VENOUS BLD VENIPUNCTURE: CPT

## 2023-06-26 PROCEDURE — 84481 FREE ASSAY (FT-3): CPT

## 2023-06-26 PROCEDURE — G0103 PSA SCREENING: HCPCS

## 2023-06-28 ENCOUNTER — HOSPITAL ENCOUNTER (OUTPATIENT)
Dept: INFUSION CENTER | Facility: HOSPITAL | Age: 73
Discharge: HOME | End: 2023-06-28
Attending: INTERNAL MEDICINE
Payer: MEDICARE

## 2023-06-28 VITALS
WEIGHT: 197.09 LBS | HEIGHT: 69 IN | DIASTOLIC BLOOD PRESSURE: 69 MMHG | TEMPERATURE: 96.5 F | RESPIRATION RATE: 16 BRPM | SYSTOLIC BLOOD PRESSURE: 120 MMHG | BODY MASS INDEX: 29.19 KG/M2 | HEART RATE: 96 BPM | OXYGEN SATURATION: 96 %

## 2023-06-28 DIAGNOSIS — T45.1X5A CHEMOTHERAPY INDUCED NEUTROPENIA (HCC): ICD-10-CM

## 2023-06-28 DIAGNOSIS — R91.8 MASS OF UPPER LOBE OF RIGHT LUNG: ICD-10-CM

## 2023-06-28 DIAGNOSIS — D70.1 CHEMOTHERAPY INDUCED NEUTROPENIA (HCC): ICD-10-CM

## 2023-06-28 DIAGNOSIS — C34.11 MALIGNANT NEOPLASM OF UPPER LOBE OF RIGHT LUNG (HCC): Primary | ICD-10-CM

## 2023-06-28 DIAGNOSIS — C34.90 ADENOCARCINOMA OF LUNG, UNSPECIFIED LATERALITY (HCC): ICD-10-CM

## 2023-06-28 RX ORDER — SODIUM CHLORIDE 9 MG/ML
20 INJECTION, SOLUTION INTRAVENOUS ONCE
Status: COMPLETED | OUTPATIENT
Start: 2023-06-28 | End: 2023-06-28

## 2023-06-28 RX ADMIN — SODIUM CHLORIDE 200 MG: 9 INJECTION, SOLUTION INTRAVENOUS at 10:26

## 2023-06-28 RX ADMIN — SODIUM CHLORIDE 20 ML/HR: 9 INJECTION, SOLUTION INTRAVENOUS at 09:14

## 2023-06-28 RX ADMIN — SODIUM CHLORIDE 1000 ML: 0.9 INJECTION, SOLUTION INTRAVENOUS at 09:17

## 2023-06-28 NOTE — PROGRESS NOTES
Patient Fluid bolus and Keytruda infusion completed without complication  Patient declined AVS at this time and confirmed next appointment  Patient discharged in stable condition to home via ambulation

## 2023-06-29 DIAGNOSIS — N40.1 BPH WITH URINARY OBSTRUCTION: ICD-10-CM

## 2023-06-29 DIAGNOSIS — N13.8 BPH WITH URINARY OBSTRUCTION: ICD-10-CM

## 2023-06-29 RX ORDER — TAMSULOSIN HYDROCHLORIDE 0.4 MG/1
0.4 CAPSULE ORAL
Qty: 90 CAPSULE | Refills: 1 | Status: SHIPPED | OUTPATIENT
Start: 2023-06-29

## 2023-07-03 ENCOUNTER — HOSPITAL ENCOUNTER (OUTPATIENT)
Dept: CT IMAGING | Facility: HOSPITAL | Age: 73
Discharge: HOME | End: 2023-07-03
Attending: INTERNAL MEDICINE
Payer: MEDICARE

## 2023-07-03 DIAGNOSIS — C34.11 MALIGNANT NEOPLASM OF UPPER LOBE OF RIGHT LUNG (HCC): ICD-10-CM

## 2023-07-03 PROCEDURE — 71260 CT THORAX DX C+: CPT

## 2023-07-03 PROCEDURE — 74177 CT ABD & PELVIS W/CONTRAST: CPT

## 2023-07-03 PROCEDURE — G1004 CDSM NDSC: HCPCS

## 2023-07-03 RX ADMIN — IOHEXOL 100 ML: 350 INJECTION, SOLUTION INTRAVENOUS at 13:44

## 2023-07-07 ENCOUNTER — DOCUMENTATION (OUTPATIENT)
Dept: HEMATOLOGY ONCOLOGY | Facility: CLINIC | Age: 73
End: 2023-07-07

## 2023-07-07 DIAGNOSIS — C34.11 MALIGNANT NEOPLASM OF UPPER LOBE OF RIGHT LUNG (HCC): ICD-10-CM

## 2023-07-07 DIAGNOSIS — R91.8 MASS OF UPPER LOBE OF RIGHT LUNG: ICD-10-CM

## 2023-07-07 DIAGNOSIS — D70.1 CHEMOTHERAPY INDUCED NEUTROPENIA (HCC): Primary | ICD-10-CM

## 2023-07-07 DIAGNOSIS — T45.1X5A CHEMOTHERAPY INDUCED NEUTROPENIA (HCC): Primary | ICD-10-CM

## 2023-07-07 DIAGNOSIS — C34.90 ADENOCARCINOMA OF LUNG, UNSPECIFIED LATERALITY (HCC): ICD-10-CM

## 2023-07-10 ENCOUNTER — OFFICE VISIT (OUTPATIENT)
Age: 73
End: 2023-07-10
Payer: MEDICARE

## 2023-07-10 ENCOUNTER — TELEPHONE (OUTPATIENT)
Age: 73
End: 2023-07-10

## 2023-07-10 VITALS
BODY MASS INDEX: 28.88 KG/M2 | WEIGHT: 195 LBS | SYSTOLIC BLOOD PRESSURE: 124 MMHG | HEIGHT: 69 IN | RESPIRATION RATE: 18 BRPM | HEART RATE: 94 BPM | DIASTOLIC BLOOD PRESSURE: 76 MMHG | OXYGEN SATURATION: 95 %

## 2023-07-10 DIAGNOSIS — E83.52 HYPERCALCEMIA OF MALIGNANCY: Primary | ICD-10-CM

## 2023-07-10 PROCEDURE — 99213 OFFICE O/P EST LOW 20 MIN: CPT | Performed by: INTERNAL MEDICINE

## 2023-07-10 RX ORDER — SODIUM CHLORIDE 9 MG/ML
20 INJECTION, SOLUTION INTRAVENOUS ONCE
Status: CANCELLED | OUTPATIENT
Start: 2023-07-19

## 2023-07-10 NOTE — TELEPHONE ENCOUNTER
NEXT Yung Corky should be on 7/19.  Dr. Tia Wynn changed xgeva to be every 3 mos, please adjust that schedule as well. Thank you.

## 2023-07-12 NOTE — PROGRESS NOTES
HEMATOLOGY / [facility] FOLLOW UP NOTE    Primary Care Provider: Chrissy Gaines MD  Referring Provider:    MRN: [id number]  : 1950    Reason for Encounter: follow up stage IV adenocarcinoma of the lung       Oncology History Overview Note   2021 - Stage IV adenocarcinoma of the lung     10/2021 - 2022 - carbo/pem/pebro     3/2022 - 2022 - carbo/taxol/RT     2022 - maintenance pembro     2023 - solitary brain met - SBRT     Adenocarcinoma of lung   2021 Initial Diagnosis    Adenocarcinoma of lung     2021 Biopsy    Right lung apex, image-guided core needle biopsy:  - Adenocarcinoma      10/6/2021 - 2022 Chemotherapy    cyanocobalamin, 1,000 mcg, Intramuscular, Once, 3 of 3 cycles  Administration: 1,000 mcg (2021), 1,000 mcg (2022), 1,000 mcg (10/6/2021)  pegfilgrastim (Elisa Eze), 6 mg, Subcutaneous, Once, 1 of 1 cycle  Administration: 6 mg (2021)  pegfilgrastim (Mabelene Lot), 6 mg, Subcutaneous, Once, 6 of 6 cycles  Administration: 6 mg (10/6/2021), 6 mg (11/3/2021), 6 mg (2021), 6 mg (12/15/2021), 6 mg (2022)  fosaprepitant (EMEND) IVPB, 150 mg, Intravenous, Once, 6 of 6 cycles  Administration: 150 mg (10/6/2021), 150 mg (2021), 150 mg (12/15/2021), 150 mg (2022), 150 mg (11/3/2021), 150 mg (2022)  CARBOplatin (PARAPLATIN) IVPB (GOG AUC DOSING), 519.5 mg, Intravenous, Once, 6 of 6 cycles  Administration: 519.5 mg (10/6/2021), 574 mg (2021), 600 mg (12/15/2021), 533 mg (2022), 604.5 mg (11/3/2021), 563 mg (2022)  pemetrexed (ALIMTA) chemo infusion, 970 mg, Intravenous, Once, 6 of 6 cycles  Administration: 1,000 mg (10/6/2021), 1,000 mg (2021), 1,000 mg (12/15/2021), 1,000 mg (2022), 1,000 mg (11/3/2021), 1,000 mg (2022)  pembrolizumab (KEYTRUDA) IVPB, 200 mg, Intravenous, Once, 6 of 6 cycles  Administration: 200 mg (10/6/2021), 200 mg (2021), 200 mg (12/15/2021), 200 mg (2022), 200 mg (11/3/2021), 200 mg (1/5/2022)     3/7/2022 -  Chemotherapy    CARBOplatin (PARAPLATIN) IVPB (GOG AUC DOSING), , Intravenous, Once, 6 of 6 cycles  Administration: 211.6 mg (3/7/2022), 208 mg (3/14/2022), 238.8 mg (3/21/2022), 211.6 mg (3/28/2022), 215.2 mg (4/4/2022), 213.4 mg (4/18/2022)  PACLItaxel (TAXOL) chemo IVPB, 50 mg/m2 = 99 mg, Intravenous, Once, 6 of 6 cycles  Administration: 99 mg (3/7/2022), 99 mg (3/14/2022), 99 mg (3/21/2022), 99 mg (3/28/2022), 99 mg (4/4/2022), 99 mg (4/18/2022)  pembrolizumab (KEYTRUDA) IVPB, 200 mg, Intravenous, Once, 22 of 27 cycles  Administration: 200 mg (3/7/2022), 200 mg (3/28/2022), 200 mg (4/25/2022), 200 mg (5/16/2022), 200 mg (6/6/2022), 200 mg (6/27/2022), 200 mg (7/18/2022), 200 mg (8/8/2022), 200 mg (8/29/2022), 200 mg (9/19/2022), 200 mg (10/10/2022), 200 mg (10/31/2022), 200 mg (11/21/2022), 200 mg (12/12/2022), 200 mg (1/3/2023), 200 mg (1/23/2023), 200 mg (2/13/2023), 200 mg (3/27/2023), 200 mg (4/26/2023), 200 mg (5/17/2023), 200 mg (6/7/2023), 200 mg (6/28/2023)     2/22/2023 - 2/22/2023 Radiation    SRS left precentral gyrus   2000 cGy    Dr. Alexandre Alicia     Malignant neoplasm of upper lobe of right lung ([address])   9/3/2021 Initial Diagnosis    Malignant neoplasm of upper lobe of right lung ([address])     9/23/2021 -  Cancer Staged    Staging form: Lung, AJCC 8th Edition  - Clinical stage from 9/23/2021: Stage IIIC (cT3, cN3, cM0) - Signed by Anjelica Olivares MD on 9/23/2021  Histopathologic type:  Adenocarcinoma, NOS       10/6/2021 - 1/26/2022 Chemotherapy    cyanocobalamin, 1,000 mcg, Intramuscular, Once, 3 of 3 cycles  Administration: 1,000 mcg (11/24/2021), 1,000 mcg (1/26/2022), 1,000 mcg (10/6/2021)  pegfilgrastim (Charles Wolff), 6 mg, Subcutaneous, Once, 1 of 1 cycle  Administration: 6 mg (11/26/2021)  pegfilgrastim (Nando Zuniga), 6 mg, Subcutaneous, Once, 6 of 6 cycles  Administration: 6 mg (10/6/2021), 6 mg (11/3/2021), 6 mg (11/24/2021), 6 mg (12/15/2021), 6 mg (1/5/2022)  fosaprepitant (EMEND) IVPB, 150 mg, Intravenous, Once, 6 of 6 cycles  Administration: 150 mg (10/6/2021), 150 mg (11/24/2021), 150 mg (12/15/2021), 150 mg (1/26/2022), 150 mg (11/3/2021), 150 mg (1/5/2022)  CARBOplatin (PARAPLATIN) IVPB (GOG AUC DOSING), 519.5 mg, Intravenous, Once, 6 of 6 cycles  Administration: 519.5 mg (10/6/2021), 574 mg (11/24/2021), 600 mg (12/15/2021), 533 mg (1/26/2022), 604.5 mg (11/3/2021), 563 mg (1/5/2022)  pemetrexed (ALIMTA) chemo infusion, 970 mg, Intravenous, Once, 6 of 6 cycles  Administration: 1,000 mg (10/6/2021), 1,000 mg (11/24/2021), 1,000 mg (12/15/2021), 1,000 mg (1/26/2022), 1,000 mg (11/3/2021), 1,000 mg (1/5/2022)  pembrolizumab (KEYTRUDA) IVPB, 200 mg, Intravenous, Once, 6 of 6 cycles  Administration: 200 mg (10/6/2021), 200 mg (11/24/2021), 200 mg (12/15/2021), 200 mg (1/26/2022), 200 mg (11/3/2021), 200 mg (1/5/2022)     3/4/2022 - 4/21/2022 Radiation    The patient saw @[facility]@ for radiation treatment.  This is the current list of radiation treatment:  Plan ID Energy Fractions Dose per Fraction (cGy) Dose Correction (cGy) Total Dose Delivered (cGy) Elapsed Days   R LUNGMED REV 6X 30 / 30 200 0 6,000 48      Treatment dates:  C1: 3/4/2022 - 4/21/2022         3/7/2022 -  Chemotherapy    CARBOplatin (PARAPLATIN) IVPB (GOG AUC DOSING), , Intravenous, Once, 6 of 6 cycles  Administration: 211.6 mg (3/7/2022), 208 mg (3/14/2022), 238.8 mg (3/21/2022), 211.6 mg (3/28/2022), 215.2 mg (4/4/2022), 213.4 mg (4/18/2022)  PACLItaxel (TAXOL) chemo IVPB, 50 mg/m2 = 99 mg, Intravenous, Once, 6 of 6 cycles  Administration: 99 mg (3/7/2022), 99 mg (3/14/2022), 99 mg (3/21/2022), 99 mg (3/28/2022), 99 mg (4/4/2022), 99 mg (4/18/2022)  pembrolizumab (KEYTRUDA) IVPB, 200 mg, Intravenous, Once, 22 of 27 cycles  Administration: 200 mg (3/7/2022), 200 mg (3/28/2022), 200 mg (4/25/2022), 200 mg (5/16/2022), 200 mg (6/6/2022), 200 mg (6/27/2022), 200 mg (7/18/2022), 200 mg (8/8/2022), 200 mg (8/29/2022), 200 mg (9/19/2022), 200 mg (10/10/2022), 200 mg (10/31/2022), 200 mg (11/21/2022), 200 mg (12/12/2022), 200 mg (1/3/2023), 200 mg (1/23/2023), 200 mg (2/13/2023), 200 mg (3/27/2023), 200 mg (4/26/2023), 200 mg (5/17/2023), 200 mg (6/7/2023), 200 mg (6/28/2023)     2/22/2023 - 2/22/2023 Radiation    SRS left precentral gyrus   2000 cGy    Dr. Froylan Drake     Brain metastasis   2/3/2023 Initial Diagnosis    Brain metastasis     2/22/2023 - 2/22/2023 Radiation    SRS left precentral gyrus   2000 cGy    Dr. Froylan Drake         Interval History: Patient presents for follow up of his stage IV adenocarcinoma of the lung. CT scan prior to this visit showed ongoing stable disease in the chest abdomen and pelvis. He remains on maintenance therapy with Keytruda. He is also getting Xgeva on a monthly basis. He is feeling well. His energy is good and  he is not short of breath. No cough or hemoptysis. No diarrhea, no rash. No bleeding. He has another MRI of the brain scheduled on July 26. REVIEW OF SYSTEMS:  Please note that a 14-point review of systems was performed to include Constitutional, HEENT, Respiratory, CVS, GI, , Musculoskeletal, Integumentary, Neurologic, Rheumatologic, Endocrinologic, Psychiatric, Lymphatic, and Hematologic/Oncologic systems were reviewed and are negative unless otherwise stated in HPI. Positive and negative findings pertinent to this evaluation are incorporated into the history of present illness.       ECOG PS: 1    PROBLEM LIST:  Patient Active Problem List   Diagnosis   • History of stroke   • Bilateral lower extremity DVTs   • Acute respiratory failure with hypoxia ([facility])   • Dysphagia   • Mass of upper lobe of right lung   • History of urinary retention   • Constipation   • Anemia of chronic disease   • Pulmonary embolism (HCC)   • Impaired cognition   • Adenocarcinoma of lung   • Acute on chronic anemia   • Malignant neoplasm of upper lobe of right lung (HCC)   • COPD mixed type (HCC)   • Chemotherapy induced neutropenia (HCC)   • Hypercalcemia of malignancy   • Chronic anticoagulation   • Chronic right-sided heart failure (HCC)   • Brain injury, without loss of consciousness, subsequent encounter   • Recurrent right pleural effusion   • Restrictive lung disease   • Nocturnal hypoxemia   • Tobacco use disorder, severe, in sustained remission, dependence   • Brain metastasis   • History of venous thromboembolism   • Hyperlipidemia   • Vasogenic edema (HCC)   • Stage 3a chronic kidney disease (HCC)       Assessment / Plan: 71-year-old male with stage IV adenocarcinoma of the lung. We will continue with maintenance Keytruda every 3 weeks. I am going to change his Xgeva to be every 3 months as his systemic disease is under good control. I will plan on seeing him back in 6 weeks in follow-up and I will follow him up on the results of the MRI of the brain as well. He knows to call in the interim with any questions or concerns. I spent 20 minutes on chart review, face to face counseling time, coordination of care and documentation. Past Medical History:   has a past medical history of Chronic respiratory failure with hypoxia ([address]) (8/9/2021), Impaired mobility and ADLs (8/13/2021), Lung cancer ([address]), and Stroke ([address] PAST SURGICAL HISTORY:   has a past surgical history that includes IR biopsy lung (8/5/2021); IR thoracentesis (2/24/2022); and IR thoracentesis (8/1/2022).     CURRENT MEDICATIONS  Current Outpatient Medications   Medication Sig Dispense Refill   • acetaminophen (TYLENOL) 325 mg tablet Take 2 tablets (650 mg total) by mouth 4 (four) times a day as needed for mild pain, headaches or fever  0   • albuterol (2.5 mg/3 mL) 0.083 % nebulizer solution Take 3 mL (2.5 mg total) by nebulization every 6 (six) hours as needed for wheezing or shortness of breath 3 mL 2   • atorvastatin (LIPITOR) 40 mg tablet Take 1 tablet (40 mg total) by mouth daily with dinner 90 tablet 0   • enoxaparin (LOVENOX) 100 mg/mL Inject 0.9 mL (90 mg total) under the skin every 12 (twelve) hours 60 mL 5   • folic acid (FOLVITE) 1 mg tablet Take 1 tablet (1,000 mcg total) by mouth daily 90 tablet 0   • sodium chloride 3 % inhalation solution Take 4 mL by nebulization as needed for cough 4 mL 0   • tamsulosin (FLOMAX) 0.4 mg Take 1 capsule (0.4 mg total) by mouth daily after dinner 90 capsule 1   • albuterol (ProAir HFA) 90 mcg/act inhaler Inhale 2 puffs every 6 (six) hours as needed for wheezing or shortness of breath (Patient not taking: Reported on 4/26/2023) 8 g 0   • multivitamin (THERAGRAN) TABS Take 1 tablet by mouth daily (Patient not taking: Reported on 7/10/2023)       No current facility-administered medications for this visit. @[facility]@    SOCIAL HISTORY:   reports that he quit smoking about 19 years ago. His smoking use included cigarettes. He started smoking about 58 years ago. He has a 117.00 pack-year smoking history. He has never used smokeless tobacco. He reports that he does not currently use alcohol. He reports that he does not use drugs. FAMILY HISTORY:  family history includes Lung cancer in his brother; Prostate cancer in his brother. ALLERGIES:  is allergic to doxycycline. Physical Exam:  Vital Signs:   Visit Vitals  /76   Pulse 94   Resp 18   Ht 5' 9" (1.753 m)   Wt 88.5 kg (195 lb)   SpO2 95%   BMI 28.80 kg/m²   Smoking Status Former   BSA 2.04 m²     Body mass index is 28.8 kg/m². Body surface area is 2.04 meters squared. GEN: Alert, awake oriented x3, in no acute distress  HEENT- No pallor, icterus, cyanosis, no oral mucosal lesions,   LAD - no palpable cervical, clavicle, axillary, inguinal LAD  Heart- normal S1 S2, regular rate and rhythm, No murmur, rubs.    Lungs- clear breathing sound bilateral.   Abdomen- soft, Non tender, bowel sounds present  Extremities- No cyanosis, clubbing, edema  Neuro- No focal neurological deficit    Labs:  Lab Results   Component Value Date    WBC 8.16 06/26/2023    HGB 8.5 (L) 06/26/2023    HCT 31.7 (L) 06/26/2023    MCV 93 06/26/2023     (H) 06/26/2023     Lab Results   Component Value Date    SODIUM 141 06/26/2023    K 4.5 06/26/2023     (H) 06/26/2023    CO2 27 06/26/2023    AGAP 5 06/26/2023    BUN 11 06/26/2023    CREATININE 0.93 06/26/2023    GLUC 93 06/05/2023    GLUF 95 06/26/2023    CALCIUM 9.8 06/26/2023    AST 16 06/26/2023    ALT 13 06/26/2023    ALKPHOS 59 06/26/2023    TP 7.2 06/26/2023    TBILI 0.34 06/26/2023    EGFR 82 06/26/2023

## 2023-07-14 NOTE — OCCUPATIONAL THERAPY NOTE
Error Occupational Therapy Discharge Summary:     Pt made fair progress throughout rehab stay, from OT standpoint  Pt was D/C home with recommendation for 24/7 supervision/assist  At time of D/C pt was completing ADLS with overall mod assist and functional transfers incidental touching  Family wished to take pt home sooner than anticipated rehab stay       Pt recommended for the following DME: bed/chair alarm, video baby monitor, gait belt, BSC  Family training was completed with pt's wife and children  Pt D/C home with home OT services

## 2023-07-17 ENCOUNTER — APPOINTMENT (OUTPATIENT)
Dept: LAB | Facility: CLINIC | Age: 73
End: 2023-07-17
Payer: MEDICARE

## 2023-07-17 DIAGNOSIS — K12.31 NEUTROPENIA ASSOCIATED WITH MUCOSITIS DUE TO ANTINEOPLASTIC THERAPY (HCC): ICD-10-CM

## 2023-07-17 DIAGNOSIS — R91.8 MASS OF UPPER LOBE OF RIGHT LUNG: ICD-10-CM

## 2023-07-17 DIAGNOSIS — T45.1X5S NEUTROPENIA ASSOCIATED WITH MUCOSITIS DUE TO ANTINEOPLASTIC THERAPY (HCC): ICD-10-CM

## 2023-07-17 DIAGNOSIS — D70.1 NEUTROPENIA ASSOCIATED WITH MUCOSITIS DUE TO ANTINEOPLASTIC THERAPY (HCC): ICD-10-CM

## 2023-07-17 DIAGNOSIS — D70.1 CHEMOTHERAPY-INDUCED NEUTROPENIA (HCC): ICD-10-CM

## 2023-07-17 DIAGNOSIS — C34.90 ADENOCARCINOMA OF LUNG, UNSPECIFIED LATERALITY (HCC): ICD-10-CM

## 2023-07-17 DIAGNOSIS — T45.1X5A CHEMOTHERAPY-INDUCED NEUTROPENIA (HCC): ICD-10-CM

## 2023-07-17 DIAGNOSIS — C34.11 MALIGNANT NEOPLASM OF UPPER LOBE OF RIGHT LUNG (HCC): Primary | ICD-10-CM

## 2023-07-17 LAB
ALBUMIN SERPL BCP-MCNC: 2.4 G/DL (ref 3.5–5)
ALP SERPL-CCNC: 59 U/L (ref 46–116)
ALT SERPL W P-5'-P-CCNC: 13 U/L (ref 12–78)
ANION GAP SERPL CALCULATED.3IONS-SCNC: 2 MMOL/L
AST SERPL W P-5'-P-CCNC: 17 U/L (ref 5–45)
BASOPHILS # BLD AUTO: 0.04 THOUSANDS/ÂΜL (ref 0–0.1)
BASOPHILS NFR BLD AUTO: 1 % (ref 0–1)
BILIRUB SERPL-MCNC: 0.23 MG/DL (ref 0.2–1)
BUN SERPL-MCNC: 11 MG/DL (ref 5–25)
CALCIUM ALBUM COR SERPL-MCNC: 11.5 MG/DL (ref 8.3–10.1)
CALCIUM SERPL-MCNC: 10.2 MG/DL (ref 8.3–10.1)
CHLORIDE SERPL-SCNC: 110 MMOL/L (ref 96–108)
CO2 SERPL-SCNC: 29 MMOL/L (ref 21–32)
CREAT SERPL-MCNC: 1.08 MG/DL (ref 0.6–1.3)
EOSINOPHIL # BLD AUTO: 0.11 THOUSAND/ÂΜL (ref 0–0.61)
EOSINOPHIL NFR BLD AUTO: 1 % (ref 0–6)
ERYTHROCYTE [DISTWIDTH] IN BLOOD BY AUTOMATED COUNT: 18.3 % (ref 11.6–15.1)
GFR SERPL CREATININE-BSD FRML MDRD: 68 ML/MIN/1.73SQ M
GLUCOSE P FAST SERPL-MCNC: 115 MG/DL (ref 65–99)
HCT VFR BLD AUTO: 29.3 % (ref 36.5–49.3)
HGB BLD-MCNC: 8.2 G/DL (ref 12–17)
IMM GRANULOCYTES # BLD AUTO: 0.05 THOUSAND/UL (ref 0–0.2)
IMM GRANULOCYTES NFR BLD AUTO: 1 % (ref 0–2)
LYMPHOCYTES # BLD AUTO: 1.06 THOUSANDS/ÂΜL (ref 0.6–4.47)
LYMPHOCYTES NFR BLD AUTO: 13 % (ref 14–44)
MCH RBC QN AUTO: 24.3 PG (ref 26.8–34.3)
MCHC RBC AUTO-ENTMCNC: 28 G/DL (ref 31.4–37.4)
MCV RBC AUTO: 87 FL (ref 82–98)
MONOCYTES # BLD AUTO: 0.97 THOUSAND/ÂΜL (ref 0.17–1.22)
MONOCYTES NFR BLD AUTO: 12 % (ref 4–12)
NEUTROPHILS # BLD AUTO: 5.8 THOUSANDS/ÂΜL (ref 1.85–7.62)
NEUTS SEG NFR BLD AUTO: 72 % (ref 43–75)
NRBC BLD AUTO-RTO: 0 /100 WBCS
PLATELET # BLD AUTO: 455 THOUSANDS/UL (ref 149–390)
PMV BLD AUTO: 10.7 FL (ref 8.9–12.7)
POTASSIUM SERPL-SCNC: 4.2 MMOL/L (ref 3.5–5.3)
PROT SERPL-MCNC: 7.1 G/DL (ref 6.4–8.4)
RBC # BLD AUTO: 3.37 MILLION/UL (ref 3.88–5.62)
SODIUM SERPL-SCNC: 141 MMOL/L (ref 135–147)
T3FREE SERPL-MCNC: 3.02 PG/ML (ref 2.5–3.9)
T4 FREE SERPL-MCNC: 0.8 NG/DL (ref 0.61–1.12)
TSH SERPL DL<=0.05 MIU/L-ACNC: 6.89 UIU/ML (ref 0.45–4.5)
WBC # BLD AUTO: 8.03 THOUSAND/UL (ref 4.31–10.16)

## 2023-07-17 PROCEDURE — 80053 COMPREHEN METABOLIC PANEL: CPT

## 2023-07-17 PROCEDURE — 84481 FREE ASSAY (FT-3): CPT

## 2023-07-17 PROCEDURE — 85025 COMPLETE CBC W/AUTO DIFF WBC: CPT

## 2023-07-17 PROCEDURE — 36415 COLL VENOUS BLD VENIPUNCTURE: CPT

## 2023-07-17 PROCEDURE — 84443 ASSAY THYROID STIM HORMONE: CPT

## 2023-07-17 PROCEDURE — 84439 ASSAY OF FREE THYROXINE: CPT

## 2023-07-19 ENCOUNTER — HOSPITAL ENCOUNTER (OUTPATIENT)
Dept: INFUSION CENTER | Facility: HOSPITAL | Age: 73
Discharge: HOME | End: 2023-07-19
Attending: INTERNAL MEDICINE
Payer: MEDICARE

## 2023-07-19 VITALS
TEMPERATURE: 97.5 F | OXYGEN SATURATION: 92 % | HEART RATE: 101 BPM | DIASTOLIC BLOOD PRESSURE: 69 MMHG | RESPIRATION RATE: 18 BRPM | SYSTOLIC BLOOD PRESSURE: 117 MMHG

## 2023-07-19 DIAGNOSIS — C34.90 ADENOCARCINOMA OF LUNG, UNSPECIFIED LATERALITY (HCC): ICD-10-CM

## 2023-07-19 DIAGNOSIS — R91.8 MASS OF UPPER LOBE OF RIGHT LUNG: ICD-10-CM

## 2023-07-19 DIAGNOSIS — C34.11 MALIGNANT NEOPLASM OF UPPER LOBE OF RIGHT LUNG (HCC): Primary | ICD-10-CM

## 2023-07-19 DIAGNOSIS — D70.1 CHEMOTHERAPY INDUCED NEUTROPENIA (HCC): ICD-10-CM

## 2023-07-19 DIAGNOSIS — T45.1X5A CHEMOTHERAPY INDUCED NEUTROPENIA (HCC): ICD-10-CM

## 2023-07-19 RX ORDER — SODIUM CHLORIDE 9 MG/ML
20 INJECTION, SOLUTION INTRAVENOUS ONCE
Status: COMPLETED | OUTPATIENT
Start: 2023-07-19 | End: 2023-07-19

## 2023-07-19 RX ADMIN — SODIUM CHLORIDE 1000 ML: 0.9 INJECTION, SOLUTION INTRAVENOUS at 09:59

## 2023-07-19 RX ADMIN — SODIUM CHLORIDE 200 MG: 9 INJECTION, SOLUTION INTRAVENOUS at 11:10

## 2023-07-19 RX ADMIN — SODIUM CHLORIDE 20 ML/HR: 9 INJECTION, SOLUTION INTRAVENOUS at 11:09

## 2023-07-19 NOTE — PROGRESS NOTES
Patient hydration and keytruda infusion completed without complication. Patient declined AVS at this time and confirmed next appointment. Patient discharged in stable condition to home via ambulation.

## 2023-07-19 NOTE — PROGRESS NOTES
Rec'd pt in good spirits and without complaints. PIV easily obtained. Hydration and Keytruda tolerated well. PIV removed and pt discharged with steady gait using cane. AVS in hand.

## 2023-07-24 ENCOUNTER — OFFICE VISIT (OUTPATIENT)
Age: 73
End: 2023-07-24
Payer: MEDICARE

## 2023-07-24 VITALS
OXYGEN SATURATION: 92 % | DIASTOLIC BLOOD PRESSURE: 72 MMHG | TEMPERATURE: 98.9 F | WEIGHT: 193 LBS | SYSTOLIC BLOOD PRESSURE: 120 MMHG | HEIGHT: 69 IN | HEART RATE: 91 BPM | BODY MASS INDEX: 28.58 KG/M2

## 2023-07-24 DIAGNOSIS — J98.4 RESTRICTIVE LUNG DISEASE: ICD-10-CM

## 2023-07-24 DIAGNOSIS — C34.91 ADENOCARCINOMA OF RIGHT LUNG (HCC): ICD-10-CM

## 2023-07-24 DIAGNOSIS — J90 RECURRENT RIGHT PLEURAL EFFUSION: Primary | ICD-10-CM

## 2023-07-24 DIAGNOSIS — G47.34 NOCTURNAL HYPOXEMIA: ICD-10-CM

## 2023-07-24 PROCEDURE — 99214 OFFICE O/P EST MOD 30 MIN: CPT | Performed by: INTERNAL MEDICINE

## 2023-07-24 NOTE — ASSESSMENT & PLAN NOTE
Very complex cancer related pleural effusion. I reviewed the chest CT from July 2023 and compared it to the chest CT in February 2022. The effusion appears more complicated with more proteinaceous, possible cancer. There is now more enhancement in the pleura suggestive of pleural metastasis. This effusion is also contributed by postradiation right lung fibrosis, volume loss. Overall the effusion has increased in 6 months minimally. I do not think that a repeat thoracentesis would be beneficial at this time. The fluid looks very complex for thoracentesis and the lung is not likely to completely reexpand with the way the pleura appears. We had deferred ASEPT catheter placement in the past due to risk of infection and risks>benefits in the past and his last thoracentesis was in August 2022. Even though the effusion will likely never decreased, it has not affected his quality of life too much at this time.       At this point it's hard to say that drainage with ASEPT catheter or thora would be of best benefit for him due to the complex nature of the effusion and likely trapped lung with parenchymal volume loss from radiation/tumor

## 2023-07-24 NOTE — PROGRESS NOTES
Pulmonary Outpatient Follow Up Note   Usha Conde 71 y.o. male MRN: [id number]  7/26/2023      Assessment/Plan:    1. Recurrent right pleural effusion  Assessment & Plan:  Very complex cancer related pleural effusion. I reviewed the chest CT from July 2023 and compared it to the chest CT in February 2022. The effusion appears more complicated with more proteinaceous, possible cancer. There is now more enhancement in the pleura suggestive of pleural metastasis. This effusion is also contributed by postradiation right lung fibrosis, volume loss. Overall the effusion has increased in 6 months minimally. I do not think that a repeat thoracentesis would be beneficial at this time. The fluid looks very complex for thoracentesis and the lung is not likely to completely reexpand with the way the pleura appears. We had deferred ASEPT catheter placement in the past due to risk of infection and risks>benefits in the past and his last thoracentesis was in August 2022. Even though the effusion will likely never decreased, it has not affected his quality of life too much at this time. At this point it's hard to say that drainage with ASEPT catheter or thora would be of best benefit for him due to the complex nature of the effusion and likely trapped lung with parenchymal volume loss from radiation/tumor    Orders:  -     Ambulatory referral to Pulmonology    2. Nocturnal hypoxemia  Assessment & Plan:  He does not endorse sx of sleep apnea such as snoring, gasping, witnessed apneas, and does not have excessive daytime sleepiness. Likely would not tolerate a CPAP based on my conversations with him. He is on 2LPM O2 overnight. 29 minutes of nocturnal hypoxemia on an ambulatory sleep pulse ox in 12/2022 on room air. 3. Restrictive lung disease  Assessment & Plan:  Due to right lung volume loss related to the malignancy, radiation fibrosis, right-sided pleural effusion with pleural thickening.       4. Adenocarcinoma of right lung [facility])        Health Maintenance  Immunization History   Administered Date(s) Administered   • COVID-19 PFIZER VACCINE 0.3 ML IM 03/27/2021, 04/17/2021, 01/22/2022   • Pneumococcal Conjugate Vaccine 20-valent (Pcv20), Polysace 05/09/2022, 05/09/2022   • Tdap 12/10/2017       Return in about 1 year (around 7/24/2024). History of Present Illness   HPI:  Silvano Rehman is a 71 y.o. male who tobacco use since quit, DVT (2021) on Lovenox, Emphysema, stage IV NSCLC (adenocarcinoma) of the right lung with brain mets s/p chemo/XRT (carboplatin, pemetrexed 10/6-1/26/22, XRT 3/4/22-4/21/22) and now on Keytruda who I last saw in 12/2022 for oxygen need, recurrent pleural effusion. He continues to have similar quality of life compared to my last visit with him in Dec 2022. He denies any significant dyspnea at rest.  No cough with sputum or hemoptysis. He uses oxygen (2LPM) with sleep only. Occasional use of albuterol. He is no longer using oxygen at rest or with exertion (I called him in 10/11/22 to tell him to stop O2 with exertion) based on 6 minute walk. He is able to do house work and takes care of himself without any issues. He is not overly concerned regarding his breathing a history and activity level. He had an admission 4/14-4/18/2023 for presumed CAP. Procal was normal.  CT was consistent with ERON PNA. He was given 5 days of Rocephin and 2 days of Levaquin after that. Sputum cx with MRF. Possible UTI on that admission as well. He also received lasix for peripheral edema. Last visit with Dr. Litzy Schultz 7/10/2023. He is continue on radiation whole brain. Prior scan shows stable disease in the chest abdomen pelvis. He remains on maintenance therapy with Keytruda. He is also getting x-ray on a monthly basis. His energy good and he is not short of breath. No cough or hemoptysis. 7/3/2023 CT chest shows centrilobular emphysema. Improved groundglass opacities. Moderate size loculated pleural effusion with enhancing rim overall appearance is stable when compared to prior study in April 2023    He continues on albuterol nebulized and MDI. He sleeps on an incline. No witnessed apneas. No snoring. No complaints regarding his sleeping    Reports that he quit smoking about 19 years ago. His smoking use included cigarettes. He started smoking about 58 years ago. He has a 117.00 pack-year smoking history.      Historical Information   Past Medical History:   Diagnosis Date   • Chronic respiratory failure with hypoxia ([address]) 8/9/2021   • Impaired mobility and ADLs 8/13/2021   • Lung cancer ([address])    • Stroke [facility])      Past Surgical History:   Procedure Laterality Date   • IR BIOPSY LUNG  8/5/2021   • IR THORACENTESIS  2/24/2022   • IR THORACENTESIS  8/1/2022     Family History   Problem Relation Age of Onset   • Prostate cancer Brother    • Lung cancer Brother        Meds/Allergies     Current Outpatient Medications:   •  acetaminophen (TYLENOL) 325 mg tablet, Take 2 tablets (650 mg total) by mouth 4 (four) times a day as needed for mild pain, headaches or fever, Disp: , Rfl: 0  •  albuterol (2.5 mg/3 mL) 0.083 % nebulizer solution, Take 3 mL (2.5 mg total) by nebulization every 6 (six) hours as needed for wheezing or shortness of breath, Disp: 3 mL, Rfl: 2  •  albuterol (ProAir HFA) 90 mcg/act inhaler, Inhale 2 puffs every 6 (six) hours as needed for wheezing or shortness of breath, Disp: 8 g, Rfl: 0  •  atorvastatin (LIPITOR) 40 mg tablet, Take 1 tablet (40 mg total) by mouth daily with dinner, Disp: 90 tablet, Rfl: 0  •  enoxaparin (LOVENOX) 100 mg/mL, Inject 0.9 mL (90 mg total) under the skin every 12 (twelve) hours, Disp: 60 mL, Rfl: 5  •  folic acid (FOLVITE) 1 mg tablet, Take 1 tablet (1,000 mcg total) by mouth daily, Disp: 90 tablet, Rfl: 0  •  tamsulosin (FLOMAX) 0.4 mg, Take 1 capsule (0.4 mg total) by mouth daily after dinner, Disp: 90 capsule, Rfl: 1  • multivitamin (THERAGRAN) TABS, Take 1 tablet by mouth daily (Patient not taking: Reported on 7/10/2023), Disp: , Rfl:   No current facility-administered medications for this visit. Allergies   Allergen Reactions   • Doxycycline Rash       Vitals: Blood pressure 120/72, pulse 91, temperature 98.9 °F (37.2 °C), temperature source Tympanic, height 5' 9" (1.753 m), weight 87.5 kg (193 lb), SpO2 92 %. Body mass index is 28.5 kg/m². Oxygen Therapy  SpO2: 92 %  Oxygen Therapy: None (Room air)    Physical Exam  Constitutional:       General: He is not in acute distress. Appearance: He is not ill-appearing, toxic-appearing or diaphoretic. HENT:      Head: Normocephalic and atraumatic. Mouth/Throat:      Mouth: Mucous membranes are moist.      Pharynx: Oropharynx is clear. No oropharyngeal exudate. Eyes:      General: No scleral icterus. Extraocular Movements: Extraocular movements intact. Conjunctiva/sclera: Conjunctivae normal.   Cardiovascular:      Rate and Rhythm: Normal rate and regular rhythm. Pulmonary:      Effort: Pulmonary effort is normal. No respiratory distress. Breath sounds: No stridor. No wheezing, rhonchi or rales. Comments: Decreased breath sounds at the base of the right lung  Abdominal:      General: Abdomen is flat. There is no distension. Palpations: Abdomen is soft. Tenderness: There is no guarding. Musculoskeletal:         General: No swelling. Normal range of motion. Cervical back: Normal range of motion and neck supple. No rigidity. Skin:     General: Skin is warm and dry. Coloration: Skin is not jaundiced or pale. Findings: No lesion or rash. Neurological:      General: No focal deficit present. Mental Status: He is alert and oriented to person, place, and time. Mental status is at baseline. Motor: No weakness.    Psychiatric:         Mood and Affect: Mood normal.         Behavior: Behavior normal.         Thought Content: Thought content normal.         Labs: I have personally reviewed pertinent lab results. ABG: No results found for: "PHART", "WMM9TNY", "PO2ART", "VJG1CAU", "C1OPDQKB", "BEART", "SOURCE",   BNP:   Lab Results   Component Value Date    BNP 60 03/09/2023   ,   CBC:  Lab Results   Component Value Date    WBC 8.03 07/17/2023    HGB 8.2 (L) 07/17/2023    HCT 29.3 (L) 07/17/2023    MCV 87 07/17/2023     (H) 07/17/2023    EOSPCT 1 07/17/2023    EOSABS 0.11 07/17/2023    NEUTOPHILPCT 72 07/17/2023    LYMPHOPCT 13 (L) 07/17/2023   ,   CMP:   Lab Results   Component Value Date    SODIUM 141 07/17/2023    K 4.2 07/17/2023     (H) 07/17/2023    CO2 29 07/17/2023    BUN 11 07/17/2023    CREATININE 1.08 07/17/2023    CALCIUM 10.2 (H) 07/17/2023    AST 17 07/17/2023    ALT 13 07/17/2023    ALKPHOS 59 07/17/2023    EGFR 68 07/17/2023   ,   PT/INR:   Lab Results   Component Value Date    INR 0.91 04/14/2023   ,   Troponin:   Lab Results   Component Value Date    TROPONINI 0.38 (H) 08/21/2021         Imaging and other studies: I have personally reviewed pertinent reports. and I have personally reviewed pertinent films in PACS  7/3/23 CT C/A/P  LUNGS: Continued volume loss and chronic extensive opacification of the right lung is again noted diffusely most consistent with posttreatment change. Centrilobular emphysematous changes. Improved groundglass opacification noted previously in the medial left lung apex. Mild subsegmental atelectatic change left lung base. Central airways are patent.     PLEURA: Continued moderate-sized loculated pleural effusion with enhancing rim posterolateral inferior right hemithorax with mottled high attenuation within it similar to the prior exam and possibly reflecting hemorrhagic/proteinaceous/tumoral components. Overall appearance is stable when compared to the prior study. 11/22/2022 CT chest abdomen pelvis with contrast -no pulmonary embolism.   Consolidation and volume loss throughout the right lung compatible with postradiation changes. Partially obscured right upper lobe mass is decreasing in size. Unchanged size of the right-sided pleural effusion now with increased loculation and diffuse pleural thickening. Irregular thickening and nodularity in the effusion concerning for malignancy. No evidence of disease in the abdomen pelvis      Pulmonary function testing:   Pulmonary Functions Testing Results:  Results:  FEV1/FVC Ratio: 87 %  Forced Vital Capacity: 1.92 L    48 % predicted  FEV1: 1.66 L     55 % predicted  Post bronchodilator response: no increase     Lung volumes by body plethysmography:   Total Lung Capacity 53 % predicted   Residual volume 70 % predicted     DLCO corrected for patients hemoglobin level: 47 %     Interpretation:     • Spirometry suggestive of restriction     • No significant improvement in airflow or forced vital capacity in response to the administration to bronchodilator per ATS standards.      • Moderate restriction as indicated by the lung volumes     • Moderate decrease in diffusion capacity     • Flow-volume loop consistent with restriction       Transthoracic Echo:  LEFT VENTRICLE:  Systolic function was normal by visual assessment. Ejection fraction was estimated to be 55 %. There were no regional wall motion abnormalities.     MITRAL VALVE:  There was trace regurgitation.     TRICUSPID VALVE:  There was trace regurgitation.     PULMONIC VALVE:  There was trace regurgitation.     AORTA:  The root exhibited mild dilatation. There was mild dilatation of the ascending aorta.     PERICARDIUM:  A trace pericardial effusion was identified.       Thaddeus Hernandez MD  Pulmonary, Critical Care and Sleep Medicine  [facility]

## 2023-07-24 NOTE — ASSESSMENT & PLAN NOTE
Due to right lung volume loss related to the malignancy, radiation fibrosis, right-sided pleural effusion with pleural thickening.

## 2023-07-26 ENCOUNTER — HOSPITAL ENCOUNTER (OUTPATIENT)
Dept: MRI IMAGING | Facility: HOSPITAL | Age: 73
Discharge: HOME | End: 2023-07-26
Payer: MEDICARE

## 2023-07-26 DIAGNOSIS — C79.31 MALIGNANT NEOPLASM METASTATIC TO BRAIN (HCC): ICD-10-CM

## 2023-07-26 PROCEDURE — 70553 MRI BRAIN STEM W/O & W/DYE: CPT

## 2023-07-26 PROCEDURE — A9585 GADOBUTROL INJECTION: HCPCS | Performed by: RADIOLOGY

## 2023-07-26 RX ADMIN — GADOBUTROL 8 ML: 604.72 INJECTION INTRAVENOUS at 16:31

## 2023-07-27 NOTE — ASSESSMENT & PLAN NOTE
He does not endorse sx of sleep apnea such as snoring, gasping, witnessed apneas, and does not have excessive daytime sleepiness. Likely would not tolerate a CPAP based on my conversations with him. He is on 2LPM O2 overnight. 29 minutes of nocturnal hypoxemia on an ambulatory sleep pulse ox in 12/2022 on room air.

## 2023-07-28 ENCOUNTER — DOCUMENTATION (OUTPATIENT)
Dept: HEMATOLOGY ONCOLOGY | Facility: CLINIC | Age: 73
End: 2023-07-28

## 2023-07-28 NOTE — PROGRESS NOTES
In-basket message received from Gen Alexander RN to add patient to [facility] on 8/2/2023. Chart reviewed and prep completed.

## 2023-08-02 ENCOUNTER — DOCUMENTATION (OUTPATIENT)
Dept: RADIATION ONCOLOGY | Facility: HOSPITAL | Age: 73
End: 2023-08-02

## 2023-08-02 ENCOUNTER — CLINICAL SUPPORT (OUTPATIENT)
Dept: RADIATION ONCOLOGY | Facility: HOSPITAL | Age: 73
End: 2023-08-02
Attending: RADIOLOGY
Payer: MEDICARE

## 2023-08-02 VITALS
WEIGHT: 192.4 LBS | BODY MASS INDEX: 28.5 KG/M2 | HEART RATE: 91 BPM | OXYGEN SATURATION: 95 % | SYSTOLIC BLOOD PRESSURE: 118 MMHG | HEIGHT: 69 IN | RESPIRATION RATE: 18 BRPM | TEMPERATURE: 98.4 F | DIASTOLIC BLOOD PRESSURE: 68 MMHG

## 2023-08-02 DIAGNOSIS — C34.11 MALIGNANT NEOPLASM OF UPPER LOBE OF RIGHT LUNG (HCC): ICD-10-CM

## 2023-08-02 DIAGNOSIS — I63.9 ACUTE CVA (CEREBROVASCULAR ACCIDENT) (HCC): ICD-10-CM

## 2023-08-02 DIAGNOSIS — C79.31 MALIGNANT NEOPLASM METASTATIC TO BRAIN (HCC): Primary | ICD-10-CM

## 2023-08-02 PROCEDURE — 99214 OFFICE O/P EST MOD 30 MIN: CPT | Performed by: RADIOLOGY

## 2023-08-02 PROCEDURE — 99211 OFF/OP EST MAY X REQ PHY/QHP: CPT | Performed by: RADIOLOGY

## 2023-08-02 RX ORDER — FOLIC ACID 1 MG/1
1000 TABLET ORAL DAILY
Qty: 90 TABLET | Refills: 0 | Status: SHIPPED | OUTPATIENT
Start: 2023-08-02

## 2023-08-02 RX ORDER — ATORVASTATIN CALCIUM 40 MG/1
40 TABLET, FILM COATED ORAL
Qty: 90 TABLET | Refills: 0 | Status: SHIPPED | OUTPATIENT
Start: 2023-08-02

## 2023-08-02 NOTE — PROGRESS NOTES
NEURO ONCOLOGY CASE REVIEW     DATE: 8/2/2023    PRESENTING DOCTOR: Dr Sury Mi    DIAGNOSIS:  Stage IV adenocarcinoma of the lung; Brain metastasis       Obey Boyer is a 71 y.o. male who was presented at Neuro Oncology Case Review today. History of adenocarcinoma NSCLC of right upper lobe diagnosed in August 2021. He completed chemotherapy for cytoreduction followed by concurrent chemoRT, completed 4/21/2022. He continued with systemic immunotherapy with Keytruda. He is followed by Dr Eli Desouza. 2/3/2023 Admitted with progressive right hand weakness and handgrip difficulty. Brain imaging revealed 1.2 cm metastatic lesion in the left precentral gyrus with marked surrounding edema. He completed SRS to this lesion on 2/22/23. Patient was presented today to review his recent MRI brain findings. PHYSICIAN RECOMMENDED PLAN:   - Continue MRI brain surveillance imaging if patient is asymptomatic    Patient is scheduled for radiation oncology follow up today with Dr Cornelius Lyon.   8/23/23 Hematology oncology follow up, Dr Mikey Corrales    Imaging Reviewed:   7/26/23 MRI brain: Treated metastasis in the posterior left frontal lobe is increased in size with increased edema. This could represent progression of disease versus radiation necrosis. Recommend close interval follow-up.    4/21/23 MRI brain      Team agreed to plan. NCCN guidelines were readily available for review at this discussion. The final treatment plan will be left at the discretion of the patient and the treating physician. DISCLAIMERS:  TO THE TREATING PHYSICIAN:  This conference is a meeting of clinicians from various specialty areas who evaluate and discuss patients for whom a multidisciplinary treatment approach is being considered. Please note that the above opinion was a consensus of the conference attendees and is intended only to assist in quality care of the discussed patient.   The responsibility for follow up on the input given during the conference, along with any final decisions regarding plan of care, is that of the patient and the patient's provider. Accordingly, appointments have only been recommended based on this information; and have NOT been scheduled unless otherwise noted. TO THE PATIENT:  This summary is a brief record of major aspects of your cancer treatment. You may choose to can share a your copy with any of your doctors or nurses. However, this is not a detailed or comprehensive record of your care.

## 2023-08-02 NOTE — PROGRESS NOTES
Follow-up - Radiation Oncology   [facility] 1950 71 y.o. male [id number]      History of Present Illness   Cancer Staging   Malignant neoplasm of upper lobe of right lung [facility])  Staging form: Lung, AJCC 8th Edition  - Clinical stage from 9/23/2021: Stage IIIC (cT3, cN3, cM0) - Signed by Corinne Sears MD on 9/23/2021  Histopathologic type: Adenocarcinoma, NOS        Interval History:  Obi Castillo 1950 is a 71 y.o. male with history of adenocarcinoma NSCLC of right upper lobe diagnosed in August 2021. Previous radiation under the care of Dr. Jory Gonzalez, he completed chemotherapy for cytoreduction followed by concurrent chemoRT to right lung/mediastinum completed 4/21/22. He continues with systemic immunotherapy with Keytruda. He was found to have a solitary metastasis in the left precentral gyrus region with surrounding edema.  He completed SRS to this lesion on 2/22/23. He was last seen on 4/26/23 and returns today for follow up.     7/3/23 CT c/a/p w contrast  1. Stable CT appearance of the chest, abdomen, and pelvis without evidence for disease progression. 2. Stable posttreatment change in the right hemithorax with diffuse volume loss/opacification presumably reflecting posttreatment change. 3. Stable moderate size loculated complex right pleural effusion as described above. 4. Additional stable findings as noted.     7/10/23 Dr. Kadeem Gallego  remains on maintenance therapy with [facility].    change his [place] to be every 3 months   Follow up 6 weeks     7/24/23 Pulmonary  Very complex cancer related pleural effusion.  I reviewed the chest CT from July 2023 and compared it to the chest CT in February 2022.  The effusion appears more complicated with more proteinaceous, possible cancer. Pari Fisherman is now more enhancement in the pleura suggestive of pleural metastasis.  This effusion is also contributed by postradiation right lung fibrosis, volume loss.   do not think that a repeat thoracentesis would be beneficial at this time. At this point it's hard to say that drainage with ASEPT catheter or thora would be of best benefit for him due to the complex nature of the effusion and likely trapped lung with parenchymal volume loss from radiation/tumor  He is on 2LPM O2 overnight.        23 MRI brain w wo contrast  IMPRESSION:  Treated metastasis in the posterior left frontal lobe is increased in size with increased edema. This could represent progression of disease versus radiation necrosis.  Recommend close interval follow-up.     Upcomin23 Infusion  23 Dr. Sidney Laughlin  24 Dr. Janet Powers   Oncology History   Adenocarcinoma of lung   2021 Initial Diagnosis    Adenocarcinoma of lung     2021 Biopsy    Right lung apex, image-guided core needle biopsy:  - Adenocarcinoma      10/6/2021 - 2022 Chemotherapy    cyanocobalamin, 1,000 mcg, Intramuscular, Once, 3 of 3 cycles  Administration: 1,000 mcg (2021), 1,000 mcg (2022), 1,000 mcg (10/6/2021)  pegfilgrastim (Dang Graver), 6 mg, Subcutaneous, Once, 1 of 1 cycle  Administration: 6 mg (2021)  pegfilgrastim (Cheryle Ada), 6 mg, Subcutaneous, Once, 6 of 6 cycles  Administration: 6 mg (10/6/2021), 6 mg (11/3/2021), 6 mg (2021), 6 mg (12/15/2021), 6 mg (2022)  fosaprepitant (EMEND) IVPB, 150 mg, Intravenous, Once, 6 of 6 cycles  Administration: 150 mg (10/6/2021), 150 mg (2021), 150 mg (12/15/2021), 150 mg (2022), 150 mg (11/3/2021), 150 mg (2022)  CARBOplatin (PARAPLATIN) IVPB (GO AUC DOSING), 519.5 mg, Intravenous, Once, 6 of 6 cycles  Administration: 519.5 mg (10/6/2021), 574 mg (2021), 600 mg (12/15/2021), 533 mg (2022), 604.5 mg (11/3/2021), 563 mg (2022)  pemetrexed (ALIMTA) chemo infusion, 970 mg, Intravenous, Once, 6 of 6 cycles  Administration: 1,000 mg (10/6/2021), 1,000 mg (2021), 1,000 mg (12/15/2021), 1,000 mg (2022), 1,000 mg (11/3/2021), 1,000 mg (1/5/2022)  pembrolizumab (KEYTRUDA) IVPB, 200 mg, Intravenous, Once, 6 of 6 cycles  Administration: 200 mg (10/6/2021), 200 mg (11/24/2021), 200 mg (12/15/2021), 200 mg (1/26/2022), 200 mg (11/3/2021), 200 mg (1/5/2022)     3/7/2022 -  Chemotherapy    CARBOplatin (PARAPLATIN) IVPB (GOG AUC DOSING), , Intravenous, Once, 6 of 6 cycles  Administration: 211.6 mg (3/7/2022), 208 mg (3/14/2022), 238.8 mg (3/21/2022), 211.6 mg (3/28/2022), 215.2 mg (4/4/2022), 213.4 mg (4/18/2022)  PACLItaxel (TAXOL) chemo IVPB, 50 mg/m2 = 99 mg, Intravenous, Once, 6 of 6 cycles  Administration: 99 mg (3/7/2022), 99 mg (3/14/2022), 99 mg (3/21/2022), 99 mg (3/28/2022), 99 mg (4/4/2022), 99 mg (4/18/2022)  pembrolizumab (KEYTRUDA) IVPB, 200 mg, Intravenous, Once, 23 of 27 cycles  Administration: 200 mg (3/7/2022), 200 mg (3/28/2022), 200 mg (4/25/2022), 200 mg (5/16/2022), 200 mg (6/6/2022), 200 mg (6/27/2022), 200 mg (7/18/2022), 200 mg (8/8/2022), 200 mg (8/29/2022), 200 mg (9/19/2022), 200 mg (10/10/2022), 200 mg (10/31/2022), 200 mg (11/21/2022), 200 mg (12/12/2022), 200 mg (1/3/2023), 200 mg (1/23/2023), 200 mg (2/13/2023), 200 mg (3/27/2023), 200 mg (4/26/2023), 200 mg (5/17/2023), 200 mg (6/7/2023), 200 mg (6/28/2023), 200 mg (7/19/2023)     2/22/2023 - 2/22/2023 Radiation    SRS left precentral gyrus   2000 cGy    Dr. Naomi Hubbard     Malignant neoplasm of upper lobe of right lung ([address])   9/3/2021 Initial Diagnosis    Malignant neoplasm of upper lobe of right lung ([address])     9/23/2021 -  Cancer Staged    Staging form: Lung, AJCC 8th Edition  - Clinical stage from 9/23/2021: Stage IIIC (cT3, cN3, cM0) - Signed by Courtney Green MD on 9/23/2021  Histopathologic type:  Adenocarcinoma, NOS       10/6/2021 - 1/26/2022 Chemotherapy    cyanocobalamin, 1,000 mcg, Intramuscular, Once, 3 of 3 cycles  Administration: 1,000 mcg (11/24/2021), 1,000 mcg (1/26/2022), 1,000 mcg (10/6/2021)  pegfilgrastim (NEULASTA), 6 mg, Subcutaneous, Once, 1 of 1 cycle  Administration: 6 mg (11/26/2021)  pegfilgrastim (Severo Skandrea), 6 mg, Subcutaneous, Once, 6 of 6 cycles  Administration: 6 mg (10/6/2021), 6 mg (11/3/2021), 6 mg (11/24/2021), 6 mg (12/15/2021), 6 mg (1/5/2022)  fosaprepitant (EMEND) IVPB, 150 mg, Intravenous, Once, 6 of 6 cycles  Administration: 150 mg (10/6/2021), 150 mg (11/24/2021), 150 mg (12/15/2021), 150 mg (1/26/2022), 150 mg (11/3/2021), 150 mg (1/5/2022)  CARBOplatin (PARAPLATIN) IVPB ([facility] AUC DOSING), 519.5 mg, Intravenous, Once, 6 of 6 cycles  Administration: 519.5 mg (10/6/2021), 574 mg (11/24/2021), 600 mg (12/15/2021), 533 mg (1/26/2022), 604.5 mg (11/3/2021), 563 mg (1/5/2022)  pemetrexed (ALIMTA) chemo infusion, 970 mg, Intravenous, Once, 6 of 6 cycles  Administration: 1,000 mg (10/6/2021), 1,000 mg (11/24/2021), 1,000 mg (12/15/2021), 1,000 mg (1/26/2022), 1,000 mg (11/3/2021), 1,000 mg (1/5/2022)  pembrolizumab (KEYTRUDA) IVPB, 200 mg, Intravenous, Once, 6 of 6 cycles  Administration: 200 mg (10/6/2021), 200 mg (11/24/2021), 200 mg (12/15/2021), 200 mg (1/26/2022), 200 mg (11/3/2021), 200 mg (1/5/2022)     3/4/2022 - 4/21/2022 Radiation    The patient saw [unfilled] for radiation treatment.  This is the current list of radiation treatment:  Plan ID Energy Fractions Dose per Fraction (cGy) Dose Correction (cGy) Total Dose Delivered (cGy) Elapsed Days   R LUNGMED REV 6X 30 / 30 200 0 6,000 48      Treatment dates:  C1: 3/4/2022 - 4/21/2022         3/7/2022 -  Chemotherapy    CARBOplatin (PARAPLATIN) IVPB (GOG AUC DOSING), , Intravenous, Once, 6 of 6 cycles  Administration: 211.6 mg (3/7/2022), 208 mg (3/14/2022), 238.8 mg (3/21/2022), 211.6 mg (3/28/2022), 215.2 mg (4/4/2022), 213.4 mg (4/18/2022)  PACLItaxel (TAXOL) chemo IVPB, 50 mg/m2 = 99 mg, Intravenous, Once, 6 of 6 cycles  Administration: 99 mg (3/7/2022), 99 mg (3/14/2022), 99 mg (3/21/2022), 99 mg (3/28/2022), 99 mg (4/4/2022), 99 mg (4/18/2022)  pembrolizumab (KEYTRUDA) IVPB, 200 mg, Intravenous, Once,  of 27 cycles  Administration: 200 mg (3/7/2022), 200 mg (3/28/2022), 200 mg (2022), 200 mg (2022), 200 mg (2022), 200 mg (2022), 200 mg (2022), 200 mg (2022), 200 mg (2022), 200 mg (2022), 200 mg (10/10/2022), 200 mg (10/31/2022), 200 mg (2022), 200 mg (2022), 200 mg (1/3/2023), 200 mg (2023), 200 mg (2023), 200 mg (3/27/2023), 200 mg (2023), 200 mg (2023), 200 mg (2023), 200 mg (2023), 200 mg (2023)     2023 - 2023 Radiation    SRS left precentral gyrus   2000 cGy    Dr. Adolfo Hobbs     Brain metastasis   2/3/2023 Initial Diagnosis    Brain metastasis     2023 - 2023 Radiation    SRS left precentral gyrus   2000 cGy    Dr. Adolfo Hobbs         Past Medical History:   Diagnosis Date   • Chronic respiratory failure with hypoxia ([address]) 2021   • Impaired mobility and ADLs 2021   • Lung cancer ([address])    • Stroke [facility])      Past Surgical History:   Procedure Laterality Date   • IR BIOPSY LUNG  2021   • IR THORACENTESIS  2022   • IR THORACENTESIS  2022       Social History   Social History     Substance and Sexual Activity   Alcohol Use Not Currently    Comment: No ETOH since Summer 2021      Social History     Substance and Sexual Activity   Drug Use Never     Social History     Tobacco Use   Smoking Status Former   • Packs/day: 3.00   • Years: 39.00   • Total pack years: 117.00   • Types: Cigarettes   • Start date:    • Quit date:    • Years since quittin.5   Smokeless Tobacco Never         Meds/Allergies     Current Outpatient Medications:   •  acetaminophen (TYLENOL) 325 mg tablet, Take 2 tablets (650 mg total) by mouth 4 (four) times a day as needed for mild pain, headaches or fever, Disp: , Rfl: 0  •  albuterol (2.5 mg/3 mL) 0.083 % nebulizer solution, Take 3 mL (2.5 mg total) by nebulization every 6 (six) hours as needed for wheezing or shortness of breath, Disp: 3 mL, Rfl: 2  •  albuterol (ProAir HFA) 90 mcg/act inhaler, Inhale 2 puffs every 6 (six) hours as needed for wheezing or shortness of breath, Disp: 8 g, Rfl: 0  •  atorvastatin (LIPITOR) 40 mg tablet, TAKE 1 TABLET BY MOUTH DAILY WITH DINNER, Disp: 90 tablet, Rfl: 0  •  enoxaparin (LOVENOX) 100 mg/mL, Inject 0.9 mL (90 mg total) under the skin every 12 (twelve) hours, Disp: 60 mL, Rfl: 5  •  folic acid (FOLVITE) 1 mg tablet, Take 1 tablet (1,000 mcg total) by mouth daily, Disp: 90 tablet, Rfl: 0  •  tamsulosin (FLOMAX) 0.4 mg, Take 1 capsule (0.4 mg total) by mouth daily after dinner, Disp: 90 capsule, Rfl: 1  •  multivitamin (THERAGRAN) TABS, Take 1 tablet by mouth daily (Patient not taking: Reported on 7/10/2023), Disp: , Rfl:   Allergies   Allergen Reactions   • Doxycycline Rash         Review of Systems   Constitutional: Positive for appetite change. Eyes:        Wears glasses   Respiratory: Negative. Cardiovascular: Negative. Gastrointestinal: Negative. Endocrine: Negative. Genitourinary: Negative. Musculoskeletal: Negative. Skin: Negative. Allergic/Immunologic: Negative. Neurological: Negative for dizziness, weakness, light-headedness, numbness and headaches. Hematological: Negative. Psychiatric/Behavioral: Negative.           OBJECTIVE:   /68 (BP Location: Left arm, Patient Position: Sitting, Cuff Size: Standard)   Pulse 91   Temp 98.4 °F (36.9 °C) (Temporal)   Resp 18   Ht 5' 9" (1.753 m)   Wt 87.3 kg (192 lb 6.4 oz)   SpO2 95%   BMI 28.41 kg/m²   Pain Assessment:  0  ECOG/Zubrod/WHO: 0 - Asymptomatic    Physical Exam   He is conversing appropriately. His breathing is unlabored. Ambulating dependently.         RESULTS    Lab Results:   Recent Results (from the past 672 hour(s))   T3, free    Collection Time: 07/17/23  8:31 AM   Result Value Ref Range    T3, Free 3.02 2.50 - 3.90 pg/mL   TSH, 3rd generation with Free T4 reflex    Collection Time: 07/17/23  8:31 AM Result Value Ref Range    TSH 3RD GENERATON 6.890 (H) 0.450 - 4.500 uIU/mL   Comprehensive metabolic panel    Collection Time: 07/17/23  8:31 AM   Result Value Ref Range    Sodium 141 135 - 147 mmol/L    Potassium 4.2 3.5 - 5.3 mmol/L    Chloride 110 (H) 96 - 108 mmol/L    CO2 29 21 - 32 mmol/L    ANION GAP 2 mmol/L    BUN 11 5 - 25 mg/dL    Creatinine 1.08 0.60 - 1.30 mg/dL    Glucose, Fasting 115 (H) 65 - 99 mg/dL    Calcium 10.2 (H) 8.3 - 10.1 mg/dL    Corrected Calcium 11.5 (H) 8.3 - 10.1 mg/dL    AST 17 5 - 45 U/L    ALT 13 12 - 78 U/L    Alkaline Phosphatase 59 46 - 116 U/L    Total Protein 7.1 6.4 - 8.4 g/dL    Albumin 2.4 (L) 3.5 - 5.0 g/dL    Total Bilirubin 0.23 0.20 - 1.00 mg/dL    eGFR 68 ml/min/1.73sq m   CBC and differential    Collection Time: 07/17/23  8:31 AM   Result Value Ref Range    WBC 8.03 4.31 - 10.16 Thousand/uL    RBC 3.37 (L) 3.88 - 5.62 Million/uL    Hemoglobin 8.2 (L) 12.0 - 17.0 g/dL    Hematocrit 29.3 (L) 36.5 - 49.3 %    MCV 87 82 - 98 fL    MCH 24.3 (L) 26.8 - 34.3 pg    MCHC 28.0 (L) 31.4 - 37.4 g/dL    RDW 18.3 (H) 11.6 - 15.1 %    MPV 10.7 8.9 - 12.7 fL    Platelets 605 (H) 983 - 390 Thousands/uL    nRBC 0 /100 WBCs    Neutrophils Relative 72 43 - 75 %    Immat GRANS % 1 0 - 2 %    Lymphocytes Relative 13 (L) 14 - 44 %    Monocytes Relative 12 4 - 12 %    Eosinophils Relative 1 0 - 6 %    Basophils Relative 1 0 - 1 %    Neutrophils Absolute 5.80 1.85 - 7.62 Thousands/µL    Immature Grans Absolute 0.05 0.00 - 0.20 Thousand/uL    Lymphocytes Absolute 1.06 0.60 - 4.47 Thousands/µL    Monocytes Absolute 0.97 0.17 - 1.22 Thousand/µL    Eosinophils Absolute 0.11 0.00 - 0.61 Thousand/µL    Basophils Absolute 0.04 0.00 - 0.10 Thousands/µL   T4, free    Collection Time: 07/17/23  8:31 AM   Result Value Ref Range    Free T4 0.80 0.61 - 1.12 ng/dL       Imaging Studies:MRI brain w wo contrast    Result Date: 7/27/2023  Narrative: MRI BRAIN WITH AND WITHOUT CONTRAST INDICATION: C79.31: Secondary malignant neoplasm of brain. SBRT of brain met 2/2/2023 COMPARISON: Several MRIs most recently performed on 4/21/2023. TECHNIQUE: Multiplanar, multisequence imaging of the brain was performed before and after gadolinium administration. IV Contrast:  8 mL of Gadobutrol injection (SINGLE-DOSE) IMAGE QUALITY:   Diagnostic. FINDINGS: BRAIN PARENCHYMA: Previously treated enhancing metastatic lesion in the posterior left frontal lobe measures 1.2 cm on image 109 series 12, increased from 6 mm. Small mount of associated hemosiderin deposition. There is increased vasogenic edema. No increased blood flow on ASL. This could represent progression of disease versus radiation necrosis. Recommend close interval follow-up. No new lesions. Stable chronic lacunar infarcts in the basal ganglia with hemosiderin deposition. T2/FLAIR hyperintensities in the periventricular and subcortical matter due to chronic microangiopathy. No acute infarct. No shift or herniation. VENTRICLES: Ex vacuo dilatation of the left lateral ventricle. Volume loss. No hydrocephalus. SELLA AND PITUITARY GLAND:  Normal. ORBITS: Right lens replacement. PARANASAL SINUSES: Opacification of the right sphenoid sinus VASCULATURE:  Evaluation of the major intracranial vasculature demonstrates appropriate flow voids. CALVARIUM AND SKULL BASE:  Normal. EXTRACRANIAL SOFT TISSUES:  Normal.     Impression: Treated metastasis in the posterior left frontal lobe is increased in size with increased edema. This could represent progression of disease versus radiation necrosis. Recommend close interval follow-up. Workstation performed: [id number]           Assessment/Plan:  No orders of the defined types were placed in this encounter. Kinga Dorado is a 71y.o. year old male who is 6 months post SRS for brain metastasis. His MRI scan was reviewed in neuro-oncology rounds today.   I have reviewed with the family that although the measurement of his lesion is increased the group felt this is likely posttreatment changes. Recommendation was for follow-up MRI of the brain in 3 months. We discussed that should he experience any symptoms to contact our department and move up his MRI. Otherwise he will return to neuro-oncology clinic in 3 months. Curtis Rader MD  1/8/0323,0:40 PM    Portions of the record may have been created with voice recognition software.  Occasional wrong word or "sound a like" substitutions may have occurred due to the inherent limitations of voice recognition software.  Read the chart carefully and recognize, using context, where substitutions have occurred.

## 2023-08-02 NOTE — PROGRESS NOTES
Roseline Lazaro 1950 is a 71 y.o. male with history of adenocarcinoma NSCLC of right upper lobe diagnosed in August 2021. Previous radiation under the care of Dr. Ana Rios, he completed chemotherapy for cytoreduction followed by concurrent chemoRT to right lung/mediastinum completed 4/21/22. He continues with systemic immunotherapy with Keytruda. He was found to have a solitary metastasis in the left precentral gyrus region with surrounding edema. He completed SRS to this lesion on 2/22/23. He was last seen on 4/26/23 and returns today for follow up. 7/3/23 CT c/a/p w contrast  1. Stable CT appearance of the chest, abdomen, and pelvis without evidence for disease progression. 2. Stable posttreatment change in the right hemithorax with diffuse volume loss/opacification presumably reflecting posttreatment change. 3. Stable moderate size loculated complex right pleural effusion as described above. 4. Additional stable findings as noted. 7/10/23 Dr. Gold Bose  remains on maintenance therapy with Amy castorena. change his Sharlon Lofty to be every 3 months   Follow up 6 weeks    7/24/23 Pulmonary  Very complex cancer related pleural effusion. I reviewed the chest CT from July 2023 and compared it to the chest CT in February 2022. The effusion appears more complicated with more proteinaceous, possible cancer. There is now more enhancement in the pleura suggestive of pleural metastasis. This effusion is also contributed by postradiation right lung fibrosis, volume loss. do not think that a repeat thoracentesis would be beneficial at this time.   At this point it's hard to say that drainage with ASEPT catheter or thora would be of best benefit for him due to the complex nature of the effusion and likely trapped lung with parenchymal volume loss from radiation/tumor  He is on 2LPM O2 overnight.      7/26/23 MRI brain w wo contrast  IMPRESSION:  Treated metastasis in the posterior left frontal lobe is increased in size with increased edema. This could represent progression of disease versus radiation necrosis. Recommend close interval follow-up.     Upcomin23 Infusion  23 Dr. Piper Hull  24 Dr. Bloom [facility]      Oncology History   Adenocarcinoma of lung   2021 Initial Diagnosis    Adenocarcinoma of lung     2021 Biopsy    Right lung apex, image-guided core needle biopsy:  - Adenocarcinoma      10/6/2021 - 2022 Chemotherapy    cyanocobalamin, 1,000 mcg, Intramuscular, Once, 3 of 3 cycles  Administration: 1,000 mcg (2021), 1,000 mcg (2022), 1,000 mcg (10/6/2021)  pegfilgrastim (Alexander Claw), 6 mg, Subcutaneous, Once, 1 of 1 cycle  Administration: 6 mg (2021)  pegfilgrastim (Jeff Garrett), 6 mg, Subcutaneous, Once, 6 of 6 cycles  Administration: 6 mg (10/6/2021), 6 mg (11/3/2021), 6 mg (2021), 6 mg (12/15/2021), 6 mg (2022)  fosaprepitant (EMEND) IVPB, 150 mg, Intravenous, Once, 6 of 6 cycles  Administration: 150 mg (10/6/2021), 150 mg (2021), 150 mg (12/15/2021), 150 mg (2022), 150 mg (11/3/2021), 150 mg (2022)  CARBOplatin (PARAPLATIN) IVPB (GO AUC DOSING), 519.5 mg, Intravenous, Once, 6 of 6 cycles  Administration: 519.5 mg (10/6/2021), 574 mg (2021), 600 mg (12/15/2021), 533 mg (2022), 604.5 mg (11/3/2021), 563 mg (2022)  pemetrexed (ALIMTA) chemo infusion, 970 mg, Intravenous, Once, 6 of 6 cycles  Administration: 1,000 mg (10/6/2021), 1,000 mg (2021), 1,000 mg (12/15/2021), 1,000 mg (2022), 1,000 mg (11/3/2021), 1,000 mg (2022)  pembrolizumab (KEYTRUDA) IVPB, 200 mg, Intravenous, Once, 6 of 6 cycles  Administration: 200 mg (10/6/2021), 200 mg (2021), 200 mg (12/15/2021), 200 mg (2022), 200 mg (11/3/2021), 200 mg (2022)     3/7/2022 -  Chemotherapy    CARBOplatin (PARAPLATIN) IVPB ([facility] AUC DOSING), , Intravenous, Once, 6 of 6 cycles  Administration: 211.6 mg (3/7/2022), 208 mg (3/14/2022), 238.8 mg (3/21/2022), 211.6 mg (3/28/2022), 215.2 mg (4/4/2022), 213.4 mg (4/18/2022)  PACLItaxel (TAXOL) chemo IVPB, 50 mg/m2 = 99 mg, Intravenous, Once, 6 of 6 cycles  Administration: 99 mg (3/7/2022), 99 mg (3/14/2022), 99 mg (3/21/2022), 99 mg (3/28/2022), 99 mg (4/4/2022), 99 mg (4/18/2022)  pembrolizumab (KEYTRUDA) IVPB, 200 mg, Intravenous, Once, 23 of 27 cycles  Administration: 200 mg (3/7/2022), 200 mg (3/28/2022), 200 mg (4/25/2022), 200 mg (5/16/2022), 200 mg (6/6/2022), 200 mg (6/27/2022), 200 mg (7/18/2022), 200 mg (8/8/2022), 200 mg (8/29/2022), 200 mg (9/19/2022), 200 mg (10/10/2022), 200 mg (10/31/2022), 200 mg (11/21/2022), 200 mg (12/12/2022), 200 mg (1/3/2023), 200 mg (1/23/2023), 200 mg (2/13/2023), 200 mg (3/27/2023), 200 mg (4/26/2023), 200 mg (5/17/2023), 200 mg (6/7/2023), 200 mg (6/28/2023), 200 mg (7/19/2023)     2/22/2023 - 2/22/2023 Radiation    SRS left precentral gyrus   2000 cGy    Dr. Zeny Kellogg     Malignant neoplasm of upper lobe of right lung ([address])   9/3/2021 Initial Diagnosis    Malignant neoplasm of upper lobe of right lung ([address])     9/23/2021 -  Cancer Staged    Staging form: Lung, AJCC 8th Edition  - Clinical stage from 9/23/2021: Stage IIIC (cT3, cN3, cM0) - Signed by Hilda Rivera MD on 9/23/2021  Histopathologic type:  Adenocarcinoma, NOS       10/6/2021 - 1/26/2022 Chemotherapy    cyanocobalamin, 1,000 mcg, Intramuscular, Once, 3 of 3 cycles  Administration: 1,000 mcg (11/24/2021), 1,000 mcg (1/26/2022), 1,000 mcg (10/6/2021)  pegfilgrastim (Inge Maxin), 6 mg, Subcutaneous, Once, 1 of 1 cycle  Administration: 6 mg (11/26/2021)  pegfilgrastim (Duanne Danger), 6 mg, Subcutaneous, Once, 6 of 6 cycles  Administration: 6 mg (10/6/2021), 6 mg (11/3/2021), 6 mg (11/24/2021), 6 mg (12/15/2021), 6 mg (1/5/2022)  fosaprepitant (EMEND) IVPB, 150 mg, Intravenous, Once, 6 of 6 cycles  Administration: 150 mg (10/6/2021), 150 mg (11/24/2021), 150 mg (12/15/2021), 150 mg (1/26/2022), 150 mg (11/3/2021), 150 mg (1/5/2022)  CARBOplatin (PARAPLATIN) IVPB (GOG AUC DOSING), 519.5 mg, Intravenous, Once, 6 of 6 cycles  Administration: 519.5 mg (10/6/2021), 574 mg (11/24/2021), 600 mg (12/15/2021), 533 mg (1/26/2022), 604.5 mg (11/3/2021), 563 mg (1/5/2022)  pemetrexed (ALIMTA) chemo infusion, 970 mg, Intravenous, Once, 6 of 6 cycles  Administration: 1,000 mg (10/6/2021), 1,000 mg (11/24/2021), 1,000 mg (12/15/2021), 1,000 mg (1/26/2022), 1,000 mg (11/3/2021), 1,000 mg (1/5/2022)  pembrolizumab (KEYTRUDA) IVPB, 200 mg, Intravenous, Once, 6 of 6 cycles  Administration: 200 mg (10/6/2021), 200 mg (11/24/2021), 200 mg (12/15/2021), 200 mg (1/26/2022), 200 mg (11/3/2021), 200 mg (1/5/2022)     3/4/2022 - 4/21/2022 Radiation    The patient saw @[facility]@ for radiation treatment.  This is the current list of radiation treatment:  Plan ID Energy Fractions Dose per Fraction (cGy) Dose Correction (cGy) Total Dose Delivered (cGy) Elapsed Days   R LUNGMED REV 6X 30 / 30 200 0 6,000 48      Treatment dates:  C1: 3/4/2022 - 4/21/2022         3/7/2022 -  Chemotherapy    CARBOplatin (PARAPLATIN) IVPB (GOG AUC DOSING), , Intravenous, Once, 6 of 6 cycles  Administration: 211.6 mg (3/7/2022), 208 mg (3/14/2022), 238.8 mg (3/21/2022), 211.6 mg (3/28/2022), 215.2 mg (4/4/2022), 213.4 mg (4/18/2022)  PACLItaxel (TAXOL) chemo IVPB, 50 mg/m2 = 99 mg, Intravenous, Once, 6 of 6 cycles  Administration: 99 mg (3/7/2022), 99 mg (3/14/2022), 99 mg (3/21/2022), 99 mg (3/28/2022), 99 mg (4/4/2022), 99 mg (4/18/2022)  pembrolizumab (KEYTRUDA) IVPB, 200 mg, Intravenous, Once, 23 of 27 cycles  Administration: 200 mg (3/7/2022), 200 mg (3/28/2022), 200 mg (4/25/2022), 200 mg (5/16/2022), 200 mg (6/6/2022), 200 mg (6/27/2022), 200 mg (7/18/2022), 200 mg (8/8/2022), 200 mg (8/29/2022), 200 mg (9/19/2022), 200 mg (10/10/2022), 200 mg (10/31/2022), 200 mg (11/21/2022), 200 mg (12/12/2022), 200 mg (1/3/2023), 200 mg (1/23/2023), 200 mg (2/13/2023), 200 mg (3/27/2023), 200 mg (4/26/2023), 200 mg (5/17/2023), 200 mg (6/7/2023), 200 mg (6/28/2023), 200 mg (7/19/2023)     2/22/2023 - 2/22/2023 Radiation    SRS left precentral gyrus   2000 cGy    Dr. Ioana Eubanks     Brain metastasis   2/3/2023 Initial Diagnosis    Brain metastasis     2/22/2023 - 2/22/2023 Radiation    SRS left precentral gyrus   2000 cGy    Dr. Ioana Eubanks         Review of Systems:  Review of Systems   Constitutional: Positive for appetite change. Eyes:        Wears glasses   Respiratory: Negative. Cardiovascular: Negative. Gastrointestinal: Negative. Endocrine: Negative. Genitourinary: Negative. Musculoskeletal: Negative. Skin: Negative. Allergic/Immunologic: Negative. Neurological: Negative for dizziness, weakness, light-headedness, numbness and headaches. Hematological: Negative. Psychiatric/Behavioral: Negative.         Clinical Trial: no        Health Maintenance   Topic Date Due   • Hepatitis C Screening  Never done   • BMI: Followup Plan  Never done   • Hepatitis A Vaccine (1 of 2 - Risk 2-dose series) Never done   • Colorectal Cancer Screening  Never done   • Hepatitis B Vaccine (1 of 3 - Risk 3-dose series) Never done   • COVID-19 Vaccine (4 - Booster for Pfizer series) 03/19/2022   • Influenza Vaccine (1) 09/01/2023   • Fall Risk  05/24/2024   • Medicare Annual Wellness Visit (AWV)  05/24/2024   • BMI: Adult  07/24/2024   • Depression Screening  08/02/2024   • Pneumococcal Vaccine: 65+ Years  Completed   • HIB Vaccine  Aged Out   • IPV Vaccine  Aged Out   • Meningococcal ACWY Vaccine  Aged Out   • HPV Vaccine  Aged Out     Patient Active Problem List   Diagnosis   • History of stroke   • Bilateral lower extremity DVTs   • Acute respiratory failure with hypoxia ([address])   • Dysphagia   • Mass of upper lobe of right lung   • History of urinary retention   • Constipation   • Anemia of chronic disease   • Pulmonary embolism ([address])   • Impaired cognition   • Adenocarcinoma of lung   • Acute on chronic anemia   • Malignant neoplasm of upper lobe of right lung (HCC)   • COPD mixed type (HCC)   • Chemotherapy induced neutropenia (HCC)   • Hypercalcemia of malignancy   • Chronic anticoagulation   • Chronic right-sided heart failure (HCC)   • Brain injury, without loss of consciousness, subsequent encounter   • Recurrent right pleural effusion   • Restrictive lung disease   • Nocturnal hypoxemia   • Tobacco use disorder, severe, in sustained remission, dependence   • Brain metastasis   • History of venous thromboembolism   • Hyperlipidemia   • Vasogenic edema (HCC)   • Stage 3a chronic kidney disease (HCC)     Past Medical History:   Diagnosis Date   • Chronic respiratory failure with hypoxia ([address]) 2021   • Impaired mobility and ADLs 2021   • Lung cancer ([address])    • Stroke [facility])      Past Surgical History:   Procedure Laterality Date   • IR BIOPSY LUNG  2021   • IR THORACENTESIS  2022   • IR THORACENTESIS  2022     Family History   Problem Relation Age of Onset   • Prostate cancer Brother    • Lung cancer Brother      Social History     Socioeconomic History   • Marital status: /Civil Union     Spouse name: Not on file   • Number of children: Not on file   • Years of education: Not on file   • Highest education level: Not on file   Occupational History   • Not on file   Tobacco Use   • Smoking status: Former     Packs/day: 3.00     Years: 39.00     Total pack years: 117.00     Types: Cigarettes     Start date: 66     Quit date:      Years since quittin.5   • Smokeless tobacco: Never   Vaping Use   • Vaping Use: Never used   Substance and Sexual Activity   • Alcohol use: Not Currently     Comment: No ETOH since Summer 2021    • Drug use: Never   • Sexual activity: Yes     Partners: Female   Other Topics Concern   • Not on file   Social History Narrative   • Not on file     Social Determinants of Health     Financial Resource Strain: Low Risk  (2023)    Overall Financial Resource Strain (CARDIA)    • Difficulty of Paying Living Expenses: Not hard at all   Food Insecurity: No Food Insecurity (4/17/2023)    Hunger Vital Sign    • Worried About Running Out of Food in the Last Year: Never true    • Ran Out of Food in the Last Year: Never true   Transportation Needs: No Transportation Needs (5/24/2023)    PRAPARE - Transportation    • Lack of Transportation (Medical): No    • Lack of Transportation (Non-Medical):  No   Physical Activity: Not on file   Stress: Not on file   Social Connections: Not on file   Intimate Partner Violence: Not on file   Housing Stability: Low Risk  (4/17/2023)    Housing Stability Vital Sign    • Unable to Pay for Housing in the Last Year: No    • Number of Places Lived in the Last Year: 1    • Unstable Housing in the Last Year: No       Current Outpatient Medications:   •  acetaminophen (TYLENOL) 325 mg tablet, Take 2 tablets (650 mg total) by mouth 4 (four) times a day as needed for mild pain, headaches or fever, Disp: , Rfl: 0  •  albuterol (2.5 mg/3 mL) 0.083 % nebulizer solution, Take 3 mL (2.5 mg total) by nebulization every 6 (six) hours as needed for wheezing or shortness of breath, Disp: 3 mL, Rfl: 2  •  albuterol (ProAir HFA) 90 mcg/act inhaler, Inhale 2 puffs every 6 (six) hours as needed for wheezing or shortness of breath, Disp: 8 g, Rfl: 0  •  atorvastatin (LIPITOR) 40 mg tablet, TAKE 1 TABLET BY MOUTH DAILY WITH DINNER, Disp: 90 tablet, Rfl: 0  •  enoxaparin (LOVENOX) 100 mg/mL, Inject 0.9 mL (90 mg total) under the skin every 12 (twelve) hours, Disp: 60 mL, Rfl: 5  •  folic acid (FOLVITE) 1 mg tablet, Take 1 tablet (1,000 mcg total) by mouth daily, Disp: 90 tablet, Rfl: 0  •  tamsulosin (FLOMAX) 0.4 mg, Take 1 capsule (0.4 mg total) by mouth daily after dinner, Disp: 90 capsule, Rfl: 1  •  multivitamin (THERAGRAN) TABS, Take 1 tablet by mouth daily (Patient not taking: Reported on 7/10/2023), Disp: , Rfl:   Allergies   Allergen Reactions   • Doxycycline Rash     Vitals:    08/02/23 1326   BP: 118/68   BP Location: Left arm   Patient Position: Sitting   Cuff Size: Standard   Pulse: 91   Resp: 18   Temp: 98.4 °F (36.9 °C)   TempSrc: Temporal   SpO2: 95%   Weight: 87.3 kg (192 lb 6.4 oz)   Height: 5' 9" (1.753 m)      Pain Score: 0-No pain

## 2023-08-07 ENCOUNTER — APPOINTMENT (OUTPATIENT)
Dept: LAB | Facility: CLINIC | Age: 73
End: 2023-08-07
Payer: MEDICARE

## 2023-08-07 DIAGNOSIS — D70.1 NEUTROPENIA ASSOCIATED WITH MUCOSITIS DUE TO ANTINEOPLASTIC THERAPY (HCC): ICD-10-CM

## 2023-08-07 DIAGNOSIS — R91.8 LUNG MASS: ICD-10-CM

## 2023-08-07 DIAGNOSIS — N14.19 CIS-PLATINUM NEPHROPATHY: ICD-10-CM

## 2023-08-07 DIAGNOSIS — T45.1X5S NEUTROPENIA ASSOCIATED WITH MUCOSITIS DUE TO ANTINEOPLASTIC THERAPY (HCC): ICD-10-CM

## 2023-08-07 DIAGNOSIS — C34.90 MALIGNANT NEOPLASM OF BRONCHUS AND LUNG (HCC): ICD-10-CM

## 2023-08-07 DIAGNOSIS — C34.11 MALIGNANT NEOPLASM OF UPPER LOBE OF RIGHT LUNG (HCC): ICD-10-CM

## 2023-08-07 DIAGNOSIS — T45.1X5A CIS-PLATINUM NEPHROPATHY: ICD-10-CM

## 2023-08-07 DIAGNOSIS — K12.31 NEUTROPENIA ASSOCIATED WITH MUCOSITIS DUE TO ANTINEOPLASTIC THERAPY (HCC): ICD-10-CM

## 2023-08-07 LAB
ALBUMIN SERPL BCP-MCNC: 2.6 G/DL (ref 3.5–5)
ALP SERPL-CCNC: 60 U/L (ref 46–116)
ALT SERPL W P-5'-P-CCNC: 11 U/L (ref 12–78)
ANION GAP SERPL CALCULATED.3IONS-SCNC: 2 MMOL/L
AST SERPL W P-5'-P-CCNC: 19 U/L (ref 5–45)
BASOPHILS # BLD AUTO: 0.05 THOUSANDS/ÂΜL (ref 0–0.1)
BASOPHILS NFR BLD AUTO: 1 % (ref 0–1)
BILIRUB SERPL-MCNC: 0.36 MG/DL (ref 0.2–1)
BUN SERPL-MCNC: 12 MG/DL (ref 5–25)
CALCIUM ALBUM COR SERPL-MCNC: 11.3 MG/DL (ref 8.3–10.1)
CALCIUM SERPL-MCNC: 10.2 MG/DL (ref 8.3–10.1)
CHLORIDE SERPL-SCNC: 110 MMOL/L (ref 96–108)
CO2 SERPL-SCNC: 29 MMOL/L (ref 21–32)
CREAT SERPL-MCNC: 1.03 MG/DL (ref 0.6–1.3)
EOSINOPHIL # BLD AUTO: 0.07 THOUSAND/ÂΜL (ref 0–0.61)
EOSINOPHIL NFR BLD AUTO: 1 % (ref 0–6)
ERYTHROCYTE [DISTWIDTH] IN BLOOD BY AUTOMATED COUNT: 18.7 % (ref 11.6–15.1)
GFR SERPL CREATININE-BSD FRML MDRD: 72 ML/MIN/1.73SQ M
GLUCOSE P FAST SERPL-MCNC: 101 MG/DL (ref 65–99)
HCT VFR BLD AUTO: 29.1 % (ref 36.5–49.3)
HGB BLD-MCNC: 8.1 G/DL (ref 12–17)
IMM GRANULOCYTES # BLD AUTO: 0.04 THOUSAND/UL (ref 0–0.2)
IMM GRANULOCYTES NFR BLD AUTO: 1 % (ref 0–2)
LYMPHOCYTES # BLD AUTO: 1.13 THOUSANDS/ÂΜL (ref 0.6–4.47)
LYMPHOCYTES NFR BLD AUTO: 14 % (ref 14–44)
MCH RBC QN AUTO: 23.8 PG (ref 26.8–34.3)
MCHC RBC AUTO-ENTMCNC: 27.8 G/DL (ref 31.4–37.4)
MCV RBC AUTO: 85 FL (ref 82–98)
MONOCYTES # BLD AUTO: 0.94 THOUSAND/ÂΜL (ref 0.17–1.22)
MONOCYTES NFR BLD AUTO: 12 % (ref 4–12)
NEUTROPHILS # BLD AUTO: 5.92 THOUSANDS/ÂΜL (ref 1.85–7.62)
NEUTS SEG NFR BLD AUTO: 71 % (ref 43–75)
NRBC BLD AUTO-RTO: 0 /100 WBCS
PLATELET # BLD AUTO: 451 THOUSANDS/UL (ref 149–390)
PMV BLD AUTO: 9.5 FL (ref 8.9–12.7)
POTASSIUM SERPL-SCNC: 4.1 MMOL/L (ref 3.5–5.3)
PROT SERPL-MCNC: 7.3 G/DL (ref 6.4–8.4)
RBC # BLD AUTO: 3.41 MILLION/UL (ref 3.88–5.62)
SODIUM SERPL-SCNC: 141 MMOL/L (ref 135–147)
T3FREE SERPL-MCNC: 3.12 PG/ML (ref 2.5–3.9)
T4 FREE SERPL-MCNC: 0.73 NG/DL (ref 0.61–1.12)
TSH SERPL DL<=0.05 MIU/L-ACNC: 6.79 UIU/ML (ref 0.45–4.5)
WBC # BLD AUTO: 8.15 THOUSAND/UL (ref 4.31–10.16)

## 2023-08-07 PROCEDURE — 84481 FREE ASSAY (FT-3): CPT

## 2023-08-07 PROCEDURE — 84443 ASSAY THYROID STIM HORMONE: CPT

## 2023-08-07 PROCEDURE — 85025 COMPLETE CBC W/AUTO DIFF WBC: CPT

## 2023-08-07 PROCEDURE — 84439 ASSAY OF FREE THYROXINE: CPT

## 2023-08-07 PROCEDURE — 80053 COMPREHEN METABOLIC PANEL: CPT

## 2023-08-07 PROCEDURE — 36415 COLL VENOUS BLD VENIPUNCTURE: CPT

## 2023-08-07 RX ORDER — SODIUM CHLORIDE 9 MG/ML
20 INJECTION, SOLUTION INTRAVENOUS ONCE
Status: CANCELLED | OUTPATIENT
Start: 2023-08-09

## 2023-08-09 ENCOUNTER — RA CDI HCC (OUTPATIENT)
Dept: OTHER | Facility: HOSPITAL | Age: 73
End: 2023-08-09

## 2023-08-09 ENCOUNTER — HOSPITAL ENCOUNTER (OUTPATIENT)
Dept: INFUSION CENTER | Facility: HOSPITAL | Age: 73
Discharge: HOME | End: 2023-08-09
Attending: INTERNAL MEDICINE
Payer: MEDICARE

## 2023-08-09 VITALS
HEIGHT: 69 IN | HEART RATE: 89 BPM | RESPIRATION RATE: 18 BRPM | OXYGEN SATURATION: 92 % | DIASTOLIC BLOOD PRESSURE: 72 MMHG | TEMPERATURE: 98.3 F | WEIGHT: 191.58 LBS | BODY MASS INDEX: 28.38 KG/M2 | SYSTOLIC BLOOD PRESSURE: 119 MMHG

## 2023-08-09 DIAGNOSIS — D70.1 CHEMOTHERAPY INDUCED NEUTROPENIA (HCC): ICD-10-CM

## 2023-08-09 DIAGNOSIS — C34.90 ADENOCARCINOMA OF LUNG, UNSPECIFIED LATERALITY (HCC): ICD-10-CM

## 2023-08-09 DIAGNOSIS — R91.8 MASS OF UPPER LOBE OF RIGHT LUNG: ICD-10-CM

## 2023-08-09 DIAGNOSIS — T45.1X5A CHEMOTHERAPY INDUCED NEUTROPENIA (HCC): ICD-10-CM

## 2023-08-09 DIAGNOSIS — C34.11 MALIGNANT NEOPLASM OF UPPER LOBE OF RIGHT LUNG (HCC): Primary | ICD-10-CM

## 2023-08-09 RX ORDER — SODIUM CHLORIDE 9 MG/ML
20 INJECTION, SOLUTION INTRAVENOUS ONCE
Status: COMPLETED | OUTPATIENT
Start: 2023-08-09 | End: 2023-08-09

## 2023-08-09 RX ADMIN — SODIUM CHLORIDE 200 MG: 9 INJECTION, SOLUTION INTRAVENOUS at 12:29

## 2023-08-09 RX ADMIN — SODIUM CHLORIDE 1000 ML: 0.9 INJECTION, SOLUTION INTRAVENOUS at 11:14

## 2023-08-09 RX ADMIN — SODIUM CHLORIDE 20 ML/HR: 9 INJECTION, SOLUTION INTRAVENOUS at 11:14

## 2023-08-09 NOTE — PROGRESS NOTES
Pt tolerated chemo treatment with no adverse reaction. Pt left unit ambulatory with steady gait using cane.  Refused AVS.

## 2023-08-11 ENCOUNTER — HOSPITAL ENCOUNTER (INPATIENT)
Facility: HOSPITAL | Age: 73
LOS: 2 days | Discharge: HOME | DRG: 100 | End: 2023-08-13
Attending: EMERGENCY MEDICINE | Admitting: INTERNAL MEDICINE
Payer: MEDICARE

## 2023-08-11 ENCOUNTER — APPOINTMENT (EMERGENCY)
Dept: CT IMAGING | Facility: HOSPITAL | Age: 73
DRG: 100 | End: 2023-08-11
Payer: MEDICARE

## 2023-08-11 ENCOUNTER — TELEPHONE (OUTPATIENT)
Age: 73
End: 2023-08-11

## 2023-08-11 DIAGNOSIS — D50.9 IRON DEFICIENCY ANEMIA, UNSPECIFIED IRON DEFICIENCY ANEMIA TYPE: ICD-10-CM

## 2023-08-11 DIAGNOSIS — G93.6 VASOGENIC EDEMA (HCC): ICD-10-CM

## 2023-08-11 DIAGNOSIS — Z86.73 HISTORY OF STROKE: ICD-10-CM

## 2023-08-11 DIAGNOSIS — R56.9 PARTIAL SEIZURE (HCC): Primary | ICD-10-CM

## 2023-08-11 DIAGNOSIS — C79.31 MALIGNANT NEOPLASM METASTATIC TO BRAIN (HCC): ICD-10-CM

## 2023-08-11 DIAGNOSIS — D64.9 ANEMIA: ICD-10-CM

## 2023-08-11 LAB
ABO GROUP BLD: NORMAL
ABO GROUP BLD: NORMAL
ALBUMIN SERPL BCP-MCNC: 3.3 G/DL (ref 3.5–5)
ALP SERPL-CCNC: 49 U/L (ref 34–104)
ALT SERPL W P-5'-P-CCNC: 7 U/L (ref 7–52)
ANION GAP SERPL CALCULATED.3IONS-SCNC: 5 MMOL/L
APTT PPP: 33 SECONDS (ref 23–37)
AST SERPL W P-5'-P-CCNC: 12 U/L (ref 13–39)
ATRIAL RATE: 82 BPM
BASOPHILS # BLD AUTO: 0.04 THOUSANDS/ÂΜL (ref 0–0.1)
BASOPHILS NFR BLD AUTO: 1 % (ref 0–1)
BILIRUB SERPL-MCNC: 0.33 MG/DL (ref 0.2–1)
BILIRUB UR QL STRIP: NEGATIVE
BLD GP AB SCN SERPL QL: NEGATIVE
BUN SERPL-MCNC: 10 MG/DL (ref 5–25)
CALCIUM ALBUM COR SERPL-MCNC: 10.1 MG/DL (ref 8.3–10.1)
CALCIUM SERPL-MCNC: 9.5 MG/DL (ref 8.4–10.2)
CARDIAC TROPONIN I PNL SERPL HS: <2 NG/L
CARDIAC TROPONIN I PNL SERPL HS: <2 NG/L
CHLORIDE SERPL-SCNC: 106 MMOL/L (ref 96–108)
CLARITY UR: CLEAR
CO2 SERPL-SCNC: 26 MMOL/L (ref 21–32)
COLOR UR: YELLOW
CREAT SERPL-MCNC: 0.95 MG/DL (ref 0.6–1.3)
EOSINOPHIL # BLD AUTO: 0.15 THOUSAND/ÂΜL (ref 0–0.61)
EOSINOPHIL NFR BLD AUTO: 2 % (ref 0–6)
ERYTHROCYTE [DISTWIDTH] IN BLOOD BY AUTOMATED COUNT: 18.6 % (ref 11.6–15.1)
FERRITIN SERPL-MCNC: 654 NG/ML (ref 24–336)
GFR SERPL CREATININE-BSD FRML MDRD: 80 ML/MIN/1.73SQ M
GLUCOSE SERPL-MCNC: 91 MG/DL (ref 65–140)
GLUCOSE SERPL-MCNC: 93 MG/DL (ref 65–140)
GLUCOSE UR STRIP-MCNC: NEGATIVE MG/DL
HCT VFR BLD AUTO: 25.4 % (ref 36.5–49.3)
HGB BLD-MCNC: 6.9 G/DL (ref 12–17)
HGB UR QL STRIP.AUTO: NEGATIVE
IMM GRANULOCYTES # BLD AUTO: 0.04 THOUSAND/UL (ref 0–0.2)
IMM GRANULOCYTES NFR BLD AUTO: 1 % (ref 0–2)
INR PPP: 0.95 (ref 0.84–1.19)
IRON SATN MFR SERPL: 9 % (ref 20–50)
IRON SERPL-MCNC: 29 UG/DL (ref 65–175)
KETONES UR STRIP-MCNC: NEGATIVE MG/DL
LACTATE SERPL-SCNC: 1.3 MMOL/L (ref 0.5–2)
LDH SERPL-CCNC: 202 U/L (ref 140–271)
LEUKOCYTE ESTERASE UR QL STRIP: NEGATIVE
LYMPHOCYTES # BLD AUTO: 1.08 THOUSANDS/ÂΜL (ref 0.6–4.47)
LYMPHOCYTES NFR BLD AUTO: 17 % (ref 14–44)
MCH RBC QN AUTO: 22.8 PG (ref 26.8–34.3)
MCHC RBC AUTO-ENTMCNC: 27.2 G/DL (ref 31.4–37.4)
MCV RBC AUTO: 84 FL (ref 82–98)
MONOCYTES # BLD AUTO: 0.76 THOUSAND/ÂΜL (ref 0.17–1.22)
MONOCYTES NFR BLD AUTO: 12 % (ref 4–12)
NEUTROPHILS # BLD AUTO: 4.32 THOUSANDS/ÂΜL (ref 1.85–7.62)
NEUTS SEG NFR BLD AUTO: 67 % (ref 43–75)
NITRITE UR QL STRIP: NEGATIVE
NRBC BLD AUTO-RTO: 0 /100 WBCS
P AXIS: 57 DEGREES
PH UR STRIP.AUTO: 6.5 [PH]
PLATELET # BLD AUTO: 352 THOUSANDS/UL (ref 149–390)
PMV BLD AUTO: 9.4 FL (ref 8.9–12.7)
POTASSIUM SERPL-SCNC: 4 MMOL/L (ref 3.5–5.3)
PR INTERVAL: 168 MS
PROT SERPL-MCNC: 7.1 G/DL (ref 6.4–8.4)
PROT UR STRIP-MCNC: NEGATIVE MG/DL
PROTHROMBIN TIME: 13.3 SECONDS (ref 11.6–14.5)
QRS AXIS: 50 DEGREES
QRSD INTERVAL: 74 MS
QT INTERVAL: 376 MS
QTC INTERVAL: 439 MS
RBC # BLD AUTO: 3.02 MILLION/UL (ref 3.88–5.62)
RETICS # AUTO: NORMAL 10*3/UL (ref 14356–105094)
RETICS # CALC: 1.41 % (ref 0.37–1.87)
RH BLD: POSITIVE
RH BLD: POSITIVE
SODIUM SERPL-SCNC: 137 MMOL/L (ref 135–147)
SP GR UR STRIP.AUTO: <1.005 (ref 1–1.03)
SPECIMEN EXPIRATION DATE: NORMAL
T WAVE AXIS: 34 DEGREES
TIBC SERPL-MCNC: 333 UG/DL (ref 250–450)
UROBILINOGEN UR STRIP-ACNC: <2 MG/DL
VENTRICULAR RATE: 82 BPM
WBC # BLD AUTO: 6.39 THOUSAND/UL (ref 4.31–10.16)

## 2023-08-11 PROCEDURE — 83540 ASSAY OF IRON: CPT | Performed by: INTERNAL MEDICINE

## 2023-08-11 PROCEDURE — 99223 1ST HOSP IP/OBS HIGH 75: CPT | Performed by: STUDENT IN AN ORGANIZED HEALTH CARE EDUCATION/TRAINING PROGRAM

## 2023-08-11 PROCEDURE — 81003 URINALYSIS AUTO W/O SCOPE: CPT | Performed by: EMERGENCY MEDICINE

## 2023-08-11 PROCEDURE — 86850 RBC ANTIBODY SCREEN: CPT | Performed by: EMERGENCY MEDICINE

## 2023-08-11 PROCEDURE — 99285 EMERGENCY DEPT VISIT HI MDM: CPT | Performed by: EMERGENCY MEDICINE

## 2023-08-11 PROCEDURE — 83615 LACTATE (LD) (LDH) ENZYME: CPT

## 2023-08-11 PROCEDURE — 83010 ASSAY OF HAPTOGLOBIN QUANT: CPT

## 2023-08-11 PROCEDURE — G1004 CDSM NDSC: HCPCS

## 2023-08-11 PROCEDURE — 82728 ASSAY OF FERRITIN: CPT | Performed by: INTERNAL MEDICINE

## 2023-08-11 PROCEDURE — 36415 COLL VENOUS BLD VENIPUNCTURE: CPT | Performed by: EMERGENCY MEDICINE

## 2023-08-11 PROCEDURE — 80053 COMPREHEN METABOLIC PANEL: CPT | Performed by: EMERGENCY MEDICINE

## 2023-08-11 PROCEDURE — 86920 COMPATIBILITY TEST SPIN: CPT

## 2023-08-11 PROCEDURE — 84484 ASSAY OF TROPONIN QUANT: CPT | Performed by: EMERGENCY MEDICINE

## 2023-08-11 PROCEDURE — 85610 PROTHROMBIN TIME: CPT | Performed by: EMERGENCY MEDICINE

## 2023-08-11 PROCEDURE — 85730 THROMBOPLASTIN TIME PARTIAL: CPT | Performed by: EMERGENCY MEDICINE

## 2023-08-11 PROCEDURE — 96365 THER/PROPH/DIAG IV INF INIT: CPT

## 2023-08-11 PROCEDURE — 85045 AUTOMATED RETICULOCYTE COUNT: CPT

## 2023-08-11 PROCEDURE — 83605 ASSAY OF LACTIC ACID: CPT | Performed by: EMERGENCY MEDICINE

## 2023-08-11 PROCEDURE — 93010 ELECTROCARDIOGRAM REPORT: CPT | Performed by: INTERNAL MEDICINE

## 2023-08-11 PROCEDURE — 86900 BLOOD TYPING SEROLOGIC ABO: CPT | Performed by: EMERGENCY MEDICINE

## 2023-08-11 PROCEDURE — 30233N1 TRANSFUSION OF NONAUTOLOGOUS RED BLOOD CELLS INTO PERIPHERAL VEIN, PERCUTANEOUS APPROACH: ICD-10-PCS | Performed by: STUDENT IN AN ORGANIZED HEALTH CARE EDUCATION/TRAINING PROGRAM

## 2023-08-11 PROCEDURE — 93005 ELECTROCARDIOGRAM TRACING: CPT

## 2023-08-11 PROCEDURE — 99223 1ST HOSP IP/OBS HIGH 75: CPT | Performed by: INTERNAL MEDICINE

## 2023-08-11 PROCEDURE — 86901 BLOOD TYPING SEROLOGIC RH(D): CPT | Performed by: EMERGENCY MEDICINE

## 2023-08-11 PROCEDURE — 99223 1ST HOSP IP/OBS HIGH 75: CPT

## 2023-08-11 PROCEDURE — 83550 IRON BINDING TEST: CPT | Performed by: INTERNAL MEDICINE

## 2023-08-11 PROCEDURE — 96361 HYDRATE IV INFUSION ADD-ON: CPT

## 2023-08-11 PROCEDURE — 99285 EMERGENCY DEPT VISIT HI MDM: CPT

## 2023-08-11 PROCEDURE — 99223 1ST HOSP IP/OBS HIGH 75: CPT | Performed by: RADIOLOGY

## 2023-08-11 PROCEDURE — 70496 CT ANGIOGRAPHY HEAD: CPT

## 2023-08-11 PROCEDURE — P9016 RBC LEUKOCYTES REDUCED: HCPCS

## 2023-08-11 PROCEDURE — 85025 COMPLETE CBC W/AUTO DIFF WBC: CPT | Performed by: EMERGENCY MEDICINE

## 2023-08-11 PROCEDURE — 82948 REAGENT STRIP/BLOOD GLUCOSE: CPT

## 2023-08-11 PROCEDURE — NC001 PR NO CHARGE: Performed by: STUDENT IN AN ORGANIZED HEALTH CARE EDUCATION/TRAINING PROGRAM

## 2023-08-11 RX ORDER — ENOXAPARIN SODIUM 100 MG/ML
1 INJECTION SUBCUTANEOUS EVERY 12 HOURS SCHEDULED
Status: DISCONTINUED | OUTPATIENT
Start: 2023-08-11 | End: 2023-08-13 | Stop reason: HOSPADM

## 2023-08-11 RX ORDER — TAMSULOSIN HYDROCHLORIDE 0.4 MG/1
0.4 CAPSULE ORAL
Status: DISCONTINUED | OUTPATIENT
Start: 2023-08-11 | End: 2023-08-13 | Stop reason: HOSPADM

## 2023-08-11 RX ORDER — LEVETIRACETAM 500 MG/1
1000 TABLET ORAL EVERY 12 HOURS SCHEDULED
Status: DISCONTINUED | OUTPATIENT
Start: 2023-08-11 | End: 2023-08-13 | Stop reason: HOSPADM

## 2023-08-11 RX ORDER — ACETAMINOPHEN 325 MG/1
650 TABLET ORAL 4 TIMES DAILY PRN
Status: DISCONTINUED | OUTPATIENT
Start: 2023-08-11 | End: 2023-08-13 | Stop reason: HOSPADM

## 2023-08-11 RX ORDER — ALBUTEROL SULFATE 90 UG/1
2 AEROSOL, METERED RESPIRATORY (INHALATION) EVERY 6 HOURS PRN
Status: DISCONTINUED | OUTPATIENT
Start: 2023-08-11 | End: 2023-08-13 | Stop reason: HOSPADM

## 2023-08-11 RX ORDER — ATORVASTATIN CALCIUM 40 MG/1
40 TABLET, FILM COATED ORAL
Status: DISCONTINUED | OUTPATIENT
Start: 2023-08-11 | End: 2023-08-13 | Stop reason: HOSPADM

## 2023-08-11 RX ORDER — FOLIC ACID 1 MG/1
1000 TABLET ORAL DAILY
Status: DISCONTINUED | OUTPATIENT
Start: 2023-08-11 | End: 2023-08-13 | Stop reason: HOSPADM

## 2023-08-11 RX ORDER — MAGNESIUM HYDROXIDE/ALUMINUM HYDROXICE/SIMETHICONE 120; 1200; 1200 MG/30ML; MG/30ML; MG/30ML
30 SUSPENSION ORAL EVERY 6 HOURS PRN
Status: DISCONTINUED | OUTPATIENT
Start: 2023-08-11 | End: 2023-08-13 | Stop reason: HOSPADM

## 2023-08-11 RX ORDER — SENNOSIDES 8.6 MG
1 TABLET ORAL DAILY
Status: DISCONTINUED | OUTPATIENT
Start: 2023-08-11 | End: 2023-08-13 | Stop reason: HOSPADM

## 2023-08-11 RX ORDER — ALBUTEROL SULFATE 2.5 MG/3ML
2.5 SOLUTION RESPIRATORY (INHALATION) EVERY 6 HOURS PRN
Status: DISCONTINUED | OUTPATIENT
Start: 2023-08-11 | End: 2023-08-13 | Stop reason: HOSPADM

## 2023-08-11 RX ORDER — DEXAMETHASONE SODIUM PHOSPHATE 4 MG/ML
4 INJECTION, SOLUTION INTRA-ARTICULAR; INTRALESIONAL; INTRAMUSCULAR; INTRAVENOUS; SOFT TISSUE EVERY 6 HOURS
Status: DISCONTINUED | OUTPATIENT
Start: 2023-08-11 | End: 2023-08-13 | Stop reason: HOSPADM

## 2023-08-11 RX ORDER — CALCIUM CARBONATE 500 MG/1
1000 TABLET, CHEWABLE ORAL DAILY PRN
Status: DISCONTINUED | OUTPATIENT
Start: 2023-08-11 | End: 2023-08-13 | Stop reason: HOSPADM

## 2023-08-11 RX ORDER — PANTOPRAZOLE SODIUM 40 MG/1
40 TABLET, DELAYED RELEASE ORAL
Status: DISCONTINUED | OUTPATIENT
Start: 2023-08-11 | End: 2023-08-13 | Stop reason: HOSPADM

## 2023-08-11 RX ORDER — ONDANSETRON 2 MG/ML
4 INJECTION INTRAMUSCULAR; INTRAVENOUS EVERY 6 HOURS PRN
Status: DISCONTINUED | OUTPATIENT
Start: 2023-08-11 | End: 2023-08-13 | Stop reason: HOSPADM

## 2023-08-11 RX ORDER — ACETAMINOPHEN 325 MG/1
650 TABLET ORAL EVERY 6 HOURS PRN
Status: DISCONTINUED | OUTPATIENT
Start: 2023-08-11 | End: 2023-08-13 | Stop reason: HOSPADM

## 2023-08-11 RX ORDER — DOCUSATE SODIUM 100 MG/1
100 CAPSULE, LIQUID FILLED ORAL 2 TIMES DAILY
Status: DISCONTINUED | OUTPATIENT
Start: 2023-08-11 | End: 2023-08-13 | Stop reason: HOSPADM

## 2023-08-11 RX ADMIN — FOLIC ACID 1000 MCG: 1 TABLET ORAL at 12:39

## 2023-08-11 RX ADMIN — IOHEXOL 90 ML: 350 INJECTION, SOLUTION INTRAVENOUS at 06:49

## 2023-08-11 RX ADMIN — DEXAMETHASONE SODIUM PHOSPHATE 4 MG: 4 INJECTION, SOLUTION INTRAMUSCULAR; INTRAVENOUS at 15:48

## 2023-08-11 RX ADMIN — TAMSULOSIN HYDROCHLORIDE 0.4 MG: 0.4 CAPSULE ORAL at 20:58

## 2023-08-11 RX ADMIN — LEVETIRACETAM 1000 MG: 500 TABLET, FILM COATED ORAL at 20:58

## 2023-08-11 RX ADMIN — SENNOSIDES 8.6 MG: 8.6 TABLET, FILM COATED ORAL at 12:39

## 2023-08-11 RX ADMIN — DEXAMETHASONE SODIUM PHOSPHATE 4 MG: 4 INJECTION, SOLUTION INTRAMUSCULAR; INTRAVENOUS at 21:01

## 2023-08-11 RX ADMIN — ENOXAPARIN SODIUM 90 MG: 100 INJECTION SUBCUTANEOUS at 11:14

## 2023-08-11 RX ADMIN — ENOXAPARIN SODIUM 90 MG: 100 INJECTION SUBCUTANEOUS at 21:00

## 2023-08-11 RX ADMIN — SODIUM CHLORIDE 1000 ML: 0.9 INJECTION, SOLUTION INTRAVENOUS at 06:18

## 2023-08-11 RX ADMIN — LEVETIRACETAM 1500 MG: 500 INJECTION, SOLUTION INTRAVENOUS at 08:31

## 2023-08-11 RX ADMIN — ATORVASTATIN CALCIUM 40 MG: 40 TABLET, FILM COATED ORAL at 15:48

## 2023-08-11 RX ADMIN — PANTOPRAZOLE SODIUM 40 MG: 40 TABLET, DELAYED RELEASE ORAL at 12:39

## 2023-08-11 RX ADMIN — DOCUSATE SODIUM 100 MG: 100 CAPSULE, LIQUID FILLED ORAL at 20:58

## 2023-08-11 RX ADMIN — DOCUSATE SODIUM 100 MG: 100 CAPSULE, LIQUID FILLED ORAL at 12:39

## 2023-08-11 NOTE — ASSESSMENT & PLAN NOTE
See plan for adenocarcinoma of the lung  Start Decadron per radiation oncology recommendation  Decadron taper at discharge

## 2023-08-11 NOTE — OCCUPATIONAL THERAPY NOTE
Occupational Therapy Cancellation    Patient Name: Sue Go  [id number] Date: 8/11/2023 08/11/23 1223   OT Last Visit   OT Visit Date 08/11/23   Note Type   Note type Cancelled Session   Cancel Reasons Medical status   Additional Comments OT orders received, chart review performed. Spoke to [employer] who reports pt is a self at home, hasn't been OOB since getting to the floor ~1-2 hour ago. Attempted to see pt for OT eval, however pt currently receiving 1 unit PBRCs.  Will hold and f/u as appropriate s/p transfusion     [employer], MS, OTR/L

## 2023-08-11 NOTE — CONSULTS
Medical Oncology/Hematology Consult Note  Rosendo Sanford, male, 71 y.o., 1950,  /-01, [id number]     Reason for consultation: Metastatic Lung cancer and Anemia    Assessment and Plan:  1. Metastatic lung cancer   · Biopsy-proven (8/5/2021) adenocarcinoma of the lung  · Initiated treatment 10/2021- 1/2022, carboplatin/pemetrexed/pembrolizumab   · Followed by concurrent carboplatin/Taxol/RT 3/2022 to 4/2022  · Patient started on maintenance pembrolizumab 5/2022 every 3 weeks plus Xgeva every 90 days  · Received pembrolizumab on Wednesday, 8/9/2023. 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 This could represent progression of disease versus radiation necrosis. · 8/11/2023: CT head:  · Similar moderate vasogenic edema in left posterior frontal lobe with known underlying treated metastatic lesion not well seen by CT as seen on MRI brain 7/26/2023. No new acute intracranial abnormality. · Negative CTA head for large vessel occlusion, dissection, aneurysm, or high-grade stenosis. 2. Seizure-like activity:  ·  At 4 AM, patient's wife awoken due to patient unable to control his right arm movement. The movement stopped within 2 to 3 minutes per patient's wife. He did not lose consciousness, denies confusion, dizziness, lightheadedness, no headaches, blurry vision or diplopia prior to this event. · Patient initiated on dexamethasone and Keppra per primary team  · Today at bedside rounding, patient laying in bed comfortably, AO x3, answering questions and participating in conversation appropriately. 2. Anemia:   · On admission, Hgb 6.9, MCV 84, WBC 6.39, platelets [phone]. · Received 1U PRBCs this a.m.   · Patient reports he has been having dark stools "for a long time now."  · Patient denies abnormal bleeding: epistaxis, gingival bleeding, hematuria, dark tarry stools. · Prior to admission, patient's Hgb stable between 8.1 to 8.9 g/dL. Recommendations:  · Agree with radiation oncology recommendations on dexamethasone q6h. Per radiation oncology note, he will be contacted by their office to assess ability for taper outpatient. · Recommend follow neurology recommendations for seizure  · Recommend monitor CBC with differential daily  · Obtain occult stool study as patient reports dark stools  · Obtain LDH, haptoglobin, reticulocyte count to assess for hemolysis  · Obtain iron studies  · Transfuse if Hgb < 7.0g/dL or active bleeding      Outpatient follow up plan: Patient scheduled with Dr. Polo Dec 8/23/23      Communication with patient/family: Patient's wife and son present during bedside rounding    Thank you for this consult. CRISTOFER Fritz  Hematology-Oncology       History of present illness:  Kinga Dorado is a 71 y.o. male with PMH of COPD, CVA, DVT/PE, chronic anticoagulation, CHF, obesity who presented to the hospital for seizure-like activity. Patient diagnosed with adenocarcinoma of the lung in 2021, he is status post 6 cycles of carboplatin, pemetrexed, pembrolizumab. Followed by concurrent carboplatin, Taxol, RT. He was found to have a solitary brain met in February 2023, status post SBRT. At 4 AM, patient was not able to control right arm movement. This lasted for 2 to 3 minutes. He did not lose consciousness, denies dizziness, lightheadedness, blurry vision, diplopia, headaches prior to this event. Patient found to have a hemoglobin of 6.9. Reports he has been having dark stools "for a long time."      Review of Systems:   Review of Systems   Constitutional: Positive for activity change and appetite change. Negative for fever. HENT: Negative. Eyes: Negative for visual disturbance. Respiratory: Negative for chest tightness and shortness of breath. Cardiovascular: Negative for chest pain, palpitations and leg swelling. Gastrointestinal: Negative for abdominal pain, anal bleeding, blood in stool, constipation and diarrhea. Dark stools   Endocrine: Negative. Genitourinary: Negative. Negative for hematuria. Musculoskeletal: Negative. Skin: Positive for pallor. Neurological: Positive for seizures. Negative for tremors and headaches. Hematological: Negative. Psychiatric/Behavioral: Negative.         Oncology History:   Cancer Staging   Malignant neoplasm of upper lobe of right lung [facility])  Staging form: Lung, AJCC 8th Edition  - Clinical stage from 9/23/2021: Stage IIIC (cT3, cN3, cM0) - Signed by Corinne Sears MD on 9/23/2021    Oncology History   Adenocarcinoma of lung   8/2021 Initial Diagnosis    Adenocarcinoma of lung     8/5/2021 Biopsy    Right lung apex, image-guided core needle biopsy:  - Adenocarcinoma      10/6/2021 - 1/26/2022 Chemotherapy    cyanocobalamin, 1,000 mcg, Intramuscular, Once, 3 of 3 cycles  Administration: 1,000 mcg (11/24/2021), 1,000 mcg (1/26/2022), 1,000 mcg (10/6/2021)  pegfilgrastim (Annetta Stalker), 6 mg, Subcutaneous, Once, 1 of 1 cycle  Administration: 6 mg (11/26/2021)  pegfilgrastim (Clark Sell), 6 mg, Subcutaneous, Once, 6 of 6 cycles  Administration: 6 mg (10/6/2021), 6 mg (11/3/2021), 6 mg (11/24/2021), 6 mg (12/15/2021), 6 mg (1/5/2022)  fosaprepitant (EMEND) IVPB, 150 mg, Intravenous, Once, 6 of 6 cycles  Administration: 150 mg (10/6/2021), 150 mg (11/24/2021), 150 mg (12/15/2021), 150 mg (1/26/2022), 150 mg (11/3/2021), 150 mg (1/5/2022)  CARBOplatin (PARAPLATIN) IVPB ([facility] AUC DOSING), 519.5 mg, Intravenous, Once, 6 of 6 cycles  Administration: 519.5 mg (10/6/2021), 574 mg (11/24/2021), 600 mg (12/15/2021), 533 mg (1/26/2022), 604.5 mg (11/3/2021), 563 mg (1/5/2022)  pemetrexed (ALIMTA) chemo infusion, 970 mg, Intravenous, Once, 6 of 6 cycles  Administration: 1,000 mg (10/6/2021), 1,000 mg (11/24/2021), 1,000 mg (12/15/2021), 1,000 mg (1/26/2022), 1,000 mg (11/3/2021), 1,000 mg (1/5/2022)  pembrolizumab (KEYTRUDA) IVPB, 200 mg, Intravenous, Once, 6 of 6 cycles  Administration: 200 mg (10/6/2021), 200 mg (11/24/2021), 200 mg (12/15/2021), 200 mg (1/26/2022), 200 mg (11/3/2021), 200 mg (1/5/2022)     3/7/2022 -  Chemotherapy    CARBOplatin (PARAPLATIN) IVPB ([facility] AUC DOSING), , Intravenous, Once, 6 of 6 cycles  Administration: 211.6 mg (3/7/2022), 208 mg (3/14/2022), 238.8 mg (3/21/2022), 211.6 mg (3/28/2022), 215.2 mg (4/4/2022), 213.4 mg (4/18/2022)  PACLItaxel (TAXOL) chemo IVPB, 50 mg/m2 = 99 mg, Intravenous, Once, 6 of 6 cycles  Administration: 99 mg (3/7/2022), 99 mg (3/14/2022), 99 mg (3/21/2022), 99 mg (3/28/2022), 99 mg (4/4/2022), 99 mg (4/18/2022)  pembrolizumab (KEYTRUDA) IVPB, 200 mg, Intravenous, Once, 24 of 27 cycles  Administration: 200 mg (3/7/2022), 200 mg (3/28/2022), 200 mg (4/25/2022), 200 mg (5/16/2022), 200 mg (6/6/2022), 200 mg (6/27/2022), 200 mg (7/18/2022), 200 mg (8/8/2022), 200 mg (8/29/2022), 200 mg (9/19/2022), 200 mg (10/10/2022), 200 mg (10/31/2022), 200 mg (11/21/2022), 200 mg (12/12/2022), 200 mg (1/3/2023), 200 mg (1/23/2023), 200 mg (2/13/2023), 200 mg (3/27/2023), 200 mg (4/26/2023), 200 mg (5/17/2023), 200 mg (6/7/2023), 200 mg (6/28/2023), 200 mg (7/19/2023), 200 mg (8/9/2023)     2/22/2023 - 2/22/2023 Radiation    SRS left precentral gyrus   2000 cGy    Dr. Inman Began     Malignant neoplasm of upper lobe of right lung ([address])   9/3/2021 Initial Diagnosis    Malignant neoplasm of upper lobe of right lung ([address])     9/23/2021 -  Cancer Staged    Staging form: Lung, AJCC 8th Edition  - Clinical stage from 9/23/2021: Stage IIIC (cT3, cN3, cM0) - Signed by Chelsy Lim MD on 9/23/2021  Histopathologic type:  Adenocarcinoma, NOS       10/6/2021 - 1/26/2022 Chemotherapy    cyanocobalamin, 1,000 mcg, Intramuscular, Once, 3 of 3 cycles  Administration: 1,000 mcg (11/24/2021), 1,000 mcg (1/26/2022), 1,000 mcg (10/6/2021)  pegfilgrastim (NEULASTA), 6 mg, Subcutaneous, Once, 1 of 1 cycle  Administration: 6 mg (11/26/2021)  pegfilgrastim (Delwin Fermo), 6 mg, Subcutaneous, Once, 6 of 6 cycles  Administration: 6 mg (10/6/2021), 6 mg (11/3/2021), 6 mg (11/24/2021), 6 mg (12/15/2021), 6 mg (1/5/2022)  fosaprepitant (EMEND) IVPB, 150 mg, Intravenous, Once, 6 of 6 cycles  Administration: 150 mg (10/6/2021), 150 mg (11/24/2021), 150 mg (12/15/2021), 150 mg (1/26/2022), 150 mg (11/3/2021), 150 mg (1/5/2022)  CARBOplatin (PARAPLATIN) IVPB ([facility] AUC DOSING), 519.5 mg, Intravenous, Once, 6 of 6 cycles  Administration: 519.5 mg (10/6/2021), 574 mg (11/24/2021), 600 mg (12/15/2021), 533 mg (1/26/2022), 604.5 mg (11/3/2021), 563 mg (1/5/2022)  pemetrexed (ALIMTA) chemo infusion, 970 mg, Intravenous, Once, 6 of 6 cycles  Administration: 1,000 mg (10/6/2021), 1,000 mg (11/24/2021), 1,000 mg (12/15/2021), 1,000 mg (1/26/2022), 1,000 mg (11/3/2021), 1,000 mg (1/5/2022)  pembrolizumab (KEYTRUDA) IVPB, 200 mg, Intravenous, Once, 6 of 6 cycles  Administration: 200 mg (10/6/2021), 200 mg (11/24/2021), 200 mg (12/15/2021), 200 mg (1/26/2022), 200 mg (11/3/2021), 200 mg (1/5/2022)     3/4/2022 - 4/21/2022 Radiation    The patient saw @[facility]@ for radiation treatment.  This is the current list of radiation treatment:  Plan ID Energy Fractions Dose per Fraction (cGy) Dose Correction (cGy) Total Dose Delivered (cGy) Elapsed Days   R LUNGMED REV 6X 30 / 30 200 0 6,000 48      Treatment dates:  C1: 3/4/2022 - 4/21/2022         3/7/2022 -  Chemotherapy    CARBOplatin (PARAPLATIN) IVPB ([facility] AUC DOSING), , Intravenous, Once, 6 of 6 cycles  Administration: 211.6 mg (3/7/2022), 208 mg (3/14/2022), 238.8 mg (3/21/2022), 211.6 mg (3/28/2022), 215.2 mg (4/4/2022), 213.4 mg (4/18/2022)  PACLItaxel (TAXOL) chemo IVPB, 50 mg/m2 = 99 mg, Intravenous, Once, 6 of 6 cycles  Administration: 99 mg (3/7/2022), 99 mg (3/14/2022), 99 mg (3/21/2022), 99 mg (3/28/2022), 99 mg (4/4/2022), 99 mg (2022)  pembrolizumab (KEYTRUDA) IVPB, 200 mg, Intravenous, Once, 24 of 27 cycles  Administration: 200 mg (3/7/2022), 200 mg (3/28/2022), 200 mg (2022), 200 mg (2022), 200 mg (2022), 200 mg (2022), 200 mg (2022), 200 mg (2022), 200 mg (2022), 200 mg (2022), 200 mg (10/10/2022), 200 mg (10/31/2022), 200 mg (2022), 200 mg (2022), 200 mg (1/3/2023), 200 mg (2023), 200 mg (2023), 200 mg (3/27/2023), 200 mg (2023), 200 mg (2023), 200 mg (2023), 200 mg (2023), 200 mg (2023), 200 mg (2023)     2023 - 2023 Radiation    SRS left precentral gyrus   2000 cGy    Dr. Suly Tapia     Brain metastasis   2/3/2023 Initial Diagnosis    Brain metastasis     2023 - 2023 Radiation    SRS left precentral gyrus   2000 cGy    Dr. Suly Tapia         Past Medical History:   Past Medical History:   Diagnosis Date   • Chronic respiratory failure with hypoxia ([address]) 2021   • Impaired mobility and ADLs 2021   • Lung cancer ([address])    • Stroke [facility])        Past Surgical History:   Procedure Laterality Date   • IR BIOPSY LUNG  2021   • IR THORACENTESIS  2022   • IR THORACENTESIS  2022       Family History   Problem Relation Age of Onset   • Prostate cancer Brother    • Lung cancer Brother        Social History     Socioeconomic History   • Marital status: /Civil Union     Spouse name: None   • Number of children: None   • Years of education: None   • Highest education level: None   Occupational History   • None   Tobacco Use   • Smoking status: Former     Packs/day: 3.00     Years: 39.00     Total pack years: 117.00     Types: Cigarettes     Start date:      Quit date:      Years since quittin.6   • Smokeless tobacco: Never   Vaping Use   • Vaping Use: Never used   Substance and Sexual Activity   • Alcohol use: Not Currently     Comment: No ETOH since Summer 2021    • Drug use: Never   • Sexual activity: Yes Partners: Female   Other Topics Concern   • None   Social History Narrative   • None     Social Determinants of Health     Financial Resource Strain: Low Risk  (5/24/2023)    Overall Financial Resource Strain (CARDIA)    • Difficulty of Paying Living Expenses: Not hard at all   Food Insecurity: No Food Insecurity (4/17/2023)    Hunger Vital Sign    • Worried About Running Out of Food in the Last Year: Never true    • Ran Out of Food in the Last Year: Never true   Transportation Needs: No Transportation Needs (5/24/2023)    PRAPARE - Transportation    • Lack of Transportation (Medical): No    • Lack of Transportation (Non-Medical):  No   Physical Activity: Not on file   Stress: Not on file   Social Connections: Not on file   Intimate Partner Violence: Not on file   Housing Stability: Low Risk  (4/17/2023)    Housing Stability Vital Sign    • Unable to Pay for Housing in the Last Year: No    • Number of Places Lived in the Last Year: 1    • Unstable Housing in the Last Year: No         Current Facility-Administered Medications:   •  acetaminophen (TYLENOL) tablet 650 mg, 650 mg, Oral, 4x Daily PRN, Emerson Meza DO  •  acetaminophen (TYLENOL) tablet 650 mg, 650 mg, Oral, Q6H PRN, Emerson Meza DO  •  albuterol (PROVENTIL HFA,VENTOLIN HFA) inhaler 2 puff, 2 puff, Inhalation, Q6H PRN, Emerson Meza DO  •  albuterol inhalation solution 2.5 mg, 2.5 mg, Nebulization, Q6H PRN, Emerson Meza DO  •  aluminum-magnesium hydroxide-simethicone (MAALOX) oral suspension 30 mL, 30 mL, Oral, Q6H PRN, Emerson Meza DO  •  atorvastatin (LIPITOR) tablet 40 mg, 40 mg, Oral, Daily With Dinner, Antonio Munoz DO  •  calcium carbonate (TUMS) chewable tablet 1,000 mg, 1,000 mg, Oral, Daily PRN, Emerson Meza DO  •  dexamethasone (DECADRON) injection 4 mg, 4 mg, Intravenous, Q6H, Antonio Munoz DO  •  docusate sodium (COLACE) capsule 100 mg, 100 mg, Oral, BID, Emerson Meza DO, 100 mg at 08/11/23 1239  • enoxaparin (LOVENOX) subcutaneous injection 90 mg, 1 mg/kg, Subcutaneous, Q12H [address], [facility], , 90 mg at 79/79/20 7823  •  folic acid (FOLVITE) tablet 1,000 mcg, 1,000 mcg, Oral, Daily, [facility], 1,000 mcg at 08/11/23 1239  •  levETIRAcetam (KEPPRA) tablet 1,000 mg, 1,000 mg, Oral, Q12H [address], [facility],   •  ondansetron [facility]) injection 4 mg, 4 mg, Intravenous, Q6H PRN, [facility]  •  pantoprazole (PROTONIX) EC tablet 40 mg, 40 mg, Oral, Daily Before Breakfast, [facility], 40 mg at 08/11/23 1239  •  senna (SENOKOT) tablet 8.6 mg, 1 tablet, Oral, Daily, [facility], 8.6 mg at 08/11/23 1239  •  tamsulosin (FLOMAX) capsule 0.4 mg, 0.4 mg, Oral, After Dinner, [facility]    Medications Prior to Admission   Medication   • acetaminophen (TYLENOL) 325 mg tablet   • albuterol (2.5 mg/3 mL) 0.083 % nebulizer solution   • albuterol (ProAir HFA) 90 mcg/act inhaler   • atorvastatin (LIPITOR) 40 mg tablet   • enoxaparin (LOVENOX) 007 mg/mL   • folic acid (FOLVITE) 1 mg tablet   • multivitamin (THERAGRAN) TABS   • tamsulosin (FLOMAX) 0.4 mg       Allergies   Allergen Reactions   • Doxycycline Rash         Physical Exam:     /67   Pulse 72   Temp 97.7 °F (36.5 °C)   Resp 18   SpO2 96%     Physical Exam  Constitutional:       General: He is not in acute distress. HENT:      Head: Normocephalic. Mouth/Throat:      Mouth: Mucous membranes are moist.   Cardiovascular:      Rate and Rhythm: Normal rate. Pulses: Normal pulses. Pulmonary:      Effort: Pulmonary effort is normal.   Abdominal:      General: There is no distension. Palpations: Abdomen is soft. Tenderness: There is no abdominal tenderness. There is no guarding. Musculoskeletal:      Cervical back: Normal range of motion. Right lower leg: No edema. Left lower leg: No edema. Skin:     General: Skin is warm and dry. Coloration: Skin is pale.    Neurological: Mental Status: He is alert and oriented to person, place, and time. Psychiatric:         Mood and Affect: Mood normal.         Behavior: Behavior normal.         Thought Content:  Thought content normal.         Judgment: Judgment normal.           Recent Results (from the past 48 hour(s))   ECG 12 lead    Collection Time: 08/11/23  6:06 AM   Result Value Ref Range    Ventricular Rate 82 BPM    Atrial Rate 82 BPM    MN Interval 168 ms    QRSD Interval 74 ms    QT Interval 376 ms    QTC Interval 439 ms    P [place] 57 degrees    QRS Axis 50 degrees    T Wave Axis 34 degrees   Fingerstick Glucose (POCT)    Collection Time: 08/11/23  6:13 AM   Result Value Ref Range    POC Glucose 91 65 - 140 mg/dl   CBC and differential    Collection Time: 08/11/23  6:16 AM   Result Value Ref Range    WBC 6.39 4.31 - 10.16 Thousand/uL    RBC 3.02 (L) 3.88 - 5.62 Million/uL    Hemoglobin 6.9 (LL) 12.0 - 17.0 g/dL    Hematocrit 25.4 (L) 36.5 - 49.3 %    MCV 84 82 - 98 fL    MCH 22.8 (L) 26.8 - 34.3 pg    MCHC 27.2 (L) 31.4 - 37.4 g/dL    RDW 18.6 (H) 11.6 - 15.1 %    MPV 9.4 8.9 - 12.7 fL    Platelets 601 803 - 358 Thousands/uL    nRBC 0 /100 WBCs    Neutrophils Relative 67 43 - 75 %    Immat GRANS % 1 0 - 2 %    Lymphocytes Relative 17 14 - 44 %    Monocytes Relative 12 4 - 12 %    Eosinophils Relative 2 0 - 6 %    Basophils Relative 1 0 - 1 %    Neutrophils Absolute 4.32 1.85 - 7.62 Thousands/µL    Immature Grans Absolute 0.04 0.00 - 0.20 Thousand/uL    Lymphocytes Absolute 1.08 0.60 - 4.47 Thousands/µL    Monocytes Absolute 0.76 0.17 - 1.22 Thousand/µL    Eosinophils Absolute 0.15 0.00 - 0.61 Thousand/µL    Basophils Absolute 0.04 0.00 - 0.10 Thousands/µL   Comprehensive metabolic panel    Collection Time: 08/11/23  6:16 AM   Result Value Ref Range    Sodium 137 135 - 147 mmol/L    Potassium 4.0 3.5 - 5.3 mmol/L    Chloride 106 96 - 108 mmol/L    CO2 26 21 - 32 mmol/L    ANION GAP 5 mmol/L    BUN 10 5 - 25 mg/dL    Creatinine 0.95 0.60 - 1.30 mg/dL    Glucose 93 65 - 140 mg/dL    Calcium 9.5 8.4 - 10.2 mg/dL    Corrected Calcium 10.1 8.3 - 10.1 mg/dL    AST 12 (L) 13 - 39 U/L    ALT 7 7 - 52 U/L    Alkaline Phosphatase 49 34 - 104 U/L    Total Protein 7.1 6.4 - 8.4 g/dL    Albumin 3.3 (L) 3.5 - 5.0 g/dL    Total Bilirubin 0.33 0.20 - 1.00 mg/dL    eGFR 80 ml/min/1.73sq m   Lactic acid, plasma (w/reflex if result > 2.0)    Collection Time: 08/11/23  6:16 AM   Result Value Ref Range    LACTIC ACID 1.3 0.5 - 2.0 mmol/L   HS Troponin 0hr (reflex protocol)    Collection Time: 08/11/23  6:16 AM   Result Value Ref Range    hs TnI 0hr <2 "Refer to ACS Flowchart"- see link ng/L   Protime-INR    Collection Time: 08/11/23  6:16 AM   Result Value Ref Range    Protime 13.3 11.6 - 14.5 seconds    INR 0.95 0.84 - 1.19   APTT    Collection Time: 08/11/23  6:16 AM   Result Value Ref Range    PTT 33 23 - 37 seconds   Type and screen    Collection Time: 08/11/23  7:11 AM   Result Value Ref Range    ABO Grouping A     Rh Factor Positive     Antibody Screen Negative     Specimen Expiration Date [id number]    ABOR Recheck - Contact Blood Bank Prior to Collection    Collection Time: 08/11/23  7:20 AM   Result Value Ref Range    ABO Grouping A     Rh Factor Positive    UA w Reflex to Microscopic w Reflex to Culture    Collection Time: 08/11/23  8:32 AM    Specimen: Urine, Clean Catch   Result Value Ref Range    Color, UA Yellow     Clarity, UA Clear     Specific Gravity, UA <1.005 (L) 1.005 - 1.030    pH, UA 6.5 4.5, 5.0, 5.5, 6.0, 6.5, 7.0, 7.5, 8.0    Leukocytes, UA Negative Negative    Nitrite, UA Negative Negative    Protein, UA Negative Negative mg/dl    Glucose, UA Negative Negative mg/dl    Ketones, UA Negative Negative mg/dl    Urobilinogen, UA <2.0 <2.0 mg/dl mg/dl    Bilirubin, UA Negative Negative    Occult Blood, UA Negative Negative   HS Troponin I 2hr    Collection Time: 08/11/23  8:32 AM   Result Value Ref Range    hs TnI 2hr <2 "Refer to ACS Flowchart"- see link ng/L    Delta 2hr hsTnI     Prepare Leukoreduced RBC: 1 Units    Collection Time: 08/11/23  9:44 AM   Result Value Ref Range    Unit Product Code S0616B39     Unit Number [id number]     Unit ABO A     Unit RH POS     Crossmatch Compatible     Unit Dispense Status Issued     Unit Product Volume 350 ml       CTA head with and without contrast    Result Date: 8/11/2023  Narrative: CTA BRAIN WITH CONTRAST INDICATION: seizure, known brain mets COMPARISON:   MRI brain with and without contrast 7/26/2023, 4/21/2023, 3/6/2023, 2/16/2023. CTA head and neck with and without contrast 2/3/2023. Po Fuchs TECHNIQUE:   Post contrast imaging was performed after administration of iodinated contrast through the neck and brain. Post contrast axial 0.625 mm images timed to opacify the arterial system. 3D rendering was performed on an independent workstation. MIP reconstructions performed. Coronal reconstructions were performed of the noncontrast portion of the brain. Radiation dose length product (DLP) for this visit:  1292.41 mGy-cm . This examination, like all CT scans performed in the [facility], was performed utilizing techniques to minimize radiation dose exposure, including the use of iterative reconstruction and automated exposure control. IV Contrast:  90 mL of iohexol (OMNIPAQUE) IMAGE QUALITY:   Diagnostic FINDINGS: NONCONTRAST BRAIN: PARENCHYMA: Decreased attenuation is noted in periventricular and subcortical white matter demonstrating an appearance that is statistically most likely to represent mild microangiopathic change. Unchanged chronic infarcts in bilateral basal ganglia (left worse than right). Similar moderate vasogenic edema in left posterior frontal lobe with known underlying treated metastatic lesion not well seen by CT as seen on MRI brain 7/26/2023. No CT signs of acute infarction. No mass effect or midline shift. No acute parenchymal hemorrhage.  VENTRICLES AND EXTRA-AXIAL SPACES: Unchanged mild ex-vacuo dilatation of the left lateral ventricle. VISUALIZED ORBITS: Sequela of right cataract surgery. Left orbit is normal. PARANASAL SINUSES: Moderate mucosal thickening and hypoplastic right sphenoid sinus with chronic osteitis. INTRACRANIAL VASCULATURE INTERNAL CAROTID ARTERIES:  Normal enhancement of the intracranial portions of the internal carotid arteries. Normal ophthalmic artery origins. Normal ICA terminus. ANTERIOR CIRCULATION: Aplastic right A1 segment, normal variant. Normal caliber left A1 segment. Anterior cerebral arteries with normal enhancement. Normal anterior communicating artery. MIDDLE CEREBRAL ARTERY CIRCULATION:  M1 segment and middle cerebral artery branches demonstrate normal enhancement bilaterally. DISTAL VERTEBRAL ARTERIES: Patent hypoplastic right and dominant left distal vertebral arteries. Posterior inferior cerebellar artery origins are normal. Normal vertebral basilar junction. BASILAR ARTERY:  Basilar artery is normal in caliber. Normal superior cerebellar arteries. POSTERIOR CEREBRAL ARTERIES: There is fetal origin of the right posterior cerebral artery. The left posterior cerebral artery arises from the basilar tip. Both demonstrate no focal stenosis. Normal right and absent left posterior communicating arteries, normal variant. DURAL VENOUS SINUSES:  Normal. NON VASCULAR ANATOMY BONY STRUCTURES:  No acute osseous abnormality. Impression: Similar moderate vasogenic edema in left posterior frontal lobe with known underlying treated metastatic lesion not well seen by CT as seen on MRI brain 7/26/2023. No new acute intracranial abnormality. Negative CTA head for large vessel occlusion, dissection, aneurysm, or high-grade stenosis. Workstation performed: [id number]     MRI brain w wo contrast    Result Date: 7/27/2023  Narrative: MRI BRAIN WITH AND WITHOUT CONTRAST INDICATION: C79.31: Secondary malignant neoplasm of brain.    SBRT of brain met 2/2/2023 COMPARISON: Several MRIs most recently performed on 4/21/2023. TECHNIQUE: Multiplanar, multisequence imaging of the brain was performed before and after gadolinium administration. IV Contrast:  8 mL of Gadobutrol injection (SINGLE-DOSE) IMAGE QUALITY:   Diagnostic. FINDINGS: BRAIN PARENCHYMA: Previously treated enhancing metastatic lesion in the posterior left frontal lobe measures 1.2 cm on image 109 series 12, increased from 6 mm. Small mount of associated hemosiderin deposition. There is increased vasogenic edema. No increased blood flow on ASL. This could represent progression of disease versus radiation necrosis. Recommend close interval follow-up. No new lesions. Stable chronic lacunar infarcts in the basal ganglia with hemosiderin deposition. T2/FLAIR hyperintensities in the periventricular and subcortical matter due to chronic microangiopathy. No acute infarct. No shift or herniation. VENTRICLES: Ex vacuo dilatation of the left lateral ventricle. Volume loss. No hydrocephalus. SELLA AND PITUITARY GLAND:  Normal. ORBITS: Right lens replacement. PARANASAL SINUSES: Opacification of the right sphenoid sinus VASCULATURE:  Evaluation of the major intracranial vasculature demonstrates appropriate flow voids. CALVARIUM AND SKULL BASE:  Normal. EXTRACRANIAL SOFT TISSUES:  Normal.     Impression: Treated metastasis in the posterior left frontal lobe is increased in size with increased edema. This could represent progression of disease versus radiation necrosis. Recommend close interval follow-up. Workstation performed: [id number]       Labs and pertinent reports reviewed. This note has been generated by voice recognition software system. Therefore, there may be spelling, grammar, and or syntax errors. Please contact if questions arise.

## 2023-08-11 NOTE — ASSESSMENT & PLAN NOTE
Previous history of DVT and pulmonary embolism  Failure of Eliquis, continue weight-based Lovenox for now

## 2023-08-11 NOTE — ASSESSMENT & PLAN NOTE
71-year-old male with history stage IV adenocarcinoma of lung cancer with known mets to the brain (s/p chemo/radiation, on supplemental oxygen at baseline), and prior strokes (on Lovenox) who presents after a 3-minute episode of uncontrollable right arm movements concerning for seizure activity. There is no loss of consciousness, tongue bite or incontinence. Patient was previously on steroids/Keppra for known brain mets, however is not currently on any AED. BP on arrival 125/67. Workup:  - Prior MRI brain w/wo (07/26/2023) revealed "treated metastasis in the posterior left frontal lobe is increased in size with increased edema. This could represent progression of disease versus radiation necrosis"  - CT head revealed similar moderate vasogenic edema in left posterior frontal lobe with known underlying treated but lesions, negative for acute intracranial abnormality.    - CTA head/neck negative for LVO, dissection, aneurysm or high-grade stenosis. - Labs on admission remarkable for anemia (6.9/25.4)  - UA negative for infection    Etiology of partial seizure earlier likely secondary to underlying known brain met. Plan:  - s/p Keppra 1500 mg load  - Continue with Keppra 1g twice daily  - Ativan PRN   - No need for routine EEG or repeat MRI brain at this time   - Telemetry  - Seizure/Fall precautions  - Patient does not currently drive, however does have an active license. [employer] form completed and faxed at this time. Patient made aware that he should continue to not to drive. - Medical management and supportive care per primary team. Correction of any metabolic or infectious disturbances. Plan discussed with Attending Neurologist, please see attestation for further input/recommendations.

## 2023-08-11 NOTE — H&P
[facility]  H&P  Name: Jennie Brannon 71 y.o. male I MRN: [id number]  Unit/Bed#: -01 I Date of Admission: 8/11/2023   Date of Service: 8/11/2023 I Hospital Day: 0    Assessment and Plan  * Partial seizure [facility])  Assessment & Plan  Patient presenting with partial seizure in the setting of brain metastasis, he was previously on Keppra. Had chemotherapy on Wednesday with Willard Pedersen as well as IV hydration    Case discussed with neurology, they will initiate Keppra as AED  Start corticosteroids given seizure, radiation oncology consultation placed for E consult  Will likely need taper of steroids  Will plan to monitor for any recurrent seizure activity  Neuroimaging per the neurologist  CT of the brain with vasogenic edema although unchanged    Stage 3a chronic kidney disease [facility])  Assessment & Plan  Lab Results   Component Value Date    EGFR 80 08/11/2023    EGFR 72 08/07/2023    EGFR 68 07/17/2023    CREATININE 0.95 08/11/2023    CREATININE 1.03 08/07/2023    CREATININE 1.08 07/17/2023   Function at historical baseline    History of venous thromboembolism  Assessment & Plan  Previous history of DVT and pulmonary embolism  Failure of Eliquis, continue weight-based Lovenox for now    Brain metastasis  Assessment & Plan  See plan for adenocarcinoma of the lung  Start Decadron per radiation oncology recommendation  Decadron taper at discharge    Adenocarcinoma of lung  Assessment & Plan  History of adenocarcinoma of the lung diagnosed in August 2021. By Dr. Sharon Artis. He completed chemotherapy for cytoreduction followed by concurrent chemo RT to the right lung and mediastinum which she completed on 4/21/2022.     Remains on systemic immunotherapy with Willard Pedersen  He was found to have solitary metastasis in the left precentral gyrus region with surrounding edema  These is considered stage IIIc        Code Status: Prior **    VTE Prophylaxis: Enoxaparin (Lovenox)  / sequential compression device POLST: There is no POLST form on file for this patient (pre-hospital)  Discussion with family: Patient wife at bedside    Anticipated Length of Stay:  Patient will be admitted on an Inpatient basis with an anticipated length of stay of greater than 2 midnights. Justification for Hospital Stay: Partial seizure [facility])     Total Time for Visit, including Counseling / Coordination of Care: 1 hour. Greater than 50% of this total time spent on direct patient counseling and coordination of care. Chief Complaint:     Chief Complaint   Patient presents with   • Seizure - New Onset     Pt presents to ed via walk in for seizure axctivty wife reports pt woke up with right arm twitching , wife reports pt has history of brain aneurism        History of Present Illness:    Joe Henning is a 71 y.o. male who presents with seizure-like activity while at home. 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 He has received previous radiation therapy at [facility] in [facility]. He he completed chemotherapy for cytoreduction and now remains on immunotherapy. The patient was in his normal state of health, he subsequently was found to have a witnessed seizure. Denies any tongue biting. No chest pain shortness of breath or difficulty breathing. Was also found to have anemia, with previous hemoglobin of 8.1 from 4 days ago. Denies any black stools, no nausea no vomiting no diarrhea. He has no previous history of GI bleed in the past.    In the emergency room the patient was loaded with Keppra, report no headache.     Review of Systems:    A complete and comprehensive 14 point organ system review was performed and all other systems are negative other than stated above in the HPI    Past Medical and Surgical History:     Past Medical History:   Diagnosis Date   • Chronic respiratory failure with hypoxia ([address]) 8/9/2021   • Impaired mobility and ADLs 8/13/2021   • Lung cancer [facility])    • Stroke [facility])        Past Surgical History:   Procedure Laterality Date   • IR BIOPSY LUNG  8/5/2021   • IR THORACENTESIS  2/24/2022   • IR THORACENTESIS  8/1/2022       Meds/Allergies:    Prior to Admission medications    Medication Sig Start Date End Date Taking? Authorizing Provider   acetaminophen (TYLENOL) 325 mg tablet Take 2 tablets (650 mg total) by mouth 4 (four) times a day as needed for mild pain, headaches or fever 8/16/21   Lisa Spaulding MD   albuterol (2.5 mg/3 mL) 0.083 % nebulizer solution Take 3 mL (2.5 mg total) by nebulization every 6 (six) hours as needed for wheezing or shortness of breath 4/18/23   Kashif Cobos,    albuterol (ProAir HFA) 90 mcg/act inhaler Inhale 2 puffs every 6 (six) hours as needed for wheezing or shortness of breath 8/11/22   Deshawn Alexandre MD   atorvastatin (LIPITOR) 40 mg tablet TAKE 1 TABLET BY MOUTH DAILY WITH DINNER 8/2/23   Deshawn Alexandre MD   enoxaparin (LOVENOX) 100 mg/mL Inject 0.9 mL (90 mg total) under the skin every 12 (twelve) hours 3/6/23   Juliet Pagan DO   folic acid (FOLVITE) 1 mg tablet TAKE 1 TABLET BY MOUTH EVERY DAY 8/2/23   Juliet Pagan DO   multivitamin [facility]) TABS Take 1 tablet by mouth daily  Patient not taking: Reported on 7/10/2023    Historical Provider, MD   tamsulosin (FLOMAX) 0.4 mg Take 1 capsule (0.4 mg total) by mouth daily after dinner 6/29/23   Chrissy Gaines MD     I have reviewed home medications with patient personally. Allergies:    Allergies   Allergen Reactions   • Doxycycline Rash       Social History:     Marital Status: /Civil Union   Occupation: Retired    Substance Use History:   Social History     Substance and Sexual Activity   Alcohol Use Not Currently    Comment: No ETOH since Summer 2021      Social History     Tobacco Use   Smoking Status Former   • Packs/day: 3.00   • Years: 39.00   • Total pack years: 117.00   • Types: Cigarettes • Start date: 66   • Quit date:    • Years since quittin.6   Smokeless Tobacco Never     Social History     Substance and Sexual Activity   Drug Use Never       Family History:    Family History   Problem Relation Age of Onset   • Prostate cancer Brother    • Lung cancer Brother        Physical Exam:     Vitals:   Blood Pressure: 110/67 (23 1052)  Pulse: 72 (23 1052)  Temperature: 97.8 °F (36.6 °C) (23 1052)  Temp Source: Oral (23)  Respirations: 18 (23 105)  SpO2: 94 % (23 105)      General: well appearing, no acute distress  HEENT: atraumatic, PERRLA, moist mucosa, normal pharynx, normal tonsils and adenoids, normal tongue, no fluid in sinuses  Neck: Trachea midline, no carotid bruit, no masses  Respiratory: normal chest wall expansion, CTA B, no r/r/w, no rubs  Cardiovascular: RRR, no m/r/g, Normal S1 and S2  Abdomen: Soft, non-tender, non-distended, normal bowel sounds in all quadrants, no hepatosplenomegaly, no tympany  Rectal: deferred  Musculoskeletal: normal ROM in upper and lower extremities  Integumentary: warm, dry, and pink, with no rash, purpura, or petechia  Heme/Lymph: no lymphadenopathy, no bruises  Neurological: Cranial Nerves II-XII grossly intact  Psychiatric: cooperative with normal mood, affect, and cognition    Additional Data:     Lab Results: I have personally reviewed pertinent reports.       Results from last 7 days   Lab Units 23  0616   WBC Thousand/uL 6.39   HEMOGLOBIN g/dL 6.9*   HEMATOCRIT % 25.4*   PLATELETS Thousands/uL 352   NEUTROS PCT % 67   LYMPHS PCT % 17   MONOS PCT % 12   EOS PCT % 2     Results from last 7 days   Lab Units 23  0616   SODIUM mmol/L 137   POTASSIUM mmol/L 4.0   CHLORIDE mmol/L 106   CO2 mmol/L 26   BUN mg/dL 10   CREATININE mg/dL 0.95   ANION GAP mmol/L 5   CALCIUM mg/dL 9.5   ALBUMIN g/dL 3.3*   TOTAL BILIRUBIN mg/dL 0.33   ALK PHOS U/L 49   ALT U/L 7   AST U/L 12*   GLUCOSE RANDOM mg/dL 93 Results from last 7 days   Lab Units 08/11/23  0616   INR  0.95     Results from last 7 days   Lab Units 08/11/23  0613   POC GLUCOSE mg/dl 91         Results from last 7 days   Lab Units 08/11/23  0616   LACTIC ACID mmol/L 1.3     Results from last 7 days   Lab Units 08/11/23  0832 08/11/23  0616   HS TNI 0HR ng/L  --  <2   HS TNI 2HR ng/L <2  --        Imaging: I have personally reviewed pertinent reports. CTA head with and without contrast   Final Result by Trav Nguyen MD (08/11 2076)      Similar moderate vasogenic edema in left posterior frontal lobe with known underlying treated metastatic lesion not well seen by CT as seen on MRI brain 7/26/2023. No new acute intracranial abnormality. Negative CTA head for large vessel occlusion, dissection, aneurysm, or high-grade stenosis. Workstation performed: [id number]             EKG, Pathology, and Other Studies Reviewed on Admission:   · Head CT reviewed, moderate vasogenic edema in the left posterior frontal lobe with underlying treated metastatic lesion. [facility]: Yes     ** Please Note: This note was completed in part utilizing M-Modal Fluency Direct Software. Grammatical errors, random word insertions, spelling mistakes, and incomplete sentences may be an occasional consequence of this system secondary to software limitations, ambient noise, and hardware issues. If you have any questions or concerns about the content, text, or information contained within the body of this dictation, please contact the provider for clarification. **

## 2023-08-11 NOTE — ASSESSMENT & PLAN NOTE
Lab Results   Component Value Date    EGFR 80 08/11/2023    EGFR 72 08/07/2023    EGFR 68 07/17/2023    CREATININE 0.95 08/11/2023    CREATININE 1.03 08/07/2023    CREATININE 1.08 07/17/2023   Function at historical baseline

## 2023-08-11 NOTE — CONSULTS
Consultation - Neurology   Derek Brantley 71 y.o. male MRN: [id number]  Unit/Bed#: -01 Encounter: [id number]      Assessment/Plan     * Partial seizure [facility])  Assessment & Plan  73-year-old male with history stage IV adenocarcinoma of lung cancer with known mets to the brain (s/p chemo/radiation, on supplemental oxygen at baseline), and prior strokes (on Lovenox) who presents after a 3-minute episode of uncontrollable right arm movements concerning for seizure activity. There is no loss of consciousness, tongue bite or incontinence. Patient was previously on steroids/Keppra for known brain mets, however is not currently on any AED. BP on arrival 125/67. Workup:  - Prior MRI brain w/wo (07/26/2023) revealed "treated metastasis in the posterior left frontal lobe is increased in size with increased edema. This could represent progression of disease versus radiation necrosis"  - CT head revealed similar moderate vasogenic edema in left posterior frontal lobe with known underlying treated but lesions, negative for acute intracranial abnormality.    - CTA head/neck negative for LVO, dissection, aneurysm or high-grade stenosis. - Labs on admission remarkable for anemia (6.9/25.4)  - UA negative for infection    Etiology of partial seizure earlier likely secondary to underlying known brain met. Plan:  - s/p Keppra 1500 mg load  - Continue with Keppra 1g twice daily  - Ativan PRN   - No need for routine EEG or repeat MRI brain at this time   - Telemetry  - Seizure/Fall precautions  - Patient does not currently drive, however does have an active license. [employer] form completed and faxed at this time. Patient made aware that he should continue to not to drive. - Medical management and supportive care per primary team. Correction of any metabolic or infectious disturbances. Plan discussed with Attending Neurologist, please see attestation for further input/recommendations.          Derek Brantley will need follow up in in 6 weeks with epilepsy attending or advance practitioner. He will not require outpatient neurological testing. History of Present Illness     Reason for Consult / Principal Problem: Partial seizure  Hx and PE limited by: N/A, history obtained per patient, per wife who is present at bedside and per chart review  HPI: Obi Castillo is a 71 y.o.  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 He reports he has been in his normal state of health, however his wife woke him up on 8/11 to him repeatedly moving his arm. Patient was awake during this time and reported that he could not stop his arm. This episode lasted approximately 3 minutes and broke spontaneously. There is no loss of consciousness, tongue bite or urinary incontinence. Patient has not had any prior seizures and is not currently on any AEDs. BP on arrival 125/67. Labs on admission remarkable for anemia (6.9/25.4), PRBC transfusion pending. CT head revealed similar moderate vasogenic edema in left posterior frontal lobe with known underlying treated but lesions, negative for acute intracranial abnormality. CTA head/neck negative for LVO, dissection, aneurysm or high-grade stenosis. Per chart review, patient was previously seen by [facility] inpatient neurology on August 2021 for right hemiparesis and dysarthria. Neuroimaging at the time revealed bilateral strokes (left MCA and right MCA) in the setting of hypercoagulable state as he was recently diagnosed with a new lung mass. Patient was started on Lovenox indefinitely. 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 Patient was started on dexamethasone and Keppra 1 g twice daily.   It appears that Keppra was discontinued in 2023. Consult to neurology  Consult performed by: CRISTOFER Grijalva  Consult ordered by: Vanessa Conklin DO          Review of Systems   Constitutional: Negative for fatigue and fever. Eyes: Negative for photophobia and visual disturbance. Respiratory: Negative for cough and shortness of breath. Cardiovascular: Negative for chest pain and palpitations. Musculoskeletal: Negative for back pain and neck pain. Skin: Negative for color change and pallor. Neurological: Positive for speech difficulty (Chronic dysarthria). Negative for dizziness, tremors, seizures, syncope, facial asymmetry, weakness, light-headedness, numbness and headaches. Psychiatric/Behavioral: Positive for confusion. The patient is not nervous/anxious. All other systems reviewed and are negative. Historical Information   Past Medical History:   Diagnosis Date   • Chronic respiratory failure with hypoxia ([address]) 2021   • Impaired mobility and ADLs 2021   • Lung cancer (HCC)    • Stroke [facility])      Past Surgical History:   Procedure Laterality Date   • IR BIOPSY LUNG  2021   • IR THORACENTESIS  2022   • IR THORACENTESIS  2022     Social History   Social History     Substance and Sexual Activity   Alcohol Use Not Currently    Comment: No ETOH since Summer 2021      Social History     Substance and Sexual Activity   Drug Use Never     E-Cigarette/Vaping   • E-Cigarette Use Never User      E-Cigarette/Vaping Substances     Social History     Tobacco Use   Smoking Status Former   • Packs/day: 3.00   • Years: 39.00   • Total pack years: 117.00   • Types: Cigarettes   • Start date: 66   • Quit date:    • Years since quittin.6   Smokeless Tobacco Never     Family History:   Family History   Problem Relation Age of Onset   • Prostate cancer Brother    • Lung cancer Brother        Review of previous medical records was completed.      Meds/Allergies   all current active meds have been reviewed, current meds:   Current Facility-Administered Medications   Medication Dose Route Frequency   • acetaminophen (TYLENOL) tablet 650 mg  650 mg Oral 4x Daily PRN   • acetaminophen (TYLENOL) tablet 650 mg  650 mg Oral Q6H PRN   • albuterol (PROVENTIL HFA,VENTOLIN HFA) inhaler 2 puff  2 puff Inhalation Q6H PRN   • albuterol inhalation solution 2.5 mg  2.5 mg Nebulization Q6H PRN   • aluminum-magnesium hydroxide-simethicone (MAALOX) oral suspension 30 mL  30 mL Oral Q6H PRN   • atorvastatin (LIPITOR) tablet 40 mg  40 mg Oral Daily With Dinner   • calcium carbonate (TUMS) chewable tablet 1,000 mg  1,000 mg Oral Daily PRN   • dexamethasone (DECADRON) injection 4 mg  4 mg Intravenous Q6H   • docusate sodium (COLACE) capsule 100 mg  100 mg Oral BID   • enoxaparin (LOVENOX) subcutaneous injection 90 mg  1 mg/kg Subcutaneous E56A OPAL   • folic acid (FOLVITE) tablet 1,000 mcg  1,000 mcg Oral Daily   • levETIRAcetam (KEPPRA) tablet 1,000 mg  1,000 mg Oral Q12H OPAL   • ondansetron (ZOFRAN) injection 4 mg  4 mg Intravenous Q6H PRN   • pantoprazole (PROTONIX) EC tablet 40 mg  40 mg Oral Early Morning   • senna (SENOKOT) tablet 8.6 mg  1 tablet Oral Daily   • tamsulosin (FLOMAX) capsule 0.4 mg  0.4 mg Oral After Dinner    and PTA meds:   Prior to Admission Medications   Prescriptions Last Dose Informant Patient Reported?  Taking?   acetaminophen (TYLENOL) 325 mg tablet  Self No No   Sig: Take 2 tablets (650 mg total) by mouth 4 (four) times a day as needed for mild pain, headaches or fever   albuterol (2.5 mg/3 mL) 0.083 % nebulizer solution  Self No No   Sig: Take 3 mL (2.5 mg total) by nebulization every 6 (six) hours as needed for wheezing or shortness of breath   albuterol (ProAir HFA) 90 mcg/act inhaler  Self No No   Sig: Inhale 2 puffs every 6 (six) hours as needed for wheezing or shortness of breath   atorvastatin (LIPITOR) 40 mg tablet   No No   Sig: TAKE 1 TABLET BY MOUTH DAILY WITH DINNER   enoxaparin (LOVENOX) 100 mg/mL  Self No No   Sig: Inject 0.9 mL (90 mg total) under the skin every 12 (twelve) hours   folic acid (FOLVITE) 1 mg tablet   No No   Sig: TAKE 1 TABLET BY MOUTH EVERY DAY   multivitamin (THERAGRAN) TABS  Self Yes No   Sig: Take 1 tablet by mouth daily   Patient not taking: Reported on 7/10/2023   tamsulosin (FLOMAX) 0.4 mg  Self No No   Sig: Take 1 capsule (0.4 mg total) by mouth daily after dinner      Facility-Administered Medications: None       Allergies   Allergen Reactions   • Doxycycline Rash       Objective   Vitals:Blood pressure 110/67, pulse 70, temperature 97.8 °F (36.6 °C), resp. rate 18, SpO2 94 %. ,There is no height or weight on file to calculate BMI. Intake/Output Summary (Last 24 hours) at 8/11/2023 1121  Last data filed at 8/11/2023 1101  Gross per 24 hour   Intake --   Output 175 ml   Net -175 ml       Invasive Devices: Invasive Devices     Peripheral Intravenous Line  Duration           Peripheral IV 08/11/23 Left Antecubital <1 day    Peripheral IV 08/11/23 Right Antecubital <1 day                Physical Exam  Vitals reviewed. Exam conducted with a chaperone present (Wife present at bedside during neuro exam). Constitutional:       General: He is not in acute distress. Appearance: Normal appearance. He is well-developed. He is obese. HENT:      Head: Normocephalic and atraumatic. Right Ear: External ear normal.      Left Ear: External ear normal.      Nose: Nose normal.      Mouth/Throat:      Mouth: Mucous membranes are moist.   Eyes:      Extraocular Movements: Extraocular movements intact and EOM normal.      Conjunctiva/sclera: Conjunctivae normal.      Pupils: Pupils are equal, round, and reactive to light. Cardiovascular:      Rate and Rhythm: Normal rate and regular rhythm. Pulmonary:      Effort: Pulmonary effort is normal. No respiratory distress. Musculoskeletal:         General: Normal range of motion.       Cervical back: Normal range of motion and neck supple. Right lower leg: No edema. Left lower leg: No edema. Skin:     General: Skin is warm and dry. Coloration: Skin is not jaundiced or pale. Neurological:      Mental Status: He is alert. Motor: Motor strength is normal.     Coordination: Finger-nose-finger test: Dysmetria noted with right finger-to-nose, chronic since stroke per wife. Patient also had some past-pointing with right finger-to-nose. Comments: See below   Psychiatric:         Mood and Affect: Mood normal.         Speech: Speech normal.         Behavior: Behavior normal.       Neurologic Exam     Mental Status   Speech: speech is normal     Patient is alert. Oriented to self. Originally stated this was [facility], however then stated it was [facility]. Stated it was August 2022, then self corrected to 2023. He is aware of his situation that brought him to the hospital.  Normal attention and concentration. Able to follow two-step commands. Speech is dysarthric (baseline since prior stroke per wife), no obvious aphasia. He is able to name objects and repeat phrases. Cranial Nerves     CN II   Visual fields full to confrontation. CN III, IV, VI   Pupils are equal, round, and reactive to light. Extraocular motions are normal.   Nystagmus: none   Diplopia: none    CN V   Facial sensation intact. CN VII   Facial expression full, symmetric. CN VIII   CN VIII normal.     CN IX, X   CN IX normal.     CN XII   CN XII normal.     Exotropia present (right eye turned outward)     Motor Exam   Muscle bulk: normal  Overall muscle tone: normal  Right arm pronator drift: absent  Left arm pronator drift: absent    Strength   Strength 5/5 throughout.      Sensory Exam   Light touch normal.     Gait, Coordination, and Reflexes     Gait  Gait: (Deferred during neuro exam)    Coordination   Finger-nose-finger test: Dysmetria noted with right finger-to-nose, chronic since stroke per wife.  Patient also had some past-pointing with right finger-to-nose. Tremor   Resting tremor: absent  Intention tremor: absent  Action tremor: absent      Lab Results:   I have personally reviewed pertinent reports. , CBC:   Results from last 7 days   Lab Units 08/11/23  0616 08/07/23  0927   WBC Thousand/uL 6.39 8.15   RBC Million/uL 3.02* 3.41*   HEMOGLOBIN g/dL 6.9* 8.1*   HEMATOCRIT % 25.4* 29.1*   MCV fL 84 85   PLATELETS Thousands/uL 352 451*   , BMP/CMP:   Results from last 7 days   Lab Units 08/11/23  0616 08/07/23  0927   SODIUM mmol/L 137 141   POTASSIUM mmol/L 4.0 4.1   CHLORIDE mmol/L 106 110*   CO2 mmol/L 26 29   BUN mg/dL 10 12   CREATININE mg/dL 0.95 1.03   CALCIUM mg/dL 9.5 10.2*   AST U/L 12* 19   ALT U/L 7 11*   ALK PHOS U/L 49 60   EGFR ml/min/1.73sq m 80 72   , TSH:   Results from last 7 days   Lab Units 08/07/23  0927   TSH 3RD GENERATON uIU/mL 6.791*   , Coagulation:   Results from last 7 days   Lab Units 08/11/23  0616   INR  0.95   Urinalysis:   Results from last 7 days   Lab Units 08/11/23  0832   COLOR UA  Yellow   CLARITY UA  Clear   SPEC GRAV UA  <1.005*   PH UA  6.5   LEUKOCYTES UA  Negative   NITRITE UA  Negative   GLUCOSE UA mg/dl Negative   KETONES UA mg/dl Negative   BILIRUBIN UA  Negative   BLOOD UA  Negative   Imaging Studies: I have personally reviewed pertinent reports. and I have personally reviewed pertinent films in PACS CTA head/neck  EKG, Pathology, and Other Studies: I have personally reviewed pertinent reports.    and I have personally reviewed pertinent films in PACS  VTE Prophylaxis: Patient currently in ED    Code Status: Level 1 - Full Code

## 2023-08-11 NOTE — CONSULTS
Consultation - Radiation Oncology   Diane Benites 71 y.o. male MRN: [id number]  Unit/Bed#: -01 Encounter: [id number]        History of Present Illness   Physician Requesting Consult: Danita Adams DO  Reason for Consult / Principal Problem: Seizure, brain metastasis    HPI: Diane Benites is a 71 y.o. male with adenocarcinoma of the right upper lobe of the lung diagnosed in August 2021. Previous radiation was performed under the care of Dr. Torin Monk. He completed chemotherapy for cytoreduction followed by concurrent chemoRT targeting the right lung/mediastinum (EOT 4/21/22). He then continued pembrolizumab. He was found to have a solitary metastasis in the left precentral gyrus region with surrounding edema.  He completed SRS to this lesion on 2/22/23. He was last seen on 8/2/23. Relevant recent imaging is as follows:     7/3/23 CT c/a/p w contrast  1. Stable CT appearance of the chest, abdomen, and pelvis without evidence for disease progression. 2. Stable posttreatment change in the right hemithorax with diffuse volume loss/opacification presumably reflecting posttreatment change. 3. Stable moderate size loculated complex right pleural effusion as described above. 4. Additional stable findings as noted.     7/10/23 Dr. Natalie Lott  remains on maintenance therapy with Anibal Childes.    change his Mario Benito to be every 3 months   Follow up 6 weeks     7/24/23 Pulmonary  Very complex cancer related pleural effusion.  I reviewed the chest CT from July 2023 and compared it to the chest CT in February 2022.  The effusion appears more complicated with more proteinaceous, possible cancer. Rivka Nose is now more enhancement in the pleura suggestive of pleural metastasis.  This effusion is also contributed by postradiation right lung fibrosis, volume loss. do not think that a repeat thoracentesis would be beneficial at this time.   At this point it's hard to say that drainage with ASEPT catheter or thora would be of best benefit for him due to the complex nature of the effusion and likely trapped lung with parenchymal volume loss from radiation/tumor  He is on 2LPM O2 overnight.        7/26/23 MRI brain w wo contrast  IMPRESSION:  Treated metastasis in the posterior left frontal lobe is increased in size with increased edema. This could represent progression of disease versus radiation necrosis. Recommend close interval follow-up. He now presented on 8/11/23 with right arm twitching without loss of consciousness. The episode lasted 3 minutes and spontaneously resolved. He did not experience incontinence or tongue biting. CTA head on 8/11/23 demonstrated similar moderate vasogenic edema in the left posterior frontal lobe with known underlying treated metastatic lesion. There was no new acute intracranial abnormality. He was evaluated by neurology and began levetiracetam 1500 mg loading dose. He is to continue levetiracetam 1000 mg BID. He also received dexamethasone, currently prescribed 4 mg every 6 hours. Upon interview, he endorses the above history. He denies headaches, recurrent seizures or focal numbness/weakness/tingling. He is without additional acute concerns. Inpatient consult to Radiation Oncology  Consult performed by: Milagro Genao MD  Consult ordered by: Avinash Brannon DO          Review of Systems Negative except as described above.     Historical Information   Previous Oncology History:  Oncology History   Adenocarcinoma of lung   8/2021 Initial Diagnosis    Adenocarcinoma of lung     8/5/2021 Biopsy    Right lung apex, image-guided core needle biopsy:  - Adenocarcinoma      10/6/2021 - 1/26/2022 Chemotherapy    cyanocobalamin, 1,000 mcg, Intramuscular, Once, 3 of 3 cycles  Administration: 1,000 mcg (11/24/2021), 1,000 mcg (1/26/2022), 1,000 mcg (10/6/2021)  pegfilgrastim (Abhi Hack), 6 mg, Subcutaneous, Once, 1 of 1 cycle  Administration: 6 mg (11/26/2021)  pegfilgrastim (Marcos Srinivasan), 6 mg, Subcutaneous, Once, 6 of 6 cycles  Administration: 6 mg (10/6/2021), 6 mg (11/3/2021), 6 mg (11/24/2021), 6 mg (12/15/2021), 6 mg (1/5/2022)  fosaprepitant (EMEND) IVPB, 150 mg, Intravenous, Once, 6 of 6 cycles  Administration: 150 mg (10/6/2021), 150 mg (11/24/2021), 150 mg (12/15/2021), 150 mg (1/26/2022), 150 mg (11/3/2021), 150 mg (1/5/2022)  CARBOplatin (PARAPLATIN) IVPB (GOG AUC DOSING), 519.5 mg, Intravenous, Once, 6 of 6 cycles  Administration: 519.5 mg (10/6/2021), 574 mg (11/24/2021), 600 mg (12/15/2021), 533 mg (1/26/2022), 604.5 mg (11/3/2021), 563 mg (1/5/2022)  pemetrexed (ALIMTA) chemo infusion, 970 mg, Intravenous, Once, 6 of 6 cycles  Administration: 1,000 mg (10/6/2021), 1,000 mg (11/24/2021), 1,000 mg (12/15/2021), 1,000 mg (1/26/2022), 1,000 mg (11/3/2021), 1,000 mg (1/5/2022)  pembrolizumab (KEYTRUDA) IVPB, 200 mg, Intravenous, Once, 6 of 6 cycles  Administration: 200 mg (10/6/2021), 200 mg (11/24/2021), 200 mg (12/15/2021), 200 mg (1/26/2022), 200 mg (11/3/2021), 200 mg (1/5/2022)     3/7/2022 -  Chemotherapy    CARBOplatin (PARAPLATIN) IVPB (GOG AUC DOSING), , Intravenous, Once, 6 of 6 cycles  Administration: 211.6 mg (3/7/2022), 208 mg (3/14/2022), 238.8 mg (3/21/2022), 211.6 mg (3/28/2022), 215.2 mg (4/4/2022), 213.4 mg (4/18/2022)  PACLItaxel (TAXOL) chemo IVPB, 50 mg/m2 = 99 mg, Intravenous, Once, 6 of 6 cycles  Administration: 99 mg (3/7/2022), 99 mg (3/14/2022), 99 mg (3/21/2022), 99 mg (3/28/2022), 99 mg (4/4/2022), 99 mg (4/18/2022)  pembrolizumab (KEYTRUDA) IVPB, 200 mg, Intravenous, Once, 24 of 27 cycles  Administration: 200 mg (3/7/2022), 200 mg (3/28/2022), 200 mg (4/25/2022), 200 mg (5/16/2022), 200 mg (6/6/2022), 200 mg (6/27/2022), 200 mg (7/18/2022), 200 mg (8/8/2022), 200 mg (8/29/2022), 200 mg (9/19/2022), 200 mg (10/10/2022), 200 mg (10/31/2022), 200 mg (11/21/2022), 200 mg (12/12/2022), 200 mg (1/3/2023), 200 mg (1/23/2023), 200 mg (2/13/2023), 200 mg (3/27/2023), 200 mg (4/26/2023), 200 mg (5/17/2023), 200 mg (6/7/2023), 200 mg (6/28/2023), 200 mg (7/19/2023), 200 mg (8/9/2023)     2/22/2023 - 2/22/2023 Radiation    SRS left precentral gyrus   2000 cGy    Dr. Meghan Villa     Malignant neoplasm of upper lobe of right lung ([address])   9/3/2021 Initial Diagnosis    Malignant neoplasm of upper lobe of right lung ([address])     9/23/2021 -  Cancer Staged    Staging form: Lung, AJCC 8th Edition  - Clinical stage from 9/23/2021: Stage IIIC (cT3, cN3, cM0) - Signed by Theo Casarez MD on 9/23/2021  Histopathologic type:  Adenocarcinoma, NOS       10/6/2021 - 1/26/2022 Chemotherapy    cyanocobalamin, 1,000 mcg, Intramuscular, Once, 3 of 3 cycles  Administration: 1,000 mcg (11/24/2021), 1,000 mcg (1/26/2022), 1,000 mcg (10/6/2021)  pegfilgrastim (Freddy Jordan), 6 mg, Subcutaneous, Once, 1 of 1 cycle  Administration: 6 mg (11/26/2021)  pegfilgrastim ([place] Canard), 6 mg, Subcutaneous, Once, 6 of 6 cycles  Administration: 6 mg (10/6/2021), 6 mg (11/3/2021), 6 mg (11/24/2021), 6 mg (12/15/2021), 6 mg (1/5/2022)  fosaprepitant (EMEND) IVPB, 150 mg, Intravenous, Once, 6 of 6 cycles  Administration: 150 mg (10/6/2021), 150 mg (11/24/2021), 150 mg (12/15/2021), 150 mg (1/26/2022), 150 mg (11/3/2021), 150 mg (1/5/2022)  CARBOplatin (PARAPLATIN) IVPB (GO AUC DOSING), 519.5 mg, Intravenous, Once, 6 of 6 cycles  Administration: 519.5 mg (10/6/2021), 574 mg (11/24/2021), 600 mg (12/15/2021), 533 mg (1/26/2022), 604.5 mg (11/3/2021), 563 mg (1/5/2022)  pemetrexed (ALIMTA) chemo infusion, 970 mg, Intravenous, Once, 6 of 6 cycles  Administration: 1,000 mg (10/6/2021), 1,000 mg (11/24/2021), 1,000 mg (12/15/2021), 1,000 mg (1/26/2022), 1,000 mg (11/3/2021), 1,000 mg (1/5/2022)  pembrolizumab (KEYTRUDA) IVPB, 200 mg, Intravenous, Once, 6 of 6 cycles  Administration: 200 mg (10/6/2021), 200 mg (11/24/2021), 200 mg (12/15/2021), 200 mg (1/26/2022), 200 mg (11/3/2021), 200 mg (1/5/2022)     3/4/2022 - 4/21/2022 Radiation    The patient saw @[facility]@ for radiation treatment.  This is the current list of radiation treatment:  Plan ID Energy Fractions Dose per Fraction (cGy) Dose Correction (cGy) Total Dose Delivered (cGy) Elapsed Days   R LUNGMED REV 6X 30 / 30 200 0 6,000 48      Treatment dates:  C1: 3/4/2022 - 4/21/2022         3/7/2022 -  Chemotherapy    CARBOplatin (PARAPLATIN) IVPB (GOG AUC DOSING), , Intravenous, Once, 6 of 6 cycles  Administration: 211.6 mg (3/7/2022), 208 mg (3/14/2022), 238.8 mg (3/21/2022), 211.6 mg (3/28/2022), 215.2 mg (4/4/2022), 213.4 mg (4/18/2022)  PACLItaxel (TAXOL) chemo IVPB, 50 mg/m2 = 99 mg, Intravenous, Once, 6 of 6 cycles  Administration: 99 mg (3/7/2022), 99 mg (3/14/2022), 99 mg (3/21/2022), 99 mg (3/28/2022), 99 mg (4/4/2022), 99 mg (4/18/2022)  pembrolizumab (KEYTRUDA) IVPB, 200 mg, Intravenous, Once, 24 of 27 cycles  Administration: 200 mg (3/7/2022), 200 mg (3/28/2022), 200 mg (4/25/2022), 200 mg (5/16/2022), 200 mg (6/6/2022), 200 mg (6/27/2022), 200 mg (7/18/2022), 200 mg (8/8/2022), 200 mg (8/29/2022), 200 mg (9/19/2022), 200 mg (10/10/2022), 200 mg (10/31/2022), 200 mg (11/21/2022), 200 mg (12/12/2022), 200 mg (1/3/2023), 200 mg (1/23/2023), 200 mg (2/13/2023), 200 mg (3/27/2023), 200 mg (4/26/2023), 200 mg (5/17/2023), 200 mg (6/7/2023), 200 mg (6/28/2023), 200 mg (7/19/2023), 200 mg (8/9/2023)     2/22/2023 - 2/22/2023 Radiation    SRS left precentral gyrus   2000 cGy    Dr. Alexandre Alicia     Brain metastasis   2/3/2023 Initial Diagnosis    Brain metastasis     2/22/2023 - 2/22/2023 Radiation    SRS left precentral gyrus   2000 cGy    Dr. Alexandre Alicia         Past Medical History:   Diagnosis Date   • Chronic respiratory failure with hypoxia ([address]) 8/9/2021   • Impaired mobility and ADLs 8/13/2021   • Lung cancer [facility])    • Stroke [facility])      Past Surgical History:   Procedure Laterality Date   • IR BIOPSY LUNG  8/5/2021   • IR THORACENTESIS  2/24/2022   • IR THORACENTESIS  8/1/2022     Family History   Problem Relation Age of Onset   • Prostate cancer Brother    • Lung cancer Brother      Social History   Social History     Substance and Sexual Activity   Alcohol Use Not Currently    Comment: No ETOH since Summer 2021      Social History     Substance and Sexual Activity   Drug Use Never     Social History     Tobacco Use   Smoking Status Former   • Packs/day: 3.00   • Years: 39.00   • Total pack years: 117.00   • Types: Cigarettes   • Start date: 66   • Quit date:    • Years since quittin.6   Smokeless Tobacco Never       Meds/Allergies     Current Facility-Administered Medications:   •  acetaminophen (TYLENOL) tablet 650 mg, 650 mg, Oral, 4x Daily PRN, Starlett Phi, DO  •  acetaminophen (TYLENOL) tablet 650 mg, 650 mg, Oral, Q6H PRN, Starlett Phi, DO  •  albuterol (PROVENTIL HFA,VENTOLIN HFA) inhaler 2 puff, 2 puff, Inhalation, Q6H PRN, Starlett Phi, DO  •  albuterol inhalation solution 2.5 mg, 2.5 mg, Nebulization, Q6H PRN, Benjy Phi, DO  •  aluminum-magnesium hydroxide-simethicone (MAALOX) oral suspension 30 mL, 30 mL, Oral, Q6H PRN, Starlett Phi, DO  •  atorvastatin (LIPITOR) tablet 40 mg, 40 mg, Oral, Daily With Dinner, Antonio Lagunas, DO  •  calcium carbonate (TUMS) chewable tablet 1,000 mg, 1,000 mg, Oral, Daily PRN, Benjy Phi, DO  •  dexamethasone (DECADRON) injection 4 mg, 4 mg, Intravenous, Q6H, Antonio Lagunas, DO  •  docusate sodium (COLACE) capsule 100 mg, 100 mg, Oral, BID, Antonio Lagunas, DO  •  enoxaparin (LOVENOX) subcutaneous injection 90 mg, 1 mg/kg, Subcutaneous, Q12H [address], Antonio Lagunas, DO, 90 mg at 78/84/67 1307  •  folic acid (FOLVITE) tablet 1,000 mcg, 1,000 mcg, Oral, Daily, Antonio Lagunas, DO  •  levETIRAcetam (KEPPRA) tablet 1,000 mg, 1,000 mg, Oral, Q12H [address], Antonio Diogo Lagunas, DO  •  ondansetron (ZOFRAN) injection 4 mg, 4 mg, Intravenous, Q6H PRN, Benjy Bermeo, DO  •  pantoprazole (PROTONIX) EC tablet 40 mg, 40 mg, Oral, Daily Before Breakfast, Antonio Braden,   •  senna (SENOKOT) tablet 8.6 mg, 1 tablet, Oral, Daily, Antonio Braden,   •  tamsulosin (FLOMAX) capsule 0.4 mg, 0.4 mg, Oral, After Dinner, Antonio Braden DO    Allergies   Allergen Reactions   • Doxycycline Rash       Objective     Intake/Output Summary (Last 24 hours) at 8/11/2023 1125  Last data filed at 8/11/2023 1101  Gross per 24 hour   Intake --   Output 175 ml   Net -175 ml     Invasive Devices     Peripheral Intravenous Line  Duration           Peripheral IV 08/11/23 Left Antecubital <1 day    Peripheral IV 08/11/23 Right Antecubital <1 day              Physical Exam  HENT:      Head: Normocephalic and atraumatic. Eyes:      General: No scleral icterus. Extraocular Movements: Extraocular movements intact. Cardiovascular:      Rate and Rhythm: Normal rate. Pulmonary:      Effort: Pulmonary effort is normal.   Abdominal:      General: Abdomen is flat. There is no distension. Musculoskeletal:         General: Normal range of motion. Cervical back: Normal range of motion. Skin:     General: Skin is warm and dry. Neurological:      General: No focal deficit present. Mental Status: He is alert. Sensory: No sensory deficit. Motor: No weakness. Psychiatric:         Mood and Affect: Mood normal.            Lab Results: I have personally reviewed pertinent reports. Imaging Studies: I have personally reviewed pertinent films in PACS  EKG, Pathology, and Other Studies: I have personally reviewed pertinent reports. Assessment/Plan     Assessment and Plan:  Mr. Jennifer Stinson is a 71year old man with adenocarcinoma of the right upper lobe of the lung diagnosed in August 2021. Previous radiation was performed under the care of Dr. Edwin Yanes. He completed chemotherapy for cytoreduction followed by concurrent chemoRT targeting the right lung/mediastinum (EOT 4/21/22). He then continued pembrolizumab.  He was found to have a solitary metastasis in the left precentral gyrus region with surrounding edema.  He completed SRS to this lesion on 2/22/23. I reviewed recent imaging demonstrating increased edema around the treated brain lesion. I recommend continuing dexamethasone 4 mg every 6 hours for now with PPI. He will be contacted next week to review symptoms and assess ability to taper. I agree with neurology recommendations. He understands he should not drive. Outpatient follow up will be arranged through [facility]. All questions were answered to his satisfaction. Thank you for involving me in Mr. Bety junior. Code Status: Level 1 - Full Code  Advance Directive and Living Will:      Power of :    POLST:      Counseling / Coordination of Care  Total floor / unit time spent today 40 minutes. Greater than 50% of total time was spent with the patient and / or family counseling and / or coordination of care. A description of the counseling / coordination of care: Reviewed management of symptomatic edema, coordinated outpatient follow up.

## 2023-08-11 NOTE — TELEPHONE ENCOUNTER
Received voicemail from wife that patient is currently in the ED due to having seizure activity this morning. He is also getting a blood transfusion. I let her know we would follow up with her after he is discharged.

## 2023-08-11 NOTE — PLAN OF CARE
Problem: PAIN - ADULT  Goal: Verbalizes/displays adequate comfort level or baseline comfort level  Description: Interventions:  - Encourage patient to monitor pain and request assistance  - Assess pain using appropriate pain scale  - Administer analgesics based on type and severity of pain and evaluate response  - Implement non-pharmacological measures as appropriate and evaluate response  - Consider cultural and social influences on pain and pain management  - Notify physician/advanced practitioner if interventions unsuccessful or patient reports new pain  Outcome: Progressing     Problem: INFECTION - ADULT  Goal: Absence or prevention of progression during hospitalization  Description: INTERVENTIONS:  - Assess and monitor for signs and symptoms of infection  - Monitor lab/diagnostic results  - Monitor all insertion sites, i.e. indwelling lines, tubes, and drains  - Monitor endotracheal if appropriate and nasal secretions for changes in amount and color  - Benedict appropriate cooling/warming therapies per order  - Administer medications as ordered  - Instruct and encourage patient and family to use good hand hygiene technique  - Identify and instruct in appropriate isolation precautions for identified infection/condition  Outcome: Progressing  Goal: Absence of fever/infection during neutropenic period  Description: INTERVENTIONS:  - Monitor WBC    Outcome: Progressing     Problem: SAFETY ADULT  Goal: Patient will remain free of falls  Description: INTERVENTIONS:  - Educate patient/family on patient safety including physical limitations  - Instruct patient to call for assistance with activity   - Consult OT/PT to assist with strengthening/mobility   - Keep Call bell within reach  - Keep bed low and locked with side rails adjusted as appropriate  - Keep care items and personal belongings within reach  - Initiate and maintain comfort rounds  - Make Fall Risk Sign visible to staff  - Offer Toileting every 2 Hours, in advance of need  - Initiate/Maintain bed alarm  - Obtain necessary fall risk management equipment:   - Apply yellow socks and bracelet for high fall risk patients  - Consider moving patient to room near nurses station  Outcome: Progressing  Goal: Maintain or return to baseline ADL function  Description: INTERVENTIONS:  -  Assess patient's ability to carry out ADLs; assess patient's baseline for ADL function and identify physical deficits which impact ability to perform ADLs (bathing, care of mouth/teeth, toileting, grooming, dressing, etc.)  - Assess/evaluate cause of self-care deficits   - Assess range of motion  - Assess patient's mobility; develop plan if impaired  - Assess patient's need for assistive devices and provide as appropriate  - Encourage maximum independence but intervene and supervise when necessary  - Involve family in performance of ADLs  - Assess for home care needs following discharge   - Consider OT consult to assist with ADL evaluation and planning for discharge  - Provide patient education as appropriate  Outcome: Progressing  Goal: Maintains/Returns to pre admission functional level  Description: INTERVENTIONS:  - Perform BMAT or MOVE assessment daily.   - Set and communicate daily mobility goal to care team and patient/family/caregiver. - Collaborate with rehabilitation services on mobility goals if consulted  - Perform Range of Motion 2 times a day. - Reposition patient every 2 hours.   - Dangle patient 2 times a day  - Stand patient 2 times a day  - Ambulate patient 2 times a day  - Out of bed to chair 2 times a day   - Out of bed for meals 2 times a day  - Out of bed for toileting  - Record patient progress and toleration of activity level   Outcome: Progressing     Problem: DISCHARGE PLANNING  Goal: Discharge to home or other facility with appropriate resources  Description: INTERVENTIONS:  - Identify barriers to discharge w/patient and caregiver  - Arrange for needed discharge resources and transportation as appropriate  - Identify discharge learning needs (meds, wound care, etc.)  - Arrange for interpretive services to assist at discharge as needed  - Refer to Case Management Department for coordinating discharge planning if the patient needs post-hospital services based on physician/advanced practitioner order or complex needs related to functional status, cognitive ability, or social support system  Outcome: Progressing     Problem: Knowledge Deficit  Goal: Patient/family/caregiver demonstrates understanding of disease process, treatment plan, medications, and discharge instructions  Description: Complete learning assessment and assess knowledge base.   Interventions:  - Provide teaching at level of understanding  - Provide teaching via preferred learning methods  Outcome: Progressing     Problem: NEUROSENSORY - ADULT  Goal: Achieves stable or improved neurological status  Description: INTERVENTIONS  - Monitor and report changes in neurological status  - Monitor vital signs such as temperature, blood pressure, glucose, and any other labs ordered   - Initiate measures to prevent increased intracranial pressure  - Monitor for seizure activity and implement precautions if appropriate      Outcome: Progressing  Goal: Remains free of injury related to seizures activity  Description: INTERVENTIONS  - Maintain airway, patient safety  and administer oxygen as ordered  - Monitor patient for seizure activity, document and report duration and description of seizure to physician/advanced practitioner  - If seizure occurs,  ensure patient safety during seizure  - Reorient patient post seizure  - Seizure pads on all 4 side rails  - Instruct patient/family to notify RN of any seizure activity including if an aura is experienced  - Instruct patient/family to call for assistance with activity based on nursing assessment  - Administer anti-seizure medications if ordered    Outcome: Progressing Problem: SKIN/TISSUE INTEGRITY - ADULT  Goal: Skin Integrity remains intact(Skin Breakdown Prevention)  Description: Assess:  -Perform [place] assessment   -Clean and moisturize skin   -Inspect skin when repositioning, toileting, and assisting with ADLS  -Assess under medical devices  -Assess extremities for adequate circulation and sensation     Bed Management:  -Have minimal linens on bed & keep smooth, unwrinkled  -Change linens as needed when moist or perspiring  -Avoid sitting or lying in one position for more than 2 hours while in bed  -Keep HOB at 30 degrees     Toileting:  -Offer bedside commode  -Assess for incontinence   -Use incontinent care products after each incontinent episode    Activity:  -Mobilize patient 2 times a day  -Encourage activity and walks on unit  -Encourage or provide ROM exercises   -Turn and reposition patient every 2 Hours  -Use appropriate equipment to lift or move patient in bed  -Instruct/ Assist with weight shifting every 2 when out of bed in chair  -Consider limitation of chair time 2 hour intervals    Skin Care:  -Avoid use of baby powder, tape, friction and shearing, hot water or constrictive clothing  -Relieve pressure over bony prominences   -Do not massage red bony areas    Next Steps:  -Teach patient strategies to minimize risks   -Consider consults to  interdisciplinary teams  Outcome: Progressing

## 2023-08-11 NOTE — ED CARE HANDOFF
Emergency Department Sign Out Note        Sign out and transfer of care from Dr Emeterio Franco. See Separate Emergency Department note. The patient, Ansley Victoria, was evaluated by the previous provider for seizure and anemia. Workup Completed:  Labs and CAT scan reviewed    ED Course / Workup Pending (followup): Consult done to neurology and drivers restriction form completed wife states the patient has not driven for the past 2 years after his stroke                                         Procedures  MDM        Disposition  Final diagnoses:   Partial seizure ([address])   Anemia     Time reflects when diagnosis was documented in both MDM as applicable and the Disposition within this note     Time User Action Codes Description Comment    8/11/2023  6:49 AM Jeovany Dominguez S Add [R56.9] Partial seizure ([address])     8/11/2023  6:54 AM Jeovany PEDRO Add [D64.9] Anemia     8/11/2023  9:31 AM Antonio New Add [G93.6] Vasogenic edema ([address])     8/11/2023  9:31 AM Dayana Cox Add [C79.31] Brain metastasis       ED Disposition     ED Disposition   Admit    Condition   Stable    Date/Time   Fri Aug 11, 2023  9:42 AM    Comment   Case was discussed with *Dr Lionel Garcia** and the patient's admission status was agreed to be Admission Status: inpatient status to the service of Dr. Lionel Garcia . Follow-up Information    None       Patient's Medications   Discharge Prescriptions    No medications on file     No discharge procedures on file.        ED Provider  Electronically Signed by     Becca Lester DO  08/11/23 1005

## 2023-08-11 NOTE — ASSESSMENT & PLAN NOTE
Patient presenting with partial seizure in the setting of brain metastasis, he was previously on Keppra.   Had chemotherapy on Wednesday with Jennifer Tena as well as IV hydration    Case discussed with neurology, they will initiate Keppra as AED  Start corticosteroids given seizure, radiation oncology consultation placed for E consult  Will likely need taper of steroids  Will plan to monitor for any recurrent seizure activity  Neuroimaging per the neurologist  CT of the brain with vasogenic edema although unchanged

## 2023-08-11 NOTE — PHYSICAL THERAPY NOTE
PHYSICAL THERAPY CANCELLATION NOTE    Patient Name: Clotilde Fritz  YPQKM'T Date: 8/11/2023 08/11/23 1211   Note Type   Note type Cancelled Session   Cancel Reasons Medical status   Additional Comments PT orders received, chart review performed. Spoke to [employer] who reports pt is a self at home, hasn't been OOB since getting to the floor ~1-2 hour ago. Attempted to see pt for PT eval, however pt currently receiving 1 unit PBRCs.  Will hold and f/u as appropriate s/p transfusion       Imelda Kvng, PT, DPT

## 2023-08-11 NOTE — ASSESSMENT & PLAN NOTE
1 unit of packed red blood cells ordered  Transfuse for hemoglobin greater than 7  No evidence of gastrointestinal source, possibly related to recent IV hydration in the setting of chronic anemia  Repeat in a.m.   Recent Labs     08/11/23  0616   HGB 6.9*

## 2023-08-11 NOTE — ED PROVIDER NOTES
History  Chief Complaint   Patient presents with   • Seizure - New Onset     Pt presents to ed via walk in for seizure axctivty wife reports pt woke up with right arm twitching , wife reports pt has history of brain aneurism      70 yo M w/ PMH of lung CA on home O2, prior CVA presents to ED with possible seizure. He has lung CA with known mets to brain. Recent radiation. Had steroids for mets back in Feb. No fall/trauma. Is on lovenox. No prior seizures, no antiepileptics. Has been in normal state of health. No fevers/chills. No increased WOB or SOB. Wife reports that she woke up to him repeatedly moving his R arm. He was awake, told her he couldn't stop. This lasted about 3 mins. Broke spontaneously. Came straight here. No tongue lac/urinary incontinence. Not diabetic. No meds given. History provided by:  Patient and medical records   used: No    Seizure - New Onset  Seizure activity on arrival: no    Seizure type: Focal  Initial focality:  Unable to specify  Episode characteristics: abnormal movements    Postictal symptoms: no confusion, no memory loss and no somnolence    Return to baseline: yes    Severity:  Moderate  Duration:  3 minutes  Timing:  Once  Progression:  Resolved  Context: intracranial lesion    Context: not alcohol withdrawal, not fever and not previous head injury    Recent head injury:  No recent head injuries  PTA treatment:  None  History of seizures: no        Prior to Admission Medications   Prescriptions Last Dose Informant Patient Reported?  Taking?   acetaminophen (TYLENOL) 325 mg tablet  Self No No   Sig: Take 2 tablets (650 mg total) by mouth 4 (four) times a day as needed for mild pain, headaches or fever   albuterol (2.5 mg/3 mL) 0.083 % nebulizer solution  Self No No   Sig: Take 3 mL (2.5 mg total) by nebulization every 6 (six) hours as needed for wheezing or shortness of breath   albuterol (ProAir HFA) 90 mcg/act inhaler  Self No No   Sig: Inhale 2 puffs every 6 (six) hours as needed for wheezing or shortness of breath   atorvastatin (LIPITOR) 40 mg tablet   No No   Sig: TAKE 1 TABLET BY MOUTH DAILY WITH DINNER   enoxaparin (LOVENOX) 100 mg/mL  Self No No   Sig: Inject 0.9 mL (90 mg total) under the skin every 12 (twelve) hours   folic acid (FOLVITE) 1 mg tablet   No No   Sig: TAKE 1 TABLET BY MOUTH EVERY DAY   multivitamin (THERAGRAN) TABS  Self Yes No   Sig: Take 1 tablet by mouth daily   Patient not taking: Reported on 7/10/2023   tamsulosin (FLOMAX) 0.4 mg  Self No No   Sig: Take 1 capsule (0.4 mg total) by mouth daily after dinner      Facility-Administered Medications: None       Past Medical History:   Diagnosis Date   • Chronic respiratory failure with hypoxia ([facility]) 2021   • Impaired mobility and ADLs 2021   • Lung cancer ([address])    • Stroke [facility])        Past Surgical History:   Procedure Laterality Date   • IR BIOPSY LUNG  2021   • IR THORACENTESIS  2022   • IR THORACENTESIS  2022       Family History   Problem Relation Age of Onset   • Prostate cancer Brother    • Lung cancer Brother      I have reviewed and agree with the history as documented. E-Cigarette/Vaping   • E-Cigarette Use Never User      E-Cigarette/Vaping Substances     Social History     Tobacco Use   • Smoking status: Former     Packs/day: 3.00     Years: 39.00     Total pack years: 117.00     Types: Cigarettes     Start date: 66     Quit date: 2004     Years since quittin.6   • Smokeless tobacco: Never   Vaping Use   • Vaping Use: Never used   Substance Use Topics   • Alcohol use: Not Currently     Comment: No ETOH since Summer 2021    • Drug use: Never       Review of Systems   Constitutional: Negative for chills, diaphoresis, fatigue, fever and unexpected weight change. HENT: Negative for congestion, ear pain, rhinorrhea, sore throat, trouble swallowing and voice change. Eyes: Negative for pain and visual disturbance.    Respiratory: Negative for cough, chest tightness and shortness of breath. Cardiovascular: Negative for chest pain, palpitations and leg swelling. Gastrointestinal: Negative for abdominal pain, blood in stool, constipation, diarrhea, nausea and vomiting. Genitourinary: Negative for difficulty urinating and hematuria. Musculoskeletal: Negative for arthralgias, back pain and neck pain. Skin: Negative for rash. Neurological: Positive for seizures. Negative for dizziness, syncope, light-headedness and headaches. Psychiatric/Behavioral: Negative for confusion and suicidal ideas. The patient is not nervous/anxious. Physical Exam  Physical Exam  Vitals and nursing note reviewed. Constitutional:       General: He is not in acute distress. Appearance: He is well-developed. He is ill-appearing (chronically). He is not toxic-appearing or diaphoretic. HENT:      Head: Normocephalic and atraumatic. Right Ear: External ear normal.      Left Ear: External ear normal.      Nose: Nose normal.      Mouth/Throat:      Mouth: Mucous membranes are dry. Eyes:      General: No scleral icterus. Right eye: No discharge. Left eye: No discharge. Conjunctiva/sclera: Conjunctivae normal.      Comments: Blind R eye. L pupil is reactive. Neck:      Vascular: No JVD. Trachea: No tracheal deviation. Cardiovascular:      Rate and Rhythm: Normal rate and regular rhythm. Pulses: Normal pulses. Heart sounds: Normal heart sounds. No murmur heard. No friction rub. No gallop. Pulmonary:      Effort: Pulmonary effort is normal. No respiratory distress. Breath sounds: Normal breath sounds. No stridor. No wheezing or rales. Chest:      Chest wall: No tenderness. Abdominal:      General: Bowel sounds are normal. There is no distension. Palpations: Abdomen is soft. Tenderness: There is no abdominal tenderness. There is no guarding or rebound.    Musculoskeletal:         General: No tenderness or deformity. Normal range of motion. Cervical back: Normal range of motion and neck supple. No rigidity or tenderness. Right lower leg: Edema (scant) present. Left lower leg: Edema (scant) present. Lymphadenopathy:      Cervical: No cervical adenopathy. Skin:     General: Skin is warm and dry. Coloration: Skin is pale. Findings: No rash. Neurological:      General: No focal deficit present. Mental Status: He is alert. He is disoriented. GCS: GCS eye subscore is 4. GCS verbal subscore is 4. GCS motor subscore is 6. Cranial Nerves: Cranial nerves 2-12 are intact. No cranial nerve deficit, dysarthria or facial asymmetry. Sensory: Sensation is intact. No sensory deficit. Motor: Motor function is intact. No weakness or seizure activity. Coordination: Coordination normal.      Comments: Slightly weak RUE 4+/5. Otherwise 5/5 in extremities    Thought it was 2013 otherwise oriented.    Psychiatric:         Behavior: Behavior normal.         Vital Signs  ED Triage Vitals [08/11/23 0615]   Temperature Pulse Respirations Blood Pressure SpO2   97.6 °F (36.4 °C) 79 20 125/67 100 %      Temp Source Heart Rate Source Patient Position - Orthostatic VS BP Location FiO2 (%)   Oral Monitor -- -- --      Pain Score       --           Vitals:    08/11/23 0615   BP: 125/67   Pulse: 79         Visual Acuity  Visual Acuity    Flowsheet Row Most Recent Value   L Pupil Size (mm) 3   R Pupil Size (mm) 3          ED Medications  Medications   sodium chloride 0.9 % bolus 1,000 mL (1,000 mL Intravenous New Bag 8/11/23 0618)   iohexol (OMNIPAQUE) 350 MG/ML injection (SINGLE-DOSE) 90 mL (90 mL Intravenous Given 8/11/23 0649)       Diagnostic Studies  Results Reviewed     Procedure Component Value Units Date/Time    CBC and differential [[id number]]  (Abnormal) Collected: 08/11/23 0616    Lab Status: Final result Specimen: Blood from Arm, Right Updated: 08/11/23 0654     WBC 6.39 Thousand/uL RBC 3.02 Million/uL      Hemoglobin 6.9 g/dL      Hematocrit 25.4 %      MCV 84 fL      MCH 22.8 pg      MCHC 27.2 g/dL      RDW 18.6 %      MPV 9.4 fL      Platelets 788 Thousands/uL      nRBC 0 /100 WBCs      Neutrophils Relative 67 %      Immat GRANS % 1 %      Lymphocytes Relative 17 %      Monocytes Relative 12 %      Eosinophils Relative 2 %      Basophils Relative 1 %      Neutrophils Absolute 4.32 Thousands/µL      Immature Grans Absolute 0.04 Thousand/uL      Lymphocytes Absolute 1.08 Thousands/µL      Monocytes Absolute 0.76 Thousand/µL      Eosinophils Absolute 0.15 Thousand/µL      Basophils Absolute 0.04 Thousands/µL     Narrative: This is an appended report. These results have been appended to a previously verified report. HS Troponin 0hr (reflex protocol) [[id number]]  (Normal) Collected: 08/11/23 0616    Lab Status: Final result Specimen: Blood from Arm, Right Updated: 08/11/23 0647     hs TnI 0hr <2 ng/L     HS Troponin I 2hr [[id number]]     Lab Status: No result Specimen: Blood     Lactic acid, plasma (w/reflex if result > 2.0) [[id number]]  (Normal) Collected: 08/11/23 0616    Lab Status: Final result Specimen: Blood from Arm, Right Updated: 08/11/23 0639     LACTIC ACID 1.3 mmol/L     Narrative:      Result may be elevated if tourniquet was used during collection.     Comprehensive metabolic panel [[id number]]  (Abnormal) Collected: 08/11/23 0616    Lab Status: Final result Specimen: Blood from Arm, Right Updated: 08/11/23 0855     Sodium 137 mmol/L      Potassium 4.0 mmol/L      Chloride 106 mmol/L      CO2 26 mmol/L      ANION GAP 5 mmol/L      BUN 10 mg/dL      Creatinine 0.95 mg/dL      Glucose 93 mg/dL      Calcium 9.5 mg/dL      Corrected Calcium 10.1 mg/dL      AST 12 U/L      ALT 7 U/L      Alkaline Phosphatase 49 U/L      Total Protein 7.1 g/dL      Albumin 3.3 g/dL      Total Bilirubin 0.33 mg/dL      eGFR 80 ml/min/1.73sq m     Narrative:      National Kidney Disease Foundation guidelines for Chronic Kidney Disease (CKD):   •  Stage 1 with normal or high GFR (GFR > 90 mL/min/1.73 square meters)  •  Stage 2 Mild CKD (GFR = 60-89 mL/min/1.73 square meters)  •  Stage 3A Moderate CKD (GFR = 45-59 mL/min/1.73 square meters)  •  Stage 3B Moderate CKD (GFR = 30-44 mL/min/1.73 square meters)  •  Stage 4 Severe CKD (GFR = 15-29 mL/min/1.73 square meters)  •  Stage 5 End Stage CKD (GFR <15 mL/min/1.73 square meters)  Note: GFR calculation is accurate only with a steady state creatinine    Protime-INR [[id number]]  (Normal) Collected: 08/11/23 0616    Lab Status: Final result Specimen: Blood from Arm, Right Updated: 08/11/23 0637     Protime 13.3 seconds      INR 0.95    APTT [[id number]]  (Normal) Collected: 08/11/23 0616    Lab Status: Final result Specimen: Blood from Arm, Right Updated: 08/11/23 0637     PTT 33 seconds     Fingerstick Glucose (POCT) [[id number]]  (Normal) Collected: 08/11/23 0613    Lab Status: Final result Updated: 08/11/23 0616     POC Glucose 91 mg/dl     UA w Reflex to Microscopic w Reflex to Culture [[id number]]     Lab Status: No result Specimen: Urine                  CTA head with and without contrast    (Results Pending)              Procedures  ECG 12 Lead Documentation Only    Date/Time: 8/11/2023 6:09 AM    Performed by: Curry Vora MD  Authorized by: Curry Vora MD    Indications / Diagnosis:  Seizure  ECG reviewed by me, the ED Provider: yes    Patient location:  ED  Previous ECG:     Previous ECG:  Compared to current    Similarity:  No change    Comparison to cardiac monitor: Yes    Interpretation:     Interpretation: non-specific    Quality:     Tracing quality:  Limited by artifact  Rate:     ECG rate:  82    ECG rate assessment: normal    Rhythm:     Rhythm: sinus rhythm    Ectopy:     Ectopy: none    QRS:     QRS axis:  Normal    QRS intervals:  Normal  Conduction:     Conduction: normal    ST segments:     ST segments:  Normal  T waves:     T waves: non-specific               ED Course  ED Course as of 08/11/23 0658   Fri Aug 11, 2023   1884 S/o to Dr. Jovanna Chan at end of shift. New onset likely partial seizure at home. RUE only. 3 mins. Has known lung CA with mets to brain. Not currently on steroids, he was back in Feb. Recent radiation to mets. CTA head pend at dispo. Suspect will require neuro/nsx discussion and admission. Pt back at baseline in ed currently. 0654 Pt anemic as well, has chronic anemia. Will consent for blood. SBIRT 22yo+    Flowsheet Row Most Recent Value   Initial Alcohol Screen: US AUDIT-C     1. How often do you have a drink containing alcohol? 0 Filed at: 08/11/2023 0601   2. How many drinks containing alcohol do you have on a typical day you are drinking? 0 Filed at: 08/11/2023 0601   3a. Male UNDER 65: How often do you have five or more drinks on one occasion? 0 Filed at: 08/11/2023 0601   3b. FEMALE Any Age, or MALE 65+: How often do you have 4 or more drinks on one occassion? 0 Filed at: 08/11/2023 0601   Audit-C Score 0 Filed at: 08/11/2023 0601   EDWIN: How many times in the past year have you. .. Used an illegal drug or used a prescription medication for non-medical reasons? Never Filed at: 08/11/2023 0601                    Medical Decision Making  Partial seizure [facility]): acute illness or injury  Amount and/or Complexity of Data Reviewed  Independent Historian: spouse  Labs: ordered. Decision-making details documented in ED Course. Radiology: ordered. ECG/medicine tests: ordered and independent interpretation performed. Decision-making details documented in ED Course. Risk  Prescription drug management.           Disposition  Final diagnoses:   Partial seizure ([address])   Anemia     Time reflects when diagnosis was documented in both MDM as applicable and the Disposition within this note     Time User Action Codes Description Comment    8/11/2023  6:49 AM Chris Delgado Add [R56.9] Partial seizure ([address])     8/11/2023  6:54 AM Nathan Pope Add [D64.9] Anemia       ED Disposition     None      Follow-up Information    None         Patient's Medications   Discharge Prescriptions    No medications on file       No discharge procedures on file.     PDMP Review     None          ED Provider  Electronically Signed by           Nathan Pope MD  08/11/23 8921

## 2023-08-12 PROBLEM — D50.9 IRON DEFICIENCY ANEMIA: Status: ACTIVE | Noted: 2023-08-11

## 2023-08-12 LAB
ABO GROUP BLD BPU: NORMAL
ANION GAP SERPL CALCULATED.3IONS-SCNC: 5 MMOL/L
BASOPHILS # BLD AUTO: 0.01 THOUSANDS/ÂΜL (ref 0–0.1)
BASOPHILS NFR BLD AUTO: 0 % (ref 0–1)
BPU ID: NORMAL
BUN SERPL-MCNC: 10 MG/DL (ref 5–25)
CALCIUM SERPL-MCNC: 9 MG/DL (ref 8.4–10.2)
CHLORIDE SERPL-SCNC: 107 MMOL/L (ref 96–108)
CO2 SERPL-SCNC: 26 MMOL/L (ref 21–32)
CREAT SERPL-MCNC: 0.8 MG/DL (ref 0.6–1.3)
CROSSMATCH: NORMAL
EOSINOPHIL # BLD AUTO: 0 THOUSAND/ÂΜL (ref 0–0.61)
EOSINOPHIL NFR BLD AUTO: 0 % (ref 0–6)
ERYTHROCYTE [DISTWIDTH] IN BLOOD BY AUTOMATED COUNT: 18.1 % (ref 11.6–15.1)
GFR SERPL CREATININE-BSD FRML MDRD: 89 ML/MIN/1.73SQ M
GLUCOSE SERPL-MCNC: 135 MG/DL (ref 65–140)
HAPTOGLOB SERPL-MCNC: 287 MG/DL (ref 34–355)
HCT VFR BLD AUTO: 30.1 % (ref 36.5–49.3)
HGB BLD-MCNC: 8.8 G/DL (ref 12–17)
IMM GRANULOCYTES # BLD AUTO: 0.07 THOUSAND/UL (ref 0–0.2)
IMM GRANULOCYTES NFR BLD AUTO: 1 % (ref 0–2)
LYMPHOCYTES # BLD AUTO: 0.52 THOUSANDS/ÂΜL (ref 0.6–4.47)
LYMPHOCYTES NFR BLD AUTO: 9 % (ref 14–44)
MCH RBC QN AUTO: 24.4 PG (ref 26.8–34.3)
MCHC RBC AUTO-ENTMCNC: 29.2 G/DL (ref 31.4–37.4)
MCV RBC AUTO: 83 FL (ref 82–98)
MONOCYTES # BLD AUTO: 0.1 THOUSAND/ÂΜL (ref 0.17–1.22)
MONOCYTES NFR BLD AUTO: 2 % (ref 4–12)
NEUTROPHILS # BLD AUTO: 5.2 THOUSANDS/ÂΜL (ref 1.85–7.62)
NEUTS SEG NFR BLD AUTO: 88 % (ref 43–75)
NRBC BLD AUTO-RTO: 0 /100 WBCS
PLATELET # BLD AUTO: 390 THOUSANDS/UL (ref 149–390)
PMV BLD AUTO: 9 FL (ref 8.9–12.7)
POTASSIUM SERPL-SCNC: 4 MMOL/L (ref 3.5–5.3)
RBC # BLD AUTO: 3.61 MILLION/UL (ref 3.88–5.62)
SODIUM SERPL-SCNC: 138 MMOL/L (ref 135–147)
UNIT DISPENSE STATUS: NORMAL
UNIT PRODUCT CODE: NORMAL
UNIT PRODUCT VOLUME: 350 ML
UNIT RH: NORMAL
WBC # BLD AUTO: 5.9 THOUSAND/UL (ref 4.31–10.16)

## 2023-08-12 PROCEDURE — 99233 SBSQ HOSP IP/OBS HIGH 50: CPT | Performed by: STUDENT IN AN ORGANIZED HEALTH CARE EDUCATION/TRAINING PROGRAM

## 2023-08-12 PROCEDURE — 80048 BASIC METABOLIC PNL TOTAL CA: CPT | Performed by: INTERNAL MEDICINE

## 2023-08-12 PROCEDURE — 85025 COMPLETE CBC W/AUTO DIFF WBC: CPT | Performed by: INTERNAL MEDICINE

## 2023-08-12 RX ADMIN — DEXAMETHASONE SODIUM PHOSPHATE 4 MG: 4 INJECTION, SOLUTION INTRAMUSCULAR; INTRAVENOUS at 04:30

## 2023-08-12 RX ADMIN — PANTOPRAZOLE SODIUM 40 MG: 40 TABLET, DELAYED RELEASE ORAL at 06:37

## 2023-08-12 RX ADMIN — DOCUSATE SODIUM 100 MG: 100 CAPSULE, LIQUID FILLED ORAL at 09:19

## 2023-08-12 RX ADMIN — DEXAMETHASONE SODIUM PHOSPHATE 4 MG: 4 INJECTION, SOLUTION INTRAMUSCULAR; INTRAVENOUS at 17:05

## 2023-08-12 RX ADMIN — ENOXAPARIN SODIUM 90 MG: 100 INJECTION SUBCUTANEOUS at 21:14

## 2023-08-12 RX ADMIN — SENNOSIDES 8.6 MG: 8.6 TABLET, FILM COATED ORAL at 09:19

## 2023-08-12 RX ADMIN — LEVETIRACETAM 1000 MG: 500 TABLET, FILM COATED ORAL at 21:14

## 2023-08-12 RX ADMIN — ENOXAPARIN SODIUM 90 MG: 100 INJECTION SUBCUTANEOUS at 09:19

## 2023-08-12 RX ADMIN — ATORVASTATIN CALCIUM 40 MG: 40 TABLET, FILM COATED ORAL at 17:04

## 2023-08-12 RX ADMIN — DEXAMETHASONE SODIUM PHOSPHATE 4 MG: 4 INJECTION, SOLUTION INTRAMUSCULAR; INTRAVENOUS at 21:14

## 2023-08-12 RX ADMIN — TAMSULOSIN HYDROCHLORIDE 0.4 MG: 0.4 CAPSULE ORAL at 17:04

## 2023-08-12 RX ADMIN — DEXAMETHASONE SODIUM PHOSPHATE 4 MG: 4 INJECTION, SOLUTION INTRAMUSCULAR; INTRAVENOUS at 09:18

## 2023-08-12 RX ADMIN — DOCUSATE SODIUM 100 MG: 100 CAPSULE, LIQUID FILLED ORAL at 17:04

## 2023-08-12 RX ADMIN — IRON SUCROSE 300 MG: 20 INJECTION, SOLUTION INTRAVENOUS at 13:57

## 2023-08-12 RX ADMIN — FOLIC ACID 1000 MCG: 1 TABLET ORAL at 09:19

## 2023-08-12 RX ADMIN — LEVETIRACETAM 1000 MG: 500 TABLET, FILM COATED ORAL at 09:19

## 2023-08-12 NOTE — ASSESSMENT & PLAN NOTE
History of adenocarcinoma of the lung diagnosed in August 2021. By Dr. Juni Pardo. He completed chemotherapy for cytoreduction followed by concurrent chemo RT to the right lung and mediastinum which she completed on 4/21/2022.     Remains on systemic immunotherapy with Cindy Lions  He was found to have solitary metastasis in the left precentral gyrus region with surrounding edema  These is considered stage IIIc

## 2023-08-12 NOTE — DISCHARGE SUMMARY
[address] [facility]  Discharge- Denise Nose 1950, 71 y.o. male MRN: [id number]  Unit/Bed#: -01 Encounter: [id number]  Primary Care Provider: César Valero MD   Date and time admitted to hospital: 8/11/2023  5:55 AM    * Partial seizure [facility])  Assessment & Plan  Patient presenting with partial seizure in the setting of brain metastasis, he was previously on Keppra.   Had chemotherapy on Wednesday with Jacquelyn Curtis as well as IV hydration    Case discussed with neurology, they will initiate Keppra as AED-> continue on 1000 mg bid for maintenance dose  Corticosteroids -> continue decadron 4mg q6h with PPI for 1 week   radiation oncology following  Will plan to monitor for any recurrent seizure activity  Neuroimaging per the neurologist  CT of the brain with vasogenic edema    F/u with neuro, radiation onc, and onc outpt    Iron deficiency anemia  Assessment & Plan  1 unit of packed red blood cells ordered  Transfuse for hemoglobin greater than 7  No evidence of gastrointestinal source, possibly related to recent IV hydration in the setting of chronic anemia  Continue to trend  improved to 8.8  Iron def with iron level of 9-> venofer x2  Haptoglobin 287, LDH WNL, Ret count WNL      Stage 3a chronic kidney disease ([address])  Assessment & Plan  Lab Results   Component Value Date    EGFR 89 08/12/2023    EGFR 80 08/11/2023    EGFR 72 08/07/2023    CREATININE 0.80 08/12/2023    CREATININE 0.95 08/11/2023    CREATININE 1.03 08/07/2023   Function at historical baseline    History of venous thromboembolism  Assessment & Plan  Previous history of DVT and pulmonary embolism  Failure of Eliquis, continue weight-based Lovenox for now    Brain metastasis  Assessment & Plan  See plan for adenocarcinoma of the lung  Start Decadron per radiation oncology recommendation  Decadron 4 mg q6h for 1 week with PPI    Adenocarcinoma of lung  Assessment & Plan  History of adenocarcinoma of the lung diagnosed in August 2021. By Dr. Corinne Boozer. He completed chemotherapy for cytoreduction followed by concurrent chemo RT to the right lung and mediastinum which she completed on 4/21/2022. Remains on systemic immunotherapy with Corrina Clements  He was found to have solitary metastasis in the left precentral gyrus region with surrounding edema  These is considered stage IIIc    Medical Problems     Resolved Problems  Date Reviewed: 8/11/2023   None       Discharging Physician / Practitioner: Derrek Gooden MD  PCP: Gayathri Braswell MD  Admission Date:   Admission Orders (From admission, onward)     Ordered        08/11/23 0930  Inpatient Admission  Once                      Discharge Date: 08/13/23    Consultations During Hospital Stay:  · Neuro,  · Onc  · Radiation onc  · CM  · PT  · OT    Procedures Performed:   CTA head with and without contrast   Final Result      Similar moderate vasogenic edema in left posterior frontal lobe with known underlying treated metastatic lesion not well seen by CT as seen on MRI brain 7/26/2023. No new acute intracranial abnormality. Negative CTA head for large vessel occlusion, dissection, aneurysm, or high-grade stenosis. Workstation performed: [id number]         ·   ·     Significant Findings / Test Results:   · See above    Incidental Findings:   · none   ·     Test Results Pending at Discharge (will require follow up):   · none     Outpatient Tests Requested:  · none    Complications:  None known at this time    Reason for Admission: Partial seizure    Hospital Course:   Maureen Spaulding is a 71 y.o. male patient who originally presented to the hospital on 8/11/2023 due to seizures while sleeping. Neurology was consulted imaging showed the results above he was placed on Keppra. Radiation oncology and oncology were consulted recommended initiation of Decadron along with PPI. Patient had no further incidences while admitted.   Also on admission was found to have anemia and needed to be transfused 1 unit. Iron panel was done and was found to have iron deficiency Venofer was done. He is otherwise remained hemodynamically stable afebrile in no acute respiratory distress. He has been medically cleared for discharge by neurology oncology and internal medicine. He is to follow-up with his PCP oncology radiation oncology and neurology. He will be discharged with 1 week of Decadron 4 mg every 6 hours, Protonix 40 mg twice daily and Keppra 1000 mg twice daily. Please see above list of diagnoses and related plan for additional information. Condition at Discharge: stable    Discharge Day Visit / Exam:   Subjective:  Giuliana Sandoval was seen and examined at bedside. No acute events overnight. Discussed plan of care. All questions and concerns were answered and addressed. Has no acute complaints at this time. Vitals: Blood Pressure: 123/74 (08/12/23 2113)  Pulse: 78 (08/12/23 2113)  Temperature: (!) 97.2 °F (36.2 °C) (08/12/23 2113)  Temp Source: Oral (08/12/23 0735)  Respirations: 16 (08/12/23 1424)  SpO2: 95 % (08/12/23 2113)  Exam:   Physical Exam   Vitals and nursing note reviewed. Constitutional:       General: He is not in acute distress. Appearance: He is ill-appearing (chronically ). HENT:      Head: Normocephalic and atraumatic. Cardiovascular:      Rate and Rhythm: Normal rate and regular rhythm. Pulses: Normal pulses. Heart sounds: Normal heart sounds. Pulmonary:      Effort: Pulmonary effort is normal.      Breath sounds: Normal breath sounds. Abdominal:      General: Abdomen is flat. Bowel sounds are normal.      Palpations: Abdomen is soft. Musculoskeletal:      Right lower leg: No edema. Left lower leg: No edema. Skin:     General: Skin is warm. Neurological:      General: No focal deficit present. Mental Status: He is alert and oriented to person, place, and time.      Discussion with Family: Updated  (wife) at bedside. Discharge instructions/Information to patient and family:   See after visit summary for information provided to patient and family. Provisions for Follow-Up Care:  See after visit summary for information related to follow-up care and any pertinent home health orders. Disposition:   Home    Planned Readmission: no     Discharge Statement:  I spent 35 minutes discharging the patient. This time was spent on the day of discharge. I had direct contact with the patient on the day of discharge. Greater than 50% of the total time was spent examining patient, answering all patient questions, arranging and discussing plan of care with patient as well as directly providing post-discharge instructions. Additional time then spent on discharge activities. Discharge Medications:  See after visit summary for reconciled discharge medications provided to patient and/or family.       **Please Note: This note may have been constructed using a voice recognition system**

## 2023-08-12 NOTE — ASSESSMENT & PLAN NOTE
Patient presenting with partial seizure in the setting of brain metastasis, he was previously on Keppra.   Had chemotherapy on Wednesday with Horace Aragon as well as IV hydration    Case discussed with neurology, they will initiate Keppra as AED-> continue on 1000 mg bid for maintenance dose  Corticosteroids -> continue decadron 4mg q6h with PPI for 1 week   radiation oncology following  Will plan to monitor for any recurrent seizure activity  Neuroimaging per the neurologist  CT of the brain with vasogenic edema    F/u with neuro, radiation onc, and onc outpt

## 2023-08-12 NOTE — CASE MANAGEMENT
Case Management Assessment & Discharge Planning Note    Patient name Sharyle Corolla  Location [address] [facility] 335/-01 MRN [id number]  : 1950 Date 2023       Current Admission Date: 2023  Current Admission Diagnosis:Partial seizure [facility])   Patient Active Problem List    Diagnosis Date Noted   • Partial seizure ([address]) 2023   • Iron deficiency anemia 2023   • Stage 3a chronic kidney disease ([address]) 2023   • Vasogenic edema ([address]) 2023   • Brain metastasis 2023   • History of venous thromboembolism 2023   • Hyperlipidemia 2023   • Restrictive lung disease 2022   • Nocturnal hypoxemia 2022   • Tobacco use disorder, severe, in sustained remission, dependence 2022   • Recurrent right pleural effusion 2022   • Chronic right-sided heart failure ([address]) 2022   • Brain injury, without loss of consciousness, subsequent encounter 2022   • Chronic anticoagulation 2022   • Hypercalcemia of malignancy 10/21/2021   • Chemotherapy induced neutropenia ([address]) 10/11/2021   • COPD mixed type ([address]) 2021   • Malignant neoplasm of upper lobe of right lung ([address]) 2021   • Acute on chronic anemia 2021   • Adenocarcinoma of lung 2021   • Impaired cognition 2021   • Constipation 2021   • Anemia of chronic disease 2021   • Pulmonary embolism ([address]) 2021   • History of urinary retention 2021   • Mass of upper lobe of right lung 2021   • History of stroke 2021   • Bilateral lower extremity DVTs 2021   • Acute respiratory failure with hypoxia ([address]) 2021   • Dysphagia 2021      LOS (days): 1  Geometric Mean LOS (GMLOS) (days): 4.40  Days to GMLOS:3.2     OBJECTIVE:    Risk of Unplanned Readmission Score: 23.64      Current admission status: Inpatient    Preferred Pharmacy:   [address] , [address] [address] [address] [address]  Phone: [phone] Fax: [phone] [address] [facility],  [address]  [address]  [facility] [place] [address]  Phone: [phone] Fax: [phone]    Primary Care Provider: Vivi Ceron MD    Primary Insurance: MEDICARE  Secondary Insurance: COMMERCIAL MISCELLANEOUS    ASSESSMENT:  Pete [address] Representative - Spouse   Primary Phone: [phone] (Mobile)               Readmission Root Cause  30 Day Readmission: No    Patient Information  Admitted from[de-identified] Home  Mental Status: Alert  During Assessment patient was accompanied by: Spouse  Assessment information provided by[de-identified] Patient  Primary Caregiver: Self  Support Systems: Self, Spouse/significant other  [facility]: [address] [place] [address] do you live in?: [address] [place] [address] entry access options. Select all that apply.: Stairs  Number of steps to enter home.: 1  Type of Current Residence: Ranch  In the last 12 months, was there a time when you were not able to pay the mortgage or rent on time?: No  In the last 12 months, how many places have you lived?: 1  In the last 12 months, was there a time when you did not have a steady place to sleep or slept in a shelter (including now)?: No  Homeless/housing insecurity resource given?: N/A  Living Arrangements: Lives w/ Spouse/significant other  Is patient a ?: No    Activities of Daily Living Prior to Admission  Functional Status: Independent  Completes ADLs independently?: Yes  Ambulates independently?: Yes  Does patient use assisted devices?: Yes  Assisted Devices (DME) used: Home Oxygen concentrator  DME Company Name (respiratory supplies):  [address] [place] [address]  O2 Rate(s): 2 nocturnal  Does patient currently own DME?: Yes  What DME does the patient currently own?: Bedside Commode, Home Oxygen concentrator, Nebulizer, Portable Oxygen tanks, Shower Chair, Straight [place], Walker  Does patient have a history of Outpatient Therapy (PT/OT)?: No  Does the patient have a history of Short-Term Rehab?: No  Does patient have a history of HHC?: Yes  Does patient currently have [address]?: No    Patient Information Continued  Does patient have prescription coverage?: Yes  Within the past 12 months, you worried that your food would run out before you got the money to buy more.: Never true  Within the past 12 months, the food you bought just didn't last and you didn't have money to get more.: Never true  Food insecurity resource given?: N/A  Does patient receive dialysis treatments?: No  Does patient have a history of substance abuse?: No  Does patient have a history of Mental Health Diagnosis?: No    Means of Transportation  Means of Transport to Appts[de-identified] Family transport  In the past 12 months, has lack of transportation kept you from medical appointments or from getting medications?: No  In the past 12 months, has lack of transportation kept you from meetings, work, or from getting things needed for daily living?: No  Was application for public transport provided?: N/A    DISCHARGE DETAILS:    Discharge planning discussed with[de-identified] pt  Freedom of Choice: Yes     CM contacted family/caregiver?: Yes  Were Treatment Team discharge recommendations reviewed with patient/caregiver?: Yes  Did patient/caregiver verbalize understanding of patient care needs?: Yes  Were patient/caregiver advised of the risks associated with not following Treatment Team discharge recommendations?: Yes    Contacts  Patient Contacts: Sal Mcintyre  Relationship to Patient[de-identified] Family  Contact Method: In Person  Reason/Outcome: Referral, Discharge [address]         Is the patient interested in [address] at discharge? :  (pending eval on mobility)    DME Referral Provided  Referral made for DME?: No    Other Referral/Resources/Interventions Provided:  Interventions: HHC  Referral Comments: May need home PT    Transport at Discharge : Family     Additional Comments: Cm met with pt and reviewed cm role. Pt lives with his spouse in ranch home with 1 [address]. He is ind at baseline without DME. He does use home oxygen concentrator from [address] [place] [address] for nocutrnal 2L O2. He has a BSC, walker, cane, shower chair, nebulizer and portable O2 tanks. Pt has no hx of outpt PT, STR, MH or D/A tx. He has had [facility] and [facility] in the past. Pt uses [facility] in [place] for rx needs. His spouse nuno [facility] home. RN to try to get pt out of bed to see his functional and ambulation status.  May need home PT or outpt PT.

## 2023-08-12 NOTE — ASSESSMENT & PLAN NOTE
Lab Results   Component Value Date    EGFR 89 08/12/2023    EGFR 80 08/11/2023    EGFR 72 08/07/2023    CREATININE 0.80 08/12/2023    CREATININE 0.95 08/11/2023    CREATININE 1.03 08/07/2023   Function at historical baseline

## 2023-08-12 NOTE — ASSESSMENT & PLAN NOTE
History of adenocarcinoma of the lung diagnosed in August 2021. By Dr. Naomi Hubbard. He completed chemotherapy for cytoreduction followed by concurrent chemo RT to the right lung and mediastinum which she completed on 4/21/2022.     Remains on systemic immunotherapy with Loreli Marcie  He was found to have solitary metastasis in the left precentral gyrus region with surrounding edema  These is considered stage IIIc

## 2023-08-12 NOTE — PLAN OF CARE
Problem: SAFETY ADULT  Goal: Patient will remain free of falls  Description: INTERVENTIONS:  - Educate patient/family on patient safety including physical limitations  - Instruct patient to call for assistance with activity   - Consult OT/PT to assist with strengthening/mobility   - Keep Call bell within reach  - Keep bed low and locked with side rails adjusted as appropriate  - Keep care items and personal belongings within reach  - Initiate and maintain comfort rounds  - Make Fall Risk Sign visible to staff  - Offer Toileting every 2 Hours, in advance of need  - Initiate/Maintain bed alarm  - Obtain necessary fall risk management equipment:   - Apply yellow socks and bracelet for high fall risk patients  - Consider moving patient to room near nurses station  Outcome: Progressing  Goal: Maintain or return to baseline ADL function  Description: INTERVENTIONS:  -  Assess patient's ability to carry out ADLs; assess patient's baseline for ADL function and identify physical deficits which impact ability to perform ADLs (bathing, care of mouth/teeth, toileting, grooming, dressing, etc.)  - Assess/evaluate cause of self-care deficits   - Assess range of motion  - Assess patient's mobility; develop plan if impaired  - Assess patient's need for assistive devices and provide as appropriate  - Encourage maximum independence but intervene and supervise when necessary  - Involve family in performance of ADLs  - Assess for home care needs following discharge   - Consider OT consult to assist with ADL evaluation and planning for discharge  - Provide patient education as appropriate  Outcome: Progressing  Goal: Maintains/Returns to pre admission functional level  Description: INTERVENTIONS:  - Perform BMAT or MOVE assessment daily.   - Set and communicate daily mobility goal to care team and patient/family/caregiver.    - Collaborate with rehabilitation services on mobility goals if consulted  - Perform Range of Motion 2 times a day.  - Reposition patient every 2 hours.   - Dangle patient 2 times a day  - Stand patient 2 times a day  - Ambulate patient 2 times a day  - Out of bed to chair 2 times a day   - Out of bed for meals 2 times a day  - Out of bed for toileting  - Record patient progress and toleration of activity level   Outcome: Progressing     Problem: NEUROSENSORY - ADULT  Goal: Achieves stable or improved neurological status  Description: INTERVENTIONS  - Monitor and report changes in neurological status  - Monitor vital signs such as temperature, blood pressure, glucose, and any other labs ordered   - Initiate measures to prevent increased intracranial pressure  - Monitor for seizure activity and implement precautions if appropriate      Outcome: Progressing  Goal: Remains free of injury related to seizures activity  Description: INTERVENTIONS  - Maintain airway, patient safety  and administer oxygen as ordered  - Monitor patient for seizure activity, document and report duration and description of seizure to physician/advanced practitioner  - If seizure occurs,  ensure patient safety during seizure  - Reorient patient post seizure  - Seizure pads on all 4 side rails  - Instruct patient/family to notify RN of any seizure activity including if an aura is experienced  - Instruct patient/family to call for assistance with activity based on nursing assessment  - Administer anti-seizure medications if ordered    Outcome: Progressing

## 2023-08-12 NOTE — PROGRESS NOTES
[facility]  Progress Note  Name: Juan Ramon Pinedo I  MRN: [id number]  Unit/Bed#: -01 I Date of Admission: 8/11/2023   Date of Service: 8/12/2023 I Hospital Day: 1    Assessment/Plan   * Partial seizure [facility])  Assessment & Plan  Patient presenting with partial seizure in the setting of brain metastasis, he was previously on Keppra. Had chemotherapy on Wednesday with Kenneth Contreras as well as IV hydration    Case discussed with neurology, they will initiate Keppra as AED-> continue on 1000 mg bid for maintenance dose  Corticosteroids -> continue decadron 4mg q6h with PPI for 1 week   radiation oncology following  Will plan to monitor for any recurrent seizure activity  Neuroimaging per the neurologist  CT of the brain with vasogenic edema    F/u with neuro, radiation onc, and onc outpt    Iron deficiency anemia  Assessment & Plan  1 unit of packed red blood cells ordered  Transfuse for hemoglobin greater than 7  No evidence of gastrointestinal source, possibly related to recent IV hydration in the setting of chronic anemia  Continue to trend  improved to 8.8  Iron def with iron level of 9-> venofer x2  Haptoglobin 287, LDH WNL, Ret count WNL      Stage 3a chronic kidney disease ([address])  Assessment & Plan  Lab Results   Component Value Date    EGFR 89 08/12/2023    EGFR 80 08/11/2023    EGFR 72 08/07/2023    CREATININE 0.80 08/12/2023    CREATININE 0.95 08/11/2023    CREATININE 1.03 08/07/2023   Function at historical baseline    History of venous thromboembolism  Assessment & Plan  Previous history of DVT and pulmonary embolism  Failure of Eliquis, continue weight-based Lovenox for now    Brain metastasis  Assessment & Plan  See plan for adenocarcinoma of the lung  Start Decadron per radiation oncology recommendation  Decadron taper at discharge    Adenocarcinoma of lung  Assessment & Plan  History of adenocarcinoma of the lung diagnosed in August 2021. By Dr. Suly Tapia.   He completed chemotherapy for cytoreduction followed by concurrent chemo RT to the right lung and mediastinum which she completed on 2022. Remains on systemic immunotherapy with Jennifer Tena  He was found to have solitary metastasis in the left precentral gyrus region with surrounding edema  These is considered stage IIIc             VTE Pharmacologic Prophylaxis: VTE Score: 3 Moderate Risk (Score 3-4) - Pharmacological DVT Prophylaxis Ordered: enoxaparin (Lovenox). Patient Centered Rounds: I performed bedside rounds with nursing staff today. Discussions with Specialists or Other Care Team Provider: Neuro, Onc, Radiation onc, PT, OT    Education and Discussions with Family / Patient: Updated  (wife) at bedside. Total Time Spent on Date of Encounter in care of patient: 35 minutes This time was spent on one or more of the following: performing physical exam; counseling and coordination of care; obtaining or reviewing history; documenting in the medical record; reviewing/ordering tests, medications or procedures; communicating with other healthcare professionals and discussing with patient's family/caregivers. Current Length of Stay: 1 day(s)  Current Patient Status: Inpatient   Certification Statement: The patient will continue to require additional inpatient hospital stay due to seizures  Discharge Plan: Anticipate discharge tomorrow to home. Code Status: Level 1 - Full Code    Subjective:   Nathan Earzo was seen and examined at bedside. No acute events overnight. Discussed plan of care. All questions and concerns were answered and addressed. Has no acute complaints at this time. States that he is feeling better. Is slow to respond however is alert and oriented x3. Per wife at bedside states that he is much improved.     Objective:     Vitals:   Temp (24hrs), Av.8 °F (36.6 °C), Min:97.4 °F (36.3 °C), Max:98.4 °F (36.9 °C)    Temp:  [97.4 °F (36.3 °C)-98.4 °F (36.9 °C)] 97.4 °F (36.3 °C)  HR:  [80-95] 80  Resp: [16-20] 20  BP: (113-116)/(65-69) 113/69  SpO2:  [92 %-94 %] 94 %  There is no height or weight on file to calculate BMI. Input and Output Summary (last 24 hours): Intake/Output Summary (Last 24 hours) at 8/12/2023 1231  Last data filed at 8/12/2023 0901  Gross per 24 hour   Intake 240 ml   Output 450 ml   Net -210 ml       Physical Exam:   Physical Exam  Vitals and nursing note reviewed. Constitutional:       General: He is not in acute distress. Appearance: He is ill-appearing (chronically ). HENT:      Head: Normocephalic and atraumatic. Cardiovascular:      Rate and Rhythm: Normal rate and regular rhythm. Pulses: Normal pulses. Heart sounds: Normal heart sounds. Pulmonary:      Effort: Pulmonary effort is normal.      Breath sounds: Normal breath sounds. Abdominal:      General: Abdomen is flat. Bowel sounds are normal.      Palpations: Abdomen is soft. Musculoskeletal:      Right lower leg: No edema. Left lower leg: No edema. Skin:     General: Skin is warm. Neurological:      General: No focal deficit present. Mental Status: He is alert and oriented to person, place, and time.           Additional Data:     Labs:  Results from last 7 days   Lab Units 08/12/23  0436   WBC Thousand/uL 5.90   HEMOGLOBIN g/dL 8.8*   HEMATOCRIT % 30.1*   PLATELETS Thousands/uL 390   NEUTROS PCT % 88*   LYMPHS PCT % 9*   MONOS PCT % 2*   EOS PCT % 0     Results from last 7 days   Lab Units 08/12/23  0436 08/11/23  0616   SODIUM mmol/L 138 137   POTASSIUM mmol/L 4.0 4.0   CHLORIDE mmol/L 107 106   CO2 mmol/L 26 26   BUN mg/dL 10 10   CREATININE mg/dL 0.80 0.95   ANION GAP mmol/L 5 5   CALCIUM mg/dL 9.0 9.5   ALBUMIN g/dL  --  3.3*   TOTAL BILIRUBIN mg/dL  --  0.33   ALK PHOS U/L  --  49   ALT U/L  --  7   AST U/L  --  12*   GLUCOSE RANDOM mg/dL 135 93     Results from last 7 days   Lab Units 08/11/23  0616   INR  0.95     Results from last 7 days   Lab Units 08/11/23  0613   POC GLUCOSE mg/dl 91         Results from last 7 days   Lab Units 08/11/23  0616   LACTIC ACID mmol/L 1.3       Lines/Drains:  Invasive Devices     Peripheral Intravenous Line  Duration           Peripheral IV 08/11/23 Left Antecubital 1 day    Peripheral IV 08/11/23 Right Antecubital 1 day                      Imaging: No pertinent imaging reviewed. Recent Cultures (last 7 days):         Last 24 Hours Medication List:   Current Facility-Administered Medications   Medication Dose Route Frequency Provider Last Rate   • acetaminophen  650 mg Oral 4x Daily PRN Nehemiah [facility], DO     • acetaminophen  650 mg Oral Q6H PRN Nehemiah [facility], DO     • albuterol  2 puff Inhalation Q6H PRN Nehemiah [facility], DO     • albuterol  2.5 mg Nebulization Q6H PRN Nehemiah [facility], DO     • aluminum-magnesium hydroxide-simethicone  30 mL Oral Q6H PRN Nehemiah [facility], DO     • atorvastatin  40 mg Oral Daily With Dinner Nehemiah [facility], DO     • calcium carbonate  1,000 mg Oral Daily PRN Nehemiah [facility], DO     • dexamethasone  4 mg Intravenous Q6H Shaun Jennell Olszewski, DO     • docusate sodium  100 mg Oral BID Nehemiah [facility], DO     • enoxaparin  1 mg/kg Subcutaneous Q12H [address] Antonio Jennell Olszewski, DO     • folic acid  1,059 mcg Oral Daily Shaun Jennell Olszewski, DO     • iron sucrose  300 mg Intravenous Daily José Miguel Perry MD     • levETIRAcetam  1,000 mg Oral Q12H [address] Antonio Jennell Olszewski, DO     • ondansetron  4 mg Intravenous Q6H PRN Nehemiah Thomas, DO     • pantoprazole  40 mg Oral Daily Before Breakfast Shaun Jennell Olszewski, DO     • senna  1 tablet Oral Daily Shaun Jennell Olszewski, DO     • tamsulosin  0.4 mg Oral After Dinner Shaun Jennell Olszewski, DO          Today, Patient Was Seen By: José Miguel Perry MD    **Please Note: This note may have been constructed using a voice recognition system. **

## 2023-08-12 NOTE — ASSESSMENT & PLAN NOTE
See plan for adenocarcinoma of the lung  Start Decadron per radiation oncology recommendation  Decadron 4 mg q6h for 1 week with PPI

## 2023-08-12 NOTE — ASSESSMENT & PLAN NOTE
1 unit of packed red blood cells ordered  Transfuse for hemoglobin greater than 7  No evidence of gastrointestinal source, possibly related to recent IV hydration in the setting of chronic anemia  Continue to trend  improved to 8.8  Iron def with iron level of 9-> venofer x2  Haptoglobin 287, LDH WNL, Ret count WNL

## 2023-08-13 VITALS
DIASTOLIC BLOOD PRESSURE: 74 MMHG | RESPIRATION RATE: 17 BRPM | HEART RATE: 79 BPM | SYSTOLIC BLOOD PRESSURE: 124 MMHG | OXYGEN SATURATION: 93 % | TEMPERATURE: 97.2 F

## 2023-08-13 LAB
ALBUMIN SERPL BCP-MCNC: 3 G/DL (ref 3.5–5)
ALP SERPL-CCNC: 45 U/L (ref 34–104)
ALT SERPL W P-5'-P-CCNC: 7 U/L (ref 7–52)
ANION GAP SERPL CALCULATED.3IONS-SCNC: 3 MMOL/L
AST SERPL W P-5'-P-CCNC: 7 U/L (ref 13–39)
BASOPHILS # BLD AUTO: 0.01 THOUSANDS/ÂΜL (ref 0–0.1)
BASOPHILS NFR BLD AUTO: 0 % (ref 0–1)
BILIRUB SERPL-MCNC: 0.25 MG/DL (ref 0.2–1)
BUN SERPL-MCNC: 13 MG/DL (ref 5–25)
CALCIUM ALBUM COR SERPL-MCNC: 10 MG/DL (ref 8.3–10.1)
CALCIUM SERPL-MCNC: 9.2 MG/DL (ref 8.4–10.2)
CHLORIDE SERPL-SCNC: 109 MMOL/L (ref 96–108)
CO2 SERPL-SCNC: 27 MMOL/L (ref 21–32)
CREAT SERPL-MCNC: 0.85 MG/DL (ref 0.6–1.3)
EOSINOPHIL # BLD AUTO: 0 THOUSAND/ÂΜL (ref 0–0.61)
EOSINOPHIL NFR BLD AUTO: 0 % (ref 0–6)
ERYTHROCYTE [DISTWIDTH] IN BLOOD BY AUTOMATED COUNT: 18.3 % (ref 11.6–15.1)
GFR SERPL CREATININE-BSD FRML MDRD: 87 ML/MIN/1.73SQ M
GLUCOSE SERPL-MCNC: 128 MG/DL (ref 65–140)
HCT VFR BLD AUTO: 30.3 % (ref 36.5–49.3)
HGB BLD-MCNC: 8.7 G/DL (ref 12–17)
IMM GRANULOCYTES # BLD AUTO: 0.1 THOUSAND/UL (ref 0–0.2)
IMM GRANULOCYTES NFR BLD AUTO: 1 % (ref 0–2)
LYMPHOCYTES # BLD AUTO: 0.62 THOUSANDS/ÂΜL (ref 0.6–4.47)
LYMPHOCYTES NFR BLD AUTO: 7 % (ref 14–44)
MAGNESIUM SERPL-MCNC: 2.3 MG/DL (ref 1.9–2.7)
MCH RBC QN AUTO: 24 PG (ref 26.8–34.3)
MCHC RBC AUTO-ENTMCNC: 28.7 G/DL (ref 31.4–37.4)
MCV RBC AUTO: 84 FL (ref 82–98)
MONOCYTES # BLD AUTO: 0.31 THOUSAND/ÂΜL (ref 0.17–1.22)
MONOCYTES NFR BLD AUTO: 3 % (ref 4–12)
NEUTROPHILS # BLD AUTO: 8.43 THOUSANDS/ÂΜL (ref 1.85–7.62)
NEUTS SEG NFR BLD AUTO: 89 % (ref 43–75)
NRBC BLD AUTO-RTO: 0 /100 WBCS
PLATELET # BLD AUTO: 383 THOUSANDS/UL (ref 149–390)
PMV BLD AUTO: 9 FL (ref 8.9–12.7)
POTASSIUM SERPL-SCNC: 4.1 MMOL/L (ref 3.5–5.3)
PROT SERPL-MCNC: 6.4 G/DL (ref 6.4–8.4)
RBC # BLD AUTO: 3.63 MILLION/UL (ref 3.88–5.62)
SODIUM SERPL-SCNC: 139 MMOL/L (ref 135–147)
WBC # BLD AUTO: 9.47 THOUSAND/UL (ref 4.31–10.16)

## 2023-08-13 PROCEDURE — 85025 COMPLETE CBC W/AUTO DIFF WBC: CPT | Performed by: STUDENT IN AN ORGANIZED HEALTH CARE EDUCATION/TRAINING PROGRAM

## 2023-08-13 PROCEDURE — 99239 HOSP IP/OBS DSCHRG MGMT >30: CPT | Performed by: STUDENT IN AN ORGANIZED HEALTH CARE EDUCATION/TRAINING PROGRAM

## 2023-08-13 PROCEDURE — 80053 COMPREHEN METABOLIC PANEL: CPT | Performed by: STUDENT IN AN ORGANIZED HEALTH CARE EDUCATION/TRAINING PROGRAM

## 2023-08-13 PROCEDURE — 83735 ASSAY OF MAGNESIUM: CPT | Performed by: STUDENT IN AN ORGANIZED HEALTH CARE EDUCATION/TRAINING PROGRAM

## 2023-08-13 RX ORDER — PANTOPRAZOLE SODIUM 40 MG/1
40 TABLET, DELAYED RELEASE ORAL
Qty: 30 TABLET | Refills: 0 | Status: SHIPPED | OUTPATIENT
Start: 2023-08-14 | End: 2023-09-13

## 2023-08-13 RX ORDER — LEVETIRACETAM 1000 MG/1
1000 TABLET ORAL EVERY 12 HOURS SCHEDULED
Qty: 60 TABLET | Refills: 0 | Status: SHIPPED | OUTPATIENT
Start: 2023-08-13 | End: 2023-09-12

## 2023-08-13 RX ORDER — FERROUS SULFATE TAB EC 324 MG (65 MG FE EQUIVALENT) 324 (65 FE) MG
324 TABLET DELAYED RESPONSE ORAL
Qty: 60 TABLET | Refills: 0 | Status: SHIPPED | OUTPATIENT
Start: 2023-08-13 | End: 2023-09-12

## 2023-08-13 RX ORDER — DEXAMETHASONE 4 MG/1
4 TABLET ORAL 2 TIMES DAILY WITH MEALS
Qty: 14 TABLET | Refills: 0 | Status: SHIPPED | OUTPATIENT
Start: 2023-08-13 | End: 2023-08-14 | Stop reason: DRUGHIGH

## 2023-08-13 RX ADMIN — SENNOSIDES 8.6 MG: 8.6 TABLET, FILM COATED ORAL at 09:47

## 2023-08-13 RX ADMIN — LEVETIRACETAM 1000 MG: 500 TABLET, FILM COATED ORAL at 09:47

## 2023-08-13 RX ADMIN — FOLIC ACID 1000 MCG: 1 TABLET ORAL at 09:47

## 2023-08-13 RX ADMIN — ENOXAPARIN SODIUM 90 MG: 100 INJECTION SUBCUTANEOUS at 09:51

## 2023-08-13 RX ADMIN — PANTOPRAZOLE SODIUM 40 MG: 40 TABLET, DELAYED RELEASE ORAL at 06:13

## 2023-08-13 RX ADMIN — DEXAMETHASONE SODIUM PHOSPHATE 4 MG: 4 INJECTION, SOLUTION INTRAMUSCULAR; INTRAVENOUS at 09:46

## 2023-08-13 RX ADMIN — IRON SUCROSE 300 MG: 20 INJECTION, SOLUTION INTRAVENOUS at 09:47

## 2023-08-13 RX ADMIN — DOCUSATE SODIUM 100 MG: 100 CAPSULE, LIQUID FILLED ORAL at 09:47

## 2023-08-13 RX ADMIN — DEXAMETHASONE SODIUM PHOSPHATE 4 MG: 4 INJECTION, SOLUTION INTRAMUSCULAR; INTRAVENOUS at 03:25

## 2023-08-13 NOTE — NURSING NOTE
Patient discharged to home. Patient and wife declined outpatient PT. Reviewed discharge instructions with patient and wife - all questions/concerns addressed prior to d/c.

## 2023-08-13 NOTE — CASE MANAGEMENT
Case Management Progress Note    Patient name Denise Deutsch  Location [address] [facility] 335/-01 MRN [id number]  : 1950 Date 2023       LOS (days): 2  Geometric Mean LOS (GMLOS) (days): 4.40  Days to GMLOS:2.3        OBJECTIVE:     Current admission status: Inpatient  Preferred Pharmacy:   [address] [address]  Phone: [phone] Fax: [phone] 84[facility] [address]  [address]  [address] [address]  Phone: [phone] Fax: [phone]    Primary Care Provider: César Valero MD    Primary Insurance: MEDICARE  Secondary Insurance: COMMERCIAL MISCELLANEOUS    PROGRESS NOTE:    Pt and spouse declined outpt PT stating they feel pt is at his baseline to RN.

## 2023-08-13 NOTE — DISCHARGE INSTR - AVS FIRST PAGE
You are to follow-up with your PCP radiation oncology in 1 week    You are to follow-up with neurology and oncology    You are to take Decadron 4 mg 4 times a day for 1 week    You are to take Protonix 40 mg daily    You are to take Keppra 1000 mg twice a day

## 2023-08-14 ENCOUNTER — TRANSITIONAL CARE MANAGEMENT (OUTPATIENT)
Dept: FAMILY MEDICINE CLINIC | Facility: HOSPITAL | Age: 73
End: 2023-08-14

## 2023-08-14 DIAGNOSIS — C79.31 MALIGNANT NEOPLASM METASTATIC TO BRAIN (HCC): Primary | ICD-10-CM

## 2023-08-14 RX ORDER — DEXAMETHASONE 4 MG/1
4 TABLET ORAL
Qty: 42 TABLET | Refills: 1 | Status: SHIPPED | OUTPATIENT
Start: 2023-08-14

## 2023-08-14 NOTE — TELEPHONE ENCOUNTER
8/14/23 10:27 Teams  message:   Nandini Hernandez, this is Nida Ortiz. I'm calling up about Mervin Colmenares [id number]. He recently was in the hospital for a seizure and he was prescribed dexamethasone and I picked up the prescription. But there's a difference between what it says on the bottle and what it says on the paperwork from the hospital. Could you please get back to me and let me know what to do? Thank you. Kenton called back, she reports patient was discharged home yesterday. She states she has conflict regarding how dexamethasone was prescribed, discharge paperwork says to take dexamethasone 4 mg every 6 hours and bottle states 4 mg twice a day. Informed her that Dr Manda Guillaume recommended 4 mg every 6 hours on Friday. Chart reviewed. She states that patient is doing well, no seizures, headaches, N/V, no new neurologic changes. Dr Maria Elena Sequeira informed of above. Per Dr Mireille Molina instructed to decrease dexamethasone to 4 mg three times a day x1 week starting today. Refill sent to [facility] pharmacy in [place]. Continue PPI daily and Keppra 2x/day per discharge instructions. Kwabena informed that I will call in 1 week to follow up on patient's status. Per Dr Maria Elena Sequeira, patient does not need to come into office at this time. She was instructed to call our office with any questions/concerns or take patient to ED if any seizures, etc. She verbalized understanding of instructions given and was appreciative of call back.

## 2023-08-18 ENCOUNTER — TELEPHONE (OUTPATIENT)
Dept: NEUROLOGY | Facility: CLINIC | Age: 73
End: 2023-08-18

## 2023-08-18 ENCOUNTER — OFFICE VISIT (OUTPATIENT)
Dept: FAMILY MEDICINE CLINIC | Facility: HOSPITAL | Age: 73
End: 2023-08-18
Payer: MEDICARE

## 2023-08-18 VITALS
TEMPERATURE: 97.4 F | OXYGEN SATURATION: 95 % | DIASTOLIC BLOOD PRESSURE: 82 MMHG | BODY MASS INDEX: 29.38 KG/M2 | HEART RATE: 70 BPM | WEIGHT: 198.4 LBS | SYSTOLIC BLOOD PRESSURE: 120 MMHG | HEIGHT: 69 IN

## 2023-08-18 DIAGNOSIS — J44.9 COPD MIXED TYPE (HCC): ICD-10-CM

## 2023-08-18 DIAGNOSIS — R56.9 PARTIAL SEIZURE (HCC): ICD-10-CM

## 2023-08-18 DIAGNOSIS — D63.8 ANEMIA OF CHRONIC DISEASE: ICD-10-CM

## 2023-08-18 DIAGNOSIS — C79.31 MALIGNANT NEOPLASM METASTATIC TO BRAIN (HCC): Primary | ICD-10-CM

## 2023-08-18 DIAGNOSIS — C34.11 MALIGNANT NEOPLASM OF UPPER LOBE OF RIGHT LUNG (HCC): ICD-10-CM

## 2023-08-18 PROBLEM — J96.01 ACUTE RESPIRATORY FAILURE WITH HYPOXIA (HCC): Status: RESOLVED | Noted: 2021-07-31 | Resolved: 2023-08-18

## 2023-08-18 PROCEDURE — 99496 TRANSJ CARE MGMT HIGH F2F 7D: CPT | Performed by: FAMILY MEDICINE

## 2023-08-18 NOTE — TELEPHONE ENCOUNTER
BERNARDO/ 8-11 to 8-13/ Partial Seizure/ Medicare A&B        Neurology consult note: Herb Araujo will need follow up in in 6 weeks with epilepsy attending or advance practitioner. He will not require outpatient neurological testing.        Scheduled     9-29-23/ Isidro/ BERNARDO/Partial seizure

## 2023-08-22 ENCOUNTER — TELEPHONE (OUTPATIENT)
Dept: RADIATION ONCOLOGY | Facility: HOSPITAL | Age: 73
End: 2023-08-22

## 2023-08-22 NOTE — TELEPHONE ENCOUNTER
LM with instructions to decrease dexamethasone to 4 mg twice a day, starting today. Karyle Crochet informed that I will verify with Dr Ladene Primrose on how long to keep him on that dosage and get back to her tomorrow. Karyle Crochet instructed to call me back if she has any questions regarding above. My direct Teams phone number given.

## 2023-08-22 NOTE — TELEPHONE ENCOUNTER
Tod Favre reports that Amelia Cueto is doing well. He has had no complaints of headaches, N/V, no new seizures/tremors, and no new weakness, no difficulty with ambulating or other neurologic changes. She reports he has a good appetite. He is currently on dexamethasone 4 mg 3x/day since 8/14/23. She is aware that I will update Dr Maximo Lopez on his condition and get back to her with further steroid taper instructions. She verbalized understanding.

## 2023-08-23 ENCOUNTER — TELEPHONE (OUTPATIENT)
Age: 73
End: 2023-08-23

## 2023-08-23 ENCOUNTER — OFFICE VISIT (OUTPATIENT)
Age: 73
End: 2023-08-23
Payer: MEDICARE

## 2023-08-23 ENCOUNTER — TELEPHONE (OUTPATIENT)
Dept: RADIATION ONCOLOGY | Facility: HOSPITAL | Age: 73
End: 2023-08-23

## 2023-08-23 VITALS
RESPIRATION RATE: 18 BRPM | HEIGHT: 69 IN | HEART RATE: 79 BPM | DIASTOLIC BLOOD PRESSURE: 78 MMHG | SYSTOLIC BLOOD PRESSURE: 120 MMHG | BODY MASS INDEX: 29.89 KG/M2 | WEIGHT: 201.8 LBS | OXYGEN SATURATION: 98 % | TEMPERATURE: 97.2 F

## 2023-08-23 DIAGNOSIS — T45.1X5A CHEMOTHERAPY INDUCED NEUTROPENIA (HCC): Primary | ICD-10-CM

## 2023-08-23 DIAGNOSIS — R91.8 MASS OF UPPER LOBE OF RIGHT LUNG: ICD-10-CM

## 2023-08-23 DIAGNOSIS — D70.1 CHEMOTHERAPY INDUCED NEUTROPENIA (HCC): Primary | ICD-10-CM

## 2023-08-23 DIAGNOSIS — D50.0 IRON DEFICIENCY ANEMIA DUE TO CHRONIC BLOOD LOSS: ICD-10-CM

## 2023-08-23 DIAGNOSIS — C34.11 MALIGNANT NEOPLASM OF UPPER LOBE OF RIGHT LUNG (HCC): ICD-10-CM

## 2023-08-23 DIAGNOSIS — C34.90 ADENOCARCINOMA OF LUNG, UNSPECIFIED LATERALITY (HCC): ICD-10-CM

## 2023-08-23 DIAGNOSIS — C34.11 MALIGNANT NEOPLASM OF UPPER LOBE OF RIGHT LUNG (HCC): Primary | ICD-10-CM

## 2023-08-23 PROCEDURE — 99214 OFFICE O/P EST MOD 30 MIN: CPT | Performed by: INTERNAL MEDICINE

## 2023-08-23 RX ORDER — SODIUM CHLORIDE 9 MG/ML
20 INJECTION, SOLUTION INTRAVENOUS ONCE
Status: CANCELLED | OUTPATIENT
Start: 2023-08-30

## 2023-08-23 RX ORDER — SODIUM CHLORIDE 9 MG/ML
20 INJECTION, SOLUTION INTRAVENOUS ONCE
OUTPATIENT
Start: 2023-10-11

## 2023-08-23 RX ORDER — SODIUM CHLORIDE 9 MG/ML
20 INJECTION, SOLUTION INTRAVENOUS ONCE
OUTPATIENT
Start: 2023-09-20

## 2023-08-23 NOTE — TELEPHONE ENCOUNTER
Per Dr Kristin Pacheco instructed to continue dexamethasone 4 mg twice a day for 1 week. Pilar Pascual informed that I will touch base with her next week to get update on patient's status. Pilar Pascual instructed to call if patient reports any neurologic symptoms with decrease in dexamethasone. She verbalized understanding of above.

## 2023-08-23 NOTE — PROGRESS NOTES
Assessment & Plan     1. Brain metastasis    2. Anemia of chronic disease    3. COPD mixed type ([address])    4. Malignant neoplasm of upper lobe of right lung ([address])    5. Partial seizure ([address])       Gato Morse fortunately is doing okay. He has not had any further seizures. He is on steroids and keppra. Is following up with radiation oncology and oncology. Copd stable. No exacerbations. Subjective     Transitional Care Management Review:   Eduarda Campbell is a 71 y.o. male here for TCM follow up. During the TCM phone call patient stated:  TCM Call     Date and time call was made  8/14/2023 10:50 AM    Hospital care reviewed  Records reviewed    Patient was hospitialized at  [address] [place] [address]    Date of Admission  08/11/23    Date of discharge  08/13/23    Diagnosis  Partial seizure    Disposition  Home    Were the patients medications reviewed and updated  No    Current Symptoms  Weakness    Weakness severity  Mild      TCM Call     Post hospital issues  None    Should patient be enrolled in anticoag monitoring? No    Scheduled for follow up? Yes    Did you obtain your prescribed medications  Yes    Do you need help managing your prescriptions or medications  No    Is transportation to your appointment needed  No    I have advised the patient to call PCP with any new or worsening symptoms  Kevin Jamison MA    Living Arrangements  Spouse or Significiant other        Patient here for tcm. Was in hospital due to seizures secondary to brain mets due to lung cancer. Has not had nay further seziures. He is on keppra and steroids. He has ongoing fu and treatment plan through radiation oncology. Sees Oncology regularly as well. Continues on lovenox due to hx of vte. Reports feeling well and no new concerns today. Copd has been stable. Review of Systems   Constitutional: Negative. Negative for activity change, appetite change, chills and diaphoresis.    HENT: Negative for congestion and dental problem. Respiratory: Negative. Negative for apnea, chest tightness, shortness of breath and wheezing. Cardiovascular: Negative. Negative for chest pain, palpitations and leg swelling. Gastrointestinal: Negative. Negative for abdominal distention, abdominal pain, constipation, diarrhea and nausea. Genitourinary: Negative. Negative for difficulty urinating, dysuria and frequency. Objective     /82   Pulse 70   Temp (!) 97.4 °F (36.3 °C)   Ht 5' 9" (1.753 m)   Wt 90 kg (198 lb 6.4 oz)   SpO2 95%   BMI 29.30 kg/m²      Physical Exam  Vitals and nursing note reviewed. Constitutional:       General: He is not in acute distress. Appearance: Normal appearance. He is well-developed. He is not ill-appearing. HENT:      Head: Normocephalic and atraumatic. Right Ear: External ear normal.      Left Ear: External ear normal.      Mouth/Throat:      Mouth: Mucous membranes are moist.      Pharynx: Oropharynx is clear. Eyes:      Extraocular Movements: Extraocular movements intact. Conjunctiva/sclera: Conjunctivae normal.      Pupils: Pupils are equal, round, and reactive to light. Cardiovascular:      Rate and Rhythm: Normal rate and regular rhythm. Heart sounds: Normal heart sounds. No murmur heard. Pulmonary:      Effort: Pulmonary effort is normal.      Breath sounds: Normal breath sounds. Abdominal:      General: Bowel sounds are normal. There is no distension. Palpations: Abdomen is soft. There is no mass. Tenderness: There is no abdominal tenderness. There is no guarding. Hernia: No hernia is present. Genitourinary:     Penis: Normal.    Musculoskeletal:         General: Normal range of motion. Cervical back: Normal range of motion and neck supple. Skin:     General: Skin is warm and dry. Capillary Refill: Capillary refill takes less than 2 seconds. Neurological:      General: No focal deficit present.       Mental Status: He is alert and oriented to person, place, and time.    Psychiatric:         Mood and Affect: Mood normal.         Behavior: Behavior normal.       Medications have been reviewed by provider in current encounter    Crissy Friedman MD

## 2023-08-26 NOTE — PROGRESS NOTES
HEMATOLOGY / [facility] FOLLOW UP NOTE    Primary Care Provider: Efren Rosario MD  Referring Provider:    MRN: [id number]  : 1950    Reason for Encounter: follow up [facility]       Oncology History Overview Note   2021 - Stage IV adenocarcinoma of the lung     10/2021 - 2022 - carbo/pem/pebro     3/2022 - 2022 - carbo/taxol/RT     2022 - maintenance pembro     2023 - solitary brain met - SBRT     Adenocarcinoma of lung   2021 Initial Diagnosis    Adenocarcinoma of lung     2021 Biopsy    Right lung apex, image-guided core needle biopsy:  - Adenocarcinoma      10/6/2021 - 2022 Chemotherapy    cyanocobalamin, 1,000 mcg, Intramuscular, Once, 3 of 3 cycles  Administration: 1,000 mcg (2021), 1,000 mcg (2022), 1,000 mcg (10/6/2021)  pegfilgrastim (Chandrakant Aloe), 6 mg, Subcutaneous, Once, 1 of 1 cycle  Administration: 6 mg (2021)  pegfilgrastim (Jeneal Ripper), 6 mg, Subcutaneous, Once, 6 of 6 cycles  Administration: 6 mg (10/6/2021), 6 mg (11/3/2021), 6 mg (2021), 6 mg (12/15/2021), 6 mg (2022)  fosaprepitant (EMEND) IVPB, 150 mg, Intravenous, Once, 6 of 6 cycles  Administration: 150 mg (10/6/2021), 150 mg (2021), 150 mg (12/15/2021), 150 mg (2022), 150 mg (11/3/2021), 150 mg (2022)  CARBOplatin (PARAPLATIN) IVPB (GOG AUC DOSING), 519.5 mg, Intravenous, Once, 6 of 6 cycles  Administration: 519.5 mg (10/6/2021), 574 mg (2021), 600 mg (12/15/2021), 533 mg (2022), 604.5 mg (11/3/2021), 563 mg (2022)  pemetrexed (ALIMTA) chemo infusion, 970 mg, Intravenous, Once, 6 of 6 cycles  Administration: 1,000 mg (10/6/2021), 1,000 mg (2021), 1,000 mg (12/15/2021), 1,000 mg (2022), 1,000 mg (11/3/2021), 1,000 mg (2022)  pembrolizumab (KEYTRUDA) IVPB, 200 mg, Intravenous, Once, 6 of 6 cycles  Administration: 200 mg (10/6/2021), 200 mg (2021), 200 mg (12/15/2021), 200 mg (2022), 200 mg (11/3/2021), 200 mg (2022) 3/7/2022 -  Chemotherapy    CARBOplatin (PARAPLATIN) IVPB (GOG AUC DOSING), , Intravenous, Once, 6 of 6 cycles  Administration: 211.6 mg (3/7/2022), 208 mg (3/14/2022), 238.8 mg (3/21/2022), 211.6 mg (3/28/2022), 215.2 mg (4/4/2022), 213.4 mg (4/18/2022)  PACLItaxel (TAXOL) chemo IVPB, 50 mg/m2 = 99 mg, Intravenous, Once, 6 of 6 cycles  Administration: 99 mg (3/7/2022), 99 mg (3/14/2022), 99 mg (3/21/2022), 99 mg (3/28/2022), 99 mg (4/4/2022), 99 mg (4/18/2022)  pembrolizumab (KEYTRUDA) IVPB, 200 mg, Intravenous, Once, 24 of 30 cycles  Administration: 200 mg (3/7/2022), 200 mg (3/28/2022), 200 mg (4/25/2022), 200 mg (5/16/2022), 200 mg (6/6/2022), 200 mg (6/27/2022), 200 mg (7/18/2022), 200 mg (8/8/2022), 200 mg (8/29/2022), 200 mg (9/19/2022), 200 mg (10/10/2022), 200 mg (10/31/2022), 200 mg (11/21/2022), 200 mg (12/12/2022), 200 mg (1/3/2023), 200 mg (1/23/2023), 200 mg (2/13/2023), 200 mg (3/27/2023), 200 mg (4/26/2023), 200 mg (5/17/2023), 200 mg (6/7/2023), 200 mg (6/28/2023), 200 mg (7/19/2023), 200 mg (8/9/2023)     2/22/2023 - 2/22/2023 Radiation    SRS left precentral gyrus   2000 cGy    Dr. Pankaj Kessler     Malignant neoplasm of upper lobe of right lung ([address])   9/3/2021 Initial Diagnosis    Malignant neoplasm of upper lobe of right lung ([address])     9/23/2021 -  Cancer Staged    Staging form: Lung, AJCC 8th Edition  - Clinical stage from 9/23/2021: Stage IIIC (cT3, cN3, cM0) - Signed by Willard Lee MD on 9/23/2021  Histopathologic type:  Adenocarcinoma, NOS       10/6/2021 - 1/26/2022 Chemotherapy    cyanocobalamin, 1,000 mcg, Intramuscular, Once, 3 of 3 cycles  Administration: 1,000 mcg (11/24/2021), 1,000 mcg (1/26/2022), 1,000 mcg (10/6/2021)  pegfilgrastim (Rosa Nava), 6 mg, Subcutaneous, Once, 1 of 1 cycle  Administration: 6 mg (11/26/2021)  pegfilgrastim (Duglas Cooper), 6 mg, Subcutaneous, Once, 6 of 6 cycles  Administration: 6 mg (10/6/2021), 6 mg (11/3/2021), 6 mg (11/24/2021), 6 mg (12/15/2021), 6 mg (1/5/2022)  fosaprepitant (EMEND) IVPB, 150 mg, Intravenous, Once, 6 of 6 cycles  Administration: 150 mg (10/6/2021), 150 mg (11/24/2021), 150 mg (12/15/2021), 150 mg (1/26/2022), 150 mg (11/3/2021), 150 mg (1/5/2022)  CARBOplatin (PARAPLATIN) IVPB (GOG AUC DOSING), 519.5 mg, Intravenous, Once, 6 of 6 cycles  Administration: 519.5 mg (10/6/2021), 574 mg (11/24/2021), 600 mg (12/15/2021), 533 mg (1/26/2022), 604.5 mg (11/3/2021), 563 mg (1/5/2022)  pemetrexed (ALIMTA) chemo infusion, 970 mg, Intravenous, Once, 6 of 6 cycles  Administration: 1,000 mg (10/6/2021), 1,000 mg (11/24/2021), 1,000 mg (12/15/2021), 1,000 mg (1/26/2022), 1,000 mg (11/3/2021), 1,000 mg (1/5/2022)  pembrolizumab (KEYTRUDA) IVPB, 200 mg, Intravenous, Once, 6 of 6 cycles  Administration: 200 mg (10/6/2021), 200 mg (11/24/2021), 200 mg (12/15/2021), 200 mg (1/26/2022), 200 mg (11/3/2021), 200 mg (1/5/2022)     3/4/2022 - 4/21/2022 Radiation    The patient saw @[facility]@ for radiation treatment.  This is the current list of radiation treatment:  Plan ID Energy Fractions Dose per Fraction (cGy) Dose Correction (cGy) Total Dose Delivered (cGy) Elapsed Days   R LUNGMED REV 6X 30 / 30 200 0 6,000 48      Treatment dates:  C1: 3/4/2022 - 4/21/2022         3/7/2022 -  Chemotherapy    CARBOplatin (PARAPLATIN) IVPB (GOG AUC DOSING), , Intravenous, Once, 6 of 6 cycles  Administration: 211.6 mg (3/7/2022), 208 mg (3/14/2022), 238.8 mg (3/21/2022), 211.6 mg (3/28/2022), 215.2 mg (4/4/2022), 213.4 mg (4/18/2022)  PACLItaxel (TAXOL) chemo IVPB, 50 mg/m2 = 99 mg, Intravenous, Once, 6 of 6 cycles  Administration: 99 mg (3/7/2022), 99 mg (3/14/2022), 99 mg (3/21/2022), 99 mg (3/28/2022), 99 mg (4/4/2022), 99 mg (4/18/2022)  pembrolizumab (KEYTRUDA) IVPB, 200 mg, Intravenous, Once, 24 of 30 cycles  Administration: 200 mg (3/7/2022), 200 mg (3/28/2022), 200 mg (4/25/2022), 200 mg (5/16/2022), 200 mg (6/6/2022), 200 mg (6/27/2022), 200 mg (7/18/2022), 200 mg (8/8/2022), 200 mg (8/29/2022), 200 mg (9/19/2022), 200 mg (10/10/2022), 200 mg (10/31/2022), 200 mg (11/21/2022), 200 mg (12/12/2022), 200 mg (1/3/2023), 200 mg (1/23/2023), 200 mg (2/13/2023), 200 mg (3/27/2023), 200 mg (4/26/2023), 200 mg (5/17/2023), 200 mg (6/7/2023), 200 mg (6/28/2023), 200 mg (7/19/2023), 200 mg (8/9/2023)     2/22/2023 - 2/22/2023 Radiation    SRS left precentral gyrus   2000 cGy    Dr. Lady Doherty     Brain metastasis   2/3/2023 Initial Diagnosis    Brain metastasis     2/22/2023 - 2/22/2023 Radiation    SRS left precentral gyrus   2000 cGy    Dr. Lady Doherty         Interval History: Patient presents for follow up of his stage IV non-small cell lung cancer. He was admitted on August 11 with seizures. He was put back on Keppra and dexamethasone. He also was anemic and required transfusion of 1 unit of packed red blood cells and IV iron infusions. Dr. Lady Doherty has been managing his steroids. CT scan did not show any new obvious areas of disease but there was new swelling. Most recent hemoglobin was 8.7. He is on Lovenox and denies any nosebleeds, bright red blood per rectum or blood in the urine. He denies any diarrhea, rash, joint pain. He is not having any edema from the dexamethasone. REVIEW OF SYSTEMS:  Please note that a 14-point review of systems was performed to include Constitutional, HEENT, Respiratory, CVS, GI, , Musculoskeletal, Integumentary, Neurologic, Rheumatologic, Endocrinologic, Psychiatric, Lymphatic, and Hematologic/Oncologic systems were reviewed and are negative unless otherwise stated in HPI. Positive and negative findings pertinent to this evaluation are incorporated into the history of present illness.       ECOG PS: 1    PROBLEM LIST:  Patient Active Problem List   Diagnosis   • History of stroke   • Bilateral lower extremity DVTs   • Dysphagia   • Mass of upper lobe of right lung   • History of urinary retention   • Constipation   • Anemia of chronic disease   • Pulmonary embolism (HCC)   • Impaired cognition   • Adenocarcinoma of lung   • Acute on chronic anemia   • Malignant neoplasm of upper lobe of right lung (HCC)   • COPD mixed type (HCC)   • Chemotherapy induced neutropenia (HCC)   • Hypercalcemia of malignancy   • Chronic anticoagulation   • Chronic right-sided heart failure (HCC)   • Brain injury, without loss of consciousness, subsequent encounter   • Recurrent right pleural effusion   • Restrictive lung disease   • Nocturnal hypoxemia   • Tobacco use disorder, severe, in sustained remission, dependence   • Brain metastasis   • History of venous thromboembolism   • Hyperlipidemia   • Vasogenic edema (HCC)   • Stage 3a chronic kidney disease (HCC)   • Partial seizure (HCC)   • Iron deficiency anemia       Assessment / Plan: 81-year-old male with metastatic non-small cell lung cancer. Dr. Frederick Smith is managing his dexamethasone. He is back on Keppra and has not had any further seizure events. I do not see any evidence of active bleeding based on his history. It is certainly possible there could be some type of GI bleed going on in the setting of him being on steroids and with a drop in his hemoglobin and iron deficiency. If he would continue to drop his hemoglobin we may need to consider a GI referral however. I am going to continue with immunotherapy in the maintenance setting. I will see him in early October with a repeat CT scan and repeat CBC CMP and iron studies at that time. He knows to call in the interim with any questions or concerns. I spent 30 minutes on chart review, face to face counseling time, coordination of care and documentation. Past Medical History:   has a past medical history of Acute respiratory failure with hypoxia ([address]) (7/31/2021), Chronic respiratory failure with hypoxia ([address]) (8/9/2021), Impaired mobility and ADLs (8/13/2021), Lung cancer ([address]), and Stroke ([address]).     PAST SURGICAL HISTORY:   has a past surgical history that includes IR biopsy lung (8/5/2021); IR thoracentesis (2/24/2022); and IR thoracentesis (8/1/2022). CURRENT MEDICATIONS  Current Outpatient Medications   Medication Sig Dispense Refill   • acetaminophen (TYLENOL) 325 mg tablet Take 2 tablets (650 mg total) by mouth 4 (four) times a day as needed for mild pain, headaches or fever  0   • albuterol (2.5 mg/3 mL) 0.083 % nebulizer solution Take 3 mL (2.5 mg total) by nebulization every 6 (six) hours as needed for wheezing or shortness of breath 3 mL 2   • albuterol (ProAir HFA) 90 mcg/act inhaler Inhale 2 puffs every 6 (six) hours as needed for wheezing or shortness of breath 8 g 0   • atorvastatin (LIPITOR) 40 mg tablet TAKE 1 TABLET BY MOUTH DAILY WITH DINNER 90 tablet 0   • dexamethasone (DECADRON) 4 mg tablet Take 1 tablet (4 mg total) by mouth 3 (three) times a day with meals 42 tablet 1   • enoxaparin (LOVENOX) 100 mg/mL Inject 0.9 mL (90 mg total) under the skin every 12 (twelve) hours 60 mL 5   • ferrous sulfate 324 (65 Fe) mg Take 1 tablet (324 mg total) by mouth 2 (two) times a day before meals 60 tablet 0   • folic acid (FOLVITE) 1 mg tablet TAKE 1 TABLET BY MOUTH EVERY DAY 90 tablet 0   • levETIRAcetam (KEPPRA) 1000 MG tablet Take 1 tablet (1,000 mg total) by mouth every 12 (twelve) hours 60 tablet 0   • pantoprazole (PROTONIX) 40 mg tablet Take 1 tablet (40 mg total) by mouth daily before breakfast Do not start before August 14, 2023. 30 tablet 0   • tamsulosin (FLOMAX) 0.4 mg Take 1 capsule (0.4 mg total) by mouth daily after dinner 90 capsule 1     No current facility-administered medications for this visit. [unfilled]    SOCIAL HISTORY:   reports that he quit smoking about 19 years ago. His smoking use included cigarettes. He started smoking about 58 years ago. He has a 117.00 pack-year smoking history. He has never used smokeless tobacco. He reports that he does not currently use alcohol. He reports that he does not use drugs.      FAMILY HISTORY:  family history includes Lung cancer in his brother; Prostate cancer in his brother. ALLERGIES:  is allergic to doxycycline. Physical Exam:  Vital Signs:   Visit Vitals  /78 (BP Location: Left arm, Patient Position: Sitting, Cuff Size: Standard)   Pulse 79   Temp (!) 97.2 °F (36.2 °C) (Temporal)   Resp 18   Ht 5' 9" (1.753 m)   Wt 91.5 kg (201 lb 12.8 oz)   SpO2 98%   BMI 29.80 kg/m²   Smoking Status Former   BSA 2.07 m²     Body mass index is 29.8 kg/m². Body surface area is 2.07 meters squared. GEN: Alert, awake oriented x3, in no acute distress  HEENT- No pallor, icterus, cyanosis, no oral mucosal lesions,   LAD - no palpable cervical, clavicle, axillary, inguinal LAD  Heart- normal S1 S2, regular rate and rhythm, No murmur, rubs.    Lungs- clear breathing sound bilateral.   Abdomen- soft, Non tender, bowel sounds present  Extremities- No cyanosis, clubbing, edema  Neuro- No focal neurological deficit    Labs:  Lab Results   Component Value Date    WBC 9.47 08/13/2023    HGB 8.7 (L) 08/13/2023    HCT 30.3 (L) 08/13/2023    MCV 84 08/13/2023     08/13/2023     Lab Results   Component Value Date    SODIUM 139 08/13/2023    K 4.1 08/13/2023     (H) 08/13/2023    CO2 27 08/13/2023    AGAP 3 08/13/2023    BUN 13 08/13/2023    CREATININE 0.85 08/13/2023    GLUC 128 08/13/2023    GLUF 101 (H) 08/07/2023    CALCIUM 9.2 08/13/2023    AST 7 (L) 08/13/2023    ALT 7 08/13/2023    ALKPHOS 45 08/13/2023    TP 6.4 08/13/2023    TBILI 0.25 08/13/2023    EGFR 87 08/13/2023

## 2023-08-28 ENCOUNTER — APPOINTMENT (OUTPATIENT)
Dept: LAB | Facility: CLINIC | Age: 73
End: 2023-08-28
Payer: MEDICARE

## 2023-08-28 DIAGNOSIS — C34.90 MALIGNANT NEOPLASM OF BRONCHUS AND LUNG (HCC): ICD-10-CM

## 2023-08-28 DIAGNOSIS — D50.0 IRON DEFICIENCY ANEMIA DUE TO CHRONIC BLOOD LOSS: ICD-10-CM

## 2023-08-28 DIAGNOSIS — T45.1X5A CHEMOTHERAPY-INDUCED NEUTROPENIA: ICD-10-CM

## 2023-08-28 DIAGNOSIS — C34.11 MALIGNANT NEOPLASM OF UPPER LOBE OF RIGHT LUNG (HCC): ICD-10-CM

## 2023-08-28 DIAGNOSIS — R91.8 LUNG MASS: ICD-10-CM

## 2023-08-28 DIAGNOSIS — D70.1 CHEMOTHERAPY-INDUCED NEUTROPENIA: ICD-10-CM

## 2023-08-28 LAB
ERYTHROCYTE [DISTWIDTH] IN BLOOD BY AUTOMATED COUNT: 26.1 % (ref 11.6–15.1)
FERRITIN SERPL-MCNC: 313 NG/ML (ref 24–336)
HCT VFR BLD AUTO: 39.1 % (ref 36.5–49.3)
HGB BLD-MCNC: 11.6 G/DL (ref 12–17)
MCH RBC QN AUTO: 26.9 PG (ref 26.8–34.3)
MCHC RBC AUTO-ENTMCNC: 29.7 G/DL (ref 31.4–37.4)
MCV RBC AUTO: 91 FL (ref 82–98)
PLATELET # BLD AUTO: 252 THOUSANDS/UL (ref 149–390)
PMV BLD AUTO: 9.8 FL (ref 8.9–12.7)
RBC # BLD AUTO: 4.32 MILLION/UL (ref 3.88–5.62)
T3FREE SERPL-MCNC: 2.76 PG/ML (ref 2.5–3.9)
TSH SERPL DL<=0.05 MIU/L-ACNC: 3.45 UIU/ML (ref 0.45–4.5)
WBC # BLD AUTO: 11.93 THOUSAND/UL (ref 4.31–10.16)

## 2023-08-28 PROCEDURE — 83540 ASSAY OF IRON: CPT

## 2023-08-28 PROCEDURE — 80053 COMPREHEN METABOLIC PANEL: CPT

## 2023-08-28 PROCEDURE — 85027 COMPLETE CBC AUTOMATED: CPT

## 2023-08-28 PROCEDURE — 84481 FREE ASSAY (FT-3): CPT

## 2023-08-28 PROCEDURE — 36415 COLL VENOUS BLD VENIPUNCTURE: CPT

## 2023-08-28 PROCEDURE — 84443 ASSAY THYROID STIM HORMONE: CPT

## 2023-08-28 PROCEDURE — 82728 ASSAY OF FERRITIN: CPT

## 2023-08-28 PROCEDURE — 83550 IRON BINDING TEST: CPT

## 2023-08-29 ENCOUNTER — TELEPHONE (OUTPATIENT)
Dept: RADIATION ONCOLOGY | Facility: HOSPITAL | Age: 73
End: 2023-08-29

## 2023-08-29 LAB
ALBUMIN SERPL BCP-MCNC: 3.3 G/DL (ref 3.5–5)
ALP SERPL-CCNC: 47 U/L (ref 34–104)
ALT SERPL W P-5'-P-CCNC: 14 U/L (ref 7–52)
ANION GAP SERPL CALCULATED.3IONS-SCNC: 11 MMOL/L
AST SERPL W P-5'-P-CCNC: 11 U/L (ref 13–39)
BILIRUB SERPL-MCNC: 0.26 MG/DL (ref 0.2–1)
BUN SERPL-MCNC: 23 MG/DL (ref 5–25)
CALCIUM ALBUM COR SERPL-MCNC: 10.4 MG/DL (ref 8.3–10.1)
CALCIUM SERPL-MCNC: 9.8 MG/DL (ref 8.4–10.2)
CHLORIDE SERPL-SCNC: 104 MMOL/L (ref 96–108)
CO2 SERPL-SCNC: 26 MMOL/L (ref 21–32)
CREAT SERPL-MCNC: 1.02 MG/DL (ref 0.6–1.3)
GFR SERPL CREATININE-BSD FRML MDRD: 73 ML/MIN/1.73SQ M
GLUCOSE P FAST SERPL-MCNC: 106 MG/DL (ref 65–99)
IRON SATN MFR SERPL: 31 % (ref 15–50)
IRON SERPL-MCNC: 83 UG/DL (ref 50–212)
POTASSIUM SERPL-SCNC: 4.7 MMOL/L (ref 3.5–5.3)
PROT SERPL-MCNC: 5.8 G/DL (ref 6.4–8.4)
SODIUM SERPL-SCNC: 141 MMOL/L (ref 135–147)
TIBC SERPL-MCNC: 269 UG/DL (ref 250–450)
UIBC SERPL-MCNC: 186 UG/DL (ref 155–355)

## 2023-08-29 NOTE — TELEPHONE ENCOUNTER
Called patient and spoke to his wife [employer] regarding his Dex steroid taper. He is currently taking Dex 4mg BID since 8/22/23. He is feeling good, denies any headaches, n/v, weakness, neurological changes. Please advise on further taper recommendations.

## 2023-08-29 NOTE — TELEPHONE ENCOUNTER
Per Dr. Girish Montoya, patient should continue Dexamethasone taper to 4 mg in the AM and 2 mg in the PM, starting today. Call patient again in one week to see how he is feeling and give further taper instructions. I called [facility] back and reviewed this. They have enough pills to last a couple weeks, he may need a refill next week when we check in with him. He is taking protonix as well.

## 2023-08-30 ENCOUNTER — HOSPITAL ENCOUNTER (OUTPATIENT)
Dept: INFUSION CENTER | Facility: HOSPITAL | Age: 73
Discharge: HOME | End: 2023-08-30
Attending: INTERNAL MEDICINE
Payer: MEDICARE

## 2023-08-30 VITALS
OXYGEN SATURATION: 96 % | RESPIRATION RATE: 16 BRPM | HEART RATE: 75 BPM | SYSTOLIC BLOOD PRESSURE: 130 MMHG | TEMPERATURE: 97.8 F | DIASTOLIC BLOOD PRESSURE: 79 MMHG

## 2023-08-30 DIAGNOSIS — T45.1X5A CHEMOTHERAPY INDUCED NEUTROPENIA (HCC): ICD-10-CM

## 2023-08-30 DIAGNOSIS — C34.90 ADENOCARCINOMA OF LUNG, UNSPECIFIED LATERALITY (HCC): ICD-10-CM

## 2023-08-30 DIAGNOSIS — R91.8 MASS OF UPPER LOBE OF RIGHT LUNG: ICD-10-CM

## 2023-08-30 DIAGNOSIS — D70.1 CHEMOTHERAPY INDUCED NEUTROPENIA (HCC): ICD-10-CM

## 2023-08-30 DIAGNOSIS — C34.11 MALIGNANT NEOPLASM OF UPPER LOBE OF RIGHT LUNG (HCC): Primary | ICD-10-CM

## 2023-08-30 RX ORDER — SODIUM CHLORIDE 9 MG/ML
20 INJECTION, SOLUTION INTRAVENOUS ONCE
Status: COMPLETED | OUTPATIENT
Start: 2023-08-30 | End: 2023-08-30

## 2023-08-30 RX ADMIN — SODIUM CHLORIDE 200 MG: 9 INJECTION, SOLUTION INTRAVENOUS at 12:00

## 2023-08-30 RX ADMIN — SODIUM CHLORIDE 20 ML/HR: 9 INJECTION, SOLUTION INTRAVENOUS at 10:39

## 2023-08-30 RX ADMIN — SODIUM CHLORIDE 1000 ML: 0.9 INJECTION, SOLUTION INTRAVENOUS at 10:37

## 2023-08-30 NOTE — PROGRESS NOTES
Patient arrived for treatment. While looking up his medications for verification this RN noticed that his CBC lab were not with differential and missing an ANC level. Office contacted. At 1014 Per Violeta- Patient is OK to treat per Dr. Shayla Newman.

## 2023-09-04 DIAGNOSIS — R56.9 PARTIAL SEIZURE (HCC): ICD-10-CM

## 2023-09-04 RX ORDER — PANTOPRAZOLE SODIUM 40 MG/1
40 TABLET, DELAYED RELEASE ORAL
Qty: 90 TABLET | Refills: 1 | OUTPATIENT
Start: 2023-09-04 | End: 2023-10-04

## 2023-09-04 RX ORDER — LEVETIRACETAM 1000 MG/1
TABLET ORAL
Qty: 180 TABLET | Refills: 1 | OUTPATIENT
Start: 2023-09-04

## 2023-09-05 DIAGNOSIS — D50.9 IRON DEFICIENCY ANEMIA, UNSPECIFIED IRON DEFICIENCY ANEMIA TYPE: ICD-10-CM

## 2023-09-05 RX ORDER — FERROUS SULFATE TAB EC 324 MG (65 MG FE EQUIVALENT) 324 (65 FE) MG
324 TABLET DELAYED RESPONSE ORAL
Qty: 180 TABLET | Refills: 1 | OUTPATIENT
Start: 2023-09-05

## 2023-09-06 ENCOUNTER — TELEPHONE (OUTPATIENT)
Age: 73
End: 2023-09-06

## 2023-09-06 ENCOUNTER — TELEPHONE (OUTPATIENT)
Dept: RADIATION ONCOLOGY | Facility: HOSPITAL | Age: 73
End: 2023-09-06

## 2023-09-06 DIAGNOSIS — C79.31 MALIGNANT NEOPLASM METASTATIC TO BRAIN (HCC): Primary | ICD-10-CM

## 2023-09-06 RX ORDER — DEXAMETHASONE 2 MG/1
2 TABLET ORAL 2 TIMES DAILY WITH MEALS
Qty: 20 TABLET | Refills: 0 | Status: SHIPPED | OUTPATIENT
Start: 2023-09-06

## 2023-09-06 NOTE — TELEPHONE ENCOUNTER
Received missed call from patient. Attempted to call back but had to leave a message. Left my teams number for patient to call me back.

## 2023-09-06 NOTE — TELEPHONE ENCOUNTER
Spoke with Mark Jim to get update on patient's status, currently on steroid taper, since 8/29/23 he's taking dexamethasone 4 mg in the AM and 2 mg in the PM. She states that he has not had any headaches, n/v, weakness, or other neurologic changes. She reports that he's developed a moist non-productive cough x1 week. She states he's been clearing his throat a lot. Cough is worse at night. He has no shortness of breath, she checks his oxygen sats daily,  which have been running 95-96%, he has no fevers or other respiratory symptoms. She's wondering if it could be related to the [facility]. Mark Jim advised to call Dr Mery Heredia office to report cough. Mark Jim informed that I will call her back with further steroid taper instructions per Dr Frederick Smith. Patient will need dexamethasone refill this week. [facility] pharmacy in [place] verified.

## 2023-09-06 NOTE — TELEPHONE ENCOUNTER
Per Dr Andres Hoyt instructed to decrease dexamethasone by 2 mg every 3 days starting tomorrow. Starting tomorrow, he will decrease to 2 mg twice a day x3 days, then decrease to 2 mg daily in the AM x3 days then stop. Jose Guillen instructed to call if patient developes any new symptoms with this taper schedule. Dexamethasone refill sent to [facility] pharmacy in [address] [facility]. Jose Guillen verbalized understanding of above.  Steroid taper calender sent to her email at [email or web address].

## 2023-09-08 ENCOUNTER — APPOINTMENT (OUTPATIENT)
Dept: LAB | Facility: CLINIC | Age: 73
End: 2023-09-08
Payer: MEDICARE

## 2023-09-08 DIAGNOSIS — C34.11 MALIGNANT NEOPLASM OF UPPER LOBE OF RIGHT LUNG (HCC): ICD-10-CM

## 2023-09-08 DIAGNOSIS — D70.1 NEUTROPENIA ASSOCIATED WITH MUCOSITIS DUE TO ANTINEOPLASTIC THERAPY (HCC): ICD-10-CM

## 2023-09-08 DIAGNOSIS — K12.31 NEUTROPENIA ASSOCIATED WITH MUCOSITIS DUE TO ANTINEOPLASTIC THERAPY (HCC): ICD-10-CM

## 2023-09-08 DIAGNOSIS — R91.8 LUNG MASS: ICD-10-CM

## 2023-09-08 DIAGNOSIS — C34.90 MALIGNANT NEOPLASM OF BRONCHUS AND LUNG (HCC): ICD-10-CM

## 2023-09-08 DIAGNOSIS — T45.1X5S NEUTROPENIA ASSOCIATED WITH MUCOSITIS DUE TO ANTINEOPLASTIC THERAPY (HCC): ICD-10-CM

## 2023-09-08 LAB
ALBUMIN SERPL BCP-MCNC: 3.4 G/DL (ref 3.5–5)
ALP SERPL-CCNC: 55 U/L (ref 34–104)
ALT SERPL W P-5'-P-CCNC: 15 U/L (ref 7–52)
ANION GAP SERPL CALCULATED.3IONS-SCNC: 7 MMOL/L
AST SERPL W P-5'-P-CCNC: 10 U/L (ref 13–39)
BILIRUB SERPL-MCNC: 0.31 MG/DL (ref 0.2–1)
BUN SERPL-MCNC: 25 MG/DL (ref 5–25)
CALCIUM ALBUM COR SERPL-MCNC: 9.8 MG/DL (ref 8.3–10.1)
CALCIUM SERPL-MCNC: 9.3 MG/DL (ref 8.4–10.2)
CHLORIDE SERPL-SCNC: 106 MMOL/L (ref 96–108)
CO2 SERPL-SCNC: 32 MMOL/L (ref 21–32)
CREAT SERPL-MCNC: 1.07 MG/DL (ref 0.6–1.3)
GFR SERPL CREATININE-BSD FRML MDRD: 68 ML/MIN/1.73SQ M
GLUCOSE P FAST SERPL-MCNC: 109 MG/DL (ref 65–99)
POTASSIUM SERPL-SCNC: 4.3 MMOL/L (ref 3.5–5.3)
PROT SERPL-MCNC: 5.8 G/DL (ref 6.4–8.4)
SODIUM SERPL-SCNC: 145 MMOL/L (ref 135–147)

## 2023-09-08 PROCEDURE — 36415 COLL VENOUS BLD VENIPUNCTURE: CPT

## 2023-09-08 PROCEDURE — 80053 COMPREHEN METABOLIC PANEL: CPT

## 2023-09-09 DIAGNOSIS — I82.4Y3 DEEP VEIN THROMBOSIS (DVT) OF PROXIMAL VEIN OF BOTH LOWER EXTREMITIES, UNSPECIFIED CHRONICITY (HCC): ICD-10-CM

## 2023-09-11 ENCOUNTER — HOSPITAL ENCOUNTER (OUTPATIENT)
Dept: INFUSION CENTER | Facility: HOSPITAL | Age: 73
Discharge: HOME | End: 2023-09-11
Attending: INTERNAL MEDICINE
Payer: MEDICARE

## 2023-09-11 VITALS
TEMPERATURE: 97.4 F | RESPIRATION RATE: 20 BRPM | SYSTOLIC BLOOD PRESSURE: 122 MMHG | DIASTOLIC BLOOD PRESSURE: 68 MMHG | BODY MASS INDEX: 31.25 KG/M2 | WEIGHT: 210.98 LBS | HEART RATE: 81 BPM | HEIGHT: 69 IN | OXYGEN SATURATION: 91 %

## 2023-09-11 DIAGNOSIS — D50.9 IRON DEFICIENCY ANEMIA, UNSPECIFIED IRON DEFICIENCY ANEMIA TYPE: ICD-10-CM

## 2023-09-11 DIAGNOSIS — E83.52 HYPERCALCEMIA OF MALIGNANCY: Primary | ICD-10-CM

## 2023-09-11 DIAGNOSIS — R56.9 PARTIAL SEIZURE (HCC): ICD-10-CM

## 2023-09-11 PROCEDURE — 96372 THER/PROPH/DIAG INJ SC/IM: CPT

## 2023-09-11 RX ORDER — ENOXAPARIN SODIUM 100 MG/ML
1 INJECTION SUBCUTANEOUS EVERY 12 HOURS
Qty: 60 ML | Refills: 5 | Status: SHIPPED | OUTPATIENT
Start: 2023-09-11

## 2023-09-11 RX ORDER — LEVETIRACETAM 1000 MG/1
1000 TABLET ORAL EVERY 12 HOURS SCHEDULED
Qty: 60 TABLET | Refills: 0 | Status: CANCELLED | OUTPATIENT
Start: 2023-09-11 | End: 2023-10-11

## 2023-09-11 RX ADMIN — DENOSUMAB 120 MG: 120 INJECTION SUBCUTANEOUS at 10:43

## 2023-09-11 NOTE — TELEPHONE ENCOUNTER
Patient's spouse called and left a message on my voicemail requesting refills of his iron supplement and keppra. Will route the keppra to his neurology office.

## 2023-09-11 NOTE — PROGRESS NOTES
Pt arrived amb with cane for Xgeva injection. Calcium and Cr Cl wnl. Xgeva given SQ JELLY, dsd applied. Another appt made for Xgeva, disch amb with cane  To home, slow gait.

## 2023-09-12 ENCOUNTER — TELEPHONE (OUTPATIENT)
Age: 73
End: 2023-09-12

## 2023-09-12 RX ORDER — FERROUS SULFATE TAB EC 324 MG (65 MG FE EQUIVALENT) 324 (65 FE) MG
324 TABLET DELAYED RESPONSE ORAL
Qty: 60 TABLET | Refills: 0 | Status: SHIPPED | OUTPATIENT
Start: 2023-09-12 | End: 2023-10-12

## 2023-09-14 ENCOUNTER — TELEPHONE (OUTPATIENT)
Dept: NEUROLOGY | Facility: CLINIC | Age: 73
End: 2023-09-14

## 2023-09-14 DIAGNOSIS — R56.9 PARTIAL SEIZURE (HCC): ICD-10-CM

## 2023-09-14 RX ORDER — LEVETIRACETAM 1000 MG/1
1000 TABLET ORAL EVERY 12 HOURS SCHEDULED
Qty: 60 TABLET | Refills: 0 | Status: SHIPPED | OUTPATIENT
Start: 2023-09-14 | End: 2023-09-25 | Stop reason: SDUPTHER

## 2023-09-14 NOTE — TELEPHONE ENCOUNTER
Dr Deondre Davidson - Would you be agreeable to fill a 30 day supply of pt's Keppra until pt is seen by neurology on 9/29/23?

## 2023-09-14 NOTE — TELEPHONE ENCOUNTER
Please call patient. I will continue with the Ivan Guardian and Rx was sent. However, just to review, although he has not seen Neurology as outpatient he was seen by neurology while in the hospital and initiated the medication while he was there, to continue at least until outpatient followup.

## 2023-09-14 NOTE — TELEPHONE ENCOUNTER
Recd vm 9/13 taken off 9/14  I'm calling up for Robin Cruz, 1950. He was prescribed keppra when he was in the hospital a couple of weeks ago, and his prescription has run out. So I need a refill on that prescription for keprra. He is due to see doctor ailyn bennett on september 29th. So I need the, the keppra  right now. So if you could please get back to me.  [phone]    s/p admission 8/11  HFU scheduled 9/29 YONATAN Anderson,  Are you willing to reorder keppra?

## 2023-09-15 ENCOUNTER — TELEPHONE (OUTPATIENT)
Dept: RADIATION ONCOLOGY | Facility: HOSPITAL | Age: 73
End: 2023-09-15

## 2023-09-15 NOTE — TELEPHONE ENCOUNTER
Spoke with pt's wife Jose Guillen and she informs that they picked up prescription. Nothing further at this time.

## 2023-09-15 NOTE — TELEPHONE ENCOUNTER
Called patient's wife regarding steroid taper and recent seizure like activity since he ran out of keppra. She states his last dose of Dex taper was on Tuesday, they were trying to get a refill of his keppra and ran out on wed, last dose of Keppra was wed morning and he was able to restart it Thursday. However, he had a short 1-2 minute episode of seizure like activity with right hand shaking. No episodes since that time. He is feeling okay since tapered off steroids, he is sleeping more and slightly unsteady when walking. Reviewed with Dr. Willam Barth as Dr. Lady Doherty is out of the office, he recommends to continue to monitor symptoms, if any seizure like activity occurs he should go to the ER. Call us with any change or worsening of symptoms.

## 2023-09-15 NOTE — TELEPHONE ENCOUNTER
Patient's wife called, pt was tapering off steroids and finished on Wednesday. He also ran out of [facility] on Wednesday and until she got a new prescription pt had a small seizure (right hand) yesterday. She wanted to get a message to Dr Gina Martinez to see where they go from here.

## 2023-09-18 ENCOUNTER — TELEPHONE (OUTPATIENT)
Dept: HEMATOLOGY ONCOLOGY | Facility: CLINIC | Age: 73
End: 2023-09-18

## 2023-09-18 ENCOUNTER — TELEPHONE (OUTPATIENT)
Dept: RADIATION ONCOLOGY | Facility: CLINIC | Age: 73
End: 2023-09-18

## 2023-09-18 DIAGNOSIS — C79.31 MALIGNANT NEOPLASM METASTATIC TO BRAIN (HCC): Primary | ICD-10-CM

## 2023-09-18 RX ORDER — DEXAMETHASONE 4 MG/1
4 TABLET ORAL 2 TIMES DAILY WITH MEALS
Qty: 60 TABLET | Refills: 0 | Status: SHIPPED | OUTPATIENT
Start: 2023-09-18

## 2023-09-18 NOTE — TELEPHONE ENCOUNTER
Spoke with wife, Elida Pate she reports that patient completed dexamethasone taper last Tuesday, 9/12/23. He was taking 2 mg daily at that time. She states that she noticed his walking was affected while on the 2 mg daily dosage. She reports that Saturday, 9/16 he was having increased difficulty ambulating, and was using the walker. Since Saturday, he has been in bed and is unable to walk and he has right arm weakness again. She states he denies headaches, N/V, no dizziness/lightheadedness. He does have fatigue. No seizures since restarting Keppra last Thursday. Informed her I would notify Dr Kishan Carrillo of above and call back with further instructions.

## 2023-09-18 NOTE — TELEPHONE ENCOUNTER
Spoke with patient's wife advising her to speak with Dr. Jesús Byrne regarding the steroid management. I informed patients wife to give us a call back early this afternoon if she does not hear back from anyone.  Left direct line for call back if they have any questions or concerns

## 2023-09-18 NOTE — TELEPHONE ENCOUNTER
Nishant Shirley called with a message she has for Dr Manuela Gramajo. Natalie Ruffin has now stopped the steroids, but is still taking [place]. He can't walk on his own and has been in bed since Saturday night. She states that he is going downhill.

## 2023-09-18 NOTE — TELEPHONE ENCOUNTER
Spoke with patient's wife regarding his current state, pt's wife stating pt stopped his steroid taper on Wednesday and has declined since. Pt's wife states patient is having R arm weakness and "sluggish" ambulation with walker. Pt's wife denies slurred speech or facial drooping. She says he still has movement in the R arm and able to squeeze her hand. She denies pt having any incontinence or confusion. Pt's wife says this same situation occurred the last time he was tapered off steroids. Informed pt's wife I would talk to Dr. Donna Harper and call her back shortly. Pt's wife verbalized understanding.

## 2023-09-18 NOTE — TELEPHONE ENCOUNTER
Per Dr Ron Florian instructed to restart Sivlestre Valdez on dexamethasone 4 mg twice a day, giving first dose now. Also informed her that if no change in symptoms or symptoms worsen he should go to the ED. She only has 4 tabs left of the dexamethasone 4 mg, refill order placed to [facility] pharmacy in [address] [place] [address]. Berna Bella informed that I will touch base with her again on 9/20 to get update on patient's status. Instructed to call with any questions/concerns. She verbalized understanding of above.

## 2023-09-19 ENCOUNTER — TELEPHONE (OUTPATIENT)
Age: 73
End: 2023-09-19

## 2023-09-19 NOTE — TELEPHONE ENCOUNTER
Spoke to patient's wife. Patient recently tapered off his steroid. Since then, he has steadily declined. He was unable to get out of bed up until he was instructed to restart his steroids yesterday. She said the steroids are already helping but said he still needs assistance with standing and ambulating. She is concerned that it is going to be difficult to get him into his treatment tomorrow. He has a CT scan tomorrow as well and follows up with Dr. Harmeet Leon on 10/2. Should we hold his treatment tomorrow?

## 2023-09-19 NOTE — TELEPHONE ENCOUNTER
Treatment deferred to next week. She said she should still be able to get him to his CT scan tomorrow. We will follow up with them at his appt on 10/2. She knows to call in the meantime with any questions/concerns.

## 2023-09-20 ENCOUNTER — HOSPITAL ENCOUNTER (OUTPATIENT)
Dept: CT IMAGING | Facility: HOSPITAL | Age: 73
Discharge: HOME | End: 2023-09-20
Attending: INTERNAL MEDICINE
Payer: MEDICARE

## 2023-09-20 ENCOUNTER — TELEPHONE (OUTPATIENT)
Dept: RADIATION ONCOLOGY | Facility: HOSPITAL | Age: 73
End: 2023-09-20

## 2023-09-20 ENCOUNTER — HOSPITAL ENCOUNTER (OUTPATIENT)
Dept: INFUSION CENTER | Facility: HOSPITAL | Age: 73
Discharge: HOME | End: 2023-09-20
Attending: INTERNAL MEDICINE

## 2023-09-20 DIAGNOSIS — R56.9 PARTIAL SEIZURE (HCC): ICD-10-CM

## 2023-09-20 DIAGNOSIS — C34.11 MALIGNANT NEOPLASM OF UPPER LOBE OF RIGHT LUNG (HCC): ICD-10-CM

## 2023-09-20 DIAGNOSIS — Z92.241 HX OF STEROID THERAPY: Primary | ICD-10-CM

## 2023-09-20 PROCEDURE — 74177 CT ABD & PELVIS W/CONTRAST: CPT

## 2023-09-20 PROCEDURE — G1004 CDSM NDSC: HCPCS

## 2023-09-20 PROCEDURE — 71260 CT THORAX DX C+: CPT

## 2023-09-20 RX ORDER — PANTOPRAZOLE SODIUM 40 MG/1
40 TABLET, DELAYED RELEASE ORAL
Qty: 30 TABLET | Refills: 1 | Status: CANCELLED | OUTPATIENT
Start: 2023-09-20 | End: 2023-10-20

## 2023-09-20 RX ADMIN — IOHEXOL 80 ML: 350 INJECTION, SOLUTION INTRAVENOUS at 14:06

## 2023-09-20 NOTE — TELEPHONE ENCOUNTER
[id number] Teams VM:  Mark Kai, this is José Miguel Preston. I'm calling up about Jamee Silverman. I wanted to touch base with you about his dexamethasone schedule and if he's supposed to take a pan to [facility] because I'm out of those. If he needs those, could you please call me back [phone]? Thanks. Spoke with Jose Guillen, she reports that after the 3rd dose of dexamethasone 4 mg, Roberto Ji was up walking with his walker, he has had some improvement with his right hand/arm weakness, he has less fatigue and is more himself. He was restarted on dexamethasone 4 mg BID on Monday, 9/17/23. She was informed that he should continue taking the pantoprazole 40 mg daily. She is requesting refill for the pantoprazole. Jose Guillen informed that I will call back with further steroid instructions per Dr Namita Motta. She verbalized understanding of above.

## 2023-09-21 ENCOUNTER — TELEPHONE (OUTPATIENT)
Dept: RADIATION ONCOLOGY | Facility: HOSPITAL | Age: 73
End: 2023-09-21

## 2023-09-21 RX ORDER — PANTOPRAZOLE SODIUM 40 MG/1
40 TABLET, DELAYED RELEASE ORAL
Qty: 30 TABLET | Refills: 1 | COMMUNITY

## 2023-09-21 NOTE — TELEPHONE ENCOUNTER
LM for Robbjacoby Renoruma to let her know that Corin's pantoprazole 40 mg daily before breakfast has been reordered via [facility] in [place]. I also instructed her to keep patient on dexamethasone 4 mg twice a day per Dr Chapo Juares and I will touch base with them next week to get update on his status. Requested call back if she has any questions or if patient's symptoms worsen. My direct teams number provided.

## 2023-09-25 ENCOUNTER — APPOINTMENT (OUTPATIENT)
Dept: RADIOLOGY | Facility: HOSPITAL | Age: 73
End: 2023-09-25
Payer: MEDICARE

## 2023-09-25 ENCOUNTER — HOSPITAL ENCOUNTER (EMERGENCY)
Facility: HOSPITAL | Age: 73
Discharge: HOME | End: 2023-09-25
Attending: EMERGENCY MEDICINE | Admitting: EMERGENCY MEDICINE
Payer: MEDICARE

## 2023-09-25 ENCOUNTER — TELEPHONE (OUTPATIENT)
Age: 73
End: 2023-09-25

## 2023-09-25 ENCOUNTER — TELEPHONE (OUTPATIENT)
Dept: RADIATION ONCOLOGY | Facility: HOSPITAL | Age: 73
End: 2023-09-25

## 2023-09-25 ENCOUNTER — APPOINTMENT (EMERGENCY)
Dept: CT IMAGING | Facility: HOSPITAL | Age: 73
End: 2023-09-25
Payer: MEDICARE

## 2023-09-25 VITALS
SYSTOLIC BLOOD PRESSURE: 139 MMHG | DIASTOLIC BLOOD PRESSURE: 86 MMHG | OXYGEN SATURATION: 98 % | TEMPERATURE: 98.2 F | HEART RATE: 75 BPM | RESPIRATION RATE: 18 BRPM | HEIGHT: 69 IN | BODY MASS INDEX: 31.1 KG/M2 | WEIGHT: 210 LBS

## 2023-09-25 DIAGNOSIS — C79.31 LUNG CANCER METASTATIC TO BRAIN (HCC): ICD-10-CM

## 2023-09-25 DIAGNOSIS — R56.9 PARTIAL SEIZURE (HCC): ICD-10-CM

## 2023-09-25 DIAGNOSIS — C34.90 LUNG CANCER METASTATIC TO BRAIN (HCC): ICD-10-CM

## 2023-09-25 DIAGNOSIS — G40.109 FOCAL MOTOR SEIZURE (HCC): Primary | ICD-10-CM

## 2023-09-25 LAB
ALBUMIN SERPL BCP-MCNC: 3.5 G/DL (ref 3.5–5)
ALP SERPL-CCNC: 62 U/L (ref 34–104)
ALT SERPL W P-5'-P-CCNC: 15 U/L (ref 7–52)
ANION GAP SERPL CALCULATED.3IONS-SCNC: 5 MMOL/L
AST SERPL W P-5'-P-CCNC: 10 U/L (ref 13–39)
BASOPHILS # BLD MANUAL: 0 THOUSAND/UL (ref 0–0.1)
BASOPHILS NFR MAR MANUAL: 0 % (ref 0–1)
BILIRUB SERPL-MCNC: 0.31 MG/DL (ref 0.2–1)
BNP SERPL-MCNC: 56 PG/ML (ref 0–100)
BUN SERPL-MCNC: 15 MG/DL (ref 5–25)
CALCIUM SERPL-MCNC: 9.8 MG/DL (ref 8.4–10.2)
CHLORIDE SERPL-SCNC: 104 MMOL/L (ref 96–108)
CO2 SERPL-SCNC: 31 MMOL/L (ref 21–32)
CREAT SERPL-MCNC: 0.82 MG/DL (ref 0.6–1.3)
EOSINOPHIL # BLD MANUAL: 0 THOUSAND/UL (ref 0–0.4)
EOSINOPHIL NFR BLD MANUAL: 0 % (ref 0–6)
ERYTHROCYTE [DISTWIDTH] IN BLOOD BY AUTOMATED COUNT: 23.3 % (ref 11.6–15.1)
GFR SERPL CREATININE-BSD FRML MDRD: 88 ML/MIN/1.73SQ M
GLUCOSE SERPL-MCNC: 114 MG/DL (ref 65–140)
HCT VFR BLD AUTO: 38.3 % (ref 36.5–49.3)
HGB BLD-MCNC: 11.4 G/DL (ref 12–17)
LYMPHOCYTES # BLD AUTO: 1.49 THOUSAND/UL (ref 0.6–4.47)
LYMPHOCYTES # BLD AUTO: 19 % (ref 14–44)
MCH RBC QN AUTO: 27.2 PG (ref 26.8–34.3)
MCHC RBC AUTO-ENTMCNC: 29.8 G/DL (ref 31.4–37.4)
MCV RBC AUTO: 91 FL (ref 82–98)
METAMYELOCYTES NFR BLD MANUAL: 1 % (ref 0–1)
MONOCYTES # BLD AUTO: 0.47 THOUSAND/UL (ref 0–1.22)
MONOCYTES NFR BLD: 6 % (ref 4–12)
NEUTROPHILS # BLD MANUAL: 5.81 THOUSAND/UL (ref 1.85–7.62)
NEUTS SEG NFR BLD AUTO: 74 % (ref 43–75)
PLATELET # BLD AUTO: 234 THOUSANDS/UL (ref 149–390)
PLATELET BLD QL SMEAR: ADEQUATE
PMV BLD AUTO: 9 FL (ref 8.9–12.7)
POLYCHROMASIA BLD QL SMEAR: PRESENT
POTASSIUM SERPL-SCNC: 3.9 MMOL/L (ref 3.5–5.3)
PROT SERPL-MCNC: 6.4 G/DL (ref 6.4–8.4)
RBC # BLD AUTO: 4.19 MILLION/UL (ref 3.88–5.62)
RBC MORPH BLD: PRESENT
SODIUM SERPL-SCNC: 140 MMOL/L (ref 135–147)
WBC # BLD AUTO: 7.85 THOUSAND/UL (ref 4.31–10.16)

## 2023-09-25 PROCEDURE — 99285 EMERGENCY DEPT VISIT HI MDM: CPT | Performed by: EMERGENCY MEDICINE

## 2023-09-25 PROCEDURE — 70450 CT HEAD/BRAIN W/O DYE: CPT

## 2023-09-25 PROCEDURE — G1004 CDSM NDSC: HCPCS

## 2023-09-25 PROCEDURE — 93005 ELECTROCARDIOGRAM TRACING: CPT

## 2023-09-25 PROCEDURE — 71046 X-RAY EXAM CHEST 2 VIEWS: CPT

## 2023-09-25 PROCEDURE — 36415 COLL VENOUS BLD VENIPUNCTURE: CPT

## 2023-09-25 PROCEDURE — 83880 ASSAY OF NATRIURETIC PEPTIDE: CPT | Performed by: EMERGENCY MEDICINE

## 2023-09-25 PROCEDURE — 85007 BL SMEAR W/DIFF WBC COUNT: CPT | Performed by: EMERGENCY MEDICINE

## 2023-09-25 PROCEDURE — 85027 COMPLETE CBC AUTOMATED: CPT | Performed by: EMERGENCY MEDICINE

## 2023-09-25 PROCEDURE — 80053 COMPREHEN METABOLIC PANEL: CPT | Performed by: EMERGENCY MEDICINE

## 2023-09-25 PROCEDURE — 80177 DRUG SCRN QUAN LEVETIRACETAM: CPT | Performed by: EMERGENCY MEDICINE

## 2023-09-25 PROCEDURE — 99285 EMERGENCY DEPT VISIT HI MDM: CPT

## 2023-09-25 RX ORDER — LEVETIRACETAM 1000 MG/1
1500 TABLET ORAL 2 TIMES DAILY
Qty: 90 TABLET | Refills: 0 | Status: SHIPPED | OUTPATIENT
Start: 2023-09-25 | End: 2023-09-29 | Stop reason: SDUPTHER

## 2023-09-25 NOTE — ED PROVIDER NOTES
History  Chief Complaint   Patient presents with   • Weakness - Generalized     Patient presents to ED with worsening weakness since starting dexamethasone, patient is undergoing chemo for cancer. Wife also reports evaluation of right arm tremors/uncontrolled shaking this morning, which has happened in the past.      71-year-old male with a history of metastatic lung cancer presents for recurrent right upper extremity focal seizure lasting several minutes. The wife states that this is happened in the past.  She states that she contacted radiation oncology who advised the patient come to the emergency department for further evaluation. The patient denies any change in his current shortness of breath          Prior to Admission Medications   Prescriptions Last Dose Informant Patient Reported? Taking?   acetaminophen (TYLENOL) 325 mg tablet  Self No No   Sig: Take 2 tablets (650 mg total) by mouth 4 (four) times a day as needed for mild pain, headaches or fever   albuterol (2.5 mg/3 mL) 0.083 % nebulizer solution  Self No No   Sig: Take 3 mL (2.5 mg total) by nebulization every 6 (six) hours as needed for wheezing or shortness of breath   albuterol (ProAir HFA) 90 mcg/act inhaler  Self No No   Sig: Inhale 2 puffs every 6 (six) hours as needed for wheezing or shortness of breath   atorvastatin (LIPITOR) 40 mg tablet   No No   Sig: TAKE 1 TABLET BY MOUTH DAILY WITH DINNER   dexamethasone (DECADRON) 4 mg tablet   No No   Sig: Take 1 tablet (4 mg total) by mouth 2 (two) times a day with meals Taper as directed.    enoxaparin (LOVENOX) 100 mg/mL   No No   Sig: INJECT 0.9 ML (90 MG TOTAL) UNDER THE SKIN EVERY 12 (TWELVE) HOURS   ferrous sulfate 324 (65 Fe) mg   No No   Sig: Take 1 tablet (324 mg total) by mouth 2 (two) times a day before meals   folic acid (FOLVITE) 1 mg tablet   No No   Sig: TAKE 1 TABLET BY MOUTH EVERY DAY   levETIRAcetam (KEPPRA) 1000 MG tablet   No No   Sig: Take 1 tablet (1,000 mg total) by mouth every 12 (twelve) hours   levETIRAcetam (KEPPRA) 1000 MG tablet   No Yes   Sig: Take 1.5 tablets (1,500 mg total) by mouth 2 (two) times a day   pantoprazole (PROTONIX) 40 mg tablet   Yes No   Sig: Take 1 tablet (40 mg total) by mouth daily before breakfast   tamsulosin (FLOMAX) 0.4 mg  Self No No   Sig: Take 1 capsule (0.4 mg total) by mouth daily after dinner      Facility-Administered Medications: None       Past Medical History:   Diagnosis Date   • Acute respiratory failure with hypoxia ([facility]) 2021   • Chronic respiratory failure with hypoxia ([address]) 2021   • Impaired mobility and ADLs 2021   • Lung cancer ([address])    • Stroke [facility])        Past Surgical History:   Procedure Laterality Date   • IR BIOPSY LUNG  2021   • IR THORACENTESIS  2022   • IR THORACENTESIS  2022       Family History   Problem Relation Age of Onset   • Prostate cancer Brother    • Lung cancer Brother      I have reviewed and agree with the history as documented. E-Cigarette/Vaping   • E-Cigarette Use Never User      E-Cigarette/Vaping Substances     Social History     Tobacco Use   • Smoking status: Former     Packs/day: 3.00     Years: 39.00     Total pack years: 117.00     Types: Cigarettes     Start date: 66     Quit date:      Years since quittin.7   • Smokeless tobacco: Never   Vaping Use   • Vaping Use: Never used   Substance Use Topics   • Alcohol use: Not Currently     Comment: No ETOH since Summer 2021    • Drug use: Never       Review of Systems    Physical Exam  Physical Exam  Vitals and nursing note reviewed. Constitutional:       General: He is not in acute distress. Appearance: He is well-developed. HENT:      Head: Normocephalic and atraumatic. Right Ear: External ear normal.      Left Ear: External ear normal.      Mouth/Throat:      Pharynx: No oropharyngeal exudate. Eyes:      General: No scleral icterus. Pupils: Pupils are equal, round, and reactive to light. Cardiovascular:      Rate and Rhythm: Normal rate and regular rhythm. Heart sounds: Normal heart sounds. Pulmonary:      Effort: Pulmonary effort is normal. No respiratory distress. Breath sounds: Normal breath sounds. Abdominal:      General: Bowel sounds are normal.      Palpations: Abdomen is soft. Tenderness: There is no abdominal tenderness. There is no guarding or rebound. Musculoskeletal:         General: Normal range of motion. Cervical back: Normal range of motion and neck supple. Skin:     General: Skin is warm and dry. Findings: No rash. Neurological:      Mental Status: He is alert and oriented to person, place, and time. Motor: Weakness ( Right upper extremity and right lower extremity) present. No tremor. Coordination: Finger-Nose-Finger Test abnormal ( Right upper extremity). Vital Signs  ED Triage Vitals [09/25/23 0938]   Temperature Pulse Respirations Blood Pressure SpO2   98.2 °F (36.8 °C) 84 16 134/80 98 %      Temp src Heart Rate Source Patient Position - Orthostatic VS BP Location FiO2 (%)   -- Monitor Sitting Left arm --      Pain Score       No Pain           Vitals:    09/25/23 0938 09/25/23 1100 09/25/23 1200 09/25/23 1352   BP: 134/80 134/83 134/83 139/86   Pulse: 84 78 70 75   Patient Position - Orthostatic VS: Sitting   Lying         Visual Acuity      ED Medications  Medications - No data to display    Diagnostic Studies  Results Reviewed     Procedure Component Value Units Date/Time    Levetiracetam level [[id number]] Collected: 09/25/23 1300    Lab Status:  In process Specimen: Blood from Arm, Right Updated: 09/25/23 1302    RBC Morphology Reflex Test [[id number]] Collected: 09/25/23 0953    Lab Status: Final result Specimen: Blood from Arm, Left Updated: 09/25/23 1101    B-Type Natriuretic Peptide(BNP) [[id number]]  (Normal) Collected: 09/25/23 0953    Lab Status: Final result Specimen: Blood from Arm, Left Updated: 09/25/23 1024 BNP 56 pg/mL     CBC and differential [[id number]]  (Abnormal) Collected: 09/25/23 0953    Lab Status: Final result Specimen: Blood from Arm, Left Updated: 09/25/23 1018     WBC 7.85 Thousand/uL      RBC 4.19 Million/uL      Hemoglobin 11.4 g/dL      Hematocrit 38.3 %      MCV 91 fL      MCH 27.2 pg      MCHC 29.8 g/dL      RDW 23.3 %      MPV 9.0 fL      Platelets 228 Thousands/uL     Narrative: This is an appended report. These results have been appended to a previously verified report.     Manual Differential(PHLEBS Do Not Order) [[id number]] Collected: 09/25/23 0953    Lab Status: Final result Specimen: Blood from Arm, Left Updated: 09/25/23 1018     Segmented % 74 %      Lymphocytes % 19 %      Monocytes % 6 %      Eosinophils, % 0 %      Basophils % 0 %      Metamyelocytes% 1 %      Absolute Neutrophils 5.81 Thousand/uL      Lymphocytes Absolute 1.49 Thousand/uL      Monocytes Absolute 0.47 Thousand/uL      Eosinophils Absolute 0.00 Thousand/uL      Basophils Absolute 0.00 Thousand/uL      Total Counted --     RBC Morphology Present     Platelet Estimate Adequate     Polychromasia Present    Comprehensive metabolic panel [[id number]]  (Abnormal) Collected: 09/25/23 0953    Lab Status: Final result Specimen: Blood from Arm, Left Updated: 09/25/23 1014     Sodium 140 mmol/L      Potassium 3.9 mmol/L      Chloride 104 mmol/L      CO2 31 mmol/L      ANION GAP 5 mmol/L      BUN 15 mg/dL      Creatinine 0.82 mg/dL      Glucose 114 mg/dL      Calcium 9.8 mg/dL      AST 10 U/L      ALT 15 U/L      Alkaline Phosphatase 62 U/L      Total Protein 6.4 g/dL      Albumin 3.5 g/dL      Total Bilirubin 0.31 mg/dL      eGFR 88 ml/min/1.73sq m     Narrative:      [place] guidelines for Chronic Kidney Disease (CKD):   •  Stage 1 with normal or high GFR (GFR > 90 mL/min/1.73 square meters)  •  Stage 2 Mild CKD (GFR = 60-89 mL/min/1.73 square meters)  •  Stage 3A Moderate CKD (GFR = 45-59 mL/min/1.73 square meters)  •  Stage 3B Moderate CKD (GFR = 30-44 mL/min/1.73 square meters)  •  Stage 4 Severe CKD (GFR = 15-29 mL/min/1.73 square meters)  •  Stage 5 End Stage CKD (GFR <15 mL/min/1.73 square meters)  Note: GFR calculation is accurate only with a steady state creatinine                 CT head without contrast   Final Result by David Pizarro DO (09/25 0663)   Addendum (preliminary) 1 of 1 by David Pizarro DO (09/25 1350)   ADDENDUM:   I personally discussed this study with Hernán Adhikari on 9/25/2023    1:50 PM.            Final   Left frontal region vasogenic edema again seen not significantly changed from August 11, 2023. No acute intracranial abnormality. Given the history and symptoms: Consider repeat brain MRI. The study was marked in [facility] for immediate notification. Workstation performed: [id number]         XR chest 2 views   ED Interpretation by Hernán Adhikari DO (09/25 1026)   Recurrent large right pleural effusion      Final Result by Adriane Barroso MD (09/25 1210)      Redemonstrated extensive right lung opacity corresponding with patient's known right pleural effusion and post radiation changes. Resident: Lance Fuchs, the attending radiologist, have reviewed the images and agree with the final report above. Workstation performed: [id number]                    Procedures  ECG 12 Lead Documentation Only    Date/Time: 9/25/2023 10:02 AM    Performed by: Hernán Adhikari DO  Authorized by:  Hernán Adhikari DO    Indications / Diagnosis:  SOB  ECG reviewed by me, the ED Provider: yes    Patient location:  ED  Previous ECG:     Previous ECG:  Unavailable  Interpretation:     Interpretation: normal    Rate:     ECG rate:  82    ECG rate assessment: normal    Rhythm:     Rhythm: sinus rhythm    Ectopy:     Ectopy: none    QRS:     QRS axis:  Normal    QRS intervals:  Normal  Conduction:     Conduction: normal    ST segments:     ST segments:  Normal  T waves:     T waves: normal               ED Course  ED Course as of 09/26/23 1331   Mon Sep 25, 2023   1226 Neuro paged   [phone] Discussed with neuro who recommended increase in keppra to 1500mg BID. Awaiting recommendations from rad onc   [phone] Discussed with Dr. Colton Forman from rads. Given hx of similar symptoms with resolution. No indication for stat MRI now, although would recommend MRI to assess for new lesions that would not be seen on CT                               SBIRT 20yo+    Flowsheet Row Most Recent Value   Initial Alcohol Screen: US AUDIT-C     1. How often do you have a drink containing alcohol? 0 Filed at: 09/25/2023 0939   Audit-C Score 0 Filed at: 09/25/2023 0737   EDWIN: How many times in the past year have you. .. Used an illegal drug or used a prescription medication for non-medical reasons? Never Filed at: 09/25/2023 4138                    Medical Decision Making  Differential diagnosis: Recurrent seizure, worsening vasogenic edema secondary to metastatic disease, electrolyte disturbance, arrhythmia    Discussed CT findings without acute chage. Patient at baseline and therefore will d/c with increased keppra dose for outpatient neuro f/u after d/w neurorads and neuro. The patient (and any family present) verbalized understanding of the discharge instructions and warnings that would necessitate return to the Emergency Department. All questions were answered prior to discharge. Amount and/or Complexity of Data Reviewed  External Data Reviewed: notes. Details: rad onc notes reviewed. Labs: ordered. Radiology: ordered and independent interpretation performed. Risk  Prescription drug management.           Disposition  Final diagnoses:   Focal motor seizure ([address])   Lung cancer metastatic to brain [facility])     Time reflects when diagnosis was documented in both MDM as applicable and the Disposition within this note     Time User Action Codes Description Comment 9/25/2023  1:50 PM Lázaroabdulazizcrystal DAY Add [Y60.196] Focal motor seizure ([address] [facility])     9/25/2023  1:51 PM Chente DAY Add [C34.90,  C79.31] Lung cancer metastatic to brain ([address])     9/25/2023  1:51 PM Bassam Scales Add [R56.9] Partial seizure [facility])       ED Disposition     ED Disposition   Discharge    Condition   Stable    Date/Time   Mon Sep 25, 2023  1:50 PM    Comment   Rony Burrows discharge to home/self care.                Follow-up Information     Follow up With Specialties Details Why Contact Info Additional MD [facility]   [address]  [address] [facility] [address] [facility] Neurology Go on 9/29/2023 For further evaluation, as previously scheduled [place] [address] [id number]  [address] [facility] [address] [facility], [address] [facility], [address] [facility], [address] [place], [place], [address] [place]     [address] [facility] Emergency Department Emergency Medicine Go to  If symptoms worsen [address] [facility] [phone]  [address] [facility] Emergency Department, [address] [facility] [place], [address] [facility], [address] [facility]          Discharge Medication List as of 9/25/2023  1:53 PM      CONTINUE these medications which have CHANGED    Details   levETIRAcetam (KEPPRA) 1000 MG tablet Take 1.5 tablets (1,500 mg total) by mouth 2 (two) times a day, Starting Mon 9/25/2023, Until Wed 10/25/2023, Normal         CONTINUE these medications which have NOT CHANGED    Details   acetaminophen (TYLENOL) 325 mg tablet Take 2 tablets (650 mg total) by mouth 4 (four) times a day as needed for mild pain, headaches or fever, Starting Mon 8/16/2021, No Print      albuterol (2.5 mg/3 mL) 0.083 % nebulizer solution Take 3 mL (2.5 mg total) by nebulization every 6 (six) hours as needed for wheezing or shortness of breath, Starting Tue 4/18/2023, Normal      albuterol (ProAir HFA) 90 mcg/act inhaler Inhale 2 puffs every 6 (six) hours as needed for wheezing or shortness of breath, Starting Thu 8/11/2022, Normal      atorvastatin (LIPITOR) 40 mg tablet TAKE 1 TABLET BY MOUTH DAILY WITH DINNER, Starting Wed 8/2/2023, Normal      dexamethasone (DECADRON) 4 mg tablet Take 1 tablet (4 mg total) by mouth 2 (two) times a day with meals Taper as directed., Starting Mon 9/18/2023, Normal      enoxaparin (LOVENOX) 100 mg/mL INJECT 0.9 ML (90 MG TOTAL) UNDER THE SKIN EVERY 12 (TWELVE) HOURS, Starting Mon 9/11/2023, Normal      ferrous sulfate 324 (65 Fe) mg Take 1 tablet (324 mg total) by mouth 2 (two) times a day before meals, Starting Tue 9/12/2023, Until Thu 11/75/0502, Normal      folic acid (FOLVITE) 1 mg tablet TAKE 1 TABLET BY MOUTH EVERY DAY, Starting Wed 8/2/2023, Normal      pantoprazole (PROTONIX) 40 mg tablet Take 1 tablet (40 mg total) by mouth daily before breakfast, Historical Med      tamsulosin (FLOMAX) 0.4 mg Take 1 capsule (0.4 mg total) by mouth daily after dinner, Starting Thu 6/29/2023, Normal             No discharge procedures on file.     PDMP Review       Value Time User    PDMP Reviewed  Yes 8/13/2023  7:29 AM Khoi De La Fuente MD          ED Provider  Electronically Signed by           Manuela Gaitan DO  09/26/23 5035

## 2023-09-25 NOTE — TELEPHONE ENCOUNTER
Rec'd VM from patient's spouse: "Pacov, this is Carline Rosario. I'm calling up for [facility] [id number]. I want you to get a message to Doctor Desiree that we'll be bringing him into emergency room today because he had another seizure during the night and Doctor Namita Motta recommended that he be brought in So just wanted to let you know that that's where he's going to be.  Thanks."

## 2023-09-25 NOTE — TELEPHONE ENCOUNTER
Call from Corin's spouse, Gene Cortez, and patient could be heard in the background during call. She reports that patient woke up this morning to seizure in his right arm. Tremor/seisure activity in the right arm was stronger today than his last episode on 9/14/23. She states that he has not missed any of his Keppra  BID or dexamethasone 4 mg BID doses. She also reports that he has increased weakness in his right leg and has been dragging it, but can still get around using the walker. She denies any other symptoms. Generosita Cortez informed that I will inform Dr Gina Martinez of above and call her back with further instructions. She verbalized understanding.

## 2023-09-25 NOTE — TELEPHONE ENCOUNTER
Patient and spouse called, instructed to go to ED for further evaluation per Dr Pankaj Kessler. They verbalized understanding.

## 2023-09-26 ENCOUNTER — TELEPHONE (OUTPATIENT)
Dept: HEMATOLOGY ONCOLOGY | Facility: CLINIC | Age: 73
End: 2023-09-26

## 2023-09-26 LAB
ATRIAL RATE: 82 BPM
P AXIS: 56 DEGREES
PR INTERVAL: 158 MS
QRS AXIS: 21 DEGREES
QRSD INTERVAL: 70 MS
QT INTERVAL: 340 MS
QTC INTERVAL: 397 MS
T WAVE AXIS: 61 DEGREES
VENTRICULAR RATE: 82 BPM

## 2023-09-26 PROCEDURE — 93010 ELECTROCARDIOGRAM REPORT: CPT | Performed by: INTERNAL MEDICINE

## 2023-09-26 NOTE — TELEPHONE ENCOUNTER
Spoke with patient's wife to inform her that patient does not need new labs drawn before treatment tomorrow since they were just done yesterday.  Pt's wife verbalized understanding
Clear

## 2023-09-27 ENCOUNTER — TELEPHONE (OUTPATIENT)
Dept: RADIATION ONCOLOGY | Facility: HOSPITAL | Age: 73
End: 2023-09-27

## 2023-09-27 NOTE — TELEPHONE ENCOUNTER
Spoke with Nana Rubinstein for update on patient, she states that he continues to have right sided weakness, she states he has had very little improvement on the dexamethasone 4 mg BID. He continues to use walker with ambulation. No focal seizures/tremors since his last episode Monday morning. 9/25/23 ER note reviewed, she has increased his Keppra to 1500 mg BID per ER instructions. He has neurology follow up on 9/29/23. No other neurologic symptoms/complaints. She states he has a frequent cough, especially at night, stating that he is sometimes is up all night. Cough during the day is occasional. He sleeps in bed with a couple pillows under his head. Patient had recent CT chest abdomen pelvis on 9/20/23 and has a follow up with Dr Koffi Ritter on 10/2/23. Nana Rubinstein states that they have seen the CT results on [facility]. Nana Rubinstein informed that I will notify Dr Misael Concepcion of patient's status and call back with further steroid instructions. She verbalized understanding of above.

## 2023-09-27 NOTE — ADDENDUM NOTE
Encounter addended by: Cele Gao, Pharmacist on: 9/27/2023 4:36 PM   Actions taken: Vu created or edited

## 2023-09-27 NOTE — TELEPHONE ENCOUNTER
Per Dr Saumya Mitchell informed that patient's MRI brain has been moved up to 10//2 at the [address] [place] [address] at 12:30 pm. Also instructed to increase dexamethasone to 4 mg 3x/day, dosage times reviewed with Morena Smith. Instructed patient should continue to take pantoprazole daily with steroids. Instructed to call or take patient to ED if any worsening of symptoms. She verbalized understanding of instructions given.

## 2023-09-28 ENCOUNTER — HOSPITAL ENCOUNTER (OUTPATIENT)
Dept: INFUSION CENTER | Facility: HOSPITAL | Age: 73
Discharge: HOME | End: 2023-09-28
Attending: INTERNAL MEDICINE
Payer: MEDICARE

## 2023-09-28 VITALS
SYSTOLIC BLOOD PRESSURE: 137 MMHG | HEIGHT: 69 IN | DIASTOLIC BLOOD PRESSURE: 88 MMHG | RESPIRATION RATE: 18 BRPM | BODY MASS INDEX: 31 KG/M2 | OXYGEN SATURATION: 96 % | TEMPERATURE: 96.9 F | HEART RATE: 75 BPM

## 2023-09-28 DIAGNOSIS — Z51.81 THERAPEUTIC DRUG MONITORING: ICD-10-CM

## 2023-09-28 DIAGNOSIS — C34.11 MALIGNANT NEOPLASM OF UPPER LOBE OF RIGHT LUNG (HCC): Primary | ICD-10-CM

## 2023-09-28 DIAGNOSIS — D70.1 CHEMOTHERAPY INDUCED NEUTROPENIA: ICD-10-CM

## 2023-09-28 DIAGNOSIS — R68.89 OTHER GENERAL SYMPTOMS AND SIGNS: ICD-10-CM

## 2023-09-28 DIAGNOSIS — T45.1X5A CHEMOTHERAPY INDUCED NEUTROPENIA: ICD-10-CM

## 2023-09-28 DIAGNOSIS — R91.8 MASS OF UPPER LOBE OF RIGHT LUNG: Primary | ICD-10-CM

## 2023-09-28 DIAGNOSIS — C34.11 MALIGNANT NEOPLASM OF UPPER LOBE OF RIGHT LUNG (HCC): ICD-10-CM

## 2023-09-28 DIAGNOSIS — C34.90 ADENOCARCINOMA OF LUNG, UNSPECIFIED LATERALITY (HCC): ICD-10-CM

## 2023-09-28 LAB
LEVETIRACETAM SERPL-MCNC: 21 UG/ML (ref 10–40)
T3FREE SERPL-MCNC: 2.65 PG/ML (ref 2.5–3.9)
TSH SERPL DL<=0.05 MIU/L-ACNC: 1.68 UIU/ML (ref 0.45–4.5)

## 2023-09-28 PROCEDURE — 84481 FREE ASSAY (FT-3): CPT | Performed by: INTERNAL MEDICINE

## 2023-09-28 PROCEDURE — 96361 HYDRATE IV INFUSION ADD-ON: CPT

## 2023-09-28 PROCEDURE — 96413 CHEMO IV INFUSION 1 HR: CPT

## 2023-09-28 PROCEDURE — 84443 ASSAY THYROID STIM HORMONE: CPT | Performed by: INTERNAL MEDICINE

## 2023-09-28 RX ORDER — SODIUM CHLORIDE 9 MG/ML
20 INJECTION, SOLUTION INTRAVENOUS ONCE
Status: COMPLETED | OUTPATIENT
Start: 2023-09-28 | End: 2023-09-28

## 2023-09-28 RX ADMIN — SODIUM CHLORIDE 1000 ML: 0.9 INJECTION, SOLUTION INTRAVENOUS at 13:28

## 2023-09-28 RX ADMIN — SODIUM CHLORIDE 200 MG: 9 INJECTION, SOLUTION INTRAVENOUS at 14:39

## 2023-09-28 RX ADMIN — SODIUM CHLORIDE 20 ML/HR: 0.9 INJECTION, SOLUTION INTRAVENOUS at 13:28

## 2023-09-28 NOTE — PROGRESS NOTES
Patient completed fluid bolus and chemotherapy infusion with no adverse reactions. PIV site removed, dressing CD&I. Declined AVS, left unit via WC accompanied by wife.

## 2023-09-29 ENCOUNTER — DOCUMENTATION (OUTPATIENT)
Dept: HEMATOLOGY ONCOLOGY | Facility: CLINIC | Age: 73
End: 2023-09-29

## 2023-09-29 ENCOUNTER — OFFICE VISIT (OUTPATIENT)
Dept: NEUROLOGY | Facility: CLINIC | Age: 73
End: 2023-09-29
Payer: MEDICARE

## 2023-09-29 VITALS
HEART RATE: 80 BPM | HEIGHT: 69 IN | OXYGEN SATURATION: 95 % | BODY MASS INDEX: 31.4 KG/M2 | TEMPERATURE: 97.2 F | SYSTOLIC BLOOD PRESSURE: 127 MMHG | RESPIRATION RATE: 16 BRPM | WEIGHT: 212 LBS | DIASTOLIC BLOOD PRESSURE: 74 MMHG

## 2023-09-29 DIAGNOSIS — R56.9 PARTIAL SEIZURE (HCC): Primary | ICD-10-CM

## 2023-09-29 DIAGNOSIS — C34.91 ADENOCARCINOMA OF RIGHT LUNG (HCC): ICD-10-CM

## 2023-09-29 DIAGNOSIS — C79.31 MALIGNANT NEOPLASM METASTATIC TO BRAIN (HCC): ICD-10-CM

## 2023-09-29 DIAGNOSIS — Z86.73 HISTORY OF STROKE: ICD-10-CM

## 2023-09-29 PROBLEM — S06.9X0D: Status: RESOLVED | Noted: 2022-07-28 | Resolved: 2023-09-29

## 2023-09-29 PROCEDURE — 99214 OFFICE O/P EST MOD 30 MIN: CPT | Performed by: PHYSICIAN ASSISTANT

## 2023-09-29 RX ORDER — LEVETIRACETAM 1000 MG/1
1500 TABLET ORAL 2 TIMES DAILY
Qty: 90 TABLET | Refills: 5 | Status: SHIPPED | OUTPATIENT
Start: 2023-09-29

## 2023-09-29 NOTE — PROGRESS NOTES
In-basket message received from Anjel Gordon, [address] [place] [address] to add patient to [facility] on 10/4/23. Chart reviewed and prep completed.

## 2023-09-29 NOTE — PROGRESS NOTES
Patient ID: Jose M Castellano is a 68 y.o. 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 Assessment:  Patient diagnosed with stage IV adenocarcinoma of the lung in August 2021, and then found to have a brain met in the left precentral gyrus in February 2023 when he presented with right upper extremity weakness. More recently in August 2023 he presented with a focal seizure involving right arm flexion and rhythmic jerking activity. There was no loss of awareness or altered consciousness during the seizure. He was started on levetiracetam 1000 mg twice daily. He has had 2 breakthrough seizures, one was due to missing medication for 24 hours after running out, but more recently on 9/25 he had a breakthrough seizure with full compliance. He was seen in the ED and levetiracetam increased to 1500 mg twice daily. He has not had any seizures since this time and is tolerating the medication well. 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 We reviewed epilepsy in detail today. He will need to continue antiseizure medication at this time and is fortunately tolerating levetiracetam well. We discussed that if he continues to have breakthrough seizures and we advance the levetiracetam to maximum dose, he would need a second AED added. I would like to obtain a routine EEG just as a baseline. He continues to follow with radiation oncology and medical oncology regarding his metastatic lung adenocarcinoma. His wife says that anytime they start tapering off the dexamethasone he goes downhill including increased weakness, ambulatory dysfunction, cognitive issues. Will defer steroid management to his oncology team, who has been managing this for him.   He is scheduled for a repeat brain MRI next week and then his case seems to be scheduled for discussion at [facility]. I offered to order physical therapy for the patient, but he declines both in the home or outpatient therapies. We discussed that this would likely help improve his strength and stamina overall. Patient/family can contact me or any of his other providers if he would like physical therapy. We reviewed seizure safety and first-aid in detail today. He is not currently driving, although technically with focal seizures without altered awareness he can drive, but he has other issues preventing him from driving at this time. Plan:  - Continue levetiracetam 1500 mg twice daily  - Routine EEG  - Continue to follow with oncology team as indicated for management of lung adenocarcinoma and brain metastasis. Will defer management of steroids to oncology team  - Call the office if there are any recurrent focal seizures, but if there is a convulsive seizure with loss of consciousness, advised he be seen in the ED for evaluation  - Follow-up in 2 to 3 months or sooner if needed       Diagnoses and all orders for this visit:    Partial seizure (HCC)  -     levETIRAcetam (KEPPRA) 1000 MG tablet; Take 1.5 tablets (1,500 mg total) by mouth 2 (two) times a day  -     EEG Routine and awake; Future    Brain metastasis  -     EEG Routine and awake; Future    Adenocarcinoma of right lung ([address])    History of stroke           Subjective:    SHAQUILLE Castellano is a 68 y.o. 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 Patient presented to the ED on 8/11/23 with seizure like activity. His wife woke up to him repeatedly moving his R arm and told her he could not stop it. This episode lasted approximately 3 minutes and broke spontaneously.  There was no loss of consciousness, tongue bite or incontinence. Patient denied any prior seizures and was not on any AEDs at the time (per chart review, previously on Keppra due to brain mets, but no prior documented seizure activity). BP on arrival 125/67. CT head revealed similar moderate vasogenic edema in left posterior frontal lobe with known underlying treated metastatic lesions, negative for acute intracranial abnormality. CTA head/neck negative for LVO, dissection, aneurysm or high-grade stenosis. It was noted that in Feb 2023 he presented to the hospital with right arm weakness, which is when the 1.2 cm brain met in the left pre-central gyrus was discovered. He was placed on Keppra at the time, but appears this was discontinued about a month later. During this admission, neurology reviewed recent brain MRI from 7/26/23 which demonstrated "treated metastasis in the posterior left frontal lobe is increased in size with increased edema. This could represent progression of disease versus radiation necrosis". Patient was started back on Keppra 1000mg BID. Radiation oncology started patient back on dexamethasone. No updated MRI or EEG completed. On chart review, since hospitalization, rad onc had him tapering off dexamethasone. He had also run out of Keppra x 1 day and reported a focal seizure with right arm shaking when he ran out (on 9/14) but was able to resume medication the next day. Wife then reported to his rad onc team that since coming off dexamethasone completely, he had “gone downhill”, was unable to ambulate, in bed. Rad onc advised restarting dexamethasone 4mg BID and advised ED if any significant changes. On 9/25/23 wife contacted rad onc and reported patient had a seizure than AM (right arm shaking as before) and had also been noticing dragging of the right leg. Denied missed doses of Keppra or dex. Directed to the ED.   He went to the ED on 9/25/23, CTH “Left frontal region vasogenic edema again seen not significantly changed from August 11, 2023. No acute intracranial abnormality.”  Per ED note, neurology was contacted (no consult or note from neurology), who recommended increasing Keppra to 1500mg BID. Rad onc ordered MRI brain with plan to discuss at [facility]. Steroids were continued. Patient is here today with his wife Dalia Doan and son Saint Puffer. Patient himself says that he is doing well. His wife says that while he is on dexamethasone he does very well, however once they begin to taper him off the steroid, he goes downhill very quickly and begins having trouble with his ambulation, mental status, becomes weaker. He is tolerating the increasing levetiracetam to 1500 mg twice daily well, without known side effects. He is a little bit more tired during the day, but nothing significant. No mood changes, feels mood is okay. We reviewed his seizures in detail today. 2 of the 3 focal seizures occurred out of sleep. This involved flexion of the right arm to his body and rhythmic shaking activity. One of the seizures occurred in the same fashion but in the afternoon. He is fully alert and aware during the seizure, says that he cannot stop the arm from shaking. He has not had any staring episodes, altered awareness, behavioral arrest, or periods of confusion. He has not had any loss of consciousness or full body convulsive activity. The seizure a couple weeks ago happened when he ran out of levetiracetam for just 24 hours. The one earlier this week on the 25th he had been fully compliant with the medication at the time. I asked about physical therapy and patient and family that he does not want any physical therapy, either in the home or outside of the home at this time. AED/side effects/compliance:  levetiracetam 1500mg BID      Event/Seizure semiology:  1. Right arm flexed to body with rhythmic jerking activity. No LOC, no altered awareness.       Special Features  Status epilepticus: No  Self Injury Seizures: No  Precipitating Factors: No     Epilepsy Risk Factors:  Abnormal pregnancy: No  Abnormal birth/: No  Abnormal Development: No  Febrile seizures, simple: No  Febrile seizures, complex: No  CNS infection: No  Cerebral palsy: No  Head injury (moderate/severe): No  CNS neoplasm: yes - L precentral gyrus brain met from lung adenocarcinoma   CNS malformation: No  Neurosurgical procedure: SRS brain met      Stroke: yes   Alcohol abuse: No  Drug abuse: No  Family history Sz/epilepsy: No     Prior AEDs:  levetiracetam      Prior workup:    2023 MRI brain w/wo contrast  1.2 cm avidly enhancing lesion in left precentral gyrus with marked surrounding perilesional vasogenic edema, concerning for brain metastasis. Questionable tiny cortical enhancing lesion in left posterior frontal lobe only seen on bravo sequences slightly obscured by mild motion artifact . This could represent tiny brain metastasis or cortical vessel. Attention on follow-up. 3/6/2023 MRI brain w/wo contrast  1.6 cm metastasis in the posterior left frontal lobe is not significantly changed in size but surrounding vasogenic edema is mildly increased. . No significant mass effect, shift or herniation. No new intracranial metastases    2023 MRI brain w/wo contrast  Decreased size of SRS treated posterior left frontal lobe cortical metastatic lesion with markedly improved perilesional edema. 2023 MRI brain w/wo contrast  Treated metastasis in the posterior left frontal lobe is increased in size with increased edema. This could represent progression of disease versus radiation necrosis. Recommend close interval follow-up.     EEG  None on file     Social History:  Lives alone: No  Driving: no     The following portions of the patient's history were reviewed and updated as appropriate: current medications, past family history, past medical history, past social history, past surgical history and problem list.       Objective:    Blood pressure 127/74, pulse 80, temperature (!) 97.2 °F (36.2 °C), temperature source Temporal, resp. rate 16, height 5' 9.02" (1.753 m), weight 96.2 kg (212 lb), SpO2 95 %. Physical Exam  Eyes:      Extraocular Movements: EOM normal.      Pupils: Pupils are equal, round, and reactive to light. Neurological:      Mental Status: He is alert. Cranial Nerves: Dysarthria present. Deep Tendon Reflexes: Reflexes are normal and symmetric. Psychiatric:         Mood and Affect: Mood normal.         Behavior: Behavior normal.         Neurological Exam  Mental Status  Alert. Orientation: Able to correctly state month, date. Said year was "2003". Able to name current [place] President, but needed cue for the last President. Recent and remote memory are intact. dysarthria present. Language is fluent with no aphasia. Attention and concentration: Able to repeat 3 digits forward. Could not repeat 3 digits backwards, game them to me forwards again . Cranial Nerves  CN II: Visual fields full to confrontation. CN III, IV, VI: Extraocular movements intact bilaterally. Pupils equal round and reactive to light bilaterally. CN V: Facial sensation is normal.  CN VII: Full and symmetric facial movement. CN VIII: Hearing is normal.  CN IX, X: Palate elevates symmetrically  CN XI: Shoulder shrug strength is normal.  CN XII: Tongue midline without atrophy or fasciculations. Motor   Normal muscle tone. Right fixation on barrel roll  Minimal RUE weakness compared to right, more with  strength, otherwise 5/5 elsewhere . Sensory  Light touch is normal in upper and lower extremities. Reflexes  Deep tendon reflexes are 2+ and symmetric in all four extremities. Coordination  Right: Finger-to-nose normal.Left: Finger-to-nose normal.    Gait    Not tested today, in a WC.        ROS:    Review of Systems   Constitutional: Negative for chills and fever. HENT: Negative for ear pain and sore throat.     Eyes: Negative for pain and visual disturbance. Respiratory: Positive for cough. Negative for shortness of breath. Cardiovascular: Negative for chest pain and palpitations. Gastrointestinal: Negative for abdominal pain and vomiting. Genitourinary: Negative for dysuria and hematuria. Musculoskeletal: Positive for gait problem (uses a walker). Negative for arthralgias and back pain. Skin: Negative for color change and rash. Neurological: Positive for seizures (9/25/23) and weakness (right side). Negative for syncope. Hematological: Bruises/bleeds easily. All other systems reviewed and are negative.     I personally reviewed and updated the ROS as appropriate

## 2023-09-29 NOTE — PATIENT INSTRUCTIONS
Continue levetiracetam 1500mg twice a day  Routine EEG  Continue to follow with oncology team and as indicated for management of lung adenocarcinoma and brain metastasis. Will defer management of steroids to oncology team  Call the office if there are any recurrent seizures  Follow up in 2-3 months or sooner if needed    [address]    What to Do If You Witness a Seizure:     Generalized convulsive (called a generalized tonic-clonic or grand mal seizure). During this seizure, the person may cry out, suddenly stiffen up, make jerking movements, and fall. The person may turn pale or blue from difficulty breathing. Actions:  Stay calm. Talk in a soothing voice and if possible keep onlookers away. Prevent injury. Move objects away that the person might hit while jerking uncontrollably. Time when the seizure starts and ends. Most seizures stop after only a few minutes. Turn him or her gently onto one side. This will help keep the airway clear. Never place anything in his/her mouth or give him/her anything by mouth during a seizure. -- Do not give the person water, pills, or food until fully alert. Loosen tight clothing or jewelry around his/her neck. Make the person as comfortable as possible. Place something soft under their head. Do not hold the person down. If the person having a seizure thrashes around there is no need for you to restrain them. They are more likely to be combative if restrained. Remember to consider your safety as well. Keep onlookers away. Be sensitive and supportive, and ask others to do the same. Stay with the person until he/she is fully alert. Complex partial seizure (confusional spells).  During this kind of seizure, the person may have a glassy stare; give no response or inappropriate responses when questioned; sit, stand, or walk about aimlessly; make lip smacking or chewing motions; fidget with clothes; appear to be drunk, drugged, or confused. Actions:  Make sure the person is safe and won’t harm themselves. Try to remove harmful objects from around the person (tools, utensils, glasses). Do NOT be aggressive or attempt to restrain the person. They are more likely to be combative if restrained. Remember to consider your safety as well. Help prevent the person from wandering, and direct the person to chair or safe position. Never place anything in his/her mouth or give him/her anything by mouth during a seizure. -- Do not give the person water, pills, or food until fully alert. Keep onlookers away. Be sensitive and supportive, and ask others to do the same. Stay with the person until he/she is fully alert. CALL 911 if:  A convulsive seizure lasts more than 5 minutes. The person turns blue during the seizure. The person does not start breathing after the seizure. Begin mouth to mouth resuscitation if this would occur. The person has one seizure right after the other without coming back to normal consciousness between the seizures. The person has not regained consciousness or is still confused after 30 minutes. You know the person does not have epilepsy. You know the person has diabetes or low blood sugar. The person is pregnant, ill, or injured. The seizure occurred in water, because the person may have inhaled water. The person requests an ambulance or medical help. Rescue medication  Your doctor may prescribe a rescue medication such as lorazepam (Ativan), diazepam (Valium / Diastat), or clonazepam (Klonopin) to terminate a seizure or if you have a history of cluster of seizures. Follow the instructions given by your doctor for these medications    Recognizing Common Seizures (examples)   Simple partial seizures: Isolated twitching, numbness, sweating, dizziness, nausea/vomiting, disturbances to hearing, vision, smell or taste. No loss of consciousness occurs, and the person remains aware of his/her environment. Complex partial seizures: Staring, motionless, picking at clothes, smacking lips, swallowing repeatedly or wandering around. The person is not aware of their surroundings and is not fully responsive. Atonic seizures: “Drop attacks” or sudden, rapid fall to ground with rapid recovery. Myoclonic seizures: Brief forceful jerks which can affect the whole body or just part of it. Absence seizures: May appear to be daydreaming or “spacing out.” The person is momentarily unresponsive and unaware of what is happening around him/her. Tonic seizures: Stiffening of part or of the entire body. Generalized Tonic-Clonic Seizures. “Grand-mal seizure.” Sudden loss of consciousness with body stiffening followed by continuous jerking movements. A blue tinge around the mouth is likely but lack of oxygen is rare. Loss of bladder and/or bowel control may occur. SEIZURE SAFETY     Don’t let fear of seizures keep you at home. Be smart about your activities to make sure you are safe. The guidelines below can help you be as safe as possible. Make sure the people around you are aware of: What happens when you have a seizure  Correct seizure first aid  First aid for choking (consider taking a basic life support class)  When they should know to call 911 or for medical help    Avoid common triggers of seizures:  Missing your medications  Not getting enough sleep  Drinking alcohol  Using illegal drugs    Safety measures for at home:  In the bathroom:   Do not take baths in the bathtub. Instead, take only showers. Try to have a family member available while you are in the shower. Make sure the drain in the bathtub/shower is working properly to avoid pooling of water. You can consider a fitted shower seat. Recessed soap trays can minimize injury if you would happen to fall in the shower. Bathroom doors can be hung to open outwards, so that the door can still be opened if you fall against the door.    Do not lock the bathroom door. Use an “Occupied” sign on the door or other signal to let others know you are in the bathroom. Safety locks can be obtained from the [employer]. On your water heater, set your water temperature to a warm temperature that is not scalding to avoid burns from very hot water. Put non-skid strips/ in the bathtub. Use an electric shaver. Avoid any electrical appliances (including electric shaver) in the bathroom or near water. Use shatterproof glass for mirrors. In the kitchen:             When possible, cook using a microwave. Only cook or use electrical appliances when someone else is in the house and available. As much as possible, grill food and avoid fryers or frying food. Use the back burners of the stove and turn the pot handles toward the back of the stove. Avoid carrying hot pans, pots of boiling water, or very hot food. Serve food or liquids directly from the stove. At the least, minimize the distance hot food is carried. Use precut foods or food processers to limit the need to use knives. Use plastic or durable cups, dishes, and containers instead of breakable glass items. In the bedroom  Low level and wide beds (like a futon) can reduce risk of injury of falling out of bed. If there is a high risk of falling out of bed, you can consider simply putting the mattress on the floor. Avoid sleeping on top bunks of bunk beds. Place a soft carpet on the floor. Around the house  Pad sharp corners of tables, chairs, etc. Round tables and furniture can be considered to avoid sharp corners. Avoid open flames. Place a screen in front of fireplaces and don’t build a fire alone. Allow for open spaces with furniture. Avoid loose throw rugs or slippery floors. Non-slip yony or cushioned yony can help reduce injury from a fall. Fireproof fabrics and furniture are best, and especially important if you smoke.   Doors and windows with safety glass are safer if someone falls against them. Avoid lights and heaters that could easily be knocked over. Use curling irons or clothing irons that have automatic shut off switches. Make sure motor driven equipment or lawn mowers have “dead man’s” handles or seats so they will turn off if you release pressure. Safety measures when away from home  [address] [facility] law mandates that you cannot drive for 6 months after your most recent seizure. [facility] law mandates that you cannot drive for 6 months after your most recent seizure. Work/Travel  Wear a medical alert bracelet or necklace at all times. Wear a helmet and use protective clothing/equipment when appropriate. Consider telling co-workers and travel companions that you have epilepsy and what to do if you have a seizure. Avoid irregular shifts or disruptions of a regular sleep pattern. Take your medications on time and keep an updated list of medications in your purse or wallet. Do not climb to heights or operate heavy machinery. Stand back from the edges of roads or bus/train platforms when traveling. If you wander when you have a seizure, take a friend along for the trip. Recreation  Swimming can be dangerous. Do not swim alone, in open water, or in murky water that you cannot see the bottom. Caregivers should be with you in the pool at all times and must be physically able to get you out of the water. Use a flotation device. Scuba diving is not recommended since during a seizure people are unaware of their surroundings. Having a seizure underwater can be deadly and can endanger the lives of others. Kayaking and canoeing can be especially dangerous  People with epilepsy are at a higher risk of becoming trapped underneath a canoe or kayak. Wear a helmet when playing contact sports, biking, or if there is a risk of falling. Patients with epilepsy are at a higher risk of head injury when playing contact sports.   Avoid riding a bike, running, or other activities around busy roads, steep hills, or secluded areas. Exercise on soft surfaces. [employer]: many people with epilepsy can go on rides depending on their type of seizures. For some people, stress or excitement can trigger a seizure. Rollercoasters (especially if you are upside-down) are more dangerous for people with epilepsy. Medications  Take your medications on time. If you have trouble remembering, set alarms on your phone. You can visit www. [employer] to set up reminders through text message to help you remember to take your medications. Use a pillbox to help you keep track of your medications. When out of the house, take any needed medications with you. Consider keeping one or two extra doses with you in case you are unexpectedly away from home longer than planned. If you realize you missed a dose of your medications and it is less than 2 hours until your next dose, skip the missed dose. Do not double up your medication dose. If it is more than 2 hours until your next dose, you can take the missed dose. Avoid drinking alcohol, since this can enhance effects of your seizure medications. Be aware of common and major side effects of your medications. Keep your medications out of the reach of children. Parenting:  Childproof your home as much as possible. It is possible that you could drop your baby if you have a seizure while holding or feeding them. If you are nursing a baby, sit on the floor or bed with your back supported so the baby will not fall far if you should lose consciousness. Feed the baby while he or she is seated in an infant seat. Dress, change, and sponge bathe the baby on the floor. Move the baby around in a stroller or small crib. Keep a young baby in a playpen when you are alone, and a toddler in an indoor play yard, or childproof one room and use safety javier at the doors.   When out of the house, use a bungee-type cord or restraint harness so your child cannot wander away if you have a seizure that affects your awareness.

## 2023-10-02 ENCOUNTER — OFFICE VISIT (OUTPATIENT)
Age: 73
End: 2023-10-02
Payer: MEDICARE

## 2023-10-02 ENCOUNTER — TELEPHONE (OUTPATIENT)
Age: 73
End: 2023-10-02

## 2023-10-02 ENCOUNTER — HOSPITAL ENCOUNTER (OUTPATIENT)
Dept: MRI IMAGING | Facility: HOSPITAL | Age: 73
Discharge: HOME | End: 2023-10-02
Attending: RADIOLOGY
Payer: MEDICARE

## 2023-10-02 VITALS
WEIGHT: 213 LBS | DIASTOLIC BLOOD PRESSURE: 72 MMHG | HEIGHT: 60 IN | TEMPERATURE: 98 F | BODY MASS INDEX: 41.82 KG/M2 | HEART RATE: 76 BPM | SYSTOLIC BLOOD PRESSURE: 147 MMHG | RESPIRATION RATE: 16 BRPM | OXYGEN SATURATION: 96 %

## 2023-10-02 DIAGNOSIS — C34.90 ADENOCARCINOMA OF LUNG, UNSPECIFIED LATERALITY (HCC): ICD-10-CM

## 2023-10-02 DIAGNOSIS — C34.11 MALIGNANT NEOPLASM OF UPPER LOBE OF RIGHT LUNG (HCC): Primary | ICD-10-CM

## 2023-10-02 DIAGNOSIS — R91.8 MASS OF UPPER LOBE OF RIGHT LUNG: ICD-10-CM

## 2023-10-02 DIAGNOSIS — T45.1X5A CHEMOTHERAPY INDUCED NEUTROPENIA: ICD-10-CM

## 2023-10-02 DIAGNOSIS — D70.1 CHEMOTHERAPY INDUCED NEUTROPENIA: ICD-10-CM

## 2023-10-02 DIAGNOSIS — C79.31 MALIGNANT NEOPLASM METASTATIC TO BRAIN (HCC): ICD-10-CM

## 2023-10-02 PROCEDURE — 99215 OFFICE O/P EST HI 40 MIN: CPT | Performed by: INTERNAL MEDICINE

## 2023-10-02 PROCEDURE — 70553 MRI BRAIN STEM W/O & W/DYE: CPT

## 2023-10-02 PROCEDURE — A9585 GADOBUTROL INJECTION: HCPCS | Performed by: RADIOLOGY

## 2023-10-02 RX ORDER — GADOBUTROL 604.72 MG/ML
9 INJECTION INTRAVENOUS
Status: COMPLETED | OUTPATIENT
Start: 2023-10-02 | End: 2023-10-02

## 2023-10-02 RX ORDER — SODIUM CHLORIDE 9 MG/ML
20 INJECTION, SOLUTION INTRAVENOUS ONCE
OUTPATIENT
Start: 2023-11-29

## 2023-10-02 RX ORDER — SODIUM CHLORIDE 9 MG/ML
20 INJECTION, SOLUTION INTRAVENOUS ONCE
OUTPATIENT
Start: 2023-11-08

## 2023-10-02 RX ORDER — SODIUM CHLORIDE 9 MG/ML
20 INJECTION, SOLUTION INTRAVENOUS ONCE
OUTPATIENT
Start: 2023-12-20

## 2023-10-02 RX ADMIN — GADOBUTROL 9 ML: 604.72 INJECTION INTRAVENOUS at 13:05

## 2023-10-03 ENCOUNTER — TELEPHONE (OUTPATIENT)
Dept: RADIATION ONCOLOGY | Facility: HOSPITAL | Age: 73
End: 2023-10-03

## 2023-10-03 NOTE — PROGRESS NOTES
HEMATOLOGY / [facility] FOLLOW UP NOTE    Primary Care Provider: Ernst Argueta MD  Referring Provider:    MRN: [id number]  : 1950    Reason for Encounter: follow up stage IV adeno of the lung       Oncology History Overview Note   2021 - Stage IV adenocarcinoma of the lung     10/2021 - 2022 - carbo/pem/pebro     3/2022 - 2022 - carbo/taxol/RT     2022 - maintenance pembro     2023 - solitary brain met - SBRT     Adenocarcinoma of lung   2021 Initial Diagnosis    Adenocarcinoma of lung     2021 Biopsy    Right lung apex, image-guided core needle biopsy:  - Adenocarcinoma      10/6/2021 - 2022 Chemotherapy    cyanocobalamin, 1,000 mcg, Intramuscular, Once, 3 of 3 cycles  Administration: 1,000 mcg (2021), 1,000 mcg (2022), 1,000 mcg (10/6/2021)  pegfilgrastim (Mardee Gaucher), 6 mg, Subcutaneous, Once, 1 of 1 cycle  Administration: 6 mg (2021)  pegfilgrastim (Stacy Thayer), 6 mg, Subcutaneous, Once, 6 of 6 cycles  Administration: 6 mg (10/6/2021), 6 mg (11/3/2021), 6 mg (2021), 6 mg (12/15/2021), 6 mg (2022)  fosaprepitant (EMEND) IVPB, 150 mg, Intravenous, Once, 6 of 6 cycles  Administration: 150 mg (10/6/2021), 150 mg (2021), 150 mg (12/15/2021), 150 mg (2022), 150 mg (11/3/2021), 150 mg (2022)  CARBOplatin (PARAPLATIN) IVPB (GOG AUC DOSING), 519.5 mg, Intravenous, Once, 6 of 6 cycles  Administration: 519.5 mg (10/6/2021), 574 mg (2021), 600 mg (12/15/2021), 533 mg (2022), 604.5 mg (11/3/2021), 563 mg (2022)  pemetrexed (ALIMTA) chemo infusion, 970 mg, Intravenous, Once, 6 of 6 cycles  Administration: 1,000 mg (10/6/2021), 1,000 mg (2021), 1,000 mg (12/15/2021), 1,000 mg (2022), 1,000 mg (11/3/2021), 1,000 mg (2022)  pembrolizumab (KEYTRUDA) IVPB, 200 mg, Intravenous, Once, 6 of 6 cycles  Administration: 200 mg (10/6/2021), 200 mg (2021), 200 mg (12/15/2021), 200 mg (2022), 200 mg (11/3/2021), 200 mg (1/5/2022)     3/7/2022 -  Chemotherapy    CARBOplatin (PARAPLATIN) IVPB (GOG AUC DOSING), , Intravenous, Once, 6 of 6 cycles  Administration: 211.6 mg (3/7/2022), 208 mg (3/14/2022), 238.8 mg (3/21/2022), 211.6 mg (3/28/2022), 215.2 mg (4/4/2022), 213.4 mg (4/18/2022)  PACLItaxel (TAXOL) chemo IVPB, 50 mg/m2 = 99 mg, Intravenous, Once, 6 of 6 cycles  Administration: 99 mg (3/7/2022), 99 mg (3/14/2022), 99 mg (3/21/2022), 99 mg (3/28/2022), 99 mg (4/4/2022), 99 mg (4/18/2022)  pembrolizumab (KEYTRUDA) IVPB, 200 mg, Intravenous, Once, 26 of 33 cycles  Administration: 200 mg (3/7/2022), 200 mg (3/28/2022), 200 mg (4/25/2022), 200 mg (5/16/2022), 200 mg (6/6/2022), 200 mg (6/27/2022), 200 mg (7/18/2022), 200 mg (8/8/2022), 200 mg (8/29/2022), 200 mg (9/19/2022), 200 mg (10/10/2022), 200 mg (10/31/2022), 200 mg (11/21/2022), 200 mg (12/12/2022), 200 mg (1/3/2023), 200 mg (1/23/2023), 200 mg (2/13/2023), 200 mg (3/27/2023), 200 mg (4/26/2023), 200 mg (5/17/2023), 200 mg (6/7/2023), 200 mg (6/28/2023), 200 mg (7/19/2023), 200 mg (8/9/2023), 200 mg (8/30/2023), 200 mg (9/28/2023)     2/22/2023 - 2/22/2023 Radiation    SRS left precentral gyrus   2000 cGy    Dr. Girish Montoya     Malignant neoplasm of upper lobe of right lung ([address])   9/3/2021 Initial Diagnosis    Malignant neoplasm of upper lobe of right lung ([address])     9/23/2021 -  Cancer Staged    Staging form: Lung, AJCC 8th Edition  - Clinical stage from 9/23/2021: Stage IIIC (cT3, cN3, cM0) - Signed by Елена Martell MD on 9/23/2021  Histopathologic type:  Adenocarcinoma, NOS       10/6/2021 - 1/26/2022 Chemotherapy    cyanocobalamin, 1,000 mcg, Intramuscular, Once, 3 of 3 cycles  Administration: 1,000 mcg (11/24/2021), 1,000 mcg (1/26/2022), 1,000 mcg (10/6/2021)  pegfilgrastim (Zuri Mccollum), 6 mg, Subcutaneous, Once, 1 of 1 cycle  Administration: 6 mg (11/26/2021)  pegfilgrastim (Jose E Abts), 6 mg, Subcutaneous, Once, 6 of 6 cycles  Administration: 6 mg (10/6/2021), 6 mg (11/3/2021), 6 mg (11/24/2021), 6 mg (12/15/2021), 6 mg (1/5/2022)  fosaprepitant (EMEND) IVPB, 150 mg, Intravenous, Once, 6 of 6 cycles  Administration: 150 mg (10/6/2021), 150 mg (11/24/2021), 150 mg (12/15/2021), 150 mg (1/26/2022), 150 mg (11/3/2021), 150 mg (1/5/2022)  CARBOplatin (PARAPLATIN) IVPB ([facility] AUC DOSING), 519.5 mg, Intravenous, Once, 6 of 6 cycles  Administration: 519.5 mg (10/6/2021), 574 mg (11/24/2021), 600 mg (12/15/2021), 533 mg (1/26/2022), 604.5 mg (11/3/2021), 563 mg (1/5/2022)  pemetrexed (ALIMTA) chemo infusion, 970 mg, Intravenous, Once, 6 of 6 cycles  Administration: 1,000 mg (10/6/2021), 1,000 mg (11/24/2021), 1,000 mg (12/15/2021), 1,000 mg (1/26/2022), 1,000 mg (11/3/2021), 1,000 mg (1/5/2022)  pembrolizumab (KEYTRUDA) IVPB, 200 mg, Intravenous, Once, 6 of 6 cycles  Administration: 200 mg (10/6/2021), 200 mg (11/24/2021), 200 mg (12/15/2021), 200 mg (1/26/2022), 200 mg (11/3/2021), 200 mg (1/5/2022)     3/4/2022 - 4/21/2022 Radiation    The patient saw @[facility]@ for radiation treatment.  This is the current list of radiation treatment:  Plan ID Energy Fractions Dose per Fraction (cGy) Dose Correction (cGy) Total Dose Delivered (cGy) Elapsed Days   R LUNGMED REV 6X 30 / 30 200 0 6,000 48      Treatment dates:  C1: 3/4/2022 - 4/21/2022         3/7/2022 -  Chemotherapy    CARBOplatin (PARAPLATIN) IVPB (GOG AUC DOSING), , Intravenous, Once, 6 of 6 cycles  Administration: 211.6 mg (3/7/2022), 208 mg (3/14/2022), 238.8 mg (3/21/2022), 211.6 mg (3/28/2022), 215.2 mg (4/4/2022), 213.4 mg (4/18/2022)  PACLItaxel (TAXOL) chemo IVPB, 50 mg/m2 = 99 mg, Intravenous, Once, 6 of 6 cycles  Administration: 99 mg (3/7/2022), 99 mg (3/14/2022), 99 mg (3/21/2022), 99 mg (3/28/2022), 99 mg (4/4/2022), 99 mg (4/18/2022)  pembrolizumab (KEYTRUDA) IVPB, 200 mg, Intravenous, Once, 26 of 33 cycles  Administration: 200 mg (3/7/2022), 200 mg (3/28/2022), 200 mg (4/25/2022), 200 mg (5/16/2022), 200 mg (6/6/2022), 200 mg (6/27/2022), 200 mg (7/18/2022), 200 mg (8/8/2022), 200 mg (8/29/2022), 200 mg (9/19/2022), 200 mg (10/10/2022), 200 mg (10/31/2022), 200 mg (11/21/2022), 200 mg (12/12/2022), 200 mg (1/3/2023), 200 mg (1/23/2023), 200 mg (2/13/2023), 200 mg (3/27/2023), 200 mg (4/26/2023), 200 mg (5/17/2023), 200 mg (6/7/2023), 200 mg (6/28/2023), 200 mg (7/19/2023), 200 mg (8/9/2023), 200 mg (8/30/2023), 200 mg (9/28/2023)     2/22/2023 - 2/22/2023 Radiation    SRS left precentral gyrus   2000 cGy    Dr. Darwin Villalobos     Brain metastasis   2/3/2023 Initial Diagnosis    Brain metastasis     2/22/2023 - 2/22/2023 Radiation    SRS left precentral gyrus   2000 cGy    Dr. Darwin Villalobos         Interval History: Patient presents for follow up of his stage IV adenocarcinoma of the lung. He was in the ER again after attempting a dexamethasone wean. He had been off of it for about a week when he started to get a worsening right arm tremor and severe weakness. He has not had any further seizure activity and remains on Keppra. He had a CT scan of the head in the ER that shows continuing left frontal vasogenic edema. He had an MRI of the brain today but that is not read yet. He is feeling much better now that he is back on dexamethasone 4 mg 3 times daily. He also had a CT of the chest abdomen pelvis done on September 20. There was some concern for an increasing nodularity over the lateral right pleura. There is also a right supraclavicular lymph node measuring 2.8 cm. It is not causing him any discomfort. He has not had any further falls since he has been home. He does have an intermittent dry cough. There was no evidence of pneumonitis on the CT. No diarrhea.          REVIEW OF SYSTEMS:  Please note that a 14-point review of systems was performed to include Constitutional, HEENT, Respiratory, CVS, GI, , Musculoskeletal, Integumentary, Neurologic, Rheumatologic, Endocrinologic, Psychiatric, Lymphatic, and Hematologic/Oncologic systems were reviewed and are negative unless otherwise stated in HPI. Positive and negative findings pertinent to this evaluation are incorporated into the history of present illness. ECOG PS: 2    PROBLEM LIST:  Patient Active Problem List   Diagnosis   • History of stroke   • Bilateral lower extremity DVTs   • Dysphagia   • Mass of upper lobe of right lung   • History of urinary retention   • Constipation   • Anemia of chronic disease   • Pulmonary embolism (HCC)   • Impaired cognition   • Adenocarcinoma of lung   • Acute on chronic anemia   • Malignant neoplasm of upper lobe of right lung (HCC)   • COPD mixed type (HCC)   • Chemotherapy induced neutropenia    • Hypercalcemia of malignancy   • Chronic anticoagulation   • Chronic right-sided heart failure (HCC)   • Recurrent right pleural effusion   • Restrictive lung disease   • Nocturnal hypoxemia   • Tobacco use disorder, severe, in sustained remission, dependence   • Brain metastasis   • History of venous thromboembolism   • Hyperlipidemia   • Vasogenic edema (HCC)   • Stage 3a chronic kidney disease (HCC)   • Partial seizure (HCC)   • Iron deficiency anemia       Assessment / Plan: 78-year-old male with metastatic adenocarcinoma of the lung. His case is being presented at our neurology tumor board in 2 days. By that time we will be able to review the MRI results of the brain. I think at this point he is going to need to stay on some kind of steroids long-term. If he can wean back to 4 mg a day I would be happy with that. It seems to be a problem when we try to stop it completely and he ends up back in the hospital.  The supraclavicular lymph node may or may not be new on the CT scan of the chest abdomen pelvis. We have never scanned that high before so I am not sure if this is new or not. There is a slight increased nodularity in the pleura. I personally reviewed the scans with the patient and his wife.   Right now I am not can to change his systemic therapy and we will continue with Keytruda. I think a lot of how he will do depends on what is going on in his brain. If I do need to change his systemic therapy we may need to think about chemotherapy. As his performance status has continued to worsen I do not know that he will necessarily be a candidate for the safe administration of chemotherapy. I did bring up starting to think more along the lines of palliative or comfort care. We will continue to revisit these conversations as time goes on. I will see him back in 2 months with another CT of the neck and chest to monitor his systemic disease closely for progression. I spent 40 minutes on chart review, face to face counseling time, coordination of care and documentation. Past Medical History:   has a past medical history of Acute respiratory failure with hypoxia ([address]) (7/31/2021), Chronic respiratory failure with hypoxia ([address]) (8/9/2021), Impaired mobility and ADLs (8/13/2021), Lung cancer ([address]), and Stroke ([address] PAST SURGICAL HISTORY:   has a past surgical history that includes IR biopsy lung (8/5/2021); IR thoracentesis (2/24/2022); and IR thoracentesis (8/1/2022). CURRENT MEDICATIONS  Current Outpatient Medications   Medication Sig Dispense Refill   • acetaminophen (TYLENOL) 325 mg tablet Take 2 tablets (650 mg total) by mouth 4 (four) times a day as needed for mild pain, headaches or fever  0   • albuterol (2.5 mg/3 mL) 0.083 % nebulizer solution Take 3 mL (2.5 mg total) by nebulization every 6 (six) hours as needed for wheezing or shortness of breath 3 mL 2   • albuterol (ProAir HFA) 90 mcg/act inhaler Inhale 2 puffs every 6 (six) hours as needed for wheezing or shortness of breath 8 g 0   • atorvastatin (LIPITOR) 40 mg tablet TAKE 1 TABLET BY MOUTH DAILY WITH DINNER 90 tablet 0   • dexamethasone (DECADRON) 4 mg tablet Take 1 tablet (4 mg total) by mouth 2 (two) times a day with meals Taper as directed.  60 tablet 0   • enoxaparin (LOVENOX) 100 mg/mL INJECT 0.9 ML (90 MG TOTAL) UNDER THE SKIN EVERY 12 (TWELVE) HOURS 60 mL 5   • ferrous sulfate 324 (65 Fe) mg Take 1 tablet (324 mg total) by mouth 2 (two) times a day before meals 60 tablet 0   • folic acid (FOLVITE) 1 mg tablet TAKE 1 TABLET BY MOUTH EVERY DAY 90 tablet 0   • levETIRAcetam (KEPPRA) 1000 MG tablet Take 1.5 tablets (1,500 mg total) by mouth 2 (two) times a day 90 tablet 5   • pantoprazole (PROTONIX) 40 mg tablet Take 1 tablet (40 mg total) by mouth daily before breakfast 30 tablet 1   • tamsulosin (FLOMAX) 0.4 mg Take 1 capsule (0.4 mg total) by mouth daily after dinner 90 capsule 1     No current facility-administered medications for this visit. [unfilled]    SOCIAL HISTORY:   reports that he quit smoking about 19 years ago. His smoking use included cigarettes. He started smoking about 58 years ago. He has a 117.00 pack-year smoking history. He has never used smokeless tobacco. He reports that he does not currently use alcohol. He reports that he does not use drugs. FAMILY HISTORY:  family history includes Lung cancer in his brother; Prostate cancer in his brother. ALLERGIES:  is allergic to doxycycline. Physical Exam:  Vital Signs:   Visit Vitals  /72 (BP Location: Left arm, Patient Position: Sitting, Cuff Size: Adult)   Pulse 76   Temp 98 °F (36.7 °C)   Resp 16   Ht 5' (1.524 m)   Wt 96.6 kg (213 lb)   SpO2 96%   BMI 41.60 kg/m²   Smoking Status Former   BSA 1.92 m²     Body mass index is 41.6 kg/m². Body surface area is 1.92 meters squared. GEN: Alert, awake oriented x3, in no acute distress  HEENT- No pallor, icterus, cyanosis, no oral mucosal lesions,   LAD - no palpable cervical, clavicle, axillary, inguinal LAD  Heart- normal S1 S2, regular rate and rhythm, No murmur, rubs.    Lungs- clear breathing sound bilateral.   Abdomen- soft, Non tender, bowel sounds present  Extremities- No cyanosis, clubbing, edema  Neuro- No focal neurological deficit    Labs:  Lab Results   Component Value Date    WBC 7.85 09/25/2023    HGB 11.4 (L) 09/25/2023    HCT 38.3 09/25/2023    MCV 91 09/25/2023     09/25/2023     Lab Results   Component Value Date    SODIUM 140 09/25/2023    K 3.9 09/25/2023     09/25/2023    CO2 31 09/25/2023    AGAP 5 09/25/2023    BUN 15 09/25/2023    CREATININE 0.82 09/25/2023    GLUC 114 09/25/2023    GLUF 109 (H) 09/08/2023    CALCIUM 9.8 09/25/2023    AST 10 (L) 09/25/2023    ALT 15 09/25/2023    ALKPHOS 62 09/25/2023    TP 6.4 09/25/2023    TBILI 0.31 09/25/2023    EGFR 88 09/25/2023

## 2023-10-03 NOTE — TELEPHONE ENCOUNTER
Dalia Daon reports that patient has had sight improvement with right sided weakness since increasing dexamethasone 4 mg from BID to TID last week (inceased on 9/27). No new RUE focal seizures. He is using walker at home with ambulation and wheelchair for longer distances. She reports that his overall core strength is decreased. No other neurologic changes, no worsening of symptoms reported. Dalia Doan states that he continues to have a frequent, constant cough at night and more recently has become more frequent during the daytime. Patient was seen by Dr Lady Conner yesterday. Dalia Doan states she spoke with Dr Lady Conner about the current steroid use and his frequent cough. Dalia Doan is aware that Corin's MRI brain imaging will be reviewed at Neuro Oncology Case Review tomorrow and that Dr Frank Sears will be updated regarding above. She verbalized understanding.

## 2023-10-04 ENCOUNTER — OFFICE VISIT (OUTPATIENT)
Age: 73
End: 2023-10-04
Payer: MEDICARE

## 2023-10-04 ENCOUNTER — TELEPHONE (OUTPATIENT)
Age: 73
End: 2023-10-04

## 2023-10-04 ENCOUNTER — DOCUMENTATION (OUTPATIENT)
Dept: RADIATION ONCOLOGY | Facility: HOSPITAL | Age: 73
End: 2023-10-04

## 2023-10-04 VITALS
DIASTOLIC BLOOD PRESSURE: 86 MMHG | HEART RATE: 90 BPM | OXYGEN SATURATION: 97 % | SYSTOLIC BLOOD PRESSURE: 130 MMHG | TEMPERATURE: 97.2 F | RESPIRATION RATE: 16 BRPM

## 2023-10-04 DIAGNOSIS — E66.01 OBESITY, MORBID (HCC): Primary | ICD-10-CM

## 2023-10-04 DIAGNOSIS — C79.31 MALIGNANT NEOPLASM METASTATIC TO BRAIN (HCC): ICD-10-CM

## 2023-10-04 PROCEDURE — 99214 OFFICE O/P EST MOD 30 MIN: CPT | Performed by: INTERNAL MEDICINE

## 2023-10-04 RX ORDER — DEXAMETHASONE 4 MG/1
4 TABLET ORAL 2 TIMES DAILY WITH MEALS
Qty: 180 TABLET | Refills: 1 | Status: SHIPPED | OUTPATIENT
Start: 2023-10-04

## 2023-10-04 NOTE — TELEPHONE ENCOUNTER
----- Message from Liv Hoyt DO sent at 10/4/2023  7:53 AM EDT -----  Can we get this patient back into my next 20-minute opening in [place]? 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 Thank you.

## 2023-10-04 NOTE — TELEPHONE ENCOUNTER
Title: Medication Timeout    Date patient scheduled: 10/18/23    Avastin 7.5mg/kg added to plan. Office RN notified patient? ? Yes, patient in the office. Is the patient scheduled within 24 hours?? No.     Office RN to route to INF  pool. Infusion  pool routes to [facility]. Infusion tech to receive message, confirm scheduled treatment duration matches ordered treatment duration or adjust accordingly, and re-link appointment request orders. Infusion tech to notify pharmacy and finance.

## 2023-10-04 NOTE — PROGRESS NOTES
NEURO ONCOLOGY CASE REVIEW     DATE: 10/4/2023  PRESENTING DOCTOR/RN: Chet Bueno RN (presented for Dr Maximo Lopez)    DIAGNOSIS: Stage IV adenocarcinoma of the lung; Brain metastasis           Murali Beltran is a 68 y.o. male who was presented at Neuro Oncology Case Review today. History of adenocarcinoma NSCLC of right upper lobe diagnosed in August 2021. He completed chemotherapy for cytoreduction followed by concurrent chemoRT, completed 4/21/2022. He continued with systemic immunotherapy with Keytruda. He is followed by Dr Marcello Herrera. 2/3/2023 Admitted with progressive right hand weakness and handgrip difficulty. Brain imaging revealed 1.2 cm metastatic lesion in the left precentral gyrus with marked surrounding edema. He completed SRS to this lesion on 2/22/23. Patient was presented today to review his recent MRI brain findings/discuss treatment plan. PHYSICIAN RECOMMENDED PLAN:   - Consider Avastin   - No neurosurgical intervention/OLEGARIO  - No radiation therapy at this time    10/4/23 Hematology oncology follow up, Dr Stephany Parada    Imaging Reviewed:   10/2/23 MRI brain BT: Metastatic adenocarcinoma of the lung status post chemotherapy. Left posterior frontal lobe intracranial metastases (status post SBRT 2/22/2023) increased in size compared to 7/26/2023 with progression of surrounding vasogenic edema. There is no new   suspicious intra-axial lesion. 7/26/23 MRI brain  4/21/23 MRI brain   2/16/23 MRI brain    Team agreed to plan. NCCN guidelines were readily available for review at this discussion. The final treatment plan will be left at the discretion of the patient and the treating physician. DISCLAIMERS:  TO THE TREATING PHYSICIAN:  This conference is a meeting of clinicians from various specialty areas who evaluate and discuss patients for whom a multidisciplinary treatment approach is being considered.  Please note that the above opinion was a consensus of the conference attendees and is intended only to assist in quality care of the discussed patient. The responsibility for follow up on the input given during the conference, along with any final decisions regarding plan of care, is that of the patient and the patient's provider. Accordingly, appointments have only been recommended based on this information; and have NOT been scheduled unless otherwise noted. TO THE PATIENT:  This summary is a brief record of major aspects of your cancer treatment. You may choose to can share a your copy with any of your doctors or nurses. However, this is not a detailed or comprehensive record of your care.

## 2023-10-06 ENCOUNTER — TELEPHONE (OUTPATIENT)
Age: 73
End: 2023-10-06

## 2023-10-06 NOTE — TELEPHONE ENCOUNTER
Received email from Rob at [employer]:    "[address] and Suman Nelson,    We received a test request through [facility] for Dr. Garcia  patient Ihsan Dias ( 52) and need to request patient Progress Notes. The specimen was collected on 21, and whenever we get a request on a specimen collected over 24 months ago, we request Progress Notes, as it gives our Pathologists a better understanding of the patient's disease state and insures we report out the correct biomarkers. Can you please send them to Customer Support or Reagan Sainz when you get an opportunity. Since this is my last day with [employer], my computer access will be shut off at some point and I want to make sure these get added to the case. It's been a pleasure working with you. Thank you and I hope you both are having a good week.     Rob"

## 2023-10-07 NOTE — PROGRESS NOTES
HEMATOLOGY / [facility] FOLLOW UP NOTE    Primary Care Provider: Sara Oliveira MD  Referring Provider:    MRN: [id number]  : 1950    Reason for Encounter: follow up stage IV NSCLC       Oncology History Overview Note   2021 - Stage IV adenocarcinoma of the lung     10/2021 - 2022 - carbo/pem/pebro     3/2022 - 2022 - carbo/taxol/RT     2022 - maintenance pembro     2023 - solitary brain met - SBRT     Adenocarcinoma of lung   2021 Initial Diagnosis    Adenocarcinoma of lung     2021 Biopsy    Right lung apex, image-guided core needle biopsy:  - Adenocarcinoma      10/6/2021 - 2022 Chemotherapy    cyanocobalamin, 1,000 mcg, Intramuscular, Once, 3 of 3 cycles  Administration: 1,000 mcg (2021), 1,000 mcg (2022), 1,000 mcg (10/6/2021)  pegfilgrastim (Umm Radha), 6 mg, Subcutaneous, Once, 1 of 1 cycle  Administration: 6 mg (2021)  pegfilgrastim (Fredda Hunting), 6 mg, Subcutaneous, Once, 6 of 6 cycles  Administration: 6 mg (10/6/2021), 6 mg (11/3/2021), 6 mg (2021), 6 mg (12/15/2021), 6 mg (2022)  fosaprepitant (EMEND) IVPB, 150 mg, Intravenous, Once, 6 of 6 cycles  Administration: 150 mg (10/6/2021), 150 mg (2021), 150 mg (12/15/2021), 150 mg (2022), 150 mg (11/3/2021), 150 mg (2022)  CARBOplatin (PARAPLATIN) IVPB (GOG AUC DOSING), 519.5 mg, Intravenous, Once, 6 of 6 cycles  Administration: 519.5 mg (10/6/2021), 574 mg (2021), 600 mg (12/15/2021), 533 mg (2022), 604.5 mg (11/3/2021), 563 mg (2022)  pemetrexed (ALIMTA) chemo infusion, 970 mg, Intravenous, Once, 6 of 6 cycles  Administration: 1,000 mg (10/6/2021), 1,000 mg (2021), 1,000 mg (12/15/2021), 1,000 mg (2022), 1,000 mg (11/3/2021), 1,000 mg (2022)  pembrolizumab (KEYTRUDA) IVPB, 200 mg, Intravenous, Once, 6 of 6 cycles  Administration: 200 mg (10/6/2021), 200 mg (2021), 200 mg (12/15/2021), 200 mg (2022), 200 mg (11/3/2021), 200 mg (2022) 3/7/2022 -  Chemotherapy    CARBOplatin (PARAPLATIN) IVPB (GOG AUC DOSING), , Intravenous, Once, 6 of 6 cycles  Administration: 211.6 mg (3/7/2022), 208 mg (3/14/2022), 238.8 mg (3/21/2022), 211.6 mg (3/28/2022), 215.2 mg (4/4/2022), 213.4 mg (4/18/2022)  bevacizumab (AVASTIN) IVPB, 7.5 mg/kg = 622.5 mg (100 % of original dose 7.5 mg/kg), Intravenous, Once, 0 of 4 cycles  Dose modification: 7.5 mg/kg (original dose 7.5 mg/kg, Cycle 31)  PACLItaxel (TAXOL) chemo IVPB, 50 mg/m2 = 99 mg, Intravenous, Once, 6 of 6 cycles  Administration: 99 mg (3/7/2022), 99 mg (3/14/2022), 99 mg (3/21/2022), 99 mg (3/28/2022), 99 mg (4/4/2022), 99 mg (4/18/2022)  pembrolizumab (KEYTRUDA) IVPB, 200 mg, Intravenous, Once, 26 of 33 cycles  Administration: 200 mg (3/7/2022), 200 mg (3/28/2022), 200 mg (4/25/2022), 200 mg (5/16/2022), 200 mg (6/6/2022), 200 mg (6/27/2022), 200 mg (7/18/2022), 200 mg (8/8/2022), 200 mg (8/29/2022), 200 mg (9/19/2022), 200 mg (10/10/2022), 200 mg (10/31/2022), 200 mg (11/21/2022), 200 mg (12/12/2022), 200 mg (1/3/2023), 200 mg (1/23/2023), 200 mg (2/13/2023), 200 mg (3/27/2023), 200 mg (4/26/2023), 200 mg (5/17/2023), 200 mg (6/7/2023), 200 mg (6/28/2023), 200 mg (7/19/2023), 200 mg (8/9/2023), 200 mg (8/30/2023), 200 mg (9/28/2023)     2/22/2023 - 2/22/2023 Radiation    SRS left precentral gyrus   2000 cGy    Dr. Kali Williamson     Malignant neoplasm of upper lobe of right lung ([address])   9/3/2021 Initial Diagnosis    Malignant neoplasm of upper lobe of right lung ([address])     9/23/2021 -  Cancer Staged    Staging form: Lung, AJCC 8th Edition  - Clinical stage from 9/23/2021: Stage IIIC (cT3, cN3, cM0) - Signed by Hilliard Dubin, MD on 9/23/2021  Histopathologic type:  Adenocarcinoma, NOS       10/6/2021 - 1/26/2022 Chemotherapy    cyanocobalamin, 1,000 mcg, Intramuscular, Once, 3 of 3 cycles  Administration: 1,000 mcg (11/24/2021), 1,000 mcg (1/26/2022), 1,000 mcg (10/6/2021)  pegfilgrastim (NEULASTA), 6 mg, Subcutaneous, Once, 1 of 1 cycle  Administration: 6 mg (11/26/2021)  pegfilgrastim (Chioma Bora), 6 mg, Subcutaneous, Once, 6 of 6 cycles  Administration: 6 mg (10/6/2021), 6 mg (11/3/2021), 6 mg (11/24/2021), 6 mg (12/15/2021), 6 mg (1/5/2022)  fosaprepitant (EMEND) IVPB, 150 mg, Intravenous, Once, 6 of 6 cycles  Administration: 150 mg (10/6/2021), 150 mg (11/24/2021), 150 mg (12/15/2021), 150 mg (1/26/2022), 150 mg (11/3/2021), 150 mg (1/5/2022)  CARBOplatin (PARAPLATIN) IVPB ([facility] AUC DOSING), 519.5 mg, Intravenous, Once, 6 of 6 cycles  Administration: 519.5 mg (10/6/2021), 574 mg (11/24/2021), 600 mg (12/15/2021), 533 mg (1/26/2022), 604.5 mg (11/3/2021), 563 mg (1/5/2022)  pemetrexed (ALIMTA) chemo infusion, 970 mg, Intravenous, Once, 6 of 6 cycles  Administration: 1,000 mg (10/6/2021), 1,000 mg (11/24/2021), 1,000 mg (12/15/2021), 1,000 mg (1/26/2022), 1,000 mg (11/3/2021), 1,000 mg (1/5/2022)  pembrolizumab (KEYTRUDA) IVPB, 200 mg, Intravenous, Once, 6 of 6 cycles  Administration: 200 mg (10/6/2021), 200 mg (11/24/2021), 200 mg (12/15/2021), 200 mg (1/26/2022), 200 mg (11/3/2021), 200 mg (1/5/2022)     3/4/2022 - 4/21/2022 Radiation    The patient saw [unfilled] for radiation treatment.  This is the current list of radiation treatment:  Plan ID Energy Fractions Dose per Fraction (cGy) Dose Correction (cGy) Total Dose Delivered (cGy) Elapsed Days   R LUNGMED REV 6X 30 / 30 200 0 6,000 48      Treatment dates:  C1: 3/4/2022 - 4/21/2022         3/7/2022 -  Chemotherapy    CARBOplatin (PARAPLATIN) IVPB (GOG AUC DOSING), , Intravenous, Once, 6 of 6 cycles  Administration: 211.6 mg (3/7/2022), 208 mg (3/14/2022), 238.8 mg (3/21/2022), 211.6 mg (3/28/2022), 215.2 mg (4/4/2022), 213.4 mg (4/18/2022)  bevacizumab (AVASTIN) IVPB, 7.5 mg/kg = 622.5 mg (100 % of original dose 7.5 mg/kg), Intravenous, Once, 0 of 4 cycles  Dose modification: 7.5 mg/kg (original dose 7.5 mg/kg, Cycle 31)  PACLItaxel (TAXOL) chemo IVPB, 50 mg/m2 = 99 mg, Intravenous, Once, 6 of 6 cycles  Administration: 99 mg (3/7/2022), 99 mg (3/14/2022), 99 mg (3/21/2022), 99 mg (3/28/2022), 99 mg (4/4/2022), 99 mg (4/18/2022)  pembrolizumab (KEYTRUDA) IVPB, 200 mg, Intravenous, Once, 26 of 33 cycles  Administration: 200 mg (3/7/2022), 200 mg (3/28/2022), 200 mg (4/25/2022), 200 mg (5/16/2022), 200 mg (6/6/2022), 200 mg (6/27/2022), 200 mg (7/18/2022), 200 mg (8/8/2022), 200 mg (8/29/2022), 200 mg (9/19/2022), 200 mg (10/10/2022), 200 mg (10/31/2022), 200 mg (11/21/2022), 200 mg (12/12/2022), 200 mg (1/3/2023), 200 mg (1/23/2023), 200 mg (2/13/2023), 200 mg (3/27/2023), 200 mg (4/26/2023), 200 mg (5/17/2023), 200 mg (6/7/2023), 200 mg (6/28/2023), 200 mg (7/19/2023), 200 mg (8/9/2023), 200 mg (8/30/2023), 200 mg (9/28/2023)     2/22/2023 - 2/22/2023 Radiation    SRS left precentral gyrus   2000 cGy    Dr. Mariah Summers     Brain metastasis   2/3/2023 Initial Diagnosis    Brain metastasis     2/22/2023 - 2/22/2023 Radiation    SRS left precentral gyrus   2000 cGy    Dr. Mariah Summers         Interval History: Patient presents for follow up of his stage IV non-small cell lung cancer. We discussed his case at our multidisciplinary neuro-oncology tumor board this morning. 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 It has caused him to be unable to fully wean his dexamethasone. He is on Lovenox for his history of DVT. He is not having any problems with bleeding on it. He has no other new problems since our visit 2 days ago. REVIEW OF SYSTEMS:  Please note that a 14-point review of systems was performed to include Constitutional, HEENT, Respiratory, CVS, GI, , Musculoskeletal, Integumentary, Neurologic, Rheumatologic, Endocrinologic, Psychiatric, Lymphatic, and Hematologic/Oncologic systems were reviewed and are negative unless otherwise stated in HPI.  Positive and negative findings pertinent to this evaluation are incorporated into the history of present illness. ECOG PS: 2    PROBLEM LIST:  Patient Active Problem List   Diagnosis   • History of stroke   • Bilateral lower extremity DVTs   • Dysphagia   • Mass of upper lobe of right lung   • History of urinary retention   • Constipation   • Anemia of chronic disease   • Pulmonary embolism (HCC)   • Impaired cognition   • Adenocarcinoma of lung   • Acute on chronic anemia   • Malignant neoplasm of upper lobe of right lung (HCC)   • COPD mixed type (HCC)   • Chemotherapy induced neutropenia    • Hypercalcemia of malignancy   • Chronic anticoagulation   • Chronic right-sided heart failure (HCC)   • Recurrent right pleural effusion   • Restrictive lung disease   • Nocturnal hypoxemia   • Tobacco use disorder, severe, in sustained remission, dependence   • Brain metastasis   • History of venous thromboembolism   • Hyperlipidemia   • Vasogenic edema (HCC)   • Stage 3a chronic kidney disease (HCC)   • Partial seizure (HCC)   • Iron deficiency anemia   • Obesity, morbid (HCC)       Assessment / Plan: 46-year-old male with stage IV non-small cell lung cancer with metastasis to the brain. 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 I would dose him 7.5 mg every 3 weeks so it could coincide with his pembrolizumab. There is no contraindication to giving both medications together. He has a treated DVT. We did discuss the risks and benefits of Avastin and consent was signed. We will need to watch closely for thrombosis or bleeding and we will need a urinalysis to do follow him for proteinuria. He does not have any excessive hypertension today. I will plan on seeing the patient before his second dose of Avastin. There was also a mistake in scheduling his CT scan. It was scheduled for next week and should be for 2 months from now. We will correct that mistake. He knows to call in the interim with any questions or concerns.        I spent 30 minutes on chart review, face to face counseling time, coordination of care and documentation. Past Medical History:   has a past medical history of Acute respiratory failure with hypoxia ([address]) (7/31/2021), Chronic respiratory failure with hypoxia ([address]) (8/9/2021), Impaired mobility and ADLs (8/13/2021), Lung cancer ([address]), and Stroke ([address] PAST SURGICAL HISTORY:   has a past surgical history that includes IR biopsy lung (8/5/2021); IR thoracentesis (2/24/2022); and IR thoracentesis (8/1/2022). CURRENT MEDICATIONS  Current Outpatient Medications   Medication Sig Dispense Refill   • acetaminophen (TYLENOL) 325 mg tablet Take 2 tablets (650 mg total) by mouth 4 (four) times a day as needed for mild pain, headaches or fever  0   • albuterol (2.5 mg/3 mL) 0.083 % nebulizer solution Take 3 mL (2.5 mg total) by nebulization every 6 (six) hours as needed for wheezing or shortness of breath 3 mL 2   • albuterol (ProAir HFA) 90 mcg/act inhaler Inhale 2 puffs every 6 (six) hours as needed for wheezing or shortness of breath 8 g 0   • atorvastatin (LIPITOR) 40 mg tablet TAKE 1 TABLET BY MOUTH DAILY WITH DINNER 90 tablet 0   • dexamethasone (DECADRON) 4 mg tablet Take 1 tablet (4 mg total) by mouth 2 (two) times a day with meals Taper as directed.  180 tablet 1   • enoxaparin (LOVENOX) 100 mg/mL INJECT 0.9 ML (90 MG TOTAL) UNDER THE SKIN EVERY 12 (TWELVE) HOURS 60 mL 5   • ferrous sulfate 324 (65 Fe) mg Take 1 tablet (324 mg total) by mouth 2 (two) times a day before meals 60 tablet 0   • folic acid (FOLVITE) 1 mg tablet TAKE 1 TABLET BY MOUTH EVERY DAY 90 tablet 0   • levETIRAcetam (KEPPRA) 1000 MG tablet Take 1.5 tablets (1,500 mg total) by mouth 2 (two) times a day 90 tablet 5   • pantoprazole (PROTONIX) 40 mg tablet Take 1 tablet (40 mg total) by mouth daily before breakfast 30 tablet 1   • tamsulosin (FLOMAX) 0.4 mg Take 1 capsule (0.4 mg total) by mouth daily after dinner 90 capsule 1     No current facility-administered medications for this visit. [unfilled]    SOCIAL HISTORY:   reports that he quit smoking about 19 years ago. His smoking use included cigarettes. He started smoking about 58 years ago. He has a 117.00 pack-year smoking history. He has never used smokeless tobacco. He reports that he does not currently use alcohol. He reports that he does not use drugs. FAMILY HISTORY:  family history includes Lung cancer in his brother; Prostate cancer in his brother. ALLERGIES:  is allergic to doxycycline. Physical Exam:  Vital Signs:   Visit Vitals  /86 (BP Location: Left arm, Patient Position: Sitting, Cuff Size: Standard)   Pulse 90   Temp (!) 97.2 °F (36.2 °C) (Temporal)   Resp 16   SpO2 97%   Smoking Status Former     There is no height or weight on file to calculate BMI. There is no height or weight on file to calculate BSA. GEN: Alert, awake oriented x3, in no acute distress  HEENT- No pallor, icterus, cyanosis, no oral mucosal lesions,   LAD - no palpable cervical, clavicle, axillary, inguinal LAD  Heart- normal S1 S2, regular rate and rhythm, No murmur, rubs.    Lungs- clear breathing sound bilateral.   Abdomen- soft, Non tender, bowel sounds present  Extremities- No cyanosis, clubbing, edema  Neuro- No focal neurological deficit    Labs:  Lab Results   Component Value Date    WBC 7.85 09/25/2023    HGB 11.4 (L) 09/25/2023    HCT 38.3 09/25/2023    MCV 91 09/25/2023     09/25/2023     Lab Results   Component Value Date    SODIUM 140 09/25/2023    K 3.9 09/25/2023     09/25/2023    CO2 31 09/25/2023    AGAP 5 09/25/2023    BUN 15 09/25/2023    CREATININE 0.82 09/25/2023    GLUC 114 09/25/2023    GLUF 109 (H) 09/08/2023    CALCIUM 9.8 09/25/2023    AST 10 (L) 09/25/2023    ALT 15 09/25/2023    ALKPHOS 62 09/25/2023    TP 6.4 09/25/2023    TBILI 0.31 09/25/2023    EGFR 88 09/25/2023

## 2023-10-16 ENCOUNTER — APPOINTMENT (OUTPATIENT)
Dept: LAB | Facility: CLINIC | Age: 73
End: 2023-10-16
Payer: MEDICARE

## 2023-10-16 DIAGNOSIS — C34.90 MALIGNANT NEOPLASM OF BRONCHUS AND LUNG (HCC): ICD-10-CM

## 2023-10-16 DIAGNOSIS — Z92.241 HX OF STEROID THERAPY: ICD-10-CM

## 2023-10-16 DIAGNOSIS — R91.8 LUNG MASS: ICD-10-CM

## 2023-10-16 DIAGNOSIS — T45.1X5A CHEMOTHERAPY-INDUCED NEUTROPENIA: ICD-10-CM

## 2023-10-16 DIAGNOSIS — C34.11 MALIGNANT NEOPLASM OF UPPER LOBE OF RIGHT LUNG (HCC): ICD-10-CM

## 2023-10-16 DIAGNOSIS — D70.1 CHEMOTHERAPY-INDUCED NEUTROPENIA: ICD-10-CM

## 2023-10-16 LAB
ALBUMIN SERPL BCP-MCNC: 3.4 G/DL (ref 3.5–5)
ALP SERPL-CCNC: 48 U/L (ref 34–104)
ALT SERPL W P-5'-P-CCNC: 17 U/L (ref 7–52)
ANION GAP SERPL CALCULATED.3IONS-SCNC: 8 MMOL/L
ANISOCYTOSIS BLD QL SMEAR: PRESENT
AST SERPL W P-5'-P-CCNC: 12 U/L (ref 13–39)
BASOPHILS # BLD MANUAL: 0 THOUSAND/UL (ref 0–0.1)
BASOPHILS NFR MAR MANUAL: 0 % (ref 0–1)
BILIRUB SERPL-MCNC: 0.35 MG/DL (ref 0.2–1)
BUN SERPL-MCNC: 17 MG/DL (ref 5–25)
CALCIUM ALBUM COR SERPL-MCNC: 9.2 MG/DL (ref 8.3–10.1)
CALCIUM SERPL-MCNC: 8.7 MG/DL (ref 8.4–10.2)
CHLORIDE SERPL-SCNC: 105 MMOL/L (ref 96–108)
CO2 SERPL-SCNC: 29 MMOL/L (ref 21–32)
CREAT SERPL-MCNC: 0.89 MG/DL (ref 0.6–1.3)
EOSINOPHIL # BLD MANUAL: 0.07 THOUSAND/UL (ref 0–0.4)
EOSINOPHIL NFR BLD MANUAL: 1 % (ref 0–6)
ERYTHROCYTE [DISTWIDTH] IN BLOOD BY AUTOMATED COUNT: 23.9 % (ref 11.6–15.1)
GFR SERPL CREATININE-BSD FRML MDRD: 85 ML/MIN/1.73SQ M
GLUCOSE P FAST SERPL-MCNC: 82 MG/DL (ref 65–99)
HCT VFR BLD AUTO: 39 % (ref 36.5–49.3)
HGB BLD-MCNC: 11.9 G/DL (ref 12–17)
LYMPHOCYTES # BLD AUTO: 0.75 THOUSAND/UL (ref 0.6–4.47)
LYMPHOCYTES # BLD AUTO: 8 % (ref 14–44)
MCH RBC QN AUTO: 29.5 PG (ref 26.8–34.3)
MCHC RBC AUTO-ENTMCNC: 30.5 G/DL (ref 31.4–37.4)
MCV RBC AUTO: 97 FL (ref 82–98)
MONOCYTES # BLD AUTO: 0.2 THOUSAND/UL (ref 0–1.22)
MONOCYTES NFR BLD: 3 % (ref 4–12)
NEUTROPHILS # BLD MANUAL: 5.79 THOUSAND/UL (ref 1.85–7.62)
NEUTS BAND NFR BLD MANUAL: 16 % (ref 0–8)
NEUTS SEG NFR BLD AUTO: 69 % (ref 43–75)
NRBC BLD AUTO-RTO: 1 /100 WBC (ref 0–2)
PLATELET # BLD AUTO: 170 THOUSANDS/UL (ref 149–390)
PLATELET BLD QL SMEAR: ADEQUATE
PMV BLD AUTO: 10.1 FL (ref 8.9–12.7)
POIKILOCYTOSIS BLD QL SMEAR: PRESENT
POLYCHROMASIA BLD QL SMEAR: PRESENT
POTASSIUM SERPL-SCNC: 4.1 MMOL/L (ref 3.5–5.3)
PROT SERPL-MCNC: 5.7 G/DL (ref 6.4–8.4)
RBC # BLD AUTO: 4.04 MILLION/UL (ref 3.88–5.62)
RBC MORPH BLD: PRESENT
SODIUM SERPL-SCNC: 142 MMOL/L (ref 135–147)
T3FREE SERPL-MCNC: 2.67 PG/ML (ref 2.5–3.9)
T4 FREE SERPL-MCNC: 0.6 NG/DL (ref 0.61–1.12)
TSH SERPL DL<=0.05 MIU/L-ACNC: 7.29 UIU/ML (ref 0.45–4.5)
VARIANT LYMPHS # BLD AUTO: 3 %
WBC # BLD AUTO: 6.81 THOUSAND/UL (ref 4.31–10.16)

## 2023-10-16 PROCEDURE — 85007 BL SMEAR W/DIFF WBC COUNT: CPT

## 2023-10-16 PROCEDURE — 85027 COMPLETE CBC AUTOMATED: CPT

## 2023-10-16 PROCEDURE — 84481 FREE ASSAY (FT-3): CPT

## 2023-10-16 PROCEDURE — 84439 ASSAY OF FREE THYROXINE: CPT

## 2023-10-16 PROCEDURE — 36415 COLL VENOUS BLD VENIPUNCTURE: CPT

## 2023-10-16 PROCEDURE — 80053 COMPREHEN METABOLIC PANEL: CPT

## 2023-10-16 PROCEDURE — 84443 ASSAY THYROID STIM HORMONE: CPT

## 2023-10-16 RX ORDER — PANTOPRAZOLE SODIUM 40 MG/1
40 TABLET, DELAYED RELEASE ORAL
Qty: 30 TABLET | Refills: 0 | Status: SHIPPED | OUTPATIENT
Start: 2023-10-16

## 2023-10-17 ENCOUNTER — HOSPITAL ENCOUNTER (OUTPATIENT)
Dept: RADIOLOGY | Facility: HOSPITAL | Age: 73
Discharge: HOME | End: 2023-10-17
Payer: MEDICARE

## 2023-10-17 ENCOUNTER — TELEPHONE (OUTPATIENT)
Dept: HEMATOLOGY ONCOLOGY | Facility: CLINIC | Age: 73
End: 2023-10-17

## 2023-10-17 ENCOUNTER — APPOINTMENT (OUTPATIENT)
Dept: LAB | Facility: HOSPITAL | Age: 73
End: 2023-10-17
Payer: MEDICARE

## 2023-10-17 DIAGNOSIS — C79.31 MALIGNANT NEOPLASM METASTATIC TO BRAIN (HCC): ICD-10-CM

## 2023-10-17 DIAGNOSIS — R91.8 MASS OF UPPER LOBE OF RIGHT LUNG: ICD-10-CM

## 2023-10-17 DIAGNOSIS — C79.31 MALIGNANT NEOPLASM METASTATIC TO BRAIN (HCC): Primary | ICD-10-CM

## 2023-10-17 DIAGNOSIS — C34.11 MALIGNANT NEOPLASM OF UPPER LOBE OF RIGHT LUNG (HCC): ICD-10-CM

## 2023-10-17 DIAGNOSIS — R60.9 EDEMA, UNSPECIFIED TYPE: ICD-10-CM

## 2023-10-17 LAB
BACTERIA UR QL AUTO: NORMAL /HPF
BILIRUB UR QL STRIP: NEGATIVE
CLARITY UR: CLEAR
COLOR UR: YELLOW
GLUCOSE UR STRIP-MCNC: NEGATIVE MG/DL
HGB UR QL STRIP.AUTO: ABNORMAL
KETONES UR STRIP-MCNC: NEGATIVE MG/DL
LEUKOCYTE ESTERASE UR QL STRIP: NEGATIVE
NITRITE UR QL STRIP: NEGATIVE
NON-SQ EPI CELLS URNS QL MICRO: NORMAL /HPF
PH UR STRIP.AUTO: 6.5 [PH]
PROT UR STRIP-MCNC: ABNORMAL MG/DL
RBC #/AREA URNS AUTO: NORMAL /HPF
SP GR UR STRIP.AUTO: 1.02 (ref 1–1.03)
UROBILINOGEN UR STRIP-ACNC: <2 MG/DL
WBC #/AREA URNS AUTO: NORMAL /HPF

## 2023-10-17 PROCEDURE — 81001 URINALYSIS AUTO W/SCOPE: CPT

## 2023-10-17 PROCEDURE — 71046 X-RAY EXAM CHEST 2 VIEWS: CPT

## 2023-10-17 RX ORDER — FUROSEMIDE 20 MG/1
20 TABLET ORAL DAILY
Qty: 5 TABLET | Refills: 0 | Status: SHIPPED | OUTPATIENT
Start: 2023-10-17

## 2023-10-17 NOTE — TELEPHONE ENCOUNTER
Spoke with patient's wife Tristen Mitchell to let her know Dr. Jitendra Gamez has reviewed the chest x-ray results and wants to start patient on PO Lasix 20mg daily for 5 days. I also informed her that patient needs a urinalysis completed prior to treatment tomorrow. Pt's wife confirmed she will pickup the Lasix once ready at the pharmacy and have him start in the morning and she will bring him to get U/A done today. She verbalized understanding and is in agreement with plan.  Has direct number for any more questions or concerns

## 2023-10-17 NOTE — TELEPHONE ENCOUNTER
Spoke with patient's wife Liliana Lizama who let me know pt has been having worsening b/l lower extremity (R>L) and b/l hand swelling. Pt's wife states the swelling in all areas is pitting when she presses down, pt has been having epsom salt baths and elevating extremities as much as possible. Pt's wife states the extremities are cool to touch and pt is having discomfort d/t the swelling, denies any areas of heat, redness or direct pain. She states pt has a persistent cough which Dr. Keisha Stapleton was made aware of at recent office visit and OTC cough medications are not helping. She describes it as a wet, non-productive cough that worsens with eating and has chronic shortness of breath but denies pt having difficulty swallowing or worsening SOB. Pt has had decreased mobility and has a history of needing lasix for swelling. Pt's wife also stated pt's TSH level was elevated and if he should have his treatment tomorrow. I informed pt's wife I will address these concerns with Dr. Keisha Stapleton and give her a call back to update her on the plan of care. Pt's wife verbalized understanding. After speaking with Dr. Keisha Stapleton, I informed pt's wife she would like to get a chest x-ray top further evaluate. I explained the order is in place and they can walk-in to get scan done. Pt's wife verbalized understanding.

## 2023-10-18 ENCOUNTER — HOSPITAL ENCOUNTER (OUTPATIENT)
Dept: INFUSION CENTER | Facility: HOSPITAL | Age: 73
Discharge: HOME | End: 2023-10-18
Attending: INTERNAL MEDICINE
Payer: MEDICARE

## 2023-10-18 VITALS
BODY MASS INDEX: 45.06 KG/M2 | HEART RATE: 88 BPM | OXYGEN SATURATION: 98 % | SYSTOLIC BLOOD PRESSURE: 124 MMHG | WEIGHT: 229.5 LBS | DIASTOLIC BLOOD PRESSURE: 79 MMHG | TEMPERATURE: 98 F | HEIGHT: 60 IN | RESPIRATION RATE: 18 BRPM

## 2023-10-18 DIAGNOSIS — D70.1 CHEMOTHERAPY INDUCED NEUTROPENIA: ICD-10-CM

## 2023-10-18 DIAGNOSIS — C34.90 ADENOCARCINOMA OF LUNG, UNSPECIFIED LATERALITY (HCC): ICD-10-CM

## 2023-10-18 DIAGNOSIS — T45.1X5A CHEMOTHERAPY INDUCED NEUTROPENIA: ICD-10-CM

## 2023-10-18 DIAGNOSIS — R91.8 MASS OF UPPER LOBE OF RIGHT LUNG: Primary | ICD-10-CM

## 2023-10-18 DIAGNOSIS — C34.11 MALIGNANT NEOPLASM OF UPPER LOBE OF RIGHT LUNG (HCC): ICD-10-CM

## 2023-10-18 PROCEDURE — 96417 CHEMO IV INFUS EACH ADDL SEQ: CPT

## 2023-10-18 PROCEDURE — 96415 CHEMO IV INFUSION ADDL HR: CPT

## 2023-10-18 PROCEDURE — 96413 CHEMO IV INFUSION 1 HR: CPT

## 2023-10-18 RX ORDER — SODIUM CHLORIDE 9 MG/ML
20 INJECTION, SOLUTION INTRAVENOUS ONCE
Status: COMPLETED | OUTPATIENT
Start: 2023-10-18 | End: 2023-10-18

## 2023-10-18 RX ADMIN — BEVACIZUMAB 600 MG: 400 INJECTION, SOLUTION INTRAVENOUS at 11:52

## 2023-10-18 RX ADMIN — SODIUM CHLORIDE 20 ML/HR: 9 INJECTION, SOLUTION INTRAVENOUS at 11:52

## 2023-10-18 RX ADMIN — SODIUM CHLORIDE 200 MG: 9 INJECTION, SOLUTION INTRAVENOUS at 13:58

## 2023-10-18 NOTE — PROGRESS NOTES
Pt arrived for chemotherapy infusion. Pt visibly swollen with facial edema, +3 edema in b/l LE's and UE's. Pt also has wet cough and crackles throughout lungs. Heart rate and rhythm regular, VSS. Quirino Santos RN/Dr. Jenifer Puri aware. Pt had CXR and labs done yesterday, all appears stable, and Dr. Jenifer Puri would like him to proceed with today's treatment, with the exception of the 1L bolus, which will be held. Lasix was ordered and patient's wife will be picking it up from the pharmacy today after treatment and getting it started. PIV site obtained, will continue to monitor.

## 2023-10-22 DIAGNOSIS — R56.9 PARTIAL SEIZURE (HCC): ICD-10-CM

## 2023-10-23 ENCOUNTER — APPOINTMENT (EMERGENCY)
Dept: CT IMAGING | Facility: HOSPITAL | Age: 73
DRG: 867 | End: 2023-10-23
Payer: MEDICARE

## 2023-10-23 ENCOUNTER — HOSPITAL ENCOUNTER (INPATIENT)
Facility: HOSPITAL | Age: 73
LOS: 10 days | Discharge: HOSPICE | DRG: 867 | End: 2023-11-02
Attending: EMERGENCY MEDICINE | Admitting: INTERNAL MEDICINE
Payer: MEDICARE

## 2023-10-23 ENCOUNTER — APPOINTMENT (EMERGENCY)
Dept: RADIOLOGY | Facility: HOSPITAL | Age: 73
DRG: 867 | End: 2023-10-23
Payer: MEDICARE

## 2023-10-23 ENCOUNTER — APPOINTMENT (INPATIENT)
Dept: CT IMAGING | Facility: HOSPITAL | Age: 73
DRG: 867 | End: 2023-10-23
Payer: MEDICARE

## 2023-10-23 DIAGNOSIS — D64.9 ACUTE ON CHRONIC ANEMIA: ICD-10-CM

## 2023-10-23 DIAGNOSIS — C79.31 MALIGNANT NEOPLASM METASTATIC TO BRAIN (HCC): ICD-10-CM

## 2023-10-23 DIAGNOSIS — J90 RECURRENT PLEURAL EFFUSION ON RIGHT: ICD-10-CM

## 2023-10-23 DIAGNOSIS — C34.11 MALIGNANT NEOPLASM OF UPPER LOBE OF RIGHT LUNG (HCC): ICD-10-CM

## 2023-10-23 DIAGNOSIS — J96.00 ACUTE RESPIRATORY FAILURE (HCC): ICD-10-CM

## 2023-10-23 DIAGNOSIS — J44.9 COPD MIXED TYPE (HCC): ICD-10-CM

## 2023-10-23 DIAGNOSIS — C34.91 ADENOCARCINOMA OF RIGHT LUNG (HCC): ICD-10-CM

## 2023-10-23 DIAGNOSIS — R91.8 MASS OF UPPER LOBE OF RIGHT LUNG: ICD-10-CM

## 2023-10-23 DIAGNOSIS — M79.89 SWELLING OF LIMB: ICD-10-CM

## 2023-10-23 DIAGNOSIS — J90 RECURRENT RIGHT PLEURAL EFFUSION: ICD-10-CM

## 2023-10-23 DIAGNOSIS — N18.31 STAGE 3A CHRONIC KIDNEY DISEASE (HCC): ICD-10-CM

## 2023-10-23 DIAGNOSIS — R56.9 SEIZURE (HCC): ICD-10-CM

## 2023-10-23 DIAGNOSIS — A41.9 SEPSIS WITHOUT ACUTE ORGAN DYSFUNCTION, DUE TO UNSPECIFIED ORGANISM (HCC): ICD-10-CM

## 2023-10-23 DIAGNOSIS — R33.9 URINARY RETENTION: ICD-10-CM

## 2023-10-23 DIAGNOSIS — A41.9 SEVERE SEPSIS (HCC): ICD-10-CM

## 2023-10-23 DIAGNOSIS — R65.20 SEVERE SEPSIS (HCC): ICD-10-CM

## 2023-10-23 DIAGNOSIS — J96.11 HYPOXEMIC RESPIRATORY FAILURE, CHRONIC (HCC): ICD-10-CM

## 2023-10-23 DIAGNOSIS — R78.81 GRAM-NEGATIVE BACTEREMIA: ICD-10-CM

## 2023-10-23 PROBLEM — N40.0 BPH (BENIGN PROSTATIC HYPERPLASIA): Status: ACTIVE | Noted: 2023-10-23

## 2023-10-23 PROBLEM — K21.9 GERD (GASTROESOPHAGEAL REFLUX DISEASE): Status: ACTIVE | Noted: 2023-10-23

## 2023-10-23 PROBLEM — R60.1 ANASARCA: Status: ACTIVE | Noted: 2023-02-20

## 2023-10-23 LAB
2HR DELTA HS TROPONIN: 15 NG/L
4HR DELTA HS TROPONIN: 24 NG/L
ALBUMIN SERPL BCP-MCNC: 3.8 G/DL (ref 3.5–5)
ALP SERPL-CCNC: 53 U/L (ref 34–104)
ALT SERPL W P-5'-P-CCNC: 20 U/L (ref 7–52)
ANION GAP SERPL CALCULATED.3IONS-SCNC: 9 MMOL/L
ANISOCYTOSIS BLD QL SMEAR: PRESENT
AST SERPL W P-5'-P-CCNC: 12 U/L (ref 13–39)
BACTERIA UR QL AUTO: ABNORMAL /HPF
BASE EXCESS BLDA CALC-SCNC: 0 MMOL/L (ref -2–3)
BASOPHILS # BLD MANUAL: 0 THOUSAND/UL (ref 0–0.1)
BASOPHILS NFR MAR MANUAL: 0 % (ref 0–1)
BILIRUB SERPL-MCNC: 0.42 MG/DL (ref 0.2–1)
BILIRUB UR QL STRIP: NEGATIVE
BNP SERPL-MCNC: 154 PG/ML (ref 0–100)
BUN SERPL-MCNC: 25 MG/DL (ref 5–25)
CA-I BLD-SCNC: 1.24 MMOL/L (ref 1.12–1.32)
CALCIUM SERPL-MCNC: 9.5 MG/DL (ref 8.4–10.2)
CARDIAC TROPONIN I PNL SERPL HS: 22 NG/L
CARDIAC TROPONIN I PNL SERPL HS: 31 NG/L
CARDIAC TROPONIN I PNL SERPL HS: 7 NG/L
CARIS ALK: NORMAL
CARIS GENOMIC LOH - EXOME: NORMAL
CARIS MSI - EXOME: NORMAL
CARIS PD-L1 (22C3): NEGATIVE
CARIS PD-L1 (SP263): NEGATIVE
CARIS PD-L1 FDA (28-8): NEGATIVE
CARIS PD-L1 FDA(SP142): NEGATIVE
CARIS PTEN: POSITIVE
CARIS TMB - EXOME: NORMAL
CHLORIDE SERPL-SCNC: 104 MMOL/L (ref 96–108)
CLARITY UR: CLEAR
CO2 SERPL-SCNC: 29 MMOL/L (ref 21–32)
COLOR UR: YELLOW
CREAT SERPL-MCNC: 1 MG/DL (ref 0.6–1.3)
D DIMER PPP FEU-MCNC: 1.46 UG/ML FEU
EOSINOPHIL # BLD MANUAL: 0 THOUSAND/UL (ref 0–0.4)
EOSINOPHIL NFR BLD MANUAL: 0 % (ref 0–6)
ERYTHROCYTE [DISTWIDTH] IN BLOOD BY AUTOMATED COUNT: 22.6 % (ref 11.6–15.1)
FLUAV RNA RESP QL NAA+PROBE: NEGATIVE
FLUBV RNA RESP QL NAA+PROBE: NEGATIVE
GFR SERPL CREATININE-BSD FRML MDRD: 74 ML/MIN/1.73SQ M
GLUCOSE SERPL-MCNC: 115 MG/DL (ref 65–140)
GLUCOSE SERPL-MCNC: 132 MG/DL (ref 65–140)
GLUCOSE SERPL-MCNC: 132 MG/DL (ref 65–140)
GLUCOSE UR STRIP-MCNC: NEGATIVE MG/DL
HCO3 BLDA-SCNC: 26.8 MMOL/L (ref 24–30)
HCT VFR BLD AUTO: 40.5 % (ref 36.5–49.3)
HCT VFR BLD CALC: 38 % (ref 36.5–49.3)
HGB BLD-MCNC: 12.1 G/DL (ref 12–17)
HGB BLDA-MCNC: 12.9 G/DL (ref 12–17)
HGB UR QL STRIP.AUTO: ABNORMAL
HYALINE CASTS #/AREA URNS LPF: ABNORMAL /LPF
INR PPP: 1 (ref 0.84–1.19)
KETONES UR STRIP-MCNC: NEGATIVE MG/DL
LACTATE SERPL-SCNC: 1.9 MMOL/L (ref 0.5–2)
LACTATE SERPL-SCNC: 2.4 MMOL/L (ref 0.5–2)
LACTATE SERPL-SCNC: 2.6 MMOL/L (ref 0.5–2)
LACTATE SERPL-SCNC: 2.6 MMOL/L (ref 0.5–2)
LACTATE SERPL-SCNC: 5.1 MMOL/L (ref 0.5–2)
LEUKOCYTE ESTERASE UR QL STRIP: NEGATIVE
LYMPHOCYTES # BLD AUTO: 2.53 THOUSAND/UL (ref 0.6–4.47)
LYMPHOCYTES # BLD AUTO: 34 % (ref 14–44)
MAGNESIUM SERPL-MCNC: 1.9 MG/DL (ref 1.9–2.7)
MCH RBC QN AUTO: 28.9 PG (ref 26.8–34.3)
MCHC RBC AUTO-ENTMCNC: 29.9 G/DL (ref 31.4–37.4)
MCV RBC AUTO: 97 FL (ref 82–98)
METAMYELOCYTES NFR BLD MANUAL: 2 % (ref 0–1)
MONOCYTES # BLD AUTO: 0.22 THOUSAND/UL (ref 0–1.22)
MONOCYTES NFR BLD: 3 % (ref 4–12)
MUCOUS THREADS UR QL AUTO: ABNORMAL
NEUTROPHILS # BLD MANUAL: 4.53 THOUSAND/UL (ref 1.85–7.62)
NEUTS SEG NFR BLD AUTO: 61 % (ref 43–75)
NITRITE UR QL STRIP: NEGATIVE
NON-SQ EPI CELLS URNS QL MICRO: ABNORMAL /HPF
NRBC BLD AUTO-RTO: 5 /100 WBC (ref 0–2)
PCO2 BLD: 28 MMOL/L (ref 21–32)
PCO2 BLD: 49.4 MM HG (ref 42–50)
PH BLD: 7.34 [PH] (ref 7.3–7.4)
PH UR STRIP.AUTO: 5.5 [PH]
PLATELET # BLD AUTO: 146 THOUSANDS/UL (ref 149–390)
PLATELET BLD QL SMEAR: ABNORMAL
PMV BLD AUTO: 8.6 FL (ref 8.9–12.7)
PO2 BLD: 25 MM HG (ref 35–45)
POLYCHROMASIA BLD QL SMEAR: PRESENT
POTASSIUM BLD-SCNC: 4.2 MMOL/L (ref 3.5–5.3)
POTASSIUM SERPL-SCNC: 4.1 MMOL/L (ref 3.5–5.3)
PROCALCITONIN SERPL-MCNC: 0.13 NG/ML
PROT SERPL-MCNC: 6.7 G/DL (ref 6.4–8.4)
PROT UR STRIP-MCNC: ABNORMAL MG/DL
PROTHROMBIN TIME: 13.6 SECONDS (ref 11.6–14.5)
RBC # BLD AUTO: 4.19 MILLION/UL (ref 3.88–5.62)
RBC #/AREA URNS AUTO: ABNORMAL /HPF
RBC MORPH BLD: PRESENT
RSV RNA RESP QL NAA+PROBE: NEGATIVE
SAO2 % BLD FROM PO2: 41 % (ref 60–85)
SARS-COV-2 RNA RESP QL NAA+PROBE: NEGATIVE
SODIUM BLD-SCNC: 141 MMOL/L (ref 136–145)
SODIUM SERPL-SCNC: 142 MMOL/L (ref 135–147)
SP GR UR STRIP.AUTO: 1.02 (ref 1–1.03)
SPECIMEN SOURCE: ABNORMAL
TSH SERPL DL<=0.05 MIU/L-ACNC: 4.45 UIU/ML (ref 0.45–4.5)
UROBILINOGEN UR STRIP-ACNC: <2 MG/DL
WBC # BLD AUTO: 7.43 THOUSAND/UL (ref 4.31–10.16)
WBC #/AREA URNS AUTO: ABNORMAL /HPF

## 2023-10-23 PROCEDURE — 99291 CRITICAL CARE FIRST HOUR: CPT

## 2023-10-23 PROCEDURE — 71275 CT ANGIOGRAPHY CHEST: CPT

## 2023-10-23 PROCEDURE — 84443 ASSAY THYROID STIM HORMONE: CPT | Performed by: PHYSICIAN ASSISTANT

## 2023-10-23 PROCEDURE — 87040 BLOOD CULTURE FOR BACTERIA: CPT | Performed by: EMERGENCY MEDICINE

## 2023-10-23 PROCEDURE — 96365 THER/PROPH/DIAG IV INF INIT: CPT

## 2023-10-23 PROCEDURE — 93005 ELECTROCARDIOGRAM TRACING: CPT

## 2023-10-23 PROCEDURE — 94640 AIRWAY INHALATION TREATMENT: CPT

## 2023-10-23 PROCEDURE — 82803 BLOOD GASES ANY COMBINATION: CPT

## 2023-10-23 PROCEDURE — 87081 CULTURE SCREEN ONLY: CPT | Performed by: PHYSICIAN ASSISTANT

## 2023-10-23 PROCEDURE — 94760 N-INVAS EAR/PLS OXIMETRY 1: CPT

## 2023-10-23 PROCEDURE — 84484 ASSAY OF TROPONIN QUANT: CPT

## 2023-10-23 PROCEDURE — 83880 ASSAY OF NATRIURETIC PEPTIDE: CPT | Performed by: EMERGENCY MEDICINE

## 2023-10-23 PROCEDURE — 82948 REAGENT STRIP/BLOOD GLUCOSE: CPT

## 2023-10-23 PROCEDURE — G1004 CDSM NDSC: HCPCS

## 2023-10-23 PROCEDURE — ND001 PR NO DOCUMENTATION: Performed by: PHYSICIAN ASSISTANT

## 2023-10-23 PROCEDURE — 80053 COMPREHEN METABOLIC PANEL: CPT | Performed by: EMERGENCY MEDICINE

## 2023-10-23 PROCEDURE — 81001 URINALYSIS AUTO W/SCOPE: CPT | Performed by: EMERGENCY MEDICINE

## 2023-10-23 PROCEDURE — 74177 CT ABD & PELVIS W/CONTRAST: CPT

## 2023-10-23 PROCEDURE — 80177 DRUG SCRN QUAN LEVETIRACETAM: CPT | Performed by: PHYSICIAN ASSISTANT

## 2023-10-23 PROCEDURE — 84484 ASSAY OF TROPONIN QUANT: CPT | Performed by: EMERGENCY MEDICINE

## 2023-10-23 PROCEDURE — 96368 THER/DIAG CONCURRENT INF: CPT

## 2023-10-23 PROCEDURE — 5A09357 ASSISTANCE WITH RESPIRATORY VENTILATION, LESS THAN 24 CONSECUTIVE HOURS, CONTINUOUS POSITIVE AIRWAY PRESSURE: ICD-10-PCS | Performed by: INTERNAL MEDICINE

## 2023-10-23 PROCEDURE — 85610 PROTHROMBIN TIME: CPT | Performed by: EMERGENCY MEDICINE

## 2023-10-23 PROCEDURE — 87077 CULTURE AEROBIC IDENTIFY: CPT | Performed by: EMERGENCY MEDICINE

## 2023-10-23 PROCEDURE — 85027 COMPLETE CBC AUTOMATED: CPT | Performed by: EMERGENCY MEDICINE

## 2023-10-23 PROCEDURE — 83605 ASSAY OF LACTIC ACID: CPT | Performed by: PHYSICIAN ASSISTANT

## 2023-10-23 PROCEDURE — 71045 X-RAY EXAM CHEST 1 VIEW: CPT

## 2023-10-23 PROCEDURE — 83735 ASSAY OF MAGNESIUM: CPT | Performed by: EMERGENCY MEDICINE

## 2023-10-23 PROCEDURE — 99291 CRITICAL CARE FIRST HOUR: CPT | Performed by: EMERGENCY MEDICINE

## 2023-10-23 PROCEDURE — 94002 VENT MGMT INPAT INIT DAY: CPT

## 2023-10-23 PROCEDURE — 84132 ASSAY OF SERUM POTASSIUM: CPT

## 2023-10-23 PROCEDURE — 84145 PROCALCITONIN (PCT): CPT | Performed by: EMERGENCY MEDICINE

## 2023-10-23 PROCEDURE — 82330 ASSAY OF CALCIUM: CPT

## 2023-10-23 PROCEDURE — 83605 ASSAY OF LACTIC ACID: CPT | Performed by: EMERGENCY MEDICINE

## 2023-10-23 PROCEDURE — 85014 HEMATOCRIT: CPT

## 2023-10-23 PROCEDURE — 82947 ASSAY GLUCOSE BLOOD QUANT: CPT

## 2023-10-23 PROCEDURE — 83605 ASSAY OF LACTIC ACID: CPT

## 2023-10-23 PROCEDURE — 96375 TX/PRO/DX INJ NEW DRUG ADDON: CPT

## 2023-10-23 PROCEDURE — 85379 FIBRIN DEGRADATION QUANT: CPT | Performed by: EMERGENCY MEDICINE

## 2023-10-23 PROCEDURE — 0241U HB NFCT DS VIR RESP RNA 4 TRGT: CPT | Performed by: EMERGENCY MEDICINE

## 2023-10-23 PROCEDURE — 87186 SC STD MICRODIL/AGAR DIL: CPT | Performed by: EMERGENCY MEDICINE

## 2023-10-23 PROCEDURE — 84295 ASSAY OF SERUM SODIUM: CPT

## 2023-10-23 PROCEDURE — 94664 DEMO&/EVAL PT USE INHALER: CPT

## 2023-10-23 PROCEDURE — 36415 COLL VENOUS BLD VENIPUNCTURE: CPT | Performed by: EMERGENCY MEDICINE

## 2023-10-23 PROCEDURE — 87154 CUL TYP ID BLD PTHGN 6+ TRGT: CPT | Performed by: EMERGENCY MEDICINE

## 2023-10-23 PROCEDURE — 85007 BL SMEAR W/DIFF WBC COUNT: CPT | Performed by: EMERGENCY MEDICINE

## 2023-10-23 RX ORDER — SODIUM CHLORIDE, SODIUM GLUCONATE, SODIUM ACETATE, POTASSIUM CHLORIDE, MAGNESIUM CHLORIDE, SODIUM PHOSPHATE, DIBASIC, AND POTASSIUM PHOSPHATE .53; .5; .37; .037; .03; .012; .00082 G/100ML; G/100ML; G/100ML; G/100ML; G/100ML; G/100ML; G/100ML
100 INJECTION, SOLUTION INTRAVENOUS CONTINUOUS
Status: DISCONTINUED | OUTPATIENT
Start: 2023-10-23 | End: 2023-10-23

## 2023-10-23 RX ORDER — INSULIN LISPRO 100 [IU]/ML
1-6 INJECTION, SOLUTION INTRAVENOUS; SUBCUTANEOUS
Status: DISCONTINUED | OUTPATIENT
Start: 2023-10-24 | End: 2023-10-30

## 2023-10-23 RX ORDER — CEFEPIME HYDROCHLORIDE 2 G/50ML
2000 INJECTION, SOLUTION INTRAVENOUS ONCE
Status: COMPLETED | OUTPATIENT
Start: 2023-10-23 | End: 2023-10-23

## 2023-10-23 RX ORDER — CEFEPIME HYDROCHLORIDE 2 G/50ML
2000 INJECTION, SOLUTION INTRAVENOUS EVERY 8 HOURS
Status: CANCELLED | OUTPATIENT
Start: 2023-10-23

## 2023-10-23 RX ORDER — TAMSULOSIN HYDROCHLORIDE 0.4 MG/1
0.4 CAPSULE ORAL
Status: DISCONTINUED | OUTPATIENT
Start: 2023-10-24 | End: 2023-11-02 | Stop reason: HOSPADM

## 2023-10-23 RX ORDER — LEVALBUTEROL INHALATION SOLUTION 1.25 MG/3ML
1.25 SOLUTION RESPIRATORY (INHALATION)
Status: CANCELLED | OUTPATIENT
Start: 2023-10-23

## 2023-10-23 RX ORDER — LEVETIRACETAM 500 MG/1
1500 TABLET ORAL EVERY 12 HOURS SCHEDULED
Status: DISCONTINUED | OUTPATIENT
Start: 2023-10-24 | End: 2023-10-30

## 2023-10-23 RX ORDER — VANCOMYCIN HYDROCHLORIDE 1 G/200ML
12.5 INJECTION, SOLUTION INTRAVENOUS EVERY 12 HOURS
Status: DISCONTINUED | OUTPATIENT
Start: 2023-10-24 | End: 2023-10-24

## 2023-10-23 RX ORDER — DEXAMETHASONE 4 MG/1
4 TABLET ORAL EVERY 12 HOURS SCHEDULED
Status: DISCONTINUED | OUTPATIENT
Start: 2023-10-23 | End: 2023-11-02 | Stop reason: HOSPADM

## 2023-10-23 RX ORDER — CEFEPIME HYDROCHLORIDE 2 G/50ML
2000 INJECTION, SOLUTION INTRAVENOUS EVERY 8 HOURS
Status: DISCONTINUED | OUTPATIENT
Start: 2023-10-23 | End: 2023-10-26

## 2023-10-23 RX ORDER — PANTOPRAZOLE SODIUM 40 MG/1
40 TABLET, DELAYED RELEASE ORAL
Status: DISCONTINUED | OUTPATIENT
Start: 2023-10-24 | End: 2023-11-02 | Stop reason: HOSPADM

## 2023-10-23 RX ORDER — ENOXAPARIN SODIUM 100 MG/ML
100 INJECTION SUBCUTANEOUS EVERY 12 HOURS SCHEDULED
Status: DISCONTINUED | OUTPATIENT
Start: 2023-10-23 | End: 2023-10-28

## 2023-10-23 RX ORDER — LEVETIRACETAM 1000 MG/1
1500 TABLET ORAL 2 TIMES DAILY
Qty: 270 TABLET | Refills: 2 | Status: SHIPPED | OUTPATIENT
Start: 2023-10-23 | End: 2023-11-02

## 2023-10-23 RX ORDER — LEVALBUTEROL INHALATION SOLUTION 1.25 MG/3ML
1.25 SOLUTION RESPIRATORY (INHALATION)
Status: DISCONTINUED | OUTPATIENT
Start: 2023-10-23 | End: 2023-10-28

## 2023-10-23 RX ORDER — ALBUTEROL SULFATE 2.5 MG/3ML
5 SOLUTION RESPIRATORY (INHALATION) ONCE
Status: COMPLETED | OUTPATIENT
Start: 2023-10-23 | End: 2023-10-23

## 2023-10-23 RX ORDER — ATORVASTATIN CALCIUM 40 MG/1
40 TABLET, FILM COATED ORAL
Status: DISCONTINUED | OUTPATIENT
Start: 2023-10-24 | End: 2023-11-02 | Stop reason: HOSPADM

## 2023-10-23 RX ORDER — ACETAMINOPHEN 650 MG/1
650 SUPPOSITORY RECTAL ONCE
Status: COMPLETED | OUTPATIENT
Start: 2023-10-23 | End: 2023-10-23

## 2023-10-23 RX ORDER — SODIUM CHLORIDE, SODIUM GLUCONATE, SODIUM ACETATE, POTASSIUM CHLORIDE, MAGNESIUM CHLORIDE, SODIUM PHOSPHATE, DIBASIC, AND POTASSIUM PHOSPHATE .53; .5; .37; .037; .03; .012; .00082 G/100ML; G/100ML; G/100ML; G/100ML; G/100ML; G/100ML; G/100ML
500 INJECTION, SOLUTION INTRAVENOUS ONCE
Status: COMPLETED | OUTPATIENT
Start: 2023-10-23 | End: 2023-10-23

## 2023-10-23 RX ORDER — CHLORHEXIDINE GLUCONATE ORAL RINSE 1.2 MG/ML
15 SOLUTION DENTAL EVERY 12 HOURS SCHEDULED
Status: DISCONTINUED | OUTPATIENT
Start: 2023-10-23 | End: 2023-10-24

## 2023-10-23 RX ORDER — FUROSEMIDE 10 MG/ML
40 INJECTION INTRAMUSCULAR; INTRAVENOUS ONCE
Status: COMPLETED | OUTPATIENT
Start: 2023-10-23 | End: 2023-10-23

## 2023-10-23 RX ORDER — METHYLPREDNISOLONE SODIUM SUCCINATE 125 MG/2ML
80 INJECTION, POWDER, LYOPHILIZED, FOR SOLUTION INTRAMUSCULAR; INTRAVENOUS ONCE
Status: COMPLETED | OUTPATIENT
Start: 2023-10-23 | End: 2023-10-23

## 2023-10-23 RX ADMIN — IOHEXOL 100 ML: 350 INJECTION, SOLUTION INTRAVENOUS at 09:28

## 2023-10-23 RX ADMIN — CHLORHEXIDINE GLUCONATE 15 ML: 1.2 SOLUTION ORAL at 20:15

## 2023-10-23 RX ADMIN — SODIUM CHLORIDE, SODIUM GLUCONATE, SODIUM ACETATE, POTASSIUM CHLORIDE, MAGNESIUM CHLORIDE, SODIUM PHOSPHATE, DIBASIC, AND POTASSIUM PHOSPHATE 100 ML/HR: .53; .5; .37; .037; .03; .012; .00082 INJECTION, SOLUTION INTRAVENOUS at 15:50

## 2023-10-23 RX ADMIN — IOHEXOL 80 ML: 350 INJECTION, SOLUTION INTRAVENOUS at 12:30

## 2023-10-23 RX ADMIN — FUROSEMIDE 40 MG: 10 INJECTION, SOLUTION INTRAMUSCULAR; INTRAVENOUS at 08:07

## 2023-10-23 RX ADMIN — ENOXAPARIN SODIUM 100 MG: 100 INJECTION SUBCUTANEOUS at 14:31

## 2023-10-23 RX ADMIN — METHYLPREDNISOLONE SODIUM SUCCINATE 80 MG: 125 INJECTION, POWDER, FOR SOLUTION INTRAMUSCULAR; INTRAVENOUS at 08:06

## 2023-10-23 RX ADMIN — ACETAMINOPHEN 650 MG: 650 SUPPOSITORY RECTAL at 08:20

## 2023-10-23 RX ADMIN — CEFEPIME HYDROCHLORIDE 2000 MG: 2 INJECTION, SOLUTION INTRAVENOUS at 18:25

## 2023-10-23 RX ADMIN — LEVALBUTEROL HYDROCHLORIDE 1.25 MG: 1.25 SOLUTION RESPIRATORY (INHALATION) at 19:25

## 2023-10-23 RX ADMIN — DEXAMETHASONE 4 MG: 2 TABLET ORAL at 20:15

## 2023-10-23 RX ADMIN — CEFEPIME HYDROCHLORIDE 2000 MG: 2 INJECTION, SOLUTION INTRAVENOUS at 09:34

## 2023-10-23 RX ADMIN — ALBUTEROL SULFATE 5 MG: 2.5 SOLUTION RESPIRATORY (INHALATION) at 08:10

## 2023-10-23 RX ADMIN — VANCOMYCIN HYDROCHLORIDE 2000 MG: 1 INJECTION, POWDER, LYOPHILIZED, FOR SOLUTION INTRAVENOUS at 16:39

## 2023-10-23 RX ADMIN — SODIUM CHLORIDE, SODIUM GLUCONATE, SODIUM ACETATE, POTASSIUM CHLORIDE, MAGNESIUM CHLORIDE, SODIUM PHOSPHATE, DIBASIC, AND POTASSIUM PHOSPHATE 500 ML: .53; .5; .37; .037; .03; .012; .00082 INJECTION, SOLUTION INTRAVENOUS at 09:33

## 2023-10-23 RX ADMIN — LEVETIRACETAM 1500 MG: 500 INJECTION, SOLUTION INTRAVENOUS at 20:15

## 2023-10-23 RX ADMIN — IPRATROPIUM BROMIDE 0.5 MG: 0.5 SOLUTION RESPIRATORY (INHALATION) at 08:10

## 2023-10-23 NOTE — PROGRESS NOTES
Lianne Shipley is a 68 y.o. male who is currently ordered Vancomycin IV with management by the Pharmacy Consult service. Relevant clinical data and objective / subjective history reviewed. Vancomycin Assessment:  Indication and Goal AUC/Trough: Pneumonia (goal -600, trough >10), -600, trough >10  Clinical Status: worsening  Micro:   Blood culture x2: pending  Renal Function:  SCr: 1.0 mg/dL  CrCl: 78.1 mL/min  Renal replacement: Not on dialysis  Days of Therapy: 1  Current Dose: 2000mg IV loading dose  Vancomycin Plan:  New Dosinmg IV Q12H  Estimated AUC: 522 mcg*hr/mL  Estimated Trough: 17.5 mcg/mL  Next Level: With morning labs at approximately 0500 on 10/25/23  Renal Function Monitoring: Daily BMP and [place] will continue to follow closely for s/sx of nephrotoxicity, infusion reactions and appropriateness of therapy. BMP and CBC will be ordered per protocol. We will continue to follow the patient’s culture results and clinical progress daily.     Chela Jimenez, Pharmacist

## 2023-10-23 NOTE — ASSESSMENT & PLAN NOTE
By history  Last seen 9/25 in [address] [place] [address] ED for focal seizure following chemotherapy  Home rgimen: Keppra 1500 mg PO BID    Plan:  Continue with Keppra.  Convert to IV until able to take PO

## 2023-10-23 NOTE — ASSESSMENT & PLAN NOTE
By history  Last lipid panel from 06/2023 with triglycerides of 92, LDL 62  Home regimen: Atorvastatin 40 mg Qday    Plan:  Restart Atorvastatin once able to safely take PO

## 2023-10-23 NOTE — ASSESSMENT & PLAN NOTE
Dx initially 09/2021 stage IIIC. Received multiple rounds of chemo/radiation. Found to have brain mets 02/2023 to the left precentral gyrus. Cancer upstaged to stage IV. Currently on daily dexamethasone, [facility] as well as a history of DVT. Currently receiving combination Avastatin and Pembrolizumab at the infusion center. Last chemo 10/18/2023. Avastatin is a new medication for the patient. 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 Unlikely to benefit from ASEPT catheter    Plan:  Continue with home dose dexamethasone and Lovenox for DVT   Extensive conversation with patient and wife regarding code status. Patient and wife at this time would like to remain full code.   Palliative care consult  Recommend follow up outpatient with oncology after discharge

## 2023-10-23 NOTE — ASSESSMENT & PLAN NOTE
POA: Tachycardia, tachypnea. Febrile, Tmax 102. CT LLL ground glass opacities.    UA negative for UTI  No leukocytosis, Lactate 5.1 repeat 2.6, Procal negative 0.18  In the ED the patient was given 500 ml of IVF- full 30 ml/kg were not given due to concern for fluid overload, blood cultures drawn, started on Cefepime    Plan:  Continue with IV Cefepime  Repeat serum lactate every 2 hours until cleared  Follow up culture results  Repeat CBC w/ diff in the AM, monitor and trend WBC  Repeat Procal in the AM- Consider discontinuing ABX therapy if negative  Repeat CTA chest to rule out PNA

## 2023-10-23 NOTE — ED PROVIDER NOTES
History  Chief Complaint   Patient presents with    Respiratory Distress     Patient presents to the ED via EMS, states in respiratory distress today with hx of lung CA, arrives to department with cpap on      21-year-old male with a history of lung cancer presents to the emergency department via EMS from home for the evaluation of severe respiratory distress beginning today. Patient on prehospital CPAP upon arrival      History provided by:  EMS personnel  History limited by:  Severe respiratory distress      Prior to Admission Medications   Prescriptions Last Dose Informant Patient Reported? Taking?   acetaminophen (TYLENOL) 325 mg tablet  Self No No   Sig: Take 2 tablets (650 mg total) by mouth 4 (four) times a day as needed for mild pain, headaches or fever   albuterol (2.5 mg/3 mL) 0.083 % nebulizer solution Not Taking Self No No   Sig: Take 3 mL (2.5 mg total) by nebulization every 6 (six) hours as needed for wheezing or shortness of breath   Patient not taking: Reported on 10/23/2023   albuterol (ProAir HFA) 90 mcg/act inhaler Not Taking Self No No   Sig: Inhale 2 puffs every 6 (six) hours as needed for wheezing or shortness of breath   Patient not taking: Reported on 10/23/2023   atorvastatin (LIPITOR) 40 mg tablet 10/22/2023  No Yes   Sig: TAKE 1 TABLET BY MOUTH DAILY WITH DINNER   dexamethasone (DECADRON) 4 mg tablet 10/23/2023  No Yes   Sig: Take 1 tablet (4 mg total) by mouth 2 (two) times a day with meals Taper as directed.    enoxaparin (LOVENOX) 100 mg/mL 10/22/2023  No Yes   Sig: INJECT 0.9 ML (90 MG TOTAL) UNDER THE SKIN EVERY 12 (TWELVE) HOURS   ferrous sulfate 324 (65 Fe) mg   No No   Sig: Take 1 tablet (324 mg total) by mouth 2 (two) times a day before meals   folic acid (FOLVITE) 1 mg tablet 10/22/2023  No Yes   Sig: TAKE 1 TABLET BY MOUTH EVERY DAY   furosemide (LASIX) 20 mg tablet 10/22/2023  No Yes   Sig: Take 1 tablet (20 mg total) by mouth daily   levETIRAcetam (KEPPRA) 1000 MG tablet 10/23/2023  No Yes   Sig: TAKE 1.5 TABLETS (1,500 MG TOTAL) BY MOUTH 2 (TWO) TIMES A DAY   pantoprazole (PROTONIX) 40 mg tablet 10/22/2023  No Yes   Sig: Take 1 tablet (40 mg total) by mouth daily before breakfast   tamsulosin (FLOMAX) 0.4 mg 10/22/2023 Self No Yes   Sig: Take 1 capsule (0.4 mg total) by mouth daily after dinner      Facility-Administered Medications: None       Past Medical History:   Diagnosis Date    Acute respiratory failure with hypoxia ([facility]) 2021    Chronic respiratory failure with hypoxia ([facility]) 2021    Impaired mobility and ADLs 2021    Lung cancer ([address] [facility])     [facility])        Past Surgical History:   Procedure Laterality Date    IR BIOPSY LUNG  2021    IR THORACENTESIS  2022    IR THORACENTESIS  2022       Family History   Problem Relation Age of Onset    Prostate cancer Brother     Lung cancer Brother      I have reviewed and agree with the history as documented. E-Cigarette/Vaping    E-Cigarette Use Never User      E-Cigarette/Vaping Substances     Social History     Tobacco Use    Smoking status: Former     Packs/day: 3.00     Years: 39.00     Total pack years: 117.00     Types: Cigarettes     Start date:      Quit date:      Years since quittin.8    Smokeless tobacco: Never   Vaping Use    Vaping Use: Never used   Substance Use Topics    Alcohol use: Not Currently     Comment: No ETOH since Summer 2021     Drug use: Never       Review of Systems    Physical Exam  Physical Exam  Constitutional:       General: He is in acute distress. Appearance: He is ill-appearing. Comments: listless   Cardiovascular:      Rate and Rhythm: Tachycardia present. Pulmonary:      Effort: Tachypnea, accessory muscle usage and respiratory distress present. Breath sounds: Decreased air movement present. Wheezing and rhonchi present. Abdominal:      General: Abdomen is protuberant. There is distension. Tenderness:  There is no abdominal tenderness. Musculoskeletal:      Right lower leg: Edema present. Left lower leg: Edema present.          Vital Signs  ED Triage Vitals   Temperature Pulse Respirations Blood Pressure SpO2   10/23/23 0804 10/23/23 0801 10/23/23 0801 10/23/23 0801 10/23/23 0801   (!) 102.1 °F (38.9 °C) (!) 148 (!) 24 131/92 91 %      Temp Source Heart Rate Source Patient Position - Orthostatic VS BP Location FiO2 (%)   10/23/23 0804 10/23/23 0801 10/23/23 0801 10/23/23 0801 10/23/23 0839   Axillary Monitor Sitting Left arm 40      Pain Score       10/23/23 0801       No Pain           Vitals:    10/23/23 1130 10/23/23 1200 10/23/23 1326 10/23/23 1541   BP: 121/73 122/78 126/84 114/71   Pulse: (!) 122 (!) 117 (!) 107 99   Patient Position - Orthostatic VS:   Lying Lying         Visual Acuity  Visual Acuity      Flowsheet Row Most Recent Value   L Pupil Size (mm) 3   R Pupil Size (mm) 2  [baseline]   L Pupil Shape Round   R Pupil Shape Round            ED Medications  Medications   enoxaparin (LOVENOX) subcutaneous injection 100 mg (100 mg Subcutaneous Given 10/23/23 1431)   levETIRAcetam (KEPPRA) 1,500 mg in sodium chloride 0.9 % 100 mL IVPB (has no administration in time range)   dexamethasone (DECADRON) tablet 4 mg (has no administration in time range)   chlorhexidine (PERIDEX) 0.12 % oral rinse 15 mL (0 mL Mouth/Throat Hold 10/23/23 1339)   multi-electrolyte (PLASMALYTE-A/ISOLYTE-S PH 7.4) IV solution (100 mL/hr Intravenous New Bag 10/23/23 1550)   cefepime (MAXIPIME) IVPB (premix in dextrose) 2,000 mg 50 mL (has no administration in time range)   vancomycin (VANCOCIN) 1500 mg in sodium chloride 0.9% 250 mL IVPB (has no administration in time range)   levalbuterol (XOPENEX) inhalation solution 1.25 mg (has no administration in time range)   albuterol inhalation solution 5 mg (5 mg Nebulization Given 10/23/23 0810)   ipratropium (ATROVENT) 0.02 % inhalation solution 0.5 mg (0.5 mg Nebulization Given 10/23/23 0810) methylPREDNISolone sodium succinate (Solu-MEDROL) injection 80 mg (80 mg Intravenous Given 10/23/23 0806)   furosemide (LASIX) injection 40 mg (40 mg Intravenous Given 10/23/23 0807)   acetaminophen (TYLENOL) rectal suppository 650 mg (650 mg Rectal Given 10/23/23 0820)   multi-electrolyte (PLASMALYTE-A/ISOLYTE-S PH 7.4) IV solution 500 mL (0 mL Intravenous Stopped 10/23/23 1045)   cefepime (MAXIPIME) IVPB (premix in dextrose) 2,000 mg 50 mL (0 mg Intravenous Stopped 10/23/23 1004)   iohexol (OMNIPAQUE) 350 MG/ML injection (MULTI-DOSE) 100 mL (100 mL Intravenous Given 10/23/23 0928)   iohexol (OMNIPAQUE) 350 MG/ML injection (MULTI-DOSE) 80 mL (80 mL Intravenous Given 10/23/23 1230)       Diagnostic Studies  Results Reviewed       Procedure Component Value Units Date/Time    Blood culture #1 [[id number]] Collected: 10/23/23 0811    Lab Status: Preliminary result Specimen: Blood from Arm, Left Updated: 10/23/23 1501     Blood Culture Received in Microbiology Lab. Culture in Progress. Blood culture #2 [[id number]] Collected: 10/23/23 2150    Lab Status: Preliminary result Specimen: Blood from Arm, Right Updated: 10/23/23 1501     Blood Culture Received in Microbiology Lab. Culture in Progress. HS Troponin I 4hr [[id number]]  (Abnormal) Collected: 10/23/23 1406    Lab Status: Final result Specimen: Blood from Arm, Left Updated: 10/23/23 1449     hs TnI 4hr 31 ng/L      Delta 4hr hsTnI 24 ng/L     Blood gas, Venous [[id number]] Collected: 10/23/23 1252    Lab Status: No result Specimen: Blood from Arm, Left     Lactic acid 2 Hours [[id number]]  (Abnormal) Collected: 10/23/23 1047    Lab Status: Final result Specimen: Blood from Arm, Left Updated: 10/23/23 1137     LACTIC ACID 2.6 mmol/L     Narrative:      Result may be elevated if tourniquet was used during collection.     HS Troponin I 2hr [[id number]]  (Normal) Collected: 10/23/23 1047    Lab Status: Final result Specimen: Blood from Arm, Left Updated: 10/23/23 7016 hs TnI 2hr 22 ng/L      Delta 2hr hsTnI 15 ng/L     Urine Microscopic [[id number]]  (Abnormal) Collected: 10/23/23 0944    Lab Status: Final result Specimen: Urine, Clean Catch Updated: 10/23/23 1009     RBC, UA 0-1 /hpf      WBC, UA 1-2 /hpf      Epithelial Cells Occasional /hpf      Bacteria, UA Occasional /hpf      MUCUS THREADS Occasional     Hyaline Casts, UA 0-1 /lpf     UA w Reflex to Microscopic w Reflex to Culture [[id number]]  (Abnormal) Collected: 10/23/23 0944    Lab Status: Final result Specimen: Urine, Clean Catch Updated: 10/23/23 1000     Color, UA Yellow     Clarity, UA Clear     Specific Gravity, UA 1.020     pH, UA 5.5     Leukocytes, UA Negative     Nitrite, UA Negative     Protein, UA Trace mg/dl      Glucose, UA Negative mg/dl      Ketones, UA Negative mg/dl      Urobilinogen, UA <2.0 mg/dl      Bilirubin, UA Negative     Occult Blood, UA Small    RBC Morphology Reflex Test [[id number]] Collected: 10/23/23 0811    Lab Status: Final result Specimen: Blood from Arm, Left Updated: 10/23/23 0901    FLU/RSV/COVID - if FLU/RSV clinically relevant [[id number]]  (Normal) Collected: 10/23/23 0814    Lab Status: Final result Specimen: Nares from Nose Updated: 10/23/23 0900     SARS-CoV-2 Negative     INFLUENZA A PCR Negative     INFLUENZA B PCR Negative     RSV PCR Negative    Narrative:      FOR PEDIATRIC PATIENTS - copy/paste COVID Guidelines URL to browser: [email or web address]    SARS-CoV-2 assay is a Nucleic Acid Amplification assay intended for the  qualitative detection of nucleic acid from SARS-CoV-2 in nasopharyngeal  swabs. Results are for the presumptive identification of SARS-CoV-2 RNA. Positive results are indicative of infection with SARS-CoV-2, the virus  causing COVID-19, but do not rule out bacterial infection or co-infection  with other viruses.  Laboratories within the [facility] and its  territories are required to report all positive results to the appropriate  public health authorities. Negative results do not preclude SARS-CoV-2  infection and should not be used as the sole basis for treatment or other  patient management decisions. Negative results must be combined with  clinical observations, patient history, and epidemiological information. This test has not been FDA cleared or approved. This test has been authorized by FDA under an Emergency Use Authorization  (EUA). This test is only authorized for the duration of time the  declaration that circumstances exist justifying the authorization of the  emergency use of an in vitro diagnostic tests for detection of SARS-CoV-2  virus and/or diagnosis of COVID-19 infection under section 564(b)(1) of  the Act, 21 U. S.C. 408HCB-4(R)(1), unless the authorization is terminated  or revoked sooner. The test has been validated but independent review by FDA  and CLIA is pending. Test performed using [employer] GeneXpert: This RT-PCR assay targets N2,  a region unique to SARS-CoV-2. A conserved region in the E-gene was chosen  for pan-Sarbecovirus detection which includes SARS-CoV-2. According to CMS-2020-01-R, this platform meets the definition of high-throughput technology. D-Dimer [[id number]]  (Abnormal) Collected: 10/23/23 0811    Lab Status: Final result Specimen: Blood from Arm, Left Updated: 10/23/23 0849     D-Dimer, Quant 1.46 ug/ml FEU     Narrative: In the evaluation for possible pulmonary embolism, in the appropriate (Well's Score of 4 or less) patient, the age adjusted d-dimer cutoff for this patient can be calculated as:    Age x 0.01 (in ug/mL) for Age-adjusted D-dimer exclusion threshold for a patient over 50 years.     B-Type Natriuretic Peptide(BNP) [[id number]]  (Abnormal) Collected: 10/23/23 0811    Lab Status: Final result Specimen: Blood from Arm, Left Updated: 10/23/23 0848      pg/mL     Lactic acid [[id number]]  (Abnormal) Collected: 10/23/23 0811    Lab Status: Final result Specimen: Blood from Arm, Left Updated: 10/23/23 0848     LACTIC ACID 5.1 mmol/L     Narrative:      Result may be elevated if tourniquet was used during collection.     CBC and differential [[id number]]  (Abnormal) Collected: 10/23/23 0811    Lab Status: Final result Specimen: Blood from Arm, Left Updated: 10/23/23 0847     WBC 7.43 Thousand/uL      RBC 4.19 Million/uL      Hemoglobin 12.1 g/dL      Hematocrit 40.5 %      MCV 97 fL      MCH 28.9 pg      MCHC 29.9 g/dL      RDW 22.6 %      MPV 8.6 fL      Platelets 126 Thousands/uL     Procalcitonin [[id number]]  (Normal) Collected: 10/23/23 0811    Lab Status: Final result Specimen: Blood from Arm, Left Updated: 10/23/23 0847     Procalcitonin 0.13 ng/ml     Manual Differential(PHLEBS Do Not Order) [[id number]]  (Abnormal) Collected: 10/23/23 0811    Lab Status: Final result Specimen: Blood from Arm, Left Updated: 10/23/23 0847     Segmented % 61 %      Lymphocytes % 34 %      Monocytes % 3 %      Eosinophils, % 0 %      Basophils % 0 %      Metamyelocytes% 2 %      Absolute Neutrophils 4.53 Thousand/uL      Lymphocytes Absolute 2.53 Thousand/uL      Monocytes Absolute 0.22 Thousand/uL      Eosinophils Absolute 0.00 Thousand/uL      Basophils Absolute 0.00 Thousand/uL      Total Counted --     nRBC 5 /100 WBC      RBC Morphology Present     Platelet Estimate Borderline     Anisocytosis Present     Polychromasia Present    HS Troponin 0hr (reflex protocol) [[id number]]  (Normal) Collected: 10/23/23 0811    Lab Status: Final result Specimen: Blood from Arm, Left Updated: 10/23/23 0844     hs TnI 0hr 7 ng/L     Comprehensive metabolic panel [[id number]]  (Abnormal) Collected: 10/23/23 0811    Lab Status: Final result Specimen: Blood from Arm, Left Updated: 10/23/23 0839     Sodium 142 mmol/L      Potassium 4.1 mmol/L      Chloride 104 mmol/L      CO2 29 mmol/L      ANION GAP 9 mmol/L      BUN 25 mg/dL      Creatinine 1.00 mg/dL      Glucose 132 mg/dL Calcium 9.5 mg/dL      AST 12 U/L      ALT 20 U/L      Alkaline Phosphatase 53 U/L      Total Protein 6.7 g/dL      Albumin 3.8 g/dL      Total Bilirubin 0.42 mg/dL      eGFR 74 ml/min/1.73sq m     Narrative:      [facility] (CKD):     Stage 1 with normal or high GFR (GFR > 90 mL/min/1.73 square meters)    Stage 2 Mild CKD (GFR = 60-89 mL/min/1.73 square meters)    Stage 3A Moderate CKD (GFR = 45-59 mL/min/1.73 square meters)    Stage 3B Moderate CKD (GFR = 30-44 mL/min/1.73 square meters)    Stage 4 Severe CKD (GFR = 15-29 mL/min/1.73 square meters)    Stage 5 End Stage CKD (GFR <15 mL/min/1.73 square meters)  Note: GFR calculation is accurate only with a steady state creatinine    Magnesium [[id number]]  (Normal) Collected: 10/23/23 0811    Lab Status: Final result Specimen: Blood from Arm, Left Updated: 10/23/23 0839     Magnesium 1.9 mg/dL     Protime-INR [[id number]]  (Normal) Collected: 10/23/23 0811    Lab Status: Final result Specimen: Blood from Arm, Left Updated: 10/23/23 0834     Protime 13.6 seconds      INR 1.00    Fingerstick Glucose (POCT) [[id number]]  (Normal) Collected: 10/23/23 0806    Lab Status: Final result Updated: 10/23/23 0817     POC Glucose 115 mg/dl     POCT Blood Gas (CG8+) [[id number]]  (Abnormal) Collected: 10/23/23 0813    Lab Status: Final result Specimen: Venous Updated: 10/23/23 0816     ph, Cipriano ISTAT 7.343     pCO2, Cipriano i-STAT 49.4 mm HG      pO2, Cipriano i-STAT 25.0 mm HG      BE, i-STAT 0 mmol/L      HCO3, Cipriano i-STAT 26.8 mmol/L      CO2, i-STAT 28 mmol/L      O2 Sat, i-STAT 41 %      SODIUM, I-STAT 141 mmol/l      Potassium, i-STAT 4.2 mmol/L      Calcium, Ionized i-STAT 1.24 mmol/L      Hct, i-STAT 38 %      Hgb, i-STAT 12.9 g/dl      Glucose, i-STAT 132 mg/dl      Specimen Type VENOUS                   CTA ED chest PE study   Final Result by Daryn Brandon MD (10/23 5669)      No evidence for acute pulmonary emboli. Densely consolidated right lower lobe and right upper lobe. Aeration remaining in the right middle lobe. See above for further description and comparison. Significant mass effect on the right lower lung secondary to large mixed attenuation masslike    pleural abnormality described above. 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 Stable incompletely imaged right neck mass presumably representing metastatic adenopathy. Stable hypodensities within the liver. Workstation performed: [id number]         PE Study with CT Abdomen and Pelvis with contrast   Final Result by Mai Meckel, MD (10/23 1978)      1. Heterogeneous opacification of the right-sided pulmonary arteries may be due to artifact, but cannot exclude a pulmonary embolus particularly in the right pulmonary artery. Recommend repeating the CTA of the chest. There is no large saddle embolus or    CT findings of right heart strain. 2.  Mildly increased right lung consolidation and groundglass opacities in the left lower lobe. Cannot exclude superimposed pneumonia. 3.  Right pleural thickening and nodularity with a moderate size loculated complex collection, suspicious for pleural involvement by metastases. 4.  Partially imaged mass in the right neck suspicious for darien metastases. Medical record indicates a CT of the neck has been ordered to evaluate this finding but not yet performed.          I personally discussed this study with Mellisa Long on 10/23/2023 11:35 AM.                     Workstation performed: [id number]         XR chest portable - 1 view   ED Interpretation by Mellisa Long DO (10/23 6122)   Pulmonary vascular congestion with persistent right pleural effusion when compared to previous chest x-ray      Final Result by Jane Gray MD (10/23 6572)      Persistent right-sided loculated pleural effusion with superimposed consolidation better visualized on same day CT study. Groundglass opacification in the left lower lung zone, likely to represent atelectasis. Resident: Eulene Meigs, the attending radiologist, have reviewed the images and agree with the final report above. Workstation performed: [id number]                    Procedures  ECG 12 Lead Documentation Only    Date/Time: 10/23/2023 9:14 AM    Performed by: Abdi Marrero DO  Authorized by: Abdi Marrero DO    Indications / Diagnosis:  SOB  ECG reviewed by me, the ED Provider: yes    Patient location:  ED  Previous ECG:     Previous ECG:  Unavailable  Interpretation:     Interpretation: normal    Rate:     ECG rate:  137    ECG rate assessment: tachycardic    Rhythm:     Rhythm: sinus tachycardia    Ectopy:     Ectopy: none    QRS:     QRS axis:  Normal    QRS intervals:  Normal  Conduction:     Conduction: normal    ST segments:     ST segments:  Normal  T waves:     T waves: normal    CriticalCare Time    Date/Time: 10/23/2023 9:20 AM    Performed by: Abdi Marrero DO  Authorized by:  Abdi Marrero DO    Critical care provider statement:     Critical care time (minutes):  35    Critical care time was exclusive of:  Separately billable procedures and treating other patients and teaching time    Critical care was necessary to treat or prevent imminent or life-threatening deterioration of the following conditions:  Respiratory failure and sepsis    Critical care was time spent personally by me on the following activities:  Blood draw for specimens, obtaining history from patient or surrogate, development of treatment plan with patient or surrogate, discussions with consultants, evaluation of patient's response to treatment, examination of patient, ordering and performing treatments and interventions, ordering and review of laboratory studies, ordering and review of radiographic studies, re-evaluation of patient's condition, review of old charts and interpretation of cardiac output measurements    I assumed direction of critical care for this patient from another provider in my specialty: no             ED Course  ED Course as of 10/23/23 9201 Isabel Oct 23, 2023   2341 Patient reevaluated. Patient's work of breathing is slightly improved. Patient is still tachypneic on BiPAP. The patient symptomatically admits to feeling improved. Family is at the bedside at this time, I discussed my concerns for sepsis, underlying pneumonia possible pulmonary embolism as well as intra-abdominal pathology. [address] paged for admit as patient still requiring BIPAP support   1133 Discussed with radiologist, advised repeat dedicated PE study due to movement artifact   1136 Accepted to level 1 stepdown after discussion with critical care                            Initial Sepsis Screening       [address] Name 10/23/23 0850                Is the patient's history suggestive of a new or worsening infection? Yes (Proceed)  -ML        Suspected source of infection pneumonia  -ML        Indicate SIRS criteria Hyperthemia > 38.3C (100.9F) OR Hypothermia <36C (96.8F); Tachycardia > 90 bpm;Tachypnea > 20 resp per min  -ML        Are two or more of the above signs & symptoms of infection both present and new to the patient?  Yes (Proceed)  -ML        Assess for evidence of organ dysfunction: Are any of the below criteria present within 6 hours of suspected infection and SIRS criteria that are NOT considered to be chronic conditions? --        Date of presentation of septic shock --        Time of presentation of septic shock --        Fluid Resuscitation: A lesser volume than 30 ml/kg IV fluid will be given  -ML        The 30 mL/kg fluid bolus was not given to the patient despite having hypotension, a lactate of >= 4 mmol/L, or documentation of septic shock secondary to: Concern for fluid/volume overload;Heart Failure  -ML        Instead of the 30 ml/kg fluid bolus, the following volume of crystalloid fluid will be ordered: 500 ml  -ML        Of the following fluid type: Isolyte  -ML        Is the patient is persistently hypotensive in the hour after fluid bolus administration? If yes, patient meets criteria for vasopressor use. NO  -ML        Sepsis Note: Click "NEXT" below (NOT "close") to generate sepsis note based on above information. YES (proceed by clicking "NEXT")  -ML                  User Key  (r) = Recorded By, (t) = Taken By, (c) = Cosigned By      [address] [place] [address] Name Provider Type    KYLE Guillen DO Physician                    SBIRT 20yo+      Flowsheet Row Most Recent Value   Initial Alcohol Screen: US AUDIT-C     1. How often do you have a drink containing alcohol? 0 Filed at: 10/23/2023 0801   2. How many drinks containing alcohol do you have on a typical day you are drinking? 0 Filed at: 10/23/2023 0801   3a. Male UNDER 65: How often do you have five or more drinks on one occasion? 0 Filed at: 10/23/2023 0801   3b. FEMALE Any Age, or MALE 65+: How often do you have 4 or more drinks on one occassion? 0 Filed at: 10/23/2023 0801   Audit-C Score 0 Filed at: 10/23/2023 8146   EDWIN: How many times in the past year have you. .. Used an illegal drug or used a prescription medication for non-medical reasons?  Never Filed at: 10/23/2023 1586            Wells' Criteria for PE      Flowsheet Row Most Recent Value   Wells' Criteria for PE    Clinical signs and symptoms of DVT 0 Filed at: 10/23/2023 1287   PE is primary diagnosis or equally likely 0 Filed at: 10/23/2023 0855   HR >100 1.5 Filed at: 10/23/2023 0855   Immobilization at least 3 days or Surgery in the previous 4 weeks 0 Filed at: 10/23/2023 0855   Previous, objectively diagnosed PE or DVT 1.5 Filed at: 10/23/2023 0855   Hemoptysis 0 Filed at: 10/23/2023 2913   Malignancy with treatment within 6 months or palliative 1 Filed at: 10/23/2023 9608   Wells' Criteria Total 4 Filed at: 10/23/2023 0855                  Medical Decision Making  Differential diagnosis: COPD exacerbation, pneumonia, recurrent pleural effusion, anemia, electrolyte disturbance, arrhythmia, sepsis    Plan for aggressive respiratory support with BiPAP inline nebulization and steroids. IV antibiotics ordered and plan for admission to critical care service    Amount and/or Complexity of Data Reviewed  External Data Reviewed: notes. Details: 10/4 oncology note reviewed  Labs: ordered. Radiology: ordered and independent interpretation performed. Risk  OTC drugs. Prescription drug management. Decision regarding hospitalization.              Disposition  Final diagnoses:   Acute respiratory failure ([address])   Severe sepsis (HCC)   Recurrent pleural effusion on right     Time reflects when diagnosis was documented in both MDM as applicable and the Disposition within this note       Time User Action Codes Description Comment    10/23/2023 11:38 AM Rebbecca Ades B Add [J96.00] Acute respiratory failure ([address])     10/23/2023 11:38 AM Magdalena Saleem Add [A41.9,  R65.20] Severe sepsis ([address])     10/23/2023 11:38 AM Magdalena Saleem Add [J90] Recurrent pleural effusion on right     10/23/2023  1:24 PM Emely Ridgefield Add [J96.11] Acute on Chronic Hypoxemic respiratory failure, ([address])     10/23/2023  1:24 PM Emely Ridgefield Add [C79.31] Brain metastasis     10/23/2023  1:24 PM Emely Rand Add [C34.91] Adenocarcinoma of right lung ([address])     10/23/2023  3:25 PM Barnie Shad Add [R56.9] Seizure ([address])     10/23/2023  3:25 PM Barnie Shad Add [N18.31] Stage 3a chronic kidney disease ([address])     10/23/2023  3:25 PM Tika Huizar Add [A41.9] Sepsis without acute organ dysfunction, due to unspecified organism [facility])           ED Disposition       ED Disposition   Admit    Condition   Stable    Date/Time   Mon Oct 23, 2023 5737    Comment   Case was discussed with Jasson Lee and the patient's admission status was agreed to be Admission Status: inpatient status to the service of Dr. Jason Parker . Follow-up Information    None         Current Discharge Medication List        START taking these medications    Details   levETIRAcetam (KEPPRA) 1000 MG tablet TAKE 1.5 TABLETS (1,500 MG TOTAL) BY MOUTH 2 (TWO) TIMES A DAY  Qty: 270 tablet, Refills: 2    Associated Diagnoses: Partial seizure ([address])           CONTINUE these medications which have NOT CHANGED    Details   atorvastatin (LIPITOR) 40 mg tablet TAKE 1 TABLET BY MOUTH DAILY WITH DINNER  Qty: 90 tablet, Refills: 0    Associated Diagnoses: Acute CVA (cerebrovascular accident) ([address])      dexamethasone (DECADRON) 4 mg tablet Take 1 tablet (4 mg total) by mouth 2 (two) times a day with meals Taper as directed. Qty: 180 tablet, Refills: 1    Associated Diagnoses: Malignant neoplasm metastatic to brain (HCC)      enoxaparin (LOVENOX) 100 mg/mL INJECT 0.9 ML (90 MG TOTAL) UNDER THE SKIN EVERY 12 (TWELVE) HOURS  Qty: 60 mL, Refills: 5    Associated Diagnoses: Deep vein thrombosis (DVT) of proximal vein of both lower extremities, unspecified chronicity (HCC)      folic acid (FOLVITE) 1 mg tablet TAKE 1 TABLET BY MOUTH EVERY DAY  Qty: 90 tablet, Refills: 0    Associated Diagnoses: Malignant neoplasm of upper lobe of right lung (HCC)      furosemide (LASIX) 20 mg tablet Take 1 tablet (20 mg total) by mouth daily  Qty: 5 tablet, Refills: 0    Associated Diagnoses: Malignant neoplasm metastatic to brain ([address]);  Mass of upper lobe of right lung; Edema, unspecified type      pantoprazole (PROTONIX) 40 mg tablet Take 1 tablet (40 mg total) by mouth daily before breakfast  Qty: 30 tablet, Refills: 0    Associated Diagnoses: Hx of steroid therapy      tamsulosin (FLOMAX) 0.4 mg Take 1 capsule (0.4 mg total) by mouth daily after dinner  Qty: 90 capsule, Refills: 1    Associated Diagnoses: BPH with urinary obstruction      acetaminophen (TYLENOL) 325 mg tablet Take 2 tablets (650 mg total) by mouth 4 (four) times a day as needed for mild pain, headaches or fever  Refills: 0    Associated Diagnoses: At risk for pain      albuterol (2.5 mg/3 mL) 0.083 % nebulizer solution Take 3 mL (2.5 mg total) by nebulization every 6 (six) hours as needed for wheezing or shortness of breath  Qty: 3 mL, Refills: 2    Associated Diagnoses: COPD mixed type (HCC)      albuterol (ProAir HFA) 90 mcg/act inhaler Inhale 2 puffs every 6 (six) hours as needed for wheezing or shortness of breath  Qty: 8 g, Refills: 0    Associated Diagnoses: COPD mixed type ([address]);  Malignant neoplasm of upper lobe of right lung (HCC)      ferrous sulfate 324 (65 Fe) mg Take 1 tablet (324 mg total) by mouth 2 (two) times a day before meals  Qty: 60 tablet, Refills: 0    Associated Diagnoses: Iron deficiency anemia, unspecified iron deficiency anemia type                 PDMP Review         Value Time User    PDMP Reviewed  Yes 8/13/2023  7:29 AM June Arana MD            ED Provider  Electronically Signed by             Yimi Elizalde DO  10/23/23 4100

## 2023-10-23 NOTE — ASSESSMENT & PLAN NOTE
POA: increased WOB in the ED requiring the initiation of BIPAP  Hx of Stage IV lung CA with mets  Currently on BIPAP 12/6 40%. Still tachypneic with a RR in 30's  CTA chest significant for mildly increased R. Lung consolidation and groundglass opacities in the LLL. Cannot exclude superimposed PNA.   Awaiting repeat CTA to evaluate for PE  RVP negative  Wears 2 L O2 at home during the night  On albuterol at home      Plan:  C/w BIPAP for WOB  Keep NPO while on BIPAP  Follow up CT results  Titrate O2 to maintain O2 > 88%  PRN Xopenex for SOB/wheezing

## 2023-10-23 NOTE — ACP (ADVANCE CARE PLANNING)
10/23/2023 3:26 PM -  Vee Ramos chart and case were reviewed by Carlee Pollard PA-C. I spoke with critical care team Elen CLEVELAND about the case. Code status has already been discussed and patient and family would like to maintain level 1 full code. The patient is receiving cancer treatment as an OP and will discuss with his oncologist about this as an outpatient. He has been weaned from BiPAP. Palliative Care was asked to help establish the patient with an outpatient palliative care program.   I met Natalie Gutierrez at bedside with wife/Lulu, daughter/Rosalva, and son/Stevie. They are familiar with palliative care and are interested in following as an outpatient. Because the patient is more or less homebound and it is difficult for him to get out of the home, they would like a home program.  [facility] does not offer a home palliative care program and I told them that I would help arrange home palliative care program through an external agency. They are agreeable to this. I discussed with case management and added the palliative care outpatient referral for external agency. I will cancel the palliative care consult as no inpatient palliative care services were rendered today. Call again if situation changes and I will gladly see in consult. For urgent issues or any questions/concerns, please notify on-call provider via [address] You may also call our answering service 24/7 at [phone]. Carlee Pollard PA-C  Palliative and Supportive Care  Clinic/Answering Service: [phone]  You can find me on [employer]!

## 2023-10-23 NOTE — H&P
[facility]  H&P  Name: Cathy Johnson 68 y.o. male I MRN: [id number]  Unit/Bed#: ED 09 I Date of Admission: 10/23/2023   Date of Service: 10/23/2023 I Hospital Day: 0      Assessment/Plan   * Acute on Chronic Hypoxemic respiratory failure, ([address])  Assessment & Plan  POA: increased WOB in the ED requiring the initiation of BIPAP  Hx of Stage IV lung CA with mets  Currently on BIPAP 12/6 40%. Still tachypneic with a RR in 30's  CTA chest significant for mildly increased R. Lung consolidation and groundglass opacities in the LLL. Cannot exclude superimposed PNA. Awaiting repeat CTA to evaluate for PE  RVP negative  Wears 2 L O2 at home during the night  On albuterol at home      Plan:  C/w BIPAP for WOB  Keep NPO while on BIPAP  Follow up CT results  Titrate O2 to maintain O2 > 88%  PRN Xopenex for SOB/wheezing     Sepsis ([address])  Assessment & Plan  POA: Tachycardia, tachypnea. Febrile, Tmax 102. CT LLL ground glass opacities. UA negative for UTI  No leukocytosis, Lactate 5.1 repeat 2.6, Procal negative 0.18  In the ED the patient was given 500 ml of IVF- full 30 ml/kg were not given due to concern for fluid overload, blood cultures drawn, started on Cefepime    Plan:  Continue with IV Cefepime  Repeat serum lactate every 2 hours until cleared  Follow up culture results  Repeat CBC w/ diff in the AM, monitor and trend WBC  Repeat Procal in the AM- Consider discontinuing ABX therapy if negative  Repeat CTA chest to rule out PNA    Adenocarcinoma of lung  Assessment & Plan  Dx initially 09/2021 stage IIIC. Received multiple rounds of chemo/radiation. Found to have brain mets 02/2023 to the left precentral gyrus. Cancer upstaged to stage IV. Currently on daily dexamethasone, Lovenox for [facility] as well as a history of DVT. Currently receiving combination Avastatin and Pembrolizumab at the infusion center. Last chemo 10/18/2023. Avastatin is a new medication for the patient.   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 Unlikely to benefit from ASEPT catheter    Plan:  Continue with home dose dexamethasone and Lovenox for DVT   Extensive conversation with patient and wife regarding code status. Patient and wife at this time would like to remain full code. Palliative care consult  Recommend follow up outpatient with oncology after discharge    HLD (hyperlipidemia)  Assessment & Plan  By history  Last lipid panel from 06/2023 with triglycerides of 92, LDL 62  Home regimen: Atorvastatin 40 mg Qday    Plan:  Restart Atorvastatin once able to safely take PO    BPH (benign prostatic hyperplasia)  Assessment & Plan  By history    Plan:  Restart home dose Tamsolosin     GERD (gastroesophageal reflux disease)  Assessment & Plan  By history  Home regimen: Protonix    Plan:  Continue with home dose Protonix      Seizure ([address])  Assessment & Plan  By history  Last seen 9/25 in [address] [place] [address] ED for focal seizure following chemotherapy  Home rgimen: Keppra 1500 mg PO BID    Plan:  Continue with Keppra. Convert to IV until able to take PO           History of Present Illness     HPI: Eduarda Campbell is a 68 y.o. 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 Presented to [address] [place] [address] 10/23/2023 after experiencing chills at home. Patient reports checking temperature at home which was 102.6 F, and the patient came to the ED for further evaluation. In the ED the patient was tachycardic and tachypneic with increased WOB requiring BIPAP. Lab work was significant for an elevated lactate of 5.1. RVP was negative. CTA CAP performed in the ED and was significant for a new LLL ground glass opacity. Patient is currently pending repeat imaging to rule out PE.  In the ED the patient was given 500 ml of crystalloid, 30 ml/kg of fluids were not given secondary to concern for volume overload, 60 mg IV lasix were given without significant urine output, blood cultures x 2 were drawn, and patient was given IV Cefepime empirically for PNA. ICU was asked to evaluate the patient for admission. Patient to be admitted to the ICU as a SD1 under the critical care service for acute on chronic hypoxemic respiratory failure requiring continuous BIPAP and sepsis. History obtained from spouse, chart review, and the patient. Review of Systems   Constitutional:  Positive for chills and fever. HENT:  Negative for congestion and sore throat. Eyes: Negative. Respiratory:  Positive for cough and shortness of breath. Negative for chest tightness and wheezing. Reports "wet cough" but unable to expectorate anything    Cardiovascular:  Positive for leg swelling. Negative for chest pain and palpitations. Gastrointestinal:  Negative for abdominal pain, constipation, diarrhea, nausea and vomiting. Endocrine: Negative. Genitourinary:  Negative for decreased urine volume, difficulty urinating and dysuria. Musculoskeletal: Negative. Skin: Negative. Neurological:  Negative for dizziness, syncope, weakness, light-headedness and headaches. Hematological: Negative. Psychiatric/Behavioral: Negative.             Historical Information   Past Medical History:  7/31/2021: Acute respiratory failure with hypoxia ([address])  8/9/2021: Chronic respiratory failure with hypoxia ([address])  8/13/2021: Impaired mobility and ADLs  No date: Lung cancer ([address])  No date: Stroke [facility]) Past Surgical History:  8/5/2021: IR BIOPSY LUNG  2/24/2022: IR THORACENTESIS  8/1/2022: IR THORACENTESIS   Current Outpatient Medications   Medication Instructions    acetaminophen (TYLENOL) 650 mg, Oral, 4 times daily PRN    albuterol (ProAir HFA) 90 mcg/act inhaler 2 puffs, Inhalation, Every 6 hours PRN    albuterol 2.5 mg, Nebulization, Every 6 hours PRN atorvastatin (LIPITOR) 40 mg, Oral, Daily with dinner    dexamethasone (DECADRON) 4 mg, Oral, 2 times daily with meals, Taper as directed. enoxaparin (LOVENOX) 1 mg/kg, Subcutaneous, Every 12 hours    ferrous sulfate 324 mg, Oral, 2 times daily before meals    folic acid (FOLVITE) 4,709 mcg, Oral, Daily    furosemide (LASIX) 20 mg, Oral, Daily    levETIRAcetam (KEPPRA) 1,500 mg, Oral, 2 times daily    pantoprazole (PROTONIX) 40 mg, Oral, Daily before breakfast    tamsulosin (FLOMAX) 0.4 mg, Oral, Daily after dinner    Allergies   Allergen Reactions    Doxycycline Rash      Social History     Tobacco Use    Smoking status: Former     Packs/day: 3.00     Years: 39.00     Total pack years: 117.00     Types: Cigarettes     Start date:      Quit date:      Years since quittin.8    Smokeless tobacco: Never   Vaping Use    Vaping Use: Never used   Substance Use Topics    Alcohol use: Not Currently     Comment: No ETOH since Summer 2021     Drug use: Never    Family History   Problem Relation Age of Onset    Prostate cancer Brother     Lung cancer Brother           Objective                            Vitals I/O      Most Recent Min/Max in 24hrs   Temp (!) 102.1 °F (38.9 °C) Temp  Min: 102.1 °F (38.9 °C)  Max: 102.1 °F (38.9 °C)   Pulse (!) 122 Pulse  Min: 122  Max: 148   Resp (!) 33 Resp  Min: 14  Max: 49   /73 BP  Min: 113/71  Max: 137/84   O2 Sat 96 % SpO2  Min: 91 %  Max: 99 %      Intake/Output Summary (Last 24 hours) at 10/23/2023 1249  Last data filed at 10/23/2023 1045  Gross per 24 hour   Intake 550 ml   Output --   Net 550 ml         No diet orders on file     Invasive Monitoring Physical exam    Physical Exam  Eyes:      General: Vision grossly intact. No scleral icterus. Extraocular Movements: Extraocular movements intact. Conjunctiva/sclera: Conjunctivae normal.      Pupils: Pupils are equal, round, and reactive to light. Skin:     General: Skin is warm and dry.    HENT: Head: Normocephalic and atraumatic. Mouth/Throat:      Mouth: Mucous membranes are dry. Cardiovascular:      Rate and Rhythm: Regular rhythm. Tachycardia present. Pulses: Normal pulses. Heart sounds: Normal heart sounds. Musculoskeletal:         General: Normal range of motion. Right lower leg: 3+ Edema present. Left lower leg: 3+ Edema present. Abdominal:      General: Bowel sounds are normal.      Palpations: Abdomen is soft. Tenderness: There is no abdominal tenderness. Constitutional:       General: He is awake. Appearance: He is ill-appearing. Comments: BIPAP in place    Pulmonary:      Effort: Tachypnea present. Breath sounds: Examination of the right-middle field reveals decreased breath sounds. Examination of the right-lower field reveals decreased breath sounds. Decreased breath sounds present. Neurological:      General: No focal deficit present. Mental Status: He is alert and oriented to person, place and time. Mental status is at baseline. He is CAM ICU negative. Sensory: Sensation is intact. Motor: gross motor function is at baseline for patient. Cough reflex and gag reflex intact. Diagnostic Studies      EKG: ST  Imaging:  I have personally reviewed pertinent reports. and I have personally reviewed pertinent films in PACS     Medications:  Scheduled PRN        Continuous    No current facility-administered medications for this encounter.        Labs:    CBC    Recent Labs     10/23/23  0811 10/23/23  0813   WBC 7.43  --    HGB 12.1 12.9   HCT 40.5 38   *  --      BMP    Recent Labs     10/23/23  0811 10/23/23  0813   SODIUM 142  --    K 4.1  --      --    CO2 29 28   AGAP 9  --    BUN 25  --    CREATININE 1.00  --    CALCIUM 9.5  --        Coags    Recent Labs     10/23/23  0811   INR 1.00        Additional Electrolytes  Recent Labs     10/23/23  0811 10/23/23  0813   MG 1.9  --    CAIONIZED  --  1.24 Blood Gas    No recent results  No recent results LFTs  Recent Labs     10/23/23  0811   ALT 20   AST 12*   ALKPHOS 53   ALB 3.8   TBILI 0.42       Infectious  Recent Labs     10/23/23  0811   PROCALCITONI 0.13     Glucose  Recent Labs     10/23/23  0811   GLUC 132             Critical Care Time Delivered : Upon my evaluation, this patient had a high probability of imminent or life-threatening deterioration due to acute on chronic respiratory failure, sepsis, which required my direct attention, intervention, and personal management. I have personally provided 45 minutes of critical care time, exclusive of procedures, teaching, family meetings, and any prior time recorded by providers other than myself.    Anticipated Length of Stay is > 2 midnights  CRISTOFER Carmona

## 2023-10-23 NOTE — PLAN OF CARE
Problem: Potential for Falls  Goal: Patient will remain free of falls  Description: INTERVENTIONS:  - Educate patient/family on patient safety including physical limitations  - Instruct patient to call for assistance with activity   - Consult OT/PT to assist with strengthening/mobility   - Keep Call bell within reach  - Keep bed low and locked with side rails adjusted as appropriate  - Keep care items and personal belongings within reach  - Initiate and maintain comfort rounds  - Make Fall Risk Sign visible to staff  - Offer Toileting every 2 Hours, in advance of need  - Initiate/Maintain bed alarm  - Obtain necessary fall risk management equipment  Problem: GENITOURINARY - ADULT  Goal: Maintains or returns to baseline urinary function  Description: INTERVENTIONS:  - Assess urinary function  - Encourage oral fluids to ensure adequate hydration if ordered  - Administer IV fluids as ordered to ensure adequate hydration  - Administer ordered medications as needed  - Offer frequent toileting  - Follow urinary retention protocol if ordered  10/23/2023 1416 by Bashir Pena RN  Outcome: Progressing  10/23/2023 1416 by Bashir Pena RN  Outcome: Progressing  Goal: Absence of urinary retention  Description: INTERVENTIONS:  - Assess patient’s ability to void and empty bladder  - Monitor I/O  - Bladder scan as needed  - Discuss with physician/AP medications to alleviate retention as needed  - Discuss catheterization for long term situations as appropriate  10/23/2023 1416 by Bashir Pena RN  Outcome: Progressing  10/23/2023 1416 by Bashir Pena RN  Outcome: Progressing  Goal: Urinary catheter remains patent  Description: INTERVENTIONS:  - Assess patency of urinary catheter  - If patient has a chronic cadet, consider changing catheter if non-functioning  - Follow guidelines for intermittent irrigation of non-functioning urinary catheter  10/23/2023 1416 by Bashir Pena RN  Outcome: Progressing  10/23/2023 1416 by Betty Gomez RN  Outcome: Progressing     Problem: SKIN/TISSUE INTEGRITY - ADULT  Goal: Skin Integrity remains intact(Skin Breakdown Prevention)  Description: Assess:  -Perform [place] assessment -Clean and moisturize skin-Inspect skin when repositioning, toileting, and assisting with ADLS    -Assess extremities for adequate circulation and sensation     Bed Management:  -Have minimal linens on bed & keep smooth, unwrinkled  -Change linens as needed when moist or perspiring  -Avoid sitting or lying in one position for more than 2 hours while in bed  -Keep HOB at 30 degrees     Toileting:  -Offer bedside commode    -Encourage activity and walks on unit  -Encourage or provide ROM exercises   -Turn and reposition patient every 2 Hours  -Use appropriate equipment to lift or move patient in bed  -Instruct/ Assist with weight shifting every 2 hours  when out of bed in chair  -Consider limitation of chair time 2 hour intervals    Skin Care:  -Avoid use of baby powder, tape, friction and shearing, hot water or constrictive clothing  -Relieve pressure over bony prominences-Do not massage red bony areas    10/23/2023 1416 by Betty Gomez RN  Outcome: Progressing  10/23/2023 1416 by Betty Gomez RN  Outcome: Progressing  Goal: Incision(s), wounds(s) or drain site(s) healing without S/S of infection  Description: INTERVENTIONS  - Assess and document dressing, incision, wound bed, drain sites and surrounding tissue  - Provide patient and family education  10/23/2023 1416 by Betty Gomez RN  Outcome: Progressing  10/23/2023 1416 by Betty Gomez RN  Outcome: Progressing  Goal: Pressure injury heals and does not worsen  Description: Interventions:  - Implement low air loss mattress or specialty surface (Criteria met)  - Apply silicone foam dressing    - Consider nutrition services referral as needed  10/23/2023 1416 by Betty Gomez RN  Outcome: Progressing  10/23/2023 1416 by Josephine Alexandre, RN  Outcome: Progressing   - Apply yellow socks and bracelet for high fall risk patients  - Consider moving patient to room near nurses station  10/23/2023 1416 by Josephine Alexandre RN  Outcome: Progressing  10/23/2023 1416 by Josephine Alexandre, GILMAR  Outcome: Progressing

## 2023-10-24 ENCOUNTER — APPOINTMENT (INPATIENT)
Dept: RADIOLOGY | Facility: HOSPITAL | Age: 73
DRG: 867 | End: 2023-10-24
Payer: MEDICARE

## 2023-10-24 PROBLEM — R78.81 GRAM-NEGATIVE BACTEREMIA: Status: ACTIVE | Noted: 2023-10-24

## 2023-10-24 LAB
ALBUMIN SERPL BCP-MCNC: 3 G/DL (ref 3.5–5)
ALP SERPL-CCNC: 35 U/L (ref 34–104)
ALT SERPL W P-5'-P-CCNC: 15 U/L (ref 7–52)
ANION GAP SERPL CALCULATED.3IONS-SCNC: 4 MMOL/L
ANISOCYTOSIS BLD QL SMEAR: PRESENT
AST SERPL W P-5'-P-CCNC: 11 U/L (ref 13–39)
BASOPHILS # BLD MANUAL: 0 THOUSAND/UL (ref 0–0.1)
BASOPHILS NFR MAR MANUAL: 0 % (ref 0–1)
BILIRUB SERPL-MCNC: 0.44 MG/DL (ref 0.2–1)
BUN SERPL-MCNC: 26 MG/DL (ref 5–25)
CA-I BLD-SCNC: 1.14 MMOL/L (ref 1.12–1.32)
CALCIUM ALBUM COR SERPL-MCNC: 9.3 MG/DL (ref 8.3–10.1)
CALCIUM SERPL-MCNC: 8.5 MG/DL (ref 8.4–10.2)
CHLORIDE SERPL-SCNC: 108 MMOL/L (ref 96–108)
CO2 SERPL-SCNC: 31 MMOL/L (ref 21–32)
CREAT SERPL-MCNC: 0.88 MG/DL (ref 0.6–1.3)
EOSINOPHIL # BLD MANUAL: 0 THOUSAND/UL (ref 0–0.4)
EOSINOPHIL NFR BLD MANUAL: 0 % (ref 0–6)
ERYTHROCYTE [DISTWIDTH] IN BLOOD BY AUTOMATED COUNT: 23.1 % (ref 11.6–15.1)
GFR SERPL CREATININE-BSD FRML MDRD: 85 ML/MIN/1.73SQ M
GLUCOSE SERPL-MCNC: 120 MG/DL (ref 65–140)
GLUCOSE SERPL-MCNC: 137 MG/DL (ref 65–140)
GLUCOSE SERPL-MCNC: 170 MG/DL (ref 65–140)
GLUCOSE SERPL-MCNC: 90 MG/DL (ref 65–140)
GLUCOSE SERPL-MCNC: 96 MG/DL (ref 65–140)
HCT VFR BLD AUTO: 31.1 % (ref 36.5–49.3)
HGB BLD-MCNC: 9.5 G/DL (ref 12–17)
L PNEUMO1 AG UR QL IA.RAPID: NEGATIVE
LYMPHOCYTES # BLD AUTO: 0.63 THOUSAND/UL (ref 0.6–4.47)
LYMPHOCYTES # BLD AUTO: 10 % (ref 14–44)
MCH RBC QN AUTO: 29.1 PG (ref 26.8–34.3)
MCHC RBC AUTO-ENTMCNC: 30.5 G/DL (ref 31.4–37.4)
MCV RBC AUTO: 95 FL (ref 82–98)
MONOCYTES # BLD AUTO: 0.06 THOUSAND/UL (ref 0–1.22)
MONOCYTES NFR BLD: 1 % (ref 4–12)
NEUTROPHILS # BLD MANUAL: 5.63 THOUSAND/UL (ref 1.85–7.62)
NEUTS BAND NFR BLD MANUAL: 5 % (ref 0–8)
NEUTS SEG NFR BLD AUTO: 84 % (ref 43–75)
NRBC BLD AUTO-RTO: 1 /100 WBC (ref 0–2)
OVALOCYTES BLD QL SMEAR: PRESENT
PLATELET # BLD AUTO: 99 THOUSANDS/UL (ref 149–390)
PLATELET BLD QL SMEAR: ABNORMAL
PMV BLD AUTO: 8.6 FL (ref 8.9–12.7)
POTASSIUM SERPL-SCNC: 4 MMOL/L (ref 3.5–5.3)
PROCALCITONIN SERPL-MCNC: 21.26 NG/ML
PROT SERPL-MCNC: 5.5 G/DL (ref 6.4–8.4)
RBC # BLD AUTO: 3.27 MILLION/UL (ref 3.88–5.62)
RBC MORPH BLD: PRESENT
S PNEUM AG UR QL: NEGATIVE
SODIUM SERPL-SCNC: 143 MMOL/L (ref 135–147)
WBC # BLD AUTO: 6.33 THOUSAND/UL (ref 4.31–10.16)

## 2023-10-24 PROCEDURE — 85007 BL SMEAR W/DIFF WBC COUNT: CPT

## 2023-10-24 PROCEDURE — 92611 MOTION FLUOROSCOPY/SWALLOW: CPT

## 2023-10-24 PROCEDURE — NC001 PR NO CHARGE

## 2023-10-24 PROCEDURE — 85027 COMPLETE CBC AUTOMATED: CPT

## 2023-10-24 PROCEDURE — 80053 COMPREHEN METABOLIC PANEL: CPT

## 2023-10-24 PROCEDURE — 87449 NOS EACH ORGANISM AG IA: CPT | Performed by: PHYSICIAN ASSISTANT

## 2023-10-24 PROCEDURE — 99223 1ST HOSP IP/OBS HIGH 75: CPT | Performed by: NURSE PRACTITIONER

## 2023-10-24 PROCEDURE — 99232 SBSQ HOSP IP/OBS MODERATE 35: CPT | Performed by: INTERNAL MEDICINE

## 2023-10-24 PROCEDURE — 92610 EVALUATE SWALLOWING FUNCTION: CPT

## 2023-10-24 PROCEDURE — 94640 AIRWAY INHALATION TREATMENT: CPT

## 2023-10-24 PROCEDURE — 82948 REAGENT STRIP/BLOOD GLUCOSE: CPT

## 2023-10-24 PROCEDURE — 94760 N-INVAS EAR/PLS OXIMETRY 1: CPT

## 2023-10-24 PROCEDURE — 84145 PROCALCITONIN (PCT): CPT

## 2023-10-24 PROCEDURE — 82330 ASSAY OF CALCIUM: CPT

## 2023-10-24 PROCEDURE — 74230 X-RAY XM SWLNG FUNCJ C+: CPT

## 2023-10-24 RX ADMIN — DEXAMETHASONE 4 MG: 2 TABLET ORAL at 20:04

## 2023-10-24 RX ADMIN — CEFEPIME HYDROCHLORIDE 2000 MG: 2 INJECTION, SOLUTION INTRAVENOUS at 09:26

## 2023-10-24 RX ADMIN — LEVETIRACETAM 1500 MG: 500 TABLET, FILM COATED ORAL at 20:04

## 2023-10-24 RX ADMIN — ATORVASTATIN CALCIUM 40 MG: 40 TABLET, FILM COATED ORAL at 16:07

## 2023-10-24 RX ADMIN — VANCOMYCIN HYDROCHLORIDE 1000 MG: 1 INJECTION, SOLUTION INTRAVENOUS at 06:09

## 2023-10-24 RX ADMIN — TAMSULOSIN HYDROCHLORIDE 0.4 MG: 0.4 CAPSULE ORAL at 16:07

## 2023-10-24 RX ADMIN — ENOXAPARIN SODIUM 100 MG: 100 INJECTION SUBCUTANEOUS at 14:54

## 2023-10-24 RX ADMIN — LEVALBUTEROL HYDROCHLORIDE 1.25 MG: 1.25 SOLUTION RESPIRATORY (INHALATION) at 07:50

## 2023-10-24 RX ADMIN — PANTOPRAZOLE SODIUM 40 MG: 40 TABLET, DELAYED RELEASE ORAL at 06:11

## 2023-10-24 RX ADMIN — CEFEPIME HYDROCHLORIDE 2000 MG: 2 INJECTION, SOLUTION INTRAVENOUS at 20:03

## 2023-10-24 RX ADMIN — CEFEPIME HYDROCHLORIDE 2000 MG: 2 INJECTION, SOLUTION INTRAVENOUS at 02:13

## 2023-10-24 RX ADMIN — DEXAMETHASONE 4 MG: 2 TABLET ORAL at 09:26

## 2023-10-24 RX ADMIN — CHLORHEXIDINE GLUCONATE 15 ML: 1.2 SOLUTION ORAL at 09:41

## 2023-10-24 RX ADMIN — LEVETIRACETAM 1500 MG: 500 TABLET, FILM COATED ORAL at 09:26

## 2023-10-24 RX ADMIN — LEVALBUTEROL HYDROCHLORIDE 1.25 MG: 1.25 SOLUTION RESPIRATORY (INHALATION) at 14:10

## 2023-10-24 RX ADMIN — ENOXAPARIN SODIUM 100 MG: 100 INJECTION SUBCUTANEOUS at 02:13

## 2023-10-24 RX ADMIN — INSULIN LISPRO 1 UNITS: 100 INJECTION, SOLUTION INTRAVENOUS; SUBCUTANEOUS at 16:14

## 2023-10-24 NOTE — CONSULTS
Medical Oncology/Hematology Consult Note  Shelly Meza, 68 y.o., 1950  /-[birth year], [id number]  10/24/23    Assessment and Plan:    Stage IV adenocarcinoma of the lung  2. Brain metastasis  Initially diagnosed 8/2021 with NSCLC adenocarcinoma of right upper lobe. Stage IIIC (cT3, cN3, cM0)   He completed chemotherapy for cytoreduction with Taxol/carbo/Pembro followed by concurrent chemoradiation, completed 4/21/2022. He has been on maintenance systemic immunotherapy with Pembro    He was found to have a solitary brain metastasis in the left precentral gyrus region with surrounding edema treated with SRS on 2/22/2023  He has had right upper extremity weakness, focal seizures involving right arm secondary to left-sided brain metastasis. He is on Keppra and has been unable to wean off steroid due to increased weakness, ambulatory dysfunction, cognitive issues every time steroid dose is tapered  Current dose of steroid is dexamethasone 4 mg twice daily    MRI Brain 10/2/23 shows increase in size of left posterior frontal lobe intracranial metastasis with progression of surrounding vasogenic edema. No new suspicious lesions    10/4/23 patient's case discussed at neuro-oncology tumor board. No neurosurgical intervention or additional radiotherapy indicated. 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 10/18/23 treatment with Pembro and Avastin    3. Acute on chronic respiratory failure  CTA Chest PE study negative for PE. However, findings show concern for postobstructive pneumonia, chronic loculated and complicated right pleural effusion likely malignant which is slightly larger. 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 History of DVT  On Lovenox    6. Thrombocytopenia  Mild, likely secondary to recent treatment with Avastin complicated by bacteremia  Safe to continue Lovenox as long as platelets remain >/= 50. Monitor for bleeding    Check CBC with differential daily      - as with any advanced malignancy, I did review GOC; patient remains treatment focused at this time. Level 1, full code. I did review imaging which is concerning for disease progression. Declining performance status. Avastin recently added. He does have anasarca, which wife attributes to persistent steroid use. Unfortunately, he has been unable to be weaned completely off dexamethasone. Would benefit from palliative care. Medical oncology will follow along. He is currently scheduled for outpatient follow-up with Dr. Koffi Ritter on 11/6/2023      Please do not hesitate to contact me if you have any questions or need additional information. Thank you for this consult. August Lomeli MSN, [address] [facility], [address] [place] [address]  Hematology Oncology Nurse Practitioner      Reason for Consultation: Metastatic adenocarcinoma of right lung        History of present illness:   Robin Cruz is a 68 y.o. male well-known to medical oncology for history of stage IV adenocarcinoma of the lung with brain metastasis. He is undergoing treatment with Pembro and Avastin. He presented to ED yesterday for evaluation of severe respiratory distress. He was febrile, tachycardic, initially required BiPAP. Labs show significantly elevated procalcitonin, elevated lactate.   CT imaging concerning for postobstructive pneumonia    Robin rCuz current outpatient oncology regimen is: Andreas Dusky, last dose 10/18/2023      Oncologic History:  Primary Outpatient Hematology/Oncology Provider: Lupis Vizcaino DO  Oncology History Overview Note   9/2021 - Stage IV adenocarcinoma of the lung     10/2021 - 1/2022 - carbo/pem/pebro     3/2022 - 4/2022 - carbo/taxol/RT     5/2022 - maintenance pembro     2/2023 - solitary brain met - SBRT     Adenocarcinoma of lung   8/2021 Initial Diagnosis    Adenocarcinoma of lung     8/5/2021 Biopsy    Right lung apex, image-guided core needle biopsy:  - Adenocarcinoma      10/6/2021 - 1/26/2022 Chemotherapy    cyanocobalamin, 1,000 mcg, Intramuscular, Once, 3 of 3 cycles  Administration: 1,000 mcg (11/24/2021), 1,000 mcg (1/26/2022), 1,000 mcg (10/6/2021)  pegfilgrastim (Reubin Muckle), 6 mg, Subcutaneous, Once, 1 of 1 cycle  Administration: 6 mg (11/26/2021)  pegfilgrastim (Latrelle Mini), 6 mg, Subcutaneous, Once, 6 of 6 cycles  Administration: 6 mg (10/6/2021), 6 mg (11/3/2021), 6 mg (11/24/2021), 6 mg (12/15/2021), 6 mg (1/5/2022)  fosaprepitant (EMEND) IVPB, 150 mg, Intravenous, Once, 6 of 6 cycles  Administration: 150 mg (10/6/2021), 150 mg (11/24/2021), 150 mg (12/15/2021), 150 mg (1/26/2022), 150 mg (11/3/2021), 150 mg (1/5/2022)  CARBOplatin (PARAPLATIN) IVPB (GO AUC DOSING), 519.5 mg, Intravenous, Once, 6 of 6 cycles  Administration: 519.5 mg (10/6/2021), 574 mg (11/24/2021), 600 mg (12/15/2021), 533 mg (1/26/2022), 604.5 mg (11/3/2021), 563 mg (1/5/2022)  pemetrexed (ALIMTA) chemo infusion, 970 mg, Intravenous, Once, 6 of 6 cycles  Administration: 1,000 mg (10/6/2021), 1,000 mg (11/24/2021), 1,000 mg (12/15/2021), 1,000 mg (1/26/2022), 1,000 mg (11/3/2021), 1,000 mg (1/5/2022)  pembrolizumab (KEYTRUDA) IVPB, 200 mg, Intravenous, Once, 6 of 6 cycles  Administration: 200 mg (10/6/2021), 200 mg (11/24/2021), 200 mg (12/15/2021), 200 mg (1/26/2022), 200 mg (11/3/2021), 200 mg (1/5/2022)     3/7/2022 -  Chemotherapy    CARBOplatin (PARAPLATIN) IVPB ([facility] AUC DOSING), , Intravenous, Once, 6 of 6 cycles  Administration: 211.6 mg (3/7/2022), 208 mg (3/14/2022), 238.8 mg (3/21/2022), 211.6 mg (3/28/2022), 215.2 mg (4/4/2022), 213.4 mg (4/18/2022)  bevacizumab (AVASTIN) IVPB, 622.5 mg (100 % of original dose 7.5 mg/kg), Intravenous, Once, 1 of 4 cycles  Dose modification: 7.5 mg/kg (original dose 7.5 mg/kg, Cycle 31)  Administration: 600 mg (10/18/2023)  PACLItaxel (TAXOL) chemo IVPB, 50 mg/m2 = 99 mg, Intravenous, Once, 6 of 6 cycles  Administration: 99 mg (3/7/2022), 99 mg (3/14/2022), 99 mg (3/21/2022), 99 mg (3/28/2022), 99 mg (4/4/2022), 99 mg (4/18/2022)  pembrolizumab (KEYTRUDA) IVPB, 200 mg, Intravenous, Once, 27 of 33 cycles  Administration: 200 mg (3/7/2022), 200 mg (3/28/2022), 200 mg (4/25/2022), 200 mg (5/16/2022), 200 mg (6/6/2022), 200 mg (6/27/2022), 200 mg (7/18/2022), 200 mg (8/8/2022), 200 mg (8/29/2022), 200 mg (9/19/2022), 200 mg (10/10/2022), 200 mg (10/31/2022), 200 mg (11/21/2022), 200 mg (12/12/2022), 200 mg (1/3/2023), 200 mg (1/23/2023), 200 mg (2/13/2023), 200 mg (3/27/2023), 200 mg (4/26/2023), 200 mg (5/17/2023), 200 mg (6/7/2023), 200 mg (6/28/2023), 200 mg (7/19/2023), 200 mg (8/9/2023), 200 mg (8/30/2023), 200 mg (9/28/2023), 200 mg (10/18/2023)     2/22/2023 - 2/22/2023 Radiation    SRS left precentral gyrus   2000 cGy    Dr. Rich Haile     Malignant neoplasm of upper lobe of right lung ([address])   9/3/2021 Initial Diagnosis    Malignant neoplasm of upper lobe of right lung ([address])     9/23/2021 -  Cancer Staged    Staging form: Lung, AJCC 8th Edition  - Clinical stage from 9/23/2021: Stage IIIC (cT3, cN3, cM0) - Signed by August Seyd MD on 9/23/2021  Histopathologic type:  Adenocarcinoma, NOS       10/6/2021 - 1/26/2022 Chemotherapy    cyanocobalamin, 1,000 mcg, Intramuscular, Once, 3 of 3 cycles  Administration: 1,000 mcg (11/24/2021), 1,000 mcg (1/26/2022), 1,000 mcg (10/6/2021)  pegfilgrastim (Royetta Brood), 6 mg, Subcutaneous, Once, 1 of 1 cycle  Administration: 6 mg (11/26/2021)  pegfilgrastim (Sarah Hurt), 6 mg, Subcutaneous, Once, 6 of 6 cycles  Administration: 6 mg (10/6/2021), 6 mg (11/3/2021), 6 mg (11/24/2021), 6 mg (12/15/2021), 6 mg (1/5/2022)  fosaprepitant (EMEND) IVPB, 150 mg, Intravenous, Once, 6 of 6 cycles  Administration: 150 mg (10/6/2021), 150 mg (11/24/2021), 150 mg (12/15/2021), 150 mg (1/26/2022), 150 mg (11/3/2021), 150 mg (1/5/2022)  CARBOplatin (PARAPLATIN) IVPB (GOG AUC DOSING), 519.5 mg, Intravenous, Once, 6 of 6 cycles  Administration: 519.5 mg (10/6/2021), 574 mg (11/24/2021), 600 mg (12/15/2021), 533 mg (1/26/2022), 604.5 mg (11/3/2021), 563 mg (1/5/2022)  pemetrexed (ALIMTA) chemo infusion, 970 mg, Intravenous, Once, 6 of 6 cycles  Administration: 1,000 mg (10/6/2021), 1,000 mg (11/24/2021), 1,000 mg (12/15/2021), 1,000 mg (1/26/2022), 1,000 mg (11/3/2021), 1,000 mg (1/5/2022)  pembrolizumab (KEYTRUDA) IVPB, 200 mg, Intravenous, Once, 6 of 6 cycles  Administration: 200 mg (10/6/2021), 200 mg (11/24/2021), 200 mg (12/15/2021), 200 mg (1/26/2022), 200 mg (11/3/2021), 200 mg (1/5/2022)     3/4/2022 - 4/21/2022 Radiation    The patient saw @[facility] for radiation treatment.  This is the current list of radiation treatment:  Plan ID Energy Fractions Dose per Fraction (cGy) Dose Correction (cGy) Total Dose Delivered (cGy) Elapsed Days   R LUNGMED REV 6X 30 / 30 200 0 6,000 48      Treatment dates:  C1: 3/4/2022 - 4/21/2022         3/7/2022 -  Chemotherapy    CARBOplatin (PARAPLATIN) IVPB (GOG AUC DOSING), , Intravenous, Once, 6 of 6 cycles  Administration: 211.6 mg (3/7/2022), 208 mg (3/14/2022), 238.8 mg (3/21/2022), 211.6 mg (3/28/2022), 215.2 mg (4/4/2022), 213.4 mg (4/18/2022)  bevacizumab (AVASTIN) IVPB, 622.5 mg (100 % of original dose 7.5 mg/kg), Intravenous, Once, 1 of 4 cycles  Dose modification: 7.5 mg/kg (original dose 7.5 mg/kg, Cycle 31)  Administration: 600 mg (10/18/2023)  PACLItaxel (TAXOL) chemo IVPB, 50 mg/m2 = 99 mg, Intravenous, Once, 6 of 6 cycles  Administration: 99 mg (3/7/2022), 99 mg (3/14/2022), 99 mg (3/21/2022), 99 mg (3/28/2022), 99 mg (4/4/2022), 99 mg (4/18/2022)  pembrolizumab (KEYTRUDA) IVPB, 200 mg, Intravenous, Once, 27 of 33 cycles  Administration: 200 mg (3/7/2022), 200 mg (3/28/2022), 200 mg (4/25/2022), 200 mg (5/16/2022), 200 mg (6/6/2022), 200 mg (6/27/2022), 200 mg (7/18/2022), 200 mg (8/8/2022), 200 mg (8/29/2022), 200 mg (9/19/2022), 200 mg (10/10/2022), 200 mg (10/31/2022), 200 mg (11/21/2022), 200 mg (12/12/2022), 200 mg (1/3/2023), 200 mg (1/23/2023), 200 mg (2/13/2023), 200 mg (3/27/2023), 200 mg (4/26/2023), 200 mg (5/17/2023), 200 mg (6/7/2023), 200 mg (6/28/2023), 200 mg (7/19/2023), 200 mg (8/9/2023), 200 mg (8/30/2023), 200 mg (9/28/2023), 200 mg (10/18/2023)     2/22/2023 - 2/22/2023 Radiation    SRS left precentral gyrus   2000 cGy    Dr. Nicole Metz     Brain metastasis   2/3/2023 Initial Diagnosis    Brain metastasis     2/22/2023 - 2/22/2023 Radiation    SRS left precentral gyrus   2000 cGy    Dr. Rivera Isaías         Interval history:  Patient seen and examined. He has significant swelling. Wife states swelling actually is improved. He feels improved since admission. afebrile overnight. Review of Systems   Constitutional:  Positive for activity change and fatigue. Respiratory:  Positive for cough and shortness of breath. Cardiovascular:  Positive for leg swelling. Neurological:  Positive for weakness. All other review of systems were negative. Past medical history:   Past Medical History:   Diagnosis Date    Acute respiratory failure with hypoxia ([address]) 7/31/2021    Chronic respiratory failure with hypoxia ([facility]) 8/9/2021    Impaired mobility and ADLs 8/13/2021    Lung cancer [facility])     Stroke [facility])        Past surgical history:   Past Surgical History:   Procedure Laterality Date    IR BIOPSY LUNG  8/5/2021    IR THORACENTESIS  2/24/2022    IR THORACENTESIS  8/1/2022       Allergies:    Allergies   Allergen Reactions    Doxycycline Rash       Home medications:   Medications Prior to Admission   Medication    atorvastatin (LIPITOR) 40 mg tablet    dexamethasone (DECADRON) 4 mg tablet    enoxaparin (LOVENOX) 328 mg/mL    folic acid (FOLVITE) 1 mg tablet    furosemide (LASIX) 20 mg tablet    levETIRAcetam (KEPPRA) 1000 MG tablet    pantoprazole (PROTONIX) 40 mg tablet    tamsulosin (FLOMAX) 0.4 mg    acetaminophen (TYLENOL) 325 mg tablet    albuterol (2.5 mg/3 mL) 0.083 % nebulizer solution    albuterol (ProAir HFA) 90 mcg/act inhaler    ferrous sulfate 324 (65 Fe) mg       Hospital medications:   Current Facility-Administered Medications:     atorvastatin (LIPITOR) tablet 40 mg, 40 mg, Oral, Daily With Dinner, Blessing Maria PA-C    cefepime (MAXIPIME) IVPB (premix in dextrose) 2,000 mg 50 mL, 2,000 mg, Intravenous, Q8H, CRISTOFER Sparrow, Last Rate: 100 mL/hr at 10/24/23 0213, 2,000 mg at 10/24/23 5372    chlorhexidine (PERIDEX) 0.12 % oral rinse 15 mL, 15 mL, Mouth/Throat, Q12H [facility] CRISTOFER Trevino, 15 mL at 10/23/23 2015    dexamethasone (DECADRON) tablet 4 mg, 4 mg, Oral, Q12H [facility], CRISTOFER Sparrow, 4 mg at 10/23/23 2015    enoxaparin (LOVENOX) subcutaneous injection 100 mg, 100 mg, Subcutaneous, Q12H [facility], CRISTOFER Alberto, 100 mg at 10/24/23 3590    insulin lispro (HumaLOG) 100 units/mL subcutaneous injection 1-6 Units, 1-6 Units, Subcutaneous, TID AC **AND** Fingerstick Glucose (POCT), , , TID AC, Blessing Maria PA-C    levalbuterol [facility]) inhalation solution 1.25 mg, 1.25 mg, Nebulization, TID, CRISTOFER Sparrow, 1.25 mg at 10/24/23 0750    levETIRAcetam (KEPPRA) tablet 1,500 mg, 1,500 mg, Oral, Q12H [facility], Blessing Maria PA-C    pantoprazole (PROTONIX) EC tablet 40 mg, 40 mg, Oral, Early Morning, Blessing Maria PA-C, 40 mg at 10/24/23 5470    tamsulosin (FLOMAX) capsule 0.4 mg, 0.4 mg, Oral, Daily With Dinner, Blessing Maria PA-C    vancomycin (VANCOCIN) IVPB (premix in dextrose) 1,000 mg 200 mL, 12.5 mg/kg (Adjusted), Intravenous, Q12H, CRISTOFER Sparrow, Last Rate: 200 mL/hr at 10/24/23 0609, 1,000 mg at 10/24/23 0609    Social history:   Social History     Tobacco Use    Smoking status: Former     Packs/day: 3.00     Years: 39.00     Total pack years: 117.00     Types: Cigarettes     Start date: 66     Quit date: 2004     Years since quittin.8    Smokeless tobacco: Never   Vaping Use    Vaping Use: Never used   Substance Use Topics    Alcohol use: Not Currently     Comment: No ETOH since Summer 2021     Drug use: Never       Family history:   Family History   Problem Relation Age of Onset    Prostate cancer Brother     Lung cancer Brother        Vitals:  Vitals:    10/24/23 0751   BP:    Pulse:    Resp:    Temp:    SpO2: 91%       Physical Exam  Constitutional:       Appearance: He is ill-appearing. HENT:      Head: Normocephalic and atraumatic. Eyes:      General: No scleral icterus. Cardiovascular:      Rate and Rhythm: Normal rate and regular rhythm. Pulmonary:      Effort: Pulmonary effort is normal. No respiratory distress. Comments: O2 NC  Abdominal:      General: There is distension. Musculoskeletal:         General: Swelling (Anasarca) present. Right lower leg: Edema present. Left lower leg: Edema present. Neurological:      General: No focal deficit present. Mental Status: He is alert and oriented to person, place, and time.    Psychiatric:         Mood and Affect: Mood normal.         Behavior: Behavior normal.         Recent Results (from the past 48 hour(s))   Fingerstick Glucose (POCT)    Collection Time: 10/23/23  8:06 AM   Result Value Ref Range    POC Glucose 115 65 - 140 mg/dl   CBC and differential    Collection Time: 10/23/23  8:11 AM   Result Value Ref Range    WBC 7.43 4.31 - 10.16 Thousand/uL    RBC 4.19 3.88 - 5.62 Million/uL    Hemoglobin 12.1 12.0 - 17.0 g/dL    Hematocrit 40.5 36.5 - 49.3 %    MCV 97 82 - 98 fL    MCH 28.9 26.8 - 34.3 pg    MCHC 29.9 (L) 31.4 - 37.4 g/dL    RDW 22.6 (H) 11.6 - 15.1 %    MPV 8.6 (L) 8.9 - 12.7 fL    Platelets 102 (L) 731 - 390 Thousands/uL   Comprehensive metabolic panel    Collection Time: 10/23/23  8:11 AM   Result Value Ref Range    Sodium 142 135 - 147 mmol/L    Potassium 4.1 3.5 - 5.3 mmol/L    Chloride 104 96 - 108 mmol/L    CO2 29 21 - 32 mmol/L    ANION GAP 9 mmol/L    BUN 25 5 - 25 mg/dL    Creatinine 1.00 0.60 - 1.30 mg/dL    Glucose 132 65 - 140 mg/dL    Calcium 9.5 8.4 - 10.2 mg/dL    AST 12 (L) 13 - 39 U/L    ALT 20 7 - 52 U/L    Alkaline Phosphatase 53 34 - 104 U/L    Total Protein 6.7 6.4 - 8.4 g/dL    Albumin 3.8 3.5 - 5.0 g/dL    Total Bilirubin 0.42 0.20 - 1.00 mg/dL    eGFR 74 ml/min/1.73sq m   Lactic acid    Collection Time: 10/23/23  8:11 AM   Result Value Ref Range    LACTIC ACID 5.1 (HH) 0.5 - 2.0 mmol/L   Procalcitonin    Collection Time: 10/23/23  8:11 AM   Result Value Ref Range    Procalcitonin 0.13 <=0.25 ng/ml   Protime-INR    Collection Time: 10/23/23  8:11 AM   Result Value Ref Range    Protime 13.6 11.6 - 14.5 seconds    INR 1.00 0.84 - 1.19   Blood culture #1    Collection Time: 10/23/23  8:11 AM    Specimen: Arm, Left; Blood   Result Value Ref Range    Gram Stain Result Gram negative rods (A)    B-Type Natriuretic Peptide(BNP)    Collection Time: 10/23/23  8:11 AM   Result Value Ref Range     (H) 0 - 100 pg/mL   Magnesium    Collection Time: 10/23/23  8:11 AM   Result Value Ref Range    Magnesium 1.9 1.9 - 2.7 mg/dL   HS Troponin 0hr (reflex protocol)    Collection Time: 10/23/23  8:11 AM   Result Value Ref Range    hs TnI 0hr 7 "Refer to ACS Flowchart"- see link ng/L   D-Dimer    Collection Time: 10/23/23  8:11 AM   Result Value Ref Range    D-Dimer, Quant 1.46 (H) <0.50 ug/ml FEU   Manual Differential(PHLEBS Do Not Order)    Collection Time: 10/23/23  8:11 AM   Result Value Ref Range    Segmented % 61 43 - 75 %    Lymphocytes % 34 14 - 44 %    Monocytes % 3 (L) 4 - 12 %    Eosinophils, % 0 0 - 6 %    Basophils % 0 0 - 1 %    Metamyelocytes% 2 (H) 0 - 1 %    Absolute Neutrophils 4.53 1.85 - 7.62 Thousand/uL    Lymphocytes Absolute 2.53 0.60 - 4.47 Thousand/uL    Monocytes Absolute 0.22 0.00 - 1.22 Thousand/uL    Eosinophils Absolute 0.00 0.00 - 0.40 Thousand/uL    Basophils Absolute 0.00 0.00 - 0.10 Thousand/uL    Total Counted      nRBC 5 (H) 0 - 2 /100 WBC    RBC Morphology Present     Platelet Estimate Borderline (A) Adequate    Anisocytosis Present     Polychromasia Present    TSH, 3rd generation with Free T4 reflex    Collection Time: 10/23/23  8:11 AM   Result Value Ref Range    TSH 3RD GENERATON 4.446 0.450 - 4.500 uIU/mL   Blood Culture Identification Panel    Collection Time: 10/23/23  8:11 AM    Specimen: Arm, Left; Blood   Result Value Ref Range    ALL TARGETS Not Detected    Blood culture #2    Collection Time: 10/23/23  8:12 AM    Specimen: Arm, Right; Blood   Result Value Ref Range    Gram Stain Result Gram negative rods (A)    POCT Blood Gas (CG8+)    Collection Time: 10/23/23  8:13 AM   Result Value Ref Range    ph, Cipriano ISTAT 7.343 7.300 - 7.400    pCO2, Cipriano i-STAT 49.4 42.0 - 50.0 mm HG    pO2, Cipriano i-STAT 25.0 (L) 35.0 - 45.0 mm HG    BE, i-STAT 0 -2 - 3 mmol/L    HCO3, Cipriano i-STAT 26.8 24.0 - 30.0 mmol/L    CO2, i-STAT 28 21 - 32 mmol/L    O2 Sat, i-STAT 41 (L) 60 - 85 %    SODIUM, I-STAT 141 136 - 145 mmol/l    Potassium, i-STAT 4.2 3.5 - 5.3 mmol/L    Calcium, Ionized i-STAT 1.24 1.12 - 1.32 mmol/L    Hct, i-STAT 38 36.5 - 49.3 %    Hgb, i-STAT 12.9 12.0 - 17.0 g/dl    Glucose, i-STAT 132 65 - 140 mg/dl    Specimen Type VENOUS    FLU/RSV/COVID - if FLU/RSV clinically relevant    Collection Time: 10/23/23  8:14 AM    Specimen: Nose; Nares   Result Value Ref Range    SARS-CoV-2 Negative Negative    INFLUENZA A PCR Negative Negative    INFLUENZA B PCR Negative Negative    RSV PCR Negative Negative   UA w Reflex to Microscopic w Reflex to Culture    Collection Time: 10/23/23  9:44 AM    Specimen: Urine, Clean Catch   Result Value Ref Range    Color, UA Yellow     Clarity, UA Clear     Specific Gravity, UA 1.020 1.005 - 1.030    pH, UA 5.5 4.5, 5.0, 5.5, 6.0, 6.5, 7.0, 7.5, 8.0    Leukocytes, UA Negative Negative    Nitrite, UA Negative Negative Protein, UA Trace (A) Negative mg/dl    Glucose, UA Negative Negative mg/dl    Ketones, UA Negative Negative mg/dl    Urobilinogen, UA <2.0 <2.0 mg/dl mg/dl    Bilirubin, UA Negative Negative    Occult Blood, UA Small (A) Negative   Urine Microscopic    Collection Time: 10/23/23  9:44 AM   Result Value Ref Range    RBC, UA 0-1 None Seen, 0-1, 1-2, 2-4, 0-5 /hpf    WBC, UA 1-2 None Seen, 0-1, 1-2, 0-5, 2-4 /hpf    Epithelial Cells Occasional None Seen, Occasional /hpf    Bacteria, UA Occasional None Seen, Occasional /hpf    MUCUS THREADS Occasional (A) None Seen    Hyaline Casts, UA 0-1 (A) (none) /lpf   HS Troponin I 2hr    Collection Time: 10/23/23 10:47 AM   Result Value Ref Range    hs TnI 2hr 22 "Refer to ACS Flowchart"- see link ng/L    Delta 2hr hsTnI 15 <20 ng/L   Lactic acid 2 Hours    Collection Time: 10/23/23 10:47 AM   Result Value Ref Range    LACTIC ACID 2.6 (HH) 0.5 - 2.0 mmol/L   HS Troponin I 4hr    Collection Time: 10/23/23  2:06 PM   Result Value Ref Range    hs TnI 4hr 31 "Refer to ACS Flowchart"- see link ng/L    Delta 4hr hsTnI 24 (H) <20 ng/L   Lactic acid, plasma (w/reflex if result > 2.0)    Collection Time: 10/23/23  2:06 PM   Result Value Ref Range    LACTIC ACID 2.4 (HH) 0.5 - 2.0 mmol/L   Lactic acid 2 Hours    Collection Time: 10/23/23  5:03 PM   Result Value Ref Range    LACTIC ACID 2.6 (HH) 0.5 - 2.0 mmol/L   Lactic acid, plasma (w/reflex if result > 2.0)    Collection Time: 10/23/23  8:23 PM   Result Value Ref Range    LACTIC ACID 1.9 0.5 - 2.0 mmol/L   Strep Pneumoniae, Urine    Collection Time: 10/24/23  2:19 AM    Specimen: Urine, Clean Catch   Result Value Ref Range    Strep pneumoniae antigen, urine Negative Negative   Legionella antigen, urine    Collection Time: 10/24/23  2:19 AM    Specimen: Urine, Clean Catch   Result Value Ref Range    Legionella Urinary Antigen Negative Negative   CBC and differential    Collection Time: 10/24/23  6:12 AM   Result Value Ref Range    WBC 6. 33 4.31 - 10.16 Thousand/uL    RBC 3.27 (L) 3.88 - 5.62 Million/uL    Hemoglobin 9.5 (L) 12.0 - 17.0 g/dL    Hematocrit 31.1 (L) 36.5 - 49.3 %    MCV 95 82 - 98 fL    MCH 29.1 26.8 - 34.3 pg    MCHC 30.5 (L) 31.4 - 37.4 g/dL    RDW 23.1 (H) 11.6 - 15.1 %    MPV 8.6 (L) 8.9 - 12.7 fL    Platelets 99 (L) 915 - 390 Thousands/uL   Comprehensive metabolic panel    Collection Time: 10/24/23  6:12 AM   Result Value Ref Range    Sodium 143 135 - 147 mmol/L    Potassium 4.0 3.5 - 5.3 mmol/L    Chloride 108 96 - 108 mmol/L    CO2 31 21 - 32 mmol/L    ANION GAP 4 mmol/L    BUN 26 (H) 5 - 25 mg/dL    Creatinine 0.88 0.60 - 1.30 mg/dL    Glucose 90 65 - 140 mg/dL    Calcium 8.5 8.4 - 10.2 mg/dL    Corrected Calcium 9.3 8.3 - 10.1 mg/dL    AST 11 (L) 13 - 39 U/L    ALT 15 7 - 52 U/L    Alkaline Phosphatase 35 34 - 104 U/L    Total Protein 5.5 (L) 6.4 - 8.4 g/dL    Albumin 3.0 (L) 3.5 - 5.0 g/dL    Total Bilirubin 0.44 0.20 - 1.00 mg/dL    eGFR 85 ml/min/1.73sq m   Calcium, ionized    Collection Time: 10/24/23  6:12 AM   Result Value Ref Range    Calcium, Ionized 1.14 1.12 - 1.32 mmol/L   Procalcitonin    Collection Time: 10/24/23  6:12 AM   Result Value Ref Range    Procalcitonin 21.26 (H) <=0.25 ng/ml   Manual Differential(PHLEBS Do Not Order)    Collection Time: 10/24/23  6:12 AM   Result Value Ref Range    Segmented % 84 (H) 43 - 75 %    Bands % 5 0 - 8 %    Lymphocytes % 10 (L) 14 - 44 %    Monocytes % 1 (L) 4 - 12 %    Eosinophils, % 0 0 - 6 %    Basophils % 0 0 - 1 %    Absolute Neutrophils 5.63 1.85 - 7.62 Thousand/uL    Lymphocytes Absolute 0.63 0.60 - 4.47 Thousand/uL    Monocytes Absolute 0.06 0.00 - 1.22 Thousand/uL    Eosinophils Absolute 0.00 0.00 - 0.40 Thousand/uL    Basophils Absolute 0.00 0.00 - 0.10 Thousand/uL    Total Counted      nRBC 1 0 - 2 /100 WBC    RBC Morphology Present     Platelet Estimate Borderline (A) Adequate    Anisocytosis Present     Ovalocytes Present    Fingerstick Glucose (POCT) Collection Time: 10/24/23  6:14 AM   Result Value Ref Range    POC Glucose 96 65 - 140 mg/dl       Imaging Studies:   XR chest portable - 1 view    Result Date: 10/23/2023  Narrative: CHEST INDICATION:   Shortness of breath. History of lung cancer. . COMPARISON: Chest radiograph 10/17/2023. Same day CT chest study. EXAM PERFORMED/VIEWS:  XR CHEST PORTABLE FINDINGS: Heart shadow is again obscured by right-sided opacity. Near right-sided opacification due to known loculated pleural effusion. Superimposed consolidation better visualized on same day CT study. Groundglass opacification in the left lower lung zone, likely to represent atelectasis. No pneumothorax. Osseous structures appear within normal limits for patient age. Impression: Persistent right-sided loculated pleural effusion with superimposed consolidation better visualized on same day CT study. Groundglass opacification in the left lower lung zone, likely to represent atelectasis. Resident: Nicol Duval, the attending radiologist, have reviewed the images and agree with the final report above. Workstation performed: [id number]     CTA ED chest PE study    Result Date: 10/23/2023  Narrative: CTA - CHEST WITH IV CONTRAST - PULMONARY ANGIOGRAM INDICATION:   abnormal CTA. 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 COMPARISON: 10/23/2023 from 9:20 a.m., 9/20/2023 TECHNIQUE: CTA examination of the chest was performed using angiographic technique according to a protocol specifically tailored to evaluate for pulmonary embolism. Multiplanar 2D reformatted images were created from the source data. In addition, coronal 3D MIP postprocessing was performed on the acquisition scanner. Radiation dose length product (DLP) for this visit:  613.29 mGy-cm .   This examination, like all CT scans performed in the [facility], was performed utilizing techniques to minimize radiation dose exposure, including the use of iterative reconstruction and automated exposure control. IV Contrast:  80 mL of iohexol (OMNIPAQUE) FINDINGS: PULMONARY ARTERIAL TREE:  No pulmonary embolus is seen. LUNGS: Background centrilobular emphysema. Mild dependent and background groundglass opacities on the left without significant change. Densely consolidated right upper lobe apical and posterior segments similar to recent priors. Worsening consolidation in the right lower lobe, no significant remaining aeration present. Some air bronchograms present, many of the bronchi are narrow and attenuated or partially obscured, especially in the bronchus intermedius and lower lobe segments. PLEURA: Large gradually increasing mixed attenuation pleural mass/fluid which inverts the right hemidiaphragm and causes mass effect on the right lower lobe. This currently measures 13.2 cm craniocaudal by 13.5 x 9.4 cm on axial image 170. HEART/GREAT VESSELS:  Unremarkable for patient's age. No thoracic aortic aneurysm. MEDIASTINUM AND JONH: No significant mediastinal or hilar adenopathy. CHEST WALL AND LOWER NECK:   Grossly stable right neck soft tissue mass presumably representing metastatic adenopathy. Incompletely imaged. VISUALIZED STRUCTURES IN THE UPPER ABDOMEN: Scattered hypodensities within the liver not characterized adequately on this exam. No gross change compared to recent priors. Mild caliectasis on the left, incompletely imaged. May in part be related to contrast excretion phase compared to earlier today. Study from today shows mild fullness which is symmetric bilaterally and to the level of the bladder. OSSEOUS STRUCTURES:  No acute fracture or destructive osseous lesion. Impression: No evidence for acute pulmonary emboli. Densely consolidated right lower lobe and right upper lobe. Aeration remaining in the right middle lobe. See above for further description and comparison.  Significant mass effect on the right lower lung secondary to large mixed attenuation masslike pleural abnormality described above. 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 Stable incompletely imaged right neck mass presumably representing metastatic adenopathy. Stable hypodensities within the liver. Workstation performed: [id number]     PE Study with CT Abdomen and Pelvis with contrast    Result Date: 10/23/2023  Narrative: CT PULMONARY ANGIOGRAM OF THE CHEST AND CT ABDOMEN AND PELVIS WITH INTRAVENOUS CONTRAST INDICATION:   SOB, fever, abdominal distention. COMPARISON: CT chest abdomen pelvis 9/20/2023. TECHNIQUE:  CT examination of the chest, abdomen and pelvis was performed. Thin section CT angiographic technique was used in the chest in order to evaluate for pulmonary embolus and coronal 3D MIP postprocessing was performed on the acquisition scanner. Multiplanar 2D reformatted images were created from the source data. This examination, like all CT scans performed in the [facility], was performed utilizing techniques to minimize radiation dose exposure, including the use of iterative reconstruction and automated exposure control. Radiation dose length product (DLP) for this visit:  1801.92 mGy-cm IV Contrast:  100 mL of iohexol (OMNIPAQUE) Enteric Contrast:  Enteric contrast was not administered. FINDINGS: CHEST PULMONARY ARTERIAL TREE: There is heterogeneous enhancement throughout the right pulmonary arteries. While this may be artifactual there is a segment that has more of an eccentric filling defect appearance on the ventral margin of the mid right pulmonary  artery (series 2 image 110) which is indeterminate. No left-sided pulmonary emboli. LUNGS: Increased diffuse right lung consolidation most apparent within the medial right lower lobe. Groundglass opacities in the left lower lobe.  PLEURA: Right-sided pleural metastasis with nodular thickening of the pleura and a moderate size loculated heterogeneously hyperattenuating pleural collection. No left-sided effusion. No pneumothorax. HEART/AORTA:  Heart is unremarkable for patient's age. No thoracic aortic aneurysm. MEDIASTINUM AND JONH:  Unremarkable. CHEST WALL AND LOWER NECK: The imaged mass in the right neck measuring at least 3.2 cm as noted on the previous exam. ABDOMEN LIVER/BILIARY TREE: Numerous hypoattenuating hepatic lesions, too small to further characterize. GALLBLADDER:  No calcified gallstones. No pericholecystic inflammatory change. SPLEEN:  Unremarkable. PANCREAS:  Unremarkable. ADRENAL GLANDS:  Unremarkable. KIDNEYS/URETERS:  One or more simple renal cyst(s) is noted. Otherwise unremarkable kidneys. No hydronephrosis. STOMACH AND BOWEL: Colonic diverticulosis without evidence of acute diverticulitis. APPENDIX: A normal appendix was visualized. ABDOMINOPELVIC CAVITY:  No ascites. No pneumoperitoneum. No lymphadenopathy. Stable right retrocrural lymph node. VESSELS: Atherosclerotic changes are present. No evidence of aneurysm. PELVIS REPRODUCTIVE ORGANS:  Unremarkable for patient's age. URINARY BLADDER:  Unremarkable. ABDOMINAL WALL/INGUINAL REGIONS:  Unremarkable. OSSEOUS STRUCTURES:  No acute fracture or destructive osseous lesion. Degenerative changes throughout the spine. Impression: 1. Heterogeneous opacification of the right-sided pulmonary arteries may be due to artifact, but cannot exclude a pulmonary embolus particularly in the right pulmonary artery. Recommend repeating the CTA of the chest. There is no large saddle embolus or  CT findings of right heart strain. 2.  Mildly increased right lung consolidation and groundglass opacities in the left lower lobe. Cannot exclude superimposed pneumonia. 3.  Right pleural thickening and nodularity with a moderate size loculated complex collection, suspicious for pleural involvement by metastases. 4.  Partially imaged mass in the right neck suspicious for darien metastases.  Medical record indicates a CT of the neck has been ordered to evaluate this finding but not yet performed. I personally discussed this study with Scott Velez on 10/23/2023 11:35 AM. Workstation performed: [id number]     XR chest pa & lateral    Result Date: 10/17/2023  Narrative: CHEST INDICATION:   C79.31: Secondary malignant neoplasm of brain. C34.11: Malignant neoplasm of upper lobe, right bronchus or lung. Cold-like symptoms for 2 weeks. COMPARISON: Chest radiograph 9/25/2023; CT chest, abdomen, pelvis 9/20/2023 EXAM PERFORMED/VIEWS:  XR CHEST PA & LATERAL  The frontal view was performed utilizing dual energy radiographic technique. Images: 4 FINDINGS: Heart shadow is obscured by right-sided opacity. Mild bowing of the trachea toward the right, unchanged. Slight progression of the previously demonstrated extensive right lung opacity corresponding with the patient's known loculated right-sided pleural effusion and radiation fibrosis demonstrated on CT dated 9/20/2023. Left lung is clear apart from tiny linear scar or atelectasis at the base. No pneumothorax. Osseous structures appear within normal limits for patient age. Impression: Slight progression of previously demonstrated extensive opacity right hemithorax corresponding with patient's known right pleural effusion and probable post radiation changes. Resident: Elizabeth Cook, the attending radiologist, have reviewed the images and agree with the final report above. Workstation performed: [id number]     MRI brain bt w wo contrast    Result Date: 10/2/2023  Narrative: MRI BRAIN WITH AND WITHOUT CONTRAST INDICATION: C79.31: Secondary malignant neoplasm of brain. 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 SBRT of brain met 2/22/2023 COMPARISON: MRI brain 7/26/2023. TECHNIQUE: Multiplanar, multisequence imaging of the brain and sella was performed before and after gadolinium administration.  IV Contrast:  9 mL of Gadobutrol injection (SINGLE-DOSE) IMAGE QUALITY:   Diagnostic. FINDINGS: Focal necrotic lesion with irregular peripheral enhancement involving the posterior left frontal lobe measuring 1.3 x 1.8 x 1.9 cm (AP x ML x CC), previously measuring 1.2 cm. There is increased surrounding vasogenic edema. Mild chronic microangiopathic ischemic changes. VENTRICLES:  Ventricles and extra-axial CSF spaces are prominent commensurate with the degree of volume loss. No hydrocephalus. No intraventricular hemorrhage. SELLA AND PITUITARY GLAND:  Normal. ORBITS: Right-sided cataract surgery. PARANASAL SINUSES:  Mucosal thickening of the sinuses with no air fluid levels. VASCULATURE:  Evaluation of the major intracranial vasculature demonstrates appropriate flow voids. CALVARIUM AND SKULL BASE: Marrow signal heterogeneity which can be seen with underlying red marrow proliferation. EXTRACRANIAL SOFT TISSUES:  Normal.     Impression: Metastatic adenocarcinoma of the lung status post chemotherapy. Left posterior frontal lobe intracranial metastases (status post SBRT 2/22/2023) increased in size compared to 7/26/2023 with progression of surrounding vasogenic edema. There is no new suspicious intra-axial lesion. Workstation performed: [id number]     CT head without contrast    Addendum Date: 9/25/2023 Addendum:   ADDENDUM: I personally discussed this study with Agustin Nettles on 9/25/2023 1:50 PM.     Result Date: 9/25/2023  Narrative: CT BRAIN - WITHOUT CONTRAST INDICATION:   Neuro deficit, persistent/recurrent, CNS neoplasm suspected Recurrent RUE seizure, hx of mets in brain. COMPARISON: CT dated August 11, 2023. MRI dated July 26, 2023 TECHNIQUE:  CT examination of the brain was performed. Multiplanar 2D reformatted images were created from the source data. Radiation dose length product (DLP) for this visit:  878 mGy-cm .   This examination, like all CT scans performed in the [facility], was performed utilizing techniques to minimize radiation dose exposure, including the use of iterative reconstruction and automated exposure control. IMAGE QUALITY:  Diagnostic. FINDINGS: PARENCHYMA:  Persistent left frontal region vasogenic edema again seen secondary to underlying metastatic lesion which is obscured by surrounding vasogenic edema. No new metastatic lesions identified. No CT signs of acute infarction. No acute parenchymal hemorrhage. Old lacunar infarcts in the left basal ganglion VENTRICLES AND EXTRA-AXIAL SPACES:  Ex vacuo dilatation left frontal horn. Tilmon Fluke VISUALIZED ORBITS: Normal visualized orbits. PARANASAL SINUSES: Normal visualized paranasal sinuses. Partial visualization of right nasopharyngeal lesion characterized as possible retention cyst on recent MR examination. CALVARIUM AND EXTRACRANIAL SOFT TISSUES:  Normal.     Impression: Left frontal region vasogenic edema again seen not significantly changed from August 11, 2023. No acute intracranial abnormality. Given the history and symptoms: Consider repeat brain MRI. The study was marked in [facility] [place] for immediate notification. Workstation performed: [id number]     XR chest 2 views    Result Date: 9/25/2023  Narrative: CHEST INDICATION: History of seizures and lung cancer. Worsening weakness. COMPARISON: CT chest abdomen pelvis 9/20/2023, CT chest 07/03/2023, chest radiograph 04/18/2023 EXAM PERFORMED/VIEWS:  XR CHEST PA & LATERAL  The frontal view was performed utilizing dual energy radiographic technique. Images: 4 FINDINGS: Cardiomediastinal silhouette is obscured by right-sided opacity. Redemonstrated extensive right lung opacity corresponding with patient's known loculated right pleural effusion and radiation fibrosis seen on CT. Left lung is clear. No pneumothorax. Osseous structures appear within normal limits for patient age.      Impression: Redemonstrated extensive right lung opacity corresponding with patient's known right pleural effusion and post radiation changes. Resident: Troy Caldera, the attending radiologist, have reviewed the images and agree with the final report above. Workstation performed: [id number]       Counseling / Coordination of Care  Total floor / unit time spent today 75 minutes. Greater than 50% of total time was spent with the patient and / or family counseling and / or coordination of care. CRISTOFER Mascorro    Please note: This report has been generated by voice recognition software system. Therefore, there may be syntax, spelling and/or grammatical errors. Please call if you have any questions.

## 2023-10-24 NOTE — ASSESSMENT & PLAN NOTE
Dx initially 09/2021 stage IIIC. Received multiple rounds of chemo/radiation. Found to have brain mets 02/2023 to the left precentral gyrus. Cancer upstaged to stage IV. Currently on daily dexamethasone, Lovenox for [facility] as well as a history of DVT. Currently receiving combination Avastin and Pembrolizumab at the infusion center. Last chemo 10/18/2023. Avastatin is a new medication for the patient. 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 Unlikely to benefit from ASEPT catheter    Plan:  Continue with home dose dexamethasone and Lovenox for DVT   Extensive conversation with patient and wife regarding code status. Patient and wife at this time would like to remain full code.   Palliative care consult  Recommend follow up outpatient with oncology after discharge

## 2023-10-24 NOTE — PLAN OF CARE
Problem: Potential for Falls  Goal: Patient will remain free of falls  Description: INTERVENTIONS:  - Educate patient/family on patient safety including physical limitations  - Instruct patient to call for assistance with activity   - Consult OT/PT to assist with strengthening/mobility   - Keep Call bell within reach  - Keep bed low and locked with side rails adjusted as appropriate  - Keep care items and personal belongings within reach  - Initiate and maintain comfort rounds  - Make Fall Risk Sign visible to staff  - Offer Toileting every  Hours, in advance of need  - Initiate/Maintain alarm  - Obtain necessary fall risk management equipment:   - Apply yellow socks and bracelet for high fall risk patients  - Consider moving patient to room near nurses station  Outcome: Progressing     Problem: NEUROSENSORY - ADULT  Goal: Achieves stable or improved neurological status  Description: INTERVENTIONS  - Monitor and report changes in neurological status  - Monitor vital signs such as temperature, blood pressure, glucose, and any other labs ordered   - Initiate measures to prevent increased intracranial pressure  - Monitor for seizure activity and implement precautions if appropriate      Outcome: Progressing  Goal: Achieves maximal functionality and self care  Description: INTERVENTIONS  - Monitor swallowing and airway patency with patient fatigue and changes in neurological status  - Encourage and assist patient to increase activity and self care.    - Encourage visually impaired, hearing impaired and aphasic patients to use assistive/communication devices  Outcome: Progressing     Problem: GENITOURINARY - ADULT  Goal: Maintains or returns to baseline urinary function  Description: INTERVENTIONS:  - Assess urinary function  - Encourage oral fluids to ensure adequate hydration if ordered  - Administer IV fluids as ordered to ensure adequate hydration  - Administer ordered medications as needed  - Offer frequent toileting  - Follow urinary retention protocol if ordered  Outcome: Progressing  Goal: Absence of urinary retention  Description: INTERVENTIONS:  - Assess patient’s ability to void and empty bladder  - Monitor I/O  - Bladder scan as needed  - Discuss with physician/AP medications to alleviate retention as needed  - Discuss catheterization for long term situations as appropriate  Outcome: Progressing  Goal: Urinary catheter remains patent  Description: INTERVENTIONS:  - Assess patency of urinary catheter  - If patient has a chronic cadet, consider changing catheter if non-functioning  - Follow guidelines for intermittent irrigation of non-functioning urinary catheter  Outcome: Progressing     Problem: METABOLIC, FLUID AND ELECTROLYTES - ADULT  Goal: Electrolytes maintained within normal limits  Description: INTERVENTIONS:  - Monitor labs and assess patient for signs and symptoms of electrolyte imbalances  - Administer electrolyte replacement as ordered  - Monitor response to electrolyte replacements, including repeat lab results as appropriate  - Instruct patient on fluid and nutrition as appropriate  Outcome: Progressing  Goal: Fluid balance maintained  Description: INTERVENTIONS:  - Monitor labs   - Monitor I/O and WT  - Instruct patient on fluid and nutrition as appropriate  - Assess for signs & symptoms of volume excess or deficit  Outcome: Progressing  Goal: Glucose maintained within target range  Description: INTERVENTIONS:  - Monitor Blood Glucose as ordered  - Assess for signs and symptoms of hyperglycemia and hypoglycemia  - Administer ordered medications to maintain glucose within target range  - Assess nutritional intake and initiate nutrition service referral as needed  Outcome: Progressing     Problem: SKIN/TISSUE INTEGRITY - ADULT  Goal: Skin Integrity remains intact(Skin Breakdown Prevention)  Description: Assess:  -Perform [place] assessment every   -Clean and moisturize skin every   -Inspect skin when repositioning, toileting, and assisting with ADLS  -Assess under medical devices such as  every   -Assess extremities for adequate circulation and sensation     Bed Management:  -Have minimal linens on bed & keep smooth, unwrinkled  -Change linens as needed when moist or perspiring  -Avoid sitting or lying in one position for more than  hours while in bed  -Keep HOB at degrees     Toileting:  -Offer bedside commode  -Assess for incontinence every   -Use incontinent care products after each incontinent episode such as     Activity:  -Mobilize patient  times a day  -Encourage activity and walks on unit  -Encourage or provide ROM exercises   -Turn and reposition patient every  Hours  -Use appropriate equipment to lift or move patient in bed  -Instruct/ Assist with weight shifting every  when out of bed in chair  -Consider limitation of chair time  hour intervals    Skin Care:  -Avoid use of baby powder, tape, friction and shearing, hot water or constrictive clothing  -Relieve pressure over bony prominences using   -Do not massage red bony areas    Next Steps:  -Teach patient strategies to minimize risks such as    -Consider consults to  interdisciplinary teams such as   Outcome: Progressing  Goal: Incision(s), wounds(s) or drain site(s) healing without S/S of infection  Description: INTERVENTIONS  - Assess and document dressing, incision, wound bed, drain sites and surrounding tissue  - Provide patient and family education  - Perform skin care/dressing changes every   Outcome: Progressing  Goal: Pressure injury heals and does not worsen  Description: Interventions:  - Implement low air loss mattress or specialty surface (Criteria met)  - Apply silicone foam dressing  - Instruct/assist with weight shifting every  minutes when in chair   - Limit chair time to  hour intervals  - Use special pressure reducing interventions such as  when in chair   - Apply fecal or urinary incontinence containment device   - Perform passive or active ROM every   - Turn and reposition patient & offload bony prominences every  hours   - Utilize friction reducing device or surface for transfers   - Consider consults to  interdisciplinary teams such as   - Use incontinent care products after each incontinent episode such as   - Consider nutrition services referral as needed  Outcome: Progressing     Problem: MUSCULOSKELETAL - ADULT  Goal: Maintain or return mobility to safest level of function  Description: INTERVENTIONS:  - Assess patient's ability to carry out ADLs; assess patient's baseline for ADL function and identify physical deficits which impact ability to perform ADLs (bathing, care of mouth/teeth, toileting, grooming, dressing, etc.)  - Assess/evaluate cause of self-care deficits   - Assess range of motion  - Assess patient's mobility  - Assess patient's need for assistive devices and provide as appropriate  - Encourage maximum independence but intervene and supervise when necessary  - Involve family in performance of ADLs  - Assess for home care needs following discharge   - Consider OT consult to assist with ADL evaluation and planning for discharge  - Provide patient education as appropriate  Outcome: Progressing  Goal: Maintain proper alignment of affected body part  Description: INTERVENTIONS:  - Support, maintain and protect limb and body alignment  - Provide patient/ family with appropriate education  Outcome: Progressing     Problem: RESPIRATORY - ADULT  Goal: Achieves optimal ventilation and oxygenation  Description: INTERVENTIONS:  - Assess for changes in respiratory status  - Assess for changes in mentation and behavior  - Position to facilitate oxygenation and minimize respiratory effort  - Oxygen administered by appropriate delivery if ordered  - Initiate smoking cessation education as indicated  - Encourage broncho-pulmonary hygiene including cough, deep breathe, Incentive Spirometry  - Assess the need for suctioning and aspirate as needed  - Assess and instruct to report SOB or any respiratory difficulty  - Respiratory Therapy support as indicated  Outcome: Progressing     Problem: PAIN - ADULT  Goal: Verbalizes/displays adequate comfort level or baseline comfort level  Description: Interventions:  - Encourage patient to monitor pain and request assistance  - Assess pain using appropriate pain scale  - Administer analgesics based on type and severity of pain and evaluate response  - Implement non-pharmacological measures as appropriate and evaluate response  - Consider cultural and social influences on pain and pain management  - Notify physician/advanced practitioner if interventions unsuccessful or patient reports new pain  Outcome: Progressing     Problem: INFECTION - ADULT  Goal: Absence or prevention of progression during hospitalization  Description: INTERVENTIONS:  - Assess and monitor for signs and symptoms of infection  - Monitor lab/diagnostic results  - Monitor all insertion sites, i.e. indwelling lines, tubes, and drains  - Monitor endotracheal if appropriate and nasal secretions for changes in amount and color  - [place] appropriate cooling/warming therapies per order  - Administer medications as ordered  - Instruct and encourage patient and family to use good hand hygiene technique  - Identify and instruct in appropriate isolation precautions for identified infection/condition  Outcome: Progressing  Goal: Absence of fever/infection during neutropenic period  Description: INTERVENTIONS:  - Monitor WBC    Outcome: Progressing     Problem: SAFETY ADULT  Goal: Patient will remain free of falls  Description: INTERVENTIONS:  - Educate patient/family on patient safety including physical limitations  - Instruct patient to call for assistance with activity   - Consult OT/PT to assist with strengthening/mobility   - Keep Call bell within reach  - Keep bed low and locked with side rails adjusted as appropriate  - Keep care items and personal belongings within reach  - Initiate and maintain comfort rounds  - Make Fall Risk Sign visible to staff  - Offer Toileting every  Hours, in advance of need  - Initiate/Maintain alarm  - Obtain necessary fall risk management equipment:   - Apply yellow socks and bracelet for high fall risk patients  - Consider moving patient to room near nurses station  Outcome: Progressing  Goal: Maintain or return to baseline ADL function  Description: INTERVENTIONS:  -  Assess patient's ability to carry out ADLs; assess patient's baseline for ADL function and identify physical deficits which impact ability to perform ADLs (bathing, care of mouth/teeth, toileting, grooming, dressing, etc.)  - Assess/evaluate cause of self-care deficits   - Assess range of motion  - Assess patient's mobility; develop plan if impaired  - Assess patient's need for assistive devices and provide as appropriate  - Encourage maximum independence but intervene and supervise when necessary  - Involve family in performance of ADLs  - Assess for home care needs following discharge   - Consider OT consult to assist with ADL evaluation and planning for discharge  - Provide patient education as appropriate  Outcome: Progressing  Goal: Maintains/Returns to pre admission functional level  Description: INTERVENTIONS:  - Perform BMAT or MOVE assessment daily.   - Set and communicate daily mobility goal to care team and patient/family/caregiver. - Collaborate with rehabilitation services on mobility goals if consulted  - Perform Range of Motion  times a day. - Reposition patient every  hours.   - Dangle patient  times a day  - Stand patient  times a day  - Ambulate patient  times a day  - Out of bed to chair  times a day   - Out of bed for meal times a day  - Out of bed for toileting  - Record patient progress and toleration of activity level   Outcome: Progressing

## 2023-10-24 NOTE — PROGRESS NOTES
Critical Care Interval Transfer Note:    Please refer to progress note from earlier today for full details. Barriers to discharge: Follow-up blood culture results-continue empiric therapy with IV cefepime  PT OT  MBS by speech-appreciate recommendations  Oncology consultation     Consults: IP CONSULT TO CASE MANAGEMENT  IP CONSULT TO ONCOLOGY    Recommended to review admission imaging for incidental findings and document in discharge navigator: Chart reviewed, no known incidental findings noted at this time. Discharge Plan: Anticipate discharge in 48-72 hrs to home. PT Recommendations: Home with home health rehabilitation  OT Recommendations: Post acute rehabilitation services      Patient seen and evaluated by Critical Care today and deemed to be appropriate for transfer to Med Surg. Spoke to MD Skyla Koo from [facility] to accept transfer. Critical care can be contacted via [employer] with any questions or concerns.

## 2023-10-24 NOTE — ASSESSMENT & PLAN NOTE
By history  Last seen 9/25 in [address] [place] [address] ED for focal seizure following chemotherapy  Home rgimen: Keppra 1500 mg PO BID    Plan:  Continue with Keppra 1500 mg BID  Check Keppra level  Q4h neuro checks

## 2023-10-24 NOTE — PROGRESS NOTES
[facility]  Progress Note  Name: Trena Roman  MRN: [id number]  Unit/Bed#: -01 I Date of Admission: 10/23/2023   Date of Service: 10/24/2023 I Hospital Day: 1    Assessment/Plan   * Acute on Chronic Hypoxemic respiratory failure, ([address])  Assessment & Plan  POA: increased WOB in the ED requiring the initiation of BIPAP  Hx of Stage IV lung CA with mets. 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 Completed a short course of PO lasix outpatient  10/23 CTA PE Study: No evidence for acute pulmonary emboli. Densely consolidated right lower lobe and right upper lobe. Aeration remaining in the right middle lobe. See above for further description and comparison. Significant mass effect on the right lower lung secondary to large mixed attenuation masslike pleural abnormality described above. 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 Stable incompletely imaged right neck mass presumably representing metastatic adenopathy. Stable hypodensities within the liver. RVP negative    Plan:  Weaned off BiPAP to 4L NC  Continue abx for presumed PNA as outlined below  Hold further diuretics  Titrate O2 to maintain spO2 > 92%    Brain metastasis  Assessment & Plan  Brain metastasis first diagnosed in February 2023 s/p SBRT  Currently on Keppra for focal seizures in 8/2023 due to brain metastasis and Decadron for surrounding vasogenic edema  Pt has been unable to be weaned from decadron due to decreased functional status and worsening lethargy with attempts of weaning  10/2 MRI brain: Metastatic adenocarcinoma of the lung status post chemotherapy. Left posterior frontal lobe intracranial metastases (status post SBRT 2/22/2023) increased in size compared to 7/26/2023 with progression of surrounding vasogenic edema.  There is no new suspicious intra-axial lesion. 10/4 started on Avastin due to concern for radiation necrosis  Recently developed significant peripheral edema, likely secondary to Avastin  Completed a short course of lasix outpatient    Plan:  Pt currently at baseline neuro status as documented in outpatient Neurology notes  Hold further lasix  Routine neuro checks  Continue Keppra  Check Keppra level    Sepsis [facility])  Assessment & Plan  SIRS: Tachycardia, tachypnea, fever  10/23 CTA PE Study: No evidence for acute pulmonary emboli. Densely consolidated right lower lobe and right upper lobe. Aeration remaining in the right middle lobe. See above for further description and comparison. Significant mass effect on the right lower lung secondary to large mixed attenuation masslike pleural abnormality described above. 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 Stable incompletely imaged right neck mass presumably representing metastatic adenopathy. Stable hypodensities within the liver. UA negative for UTI  Blood cultures pending  Urine strep/legionella and MRSA pending  Lactate 5.1 --> 2.6 --> 2.4  30 ml/kg were not given due to concern for fluid overload  Procal 0.18    Plan:  Continue IVF resuscitation  Continue IV Cefepime and Vancomycin  Trend lactic and procal  Follow up culture results  Blood cultures with 2/2 GNR  Trend fever curve and WBC count    Gram-negative bacteremia  Assessment & Plan  2/2 blood cultures with GNR  Awaiting BCID  Continue Cefepime and Vancomycin for now  Consider discontinuing Vanco if MRSA neg    Adenocarcinoma of lung  Assessment & Plan  Dx initially 09/2021 stage IIIC. Received multiple rounds of chemo/radiation. Found to have brain mets 02/2023 to the left precentral gyrus. Cancer upstaged to stage IV. Currently on daily dexamethasone, Lovenox for [facility] as well as a history of DVT. Currently receiving combination Avastin and Pembrolizumab at the infusion center. Last chemo 10/18/2023. Avastatin is a new medication for the patient. 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 Unlikely to benefit from ASEPT catheter    Plan:  Continue with home dose dexamethasone and Lovenox for DVT   Extensive conversation with patient and wife regarding code status. Patient and wife at this time would like to remain full code. Palliative care consult  Recommend follow up outpatient with oncology after discharge    HLD (hyperlipidemia)  Assessment & Plan  Continue home statin    BPH (benign prostatic hyperplasia)  Assessment & Plan  Continue home Flomax    GERD (gastroesophageal reflux disease)  Assessment & Plan  Continue PPI      Seizure ([address])  Assessment & Plan  By history  Last seen 9/25 in [facility] ED for focal seizure following chemotherapy  Home rgimen: Keppra 1500 mg PO BID    Plan:  Continue with Keppra 1500 mg BID  Check Keppra level  Q4h neuro checks    Anasarca  Assessment & Plan  Likely 2/2 Avastin which was started earlier this month for radiation necrosis surrounding brain metastasis  Completed a course of lasix outpatient  Hold off on further diuretics  UA with only trace proteinuria  Will need further discussion with medical oncology regarding further Avastin dosing outpatient    Bilateral lower extremity DVTs  Assessment & Plan  Continue home Lovenox             ICU Core Measures     A: Assess, Prevent, and Manage Pain Has pain been assessed? Yes  Need for changes to pain regimen? No   B: Both SAT/SAT  N/A   C: Choice of Sedation RASS Goal: 0 Alert and Calm or N/A patient not on sedation  Need for changes to sedation or analgesia regimen? NA   D: Delirium CAM-ICU: Positive   E: Early Mobility  Plan for early mobility? Yes   F: Family Engagement Plan for family engagement today? Yes       Antibiotic Review: Awaiting culture results.        Prophylaxis:  VTE VTE covered by:  enoxaparin, Subcutaneous, 100 mg at 10/24/23 0433       Stress Ulcer  covered bypantoprazole (PROTONIX) EC tablet 40 mg [[id number]]       Subjective   HPI: 68 y.o. 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 Lung recurrent pleural effusion secondary to post radiation fibrosis, seizure, HLD, GERD who presented on 10/23 with acute on chronic hypoxic respiratory failure requiring BiPAP 2/2 PNA. 24-hour events:  Weaned off BiPAP shortly after arrival to ICU. Currently on 4L NC  Blood cultures positive for GNR  Episodes of delirium overnight which required frequent reorientation    Review of Systems   Constitutional:  Positive for fever. Negative for activity change, appetite change and chills. Respiratory:  Positive for cough. Negative for shortness of breath and wheezing. Cardiovascular:  Negative for chest pain and palpitations. Gastrointestinal:  Negative for abdominal distention, abdominal pain, constipation, diarrhea and nausea. Neurological:  Negative for dizziness, weakness, light-headedness and headaches. All other systems reviewed and are negative. Objective                            Vitals I/O      Most Recent Min/Max in 24hrs   Temp 98.1 °F (36.7 °C) Temp  Min: 97.6 °F (36.4 °C)  Max: 102.1 °F (38.9 °C)   Pulse 84 Pulse  Min: 83  Max: 148   Resp (!) 24 Resp  Min: 14  Max: 49   /67 BP  Min: 109/67  Max: 137/84   O2 Sat 94 % SpO2  Min: 91 %  Max: 99 %      Intake/Output Summary (Last 24 hours) at 10/24/2023 0303  Last data filed at 10/24/2023 0219  Gross per 24 hour   Intake 1705 ml   Output 419 ml   Net 1286 ml         Diet Cardiovascular; Cardiac; Fluid Restriction 2000 ML     Invasive Monitoring Physical exam    Physical Exam  Eyes:      Extraocular Movements: Extraocular movements intact. Pupils: Pupils are equal, round, and reactive to light. Skin:     General: Skin is warm and dry.       Coloration: Skin is not jaundiced or pale. HENT:      Head: Normocephalic and atraumatic. Nose: No congestion or rhinorrhea. Mouth/Throat:      Mouth: Mucous membranes are moist.   Cardiovascular:      Rate and Rhythm: Normal rate and regular rhythm. Heart sounds: No murmur heard. No friction rub. No gallop. Musculoskeletal:      Right lower leg: No edema. Left lower leg: No edema. Abdominal:      General: There is no distension. Palpations: Abdomen is soft. Tenderness: There is no abdominal tenderness. There is no guarding. Hernia: No hernia is present. Constitutional:       Appearance: He is well-developed and well-nourished. Pulmonary:      Effort: Pulmonary effort is normal. No accessory muscle usage, respiratory distress or accessory muscle usage. Breath sounds: No wheezing, rhonchi or rales. Comments: Decreased air movement on the R  Neurological:      General: No focal deficit present. Mental Status: He is alert. Mental status is at baseline. He is disoriented to time and disoriented to situation. Cranial Nerves: No dysarthria. Motor: Strength full and intact in all extremities. Vitals reviewed. Diagnostic Studies      EKG: NSR  Imaging:  I have personally reviewed pertinent reports.        Medications:  Scheduled PRN   atorvastatin, 40 mg, Daily With Dinner  cefepime, 2,000 mg, Q8H  chlorhexidine, 15 mL, Q12H OPAL  dexamethasone, 4 mg, Q12H OPAL  enoxaparin, 100 mg, Q12H OPAL  insulin lispro, 1-6 Units, TID AC  levalbuterol, 1.25 mg, TID  levETIRAcetam, 1,500 mg, Q12H OPAL  pantoprazole, 40 mg, Early Morning  tamsulosin, 0.4 mg, Daily With Dinner  vancomycin, 12.5 mg/kg (Adjusted), Q12H          Continuous          Labs:    CBC    Recent Labs     10/23/23  0811 10/23/23  0813   WBC 7.43  --    HGB 12.1 12.9   HCT 40.5 38   *  --      BMP    Recent Labs     10/23/23  0811 10/23/23  0813   SODIUM 142  --    K 4.1  --      -- CO2 29 28   AGAP 9  --    BUN 25  --    CREATININE 1.00  --    CALCIUM 9.5  --        Coags    Recent Labs     10/23/23  0811   INR 1.00        Additional Electrolytes  Recent Labs     10/23/23  0811 10/23/23  0813   MG 1.9  --    CAIONIZED  --  1.24          Blood Gas    No recent results  No recent results LFTs  Recent Labs     10/23/23  0811   ALT 20   AST 12*   ALKPHOS 53   ALB 3.8   TBILI 0.42       Infectious  Recent Labs     10/23/23  0811   PROCALCITONI 0.13     Glucose  Recent Labs     10/23/23  0811   GLUC [address] [facility] Shivam Hines PA-C

## 2023-10-24 NOTE — ASSESSMENT & PLAN NOTE
Brain metastasis first diagnosed in February 2023 s/p SBRT  Currently on Keppra for focal seizures in 8/2023 due to brain metastasis and Decadron for surrounding vasogenic edema  Pt has been unable to be weaned from decadron due to decreased functional status and worsening lethargy with attempts of weaning  10/2 MRI brain: Metastatic adenocarcinoma of the lung status post chemotherapy. Left posterior frontal lobe intracranial metastases (status post SBRT 2/22/2023) increased in size compared to 7/26/2023 with progression of surrounding vasogenic edema. There is no new suspicious intra-axial lesion.   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

## 2023-10-24 NOTE — ASSESSMENT & PLAN NOTE
2/2 blood cultures with GNR  Awaiting BCID  Continue Cefepime and Vancomycin for now  Consider discontinuing Vanco if MRSA neg

## 2023-10-24 NOTE — ASSESSMENT & PLAN NOTE
POA: increased WOB in the ED requiring the initiation of BIPAP  Hx of Stage IV lung CA with mets. 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 Completed a short course of PO lasix outpatient  10/23 CTA PE Study: No evidence for acute pulmonary emboli. Densely consolidated right lower lobe and right upper lobe. Aeration remaining in the right middle lobe. See above for further description and comparison. Significant mass effect on the right lower lung secondary to large mixed attenuation masslike pleural abnormality described above. 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 Stable incompletely imaged right neck mass presumably representing metastatic adenopathy. Stable hypodensities within the liver.   RVP negative    Plan:  Weaned off BiPAP to  NC  Continue abx for presumed PNA as outlined below  Hold further diuretics  Titrate O2 to maintain spO2 > 92%  Pulmonary medicine following, appreciate recommendations

## 2023-10-24 NOTE — ASSESSMENT & PLAN NOTE
POA: increased WOB in the ED requiring the initiation of BIPAP  Hx of Stage IV lung CA with mets. 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 Completed a short course of PO lasix outpatient  10/23 CTA PE Study: No evidence for acute pulmonary emboli. Densely consolidated right lower lobe and right upper lobe. Aeration remaining in the right middle lobe. See above for further description and comparison. Significant mass effect on the right lower lung secondary to large mixed attenuation masslike pleural abnormality described above. 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 Stable incompletely imaged right neck mass presumably representing metastatic adenopathy. Stable hypodensities within the liver.   RVP negative    Plan:  Weaned off BiPAP to 4L NC  Continue abx for presumed PNA as outlined below  Hold further diuretics  Titrate O2 to maintain spO2 > 92%

## 2023-10-24 NOTE — QUICK NOTE
Patient's wife updated at bedside by MD Carlee Hopson. All questions answered to spouse's satisfaction. Informed patient's wife that patient would be downgraded out of the ICU today.

## 2023-10-24 NOTE — ASSESSMENT & PLAN NOTE
Brain metastasis first diagnosed in February 2023 s/p SBRT  Currently on Keppra for focal seizures in 8/2023 due to brain metastasis and Decadron for surrounding vasogenic edema  Pt has been unable to be weaned from decadron due to decreased functional status and worsening lethargy with attempts of weaning  10/2 MRI brain: Metastatic adenocarcinoma of the lung status post chemotherapy. Left posterior frontal lobe intracranial metastases (status post SBRT 2/22/2023) increased in size compared to 7/26/2023 with progression of surrounding vasogenic edema. There is no new suspicious intra-axial lesion. 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

## 2023-10-24 NOTE — PROGRESS NOTES
Vancomycin IV Pharmacy-to-Dose Consultation     Vancomycin has been discontinued. Pharmacy will sign off. Please contact or re-consult with questions.     Jillian Ware, Pharmacist

## 2023-10-24 NOTE — PROGRESS NOTES
Bertha Narayanan is a 68 y.o. male who is currently ordered Vancomycin IV with management by the Pharmacy Consult service. Relevant clinical data and objective / subjective history reviewed. Vancomycin Assessment:  Indication and Goal AUC/Trough: Pneumonia (goal -600, trough >10), -600, trough >10  Clinical Status: stable  Micro:     Renal Function:  SCr: 0.88 mg/dL  CrCl: 89.2 mL/min  Renal replacement: Not on dialysis  Days of Therapy: 2  Current Dose: 1000mg IV Q12H  Vancomycin Plan:  New Dosing: No change  Estimated AUC: 474 mcg*hr/mL  Estimated Trough: 15.5 mcg/mL  Next Level: With AM labs at 0500 on 10/25/23  Renal Function Monitoring: Daily BMP and [place] will continue to follow closely for s/sx of nephrotoxicity, infusion reactions and appropriateness of therapy. BMP and CBC will be ordered per protocol. We will continue to follow the patient’s culture results and clinical progress daily.     Madina Rebolledo, Pharmacist

## 2023-10-24 NOTE — ASSESSMENT & PLAN NOTE
SIRS: Tachycardia, tachypnea, fever  10/23 CTA PE Study: No evidence for acute pulmonary emboli. Densely consolidated right lower lobe and right upper lobe. Aeration remaining in the right middle lobe. See above for further description and comparison. Significant mass effect on the right lower lung secondary to large mixed attenuation masslike pleural abnormality described above. 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 Stable incompletely imaged right neck mass presumably representing metastatic adenopathy. Stable hypodensities within the liver.   UA negative for UTI  Blood cultures pending  Urine strep/legionella and MRSA pending  Lactate 5.1 --> 2.6 --> 2.4  30 ml/kg were not given due to concern for fluid overload  Procal 0.18    Plan:  Continue IVF resuscitation  Continue IV Cefepime and Vancomycin  Trend lactic and procal  Follow up culture results  Blood cultures with 2/2 GNR  Trend fever curve and WBC count

## 2023-10-24 NOTE — PROCEDURES
Video Swallow Study      Patient Name: Brittany SALAZAR Date: 10/24/2023        Past Medical History  Past Medical History:   Diagnosis Date    Acute respiratory failure with hypoxia ([address]) 7/31/2021    Chronic respiratory failure with hypoxia ([facility]) 8/9/2021    Impaired mobility and ADLs 8/13/2021    Lung cancer ([address])     Stroke [facility])         Past Surgical History  Past Surgical History:   Procedure Laterality Date    IR BIOPSY LUNG  8/5/2021    IR THORACENTESIS  2/24/2022    IR THORACENTESIS  8/1/2022       Modified (Video) Barium Swallow Study    Summary:  Images are on PACS for review. Pt presents w/ min-mild oropharyngeal dysphagia. Min prolonged mastication w/ loss of bolus to lateral sulci. Swallow initiation w/ variable delay, at times bolus head reaching the pyriforms. Reduced hyo-laryngeal elevation and incomplete vestibule closure w/ transient penetration of thin liquids. Deep penetration passing beneath the vocal cords x1 though material immediately, independently ejected from larynx. No gross aspiration on today's study. No significant pharyngeal residue. Recommendations:  Diet: Regular   Liquids: Thin   Meds: Whole/thin   Strategies: Single sip, slow rate   Frequent oral care  Upright position  F/u ST tx: Yes   Therapy Prognosis: Good   Prognosis considerations: As able/appropriate   Aspiration Precautions  Reflux Precautions  Consider consult with: -   Results reviewed with: pt, nursing, family   Aspiration precautions posted. Repeat MBS as necessary  If a dedicated assessment of the esophagus is desired, consider esophagram/barium swallow or EGD. Goals:  Pt will tolerate least restrictive diet w/out s/s aspiration or oral/pharyngeal difficulties.       Patient's goal: "time to party"       H&P/pertinent provider notes: (PMH noted above)  Pt is a 68 y.o. male who presented to  [address] [place] [address]  with a PMHx significant for stage IV Adenocarcinoma of the R. 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 Presented to [address] [place] [address] 10/23/2023 after experiencing chills at home. Patient reports checking temperature at home which was 102.6 F, and the patient came to the ED for further evaluation. In the ED the patient was tachycardic and tachypneic with increased WOB requiring BIPAP. Lab work was significant for an elevated lactate of 5.1. RVP was negative. CTA CAP performed in the ED and was significant for a new LLL ground glass opacity. Patient is currently pending repeat imaging to rule out PE. In the ED the patient was given 500 ml of crystalloid, 30 ml/kg of fluids were not given secondary to concern for volume overload, 60 mg IV lasix were given without significant urine output, blood cultures x 2 were drawn, and patient was given IV Cefepime empirically for PNA. ICU was asked to evaluate the patient for admission. Patient to be admitted to the ICU as a SD1 under the critical care service for acute on chronic hypoxemic respiratory failure requiring continuous BIPAP and sepsis. [facility] Special Studies:  CTA chest PE study 10/23: No evidence for acute pulmonary emboli. Densely consolidated right lower lobe and right upper lobe. Aeration remaining in the right middle lobe. See above for further description and comparison. Significant mass effect on the right lower lung secondary to large mixed attenuation masslike   pleural abnormality described above. 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 Stable incompletely imaged right neck mass presumably representing metastatic adenopathy. Stable hypodensities within the liver. PE study with CT abdomen and pelvis 10/23: 1.   Heterogeneous opacification of the right-sided pulmonary arteries may be due to artifact, but cannot exclude a pulmonary embolus particularly in the right pulmonary artery. Recommend repeating the CTA of the chest. There is no large saddle embolus or   CT findings of right heart strain. 2.  Mildly increased right lung consolidation and groundglass opacities in the left lower lobe. Cannot exclude superimposed pneumonia. 3.  Right pleural thickening and nodularity with a moderate size loculated complex collection, suspicious for pleural involvement by metastases. 4.  Partially imaged mass in the right neck suspicious for darien metastases. Medical record indicates a CT of the neck has been ordered to evaluate this finding but not yet performed. CXR 10/23: Persistent right-sided loculated pleural effusion with superimposed consolidation better visualized on same day CT study. Groundglass opacification in the left lower lung zone, likely to represent atelectasis. Previous VBS:  No       Does the pt have pain? No   If yes, was nursing made aware/was it addressed? N/A     Swallow Mechanism Exam  Facial: symmetrical  Labial: WFL  Lingual: WFL  Velum: symmetrical  Mandible: adequate ROM  Dentition: adequate  Vocal quality:clear/adequate   Volitional Cough: strong/productive   Respiratory Status: on 2L O2     Swallow Information   Current Risks for Dysphagia & Aspiration:  respiratory compromise/failure  Current Symptoms/Concerns: change in respiratory status  Current Diet: regular diet and thin liquids   Baseline Diet: regular diet and thin liquids      Consistencies Administered:  Pt was viewed sitting upright in the lateral view (AP views unable to obtained 2/2 body habitus). Trials administered were otherwise consistent with [facility] Protocol: 5-mL thin liquid x2, 20-mL cup sip thin, 40-mL sequential swallow thin, 5-mL nectar thick, 20-mL cup sip nectar thick, 40-mL sequential swallow nectar thick, 5-mL Honey thick, 5-mL pudding, ½ cookie coated with 3-mL pudding, 5-mL nectar thick in the AP position, and 5-mL pudding in the AP position. Pt was also given thin liquids by straw, as well as a barium tablet with thin liquids.        Oral Impairment:  Lip Closure: complete    Tongue Control During Bolus Hold: loss to lateral sulci   Bolus Preparation/Mastication: min-mild prolonged   Bolus Transport/Lingual Motion: brisk, piecemeal   Oral Residue: trace diffuse   Initiation of the Pharyngeal Swallow: mild delay, bolus head inconsistently to the pyriforms     Pharyngeal Impairment:  Soft Palate Elevation: complete    Laryngeal Elevation: partial elevation   Anterior Hyoid Excursion: reduced, limited movement   Epiglottic Movement: complete   Laryngeal Vestibular Closure: trace incomplete   Pharyngeal Stripping Wave: complete   PES Opening: complete   Tongue Base Retraction: mild reduced w/ trace contrast   Pharyngeal Residue: trace-mild vallecular     Screening of Esophageal Impairment   Esophageal Clearance: complete       Penetration/Aspiration:  Thin: PAS 6 - transient aspiration x1 (coating just under the cords and then is immediately ejected)   Nectar: PAS 2 - transient penetration   Honey: PAS 1 - no penetration/aspiration   Puree: PAS 1 - no penetration/aspiration   Solid: PAS 1 - no penetration/aspiration   Response to Aspiration: delayed throat clear   Strategies/Efficacy: effortful swallow effective in eliminating penetration/aspiration     8-Point Penetration-Aspiration Scale   1 Material does not enter the airway   2 Material enters the airway, remains above the vocal folds, and is ejected  from the  airway    3 Material enters the airway, remains above the vocal folds, and is not ejected from the airway   4 Material enters the airway, contacts the vocal folds, and is ejected from the airway   5 Material enters the airway, contacts the vocal folds, and is not ejected from the airway    6 Material enters the airway, passes below the vocal folds and is ejected into the larynx or out of the airway    7 Material enters the airway, passes below the vocal folds, and is not ejected from the trachea despite effort    8 Material enters the airway, passes below the vocal folds, and no effort is made to eject

## 2023-10-24 NOTE — ASSESSMENT & PLAN NOTE
SIRS: Tachycardia, tachypnea, fever  10/23 CTA PE Study: No evidence for acute pulmonary emboli. Densely consolidated right lower lobe and right upper lobe. Aeration remaining in the right middle lobe. See above for further description and comparison. Significant mass effect on the right lower lung secondary to large mixed attenuation masslike pleural abnormality described above. 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 Stable incompletely imaged right neck mass presumably representing metastatic adenopathy. Stable hypodensities within the liver. UA negative for UTI  Blood cultures pending  Urine strep/legionella and MRSA pending  Lactate 5.1 --> 2.6 --> 2.4  30 ml/kg were not given due to concern for fluid overload  Procal 0.18    Plan:  Continue IV Cefepime   Trend lactic and procal  Follow up culture results  Blood cultures with 2/2 GNR. Lab unable to thus far speciate. Consult infectious disease. Repeat blood cultures.    Trend fever curve and WBC count

## 2023-10-24 NOTE — ASSESSMENT & PLAN NOTE
Likely 2/2 Avastin which was started earlier this month for radiation necrosis surrounding brain metastasis  Completed a course of lasix outpatient  Hold off on further diuretics  UA with only trace proteinuria  Will need further discussion with medical oncology regarding further Avastin dosing outpatient

## 2023-10-24 NOTE — SPEECH THERAPY NOTE
Speech Language/Pathology    Speech-Language Pathology Bedside Swallow Evaluation      Patient Name: Jessica Aguila    UUDGT'U Date: 10/24/2023     Problem List  Principal Problem:    Acute on Chronic Hypoxemic respiratory failure, ([address])  Active Problems:    Bilateral lower extremity DVTs    Adenocarcinoma of lung    Brain metastasis    HLD (hyperlipidemia)    Anasarca    Sepsis (HCC)    Seizure (HCC)    GERD (gastroesophageal reflux disease)    BPH (benign prostatic hyperplasia)    Gram-negative bacteremia      Past Medical History  Past Medical History:   Diagnosis Date    Acute respiratory failure with hypoxia ([address]) 7/31/2021    Chronic respiratory failure with hypoxia ([address]) 8/9/2021    Impaired mobility and ADLs 8/13/2021    Lung cancer ([address])     Stroke [facility])        Past Surgical History  Past Surgical History:   Procedure Laterality Date    IR BIOPSY LUNG  8/5/2021    IR THORACENTESIS  2/24/2022    IR THORACENTESIS  8/1/2022       Summary   Pt presented with min oral dysphagia, ? pharyngeal dysphagia. Pt is somewhat impulsive w/ PO intake, at times taking large bites. Despite larger bolus, pt able to effective break down all material. Piecemeal transfers effective to clear residue. Pt w/ intermittent cough throughout trials, not consistently s/p swallow or reproducible w/ specific consistency. O2 stable. Pt's wife does report coughing w/ PO recently. Given CT chest findings, consider VBS to further assess/discern s/s as related to aspiration vs other. Risk/s for Aspiration:   At least mild     Recommended Diet:  deferred until MBS     Recommended Form of Meds: whole with liquid   Aspiration precautions and swallowing strategies: upright posture, only feed when fully alert, slow rate of feeding, and small bites/sips  Other Recommendations: Continue frequent oral care         Current Medical Status  Pt is a 68 y.o. male who presented to  [address] [place] [address]  with a PMHx significant for stage IV Adenocarcinoma of the R. 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 Presented to [address] [place] [address] 10/23/2023 after experiencing chills at home. Patient reports checking temperature at home which was 102.6 F, and the patient came to the ED for further evaluation. In the ED the patient was tachycardic and tachypneic with increased WOB requiring BIPAP. Lab work was significant for an elevated lactate of 5.1. RVP was negative. CTA CAP performed in the ED and was significant for a new LLL ground glass opacity. Patient is currently pending repeat imaging to rule out PE. In the ED the patient was given 500 ml of crystalloid, 30 ml/kg of fluids were not given secondary to concern for volume overload, 60 mg IV lasix were given without significant urine output, blood cultures x 2 were drawn, and patient was given IV Cefepime empirically for PNA. ICU was asked to evaluate the patient for admission. Patient to be admitted to the ICU as a SD1 under the critical care service for acute on chronic hypoxemic respiratory failure requiring continuous BIPAP and sepsis. .    Current Precautions:  Fall      Allergies:  No known food allergies    Past medical history:  Please see H&P for details    Special Studies:  CTA chest PE study 10/23: No evidence for acute pulmonary emboli. Densely consolidated right lower lobe and right upper lobe. Aeration remaining in the right middle lobe. See above for further description and comparison. Significant mass effect on the right lower lung secondary to large mixed attenuation masslike   pleural abnormality described above. 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 Stable incompletely imaged right neck mass presumably representing metastatic adenopathy. Stable hypodensities within the liver. PE study with CT abdomen and pelvis 10/23: 1. Heterogeneous opacification of the right-sided pulmonary arteries may be due to artifact, but cannot exclude a pulmonary embolus particularly in the right pulmonary artery. Recommend repeating the CTA of the chest. There is no large saddle embolus or   CT findings of right heart strain. 2.  Mildly increased right lung consolidation and groundglass opacities in the left lower lobe. Cannot exclude superimposed pneumonia. 3.  Right pleural thickening and nodularity with a moderate size loculated complex collection, suspicious for pleural involvement by metastases. 4.  Partially imaged mass in the right neck suspicious for darien metastases. Medical record indicates a CT of the neck has been ordered to evaluate this finding but not yet performed. CXR 10/23: Persistent right-sided loculated pleural effusion with superimposed consolidation better visualized on same day CT study. Groundglass opacification in the left lower lung zone, likely to represent atelectasis. Social/Education/Vocational Hx:  Pt lives  w/ spoouse     Swallow Information   Current Risks for Dysphagia & Aspiration:  respiratory status  Current Symptoms/Concerns: coughing with po and change in respiratory status  Current Diet: regular diet and thin liquids   Baseline Diet: regular diet and thin liquids      Baseline Assessment   Behavior/Cognition: alert  Speech/Language Status: able to participate in conversation and able to follow commands  Patient Positioning: upright in bed  Pain Status/Interventions/Response to Interventions:  No report of or nonverbal indications of pain.        Swallow Mechanism Exam  Facial: symmetrical  Labial: WFL  Lingual: WFL  Velum: symmetrical  Mandible: adequate ROM  Dentition: full dentures  Vocal quality:clear/adequate   Volitional Cough: strong/productive   Respiratory Status:  on 2L O2     Consistencies Assessed and Performance   Consistencies Administered: thin liquids, soft solids, and hard solids      Oral Stage:  Mastication was adequate with the materials administered today. Pt is at times impulsive, taking large bites though able to manipulate effectively. Piecemeal transfers effective to clear any residue. Pharyngeal Stage:   Swallow Mechanics:  Swallowing initiation appeared fairly prompt/?mild delay. Laryngeal rise was palpated and judged to be reduced. Pt w/ intermittent wet cough response throughout trials, not consistently s/p swallow or reproducible w/ specific consistency. O2 stable. Esophageal Concerns: none reported    Strategies and Efficacy: -    Summary and Recommendations (see above)    Results Reviewed with: patient, RN, MD, PA, and family     Treatment Recommended: Yes     Frequency of treatment: As able/appropriate, [facility]     Patient Stated Goal: non stated     Dysphagia LTG  -Patient will demonstrate safe and effective oral intake (without overt s/s significant oral/pharyngeal dysphagia including s/s penetration or aspiration) for the highest appropriate diet level.      Short Term Goals:  -Patient will comply with a Video/Modified Barium Swallow study for more complete assessment of swallowing anatomy/physiology/aspiration risk and to assess efficacy of treatment techniques so as to best guide treatment plan          Speech Therapy Prognosis   Prognosis: fair    Prognosis Considerations: age, medical status, prior medical history, cognitive status, progressive disease process, and respiratory status

## 2023-10-24 NOTE — CASE MANAGEMENT
Case Management Assessment & Discharge Planning Note    Patient name [facility]  Location /-59 MRN [id number]  : 1950 Date 10/24/2023       Current Admission Date: 10/23/2023  Current Admission Diagnosis:Acute on Chronic Hypoxemic respiratory failure, ([address])   Patient Active Problem List    Diagnosis Date Noted    Gram-negative bacteremia 10/24/2023    Acute on Chronic Hypoxemic respiratory failure, ([address]) 10/23/2023    GERD (gastroesophageal reflux disease) 10/23/2023    BPH (benign prostatic hyperplasia) 10/23/2023    Obesity, morbid ([address]) 10/04/2023    Seizure ([address]) 2023    Iron deficiency anemia 2023    Stage 3a chronic kidney disease ([address]) 2023    Sepsis ([address]) 2023    Anasarca 2023    Brain metastasis 2023    History of venous thromboembolism 2023    HLD (hyperlipidemia) 2023    Restrictive lung disease 2022    Nocturnal hypoxemia 2022    Tobacco use disorder, severe, in sustained remission, dependence 2022    Recurrent right pleural effusion 2022    Chronic right-sided heart failure ([address]) 2022    Chronic anticoagulation 2022    Hypercalcemia of malignancy 10/21/2021    Chemotherapy induced neutropenia  10/11/2021    COPD mixed type ([address]) 2021    Malignant neoplasm of upper lobe of right lung ([address]) 2021    Acute on chronic anemia 2021    Adenocarcinoma of lung 2021    Impaired cognition 2021    Constipation 2021    Anemia of chronic disease 2021    Pulmonary embolism ([address]) 2021    History of urinary retention 2021    Mass of upper lobe of right lung 2021    History of stroke 2021    Bilateral lower extremity DVTs 2021    Dysphagia 2021      LOS (days): 1  Geometric Mean LOS (GMLOS) (days): 5.10  Days to GMLOS:3.9     OBJECTIVE:    Risk of Unplanned Readmission Score: 25.89         Current admission status: Inpatient Preferred Pharmacy:   [facility] [place], [place] - [address] [address]  Phone: [phone] Fax: [phone]     [facility] [place], [place] - [address]  [address]  [address] [facility] [address]  Phone: [phone] Fax: [phone]    Primary Care Provider: Sabra Malone MD    Primary Insurance: MEDICARE  Secondary Insurance: COMMERCIAL MISCELLANEOUS    ASSESSMENT:  [address], [address] Representative - Spouse   Primary Phone: [phone] (Mobile)                 Advance Directives  Does patient have a [address]?: Yes  Does patient have Advance Directives?: Yes  Advance Directives: Living will, Power of  for health care  Primary Contact: Elnoria Nissen: wife: [phone]    Readmission Root Cause  30 Day Readmission: No    Patient Information  Admitted from[de-identified] Home  Mental Status: Alert  During Assessment patient was accompanied by: Spouse, Daughter  Assessment information provided by[de-identified] Patient, Spouse, Daughter  Primary Caregiver: Spouse  Support Systems: Self, Spouse/significant other, Children, Family members  [facility]: [address] [place] [address] do you live in?: [address] [place] [address] entry access options. Select all that apply.: Stairs  Number of steps to enter home. : 1  Do the steps have railings?: No  Type of Current Residence: [place]  In the last 12 months, was there a time when you were not able to pay the mortgage or rent on time?: No  In the last 12 months, how many places have you lived?: 1  In the last 12 months, was there a time when you did not have a steady place to sleep or slept in a shelter (including now)?: No  Homeless/housing insecurity resource given?: N/A  Living Arrangements: Lives w/ Spouse/significant other  Is patient a ?: No    Activities of Daily Living Prior to Admission  Functional Status: Independent  Completes ADLs independently?: No  Level of ADL dependence: Assistance  Ambulates independently?: No  Level of ambulatory dependence: Assistance  Does patient use assisted devices?: Yes  Assisted Devices (DME) used:  Wheelchair, Walker, Portable Oxygen tanks, Home Oxygen concentrator, Other (Comment), Nebulizer (lift recliner)  Does patient currently own DME?: Yes  What DME does the patient currently own?: Home Oxygen concentrator, Walker, Wheelchair, Other (Comment), Portable Oxygen tanks, Nebulizer (lift recliner)  Does patient have a history of Outpatient Therapy (PT/OT)?: No  Does the patient have a history of Short-Term Rehab?: No  Does patient have a history of HHC?: Yes ([facility] and [address])  Does patient currently have [address]?: No    Patient Information Continued  Income Source: Pension/FDC  Does patient have prescription coverage?: Yes  Within the past 12 months, you worried that your food would run out before you got the money to buy more.: Never true  Within the past 12 months, the food you bought just didn't last and you didn't have money to get more.: Never true  Food insecurity resource given?: N/A  Does patient receive dialysis treatments?: No  Does patient have a history of substance abuse?: No  Does patient have a history of Mental Health Diagnosis?: No    Means of Transportation  Means of Transport to Appts[de-identified] Family transport  In the past 12 months, has lack of transportation kept you from medical appointments or from getting medications?: No  In the past 12 months, has lack of transportation kept you from meetings, work, or from getting things needed for daily living?: No  Was application for public transport provided?: N/A        DISCHARGE DETAILS:    Discharge planning discussed with[de-identified] patient, patient's wife and dtr  Freedom of Choice: Yes  Comments - Freedom of Choice: Requesting referral to [address] [place] [address]  CM contacted family/caregiver?: No- see comments (Pt's wife and dtr at bedside) Contacts  Patient Contacts: Arielle Thomas: wife  Relationship to Patient[de-identified] Family  Contact Method: In Person  Reason/Outcome: Emergency Contact, Discharge Planning                                                                     Additional Comments: Met with Pt, Pt's wife and dtr at bedside. Discussed role of case management. Pt lives with wife in 4sh, 1 valente. Has DME: wheelchair, walker, lift recliner, home oxygen through [employer], nebulizer. Family provides transportation. Pt's dtr reports that Pt has POA and living will. CM requested family to bring in [place] and living will paperwork. Denies hx of SNF. Hx of [facility] and [facility]. Pt's wife denies hx of SNF/MH/D&A. Pt's wife is requesting [facility] Care referral to [facility] as [facility] can go into the home. Call placed to [facility]([phone]), CM transferred to [facility] with Palliative Care Program. Spoke with Nancy, faxed clinical information to [facility] at [phone] for review. CM to follow.

## 2023-10-24 NOTE — ASSESSMENT & PLAN NOTE
By history  Last seen 9/25 in [address] [place] [address] ED for focal seizure following chemotherapy  Home rgimen: Keppra 1500 mg PO BID    Plan:  Continue with Keppra 1500 mg BID  Check Keppra level  Q4h neuro checks

## 2023-10-25 ENCOUNTER — APPOINTMENT (INPATIENT)
Dept: NON INVASIVE DIAGNOSTICS | Facility: HOSPITAL | Age: 73
DRG: 867 | End: 2023-10-25
Payer: MEDICARE

## 2023-10-25 LAB
ANION GAP SERPL CALCULATED.3IONS-SCNC: 2 MMOL/L
ATRIAL RATE: 131 BPM
ATRIAL RATE: 144 BPM
BASOPHILS # BLD AUTO: 0.01 THOUSANDS/ÂΜL (ref 0–0.1)
BASOPHILS NFR BLD AUTO: 0 % (ref 0–1)
BUN SERPL-MCNC: 27 MG/DL (ref 5–25)
CALCIUM SERPL-MCNC: 8.7 MG/DL (ref 8.4–10.2)
CHLORIDE SERPL-SCNC: 108 MMOL/L (ref 96–108)
CO2 SERPL-SCNC: 32 MMOL/L (ref 21–32)
CREAT SERPL-MCNC: 0.9 MG/DL (ref 0.6–1.3)
EOSINOPHIL # BLD AUTO: 0 THOUSAND/ÂΜL (ref 0–0.61)
EOSINOPHIL NFR BLD AUTO: 0 % (ref 0–6)
ERYTHROCYTE [DISTWIDTH] IN BLOOD BY AUTOMATED COUNT: 22.4 % (ref 11.6–15.1)
GFR SERPL CREATININE-BSD FRML MDRD: 85 ML/MIN/1.73SQ M
GLUCOSE SERPL-MCNC: 104 MG/DL (ref 65–140)
GLUCOSE SERPL-MCNC: 115 MG/DL (ref 65–140)
GLUCOSE SERPL-MCNC: 151 MG/DL (ref 65–140)
GLUCOSE SERPL-MCNC: 160 MG/DL (ref 65–140)
GLUCOSE SERPL-MCNC: 197 MG/DL (ref 65–140)
HCT VFR BLD AUTO: 30.9 % (ref 36.5–49.3)
HGB BLD-MCNC: 9.3 G/DL (ref 12–17)
IMM GRANULOCYTES # BLD AUTO: 0.07 THOUSAND/UL (ref 0–0.2)
IMM GRANULOCYTES NFR BLD AUTO: 1 % (ref 0–2)
LYMPHOCYTES # BLD AUTO: 0.38 THOUSANDS/ÂΜL (ref 0.6–4.47)
LYMPHOCYTES NFR BLD AUTO: 7 % (ref 14–44)
MAGNESIUM SERPL-MCNC: 2.4 MG/DL (ref 1.9–2.7)
MCH RBC QN AUTO: 29.1 PG (ref 26.8–34.3)
MCHC RBC AUTO-ENTMCNC: 30.1 G/DL (ref 31.4–37.4)
MCV RBC AUTO: 97 FL (ref 82–98)
MONOCYTES # BLD AUTO: 0.16 THOUSAND/ÂΜL (ref 0.17–1.22)
MONOCYTES NFR BLD AUTO: 3 % (ref 4–12)
MRSA NOSE QL CULT: NORMAL
NEUTROPHILS # BLD AUTO: 4.7 THOUSANDS/ÂΜL (ref 1.85–7.62)
NEUTS SEG NFR BLD AUTO: 89 % (ref 43–75)
NRBC BLD AUTO-RTO: 0 /100 WBCS
P AXIS: 37 DEGREES
PHOSPHATE SERPL-MCNC: 3 MG/DL (ref 2.3–4.1)
PLATELET # BLD AUTO: 93 THOUSANDS/UL (ref 149–390)
PMV BLD AUTO: 8.9 FL (ref 8.9–12.7)
POTASSIUM SERPL-SCNC: 4.1 MMOL/L (ref 3.5–5.3)
PR INTERVAL: 144 MS
QRS AXIS: 112 DEGREES
QRS AXIS: 124 DEGREES
QRSD INTERVAL: 46 MS
QRSD INTERVAL: 62 MS
QT INTERVAL: 110 MS
QT INTERVAL: 382 MS
QTC INTERVAL: 176 MS
QTC INTERVAL: 573 MS
RBC # BLD AUTO: 3.2 MILLION/UL (ref 3.88–5.62)
SODIUM SERPL-SCNC: 142 MMOL/L (ref 135–147)
T WAVE AXIS: 3 DEGREES
T WAVE AXIS: 76 DEGREES
VENTRICULAR RATE: 135 BPM
VENTRICULAR RATE: 155 BPM
WBC # BLD AUTO: 5.32 THOUSAND/UL (ref 4.31–10.16)

## 2023-10-25 PROCEDURE — 85025 COMPLETE CBC W/AUTO DIFF WBC: CPT | Performed by: HOSPITALIST

## 2023-10-25 PROCEDURE — 84100 ASSAY OF PHOSPHORUS: CPT

## 2023-10-25 PROCEDURE — 94760 N-INVAS EAR/PLS OXIMETRY 1: CPT

## 2023-10-25 PROCEDURE — 93010 ELECTROCARDIOGRAM REPORT: CPT | Performed by: INTERNAL MEDICINE

## 2023-10-25 PROCEDURE — 99232 SBSQ HOSP IP/OBS MODERATE 35: CPT | Performed by: INTERNAL MEDICINE

## 2023-10-25 PROCEDURE — 99232 SBSQ HOSP IP/OBS MODERATE 35: CPT | Performed by: HOSPITALIST

## 2023-10-25 PROCEDURE — 87106 FUNGI IDENTIFICATION YEAST: CPT

## 2023-10-25 PROCEDURE — 94668 MNPJ CHEST WALL SBSQ: CPT

## 2023-10-25 PROCEDURE — 83735 ASSAY OF MAGNESIUM: CPT

## 2023-10-25 PROCEDURE — 93971 EXTREMITY STUDY: CPT

## 2023-10-25 PROCEDURE — 80048 BASIC METABOLIC PNL TOTAL CA: CPT

## 2023-10-25 PROCEDURE — 87070 CULTURE OTHR SPECIMN AEROBIC: CPT

## 2023-10-25 PROCEDURE — 94640 AIRWAY INHALATION TREATMENT: CPT

## 2023-10-25 PROCEDURE — 87205 SMEAR GRAM STAIN: CPT

## 2023-10-25 PROCEDURE — 99233 SBSQ HOSP IP/OBS HIGH 50: CPT | Performed by: NURSE PRACTITIONER

## 2023-10-25 PROCEDURE — 82948 REAGENT STRIP/BLOOD GLUCOSE: CPT

## 2023-10-25 RX ORDER — BENZONATATE 100 MG/1
100 CAPSULE ORAL 3 TIMES DAILY PRN
Status: DISCONTINUED | OUTPATIENT
Start: 2023-10-25 | End: 2023-10-30

## 2023-10-25 RX ADMIN — LEVALBUTEROL HYDROCHLORIDE 1.25 MG: 1.25 SOLUTION RESPIRATORY (INHALATION) at 07:04

## 2023-10-25 RX ADMIN — LEVETIRACETAM 1500 MG: 500 TABLET, FILM COATED ORAL at 21:52

## 2023-10-25 RX ADMIN — ENOXAPARIN SODIUM 100 MG: 100 INJECTION SUBCUTANEOUS at 02:10

## 2023-10-25 RX ADMIN — TAMSULOSIN HYDROCHLORIDE 0.4 MG: 0.4 CAPSULE ORAL at 17:10

## 2023-10-25 RX ADMIN — INSULIN LISPRO 1 UNITS: 100 INJECTION, SOLUTION INTRAVENOUS; SUBCUTANEOUS at 12:15

## 2023-10-25 RX ADMIN — CEFEPIME HYDROCHLORIDE 2000 MG: 2 INJECTION, SOLUTION INTRAVENOUS at 08:58

## 2023-10-25 RX ADMIN — LEVETIRACETAM 1500 MG: 500 TABLET, FILM COATED ORAL at 08:58

## 2023-10-25 RX ADMIN — ENOXAPARIN SODIUM 100 MG: 100 INJECTION SUBCUTANEOUS at 17:10

## 2023-10-25 RX ADMIN — CEFEPIME HYDROCHLORIDE 2000 MG: 2 INJECTION, SOLUTION INTRAVENOUS at 02:10

## 2023-10-25 RX ADMIN — LEVALBUTEROL HYDROCHLORIDE 1.25 MG: 1.25 SOLUTION RESPIRATORY (INHALATION) at 19:19

## 2023-10-25 RX ADMIN — LEVALBUTEROL HYDROCHLORIDE 1.25 MG: 1.25 SOLUTION RESPIRATORY (INHALATION) at 13:30

## 2023-10-25 RX ADMIN — BENZONATATE 100 MG: 100 CAPSULE ORAL at 21:52

## 2023-10-25 RX ADMIN — ATORVASTATIN CALCIUM 40 MG: 40 TABLET, FILM COATED ORAL at 17:10

## 2023-10-25 RX ADMIN — DEXAMETHASONE 4 MG: 2 TABLET ORAL at 08:58

## 2023-10-25 RX ADMIN — DEXAMETHASONE 4 MG: 2 TABLET ORAL at 21:52

## 2023-10-25 RX ADMIN — IPRATROPIUM BROMIDE 0.5 MG: 0.5 SOLUTION RESPIRATORY (INHALATION) at 13:30

## 2023-10-25 RX ADMIN — CEFEPIME HYDROCHLORIDE 2000 MG: 2 INJECTION, SOLUTION INTRAVENOUS at 17:25

## 2023-10-25 RX ADMIN — PANTOPRAZOLE SODIUM 40 MG: 40 TABLET, DELAYED RELEASE ORAL at 06:24

## 2023-10-25 RX ADMIN — IPRATROPIUM BROMIDE 0.5 MG: 0.5 SOLUTION RESPIRATORY (INHALATION) at 19:19

## 2023-10-25 RX ADMIN — INSULIN LISPRO 2 UNITS: 100 INJECTION, SOLUTION INTRAVENOUS; SUBCUTANEOUS at 17:09

## 2023-10-25 NOTE — PROGRESS NOTES
Progress Note - Pulmonary   Benson Del Valle 68 y.o. 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 I further titrated him to 0.5 L at rest while awake today. Likely can be taken off oxygen altogether. Uses 2 L nocturnally at baseline. Maintain pulse ox greater than 90%  Follow-up sputum culture results. Continue cefepime  Airway clearance measures and supportive care  Ongoing goals of care discussions, oncology following    Subjective:   Patient overall feeling better. Wife feels he is coughing more. Not bringing anything up. Objective:     Vitals: Blood pressure 117/75, pulse 84, temperature (!) 97 °F (36.1 °C), temperature source Temporal, resp. rate 16, height 5' 9" (1.753 m), weight 102 kg (225 lb 1.4 oz), SpO2 97 %. ,Body mass index is 33.24 kg/m². Intake/Output Summary (Last 24 hours) at 10/25/2023 1111  Last data filed at 10/24/2023 1652  Gross per 24 hour   Intake 180 ml   Output 240 ml   Net -60 ml       Invasive Devices       Peripheral Intravenous Line  Duration             Peripheral IV 10/23/23 Proximal;Ventral (anterior); Left Forearm 2 days              Drain  Duration             External Urinary Catheter Medium 1 day                    Physical Exam:   General appearance: Alert and awake, in no acute distress  Head: Normocephalic, without obvious abnormality, atraumatic  Eyes: No scleral icterus   Lungs: +Rhonchi  Heart: Regular rate  Abdomen:  No appreciable distension or tenderness  Extremities: + LE edema  Skin: Warm and dry  Neurologic: No acute focal deficits are noted    Labs: I have personally reviewed pertinent lab results. Imaging and other studies: I have personally reviewed pertinent reports.

## 2023-10-25 NOTE — PROGRESS NOTES
Medical Oncology/Hematology Progress Note  Christine Carter, male, 68 y.o., 1950,  /-01, [id number]     Assessment and Plan  Stage IV adenocarcinoma of the lung  2. Brain metastasis  3. Acute on chronic respiratory failure  4. Bacteremia  5. Goals of care    Patient is a 67-year-old gentleman with metastatic non-small cell lung cancer with solitary brain met. He did complete systemic chemotherapy with Taxol/carbo/Pembro followed by concurrent chemoradiation, completed 4/21/2022. He has been on maintenance systemic immunotherapy with Pembro    He completed SBRT to brain metastasis on 2/22/2023. He has had right upper extremity weakness, focal seizures involving right arm secondary to left-sided brain metastasis. He is on Keppra and has been unable to wean off steroid due to increased weakness, ambulatory dysfunction, cognitive issues every time steroid dose is tapered  Current dose of steroid is dexamethasone 4 mg twice daily    MRI Brain 10/2/23 shows increase in size of left posterior frontal lobe intracranial metastasis with progression of surrounding vasogenic edema. No new suspicious lesions     10/4/23 patient's case discussed at neuro-oncology tumor board. No neurosurgical intervention or additional radiotherapy indicated. 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 10/18/23 treatment with Pembro and Avastin    10/23/23 CTA PE Study negative for PE. Findings are concerning for postobstructive pneumonia, chronic loculated and complicated right pleural effusion likely malignant which is slightly larger. 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   I followed up on conversation that was held earlier with Dr. Ophelia North and patient and his spouse regarding CODE STATUS. I reviewed her recommendation for level 3 CODE STATUS and patient's goals to return home. I explained that it would be very difficult to wean him off of ventilator and patient does not want to spend the rest of his life on a machine. Family understands level 3 would allow for continuing of all current medical cares with goal to optimize his health in order to get him home. We will continue to focus on treating bacteremia with antibiotic. However, should he have a cardiac arrest, chest compressions would not be done and he will not be intubated should he go into respiratory distress. Patient does remain treatment focused. I reviewed he has been on palliative treatment with the goal to prolong life while promoting quality of life. Patient does feel he has had a good quality of life. He remains hopeful that if he can recover from current infection he may be able to resume treatment. I reviewed that while he has active infection, bacteremia cancer therapies will be on hold. Patient and family verbalized understanding of this. For now, the focus will be on medically optimizing and treating his infection with the goal to get him home and then reevaluate if he is still a candidate or wishes to continue systemic therapy. CODE STATUS was changed to level 3 DNR/DNI      Oncology will continue to follow along. He is currently scheduled for outpatient follow-up with Dr. Sloane Benson on 11/6/2023    Please see initial hospital consult note dated 10/24/2023 for admission details. HPI: Larry Silvestre is a 68y.o. year old male with a history of H4 non-small cell lung cancer who presented 10/23/2023 for severe respiratory distress. He was febrile, tachycardic and requiring BiPAP on admission. Labs demonstrated significantly elevated procalcitonin, elevated lactate, gram-negative bacteremia.   CT imaging concerning for postobstructive pneumonia        Oncology History Overview Note   9/2021 - Stage IV adenocarcinoma of the lung     10/2021 - 1/2022 - carbo/pem/pebro     3/2022 - 4/2022 - carbo/taxol/RT     5/2022 - maintenance pembro     2/2023 - solitary brain met - SBRT     Adenocarcinoma of lung   8/2021 Initial Diagnosis    Adenocarcinoma of lung     8/5/2021 Biopsy    Right lung apex, image-guided core needle biopsy:  - Adenocarcinoma      10/6/2021 - 1/26/2022 Chemotherapy    cyanocobalamin, 1,000 mcg, Intramuscular, Once, 3 of 3 cycles  Administration: 1,000 mcg (11/24/2021), 1,000 mcg (1/26/2022), 1,000 mcg (10/6/2021)  pegfilgrastim (Sonali Hargill), 6 mg, Subcutaneous, Once, 1 of 1 cycle  Administration: 6 mg (11/26/2021)  pegfilgrastim (Thelbert Marrow), 6 mg, Subcutaneous, Once, 6 of 6 cycles  Administration: 6 mg (10/6/2021), 6 mg (11/3/2021), 6 mg (11/24/2021), 6 mg (12/15/2021), 6 mg (1/5/2022)  fosaprepitant (EMEND) IVPB, 150 mg, Intravenous, Once, 6 of 6 cycles  Administration: 150 mg (10/6/2021), 150 mg (11/24/2021), 150 mg (12/15/2021), 150 mg (1/26/2022), 150 mg (11/3/2021), 150 mg (1/5/2022)  CARBOplatin (PARAPLATIN) IVPB (GO AUC DOSING), 519.5 mg, Intravenous, Once, 6 of 6 cycles  Administration: 519.5 mg (10/6/2021), 574 mg (11/24/2021), 600 mg (12/15/2021), 533 mg (1/26/2022), 604.5 mg (11/3/2021), 563 mg (1/5/2022)  pemetrexed (ALIMTA) chemo infusion, 970 mg, Intravenous, Once, 6 of 6 cycles  Administration: 1,000 mg (10/6/2021), 1,000 mg (11/24/2021), 1,000 mg (12/15/2021), 1,000 mg (1/26/2022), 1,000 mg (11/3/2021), 1,000 mg (1/5/2022)  pembrolizumab (KEYTRUDA) IVPB, 200 mg, Intravenous, Once, 6 of 6 cycles  Administration: 200 mg (10/6/2021), 200 mg (11/24/2021), 200 mg (12/15/2021), 200 mg (1/26/2022), 200 mg (11/3/2021), 200 mg (1/5/2022)     3/7/2022 -  Chemotherapy    CARBOplatin (PARAPLATIN) IVPB ([facility] [place]), , Intravenous, Once, 6 of 6 cycles  Administration: 211.6 mg (3/7/2022), 208 mg (3/14/2022), 238.8 mg (3/21/2022), 211.6 mg (3/28/2022), 215.2 mg (4/4/2022), 213.4 mg (4/18/2022)  bevacizumab (AVASTIN) IVPB, 622.5 mg (100 % of original dose 7.5 mg/kg), Intravenous, Once, 1 of 4 cycles  Dose modification: 7.5 mg/kg (original dose 7.5 mg/kg, Cycle 31)  Administration: 600 mg (10/18/2023)  PACLItaxel (TAXOL) chemo IVPB, 50 mg/m2 = 99 mg, Intravenous, Once, 6 of 6 cycles  Administration: 99 mg (3/7/2022), 99 mg (3/14/2022), 99 mg (3/21/2022), 99 mg (3/28/2022), 99 mg (4/4/2022), 99 mg (4/18/2022)  pembrolizumab (KEYTRUDA) IVPB, 200 mg, Intravenous, Once, 27 of 33 cycles  Administration: 200 mg (3/7/2022), 200 mg (3/28/2022), 200 mg (4/25/2022), 200 mg (5/16/2022), 200 mg (6/6/2022), 200 mg (6/27/2022), 200 mg (7/18/2022), 200 mg (8/8/2022), 200 mg (8/29/2022), 200 mg (9/19/2022), 200 mg (10/10/2022), 200 mg (10/31/2022), 200 mg (11/21/2022), 200 mg (12/12/2022), 200 mg (1/3/2023), 200 mg (1/23/2023), 200 mg (2/13/2023), 200 mg (3/27/2023), 200 mg (4/26/2023), 200 mg (5/17/2023), 200 mg (6/7/2023), 200 mg (6/28/2023), 200 mg (7/19/2023), 200 mg (8/9/2023), 200 mg (8/30/2023), 200 mg (9/28/2023), 200 mg (10/18/2023)     2/22/2023 - 2/22/2023 Radiation    SRS left precentral gyrus   2000 cGy    Dr. Fuentes [facility]     Malignant neoplasm of upper lobe of right lung ([address])   9/3/2021 Initial Diagnosis    Malignant neoplasm of upper lobe of right lung ([address])     9/23/2021 -  Cancer Staged    Staging form: Lung, AJCC 8th Edition  - Clinical stage from 9/23/2021: Stage IIIC (cT3, cN3, cM0) - Signed by Sarah Mcgill MD on 9/23/2021  Histopathologic type:  Adenocarcinoma, NOS       10/6/2021 - 1/26/2022 Chemotherapy    cyanocobalamin, 1,000 mcg, Intramuscular, Once, 3 of 3 cycles  Administration: 1,000 mcg (11/24/2021), 1,000 mcg (1/26/2022), 1,000 mcg (10/6/2021)  pegfilgrastim (Debria Meo), 6 mg, Subcutaneous, Once, 1 of 1 cycle  Administration: 6 mg (11/26/2021)  pegfilgrastim (NEULASTA ONPRO), 6 mg, Subcutaneous, Once, 6 of 6 cycles  Administration: 6 mg (10/6/2021), 6 mg (11/3/2021), 6 mg (11/24/2021), 6 mg (12/15/2021), 6 mg (1/5/2022)  fosaprepitant (EMEND) IVPB, 150 mg, Intravenous, Once, 6 of 6 cycles  Administration: 150 mg (10/6/2021), 150 mg (11/24/2021), 150 mg (12/15/2021), 150 mg (1/26/2022), 150 mg (11/3/2021), 150 mg (1/5/2022)  CARBOplatin (PARAPLATIN) IVPB ([facility] [place] DOSING), 519.5 mg, Intravenous, Once, 6 of 6 cycles  Administration: 519.5 mg (10/6/2021), 574 mg (11/24/2021), 600 mg (12/15/2021), 533 mg (1/26/2022), 604.5 mg (11/3/2021), 563 mg (1/5/2022)  pemetrexed (ALIMTA) chemo infusion, 970 mg, Intravenous, Once, 6 of 6 cycles  Administration: 1,000 mg (10/6/2021), 1,000 mg (11/24/2021), 1,000 mg (12/15/2021), 1,000 mg (1/26/2022), 1,000 mg (11/3/2021), 1,000 mg (1/5/2022)  pembrolizumab (KEYTRUDA) IVPB, 200 mg, Intravenous, Once, 6 of 6 cycles  Administration: 200 mg (10/6/2021), 200 mg (11/24/2021), 200 mg (12/15/2021), 200 mg (1/26/2022), 200 mg (11/3/2021), 200 mg (1/5/2022)     3/4/2022 - 4/21/2022 Radiation    The patient saw @[facility]@ for radiation treatment.  This is the current list of radiation treatment:  Plan ID Energy Fractions Dose per Fraction (cGy) Dose Correction (cGy) Total Dose Delivered (cGy) Elapsed Days   R LUNGMED REV 6X 30 / 30 200 0 6,000 48      Treatment dates:  C1: 3/4/2022 - 4/21/2022         3/7/2022 -  Chemotherapy    CARBOplatin (PARAPLATIN) IVPB ([facility] AUC DOSING), , Intravenous, Once, 6 of 6 cycles  Administration: 211.6 mg (3/7/2022), 208 mg (3/14/2022), 238.8 mg (3/21/2022), 211.6 mg (3/28/2022), 215.2 mg (4/4/2022), 213.4 mg (4/18/2022)  bevacizumab (AVASTIN) IVPB, 622.5 mg (100 % of original dose 7.5 mg/kg), Intravenous, Once, 1 of 4 cycles  Dose modification: 7.5 mg/kg (original dose 7.5 mg/kg, Cycle 31)  Administration: 600 mg (10/18/2023)  PACLItaxel (TAXOL) chemo IVPB, 50 mg/m2 = 99 mg, Intravenous, Once, 6 of 6 cycles  Administration: 99 mg (3/7/2022), 99 mg (3/14/2022), 99 mg (3/21/2022), 99 mg (3/28/2022), 99 mg (4/4/2022), 99 mg (4/18/2022)  pembrolizumab (KEYTRUDA) IVPB, 200 mg, Intravenous, Once, 27 of 33 cycles  Administration: 200 mg (3/7/2022), 200 mg (3/28/2022), 200 mg (4/25/2022), 200 mg (5/16/2022), 200 mg (6/6/2022), 200 mg (6/27/2022), 200 mg (7/18/2022), 200 mg (8/8/2022), 200 mg (8/29/2022), 200 mg (9/19/2022), 200 mg (10/10/2022), 200 mg (10/31/2022), 200 mg (11/21/2022), 200 mg (12/12/2022), 200 mg (1/3/2023), 200 mg (1/23/2023), 200 mg (2/13/2023), 200 mg (3/27/2023), 200 mg (4/26/2023), 200 mg (5/17/2023), 200 mg (6/7/2023), 200 mg (6/28/2023), 200 mg (7/19/2023), 200 mg (8/9/2023), 200 mg (8/30/2023), 200 mg (9/28/2023), 200 mg (10/18/2023)     2/22/2023 - 2/22/2023 Radiation    SRS left precentral gyrus   2000 cGy    Dr. Namita Motta     Brain metastasis   2/3/2023 Initial Diagnosis    Brain metastasis     2/22/2023 - 2/22/2023 Radiation    SRS left precentral gyrus   2000 cGy    Dr. Namita Motta            Review of Systems   Constitutional:  Positive for fatigue. Respiratory:  Positive for cough and shortness of breath. Cardiovascular:  Positive for leg swelling. Neurological:  Positive for extremity weakness. All other systems reviewed and are negative. Interval History:   Patient seen with wife and daughter present at bedside. He continues to have nonproductive cough. Remains anasarcic. Denies chest pain. Shortness of breath improved. He has been weaned down to 0.5 L nasal cannula of O2  Poor venous access, may require PICC placement for IV antibiotics      /82 (BP Location: Right arm)   Pulse 102   Temp 97.7 °F (36.5 °C) (Temporal)   Resp 16   Ht 5' 9" (1.753 m)   Wt 102 kg (225 lb 1.4 oz)   SpO2 93%   BMI 33.24 kg/m²       Physical Exam  Constitutional:       General: He is not in acute distress. Appearance: He is ill-appearing. HENT:      Head: Normocephalic and atraumatic. Eyes:      General: No scleral icterus.   Cardiovascular: Rate and Rhythm: Normal rate and regular rhythm. Pulmonary:      Effort: Pulmonary effort is normal. No respiratory distress. Musculoskeletal:         General: Swelling present. Neurological:      General: No focal deficit present. Mental Status: He is alert and oriented to person, place, and time.    Psychiatric:         Mood and Affect: Mood normal.         Behavior: Behavior normal.         Recent Results (from the past 48 hour(s))   Lactic acid 2 Hours    Collection Time: 10/23/23  5:03 PM   Result Value Ref Range    LACTIC ACID 2.6 (HH) 0.5 - 2.0 mmol/L   Lactic acid, plasma (w/reflex if result > 2.0)    Collection Time: 10/23/23  8:23 PM   Result Value Ref Range    LACTIC ACID 1.9 0.5 - 2.0 mmol/L   MRSA culture    Collection Time: 10/23/23  9:17 PM    Specimen: Nose; Nares   Result Value Ref Range    MRSA Culture Only       No Methicillin Resistant Staphlyococcus aureus (MRSA) isolated   Strep Pneumoniae, Urine    Collection Time: 10/24/23  2:19 AM    Specimen: Urine, Clean Catch   Result Value Ref Range    Strep pneumoniae antigen, urine Negative Negative   Legionella antigen, urine    Collection Time: 10/24/23  2:19 AM    Specimen: Urine, Clean Catch   Result Value Ref Range    Legionella Urinary Antigen Negative Negative   CBC and differential    Collection Time: 10/24/23  6:12 AM   Result Value Ref Range    WBC 6.33 4.31 - 10.16 Thousand/uL    RBC 3.27 (L) 3.88 - 5.62 Million/uL    Hemoglobin 9.5 (L) 12.0 - 17.0 g/dL    Hematocrit 31.1 (L) 36.5 - 49.3 %    MCV 95 82 - 98 fL    MCH 29.1 26.8 - 34.3 pg    MCHC 30.5 (L) 31.4 - 37.4 g/dL    RDW 23.1 (H) 11.6 - 15.1 %    MPV 8.6 (L) 8.9 - 12.7 fL    Platelets 99 (L) 614 - 390 Thousands/uL   Comprehensive metabolic panel    Collection Time: 10/24/23  6:12 AM   Result Value Ref Range    Sodium 143 135 - 147 mmol/L    Potassium 4.0 3.5 - 5.3 mmol/L    Chloride 108 96 - 108 mmol/L    CO2 31 21 - 32 mmol/L    ANION GAP 4 mmol/L    BUN 26 (H) 5 - 25 mg/dL Creatinine 0.88 0.60 - 1.30 mg/dL    Glucose 90 65 - 140 mg/dL    Calcium 8.5 8.4 - 10.2 mg/dL    Corrected Calcium 9.3 8.3 - 10.1 mg/dL    AST 11 (L) 13 - 39 U/L    ALT 15 7 - 52 U/L    Alkaline Phosphatase 35 34 - 104 U/L    Total Protein 5.5 (L) 6.4 - 8.4 g/dL    Albumin 3.0 (L) 3.5 - 5.0 g/dL    Total Bilirubin 0.44 0.20 - 1.00 mg/dL    eGFR 85 ml/min/1.73sq m   Calcium, ionized    Collection Time: 10/24/23  6:12 AM   Result Value Ref Range    Calcium, Ionized 1.14 1.12 - 1.32 mmol/L   Procalcitonin    Collection Time: 10/24/23  6:12 AM   Result Value Ref Range    Procalcitonin 21.26 (H) <=0.25 ng/ml   Manual Differential(PHLEBS Do Not Order)    Collection Time: 10/24/23  6:12 AM   Result Value Ref Range    Segmented % 84 (H) 43 - 75 %    Bands % 5 0 - 8 %    Lymphocytes % 10 (L) 14 - 44 %    Monocytes % 1 (L) 4 - 12 %    Eosinophils, % 0 0 - 6 %    Basophils % 0 0 - 1 %    Absolute Neutrophils 5.63 1.85 - 7.62 Thousand/uL    Lymphocytes Absolute 0.63 0.60 - 4.47 Thousand/uL    Monocytes Absolute 0.06 0.00 - 1.22 Thousand/uL    Eosinophils Absolute 0.00 0.00 - 0.40 Thousand/uL    Basophils Absolute 0.00 0.00 - 0.10 Thousand/uL    Total Counted      nRBC 1 0 - 2 /100 WBC    RBC Morphology Present     Platelet Estimate Borderline (A) Adequate    Anisocytosis Present     Ovalocytes Present    Fingerstick Glucose (POCT)    Collection Time: 10/24/23  6:14 AM   Result Value Ref Range    POC Glucose 96 65 - 140 mg/dl   Fingerstick Glucose (POCT)    Collection Time: 10/24/23 12:21 PM   Result Value Ref Range    POC Glucose 120 65 - 140 mg/dl   Fingerstick Glucose (POCT)    Collection Time: 10/24/23  3:50 PM   Result Value Ref Range    POC Glucose 170 (H) 65 - 140 mg/dl   Fingerstick Glucose (POCT)    Collection Time: 10/24/23  9:37 PM   Result Value Ref Range    POC Glucose 137 65 - 140 mg/dl   Sputum culture and Gram stain    Collection Time: 10/25/23 12:53 AM    Specimen: Induced Sputum   Result Value Ref Range Gram Stain Result 1+ Epithelial Cells (A)     Gram Stain Result No polys seen (A)     Gram Stain Result 1+ Gram negative rods (A)    Basic metabolic panel    Collection Time: 10/25/23  6:17 AM   Result Value Ref Range    Sodium 142 135 - 147 mmol/L    Potassium 4.1 3.5 - 5.3 mmol/L    Chloride 108 96 - 108 mmol/L    CO2 32 21 - 32 mmol/L    ANION GAP 2 mmol/L    BUN 27 (H) 5 - 25 mg/dL    Creatinine 0.90 0.60 - 1.30 mg/dL    Glucose 115 65 - 140 mg/dL    Calcium 8.7 8.4 - 10.2 mg/dL    eGFR 85 ml/min/1.73sq m   Magnesium    Collection Time: 10/25/23  6:17 AM   Result Value Ref Range    Magnesium 2.4 1.9 - 2.7 mg/dL   Phosphorus    Collection Time: 10/25/23  6:17 AM   Result Value Ref Range    Phosphorus 3.0 2.3 - 4.1 mg/dL   CBC and differential    Collection Time: 10/25/23  6:17 AM   Result Value Ref Range    WBC 5.32 4.31 - 10.16 Thousand/uL    RBC 3.20 (L) 3.88 - 5.62 Million/uL    Hemoglobin 9.3 (L) 12.0 - 17.0 g/dL    Hematocrit 30.9 (L) 36.5 - 49.3 %    MCV 97 82 - 98 fL    MCH 29.1 26.8 - 34.3 pg    MCHC 30.1 (L) 31.4 - 37.4 g/dL    RDW 22.4 (H) 11.6 - 15.1 %    MPV 8.9 8.9 - 12.7 fL    Platelets 93 (L) 012 - 390 Thousands/uL    nRBC 0 /100 WBCs    Neutrophils Relative 89 (H) 43 - 75 %    Immat GRANS % 1 0 - 2 %    Lymphocytes Relative 7 (L) 14 - 44 %    Monocytes Relative 3 (L) 4 - 12 %    Eosinophils Relative 0 0 - 6 %    Basophils Relative 0 0 - 1 %    Neutrophils Absolute 4.70 1.85 - 7.62 Thousands/µL    Immature Grans Absolute 0.07 0.00 - 0.20 Thousand/uL    Lymphocytes Absolute 0.38 (L) 0.60 - 4.47 Thousands/µL    Monocytes Absolute 0.16 (L) 0.17 - 1.22 Thousand/µL    Eosinophils Absolute 0.00 0.00 - 0.61 Thousand/µL    Basophils Absolute 0.01 0.00 - 0.10 Thousands/µL   Fingerstick Glucose (POCT)    Collection Time: 10/25/23  7:51 AM   Result Value Ref Range    POC Glucose 104 65 - 140 mg/dl   Fingerstick Glucose (POCT)    Collection Time: 10/25/23 11:40 AM   Result Value Ref Range    POC Glucose 151 (H) 65 - 140 mg/dl       FL barium swallow video w speech    Result Date: 10/24/2023  Narrative: A video barium swallow study was performed by the Department of Speech Pathology. Please refer to the report for the official interpretation. The images are stored for archival purposes only. Study images were not formally reviewed by the Radiology Department. XR chest portable - 1 view    Result Date: 10/23/2023  Narrative: CHEST INDICATION:   Shortness of breath. History of lung cancer. . COMPARISON: Chest radiograph 10/17/2023. Same day CT chest study. EXAM PERFORMED/VIEWS:  XR CHEST PORTABLE FINDINGS: Heart shadow is again obscured by right-sided opacity. Near right-sided opacification due to known loculated pleural effusion. Superimposed consolidation better visualized on same day CT study. Groundglass opacification in the left lower lung zone, likely to represent atelectasis. No pneumothorax. Osseous structures appear within normal limits for patient age. Impression: Persistent right-sided loculated pleural effusion with superimposed consolidation better visualized on same day CT study. Groundglass opacification in the left lower lung zone, likely to represent atelectasis. Resident: Kosta Michelle, the attending radiologist, have reviewed the images and agree with the final report above. Workstation performed: [id number]     CTA ED chest PE study    Result Date: 10/23/2023  Narrative: CTA - CHEST WITH IV CONTRAST - PULMONARY ANGIOGRAM INDICATION:   abnormal CTA. 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 COMPARISON: 10/23/2023 from 9:20 a.m., 9/20/2023 TECHNIQUE: CTA examination of the chest was performed using angiographic technique according to a protocol specifically tailored to evaluate for pulmonary embolism. Multiplanar 2D reformatted images were created from the source data. In addition, coronal 3D MIP postprocessing was performed on the acquisition scanner.  Radiation dose length product (DLP) for this visit:  613.29 mGy-cm . This examination, like all CT scans performed in the [facility], was performed utilizing techniques to minimize radiation dose exposure, including the use of iterative  reconstruction and automated exposure control. IV Contrast:  80 mL of iohexol (OMNIPAQUE) FINDINGS: PULMONARY ARTERIAL TREE:  No pulmonary embolus is seen. LUNGS: Background centrilobular emphysema. Mild dependent and background groundglass opacities on the left without significant change. Densely consolidated right upper lobe apical and posterior segments similar to recent priors. Worsening consolidation in the right lower lobe, no significant remaining aeration present. Some air bronchograms present, many of the bronchi are narrow and attenuated or partially obscured, especially in the bronchus intermedius and lower lobe segments. PLEURA: Large gradually increasing mixed attenuation pleural mass/fluid which inverts the right hemidiaphragm and causes mass effect on the right lower lobe. This currently measures 13.2 cm craniocaudal by 13.5 x 9.4 cm on axial image 170. HEART/GREAT VESSELS:  Unremarkable for patient's age. No thoracic aortic aneurysm. MEDIASTINUM AND JONH: No significant mediastinal or hilar adenopathy. CHEST WALL AND LOWER NECK:   Grossly stable right neck soft tissue mass presumably representing metastatic adenopathy. Incompletely imaged. VISUALIZED STRUCTURES IN THE UPPER ABDOMEN: Scattered hypodensities within the liver not characterized adequately on this exam. No gross change compared to recent priors. Mild caliectasis on the left, incompletely imaged. May in part be related to contrast excretion phase compared to earlier today. Study from today shows mild fullness which is symmetric bilaterally and to the level of the bladder. OSSEOUS STRUCTURES:  No acute fracture or destructive osseous lesion. Impression: No evidence for acute pulmonary emboli.  Densely consolidated right lower lobe and right upper lobe. Aeration remaining in the right middle lobe. See above for further description and comparison. Significant mass effect on the right lower lung secondary to large mixed attenuation masslike pleural abnormality described above. 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 Stable incompletely imaged right neck mass presumably representing metastatic adenopathy. Stable hypodensities within the liver. Workstation performed: [id number]     PE Study with CT Abdomen and Pelvis with contrast    Result Date: 10/23/2023  Narrative: CT PULMONARY ANGIOGRAM OF THE CHEST AND CT ABDOMEN AND PELVIS WITH INTRAVENOUS CONTRAST INDICATION:   SOB, fever, abdominal distention. COMPARISON: CT chest abdomen pelvis 9/20/2023. TECHNIQUE:  CT examination of the chest, abdomen and pelvis was performed. Thin section CT angiographic technique was used in the chest in order to evaluate for pulmonary embolus and coronal 3D MIP postprocessing was performed on the acquisition scanner. Multiplanar 2D reformatted images were created from the source data. This examination, like all CT scans performed in the [facility], was performed utilizing techniques to minimize radiation dose exposure, including the use of iterative reconstruction and automated exposure control. Radiation dose length product (DLP) for this visit:  1801.92 mGy-cm IV Contrast:  100 mL of iohexol (OMNIPAQUE) Enteric Contrast:  Enteric contrast was not administered. FINDINGS: CHEST PULMONARY ARTERIAL TREE: There is heterogeneous enhancement throughout the right pulmonary arteries. While this may be artifactual there is a segment that has more of an eccentric filling defect appearance on the ventral margin of the mid right pulmonary  artery (series 2 image 110) which is indeterminate. No left-sided pulmonary emboli.  LUNGS: Increased diffuse right lung consolidation most apparent within the medial right lower lobe. Groundglass opacities in the left lower lobe. PLEURA: Right-sided pleural metastasis with nodular thickening of the pleura and a moderate size loculated heterogeneously hyperattenuating pleural collection. No left-sided effusion. No pneumothorax. HEART/AORTA:  Heart is unremarkable for patient's age. No thoracic aortic aneurysm. MEDIASTINUM AND JONH:  Unremarkable. CHEST WALL AND LOWER NECK: The imaged mass in the right neck measuring at least 3.2 cm as noted on the previous exam. ABDOMEN LIVER/BILIARY TREE: Numerous hypoattenuating hepatic lesions, too small to further characterize. GALLBLADDER:  No calcified gallstones. No pericholecystic inflammatory change. SPLEEN:  Unremarkable. PANCREAS:  Unremarkable. ADRENAL GLANDS:  Unremarkable. KIDNEYS/URETERS:  One or more simple renal cyst(s) is noted. Otherwise unremarkable kidneys. No hydronephrosis. STOMACH AND BOWEL: Colonic diverticulosis without evidence of acute diverticulitis. APPENDIX: A normal appendix was visualized. ABDOMINOPELVIC CAVITY:  No ascites. No pneumoperitoneum. No lymphadenopathy. Stable right retrocrural lymph node. VESSELS: Atherosclerotic changes are present. No evidence of aneurysm. PELVIS REPRODUCTIVE ORGANS:  Unremarkable for patient's age. URINARY BLADDER:  Unremarkable. ABDOMINAL WALL/INGUINAL REGIONS:  Unremarkable. OSSEOUS STRUCTURES:  No acute fracture or destructive osseous lesion. Degenerative changes throughout the spine. Impression: 1. Heterogeneous opacification of the right-sided pulmonary arteries may be due to artifact, but cannot exclude a pulmonary embolus particularly in the right pulmonary artery. Recommend repeating the CTA of the chest. There is no large saddle embolus or  CT findings of right heart strain. 2.  Mildly increased right lung consolidation and groundglass opacities in the left lower lobe. Cannot exclude superimposed pneumonia.  3.  Right pleural thickening and nodularity with a moderate size loculated complex collection, suspicious for pleural involvement by metastases. 4.  Partially imaged mass in the right neck suspicious for darien metastases. Medical record indicates a CT of the neck has been ordered to evaluate this finding but not yet performed. I personally discussed this study with Mellisa Long on 10/23/2023 11:35 AM. 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 C34.11: Malignant neoplasm of upper lobe, right bronchus or lung. Cold-like symptoms for 2 weeks. COMPARISON: Chest radiograph 9/25/2023; CT chest, abdomen, pelvis 9/20/2023 EXAM PERFORMED/VIEWS:  XR CHEST PA & LATERAL  The frontal view was performed utilizing dual energy radiographic technique. Images: 4 FINDINGS: Heart shadow is obscured by right-sided opacity. Mild bowing of the trachea toward the right, unchanged. Slight progression of the previously demonstrated extensive right lung opacity corresponding with the patient's known loculated right-sided pleural effusion and radiation fibrosis demonstrated on CT dated 9/20/2023. Left lung is clear apart from tiny linear scar or atelectasis at the base. No pneumothorax. Osseous structures appear within normal limits for patient age. Impression: Slight progression of previously demonstrated extensive opacity right hemithorax corresponding with patient's known right pleural effusion and probable post radiation changes. Resident: Candelaria Villareal, the attending radiologist, have reviewed the images and agree with the final report above. 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 SBRT of brain met 2/22/2023 COMPARISON: MRI brain 7/26/2023. TECHNIQUE: Multiplanar, multisequence imaging of the brain and sella was performed before and after gadolinium administration. IV Contrast:  9 mL of Gadobutrol injection (SINGLE-DOSE) IMAGE QUALITY:   Diagnostic. FINDINGS: Focal necrotic lesion with irregular peripheral enhancement involving the posterior left frontal lobe measuring 1.3 x 1.8 x 1.9 cm (AP x ML x CC), previously measuring 1.2 cm. There is increased surrounding vasogenic edema. Mild chronic microangiopathic ischemic changes. VENTRICLES:  Ventricles and extra-axial CSF spaces are prominent commensurate with the degree of volume loss. No hydrocephalus. No intraventricular hemorrhage. SELLA AND PITUITARY GLAND:  Normal. ORBITS: Right-sided cataract surgery. PARANASAL SINUSES:  Mucosal thickening of the sinuses with no air fluid levels. VASCULATURE:  Evaluation of the major intracranial vasculature demonstrates appropriate flow voids. CALVARIUM AND SKULL BASE: Marrow signal heterogeneity which can be seen with underlying red marrow proliferation. EXTRACRANIAL SOFT TISSUES:  Normal.     Impression: Metastatic adenocarcinoma of the lung status post chemotherapy. Left posterior frontal lobe intracranial metastases (status post SBRT 2/22/2023) increased in size compared to 7/26/2023 with progression of surrounding vasogenic edema. There is no new suspicious intra-axial lesion. Workstation performed: [id number]       I have personally reviewed labs, imaging studies, and pertinent reports. This note has been generated by voice recognition software system. Therefore, there may be spelling, grammar, and or syntax errors. Please contact if questions arise.

## 2023-10-25 NOTE — PLAN OF CARE
Problem: Potential for Falls  Goal: Patient will remain free of falls  Description: INTERVENTIONS:  - Educate patient/family on patient safety including physical limitations  - Instruct patient to call for assistance with activity   - Consult OT/PT to assist with strengthening/mobility   - Keep Call bell within reach  - Keep bed low and locked with side rails adjusted as appropriate  - Keep care items and personal belongings within reach  - Initiate and maintain comfort rounds  - Make Fall Risk Sign visible to staff  - Offer Toileting every  Hours, in advance of need  - Initiate/Maintain alarm  - Obtain necessary fall risk management equipment:   - Apply yellow socks and bracelet for high fall risk patients  - Consider moving patient to room near nurses station  Outcome: Progressing     Problem: NEUROSENSORY - ADULT  Goal: Achieves stable or improved neurological status  Description: INTERVENTIONS  - Monitor and report changes in neurological status  - Monitor vital signs such as temperature, blood pressure, glucose, and any other labs ordered   - Initiate measures to prevent increased intracranial pressure  - Monitor for seizure activity and implement precautions if appropriate      Outcome: Progressing  Goal: Achieves maximal functionality and self care  Description: INTERVENTIONS  - Monitor swallowing and airway patency with patient fatigue and changes in neurological status  - Encourage and assist patient to increase activity and self care.    - Encourage visually impaired, hearing impaired and aphasic patients to use assistive/communication devices  Outcome: Progressing     Problem: GENITOURINARY - ADULT  Goal: Maintains or returns to baseline urinary function  Description: INTERVENTIONS:  - Assess urinary function  - Encourage oral fluids to ensure adequate hydration if ordered  - Administer IV fluids as ordered to ensure adequate hydration  - Administer ordered medications as needed  - Offer frequent toileting  - Follow urinary retention protocol if ordered  Outcome: Progressing  Goal: Absence of urinary retention  Description: INTERVENTIONS:  - Assess patient’s ability to void and empty bladder  - Monitor I/O  - Bladder scan as needed  - Discuss with physician/AP medications to alleviate retention as needed  - Discuss catheterization for long term situations as appropriate  Outcome: Progressing  Goal: Urinary catheter remains patent  Description: INTERVENTIONS:  - Assess patency of urinary catheter  - If patient has a chronic cadet, consider changing catheter if non-functioning  - Follow guidelines for intermittent irrigation of non-functioning urinary catheter  Outcome: Progressing     Problem: METABOLIC, FLUID AND ELECTROLYTES - ADULT  Goal: Electrolytes maintained within normal limits  Description: INTERVENTIONS:  - Monitor labs and assess patient for signs and symptoms of electrolyte imbalances  - Administer electrolyte replacement as ordered  - Monitor response to electrolyte replacements, including repeat lab results as appropriate  - Instruct patient on fluid and nutrition as appropriate  Outcome: Progressing  Goal: Fluid balance maintained  Description: INTERVENTIONS:  - Monitor labs   - Monitor I/O and WT  - Instruct patient on fluid and nutrition as appropriate  - Assess for signs & symptoms of volume excess or deficit  Outcome: Progressing  Goal: Glucose maintained within target range  Description: INTERVENTIONS:  - Monitor Blood Glucose as ordered  - Assess for signs and symptoms of hyperglycemia and hypoglycemia  - Administer ordered medications to maintain glucose within target range  - Assess nutritional intake and initiate nutrition service referral as needed  Outcome: Progressing     Goal: Incision(s), wounds(s) or drain site(s) healing without S/S of infection  Description: INTERVENTIONS  - Assess and document dressing, incision, wound bed, drain sites and surrounding tissue  - Provide patient and family education  - Perform skin care/dressing changes every   Outcome: Progressing  Goal: Pressure injury heals and does not worsen  Description: Interventions:  - Implement low air loss mattress or specialty surface (Criteria met)  - Apply silicone foam dressing  - Instruct/assist with weight shifting every  minutes when in chair   - Limit chair time to  hour intervals  - Use special pressure reducing interventions such as  when in chair   - Apply fecal or urinary incontinence containment device   - Perform passive or active ROM every   - Turn and reposition patient & offload bony prominences every  hours   - Utilize friction reducing device or surface for transfers   - Consider consults to  interdisciplinary teams such as   - Use incontinent care products after each incontinent episode such as   - Consider nutrition services referral as needed  Outcome: Progressing     Problem: MUSCULOSKELETAL - ADULT  Goal: Maintain or return mobility to safest level of function  Description: INTERVENTIONS:  - Assess patient's ability to carry out ADLs; assess patient's baseline for ADL function and identify physical deficits which impact ability to perform ADLs (bathing, care of mouth/teeth, toileting, grooming, dressing, etc.)  - Assess/evaluate cause of self-care deficits   - Assess range of motion  - Assess patient's mobility  - Assess patient's need for assistive devices and provide as appropriate  - Encourage maximum independence but intervene and supervise when necessary  - Involve family in performance of ADLs  - Assess for home care needs following discharge   - Consider OT consult to assist with ADL evaluation and planning for discharge  - Provide patient education as appropriate  Outcome: Progressing  Goal: Maintain proper alignment of affected body part  Description: INTERVENTIONS:  - Support, maintain and protect limb and body alignment  - Provide patient/ family with appropriate education  Outcome: Progressing     Problem: PAIN - ADULT  Goal: Verbalizes/displays adequate comfort level or baseline comfort level  Description: Interventions:  - Encourage patient to monitor pain and request assistance  - Assess pain using appropriate pain scale  - Administer analgesics based on type and severity of pain and evaluate response  - Implement non-pharmacological measures as appropriate and evaluate response  - Consider cultural and social influences on pain and pain management  - Notify physician/advanced practitioner if interventions unsuccessful or patient reports new pain  Outcome: Progressing     Problem: INFECTION - ADULT  Goal: Absence or prevention of progression during hospitalization  Description: INTERVENTIONS:  - Assess and monitor for signs and symptoms of infection  - Monitor lab/diagnostic results  - Monitor all insertion sites, i.e. indwelling lines, tubes, and drains  - Monitor endotracheal if appropriate and nasal secretions for changes in amount and color  - [place] appropriate cooling/warming therapies per order  - Administer medications as ordered  - Instruct and encourage patient and family to use good hand hygiene technique  - Identify and instruct in appropriate isolation precautions for identified infection/condition  Outcome: Progressing  Goal: Absence of fever/infection during neutropenic period  Description: INTERVENTIONS:  - Monitor WBC    Outcome: Progressing     Problem: SAFETY ADULT  Goal: Patient will remain free of falls  Description: INTERVENTIONS:  - Educate patient/family on patient safety including physical limitations  - Instruct patient to call for assistance with activity   - Consult OT/PT to assist with strengthening/mobility   - Keep Call bell within reach  - Keep bed low and locked with side rails adjusted as appropriate  - Keep care items and personal belongings within reach  - Initiate and maintain comfort rounds  - Make Fall Risk Sign visible to staff  - Offer Toileting every  Hours, in advance of need  - Initiate/Maintain alarm  - Obtain necessary fall risk management equipment:   - Apply yellow socks and bracelet for high fall risk patients  - Consider moving patient to room near nurses station  Outcome: Progressing  Goal: Maintain or return to baseline ADL function  Description: INTERVENTIONS:  -  Assess patient's ability to carry out ADLs; assess patient's baseline for ADL function and identify physical deficits which impact ability to perform ADLs (bathing, care of mouth/teeth, toileting, grooming, dressing, etc.)  - Assess/evaluate cause of self-care deficits   - Assess range of motion  - Assess patient's mobility; develop plan if impaired  - Assess patient's need for assistive devices and provide as appropriate  - Encourage maximum independence but intervene and supervise when necessary  - Involve family in performance of ADLs  - Assess for home care needs following discharge   - Consider OT consult to assist with ADL evaluation and planning for discharge  - Provide patient education as appropriate  Outcome: Progressing  Goal: Maintains/Returns to pre admission functional level  Description: INTERVENTIONS:  - Perform BMAT or MOVE assessment daily.   - Set and communicate daily mobility goal to care team and patient/family/caregiver. - Collaborate with rehabilitation services on mobility goals if consulted  - Perform Range of Motion  times a day. - Reposition patient every  hours.   - Dangle patient  times a day  - Stand patient  times a day  - Ambulate patient  times a day  - Out of bed to chair  times a day   - Out of bed for meals times a day  - Out of bed for toileting  - Record patient progress and toleration of activity level   Outcome: Progressing     Problem: DISCHARGE PLANNING  Goal: Discharge to home or other facility with appropriate resources  Description: INTERVENTIONS:  - Identify barriers to discharge w/patient and caregiver  - Arrange for needed discharge resources and transportation as appropriate  - Identify discharge learning needs (meds, wound care, etc.)  - Arrange for interpretive services to assist at discharge as needed  - Refer to Case Management Department for coordinating discharge planning if the patient needs post-hospital services based on physician/advanced practitioner order or complex needs related to functional status, cognitive ability, or social support system  Outcome: Progressing     Problem: Knowledge Deficit  Goal: Patient/family/caregiver demonstrates understanding of disease process, treatment plan, medications, and discharge instructions  Description: Complete learning assessment and assess knowledge base.   Interventions:  - Provide teaching at level of understanding  - Provide teaching via preferred learning methods  Outcome: Progressing     Problem: RESPIRATORY - ADULT  Goal: Achieves optimal ventilation and oxygenation  Description: INTERVENTIONS:  - Assess for changes in respiratory status  - Assess for changes in mentation and behavior  - Position to facilitate oxygenation and minimize respiratory effort  - Oxygen administered by appropriate delivery if ordered  - Initiate smoking cessation education as indicated  - Encourage broncho-pulmonary hygiene including cough, deep breathe, Incentive Spirometry  - Assess the need for suctioning and aspirate as needed  - Assess and instruct to report SOB or any respiratory difficulty  - Respiratory Therapy support as indicated  Outcome: Progressing     Problem: MOBILITY - ADULT  Goal: Maintain or return to baseline ADL function  Description: INTERVENTIONS:  -  Assess patient's ability to carry out ADLs; assess patient's baseline for ADL function and identify physical deficits which impact ability to perform ADLs (bathing, care of mouth/teeth, toileting, grooming, dressing, etc.)  - Assess/evaluate cause of self-care deficits   - Assess range of motion  - Assess patient's mobility; develop plan if impaired  - Assess patient's need for assistive devices and provide as appropriate  - Encourage maximum independence but intervene and supervise when necessary  - Involve family in performance of ADLs  - Assess for home care needs following discharge   - Consider OT consult to assist with ADL evaluation and planning for discharge  - Provide patient education as appropriate  Outcome: Progressing  Goal: Maintains/Returns to pre admission functional level  Description: INTERVENTIONS:  - Perform BMAT or MOVE assessment daily.   - Set and communicate daily mobility goal to care team and patient/family/caregiver. - Collaborate with rehabilitation services on mobility goals if consulted  - Perform Range of Motion  times a day. - Reposition patient every  hours.   - Dangle patient  timesa day  - Stand patient  times a day  - Ambulate patient  times a day  - Out of bed to chair  times a day   - Out of bed for meals  times a day  - Out of bed for toileting  - Record patient progress and toleration of activity level   Outcome: Progressing     Problem: Prexisting or High Potential for Compromised Skin Integrity  Goal: Skin integrity is maintained or improved  Description: INTERVENTIONS:  - Identify patients at risk for skin breakdown  - Assess and monitor skin integrity  - Assess and monitor nutrition and hydration status  - Monitor labs   - Assess for incontinence   - Turn and reposition patient  - Assist with mobility/ambulation  - Relieve pressure over bony prominences  - Avoid friction and shearing  - Provide appropriate hygiene as needed including keeping skin clean and dry  - Evaluate need for skin moisturizer/barrier cream  - Collaborate with interdisciplinary team   - Patient/family teaching  - Consider wound care consult   Outcome: Progressing     Problem: Nutrition/Hydration-ADULT  Goal: Nutrient/Hydration intake appropriate for improving, restoring or maintaining nutritional needs  Description: Monitor and assess patient's nutrition/hydration status for malnutrition. Collaborate with interdisciplinary team and initiate plan and interventions as ordered. Monitor patient's weight and dietary intake as ordered or per policy. Utilize nutrition screening tool and intervene as necessary. Determine patient's food preferences and provide high-protein, high-caloric foods as appropriate.      INTERVENTIONS:  - Monitor oral intake, urinary output, labs, and treatment plans  - Assess nutrition and hydration status and recommend course of action  - Evaluate amount of meals eaten  - Assist patient with eating if necessary   - Allow adequate time for meals  - Recommend/ encourage appropriate diets, oral nutritional supplements, and vitamin/mineral supplements  - Order, calculate, and assess calorie counts as needed  - Recommend, monitor, and adjust tube feedings and TPN/PPN based on assessed needs  - Assess need for intravenous fluids  - Provide specific nutrition/hydration education as appropriate  - Include patient/family/caregiver in decisions related to nutrition  Outcome: Progressing

## 2023-10-25 NOTE — PROGRESS NOTES
[address] [facility]  Progress Note  Name: Giancarlo Friedman  MRN: [id number]  Unit/Bed#: -01 I Date of Admission: 10/23/2023   Date of Service: 10/25/2023 I Hospital Day: 2    Assessment/Plan   Gram-negative bacteremia  Assessment & Plan  2/2 blood cultures with GNR  Awaiting BCID. ID consulted. Continue Cefepime     BPH (benign prostatic hyperplasia)  Assessment & Plan  Continue home Flomax    GERD (gastroesophageal reflux disease)  Assessment & Plan  Continue PPI    Seizure ([address])  Assessment & Plan  By history  Last seen 9/25 in [address] [place] [address] ED for focal seizure following chemotherapy  Home rgimen: Keppra 1500 mg PO BID    Plan:  Continue with Keppra 1500 mg BID  Check Keppra level  Q4h neuro checks    Sepsis ([address])  Assessment & Plan  SIRS: Tachycardia, tachypnea, fever  10/23 CTA PE Study: No evidence for acute pulmonary emboli. Densely consolidated right lower lobe and right upper lobe. Aeration remaining in the right middle lobe. See above for further description and comparison. Significant mass effect on the right lower lung secondary to large mixed attenuation masslike pleural abnormality described above. 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 Stable incompletely imaged right neck mass presumably representing metastatic adenopathy. Stable hypodensities within the liver. UA negative for UTI  Blood cultures pending  Urine strep/legionella and MRSA pending  Lactate 5.1 --> 2.6 --> 2.4  30 ml/kg were not given due to concern for fluid overload  Procal 0.18    Plan:  Continue IV Cefepime   Trend lactic and procal  Follow up culture results  Blood cultures with 2/2 GNR. Lab unable to thus far speciate. Consult infectious disease. Repeat blood cultures.    Trend fever curve and WBC count    Anasarca  Assessment & Plan  Likely 2/2 Avastin which was started earlier this month for radiation necrosis surrounding brain metastasis  Completed a course of lasix outpatient  Hold off on further diuretics  UA with only trace proteinuria  Will need further discussion with medical oncology regarding further Avastin dosing outpatient    HLD (hyperlipidemia)  Assessment & Plan  Continue home statin    Brain metastasis  Assessment & Plan  Brain metastasis first diagnosed in February 2023 s/p SBRT  Currently on Keppra for focal seizures in 8/2023 due to brain metastasis and Decadron for surrounding vasogenic edema  Pt has been unable to be weaned from decadron due to decreased functional status and worsening lethargy with attempts of weaning  10/2 MRI brain: Metastatic adenocarcinoma of the lung status post chemotherapy. Left posterior frontal lobe intracranial metastases (status post SBRT 2/22/2023) increased in size compared to 7/26/2023 with progression of surrounding vasogenic edema. There is no new suspicious intra-axial lesion. 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 Adenocarcinoma of lung  Assessment & Plan  Dx initially 09/2021 stage IIIC. Received multiple rounds of chemo/radiation. Found to have brain mets 02/2023 to the left precentral gyrus. Cancer upstaged to stage IV. Currently on daily dexamethasone, Lovenox for [facility] as well as a history of DVT. Currently receiving combination Avastin and Pembrolizumab at the infusion center. Last chemo 10/18/2023. Avastatin is a new medication for the patient. 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  Unlikely to benefit from ASEPT catheter    Plan:  Continue with home dose dexamethasone and Lovenox for DVT   Extensive conversation with patient and wife regarding code status. Patient and wife at this time would like to remain full code. Palliative care consult  Recommend follow up outpatient with oncology after discharge    Bilateral lower extremity DVTs  Assessment & Plan  Continue home Lovenox    * Acute on Chronic Hypoxemic respiratory failure, (HCC)  Assessment & Plan  POA: increased WOB in the ED requiring the initiation of BIPAP  Hx of Stage IV lung CA with mets. 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 Completed a short course of PO lasix outpatient  10/23 CTA PE Study: No evidence for acute pulmonary emboli. Densely consolidated right lower lobe and right upper lobe. Aeration remaining in the right middle lobe. See above for further description and comparison. Significant mass effect on the right lower lung secondary to large mixed attenuation masslike pleural abnormality described above. 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 Stable incompletely imaged right neck mass presumably representing metastatic adenopathy. Stable hypodensities within the liver. RVP negative    Plan:  Weaned off BiPAP to  NC  Continue abx for presumed PNA as outlined below  Hold further diuretics  Titrate O2 to maintain spO2 > 92%  Pulmonary medicine following, appreciate recommendations             VTE  Prophylaxis:   Pharmacologic: in place  Mechanical VTE Prophylaxis in Place: Yes    Patient Centered Rounds: I have performed bedside rounds with nursing staff today.     Discussions with Specialists or Other Care Team Provider: case management    Education and Discussions with Family / Patient: pt wife      Current Length of Stay: 2 day(s)    Current Patient Status: Inpatient        Code Status: Level 1 - Full Code    Discharge Plan: Pt will require continued inpatient hospitalization. Subjective:   Pt doing well   Transferred out of icu  Oxygen has been weaned  afebrile    Patient is seen and examined at bedside. All other ROS are negative. Objective:     Vitals:   Temp (24hrs), Av.5 °F (36.4 °C), Min:97 °F (36.1 °C), Max:98 °F (36.7 °C)    Temp:  [97 °F (36.1 °C)-98 °F (36.7 °C)] 97 °F (36.1 °C)  HR:  [84-98] 84  Resp:  [16-26] 16  BP: (117-129)/(75-85) 117/75  SpO2:  [94 %-100 %] 97 %  Body mass index is 33.24 kg/m². Input and Output Summary (last 24 hours):        Intake/Output Summary (Last 24 hours) at 10/25/2023 1250  Last data filed at 10/25/2023 1132  Gross per 24 hour   Intake 480 ml   Output 240 ml   Net 240 ml       Physical Exam:       GEN: No acute distress, comfortable, on o2  HEEENT: No JVD, PERRLA, no scleral icterus  RESP: Lungs clear to auscultation bilaterally  CV: RRR, +s1/s2   ABD: SOFT NON TENDER, POSITIVE BOWEL SOUNDS, NO DISTENTION  PSYCH: CALM  NEURO: Mentation baseline, NO FOCAL DEFICITS  SKIN: NO RASH  EXTREM: RUE swelling    Additional Data:     Labs:    Results from last 7 days   Lab Units 10/25/23  0617 10/24/23  0612   WBC Thousand/uL 5.32 6.33   HEMOGLOBIN g/dL 9.3* 9.5*   HEMATOCRIT % 30.9* 31.1*   PLATELETS Thousands/uL 93* 99*   BANDS PCT %  --  5   NEUTROS PCT % 89*  --    LYMPHS PCT % 7*  --    LYMPHO PCT %  --  10*   MONOS PCT % 3*  --    MONO PCT %  --  1*   EOS PCT % 0 0     Results from last 7 days   Lab Units 10/25/23  0617 10/24/23  0612   SODIUM mmol/L 142 143   POTASSIUM mmol/L 4.1 4.0   CHLORIDE mmol/L 108 108   CO2 mmol/L 32 31   BUN mg/dL 27* 26*   CREATININE mg/dL 0.90 0.88   ANION GAP mmol/L 2 4   CALCIUM mg/dL 8.7 8.5   ALBUMIN g/dL  --  3.0*   TOTAL BILIRUBIN mg/dL  --  0.44   ALK PHOS U/L  --  35   ALT U/L  --  15   AST U/L  --  11*   GLUCOSE RANDOM mg/dL 115 90     Results from last 7 days   Lab Units 10/23/23  0811   INR  1.00     Results from last 7 days   Lab Units 10/25/23  1140 10/25/23  9661 10/24/23  4630 10/24/23  1550 10/24/23  1221 10/24/23  0614 10/23/23  0806   POC GLUCOSE mg/dl 151* 104 137 170* 120 96 115         Results from last 7 days   Lab Units 10/24/23  0612 10/23/23  2023 10/23/23  1703 10/23/23  1406 10/23/23  1047 10/23/23  0811   LACTIC ACID mmol/L  --  1.9 2.6* 2.4* 2.6* 5.1*   PROCALCITONIN ng/ml 21.26*  --   --   --   --  0.13       Lines/Drains:  Invasive Devices       Peripheral Intravenous Line  Duration             Peripheral IV 10/23/23 Proximal;Ventral (anterior); Left Forearm 2 days              Drain  Duration             External Urinary Catheter Medium 1 day                    Telemetry:        * I Have Reviewed All Lab Data Listed Above. Imaging:     Results for orders placed during the hospital encounter of 10/23/23    XR chest portable - 1 view    Narrative  CHEST    INDICATION:   Shortness of breath. History of lung cancer. .    COMPARISON: Chest radiograph 10/17/2023. Same day CT chest study. EXAM PERFORMED/VIEWS:  XR CHEST PORTABLE      FINDINGS:    Heart shadow is again obscured by right-sided opacity. Near right-sided opacification due to known loculated pleural effusion. Superimposed consolidation better visualized on same day CT study. Groundglass opacification in the left lower lung zone, likely to represent atelectasis. No pneumothorax. Osseous structures appear within normal limits for patient age. Impression  Persistent right-sided loculated pleural effusion with superimposed consolidation better visualized on same day CT study. Groundglass opacification in the left lower lung zone, likely to represent atelectasis. Resident: Nicol Duval, the attending radiologist, have reviewed the images and agree with the final report above.     Workstation performed: [id number]    Results for orders placed during the hospital encounter of 10/17/23    XR chest pa & lateral    Narrative  CHEST    INDICATION:   C79.31: Secondary malignant neoplasm of brain. C34.11: Malignant neoplasm of upper lobe, right bronchus or lung. Cold-like symptoms for 2 weeks. COMPARISON: Chest radiograph 9/25/2023; CT chest, abdomen, pelvis 9/20/2023    EXAM PERFORMED/VIEWS:  XR CHEST PA & LATERAL  The frontal view was performed utilizing dual energy radiographic technique. Images: 4    FINDINGS:    Heart shadow is obscured by right-sided opacity. Mild bowing of the trachea toward the right, unchanged. Slight progression of the previously demonstrated extensive right lung opacity corresponding with the patient's known loculated right-sided pleural effusion and radiation fibrosis demonstrated on CT dated 9/20/2023. Left lung is clear apart from tiny linear scar or atelectasis at the base. No pneumothorax. Osseous structures appear within normal limits for patient age. Impression  Slight progression of previously demonstrated extensive opacity right hemithorax corresponding with patient's known right pleural effusion and probable post radiation changes. Resident: Perri Cruz, the attending radiologist, have reviewed the images and agree with the final report above.     Workstation performed: [id number]      *I have reviewed all imaging reports listed above      Recent Cultures (last 7 days):     Results from last 7 days   Lab Units 10/25/23  0053 10/24/23  0219 10/23/23  0812 10/23/23  0811   BLOOD CULTURE   --   --  Gram Negative Kike* Gram Negative Kike*   GRAM STAIN RESULT  1+ Epithelial Cells*  No polys seen*  1+ Gram negative rods*  --  Gram negative rods* Gram negative rods*   LEGIONELLA URINARY ANTIGEN   --  Negative  --   --        Last 24 Hours Medication List:   Current Facility-Administered Medications   Medication Dose Route Frequency Provider Last Rate    atorvastatin  40 mg Oral Daily With CRISTOFER Stevenson      benzonatate  100 mg Oral TID PRN Renny Marie PA-C      cefepime  2,000 mg Intravenous Diane Winkler CRISTOFER aFrmer 2,000 mg (10/25/23 0858)    dexamethasone  4 mg Oral Q12H [facility] CRISTOFER Sheldon      enoxaparin  100 mg Subcutaneous Q12H [facility] CRISTOFER Sheldon      insulin lispro  1-6 Units Subcutaneous TID [facility] CRISTOFER Sheldon      ipratropium  0.5 mg Nebulization TID Miguel Dowling PA-C      levalbuterol  1.25 mg Nebulization TID CRISTOFER Sheldon      levETIRAcetam  1,500 mg Oral Q12H [facility] CRISTOFER Sheldon      pantoprazole  40 mg Oral Early Morning CRISTOFER Sheldon      tamsulosin  0.4 mg Oral Daily With [address] [address], CRNP          Today, Patient Was Seen By: Rubio Hall MD    ** Please Note: Dictation voice to text software may have been used in the creation of this document.  **

## 2023-10-25 NOTE — CASE MANAGEMENT
Case Management Discharge Planning Note    Patient name Bentley Erwin [id number] [facility] [place] 219/-01 MRN [id number]  : 1950 Date 10/25/2023       Current Admission Date: 10/23/2023  Current Admission Diagnosis:Acute on Chronic Hypoxemic respiratory failure, ([address])   Patient Active Problem List    Diagnosis Date Noted    Gram-negative bacteremia 10/24/2023    Acute on Chronic Hypoxemic respiratory failure, ([address]) 10/23/2023    GERD (gastroesophageal reflux disease) 10/23/2023    BPH (benign prostatic hyperplasia) 10/23/2023    Obesity, morbid ([address]) 10/04/2023    Seizure ([address]) 2023    Iron deficiency anemia 2023    Stage 3a chronic kidney disease ([address]) 2023    Sepsis ([address]) 2023    Anasarca 2023    Brain metastasis 2023    History of venous thromboembolism 2023    HLD (hyperlipidemia) 2023    Restrictive lung disease 2022    Nocturnal hypoxemia 2022    Tobacco use disorder, severe, in sustained remission, dependence 2022    Recurrent right pleural effusion 2022    Chronic right-sided heart failure ([address]) 2022    Chronic anticoagulation 2022    Hypercalcemia of malignancy 10/21/2021    Chemotherapy induced neutropenia  10/11/2021    COPD mixed type ([address]) 2021    Malignant neoplasm of upper lobe of right lung ([address]) 2021    Acute on chronic anemia 2021    Adenocarcinoma of lung 2021    Impaired cognition 2021    Constipation 2021    Anemia of chronic disease 2021    Pulmonary embolism ([address]) 2021    History of urinary retention 2021    Mass of upper lobe of right lung 2021    History of stroke 2021    Bilateral lower extremity DVTs 2021    Dysphagia 2021      LOS (days): 2  Geometric Mean LOS (GMLOS) (days): 5.10  Days to GMLOS:3.3     OBJECTIVE:  Risk of Unplanned Readmission Score: 25.58         Current admission status: Inpatient   Preferred Pharmacy: [id number], [address]  [address] [facility] [address]  Phone: [phone] Fax: [phone]     [place], [place] - [address]  [address] [facility] [address]  Phone: [phone] Fax: [phone]    Primary Care Provider: Alo Kelly MD    Primary Insurance: MEDICARE  Secondary Insurance: COMMERCIAL MISCELLANEOUS    DISCHARGE DETAILS:     Referral made in Aidin to [facility] for their palliative care program. Notified by [facility] that referrals to their Palliative program can be made by calling or faxing them the referral.   Phone # [phone]  Fax# [phone]  CM read this was done previously by another  yesterday on pt's behalf.

## 2023-10-26 LAB
ALBUMIN SERPL BCP-MCNC: 3 G/DL (ref 3.5–5)
ALP SERPL-CCNC: 36 U/L (ref 34–104)
ALT SERPL W P-5'-P-CCNC: 13 U/L (ref 7–52)
ANION GAP SERPL CALCULATED.3IONS-SCNC: 5 MMOL/L
AST SERPL W P-5'-P-CCNC: 10 U/L (ref 13–39)
BACTERIA BLD CULT: ABNORMAL
BASOPHILS # BLD AUTO: 0.01 THOUSANDS/ÂΜL (ref 0–0.1)
BASOPHILS NFR BLD AUTO: 0 % (ref 0–1)
BILIRUB SERPL-MCNC: 0.39 MG/DL (ref 0.2–1)
BUN SERPL-MCNC: 26 MG/DL (ref 5–25)
CALCIUM ALBUM COR SERPL-MCNC: 9.9 MG/DL (ref 8.3–10.1)
CALCIUM SERPL-MCNC: 9.1 MG/DL (ref 8.4–10.2)
CHLORIDE SERPL-SCNC: 110 MMOL/L (ref 96–108)
CO2 SERPL-SCNC: 26 MMOL/L (ref 21–32)
CREAT SERPL-MCNC: 0.84 MG/DL (ref 0.6–1.3)
EOSINOPHIL # BLD AUTO: 0 THOUSAND/ÂΜL (ref 0–0.61)
EOSINOPHIL NFR BLD AUTO: 0 % (ref 0–6)
ERYTHROCYTE [DISTWIDTH] IN BLOOD BY AUTOMATED COUNT: 22.2 % (ref 11.6–15.1)
GFR SERPL CREATININE-BSD FRML MDRD: 87 ML/MIN/1.73SQ M
GLUCOSE SERPL-MCNC: 111 MG/DL (ref 65–140)
GLUCOSE SERPL-MCNC: 121 MG/DL (ref 65–140)
GLUCOSE SERPL-MCNC: 129 MG/DL (ref 65–140)
GLUCOSE SERPL-MCNC: 137 MG/DL (ref 65–140)
GLUCOSE SERPL-MCNC: 149 MG/DL (ref 65–140)
GRAM STN SPEC: ABNORMAL
HCT VFR BLD AUTO: 30.2 % (ref 36.5–49.3)
HGB BLD-MCNC: 9.1 G/DL (ref 12–17)
IMM GRANULOCYTES # BLD AUTO: 0.07 THOUSAND/UL (ref 0–0.2)
IMM GRANULOCYTES NFR BLD AUTO: 1 % (ref 0–2)
LEVETIRACETAM SERPL-MCNC: 58.8 UG/ML (ref 10–40)
LYMPHOCYTES # BLD AUTO: 0.39 THOUSANDS/ÂΜL (ref 0.6–4.47)
LYMPHOCYTES NFR BLD AUTO: 7 % (ref 14–44)
MCH RBC QN AUTO: 29.1 PG (ref 26.8–34.3)
MCHC RBC AUTO-ENTMCNC: 30.1 G/DL (ref 31.4–37.4)
MCV RBC AUTO: 97 FL (ref 82–98)
MONOCYTES # BLD AUTO: 0.15 THOUSAND/ÂΜL (ref 0.17–1.22)
MONOCYTES NFR BLD AUTO: 3 % (ref 4–12)
NEUTROPHILS # BLD AUTO: 4.91 THOUSANDS/ÂΜL (ref 1.85–7.62)
NEUTS SEG NFR BLD AUTO: 89 % (ref 43–75)
NRBC BLD AUTO-RTO: 1 /100 WBCS
PLATELET # BLD AUTO: 107 THOUSANDS/UL (ref 149–390)
PMV BLD AUTO: 9.3 FL (ref 8.9–12.7)
POTASSIUM SERPL-SCNC: 4.3 MMOL/L (ref 3.5–5.3)
PROT SERPL-MCNC: 5.4 G/DL (ref 6.4–8.4)
RBC # BLD AUTO: 3.13 MILLION/UL (ref 3.88–5.62)
SODIUM SERPL-SCNC: 141 MMOL/L (ref 135–147)
WBC # BLD AUTO: 5.53 THOUSAND/UL (ref 4.31–10.16)

## 2023-10-26 PROCEDURE — 92526 ORAL FUNCTION THERAPY: CPT

## 2023-10-26 PROCEDURE — 87040 BLOOD CULTURE FOR BACTERIA: CPT | Performed by: HOSPITALIST

## 2023-10-26 PROCEDURE — 80053 COMPREHEN METABOLIC PANEL: CPT | Performed by: HOSPITALIST

## 2023-10-26 PROCEDURE — 93971 EXTREMITY STUDY: CPT | Performed by: SURGERY

## 2023-10-26 PROCEDURE — 99232 SBSQ HOSP IP/OBS MODERATE 35: CPT | Performed by: HOSPITALIST

## 2023-10-26 PROCEDURE — 94760 N-INVAS EAR/PLS OXIMETRY 1: CPT

## 2023-10-26 PROCEDURE — 85025 COMPLETE CBC W/AUTO DIFF WBC: CPT | Performed by: HOSPITALIST

## 2023-10-26 PROCEDURE — 82948 REAGENT STRIP/BLOOD GLUCOSE: CPT

## 2023-10-26 PROCEDURE — 94640 AIRWAY INHALATION TREATMENT: CPT

## 2023-10-26 RX ORDER — LANOLIN ALCOHOL/MO/W.PET/CERES
6 CREAM (GRAM) TOPICAL
Status: DISCONTINUED | OUTPATIENT
Start: 2023-10-26 | End: 2023-11-02 | Stop reason: HOSPADM

## 2023-10-26 RX ADMIN — CEFEPIME HYDROCHLORIDE 2000 MG: 2 INJECTION, SOLUTION INTRAVENOUS at 10:03

## 2023-10-26 RX ADMIN — LEVALBUTEROL HYDROCHLORIDE 1.25 MG: 1.25 SOLUTION RESPIRATORY (INHALATION) at 09:03

## 2023-10-26 RX ADMIN — ATORVASTATIN CALCIUM 40 MG: 40 TABLET, FILM COATED ORAL at 15:52

## 2023-10-26 RX ADMIN — PANTOPRAZOLE SODIUM 40 MG: 40 TABLET, DELAYED RELEASE ORAL at 05:44

## 2023-10-26 RX ADMIN — AMPICILLIN SODIUM AND SULBACTAM SODIUM 3 G: 2; 1 INJECTION, POWDER, FOR SOLUTION INTRAMUSCULAR; INTRAVENOUS at 22:45

## 2023-10-26 RX ADMIN — IPRATROPIUM BROMIDE 0.5 MG: 0.5 SOLUTION RESPIRATORY (INHALATION) at 09:03

## 2023-10-26 RX ADMIN — Medication 6 MG: at 04:42

## 2023-10-26 RX ADMIN — IPRATROPIUM BROMIDE 0.5 MG: 0.5 SOLUTION RESPIRATORY (INHALATION) at 19:25

## 2023-10-26 RX ADMIN — AMPICILLIN SODIUM AND SULBACTAM SODIUM 3 G: 2; 1 INJECTION, POWDER, FOR SOLUTION INTRAMUSCULAR; INTRAVENOUS at 15:49

## 2023-10-26 RX ADMIN — ENOXAPARIN SODIUM 100 MG: 100 INJECTION SUBCUTANEOUS at 15:52

## 2023-10-26 RX ADMIN — CEFEPIME HYDROCHLORIDE 2000 MG: 2 INJECTION, SOLUTION INTRAVENOUS at 01:33

## 2023-10-26 RX ADMIN — LEVALBUTEROL HYDROCHLORIDE 1.25 MG: 1.25 SOLUTION RESPIRATORY (INHALATION) at 19:25

## 2023-10-26 RX ADMIN — LEVETIRACETAM 1500 MG: 500 TABLET, FILM COATED ORAL at 10:04

## 2023-10-26 RX ADMIN — LEVETIRACETAM 1500 MG: 500 TABLET, FILM COATED ORAL at 20:23

## 2023-10-26 RX ADMIN — BENZONATATE 100 MG: 100 CAPSULE ORAL at 07:11

## 2023-10-26 RX ADMIN — LEVALBUTEROL HYDROCHLORIDE 1.25 MG: 1.25 SOLUTION RESPIRATORY (INHALATION) at 14:27

## 2023-10-26 RX ADMIN — ENOXAPARIN SODIUM 100 MG: 100 INJECTION SUBCUTANEOUS at 01:33

## 2023-10-26 RX ADMIN — DEXAMETHASONE 4 MG: 2 TABLET ORAL at 10:04

## 2023-10-26 RX ADMIN — Medication 6 MG: at 20:23

## 2023-10-26 RX ADMIN — IPRATROPIUM BROMIDE 0.5 MG: 0.5 SOLUTION RESPIRATORY (INHALATION) at 14:27

## 2023-10-26 RX ADMIN — TAMSULOSIN HYDROCHLORIDE 0.4 MG: 0.4 CAPSULE ORAL at 15:52

## 2023-10-26 RX ADMIN — DEXAMETHASONE 4 MG: 2 TABLET ORAL at 20:23

## 2023-10-26 NOTE — OCCUPATIONAL THERAPY NOTE
Occupational Therapy Cx Note     Patient Name: Robb Kim  ONMQJ'K Date: 10/26/2023  Problem List  Principal Problem:    Acute on Chronic Hypoxemic respiratory failure, ([address])  Active Problems:    Bilateral lower extremity DVTs    Adenocarcinoma of lung    Brain metastasis    HLD (hyperlipidemia)    Anasarca    Sepsis ([address])    Seizure (HCC)    GERD (gastroesophageal reflux disease)    BPH (benign prostatic hyperplasia)    Gram-negative bacteremia              10/26/23 0939   OT Last Visit   OT Visit Date 10/26/23   Note Type   Note type Cancelled Session   Cancel Reasons Medical status   Additional Comments Pt pending read of duplex.  Will f/u as able & appropriate       Chevy Fishman, OTR/L

## 2023-10-26 NOTE — SPEECH THERAPY NOTE
Speech Language/Pathology    Speech/Language Pathology Progress Note    Patient Name: Bertha Narayanan  SBDVE'C Date: 10/26/2023     Problem List  Principal Problem:    Acute on Chronic Hypoxemic respiratory failure, ([address])  Active Problems:    Bilateral lower extremity DVTs    Adenocarcinoma of lung    Brain metastasis    HLD (hyperlipidemia)    Anasarca    Sepsis (HCC)    Seizure (HCC)    GERD (gastroesophageal reflux disease)    BPH (benign prostatic hyperplasia)    Gram-negative bacteremia       Past Medical History  Past Medical History:   Diagnosis Date    Acute respiratory failure with hypoxia ([address]) 7/31/2021    Chronic respiratory failure with hypoxia ([address]) 8/9/2021    Impaired mobility and ADLs 8/13/2021    Lung cancer ([address])     Stroke [facility])         Past Surgical History  Past Surgical History:   Procedure Laterality Date    IR BIOPSY LUNG  8/5/2021    IR THORACENTESIS  2/24/2022    IR THORACENTESIS  8/1/2022         Subjective:  Pt completed lunch tray, wife at bedside. "I remember the test"     Current Diet:  Regular/thin     Objective:  Pt seen for dx dysphagia tx f/u. Briefly reviewed MBSS findings and recommendations for single sips and slow rate. Pt denies cough w/ PO, wife reports coughing remains at baseline. Pt assessed w/ trials of hard solids (cookeis) and thin liquids. Adequate bite strength for hard solids. Anterior mastication of solids, ultimately effective breakdown. Trace residue throughout oral cavity, cleared w/ liquid wash. Cough x2 s/p swallows of thin liquids via straw. Of note, pt inn somewhat reclined position during trials. Pt agreeable to more upright positioning after coughing event. Pt subsequently given thin liquids via single cup sip. No further s/s aspiration across approx 5 oz thin. Education provided on recommended strategies for single sips and fully upright positioning.  Further education provided on importance of monitoring for s/s aspiration and notify medical team if they occur. Pt/wife understanding and agreeable to close monitoring w/ use of strategies and plan for ST f/u. Assessment:  Pt w/ s/s mild pharyngeal dysphagia w/ s/s aspiration w/ thin liquids via straw when in reclined position. W/ use of strategies including upright positioning and single cup sips, no overt s/s.     Plan/Recommendations:  Continue current diet as tolerated, if s/s aspiration w/ thin - consider downgrade to nectar thick and contact ST  Frequent/thorough oral care  ST to f/u as able/appropriate

## 2023-10-26 NOTE — PROGRESS NOTES
[facility]  Progress Note  Name: Ulisses Jacobo  MRN: [id number]  Unit/Bed#: -01 I Date of Admission: 10/23/2023   Date of Service: 10/26/2023 I Hospital Day: 3    Assessment/Plan   Gram-negative bacteremia  Assessment & Plan  2/2 blood cultures with pasteurella. ID consulted. Continue Cefepime     BPH (benign prostatic hyperplasia)  Assessment & Plan  Continue home Flomax    GERD (gastroesophageal reflux disease)  Assessment & Plan  Continue PPI    Seizure ([address])  Assessment & Plan  By history  Last seen 9/25 in [address] [place] [address] ED for focal seizure following chemotherapy  Home rgimen: Keppra 1500 mg PO BID    Plan:  Continue with Keppra 1500 mg BID  Check Keppra level  Q4h neuro checks    Sepsis ([address])  Assessment & Plan  SIRS: Tachycardia, tachypnea, fever  10/23 CTA PE Study: No evidence for acute pulmonary emboli. Densely consolidated right lower lobe and right upper lobe. Aeration remaining in the right middle lobe. See above for further description and comparison. Significant mass effect on the right lower lung secondary to large mixed attenuation masslike pleural abnormality described above. 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 Stable incompletely imaged right neck mass presumably representing metastatic adenopathy. Stable hypodensities within the liver. UA negative for UTI  Blood cultures - pasteurella. Urine strep/legionella and MRSA pending  Lactate 5.1 --> 2.6 --> 2.4  30 ml/kg were not given due to concern for fluid overload  Procal 0.18    Plan:  Continue IV Cefepime   Trend lactic and procal    culture results  Blood cultures with pasteurella multicoda. Consult infectious disease. Repeat blood cultures.    Trend fever curve and WBC count    Anasarca  Assessment & Plan  Likely 2/2 Avastin which was started earlier this month for radiation necrosis surrounding brain metastasis  Completed a course of lasix outpatient  Hold off on further diuretics  UA with only trace proteinuria  Will need further discussion with medical oncology regarding further Avastin dosing outpatient  RUE US negative for DVT. Probably edema 2/2 large malignant R neck mass. HLD (hyperlipidemia)  Assessment & Plan  Continue home statin    Brain metastasis  Assessment & Plan  Brain metastasis first diagnosed in February 2023 s/p SBRT  Currently on Keppra for focal seizures in 8/2023 due to brain metastasis and Decadron for surrounding vasogenic edema  Pt has been unable to be weaned from decadron due to decreased functional status and worsening lethargy with attempts of weaning  10/2 MRI brain: Metastatic adenocarcinoma of the lung status post chemotherapy. Left posterior frontal lobe intracranial metastases (status post SBRT 2/22/2023) increased in size compared to 7/26/2023 with progression of surrounding vasogenic edema. There is no new suspicious intra-axial lesion. 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 Adenocarcinoma of lung  Assessment & Plan  Dx initially 09/2021 stage IIIC. Received multiple rounds of chemo/radiation. Found to have brain mets 02/2023 to the left precentral gyrus. Cancer upstaged to stage IV. Currently on daily dexamethasone, Lovenox for [facility] as well as a history of DVT. Currently receiving combination Avastin and Pembrolizumab at the infusion center. Last chemo 10/18/2023. Avastatin is a new medication for the patient. 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  Unlikely to benefit from ASEPT catheter    Plan:  Continue with home dose dexamethasone and Lovenox for DVT   Extensive conversation with patient and wife regarding code status. Patient and wife have changed to DNR which appears appropriate given metastatic malignancy. Palliative care consult appreciated. Recommend follow up outpatient with oncology after discharge    Bilateral lower extremity DVTs  Assessment & Plan  Continue home Lovenox    * Acute on Chronic Hypoxemic respiratory failure, (HCC)  Assessment & Plan  POA: increased WOB in the ED requiring the initiation of BIPAP  Hx of Stage IV lung CA with mets. 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 Completed a short course of PO lasix outpatient  10/23 CTA PE Study: No evidence for acute pulmonary emboli. Densely consolidated right lower lobe and right upper lobe. Aeration remaining in the right middle lobe. See above for further description and comparison. Significant mass effect on the right lower lung secondary to large mixed attenuation masslike pleural abnormality described above. 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 Stable incompletely imaged right neck mass presumably representing metastatic adenopathy. Stable hypodensities within the liver. RVP negative    Plan:  Weaned off BiPAP to  NC -> PNA  Continue abx for presumed PNA as outlined below  Hold further diuretics  Titrate O2 to maintain spO2 > 92%  Pulmonary medicine following, appreciate recommendations           VTE  Prophylaxis:   Pharmacologic: in place  Mechanical VTE Prophylaxis in Place: Yes    Patient Centered Rounds: I have performed bedside rounds with nursing staff today.     Discussions with Specialists or Other Care Team Provider: case management    Education and Discussions with Family / Patient: pt wife      Current Length of Stay: 3 day(s)    Current Patient Status: Inpatient        Code Status: Level 3 - DNAR and DNI    Discharge Plan: Pt will require continued inpatient hospitalization. Subjective:   Pt states breathing well   No sob or fever    Patient is seen and examined at bedside. All other ROS are negative. Objective:     Vitals:   Temp (24hrs), Av.4 °F (36.3 °C), Min:97 °F (36.1 °C), Max:97.7 °F (36.5 °C)    Temp:  [97 °F (36.1 °C)-97.7 °F (36.5 °C)] 97.5 °F (36.4 °C)  HR:  [] 91  Resp:  [18-19] 18  BP: (114-143)/(64-92) 143/92  SpO2:  [89 %-95 %] 93 %  Body mass index is 32.56 kg/m². Input and Output Summary (last 24 hours): Intake/Output Summary (Last 24 hours) at 10/26/2023 0970  Last data filed at 10/26/2023 0405  Gross per 24 hour   Intake 1200 ml   Output 650 ml   Net 550 ml       Physical Exam:       GEN: No acute distress, comfortable  HEEENT: No JVD, PERRLA, no scleral icterus  RESP: Lungs clear to auscultation bilaterally  CV: RRR, +s1/s2   ABD: SOFT NON TENDER, POSITIVE BOWEL SOUNDS, NO DISTENTION  PSYCH: CALM  NEURO: Mentation baseline, NO FOCAL DEFICITS  SKIN: NO RASH  EXTREM: RUE edema    Additional Data:     Labs:    Results from last 7 days   Lab Units 10/26/23  0333 10/25/23  0617 10/24/23  0612   WBC Thousand/uL 5.53   < > 6.33   HEMOGLOBIN g/dL 9.1*   < > 9.5*   HEMATOCRIT % 30.2*   < > 31.1*   PLATELETS Thousands/uL 107*   < > 99*   BANDS PCT %  --   --  5   NEUTROS PCT % 89*   < >  --    LYMPHS PCT % 7*   < >  --    LYMPHO PCT %  --   --  10*   MONOS PCT % 3*   < >  --    MONO PCT %  --   --  1*   EOS PCT % 0   < > 0    < > = values in this interval not displayed.      Results from last 7 days   Lab Units 10/26/23  0333   SODIUM mmol/L 141   POTASSIUM mmol/L 4.3   CHLORIDE mmol/L 110*   CO2 mmol/L 26   BUN mg/dL 26*   CREATININE mg/dL 0.84   ANION GAP mmol/L 5   CALCIUM mg/dL 9.1   ALBUMIN g/dL 3.0*   TOTAL BILIRUBIN mg/dL 0.39   ALK PHOS U/L 36   ALT U/L 13   AST U/L 10*   GLUCOSE RANDOM mg/dL 137     Results from last 7 days   Lab Units 10/23/23  0811   INR  1.00     Results from last 7 days   Lab Units 10/26/23  0716 10/25/23  2023 10/25/23  1622 10/25/23  1140 10/25/23  0751 10/24/23  2137 10/24/23  1550 10/24/23  1221 10/24/23  0614 10/23/23  0806   POC GLUCOSE mg/dl 121 160* 197* 151* 104 137 170* 120 96 115         Results from last 7 days   Lab Units 10/24/23  0612 10/23/23  2023 10/23/23  1703 10/23/23  1406 10/23/23  1047 10/23/23  0811   LACTIC ACID mmol/L  --  1.9 2.6* 2.4* 2.6* 5.1*   PROCALCITONIN ng/ml 21.26*  --   --   --   --  0.13       Lines/Drains:  Invasive Devices       Peripheral Intravenous Line  Duration             Peripheral IV 10/25/23 Distal;Left;Upper;Ventral (anterior) Arm <1 day              Drain  Duration             External Urinary Catheter Medium 2 days                    Telemetry:        * I Have Reviewed All Lab Data Listed Above. Imaging:     Results for orders placed during the hospital encounter of 10/23/23    XR chest portable - 1 view    Narrative  CHEST    INDICATION:   Shortness of breath. History of lung cancer. .    COMPARISON: Chest radiograph 10/17/2023. Same day CT chest study. EXAM PERFORMED/VIEWS:  XR CHEST PORTABLE      FINDINGS:    Heart shadow is again obscured by right-sided opacity. Near right-sided opacification due to known loculated pleural effusion. Superimposed consolidation better visualized on same day CT study. Groundglass opacification in the left lower lung zone, likely to represent atelectasis. No pneumothorax. Osseous structures appear within normal limits for patient age. Impression  Persistent right-sided loculated pleural effusion with superimposed consolidation better visualized on same day CT study. Groundglass opacification in the left lower lung zone, likely to represent atelectasis.             Resident: Nadege Dodge, the attending radiologist, have reviewed the images and agree with the final report above.    Workstation performed: [id number]    Results for orders placed during the hospital encounter of 10/17/23    XR chest pa & lateral    Narrative  CHEST    INDICATION:   C79.31: Secondary malignant neoplasm of brain. C34.11: Malignant neoplasm of upper lobe, right bronchus or lung. Cold-like symptoms for 2 weeks. COMPARISON: Chest radiograph 9/25/2023; CT chest, abdomen, pelvis 9/20/2023    EXAM PERFORMED/VIEWS:  XR CHEST PA & LATERAL  The frontal view was performed utilizing dual energy radiographic technique. Images: 4    FINDINGS:    Heart shadow is obscured by right-sided opacity. Mild bowing of the trachea toward the right, unchanged. Slight progression of the previously demonstrated extensive right lung opacity corresponding with the patient's known loculated right-sided pleural effusion and radiation fibrosis demonstrated on CT dated 9/20/2023. Left lung is clear apart from tiny linear scar or atelectasis at the base. No pneumothorax. Osseous structures appear within normal limits for patient age. Impression  Slight progression of previously demonstrated extensive opacity right hemithorax corresponding with patient's known right pleural effusion and probable post radiation changes. Resident: Love Wolff, the attending radiologist, have reviewed the images and agree with the final report above.     Workstation performed: [id number]      *I have reviewed all imaging reports listed above      Recent Cultures (last 7 days):     Results from last 7 days   Lab Units 10/25/23  0053 10/24/23  0219 10/23/23  0812 10/23/23  0811   BLOOD CULTURE   --   --  Pasteurella multocida* Pasteurella multocida*   GRAM STAIN RESULT  1+ Epithelial Cells*  No polys seen*  1+ Gram negative rods*  --  Gram negative rods* Gram negative rods*   LEGIONELLA URINARY ANTIGEN   --  Negative  --   --        Last 24 Hours Medication List:   Current Facility-Administered Medications Medication Dose Route Frequency Provider Last Rate    atorvastatin  40 mg Oral Daily With CRISTOFER Elkins      benzonatate  100 mg Oral TID PRN Magaly Marie PA-C      cefepime  2,000 mg Intravenous Q8H Raynald Punch, CRISTOFER 2,000 mg (10/26/23 0133)    dexamethasone  4 mg Oral Q12H [facility] Raynald Punch, CRJAYCE      enoxaparin  100 mg Subcutaneous Q12H [facility] P[facility], CRJAYCE      insulin lispro  1-6 Units Subcutaneous TID [facility] Raynald Punch, CRJAYCE      ipratropium  0.5 mg Nebulization TID Magaly Dowling PA-C      levalbuterol  1.25 mg Nebulization TID Raynald Punch, CRISTOFER      levETIRAcetam  1,500 mg Oral Q12H [facility] Raynald Punch, CRJAYCE      melatonin  6 mg Oral HS Lala Rodriguez PA-C      pantoprazole  40 mg Oral Early Morning Raynald Punch, CRISTOFER      tamsulosin  0.4 mg Oral Daily With CRISTOFER Elkins          Today, Patient Was Seen By: Noy Srinivasan MD    ** Please Note: Dictation voice to text software may have been used in the creation of this document.  **

## 2023-10-26 NOTE — ASSESSMENT & PLAN NOTE
SIRS: Tachycardia, tachypnea, fever  10/23 CTA PE Study: No evidence for acute pulmonary emboli. Densely consolidated right lower lobe and right upper lobe. Aeration remaining in the right middle lobe. See above for further description and comparison. Significant mass effect on the right lower lung secondary to large mixed attenuation masslike pleural abnormality described above. 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 Stable incompletely imaged right neck mass presumably representing metastatic adenopathy. Stable hypodensities within the liver. UA negative for UTI  Blood cultures - pasteurella. Urine strep/legionella and MRSA pending  Lactate 5.1 --> 2.6 --> 2.4  30 ml/kg were not given due to concern for fluid overload  Procal 0.18    Plan:  Continue IV Cefepime   Trend lactic and procal    culture results  Blood cultures with pasteurella multicoda. Consult infectious disease. Repeat blood cultures.    Trend fever curve and WBC count

## 2023-10-26 NOTE — PLAN OF CARE
Problem: Potential for Falls  Goal: Patient will remain free of falls  Description: INTERVENTIONS:  - Educate patient/family on patient safety including physical limitations  - Instruct patient to call for assistance with activity   - Consult OT/PT to assist with strengthening/mobility   - Keep Call bell within reach  - Keep bed low and locked with side rails adjusted as appropriate  - Keep care items and personal belongings within reach  - Initiate and maintain comfort rounds  - Make Fall Risk Sign visible to staff  - Offer Toileting every 2 Hours, in advance of need  - Initiate/Maintain bed alarm  - Obtain necessary fall risk management equipment:   - Apply yellow socks and bracelet for high fall risk patients  - Consider moving patient to room near nurses station  10/25/2023 2028 by Charity Portillo RN  Outcome: Progressing  10/25/2023 0642 by Charity Portillo RN  Outcome: Progressing     Problem: NEUROSENSORY - ADULT  Goal: Achieves stable or improved neurological status  Description: INTERVENTIONS  - Monitor and report changes in neurological status  - Monitor vital signs such as temperature, blood pressure, glucose, and any other labs ordered   - Initiate measures to prevent increased intracranial pressure  - Monitor for seizure activity and implement precautions if appropriate      10/25/2023 2028 by Charity Portillo RN  Outcome: Progressing  10/25/2023 0642 by Charity Portillo RN  Outcome: Progressing  Goal: Achieves maximal functionality and self care  Description: INTERVENTIONS  - Monitor swallowing and airway patency with patient fatigue and changes in neurological status  - Encourage and assist patient to increase activity and self care.    - Encourage visually impaired, hearing impaired and aphasic patients to use assistive/communication devices  10/25/2023 2028 by Charity Portillo RN  Outcome: Progressing  10/25/2023 0642 by Charity Portillo RN  Outcome: Progressing     Problem: GENITOURINARY - ADULT  Goal: Maintains or returns to baseline urinary function  Description: INTERVENTIONS:  - Assess urinary function  - Encourage oral fluids to ensure adequate hydration if ordered  - Administer IV fluids as ordered to ensure adequate hydration  - Administer ordered medications as needed  - Offer frequent toileting  - Follow urinary retention protocol if ordered  10/25/2023 2028 by Shaylee Box RN  Outcome: Progressing  10/25/2023 0642 by Shaylee Box RN  Outcome: Progressing  Goal: Absence of urinary retention  Description: INTERVENTIONS:  - Assess patient’s ability to void and empty bladder  - Monitor I/O  - Bladder scan as needed  - Discuss with physician/AP medications to alleviate retention as needed  - Discuss catheterization for long term situations as appropriate  10/25/2023 2028 by Shaylee Box RN  Outcome: Progressing  10/25/2023 0642 by Shaylee Box RN  Outcome: Progressing  Goal: Urinary catheter remains patent  Description: INTERVENTIONS:  - Assess patency of urinary catheter  - If patient has a chronic cadet, consider changing catheter if non-functioning  - Follow guidelines for intermittent irrigation of non-functioning urinary catheter  10/25/2023 2028 by Shaylee Box RN  Outcome: Progressing  10/25/2023 0642 by Shaylee Box RN  Outcome: Progressing     Problem: METABOLIC, FLUID AND ELECTROLYTES - ADULT  Goal: Electrolytes maintained within normal limits  Description: INTERVENTIONS:  - Monitor labs and assess patient for signs and symptoms of electrolyte imbalances  - Administer electrolyte replacement as ordered  - Monitor response to electrolyte replacements, including repeat lab results as appropriate  - Instruct patient on fluid and nutrition as appropriate  10/25/2023 2028 by Shaylee Box RN  Outcome: Progressing  10/25/2023 0642 by Shaylee Box RN  Outcome: Progressing  Goal: Fluid balance maintained  Description: INTERVENTIONS:  - Monitor labs   - Monitor I/O and WT  - Instruct patient on fluid and nutrition as appropriate  - Assess for signs & symptoms of volume excess or deficit  10/25/2023 2028 by Rohith Valero RN  Outcome: Progressing  10/25/2023 0642 by Rohith Valero RN  Outcome: Progressing  Goal: Glucose maintained within target range  Description: INTERVENTIONS:  - Monitor Blood Glucose as ordered  - Assess for signs and symptoms of hyperglycemia and hypoglycemia  - Administer ordered medications to maintain glucose within target range  - Assess nutritional intake and initiate nutrition service referral as needed  10/25/2023 2028 by Rohith Valero RN  Outcome: Progressing  10/25/2023 0642 by Rohith Valero RN  Outcome: Progressing     Problem: SKIN/TISSUE INTEGRITY - ADULT  Goal: Skin Integrity remains intact(Skin Breakdown Prevention)  Description: Assess:  -Perform [place] assessment every   -Clean and moisturize skin every   -Inspect skin when repositioning, toileting, and assisting with ADLS  -Assess under medical devices such as  every   -Assess extremities for adequate circulation and sensation     Bed Management:  -Have minimal linens on bed & keep smooth, unwrinkled  -Change linens as needed when moist or perspiring  -Avoid sitting or lying in one position for more than  hours while in bed  -Keep HOB at degrees     Toileting:  -Offer bedside commode  -Assess for incontinence every   -Use incontinent care products after each incontinent episode such as     Activity:  -Mobilize patient  times a day  -Encourage activity and walks on unit  -Encourage or provide ROM exercises   -Turn and reposition patient every  Hours  -Use appropriate equipment to lift or move patient in bed  -Instruct/ Assist with weight shifting every  when out of bed in chair  -Consider limitation of chair time  hour intervals    Skin Care:  -Avoid use of baby powder, tape, friction and shearing, hot water or constrictive clothing  -Relieve pressure over bony prominences using   -Do not massage red bony areas    Next Steps:  -Teach patient strategies to minimize risks such as -Consider consults to  interdisciplinary teams such as   10/25/2023 2028 by Helder Capone RN  Outcome: Progressing  10/25/2023 0642 by Helder Capone RN  Outcome: Progressing  Goal: Incision(s), wounds(s) or drain site(s) healing without S/S of infection  Description: INTERVENTIONS  - Assess and document dressing, incision, wound bed, drain sites and surrounding tissue  - Provide patient and family education  - Perform skin care/dressing changes every   10/25/2023 2028 by Helder Capone RN  Outcome: Progressing  10/25/2023 0642 by Helder Capone RN  Outcome: Progressing  Goal: Pressure injury heals and does not worsen  Description: Interventions:  - Implement low air loss mattress or specialty surface (Criteria met)  - Apply silicone foam dressing  - Instruct/assist with weight shifting every  minutes when in chair   - Limit chair time to  hour intervals  - Use special pressure reducing interventions such as  when in chair   - Apply fecal or urinary incontinence containment device   - Perform passive or active ROM every   - Turn and reposition patient & offload bony prominences every  hours   - Utilize friction reducing device or surface for transfers   - Consider consults to  interdisciplinary teams such as   - Use incontinent care products after each incontinent episode such as   - Consider nutrition services referral as needed  10/25/2023 2028 by Helder Capone RN  Outcome: Progressing  10/25/2023 0642 by Helder Capone RN  Outcome: Progressing     Problem: MUSCULOSKELETAL - ADULT  Goal: Maintain or return mobility to safest level of function  Description: INTERVENTIONS:  - Assess patient's ability to carry out ADLs; assess patient's baseline for ADL function and identify physical deficits which impact ability to perform ADLs (bathing, care of mouth/teeth, toileting, grooming, dressing, etc.)  - Assess/evaluate cause of self-care deficits   - Assess range of motion  - Assess patient's mobility  - Assess patient's need for assistive devices and provide as appropriate  - Encourage maximum independence but intervene and supervise when necessary  - Involve family in performance of ADLs  - Assess for home care needs following discharge   - Consider OT consult to assist with ADL evaluation and planning for discharge  - Provide patient education as appropriate  10/25/2023 2028 by Ruben Ortega RN  Outcome: Progressing  10/25/2023 0642 by Ruben Ortega RN  Outcome: Progressing  Goal: Maintain proper alignment of affected body part  Description: INTERVENTIONS:  - Support, maintain and protect limb and body alignment  - Provide patient/ family with appropriate education  10/25/2023 2028 by Ruben Ortega RN  Outcome: Progressing  10/25/2023 0642 by Ruebn Ortega RN  Outcome: Progressing     Problem: PAIN - ADULT  Goal: Verbalizes/displays adequate comfort level or baseline comfort level  Description: Interventions:  - Encourage patient to monitor pain and request assistance  - Assess pain using appropriate pain scale  - Administer analgesics based on type and severity of pain and evaluate response  - Implement non-pharmacological measures as appropriate and evaluate response  - Consider cultural and social influences on pain and pain management  - Notify physician/advanced practitioner if interventions unsuccessful or patient reports new pain  10/25/2023 2028 by Ruben Ortega RN  Outcome: Progressing  10/25/2023 0642 by Ruben Ortega RN  Outcome: Progressing     Problem: INFECTION - ADULT  Goal: Absence or prevention of progression during hospitalization  Description: INTERVENTIONS:  - Assess and monitor for signs and symptoms of infection  - Monitor lab/diagnostic results  - Monitor all insertion sites, i.e. indwelling lines, tubes, and drains  - Monitor endotracheal if appropriate and nasal secretions for changes in amount and color  - [place] appropriate cooling/warming therapies per order  - Administer medications as ordered  - Instruct and encourage patient and family to use good hand hygiene technique  - Identify and instruct in appropriate isolation precautions for identified infection/condition  10/25/2023 2028 by Moira Montoya RN  Outcome: Progressing  10/25/2023 0642 by Moira Montoya RN  Outcome: Progressing  Goal: Absence of fever/infection during neutropenic period  Description: INTERVENTIONS:  - Monitor WBC    10/25/2023 2028 by Moira Montoya RN  Outcome: Progressing  10/25/2023 0642 by Moira Montoya RN  Outcome: Progressing     Problem: SAFETY ADULT  Goal: Patient will remain free of falls  Description: INTERVENTIONS:  - Educate patient/family on patient safety including physical limitations  - Instruct patient to call for assistance with activity   - Consult OT/PT to assist with strengthening/mobility   - Keep Call bell within reach  - Keep bed low and locked with side rails adjusted as appropriate  - Keep care items and personal belongings within reach  - Initiate and maintain comfort rounds  - Make Fall Risk Sign visible to staff  - Offer Toileting every  Hours, in advance of need  - Initiate/Maintain alarm  - Obtain necessary fall risk management equipment:   - Apply yellow socks and bracelet for high fall risk patients  - Consider moving patient to room near nurses station  10/25/2023 2028 by Moira Montoya RN  Outcome: Progressing  10/25/2023 0642 by Moira Montoya RN  Outcome: Progressing  Goal: Maintain or return to baseline ADL function  Description: INTERVENTIONS:  -  Assess patient's ability to carry out ADLs; assess patient's baseline for ADL function and identify physical deficits which impact ability to perform ADLs (bathing, care of mouth/teeth, toileting, grooming, dressing, etc.)  - Assess/evaluate cause of self-care deficits   - Assess range of motion  - Assess patient's mobility; develop plan if impaired  - Assess patient's need for assistive devices and provide as appropriate  - Encourage maximum independence but intervene and supervise when necessary  - Involve family in performance of ADLs  - Assess for home care needs following discharge   - Consider OT consult to assist with ADL evaluation and planning for discharge  - Provide patient education as appropriate  10/25/2023 2028 by Osiel Lara RN  Outcome: Progressing  10/25/2023 0642 by Osiel Lara RN  Outcome: Progressing  Goal: Maintains/Returns to pre admission functional level  Description: INTERVENTIONS:  - Perform BMAT or MOVE assessment daily.   - Set and communicate daily mobility goal to care team and patient/family/caregiver. - Collaborate with rehabilitation services on mobility goals if consulted  - Perform Range of Motion  times a day. - Reposition patient every  hours.   - Dangle patient  times a day  - Stand patient  times a day  - Ambulate patient  times a day  - Out of bed to chair  times a day   - Out of bed for meals  times a day  - Out of bed for toileting  - Record patient progress and toleration of activity level   10/25/2023 2028 by Osiel Lara RN  Outcome: Progressing  10/25/2023 0642 by Osiel Lara RN  Outcome: Progressing     Problem: DISCHARGE PLANNING  Goal: Discharge to home or other facility with appropriate resources  Description: INTERVENTIONS:  - Identify barriers to discharge w/patient and caregiver  - Arrange for needed discharge resources and transportation as appropriate  - Identify discharge learning needs (meds, wound care, etc.)  - Arrange for interpretive services to assist at discharge as needed  - Refer to Case Management Department for coordinating discharge planning if the patient needs post-hospital services based on physician/advanced practitioner order or complex needs related to functional status, cognitive ability, or social support system  10/25/2023 2028 by Osiel Lara RN  Outcome: Progressing  10/25/2023 0642 by Osiel Lara RN  Outcome: Progressing     Problem: Knowledge Deficit  Goal: Patient/family/caregiver demonstrates understanding of disease process, treatment plan, medications, and discharge instructions  Description: Complete learning assessment and assess knowledge base.   Interventions:  - Provide teaching at level of understanding  - Provide teaching via preferred learning methods  10/25/2023 2028 by Emani Villatoro RN  Outcome: Progressing  10/25/2023 0642 by Emani Villatoro RN  Outcome: Progressing     Problem: RESPIRATORY - ADULT  Goal: Achieves optimal ventilation and oxygenation  Description: INTERVENTIONS:  - Assess for changes in respiratory status  - Assess for changes in mentation and behavior  - Position to facilitate oxygenation and minimize respiratory effort  - Oxygen administered by appropriate delivery if ordered  - Initiate smoking cessation education as indicated  - Encourage broncho-pulmonary hygiene including cough, deep breathe, Incentive Spirometry  - Assess the need for suctioning and aspirate as needed  - Assess and instruct to report SOB or any respiratory difficulty  - Respiratory Therapy support as indicated  10/25/2023 2028 by Emani Villatoro RN  Outcome: Progressing  10/25/2023 0642 by Emani Villatoro RN  Outcome: Progressing     Problem: MOBILITY - ADULT  Goal: Maintain or return to baseline ADL function  Description: INTERVENTIONS:  -  Assess patient's ability to carry out ADLs; assess patient's baseline for ADL function and identify physical deficits which impact ability to perform ADLs (bathing, care of mouth/teeth, toileting, grooming, dressing, etc.)  - Assess/evaluate cause of self-care deficits   - Assess range of motion  - Assess patient's mobility; develop plan if impaired  - Assess patient's need for assistive devices and provide as appropriate  - Encourage maximum independence but intervene and supervise when necessary  - Involve family in performance of ADLs  - Assess for home care needs following discharge   - Consider OT consult to assist with ADL evaluation and planning for discharge  - Provide patient education as appropriate  10/25/2023 2028 by Alexia Li RN  Outcome: Progressing  10/25/2023 [id number] by Alexia Li RN  Outcome: Progressing  Goal: Maintains/Returns to pre admission functional level  Description: INTERVENTIONS:  - Perform BMAT or MOVE assessment daily.   - Set and communicate daily mobility goal to care team and patient/family/caregiver. - Collaborate with rehabilitation services on mobility goals if consulted  - Perform Range of Motion 3 times a day. - Reposition patient every 2 hours. - Dangle patient 2 times a day  - Stand patient 2 times a day  - Ambulate patient 3 times a day  - Out of bed to chair 3 times a day   - Out of bed for meals 3 times a day  - Out of bed for toileting  - Record patient progress and toleration of activity level   10/25/2023 2028 by Alexia Li RN  Outcome: Progressing  10/25/2023 0642 by Alexia Li RN  Outcome: Progressing     Problem: Prexisting or High Potential for Compromised Skin Integrity  Goal: Skin integrity is maintained or improved  Description: INTERVENTIONS:  - Identify patients at risk for skin breakdown  - Assess and monitor skin integrity  - Assess and monitor nutrition and hydration status  - Monitor labs   - Assess for incontinence   - Turn and reposition patient  - Assist with mobility/ambulation  - Relieve pressure over bony prominences  - Avoid friction and shearing  - Provide appropriate hygiene as needed including keeping skin clean and dry  - Evaluate need for skin moisturizer/barrier cream  - Collaborate with interdisciplinary team   - Patient/family teaching  - Consider wound care consult   10/25/2023 2028 by Alexia Li RN  Outcome: Progressing  10/25/2023 0642 by Alexia Li RN  Outcome: Progressing     Problem: Nutrition/Hydration-ADULT  Goal: Nutrient/Hydration intake appropriate for improving, restoring or maintaining nutritional needs  Description: Monitor and assess patient's nutrition/hydration status for malnutrition.  Collaborate with interdisciplinary team and initiate plan and interventions as ordered. Monitor patient's weight and dietary intake as ordered or per policy. Utilize nutrition screening tool and intervene as necessary. Determine patient's food preferences and provide high-protein, high-caloric foods as appropriate.      INTERVENTIONS:  - Monitor oral intake, urinary output, labs, and treatment plans  - Assess nutrition and hydration status and recommend course of action  - Evaluate amount of meals eaten  - Assist patient with eating if necessary   - Allow adequate time for meals  - Recommend/ encourage appropriate diets, oral nutritional supplements, and vitamin/mineral supplements  - Order, calculate, and assess calorie counts as needed  - Recommend, monitor, and adjust tube feedings and TPN/PPN based on assessed needs  - Assess need for intravenous fluids  - Provide specific nutrition/hydration education as appropriate  - Include patient/family/caregiver in decisions related to nutrition  10/25/2023 2028 by Rohith Valero RN  Outcome: Progressing  10/25/2023 0642 by Rohith Valero RN  Outcome: Progressing     Problem: Potential for Falls  Goal: Patient will remain free of falls  Description: INTERVENTIONS:  - Educate patient/family on patient safety including physical limitations  - Instruct patient to call for assistance with activity   - Consult OT/PT to assist with strengthening/mobility   - Keep Call bell within reach  - Keep bed low and locked with side rails adjusted as appropriate  - Keep care items and personal belongings within reach  - Initiate and maintain comfort rounds  - Make Fall Risk Sign visible to staff  - Offer Toileting every  Hours, in advance of need  - Initiate/Maintain alarm  - Obtain necessary fall risk management equipment:   - Apply yellow socks and bracelet for high fall risk patients  - Consider moving patient to room near nurses station  10/25/2023 2028 by Rohith Valero RN  Outcome: Progressing  10/25/2023 0642 by Rohith Valero RN  Outcome: Progressing Problem: NEUROSENSORY - ADULT  Goal: Achieves stable or improved neurological status  Description: INTERVENTIONS  - Monitor and report changes in neurological status  - Monitor vital signs such as temperature, blood pressure, glucose, and any other labs ordered   - Initiate measures to prevent increased intracranial pressure  - Monitor for seizure activity and implement precautions if appropriate      10/25/2023 2028 by Campos Chen RN  Outcome: Progressing  10/25/2023 0642 by Campos Chen RN  Outcome: Progressing  Goal: Achieves maximal functionality and self care  Description: INTERVENTIONS  - Monitor swallowing and airway patency with patient fatigue and changes in neurological status  - Encourage and assist patient to increase activity and self care.    - Encourage visually impaired, hearing impaired and aphasic patients to use assistive/communication devices  10/25/2023 2028 by Campos Chen RN  Outcome: Progressing  10/25/2023 0642 by Campos Chen RN  Outcome: Progressing     Problem: GENITOURINARY - ADULT  Goal: Maintains or returns to baseline urinary function  Description: INTERVENTIONS:  - Assess urinary function  - Encourage oral fluids to ensure adequate hydration if ordered  - Administer IV fluids as ordered to ensure adequate hydration  - Administer ordered medications as needed  - Offer frequent toileting  - Follow urinary retention protocol if ordered  10/25/2023 2028 by Campos Chen RN  Outcome: Progressing  10/25/2023 0642 by Campos Chen RN  Outcome: Progressing  Goal: Absence of urinary retention  Description: INTERVENTIONS:  - Assess patient’s ability to void and empty bladder  - Monitor I/O  - Bladder scan as needed  - Discuss with physician/AP medications to alleviate retention as needed  - Discuss catheterization for long term situations as appropriate  10/25/2023 2028 by Campos Chen RN  Outcome: Progressing  10/25/2023 0642 by Campos Chen RN  Outcome: Progressing  Goal: Urinary catheter remains patent  Description: INTERVENTIONS:  - Assess patency of urinary catheter  - If patient has a chronic cadet, consider changing catheter if non-functioning  - Follow guidelines for intermittent irrigation of non-functioning urinary catheter  10/25/2023 2028 by Willy Dakins, RN  Outcome: Progressing  10/25/2023 0642 by Willy Dakins, RN  Outcome: Progressing     Problem: METABOLIC, FLUID AND ELECTROLYTES - ADULT  Goal: Electrolytes maintained within normal limits  Description: INTERVENTIONS:  - Monitor labs and assess patient for signs and symptoms of electrolyte imbalances  - Administer electrolyte replacement as ordered  - Monitor response to electrolyte replacements, including repeat lab results as appropriate  - Instruct patient on fluid and nutrition as appropriate  10/25/2023 2028 by Willy Dakins, RN  Outcome: Progressing  10/25/2023 0642 by Willy Dakins, RN  Outcome: Progressing  Goal: Fluid balance maintained  Description: INTERVENTIONS:  - Monitor labs   - Monitor I/O and WT  - Instruct patient on fluid and nutrition as appropriate  - Assess for signs & symptoms of volume excess or deficit  10/25/2023 2028 by Willy Dakins, RN  Outcome: Progressing  10/25/2023 0642 by Willy Dakins, RN  Outcome: Progressing  Goal: Glucose maintained within target range  Description: INTERVENTIONS:  - Monitor Blood Glucose as ordered  - Assess for signs and symptoms of hyperglycemia and hypoglycemia  - Administer ordered medications to maintain glucose within target range  - Assess nutritional intake and initiate nutrition service referral as needed  10/25/2023 2028 by Willy Dakins, RN  Outcome: Progressing  10/25/2023 0642 by Willy Dakins, RN  Outcome: Progressing     Problem: SKIN/TISSUE INTEGRITY - ADULT  Goal: Skin Integrity remains intact(Skin Breakdown Prevention)  Description: Assess:  -Perform [place] assessment every   -Clean and moisturize skin every   -Inspect skin when repositioning, toileting, and assisting with ADLS  -Assess under medical devices such as  every   -Assess extremities for adequate circulation and sensation     Bed Management:  -Have minimal linens on bed & keep smooth, unwrinkled  -Change linens as needed when moist or perspiring  -Avoid sitting or lying in one position for more than  hours while in bed  -Keep HOB at degrees     Toileting:  -Offer bedside commode  -Assess for incontinence every   -Use incontinent care products after each incontinent episode such as     Activity:  -Mobilize patient  times a day  -Encourage activity and walks on unit  -Encourage or provide ROM exercises   -Turn and reposition patient every  Hours  -Use appropriate equipment to lift or move patient in bed  -Instruct/ Assist with weight shifting every  when out of bed in chair  -Consider limitation of chair time  hour intervals    Skin Care:  -Avoid use of baby powder, tape, friction and shearing, hot water or constrictive clothing  -Relieve pressure over bony prominences using   -Do not massage red bony areas    Next Steps:  -Teach patient strategies to minimize risks such as    -Consider consults to  interdisciplinary teams such as   10/25/2023 2028 by Vanesa Pickens RN  Outcome: Progressing  10/25/2023 0642 by Vanesa Pickens RN  Outcome: Progressing  Goal: Incision(s), wounds(s) or drain site(s) healing without S/S of infection  Description: INTERVENTIONS  - Assess and document dressing, incision, wound bed, drain sites and surrounding tissue  - Provide patient and family education  - Perform skin care/dressing changes every   10/25/2023 2028 by Vanesa Pickens RN  Outcome: Progressing  10/25/2023 0642 by Vanesa Pickens RN  Outcome: Progressing  Goal: Pressure injury heals and does not worsen  Description: Interventions:  - Implement low air loss mattress or specialty surface (Criteria met)  - Apply silicone foam dressing  - Instruct/assist with weight shifting every  minutes when in chair   - Limit chair time to  hour intervals  - Use special pressure reducing interventions such as  when in chair   - Apply fecal or urinary incontinence containment device   - Perform passive or active ROM every   - Turn and reposition patient & offload bony prominences every  hours   - Utilize friction reducing device or surface for transfers   - Consider consults to  interdisciplinary teams such as   - Use incontinent care products after each incontinent episode such as   - Consider nutrition services referral as needed  10/25/2023 2028 by Alexia Li RN  Outcome: Progressing  10/25/2023 0642 by Alexia Li RN  Outcome: Progressing     Problem: MUSCULOSKELETAL - ADULT  Goal: Maintain or return mobility to safest level of function  Description: INTERVENTIONS:  - Assess patient's ability to carry out ADLs; assess patient's baseline for ADL function and identify physical deficits which impact ability to perform ADLs (bathing, care of mouth/teeth, toileting, grooming, dressing, etc.)  - Assess/evaluate cause of self-care deficits   - Assess range of motion  - Assess patient's mobility  - Assess patient's need for assistive devices and provide as appropriate  - Encourage maximum independence but intervene and supervise when necessary  - Involve family in performance of ADLs  - Assess for home care needs following discharge   - Consider OT consult to assist with ADL evaluation and planning for discharge  - Provide patient education as appropriate  10/25/2023 2028 by Alexia Li RN  Outcome: Progressing  10/25/2023 0642 by Alexia Li RN  Outcome: Progressing  Goal: Maintain proper alignment of affected body part  Description: INTERVENTIONS:  - Support, maintain and protect limb and body alignment  - Provide patient/ family with appropriate education  10/25/2023 2028 by Alexia Li RN  Outcome: Progressing  10/25/2023 0642 by Alexia Li RN  Outcome: Progressing     Problem: PAIN - ADULT  Goal: Verbalizes/displays adequate comfort level or baseline comfort level  Description: Interventions:  - Encourage patient to monitor pain and request assistance  - Assess pain using appropriate pain scale  - Administer analgesics based on type and severity of pain and evaluate response  - Implement non-pharmacological measures as appropriate and evaluate response  - Consider cultural and social influences on pain and pain management  - Notify physician/advanced practitioner if interventions unsuccessful or patient reports new pain  10/25/2023 2028 by Ruben Vieyra RN  Outcome: Progressing  10/25/2023 0642 by Ruben Vieyra RN  Outcome: Progressing     Problem: INFECTION - ADULT  Goal: Absence or prevention of progression during hospitalization  Description: INTERVENTIONS:  - Assess and monitor for signs and symptoms of infection  - Monitor lab/diagnostic results  - Monitor all insertion sites, i.e. indwelling lines, tubes, and drains  - Monitor endotracheal if appropriate and nasal secretions for changes in amount and color  - Harrison appropriate cooling/warming therapies per order  - Administer medications as ordered  - Instruct and encourage patient and family to use good hand hygiene technique  - Identify and instruct in appropriate isolation precautions for identified infection/condition  10/25/2023 2028 by Ruben Vieyra RN  Outcome: Progressing  10/25/2023 0642 by Ruben Vieyra RN  Outcome: Progressing  Goal: Absence of fever/infection during neutropenic period  Description: INTERVENTIONS:  - Monitor WBC    10/25/2023 2028 by Ruben Vieyra RN  Outcome: Progressing  10/25/2023 0642 by Ruben Vieyra RN  Outcome: Progressing     Problem: SAFETY ADULT  Goal: Patient will remain free of falls  Description: INTERVENTIONS:  - Educate patient/family on patient safety including physical limitations  - Instruct patient to call for assistance with activity   - Consult OT/PT to assist with strengthening/mobility   - Keep Call bell within reach  - Keep bed low and locked with side rails adjusted as appropriate  - Keep care items and personal belongings within reach  - Initiate and maintain comfort rounds  - Make Fall Risk Sign visible to staff  - Offer Toileting every  Hours, in advance of need  - Initiate/Maintain alarm  - Obtain necessary fall risk management equipment:   - Apply yellow socks and bracelet for high fall risk patients  - Consider moving patient to room near nurses station  10/25/2023 2028 by Moira Montoya RN  Outcome: Progressing  10/25/2023 0642 by Moira Montoya RN  Outcome: Progressing  Goal: Maintain or return to baseline ADL function  Description: INTERVENTIONS:  -  Assess patient's ability to carry out ADLs; assess patient's baseline for ADL function and identify physical deficits which impact ability to perform ADLs (bathing, care of mouth/teeth, toileting, grooming, dressing, etc.)  - Assess/evaluate cause of self-care deficits   - Assess range of motion  - Assess patient's mobility; develop plan if impaired  - Assess patient's need for assistive devices and provide as appropriate  - Encourage maximum independence but intervene and supervise when necessary  - Involve family in performance of ADLs  - Assess for home care needs following discharge   - Consider OT consult to assist with ADL evaluation and planning for discharge  - Provide patient education as appropriate  10/25/2023 2028 by Moira Montoya RN  Outcome: Progressing  10/25/2023 0642 by Moira Montoya RN  Outcome: Progressing  Goal: Maintains/Returns to pre admission functional level  Description: INTERVENTIONS:  - Perform BMAT or MOVE assessment daily.   - Set and communicate daily mobility goal to care team and patient/family/caregiver. - Collaborate with rehabilitation services on mobility goals if consulted  - Perform Range of Motion  times a day. - Reposition patient every  hours.   - Dangle patient  times a day  - Stand patient  times a day  - Ambulate patient  times a day  - Out of bed to chair  times a day   - Out of bed for meals  times a day  - Out of bed for toileting  - Record patient progress and toleration of activity level   10/25/2023 2028 by Manda Hardin RN  Outcome: Progressing  10/25/2023 0642 by Manda Hardin RN  Outcome: Progressing     Problem: DISCHARGE PLANNING  Goal: Discharge to home or other facility with appropriate resources  Description: INTERVENTIONS:  - Identify barriers to discharge w/patient and caregiver  - Arrange for needed discharge resources and transportation as appropriate  - Identify discharge learning needs (meds, wound care, etc.)  - Arrange for interpretive services to assist at discharge as needed  - Refer to Case Management Department for coordinating discharge planning if the patient needs post-hospital services based on physician/advanced practitioner order or complex needs related to functional status, cognitive ability, or social support system  10/25/2023 2028 by Manda Hardin RN  Outcome: Progressing  10/25/2023 0642 by Manda Hardin RN  Outcome: Progressing     Problem: Knowledge Deficit  Goal: Patient/family/caregiver demonstrates understanding of disease process, treatment plan, medications, and discharge instructions  Description: Complete learning assessment and assess knowledge base.   Interventions:  - Provide teaching at level of understanding  - Provide teaching via preferred learning methods  10/25/2023 2028 by Manda Hardin RN  Outcome: Progressing  10/25/2023 0642 by Manda Hardin RN  Outcome: Progressing     Problem: RESPIRATORY - ADULT  Goal: Achieves optimal ventilation and oxygenation  Description: INTERVENTIONS:  - Assess for changes in respiratory status  - Assess for changes in mentation and behavior  - Position to facilitate oxygenation and minimize respiratory effort  - Oxygen administered by appropriate delivery if ordered  - Initiate smoking cessation education as indicated  - Encourage broncho-pulmonary hygiene including cough, deep breathe, Incentive Spirometry  - Assess the need for suctioning and aspirate as needed  - Assess and instruct to report SOB or any respiratory difficulty  - Respiratory Therapy support as indicated  10/25/2023 2028 by Gerry White RN  Outcome: Progressing  10/25/2023 0642 by Gerry White RN  Outcome: Progressing     Problem: MOBILITY - ADULT  Goal: Maintain or return to baseline ADL function  Description: INTERVENTIONS:  -  Assess patient's ability to carry out ADLs; assess patient's baseline for ADL function and identify physical deficits which impact ability to perform ADLs (bathing, care of mouth/teeth, toileting, grooming, dressing, etc.)  - Assess/evaluate cause of self-care deficits   - Assess range of motion  - Assess patient's mobility; develop plan if impaired  - Assess patient's need for assistive devices and provide as appropriate  - Encourage maximum independence but intervene and supervise when necessary  - Involve family in performance of ADLs  - Assess for home care needs following discharge   - Consider OT consult to assist with ADL evaluation and planning for discharge  - Provide patient education as appropriate  10/25/2023 2028 by Gerry White RN  Outcome: Progressing  10/25/2023 0642 by Gerry White RN  Outcome: Progressing  Goal: Maintains/Returns to pre admission functional level  Description: INTERVENTIONS:  - Perform BMAT or MOVE assessment daily.   - Set and communicate daily mobility goal to care team and patient/family/caregiver. - Collaborate with rehabilitation services on mobility goals if consulted  - Perform Range of Motion  times a day. - Reposition patient every  hours.   - Dangle patient  times a day  - Stand patient  times a day  - Ambulate patient  times a day  - Out of bed to chair  times a day   - Out of bed for meals  times a day  - Out of bed for toileting  - Record patient progress and toleration of activity level   10/25/2023 2028 by Gerry White RN  Outcome: Progressing  10/25/2023 0642 by Gerry White RN  Outcome: Progressing     Problem: Prexisting or High Potential for Compromised Skin Integrity  Goal: Skin integrity is maintained or improved  Description: INTERVENTIONS:  - Identify patients at risk for skin breakdown  - Assess and monitor skin integrity  - Assess and monitor nutrition and hydration status  - Monitor labs   - Assess for incontinence   - Turn and reposition patient  - Assist with mobility/ambulation  - Relieve pressure over bony prominences  - Avoid friction and shearing  - Provide appropriate hygiene as needed including keeping skin clean and dry  - Evaluate need for skin moisturizer/barrier cream  - Collaborate with interdisciplinary team   - Patient/family teaching  - Consider wound care consult   10/25/2023 2028 by Willy Dakins, RN  Outcome: Progressing  10/25/2023 0642 by Willy Dakins, RN  Outcome: Progressing     Problem: Nutrition/Hydration-ADULT  Goal: Nutrient/Hydration intake appropriate for improving, restoring or maintaining nutritional needs  Description: Monitor and assess patient's nutrition/hydration status for malnutrition. Collaborate with interdisciplinary team and initiate plan and interventions as ordered. Monitor patient's weight and dietary intake as ordered or per policy. Utilize nutrition screening tool and intervene as necessary. Determine patient's food preferences and provide high-protein, high-caloric foods as appropriate.      INTERVENTIONS:  - Monitor oral intake, urinary output, labs, and treatment plans  - Assess nutrition and hydration status and recommend course of action  - Evaluate amount of meals eaten  - Assist patient with eating if necessary   - Allow adequate time for meals  - Recommend/ encourage appropriate diets, oral nutritional supplements, and vitamin/mineral supplements  - Order, calculate, and assess calorie counts as needed  - Recommend, monitor, and adjust tube feedings and TPN/PPN based on assessed needs  - Assess need for intravenous fluids  - Provide specific nutrition/hydration education as appropriate  - Include patient/family/caregiver in decisions related to nutrition  10/25/2023 2028 by Manda Hardin RN  Outcome: Progressing  10/25/2023 0642 by Manda Hardin RN  Outcome: Progressing

## 2023-10-26 NOTE — ASSESSMENT & PLAN NOTE
Dx initially 09/2021 stage IIIC. Received multiple rounds of chemo/radiation. Found to have brain mets 02/2023 to the left precentral gyrus. Cancer upstaged to stage IV. Currently on daily dexamethasone, [facility] as well as a history of DVT. Currently receiving combination Avastin and Pembrolizumab at the infusion center. Last chemo 10/18/2023. Avastatin is a new medication for the patient. 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 Unlikely to benefit from ASEPT catheter    Plan:  Continue with home dose dexamethasone and Lovenox for DVT   Extensive conversation with patient and wife regarding code status. Patient and wife have changed to DNR which appears appropriate given metastatic malignancy. Palliative care consult appreciated.   Recommend follow up outpatient with oncology after discharge

## 2023-10-26 NOTE — CONSULTS
Consultation - Infectious Disease   Brittany Pardo 68 y.o. male MRN: [id number]  Unit/Bed#: -01 Encounter: [id number]      Assessment/Plan     Pasteurella multocida bacteremia/post-obstructive pneumonia    Patient with pasteurella bacteremia. Source is probably his cat. This has been described in immunosuppressed. For now I would d/c cefepime and start Unasyn. If continues to do well and repeat blood cultures are negative okay to d/c on po Augmentin 875 mg bid until 11/6.    - d/c cefepime  - start unasyn 3 grams IV q 6 hours  - monitor for toxicities while on this abx  - follow cbc and fever curve  - if continues to do well, consider d/c tomorrow on po augmentin 875 mg bid until 11/6    D/W pulmonary/primary team       History of Present Illness   Physician Requesting Consult: Ryan Godfrey MD  Reason for Consult / Principal Problem: gram negative bacteremia    HPI: Brittany Pardo is a 68y.o. year old male with nsclca with brain met, s/p chemo and rads in 2022, now on avastin and Pembro. Patient was well until the 23rd when he developed fevers at home. Patient with hypoxia on admission with increased WOB placed on Bipap. CT with post-obstructive pna, chronic loculated R pleural effusion. Patient started on cefepime, with decreased fevers. Respiratory status has stabilized. Blood cultures on admission + for pasteurella. Patient does have cat that does lick him. No bites. He has no signs of ssti. Repeat blood cultures are negative. Consults    ROS: 12 systems reviewed, remainder is neg.     Historical Information   Past Medical History:   Diagnosis Date    Acute respiratory failure with hypoxia ([address]) 7/31/2021    Chronic respiratory failure with hypoxia ([facility]) 8/9/2021    Impaired mobility and ADLs 8/13/2021    Lung cancer [facility])     Stroke [facility])      Past Surgical History:   Procedure Laterality Date    IR BIOPSY LUNG  8/5/2021    IR THORACENTESIS  2/24/2022    IR THORACENTESIS  8/1/2022 Social History   Social History     Substance and Sexual Activity   Alcohol Use Not Currently    Comment: No ETOH since Summer 2021      Social History     Substance and Sexual Activity   Drug Use Never     Social History     Tobacco Use   Smoking Status Former    Packs/day: 3.00    Years: 39.00    Total pack years: 117.00    Types: Cigarettes    Start date:     Quit date:     Years since quittin.8   Smokeless Tobacco Never     Family History   Problem Relation Age of Onset    Prostate cancer Brother     Lung cancer Brother        Meds/Allergies   MEDS: reviewed       Current Facility-Administered Medications:     atorvastatin (LIPITOR) tablet 40 mg, 40 mg, Oral, Daily With CRISTOFER Powell, 40 mg at 10/25/23 1710    benzonatate (TESSALON PERLES) capsule 100 mg, 100 mg, Oral, TID PRN, [facility], 100 mg at 10/26/23 0711    cefepime (MAXIPIME) IVPB (premix in dextrose) 2,000 mg 50 mL, 2,000 mg, Intravenous, Q8H, CRISTOFER Díaz, Last Rate: 100 mL/hr at 10/26/23 0133, 2,000 mg at 10/26/23 0133    dexamethasone (DECADRON) tablet 4 mg, 4 mg, Oral, Q12H [facility], CRISTOFER Díaz, 4 mg at 10/25/23 2152    enoxaparin (LOVENOX) subcutaneous injection 100 mg, 100 mg, Subcutaneous, Q12H [facility], CRISTOFER Díaz, 100 mg at 10/26/23 0133    insulin lispro (HumaLOG) 100 units/mL subcutaneous injection 1-6 Units, 1-6 Units, Subcutaneous, TID AC, 2 Units at 10/25/23 1709 **AND** Fingerstick Glucose (POCT), , , TID AC, CRISTOFER Díaz    ipratropium (ATROVENT) 0.02 % inhalation solution 0.5 mg, 0.5 mg, Nebulization, TID, [facility] PA-C, 0.5 mg at 10/26/23 0640    levalbuterol (XOPENEX) inhalation solution 1.25 mg, 1.25 mg, Nebulization, TID, RCISTOFER Díaz, 1.25 mg at 10/26/23 0903    levETIRAcetam (KEPPRA) tablet 1,500 mg, 1,500 mg, Oral, Q12H [facility], [facility] Obrien, 1,500 mg at 10/25/23 2152    melatonin tablet 6 mg, 6 mg, Oral, , Ana Maria L Bethel Miranda PA-C, 6 mg at 10/26/23 0442    pantoprazole (PROTONIX) EC tablet 40 mg, 40 mg, Oral, Early Morning, Tammy Torresstbhavin, CRNP, 40 mg at 10/26/23 0544    tamsulosin (FLOMAX) capsule 0.4 mg, 0.4 mg, Oral, Daily With Dinner, Tammy Torresste, CRNP, 0.4 mg at 10/25/23 1710    Allergies   Allergen Reactions    Doxycycline Rash         Intake/Output Summary (Last 24 hours) at 10/26/2023 0944  Last data filed at 10/26/2023 0405  Gross per 24 hour   Intake 1200 ml   Output 650 ml   Net 550 ml       PE:  GEN: NAD  VSS, Tmax: 97.5  HEENT: anicteric  NECK: supple, no adenopathy  CARDIAC: rrr s1s2  LUNGS: decreased  ABDOMEN: soft nt/nd + BS, obese  EXTREMITIES: + edema  SKIN:no rash  NEURO: grossly non-focal    Invasive Devices:   Peripheral IV 10/25/23 Distal;Left;Upper;Ventral (anterior) Arm (Active)   Site Assessment WDL; Clean;Dry 10/25/23 2036   Dressing Type Transparent 10/25/23 2036   Line Status Flushed 10/25/23 2036   Dressing Status Clean;Dry; Intact 10/25/23 2036   Dressing Change Due 10/28/23 10/25/23 1700   Reason Not Rotated Not due 10/25/23 2036       External Urinary Catheter Medium (Active)   Collection Container Standard drainage bag 10/24/23 0422   Securement Method for Male Tape 10/24/23 0422   Interventions Removed and skin assessed 10/24/23 0422   Output (mL) 100 mL 10/26/23 0405           Lab Results:   No results displayed because visit has over 200 results. Imaging Studies: I have personally reviewed pertinent films in PACS  EKG, Pathology, and Other Studies: I have personally reviewed pertinent reports. Culture  Lab Results   Component Value Date    BLOODCX Pasteurella multocida (A) 10/23/2023    BLOODCX Pasteurella multocida (A) 10/23/2023    BLOODCX No Growth After 5 Days. 04/17/2023    BLOODCX No Growth After 5 Days. 04/17/2023    BLOODCX Staphylococcus coagulase negative (A) 04/14/2023    BLOODCX Gram-positive jamie (A) 04/14/2023    BLOODCX No Growth After 5 Days.  04/14/2023     No results found for: "WOUNDCULT"  Lab Results   Component Value Date    URINECX >100,000 cfu/ml Enterococcus faecalis (A) 04/14/2023    URINECX (A) 08/21/2021     40,000-49,000 cfu/ml Staphylococcus coagulase negative    URINECX (A) 08/21/2021     <10,000 cfu/ml Non lactose fermenting gram negative jamie     Lab Results   Component Value Date    SPUTUMCULTUR 3+ Growth of 04/15/2023       Principal Problem:    Acute on Chronic Hypoxemic respiratory failure, (HCC)  Active Problems:    Bilateral lower extremity DVTs    Adenocarcinoma of lung    Brain metastasis    HLD (hyperlipidemia)    Anasarca    Sepsis (HCC)    Seizure (HCC)    GERD (gastroesophageal reflux disease)    BPH (benign prostatic hyperplasia)    Gram-negative bacteremia

## 2023-10-26 NOTE — ASSESSMENT & PLAN NOTE
Likely 2/2 Avastin which was started earlier this month for radiation necrosis surrounding brain metastasis  Completed a course of lasix outpatient  Hold off on further diuretics  UA with only trace proteinuria  Will need further discussion with medical oncology regarding further Avastin dosing outpatient  RUE US negative for DVT. Probably edema 2/2 large malignant R neck mass.

## 2023-10-26 NOTE — ASSESSMENT & PLAN NOTE
POA: increased WOB in the ED requiring the initiation of BIPAP  Hx of Stage IV lung CA with mets. 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 Completed a short course of PO lasix outpatient  10/23 CTA PE Study: No evidence for acute pulmonary emboli. Densely consolidated right lower lobe and right upper lobe. Aeration remaining in the right middle lobe. See above for further description and comparison. Significant mass effect on the right lower lung secondary to large mixed attenuation masslike pleural abnormality described above. 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 Stable incompletely imaged right neck mass presumably representing metastatic adenopathy. Stable hypodensities within the liver.   RVP negative    Plan:  Weaned off BiPAP to  NC -> PNA  Continue abx for presumed PNA as outlined below  Hold further diuretics  Titrate O2 to maintain spO2 > 92%  Pulmonary medicine following, appreciate recommendations

## 2023-10-26 NOTE — PLAN OF CARE
Problem: Potential for Falls  Goal: Patient will remain free of falls  Description: INTERVENTIONS:  - Educate patient/family on patient safety including physical limitations  - Instruct patient to call for assistance with activity   - Consult OT/PT to assist with strengthening/mobility   - Keep Call bell within reach  - Keep bed low and locked with side rails adjusted as appropriate  - Keep care items and personal belongings within reach  - Initiate and maintain comfort rounds  - Make Fall Risk Sign visible to staff  - Offer Toileting every 2 Hours, in advance of need  - Initiate/Maintain bed alarm  - Obtain necessary fall risk management equipment: bed alarm and call bell usage  - Apply yellow socks and bracelet for high fall risk patients  - Consider moving patient to room near nurses station  Outcome: Progressing     Problem: NEUROSENSORY - ADULT  Goal: Achieves stable or improved neurological status  Description: INTERVENTIONS  - Monitor and report changes in neurological status  - Monitor vital signs such as temperature, blood pressure, glucose, and any other labs ordered   - Initiate measures to prevent increased intracranial pressure  - Monitor for seizure activity and implement precautions if appropriate      Outcome: Progressing  Goal: Achieves maximal functionality and self care  Description: INTERVENTIONS  - Monitor swallowing and airway patency with patient fatigue and changes in neurological status  - Encourage and assist patient to increase activity and self care.    - Encourage visually impaired, hearing impaired and aphasic patients to use assistive/communication devices  Outcome: Progressing     Problem: GENITOURINARY - ADULT  Goal: Maintains or returns to baseline urinary function  Description: INTERVENTIONS:  - Assess urinary function  - Encourage oral fluids to ensure adequate hydration if ordered  - Administer IV fluids as ordered to ensure adequate hydration  - Administer ordered medications as needed  - Offer frequent toileting  - Follow urinary retention protocol if ordered  Outcome: Progressing  Goal: Absence of urinary retention  Description: INTERVENTIONS:  - Assess patient’s ability to void and empty bladder  - Monitor I/O  - Bladder scan as needed  - Discuss with physician/AP medications to alleviate retention as needed  - Discuss catheterization for long term situations as appropriate  Outcome: Progressing  Goal: Urinary catheter remains patent  Description: INTERVENTIONS:  - Assess patency of urinary catheter  - If patient has a chronic cadet, consider changing catheter if non-functioning  - Follow guidelines for intermittent irrigation of non-functioning urinary catheter  Outcome: Progressing

## 2023-10-26 NOTE — ASSESSMENT & PLAN NOTE
By history  Last seen 9/25 in [address] [place] [address] ED for focal seizure following chemotherapy  Home rgimen: Keppra 1500 mg PO BID    Plan:  Continue with Keppra 1500 mg BID  Check Keppra level  Q4h neuro checks Patient seen today for a lab only appointment.

## 2023-10-27 LAB
ALBUMIN SERPL BCP-MCNC: 3 G/DL (ref 3.5–5)
ALP SERPL-CCNC: 38 U/L (ref 34–104)
ALT SERPL W P-5'-P-CCNC: 15 U/L (ref 7–52)
ANION GAP SERPL CALCULATED.3IONS-SCNC: 4 MMOL/L
AST SERPL W P-5'-P-CCNC: 10 U/L (ref 13–39)
BILIRUB SERPL-MCNC: 0.4 MG/DL (ref 0.2–1)
BUN SERPL-MCNC: 25 MG/DL (ref 5–25)
CALCIUM ALBUM COR SERPL-MCNC: 9.9 MG/DL (ref 8.3–10.1)
CALCIUM SERPL-MCNC: 9.1 MG/DL (ref 8.4–10.2)
CHLORIDE SERPL-SCNC: 109 MMOL/L (ref 96–108)
CO2 SERPL-SCNC: 29 MMOL/L (ref 21–32)
CREAT SERPL-MCNC: 0.84 MG/DL (ref 0.6–1.3)
ERYTHROCYTE [DISTWIDTH] IN BLOOD BY AUTOMATED COUNT: 21.8 % (ref 11.6–15.1)
GFR SERPL CREATININE-BSD FRML MDRD: 87 ML/MIN/1.73SQ M
GLUCOSE SERPL-MCNC: 108 MG/DL (ref 65–140)
GLUCOSE SERPL-MCNC: 129 MG/DL (ref 65–140)
GLUCOSE SERPL-MCNC: 130 MG/DL (ref 65–140)
GLUCOSE SERPL-MCNC: 93 MG/DL (ref 65–140)
HCT VFR BLD AUTO: 31.5 % (ref 36.5–49.3)
HGB BLD-MCNC: 9.3 G/DL (ref 12–17)
MCH RBC QN AUTO: 28.5 PG (ref 26.8–34.3)
MCHC RBC AUTO-ENTMCNC: 29.5 G/DL (ref 31.4–37.4)
MCV RBC AUTO: 97 FL (ref 82–98)
NRBC BLD AUTO-RTO: 2 /100 WBCS
PLATELET # BLD AUTO: 106 THOUSANDS/UL (ref 149–390)
PMV BLD AUTO: 9.5 FL (ref 8.9–12.7)
POTASSIUM SERPL-SCNC: 4.3 MMOL/L (ref 3.5–5.3)
PROT SERPL-MCNC: 5.6 G/DL (ref 6.4–8.4)
RBC # BLD AUTO: 3.26 MILLION/UL (ref 3.88–5.62)
SODIUM SERPL-SCNC: 142 MMOL/L (ref 135–147)
WBC # BLD AUTO: 4.96 THOUSAND/UL (ref 4.31–10.16)

## 2023-10-27 PROCEDURE — 99232 SBSQ HOSP IP/OBS MODERATE 35: CPT | Performed by: INTERNAL MEDICINE

## 2023-10-27 PROCEDURE — 94760 N-INVAS EAR/PLS OXIMETRY 1: CPT

## 2023-10-27 PROCEDURE — 97167 OT EVAL HIGH COMPLEX 60 MIN: CPT

## 2023-10-27 PROCEDURE — 80053 COMPREHEN METABOLIC PANEL: CPT | Performed by: HOSPITALIST

## 2023-10-27 PROCEDURE — 99222 1ST HOSP IP/OBS MODERATE 55: CPT | Performed by: UROLOGY

## 2023-10-27 PROCEDURE — 82948 REAGENT STRIP/BLOOD GLUCOSE: CPT

## 2023-10-27 PROCEDURE — 97163 PT EVAL HIGH COMPLEX 45 MIN: CPT

## 2023-10-27 PROCEDURE — 85027 COMPLETE CBC AUTOMATED: CPT | Performed by: HOSPITALIST

## 2023-10-27 PROCEDURE — 97116 GAIT TRAINING THERAPY: CPT

## 2023-10-27 PROCEDURE — 94640 AIRWAY INHALATION TREATMENT: CPT

## 2023-10-27 PROCEDURE — 99232 SBSQ HOSP IP/OBS MODERATE 35: CPT | Performed by: HOSPITALIST

## 2023-10-27 RX ADMIN — IPRATROPIUM BROMIDE 0.5 MG: 0.5 SOLUTION RESPIRATORY (INHALATION) at 08:34

## 2023-10-27 RX ADMIN — LEVETIRACETAM 1500 MG: 500 TABLET, FILM COATED ORAL at 09:43

## 2023-10-27 RX ADMIN — TAMSULOSIN HYDROCHLORIDE 0.4 MG: 0.4 CAPSULE ORAL at 15:25

## 2023-10-27 RX ADMIN — AMPICILLIN SODIUM AND SULBACTAM SODIUM 3 G: 2; 1 INJECTION, POWDER, FOR SOLUTION INTRAMUSCULAR; INTRAVENOUS at 21:48

## 2023-10-27 RX ADMIN — PANTOPRAZOLE SODIUM 40 MG: 40 TABLET, DELAYED RELEASE ORAL at 06:01

## 2023-10-27 RX ADMIN — ENOXAPARIN SODIUM 100 MG: 100 INJECTION SUBCUTANEOUS at 01:42

## 2023-10-27 RX ADMIN — ATORVASTATIN CALCIUM 40 MG: 40 TABLET, FILM COATED ORAL at 15:25

## 2023-10-27 RX ADMIN — Medication 6 MG: at 21:48

## 2023-10-27 RX ADMIN — LEVETIRACETAM 1500 MG: 500 TABLET, FILM COATED ORAL at 21:48

## 2023-10-27 RX ADMIN — AMPICILLIN SODIUM AND SULBACTAM SODIUM 3 G: 2; 1 INJECTION, POWDER, FOR SOLUTION INTRAMUSCULAR; INTRAVENOUS at 03:35

## 2023-10-27 RX ADMIN — DEXAMETHASONE 4 MG: 2 TABLET ORAL at 21:48

## 2023-10-27 RX ADMIN — AMPICILLIN SODIUM AND SULBACTAM SODIUM 3 G: 2; 1 INJECTION, POWDER, FOR SOLUTION INTRAMUSCULAR; INTRAVENOUS at 15:25

## 2023-10-27 RX ADMIN — DEXAMETHASONE 4 MG: 2 TABLET ORAL at 09:43

## 2023-10-27 RX ADMIN — AMPICILLIN SODIUM AND SULBACTAM SODIUM 3 G: 2; 1 INJECTION, POWDER, FOR SOLUTION INTRAMUSCULAR; INTRAVENOUS at 09:43

## 2023-10-27 RX ADMIN — ENOXAPARIN SODIUM 100 MG: 100 INJECTION SUBCUTANEOUS at 14:41

## 2023-10-27 RX ADMIN — LEVALBUTEROL HYDROCHLORIDE 1.25 MG: 1.25 SOLUTION RESPIRATORY (INHALATION) at 08:35

## 2023-10-27 NOTE — ASSESSMENT & PLAN NOTE
Dx initially 09/2021 stage IIIC. Received multiple rounds of chemo/radiation. Found to have brain mets 02/2023 to the left precentral gyrus. Cancer upstaged to stage IV. Currently on daily dexamethasone[facility] as well as a history of DVT. Currently receiving combination Avastin and Pembrolizumab at the infusion center. Last chemo 10/18/2023. Avastatin is a new medication for the patient. 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 Unlikely to benefit from ASEPT catheter    Plan:  Continue with home dose dexamethasone and Lovenox for DVT   Extensive conversation with patient and wife regarding code status. Patient and wife have changed to DNR which appears appropriate given metastatic malignancy. Palliative care consult appreciated.   Recommend follow up outpatient with oncology after discharge

## 2023-10-27 NOTE — ASSESSMENT & PLAN NOTE
By history  Last seen 9/25 in [address] [place] [address] ED for focal seizure following chemotherapy  Home rgimen: Keppra 1500 mg PO BID    Plan:  Continue with Keppra 1500 mg BID  Check Keppra level  Q4h neuro checks

## 2023-10-27 NOTE — PROGRESS NOTES
Larry Silvestre  68 y.o.  male  1950  mrn [id number]    Assessment/Plan:    Pasteurella multocida bacteremia/post-obstructive pneumonia     Patient with pasteurella bacteremia. Source is probably his cat. This has been described in immunosuppressed. For now I would d/c cefepime and start Unasyn. If continues to do well and repeat blood cultures are negative okay to d/c on po Augmentin 875 mg bid until 11/6.    10/27:  No fevers, feeling well, anxious to go home. Repeat blood cultures are negative 24 hours. Okay to d/c from my standpoint on po augmentin 875 mg bid until 11/6. Education about cat exposures provided. ID off service, call with ?'s changes in clinical status     D/W primary team     Subjective: anxious to go home. Denies pain. Objective: Tc 97.3  Cor: rrr s1s2  Lungs: decreased   Labs:  CBC w/diff  Recent Labs     10/26/23  0333 10/27/23  0347   WBC 5.53 4.96   HGB 9.1* 9.3*   HCT 30.2* 31.5*   * 106*   NEUTOPHILPCT 89*  --    LYMPHOPCT 7*  --    MONOPCT 3*  --    EOSPCT 0  --      BMP  Recent Labs     10/27/23  0347   K 4.3   *   CO2 29   BUN 25   CREATININE 0.84   CALCIUM 9.1     CMP  Recent Labs     10/27/23  0347   K 4.3   *   CO2 29   BUN 25   CREATININE 0.84   CALCIUM 9.1   ALKPHOS 38   ALT 15   AST 10*       .labrc    Cultures:  Lab Results   Component Value Date    BLOODCX Received in Microbiology Lab. Culture in Progress. 10/26/2023    BLOODCX Received in Microbiology Lab. Culture in Progress. 10/26/2023    BLOODCX Pasteurella multocida (A) 10/23/2023    BLOODCX Pasteurella multocida (A) 10/23/2023    BLOODCX No Growth After 5 Days. 04/17/2023    BLOODCX No Growth After 5 Days. 04/17/2023    BLOODCX Staphylococcus coagulase negative (A) 04/14/2023    BLOODCX Gram-positive jamie (A) 04/14/2023    BLOODCX No Growth After 5 Days.  04/14/2023     No results found for: "WOUNDCULT"  Lab Results   Component Value Date    URINECX >100,000 cfu/ml Enterococcus faecalis (A) 04/14/2023    URINECX (A) 08/21/2021     40,000-49,000 cfu/ml Staphylococcus coagulase negative    URINECX (A) 08/21/2021     <10,000 cfu/ml Non lactose fermenting gram negative jamie     Lab Results   Component Value Date    SPUTUMCULTUR Culture results to follow.  10/25/2023    SPUTUMCULTUR 3+ Growth of 04/15/2023       MED: reviewed       Current Facility-Administered Medications:     ampicillin-sulbactam (UNASYN) 3 g in sodium chloride 0.9 % 100 mL IVPB, 3 g, Intravenous, Q6H, Juli Lombardo MD, Last Rate: 200 mL/hr at 10/27/23 0943, 3 g at 10/27/23 0943    atorvastatin (LIPITOR) tablet 40 mg, 40 mg, Oral, Daily With MagdaCRISTOFER Gibson, 40 mg at 10/26/23 1552    benzonatate (TESSALON PERLES) capsule 100 mg, 100 mg, Oral, TID PRN, Isamar Marie PA-C, 100 mg at 10/26/23 0711    dexamethasone (DECADRON) tablet 4 mg, 4 mg, Oral, Q12H [address] [facility], CRISTOFER, 4 mg at 10/27/23 0943    enoxaparin (LOVENOX) subcutaneous injection 100 mg, 100 mg, Subcutaneous, Q12H [address] [facility], GISELNP, 100 mg at 10/27/23 0142    insulin lispro (HumaLOG) 100 units/mL subcutaneous injection 1-6 Units, 1-6 Units, Subcutaneous, TID AC, 2 Units at 10/25/23 1709 **AND** Fingerstick Glucose (POCT), , , TID AC, CRISTOFER Bullock    ipratropium (ATROVENT) 0.02 % inhalation solution 0.5 mg, 0.5 mg, Nebulization, TID, Hector Ritchie PA-C, 0.5 mg at 10/27/23 0834    levalbuterol (XOPENEX) inhalation solution 1.25 mg, 1.25 mg, Nebulization, TID, CRISTOFER Bullock, 1.25 mg at 10/27/23 0835    levETIRAcetam (KEPPRA) tablet 1,500 mg, 1,500 mg, Oral, Q12H [address], CRISTOFER Bullock, 1,500 mg at 10/27/23 0943    melatonin tablet 6 mg, 6 mg, Oral, HS, Grecia Mendes PA-C, 6 mg at 10/26/23 2023    pantoprazole (PROTONIX) EC tablet 40 mg, 40 mg, Oral, Early Morning, CRISTOFER Bullock, 40 mg at 10/27/23 0601    tamsulosin (FLOMAX) capsule 0.4 mg, 0.4 mg, Oral, Daily With Dinner, Bethanie Damon, CRNP, 0.4 mg at 10/26/23 1552    Principal Problem:    Acute on Chronic Hypoxemic respiratory failure, (HCC)  Active Problems:    Bilateral lower extremity DVTs    Adenocarcinoma of lung    Brain metastasis    HLD (hyperlipidemia)    Anasarca    Sepsis ([address])    Seizure (HCC)    GERD (gastroesophageal reflux disease)    BPH (benign prostatic hyperplasia)    Gram-negative bacteremia      Lawanda Park MD

## 2023-10-27 NOTE — PLAN OF CARE
Problem: OCCUPATIONAL THERAPY ADULT  Goal: Performs self-care activities at highest level of function for planned discharge setting. See evaluation for individualized goals. Description: Treatment Interventions: ADL retraining, Functional transfer training, Endurance training, Patient/family training, Cognitive reorientation, UE strengthening/ROM, Equipment evaluation/education, Compensatory technique education, Continued evaluation          See flowsheet documentation for full assessment, interventions and recommendations. Note: Limitation: Decreased ADL status, Decreased UE ROM, Decreased Safe judgement during ADL, Decreased endurance, Decreased cognition, Decreased high-level ADLs, Decreased self-care trans  Prognosis: Good  Assessment: Pt is a 68 y.o. male seen for OT evaluation at [address] [place] [address], admitted 10/23/2023 w/ respiratory distress. Pt with Hypoxemic respiratory failure, chronic ([address] OT completed extensive review of pt's medical and social history. Comorbidities affecting pt's functional performance at time of assessment include: h/o DVTs, Lung CA with brain mets, anasarca, sepsis, h/o seizure, GERD, h/o CVA, dysphagia, impaired cognitoin, COPD, CHF, RLD, morbid obesity, etc. Personal factors affecting pt at time of IE include: steps to enter environment, behavioral pattern, difficulty performing ADLS, difficulty performing IADLS , limited insight into deficits, flat affect, decreased initiation and engagement , and environment. Prior to admission, pt was living with his wife in a [facility] with 1 NALLELY & a first floor s/u. Pt was A w/  ADLS and w/ IADLS, (-) drove, & required use of RW PTA.  Upon evaluation: Pt requires Mod Ax1-2 for functional mobility/transfers, Mod A for UB ADLs and Max A for LB ADLS 2* the following deficits impacting occupational performance: weakness, decreased ROM, decreased strength, decreased balance, decreased tolerance, impaired initiation, impaired memory, impaired sequencing, impaired problem solving, impulsivity, and decreased safety awareness. Full objective findings from OT assessment regarding body systems outlined above. Pt to benefit from continued skilled OT tx while in the hospital to address deficits as defined above and maximize level of functional independence w/ ADL's and functional mobility. Occupational Performance areas to address include: grooming, bathing/shower, toilet hygiene, dressing, functional mobility, and clothing management. Based on findings, pt is of high complexity. The patient's raw score on the -PAC Daily Activity inpatient short form is 13, standardized score is 32.03, less than 39.4. Patients at this level are likely to benefit from DC to post-acute rehabilitation services. However, please refer to therapist recommendation for discharge planning given other factors that may influence destination. At this time, OT recommendations at time of discharge are DC with Level II resources.

## 2023-10-27 NOTE — ASSESSMENT & PLAN NOTE
Continue home Flomax  Pt now with retention, follow urinary retention protocol  Hematuria noted, possibly in setting of thrombocytopenia, traumatic straight cath.

## 2023-10-27 NOTE — PLAN OF CARE
Problem: Potential for Falls  Goal: Patient will remain free of falls  Description: INTERVENTIONS:  - Educate patient/family on patient safety including physical limitations  - Instruct patient to call for assistance with activity   - Consult OT/PT to assist with strengthening/mobility   - Keep Call bell within reach  - Keep bed low and locked with side rails adjusted as appropriate  - Keep care items and personal belongings within reach  - Initiate and maintain comfort rounds  - Make Fall Risk Sign visible to staff  - Offer Toileting every 2 Hours, in advance of need  - Initiate/Maintain bed alarm  - Obtain necessary fall risk management equipment  - Apply yellow socks and bracelet for high fall risk patients  - Consider moving patient to room near nurses station  Outcome: Progressing     Problem: NEUROSENSORY - ADULT  Goal: Achieves stable or improved neurological status  Description: INTERVENTIONS  - Monitor and report changes in neurological status  - Monitor vital signs such as temperature, blood pressure, glucose, and any other labs ordered   - Initiate measures to prevent increased intracranial pressure  - Monitor for seizure activity and implement precautions if appropriate      Outcome: Progressing  Goal: Achieves maximal functionality and self care  Description: INTERVENTIONS  - Monitor swallowing and airway patency with patient fatigue and changes in neurological status  - Encourage and assist patient to increase activity and self care.    - Encourage visually impaired, hearing impaired and aphasic patients to use assistive/communication devices  Outcome: Progressing     Problem: GENITOURINARY - ADULT  Goal: Maintains or returns to baseline urinary function  Description: INTERVENTIONS:  - Assess urinary function  - Encourage oral fluids to ensure adequate hydration if ordered  - Administer IV fluids as ordered to ensure adequate hydration  - Administer ordered medications as needed  - Offer frequent toileting  - Follow urinary retention protocol if ordered  Outcome: Progressing  Goal: Absence of urinary retention  Description: INTERVENTIONS:  - Assess patient’s ability to void and empty bladder  - Monitor I/O  - Bladder scan as needed  - Discuss with physician/AP medications to alleviate retention as needed  - Discuss catheterization for long term situations as appropriate  Outcome: Progressing  Goal: Urinary catheter remains patent  Description: INTERVENTIONS:  - Assess patency of urinary catheter  - If patient has a chronic cadet, consider changing catheter if non-functioning  - Follow guidelines for intermittent irrigation of non-functioning urinary catheter  Outcome: Progressing     Problem: METABOLIC, FLUID AND ELECTROLYTES - ADULT  Goal: Electrolytes maintained within normal limits  Description: INTERVENTIONS:  - Monitor labs and assess patient for signs and symptoms of electrolyte imbalances  - Administer electrolyte replacement as ordered  - Monitor response to electrolyte replacements, including repeat lab results as appropriate  - Instruct patient on fluid and nutrition as appropriate  Outcome: Progressing  Goal: Fluid balance maintained  Description: INTERVENTIONS:  - Monitor labs   - Monitor I/O and WT  - Instruct patient on fluid and nutrition as appropriate  - Assess for signs & symptoms of volume excess or deficit  Outcome: Progressing  Goal: Glucose maintained within target range  Description: INTERVENTIONS:  - Monitor Blood Glucose as ordered  - Assess for signs and symptoms of hyperglycemia and hypoglycemia  - Administer ordered medications to maintain glucose within target range  - Assess nutritional intake and initiate nutrition service referral as needed  Outcome: Progressing     Problem: SKIN/TISSUE INTEGRITY - ADULT  Goal: Skin Integrity remains intact(Skin Breakdown Prevention)  Description: Assess:  -Perform [place] assessment every shift  -Clean and moisturize skin every shift  -Inspect skin when repositioning, toileting, and assisting with ADLS  -Assess under medical devices such as scds every shift  -Assess extremities for adequate circulation and sensation     Bed Management:  -Have minimal linens on bed & keep smooth, unwrinkled  -Change linens as needed when moist or perspiring  -Avoid sitting or lying in one position for more than 2 hours while in bed  -Keep HOB at 30 degrees     Toileting:  -Offer bedside commode  -Assess for incontinence every shift  -Use incontinent care products after each incontinent episode such as     Activity:  -Mobilize patient 3 times a day  -Encourage activity and walks on unit  -Encourage or provide ROM exercises   -Turn and reposition patient every 2 Hours  -Use appropriate equipment to lift or move patient in bed  -Instruct/ Assist with weight shifting every 2 hours when out of bed in chair  -Consider limitation of chair time 2 hour intervals    Skin Care:  -Avoid use of baby powder, tape, friction and shearing, hot water or constrictive clothing  -Relieve pressure over bony prominences using foam wedges    -Do not massage red bony areas    Next Steps:  -Teach patient strategies to minimize risks such as   -Consider consults to  interdisciplinary teams such as  Outcome: Progressing  Goal: Incision(s), wounds(s) or drain site(s) healing without S/S of infection  Description: INTERVENTIONS  - Assess and document dressing, incision, wound bed, drain sites and surrounding tissue  - Provide patient and family education  - Perform skin care/dressing changes every   Outcome: Progressing  Goal: Pressure injury heals and does not worsen  Description: Interventions:  - Implement low air loss mattress or specialty surface (Criteria met)  - Apply silicone foam dressing  - Instruct/assist with weight shifting every   minutes when in chair   - Limit chair time to   hour intervals  - Use special pressure reducing interventions such as   when in chair   - Apply fecal or urinary incontinence containment device   - Perform passive or active ROM every    - Turn and reposition patient & offload bony prominences every   hours   - Utilize friction reducing device or surface for transfers   - Consider consults to  interdisciplinary teams such as    - Use incontinent care products after each incontinent episode such as    - Consider nutrition services referral as needed  Outcome: Progressing     Problem: MUSCULOSKELETAL - ADULT  Goal: Maintain or return mobility to safest level of function  Description: INTERVENTIONS:  - Assess patient's ability to carry out ADLs; assess patient's baseline for ADL function and identify physical deficits which impact ability to perform ADLs (bathing, care of mouth/teeth, toileting, grooming, dressing, etc.)  - Assess/evaluate cause of self-care deficits   - Assess range of motion  - Assess patient's mobility  - Assess patient's need for assistive devices and provide as appropriate  - Encourage maximum independence but intervene and supervise when necessary  - Involve family in performance of ADLs  - Assess for home care needs following discharge   - Consider OT consult to assist with ADL evaluation and planning for discharge  - Provide patient education as appropriate  Outcome: Progressing  Goal: Maintain proper alignment of affected body part  Description: INTERVENTIONS:  - Support, maintain and protect limb and body alignment  - Provide patient/ family with appropriate education  Outcome: Progressing     Problem: PAIN - ADULT  Goal: Verbalizes/displays adequate comfort level or baseline comfort level  Description: Interventions:  - Encourage patient to monitor pain and request assistance  - Assess pain using appropriate pain scale  - Administer analgesics based on type and severity of pain and evaluate response  - Implement non-pharmacological measures as appropriate and evaluate response  - Consider cultural and social influences on pain and pain management  - Notify physician/advanced practitioner if interventions unsuccessful or patient reports new pain  Outcome: Progressing     Problem: INFECTION - ADULT  Goal: Absence or prevention of progression during hospitalization  Description: INTERVENTIONS:  - Assess and monitor for signs and symptoms of infection  - Monitor lab/diagnostic results  - Monitor all insertion sites, i.e. indwelling lines, tubes, and drains  - Monitor endotracheal if appropriate and nasal secretions for changes in amount and color  - [place] appropriate cooling/warming therapies per order  - Administer medications as ordered  - Instruct and encourage patient and family to use good hand hygiene technique  - Identify and instruct in appropriate isolation precautions for identified infection/condition  Outcome: Progressing  Goal: Absence of fever/infection during neutropenic period  Description: INTERVENTIONS:  - Monitor WBC    Outcome: Progressing     Problem: SAFETY ADULT  Goal: Patient will remain free of falls  Description: INTERVENTIONS:  - Educate patient/family on patient safety including physical limitations  - Instruct patient to call for assistance with activity   - Consult OT/PT to assist with strengthening/mobility   - Keep Call bell within reach  - Keep bed low and locked with side rails adjusted as appropriate  - Keep care items and personal belongings within reach  - Initiate and maintain comfort rounds  - Make Fall Risk Sign visible to staff  - Offer Toileting every 2 Hours, in advance of need  - Initiate/Maintain bed alarm  - Obtain necessary fall risk management equipment  - Apply yellow socks and bracelet for high fall risk patients  - Consider moving patient to room near nurses station  Outcome: Progressing  Goal: Maintain or return to baseline ADL function  Description: INTERVENTIONS:  -  Assess patient's ability to carry out ADLs; assess patient's baseline for ADL function and identify physical deficits which impact ability to perform ADLs (bathing, care of mouth/teeth, toileting, grooming, dressing, etc.)  - Assess/evaluate cause of self-care deficits   - Assess range of motion  - Assess patient's mobility; develop plan if impaired  - Assess patient's need for assistive devices and provide as appropriate  - Encourage maximum independence but intervene and supervise when necessary  - Involve family in performance of ADLs  - Assess for home care needs following discharge   - Consider OT consult to assist with ADL evaluation and planning for discharge  - Provide patient education as appropriate  Outcome: Progressing  Goal: Maintains/Returns to pre admission functional level  Description: INTERVENTIONS:  - Perform BMAT or MOVE assessment daily.   - Set and communicate daily mobility goal to care team and patient/family/caregiver. - Collaborate with rehabilitation services on mobility goals if consulted  - Perform Range of Motion 3 times a day. - Reposition patient every 2 hours.   - Dangle patient 3 times a day  - Stand patient 3 times a day  - Ambulate patient 3 times a day  - Out of bed to chair 3 times a day   - Out of bed for meals 3 times a day  - Out of bed for toileting  - Record patient progress and toleration of activity level   Outcome: Progressing     Problem: DISCHARGE PLANNING  Goal: Discharge to home or other facility with appropriate resources  Description: INTERVENTIONS:  - Identify barriers to discharge w/patient and caregiver  - Arrange for needed discharge resources and transportation as appropriate  - Identify discharge learning needs (meds, wound care, etc.)  - Arrange for interpretive services to assist at discharge as needed  - Refer to Case Management Department for coordinating discharge planning if the patient needs post-hospital services based on physician/advanced practitioner order or complex needs related to functional status, cognitive ability, or social support system  Outcome: Progressing     Problem: Knowledge Deficit  Goal: Patient/family/caregiver demonstrates understanding of disease process, treatment plan, medications, and discharge instructions  Description: Complete learning assessment and assess knowledge base.   Interventions:  - Provide teaching at level of understanding  - Provide teaching via preferred learning methods  Outcome: Progressing     Problem: RESPIRATORY - ADULT  Goal: Achieves optimal ventilation and oxygenation  Description: INTERVENTIONS:  - Assess for changes in respiratory status  - Assess for changes in mentation and behavior  - Position to facilitate oxygenation and minimize respiratory effort  - Oxygen administered by appropriate delivery if ordered  - Initiate smoking cessation education as indicated  - Encourage broncho-pulmonary hygiene including cough, deep breathe, Incentive Spirometry  - Assess the need for suctioning and aspirate as needed  - Assess and instruct to report SOB or any respiratory difficulty  - Respiratory Therapy support as indicated  Outcome: Progressing     Problem: MOBILITY - ADULT  Goal: Maintain or return to baseline ADL function  Description: INTERVENTIONS:  -  Assess patient's ability to carry out ADLs; assess patient's baseline for ADL function and identify physical deficits which impact ability to perform ADLs (bathing, care of mouth/teeth, toileting, grooming, dressing, etc.)  - Assess/evaluate cause of self-care deficits   - Assess range of motion  - Assess patient's mobility; develop plan if impaired  - Assess patient's need for assistive devices and provide as appropriate  - Encourage maximum independence but intervene and supervise when necessary  - Involve family in performance of ADLs  - Assess for home care needs following discharge   - Consider OT consult to assist with ADL evaluation and planning for discharge  - Provide patient education as appropriate  Outcome: Progressing  Goal: Maintains/Returns to pre admission functional level  Description: INTERVENTIONS:  - Perform BMAT or MOVE assessment daily.   - Set and communicate daily mobility goal to care team and patient/family/caregiver. - Collaborate with rehabilitation services on mobility goals if consulted  - Perform Range of Motion 3 times a day. - Reposition patient every 2 hours. - Dangle patient 3 times a day  - Stand patient 3 times a day  - Ambulate patient 3 times a day  - Out of bed to chair 3 times a day   - Out of bed for meals 3 times a day  - Out of bed for toileting  - Record patient progress and toleration of activity level   Outcome: Progressing     Problem: Prexisting or High Potential for Compromised Skin Integrity  Goal: Skin integrity is maintained or improved  Description: INTERVENTIONS:  - Identify patients at risk for skin breakdown  - Assess and monitor skin integrity  - Assess and monitor nutrition and hydration status  - Monitor labs   - Assess for incontinence   - Turn and reposition patient  - Assist with mobility/ambulation  - Relieve pressure over bony prominences  - Avoid friction and shearing  - Provide appropriate hygiene as needed including keeping skin clean and dry  - Evaluate need for skin moisturizer/barrier cream  - Collaborate with interdisciplinary team   - Patient/family teaching  - Consider wound care consult   Outcome: Progressing     Problem: Nutrition/Hydration-ADULT  Goal: Nutrient/Hydration intake appropriate for improving, restoring or maintaining nutritional needs  Description: Monitor and assess patient's nutrition/hydration status for malnutrition. Collaborate with interdisciplinary team and initiate plan and interventions as ordered. Monitor patient's weight and dietary intake as ordered or per policy. Utilize nutrition screening tool and intervene as necessary. Determine patient's food preferences and provide high-protein, high-caloric foods as appropriate.      INTERVENTIONS:  - Monitor oral intake, urinary output, labs, and treatment plans  - Assess nutrition and hydration status and recommend course of action  - Evaluate amount of meals eaten  - Assist patient with eating if necessary   - Allow adequate time for meals  - Recommend/ encourage appropriate diets, oral nutritional supplements, and vitamin/mineral supplements  - Order, calculate, and assess calorie counts as needed  - Recommend, monitor, and adjust tube feedings and TPN/PPN based on assessed needs  - Assess need for intravenous fluids  - Provide specific nutrition/hydration education as appropriate  - Include patient/family/caregiver in decisions related to nutrition  Outcome: Progressing

## 2023-10-27 NOTE — OCCUPATIONAL THERAPY NOTE
Occupational Therapy Evaluation     Patient Name: Galina Gilbert  CZWBC'E Date: 10/27/2023  Problem List  Principal Problem:    Acute on Chronic Hypoxemic respiratory failure, ([address])  Active Problems:    Bilateral lower extremity DVTs    Adenocarcinoma of lung    Brain metastasis    HLD (hyperlipidemia)    Anasarca    Sepsis ([address])    Seizure (HCC)    GERD (gastroesophageal reflux disease)    BPH (benign prostatic hyperplasia)    Gram-negative bacteremia    Past Medical History  Past Medical History:   Diagnosis Date    Acute respiratory failure with hypoxia ([address]) 7/31/2021    Chronic respiratory failure with hypoxia ([address]) 8/9/2021    Impaired mobility and ADLs 8/13/2021    Lung cancer ([address])     [facility])      Past Surgical History  Past Surgical History:   Procedure Laterality Date    IR BIOPSY LUNG  8/5/2021    IR THORACENTESIS  2/24/2022    IR THORACENTESIS  8/1/2022             10/27/23 0930   OT Last Visit   OT Visit Date 10/27/23   Note Type   Note type Evaluation   Pain Assessment   Pain Assessment Tool 0-10   Pain Score No Pain   Restrictions/Precautions   Weight Bearing Precautions Per Order No   Other Precautions Cognitive; Chair Alarm; Bed Alarm; Fall Risk;Seizure;Visual impairment   Home Living   Type of Home House   Home Layout Two level;Performs ADLs on one level; Able to live on main level with bedroom/bathroom;Stairs to enter with rails  (1 NALLELY, FFSU)   Bathroom Shower/Tub Tub/shower unit  (However, sponge bathing with A from wife d/t progressive weakness)   Bathroom Toilet Standard   Bathroom Equipment Commode  (BSC in bedroom)   Bathroom Accessibility Accessible via walker   Home Equipment Walker;Cane   Prior Function   Level of [address] [place] [address] with functional mobility; Needs assistance with IADLS;Needs assistance with ADLs   Lives With Spouse   Receives Help From Family   IADLs Family/Friend/Other provides transportation; Family/Friend/Other provides meals; Family/Friend/Other provides medication management   Falls in the last 6 months 0   Vocational Retired   Comments Pt typically uses [facility] for community mobility, RW for household distance. Typically Mod I, but recently requiring close supervision from wife   General   Additional Pertinent History Pt with brain mets & cognitive impairments at baseline. R neck mass. BUE swelling, R>L   Family/Caregiver Present Yes   Additional General Comments Spouse present   Subjective   Subjective Pt received sitting EOB with PT Hannah Molina. Pt pleasant & cooperative. ADL   Eating Assistance 5  Supervision/Setup   Grooming Assistance 3  Moderate Assistance   UB Bathing Assistance 3  Moderate Assistance   LB Bathing Assistance 2  Maximal Assistance   UB Dressing Assistance 3  Moderate Assistance   LB Dressing Assistance 2  Maximal [address]  2  Maximal Assistance   Bed Mobility   Supine to Sit Unable to assess   Additional Comments Pt received sitting EOB with PT. Pt sat EOB x5 min for Fair to Fair (-) balance for dynamic sitting tasks   Transfers   Sit to Stand 3  Moderate assistance   Additional items Assist x 2;Bedrails; Increased time required;Verbal cues   Stand to Sit 3  Moderate assistance   Additional items Assist x 2; Increased time required;Armrests; Verbal cues   Stand pivot 3  Moderate assistance   Additional items Assist x 2; Increased time required;Verbal cues;Armrests   Functional Mobility   Functional Mobility 3  Moderate assistance   Additional Comments x1 - VCs for RW management. Pt continued to place RW anteriorly outside ALEX, required max VCs & physical assist for correct technique.  As pt fatigued, pt with poor RUE /utilization on RW. 20 ft total, pt audibly wheezing by end of session   Additional items Rolling walker   Balance   Static Sitting Fair   Dynamic Sitting Fair -   Static Standing Poor +   Dynamic Standing Poor   Ambulatory Poor   Activity Tolerance   Activity Tolerance Patient limited by fatigue   Medical Staff Made Aware PT Kushal Brown   Nurse Made Aware LPN Cielo Singh Assessment   RUE Assessment X  (R shoulder flexion 0-90* AROM, 0-120* PROM - hard end feel. Limited shoulder ER d/t RUE pain/R neck mass.)   RUE Strength   R Shoulder Flexion 4/5   R Elbow Flexion 4/5   R Elbow Extension 4/5   LUE Assessment   LUE Assessment WFL   LUE Strength   L Shoulder Flexion 5/5   L Elbow Flexion 4+/5   L Elbow Extension 4+/5   Hand Function   Hand Function Comments R grasp endurance limited as pt unable to tolerate grasping RW for entirety of functional mobility   Sensation   Light Touch Partial deficits in the RUE  (R hand numbness)   Vision-Basic Assessment   Patient Visual Report Other (Comment)  (L eye total blindness)   Cognition   Overall Cognitive Status Impaired   Arousal/Participation Alert; Cooperative   Attention Attends with cues to redirect   Orientation Level Oriented to person;Oriented to place; Disoriented to time;Disoriented to situation   Memory Decreased recall of recent events;Decreased recall of precautions;Decreased short term memory   Following Commands Follows one step commands inconsistently   Assessment   Limitation Decreased ADL status; Decreased UE ROM; Decreased Safe judgement during ADL;Decreased endurance;Decreased cognition;Decreased high-level ADLs; Decreased self-care trans   Prognosis Good   Assessment Pt is a 68 y.o. male seen for OT evaluation at [address] [place] [address], admitted 10/23/2023 w/ respiratory distress. Pt with Hypoxemic respiratory failure, chronic ([address] OT completed extensive review of pt's medical and social history.  Comorbidities affecting pt's functional performance at time of assessment include: h/o DVTs, Lung CA with brain mets, anasarca, sepsis, h/o seizure, GERD, h/o CVA, dysphagia, impaired cognitoin, COPD, CHF, RLD, morbid obesity, etc. Personal factors affecting pt at time of IE include: steps to enter environment, behavioral pattern, difficulty performing ADLS, difficulty performing IADLS , limited insight into deficits, flat affect, decreased initiation and engagement , and environment. Prior to admission, pt was living with his wife in a [facility] with 1 NALLELY & a first floor s/u. Pt was A w/  ADLS and w/ IADLS, (-) drove, & required use of RW PTA. Upon evaluation: Pt requires Mod Ax1-2 for functional mobility/transfers, Mod A for UB ADLs and Max A for LB ADLS 2* the following deficits impacting occupational performance: weakness, decreased ROM, decreased strength, decreased balance, decreased tolerance, impaired initiation, impaired memory, impaired sequencing, impaired problem solving, impulsivity, and decreased safety awareness. Full objective findings from OT assessment regarding body systems outlined above. Pt to benefit from continued skilled OT tx while in the hospital to address deficits as defined above and maximize level of functional independence w/ ADL's and functional mobility. Occupational Performance areas to address include: grooming, bathing/shower, toilet hygiene, dressing, functional mobility, and clothing management. Based on findings, pt is of high complexity. The patient's raw score on the AM-PAC Daily Activity inpatient short form is 13, standardized score is 32.03, less than 39.4. Patients at this level are likely to benefit from DC to post-acute rehabilitation services. However, please refer to therapist recommendation for discharge planning given other factors that may influence destination. At this time, OT recommendations at time of discharge are DC with Level II resources. Goals   Patient Goals Pt wants to get stronger   Plan   Treatment Interventions ADL retraining;Functional transfer training; Endurance training;Patient/family training;Cognitive reorientation;UE strengthening/ROM; Equipment evaluation/education; Compensatory technique education;Continued evaluation   Goal Expiration Date 11/06/23   OT Treatment Day 0   OT Frequency 3-5x/wk   Discharge Recommendation   UB Rehab Resource Intensity Level II (Moderate Resource Intensity)   AM-PAC Daily Activity Inpatient   Lower Body Dressing 2   Bathing 2   Toileting 2   Upper Body Dressing 2   Grooming 2   Eating 3   Daily Activity Raw Score 13   Daily Activity Standardized Score (Calc for Raw Score >=11) 32.03   AM-PAC Applied Cognition Inpatient   Following a Speech/Presentation 2   Understanding Ordinary Conversation 3   Taking Medications 3   Remembering Where Things Are Placed or Put Away 2   Remembering List of 4-5 Errands 2   Taking Care of Complicated Tasks 2   Applied Cognition Raw Score 14   Applied Cognition Standardized Score 32.02   End of Consult   Education Provided Yes;Family or social support of family present for education by provider   Patient Position at End of Consult Bedside chair;Bed/Chair alarm activated; All needs within reach   Nurse Communication Nurse aware of consult     Pt will achieve the following goals within 10 days. *Pt will complete grooming with Min A.    *Pt will complete UB bathing and dressing with Min A.    *Pt will complete LB bathing and dressing with Mod A & DME PRN. *Pt will complete toileting w/ Min A w/ G hygiene/thoroughness using DME PRN    *Pt will sit on EOB for 5 minutes with S for increased safety with seated activity tolerance during dynamic ADL tasks. *Pt will perform functional transfers with on/off all surfaces with S using DME as needed w/ G balance/safety. *Pt will improve functional mobility during ADL/IADL/leisure tasks to S using DME as needed w/ G balance/safety. *Pt will improve standing balance to G for 8-10 minutes during purposeful activity w/ S & G endurance. *Pt will increase standing tolerance time to 5 minutes with unilateral UE support to complete sink level ADLs@ S level.     *Pt will improve functional activity tolerance to 10 minutes of sustained functional tasks to increase participation in basic self-care and decrease assistance level. *Pt will orient self x 4 with minimal verbal cues to increase overall awareness and promote safety with ADL/IADL tasks.      José Luis Virgen, OTR/L

## 2023-10-27 NOTE — PROGRESS NOTES
[id number]  Progress Note  Name: Armando Hankins  MRN: [id number]  Unit/Bed#: -01 I Date of Admission: 10/23/2023   Date of Service: 10/27/2023 I Hospital Day: 4    Assessment/Plan   Gram-negative bacteremia  Assessment & Plan  2/2 blood cultures with pasteurella. ID consulted. Continue Cefepime -> Unasyn. Augmentin at dc. BPH (benign prostatic hyperplasia)  Assessment & Plan  Continue home Flomax  Pt now with retention, follow urinary retention protocol  Hematuria noted, possibly in setting of thrombocytopenia, traumatic straight cath. GERD (gastroesophageal reflux disease)  Assessment & Plan  Continue PPI    Seizure [facility])  Assessment & Plan  By history  Last seen 9/25 in [address] [place] [address] ED for focal seizure following chemotherapy  Home rgimen: Keppra 1500 mg PO BID    Plan:  Continue with Keppra 1500 mg BID  Check Keppra level  Q4h neuro checks    Sepsis ([address])  Assessment & Plan  SIRS: Tachycardia, tachypnea, fever  10/23 CTA PE Study: No evidence for acute pulmonary emboli. Densely consolidated right lower lobe and right upper lobe. Aeration remaining in the right middle lobe. See above for further description and comparison. Significant mass effect on the right lower lung secondary to large mixed attenuation masslike pleural abnormality described above. 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 Stable incompletely imaged right neck mass presumably representing metastatic adenopathy. Stable hypodensities within the liver. UA negative for UTI  Blood cultures - pasteurella. Lactate 5.1 --> 2.6 --> 2.4  30 ml/kg were not given due to concern for fluid overload  Procal 0.18    Plan:  Continue IV Cefepime -> Unasyn. culture results  Blood cultures with pasteurella multicoda. Consult infectious disease. Repeat blood cultures pending.   Trend fever curve and WBC count    Anasarca  Assessment & Plan  Likely 2/2 Avastin which was started earlier this month for radiation necrosis surrounding brain metastasis  Completed a course of lasix outpatient  Hold off on further diuretics  UA with only trace proteinuria  Will need further discussion with medical oncology regarding further Avastin dosing outpatient  RUE US negative for DVT. Probably edema 2/2 large malignant R neck mass. HLD (hyperlipidemia)  Assessment & Plan  Continue home statin    Brain metastasis  Assessment & Plan  Brain metastasis first diagnosed in February 2023 s/p SBRT  Currently on Keppra for focal seizures in 8/2023 due to brain metastasis and Decadron for surrounding vasogenic edema  Pt has been unable to be weaned from decadron due to decreased functional status and worsening lethargy with attempts of weaning  10/2 MRI brain: Metastatic adenocarcinoma of the lung status post chemotherapy. Left posterior frontal lobe intracranial metastases (status post SBRT 2/22/2023) increased in size compared to 7/26/2023 with progression of surrounding vasogenic edema. There is no new suspicious intra-axial lesion. 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 Adenocarcinoma of lung  Assessment & Plan  Dx initially 09/2021 stage IIIC. Received multiple rounds of chemo/radiation. Found to have brain mets 02/2023 to the left precentral gyrus. Cancer upstaged to stage IV. Currently on daily dexamethasone, [facility] as well as a history of DVT. Currently receiving combination Avastin and Pembrolizumab at the infusion center. Last chemo 10/18/2023. Avastatin is a new medication for the patient.   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 Unlikely to benefit from ASEPT catheter    Plan:  Continue with home dose dexamethasone and Lovenox for DVT   Extensive conversation with patient and wife regarding code status. Patient and wife have changed to DNR which appears appropriate given metastatic malignancy. Palliative care consult appreciated. Recommend follow up outpatient with oncology after discharge    Bilateral lower extremity DVTs  Assessment & Plan  Continue home Lovenox    * Acute on Chronic Hypoxemic respiratory failure, (HCC)  Assessment & Plan  POA: increased WOB in the ED requiring the initiation of BIPAP  Hx of Stage IV lung CA with mets. 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 Completed a short course of PO lasix outpatient  10/23 CTA PE Study: No evidence for acute pulmonary emboli. Densely consolidated right lower lobe and right upper lobe. Aeration remaining in the right middle lobe. See above for further description and comparison. Significant mass effect on the right lower lung secondary to large mixed attenuation masslike pleural abnormality described above. 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 Stable incompletely imaged right neck mass presumably representing metastatic adenopathy. Stable hypodensities within the liver. RVP negative    Plan:  Weaned off BiPAP to  NC -> PNA  Continue abx for presumed PNA as outlined below  Hold further diuretics  Titrate O2 to maintain spO2 > 92%  Pulmonary medicine following, appreciate recommendations             VTE  Prophylaxis:   Pharmacologic: in place  Mechanical VTE Prophylaxis in Place: Yes    Patient Centered Rounds: I have performed bedside rounds with nursing staff today.     Discussions with Specialists or Other Care Team Provider: case management    Education and Discussions with Family / Patient: pt family bedside      Current Length of Stay: 4 day(s)    Current Patient Status: Inpatient        Code Status: Level 3 - DNAR and DNI    Discharge Plan: Pt will require continued inpatient hospitalization. Subjective:   Pt noted to have L arm bruise related to arm draw. Patient is seen and examined at bedside. All other ROS are negative. Objective:     Vitals:   Temp (24hrs), Av.5 °F (36.4 °C), Min:97.3 °F (36.3 °C), Max:97.7 °F (36.5 °C)    Temp:  [97.3 °F (36.3 °C)-97.7 °F (36.5 °C)] 97.3 °F (36.3 °C)  HR:  [78-95] 80  Resp:  [17-24] 17  BP: (111-131)/(80-88) 111/82  SpO2:  [91 %-94 %] 94 %  Body mass index is 32.72 kg/m². Input and Output Summary (last 24 hours): Intake/Output Summary (Last 24 hours) at 10/27/2023 1452  Last data filed at 10/27/2023 1401  Gross per 24 hour   Intake 240 ml   Output 2613 ml   Net -2373 ml       Physical Exam:       GEN: No acute distress, comfortable, obese  HEEENT: No JVD, PERRLA, no scleral icterus  RESP: Lungs clear to auscultation bilaterally  CV: RRR, +s1/s2   ABD: SOFT NON TENDER, POSITIVE BOWEL SOUNDS, NO DISTENTION  PSYCH: CALM  NEURO: Mentation baseline, NO FOCAL DEFICITS  SKIN: NO RASH  EXTREM: NO EDEMA, RUE swelling. Hematoma small on L arm related to phlebotomy. Additional Data:     Labs:    Results from last 7 days   Lab Units 10/27/23  0347 10/26/23  0333 10/25/23  0617 10/24/23  0612   WBC Thousand/uL 4.96 5.53   < > 6.33   HEMOGLOBIN g/dL 9.3* 9.1*   < > 9.5*   HEMATOCRIT % 31.5* 30.2*   < > 31.1*   PLATELETS Thousands/uL 106* 107*   < > 99*   BANDS PCT %  --   --   --  5   NEUTROS PCT %  --  89*   < >  --    LYMPHS PCT %  --  7*   < >  --    LYMPHO PCT %  --   --   --  10*   MONOS PCT %  --  3*   < >  --    MONO PCT %  --   --   --  1*   EOS PCT %  --  0   < > 0    < > = values in this interval not displayed.      Results from last 7 days   Lab Units 10/27/23  0347   SODIUM mmol/L 142   POTASSIUM mmol/L 4.3   CHLORIDE mmol/L 109*   CO2 mmol/L 29   BUN mg/dL 25   CREATININE mg/dL 0.84   ANION GAP mmol/L 4   CALCIUM mg/dL 9.1   ALBUMIN g/dL 3.0*   TOTAL BILIRUBIN mg/dL 0.40   ALK PHOS U/L 38   ALT U/L 15   AST U/L 10*   GLUCOSE RANDOM mg/dL 108     Results from last 7 days   Lab Units 10/23/23  0811   INR  1.00     Results from last 7 days   Lab Units 10/27/23  1100 10/27/23  0732 10/26/23  2228 10/26/23  1628 10/26/23  1108 10/26/23  0716 10/25/23  2023 10/25/23  1622 10/25/23  1140 10/25/23  0751 10/24/23  2137 10/24/23  1550   POC GLUCOSE mg/dl 130 93 129 149* 111 121 160* 197* 151* 104 137 170*         Results from last 7 days   Lab Units 10/24/23  0612 10/23/23  2023 10/23/23  1703 10/23/23  1406 10/23/23  1047 10/23/23  0811   LACTIC ACID mmol/L  --  1.9 2.6* 2.4* 2.6* 5.1*   PROCALCITONIN ng/ml 21.26*  --   --   --   --  0.13       Lines/Drains:  Invasive Devices       Peripheral Intravenous Line  Duration             Peripheral IV 10/27/23 Dorsal (posterior); Right Wrist <1 day              Drain  Duration             External Urinary Catheter Medium 3 days                    Telemetry:        * I Have Reviewed All Lab Data Listed Above. Imaging:     Results for orders placed during the hospital encounter of 10/23/23    XR chest portable - 1 view    Narrative  CHEST    INDICATION:   Shortness of breath. History of lung cancer. .    COMPARISON: Chest radiograph 10/17/2023. Same day CT chest study. EXAM PERFORMED/VIEWS:  XR CHEST PORTABLE      FINDINGS:    Heart shadow is again obscured by right-sided opacity. Near right-sided opacification due to known loculated pleural effusion. Superimposed consolidation better visualized on same day CT study. Groundglass opacification in the left lower lung zone, likely to represent atelectasis. No pneumothorax. Osseous structures appear within normal limits for patient age.     Impression  Persistent right-sided loculated pleural effusion with superimposed consolidation better visualized on same day CT study. Groundglass opacification in the left lower lung zone, likely to represent atelectasis. Resident: Brandy Craft, the attending radiologist, have reviewed the images and agree with the final report above. Workstation performed: [id number]    Results for orders placed during the hospital encounter of 10/17/23    XR chest pa & lateral    Narrative  CHEST    INDICATION:   C79.31: Secondary malignant neoplasm of brain. C34.11: Malignant neoplasm of upper lobe, right bronchus or lung. Cold-like symptoms for 2 weeks. COMPARISON: Chest radiograph 9/25/2023; CT chest, abdomen, pelvis 9/20/2023    EXAM PERFORMED/VIEWS:  XR CHEST PA & LATERAL  The frontal view was performed utilizing dual energy radiographic technique. Images: 4    FINDINGS:    Heart shadow is obscured by right-sided opacity. Mild bowing of the trachea toward the right, unchanged. Slight progression of the previously demonstrated extensive right lung opacity corresponding with the patient's known loculated right-sided pleural effusion and radiation fibrosis demonstrated on CT dated 9/20/2023. Left lung is clear apart from tiny linear scar or atelectasis at the base. No pneumothorax. Osseous structures appear within normal limits for patient age. Impression  Slight progression of previously demonstrated extensive opacity right hemithorax corresponding with patient's known right pleural effusion and probable post radiation changes. Resident: Albert Goodman, the attending radiologist, have reviewed the images and agree with the final report above.     Workstation performed: [id number]      *I have reviewed all imaging reports listed above      Recent Cultures (last 7 days):     Results from last 7 days   Lab Units 10/26/23  0642 10/26/23  0400 10/25/23  0053 10/24/23  0219 10/23/23  0812 10/23/23  0811   BLOOD CULTURE  Received in Microbiology Lab. Culture in Progress. Received in Microbiology Lab. Culture in Progress. --   --  Pasteurella multocida* Pasteurella multocida*   SPUTUM CULTURE   --   --  Culture results to follow. --   --   --    GRAM STAIN RESULT   --   --  1+ Epithelial Cells*  No polys seen*  1+ Gram negative rods*  --  Gram negative rods* Gram negative rods*   LEGIONELLA URINARY ANTIGEN   --   --   --  Negative  --   --        Last 24 Hours Medication List:   Current Facility-Administered Medications   Medication Dose Route Frequency Provider Last Rate    ampicillin-sulbactam  3 g Intravenous Q6H Branden Vázquez MD 3 g (10/27/23 0943)    atorvastatin  40 mg Oral Daily With CRISTOFER Garcia      benzonatate  100 mg Oral TID PRN Delia Malik PA-C      dexamethasone  4 mg Oral Q12H [address] Ulysses Lis, CRNP      enoxaparin  100 mg Subcutaneous Q12H [address] Ulysses Lis, CRNP      insulin lispro  1-6 Units Subcutaneous TID [facility] Ulysses Lis, CRNP      ipratropium  0.5 mg Nebulization TID Jeannette Dowling PA-C      levalbuterol  1.25 mg Nebulization TID Ulysses Lis, CRJAYCE      levETIRAcetam  1,500 mg Oral Q12H [address] Ulysses Lis, CRNP      melatonin  6 mg Oral HS La Landeros PA-C      pantoprazole  40 mg Oral Early Morning Ulysses Lis, CRNP      tamsulosin  0.4 mg Oral Daily With CRISTOFER Garcia          Today, Patient Was Seen By: Javon Amador MD    ** Please Note: Dictation voice to text software may have been used in the creation of this document.  **

## 2023-10-27 NOTE — ASSESSMENT & PLAN NOTE
SIRS: Tachycardia, tachypnea, fever  10/23 CTA PE Study: No evidence for acute pulmonary emboli. Densely consolidated right lower lobe and right upper lobe. Aeration remaining in the right middle lobe. See above for further description and comparison. Significant mass effect on the right lower lung secondary to large mixed attenuation masslike pleural abnormality described above. 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 Stable incompletely imaged right neck mass presumably representing metastatic adenopathy. Stable hypodensities within the liver. UA negative for UTI  Blood cultures - pasteurella. Lactate 5.1 --> 2.6 --> 2.4  30 ml/kg were not given due to concern for fluid overload  Procal 0.18    Plan:  Continue IV Cefepime -> Unasyn. culture results  Blood cultures with pasteurella multicoda. Consult infectious disease. Repeat blood cultures pending.   Trend fever curve and WBC count

## 2023-10-27 NOTE — CASE MANAGEMENT
Case Management Discharge Planning Note    Patient name Meg Gutierrez  Location [address] [facility] [place] 219/-01 MRN [id number]  : 1950 Date 10/27/2023       Current Admission Date: 10/23/2023  Current Admission Diagnosis:Acute on Chronic Hypoxemic respiratory failure, ([address])   Patient Active Problem List    Diagnosis Date Noted    Gram-negative bacteremia 10/24/2023    Acute on Chronic Hypoxemic respiratory failure, ([address]) 10/23/2023    GERD (gastroesophageal reflux disease) 10/23/2023    BPH (benign prostatic hyperplasia) 10/23/2023    Obesity, morbid ([address]) 10/04/2023    Seizure ([address]) 2023    Iron deficiency anemia 2023    Stage 3a chronic kidney disease ([address]) 2023    Sepsis ([address]) 2023    Anasarca 2023    Brain metastasis 2023    History of venous thromboembolism 2023    HLD (hyperlipidemia) 2023    Restrictive lung disease 2022    Nocturnal hypoxemia 2022    Tobacco use disorder, severe, in sustained remission, dependence 2022    Recurrent right pleural effusion 2022    Chronic right-sided heart failure ([address]) 2022    Chronic anticoagulation 2022    Hypercalcemia of malignancy 10/21/2021    Chemotherapy induced neutropenia  10/11/2021    COPD mixed type ([address]) 2021    Malignant neoplasm of upper lobe of right lung ([address]) 2021    Acute on chronic anemia 2021    Adenocarcinoma of lung 2021    Impaired cognition 2021    Constipation 2021    Anemia of chronic disease 2021    Pulmonary embolism ([address]) 2021    History of urinary retention 2021    Mass of upper lobe of right lung 2021    History of stroke 2021    Bilateral lower extremity DVTs 2021    Dysphagia 2021      LOS (days): 4  Geometric Mean LOS (GMLOS) (days): 5.10  Days to GMLOS:1.1     OBJECTIVE:  Risk of Unplanned Readmission Score: 26.17         Current admission status: Inpatient   Preferred Pharmacy: [id number]ie [facility], [address] [address] [address]  [address] [facility] [address]  Phone: [phone] Fax: [phone]     [place], [place] - [address]  [address] [facility] [address]  Phone: [phone] Fax: [phone]    Primary Care Provider: Jo Ann Medina MD    Primary Insurance: MEDICARE  Secondary Insurance: COMMERCIAL MISCELLANEOUS    DISCHARGE DETAILS:     IMM reviewed with patient and caregiver, patient and caregiver agrees with discharge determination.           IMM Given (Date):: 10/27/23 (1041am)  IMM Given to[de-identified] Family  Family notified[de-identified] Spouse, Nino Mcknight

## 2023-10-27 NOTE — PROGRESS NOTES
Pulmonary Progress Note  Shane Bajwa 68 y.o. 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 rate 16, height 5' 9" (1.753 m), weight 101 kg (221 lb 9 oz), SpO2 90 %., RA, Body mass index is 32.72 kg/m².       Intake/Output Summary (Last 24 hours) at 10/27/2023 1701  Last data filed at 10/27/2023 1401  Gross per 24 hour   Intake 240 ml   Output 2613 ml   Net -2373 ml       Physical exam:  General:  Patient is awake, alert, non-toxic and in no acute respiratory distress  Neck: No JVD  CV:  Regular, +S1 and S2, No murmurs, gallops or rubs appreciated  Lungs: Decreased BS bilateral without wheeze  Abdomen: Soft, +BS, Non-tender, non-distended  Extremities: + peripheral edema  Neuro: No focal deficits    Labs: CBC:   Lab Results   Component Value Date    WBC 4.96 10/27/2023    HGB 9.3 (L) 10/27/2023    HCT 31.5 (L) 10/27/2023    MCV 97 10/27/2023     (L) 10/27/2023    RBC 3.26 (L) 10/27/2023    MCH 28.5 10/27/2023    MCHC 29.5 (L) 10/27/2023    RDW 21.8 (H) 10/27/2023    MPV 9.5 10/27/2023    NRBC 2 10/27/2023   , CMP:   Lab Results   Component Value Date    SODIUM 142 10/27/2023    K 4.3 10/27/2023     (H) 10/27/2023    CO2 29 10/27/2023    BUN 25 10/27/2023    CREATININE 0.84 10/27/2023    CALCIUM 9.1 10/27/2023    AST 10 (L) 10/27/2023    ALT 15 10/27/2023    ALKPHOS 38 10/27/2023    EGFR 87 10/27/2023         Jennifer Joe DO      Portions of the record may have been created with voice recognition software. Occasional wrong word or "sound a like" substitutions may have occurred due to the inherent limitations of voice recognition software. Read the chart carefully and recognize, using context, where substitutions have occurred.

## 2023-10-27 NOTE — CASE MANAGEMENT
Case Management Discharge Planning Note    Patient name Bianca Mcadoo  Location [address] [facility] 219/-01 MRN [id number]  : 1950 Date 10/27/2023       Current Admission Date: 10/23/2023  Current Admission Diagnosis:Acute on Chronic Hypoxemic respiratory failure, ([address])   Patient Active Problem List    Diagnosis Date Noted    Gram-negative bacteremia 10/24/2023    Acute on Chronic Hypoxemic respiratory failure, ([address]) 10/23/2023    GERD (gastroesophageal reflux disease) 10/23/2023    BPH (benign prostatic hyperplasia) 10/23/2023    Obesity, morbid ([address]) 10/04/2023    Seizure ([address]) 2023    Iron deficiency anemia 2023    Stage 3a chronic kidney disease ([address]) 2023    Sepsis ([address]) 2023    Anasarca 2023    Brain metastasis 2023    History of venous thromboembolism 2023    HLD (hyperlipidemia) 2023    Restrictive lung disease 2022    Nocturnal hypoxemia 2022    Tobacco use disorder, severe, in sustained remission, dependence 2022    Recurrent right pleural effusion 2022    Chronic right-sided heart failure ([address]) 2022    Chronic anticoagulation 2022    Hypercalcemia of malignancy 10/21/2021    Chemotherapy induced neutropenia  10/11/2021    COPD mixed type ([address]) 2021    Malignant neoplasm of upper lobe of right lung ([address]) 2021    Acute on chronic anemia 2021    Adenocarcinoma of lung 2021    Impaired cognition 2021    Constipation 2021    Anemia of chronic disease 2021    Pulmonary embolism ([address]) 2021    History of urinary retention 2021    Mass of upper lobe of right lung 2021    History of stroke 2021    Bilateral lower extremity DVTs 2021    Dysphagia 2021      LOS (days): 4  Geometric Mean LOS (GMLOS) (days): 5.10  Days to GMLOS:1     OBJECTIVE:  Risk of Unplanned Readmission Score: 26.17         Current admission status: Inpatient   Preferred Pharmacy: [id number], [address]  [address] [facility] [address]  Phone: [phone] Fax: [phone]     [place], [place] - [address]  [address] [facility] [address]  Phone: [phone] Fax: [phone]    Primary Care Provider: Efren Rosario MD    Primary Insurance: MEDICARE  Secondary Insurance: COMMERCIAL MISCELLANEOUS    DISCHARGE DETAILS:        CM spoke with pt and spouse about therapy's recommendation for str. They declined STR and HH. They stated they would like to go home with Palliative and if recommended will pursue PT/OT from home.

## 2023-10-27 NOTE — PLAN OF CARE
Problem: Potential for Falls  Goal: Patient will remain free of falls  Description: INTERVENTIONS:  - Educate patient/family on patient safety including physical limitations  - Instruct patient to call for assistance with activity   - Consult OT/PT to assist with strengthening/mobility   - Keep Call bell within reach  - Keep bed low and locked with side rails adjusted as appropriate  - Keep care items and personal belongings within reach  - Initiate and maintain comfort rounds  - Make Fall Risk Sign visible to staff  - Offer Toileting every 2 Hours, in advance of need  - Initiate/Maintain bed alarm  - Obtain necessary fall risk management equipment: call bell usage and bed alarm  - Apply yellow socks and bracelet for high fall risk patients  - Consider moving patient to room near nurses station  Outcome: Progressing     Problem: NEUROSENSORY - ADULT  Goal: Achieves stable or improved neurological status  Description: INTERVENTIONS  - Monitor and report changes in neurological status  - Monitor vital signs such as temperature, blood pressure, glucose, and any other labs ordered   - Initiate measures to prevent increased intracranial pressure  - Monitor for seizure activity and implement precautions if appropriate      Outcome: Progressing  Goal: Achieves maximal functionality and self care  Description: INTERVENTIONS  - Monitor swallowing and airway patency with patient fatigue and changes in neurological status  - Encourage and assist patient to increase activity and self care.    - Encourage visually impaired, hearing impaired and aphasic patients to use assistive/communication devices  Outcome: Progressing     Problem: GENITOURINARY - ADULT  Goal: Maintains or returns to baseline urinary function  Description: INTERVENTIONS:  - Assess urinary function  - Encourage oral fluids to ensure adequate hydration if ordered  - Administer IV fluids as ordered to ensure adequate hydration  - Administer ordered medications as needed  - Offer frequent toileting  - Follow urinary retention protocol if ordered  Outcome: Progressing  Goal: Absence of urinary retention  Description: INTERVENTIONS:  - Assess patient’s ability to void and empty bladder  - Monitor I/O  - Bladder scan as needed  - Discuss with physician/AP medications to alleviate retention as needed  - Discuss catheterization for long term situations as appropriate  Outcome: Progressing  Goal: Urinary catheter remains patent  Description: INTERVENTIONS:  - Assess patency of urinary catheter  - If patient has a chronic cadet, consider changing catheter if non-functioning  - Follow guidelines for intermittent irrigation of non-functioning urinary catheter  Outcome: Progressing

## 2023-10-27 NOTE — PLAN OF CARE
Problem: PHYSICAL THERAPY ADULT  Goal: Performs mobility at highest level of function for planned discharge setting. See evaluation for individualized goals. Description: Treatment/Interventions: Functional transfer training, LE strengthening/ROM, Elevations, Therapeutic exercise, Endurance training, Cognitive reorientation, Patient/family training, Equipment eval/education, Bed mobility, Gait training, Spoke to nursing, Spoke to case management     See flowsheet documentation for full assessment, interventions and recommendations. Outcome: Progressing  Note: Prognosis: Fair  Problem List: Decreased strength, Decreased range of motion, Decreased endurance, Impaired balance, Decreased mobility, Decreased cognition, Impaired judgement, Decreased safety awareness, Obesity, Decreased skin integrity  Assessment: Pt seen for PT evaluation for mobility assessment & discharge needs. Activity orders: OOB to chair. Pt is a 68 yr old male admitted 10/23/23 with respiratory distress. Dx: Acute on chronic hypoxemic respiratory failure, ambulatory dysfunction. Comorbidities affecting pt's fnxl performance include: Lung CA with mets to brain, CVA, "impaired mobility and ADLs", seizures, B LE DVT. During PT IE, pt requires MAXA 1 for bed mobility, MODA 2 for transfers, and MODA 2 for short distance ambulation with RW. During additional treatment time pt progresses to ambulates additional 12ft with RW and MODA 2 person. The AM-PAC & Barthel Index outcome tools were used to assist in determining pt safety w/ mobility/self care & appropriate d/c recommendations, see above for scores. Pt is at risk of falls d/t multiple comorbidities, impaired balance, impaired cognition, use of ambulatory aid, varying levels of pain , acuity of medical illness, ongoing medical treatment of primary dx, polypharmacy, and unstable vitals.  Pt's clinical presentation is currently unstable/unpredictable as seen in pt's presentation of vital sign response, varying levels of cognitive performance, increased fall risk, new onset of impairment of functional mobility, decreased endurance, and new onset of weakness. Pt will benefit from continued PT services in order to address impairments, decrease risk of falls, maximize independence w/ fnxl mobility, & ensure safety w/ mobility for transition to next level of care. Based on pt presentation & impairments, pt would most appropriately benefit from level II (moderate intensity) rehab services upon d/c.    Barriers to Discharge: Decreased caregiver support    See flowsheet documentation for full assessment.

## 2023-10-28 LAB
ALBUMIN SERPL BCP-MCNC: 2.9 G/DL (ref 3.5–5)
ALP SERPL-CCNC: 39 U/L (ref 34–104)
ALT SERPL W P-5'-P-CCNC: 15 U/L (ref 7–52)
ANION GAP SERPL CALCULATED.3IONS-SCNC: 6 MMOL/L
AST SERPL W P-5'-P-CCNC: 12 U/L (ref 13–39)
BASOPHILS # BLD MANUAL: 0 THOUSAND/UL (ref 0–0.1)
BASOPHILS NFR MAR MANUAL: 0 % (ref 0–1)
BILIRUB SERPL-MCNC: 0.36 MG/DL (ref 0.2–1)
BUN SERPL-MCNC: 21 MG/DL (ref 5–25)
CALCIUM ALBUM COR SERPL-MCNC: 9.7 MG/DL (ref 8.3–10.1)
CALCIUM SERPL-MCNC: 8.8 MG/DL (ref 8.4–10.2)
CHLORIDE SERPL-SCNC: 111 MMOL/L (ref 96–108)
CO2 SERPL-SCNC: 27 MMOL/L (ref 21–32)
CREAT SERPL-MCNC: 0.7 MG/DL (ref 0.6–1.3)
EOSINOPHIL # BLD MANUAL: 0 THOUSAND/UL (ref 0–0.4)
EOSINOPHIL NFR BLD MANUAL: 0 % (ref 0–6)
ERYTHROCYTE [DISTWIDTH] IN BLOOD BY AUTOMATED COUNT: 21.9 % (ref 11.6–15.1)
GFR SERPL CREATININE-BSD FRML MDRD: 94 ML/MIN/1.73SQ M
GLUCOSE SERPL-MCNC: 100 MG/DL (ref 65–140)
GLUCOSE SERPL-MCNC: 129 MG/DL (ref 65–140)
GLUCOSE SERPL-MCNC: 150 MG/DL (ref 65–140)
GLUCOSE SERPL-MCNC: 205 MG/DL (ref 65–140)
HCT VFR BLD AUTO: 32.1 % (ref 36.5–49.3)
HGB BLD-MCNC: 9.5 G/DL (ref 12–17)
LYMPHOCYTES # BLD AUTO: 0.64 THOUSAND/UL (ref 0.6–4.47)
LYMPHOCYTES # BLD AUTO: 13 % (ref 14–44)
MCH RBC QN AUTO: 28.9 PG (ref 26.8–34.3)
MCHC RBC AUTO-ENTMCNC: 29.6 G/DL (ref 31.4–37.4)
MCV RBC AUTO: 98 FL (ref 82–98)
MONOCYTES # BLD AUTO: 0 THOUSAND/UL (ref 0–1.22)
MONOCYTES NFR BLD: 0 % (ref 4–12)
NEUTROPHILS # BLD MANUAL: 4.25 THOUSAND/UL (ref 1.85–7.62)
NEUTS SEG NFR BLD AUTO: 87 % (ref 43–75)
PLATELET # BLD AUTO: 109 THOUSANDS/UL (ref 149–390)
PLATELET BLD QL SMEAR: ABNORMAL
PMV BLD AUTO: 9.6 FL (ref 8.9–12.7)
POTASSIUM SERPL-SCNC: 4.1 MMOL/L (ref 3.5–5.3)
PROT SERPL-MCNC: 5.4 G/DL (ref 6.4–8.4)
RBC # BLD AUTO: 3.29 MILLION/UL (ref 3.88–5.62)
SODIUM SERPL-SCNC: 144 MMOL/L (ref 135–147)
WBC # BLD AUTO: 4.89 THOUSAND/UL (ref 4.31–10.16)

## 2023-10-28 PROCEDURE — 99232 SBSQ HOSP IP/OBS MODERATE 35: CPT | Performed by: HOSPITALIST

## 2023-10-28 PROCEDURE — 82948 REAGENT STRIP/BLOOD GLUCOSE: CPT

## 2023-10-28 PROCEDURE — 80053 COMPREHEN METABOLIC PANEL: CPT | Performed by: HOSPITALIST

## 2023-10-28 PROCEDURE — 85007 BL SMEAR W/DIFF WBC COUNT: CPT | Performed by: HOSPITALIST

## 2023-10-28 PROCEDURE — 99232 SBSQ HOSP IP/OBS MODERATE 35: CPT | Performed by: UROLOGY

## 2023-10-28 PROCEDURE — 94760 N-INVAS EAR/PLS OXIMETRY 1: CPT

## 2023-10-28 PROCEDURE — 85027 COMPLETE CBC AUTOMATED: CPT | Performed by: HOSPITALIST

## 2023-10-28 PROCEDURE — 94668 MNPJ CHEST WALL SBSQ: CPT

## 2023-10-28 PROCEDURE — 94640 AIRWAY INHALATION TREATMENT: CPT

## 2023-10-28 PROCEDURE — 94664 DEMO&/EVAL PT USE INHALER: CPT

## 2023-10-28 RX ORDER — DOCUSATE SODIUM 100 MG/1
100 CAPSULE, LIQUID FILLED ORAL 2 TIMES DAILY
Status: DISCONTINUED | OUTPATIENT
Start: 2023-10-28 | End: 2023-11-02 | Stop reason: HOSPADM

## 2023-10-28 RX ORDER — LEVALBUTEROL INHALATION SOLUTION 1.25 MG/3ML
1.25 SOLUTION RESPIRATORY (INHALATION) EVERY 6 HOURS PRN
Status: DISCONTINUED | OUTPATIENT
Start: 2023-10-28 | End: 2023-11-02 | Stop reason: HOSPADM

## 2023-10-28 RX ORDER — POLYETHYLENE GLYCOL 3350 17 G/17G
17 POWDER, FOR SOLUTION ORAL DAILY
Status: DISCONTINUED | OUTPATIENT
Start: 2023-10-28 | End: 2023-11-02 | Stop reason: HOSPADM

## 2023-10-28 RX ORDER — FINASTERIDE 5 MG/1
5 TABLET, FILM COATED ORAL DAILY
Status: DISCONTINUED | OUTPATIENT
Start: 2023-10-29 | End: 2023-11-02 | Stop reason: HOSPADM

## 2023-10-28 RX ORDER — SENNOSIDES 8.6 MG
1 TABLET ORAL
Status: DISCONTINUED | OUTPATIENT
Start: 2023-10-28 | End: 2023-11-02 | Stop reason: HOSPADM

## 2023-10-28 RX ADMIN — AMPICILLIN SODIUM AND SULBACTAM SODIUM 3 G: 2; 1 INJECTION, POWDER, FOR SOLUTION INTRAMUSCULAR; INTRAVENOUS at 23:18

## 2023-10-28 RX ADMIN — INSULIN LISPRO 2 UNITS: 100 INJECTION, SOLUTION INTRAVENOUS; SUBCUTANEOUS at 16:47

## 2023-10-28 RX ADMIN — DEXAMETHASONE 4 MG: 2 TABLET ORAL at 09:13

## 2023-10-28 RX ADMIN — LEVALBUTEROL HYDROCHLORIDE 1.25 MG: 1.25 SOLUTION RESPIRATORY (INHALATION) at 14:20

## 2023-10-28 RX ADMIN — LEVETIRACETAM 1500 MG: 500 TABLET, FILM COATED ORAL at 09:13

## 2023-10-28 RX ADMIN — IPRATROPIUM BROMIDE 0.5 MG: 0.5 SOLUTION RESPIRATORY (INHALATION) at 07:59

## 2023-10-28 RX ADMIN — AMPICILLIN SODIUM AND SULBACTAM SODIUM 3 G: 2; 1 INJECTION, POWDER, FOR SOLUTION INTRAMUSCULAR; INTRAVENOUS at 09:12

## 2023-10-28 RX ADMIN — IPRATROPIUM BROMIDE 0.5 MG: 0.5 SOLUTION RESPIRATORY (INHALATION) at 14:20

## 2023-10-28 RX ADMIN — Medication 6 MG: at 21:15

## 2023-10-28 RX ADMIN — LEVALBUTEROL HYDROCHLORIDE 1.25 MG: 1.25 SOLUTION RESPIRATORY (INHALATION) at 07:59

## 2023-10-28 RX ADMIN — ATORVASTATIN CALCIUM 40 MG: 40 TABLET, FILM COATED ORAL at 17:33

## 2023-10-28 RX ADMIN — DOCUSATE SODIUM 100 MG: 100 CAPSULE, LIQUID FILLED ORAL at 17:33

## 2023-10-28 RX ADMIN — AMPICILLIN SODIUM AND SULBACTAM SODIUM 3 G: 2; 1 INJECTION, POWDER, FOR SOLUTION INTRAMUSCULAR; INTRAVENOUS at 03:48

## 2023-10-28 RX ADMIN — ENOXAPARIN SODIUM 100 MG: 100 INJECTION SUBCUTANEOUS at 02:23

## 2023-10-28 RX ADMIN — SENNOSIDES 8.6 MG: 8.6 TABLET, FILM COATED ORAL at 21:15

## 2023-10-28 RX ADMIN — DEXAMETHASONE 4 MG: 2 TABLET ORAL at 21:15

## 2023-10-28 RX ADMIN — POLYETHYLENE GLYCOL 3350 17 G: 17 POWDER, FOR SOLUTION ORAL at 11:17

## 2023-10-28 RX ADMIN — AMPICILLIN SODIUM AND SULBACTAM SODIUM 3 G: 2; 1 INJECTION, POWDER, FOR SOLUTION INTRAMUSCULAR; INTRAVENOUS at 17:33

## 2023-10-28 RX ADMIN — DOCUSATE SODIUM 100 MG: 100 CAPSULE, LIQUID FILLED ORAL at 11:17

## 2023-10-28 RX ADMIN — LEVETIRACETAM 1500 MG: 500 TABLET, FILM COATED ORAL at 21:15

## 2023-10-28 RX ADMIN — PANTOPRAZOLE SODIUM 40 MG: 40 TABLET, DELAYED RELEASE ORAL at 05:04

## 2023-10-28 RX ADMIN — TAMSULOSIN HYDROCHLORIDE 0.4 MG: 0.4 CAPSULE ORAL at 17:33

## 2023-10-28 NOTE — NURSING NOTE
Patient bladder scanned at 0039 for 732 ml. Per Urology request, an indwelling urinary catheter was placed. Upon placement, patient had 1300 ml of dark, bloody output. Urology PA and SLIM PA made aware. Urology PA fully anticipated some blood in urine due to straight cath's performed. Urology requests for nursing to reach out if there is any suspicion of tube blockage, and to update with appearance and color of urine.

## 2023-10-28 NOTE — ASSESSMENT & PLAN NOTE
By history  Last seen 9/25 in [address] [place] [address] ED for focal seizure following chemotherapy  Home rgimen: Keppra 1500 mg PO BID    Plan:  Continue with Keppra 1500 mg BID  Check Keppra level  Q4h neuro checks

## 2023-10-28 NOTE — ASSESSMENT & PLAN NOTE
SIRS: Tachycardia, tachypnea, fever  10/23 CTA PE Study: No evidence for acute pulmonary emboli. Densely consolidated right lower lobe and right upper lobe. Aeration remaining in the right middle lobe. See above for further description and comparison. Significant mass effect on the right lower lung secondary to large mixed attenuation masslike pleural abnormality described above. 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 Stable incompletely imaged right neck mass presumably representing metastatic adenopathy. Stable hypodensities within the liver. UA negative for UTI  Blood cultures - pasteurella. Lactate 5.1 --> 2.6 --> 2.4  30 ml/kg were not given due to concern for fluid overload  Procal 0.18    Plan:  Continue IV Cefepime -> Unasyn. Complete abx through 11/6 per ID. Augmentin at dc.     culture results  Blood cultures with pasteurella multicoda. apprec infectious disease. Repeat blood cultures - no growth.   Trend fever curve and WBC count

## 2023-10-28 NOTE — ASSESSMENT & PLAN NOTE
Med rec updated and complete per interview with pt/family . Pt denies antibiotic use in last 14 days.    Home pharmacy [facility]   home Lovenox held d/t hematuria.

## 2023-10-28 NOTE — PROGRESS NOTES
Progress Note - General Surgery   Pasquale Ferrara 68 y.o. male MRN: [id number]  Unit/Bed#: -01 Encounter: [id number]    Assessment:  69-year-old male with acute urinary retention  Hematuria secondary to traumatic Hampton catheter insertion  -Afebrile, VSS  -UOP 3.7 L, failed retention protocol last evening and underwent Hampton catheter insertion earlier this a.m.  -hemoglobin stable 9.5 from 9.3    Plan:  -We will need outpatient trial of voiding, Hampton catheter to remain in place at discharge  -Continue Flomax  -Add Proscar  -Every 4 hours flushes to prevent clotting off catheter  -I/Os, monitor character of urine  -Trend H&H  -Remainder of medical management per primary team    Discussed with urology AP via TT messenger      Subjective/Objective   Subjective: Patient failed retention protocol overnight, Hampton catheter inserted early this AM.  Patient states he feels well today. Sitting in bedside chair. Tolerated lunch. States urine is less bloody than last evening. Objective:   Blood pressure 120/89, pulse 93, temperature (!) 97.3 °F (36.3 °C), resp. rate 16, height 5' 9" (1.753 m), weight 104 kg (229 lb 11.5 oz), SpO2 93 %. ,Body mass index is 33.92 kg/m². Intake/Output Summary (Last 24 hours) at 10/28/2023 1230  Last data filed at 10/28/2023 0808  Gross per 24 hour   Intake 600 ml   Output 3750 ml   Net -3150 ml       Invasive Devices       Peripheral Intravenous Line  Duration             Peripheral IV 10/27/23 Dorsal (posterior); Right Wrist 1 day              Drain  Duration             Urethral Catheter 16 Fr. <1 day                    Physical Exam  Vitals reviewed. Constitutional:       General: He is not in acute distress. Appearance: He is not toxic-appearing. Comments: Resting comfortably in bedside chair     HENT:      Head: Normocephalic and atraumatic. Cardiovascular:      Rate and Rhythm: Normal rate. Pulmonary:      Effort: No respiratory distress.       Breath sounds: Rhonchi present. No wheezing. Genitourinary:     Comments: Hampton catheter in place draining punch colored urine, no clots  Musculoskeletal:      Right lower leg: Edema present. Left lower leg: Edema present. Skin:     General: Skin is warm and dry. Neurological:      General: No focal deficit present. Mental Status: He is alert. Psychiatric:         Mood and Affect: Mood normal.         Behavior: Behavior normal.          Lab, Imaging and other studies:I have personally reviewed pertinent lab results. , CBC:   Lab Results   Component Value Date    WBC 4.89 10/28/2023    HGB 9.5 (L) 10/28/2023    HCT 32.1 (L) 10/28/2023    MCV 98 10/28/2023     (L) 10/28/2023    RBC 3.29 (L) 10/28/2023    MCH 28.9 10/28/2023    MCHC 29.6 (L) 10/28/2023    RDW 21.9 (H) 10/28/2023    MPV 9.6 10/28/2023   , CMP:   Lab Results   Component Value Date    SODIUM 144 10/28/2023    K 4.1 10/28/2023     (H) 10/28/2023    CO2 27 10/28/2023    BUN 21 10/28/2023    CREATININE 0.70 10/28/2023    CALCIUM 8.8 10/28/2023    AST 12 (L) 10/28/2023    ALT 15 10/28/2023    ALKPHOS 39 10/28/2023    EGFR 94 10/28/2023       XR chest portable - 1 view    Result Date: 10/23/2023  Impression: Persistent right-sided loculated pleural effusion with superimposed consolidation better visualized on same day CT study. Groundglass opacification in the left lower lung zone, likely to represent atelectasis. Resident: Nadege Dodge, the attending radiologist, have reviewed the images and agree with the final report above. Workstation performed: [id number]     CTA ED chest PE study    Result Date: 10/23/2023  Impression: No evidence for acute pulmonary emboli. Densely consolidated right lower lobe and right upper lobe. Aeration remaining in the right middle lobe. See above for further description and comparison.  Significant mass effect on the right lower lung secondary to large mixed attenuation masslike pleural abnormality described above. 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 Stable incompletely imaged right neck mass presumably representing metastatic adenopathy. Stable hypodensities within the liver. Workstation performed: [id number]     PE Study with CT Abdomen and Pelvis with contrast    Result Date: 10/23/2023  Impression: 1. Heterogeneous opacification of the right-sided pulmonary arteries may be due to artifact, but cannot exclude a pulmonary embolus particularly in the right pulmonary artery. Recommend repeating the CTA of the chest. There is no large saddle embolus or  CT findings of right heart strain. 2.  Mildly increased right lung consolidation and groundglass opacities in the left lower lobe. Cannot exclude superimposed pneumonia. 3.  Right pleural thickening and nodularity with a moderate size loculated complex collection, suspicious for pleural involvement by metastases. 4.  Partially imaged mass in the right neck suspicious for darien metastases. Medical record indicates a CT of the neck has been ordered to evaluate this finding but not yet performed. I personally discussed this study with Eleazar Garcia on 10/23/2023 11:35 AM. Workstation performed: [id number]       VTE PPX:    VTE Mechanical Prophylaxis: SCDs, ambulation as able     Suraj Brasher PA-C  10/28/2023     12:30 PM   **Please Note: Portions of the record may have been created using voice recognition software. Occasional wrong word or "sound a like" substitutions may have occurred due to the inherent limitations of voice recognition software. Read the chart carefully and recognize, using context, where substitutions have occurred. **

## 2023-10-28 NOTE — ASSESSMENT & PLAN NOTE
Continue home Flomax  Pt now with retention, follow urinary retention protocol  Per urology cadet placed. Hematuria noted, possibly in setting of thrombocytopenia, traumatic straight cath. Improving. Lovenox on hold to allow resolution of hematuria.

## 2023-10-28 NOTE — PLAN OF CARE
Problem: Potential for Falls  Goal: Patient will remain free of falls  Description: INTERVENTIONS:  - Educate patient/family on patient safety including physical limitations  - Instruct patient to call for assistance with activity   - Consult OT/PT to assist with strengthening/mobility   - Keep Call bell within reach  - Keep bed low and locked with side rails adjusted as appropriate  - Keep care items and personal belongings within reach  - Initiate and maintain comfort rounds  - Make Fall Risk Sign visible to staff  - Offer Toileting every 2 Hours, in advance of need  - Initiate/Maintain alarm  - Obtain necessary fall risk management equipment:   - Apply yellow socks and bracelet for high fall risk patients  - Consider moving patient to room near nurses station  Outcome: Progressing     Problem: NEUROSENSORY - ADULT  Goal: Achieves stable or improved neurological status  Description: INTERVENTIONS  - Monitor and report changes in neurological status  - Monitor vital signs such as temperature, blood pressure, glucose, and any other labs ordered   - Initiate measures to prevent increased intracranial pressure  - Monitor for seizure activity and implement precautions if appropriate      Outcome: Progressing  Goal: Achieves maximal functionality and self care  Description: INTERVENTIONS  - Monitor swallowing and airway patency with patient fatigue and changes in neurological status  - Encourage and assist patient to increase activity and self care.    - Encourage visually impaired, hearing impaired and aphasic patients to use assistive/communication devices  Outcome: Progressing     Problem: GENITOURINARY - ADULT  Goal: Maintains or returns to baseline urinary function  Description: INTERVENTIONS:  - Assess urinary function  - Encourage oral fluids to ensure adequate hydration if ordered  - Administer IV fluids as ordered to ensure adequate hydration  - Administer ordered medications as needed  - Offer frequent toileting  - Follow urinary retention protocol if ordered  Outcome: Progressing  Goal: Absence of urinary retention  Description: INTERVENTIONS:  - Assess patient’s ability to void and empty bladder  - Monitor I/O  - Bladder scan as needed  - Discuss with physician/AP medications to alleviate retention as needed  - Discuss catheterization for long term situations as appropriate  Outcome: Progressing  Goal: Urinary catheter remains patent  Description: INTERVENTIONS:  - Assess patency of urinary catheter  - If patient has a chronic cadet, consider changing catheter if non-functioning  - Follow guidelines for intermittent irrigation of non-functioning urinary catheter  Outcome: Progressing     Problem: METABOLIC, FLUID AND ELECTROLYTES - ADULT  Goal: Electrolytes maintained within normal limits  Description: INTERVENTIONS:  - Monitor labs and assess patient for signs and symptoms of electrolyte imbalances  - Administer electrolyte replacement as ordered  - Monitor response to electrolyte replacements, including repeat lab results as appropriate  - Instruct patient on fluid and nutrition as appropriate  Outcome: Progressing  Goal: Fluid balance maintained  Description: INTERVENTIONS:  - Monitor labs   - Monitor I/O and WT  - Instruct patient on fluid and nutrition as appropriate  - Assess for signs & symptoms of volume excess or deficit  Outcome: Progressing  Goal: Glucose maintained within target range  Description: INTERVENTIONS:  - Monitor Blood Glucose as ordered  - Assess for signs and symptoms of hyperglycemia and hypoglycemia  - Administer ordered medications to maintain glucose within target range  - Assess nutritional intake and initiate nutrition service referral as needed  Outcome: Progressing     Problem: SKIN/TISSUE INTEGRITY - ADULT  Goal: Skin Integrity remains intact(Skin Breakdown Prevention)  Description: Assess:  -Perform [place] assessment every   -Clean and moisturize skin every   -Inspect skin when repositioning, toileting, and assisting with ADLS  -Assess under medical devices such as  every   -Assess extremities for adequate circulation and sensation     Bed Management:  -Have minimal linens on bed & keep smooth, unwrinkled  -Change linens as needed when moist or perspiring  -Avoid sitting or lying in one position for more than  hours while in bed  -Keep HOB at degrees     Toileting:  -Offer bedside commode  -Assess for incontinence every shift  -Use incontinent care products after each incontinent episode such as    Activity:  -Mobilize patient 3 times a day  -Encourage activity and walks on unit  -Encourage or provide ROM exercises   -Turn and reposition patient every 2 Hours  -Use appropriate equipment to lift or move patient in bed  -Instruct/ Assist with weight shifting every  when out of bed in chair  -Consider limitation of chair time  hour intervals    Skin Care:  -Avoid use of baby powder, tape, friction and shearing, hot water or constrictive clothing  -Relieve pressure over bony prominences using   -Do not massage red bony areas    Next Steps:  -Teach patient strategies to minimize risks such as    -Consider consults to  interdisciplinary teams such as   Outcome: Progressing  Goal: Incision(s), wounds(s) or drain site(s) healing without S/S of infection  Description: INTERVENTIONS  - Assess and document dressing, incision, wound bed, drain sites and surrounding tissue  - Provide patient and family education  - Perform skin care/dressing changes every shift  Outcome: Progressing  Goal: Pressure injury heals and does not worsen  Description: Interventions:  - Implement low air loss mattress or specialty surface (Criteria met)  - Apply silicone foam dressing  - Instruct/assist with weight shifting every shift minutes when in chair   - Limit chair time to 2 hour intervals  - Use special pressure reducing interventions such as  when in chair   - Apply fecal or urinary incontinence containment device - Perform passive or active ROM every   - Turn and reposition patient & offload bony prominences every 2 hours   - Utilize friction reducing device or surface for transfers   - Consider consults to  interdisciplinary teams such as   - Use incontinent care products after each incontinent episode such as  - Consider nutrition services referral as needed  Outcome: Progressing     Problem: MUSCULOSKELETAL - ADULT  Goal: Maintain or return mobility to safest level of function  Description: INTERVENTIONS:  - Assess patient's ability to carry out ADLs; assess patient's baseline for ADL function and identify physical deficits which impact ability to perform ADLs (bathing, care of mouth/teeth, toileting, grooming, dressing, etc.)  - Assess/evaluate cause of self-care deficits   - Assess range of motion  - Assess patient's mobility  - Assess patient's need for assistive devices and provide as appropriate  - Encourage maximum independence but intervene and supervise when necessary  - Involve family in performance of ADLs  - Assess for home care needs following discharge   - Consider OT consult to assist with ADL evaluation and planning for discharge  - Provide patient education as appropriate  Outcome: Progressing  Goal: Maintain proper alignment of affected body part  Description: INTERVENTIONS:  - Support, maintain and protect limb and body alignment  - Provide patient/ family with appropriate education  Outcome: Progressing     Problem: PAIN - ADULT  Goal: Verbalizes/displays adequate comfort level or baseline comfort level  Description: Interventions:  - Encourage patient to monitor pain and request assistance  - Assess pain using appropriate pain scale  - Administer analgesics based on type and severity of pain and evaluate response  - Implement non-pharmacological measures as appropriate and evaluate response  - Consider cultural and social influences on pain and pain management  - Notify physician/advanced practitioner if interventions unsuccessful or patient reports new pain  Outcome: Progressing     Problem: INFECTION - ADULT  Goal: Absence or prevention of progression during hospitalization  Description: INTERVENTIONS:  - Assess and monitor for signs and symptoms of infection  - Monitor lab/diagnostic results  - Monitor all insertion sites, i.e. indwelling lines, tubes, and drains  - Monitor endotracheal if appropriate and nasal secretions for changes in amount and color  - [place] appropriate cooling/warming therapies per order  - Administer medications as ordered  - Instruct and encourage patient and family to use good hand hygiene technique  - Identify and instruct in appropriate isolation precautions for identified infection/condition  Outcome: Progressing  Goal: Absence of fever/infection during neutropenic period  Description: INTERVENTIONS:  - Monitor WBC    Outcome: Progressing     Problem: SAFETY ADULT  Goal: Patient will remain free of falls  Description: INTERVENTIONS:  - Educate patient/family on patient safety including physical limitations  - Instruct patient to call for assistance with activity   - Consult OT/PT to assist with strengthening/mobility   - Keep Call bell within reach  - Keep bed low and locked with side rails adjusted as appropriate  - Keep care items and personal belongings within reach  - Initiate and maintain comfort rounds  - Make Fall Risk Sign visible to staff  - Offer Toileting every 2 Hours, in advance of need  - Initiate/Maintain bed alarm  - Obtain necessary fall risk management equipment  - Apply yellow socks and bracelet for high fall risk patients  - Consider moving patient to room near nurses station  Outcome: Progressing  Goal: Maintain or return to baseline ADL function  Description: INTERVENTIONS:  -  Assess patient's ability to carry out ADLs; assess patient's baseline for ADL function and identify physical deficits which impact ability to perform ADLs (bathing, care of mouth/teeth, toileting, grooming, dressing, etc.)  - Assess/evaluate cause of self-care deficits   - Assess range of motion  - Assess patient's mobility; develop plan if impaired  - Assess patient's need for assistive devices and provide as appropriate  - Encourage maximum independence but intervene and supervise when necessary  - Involve family in performance of ADLs  - Assess for home care needs following discharge   - Consider OT consult to assist with ADL evaluation and planning for discharge  - Provide patient education as appropriate  Outcome: Progressing  Goal: Maintains/Returns to pre admission functional level  Description: INTERVENTIONS:  - Perform BMAT or MOVE assessment daily.   - Set and communicate daily mobility goal to care team and patient/family/caregiver. - Collaborate with rehabilitation services on mobility goals if consulted  - Perform Range of Motion 3 times a day. - Reposition patient every 2 hours.   - Dangle patient 3 times a day  - Stand patient 3 times a day  - Ambulate patient 3 times a day  - Out of bed to chair 3 times a day   - Out of bed for meals 3 times a day  - Out of bed for toileting  - Record patient progress and toleration of activity level   Outcome: Progressing     Problem: DISCHARGE PLANNING  Goal: Discharge to home or other facility with appropriate resources  Description: INTERVENTIONS:  - Identify barriers to discharge w/patient and caregiver  - Arrange for needed discharge resources and transportation as appropriate  - Identify discharge learning needs (meds, wound care, etc.)  - Arrange for interpretive services to assist at discharge as needed  - Refer to Case Management Department for coordinating discharge planning if the patient needs post-hospital services based on physician/advanced practitioner order or complex needs related to functional status, cognitive ability, or social support system  Outcome: Progressing     Problem: Knowledge Deficit  Goal: Patient/family/caregiver demonstrates understanding of disease process, treatment plan, medications, and discharge instructions  Description: Complete learning assessment and assess knowledge base.   Interventions:  - Provide teaching at level of understanding  - Provide teaching via preferred learning methods  Outcome: Progressing     Problem: RESPIRATORY - ADULT  Goal: Achieves optimal ventilation and oxygenation  Description: INTERVENTIONS:  - Assess for changes in respiratory status  - Assess for changes in mentation and behavior  - Position to facilitate oxygenation and minimize respiratory effort  - Oxygen administered by appropriate delivery if ordered  - Initiate smoking cessation education as indicated  - Encourage broncho-pulmonary hygiene including cough, deep breathe, Incentive Spirometry  - Assess the need for suctioning and aspirate as needed  - Assess and instruct to report SOB or any respiratory difficulty  - Respiratory Therapy support as indicated  Outcome: Progressing     Problem: MOBILITY - ADULT  Goal: Maintain or return to baseline ADL function  Description: INTERVENTIONS:  -  Assess patient's ability to carry out ADLs; assess patient's baseline for ADL function and identify physical deficits which impact ability to perform ADLs (bathing, care of mouth/teeth, toileting, grooming, dressing, etc.)  - Assess/evaluate cause of self-care deficits   - Assess range of motion  - Assess patient's mobility; develop plan if impaired  - Assess patient's need for assistive devices and provide as appropriate  - Encourage maximum independence but intervene and supervise when necessary  - Involve family in performance of ADLs  - Assess for home care needs following discharge   - Consider OT consult to assist with ADL evaluation and planning for discharge  - Provide patient education as appropriate  Outcome: Progressing  Goal: Maintains/Returns to pre admission functional level  Description: INTERVENTIONS:  - Perform BMAT or MOVE assessment daily.   - Set and communicate daily mobility goal to care team and patient/family/caregiver. - Collaborate with rehabilitation services on mobility goals if consulted  - Perform Range of Motion 3 times a day. - Reposition patient every 2 hours. - Dangle patient 3 times a day  - Stand patient 3 times a day  - Ambulate patient 3 times a day  - Out of bed to chair 3 times a day   - Out of bed for meals 3 times a day  - Out of bed for toileting  - Record patient progress and toleration of activity level   Outcome: Progressing     Problem: Prexisting or High Potential for Compromised Skin Integrity  Goal: Skin integrity is maintained or improved  Description: INTERVENTIONS:  - Identify patients at risk for skin breakdown  - Assess and monitor skin integrity  - Assess and monitor nutrition and hydration status  - Monitor labs   - Assess for incontinence   - Turn and reposition patient  - Assist with mobility/ambulation  - Relieve pressure over bony prominences  - Avoid friction and shearing  - Provide appropriate hygiene as needed including keeping skin clean and dry  - Evaluate need for skin moisturizer/barrier cream  - Collaborate with interdisciplinary team   - Patient/family teaching  - Consider wound care consult   Outcome: Progressing     Problem: Nutrition/Hydration-ADULT  Goal: Nutrient/Hydration intake appropriate for improving, restoring or maintaining nutritional needs  Description: Monitor and assess patient's nutrition/hydration status for malnutrition. Collaborate with interdisciplinary team and initiate plan and interventions as ordered. Monitor patient's weight and dietary intake as ordered or per policy. Utilize nutrition screening tool and intervene as necessary. Determine patient's food preferences and provide high-protein, high-caloric foods as appropriate.      INTERVENTIONS:  - Monitor oral intake, urinary output, labs, and treatment plans  - Assess nutrition and hydration status and recommend course of action  - Evaluate amount of meals eaten  - Assist patient with eating if necessary   - Allow adequate time for meals  - Recommend/ encourage appropriate diets, oral nutritional supplements, and vitamin/mineral supplements  - Order, calculate, and assess calorie counts as needed  - Recommend, monitor, and adjust tube feedings and TPN/PPN based on assessed needs  - Assess need for intravenous fluids  - Provide specific nutrition/hydration education as appropriate  - Include patient/family/caregiver in decisions related to nutrition  Outcome: Progressing

## 2023-10-28 NOTE — PROGRESS NOTES
[address]  Progress Note  Name: Ailyn Lockwood  MRN: [id number]  Unit/Bed#: -01 I Date of Admission: 10/23/2023   Date of Service: 10/28/2023 I Hospital Day: 5    Assessment/Plan   Gram-negative bacteremia  Assessment & Plan  2/2 blood cultures with pasteurella. ID consulted. Continue Cefepime -> Unasyn. Augmentin at dc. BPH (benign prostatic hyperplasia)  Assessment & Plan  Continue home Flomax  Pt now with retention, follow urinary retention protocol  Per urology cadet placed. Hematuria noted, possibly in setting of thrombocytopenia, traumatic straight cath. Improving. Lovenox on hold to allow resolution of hematuria. GERD (gastroesophageal reflux disease)  Assessment & Plan  Continue PPI    Seizure [facility])  Assessment & Plan  By history  Last seen 9/25 in [address] [place] [address] ED for focal seizure following chemotherapy  Home rgimen: Keppra 1500 mg PO BID    Plan:  Continue with Keppra 1500 mg BID  Check Keppra level  Q4h neuro checks    Sepsis ([address])  Assessment & Plan  SIRS: Tachycardia, tachypnea, fever  10/23 CTA PE Study: No evidence for acute pulmonary emboli. Densely consolidated right lower lobe and right upper lobe. Aeration remaining in the right middle lobe. See above for further description and comparison. Significant mass effect on the right lower lung secondary to large mixed attenuation masslike pleural abnormality described above. 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 Stable incompletely imaged right neck mass presumably representing metastatic adenopathy. Stable hypodensities within the liver. UA negative for UTI  Blood cultures - pasteurella. Lactate 5.1 --> 2.6 --> 2.4  30 ml/kg were not given due to concern for fluid overload  Procal 0.18    Plan:  Continue IV Cefepime -> Unasyn. Complete abx through 11/6 per ID. Augmentin at dc.     culture results  Blood cultures with pasteurella multicoda. apprec infectious disease. Repeat blood cultures - no growth. Trend fever curve and WBC count    Anasarca  Assessment & Plan  Likely 2/2 Avastin which was started earlier this month for radiation necrosis surrounding brain metastasis  Completed a course of lasix outpatient  Hold off on further diuretics  UA with only trace proteinuria  Will need further discussion with medical oncology regarding further Avastin dosing outpatient  RUE US negative for DVT. Probably edema 2/2 large malignant R neck mass. HLD (hyperlipidemia)  Assessment & Plan  Continue home statin    Brain metastasis  Assessment & Plan  Brain metastasis first diagnosed in February 2023 s/p SBRT  Currently on Keppra for focal seizures in 8/2023 due to brain metastasis and Decadron for surrounding vasogenic edema  Pt has been unable to be weaned from decadron due to decreased functional status and worsening lethargy with attempts of weaning  10/2 MRI brain: Metastatic adenocarcinoma of the lung status post chemotherapy. Left posterior frontal lobe intracranial metastases (status post SBRT 2/22/2023) increased in size compared to 7/26/2023 with progression of surrounding vasogenic edema. There is no new suspicious intra-axial lesion. 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 Adenocarcinoma of lung  Assessment & Plan  Dx initially 09/2021 stage IIIC. Received multiple rounds of chemo/radiation. Found to have brain mets 02/2023 to the left precentral gyrus. Cancer upstaged to stage IV. Currently on daily dexamethasone, [facility] as well as a history of DVT.  Currently receiving combination Avastin and Pembrolizumab at the infusion center. Last chemo 10/18/2023. Avastatin is a new medication for the patient. 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 Unlikely to benefit from ASEPT catheter    Plan:  Continue with home dose dexamethasone and Lovenox for DVT   Extensive conversation with patient and wife regarding code status. Patient and wife have changed to DNR which appears appropriate given metastatic malignancy. Palliative care consult appreciated. Recommend follow up outpatient with oncology after discharge    Bilateral lower extremity DVTs  Assessment & Plan    home Lovenox held d/t hematuria. * Acute on Chronic Hypoxemic respiratory failure, (HCC)  Assessment & Plan  POA: increased WOB in the ED requiring the initiation of BIPAP  Hx of Stage IV lung CA with mets. 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 Completed a short course of PO lasix outpatient  10/23 CTA PE Study: No evidence for acute pulmonary emboli. Densely consolidated right lower lobe and right upper lobe. Aeration remaining in the right middle lobe. See above for further description and comparison. Significant mass effect on the right lower lung secondary to large mixed attenuation masslike pleural abnormality described above. 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 Stable incompletely imaged right neck mass presumably representing metastatic adenopathy. Stable hypodensities within the liver.   RVP negative    Plan:  Weaned off BiPAP to  NC -> PNA  Continue abx for presumed PNA as outlined below  Hold further diuretics  Titrate O2 to maintain spO2 > 92%  Pulmonary medicine following, appreciate recommendations           VTE  Prophylaxis:   Pharmacologic: in place  Mechanical VTE Prophylaxis in Place: Yes    Patient Centered Rounds: I have performed bedside rounds with nursing staff today. Discussions with Specialists or Other Care Team Provider: case management    Education and Discussions with Family / Patient: pt wife      Current Length of Stay: 5 day(s)    Current Patient Status: Inpatient        Code Status: Level 3 - DNAR and DNI    Discharge Plan: Pt will require continued inpatient hospitalization. Subjective:     Pt hematuria has improved     Patient is seen and examined at bedside. All other ROS are negative. Objective:     Vitals:   Temp (24hrs), Av.4 °F (36.3 °C), Min:97.3 °F (36.3 °C), Max:97.5 °F (36.4 °C)    Temp:  [97.3 °F (36.3 °C)-97.5 °F (36.4 °C)] 97.3 °F (36.3 °C)  HR:  [] 93  Resp:  [16] 16  BP: (117-124)/(84-93) 120/89  SpO2:  [90 %-94 %] 93 %  Body mass index is 33.92 kg/m². Input and Output Summary (last 24 hours): Intake/Output Summary (Last 24 hours) at 10/28/2023 1233  Last data filed at 10/28/2023 0808  Gross per 24 hour   Intake 600 ml   Output 3750 ml   Net -3150 ml       Physical Exam:       GEN: No acute distress, comfortable  HEEENT: No JVD, PERRLA, no scleral icterus  RESP: Lungs clear to auscultation bilaterally  CV: RRR, +s1/s2   ABD: SOFT NON TENDER, POSITIVE BOWEL SOUNDS, NO DISTENTION  Gu: cadet, improved hematuria.    PSYCH: CALM  NEURO: Mentation baseline, NO FOCAL DEFICITS  SKIN: NO RASH  EXTREM: NO EDEMA    Additional Data:     Labs:    Results from last 7 days   Lab Units 10/28/23  0400 10/27/23  0347 10/26/23  0333 10/25/23  0617 10/24/23  0612   WBC Thousand/uL 4.89   < > 5.53   < > 6.33   HEMOGLOBIN g/dL 9.5*   < > 9.1*   < > 9.5*   HEMATOCRIT % 32.1*   < > 30.2*   < > 31.1*   PLATELETS Thousands/uL 109*   < > 107*   < > 99*   BANDS PCT %  --   --   --   --  5   NEUTROS PCT %  --   --  89*   < >  --    LYMPHS PCT %  --   --  7*   < >  --    LYMPHO PCT % 13*  --   --   --  10*   MONOS PCT %  --   --  3*   < >  --    MONO PCT % 0*  --   --   --  1* EOS PCT % 0  --  0   < > 0    < > = values in this interval not displayed. Results from last 7 days   Lab Units 10/28/23  0400   SODIUM mmol/L 144   POTASSIUM mmol/L 4.1   CHLORIDE mmol/L 111*   CO2 mmol/L 27   BUN mg/dL 21   CREATININE mg/dL 0.70   ANION GAP mmol/L 6   CALCIUM mg/dL 8.8   ALBUMIN g/dL 2.9*   TOTAL BILIRUBIN mg/dL 0.36   ALK PHOS U/L 39   ALT U/L 15   AST U/L 12*   GLUCOSE RANDOM mg/dL 150*     Results from last 7 days   Lab Units 10/23/23  0811   INR  1.00     Results from last 7 days   Lab Units 10/28/23  1153 10/28/23  0717 10/27/23  1654 10/27/23  1100 10/27/23  0732 10/26/23  2228 10/26/23  1628 10/26/23  1108 10/26/23  0716 10/25/23  2023 10/25/23  1622 10/25/23  1140   POC GLUCOSE mg/dl 129 100 129 130 93 129 149* 111 121 160* 197* 151*         Results from last 7 days   Lab Units 10/24/23  0612 10/23/23  2023 10/23/23  1703 10/23/23  1406 10/23/23  1047 10/23/23  0811   LACTIC ACID mmol/L  --  1.9 2.6* 2.4* 2.6* 5.1*   PROCALCITONIN ng/ml 21.26*  --   --   --   --  0.13       Lines/Drains:  Invasive Devices       Peripheral Intravenous Line  Duration             Peripheral IV 10/27/23 Dorsal (posterior); Right Wrist 1 day              Drain  Duration             Urethral Catheter 16 Fr. <1 day                    Telemetry:        * I Have Reviewed All Lab Data Listed Above. Imaging:     Results for orders placed during the hospital encounter of 10/23/23    XR chest portable - 1 view    Narrative  CHEST    INDICATION:   Shortness of breath. History of lung cancer. .    COMPARISON: Chest radiograph 10/17/2023. Same day CT chest study. EXAM PERFORMED/VIEWS:  XR CHEST PORTABLE      FINDINGS:    Heart shadow is again obscured by right-sided opacity. Near right-sided opacification due to known loculated pleural effusion. Superimposed consolidation better visualized on same day CT study.     Groundglass opacification in the left lower lung zone, likely to represent atelectasis. No pneumothorax. Osseous structures appear within normal limits for patient age. Impression  Persistent right-sided loculated pleural effusion with superimposed consolidation better visualized on same day CT study. Groundglass opacification in the left lower lung zone, likely to represent atelectasis. Resident: Lourdes Foster, the attending radiologist, have reviewed the images and agree with the final report above. Workstation performed: [id number]    Results for orders placed during the hospital encounter of 10/17/23    XR chest pa & lateral    Narrative  CHEST    INDICATION:   C79.31: Secondary malignant neoplasm of brain. C34.11: Malignant neoplasm of upper lobe, right bronchus or lung. Cold-like symptoms for 2 weeks. COMPARISON: Chest radiograph 9/25/2023; CT chest, abdomen, pelvis 9/20/2023    EXAM PERFORMED/VIEWS:  XR CHEST PA & LATERAL  The frontal view was performed utilizing dual energy radiographic technique. Images: 4    FINDINGS:    Heart shadow is obscured by right-sided opacity. Mild bowing of the trachea toward the right, unchanged. Slight progression of the previously demonstrated extensive right lung opacity corresponding with the patient's known loculated right-sided pleural effusion and radiation fibrosis demonstrated on CT dated 9/20/2023. Left lung is clear apart from tiny linear scar or atelectasis at the base. No pneumothorax. Osseous structures appear within normal limits for patient age. Impression  Slight progression of previously demonstrated extensive opacity right hemithorax corresponding with patient's known right pleural effusion and probable post radiation changes. Resident: Virgen Peguero, the attending radiologist, have reviewed the images and agree with the final report above.     Workstation performed: [id number]      *I have reviewed all imaging reports listed above      Recent Cultures (last 7 days): Results from last 7 days   Lab Units 10/26/23  0642 10/26/23  0400 10/25/23  0053 10/24/23  0219 10/23/23  0812 10/23/23  0811   BLOOD CULTURE  No Growth at 24 hrs. No Growth at 24 hrs.  --   --  Pasteurella multocida* Pasteurella multocida*   SPUTUM CULTURE   --   --  Culture results to follow. --   --   --    GRAM STAIN RESULT   --   --  1+ Epithelial Cells*  No polys seen*  1+ Gram negative rods*  --  Gram negative rods* Gram negative rods*   LEGIONELLA URINARY ANTIGEN   --   --   --  Negative  --   --        Last 24 Hours Medication List:   Current Facility-Administered Medications   Medication Dose Route Frequency Provider Last Rate    ampicillin-sulbactam  3 g Intravenous Q6H Alvaro Brock MD 3 g (10/28/23 0912)    atorvastatin  40 mg Oral Daily With CRISTOFER Enriquez      benzonatate  100 mg Oral TID PRN Tash Katz PA-C      dexamethasone  4 mg Oral Q12H [address] [facility], CRNP      docusate sodium  100 mg Oral BID Rich Skaggs MD      insulin lispro  1-6 Units Subcutaneous TID [facility] CRISTOFER Calvo      ipratropium  0.5 mg Nebulization TID Rafael Dowling PA-C      levalbuterol  1.25 mg Nebulization TID CRISTOFER Calvo      levETIRAcetam  1,500 mg Oral Q12H [address], CRNP      melatonin  6 mg Oral HS Claudine Langford PA-C      pantoprazole  40 mg Oral Early Morning CRISTOFER Calvo      polyethylene glycol  17 g Oral Daily Rich Skaggs MD      senna  1 tablet Oral HS Rich Skaggs MD      tamsulosin  0.4 mg Oral Daily With CRISTOFER Enriquez          Today, Patient Was Seen By: Rich Skaggs MD    ** Please Note: Dictation voice to text software may have been used in the creation of this document.  **

## 2023-10-28 NOTE — ASSESSMENT & PLAN NOTE
Dx initially 09/2021 stage IIIC. Received multiple rounds of chemo/radiation. Found to have brain mets 02/2023 to the left precentral gyrus. Cancer upstaged to stage IV. Currently on daily dexamethasone, [facility] as well as a history of DVT. Currently receiving combination Avastin and Pembrolizumab at the infusion center. Last chemo 10/18/2023. Avastatin is a new medication for the patient. 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 Unlikely to benefit from ASEPT catheter    Plan:  Continue with home dose dexamethasone and Lovenox for DVT   Extensive conversation with patient and wife regarding code status. Patient and wife have changed to DNR which appears appropriate given metastatic malignancy. Palliative care consult appreciated.   Recommend follow up outpatient with oncology after discharge

## 2023-10-28 NOTE — CONSULTS
Consultation - General Surgery   Pilo Farrell 68 y.o. male MRN: [id number]  Unit/Bed#: -01 Encounter: [id number]    Assessment/Plan     Assessment:  1) Acute Urinary Retention with suspected source being BPH -historically has had urinary retention in the past with most recent exacerbation being last year, historically has needed Hampton catheter for 1 to 2 weeks with void trial in the office, never been diagnosed with BPH but has historical features with trying to urinate at night that sounds familiar/similar, has failed attempts at urinating twice now with bladder scans exhibiting retained urine of at least 800 cc both times both of which on straight catheterization drained at least 1 L or more, current bladder scan 200 cc    Plan:  1)   -Continue to encourage mobilization  -Standing upright and utilizing gravity in order to assist with urination attempt  -Patient would like to try and attempt to urinate/void in order to avoid Hampton catheter  -PVR/bladder scan in 3 to 4 hours  -Place Hampton catheter if patient's bladder scan exhibits higher than 500 cc and inability to urinate  -Continue Flomax  -We will discuss with urology team possible benefit of finasteride or dutasteride  -If patient has Hampton catheter placed overnight will continue for 1 to 2 weeks with follow-up for void trial and evaluation and outpatient urology office  -Management of antibiotics and pulmonary status per medicine team    History of Present Illness   HPI:  Pilo Farrell is a 68 y.o. male past medical history pertinent for CVA, urinary retention in the past, historically needing Hampton catheter placed with void trial in the office last year presenting and admitted to the hospital for shortness of breath, chest tightness, cough consistent with that of Pasteurella pneumonia and corresponding bacteremia. Consulted to the urology service due to development of urinary retention.   Patient developed urinary retention last year as well as his first with need for Hampton catheter for extended period time follow-up in urology clinic for voiding trial.  Patient was started on tamsulosin at this point time. Patient since then has not had any sort of urinary retention episodes since. Patient does explain a story that sounds similar to that of BPH at home with multiple bathroom trips throughout the course of the night. Patient also has dribbling or inability to completely evacuate the bladder towards the beginning and at the end of voiding. Patient sometimes feels like he is not completely voided after urinating. Despite these historical elements patient has not had any need for readmission due to urinary retention. Patient has not had any issues that brought him to the emergency department. Patient even in the prior days while dealing with his pneumonia and patient was still able to void and initiate urinary stream.  Nursing does note that some of his similarities at home were resembled in his bathroom breaks here. However, it was not until yesterday evening that patient started retaining to a point where patient was visibly uncomfortable and bladder scan was significantly elevated. Patient bladder scan measured exceeding that of 800 cc and upon straight catheterization relieved more than 1 L drained. Similar episode happened today 10/27/2023 at approximately 2 PM.  Patient feels much better after the straight catheterizations but despite having a high probability of needing a Hampton catheter would like to try 1 more time to void on his own. Patient feels that he has been sitting in bed for 4 days and has somewhat deconditioned and this is led to some of his urinary retention elements. Patient does also have some blood in his urine now as of recent secondary to the straight catheterization x2. Patient denies any hematuria prior to the straight cath. Patient denies any suprapubic tenderness or retention prior to admission.     Inpatient consult to Urology  Consult performed by: Luis Betancur PA-C  Consult ordered by: Noy Srinivasan MD          Review of Systems   Constitutional:  Negative for appetite change, chills and fever. HENT:  Negative for congestion and rhinorrhea. Respiratory:  Positive for cough, chest tightness and shortness of breath. Negative for apnea. Cardiovascular:  Negative for chest pain and palpitations. Gastrointestinal:  Negative for abdominal distention, abdominal pain, constipation, diarrhea, nausea and vomiting. Genitourinary:  Positive for difficulty urinating and hematuria. Negative for dysuria and flank pain. Musculoskeletal:  Positive for gait problem. Negative for arthralgias. Skin:  Negative for color change and wound. Neurological:  Positive for weakness. Negative for dizziness and seizures. Psychiatric/Behavioral:  Negative for agitation and confusion.         Historical Information   Past Medical History:   Diagnosis Date    Acute respiratory failure with hypoxia ([address]) 2021    Chronic respiratory failure with hypoxia ([facility]) 2021    Impaired mobility and ADLs 2021    Lung cancer ([address] [facility])     Stroke [facility])      Past Surgical History:   Procedure Laterality Date    IR BIOPSY LUNG  2021    IR THORACENTESIS  2022    IR THORACENTESIS  2022     Social History   Social History     Substance and Sexual Activity   Alcohol Use Not Currently    Comment: No ETOH since Summer 2021      Social History     Substance and Sexual Activity   Drug Use Never     E-Cigarette/Vaping    E-Cigarette Use Never User      E-Cigarette/Vaping Substances     Social History     Tobacco Use   Smoking Status Former    Packs/day: 3.00    Years: 39.00    Total pack years: 117.00    Types: Cigarettes    Start date:     Quit date:     Years since quittin.8   Smokeless Tobacco Never     Family History: non-contributory    Meds/Allergies   all current active meds have been reviewed and current meds: Current Facility-Administered Medications   Medication Dose Route Frequency    ampicillin-sulbactam (UNASYN) 3 g in sodium chloride 0.9 % 100 mL IVPB  3 g Intravenous Q6H    atorvastatin (LIPITOR) tablet 40 mg  40 mg Oral Daily With Dinner    benzonatate (TESSALON PERLES) capsule 100 mg  100 mg Oral TID PRN    dexamethasone (DECADRON) tablet 4 mg  4 mg Oral Q12H OPAL    enoxaparin (LOVENOX) subcutaneous injection 100 mg  100 mg Subcutaneous Q12H [facility]    insulin lispro (HumaLOG) 100 units/mL subcutaneous injection 1-6 Units  1-6 Units Subcutaneous TID AC    ipratropium (ATROVENT) 0.02 % inhalation solution 0.5 mg  0.5 mg Nebulization TID    levalbuterol (XOPENEX) inhalation solution 1.25 mg  1.25 mg Nebulization TID    levETIRAcetam (KEPPRA) tablet 1,500 mg  1,500 mg Oral Q12H [facility]    melatonin tablet 6 mg  6 mg Oral HS    pantoprazole (PROTONIX) EC tablet 40 mg  40 mg Oral Early Morning    tamsulosin (FLOMAX) capsule 0.4 mg  0.4 mg Oral Daily With Dinner     Allergies   Allergen Reactions    Doxycycline Rash       Objective   First Vitals:   Blood Pressure: 131/92 (10/23/23 0801)  Pulse: (!) 148 (10/23/23 0801)  Temperature: (!) 102.1 °F (38.9 °C) (10/23/23 0804)  Temp Source: Axillary (10/23/23 0804)  Respirations: (!) 24 (10/23/23 0801)  Height: 5' 9" (175.3 cm) (10/23/23 1326)  Weight - Scale: 104 kg (230 lb) (10/23/23 0801)  SpO2: 91 % (10/23/23 0801)    Current Vitals:   Blood Pressure: 124/93 (10/27/23 1656)  Pulse: (!) 110 (10/27/23 1656)  Temperature: 97.5 °F (36.4 °C) (10/27/23 1656)  Temp Source: Temporal (10/25/23 1950)  Respirations: 16 (10/27/23 1656)  Height: 5' 9" (175.3 cm) (10/23/23 1326)  Weight - Scale: 101 kg (221 lb 9 oz) (10/27/23 0568)  SpO2: 90 % (10/27/23 1656)      Intake/Output Summary (Last 24 hours) at 10/27/2023 2009  Last data filed at 10/27/2023 1401  Gross per 24 hour   Intake 600 ml   Output 2613 ml   Net -2013 ml       Invasive Devices       Peripheral Intravenous Line  Duration Peripheral IV 10/27/23 Dorsal (posterior); Right Wrist <1 day              Drain  Duration             External Urinary Catheter Medium 3 days                    Physical Exam  Constitutional:       General: He is awake. He is not in acute distress. Appearance: Normal appearance. He is overweight. He is ill-appearing. He is not toxic-appearing or diaphoretic. Interventions: He is not intubated. HENT:      Head: Normocephalic and atraumatic. Right Ear: Hearing and external ear normal. No decreased hearing noted. Left Ear: Hearing and external ear normal. No decreased hearing noted. Nose: Nose normal.   Cardiovascular:      Rate and Rhythm: Normal rate and regular rhythm. Heart sounds: Normal heart sounds, S1 normal and S2 normal. Heart sounds not distant. No murmur heard. Pulmonary:      Effort: Pulmonary effort is normal. No tachypnea, bradypnea, accessory muscle usage, prolonged expiration, respiratory distress or retractions. He is not intubated. Breath sounds: Rhonchi present. Abdominal:      General: Abdomen is flat. Bowel sounds are normal.      Palpations: Abdomen is soft. Tenderness: There is no abdominal tenderness. There is no guarding. Psychiatric:         Behavior: Behavior is cooperative. Lab Results: I have personally reviewed pertinent lab results.   , CBC:   Lab Results   Component Value Date    WBC 4.96 10/27/2023    HGB 9.3 (L) 10/27/2023    HCT 31.5 (L) 10/27/2023    MCV 97 10/27/2023     (L) 10/27/2023    RBC 3.26 (L) 10/27/2023    MCH 28.5 10/27/2023    MCHC 29.5 (L) 10/27/2023    RDW 21.8 (H) 10/27/2023    MPV 9.5 10/27/2023    NRBC 2 10/27/2023   , CMP:   Lab Results   Component Value Date    SODIUM 142 10/27/2023    K 4.3 10/27/2023     (H) 10/27/2023    CO2 29 10/27/2023    BUN 25 10/27/2023    CREATININE 0.84 10/27/2023    CALCIUM 9.1 10/27/2023    AST 10 (L) 10/27/2023    ALT 15 10/27/2023    ALKPHOS 38 10/27/2023 EGFR 87 10/27/2023     Imaging: I have personally reviewed pertinent reports. EKG, Pathology, and Other Studies: I have personally reviewed pertinent reports. Counseling / Coordination of Care  Total floor / unit time spent today 35 minutes. Greater than 50% of total time was spent with the patient and / or family counseling and / or coordination of care. A description of the counseling / coordination of care: Developing plan, reviewing with attending, coordinating care, physical exam, history taking, reviewing labs, reviewing imaging, patient education.

## 2023-10-29 LAB
ALBUMIN SERPL BCP-MCNC: 3 G/DL (ref 3.5–5)
ALP SERPL-CCNC: 36 U/L (ref 34–104)
ALT SERPL W P-5'-P-CCNC: 14 U/L (ref 7–52)
ANION GAP SERPL CALCULATED.3IONS-SCNC: 5 MMOL/L
AST SERPL W P-5'-P-CCNC: 13 U/L (ref 13–39)
BACTERIA SPT RESP CULT: ABNORMAL
BACTERIA SPT RESP CULT: ABNORMAL
BASOPHILS # BLD MANUAL: 0 THOUSAND/UL (ref 0–0.1)
BASOPHILS NFR MAR MANUAL: 0 % (ref 0–1)
BILIRUB SERPL-MCNC: 0.37 MG/DL (ref 0.2–1)
BUN SERPL-MCNC: 19 MG/DL (ref 5–25)
CALCIUM ALBUM COR SERPL-MCNC: 9.6 MG/DL (ref 8.3–10.1)
CALCIUM SERPL-MCNC: 8.8 MG/DL (ref 8.4–10.2)
CHLORIDE SERPL-SCNC: 111 MMOL/L (ref 96–108)
CO2 SERPL-SCNC: 26 MMOL/L (ref 21–32)
CREAT SERPL-MCNC: 0.65 MG/DL (ref 0.6–1.3)
EOSINOPHIL # BLD MANUAL: 0.21 THOUSAND/UL (ref 0–0.4)
EOSINOPHIL NFR BLD MANUAL: 4 % (ref 0–6)
ERYTHROCYTE [DISTWIDTH] IN BLOOD BY AUTOMATED COUNT: 22.5 % (ref 11.6–15.1)
GFR SERPL CREATININE-BSD FRML MDRD: 97 ML/MIN/1.73SQ M
GLUCOSE SERPL-MCNC: 103 MG/DL (ref 65–140)
GLUCOSE SERPL-MCNC: 113 MG/DL (ref 65–140)
GLUCOSE SERPL-MCNC: 117 MG/DL (ref 65–140)
GLUCOSE SERPL-MCNC: 131 MG/DL (ref 65–140)
GLUCOSE SERPL-MCNC: 96 MG/DL (ref 65–140)
GRAM STN SPEC: ABNORMAL
HCT VFR BLD AUTO: 32.3 % (ref 36.5–49.3)
HGB BLD-MCNC: 9.6 G/DL (ref 12–17)
LYMPHOCYTES # BLD AUTO: 0.16 THOUSAND/UL (ref 0.6–4.47)
LYMPHOCYTES # BLD AUTO: 3 % (ref 14–44)
MCH RBC QN AUTO: 29.4 PG (ref 26.8–34.3)
MCHC RBC AUTO-ENTMCNC: 29.7 G/DL (ref 31.4–37.4)
MCV RBC AUTO: 99 FL (ref 82–98)
METAMYELOCYTES NFR BLD MANUAL: 1 % (ref 0–1)
MONOCYTES # BLD AUTO: 0.11 THOUSAND/UL (ref 0–1.22)
MONOCYTES NFR BLD: 2 % (ref 4–12)
NEUTROPHILS # BLD MANUAL: 4.81 THOUSAND/UL (ref 1.85–7.62)
NEUTS BAND NFR BLD MANUAL: 4 % (ref 0–8)
NEUTS SEG NFR BLD AUTO: 86 % (ref 43–75)
NRBC BLD AUTO-RTO: 5 /100 WBC (ref 0–2)
PLATELET # BLD AUTO: 138 THOUSANDS/UL (ref 149–390)
PLATELET BLD QL SMEAR: ABNORMAL
PMV BLD AUTO: 10.5 FL (ref 8.9–12.7)
POIKILOCYTOSIS BLD QL SMEAR: PRESENT
POLYCHROMASIA BLD QL SMEAR: PRESENT
POTASSIUM SERPL-SCNC: 4.3 MMOL/L (ref 3.5–5.3)
PROT SERPL-MCNC: 5.4 G/DL (ref 6.4–8.4)
RBC # BLD AUTO: 3.26 MILLION/UL (ref 3.88–5.62)
SODIUM SERPL-SCNC: 142 MMOL/L (ref 135–147)
WBC # BLD AUTO: 5.35 THOUSAND/UL (ref 4.31–10.16)

## 2023-10-29 PROCEDURE — 94640 AIRWAY INHALATION TREATMENT: CPT

## 2023-10-29 PROCEDURE — 99232 SBSQ HOSP IP/OBS MODERATE 35: CPT | Performed by: HOSPITALIST

## 2023-10-29 PROCEDURE — 94760 N-INVAS EAR/PLS OXIMETRY 1: CPT

## 2023-10-29 PROCEDURE — 94664 DEMO&/EVAL PT USE INHALER: CPT

## 2023-10-29 PROCEDURE — 82948 REAGENT STRIP/BLOOD GLUCOSE: CPT

## 2023-10-29 PROCEDURE — 85007 BL SMEAR W/DIFF WBC COUNT: CPT | Performed by: HOSPITALIST

## 2023-10-29 PROCEDURE — 85027 COMPLETE CBC AUTOMATED: CPT | Performed by: HOSPITALIST

## 2023-10-29 PROCEDURE — 80053 COMPREHEN METABOLIC PANEL: CPT | Performed by: HOSPITALIST

## 2023-10-29 PROCEDURE — 94668 MNPJ CHEST WALL SBSQ: CPT

## 2023-10-29 RX ORDER — ACETAMINOPHEN 325 MG/1
650 TABLET ORAL EVERY 6 HOURS PRN
Status: DISCONTINUED | OUTPATIENT
Start: 2023-10-29 | End: 2023-10-30

## 2023-10-29 RX ORDER — LEVALBUTEROL INHALATION SOLUTION 1.25 MG/3ML
1.25 SOLUTION RESPIRATORY (INHALATION)
Status: DISCONTINUED | OUTPATIENT
Start: 2023-10-29 | End: 2023-11-02 | Stop reason: HOSPADM

## 2023-10-29 RX ORDER — HYDROXYZINE HYDROCHLORIDE 25 MG/1
25 TABLET, FILM COATED ORAL EVERY 6 HOURS PRN
Status: DISCONTINUED | OUTPATIENT
Start: 2023-10-29 | End: 2023-10-30

## 2023-10-29 RX ADMIN — AMPICILLIN SODIUM AND SULBACTAM SODIUM 3 G: 2; 1 INJECTION, POWDER, FOR SOLUTION INTRAMUSCULAR; INTRAVENOUS at 18:06

## 2023-10-29 RX ADMIN — LEVALBUTEROL HYDROCHLORIDE 1.25 MG: 1.25 SOLUTION RESPIRATORY (INHALATION) at 20:20

## 2023-10-29 RX ADMIN — IPRATROPIUM BROMIDE 0.5 MG: 0.5 SOLUTION RESPIRATORY (INHALATION) at 15:52

## 2023-10-29 RX ADMIN — AMPICILLIN SODIUM AND SULBACTAM SODIUM 3 G: 2; 1 INJECTION, POWDER, FOR SOLUTION INTRAMUSCULAR; INTRAVENOUS at 21:50

## 2023-10-29 RX ADMIN — DOCUSATE SODIUM 100 MG: 100 CAPSULE, LIQUID FILLED ORAL at 08:29

## 2023-10-29 RX ADMIN — POLYETHYLENE GLYCOL 3350 17 G: 17 POWDER, FOR SOLUTION ORAL at 08:29

## 2023-10-29 RX ADMIN — ATORVASTATIN CALCIUM 40 MG: 40 TABLET, FILM COATED ORAL at 18:07

## 2023-10-29 RX ADMIN — LEVALBUTEROL HYDROCHLORIDE 1.25 MG: 1.25 SOLUTION RESPIRATORY (INHALATION) at 15:52

## 2023-10-29 RX ADMIN — FINASTERIDE 5 MG: 5 TABLET, FILM COATED ORAL at 08:29

## 2023-10-29 RX ADMIN — LEVETIRACETAM 1500 MG: 500 TABLET, FILM COATED ORAL at 21:50

## 2023-10-29 RX ADMIN — DEXAMETHASONE 4 MG: 2 TABLET ORAL at 08:29

## 2023-10-29 RX ADMIN — AMPICILLIN SODIUM AND SULBACTAM SODIUM 3 G: 2; 1 INJECTION, POWDER, FOR SOLUTION INTRAMUSCULAR; INTRAVENOUS at 04:08

## 2023-10-29 RX ADMIN — AMPICILLIN SODIUM AND SULBACTAM SODIUM 3 G: 2; 1 INJECTION, POWDER, FOR SOLUTION INTRAMUSCULAR; INTRAVENOUS at 10:20

## 2023-10-29 RX ADMIN — IPRATROPIUM BROMIDE 0.5 MG: 0.5 SOLUTION RESPIRATORY (INHALATION) at 20:20

## 2023-10-29 RX ADMIN — PANTOPRAZOLE SODIUM 40 MG: 40 TABLET, DELAYED RELEASE ORAL at 04:11

## 2023-10-29 RX ADMIN — DOCUSATE SODIUM 100 MG: 100 CAPSULE, LIQUID FILLED ORAL at 18:07

## 2023-10-29 RX ADMIN — TAMSULOSIN HYDROCHLORIDE 0.4 MG: 0.4 CAPSULE ORAL at 18:07

## 2023-10-29 RX ADMIN — DEXAMETHASONE 4 MG: 2 TABLET ORAL at 21:51

## 2023-10-29 RX ADMIN — Medication 6 MG: at 21:50

## 2023-10-29 RX ADMIN — SENNOSIDES 8.6 MG: 8.6 TABLET, FILM COATED ORAL at 21:51

## 2023-10-29 RX ADMIN — LEVETIRACETAM 1500 MG: 500 TABLET, FILM COATED ORAL at 08:29

## 2023-10-29 NOTE — PROGRESS NOTES
[facility]  Progress Note  Name: Herberth Rivers  MRN: [id number]  Unit/Bed#: -01 I Date of Admission: 10/23/2023   Date of Service: 10/29/2023 I Hospital Day: 6    Assessment/Plan   Gram-negative bacteremia  Assessment & Plan  2/2 blood cultures with pasteurella. ID consulted. Continue Cefepime -> Unasyn. Augmentin at dc. BPH (benign prostatic hyperplasia)  Assessment & Plan  Continue home Flomax  Pt now with retention, follow urinary retention protocol  Per urology cadet placed. Hematuria noted, possibly in setting of thrombocytopenia, traumatic straight cath. Improving. Lovenox on hold to allow resolution of hematuria. GERD (gastroesophageal reflux disease)  Assessment & Plan  Continue PPI    Seizure [facility])  Assessment & Plan  By history  Last seen 9/25 in [address] [place] [address] ED for focal seizure following chemotherapy  Home rgimen: Keppra 1500 mg PO BID    Plan:  Continue with Keppra 1500 mg BID  Check Keppra level  Q4h neuro checks    Sepsis ([address])  Assessment & Plan  SIRS: Tachycardia, tachypnea, fever  10/23 CTA PE Study: No evidence for acute pulmonary emboli. Densely consolidated right lower lobe and right upper lobe. Aeration remaining in the right middle lobe. See above for further description and comparison. Significant mass effect on the right lower lung secondary to large mixed attenuation masslike pleural abnormality described above. 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 Stable incompletely imaged right neck mass presumably representing metastatic adenopathy. Stable hypodensities within the liver. UA negative for UTI  Blood cultures - pasteurella. Lactate 5.1 --> 2.6 --> 2.4  30 ml/kg were not given due to concern for fluid overload  Procal 0.18    Plan:  Continue IV Cefepime -> Unasyn. Complete abx through 11/6 per ID. Augmentin at dc.     culture results  Blood cultures with pasteurella multicoda. apprec infectious disease. Repeat blood cultures - no growth. Trend fever curve and WBC count    Anasarca  Assessment & Plan  Likely 2/2 Avastin which was started earlier this month for radiation necrosis surrounding brain metastasis  Completed a course of lasix outpatient  Hold off on further diuretics  UA with only trace proteinuria  Will need further discussion with medical oncology regarding further Avastin dosing outpatient  RUE US negative for DVT. Probably edema 2/2 large malignant R neck mass. HLD (hyperlipidemia)  Assessment & Plan  Continue home statin    Brain metastasis  Assessment & Plan  Brain metastasis first diagnosed in February 2023 s/p SBRT  Currently on Keppra for focal seizures in 8/2023 due to brain metastasis and Decadron for surrounding vasogenic edema  Pt has been unable to be weaned from decadron due to decreased functional status and worsening lethargy with attempts of weaning  10/2 MRI brain: Metastatic adenocarcinoma of the lung status post chemotherapy. Left posterior frontal lobe intracranial metastases (status post SBRT 2/22/2023) increased in size compared to 7/26/2023 with progression of surrounding vasogenic edema. There is no new suspicious intra-axial lesion. 10/4 started on Avastin due to concern for radiation necrosis  Recently developed significant peripheral edema, likely secondary to Avastin  Completed a short course of lasix outpatient    Plan:  Pt currently at baseline neuro status as documented in outpatient Neurology notes  Hold further lasix  Routine neuro checks  Continue Keppra       Adenocarcinoma of lung  Assessment & Plan  Dx initially 09/2021 stage IIIC. Received multiple rounds of chemo/radiation. Found to have brain mets 02/2023 to the left precentral gyrus. Cancer upstaged to stage IV. Currently on daily dexamethasone, Lovenox for [facility] as well as a history of DVT. Currently receiving combination Avastin and Pembrolizumab at the infusion center.  Last chemo 10/18/2023. Avastatin is a new medication for the patient. 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 Unlikely to benefit from ASEPT catheter    Plan:  Continue with home dose dexamethasone and Lovenox for DVT   Extensive conversation with patient and wife regarding code status. Patient and wife have changed to DNR which appears appropriate given metastatic malignancy. Palliative care consult appreciated. Recommend follow up outpatient with oncology after discharge    Bilateral lower extremity DVTs  Assessment & Plan    home Lovenox held d/t hematuria. Resume when clear from urological standpoint. * Acute on Chronic Hypoxemic respiratory failure, (HCC)  Assessment & Plan  POA: increased WOB in the ED requiring the initiation of BIPAP  Hx of Stage IV lung CA with mets. 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 Completed a short course of PO lasix outpatient  10/23 CTA PE Study: No evidence for acute pulmonary emboli. Densely consolidated right lower lobe and right upper lobe. Aeration remaining in the right middle lobe. See above for further description and comparison. Significant mass effect on the right lower lung secondary to large mixed attenuation masslike pleural abnormality described above. 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 Stable incompletely imaged right neck mass presumably representing metastatic adenopathy. Stable hypodensities within the liver.   RVP negative    Plan:  Weaned off BiPAP to  NC -> PNA  Continue abx for presumed PNA as outlined below  Hold further diuretics  Titrate O2 to maintain spO2 > 92%  Pulmonary medicine following, appreciate recommendations             VTE  Prophylaxis:   Pharmacologic: in place  Mechanical VTE Prophylaxis in Place: Yes    Patient Centered Rounds: I have performed bedside rounds with nursing staff today. Discussions with Specialists or Other Care Team Provider: case management    Education and Discussions with Family / Patient: pt wife      Current Length of Stay: 6 day(s)    Current Patient Status: Inpatient        Code Status: Level 3 - DNAR and DNI    Discharge Plan: Pt will require continued inpatient hospitalization. Subjective:     Pt hematuria has improved    Patient is seen and examined at bedside. All other ROS are negative. Objective:     Vitals:   Temp (24hrs), Av.3 °F (36.3 °C), Min:97.3 °F (36.3 °C), Max:97.3 °F (36.3 °C)    Temp:  [97.3 °F (36.3 °C)] 97.3 °F (36.3 °C)  HR:  [] 93  BP: (125-142)/(79-90) 142/90  SpO2:  [93 %-94 %] 93 %  Body mass index is 33.76 kg/m². Input and Output Summary (last 24 hours): Intake/Output Summary (Last 24 hours) at 10/29/2023 1041  Last data filed at 10/29/2023 6203  Gross per 24 hour   Intake 240 ml   Output 1660 ml   Net -1420 ml       Physical Exam:       GEN: No acute distress, comfortable  HEEENT: No JVD, PERRLA, no scleral icterus  RESP: Lungs clear to auscultation bilaterally  CV: RRR, +s1/s2   ABD: SOFT NON TENDER, POSITIVE BOWEL SOUNDS, NO DISTENTION  PSYCH: CALM  NEURO: Mentation baseline, NO FOCAL DEFICITS  SKIN: NO RASH  EXTREM: NO EDEMA    Additional Data:     Labs:    Results from last 7 days   Lab Units 10/29/23  0407 10/27/23  0347 10/26/23  0333   WBC Thousand/uL 5.35   < > 5.53   HEMOGLOBIN g/dL 9.6*   < > 9.1*   HEMATOCRIT % 32.3*   < > 30.2*   PLATELETS Thousands/uL 138*   < > 107*   BANDS PCT % 4  --   --    NEUTROS PCT %  --   --  89*   LYMPHS PCT %  --   --  7*   LYMPHO PCT % 3*   < >  --    MONOS PCT %  --   --  3*   MONO PCT % 2*   < >  --    EOS PCT % 4   < > 0    < > = values in this interval not displayed.      Results from last 7 days   Lab Units 10/29/23  0407   SODIUM mmol/L 142   POTASSIUM mmol/L 4.3 CHLORIDE mmol/L 111*   CO2 mmol/L 26   BUN mg/dL 19   CREATININE mg/dL 0.65   ANION GAP mmol/L 5   CALCIUM mg/dL 8.8   ALBUMIN g/dL 3.0*   TOTAL BILIRUBIN mg/dL 0.37   ALK PHOS U/L 36   ALT U/L 14   AST U/L 13   GLUCOSE RANDOM mg/dL 131     Results from last 7 days   Lab Units 10/23/23  0811   INR  1.00     Results from last 7 days   Lab Units 10/29/23  0716 10/28/23  1612 10/28/23  1153 10/28/23  0717 10/27/23  1654 10/27/23  1100 10/27/23  0732 10/26/23  2228 10/26/23  1628 10/26/23  1108 10/26/23  0716 10/25/23  2023   POC GLUCOSE mg/dl 113 205* 129 100 129 130 93 129 149* 111 121 160*         Results from last 7 days   Lab Units 10/24/23  0612 10/23/23  2023 10/23/23  1703 10/23/23  1406 10/23/23  1047 10/23/23  0811   LACTIC ACID mmol/L  --  1.9 2.6* 2.4* 2.6* 5.1*   PROCALCITONIN ng/ml 21.26*  --   --   --   --  0.13       Lines/Drains:  Invasive Devices       Peripheral Intravenous Line  Duration             Peripheral IV 10/27/23 Dorsal (posterior); Right Wrist 2 days              Drain  Duration             Urethral Catheter 16 Fr. 1 day                    Telemetry:        * I Have Reviewed All Lab Data Listed Above. Imaging:     Results for orders placed during the hospital encounter of 10/23/23    XR chest portable - 1 view    Narrative  CHEST    INDICATION:   Shortness of breath. History of lung cancer. .    COMPARISON: Chest radiograph 10/17/2023. Same day CT chest study. EXAM PERFORMED/VIEWS:  XR CHEST PORTABLE      FINDINGS:    Heart shadow is again obscured by right-sided opacity. Near right-sided opacification due to known loculated pleural effusion. Superimposed consolidation better visualized on same day CT study. Groundglass opacification in the left lower lung zone, likely to represent atelectasis. No pneumothorax. Osseous structures appear within normal limits for patient age.     Impression  Persistent right-sided loculated pleural effusion with superimposed consolidation better visualized on same day CT study. Groundglass opacification in the left lower lung zone, likely to represent atelectasis. Resident: Priscilla Baires, the attending radiologist, have reviewed the images and agree with the final report above. Workstation performed: [id number]    Results for orders placed during the hospital encounter of 10/17/23    XR chest pa & lateral    Narrative  CHEST    INDICATION:   C79.31: Secondary malignant neoplasm of brain. C34.11: Malignant neoplasm of upper lobe, right bronchus or lung. Cold-like symptoms for 2 weeks. COMPARISON: Chest radiograph 9/25/2023; CT chest, abdomen, pelvis 9/20/2023    EXAM PERFORMED/VIEWS:  XR CHEST PA & LATERAL  The frontal view was performed utilizing dual energy radiographic technique. Images: 4    FINDINGS:    Heart shadow is obscured by right-sided opacity. Mild bowing of the trachea toward the right, unchanged. Slight progression of the previously demonstrated extensive right lung opacity corresponding with the patient's known loculated right-sided pleural effusion and radiation fibrosis demonstrated on CT dated 9/20/2023. Left lung is clear apart from tiny linear scar or atelectasis at the base. No pneumothorax. Osseous structures appear within normal limits for patient age. Impression  Slight progression of previously demonstrated extensive opacity right hemithorax corresponding with patient's known right pleural effusion and probable post radiation changes. Resident: Alma Baker, the attending radiologist, have reviewed the images and agree with the final report above. Workstation performed: [id number]      *I have reviewed all imaging reports listed above      Recent Cultures (last 7 days):     Results from last 7 days   Lab Units 10/26/23  0642 10/26/23  0400 10/25/23  0053 10/24/23  0219 10/23/23  0812 10/23/23  0811   BLOOD CULTURE  No Growth at 48 hrs.  No Growth at 48 hrs.  --   --  Pasteurella multocida* Pasteurella multocida*   SPUTUM CULTURE   --   --  Culture results to follow. --   --   --    GRAM STAIN RESULT   --   --  1+ Epithelial Cells*  No polys seen*  1+ Gram negative rods*  --  Gram negative rods* Gram negative rods*   LEGIONELLA URINARY ANTIGEN   --   --   --  Negative  --   --        Last 24 Hours Medication List:   Current Facility-Administered Medications   Medication Dose Route Frequency Provider Last Rate    ampicillin-sulbactam  3 g Intravenous Q6H Navdeep Victor MD 3 g (10/29/23 1020)    atorvastatin  40 mg Oral Daily With CRISTOFER Hidalgo      benzonatate  100 mg Oral TID PRN Edie Huerta PA-C      dexamethasone  4 mg Oral Q12H [address] [facility], CRISTOFER      docusate sodium  100 mg Oral BID Albaro Lee MD      finasteride  5 mg Oral Daily Suraj Brasher PA-C      insulin lispro  1-6 Units Subcutaneous TID AC CRISTOFER Castaneda      ipratropium  0.5 mg Nebulization Q6H PRN Albaro Lee MD      levalbuterol  1.25 mg Nebulization Q6H PRN Albaro Lee MD      levETIRAcetam  1,500 mg Oral Q12H [address] [facility], CRNP      melatonin  6 mg Oral HS Cielo Valdez PA-C      pantoprazole  40 mg Oral Early Morning Nicholas Fragmin, CRISTOFER      polyethylene glycol  17 g Oral Daily Albaro Lee MD      senna  1 tablet Oral HS Albaro Lee MD      tamsulosin  0.4 mg Oral Daily With CRISTOFER Hidalgo          Today, Patient Was Seen By: Albaro Lee MD    ** Please Note: Dictation voice to text software may have been used in the creation of this document.  **

## 2023-10-29 NOTE — PLAN OF CARE
Problem: GENITOURINARY - ADULT  Goal: Maintains or returns to baseline urinary function  Description: INTERVENTIONS:  - Assess urinary function  - Encourage oral fluids to ensure adequate hydration if ordered  - Administer IV fluids as ordered to ensure adequate hydration  - Administer ordered medications as needed  - Offer frequent toileting  - Follow urinary retention protocol if ordered  Outcome: Progressing  Goal: Urinary catheter remains patent  Description: INTERVENTIONS:  - Assess patency of urinary catheter  - If patient has a chronic cadet, consider changing catheter if non-functioning  - Follow guidelines for intermittent irrigation of non-functioning urinary catheter  Outcome: Progressing

## 2023-10-29 NOTE — ASSESSMENT & PLAN NOTE
Dx initially 09/2021 stage IIIC. Received multiple rounds of chemo/radiation. Found to have brain mets 02/2023 to the left precentral gyrus. Cancer upstaged to stage IV. Currently on daily dexamethasone, [facility] as well as a history of DVT. Currently receiving combination Avastin and Pembrolizumab at the infusion center. Last chemo 10/18/2023. Avastatin is a new medication for the patient. 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 Unlikely to benefit from ASEPT catheter    Plan:  Continue with home dose dexamethasone and Lovenox for DVT   Extensive conversation with patient and wife regarding code status. Patient and wife have changed to DNR which appears appropriate given metastatic malignancy. Palliative care consult appreciated.   Recommend follow up outpatient with oncology after discharge

## 2023-10-29 NOTE — PLAN OF CARE
Problem: GENITOURINARY - ADULT  Goal: Maintains or returns to baseline urinary function  Description: INTERVENTIONS:  - Assess urinary function  - Encourage oral fluids to ensure adequate hydration if ordered  - Administer IV fluids as ordered to ensure adequate hydration  - Administer ordered medications as needed  - Offer frequent toileting  - Follow urinary retention protocol if ordered  Outcome: Progressing     Problem: GENITOURINARY - ADULT  Goal: Absence of urinary retention  Description: INTERVENTIONS:  - Assess patient’s ability to void and empty bladder  - Monitor I/O  - Bladder scan as needed  - Discuss with physician/AP medications to alleviate retention as needed  - Discuss catheterization for long term situations as appropriate  Outcome: Progressing     Problem: GENITOURINARY - ADULT  Goal: Urinary catheter remains patent  Description: INTERVENTIONS:  - Assess patency of urinary catheter  - If patient has a chronic cadet, consider changing catheter if non-functioning  - Follow guidelines for intermittent irrigation of non-functioning urinary catheter  Outcome: Progressing

## 2023-10-29 NOTE — ASSESSMENT & PLAN NOTE
By history  Last seen 9/25 in [address] [place] [address] ED for focal seizure following chemotherapy  Home rgimen: Keppra 1500 mg PO BID    Plan:  Continue with Keppra 1500 mg BID  Check Keppra level  Q4h neuro checks

## 2023-10-29 NOTE — ASSESSMENT & PLAN NOTE
Brain metastasis first diagnosed in February 2023 s/p SBRT  Currently on Keppra for focal seizures in 8/2023 due to brain metastasis and Decadron for surrounding vasogenic edema  Pt has been unable to be weaned from decadron due to decreased functional status and worsening lethargy with attempts of weaning  10/2 MRI brain: Metastatic adenocarcinoma of the lung status post chemotherapy. Left posterior frontal lobe intracranial metastases (status post SBRT 2/22/2023) increased in size compared to 7/26/2023 with progression of surrounding vasogenic edema. There is no new suspicious intra-axial lesion.   10/4 started on Avastin due to concern for radiation necrosis  Recently developed significant peripheral edema, likely secondary to Avastin  Completed a short course of lasix outpatient    Plan:  Pt currently at baseline neuro status as documented in outpatient Neurology notes  Hold further lasix  Routine neuro checks  Continue Keppra

## 2023-10-30 ENCOUNTER — APPOINTMENT (INPATIENT)
Dept: RADIOLOGY | Facility: HOSPITAL | Age: 73
DRG: 867 | End: 2023-10-30
Payer: MEDICARE

## 2023-10-30 PROBLEM — E78.2 MIXED HYPERLIPIDEMIA: Status: ACTIVE | Noted: 2023-02-03

## 2023-10-30 LAB
2HR DELTA HS TROPONIN: 2 NG/L
ALBUMIN SERPL BCP-MCNC: 3.2 G/DL (ref 3.5–5)
ALP SERPL-CCNC: 40 U/L (ref 34–104)
ALT SERPL W P-5'-P-CCNC: 18 U/L (ref 7–52)
ANION GAP SERPL CALCULATED.3IONS-SCNC: 6 MMOL/L
ARTERIAL PATENCY WRIST A: YES
AST SERPL W P-5'-P-CCNC: 15 U/L (ref 13–39)
BACTERIA UR QL AUTO: ABNORMAL /HPF
BASE EXCESS BLDA CALC-SCNC: 1.8 MMOL/L
BILIRUB SERPL-MCNC: 0.39 MG/DL (ref 0.2–1)
BILIRUB UR QL STRIP: NEGATIVE
BNP SERPL-MCNC: 117 PG/ML (ref 0–100)
BUN SERPL-MCNC: 19 MG/DL (ref 5–25)
CALCIUM ALBUM COR SERPL-MCNC: 9.5 MG/DL (ref 8.3–10.1)
CALCIUM SERPL-MCNC: 8.9 MG/DL (ref 8.4–10.2)
CARDIAC TROPONIN I PNL SERPL HS: 11 NG/L
CARDIAC TROPONIN I PNL SERPL HS: 9 NG/L
CHLORIDE SERPL-SCNC: 106 MMOL/L (ref 96–108)
CLARITY UR: ABNORMAL
CO2 SERPL-SCNC: 31 MMOL/L (ref 21–32)
COLOR UR: YELLOW
CREAT SERPL-MCNC: 0.71 MG/DL (ref 0.6–1.3)
ERYTHROCYTE [DISTWIDTH] IN BLOOD BY AUTOMATED COUNT: 21.5 % (ref 11.6–15.1)
GFR SERPL CREATININE-BSD FRML MDRD: 93 ML/MIN/1.73SQ M
GLUCOSE SERPL-MCNC: 112 MG/DL (ref 65–140)
GLUCOSE SERPL-MCNC: 114 MG/DL (ref 65–140)
GLUCOSE SERPL-MCNC: 87 MG/DL (ref 65–140)
GLUCOSE SERPL-MCNC: 95 MG/DL (ref 65–140)
GLUCOSE UR STRIP-MCNC: NEGATIVE MG/DL
HCO3 BLDA-SCNC: 25.8 MMOL/L (ref 22–28)
HCT VFR BLD AUTO: 33.5 % (ref 36.5–49.3)
HGB BLD-MCNC: 10.2 G/DL (ref 12–17)
HGB UR QL STRIP.AUTO: ABNORMAL
HYALINE CASTS #/AREA URNS LPF: ABNORMAL /LPF
KETONES UR STRIP-MCNC: NEGATIVE MG/DL
L PNEUMO1 AG UR QL IA.RAPID: NEGATIVE
LACTATE SERPL-SCNC: 1.8 MMOL/L (ref 0.5–2)
LEUKOCYTE ESTERASE UR QL STRIP: NEGATIVE
MAGNESIUM SERPL-MCNC: 1.9 MG/DL (ref 1.9–2.7)
MCH RBC QN AUTO: 29.6 PG (ref 26.8–34.3)
MCHC RBC AUTO-ENTMCNC: 30.4 G/DL (ref 31.4–37.4)
MCV RBC AUTO: 97 FL (ref 82–98)
MUCOUS THREADS UR QL AUTO: ABNORMAL
NASAL CANNULA: 1
NITRITE UR QL STRIP: NEGATIVE
NON-SQ EPI CELLS URNS QL MICRO: ABNORMAL /HPF
O2 CT BLDA-SCNC: 14.6 ML/DL (ref 16–23)
OXYHGB MFR BLDA: 92.9 % (ref 94–97)
PCO2 BLDA: 38 MM HG (ref 36–44)
PH BLDA: 7.45 [PH] (ref 7.35–7.45)
PH UR STRIP.AUTO: 5 [PH]
PHOSPHATE SERPL-MCNC: 2.7 MG/DL (ref 2.3–4.1)
PLATELET # BLD AUTO: 123 THOUSANDS/UL (ref 149–390)
PMV BLD AUTO: 9.2 FL (ref 8.9–12.7)
PO2 BLDA: 73.8 MM HG (ref 75–129)
POTASSIUM SERPL-SCNC: 4 MMOL/L (ref 3.5–5.3)
PROCALCITONIN SERPL-MCNC: 0.6 NG/ML
PROT SERPL-MCNC: 5.8 G/DL (ref 6.4–8.4)
PROT UR STRIP-MCNC: NEGATIVE MG/DL
RBC # BLD AUTO: 3.45 MILLION/UL (ref 3.88–5.62)
RBC #/AREA URNS AUTO: ABNORMAL /HPF
S PNEUM AG UR QL: NEGATIVE
SODIUM SERPL-SCNC: 143 MMOL/L (ref 135–147)
SP GR UR STRIP.AUTO: <1.005 (ref 1–1.03)
SPECIMEN SOURCE: ABNORMAL
UROBILINOGEN UR STRIP-ACNC: <2 MG/DL
WBC # BLD AUTO: 5.58 THOUSAND/UL (ref 4.31–10.16)
WBC #/AREA URNS AUTO: ABNORMAL /HPF

## 2023-10-30 PROCEDURE — 84100 ASSAY OF PHOSPHORUS: CPT | Performed by: STUDENT IN AN ORGANIZED HEALTH CARE EDUCATION/TRAINING PROGRAM

## 2023-10-30 PROCEDURE — 81001 URINALYSIS AUTO W/SCOPE: CPT | Performed by: NURSE PRACTITIONER

## 2023-10-30 PROCEDURE — 83605 ASSAY OF LACTIC ACID: CPT | Performed by: STUDENT IN AN ORGANIZED HEALTH CARE EDUCATION/TRAINING PROGRAM

## 2023-10-30 PROCEDURE — 84484 ASSAY OF TROPONIN QUANT: CPT | Performed by: STUDENT IN AN ORGANIZED HEALTH CARE EDUCATION/TRAINING PROGRAM

## 2023-10-30 PROCEDURE — 71045 X-RAY EXAM CHEST 1 VIEW: CPT

## 2023-10-30 PROCEDURE — 85027 COMPLETE CBC AUTOMATED: CPT | Performed by: STUDENT IN AN ORGANIZED HEALTH CARE EDUCATION/TRAINING PROGRAM

## 2023-10-30 PROCEDURE — 82948 REAGENT STRIP/BLOOD GLUCOSE: CPT

## 2023-10-30 PROCEDURE — 99223 1ST HOSP IP/OBS HIGH 75: CPT | Performed by: INTERNAL MEDICINE

## 2023-10-30 PROCEDURE — 87040 BLOOD CULTURE FOR BACTERIA: CPT | Performed by: NURSE PRACTITIONER

## 2023-10-30 PROCEDURE — 94640 AIRWAY INHALATION TREATMENT: CPT

## 2023-10-30 PROCEDURE — 83735 ASSAY OF MAGNESIUM: CPT | Performed by: STUDENT IN AN ORGANIZED HEALTH CARE EDUCATION/TRAINING PROGRAM

## 2023-10-30 PROCEDURE — 80177 DRUG SCRN QUAN LEVETIRACETAM: CPT | Performed by: NURSE PRACTITIONER

## 2023-10-30 PROCEDURE — 94760 N-INVAS EAR/PLS OXIMETRY 1: CPT

## 2023-10-30 PROCEDURE — 84145 PROCALCITONIN (PCT): CPT | Performed by: NURSE PRACTITIONER

## 2023-10-30 PROCEDURE — 87449 NOS EACH ORGANISM AG IA: CPT | Performed by: NURSE PRACTITIONER

## 2023-10-30 PROCEDURE — 94002 VENT MGMT INPAT INIT DAY: CPT

## 2023-10-30 PROCEDURE — 82805 BLOOD GASES W/O2 SATURATION: CPT | Performed by: STUDENT IN AN ORGANIZED HEALTH CARE EDUCATION/TRAINING PROGRAM

## 2023-10-30 PROCEDURE — 99232 SBSQ HOSP IP/OBS MODERATE 35: CPT | Performed by: UROLOGY

## 2023-10-30 PROCEDURE — 83880 ASSAY OF NATRIURETIC PEPTIDE: CPT | Performed by: STUDENT IN AN ORGANIZED HEALTH CARE EDUCATION/TRAINING PROGRAM

## 2023-10-30 PROCEDURE — 99233 SBSQ HOSP IP/OBS HIGH 50: CPT | Performed by: STUDENT IN AN ORGANIZED HEALTH CARE EDUCATION/TRAINING PROGRAM

## 2023-10-30 PROCEDURE — 36600 WITHDRAWAL OF ARTERIAL BLOOD: CPT

## 2023-10-30 PROCEDURE — 80053 COMPREHEN METABOLIC PANEL: CPT | Performed by: STUDENT IN AN ORGANIZED HEALTH CARE EDUCATION/TRAINING PROGRAM

## 2023-10-30 RX ORDER — INSULIN LISPRO 100 [IU]/ML
1-6 INJECTION, SOLUTION INTRAVENOUS; SUBCUTANEOUS EVERY 6 HOURS SCHEDULED
Status: DISCONTINUED | OUTPATIENT
Start: 2023-10-30 | End: 2023-11-01

## 2023-10-30 RX ORDER — ENOXAPARIN SODIUM 100 MG/ML
100 INJECTION SUBCUTANEOUS EVERY 12 HOURS SCHEDULED
Status: DISCONTINUED | OUTPATIENT
Start: 2023-10-30 | End: 2023-11-02 | Stop reason: HOSPADM

## 2023-10-30 RX ORDER — ACETAMINOPHEN 650 MG/1
650 SUPPOSITORY RECTAL EVERY 4 HOURS PRN
Status: DISCONTINUED | OUTPATIENT
Start: 2023-10-30 | End: 2023-10-31

## 2023-10-30 RX ORDER — FUROSEMIDE 10 MG/ML
60 INJECTION INTRAMUSCULAR; INTRAVENOUS ONCE
Status: COMPLETED | OUTPATIENT
Start: 2023-10-30 | End: 2023-10-30

## 2023-10-30 RX ORDER — CHLORHEXIDINE GLUCONATE ORAL RINSE 1.2 MG/ML
15 SOLUTION DENTAL EVERY 12 HOURS SCHEDULED
Status: DISCONTINUED | OUTPATIENT
Start: 2023-10-30 | End: 2023-11-02 | Stop reason: HOSPADM

## 2023-10-30 RX ADMIN — AMPICILLIN SODIUM AND SULBACTAM SODIUM 3 G: 2; 1 INJECTION, POWDER, FOR SOLUTION INTRAMUSCULAR; INTRAVENOUS at 05:01

## 2023-10-30 RX ADMIN — AMPICILLIN SODIUM AND SULBACTAM SODIUM 3 G: 2; 1 INJECTION, POWDER, FOR SOLUTION INTRAMUSCULAR; INTRAVENOUS at 11:07

## 2023-10-30 RX ADMIN — LEVETIRACETAM 1500 MG: 500 TABLET, FILM COATED ORAL at 10:03

## 2023-10-30 RX ADMIN — PIPERACILLIN AND TAZOBACTAM 3.38 G: 3; .375 INJECTION, POWDER, LYOPHILIZED, FOR SOLUTION INTRAVENOUS at 20:20

## 2023-10-30 RX ADMIN — LEVETIRACETAM 1500 MG: 100 INJECTION, SOLUTION INTRAVENOUS at 22:39

## 2023-10-30 RX ADMIN — PANTOPRAZOLE SODIUM 40 MG: 40 TABLET, DELAYED RELEASE ORAL at 05:01

## 2023-10-30 RX ADMIN — IPRATROPIUM BROMIDE 0.5 MG: 0.5 SOLUTION RESPIRATORY (INHALATION) at 07:23

## 2023-10-30 RX ADMIN — IPRATROPIUM BROMIDE 0.5 MG: 0.5 SOLUTION RESPIRATORY (INHALATION) at 20:01

## 2023-10-30 RX ADMIN — ENOXAPARIN SODIUM 100 MG: 100 INJECTION SUBCUTANEOUS at 17:51

## 2023-10-30 RX ADMIN — LEVALBUTEROL HYDROCHLORIDE 1.25 MG: 1.25 SOLUTION RESPIRATORY (INHALATION) at 20:01

## 2023-10-30 RX ADMIN — VANCOMYCIN HYDROCHLORIDE 2000 MG: 1 INJECTION, POWDER, LYOPHILIZED, FOR SOLUTION INTRAVENOUS at 17:48

## 2023-10-30 RX ADMIN — DEXAMETHASONE 4 MG: 2 TABLET ORAL at 10:03

## 2023-10-30 RX ADMIN — CHLORHEXIDINE GLUCONATE 15 ML: 1.2 SOLUTION ORAL at 17:50

## 2023-10-30 RX ADMIN — CHLORHEXIDINE GLUCONATE 15 ML: 1.2 SOLUTION ORAL at 22:39

## 2023-10-30 RX ADMIN — ACETAMINOPHEN 650 MG: 650 SUPPOSITORY RECTAL at 16:49

## 2023-10-30 RX ADMIN — POLYETHYLENE GLYCOL 3350 17 G: 17 POWDER, FOR SOLUTION ORAL at 10:03

## 2023-10-30 RX ADMIN — PIPERACILLIN AND TAZOBACTAM 3.38 G: 3; .375 INJECTION, POWDER, LYOPHILIZED, FOR SOLUTION INTRAVENOUS at 16:44

## 2023-10-30 RX ADMIN — LEVALBUTEROL HYDROCHLORIDE 1.25 MG: 1.25 SOLUTION RESPIRATORY (INHALATION) at 13:17

## 2023-10-30 RX ADMIN — FINASTERIDE 5 MG: 5 TABLET, FILM COATED ORAL at 10:04

## 2023-10-30 RX ADMIN — IPRATROPIUM BROMIDE 0.5 MG: 0.5 SOLUTION RESPIRATORY (INHALATION) at 13:17

## 2023-10-30 RX ADMIN — LEVALBUTEROL HYDROCHLORIDE 1.25 MG: 1.25 SOLUTION RESPIRATORY (INHALATION) at 07:23

## 2023-10-30 RX ADMIN — DOCUSATE SODIUM 100 MG: 100 CAPSULE, LIQUID FILLED ORAL at 10:03

## 2023-10-30 RX ADMIN — ENOXAPARIN SODIUM 100 MG: 100 INJECTION SUBCUTANEOUS at 22:39

## 2023-10-30 RX ADMIN — FUROSEMIDE 60 MG: 10 INJECTION, SOLUTION INTRAMUSCULAR; INTRAVENOUS at 13:41

## 2023-10-30 NOTE — ASSESSMENT & PLAN NOTE
In the setting of Avastin which was started earlier this month for radiation necrosis surrounding brain metastasis  Completed a course of lasix outpatient  Admission UA with only trace proteinuria    Plan:    Wife states his anasarca has been worsening in the past 2 weeks   Now with increased WOB on bipap, rhonchi   Lasix, bipap as above

## 2023-10-30 NOTE — ASSESSMENT & PLAN NOTE
Dx initially 09/2021 stage IIIC. Received multiple rounds of chemo/radiation. Found to have brain mets 02/2023 to the left precentral gyrus. Cancer upstaged to stage IV. Currently on daily dexamethasone, Lovenox for [facility] as well as a history of DVT. Currently receiving combination Avastin and Pembrolizumab at the infusion center. Last chemo 10/18/2023. Avastatin is a new medication for the patient. 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 Unlikely to benefit from ASEPT catheter    Plan:  Continue with home dose dexamethasone and resumed Lovenox for DVT   Extensive conversation with patient and wife regarding code status. Patient and wife have changed to DNR which appears appropriate given metastatic malignancy. Palliative care consult appreciated.   Recommend follow up outpatient with oncology and pulm after discharge

## 2023-10-30 NOTE — ASSESSMENT & PLAN NOTE
POA: increased WOB in the ED requiring the initiation of BIPAP  Hx of Stage IV lung CA with mets. 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 Completed a short course of PO lasix outpatient  10/23 CTA PE Study: No evidence for acute pulmonary emboli. Densely consolidated right lower lobe and right upper lobe. Aeration remaining in the right middle lobe. See above for further description and comparison. Significant mass effect on the right lower lung secondary to large mixed attenuation masslike pleural abnormality described above. 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 Stable incompletely imaged right neck mass presumably representing metastatic adenopathy. Stable hypodensities within the liver.   RVP negative  Weaned off of bipap    Plan:  Currenlty on 1L NC  Pt is volume overloaded and sounds wet  Repeat CXR, ABG, am labs and give lasix 60 mg x1  Titrate O2 to maintain spO2 > 92%  Pulmonary medicine following, appreciate recommendations     Dispo: [facility] AT [facility]

## 2023-10-30 NOTE — ASSESSMENT & PLAN NOTE
2/2 blood cultures with pasteurella. ID consulted. Continue Cefepime -> Unasyn.  Augmentin at dc till 11/6

## 2023-10-30 NOTE — PROGRESS NOTES
[address] [facility]  Progress Note  Name: Galen Gaines  MRN: [id number]  Unit/Bed#: -01 I Date of Admission: 10/23/2023   Date of Service: 10/30/2023 I Hospital Day: 7    Assessment/Plan   * Acute on Chronic Hypoxemic respiratory failure, ([address])  Assessment & Plan  POA: increased WOB in the ED requiring the initiation of BIPAP  Hx of Stage IV lung CA with mets. 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 Completed a short course of PO lasix outpatient  10/23 CTA PE Study: No evidence for acute pulmonary emboli. Densely consolidated right lower lobe and right upper lobe. Aeration remaining in the right middle lobe. See above for further description and comparison. Significant mass effect on the right lower lung secondary to large mixed attenuation masslike pleural abnormality described above. 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 Stable incompletely imaged right neck mass presumably representing metastatic adenopathy. Stable hypodensities within the liver. RVP negative  Weaned off of bipap    Plan:  Currenlty on 1L NC  Pt is volume overloaded and sounds wet  Repeat CXR, ABG, am labs and give lasix 60 mg x1  Titrate O2 to maintain spO2 > 92%  Pulmonary medicine following, appreciate recommendations     Gram-negative bacteremia  Assessment & Plan  2/2 blood cultures with pasteurella. ID consulted. Continue Cefepime -> Unasyn. Augmentin at dc till 11/6    Sepsis [facility])  Assessment & Plan  SIRS: Tachycardia, tachypnea, fever  10/23 CTA PE Study: No evidence for acute pulmonary emboli. Densely consolidated right lower lobe and right upper lobe. Aeration remaining in the right middle lobe. See above for further description and comparison.  Significant mass effect on the right lower lung secondary to large mixed attenuation masslike pleural abnormality described above. 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 Stable incompletely imaged right neck mass presumably representing metastatic adenopathy. Stable hypodensities within the liver. UA negative for UTI  Blood cultures - pasteurella. Lactate 5.1 --> 2.6 --> 2.4  30 ml/kg were not given due to concern for fluid overload  Procal 0.18    Plan:  Continue Unasyn. Complete abx through 11/6 per ID. Augmentin at dc.     culture results  Blood cultures with pasteurella multicoda. apprec infectious disease. Repeat blood cultures - no growth. Trend fever curve and WBC count    BPH (benign prostatic hyperplasia)  Assessment & Plan  Continue home Flomax  Pt now with retention, follow urinary retention protocol  Per urology cadet placed. Hematuria noted, possibly in setting of thrombocytopenia, traumatic straight cath. Urine clear no clots. Resumed Lovenox. Monitor for hematuria  Discharge with cadet    GERD (gastroesophageal reflux disease)  Assessment & Plan  Continue PPI    Seizure [facility])  Assessment & Plan  By history  Last seen 9/25 in [address] [place] [address] ED for focal seizure following chemotherapy  Home rgimen: Keppra 1500 mg PO BID    Plan:  Continue with Keppra 1500 mg BID  Check Keppra level  Q4h neuro checks    Anasarca  Assessment & Plan  Likely 2/2 Avastin which was started earlier this month for radiation necrosis surrounding brain metastasis  Completed a course of lasix outpatient  Hold off on further diuretics  UA with only trace proteinuria  Will need further discussion with medical oncology regarding further Avastin dosing outpatient  RUE US negative for DVT. Probably edema 2/2 large malignant R neck mass. Giving lasix 60 IV x1 due to resp distress.  May need to be discharged on daily po lasix    Mixed hyperlipidemia  Assessment & Plan  Continue home statin    Brain metastasis  Assessment & Plan  Brain metastasis first diagnosed in February 2023 s/p SBRT  Currently on Keppra for focal seizures in 8/2023 due to brain metastasis and Decadron for surrounding vasogenic edema  Pt has been unable to be weaned from decadron due to decreased functional status and worsening lethargy with attempts of weaning  10/2 MRI brain: Metastatic adenocarcinoma of the lung status post chemotherapy. Left posterior frontal lobe intracranial metastases (status post SBRT 2/22/2023) increased in size compared to 7/26/2023 with progression of surrounding vasogenic edema. There is no new suspicious intra-axial lesion. 10/4 started on Avastin due to concern for radiation necrosis  Recently developed significant peripheral edema, likely secondary to Avastin  Completed a short course of lasix outpatient    Plan:  Pt currently at baseline neuro status as documented in outpatient Neurology notes  Routine neuro checks  Continue Keppra       Adenocarcinoma of lung  Assessment & Plan  Dx initially 09/2021 stage IIIC. Received multiple rounds of chemo/radiation. Found to have brain mets 02/2023 to the left precentral gyrus. Cancer upstaged to stage IV. Currently on daily dexamethasone, Lovenox for [facility] as well as a history of DVT. Currently receiving combination Avastin and Pembrolizumab at the infusion center. Last chemo 10/18/2023. Avastatin is a new medication for the patient. 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 Unlikely to benefit from ASEPT catheter    Plan:  Continue with home dose dexamethasone and resumed Lovenox for DVT   Extensive conversation with patient and wife regarding code status. Patient and wife have changed to DNR which appears appropriate given metastatic malignancy. Palliative care consult appreciated.   Recommend follow up outpatient with oncology and pulm after discharge    Bilateral lower extremity DVTs  Assessment & Plan  Resumed home Lovenox VTE Pharmacologic Prophylaxis:   Moderate Risk (Score 3-4) - Pharmacological DVT Prophylaxis Ordered: enoxaparin (Lovenox). Patient Centered Rounds: I performed bedside rounds with nursing staff today. Discussions with Specialists or Other Care Team Provider: urology, pulm, CM, ID, Pt, Ot, ST, oncology    Education and Discussions with Family / Patient: Updated  (wife) at bedside. Total Time Spent on Date of Encounter in care of patient: 45 mins. This time was spent on one or more of the following: performing physical exam; counseling and coordination of care; obtaining or reviewing history; documenting in the medical record; reviewing/ordering tests, medications or procedures; communicating with other healthcare professionals and discussing with patient's family/caregivers. Current Length of Stay: 7 day(s)  Current Patient Status: Inpatient   Certification Statement: The patient will continue to require additional inpatient hospital stay due to resp distress, hematuria, initiation of lovenox  Discharge Plan: Anticipate discharge in 24-48 hrs to home with home services. Code Status: Level 3 - DNAR and DNI    Subjective:   Rayo Martinez was seen and examined at bedside. No acute events overnight. Discussed plan of care. All questions and concerns were answered and addressed. Wife is concerned about him staying till Wednesday. They thought that they would be discharged today. Discussed that we would need to resume Lovenox prior to him being discharged stated that he could earliest be discharged tomorrow. Patient having volume overload. Patient sounding wet with a cough. Discussed initiating Lasix. Later on in the day patient having respiratory distress. Chest x-ray ABG and IV Lasix 60 would be administered. Still waiting on and labs. Respiratory therapy called for assistance for nebulizer treatments.   May need BiPAP-> pt moving to ICU care and SD1    Objective:     Vitals:   Temp (24hrs), Av.2 °F (36.8 °C), Min:97 °F (36.1 °C), Max:99 °F (37.2 °C)    Temp:  [97 °F (36.1 °C)-99 °F (37.2 °C)] 97 °F (36.1 °C)  HR:  [] 92  Resp:  [20] 20  BP: (110-140)/(75-90) 116/75  SpO2:  [90 %-96 %] 95 %  Body mass index is 34.02 kg/m². Input and Output Summary (last 24 hours): Intake/Output Summary (Last 24 hours) at 10/30/2023 1316  Last data filed at 10/30/2023 0501  Gross per 24 hour   Intake 360 ml   Output 600 ml   Net -240 ml       Physical Exam:   Physical Exam  Vitals and nursing note reviewed. Constitutional:       General: He is in acute distress. Appearance: He is obese. He is ill-appearing. HENT:      Head: Normocephalic and atraumatic. Cardiovascular:      Rate and Rhythm: Normal rate and regular rhythm. Pulses: Normal pulses. Heart sounds: Normal heart sounds. Pulmonary:      Effort: Respiratory distress present. Breath sounds: Rhonchi and rales present. Comments: 2L NC-> now requiring bipap  Abdominal:      General: Abdomen is flat. Bowel sounds are normal.      Palpations: Abdomen is soft. Musculoskeletal:      Right lower leg: Edema present. Left lower leg: Edema present. Comments: RUE and LUE edema   Skin:     General: Skin is warm. Neurological:      General: No focal deficit present. Mental Status: He is alert. He is disoriented. Additional Data:     Labs:  Results from last 7 days   Lab Units 10/29/23  0407 10/27/23  0347 10/26/23  0333   WBC Thousand/uL 5.35   < > 5.53   HEMOGLOBIN g/dL 9.6*   < > 9.1*   HEMATOCRIT % 32.3*   < > 30.2*   PLATELETS Thousands/uL 138*   < > 107*   BANDS PCT % 4  --   --    NEUTROS PCT %  --   --  89*   LYMPHS PCT %  --   --  7*   LYMPHO PCT % 3*   < >  --    MONOS PCT %  --   --  3*   MONO PCT % 2*   < >  --    EOS PCT % 4   < > 0    < > = values in this interval not displayed.      Results from last 7 days   Lab Units 10/29/23  0407   SODIUM mmol/L 142   POTASSIUM mmol/L 4.3 CHLORIDE mmol/L 111*   CO2 mmol/L 26   BUN mg/dL 19   CREATININE mg/dL 0.65   ANION GAP mmol/L 5   CALCIUM mg/dL 8.8   ALBUMIN g/dL 3.0*   TOTAL BILIRUBIN mg/dL 0.37   ALK PHOS U/L 36   ALT U/L 14   AST U/L 13   GLUCOSE RANDOM mg/dL 131           Results from last 7 days   Lab Units 10/30/23  1113 10/30/23  0734 10/29/23  1617 10/29/23  1505 10/29/23  1037 10/29/23  0716 10/28/23  1612 10/28/23  1153 10/28/23  0717 10/27/23  1654 10/27/23  1100 10/27/23  0732   POC GLUCOSE mg/dl 114 87 96 103 117 113 205* 129 100 129 130 93         Results from last 7 days   Lab Units 10/24/23  0612 10/23/23  2023 10/23/23  1703 10/23/23  1406   LACTIC ACID mmol/L  --  1.9 2.6* 2.4*   PROCALCITONIN ng/ml 21.26*  --   --   --        Lines/Drains:  Invasive Devices       Peripheral Intravenous Line  Duration             Peripheral IV 10/27/23 Dorsal (posterior); Right Wrist 3 days              Drain  Duration             Urethral Catheter 16 Fr. 2 days                  Urinary Catheter:  Goal for removal: N/A- Discharging with [place]               Imaging: Reviewed radiology reports from this admission including: chest xray    Recent Cultures (last 7 days):   Results from last 7 days   Lab Units 10/26/23  0642 10/26/23  0400 10/25/23  0053 10/24/23  0219   BLOOD CULTURE  No Growth at 72 hrs.  No Growth at 72 hrs.  --   --    SPUTUM CULTURE   --   --  2+ Growth of Candida glabrata*  2+ Growth of  --    GRAM STAIN RESULT   --   --  1+ Epithelial Cells*  No polys seen*  1+ Gram negative rods*  --    LEGIONELLA URINARY ANTIGEN   --   --   --  Negative       Last 24 Hours Medication List:   Current Facility-Administered Medications   Medication Dose Route Frequency Provider Last Rate    acetaminophen  650 mg Oral Q6H PRN Maryann Campos MD      ampicillin-sulbactam  3 g Intravenous Q6H Josias Castellano MD 3 g (10/30/23 1107)    atorvastatin  40 mg Oral Daily With CRISTOFER Tai      benzonatate  100 mg Oral TID PRN Ulisses Dowling PA-C      dexamethasone  4 mg Oral Q12H [address], CRNP      docusate sodium  100 mg Oral BID Km Reynolds MD      enoxaparin  100 mg Subcutaneous Q12H [address] Angella Levin MD      finasteride  5 mg Oral Daily Marianela Polanco PA-C      furosemide  60 mg Intravenous Once Angella Levin MD      hydrOXYzine HCL  25 mg Oral Q6H PRN Km Reynolds MD      insulin lispro  1-6 Units Subcutaneous TID AC CRISTOFER Justice      ipratropium  0.5 mg Nebulization Q6H PRN Km Reynolsd MD      ipratropium  0.5 mg Nebulization TID Km Reynolds MD      levalbuterol  1.25 mg Nebulization Q6H PRN Km Reynolds MD      levalbuterol  1.25 mg Nebulization TID Km Reynolds MD      levETIRAcetam  1,500 mg Oral Q12H [address], CRNP      melatonin  6 mg Oral HS Audrey Murcia PA-C      pantoprazole  40 mg Oral Early Morning CRISTOFER Justice      polyethylene glycol  17 g Oral Daily Km Reynolds MD      senna  1 tablet Oral HS Km Reynolds MD      tamsulosin  0.4 mg Oral Daily With [address] [address], CRNP          Today, Patient Was Seen By: Angella Levin MD    **Please Note: This note may have been constructed using a voice recognition system. **

## 2023-10-30 NOTE — ASSESSMENT & PLAN NOTE
10/23 POA: increased WOB in the ED requiring the initiation of BIPAP  Hx of Stage IV lung CA with mets. 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 Completed a short course of PO lasix outpatient for generalized swelling   10/23 CTA PE Study: No evidence for acute pulmonary emboli. Densely consolidated right lower lobe and right upper lobe. Aeration remaining in the right middle lobe. Significant mass effect on the right lower lung secondary to large mixed attenuation masslike pleural abnormality. 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 Stable incompletely imaged right neck mass presumably representing metastatic adenopathy. Stable hypodensities within the liver.     10/30 upgrade to ICU for tachypnea, tachycardia, diffuse rhonchi, cyanosis of nose and lips, generalized pitting edema   Concern for volume overload   April 2023: EF 55%, g1dd   Bipap 12/8 for increased WOB, diffuse rhonchi   Lasix 60mg IV x1 by SLIM     Plan:  AOC respiratory failure with increased WOB 10/20 with concern for volume overload   Cont bipap 12/8 -- wean as tolerated for improved WOB   Goal spo2 > 90%

## 2023-10-30 NOTE — PROGRESS NOTES
Progress Note - Urology  Robin Cruz 68 y.o. male MRN: [id number]  Unit/Bed#: -01 Encounter: [id number]    ASSESSMENT:  67 y/o M with UR likely 2/2 BPH  -AF, VSS, mild tachycardia, on 1L O2  -Hgb remains stable around 9 since admission (anemia of chronic disease baseline 9-11)  -UO clear yellow, no blood or clots  -UO 600cc    PLAN:  -maintain cadet catheter  -continue flomax/finsateride  -op fu with urology for voiding trial  -d/w urology AP on TT  -urology will signoff at this time, reconsult/reach out with any questions/changes. SUBJECTIVE:  No acute complaints. Cadet output has been clear. Denies any abdominal/back pain. Tolerating a diet. OBJECTIVE:   Vitals:  Blood pressure 116/75, pulse 92, temperature (!) 97 °F (36.1 °C), resp. rate 20, height 5' 9" (1.753 m), weight 104 kg (230 lb 6.1 oz), SpO2 95 %. ,Body mass index is 34.02 kg/m². I/Os:    Intake/Output Summary (Last 24 hours) at 10/30/2023 1006  Last data filed at 10/30/2023 0501  Gross per 24 hour   Intake 360 ml   Output 600 ml   Net -240 ml       Lines/Drains:  Invasive Devices       Peripheral Intravenous Line  Duration             Peripheral IV 10/27/23 Dorsal (posterior); Right Wrist 3 days              Drain  Duration             Urethral Catheter 16 Fr. 2 days                    Physical Exam  Vitals reviewed. Constitutional:       General: He is not in acute distress. Appearance: He is not toxic-appearing. Cardiovascular:      Rate and Rhythm: Normal rate. Pulmonary:      Effort: Pulmonary effort is normal.      Comments: Receiving breathing treatment  Abdominal:      General: There is no distension. Palpations: Abdomen is soft. Tenderness: There is no abdominal tenderness. There is no guarding. Hernia: No hernia is present. Genitourinary:     Comments: Cadet catheter patent draining clear yellow urine        Diagnostics:  I have personally reviewed pertinent lab results.      XR chest portable - 1 view    Result Date: 10/23/2023  Impression: Persistent right-sided loculated pleural effusion with superimposed consolidation better visualized on same day CT study. Groundglass opacification in the left lower lung zone, likely to represent atelectasis. Resident: Glenis Fitch, the attending radiologist, have reviewed the images and agree with the final report above. Workstation performed: [id number]     CTA ED chest PE study    Result Date: 10/23/2023  Impression: No evidence for acute pulmonary emboli. Densely consolidated right lower lobe and right upper lobe. Aeration remaining in the right middle lobe. See above for further description and comparison. Significant mass effect on the right lower lung secondary to large mixed attenuation masslike pleural abnormality described above. 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 Stable incompletely imaged right neck mass presumably representing metastatic adenopathy. Stable hypodensities within the liver. Workstation performed: [id number]     PE Study with CT Abdomen and Pelvis with contrast    Result Date: 10/23/2023  Impression: 1. Heterogeneous opacification of the right-sided pulmonary arteries may be due to artifact, but cannot exclude a pulmonary embolus particularly in the right pulmonary artery. Recommend repeating the CTA of the chest. There is no large saddle embolus or  CT findings of right heart strain. 2.  Mildly increased right lung consolidation and groundglass opacities in the left lower lobe. Cannot exclude superimposed pneumonia. 3.  Right pleural thickening and nodularity with a moderate size loculated complex collection, suspicious for pleural involvement by metastases. 4.  Partially imaged mass in the right neck suspicious for darien metastases. Medical record indicates a CT of the neck has been ordered to evaluate this finding but not yet performed.  I personally [facility] this [facility] on 10/23/2023 11:35 AM. Workstation performed: [id number]     Recent Results (from the past 36 hour(s))   CBC and differential    Collection Time: 10/29/23  4:07 AM   Result Value Ref Range    WBC 5.35 4.31 - 10.16 Thousand/uL    RBC 3.26 (L) 3.88 - 5.62 Million/uL    Hemoglobin 9.6 (L) 12.0 - 17.0 g/dL    Hematocrit 32.3 (L) 36.5 - 49.3 %    MCV 99 (H) 82 - 98 fL    MCH 29.4 26.8 - 34.3 pg    MCHC 29.7 (L) 31.4 - 37.4 g/dL    RDW 22.5 (H) 11.6 - 15.1 %    MPV 10.5 8.9 - 12.7 fL    Platelets 049 (L) 546 - 390 Thousands/uL   Comprehensive metabolic panel    Collection Time: 10/29/23  4:07 AM   Result Value Ref Range    Sodium 142 135 - 147 mmol/L    Potassium 4.3 3.5 - 5.3 mmol/L    Chloride 111 (H) 96 - 108 mmol/L    CO2 26 21 - 32 mmol/L    ANION GAP 5 mmol/L    BUN 19 5 - 25 mg/dL    Creatinine 0.65 0.60 - 1.30 mg/dL    Glucose 131 65 - 140 mg/dL    Calcium 8.8 8.4 - 10.2 mg/dL    Corrected Calcium 9.6 8.3 - 10.1 mg/dL    AST 13 13 - 39 U/L    ALT 14 7 - 52 U/L    Alkaline Phosphatase 36 34 - 104 U/L    Total Protein 5.4 (L) 6.4 - 8.4 g/dL    Albumin 3.0 (L) 3.5 - 5.0 g/dL    Total Bilirubin 0.37 0.20 - 1.00 mg/dL    eGFR 97 ml/min/1.73sq m   Manual Differential(PHLEBS Do Not Order)    Collection Time: 10/29/23  4:07 AM   Result Value Ref Range    Segmented % 86 (H) 43 - 75 %    Bands % 4 0 - 8 %    Lymphocytes % 3 (L) 14 - 44 %    Monocytes % 2 (L) 4 - 12 %    Eosinophils, % 4 0 - 6 %    Basophils % 0 0 - 1 %    Metamyelocytes% 1 0 - 1 %    Absolute Neutrophils 4.81 1.85 - 7.62 Thousand/uL    Lymphocytes Absolute 0.16 (L) 0.60 - 4.47 Thousand/uL    Monocytes Absolute 0.11 0.00 - 1.22 Thousand/uL    Eosinophils Absolute 0.21 0.00 - 0.40 Thousand/uL    Basophils Absolute 0.00 0.00 - 0.10 Thousand/uL    Total Counted      nRBC 5 (H) 0 - 2 /100 WBC    Platelet Estimate Borderline (A) Adequate    Poikilocytes Present     Polychromasia Present    Fingerstick Glucose (POCT)    Collection Time: 10/29/23  7:16 AM   Result Value Ref Range    POC Glucose 113 65 - 140 mg/dl   Fingerstick Glucose (POCT)    Collection Time: 10/29/23 10:37 AM   Result Value Ref Range    POC Glucose 117 65 - 140 mg/dl   Fingerstick Glucose (POCT)    Collection Time: 10/29/23  3:05 PM   Result Value Ref Range    POC Glucose 103 65 - 140 mg/dl   Fingerstick Glucose (POCT)    Collection Time: 10/29/23  4:17 PM   Result Value Ref Range    POC Glucose 96 65 - 140 mg/dl   Fingerstick Glucose (POCT)    Collection Time: 10/30/23  7:34 AM   Result Value Ref Range    POC Glucose 87 65 - 140 mg/dl       Current Medications:  Scheduled Meds:  Current Facility-Administered Medications   Medication Dose Route Frequency Provider Last Rate    acetaminophen  650 mg Oral Q6H PRN Jose Luis Harris MD      ampicillin-sulbactam  3 g Intravenous Q6H Dee Sumner MD 3 g (10/30/23 0501)    atorvastatin  40 mg Oral Daily With CIRSTOFER Engel      benzonatate  100 mg Oral TID PRN Sherrlyn Angelucci, PA-C      dexamethasone  4 mg Oral Q12H [address] [facility], CRSITOFER      docusate sodium  100 mg Oral BID Jose Luis Harris MD      finasteride  5 mg Oral Daily Rodo Birch PA-C      hydrOXYzine HCL  25 mg Oral Q6H PRN Jose Luis Harris MD      insulin lispro  1-6 Units Subcutaneous TID [facility] CRISTOFER Baeza      ipratropium  0.5 mg Nebulization Q6H PRN Jose Luis aHrris MD      ipratropium  0.5 mg Nebulization TID Jose Luis Harris MD      levalbuterol  1.25 mg Nebulization Q6H PRN Jose Luis Harris MD      levalbuterol  1.25 mg Nebulization TID Jose Luis Harris MD      levETIRAcetam  1,500 mg Oral Q12H [address], CRISTOFER      melatonin  6 mg Oral HS Solange Funk PA-C      pantoprazole  40 mg Oral Early Morning CRISTOFER Baeza      polyethylene glycol  17 g Oral Daily Jose Luis Harris MD      senna  1 tablet Oral HS Jose Luis Harris MD      tamsulosin  0.4 mg Oral Daily With CRISTOFER Dasilva       Continuous Infusions:   PRN Meds:    acetaminophen    benzonatate    hydrOXYzine HCL    ipratropium    levalbuterol      YONATAN Fuentes  10/30/2023

## 2023-10-30 NOTE — ASSESSMENT & PLAN NOTE
Dx initially 09/2021 stage IIIC. Received multiple rounds of chemo/radiation. Found to have brain mets 02/2023 to the left precentral gyrus. Cancer upstaged to stage IV. Currently on daily dexamethasone, Lovenox for [facility] as well as a history of DVT. 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  Unlikely to benefit from ASEPT catheter    Plan:  Continue with home dose dexamethasone   Palliative care consult

## 2023-10-30 NOTE — ASSESSMENT & PLAN NOTE
Brain metastasis first diagnosed in February 2023 s/p SBRT  Currently on Keppra for focal seizures in 8/2023 due to brain metastasis and Decadron for surrounding vasogenic edema  Pt has been unable to be weaned from decadron due to decreased functional status and worsening lethargy with attempts of weaning  10/2 MRI brain: Metastatic adenocarcinoma of the lung status post chemotherapy. Left posterior frontal lobe intracranial metastases (status post SBRT 2/22/2023) increased in size compared to 7/26/2023 with progression of surrounding vasogenic edema. There is no new suspicious intra-axial lesion.   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

## 2023-10-30 NOTE — PROGRESS NOTES
Shane Bajwa is a 68 y.o. male who is currently ordered Vancomycin IV with management by the Pharmacy Consult service. Relevant clinical data and objective / subjective history reviewed. Vancomycin Assessment:  Indication and Goal AUC/Trough: Bacteremia (goal -600, trough >10), -600, trough >10  Clinical Status: worsening  Micro:     Renal Function:  SCr: 0.71 mg/dL  CrCl: 111.6 mL/min  Renal replacement: Not on dialysis  Days of Therapy: 1  Current Dose: 2000mg IV loading dose  Vancomycin Plan:  New Dosinmg IV Q12H  Estimated AUC: 484 mcg*hr/mL  Estimated Trough: 15.3 mcg/mL  Next Level: With morning labs at approximately 0530 on 23  Renal Function Monitoring: Daily BMP and [place] will continue to follow closely for s/sx of nephrotoxicity, infusion reactions and appropriateness of therapy. BMP and CBC will be ordered per protocol. We will continue to follow the patient’s culture results and clinical progress daily.     Papi Olson, Pharmacist

## 2023-10-30 NOTE — ASSESSMENT & PLAN NOTE
Brain metastasis first diagnosed in February 2023 s/p SBRT  Currently on Keppra for focal seizures in 8/2023 due to brain metastasis and Decadron for surrounding vasogenic edema  Pt has been unable to be weaned from decadron due to decreased functional status and worsening lethargy with attempts of weaning  10/2 MRI brain: Metastatic adenocarcinoma of the lung status post chemotherapy. Left posterior frontal lobe intracranial metastases (status post SBRT 2/22/2023) increased in size compared to 7/26/2023 with progression of surrounding vasogenic edema. There is no new suspicious intra-axial lesion.   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

## 2023-10-30 NOTE — ASSESSMENT & PLAN NOTE
By history  Last seen 9/25 in [address] [place] [address] ED for focal seizure following chemotherapy  Home rgimen: Keppra 1500 mg PO BID    Plan:  Continue with Keppra 1500 mg BID  Check Keppra level  Q4h neuro checks

## 2023-10-30 NOTE — ASSESSMENT & PLAN NOTE
By history  Last seen 9/25 in [address] [place] [address] ED for focal seizure following chemotherapy  Home rgimen: Keppra 1500 mg PO BID    Plan:  Continue with Keppra 1500 mg BID  Seizure precautions

## 2023-10-30 NOTE — ASSESSMENT & PLAN NOTE
SIRS: Tachycardia, tachypnea, fever  10/23 CTA PE Study: No evidence for acute pulmonary emboli. Densely consolidated right lower lobe and right upper lobe. Aeration remaining in the right middle lobe. See above for further description and comparison. Significant mass effect on the right lower lung secondary to large mixed attenuation masslike pleural abnormality described above. 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 Stable incompletely imaged right neck mass presumably representing metastatic adenopathy. Stable hypodensities within the liver. UA negative for UTI  Blood cultures - pasteurella. Lactate 5.1 --> 2.6 --> 2.4  30 ml/kg were not given due to concern for fluid overload  Procal 0.18    Plan:  Continue Unasyn. Complete abx through 11/6 per ID. Augmentin at dc.     culture results  Blood cultures with pasteurella multicoda. apprec infectious disease. Repeat blood cultures - no growth.   Trend fever curve and WBC count

## 2023-10-30 NOTE — ASSESSMENT & PLAN NOTE
Lab Results   Component Value Date    EGFR 93 10/30/2023    EGFR 97 10/29/2023    EGFR 94 10/28/2023    CREATININE 0.71 10/30/2023    CREATININE 0.65 10/29/2023    CREATININE 0.70 10/28/2023     Baseline creat appears to be 0.7-1  Current creat 0.71  [place] placed during admission for urinary retention  Urology following, for voiding trial as OP   S/p Lasix 60mg IV x1 on transfer to ICU   Trend creat, UO   Avoid hypotension, nephrotoxic agents

## 2023-10-30 NOTE — CONSULTS
[facility]  Consult  Name: Aleah Davis 68 y.o. male I MRN: [id number]  Unit/Bed#: -01 I Date of Admission: 10/23/2023   Date of Service: 10/30/2023 I Hospital Day: 7    Consults    Assessment/Plan   * Acute on Chronic Hypoxemic respiratory failure, ([address])  Assessment & Plan  10/23 POA: increased WOB in the ED requiring the initiation of BIPAP  Hx of Stage IV lung CA with mets. 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 Completed a short course of PO lasix outpatient for generalized swelling   10/23 CTA PE Study: No evidence for acute pulmonary emboli. Densely consolidated right lower lobe and right upper lobe. Aeration remaining in the right middle lobe. Significant mass effect on the right lower lung secondary to large mixed attenuation masslike pleural abnormality. 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 Stable incompletely imaged right neck mass presumably representing metastatic adenopathy. Stable hypodensities within the liver.     10/30 upgrade to ICU for tachypnea, tachycardia, diffuse rhonchi, cyanosis of nose and lips, generalized pitting edema   Concern for volume overload   April 2023: EF 55%, g1dd   Bipap 12/8 for increased WOB, diffuse rhonchi   Lasix 60mg IV x1 by SLIM     Plan:  AOC respiratory failure with increased WOB 10/20 with concern for volume overload   Cont bipap 12/8 -- wean as tolerated for improved WOB   Goal spo2 > 90%    Brain metastasis  Assessment & Plan  Brain metastasis first diagnosed in February 2023 s/p SBRT  Currently on Keppra for focal seizures in 8/2023 due to brain metastasis and Decadron for surrounding vasogenic edema  Pt has been unable to be weaned from decadron due to decreased functional status and worsening lethargy with attempts of weaning  10/2 MRI brain: Metastatic adenocarcinoma of the lung status post chemotherapy. Left posterior frontal lobe intracranial metastases (status post SBRT 2/22/2023) increased in size compared to 7/26/2023 with progression of surrounding vasogenic edema. There is no new suspicious intra-axial lesion. 10/4 started on Avastin due to concern for radiation necrosis  Recently developed significant peripheral edema, likely secondary to Avastin  Completed a short course of lasix outpatient PTA, however wife as described continued development of edema     Plan:  Pt currently at baseline neuro status as documented in outpatient Neurology notes  Scheduled  neuro checks  Continue Keppra  Initial keppra level 58.8, does not appear keppra was held since admission -- send repeat level     Sepsis [facility])  Assessment & Plan  SIRS: Tachycardia, tachypnea, fever  UA negative for UTI  10/23 BC 2/2 + Pasteurella   10/26 BC 2/2 NG x48H  Urine strep/legionella and MRSA neg   Lactic cleared without IVF bolus initially 2/2 concern for volume overload   30 ml/kg were not given due to concern for fluid overload  Procal 0.18    Plan:  Abx D8: Vanc, Zosyn D1  ID following previously, have been reengaged in the setting of SIRS with temp 102.9, , tachypnea on bipap   Likely source: his cat   Trend fever curve and WBC count    Adenocarcinoma of lung  Assessment & Plan  Dx initially 09/2021 stage IIIC. Received multiple rounds of chemo/radiation. Found to have brain mets 02/2023 to the left precentral gyrus. Cancer upstaged to stage IV. Currently on daily dexamethasone, Lovenox for [facility] as well as a history of DVT. 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  Unlikely to benefit from ASEPT catheter    Plan:  Continue with home dose dexamethasone   Palliative care consult    Mixed hyperlipidemia  Assessment & Plan  Continue home statin    BPH (benign prostatic hyperplasia)  Assessment & Plan  Continue home Flomax  Hampton D2 for retention   Urology following -- for voiding trial as OP     GERD (gastroesophageal reflux disease)  Assessment & Plan  Continue PPI      Obesity, morbid ([address])  Assessment & Plan  Lifestyle modifications     Seizure ([address])  Assessment & Plan  By history  Last seen 9/25 in [address] [place] [address] ED for focal seizure following chemotherapy  Home rgimen: Keppra 1500 mg PO BID    Plan:  Continue with Keppra 1500 mg BID  Seizure precautions     Stage 3a chronic kidney disease ([address])  Assessment & Plan  Lab Results   Component Value Date    EGFR 93 10/30/2023    EGFR 97 10/29/2023    EGFR 94 10/28/2023    CREATININE 0.71 10/30/2023    CREATININE 0.65 10/29/2023    CREATININE 0.70 10/28/2023     Baseline creat appears to be 0.7-1  Current creat 0.71  Hampton placed during admission for urinary retention  Urology following, for voiding trial as OP   S/p Lasix 60mg IV x1 on transfer to ICU   Trend creat, UO   Avoid hypotension, nephrotoxic agents     Anasarca  Assessment & Plan  In the setting of Avastin which was started earlier this month for radiation necrosis surrounding brain metastasis  Completed a course of lasix outpatient  Admission UA with only trace proteinuria    Plan:    Wife states his anasarca has been worsening in the past 2 weeks   Now with increased WOB on bipap, rhonchi   Lasix, bipap as above     Bilateral lower extremity DVTs  Assessment & Plan  Likely in the setting of CA   Continue home Lovenox           History of Present Illness     HPI: Angeles Fair is a 68 y.o. with a past medical history of bilateral lower extremity DVTs on treatment dose Lovenox, adenocarcinoma of the lung with mets to the brain on home Decadron, Keppra, CKD 3, obesity, hyperlipidemia, GERD, BPH status post Hampton 2 days ago for urinary retention who presents as a upgrade to the ICU for increased work of breathing, tachycardia, tachypnea. He was initially admitted on 10/23 after he presented to the ER with a chief complaint of fevers at home and was found to have Pasteurella bacteremia. Today, critical care was consulted when the patient became tachypneic, tachycardic and with increased work of breathing. BiPAP was administered for increased work of breathing. Auscultation revealed rhonchi throughout. Lasix 60 mg x 1. Chest x-ray concerning for degree of volume overload on the left, persistent trapped lung on the right. In the ICU, he was noted to have a temp of over 103. Infectious disease was reengaged, new cultures were sent, antibiotics were broadened to vancomycin, Zosyn. On BiPAP, he appears significantly more comfortable, progressively more awake. He is admitted to the ICU for IV antibiotic administration, BiPAP management. History obtained from spouse, chart review, and unobtainable from patient due to bipap. Review of Systems   Respiratory:  Positive for shortness of breath. Cardiovascular:  Positive for leg swelling. Abbreviated ROS performed as patient with increased WOB on bipap        Historical Information   Past Medical History:  7/31/2021: Acute respiratory failure with hypoxia ([address])  8/9/2021: Chronic respiratory failure with hypoxia ([address])  8/13/2021: Impaired mobility and ADLs  No date: Lung cancer ([address])  No date: Stroke [facility]) Past Surgical History:  8/5/2021: IR BIOPSY LUNG  2/24/2022: IR THORACENTESIS  8/1/2022: IR THORACENTESIS   Current Outpatient Medications   Medication Instructions    acetaminophen (TYLENOL) 650 mg, Oral, 4 times daily PRN    albuterol (ProAir HFA) 90 mcg/act inhaler 2 puffs, Inhalation, Every 6 hours PRN    albuterol 2.5 mg, Nebulization, Every 6 hours PRN    atorvastatin (LIPITOR) 40 mg, Oral, Daily with dinner    dexamethasone (DECADRON) 4 mg, Oral, 2 times daily with meals, Taper as directed.     enoxaparin (LOVENOX) 1 mg/kg, Subcutaneous, Every 12 hours    ferrous sulfate 324 mg, Oral, 2 times daily before meals    folic acid (FOLVITE) 0,594 mcg, Oral, Daily    furosemide (LASIX) 20 mg, Oral, Daily    levETIRAcetam (KEPPRA) 1,500 mg, Oral, 2 times daily    pantoprazole (PROTONIX) 40 mg, Oral, Daily before breakfast    tamsulosin (FLOMAX) 0.4 mg, Oral, Daily after dinner    Allergies   Allergen Reactions    Doxycycline Rash      Social History     Tobacco Use    Smoking status: Former     Packs/day: 3.00     Years: 39.00     Total pack years: 117.00     Types: Cigarettes     Start date:      Quit date:      Years since quittin.8    Smokeless tobacco: Never   Vaping Use    Vaping Use: Never used   Substance Use Topics    Alcohol use: Not Currently     Comment: No ETOH since Summer 2021     Drug use: Never    Family History   Problem Relation Age of Onset    Prostate cancer Brother     Lung cancer Brother         Objective                            Vitals I/O      Most Recent Min/Max in 24hrs   Temp (!) 102.9 °F (39.4 °C) Temp  Min: 97 °F (36.1 °C)  Max: 102.9 °F (39.4 °C)   Pulse (!) 107 Pulse  Min: 92  Max: 111   Resp 20 Resp  Min: 20  Max: 20   /88 BP  Min: 110/78  Max: 138/88   O2 Sat 98 % SpO2  Min: 91 %  Max: 100 %      Intake/Output Summary (Last 24 hours) at 10/30/2023 1602  Last data filed at 10/30/2023 1529  Gross per 24 hour   Intake 360 ml   Output 2250 ml   Net -1890 ml         Diet NPO     Invasive Monitoring Physical exam    Physical Exam  Skin:     General: Skin is cool. Capillary Refill: Capillary refill takes 2 to 3 seconds. Coloration: Skin is mottled (generalized over BL UE and BL LE) and pale. HENT:      Head: Normocephalic and atraumatic. Cardiovascular:      Rate and Rhythm: Regular rhythm. Tachycardia present. Musculoskeletal:      Right lower leg: 3+ Pitting Edema present. Left lower leg: 3+ Pitting Edema present. Abdominal:      General: Bowel sounds are decreased.       Palpations: Abdomen is soft.      Tenderness: There is no abdominal tenderness. Constitutional:       General: He is awake. He is not in acute distress. Appearance: He is well-developed. He is obese. He is ill-appearing and toxic-appearing. Pulmonary:      Effort: Tachypnea, accessory muscle usage and accessory muscle usage present. Breath sounds: Rhonchi present. Comments: Bipap 12/8  Psychiatric:         Behavior: Behavior is cooperative. Neurological:      Mental Status: He is alert. Comments: Awake and alert, though appears sleepy  Speaks few words at a time on bipap    Genitourinary/Anorectal:     Comments: Hampton present   Vitals and nursing note reviewed. Diagnostic Studies      EKG: ST on tele   Imaging:   XR chest portable   Final Result      Increased almost complete right hemithoracic      Left vascular redistribution               Workstation performed: [id number]         VAS upper limb venous duplex scan, unilateral/limited   Final Result      FL barium swallow video w speech   Final Result      CTA ED chest PE study   Final Result      No evidence for acute pulmonary emboli. Densely consolidated right lower lobe and right upper lobe. Aeration remaining in the right middle lobe. See above for further description and comparison. Significant mass effect on the right lower lung secondary to large mixed attenuation masslike    pleural abnormality described above. 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 Stable incompletely imaged right neck mass presumably representing metastatic adenopathy. Stable hypodensities within the liver. Workstation performed: [id number]         PE Study with CT Abdomen and Pelvis with contrast   Final Result      1.   Heterogeneous opacification of the right-sided pulmonary arteries may be due to artifact, but cannot exclude a pulmonary embolus particularly in the right pulmonary artery. Recommend repeating the CTA of the chest. There is no large saddle embolus or    CT findings of right heart strain. 2.  Mildly increased right lung consolidation and groundglass opacities in the left lower lobe. Cannot exclude superimposed pneumonia. 3.  Right pleural thickening and nodularity with a moderate size loculated complex collection, suspicious for pleural involvement by metastases. 4.  Partially imaged mass in the right neck suspicious for darien metastases. Medical record indicates a CT of the neck has been ordered to evaluate this finding but not yet performed. I personally discussed this study with Hernán Sender on 10/23/2023 11:35 AM.                     Workstation performed: [id number]         XR chest portable - 1 view   ED Interpretation   Pulmonary vascular congestion with persistent right pleural effusion when compared to previous chest x-ray      Final Result      Persistent right-sided loculated pleural effusion with superimposed consolidation better visualized on same day CT study. Groundglass opacification in the left lower lung zone, likely to represent atelectasis. Resident: Deandre Guillory, the attending radiologist, have reviewed the images and agree with the final report above. Workstation performed: [id number]            I have personally reviewed pertinent reports.        Medications:  Scheduled PRN   atorvastatin, 40 mg, Daily With Dinner  chlorhexidine, 15 mL, Q12H [address]  dexamethasone, 4 mg, Q12H OPAL  docusate sodium, 100 mg, BID  enoxaparin, 100 mg, Q12H OPAL  finasteride, 5 mg, Daily  insulin lispro, 1-6 Units, Q6H OAPL  ipratropium, 0.5 mg, TID  levalbuterol, 1.25 mg, TID  levETIRAcetam, 1,500 mg, Q12H [address]  melatonin, 6 mg, HS  pantoprazole, 40 mg, Early Morning  piperacillin-tazobactam, 3.375 g, Once  piperacillin-tazobactam, 3.375 g, Q8H  polyethylene glycol, 17 g, Daily  senna, 1 tablet, HS  tamsulosin, 0.4 mg, Daily With Dinner  vancomycin, 25 mg/kg (Adjusted), Once      acetaminophen, 650 mg, Q6H PRN  benzonatate, 100 mg, TID PRN  hydrOXYzine HCL, 25 mg, Q6H PRN  ipratropium, 0.5 mg, Q6H PRN  levalbuterol, 1.25 mg, Q6H PRN       Continuous          Labs:    CBC    Recent Labs     10/29/23  0407 10/30/23  1402   WBC 5.35 5.58   HGB 9.6* 10.2*   HCT 32.3* 33.5*   * 123*   BANDSPCT 4  --      BMP    Recent Labs     10/29/23  0407 10/30/23  1402   SODIUM 142 143   K 4.3 4.0   * 106   CO2 26 31   AGAP 5 6   BUN 19 19   CREATININE 0.65 0.71   CALCIUM 8.8 8.9       Coags    No recent results     Additional Electrolytes  Recent Labs     10/30/23  1402   MG 1.9   PHOS 2.7          Blood Gas    Recent Labs     10/30/23  1300   PHART 7.449   FNS6ISQ 38.0   PO2ART 73.8*   QFS8RAM 25.8   BEART 1.8   SOURCE Radial, Left     Recent Labs     10/30/23  1300   SOURCE Radial, Left    LFTs  Recent Labs     10/29/23  0407 10/30/23  1402   ALT 14 18   AST 13 15   ALKPHOS 36 40   ALB 3.0* 3.2*   TBILI 0.37 0.39       Infectious  Recent Labs     10/30/23  1402   PROCALCITONI 0.60*     Glucose  Recent Labs     10/29/23  0407 10/30/23  1402   GLUC 131 95               CRISTOFER Rosenberg

## 2023-10-30 NOTE — SEPSIS NOTE
Sepsis Note   Christiano Coma 68 y.o. male MRN: [id number]  Unit/Bed#: -01 Encounter: [id number]       Initial Sepsis Screening       Row Name 10/30/23 1500 10/23/23 0850             Is the patient's history suggestive of a new or worsening infection? Yes (Proceed)  -ES Yes (Proceed)  -ML       Suspected source of infection suspect infection, source unknown  -ES pneumonia  -ML       Indicate SIRS criteria Hyperthemia > 38.3C (100.9F) OR Hypothermia <36C (96.8F); Tachycardia > 90 bpm  -ES Hyperthemia > 38.3C (100.9F) OR Hypothermia <36C (96.8F); Tachycardia > 90 bpm;Tachypnea > 20 resp per min  -ML       Are two or more of the above signs & symptoms of infection both present and new to the patient? -- Yes (Proceed)  -ML       Assess for evidence of organ dysfunction: Are any of the below criteria present within 6 hours of suspected infection and SIRS criteria that are NOT considered to be chronic conditions? -- --       Date of presentation of septic shock -- --       Time of presentation of septic shock -- --       Fluid Resuscitation: -- A lesser volume than 30 ml/kg IV fluid will be given  -ML       The 30 mL/kg fluid bolus was not given to the patient despite having hypotension, a lactate of >= 4 mmol/L, or documentation of septic shock secondary to: -- Concern for fluid/volume overload;Heart Failure  -ML       Instead of the 30 ml/kg fluid bolus, the following volume of crystalloid fluid will be ordered: -- 500 ml  -ML       Of the following fluid type: -- Isolyte  -ML       Is the patient is persistently hypotensive in the hour after fluid bolus administration? If yes, patient meets criteria for vasopressor use. -- NO  -ML       Sepsis Note: Click "NEXT" below (NOT "close") to generate sepsis note based on above information.  YES (proceed by clicking "NEXT")  -ES YES (proceed by clicking "NEXT")  -ML                 User Key  (r) = Recorded By, (t) = Taken By, (c) = Cosigned By      [address] [place] [address] Name Provider Type    ML Carlene Nichole DO Physician    CRISTOFER Landry Nurse Practitioner                        Body mass index is 34.02 kg/m².   Wt Readings from Last 1 Encounters:   10/30/23 104 kg (230 lb 6.1 oz)        Ideal body weight: 70.7 kg (155 lb 13.8 oz)  Adjusted ideal body weight: 84.2 kg (185 lb 10.7 oz)

## 2023-10-30 NOTE — PLAN OF CARE
Problem: Potential for Falls  Goal: Patient will remain free of falls  Description: INTERVENTIONS:  - Educate patient/family on patient safety including physical limitations  - Instruct patient to call for assistance with activity   - Consult OT/PT to assist with strengthening/mobility   - Keep Call bell within reach  - Keep bed low and locked with side rails adjusted as appropriate  - Keep care items and personal belongings within reach  - Initiate and maintain comfort rounds  - Make Fall Risk Sign visible to staff  - Offer Toileting every 2 Hours, in advance of need  - Initiate/Maintain bed alarm  - Obtain necessary fall risk management equipment: socks  Problem: NEUROSENSORY - ADULT  Goal: Achieves stable or improved neurological status  Description: INTERVENTIONS  - Monitor and report changes in neurological status  - Monitor vital signs such as temperature, blood pressure, glucose, and any other labs ordered   - Initiate measures to prevent increased intracranial pressure  - Monitor for seizure activity and implement precautions if appropriate      Outcome: Progressing  Goal: Achieves maximal functionality and self care  Description: INTERVENTIONS  - Monitor swallowing and airway patency with patient fatigue and changes in neurological status  - Encourage and assist patient to increase activity and self care.    - Encourage visually impaired, hearing impaired and aphasic patients to use assistive/communication devices  Outcome: Progressing     Problem: GENITOURINARY - ADULT  Goal: Maintains or returns to baseline urinary function  Description: INTERVENTIONS:  - Assess urinary function  - Encourage oral fluids to ensure adequate hydration if ordered  - Administer IV fluids as ordered to ensure adequate hydration  - Administer ordered medications as needed  - Offer frequent toileting  - Follow urinary retention protocol if ordered  Outcome: Progressing  Goal: Absence of urinary retention  Description: INTERVENTIONS:  - Assess patient’s ability to void and empty bladder  - Monitor I/O  - Bladder scan as needed  - Discuss with physician/AP medications to alleviate retention as needed  - Discuss catheterization for long term situations as appropriate  Outcome: Progressing  Goal: Urinary catheter remains patent  Description: INTERVENTIONS:  - Assess patency of urinary catheter  - If patient has a chronic cadet, consider changing catheter if non-functioning  - Follow guidelines for intermittent irrigation of non-functioning urinary catheter  Outcome: Progressing     - Apply yellow socks and bracelet for high fall risk patients  - Consider moving patient to room near nurses station  Outcome: Progressing

## 2023-10-30 NOTE — PLAN OF CARE
Problem: Potential for Falls  Goal: Patient will remain free of falls  Description: INTERVENTIONS:  - Educate patient/family on patient safety including physical limitations  - Instruct patient to call for assistance with activity   - Consult OT/PT to assist with strengthening/mobility   - Keep Call bell within reach  - Keep bed low and locked with side rails adjusted as appropriate  - Keep care items and personal belongings within reach  - Initiate and maintain comfort rounds  - Make Fall Risk Sign visible to staff  - Offer Toileting every  Hours, in advance of need  - Initiate/Maintain alarm  - Obtain necessary fall risk management equipment:   - Apply yellow socks and bracelet for high fall risk patients  - Consider moving patient to room near nurses station  Outcome: Progressing     Problem: NEUROSENSORY - ADULT  Goal: Achieves stable or improved neurological status  Description: INTERVENTIONS  - Monitor and report changes in neurological status  - Monitor vital signs such as temperature, blood pressure, glucose, and any other labs ordered   - Initiate measures to prevent increased intracranial pressure  - Monitor for seizure activity and implement precautions if appropriate      Outcome: Progressing  Goal: Achieves maximal functionality and self care  Description: INTERVENTIONS  - Monitor swallowing and airway patency with patient fatigue and changes in neurological status  - Encourage and assist patient to increase activity and self care.    - Encourage visually impaired, hearing impaired and aphasic patients to use assistive/communication devices  Outcome: Progressing     Problem: GENITOURINARY - ADULT  Goal: Maintains or returns to baseline urinary function  Description: INTERVENTIONS:  - Assess urinary function  - Encourage oral fluids to ensure adequate hydration if ordered  - Administer IV fluids as ordered to ensure adequate hydration  - Administer ordered medications as needed  - Offer frequent toileting  - Follow urinary retention protocol if ordered  Outcome: Progressing  Goal: Absence of urinary retention  Description: INTERVENTIONS:  - Assess patient’s ability to void and empty bladder  - Monitor I/O  - Bladder scan as needed  - Discuss with physician/AP medications to alleviate retention as needed  - Discuss catheterization for long term situations as appropriate  Outcome: Progressing  Goal: Urinary catheter remains patent  Description: INTERVENTIONS:  - Assess patency of urinary catheter  - If patient has a chronic cadet, consider changing catheter if non-functioning  - Follow guidelines for intermittent irrigation of non-functioning urinary catheter  Outcome: Progressing     Problem: METABOLIC, FLUID AND ELECTROLYTES - ADULT  Goal: Electrolytes maintained within normal limits  Description: INTERVENTIONS:  - Monitor labs and assess patient for signs and symptoms of electrolyte imbalances  - Administer electrolyte replacement as ordered  - Monitor response to electrolyte replacements, including repeat lab results as appropriate  - Instruct patient on fluid and nutrition as appropriate  Outcome: Progressing  Goal: Fluid balance maintained  Description: INTERVENTIONS:  - Monitor labs   - Monitor I/O and WT  - Instruct patient on fluid and nutrition as appropriate  - Assess for signs & symptoms of volume excess or deficit  Outcome: Progressing  Goal: Glucose maintained within target range  Description: INTERVENTIONS:  - Monitor Blood Glucose as ordered  - Assess for signs and symptoms of hyperglycemia and hypoglycemia  - Administer ordered medications to maintain glucose within target range  - Assess nutritional intake and initiate nutrition service referral as needed  Outcome: Progressing     Problem: SKIN/TISSUE INTEGRITY - ADULT  Goal: Skin Integrity remains intact(Skin Breakdown Prevention)  Description: Assess:  -Perform [place] assessment every   -Clean and moisturize skin every   -Inspect skin when repositioning, toileting, and assisting with ADLS  -Assess under medical devices such as  every   -Assess extremities for adequate circulation and sensation     Bed Management:  -Have minimal linens on bed & keep smooth, unwrinkled  -Change linens as needed when moist or perspiring  -Avoid sitting or lying in one position for more than  hours while in bed  -Keep HOB at degrees     Toileting:  -Offer bedside commode  -Assess for incontinence every   -Use incontinent care products after each incontinent episode such as     Activity:  -Mobilize patient  times a day  -Encourage activity and walks on unit  -Encourage or provide ROM exercises   -Turn and reposition patient every  Hours  -Use appropriate equipment to lift or move patient in bed  -Instruct/ Assist with weight shifting every  when out of bed in chair  -Consider limitation of chair time  hour intervals    Skin Care:  -Avoid use of baby powder, tape, friction and shearing, hot water or constrictive clothing  -Relieve pressure over bony prominences using   -Do not massage red bony areas    Next Steps:  -Teach patient strategies to minimize risks such as    -Consider consults to  interdisciplinary teams such as   Outcome: Progressing  Goal: Incision(s), wounds(s) or drain site(s) healing without S/S of infection  Description: INTERVENTIONS  - Assess and document dressing, incision, wound bed, drain sites and surrounding tissue  - Provide patient and family education  - Perform skin care/dressing changes every   Outcome: Progressing  Goal: Pressure injury heals and does not worsen  Description: Interventions:  - Implement low air loss mattress or specialty surface (Criteria met)  - Apply silicone foam dressing  - Instruct/assist with weight shifting every  minutes when in chair   - Limit chair time to  hour intervals  - Use special pressure reducing interventions such as  when in chair   - Apply fecal or urinary incontinence containment device   - Perform passive or active ROM every   - Turn and reposition patient & offload bony prominences every  hours   - Utilize friction reducing device or surface for transfers   - Consider consults to  interdisciplinary teams such as   - Use incontinent care products after each incontinent episode such as   - Consider nutrition services referral as needed  Outcome: Progressing     Problem: MUSCULOSKELETAL - ADULT  Goal: Maintain or return mobility to safest level of function  Description: INTERVENTIONS:  - Assess patient's ability to carry out ADLs; assess patient's baseline for ADL function and identify physical deficits which impact ability to perform ADLs (bathing, care of mouth/teeth, toileting, grooming, dressing, etc.)  - Assess/evaluate cause of self-care deficits   - Assess range of motion  - Assess patient's mobility  - Assess patient's need for assistive devices and provide as appropriate  - Encourage maximum independence but intervene and supervise when necessary  - Involve family in performance of ADLs  - Assess for home care needs following discharge   - Consider OT consult to assist with ADL evaluation and planning for discharge  - Provide patient education as appropriate  Outcome: Progressing  Goal: Maintain proper alignment of affected body part  Description: INTERVENTIONS:  - Support, maintain and protect limb and body alignment  - Provide patient/ family with appropriate education  Outcome: Progressing     Problem: RESPIRATORY - ADULT  Goal: Achieves optimal ventilation and oxygenation  Description: INTERVENTIONS:  - Assess for changes in respiratory status  - Assess for changes in mentation and behavior  - Position to facilitate oxygenation and minimize respiratory effort  - Oxygen administered by appropriate delivery if ordered  - Initiate smoking cessation education as indicated  - Encourage broncho-pulmonary hygiene including cough, deep breathe, Incentive Spirometry  - Assess the need for suctioning and aspirate as needed  - Assess and instruct to report SOB or any respiratory difficulty  - Respiratory Therapy support as indicated  Outcome: Progressing     Problem: PAIN - ADULT  Goal: Verbalizes/displays adequate comfort level or baseline comfort level  Description: Interventions:  - Encourage patient to monitor pain and request assistance  - Assess pain using appropriate pain scale  - Administer analgesics based on type and severity of pain and evaluate response  - Implement non-pharmacological measures as appropriate and evaluate response  - Consider cultural and social influences on pain and pain management  - Notify physician/advanced practitioner if interventions unsuccessful or patient reports new pain  Outcome: Progressing     Problem: INFECTION - ADULT  Goal: Absence or prevention of progression during hospitalization  Description: INTERVENTIONS:  - Assess and monitor for signs and symptoms of infection  - Monitor lab/diagnostic results  - Monitor all insertion sites, i.e. indwelling lines, tubes, and drains  - Monitor endotracheal if appropriate and nasal secretions for changes in amount and color  - [place] appropriate cooling/warming therapies per order  - Administer medications as ordered  - Instruct and encourage patient and family to use good hand hygiene technique  - Identify and instruct in appropriate isolation precautions for identified infection/condition  Outcome: Progressing  Goal: Absence of fever/infection during neutropenic period  Description: INTERVENTIONS:  - Monitor WBC    Outcome: Progressing     Problem: SAFETY ADULT  Goal: Patient will remain free of falls  Description: INTERVENTIONS:  - Educate patient/family on patient safety including physical limitations  - Instruct patient to call for assistance with activity   - Consult OT/PT to assist with strengthening/mobility   - Keep Call bell within reach  - Keep bed low and locked with side rails adjusted as appropriate  - Keep care items and personal belongings within reach  - Initiate and maintain comfort rounds  - Make Fall Risk Sign visible to staff  - Offer Toileting every  Hours, in advance of need  - Initiate/Maintain alarm  - Obtain necessary fall risk management equipment:   - Apply yellow socks and bracelet for high fall risk patients  - Consider moving patient to room near nurses station  Outcome: Progressing  Goal: Maintain or return to baseline ADL function  Description: INTERVENTIONS:  -  Assess patient's ability to carry out ADLs; assess patient's baseline for ADL function and identify physical deficits which impact ability to perform ADLs (bathing, care of mouth/teeth, toileting, grooming, dressing, etc.)  - Assess/evaluate cause of self-care deficits   - Assess range of motion  - Assess patient's mobility; develop plan if impaired  - Assess patient's need for assistive devices and provide as appropriate  - Encourage maximum independence but intervene and supervise when necessary  - Involve family in performance of ADLs  - Assess for home care needs following discharge   - Consider OT consult to assist with ADL evaluation and planning for discharge  - Provide patient education as appropriate  Outcome: Progressing  Goal: Maintains/Returns to pre admission functional level  Description: INTERVENTIONS:  - Perform BMAT or MOVE assessment daily.   - Set and communicate daily mobility goal to care team and patient/family/caregiver. - Collaborate with rehabilitation services on mobility goals if consulted  - Perform Range of Motion  times a day. - Reposition patient every  hours.   - Dangle patient  times a day  - Stand patient  times a day  - Ambulate patient  times a day  - Out of bed to chair  times a day   - Out of bed for meals  times a day  - Out of bed for toileting  - Record patient progress and toleration of activity level   Outcome: Progressing     Problem: DISCHARGE PLANNING  Goal: Discharge to home or other facility with appropriate resources  Description: INTERVENTIONS:  - Identify barriers to discharge w/patient and caregiver  - Arrange for needed discharge resources and transportation as appropriate  - Identify discharge learning needs (meds, wound care, etc.)  - Arrange for interpretive services to assist at discharge as needed  - Refer to Case Management Department for coordinating discharge planning if the patient needs post-hospital services based on physician/advanced practitioner order or complex needs related to functional status, cognitive ability, or social support system  Outcome: Progressing     Problem: Knowledge Deficit  Goal: Patient/family/caregiver demonstrates understanding of disease process, treatment plan, medications, and discharge instructions  Description: Complete learning assessment and assess knowledge base.   Interventions:  - Provide teaching at level of understanding  - Provide teaching via preferred learning methods  Outcome: Progressing     Problem: MOBILITY - ADULT  Goal: Maintain or return to baseline ADL function  Description: INTERVENTIONS:  -  Assess patient's ability to carry out ADLs; assess patient's baseline for ADL function and identify physical deficits which impact ability to perform ADLs (bathing, care of mouth/teeth, toileting, grooming, dressing, etc.)  - Assess/evaluate cause of self-care deficits   - Assess range of motion  - Assess patient's mobility; develop plan if impaired  - Assess patient's need for assistive devices and provide as appropriate  - Encourage maximum independence but intervene and supervise when necessary  - Involve family in performance of ADLs  - Assess for home care needs following discharge   - Consider OT consult to assist with ADL evaluation and planning for discharge  - Provide patient education as appropriate  Outcome: Progressing  Goal: Maintains/Returns to pre admission functional level  Description: INTERVENTIONS:  - Perform BMAT or MOVE assessment daily.   - Set and communicate daily mobility goal to care team and patient/family/caregiver. - Collaborate with rehabilitation services on mobility goals if consulted  - Perform Range of Motion  times a day. - Reposition patient every  hours. - Dangle patient  times a day  - Stand patient  times a day  - Ambulate patient  times a day  - Out of bed to chair  times a day   - Out of bed for meal times a day  - Out of bed for toileting  - Record patient progress and toleration of activity level   Outcome: Progressing     Problem: Prexisting or High Potential for Compromised Skin Integrity  Goal: Skin integrity is maintained or improved  Description: INTERVENTIONS:  - Identify patients at risk for skin breakdown  - Assess and monitor skin integrity  - Assess and monitor nutrition and hydration status  - Monitor labs   - Assess for incontinence   - Turn and reposition patient  - Assist with mobility/ambulation  - Relieve pressure over bony prominences  - Avoid friction and shearing  - Provide appropriate hygiene as needed including keeping skin clean and dry  - Evaluate need for skin moisturizer/barrier cream  - Collaborate with interdisciplinary team   - Patient/family teaching  - Consider wound care consult   Outcome: Progressing     Problem: Nutrition/Hydration-ADULT  Goal: Nutrient/Hydration intake appropriate for improving, restoring or maintaining nutritional needs  Description: Monitor and assess patient's nutrition/hydration status for malnutrition. Collaborate with interdisciplinary team and initiate plan and interventions as ordered. Monitor patient's weight and dietary intake as ordered or per policy. Utilize nutrition screening tool and intervene as necessary. Determine patient's food preferences and provide high-protein, high-caloric foods as appropriate.      INTERVENTIONS:  - Monitor oral intake, urinary output, labs, and treatment plans  - Assess nutrition and hydration status and recommend course of action  - Evaluate amount of meals eaten  - Assist patient with eating if necessary   - Allow adequate time for meals  - Recommend/ encourage appropriate diets, oral nutritional supplements, and vitamin/mineral supplements  - Order, calculate, and assess calorie counts as needed  - Recommend, monitor, and adjust tube feedings and TPN/PPN based on assessed needs  - Assess need for intravenous fluids  - Provide specific nutrition/hydration education as appropriate  - Include patient/family/caregiver in decisions related to nutrition  Outcome: Progressing

## 2023-10-30 NOTE — ASSESSMENT & PLAN NOTE
Continue home Flomax  Pt now with retention, follow urinary retention protocol  Per urology cadet placed. Hematuria noted, possibly in setting of thrombocytopenia, traumatic straight cath. Urine clear no clots. Resumed Lovenox.  Monitor for hematuria  Discharge with cadet    F/u with urology outpt

## 2023-10-30 NOTE — ASSESSMENT & PLAN NOTE
SIRS: Tachycardia, tachypnea, fever  UA negative for UTI  10/23 BC 2/2 + Pasteurella   10/26 BC 2/2 NG x48H  Urine strep/legionella and MRSA neg   Lactic cleared without IVF bolus initially 2/2 concern for volume overload   30 ml/kg were not given due to concern for fluid overload  Procal 0.18    Plan:  Abx D8: Vanc, Zosyn D1  ID following previously, have been reengaged in the setting of SIRS with temp 102.9, , tachypnea on bipap   Likely source: his cat   Trend fever curve and WBC count

## 2023-10-30 NOTE — RESPIRATORY THERAPY NOTE
Patient transported in his bed on Bipap from 219 to ICU-6 without incident. Patient remains on Bipap in the ICU. Work of breathing appears improved at this time.

## 2023-10-30 NOTE — PROGRESS NOTES
The patient’s standard-infusion Piperacillin-Tazobactam / Zosyn (infused over 30-60 minutes) has been converted to extended-infusion (infused over 4 hours) per [facility] Extended-Infusion Piperacillin-Tazobactam Protocol for Adults as approved by the Pharmacy and Therapeutics Committee (accessible here on MyNET).      The patient met ALL eligible criteria:    Age >= 25years old   Critical Care patient    And did NOT have ANY exclusions:     Emergency Department or Operating Room patient  Drug incompatibilities that could NOT be avoided with timing or separate line administration    The following are reminders for Nursing regarding administration:  Infuse the first dose of Zosyn over 30min as a load (if new start), and then all subsequent doses will be given as an extended-infusion over 4 hours (see dosing below)  Use primary tubing as an intermittent infusion; change out primary tubing every 24 hours   Ensure full dose of the medication is given at the appropriate rate  Most incompatible drugs can be scheduled during times when the Zosyn is not being infused; however, if one requires administration during the same time, a separate site or lumen MUST be used  If access is limited and an incompatible medication urgently needs to be given, the Zosyn extended-infusion can be held for up to 30min (remember to flush line before/after)  Extended-infusion Zosyn does NOT require special timing around hemodialysis (it can even be given simultaneously)  If a patient needs an urgent MRI while Zosyn is infusing and there is not a MRI-compatible pump available for use, finish the infusion over the traditional length (30min) and ask Pharmacy to reschedule the next doses so that they start a few hours earlier  Pharmacy will assist nursing in troubleshooting other administration issues as they arise  Dosing for Piperacillin-Tazobactam  CrCl (mL/min) Traditional Dosing Extended-Infusion Dosing #   CrCl > 40 High-Dose  CrCl > 40 Low-Dose 4.5g Q6H (over 30min)  3.375g IV Q6H (over 30min) 3.375g IV Q8H (over 4hr)*    *1st dose loaded over 30min, then start extended-infusion dosing 4hr later   CrCl 20-40 High-Dose  CrCl 20-40 Low-Dose 3.375g IV Q6H (over 30min)  2.25g IV Q6H (over 30min)    CrCl < 20 High-Dose  CrCl < 20 Low-Dose 2.25g IV Q6H (over 30min)  2.25g IV Q8H (over 30min) 3.375g IV Q12H (over 4hr)*    *1st dose loaded over 30min, then start extended-infusion dosing 6hr later   Hemo/Peritoneal Dialysis High-Dose  Hemo/Peritoneal Dialysis Low-Dose 2.25g IV Q6H (over 30min)  2.25g IV Q8H (over 30min)    CVVH/D High-Dose  CVVH/D Low-Dose 3.375g IV Q6H (over 30min)  2.25 IV Q6H (over 30min) 3.375g IV Q8H (over 4hr)*    *1st dose loaded over 30min, then start extended-infusion dosing 4hr later   # = Use 4.5g dosing (same interval) if morbidly obese (BMI ?40)    Please call the Pharmacy with any questions or concerns.

## 2023-10-31 ENCOUNTER — APPOINTMENT (INPATIENT)
Dept: NON INVASIVE DIAGNOSTICS | Facility: HOSPITAL | Age: 73
DRG: 867 | End: 2023-10-31
Payer: MEDICARE

## 2023-10-31 ENCOUNTER — APPOINTMENT (INPATIENT)
Dept: RADIOLOGY | Facility: HOSPITAL | Age: 73
DRG: 867 | End: 2023-10-31
Payer: MEDICARE

## 2023-10-31 LAB
4HR DELTA HS TROPONIN: 2 NG/L
ALBUMIN SERPL BCP-MCNC: 2.9 G/DL (ref 3.5–5)
ALP SERPL-CCNC: 39 U/L (ref 34–104)
ALT SERPL W P-5'-P-CCNC: 16 U/L (ref 7–52)
ANION GAP SERPL CALCULATED.3IONS-SCNC: 6 MMOL/L
AST SERPL W P-5'-P-CCNC: 14 U/L (ref 13–39)
BACTERIA BLD CULT: NORMAL
BACTERIA BLD CULT: NORMAL
BASE EX.OXY STD BLDV CALC-SCNC: 59.2 % (ref 60–80)
BASE EXCESS BLDV CALC-SCNC: 3.1 MMOL/L
BILIRUB SERPL-MCNC: 0.45 MG/DL (ref 0.2–1)
BUN SERPL-MCNC: 20 MG/DL (ref 5–25)
CA-I BLD-SCNC: 1.17 MMOL/L (ref 1.12–1.32)
CALCIUM ALBUM COR SERPL-MCNC: 9.1 MG/DL (ref 8.3–10.1)
CALCIUM SERPL-MCNC: 8.2 MG/DL (ref 8.4–10.2)
CARDIAC TROPONIN I PNL SERPL HS: 11 NG/L
CHLORIDE SERPL-SCNC: 105 MMOL/L (ref 96–108)
CO2 SERPL-SCNC: 32 MMOL/L (ref 21–32)
CREAT SERPL-MCNC: 0.71 MG/DL (ref 0.6–1.3)
ERYTHROCYTE [DISTWIDTH] IN BLOOD BY AUTOMATED COUNT: 21.6 % (ref 11.6–15.1)
GFR SERPL CREATININE-BSD FRML MDRD: 93 ML/MIN/1.73SQ M
GLUCOSE SERPL-MCNC: 105 MG/DL (ref 65–140)
GLUCOSE SERPL-MCNC: 117 MG/DL (ref 65–140)
GLUCOSE SERPL-MCNC: 127 MG/DL (ref 65–140)
GLUCOSE SERPL-MCNC: 153 MG/DL (ref 65–140)
GLUCOSE SERPL-MCNC: 89 MG/DL (ref 65–140)
HCO3 BLDV-SCNC: 30.3 MMOL/L (ref 24–30)
HCT VFR BLD AUTO: 32.5 % (ref 36.5–49.3)
HGB BLD-MCNC: 9.6 G/DL (ref 12–17)
LACTATE SERPL-SCNC: 2.1 MMOL/L (ref 0.5–2)
MAGNESIUM SERPL-MCNC: 2 MG/DL (ref 1.9–2.7)
MCH RBC QN AUTO: 29.1 PG (ref 26.8–34.3)
MCHC RBC AUTO-ENTMCNC: 29.5 G/DL (ref 31.4–37.4)
MCV RBC AUTO: 99 FL (ref 82–98)
NRBC BLD AUTO-RTO: 3 /100 WBCS
O2 CT BLDV-SCNC: 9.2 ML/DL
PCO2 BLDV: 59.7 MM HG (ref 42–50)
PH BLDV: 7.32 [PH] (ref 7.3–7.4)
PHOSPHATE SERPL-MCNC: 2.8 MG/DL (ref 2.3–4.1)
PLATELET # BLD AUTO: 116 THOUSANDS/UL (ref 149–390)
PMV BLD AUTO: 9.2 FL (ref 8.9–12.7)
PO2 BLDV: 36.6 MM HG (ref 35–45)
POTASSIUM SERPL-SCNC: 4.2 MMOL/L (ref 3.5–5.3)
PROCALCITONIN SERPL-MCNC: 0.48 NG/ML
PROT SERPL-MCNC: 5.4 G/DL (ref 6.4–8.4)
RBC # BLD AUTO: 3.3 MILLION/UL (ref 3.88–5.62)
SL CV LV EF: 55
SODIUM SERPL-SCNC: 143 MMOL/L (ref 135–147)
WBC # BLD AUTO: 4.81 THOUSAND/UL (ref 4.31–10.16)

## 2023-10-31 PROCEDURE — 93306 TTE W/DOPPLER COMPLETE: CPT | Performed by: INTERNAL MEDICINE

## 2023-10-31 PROCEDURE — 94003 VENT MGMT INPAT SUBQ DAY: CPT

## 2023-10-31 PROCEDURE — 82805 BLOOD GASES W/O2 SATURATION: CPT

## 2023-10-31 PROCEDURE — 94664 DEMO&/EVAL PT USE INHALER: CPT

## 2023-10-31 PROCEDURE — 93970 EXTREMITY STUDY: CPT | Performed by: SURGERY

## 2023-10-31 PROCEDURE — 83735 ASSAY OF MAGNESIUM: CPT

## 2023-10-31 PROCEDURE — 94640 AIRWAY INHALATION TREATMENT: CPT

## 2023-10-31 PROCEDURE — 94668 MNPJ CHEST WALL SBSQ: CPT

## 2023-10-31 PROCEDURE — 99232 SBSQ HOSP IP/OBS MODERATE 35: CPT | Performed by: INTERNAL MEDICINE

## 2023-10-31 PROCEDURE — 80053 COMPREHEN METABOLIC PANEL: CPT

## 2023-10-31 PROCEDURE — 84484 ASSAY OF TROPONIN QUANT: CPT | Performed by: STUDENT IN AN ORGANIZED HEALTH CARE EDUCATION/TRAINING PROGRAM

## 2023-10-31 PROCEDURE — 92526 ORAL FUNCTION THERAPY: CPT

## 2023-10-31 PROCEDURE — 93970 EXTREMITY STUDY: CPT

## 2023-10-31 PROCEDURE — 84145 PROCALCITONIN (PCT): CPT | Performed by: NURSE PRACTITIONER

## 2023-10-31 PROCEDURE — 99233 SBSQ HOSP IP/OBS HIGH 50: CPT | Performed by: NURSE PRACTITIONER

## 2023-10-31 PROCEDURE — 80177 DRUG SCRN QUAN LEVETIRACETAM: CPT

## 2023-10-31 PROCEDURE — C8929 TTE W OR WO FOL WCON,DOPPLER: HCPCS

## 2023-10-31 PROCEDURE — 85027 COMPLETE CBC AUTOMATED: CPT

## 2023-10-31 PROCEDURE — 84100 ASSAY OF PHOSPHORUS: CPT

## 2023-10-31 PROCEDURE — 82330 ASSAY OF CALCIUM: CPT

## 2023-10-31 PROCEDURE — 71045 X-RAY EXAM CHEST 1 VIEW: CPT

## 2023-10-31 PROCEDURE — 99497 ADVNCD CARE PLAN 30 MIN: CPT | Performed by: NURSE PRACTITIONER

## 2023-10-31 PROCEDURE — 83605 ASSAY OF LACTIC ACID: CPT | Performed by: NURSE PRACTITIONER

## 2023-10-31 PROCEDURE — 94760 N-INVAS EAR/PLS OXIMETRY 1: CPT

## 2023-10-31 PROCEDURE — 82948 REAGENT STRIP/BLOOD GLUCOSE: CPT

## 2023-10-31 RX ORDER — LEVETIRACETAM 500 MG/1
1500 TABLET ORAL EVERY 12 HOURS SCHEDULED
Status: DISCONTINUED | OUTPATIENT
Start: 2023-10-31 | End: 2023-11-02 | Stop reason: HOSPADM

## 2023-10-31 RX ORDER — ACETAMINOPHEN 325 MG/1
650 TABLET ORAL EVERY 6 HOURS PRN
Status: DISCONTINUED | OUTPATIENT
Start: 2023-10-31 | End: 2023-11-02 | Stop reason: HOSPADM

## 2023-10-31 RX ADMIN — LEVETIRACETAM 1500 MG: 500 TABLET, FILM COATED ORAL at 21:59

## 2023-10-31 RX ADMIN — POLYETHYLENE GLYCOL 3350 17 G: 17 POWDER, FOR SOLUTION ORAL at 15:44

## 2023-10-31 RX ADMIN — SENNOSIDES 8.6 MG: 8.6 TABLET, FILM COATED ORAL at 21:59

## 2023-10-31 RX ADMIN — DEXAMETHASONE 4 MG: 2 TABLET ORAL at 09:57

## 2023-10-31 RX ADMIN — ENOXAPARIN SODIUM 100 MG: 100 INJECTION SUBCUTANEOUS at 21:59

## 2023-10-31 RX ADMIN — PANTOPRAZOLE SODIUM 40 MG: 40 TABLET, DELAYED RELEASE ORAL at 05:17

## 2023-10-31 RX ADMIN — VANCOMYCIN HYDROCHLORIDE 1250 MG: 5 INJECTION, POWDER, LYOPHILIZED, FOR SOLUTION INTRAVENOUS at 06:13

## 2023-10-31 RX ADMIN — ATORVASTATIN CALCIUM 40 MG: 40 TABLET, FILM COATED ORAL at 15:43

## 2023-10-31 RX ADMIN — PIPERACILLIN AND TAZOBACTAM 3.38 G: 3; .375 INJECTION, POWDER, LYOPHILIZED, FOR SOLUTION INTRAVENOUS at 20:58

## 2023-10-31 RX ADMIN — LEVETIRACETAM 1500 MG: 500 TABLET, FILM COATED ORAL at 10:05

## 2023-10-31 RX ADMIN — LEVALBUTEROL HYDROCHLORIDE 1.25 MG: 1.25 SOLUTION RESPIRATORY (INHALATION) at 19:41

## 2023-10-31 RX ADMIN — PERFLUTREN 0.8 ML/MIN: 6.52 INJECTION, SUSPENSION INTRAVENOUS at 10:45

## 2023-10-31 RX ADMIN — INSULIN LISPRO 1 UNITS: 100 INJECTION, SOLUTION INTRAVENOUS; SUBCUTANEOUS at 18:28

## 2023-10-31 RX ADMIN — PIPERACILLIN AND TAZOBACTAM 3.38 G: 3; .375 INJECTION, POWDER, LYOPHILIZED, FOR SOLUTION INTRAVENOUS at 04:07

## 2023-10-31 RX ADMIN — IPRATROPIUM BROMIDE 0.5 MG: 0.5 SOLUTION RESPIRATORY (INHALATION) at 07:34

## 2023-10-31 RX ADMIN — PIPERACILLIN AND TAZOBACTAM 3.38 G: 3; .375 INJECTION, POWDER, LYOPHILIZED, FOR SOLUTION INTRAVENOUS at 15:33

## 2023-10-31 RX ADMIN — IPRATROPIUM BROMIDE 0.5 MG: 0.5 SOLUTION RESPIRATORY (INHALATION) at 13:09

## 2023-10-31 RX ADMIN — DEXAMETHASONE 4 MG: 2 TABLET ORAL at 21:59

## 2023-10-31 RX ADMIN — CHLORHEXIDINE GLUCONATE 15 ML: 1.2 SOLUTION ORAL at 21:59

## 2023-10-31 RX ADMIN — LEVALBUTEROL HYDROCHLORIDE 1.25 MG: 1.25 SOLUTION RESPIRATORY (INHALATION) at 13:09

## 2023-10-31 RX ADMIN — Medication 6 MG: at 21:59

## 2023-10-31 RX ADMIN — LEVALBUTEROL HYDROCHLORIDE 1.25 MG: 1.25 SOLUTION RESPIRATORY (INHALATION) at 07:33

## 2023-10-31 RX ADMIN — VANCOMYCIN HYDROCHLORIDE 1250 MG: 5 INJECTION, POWDER, LYOPHILIZED, FOR SOLUTION INTRAVENOUS at 18:28

## 2023-10-31 RX ADMIN — IPRATROPIUM BROMIDE 0.5 MG: 0.5 SOLUTION RESPIRATORY (INHALATION) at 19:41

## 2023-10-31 RX ADMIN — CHLORHEXIDINE GLUCONATE 15 ML: 1.2 SOLUTION ORAL at 09:58

## 2023-10-31 RX ADMIN — DOCUSATE SODIUM 100 MG: 100 CAPSULE, LIQUID FILLED ORAL at 09:57

## 2023-10-31 RX ADMIN — DOCUSATE SODIUM 100 MG: 100 CAPSULE, LIQUID FILLED ORAL at 18:28

## 2023-10-31 RX ADMIN — TAMSULOSIN HYDROCHLORIDE 0.4 MG: 0.4 CAPSULE ORAL at 15:43

## 2023-10-31 RX ADMIN — ENOXAPARIN SODIUM 100 MG: 100 INJECTION SUBCUTANEOUS at 09:57

## 2023-10-31 RX ADMIN — FINASTERIDE 5 MG: 5 TABLET, FILM COATED ORAL at 09:57

## 2023-10-31 RX ADMIN — ACETAMINOPHEN 650 MG: 325 TABLET ORAL at 10:05

## 2023-10-31 NOTE — SPEECH THERAPY NOTE
Speech Language/Pathology    Speech/Language Pathology Progress Note    Patient Name: Rony Burrows  AZGYW'C Date: 10/31/2023     Problem List  Principal Problem:    Acute on Chronic Hypoxemic respiratory failure, ([address])  Active Problems:    Bilateral lower extremity DVTs    Adenocarcinoma of lung    Brain metastasis    Mixed hyperlipidemia    Anasarca    Sepsis (HCC)    Stage 3a chronic kidney disease (HCC)    Seizure (HCC)    Obesity, morbid ([address])    GERD (gastroesophageal reflux disease)    BPH (benign prostatic hyperplasia)       Past Medical History  Past Medical History:   Diagnosis Date    Acute respiratory failure with hypoxia ([address]) 7/31/2021    Chronic respiratory failure with hypoxia ([address]) 8/9/2021    Impaired mobility and ADLs 8/13/2021    Lung cancer ([address])     Stroke [facility])         Past Surgical History  Past Surgical History:   Procedure Laterality Date    IR BIOPSY LUNG  8/5/2021    IR THORACENTESIS  2/24/2022    IR THORACENTESIS  8/1/2022         Subjective:  Pt transferred to ICU yesterday afternoon or tachypnea, tachycardia, diffuse rhonchi, cyanosis of nose and lips, generalized pitting edema. S/w CC AP and RN, pt able to be seen to determine appropriatenss to resume PO intake. "Just two chocolate puddings"     Current Diet:  NPO    Objective:  Pt seen for dx dysphagia tx to assess appropriateness for PO intake. Pt assess w/ thin liquids, puree, and hard solids. RN present to administer PO meds, taken whole w/ thin liquid. No overt s/s aspiration w/ PO meds or thin in isolation. Weak labial seal for retrieval. Lingual pumping manipulation and transfer of both puree and solid. Mild prolonged mastication of hard solid w/ piecemeal transfers to fully clear. Swallows suspected delayed w/ reduced movement to palpation. No overt s/s aspiration. S/w pt and pt's family regarding initiating diet dysphagia 3/thin liquids w/ close supervision. Pt/family agreeable.  Education reviewed on s/s dysphagia/aspiration to notify medical team of should they arise. Assessment:  Pt w/ s/s mild oropharyngeal dysphagia ana cristina by prolonged mastication and oral organization and suspected delayed swallow initiation. No overt s/s aspiration.      Plan/Recommendations:  Dysphagia 3/thin   Frequent/thorough oral care  ST to f/u as able/appropriate

## 2023-10-31 NOTE — ASSESSMENT & PLAN NOTE
10/23 POA: increased WOB in the ED requiring the initiation of BIPAP  Hx of Stage IV lung CA with mets. 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 Completed a short course of PO lasix outpatient for generalized swelling   10/23 CTA PE Study: No evidence for acute pulmonary emboli. Densely consolidated right lower lobe and right upper lobe. Aeration remaining in the right middle lobe. Significant mass effect on the right lower lung secondary to large mixed attenuation masslike pleural abnormality. 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 Stable incompletely imaged right neck mass presumably representing metastatic adenopathy. Stable hypodensities within the liver. 10/30 upgrade to ICU for tachypnea, tachycardia, diffuse rhonchi, cyanosis of nose and lips, generalized pitting edema   Concern for volume overload   April 2023: EF 55%, g1dd   Bipap 12/8 for increased WOB, diffuse rhonchi   Lasix 60mg IV x1 by SLIM     Plan:  AOC respiratory failure with increased WOB 10/20 with concern for volume overload   Cont bipap 12/8 -- wean as tolerated for improved WOB   Would trial off today.    Goal spo2 > 90%

## 2023-10-31 NOTE — ASSESSMENT & PLAN NOTE
SIRS: Tachycardia, tachypnea, fever  UA negative for UTI  10/23 BC 2/2 + Pasteurella   10/26 BC 2/2 NG x48H  Urine strep/legionella and MRSA neg   Lactic cleared without IVF bolus initially 2/2 concern for volume overload   30 ml/kg were not given due to concern for fluid overload  Procal 0.18    Plan:  Abx D8: Vanc, Zosyn D2  ID following previously, have been reengaged in the setting of SIRS with temp 102.9, , tachypnea on bipap   Likely source: his cat   Trend fever curve and WBC count

## 2023-10-31 NOTE — ASSESSMENT & PLAN NOTE
In the setting of Avastin which was started earlier this month for radiation necrosis surrounding brain metastasis  Completed a course of lasix outpatient  Admission UA with only trace proteinuria    Plan:    Wife states his anasarca has been worsening in the past 2 weeks   Episode of increase WOB and required bipap   Could trail off this AM  Lasix, bipap as above

## 2023-10-31 NOTE — PHYSICAL THERAPY NOTE
PHYSICAL THERAPY CANCELLATION NOTE      Patient Name: Eduarda Campbell  SHLGS'M Date: 10/31/2023       10/31/23 1528   Note Type   Note type Cancelled Session   Additional Comments Pt pending hospice consult to determine POC/GOC, will f/u as appropriate for PT intervention       Lizett Acharya, PT, DPT

## 2023-10-31 NOTE — PROGRESS NOTES
[facility]  Progress Note  Name: Debi Foley  MRN: [id number]  Unit/Bed#: -01 I Date of Admission: 10/23/2023   Date of Service: 10/31/2023 I Hospital Day: 8    Assessment/Plan   * Acute on Chronic Hypoxemic respiratory failure, [facility])  Assessment & Plan  10/23 POA: increased WOB in the ED requiring the initiation of BIPAP  Hx of Stage IV lung CA with mets. 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 Completed a short course of PO lasix outpatient for generalized swelling   10/23 CTA PE Study: No evidence for acute pulmonary emboli. Densely consolidated right lower lobe and right upper lobe. Aeration remaining in the right middle lobe. Significant mass effect on the right lower lung secondary to large mixed attenuation masslike pleural abnormality. 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 Stable incompletely imaged right neck mass presumably representing metastatic adenopathy. Stable hypodensities within the liver. 10/30 upgrade to ICU for tachypnea, tachycardia, diffuse rhonchi, cyanosis of nose and lips, generalized pitting edema   Concern for volume overload   April 2023: EF 55%, g1dd   Bipap 12/8 for increased WOB, diffuse rhonchi   Lasix 60mg IV x1 by SLIM     Plan:  AOC respiratory failure with increased WOB 10/20 with concern for volume overload   Cont bipap 12/8 -- wean as tolerated for improved WOB   Would trial off today.    Goal spo2 > 90%    Brain metastasis  Assessment & Plan  Brain metastasis first diagnosed in February 2023 s/p SBRT  Currently on Keppra for focal seizures in 8/2023 due to brain metastasis and Decadron for surrounding vasogenic edema  Pt has been unable to be weaned from decadron due to decreased functional status and worsening lethargy with attempts of weaning  10/2 MRI brain: Metastatic adenocarcinoma of the lung status post chemotherapy. Left posterior frontal lobe intracranial metastases (status post SBRT 2/22/2023) increased in size compared to 7/26/2023 with progression of surrounding vasogenic edema. There is no new suspicious intra-axial lesion. 10/4 started on Avastin due to concern for radiation necrosis  Recently developed significant peripheral edema, likely secondary to Avastin  Completed a short course of lasix outpatient PTA, however wife as described continued development of edema     Plan:  Pt currently at baseline neuro status as documented in outpatient Neurology notes  Scheduled  neuro checks  Continue Keppra  Initial keppra level 58.8, does not appear keppra was held since admission -- send repeat level     Sepsis [facility])  Assessment & Plan  SIRS: Tachycardia, tachypnea, fever  UA negative for UTI  10/23 BC 2/2 + Pasteurella   10/26 BC 2/2 NG x48H  Urine strep/legionella and MRSA neg   Lactic cleared without IVF bolus initially 2/2 concern for volume overload   30 ml/kg were not given due to concern for fluid overload  Procal 0.18    Plan:  Abx D8: Vanc, Zosyn D2  ID following previously, have been reengaged in the setting of SIRS with temp 102.9, , tachypnea on bipap   Likely source: his cat   Trend fever curve and WBC count    Adenocarcinoma of lung  Assessment & Plan  Dx initially 09/2021 stage IIIC. Received multiple rounds of chemo/radiation. Found to have brain mets 02/2023 to the left precentral gyrus. Cancer upstaged to stage IV. Currently on daily dexamethasone, Lovenox for [facility] [place], operated by [facility] as well as a history of DVT. 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  Unlikely to benefit from ASEPT catheter    Plan:  Continue with home dose dexamethasone   Palliative care consult    Mixed hyperlipidemia  Assessment & Plan  Continue home statin    BPH (benign prostatic hyperplasia)  Assessment & Plan  Continue home Flomax  [place] D3 for retention   Urology following -- for voiding trial as OP     GERD (gastroesophageal reflux disease)  Assessment & Plan  Continue PPI      Obesity, morbid ([address])  Assessment & Plan  Lifestyle modifications     Seizure ([address])  Assessment & Plan  By history  Last seen 9/25 in [address] [place] [address] ED for focal seizure following chemotherapy  Home rgimen: Keppra 1500 mg PO BID    Plan:  Continue with Keppra 1500 mg BID  Switched to IV secondary to decreased mentation  Seizure precautions     Stage 3a chronic kidney disease [facility])  Assessment & Plan  Lab Results   Component Value Date    EGFR 93 10/30/2023    EGFR 97 10/29/2023    EGFR 94 10/28/2023    CREATININE 0.71 10/30/2023    CREATININE 0.65 10/29/2023    CREATININE 0.70 10/28/2023     Baseline creat appears to be 0.7-1  Current creat 0.71  Hampton placed during admission for urinary retention  Urology following, for voiding trial as OP   S/p Lasix 60mg IV x1 on transfer to ICU   Robust UOP from dosage. Trend creat, UO   Avoid hypotension, nephrotoxic agents     Anasarca  Assessment & Plan  In the setting of Avastin which was started earlier this month for radiation necrosis surrounding brain metastasis  Completed a course of lasix outpatient  Admission UA with only trace proteinuria    Plan: Wife states his anasarca has been worsening in the past 2 weeks   Episode of increase WOB and required bipap   Could trail off this AM  Lasix, bipap as above     Bilateral lower extremity DVTs  Assessment & Plan  Likely in the setting of CA   Continue home Lovenox             ICU Core Measures     A: Assess, Prevent, and Manage Pain Has pain been assessed? Yes  Need for changes to pain regimen?  No   B: Both SAT/SAT  N/A   C: Choice of Sedation RASS Goal: 0 Alert and Calm or N/A patient not on sedation  Need for changes to sedation or analgesia regimen? No   D: Delirium CAM-ICU: Unable to perform secondary to Acute cognitive dysfunction   E: Early Mobility  Plan for early mobility? Yes   F: Family Engagement Plan for family engagement today? No       Antibiotic Review: Continue broad spectrum secondary to severity of illness. 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 ER with fever and chills initially treated for hypoxic respiratory failure secondary to postobstructive pneumonia. Blood cultures grew back positive for Pasteurella which thought secondary source was the patient's cat. Infectious disease is following the patient. Patient transferred to the floor 10/24. IV diuresis was initially held in the setting of sepsis however critical care was called to evaluate the patient on 10/30 secondary to increasing hypoxia found to be cyanotic with significant sensory muscle use. Patient given 60 of IV Lasix with over  2 L urine output in response. Patient was placed on BiPAP due to work of breathing. Overnight she had no acute events. He was maintained on BiPAP. Patient is lethargic but does awaken to voice follows commands and responds appropriately. Patient stable to try off BiPAP this AM.  Patient had a Tmax of 103.0. Infectious disease was notified of the new fever. Antibiotics broadened yesterday. Review of Systems   Constitutional:         No endorsing complaints    All other systems reviewed and are negative. Objective                            Vitals I/O      Most Recent Min/Max in 24hrs   Temp 99.5 °F (37.5 °C) Temp  Min: 97 °F (36.1 °C)  Max: 103.5 °F (39.7 °C)   Pulse 78 Pulse  Min: 78  Max: 114   Resp 20 Resp  Min: 18  Max: 22   /69 BP  Min: 97/66  Max: 138/88   O2 Sat 100 % SpO2  Min: 92 %  Max: 100 %      Intake/Output Summary (Last 24 hours) at 10/31/2023 0433  Last data filed at 10/31/2023 0000  Gross per 24 hour   Intake 800 ml   Output 3300 ml   Net -2500 ml         Diet NPO     Invasive Monitoring Physical exam    Physical Exam  Eyes:      Pupils: Pupils are equal, round, and reactive to light. Skin:     General: Skin is cool. Capillary Refill: Capillary refill takes less than 2 seconds. Coloration: Skin is mottled. HENT:      Head: Normocephalic. Cardiovascular:      Rate and Rhythm: Normal rate and regular rhythm. Pulses: Decreased pulses. Heart sounds: Murmur heard. Musculoskeletal:      Right lower le+ Pitting Edema present. Left lower le+ Pitting Edema present. Abdominal:      General: Bowel sounds are normal. There is distension. Tenderness: There is no abdominal tenderness. Constitutional:       General: He is not in acute distress. Appearance: He is obese. Pulmonary:      Effort: Pulmonary effort is normal.      Breath sounds: Examination of the right-lower field reveals rales. Examination of the left-lower field reveals rales. Decreased breath sounds and rales present. Comments: Bipap applied   Neurological:      Mental Status: He is alert and oriented to person, place, and time.            Diagnostic Studies      EKG: NSR   Imaging:  I have personally reviewed pertinent films in PACS     Medications:  Scheduled PRN   atorvastatin, 40 mg, Daily With Dinner  chlorhexidine, 15 mL, Q12H [facility]  dexamethasone, 4 mg, Q12H OPAL  docusate sodium, 100 mg, BID  enoxaparin, 100 mg, Q12H OPAL  finasteride, 5 mg, Daily  insulin lispro, 1-6 Units, Q6H [address]  ipratropium, 0.5 mg, TID  levalbuterol, 1.25 mg, TID  levETIRAcetam, 1,500 mg, Q12H [address]  melatonin, 6 mg, HS  pantoprazole, 40 mg, Early Morning  piperacillin-tazobactam, 3.375 g, Q8H  polyethylene glycol, 17 g, Daily  senna, 1 tablet, HS  tamsulosin, 0.4 mg, Daily With Dinner  vancomycin, 15 mg/kg (Adjusted), Q12H      acetaminophen, 650 mg, Q4H PRN  ipratropium, 0.5 mg, Q6H PRN  levalbuterol, 1.25 mg, Q6H PRN       Continuous          Labs:    CBC    Recent Labs     10/30/23  1402   WBC 5.58   HGB 10.2*   HCT 33.5*   *     BMP    Recent Labs     10/30/23  1402   SODIUM 143   K 4.0      CO2 31   AGAP 6   BUN 19   CREATININE 0.71   CALCIUM 8.9       Coags    No recent results     Additional Electrolytes  Recent Labs     10/30/23  1402   MG 1.9   PHOS 2.7          Blood Gas    Recent Labs     10/30/23  1300   PHART 7.449   SKX9WUP 38.0   PO2ART 73.8*   [id number] 25.8   BEART 1.8   SOURCE Radial, Left     Recent Labs     10/30/23  1300   SOURCE Radial, Left    LFTs  Recent Labs     10/30/23  1402   ALT 18   AST 15   ALKPHOS 40   ALB 3.2*   TBILI 0.39       Infectious  Recent Labs     10/30/23  1402   PROCALCITONI 0.60*     Glucose  Recent Labs     10/30/23  1402   GLUC 95               No critical time spent.     CRISTOFER Vale

## 2023-10-31 NOTE — ASSESSMENT & PLAN NOTE
Lab Results   Component Value Date    EGFR 93 10/30/2023    EGFR 97 10/29/2023    EGFR 94 10/28/2023    CREATININE 0.71 10/30/2023    CREATININE 0.65 10/29/2023    CREATININE 0.70 10/28/2023     Baseline creat appears to be 0.7-1  Current creat 0.71  Hampton placed during admission for urinary retention  Urology following, for voiding trial as OP   S/p Lasix 60mg IV x1 on transfer to ICU   Robust UOP from dosage.    Trend creat, UO   Avoid hypotension, nephrotoxic agents

## 2023-10-31 NOTE — PROGRESS NOTES
Medical Oncology/Hematology Progress Note  Angeles Fair, male, 68 y.o., 1950,  /-01, [phone]     Assessment and Plan  Stage IV adenocarcinoma of the lung  2. Brain metastasis  3. Acute on chronic respiratory failure  4. Bacteremia  5. Goals of care    Patient is a 32-year-old gentleman with metastatic non-small cell lung cancer with solitary brain met. He did complete systemic chemotherapy with Taxol/carbo/Pembro followed by concurrent chemoradiation, completed 4/21/2022. He has been on maintenance systemic immunotherapy with Pembro    He completed SBRT to brain metastasis on 2/22/2023. He has had right upper extremity weakness, focal seizures involving right arm secondary to left-sided brain metastasis. He is on Keppra and has been unable to wean off steroid due to increased weakness, ambulatory dysfunction, cognitive issues every time steroid dose is tapered  Current dose of steroid is dexamethasone 4 mg twice daily    MRI Brain 10/2/23 shows increase in size of left posterior frontal lobe intracranial metastasis with progression of surrounding vasogenic edema. No new suspicious lesions     10/4/23 patient's case discussed at neuro-oncology tumor board. No neurosurgical intervention or additional radiotherapy indicated. 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 10/18/23 treatment with Pembro and Avastin    10/23/23 CTA PE Study negative for PE. Findings are concerning for postobstructive pneumonia, chronic loculated and complicated right pleural effusion likely malignant which is slightly larger. 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 He has been on antibiotics per ID    10/30 rapid response called for increased hypoxia, tachypnea, tachycardia.   He was found to be cyanotic with significant accessory muscle use as well as febrile. Tmax 104  CXR suggested increasing pulmonary edema  Initially required BiPAP now back on room air. ECOG 3-4    Patient has had a prolonged hospital stay and now requiring ICU level of care. He has developed recurrent fevers in spite of prolonged antibiotics, wife reports periods of confusion  I had a long conversation with patient's wife and we discussed patient's main goal is to be at home. Given extent of deconditioning, worsening disease seen on imaging, worsening performance status, he is no longer a candidate for systemic therapy in his present condition. I have real concern that he will recover given all of his comorbidities at this time. Patient's wife seems to understand the gravity of the situation and acknowledges his wish is to be at home. She had questions regarding the difference between palliative care and hospice which I helped answer. I do feel hospice is an appropriate plan of care. She is interested in speaking with hospice liaison and considering this option. For now, we will continue with current medical cares with the limit of level 3 DNR/DNI. Hospice Consult placed    Please see initial hospital consult note dated 10/24/2023 for admission details. HPI: Brittany Pardo is a 68y.o. year old male with a history of H4 non-small cell lung cancer who presented 10/23/2023 for severe respiratory distress. He was febrile, tachycardic and requiring BiPAP on admission. Labs demonstrated significantly elevated procalcitonin, elevated lactate, gram-negative bacteremia.   CT imaging concerning for postobstructive pneumonia        Oncology History Overview Note   9/2021 - Stage IV adenocarcinoma of the lung     10/2021 - 1/2022 - carbo/pem/pebro     3/2022 - 4/2022 - carbo/taxol/RT     5/2022 - maintenance pembro     2/2023 - solitary brain met - SBRT     Adenocarcinoma of lung   8/2021 Initial Diagnosis    Adenocarcinoma of lung     8/5/2021 Biopsy Right lung apex, image-guided core needle biopsy:  - Adenocarcinoma      10/6/2021 - 1/26/2022 Chemotherapy    cyanocobalamin, 1,000 mcg, Intramuscular, Once, 3 of 3 cycles  Administration: 1,000 mcg (11/24/2021), 1,000 mcg (1/26/2022), 1,000 mcg (10/6/2021)  pegfilgrastim (Chantal Roll), 6 mg, Subcutaneous, Once, 1 of 1 cycle  Administration: 6 mg (11/26/2021)  pegfilgrastim (Haroldo Pronto), 6 mg, Subcutaneous, Once, 6 of 6 cycles  Administration: 6 mg (10/6/2021), 6 mg (11/3/2021), 6 mg (11/24/2021), 6 mg (12/15/2021), 6 mg (1/5/2022)  fosaprepitant (EMEND) IVPB, 150 mg, Intravenous, Once, 6 of 6 cycles  Administration: 150 mg (10/6/2021), 150 mg (11/24/2021), 150 mg (12/15/2021), 150 mg (1/26/2022), 150 mg (11/3/2021), 150 mg (1/5/2022)  CARBOplatin (PARAPLATIN) IVPB (GO AUC DOSING), 519.5 mg, Intravenous, Once, 6 of 6 cycles  Administration: 519.5 mg (10/6/2021), 574 mg (11/24/2021), 600 mg (12/15/2021), 533 mg (1/26/2022), 604.5 mg (11/3/2021), 563 mg (1/5/2022)  pemetrexed (ALIMTA) chemo infusion, 970 mg, Intravenous, Once, 6 of 6 cycles  Administration: 1,000 mg (10/6/2021), 1,000 mg (11/24/2021), 1,000 mg (12/15/2021), 1,000 mg (1/26/2022), 1,000 mg (11/3/2021), 1,000 mg (1/5/2022)  pembrolizumab (KEYTRUDA) IVPB, 200 mg, Intravenous, Once, 6 of 6 cycles  Administration: 200 mg (10/6/2021), 200 mg (11/24/2021), 200 mg (12/15/2021), 200 mg (1/26/2022), 200 mg (11/3/2021), 200 mg (1/5/2022)     3/7/2022 -  Chemotherapy    CARBOplatin (PARAPLATIN) IVPB ([facility] AUC DOSING), , Intravenous, Once, 6 of 6 cycles  Administration: 211.6 mg (3/7/2022), 208 mg (3/14/2022), 238.8 mg (3/21/2022), 211.6 mg (3/28/2022), 215.2 mg (4/4/2022), 213.4 mg (4/18/2022)  bevacizumab (AVASTIN) IVPB, 622.5 mg (100 % of original dose 7.5 mg/kg), Intravenous, Once, 1 of 4 cycles  Dose modification: 7.5 mg/kg (original dose 7.5 mg/kg, Cycle 31)  Administration: 600 mg (10/18/2023)  PACLItaxel (TAXOL) chemo IVPB, 50 mg/m2 = 99 mg, Intravenous, Once, 6 of 6 cycles  Administration: 99 mg (3/7/2022), 99 mg (3/14/2022), 99 mg (3/21/2022), 99 mg (3/28/2022), 99 mg (4/4/2022), 99 mg (4/18/2022)  pembrolizumab (KEYTRUDA) IVPB, 200 mg, Intravenous, Once, 27 of 33 cycles  Administration: 200 mg (3/7/2022), 200 mg (3/28/2022), 200 mg (4/25/2022), 200 mg (5/16/2022), 200 mg (6/6/2022), 200 mg (6/27/2022), 200 mg (7/18/2022), 200 mg (8/8/2022), 200 mg (8/29/2022), 200 mg (9/19/2022), 200 mg (10/10/2022), 200 mg (10/31/2022), 200 mg (11/21/2022), 200 mg (12/12/2022), 200 mg (1/3/2023), 200 mg (1/23/2023), 200 mg (2/13/2023), 200 mg (3/27/2023), 200 mg (4/26/2023), 200 mg (5/17/2023), 200 mg (6/7/2023), 200 mg (6/28/2023), 200 mg (7/19/2023), 200 mg (8/9/2023), 200 mg (8/30/2023), 200 mg (9/28/2023), 200 mg (10/18/2023)     2/22/2023 - 2/22/2023 Radiation    SRS left precentral gyrus   2000 cGy    Dr. Nicole Metz     Malignant neoplasm of upper lobe of right lung ([address])   9/3/2021 Initial Diagnosis    Malignant neoplasm of upper lobe of right lung ([address])     9/23/2021 -  Cancer Staged    Staging form: Lung, AJCC 8th Edition  - Clinical stage from 9/23/2021: Stage IIIC (cT3, cN3, cM0) - Signed by Yuan Cesar MD on 9/23/2021  Histopathologic type:  Adenocarcinoma, NOS       10/6/2021 - 1/26/2022 Chemotherapy    cyanocobalamin, 1,000 mcg, Intramuscular, Once, 3 of 3 cycles  Administration: 1,000 mcg (11/24/2021), 1,000 mcg (1/26/2022), 1,000 mcg (10/6/2021)  pegfilgrastim (Kylee Gal), 6 mg, Subcutaneous, Once, 1 of 1 cycle  Administration: 6 mg (11/26/2021)  pegfilgrastim (August Em), 6 mg, Subcutaneous, Once, 6 of 6 cycles  Administration: 6 mg (10/6/2021), 6 mg (11/3/2021), 6 mg (11/24/2021), 6 mg (12/15/2021), 6 mg (1/5/2022)  fosaprepitant (EMEND) IVPB, 150 mg, Intravenous, Once, 6 of 6 cycles  Administration: 150 mg (10/6/2021), 150 mg (11/24/2021), 150 mg (12/15/2021), 150 mg (1/26/2022), 150 mg (11/3/2021), 150 mg (1/5/2022)  CARBOplatin (PARAPLATIN) IVPB (GOG AUC DOSING), 519.5 mg, Intravenous, Once, 6 of 6 cycles  Administration: 519.5 mg (10/6/2021), 574 mg (11/24/2021), 600 mg (12/15/2021), 533 mg (1/26/2022), 604.5 mg (11/3/2021), 563 mg (1/5/2022)  pemetrexed (ALIMTA) chemo infusion, 970 mg, Intravenous, Once, 6 of 6 cycles  Administration: 1,000 mg (10/6/2021), 1,000 mg (11/24/2021), 1,000 mg (12/15/2021), 1,000 mg (1/26/2022), 1,000 mg (11/3/2021), 1,000 mg (1/5/2022)  pembrolizumab (KEYTRUDA) IVPB, 200 mg, Intravenous, Once, 6 of 6 cycles  Administration: 200 mg (10/6/2021), 200 mg (11/24/2021), 200 mg (12/15/2021), 200 mg (1/26/2022), 200 mg (11/3/2021), 200 mg (1/5/2022)     3/4/2022 - 4/21/2022 Radiation    The patient saw @[facility]@ for radiation treatment.  This is the current list of radiation treatment:  Plan ID Energy Fractions Dose per Fraction (cGy) Dose Correction (cGy) Total Dose Delivered (cGy) Elapsed Days   R LUNGMED REV 6X 30 / 30 200 0 6,000 48      Treatment dates:  C1: 3/4/2022 - 4/21/2022         3/7/2022 -  Chemotherapy    CARBOplatin (PARAPLATIN) IVPB (GOG AUC DOSING), , Intravenous, Once, 6 of 6 cycles  Administration: 211.6 mg (3/7/2022), 208 mg (3/14/2022), 238.8 mg (3/21/2022), 211.6 mg (3/28/2022), 215.2 mg (4/4/2022), 213.4 mg (4/18/2022)  bevacizumab (AVASTIN) IVPB, 622.5 mg (100 % of original dose 7.5 mg/kg), Intravenous, Once, 1 of 4 cycles  Dose modification: 7.5 mg/kg (original dose 7.5 mg/kg, Cycle 31)  Administration: 600 mg (10/18/2023)  PACLItaxel (TAXOL) chemo IVPB, 50 mg/m2 = 99 mg, Intravenous, Once, 6 of 6 cycles  Administration: 99 mg (3/7/2022), 99 mg (3/14/2022), 99 mg (3/21/2022), 99 mg (3/28/2022), 99 mg (4/4/2022), 99 mg (4/18/2022)  pembrolizumab (KEYTRUDA) IVPB, 200 mg, Intravenous, Once, 27 of 33 cycles  Administration: 200 mg (3/7/2022), 200 mg (3/28/2022), 200 mg (4/25/2022), 200 mg (5/16/2022), 200 mg (6/6/2022), 200 mg (6/27/2022), 200 mg (7/18/2022), 200 mg (8/8/2022), 200 mg (8/29/2022), 200 mg (9/19/2022), 200 mg (10/10/2022), 200 mg (10/31/2022), 200 mg (11/21/2022), 200 mg (12/12/2022), 200 mg (1/3/2023), 200 mg (1/23/2023), 200 mg (2/13/2023), 200 mg (3/27/2023), 200 mg (4/26/2023), 200 mg (5/17/2023), 200 mg (6/7/2023), 200 mg (6/28/2023), 200 mg (7/19/2023), 200 mg (8/9/2023), 200 mg (8/30/2023), 200 mg (9/28/2023), 200 mg (10/18/2023)     2/22/2023 - 2/22/2023 Radiation    SRS left precentral gyrus   2000 cGy    Dr. Alyce Posey     Brain metastasis   2/3/2023 Initial Diagnosis    Brain metastasis     2/22/2023 - 2/22/2023 Radiation    SRS left precentral gyrus   2000 cGy    Dr. Alyce Posey            Review of Systems   Unable to perform ROS: Other         Interval History:   Patient seen with wife present at bedside. Since my last visit had bedside, he had an acute event yesterday where he became significantly hypoxic/cyanotic requiring transfer to ICU. Appears ill, febrile. /64   Pulse (!) 108   Temp (!) 102.2 °F (39 °C)   Resp 14   Ht 5' 9" (1.753 m)   Wt 102 kg (224 lb)   SpO2 98%   BMI 33.08 kg/m²       Physical Exam  Constitutional:       Appearance: He is ill-appearing. HENT:      Head: Normocephalic and atraumatic. Eyes:      General: No scleral icterus. Cardiovascular:      Rate and Rhythm: Tachycardia present. Musculoskeletal:         General: Swelling present. Skin:     Findings: Bruising present. Neurological:      Mental Status: He is alert.    Psychiatric:         Behavior: Behavior normal.         Recent Results (from the past 48 hour(s))   Fingerstick Glucose (POCT)    Collection Time: 10/29/23  3:05 PM   Result Value Ref Range    POC Glucose 103 65 - 140 mg/dl   Fingerstick Glucose (POCT)    Collection Time: 10/29/23  4:17 PM   Result Value Ref Range    POC Glucose 96 65 - 140 mg/dl   Fingerstick Glucose (POCT)    Collection Time: 10/30/23  7:34 AM   Result Value Ref Range    POC Glucose 87 65 - 140 mg/dl   Fingerstick Glucose (POCT)    Collection Time: 10/30/23 11:13 AM   Result Value Ref Range    POC Glucose 114 65 - 140 mg/dl   Blood gas, arterial    Collection Time: 10/30/23  1:00 PM   Result Value Ref Range    pH, Arterial 7.449 7.350 - 7.450    pCO2, Arterial 38.0 36.0 - 44.0 mm Hg    pO2, Arterial 73.8 (L) 75.0 - 129.0 mm Hg    HCO3, Arterial 25.8 22.0 - 28.0 mmol/L    Base Excess, Arterial 1.8 mmol/L    O2 Content, Arterial 14.6 (L) 16.0 - 23.0 mL/dL    O2 HGB,Arterial  92.9 (L) 94.0 - 97.0 %    SOURCE Radial, Left     PRESTON TEST Yes     Nasal Cannula 1    Comprehensive metabolic panel    Collection Time: 10/30/23  2:02 PM   Result Value Ref Range    Sodium 143 135 - 147 mmol/L    Potassium 4.0 3.5 - 5.3 mmol/L    Chloride 106 96 - 108 mmol/L    CO2 31 21 - 32 mmol/L    ANION GAP 6 mmol/L    BUN 19 5 - 25 mg/dL    Creatinine 0.71 0.60 - 1.30 mg/dL    Glucose 95 65 - 140 mg/dL    Calcium 8.9 8.4 - 10.2 mg/dL    Corrected Calcium 9.5 8.3 - 10.1 mg/dL    AST 15 13 - 39 U/L    ALT 18 7 - 52 U/L    Alkaline Phosphatase 40 34 - 104 U/L    Total Protein 5.8 (L) 6.4 - 8.4 g/dL    Albumin 3.2 (L) 3.5 - 5.0 g/dL    Total Bilirubin 0.39 0.20 - 1.00 mg/dL    eGFR 93 ml/min/1.73sq m   HS Troponin 0hr (reflex protocol)    Collection Time: 10/30/23  2:02 PM   Result Value Ref Range    hs TnI 0hr 9 "Refer to ACS Flowchart"- see link ng/L   Magnesium    Collection Time: 10/30/23  2:02 PM   Result Value Ref Range    Magnesium 1.9 1.9 - 2.7 mg/dL   Phosphorus    Collection Time: 10/30/23  2:02 PM   Result Value Ref Range    Phosphorus 2.7 2.3 - 4.1 mg/dL   CBC    Collection Time: 10/30/23  2:02 PM   Result Value Ref Range    WBC 5.58 4.31 - 10.16 Thousand/uL    RBC 3.45 (L) 3.88 - 5.62 Million/uL    Hemoglobin 10.2 (L) 12.0 - 17.0 g/dL    Hematocrit 33.5 (L) 36.5 - 49.3 %    MCV 97 82 - 98 fL    MCH 29.6 26.8 - 34.3 pg    MCHC 30.4 (L) 31.4 - 37.4 g/dL    RDW 21.5 (H) 11.6 - 15.1 %    Platelets 333 (L) 948 - 390 Thousands/uL    MPV 9.2 8.9 - 12.7 fL   B-Type Natriuretic Peptide(BNP)    Collection Time: 10/30/23 2:02 PM   Result Value Ref Range     (H) 0 - 100 pg/mL   Procalcitonin    Collection Time: 10/30/23  2:02 PM   Result Value Ref Range    Procalcitonin 0.60 (H) <=0.25 ng/ml   Lactic acid, plasma (w/reflex if result > 2.0)    Collection Time: 10/30/23  2:06 PM   Result Value Ref Range    LACTIC ACID 1.8 0.5 - 2.0 mmol/L   Strep Pneumoniae, Urine    Collection Time: 10/30/23  3:47 PM    Specimen: Urine, Catheter   Result Value Ref Range    Strep pneumoniae antigen, urine Negative Negative   Legionella antigen, urine    Collection Time: 10/30/23  3:47 PM    Specimen: Urine, Catheter   Result Value Ref Range    Legionella Urinary Antigen Negative Negative   UA (URINE) with reflex to Scope    Collection Time: 10/30/23  3:47 PM   Result Value Ref Range    Color, UA Yellow     Clarity, UA Hazy     Specific Gravity, UA <1.005 (L) 1.005 - 1.030    pH, UA 5.0 4.5, 5.0, 5.5, 6.0, 6.5, 7.0, 7.5, 8.0    Leukocytes, UA Negative Negative    Nitrite, UA Negative Negative    Protein, UA Negative Negative mg/dl    Glucose, UA Negative Negative mg/dl    Ketones, UA Negative Negative mg/dl    Urobilinogen, UA <2.0 <2.0 mg/dl mg/dl    Bilirubin, UA Negative Negative    Occult Blood, UA Large (A) Negative   Urine Microscopic    Collection Time: 10/30/23  3:47 PM   Result Value Ref Range    RBC, UA Innumerable (A) None Seen, 0-1, 1-2, 2-4, 0-5 /hpf    WBC, UA 1-2 None Seen, 0-1, 1-2, 0-5, 2-4 /hpf    Epithelial Cells Occasional None Seen, Occasional /hpf    Bacteria, UA None Seen None Seen, Occasional /hpf    MUCUS THREADS Occasional (A) None Seen    Hyaline Casts, UA 1-2 (A) (none) /lpf   HS Troponin I 2hr    Collection Time: 10/30/23  4:04 PM   Result Value Ref Range    hs TnI 2hr 11 "Refer to ACS Flowchart"- see link ng/L    Delta 2hr hsTnI 2 <20 ng/L   Blood culture    Collection Time: 10/30/23  4:05 PM    Specimen: Arm, Left; Blood   Result Value Ref Range    Blood Culture Received in Microbiology Lab. Culture in Progress. Blood culture    Collection Time: 10/30/23  4:23 PM    Specimen: Arm, Left; Blood   Result Value Ref Range    Blood Culture Received in Microbiology Lab. Culture in Progress.     Fingerstick Glucose (POCT)    Collection Time: 10/30/23  4:50 PM   Result Value Ref Range    POC Glucose 112 65 - 140 mg/dl   Fingerstick Glucose (POCT)    Collection Time: 10/31/23 12:33 AM   Result Value Ref Range    POC Glucose 117 65 - 140 mg/dl   HS Troponin I 4hr    Collection Time: 10/31/23  1:12 AM   Result Value Ref Range    hs TnI 4hr 11 "Refer to ACS Flowchart"- see link ng/L    Delta 4hr hsTnI 2 <20 ng/L   Procalcitonin    Collection Time: 10/31/23  4:36 AM   Result Value Ref Range    Procalcitonin 0.48 (H) <=0.25 ng/ml   CBC and differential    Collection Time: 10/31/23  4:36 AM   Result Value Ref Range    WBC 4.81 4.31 - 10.16 Thousand/uL    RBC 3.30 (L) 3.88 - 5.62 Million/uL    Hemoglobin 9.6 (L) 12.0 - 17.0 g/dL    Hematocrit 32.5 (L) 36.5 - 49.3 %    MCV 99 (H) 82 - 98 fL    MCH 29.1 26.8 - 34.3 pg    MCHC 29.5 (L) 31.4 - 37.4 g/dL    RDW 21.6 (H) 11.6 - 15.1 %    MPV 9.2 8.9 - 12.7 fL    Platelets 684 (L) 289 - 390 Thousands/uL    nRBC 3 /100 WBCs   Calcium, ionized    Collection Time: 10/31/23  4:36 AM   Result Value Ref Range    Calcium, Ionized 1.17 1.12 - 1.32 mmol/L   Comprehensive metabolic panel    Collection Time: 10/31/23  4:36 AM   Result Value Ref Range    Sodium 143 135 - 147 mmol/L    Potassium 4.2 3.5 - 5.3 mmol/L    Chloride 105 96 - 108 mmol/L    CO2 32 21 - 32 mmol/L    ANION GAP 6 mmol/L    BUN 20 5 - 25 mg/dL    Creatinine 0.71 0.60 - 1.30 mg/dL    Glucose 105 65 - 140 mg/dL    Calcium 8.2 (L) 8.4 - 10.2 mg/dL    Corrected Calcium 9.1 8.3 - 10.1 mg/dL    AST 14 13 - 39 U/L    ALT 16 7 - 52 U/L    Alkaline Phosphatase 39 34 - 104 U/L    Total Protein 5.4 (L) 6.4 - 8.4 g/dL    Albumin 2.9 (L) 3.5 - 5.0 g/dL    Total Bilirubin 0.45 0.20 - 1.00 mg/dL    eGFR 93 ml/min/1.73sq m   Magnesium    Collection Time: 10/31/23  4:36 AM   Result Value Ref Range    Magnesium 2.0 1.9 - 2.7 mg/dL   Phosphorus    Collection Time: 10/31/23  4:36 AM   Result Value Ref Range    Phosphorus 2.8 2.3 - 4.1 mg/dL   Blood gas, venous    Collection Time: 10/31/23  4:36 AM   Result Value Ref Range    pH, Cipriano 7.323 7.300 - 7.400    pCO2, Cipriano 59.7 (H) 42.0 - 50.0 mm Hg    pO2, Cipriano 36.6 35.0 - 45.0 mm Hg    HCO3, Cipriano 30.3 (H) 24 - 30 mmol/L    Base Excess, Cipriano 3.1 mmol/L    O2 Content, Cipriano 9.2 ml/dL    O2 HGB, VENOUS 59.2 (L) 60.0 - 80.0 %   Fingerstick Glucose (POCT)    Collection Time: 10/31/23  5:35 AM   Result Value Ref Range    POC Glucose 89 65 - 140 mg/dl   Lactic acid, plasma (w/reflex if result > 2.0)    Collection Time: 10/31/23 10:42 AM   Result Value Ref Range    LACTIC ACID 2.1 (HH) 0.5 - 2.0 mmol/L       FL barium swallow video w speech    Result Date: 10/24/2023  Narrative: A video barium swallow study was performed by the Department of Speech Pathology. Please refer to the report for the official interpretation. The images are stored for archival purposes only. Study images were not formally reviewed by the Radiology Department. XR chest portable - 1 view    Result Date: 10/23/2023  Narrative: CHEST INDICATION:   Shortness of breath. History of lung cancer. . COMPARISON: Chest radiograph 10/17/2023. Same day CT chest study. EXAM PERFORMED/VIEWS:  XR CHEST PORTABLE FINDINGS: Heart shadow is again obscured by right-sided opacity. Near right-sided opacification due to known loculated pleural effusion. Superimposed consolidation better visualized on same day CT study. Groundglass opacification in the left lower lung zone, likely to represent atelectasis. No pneumothorax. Osseous structures appear within normal limits for patient age. Impression: Persistent right-sided loculated pleural effusion with superimposed consolidation better visualized on same day CT study.  Groundglass opacification in the left lower lung zone, likely to represent atelectasis. Resident: Natalee Blank, the attending radiologist, have reviewed the images and agree with the final report above. Workstation performed: [id number]     CTA ED chest PE study    Result Date: 10/23/2023  Narrative: CTA - CHEST WITH IV CONTRAST - PULMONARY ANGIOGRAM INDICATION:   abnormal CTA. 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 COMPARISON: 10/23/2023 from 9:20 a.m., 9/20/2023 TECHNIQUE: CTA examination of the chest was performed using angiographic technique according to a protocol specifically tailored to evaluate for pulmonary embolism. Multiplanar 2D reformatted images were created from the source data. In addition, coronal 3D MIP postprocessing was performed on the acquisition scanner. Radiation dose length product (DLP) for this visit:  613.29 mGy-cm . This examination, like all CT scans performed in the [facility], was performed utilizing techniques to minimize radiation dose exposure, including the use of iterative  reconstruction and automated exposure control. IV Contrast:  80 mL of iohexol (OMNIPAQUE) FINDINGS: PULMONARY ARTERIAL TREE:  No pulmonary embolus is seen. LUNGS: Background centrilobular emphysema. Mild dependent and background groundglass opacities on the left without significant change. Densely consolidated right upper lobe apical and posterior segments similar to recent priors. Worsening consolidation in the right lower lobe, no significant remaining aeration present. Some air bronchograms present, many of the bronchi are narrow and attenuated or partially obscured, especially in the bronchus intermedius and lower lobe segments. PLEURA: Large gradually increasing mixed attenuation pleural mass/fluid which inverts the right hemidiaphragm and causes mass effect on the right lower lobe. This currently measures 13.2 cm craniocaudal by 13.5 x 9.4 cm on axial image 170.  HEART/GREAT VESSELS:  Unremarkable for patient's age. No thoracic aortic aneurysm. MEDIASTINUM AND JONH: No significant mediastinal or hilar adenopathy. CHEST WALL AND LOWER NECK:   Grossly stable right neck soft tissue mass presumably representing metastatic adenopathy. Incompletely imaged. VISUALIZED STRUCTURES IN THE UPPER ABDOMEN: Scattered hypodensities within the liver not characterized adequately on this exam. No gross change compared to recent priors. Mild caliectasis on the left, incompletely imaged. May in part be related to contrast excretion phase compared to earlier today. Study from today shows mild fullness which is symmetric bilaterally and to the level of the bladder. OSSEOUS STRUCTURES:  No acute fracture or destructive osseous lesion. Impression: No evidence for acute pulmonary emboli. Densely consolidated right lower lobe and right upper lobe. Aeration remaining in the right middle lobe. See above for further description and comparison. Significant mass effect on the right lower lung secondary to large mixed attenuation masslike pleural abnormality described above. 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 Stable incompletely imaged right neck mass presumably representing metastatic adenopathy. Stable hypodensities within the liver. Workstation performed: [id number]     PE Study with CT Abdomen and Pelvis with contrast    Result Date: 10/23/2023  Narrative: CT PULMONARY ANGIOGRAM OF THE CHEST AND CT ABDOMEN AND PELVIS WITH INTRAVENOUS CONTRAST INDICATION:   SOB, fever, abdominal distention. COMPARISON: CT chest abdomen pelvis 9/20/2023. TECHNIQUE:  CT examination of the chest, abdomen and pelvis was performed. Thin section CT angiographic technique was used in the chest in order to evaluate for pulmonary embolus and coronal 3D MIP postprocessing was performed on the acquisition scanner. Multiplanar 2D reformatted images were created from the source data.  This examination, like all CT scans performed in the [facility], was performed utilizing techniques to minimize radiation dose exposure, including the use of iterative reconstruction and automated exposure control. Radiation dose length product (DLP) for this visit:  1801.92 mGy-cm IV Contrast:  100 mL of iohexol (OMNIPAQUE) Enteric Contrast:  Enteric contrast was not administered. FINDINGS: CHEST PULMONARY ARTERIAL TREE: There is heterogeneous enhancement throughout the right pulmonary arteries. While this may be artifactual there is a segment that has more of an eccentric filling defect appearance on the ventral margin of the mid right pulmonary  artery (series 2 image 110) which is indeterminate. No left-sided pulmonary emboli. LUNGS: Increased diffuse right lung consolidation most apparent within the medial right lower lobe. Groundglass opacities in the left lower lobe. PLEURA: Right-sided pleural metastasis with nodular thickening of the pleura and a moderate size loculated heterogeneously hyperattenuating pleural collection. No left-sided effusion. No pneumothorax. HEART/AORTA:  Heart is unremarkable for patient's age. No thoracic aortic aneurysm. MEDIASTINUM AND JONH:  Unremarkable. CHEST WALL AND LOWER NECK: The imaged mass in the right neck measuring at least 3.2 cm as noted on the previous exam. ABDOMEN LIVER/BILIARY TREE: Numerous hypoattenuating hepatic lesions, too small to further characterize. GALLBLADDER:  No calcified gallstones. No pericholecystic inflammatory change. SPLEEN:  Unremarkable. PANCREAS:  Unremarkable. ADRENAL GLANDS:  Unremarkable. KIDNEYS/URETERS:  One or more simple renal cyst(s) is noted. Otherwise unremarkable kidneys. No hydronephrosis. STOMACH AND BOWEL: Colonic diverticulosis without evidence of acute diverticulitis. APPENDIX: A normal appendix was visualized. ABDOMINOPELVIC CAVITY:  No ascites. No pneumoperitoneum. No lymphadenopathy. Stable right retrocrural lymph node. VESSELS: Atherosclerotic changes are present. No evidence of aneurysm. PELVIS REPRODUCTIVE ORGANS:  Unremarkable for patient's age. URINARY BLADDER:  Unremarkable. ABDOMINAL WALL/INGUINAL REGIONS:  Unremarkable. OSSEOUS STRUCTURES:  No acute fracture or destructive osseous lesion. Degenerative changes throughout the spine. Impression: 1. Heterogeneous opacification of the right-sided pulmonary arteries may be due to artifact, but cannot exclude a pulmonary embolus particularly in the right pulmonary artery. Recommend repeating the CTA of the chest. There is no large saddle embolus or  CT findings of right heart strain. 2.  Mildly increased right lung consolidation and groundglass opacities in the left lower lobe. Cannot exclude superimposed pneumonia. 3.  Right pleural thickening and nodularity with a moderate size loculated complex collection, suspicious for pleural involvement by metastases. 4.  Partially imaged mass in the right neck suspicious for darien metastases. Medical record indicates a CT of the neck has been ordered to evaluate this finding but not yet performed. I personally discussed this study with Jarrod Vasquez on 10/23/2023 11:35 AM. Workstation performed: [id number]     XR chest pa & lateral    Result Date: 10/17/2023  Narrative: CHEST INDICATION:   C79.31: Secondary malignant neoplasm of brain. C34.11: Malignant neoplasm of upper lobe, right bronchus or lung. Cold-like symptoms for 2 weeks. COMPARISON: Chest radiograph 9/25/2023; CT chest, abdomen, pelvis 9/20/2023 EXAM PERFORMED/VIEWS:  XR CHEST PA & LATERAL  The frontal view was performed utilizing dual energy radiographic technique. Images: 4 FINDINGS: Heart shadow is obscured by right-sided opacity. Mild bowing of the trachea toward the right, unchanged.  Slight progression of the previously demonstrated extensive right lung opacity corresponding with the patient's known loculated right-sided pleural effusion and radiation fibrosis demonstrated on CT dated 9/20/2023. Left lung is clear apart from tiny linear scar or atelectasis at the base. No pneumothorax. Osseous structures appear within normal limits for patient age. Impression: Slight progression of previously demonstrated extensive opacity right hemithorax corresponding with patient's known right pleural effusion and probable post radiation changes. Resident: Theo Yadva, the attending radiologist, have reviewed the images and agree with the final report above. 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 SBRT of brain met 2/22/2023 COMPARISON: MRI brain 7/26/2023. TECHNIQUE: Multiplanar, multisequence imaging of the brain and sella was performed before and after gadolinium administration. IV Contrast:  9 mL of Gadobutrol injection (SINGLE-DOSE) IMAGE QUALITY:   Diagnostic. FINDINGS: Focal necrotic lesion with irregular peripheral enhancement involving the posterior left frontal lobe measuring 1.3 x 1.8 x 1.9 cm (AP x ML x CC), previously measuring 1.2 cm. There is increased surrounding vasogenic edema. Mild chronic microangiopathic ischemic changes. VENTRICLES:  Ventricles and extra-axial CSF spaces are prominent commensurate with the degree of volume loss. No hydrocephalus. No intraventricular hemorrhage. SELLA AND PITUITARY GLAND:  Normal. ORBITS: Right-sided cataract surgery. PARANASAL SINUSES:  Mucosal thickening of the sinuses with no air fluid levels. VASCULATURE:  Evaluation of the major intracranial vasculature demonstrates appropriate flow voids. CALVARIUM AND SKULL BASE: Marrow signal heterogeneity which can be seen with underlying red marrow proliferation.  EXTRACRANIAL SOFT TISSUES:  Normal.     Impression: Metastatic adenocarcinoma of the lung status post chemotherapy. Left posterior frontal lobe intracranial metastases (status post SBRT 2/22/2023) increased in size compared to 7/26/2023 with progression of surrounding vasogenic edema. There is no new suspicious intra-axial lesion. Workstation performed: [id number]       I have personally reviewed labs, imaging studies, and pertinent reports. This note has been generated by voice recognition software system. Therefore, there may be spelling, grammar, and or syntax errors. Please contact if questions arise.

## 2023-10-31 NOTE — CASE MANAGEMENT
Case Management Progress Note    Patient name Shelly Meza  Location /-43 MRN [id number]  : 1950 Date 10/31/2023       LOS (days): 8  Geometric Mean LOS (GMLOS) (days): 5.10  Days to GMLOS:-3.1        OBJECTIVE:        Current admission status: Inpatient  Preferred Pharmacy:   [address] [facility], [address] [address]  Phone: [phone] Fax: [phone]    [address] - [address]  [address]  [address] [facility] [address]  Phone: [phone] Fax: [phone]    Primary Care Provider: Sabra Malone MD    Primary Insurance: MEDICARE  Secondary Insurance: COMMERCIAL MISCELLANEOUS    PROGRESS NOTE:    Met with Pt and Pt's wife and dtr at bedside with CRNP at bedside. Pt's wife and dtr are interested in speaking with [facility]. Referral sent to [facility] via [email or web address]. CM to follow.

## 2023-10-31 NOTE — PROGRESS NOTES
Angeles Fair is a 68 y.o. male who is currently ordered Vancomycin IV with management by the Pharmacy Consult service. Relevant clinical data and objective / subjective history reviewed. Vancomycin Assessment:  Indication and Goal AUC/Trough: Bacteremia (goal -600, trough >10), -600, trough >10  Clinical Status: improving  Micro:     Renal Function:  SCr: 0.71 mg/dL  CrCl: 110.5 mL/min  Renal replacement: Not on dialysis  Days of Therapy: 3  Current Dose: 1250 mg Q12H  Vancomycin Plan:  New Dosinmg IV Q12H  Estimated AUC: 492 mcg*hr/mL  Estimated Trough: 15.5 mcg/mL  Next Level: @0500 am with AM labs. Renal Function Monitoring: Daily BMP and [place] will continue to follow closely for s/sx of nephrotoxicity, infusion reactions and appropriateness of therapy. BMP and CBC will be ordered per protocol. We will continue to follow the patient’s culture results and clinical progress daily.     Yris Kebede, Pharmacist

## 2023-10-31 NOTE — ACP (ADVANCE CARE PLANNING)
Critical Care Advanced Care Planning Note  Christiano Owens 68 y.o. male MRN: [id number]  Unit/Bed#: -01 Encounter: [id number]    Christiano Owens is a 68 y.o. male requiring critical care evaluation and advanced care planning. The patient has chronic comorbidities, including but not limited to metastatic adenocarcinoma, which is now further complicated by the following acute conditions: Bacteremia/fever of unknown origin. Due to the severity of the patient's acute condition and/or the extent of chronic conditions, additional conversations pertaining to advanced care planning were required. Today's discussion, which was held in a face-to-face meeting, included  patient, daughter, wife , and it was established that all stake holders understood the rationale for the advanced care planning. The patient was able to participate in the discussion. Summary of Discussion:  After discussion with oncology given functional decline/limited treatment options given poor ECOG status patient had expressed how his ultimate goal is to go home. Discussed with patient and family options for home hospice as he is not medically cleared to go home at this time. Daughter expressed concerns to continue ongoing infectious work-up as if there is potential treatment options can lengthen his time to spend with the family that would be their ultimate wish. Patient agreed. Did express that if patient's ultimate goal is to go home given his underlying stage IV metastatic malignancies with overall clinical decompensation and respiratory failure in the hospital that we may miss this timeframe or we can transition to hospice inpatient if need be. Patient's ultimate goal and family is to go home. Case management was at bedside during the conversation. We will look at establishing care with [facility] home hospice pending clinical course and additional infectious work-up that is pending and ID input tomorrow.   If no significant findings likely will transition to home hospice in the next 24 to 48 hours      Total time spent, (25) minutes (1500 to 1525).       CODE STATUS: DNR/DNI - Level 3  POA:    POLST:      SIGNATURE: CRISTOFER Urena  DATE: October 31, 2023  TIME: 4:52 PM

## 2023-10-31 NOTE — ASSESSMENT & PLAN NOTE
By history  Last seen 9/25 in [address] [place] [address] ED for focal seizure following chemotherapy  Home rgimen: Keppra 1500 mg PO BID    Plan:  Continue with Keppra 1500 mg BID  Switched to IV secondary to decreased mentation  Seizure precautions

## 2023-10-31 NOTE — ASSESSMENT & PLAN NOTE
Dx initially 09/2021 stage IIIC. Received multiple rounds of chemo/radiation. Found to have brain mets 02/2023 to the left precentral gyrus. Cancer upstaged to stage IV. Currently on daily dexamethasone, Lovenox for [facility] as well as a history of DVT. 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  Unlikely to benefit from ASEPT catheter    Plan:  Continue with home dose dexamethasone   Palliative care consult

## 2023-11-01 LAB
ALBUMIN SERPL BCP-MCNC: 2.7 G/DL (ref 3.5–5)
ALL TARGETS: NOT DETECTED
ALP SERPL-CCNC: 34 U/L (ref 34–104)
ALT SERPL W P-5'-P-CCNC: 17 U/L (ref 7–52)
ANION GAP SERPL CALCULATED.3IONS-SCNC: 4 MMOL/L
ANISOCYTOSIS BLD QL SMEAR: PRESENT
AST SERPL W P-5'-P-CCNC: 21 U/L (ref 13–39)
BACTERIA BLD CULT: ABNORMAL
BASOPHILS # BLD MANUAL: 0 THOUSAND/UL (ref 0–0.1)
BASOPHILS NFR MAR MANUAL: 0 % (ref 0–1)
BILIRUB SERPL-MCNC: 0.36 MG/DL (ref 0.2–1)
BUN SERPL-MCNC: 18 MG/DL (ref 5–25)
CALCIUM ALBUM COR SERPL-MCNC: 9.2 MG/DL (ref 8.3–10.1)
CALCIUM SERPL-MCNC: 8.2 MG/DL (ref 8.4–10.2)
CHLORIDE SERPL-SCNC: 107 MMOL/L (ref 96–108)
CO2 SERPL-SCNC: 28 MMOL/L (ref 21–32)
CREAT SERPL-MCNC: 0.65 MG/DL (ref 0.6–1.3)
EOSINOPHIL # BLD MANUAL: 0 THOUSAND/UL (ref 0–0.4)
EOSINOPHIL NFR BLD MANUAL: 0 % (ref 0–6)
ERYTHROCYTE [DISTWIDTH] IN BLOOD BY AUTOMATED COUNT: 20.9 % (ref 11.6–15.1)
GFR SERPL CREATININE-BSD FRML MDRD: 97 ML/MIN/1.73SQ M
GLUCOSE SERPL-MCNC: 108 MG/DL (ref 65–140)
GLUCOSE SERPL-MCNC: 109 MG/DL (ref 65–140)
GLUCOSE SERPL-MCNC: 120 MG/DL (ref 65–140)
GRAM STN SPEC: ABNORMAL
HCT VFR BLD AUTO: 27.1 % (ref 36.5–49.3)
HELMET CELLS BLD QL SMEAR: PRESENT
HGB BLD-MCNC: 8.5 G/DL (ref 12–17)
LACTATE SERPL-SCNC: 0.9 MMOL/L (ref 0.5–2)
LEVETIRACETAM SERPL-MCNC: 31.3 UG/ML (ref 10–40)
LYMPHOCYTES # BLD AUTO: 0.33 THOUSAND/UL (ref 0.6–4.47)
LYMPHOCYTES # BLD AUTO: 7 % (ref 14–44)
MAGNESIUM SERPL-MCNC: 2.1 MG/DL (ref 1.9–2.7)
MCH RBC QN AUTO: 29.8 PG (ref 26.8–34.3)
MCHC RBC AUTO-ENTMCNC: 31.4 G/DL (ref 31.4–37.4)
MCV RBC AUTO: 95 FL (ref 82–98)
MONOCYTES # BLD AUTO: 0.05 THOUSAND/UL (ref 0–1.22)
MONOCYTES NFR BLD: 1 % (ref 4–12)
NEUTROPHILS # BLD MANUAL: 4.33 THOUSAND/UL (ref 1.85–7.62)
NEUTS BAND NFR BLD MANUAL: 3 % (ref 0–8)
NEUTS SEG NFR BLD AUTO: 89 % (ref 43–75)
NRBC BLD AUTO-RTO: 2 /100 WBC (ref 0–2)
OVALOCYTES BLD QL SMEAR: PRESENT
PHOSPHATE SERPL-MCNC: 2.8 MG/DL (ref 2.3–4.1)
PLATELET # BLD AUTO: 116 THOUSANDS/UL (ref 149–390)
PLATELET BLD QL SMEAR: ABNORMAL
PMV BLD AUTO: 9.6 FL (ref 8.9–12.7)
POTASSIUM SERPL-SCNC: 4.5 MMOL/L (ref 3.5–5.3)
PROCALCITONIN SERPL-MCNC: 0.36 NG/ML
PROT SERPL-MCNC: 5.1 G/DL (ref 6.4–8.4)
RBC # BLD AUTO: 2.85 MILLION/UL (ref 3.88–5.62)
RBC MORPH BLD: PRESENT
SODIUM SERPL-SCNC: 139 MMOL/L (ref 135–147)
WBC # BLD AUTO: 4.71 THOUSAND/UL (ref 4.31–10.16)

## 2023-11-01 PROCEDURE — 84100 ASSAY OF PHOSPHORUS: CPT | Performed by: NURSE PRACTITIONER

## 2023-11-01 PROCEDURE — 87205 SMEAR GRAM STAIN: CPT

## 2023-11-01 PROCEDURE — 94760 N-INVAS EAR/PLS OXIMETRY 1: CPT

## 2023-11-01 PROCEDURE — 99232 SBSQ HOSP IP/OBS MODERATE 35: CPT | Performed by: INTERNAL MEDICINE

## 2023-11-01 PROCEDURE — 94640 AIRWAY INHALATION TREATMENT: CPT

## 2023-11-01 PROCEDURE — 85027 COMPLETE CBC AUTOMATED: CPT | Performed by: NURSE PRACTITIONER

## 2023-11-01 PROCEDURE — 94668 MNPJ CHEST WALL SBSQ: CPT

## 2023-11-01 PROCEDURE — 87106 FUNGI IDENTIFICATION YEAST: CPT

## 2023-11-01 PROCEDURE — 83735 ASSAY OF MAGNESIUM: CPT | Performed by: NURSE PRACTITIONER

## 2023-11-01 PROCEDURE — 87070 CULTURE OTHR SPECIMN AEROBIC: CPT

## 2023-11-01 PROCEDURE — 83605 ASSAY OF LACTIC ACID: CPT

## 2023-11-01 PROCEDURE — 80053 COMPREHEN METABOLIC PANEL: CPT | Performed by: NURSE PRACTITIONER

## 2023-11-01 PROCEDURE — 85007 BL SMEAR W/DIFF WBC COUNT: CPT | Performed by: NURSE PRACTITIONER

## 2023-11-01 PROCEDURE — 0202U NFCT DS 22 TRGT SARS-COV-2: CPT

## 2023-11-01 PROCEDURE — 84145 PROCALCITONIN (PCT): CPT | Performed by: NURSE PRACTITIONER

## 2023-11-01 PROCEDURE — 82948 REAGENT STRIP/BLOOD GLUCOSE: CPT

## 2023-11-01 RX ORDER — BISACODYL 10 MG
10 SUPPOSITORY, RECTAL RECTAL DAILY PRN
Status: DISCONTINUED | OUTPATIENT
Start: 2023-11-01 | End: 2023-11-02 | Stop reason: HOSPADM

## 2023-11-01 RX ADMIN — DEXAMETHASONE 4 MG: 2 TABLET ORAL at 21:46

## 2023-11-01 RX ADMIN — LEVALBUTEROL HYDROCHLORIDE 1.25 MG: 1.25 SOLUTION RESPIRATORY (INHALATION) at 13:44

## 2023-11-01 RX ADMIN — IPRATROPIUM BROMIDE 0.5 MG: 0.5 SOLUTION RESPIRATORY (INHALATION) at 13:44

## 2023-11-01 RX ADMIN — ENOXAPARIN SODIUM 100 MG: 100 INJECTION SUBCUTANEOUS at 21:46

## 2023-11-01 RX ADMIN — DEXAMETHASONE 4 MG: 2 TABLET ORAL at 08:29

## 2023-11-01 RX ADMIN — PANTOPRAZOLE SODIUM 40 MG: 40 TABLET, DELAYED RELEASE ORAL at 05:59

## 2023-11-01 RX ADMIN — VANCOMYCIN HYDROCHLORIDE 1250 MG: 5 INJECTION, POWDER, LYOPHILIZED, FOR SOLUTION INTRAVENOUS at 05:59

## 2023-11-01 RX ADMIN — IPRATROPIUM BROMIDE 0.5 MG: 0.5 SOLUTION RESPIRATORY (INHALATION) at 19:27

## 2023-11-01 RX ADMIN — ENOXAPARIN SODIUM 100 MG: 100 INJECTION SUBCUTANEOUS at 08:29

## 2023-11-01 RX ADMIN — LEVALBUTEROL HYDROCHLORIDE 1.25 MG: 1.25 SOLUTION RESPIRATORY (INHALATION) at 19:27

## 2023-11-01 RX ADMIN — ACETAMINOPHEN 650 MG: 325 TABLET ORAL at 18:10

## 2023-11-01 RX ADMIN — CHLORHEXIDINE GLUCONATE 15 ML: 1.2 SOLUTION ORAL at 21:46

## 2023-11-01 RX ADMIN — CHLORHEXIDINE GLUCONATE 15 ML: 1.2 SOLUTION ORAL at 08:29

## 2023-11-01 RX ADMIN — Medication 6 MG: at 21:46

## 2023-11-01 RX ADMIN — PIPERACILLIN AND TAZOBACTAM 3.38 G: 3; .375 INJECTION, POWDER, LYOPHILIZED, FOR SOLUTION INTRAVENOUS at 11:36

## 2023-11-01 RX ADMIN — LEVALBUTEROL HYDROCHLORIDE 1.25 MG: 1.25 SOLUTION RESPIRATORY (INHALATION) at 08:05

## 2023-11-01 RX ADMIN — POLYETHYLENE GLYCOL 3350 17 G: 17 POWDER, FOR SOLUTION ORAL at 08:29

## 2023-11-01 RX ADMIN — VANCOMYCIN HYDROCHLORIDE 1250 MG: 5 INJECTION, POWDER, LYOPHILIZED, FOR SOLUTION INTRAVENOUS at 18:01

## 2023-11-01 RX ADMIN — LEVETIRACETAM 1500 MG: 500 TABLET, FILM COATED ORAL at 08:29

## 2023-11-01 RX ADMIN — IPRATROPIUM BROMIDE 0.5 MG: 0.5 SOLUTION RESPIRATORY (INHALATION) at 08:06

## 2023-11-01 RX ADMIN — ATORVASTATIN CALCIUM 40 MG: 40 TABLET, FILM COATED ORAL at 18:02

## 2023-11-01 RX ADMIN — LEVETIRACETAM 1500 MG: 500 TABLET, FILM COATED ORAL at 21:46

## 2023-11-01 RX ADMIN — TAMSULOSIN HYDROCHLORIDE 0.4 MG: 0.4 CAPSULE ORAL at 18:01

## 2023-11-01 RX ADMIN — DOCUSATE SODIUM 100 MG: 100 CAPSULE, LIQUID FILLED ORAL at 08:29

## 2023-11-01 RX ADMIN — FINASTERIDE 5 MG: 5 TABLET, FILM COATED ORAL at 08:29

## 2023-11-01 RX ADMIN — ACETAMINOPHEN 650 MG: 325 TABLET ORAL at 00:41

## 2023-11-01 RX ADMIN — PIPERACILLIN AND TAZOBACTAM 3.38 G: 3; .375 INJECTION, POWDER, LYOPHILIZED, FOR SOLUTION INTRAVENOUS at 20:26

## 2023-11-01 RX ADMIN — PIPERACILLIN AND TAZOBACTAM 3.38 G: 3; .375 INJECTION, POWDER, LYOPHILIZED, FOR SOLUTION INTRAVENOUS at 05:00

## 2023-11-01 RX ADMIN — DOCUSATE SODIUM 100 MG: 100 CAPSULE, LIQUID FILLED ORAL at 18:01

## 2023-11-01 NOTE — PROGRESS NOTES
Larry Silvestre is a 68 y.o. male who is currently ordered Vancomycin IV with management by the Pharmacy Consult service. Relevant clinical data and objective / subjective history reviewed. Vancomycin Assessment:  Indication and Goal AUC/Trough: Bacteremia (goal -600, trough >10), -600, trough >10  Clinical Status:  improving and cultures are are -ve X24 hours   Micro:       Renal Function:  SCr: 0.65 mg/dL  CrCl: 122 mL/min  Renal replacement: Not on dialysis  Days of Therapy: 3  Current Dose: 1250 mg Q12H  Vancomycin Plan:  New Dosinmg IV Q12H  Estimated AUC: 464 mcg*hr/mL  Estimated Trough: 14.4 mcg/mL  Next Level: @0500 am on   Renal Function Monitoring: Daily BMP and UOP  Pharmacy will continue to follow closely for s/sx of nephrotoxicity, infusion reactions and appropriateness of therapy. BMP and CBC will be ordered per protocol. We will continue to follow the patient’s culture results and clinical progress daily.     Larisa Pacheco, Pharmacist

## 2023-11-01 NOTE — ASSESSMENT & PLAN NOTE
Lab Results   Component Value Date    EGFR 93 10/31/2023    EGFR 93 10/30/2023    EGFR 97 10/29/2023    CREATININE 0.71 10/31/2023    CREATININE 0.71 10/30/2023    CREATININE 0.65 10/29/2023     Baseline creat appears to be 0.7-1  Current creat 0.71  [place] placed during admission for urinary retention  Urology following, for voiding trial as OP   S/p Lasix 60mg x1 on 10/30, amenable  Robust UOP from dosage.    Hold on additional diuresis for now  Trend creat, UO   Avoid hypotension, nephrotoxic agents

## 2023-11-01 NOTE — ASSESSMENT & PLAN NOTE
10/23 POA: increased WOB in the ED requiring the initiation of BIPAP  Hx of Stage IV lung CA with mets. 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 Completed a short course of PO lasix outpatient for generalized swelling   10/23 CTA PE Study: No evidence for acute pulmonary emboli. Densely consolidated right lower lobe and right upper lobe. Aeration remaining in the right middle lobe. Significant mass effect on the right lower lung secondary to large mixed attenuation masslike pleural abnormality. 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 Stable incompletely imaged right neck mass presumably representing metastatic adenopathy. Stable hypodensities within the liver. 10/30 upgrade to ICU for tachypnea, tachycardia, diffuse rhonchi, cyanosis of nose and lips, generalized pitting edema   Concern for volume overload   April 2023: EF 55%, g1dd   Bipap 12/8 for increased WOB, diffuse rhonchi   Since deescalated to nasal cannula, no additional bipap  Lasix 60mg IV x1 by SLIM 10/30    Plan:  AOC respiratory failure with increased WOB 10/20 with concern for volume overload   Remains on nasal cannula, stable  Goal spo2 > 90%  Consider additional diuresis given exam consistent with volume overload, pending blood pressures.  With decreased PO intake

## 2023-11-01 NOTE — PLAN OF CARE
Problem: Potential for Falls  Goal: Patient will remain free of falls  Description: INTERVENTIONS:  - Educate patient/family on patient safety including physical limitations  - Instruct patient to call for assistance with activity   - Consult OT/PT to assist with strengthening/mobility   - Keep Call bell within reach  - Keep bed low and locked with side rails adjusted as appropriate  - Keep care items and personal belongings within reach  - Initiate and maintain comfort rounds  - Make Fall Risk Sign visible to staff  - Offer Toileting every  Hours, in advance of need  - Initiate/Maintain alarm  - Obtain necessary fall risk management equipment:   - Apply yellow socks and bracelet for high fall risk patients  - Consider moving patient to room near nurses station  Outcome: Progressing     Problem: NEUROSENSORY - ADULT  Goal: Achieves stable or improved neurological status  Description: INTERVENTIONS  - Monitor and report changes in neurological status  - Monitor vital signs such as temperature, blood pressure, glucose, and any other labs ordered   - Initiate measures to prevent increased intracranial pressure  - Monitor for seizure activity and implement precautions if appropriate      Outcome: Progressing  Goal: Achieves maximal functionality and self care  Description: INTERVENTIONS  - Monitor swallowing and airway patency with patient fatigue and changes in neurological status  - Encourage and assist patient to increase activity and self care.    - Encourage visually impaired, hearing impaired and aphasic patients to use assistive/communication devices  Outcome: Progressing     Problem: GENITOURINARY - ADULT  Goal: Maintains or returns to baseline urinary function  Description: INTERVENTIONS:  - Assess urinary function  - Encourage oral fluids to ensure adequate hydration if ordered  - Administer IV fluids as ordered to ensure adequate hydration  - Administer ordered medications as needed  - Offer frequent toileting  - Follow urinary retention protocol if ordered  Outcome: Progressing  Goal: Absence of urinary retention  Description: INTERVENTIONS:  - Assess patient’s ability to void and empty bladder  - Monitor I/O  - Bladder scan as needed  - Discuss with physician/AP medications to alleviate retention as needed  - Discuss catheterization for long term situations as appropriate  Outcome: Progressing  Goal: Urinary catheter remains patent  Description: INTERVENTIONS:  - Assess patency of urinary catheter  - If patient has a chronic cadet, consider changing catheter if non-functioning  - Follow guidelines for intermittent irrigation of non-functioning urinary catheter  Outcome: Progressing     Problem: METABOLIC, FLUID AND ELECTROLYTES - ADULT  Goal: Electrolytes maintained within normal limits  Description: INTERVENTIONS:  - Monitor labs and assess patient for signs and symptoms of electrolyte imbalances  - Administer electrolyte replacement as ordered  - Monitor response to electrolyte replacements, including repeat lab results as appropriate  - Instruct patient on fluid and nutrition as appropriate  Outcome: Progressing  Goal: Fluid balance maintained  Description: INTERVENTIONS:  - Monitor labs   - Monitor I/O and WT  - Instruct patient on fluid and nutrition as appropriate  - Assess for signs & symptoms of volume excess or deficit  Outcome: Progressing  Goal: Glucose maintained within target range  Description: INTERVENTIONS:  - Monitor Blood Glucose as ordered  - Assess for signs and symptoms of hyperglycemia and hypoglycemia  - Administer ordered medications to maintain glucose within target range  - Assess nutritional intake and initiate nutrition service referral as needed  Outcome: Progressing     Problem: SKIN/TISSUE INTEGRITY - ADULT  Goal: Skin Integrity remains intact(Skin Breakdown Prevention)  Description: Assess:  -Perform [place] assessment every   -Clean and moisturize skin every   -Inspect skin when repositioning, toileting, and assisting with ADLS  -Assess under medical devices such as  every   -Assess extremities for adequate circulation and sensation     Bed Management:  -Have minimal linens on bed & keep smooth, unwrinkled  -Change linens as needed when moist or perspiring  -Avoid sitting or lying in one position for more than  hours while in bed  -Keep HOB at degrees     Toileting:  -Offer bedside commode  -Assess for incontinence every   -Use incontinent care products after each incontinent episode such as     Activity:  -Mobilize patient  times a day  -Encourage activity and walks on unit  -Encourage or provide ROM exercises   -Turn and reposition patient every  Hours  -Use appropriate equipment to lift or move patient in bed  -Instruct/ Assist with weight shifting every  when out of bed in chair  -Consider limitation of chair time  hour intervals    Skin Care:  -Avoid use of baby powder, tape, friction and shearing, hot water or constrictive clothing  -Relieve pressure over bony prominences using   -Do not massage red bony areas    Next Steps:  -Teach patient strategies to minimize risks such as    -Consider consults to  interdisciplinary teams such as  Outcome: Progressing  Goal: Incision(s), wounds(s) or drain site(s) healing without S/S of infection  Description: INTERVENTIONS  - Assess and document dressing, incision, wound bed, drain sites and surrounding tissue  - Provide patient and family education  - Perform skin care/dressing changes every   Outcome: Progressing  Goal: Pressure injury heals and does not worsen  Description: Interventions:  - Implement low air loss mattress or specialty surface (Criteria met)  - Apply silicone foam dressing  - Instruct/assist with weight shifting every  minutes when in chair   - Limit chair time to  hour intervals  - Use special pressure reducing interventions such as  when in chair   - Apply fecal or urinary incontinence containment device   - Perform passive or active ROM every   - Turn and reposition patient & offload bony prominences every  hours   - Utilize friction reducing device or surface for transfers   - Consider consults to  interdisciplinary teams such as   - Use incontinent care products after each incontinent episode such as   - Consider nutrition services referral as needed  Outcome: Progressing     Problem: MUSCULOSKELETAL - ADULT  Goal: Maintain or return mobility to safest level of function  Description: INTERVENTIONS:  - Assess patient's ability to carry out ADLs; assess patient's baseline for ADL function and identify physical deficits which impact ability to perform ADLs (bathing, care of mouth/teeth, toileting, grooming, dressing, etc.)  - Assess/evaluate cause of self-care deficits   - Assess range of motion  - Assess patient's mobility  - Assess patient's need for assistive devices and provide as appropriate  - Encourage maximum independence but intervene and supervise when necessary  - Involve family in performance of ADLs  - Assess for home care needs following discharge   - Consider OT consult to assist with ADL evaluation and planning for discharge  - Provide patient education as appropriate  Outcome: Progressing  Goal: Maintain proper alignment of affected body part  Description: INTERVENTIONS:  - Support, maintain and protect limb and body alignment  - Provide patient/ family with appropriate education  Outcome: Progressing     Problem: RESPIRATORY - ADULT  Goal: Achieves optimal ventilation and oxygenation  Description: INTERVENTIONS:  - Assess for changes in respiratory status  - Assess for changes in mentation and behavior  - Position to facilitate oxygenation and minimize respiratory effort  - Oxygen administered by appropriate delivery if ordered  - Initiate smoking cessation education as indicated  - Encourage broncho-pulmonary hygiene including cough, deep breathe, Incentive Spirometry  - Assess the need for suctioning and aspirate as needed  - Assess and instruct to report SOB or any respiratory difficulty  - Respiratory Therapy support as indicated  Outcome: Progressing     Problem: PAIN - ADULT  Goal: Verbalizes/displays adequate comfort level or baseline comfort level  Description: Interventions:  - Encourage patient to monitor pain and request assistance  - Assess pain using appropriate pain scale  - Administer analgesics based on type and severity of pain and evaluate response  - Implement non-pharmacological measures as appropriate and evaluate response  - Consider cultural and social influences on pain and pain management  - Notify physician/advanced practitioner if interventions unsuccessful or patient reports new pain  Outcome: Progressing     Problem: INFECTION - ADULT  Goal: Absence or prevention of progression during hospitalization  Description: INTERVENTIONS:  - Assess and monitor for signs and symptoms of infection  - Monitor lab/diagnostic results  - Monitor all insertion sites, i.e. indwelling lines, tubes, and drains  - Monitor endotracheal if appropriate and nasal secretions for changes in amount and color  - [place] appropriate cooling/warming therapies per order  - Administer medications as ordered  - Instruct and encourage patient and family to use good hand hygiene technique  - Identify and instruct in appropriate isolation precautions for identified infection/condition  Outcome: Progressing  Goal: Absence of fever/infection during neutropenic period  Description: INTERVENTIONS:  - Monitor WBC    Outcome: Progressing     Problem: SAFETY ADULT  Goal: Patient will remain free of falls  Description: INTERVENTIONS:  - Educate patient/family on patient safety including physical limitations  - Instruct patient to call for assistance with activity   - Consult OT/PT to assist with strengthening/mobility   - Keep Call bell within reach  - Keep bed low and locked with side rails adjusted as appropriate  - Keep care items and personal belongings within reach  - Initiate and maintain comfort rounds  - Make Fall Risk Sign visible to staff  - Offer Toileting every  Hours, in advance of need  - Initiate/Maintain alarm  - Obtain necessary fall risk management equipment:   - Apply yellow socks and bracelet for high fall risk patients  - Consider moving patient to room near nurses station  Outcome: Progressing  Goal: Maintain or return to baseline ADL function  Description: INTERVENTIONS:  -  Assess patient's ability to carry out ADLs; assess patient's baseline for ADL function and identify physical deficits which impact ability to perform ADLs (bathing, care of mouth/teeth, toileting, grooming, dressing, etc.)  - Assess/evaluate cause of self-care deficits   - Assess range of motion  - Assess patient's mobility; develop plan if impaired  - Assess patient's need for assistive devices and provide as appropriate  - Encourage maximum independence but intervene and supervise when necessary  - Involve family in performance of ADLs  - Assess for home care needs following discharge   - Consider OT consult to assist with ADL evaluation and planning for discharge  - Provide patient education as appropriate  Outcome: Progressing  Goal: Maintains/Returns to pre admission functional level  Description: INTERVENTIONS:  - Perform BMAT or MOVE assessment daily.   - Set and communicate daily mobility goal to care team and patient/family/caregiver. - Collaborate with rehabilitation services on mobility goals if consulted  - Perform Range of Motion  times a day. - Reposition patient every  hours.   - Dangle patient  times a day  - Stand patient  times a day  - Ambulate patient  times a day  - Out of bed to chair  times a day   - Out of bed for meals  times a day  - Out of bed for toileting  - Record patient progress and toleration of activity level   Outcome: Progressing     Problem: DISCHARGE PLANNING  Goal: Discharge to home or other facility with appropriate resources  Description: INTERVENTIONS:  - Identify barriers to discharge w/patient and caregiver  - Arrange for needed discharge resources and transportation as appropriate  - Identify discharge learning needs (meds, wound care, etc.)  - Arrange for interpretive services to assist at discharge as needed  - Refer to Case Management Department for coordinating discharge planning if the patient needs post-hospital services based on physician/advanced practitioner order or complex needs related to functional status, cognitive ability, or social support system  Outcome: Progressing     Problem: Knowledge Deficit  Goal: Patient/family/caregiver demonstrates understanding of disease process, treatment plan, medications, and discharge instructions  Description: Complete learning assessment and assess knowledge base.   Interventions:  - Provide teaching at level of understanding  - Provide teaching via preferred learning methods  Outcome: Progressing     Problem: MOBILITY - ADULT  Goal: Maintain or return to baseline ADL function  Description: INTERVENTIONS:  -  Assess patient's ability to carry out ADLs; assess patient's baseline for ADL function and identify physical deficits which impact ability to perform ADLs (bathing, care of mouth/teeth, toileting, grooming, dressing, etc.)  - Assess/evaluate cause of self-care deficits   - Assess range of motion  - Assess patient's mobility; develop plan if impaired  - Assess patient's need for assistive devices and provide as appropriate  - Encourage maximum independence but intervene and supervise when necessary  - Involve family in performance of ADLs  - Assess for home care needs following discharge   - Consider OT consult to assist with ADL evaluation and planning for discharge  - Provide patient education as appropriate  Outcome: Progressing  Goal: Maintains/Returns to pre admission functional level  Description: INTERVENTIONS:  - Perform BMAT or MOVE assessment daily.   - Set and communicate daily mobility goal to care team and patient/family/caregiver. - Collaborate with rehabilitation services on mobility goals if consulted  - Perform Range of Motion  times a day. - Reposition patient every  hours. - Dangle patient  times a day  - Stand patient  times a day  - Ambulate patient  times a day  - Out of bed to chair  times a day   - Out of bed for meals times a day  - Out of bed for toileting  - Record patient progress and toleration of activity level   Outcome: Progressing     Problem: Prexisting or High Potential for Compromised Skin Integrity  Goal: Skin integrity is maintained or improved  Description: INTERVENTIONS:  - Identify patients at risk for skin breakdown  - Assess and monitor skin integrity  - Assess and monitor nutrition and hydration status  - Monitor labs   - Assess for incontinence   - Turn and reposition patient  - Assist with mobility/ambulation  - Relieve pressure over bony prominences  - Avoid friction and shearing  - Provide appropriate hygiene as needed including keeping skin clean and dry  - Evaluate need for skin moisturizer/barrier cream  - Collaborate with interdisciplinary team   - Patient/family teaching  - Consider wound care consult   Outcome: Progressing     Problem: Nutrition/Hydration-ADULT  Goal: Nutrient/Hydration intake appropriate for improving, restoring or maintaining nutritional needs  Description: Monitor and assess patient's nutrition/hydration status for malnutrition. Collaborate with interdisciplinary team and initiate plan and interventions as ordered. Monitor patient's weight and dietary intake as ordered or per policy. Utilize nutrition screening tool and intervene as necessary. Determine patient's food preferences and provide high-protein, high-caloric foods as appropriate.      INTERVENTIONS:  - Monitor oral intake, urinary output, labs, and treatment plans  - Assess nutrition and hydration status and recommend course of action  - Evaluate amount of meals eaten  - Assist patient with eating if necessary   - Allow adequate time for meals  - Recommend/ encourage appropriate diets, oral nutritional supplements, and vitamin/mineral supplements  - Order, calculate, and assess calorie counts as needed  - Recommend, monitor, and adjust tube feedings and TPN/PPN based on assessed needs  - Assess need for intravenous fluids  - Provide specific nutrition/hydration education as appropriate  - Include patient/family/caregiver in decisions related to nutrition  Outcome: Progressing

## 2023-11-01 NOTE — PROGRESS NOTES
[facility]  Critical Care Progress Note  Name: Benja Daniel  MRN: [id number]  Unit/Bed#: -01 I Date of Admission: 10/23/2023   Date of Service: 11/1/2023 I Hospital Day: 9    Assessment/Plan   * Acute on Chronic Hypoxemic respiratory failure, [facility])  Assessment & Plan  10/23 POA: increased WOB in the ED requiring the initiation of BIPAP  Hx of Stage IV lung CA with mets. 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 Completed a short course of PO lasix outpatient for generalized swelling   10/23 CTA PE Study: No evidence for acute pulmonary emboli. Densely consolidated right lower lobe and right upper lobe. Aeration remaining in the right middle lobe. Significant mass effect on the right lower lung secondary to large mixed attenuation masslike pleural abnormality. 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 Stable incompletely imaged right neck mass presumably representing metastatic adenopathy. Stable hypodensities within the liver. 10/30 upgrade to ICU for tachypnea, tachycardia, diffuse rhonchi, cyanosis of nose and lips, generalized pitting edema   Concern for volume overload   April 2023: EF 55%, g1dd   Bipap 12/8 for increased WOB, diffuse rhonchi   Since deescalated to nasal cannula, no additional bipap  Lasix 60mg IV x1 by SLIM 10/30    Plan:  AOC respiratory failure with increased WOB 10/20 with concern for volume overload   Remains on nasal cannula, stable  Goal spo2 > 90%  Consider additional diuresis given exam consistent with volume overload, pending blood pressures.  With decreased PO intake    Brain metastasis  Assessment & Plan  Brain metastasis first diagnosed in February 2023 s/p SBRT  Currently on Keppra for focal seizures in 8/2023 due to brain metastasis and Decadron for surrounding vasogenic edema  Pt has been unable to be weaned from decadron due to decreased functional status and worsening lethargy with attempts of weaning  10/2 MRI brain: Metastatic adenocarcinoma of the lung status post chemotherapy. Left posterior frontal lobe intracranial metastases (status post SBRT 2/22/2023) increased in size compared to 7/26/2023 with progression of surrounding vasogenic edema. There is no new suspicious intra-axial lesion. 10/4 started on Avastin due to concern for radiation necrosis  Recently developed significant peripheral edema, likely secondary to Avastin  Completed a short course of lasix outpatient PTA, however wife as described continued development of edema     Plan:  Pt currently at baseline neuro status as documented in outpatient Neurology notes  Serial neuro checks  With persistent confusion, appearing stable  Continue Keppra  Initial keppra level 58.8, does not appear keppra was held since admission -- send repeat level     Sepsis [facility])  Assessment & Plan  SIRS: Tachycardia, tachypnea, fever  UA negative for UTI  10/23 BC 2/2 + Pasteurella   10/26 BC 2/2 NG x48H  Repeat cultures negative x24hrs  Urine strep/legionella and MRSA neg   Lactic cleared without IVF bolus initially 2/2 concern for volume overload   30 ml/kg were not given due to concern for fluid overload  Procal 0.18    Plan:  Abx D8: Vanc, Zosyn D3  ID following previously, have been reengaged in the setting of SIRS with temp 102.9, , tachypnea on bipap   Likely source: his cat   Trend fever curve and WBC count  Follow up lactate in AM    Adenocarcinoma of lung  Assessment & Plan  Dx initially 09/2021 stage IIIC. Received multiple rounds of chemo/radiation. Found to have brain mets 02/2023 to the left precentral gyrus. Cancer upstaged to stage IV. Currently on daily dexamethasone, Lovenox for [facility] as well as a history of DVT.    Palliative chemo with combination Avastin and Pembrolizumab at the infusion center  Last chemo 10/18/2023  Last seen by pulmonary 07/2023 by Dr. 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 Unlikely to benefit from ASEPT catheter    Plan:  Continue with home dose dexamethasone   Palliative care consult  Ongoing conversations with family regarding possible discharge home to home hospice    Mixed hyperlipidemia  Assessment & Plan  Continue home statin    BPH (benign prostatic hyperplasia)  Assessment & Plan  Continue home Flomax  [place] D4 for retention   Urology following -- for voiding trial as OP     GERD (gastroesophageal reflux disease)  Assessment & Plan  Continue PPI    Obesity, morbid ([address])  Assessment & Plan  Lifestyle modifications     Seizure ([address])  Assessment & Plan  By history  Last seen 9/25 in [address] [place] [address] ED for focal seizure following chemotherapy  Home rgimen: Keppra 1500 mg PO BID    Plan:  Continue with Keppra 1500 mg BID  Switched to IV secondary to decreased mentation  Seizure precautions     Stage 3a chronic kidney disease [facility])  Assessment & Plan  Lab Results   Component Value Date    EGFR 93 10/31/2023    EGFR 93 10/30/2023    EGFR 97 10/29/2023    CREATININE 0.71 10/31/2023    CREATININE 0.71 10/30/2023    CREATININE 0.65 10/29/2023     Baseline creat appears to be 0.7-1  Current creat 0.71  Hampton placed during admission for urinary retention  Urology following, for voiding trial as OP   S/p Lasix 60mg x1 on 10/30, amenable  Robust UOP from dosage. Hold on additional diuresis for now  Trend creat, UO   Avoid hypotension, nephrotoxic agents     Anasarca  Assessment & Plan  In the setting of Avastin which was started earlier this month for radiation necrosis surrounding brain metastasis  Completed a course of lasix outpatient  Admission UA with only trace proteinuria    Plan:    Wife states his anasarca has been worsening in the past 2 weeks   Episode of respiratory distress requiring bipap on transfer to ICU  Remains off bipap without respiratory distress  Remains on nasal cannula  Lasix held in setting of low normal BPs, consider additional lasix if patient can tolerate hemodynamically    Bilateral lower extremity DVTs  Assessment & Plan  Likely in the setting of CA   Continue home [facility]      ICU Core Measures     A: Assess, Prevent, and Manage Pain Has pain been assessed? Yes  Need for changes to pain regimen? No   B: Both SAT/SAT  N/A   C: Choice of Sedation RASS Goal: 0 Alert and Calm  Need for changes to sedation or analgesia regimen? No   D: Delirium CAM-ICU: Positive   E: Early Mobility  Plan for early mobility? Yes   F: Family Engagement Plan for family engagement today? Yes     Antibiotic Review: Patient on appropriate coverage based on culture data. Review of Invasive Devices: [place] Plan:  failed urinary retention, now day 4        Prophylaxis:  VTE VTE covered by:  enoxaparin, Subcutaneous, 100 mg at 10/31/23 2159       Stress Ulcer  covered bypantoprazole (PROTONIX) EC tablet 40 mg [[id number]]     Subjective     HPI/24hr events: 68 YOM hospital day 9 in setting of AHRF in setting of metastatic CA, also with pasteurella bacteremia in setting of cat related wound. Ongoing goals of care discussions held with palliative, oncology and critical care. Current plan is to investigate hospice related options, patients goal is to go home. Remains hemodynamically stable on nasal cannula oxygen, no bipap. With intermittent confusion, is cooperative. No acute events. Review of Systems   Constitutional:  Negative for chills and fever. HENT:  Negative for ear pain and sore throat. Eyes:  Negative for pain and visual disturbance. Respiratory:  Negative for cough and shortness of breath. Cardiovascular:  Negative for chest pain and palpitations. Gastrointestinal:  Negative for abdominal pain and vomiting. Genitourinary:  Negative for dysuria and hematuria. Musculoskeletal:  Negative for arthralgias and back pain.    Skin:  Negative for color change and rash. Neurological:  Negative for seizures and syncope. Psychiatric/Behavioral:  Positive for confusion. All other systems reviewed and are negative. Objective                            Vitals I/O      Most Recent Min/Max in 24hrs   Temp 100.4 °F (38 °C) Temp  Min: 97.3 °F (36.3 °C)  Max: 102.4 °F (39.1 °C)   Pulse 89 Pulse  Min: 71  Max: 123   Resp 20 Resp  Min: 14  Max: 20   /73 BP  Min: 92/62  Max: 134/76   O2 Sat 97 % SpO2  Min: 95 %  Max: 100 %      Intake/Output Summary (Last 24 hours) at 11/1/2023 0337  Last data filed at 10/31/2023 2000  Gross per 24 hour   Intake 2010.05 ml   Output 830 ml   Net 1180.05 ml         Diet Dysphagia/Modified Consistency; Dysphagia 3-Dental Soft; Thin Liquid     Invasive Monitoring Physical exam   N/a Physical Exam  Eyes:      Extraocular Movements: Extraocular movements intact. Pupils: Pupils are equal, round, and reactive to light. Skin:     General: Skin is warm and dry. Capillary Refill: Capillary refill takes less than 2 seconds. HENT:      Head: Normocephalic and atraumatic. Right Ear: Hearing normal.      Left Ear: Hearing normal.      Mouth/Throat:      Mouth: Mucous membranes are dry. Cardiovascular:      Rate and Rhythm: Normal rate and regular rhythm. Heart sounds: Normal heart sounds. Musculoskeletal:      Right lower leg: Trace Edema present. Left lower leg: Trace Edema present. Abdominal:      Palpations: Abdomen is soft. Tenderness: There is no abdominal tenderness. There is no guarding. Constitutional:       General: He is not in acute distress. Appearance: He is ill-appearing. Pulmonary:      Effort: No respiratory distress. Psychiatric:         Behavior: Behavior is cooperative. Neurological:      Mental Status: He is disoriented to place and disoriented to situation. GCS: GCS eye subscore is 4. GCS verbal subscore is 4. GCS motor subscore is 6. Genitourinary/Anorectal:  FoleyVitals and nursing note reviewed. Diagnostic Studies    EKG: NSR rate 77  Imaging: None new   I have personally reviewed pertinent reports.        Medications:  Scheduled PRN   atorvastatin, 40 mg, Daily With Dinner  chlorhexidine, 15 mL, Q12H [facility]  dexamethasone, 4 mg, Q12H OPAL  docusate sodium, 100 mg, BID  enoxaparin, 100 mg, Q12H OPAL  finasteride, 5 mg, Daily  insulin lispro, 1-6 Units, Q6H OPAL  ipratropium, 0.5 mg, TID  levalbuterol, 1.25 mg, TID  levETIRAcetam, 1,500 mg, Q12H [facility]  melatonin, 6 mg, HS  pantoprazole, 40 mg, Early Morning  piperacillin-tazobactam, 3.375 g, Q8H  polyethylene glycol, 17 g, Daily  senna, 1 tablet, HS  tamsulosin, 0.4 mg, Daily With Dinner  vancomycin, 15 mg/kg (Adjusted), Q12H      acetaminophen, 650 mg, Q6H PRN  ipratropium, 0.5 mg, Q6H PRN  levalbuterol, 1.25 mg, Q6H PRN       Continuous          Labs:  CBC    Recent Labs     10/30/23  1402 10/31/23  0436   WBC 5.58 4.81   HGB 10.2* 9.6*   HCT 33.5* 32.5*   * 116*     BMP    Recent Labs     10/30/23  1402 10/31/23  0436   SODIUM 143 143   K 4.0 4.2    105   CO2 31 32   AGAP 6 6   BUN 19 20   CREATININE 0.71 0.71   CALCIUM 8.9 8.2*       Coags    No recent results     Additional Electrolytes  Recent Labs     10/30/23  1402 10/31/23  0436   MG 1.9 2.0   PHOS 2.7 2.8   CAIONIZED  --  1.17          Blood Gas    Recent Labs     10/30/23  1300   PHART 7.449   KAW9IUJ 38.0   PO2ART 73.8*   ZQN6NFA 25.8   BEART 1.8   SOURCE Radial, Left     Recent Labs     10/30/23  1300 10/31/23  0436   PHVEN  --  7.323   [id number]  --  59.7*   PO2VEN  --  36.6   ENU3QNY  --  30.3*   BEVEN  --  3.1   SOURCE Radial, Left  --     LFTs  Recent Labs     10/30/23  1402 10/31/23  0436   ALT 18 16   AST 15 14   ALKPHOS 40 39   ALB 3.2* 2.9*   TBILI 0.39 0.45       Infectious  Recent Labs     10/30/23  1402 10/31/23  0436   PROCALCITONI 0.60* 0.48*     Glucose  Recent Labs     10/30/23  1402 10/31/23  0436 GLUC 95 [place]

## 2023-11-01 NOTE — ASSESSMENT & PLAN NOTE
SIRS: Tachycardia, tachypnea, fever  UA negative for UTI  10/23 BC 2/2 + Pasteurella   10/26 BC 2/2 NG x48H  Repeat cultures negative x24hrs  Urine strep/legionella and MRSA neg   Lactic cleared without IVF bolus initially 2/2 concern for volume overload   30 ml/kg were not given due to concern for fluid overload  Procal 0.18    Plan:  Abx D8: Vanc, Zosyn D3  ID following previously, have been reengaged in the setting of SIRS with temp 102.9, , tachypnea on bipap   Likely source: his cat   Trend fever curve and WBC count  Follow up lactate in AM

## 2023-11-01 NOTE — CASE MANAGEMENT
Case Management Progress Note    Patient name Pilo Farrell  Location /-01 MRN [id number]  : 1950 Date 2023       LOS (days): 9  Geometric Mean LOS (GMLOS) (days): 5.10  Days to GMLOS:-4.1        OBJECTIVE:        Current admission status: Inpatient  Preferred Pharmacy:   [address], [address] [address]  Phone: [phone] Fax: [phone]    [address]  [address]  [address] [address]  Phone: [phone] Fax: [phone]    Primary Care Provider: Ksenia Rain MD    Primary Insurance: MEDICARE  Secondary Insurance: COMMERCIAL MISCELLANEOUS    PROGRESS NOTE:  Message received from George at [facility], hospice nurse spoke with Pt's dtr and explained hospice services. Pt's dtr and Pt's wife are still deciding on hospice.  Will follow up with [facility] in AM.

## 2023-11-01 NOTE — ASSESSMENT & PLAN NOTE
Dx initially 09/2021 stage IIIC. Received multiple rounds of chemo/radiation. Found to have brain mets 02/2023 to the left precentral gyrus. Cancer upstaged to stage IV. Currently on daily dexamethasone, Lovenox for [facility] as well as a history of DVT. Palliative chemo with combination Avastin and Pembrolizumab at the infusion center  Last chemo 10/18/2023  Last seen by pulmonary 07/2023 by Dr. 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  Unlikely to benefit from ASEPT catheter    Plan:  Continue with home dose dexamethasone   Palliative care consult  Ongoing conversations with family regarding possible discharge home to home hospice

## 2023-11-01 NOTE — ASSESSMENT & PLAN NOTE
Brain metastasis first diagnosed in February 2023 s/p SBRT  Currently on Keppra for focal seizures in 8/2023 due to brain metastasis and Decadron for surrounding vasogenic edema  Pt has been unable to be weaned from decadron due to decreased functional status and worsening lethargy with attempts of weaning  10/2 MRI brain: Metastatic adenocarcinoma of the lung status post chemotherapy. Left posterior frontal lobe intracranial metastases (status post SBRT 2/22/2023) increased in size compared to 7/26/2023 with progression of surrounding vasogenic edema. There is no new suspicious intra-axial lesion.   10/4 started on Avastin due to concern for radiation necrosis  Recently developed significant peripheral edema, likely secondary to Avastin  Completed a short course of lasix outpatient PTA, however wife as described continued development of edema     Plan:  Pt currently at baseline neuro status as documented in outpatient Neurology notes  Serial neuro checks  With persistent confusion, appearing stable  Continue Keppra  Initial keppra level 58.8, does not appear keppra was held since admission -- send repeat level

## 2023-11-01 NOTE — ASSESSMENT & PLAN NOTE
In the setting of Avastin which was started earlier this month for radiation necrosis surrounding brain metastasis  Completed a course of lasix outpatient  Admission UA with only trace proteinuria    Plan:    Wife states his anasarca has been worsening in the past 2 weeks   Episode of respiratory distress requiring bipap on transfer to ICU  Remains off bipap without respiratory distress  Remains on nasal cannula  Lasix held in setting of low normal BPs, consider additional lasix if patient can tolerate hemodynamically

## 2023-11-01 NOTE — OCCUPATIONAL THERAPY NOTE
Occupational Therapy Cx Note     Patient Name: Pilo Farrell  Today's Date: 11/1/2023  Problem List  Principal Problem:    Acute on Chronic Hypoxemic respiratory failure, ([address])  Active Problems:    Bilateral lower extremity DVTs    Adenocarcinoma of lung    Brain metastasis    Mixed hyperlipidemia    Anasarca    Sepsis ([address])    Stage 3a chronic kidney disease ([address])    Seizure ([address])    Obesity, morbid ([address])    GERD (gastroesophageal reflux disease)    BPH (benign prostatic hyperplasia)            11/01/23 1017   OT Last Visit   OT Visit Date 11/01/23   Note Type   Note type Cancelled Session   Cancel Reasons Other   Additional Comments Pt pending hospice consult to determine POC/GOC, will hold &  f/u as appropriate for OT intervention       Adrien Ganser, OTR/L

## 2023-11-01 NOTE — ASSESSMENT & PLAN NOTE
I agree with her stopping the Zoloft, but she also needs a visit to discuss anxiety and future therapies.    Likely in the setting of CA   Continue home Lovenox

## 2023-11-01 NOTE — PROGRESS NOTES
Oncology Progress Note  Supriya Nicole 68 y.o. male MRN: [id number]  Unit/Bed#: -01 Encounter: [id number]      Oncology History Overview Note   9/2021 - Stage IV adenocarcinoma of the lung     10/2021 - 1/2022 - carbo/pem/pebro     3/2022 - 4/2022 - carbo/taxol/RT     5/2022 - maintenance pembro     2/2023 - solitary brain met - SBRT     Adenocarcinoma of lung   8/2021 Initial Diagnosis    Adenocarcinoma of lung     8/5/2021 Biopsy    Right lung apex, image-guided core needle biopsy:  - Adenocarcinoma      10/6/2021 - 1/26/2022 Chemotherapy    cyanocobalamin, 1,000 mcg, Intramuscular, Once, 3 of 3 cycles  Administration: 1,000 mcg (11/24/2021), 1,000 mcg (1/26/2022), 1,000 mcg (10/6/2021)  pegfilgrastim (Donata Palin), 6 mg, Subcutaneous, Once, 1 of 1 cycle  Administration: 6 mg (11/26/2021)  pegfilgrastim (Sonu Natter), 6 mg, Subcutaneous, Once, 6 of 6 cycles  Administration: 6 mg (10/6/2021), 6 mg (11/3/2021), 6 mg (11/24/2021), 6 mg (12/15/2021), 6 mg (1/5/2022)  fosaprepitant (EMEND) IVPB, 150 mg, Intravenous, Once, 6 of 6 cycles  Administration: 150 mg (10/6/2021), 150 mg (11/24/2021), 150 mg (12/15/2021), 150 mg (1/26/2022), 150 mg (11/3/2021), 150 mg (1/5/2022)  CARBOplatin (PARAPLATIN) IVPB (GOG AUC DOSING), 519.5 mg, Intravenous, Once, 6 of 6 cycles  Administration: 519.5 mg (10/6/2021), 574 mg (11/24/2021), 600 mg (12/15/2021), 533 mg (1/26/2022), 604.5 mg (11/3/2021), 563 mg (1/5/2022)  pemetrexed (ALIMTA) chemo infusion, 970 mg, Intravenous, Once, 6 of 6 cycles  Administration: 1,000 mg (10/6/2021), 1,000 mg (11/24/2021), 1,000 mg (12/15/2021), 1,000 mg (1/26/2022), 1,000 mg (11/3/2021), 1,000 mg (1/5/2022)  pembrolizumab (KEYTRUDA) IVPB, 200 mg, Intravenous, Once, 6 of 6 cycles  Administration: 200 mg (10/6/2021), 200 mg (11/24/2021), 200 mg (12/15/2021), 200 mg (1/26/2022), 200 mg (11/3/2021), 200 mg (1/5/2022)     3/7/2022 -  Chemotherapy    CARBOplatin (PARAPLATIN) IVPB (GOG AUC DOSING), , Intravenous, Once, 6 of 6 cycles  Administration: 211.6 mg (3/7/2022), 208 mg (3/14/2022), 238.8 mg (3/21/2022), 211.6 mg (3/28/2022), 215.2 mg (4/4/2022), 213.4 mg (4/18/2022)  bevacizumab (AVASTIN) IVPB, 622.5 mg (100 % of original dose 7.5 mg/kg), Intravenous, Once, 1 of 4 cycles  Dose modification: 7.5 mg/kg (original dose 7.5 mg/kg, Cycle 31)  Administration: 600 mg (10/18/2023)  PACLItaxel (TAXOL) chemo IVPB, 50 mg/m2 = 99 mg, Intravenous, Once, 6 of 6 cycles  Administration: 99 mg (3/7/2022), 99 mg (3/14/2022), 99 mg (3/21/2022), 99 mg (3/28/2022), 99 mg (4/4/2022), 99 mg (4/18/2022)  pembrolizumab (KEYTRUDA) IVPB, 200 mg, Intravenous, Once, 27 of 33 cycles  Administration: 200 mg (3/7/2022), 200 mg (3/28/2022), 200 mg (4/25/2022), 200 mg (5/16/2022), 200 mg (6/6/2022), 200 mg (6/27/2022), 200 mg (7/18/2022), 200 mg (8/8/2022), 200 mg (8/29/2022), 200 mg (9/19/2022), 200 mg (10/10/2022), 200 mg (10/31/2022), 200 mg (11/21/2022), 200 mg (12/12/2022), 200 mg (1/3/2023), 200 mg (1/23/2023), 200 mg (2/13/2023), 200 mg (3/27/2023), 200 mg (4/26/2023), 200 mg (5/17/2023), 200 mg (6/7/2023), 200 mg (6/28/2023), 200 mg (7/19/2023), 200 mg (8/9/2023), 200 mg (8/30/2023), 200 mg (9/28/2023), 200 mg (10/18/2023)     2/22/2023 - 2/22/2023 Radiation    SRS left precentral gyrus   2000 cGy    Dr. Pankaj Kessler     Malignant neoplasm of upper lobe of right lung ([address])   9/3/2021 Initial Diagnosis    Malignant neoplasm of upper lobe of right lung ([address])     9/23/2021 -  Cancer Staged    Staging form: Lung, AJCC 8th Edition  - Clinical stage from 9/23/2021: Stage IIIC (cT3, cN3, cM0) - Signed by Willard Lee MD on 9/23/2021  Histopathologic type:  Adenocarcinoma, NOS       10/6/2021 - 1/26/2022 Chemotherapy    cyanocobalamin, 1,000 mcg, Intramuscular, Once, 3 of 3 cycles  Administration: 1,000 mcg (11/24/2021), 1,000 mcg (1/26/2022), 1,000 mcg (10/6/2021)  pegfilgrastim (NEULASTA), 6 mg, Subcutaneous, Once, 1 of 1 cycle  Administration: 6 mg (11/26/2021)  pegfilgrastim ([place] [address]), 6 mg, Subcutaneous, Once, 6 of 6 cycles  Administration: 6 mg (10/6/2021), 6 mg (11/3/2021), 6 mg (11/24/2021), 6 mg (12/15/2021), 6 mg (1/5/2022)  fosaprepitant (EMEND) IVPB, 150 mg, Intravenous, Once, 6 of 6 cycles  Administration: 150 mg (10/6/2021), 150 mg (11/24/2021), 150 mg (12/15/2021), 150 mg (1/26/2022), 150 mg (11/3/2021), 150 mg (1/5/2022)  CARBOplatin (PARAPLATIN) IVPB ([facility] AUC DOSING), 519.5 mg, Intravenous, Once, 6 of 6 cycles  Administration: 519.5 mg (10/6/2021), 574 mg (11/24/2021), 600 mg (12/15/2021), 533 mg (1/26/2022), 604.5 mg (11/3/2021), 563 mg (1/5/2022)  pemetrexed (ALIMTA) chemo infusion, 970 mg, Intravenous, Once, 6 of 6 cycles  Administration: 1,000 mg (10/6/2021), 1,000 mg (11/24/2021), 1,000 mg (12/15/2021), 1,000 mg (1/26/2022), 1,000 mg (11/3/2021), 1,000 mg (1/5/2022)  pembrolizumab (KEYTRUDA) IVPB, 200 mg, Intravenous, Once, 6 of 6 cycles  Administration: 200 mg (10/6/2021), 200 mg (11/24/2021), 200 mg (12/15/2021), 200 mg (1/26/2022), 200 mg (11/3/2021), 200 mg (1/5/2022)     3/4/2022 - 4/21/2022 Radiation    The patient saw @[facility] for radiation treatment.  This is the current list of radiation treatment:  Plan ID Energy Fractions Dose per Fraction (cGy) Dose Correction (cGy) Total Dose Delivered (cGy) Elapsed Days   R LUNGMED REV 6X 30 / 30 200 0 6,000 48      Treatment dates:  C1: 3/4/2022 - 4/21/2022         3/7/2022 -  Chemotherapy    CARBOplatin (PARAPLATIN) IVPB ([facility] AUC DOSING), , Intravenous, Once, 6 of 6 cycles  Administration: 211.6 mg (3/7/2022), 208 mg (3/14/2022), 238.8 mg (3/21/2022), 211.6 mg (3/28/2022), 215.2 mg (4/4/2022), 213.4 mg (4/18/2022)  bevacizumab (AVASTIN) IVPB, 622.5 mg (100 % of original dose 7.5 mg/kg), Intravenous, Once, 1 of 4 cycles  Dose modification: 7.5 mg/kg (original dose 7.5 mg/kg, Cycle 31)  Administration: 600 mg (10/18/2023)  PACLItaxel (TAXOL) chemo IVPB, 50 mg/m2 = 99 mg, Intravenous, Once, 6 of 6 cycles  Administration: 99 mg (3/7/2022), 99 mg (3/14/2022), 99 mg (3/21/2022), 99 mg (3/28/2022), 99 mg (4/4/2022), 99 mg (4/18/2022)  pembrolizumab (KEYTRUDA) IVPB, 200 mg, Intravenous, Once, 27 of 33 cycles  Administration: 200 mg (3/7/2022), 200 mg (3/28/2022), 200 mg (4/25/2022), 200 mg (5/16/2022), 200 mg (6/6/2022), 200 mg (6/27/2022), 200 mg (7/18/2022), 200 mg (8/8/2022), 200 mg (8/29/2022), 200 mg (9/19/2022), 200 mg (10/10/2022), 200 mg (10/31/2022), 200 mg (11/21/2022), 200 mg (12/12/2022), 200 mg (1/3/2023), 200 mg (1/23/2023), 200 mg (2/13/2023), 200 mg (3/27/2023), 200 mg (4/26/2023), 200 mg (5/17/2023), 200 mg (6/7/2023), 200 mg (6/28/2023), 200 mg (7/19/2023), 200 mg (8/9/2023), 200 mg (8/30/2023), 200 mg (9/28/2023), 200 mg (10/18/2023)     2/22/2023 - 2/22/2023 Radiation    SRS left precentral gyrus   2000 cGy    Dr. 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 Staying one more day work this up. He doesn't complain of any pain. He is having significant edema with the worst in the RUE. Breathing comfrtably. Objective:      /69   Pulse 90   Temp 98.6 °F (37 °C)   Resp (!) 28   Ht 5' 9" (1.753 m)   Wt 104 kg (229 lb 4.5 oz)   SpO2 93%   BMI 33.86 kg/m²   General Appearance:    Alert, oriented        Eyes:    PERRL   Ears:    Normal external ear canals, both ears   Nose:   Nares normal, septum midline   Throat:   Mucosa moist. Pharynx without injection. Neck:   Supple       Lungs:     Clear to auscultation bilaterally   Chest Wall:    No tenderness or deformity    Heart:    Regular rate and rhythm       Abdomen:     Soft, non-tender, bowel sounds +, no organomegaly           Extremities:   Diffuse edema, RUE most 3+       Skin:   no rash or icterus.     Lymph nodes:   Cervical, supraclavicular, and axillary nodes normal   Neurologic:   CNII-XII intact, normal strength, sensation and reflexes     throughout        Recent Results (from the past 48 hour(s))   Fingerstick Glucose (POCT)    Collection Time: 10/31/23 12:33 AM   Result Value Ref Range    POC Glucose 117 65 - 140 mg/dl   HS Troponin I 4hr    Collection Time: 10/31/23  1:12 AM   Result Value Ref Range    hs TnI 4hr 11 "Refer to ACS Flowchart"- see link ng/L    Delta 4hr hsTnI 2 <20 ng/L   Procalcitonin    Collection Time: 10/31/23  4:36 AM   Result Value Ref Range    Procalcitonin 0.48 (H) <=0.25 ng/ml   CBC and differential    Collection Time: 10/31/23  4:36 AM   Result Value Ref Range    WBC 4.81 4.31 - 10.16 Thousand/uL    RBC 3.30 (L) 3.88 - 5.62 Million/uL    Hemoglobin 9.6 (L) 12.0 - 17.0 g/dL    Hematocrit 32.5 (L) 36.5 - 49.3 %    MCV 99 (H) 82 - 98 fL    MCH 29.1 26.8 - 34.3 pg    MCHC 29.5 (L) 31.4 - 37.4 g/dL    RDW 21.6 (H) 11.6 - 15.1 %    MPV 9.2 8.9 - 12.7 fL    Platelets 111 (L) 951 - 390 Thousands/uL    nRBC 3 /100 WBCs   Calcium, ionized    Collection Time: 10/31/23  4:36 AM   Result Value Ref Range    Calcium, Ionized 1.17 1.12 - 1.32 mmol/L   Comprehensive metabolic panel    Collection Time: 10/31/23  4:36 AM   Result Value Ref Range    Sodium 143 135 - 147 mmol/L    Potassium 4.2 3.5 - 5.3 mmol/L    Chloride 105 96 - 108 mmol/L    CO2 32 21 - 32 mmol/L    ANION GAP 6 mmol/L    BUN 20 5 - 25 mg/dL    Creatinine 0.71 0.60 - 1.30 mg/dL    Glucose 105 65 - 140 mg/dL    Calcium 8.2 (L) 8.4 - 10.2 mg/dL    Corrected Calcium 9.1 8.3 - 10.1 mg/dL    AST 14 13 - 39 U/L    ALT 16 7 - 52 U/L    Alkaline Phosphatase 39 34 - 104 U/L    Total Protein 5.4 (L) 6.4 - 8.4 g/dL    Albumin 2.9 (L) 3.5 - 5.0 g/dL    Total Bilirubin 0.45 0.20 - 1.00 mg/dL    eGFR 93 ml/min/1.73sq m   Magnesium    Collection Time: 10/31/23  4:36 AM   Result Value Ref Range    Magnesium 2.0 1.9 - 2.7 mg/dL   Phosphorus    Collection Time: 10/31/23  4:36 AM   Result Value Ref Range    Phosphorus 2.8 2.3 - 4.1 mg/dL   Blood gas, venous    Collection Time: 10/31/23  4:36 AM   Result Value Ref Range    pH, Cipriano 7.323 7.300 - 7.400    pCO2, Cipriano 59.7 (H) 42.0 - 50.0 mm Hg    pO2, Cipriano 36.6 35.0 - 45.0 mm Hg    HCO3, Cipriano 30.3 (H) 24 - 30 mmol/L    Base Excess, Cipriano 3.1 mmol/L    O2 Content, Cipriano 9.2 ml/dL    O2 HGB, VENOUS 59.2 (L) 60.0 - 80.0 %   Fingerstick Glucose (POCT)    Collection Time: 10/31/23  5:35 AM   Result Value Ref Range    POC Glucose 89 65 - 140 mg/dl   Echo complete w/ contrast if indicated    Collection Time: 10/31/23  8:43 AM   Result Value Ref Range    LV EF 55    Lactic acid, plasma (w/reflex if result > 2.0)    Collection Time: 10/31/23 10:42 AM   Result Value Ref Range    LACTIC ACID 2.1 (HH) 0.5 - 2.0 mmol/L   Fingerstick Glucose (POCT)    Collection Time: 10/31/23  1:58 PM   Result Value Ref Range    POC Glucose 127 65 - 140 mg/dl   Fingerstick Glucose (POCT)    Collection Time: 10/31/23  4:27 PM   Result Value Ref Range    POC Glucose 153 (H) 65 - 140 mg/dl   Fingerstick Glucose (POCT)    Collection Time: 11/01/23 12:44 AM   Result Value Ref Range    POC Glucose 108 65 - 140 mg/dl   CBC and differential    Collection Time: 11/01/23  5:59 AM   Result Value Ref Range    WBC 4.71 4.31 - 10.16 Thousand/uL    RBC 2.85 (L) 3.88 - 5.62 Million/uL    Hemoglobin 8.5 (L) 12.0 - 17.0 g/dL    Hematocrit 27.1 (L) 36.5 - 49.3 %    MCV 95 82 - 98 fL    MCH 29.8 26.8 - 34.3 pg    MCHC 31.4 31.4 - 37.4 g/dL    RDW 20.9 (H) 11.6 - 15.1 %    MPV 9.6 8.9 - 12.7 fL    Platelets 216 (L) 640 - 390 Thousands/uL   Phosphorus    Collection Time: 11/01/23  5:59 AM   Result Value Ref Range    Phosphorus 2.8 2.3 - 4.1 mg/dL   Magnesium    Collection Time: 11/01/23  5:59 AM   Result Value Ref Range    Magnesium 2.1 1.9 - 2.7 mg/dL   Comprehensive metabolic panel    Collection Time: 11/01/23  5:59 AM   Result Value Ref Range    Sodium 139 135 - 147 mmol/L    Potassium 4.5 3.5 - 5.3 mmol/L Chloride 107 96 - 108 mmol/L    CO2 28 21 - 32 mmol/L    ANION GAP 4 mmol/L    BUN 18 5 - 25 mg/dL    Creatinine 0.65 0.60 - 1.30 mg/dL    Glucose 120 65 - 140 mg/dL    Calcium 8.2 (L) 8.4 - 10.2 mg/dL    Corrected Calcium 9.2 8.3 - 10.1 mg/dL    AST 21 13 - 39 U/L    ALT 17 7 - 52 U/L    Alkaline Phosphatase 34 34 - 104 U/L    Total Protein 5.1 (L) 6.4 - 8.4 g/dL    Albumin 2.7 (L) 3.5 - 5.0 g/dL    Total Bilirubin 0.36 0.20 - 1.00 mg/dL    eGFR 97 ml/min/1.73sq m   Procalcitonin    Collection Time: 11/01/23  5:59 AM   Result Value Ref Range    Procalcitonin 0.36 (H) <=0.25 ng/ml   Lactic acid, plasma (w/reflex if result > 2.0)    Collection Time: 11/01/23  5:59 AM   Result Value Ref Range    LACTIC ACID 0.9 0.5 - 2.0 mmol/L   Manual Differential(PHLEBS Do Not Order)    Collection Time: 11/01/23  5:59 AM   Result Value Ref Range    Segmented % 89 (H) 43 - 75 %    Bands % 3 0 - 8 %    Lymphocytes % 7 (L) 14 - 44 %    Monocytes % 1 (L) 4 - 12 %    Eosinophils, % 0 0 - 6 %    Basophils % 0 0 - 1 %    Absolute Neutrophils 4.33 1.85 - 7.62 Thousand/uL    Lymphocytes Absolute 0.33 (L) 0.60 - 4.47 Thousand/uL    Monocytes Absolute 0.05 0.00 - 1.22 Thousand/uL    Eosinophils Absolute 0.00 0.00 - 0.40 Thousand/uL    Basophils Absolute 0.00 0.00 - 0.10 Thousand/uL    Total Counted      nRBC 2 0 - 2 /100 WBC    RBC Morphology Present     Platelet Estimate Borderline (A) Adequate    Anisocytosis Present     Helmet Cells Present     Ovalocytes Present    Fingerstick Glucose (POCT)    Collection Time: 11/01/23  6:01 AM   Result Value Ref Range    POC Glucose 109 65 - 140 mg/dl         XR chest portable ICU    Result Date: 11/1/2023  Narrative: CHEST INDICATION:   Hypoxia. COMPARISON:  Chest x-ray performed the previous day. CT chest 10/23/2023 EXAM PERFORMED/VIEWS:  XR CHEST PORTABLE ICU FINDINGS: Heart shadow is obscured by adjacent opacity.  Near complete opacification of the right hemithorax with rightward deviation of the trachea is not appreciably changed from previous day's radiograph. Pulmonary vascular redistribution is noted on the left. No appreciable pneumothorax. No evidence of acute osseous abnormality. Impression: 1. Near complete opacification of the right hemithorax which appears slightly worsened from previous day's radiograph. 2. Left vascular redistribution is again demonstrated. Workstation performed: [id number]     VAS upper limb venous duplex scan, complete, bilateral    Result Date: 10/31/2023  Narrative:  [facility] REPORT CLINICAL: Indications: Other specified soft tissue disorders M79.89. Patient presents with bilateral upper extremity edema and a fever of unknown origin. He is currently on blood thinner. Operative History: Patient denies previous cardiovascular surgery Risk Factors The patient has history of Hyperlipidemia and previous smoking (quit >10yrs ago). CONCLUSION: Impression RIGHT UPPER LIMB: No evidence of acute or chronic deep vein thrombosis. No evidence of superficial thrombophlebitis noted. Doppler evaluation shows a normal response to augmentation maneuvers. LEFT UPPER LIMB: No evidence of acute or chronic deep vein thrombosis. No evidence of superficial thrombophlebitis noted. Doppler evaluation shows a normal response to augmentation maneuvers. Technical findings were faxed to the patient's chart. SIGNATURE: Electronically Signed by: Mell Lock MD, RPVI on 2023-10-31 05:08:11 PM    VAS lower limb venous duplex study, complete bilateral    Result Date: 10/31/2023  Narrative:  [facility] REPORT CLINICAL: Indications: Other specified soft tissue disorders M79.89. Patient presents with bilateral lower extremity edema and a fever of unknown origin. He is currently on a blood thinner. Operative History: Patient denies previous cardiovascular surgery Risk Factors The patient has history of Hyperlipidemia, DVT and previous smoking (quit >10yrs ago).    CONCLUSION: Impression: RIGHT LOWER LIMB: No evidence of acute or chronic deep vein thrombosis. No evidence of superficial thrombophlebitis noted. Great saphenous vein was not visualized in the distal thigh. Doppler evaluation shows a normal response to augmentation maneuvers. . Popliteal, posterior tibial and anterior tibial arterial Doppler waveform's are triphasic. LEFT LOWER LIMB: No evidence of acute or chronic deep vein thrombosis. No evidence of superficial thrombophlebitis noted. Doppler evaluation shows a normal response to augmentation maneuvers. Popliteal, posterior tibial and anterior tibial arterial Doppler waveform's are triphasic. Technical findings were faxed to the patient's chart. Technically difficult/limited study. Some segments were poorly visualized on today's exam, especially in the calf. SIGNATURE: Electronically Signed by: Myah Henderson MD [facility] on 2023-10-31 05:07:47 PM    Echo complete w/ contrast if indicated    Result Date: 10/31/2023  Narrative:   Left Ventricle: Left ventricle is not well visualized. Wall thickness is mildly increased. There is mild concentric hypertrophy. The left ventricular ejection fraction is 55-60%. Systolic function is normal. Although no diagnostic regional wall motion abnormality was identified, this possibility cannot be completely excluded on the basis of this study. Diastolic function is mildly abnormal, consistent with grade I (abnormal) relaxation. Right Ventricle: Right ventricular cavity size is normal. Systolic function is moderately reduced. Technically difficult study. Image quality precludes accurate assessement of valvular structure and function. XR chest portable    Result Date: 10/30/2023  Narrative: CHEST INDICATION:   sob. COMPARISON: CT and CXR 10/23/2023 EXAM PERFORMED/VIEWS:  XR CHEST PORTABLE FINDINGS: Cardiomediastinal silhouette evaluation restricted by right hemithoracic opacification.  Increased almost complete right hemithoracic opacification with right midline shift. Left vascular redistribution. No pneumothorax. Osseous structures appear within normal limits for patient age. Impression: Increased almost complete right hemithoracic Left vascular redistribution Workstation performed: [id number]     VAS upper limb venous duplex scan, unilateral/limited    Result Date: 10/26/2023  Narrative:  [facility] REPORT CLINICAL: Indications: Patient presents with right upper extremity edema which appears intermittently for the past several weeks. Patient has history of DVT and is on a blood thinner. Operative History: Patient denies previous cardiovascular surgery Risk Factors The patient has history of Hyperlipidemia, DVT and previous smoking (quit >10yrs ago). CONCLUSION:  Impression RIGHT UPPER LIMB: No evidence of acute or chronic deep vein thrombosis. No evidence of superficial thrombophlebitis noted. Doppler evaluation shows a normal response to augmentation maneuvers. There is a hyperechoic non- vascularized structure in the neck, 3.1 cm x 3.3 cm. LEFT UPPER LIMB LIMITED: Evaluation shows no evidence of thrombus in the internal jugular vein, subclavian vein, and the innominate vein. Technical findings sent to [facility] LPN 70/32/2162 @ [id number]. SIGNATURE: Electronically Signed by: Melinda Campbell on 2023-10-26 12:57:06 PM    FL barium swallow video w speech    Result Date: 10/24/2023  Narrative: A video barium swallow study was performed by the Department of Speech Pathology. Please refer to the report for the official interpretation. The images are stored for archival purposes only. Study images were not formally reviewed by the Radiology Department. XR chest portable - 1 view    Result Date: 10/23/2023  Narrative: CHEST INDICATION:   Shortness of breath. History of lung cancer. . COMPARISON: Chest radiograph 10/17/2023. Same day CT chest study.  EXAM PERFORMED/VIEWS:  XR CHEST PORTABLE FINDINGS: Heart shadow is again obscured by right-sided opacity. Near right-sided opacification due to known loculated pleural effusion. Superimposed consolidation better visualized on same day CT study. Groundglass opacification in the left lower lung zone, likely to represent atelectasis. No pneumothorax. Osseous structures appear within normal limits for patient age. Impression: Persistent right-sided loculated pleural effusion with superimposed consolidation better visualized on same day CT study. Groundglass opacification in the left lower lung zone, likely to represent atelectasis. Resident: Salina Calderon, the attending radiologist, have reviewed the images and agree with the final report above. Workstation performed: [id number]     CTA ED chest PE study    Result Date: 10/23/2023  Narrative: CTA - CHEST WITH IV CONTRAST - PULMONARY ANGIOGRAM INDICATION:   abnormal CTA. 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 COMPARISON: 10/23/2023 from 9:20 a.m., 9/20/2023 TECHNIQUE: CTA examination of the chest was performed using angiographic technique according to a protocol specifically tailored to evaluate for pulmonary embolism. Multiplanar 2D reformatted images were created from the source data. In addition, coronal 3D MIP postprocessing was performed on the acquisition scanner. Radiation dose length product (DLP) for this visit:  613.29 mGy-cm . This examination, like all CT scans performed in the [facility], was performed utilizing techniques to minimize radiation dose exposure, including the use of iterative  reconstruction and automated exposure control. IV Contrast:  80 mL of iohexol (OMNIPAQUE) FINDINGS: PULMONARY ARTERIAL TREE:  No pulmonary embolus is seen. LUNGS: Background centrilobular emphysema. Mild dependent and background groundglass opacities on the left without significant change. Densely consolidated right upper lobe apical and posterior segments similar to recent priors.  Worsening consolidation in the right lower lobe, no significant remaining aeration present. Some air bronchograms present, many of the bronchi are narrow and attenuated or partially obscured, especially in the bronchus intermedius and lower lobe segments. PLEURA: Large gradually increasing mixed attenuation pleural mass/fluid which inverts the right hemidiaphragm and causes mass effect on the right lower lobe. This currently measures 13.2 cm craniocaudal by 13.5 x 9.4 cm on axial image 170. HEART/GREAT VESSELS:  Unremarkable for patient's age. No thoracic aortic aneurysm. MEDIASTINUM AND JONH: No significant mediastinal or hilar adenopathy. CHEST WALL AND LOWER NECK:   Grossly stable right neck soft tissue mass presumably representing metastatic adenopathy. Incompletely imaged. VISUALIZED STRUCTURES IN THE UPPER ABDOMEN: Scattered hypodensities within the liver not characterized adequately on this exam. No gross change compared to recent priors. Mild caliectasis on the left, incompletely imaged. May in part be related to contrast excretion phase compared to earlier today. Study from today shows mild fullness which is symmetric bilaterally and to the level of the bladder. OSSEOUS STRUCTURES:  No acute fracture or destructive osseous lesion. Impression: No evidence for acute pulmonary emboli. Densely consolidated right lower lobe and right upper lobe. Aeration remaining in the right middle lobe. See above for further description and comparison. Significant mass effect on the right lower lung secondary to large mixed attenuation masslike pleural abnormality described above. 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 Stable incompletely imaged right neck mass presumably representing metastatic adenopathy. Stable hypodensities within the liver.  Workstation performed: [id number]     PE Study with CT Abdomen and Pelvis with contrast    Result Date: 10/23/2023  Narrative: CT PULMONARY ANGIOGRAM OF THE CHEST AND CT ABDOMEN AND PELVIS WITH INTRAVENOUS CONTRAST INDICATION:   SOB, fever, abdominal distention. COMPARISON: CT chest abdomen pelvis 9/20/2023. TECHNIQUE:  CT examination of the chest, abdomen and pelvis was performed. Thin section CT angiographic technique was used in the chest in order to evaluate for pulmonary embolus and coronal 3D MIP postprocessing was performed on the acquisition scanner. Multiplanar 2D reformatted images were created from the source data. This examination, like all CT scans performed in the [facility], was performed utilizing techniques to minimize radiation dose exposure, including the use of iterative reconstruction and automated exposure control. Radiation dose length product (DLP) for this visit:  1801.92 mGy-cm IV Contrast:  100 mL of iohexol (OMNIPAQUE) Enteric Contrast:  Enteric contrast was not administered. FINDINGS: CHEST PULMONARY ARTERIAL TREE: There is heterogeneous enhancement throughout the right pulmonary arteries. While this may be artifactual there is a segment that has more of an eccentric filling defect appearance on the ventral margin of the mid right pulmonary  artery (series 2 image 110) which is indeterminate. No left-sided pulmonary emboli. LUNGS: Increased diffuse right lung consolidation most apparent within the medial right lower lobe. Groundglass opacities in the left lower lobe. PLEURA: Right-sided pleural metastasis with nodular thickening of the pleura and a moderate size loculated heterogeneously hyperattenuating pleural collection. No left-sided effusion. No pneumothorax. HEART/AORTA:  Heart is unremarkable for patient's age. No thoracic aortic aneurysm. MEDIASTINUM AND JONH:  Unremarkable.  CHEST WALL AND LOWER NECK: The imaged mass in the right neck measuring at least 3.2 cm as noted on the previous exam. ABDOMEN LIVER/BILIARY TREE: Numerous hypoattenuating hepatic lesions, too small to further characterize. GALLBLADDER:  No calcified gallstones. No pericholecystic inflammatory change. SPLEEN:  Unremarkable. PANCREAS:  Unremarkable. ADRENAL GLANDS:  Unremarkable. KIDNEYS/URETERS:  One or more simple renal cyst(s) is noted. Otherwise unremarkable kidneys. No hydronephrosis. STOMACH AND BOWEL: Colonic diverticulosis without evidence of acute diverticulitis. APPENDIX: A normal appendix was visualized. ABDOMINOPELVIC CAVITY:  No ascites. No pneumoperitoneum. No lymphadenopathy. Stable right retrocrural lymph node. VESSELS: Atherosclerotic changes are present. No evidence of aneurysm. PELVIS REPRODUCTIVE ORGANS:  Unremarkable for patient's age. URINARY BLADDER:  Unremarkable. ABDOMINAL WALL/INGUINAL REGIONS:  Unremarkable. OSSEOUS STRUCTURES:  No acute fracture or destructive osseous lesion. Degenerative changes throughout the spine. Impression: 1. Heterogeneous opacification of the right-sided pulmonary arteries may be due to artifact, but cannot exclude a pulmonary embolus particularly in the right pulmonary artery. Recommend repeating the CTA of the chest. There is no large saddle embolus or  CT findings of right heart strain. 2.  Mildly increased right lung consolidation and groundglass opacities in the left lower lobe. Cannot exclude superimposed pneumonia. 3.  Right pleural thickening and nodularity with a moderate size loculated complex collection, suspicious for pleural involvement by metastases. 4.  Partially imaged mass in the right neck suspicious for darien metastases. Medical record indicates a CT of the neck has been ordered to evaluate this finding but not yet performed. I personally discussed this study with Pedro Lopes on 10/23/2023 11:35 AM. Workstation performed: [id number]     XR chest pa & lateral    Result Date: 10/17/2023  Narrative: CHEST INDICATION:   C79.31: Secondary malignant neoplasm of brain. C34.11:  Malignant neoplasm of upper lobe, right bronchus or lung. Cold-like symptoms for 2 weeks. COMPARISON: Chest radiograph 9/25/2023; CT chest, abdomen, pelvis 9/20/2023 EXAM PERFORMED/VIEWS:  XR CHEST PA & LATERAL  The frontal view was performed utilizing dual energy radiographic technique. Images: 4 FINDINGS: Heart shadow is obscured by right-sided opacity. Mild bowing of the trachea toward the right, unchanged. Slight progression of the previously demonstrated extensive right lung opacity corresponding with the patient's known loculated right-sided pleural effusion and radiation fibrosis demonstrated on CT dated 9/20/2023. Left lung is clear apart from tiny linear scar or atelectasis at the base. No pneumothorax. Osseous structures appear within normal limits for patient age. Impression: Slight progression of previously demonstrated extensive opacity right hemithorax corresponding with patient's known right pleural effusion and probable post radiation changes. Resident: Mia Shell, the attending radiologist, have reviewed the images and agree with the final report above. 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 He he has been in the hospital since October 23 with a recent transfer back to the ICU for respiratory distress. He is doing better from a breathing standpoint now. I spoke with the primary team who is keeping him 1 more day to work-up his fever for any possible infectious causes. If none are found I would recommend discharged home hospice. Due to his performance status for I can no longer offer him active cancer treatment. I explained to his wife that after a while all of the things he has gone through had to take a toll on his body and he is simply too weak to consider anything other than hospice care.   She is in agreement with this decision and would like to take him home where she has been caring for him for the past 2 years.  The patient could participate in our conversation and did seem to understand what we are talking about. He was in agreement with home hospice as well. I spent 20 minutes in chart review, face to face counseling, coordination of care and documentation.

## 2023-11-01 NOTE — PLAN OF CARE
Problem: Potential for Falls  Goal: Patient will remain free of falls  Description: INTERVENTIONS:  - Educate patient/family on patient safety including physical limitations  - Instruct patient to call for assistance with activity   - Consult OT/PT to assist with strengthening/mobility   - Keep Call bell within reach  - Keep bed low and locked with side rails adjusted as appropriate  - Keep care items and personal belongings within reach  - Initiate and maintain comfort rounds  - Make Fall Risk Sign visible to staff  - Offer Toileting every 2 Hours, in advance of need  - Initiate/Maintain bed alarm  - Obtain necessary fall risk management equipment  - Apply yellow socks and bracelet for high fall risk patients  - Consider moving patient to room near nurses station  Outcome: Progressing     Problem: NEUROSENSORY - ADULT  Goal: Achieves stable or improved neurological status  Description: INTERVENTIONS  - Monitor and report changes in neurological status  - Monitor vital signs such as temperature, blood pressure, glucose, and any other labs ordered   - Initiate measures to prevent increased intracranial pressure  - Monitor for seizure activity and implement precautions if appropriate      Outcome: Progressing  Goal: Achieves maximal functionality and self care  Description: INTERVENTIONS  - Monitor swallowing and airway patency with patient fatigue and changes in neurological status  - Encourage and assist patient to increase activity and self care.    - Encourage visually impaired, hearing impaired and aphasic patients to use assistive/communication devices  Outcome: Progressing     Problem: GENITOURINARY - ADULT  Goal: Maintains or returns to baseline urinary function  Description: INTERVENTIONS:  - Assess urinary function  - Encourage oral fluids to ensure adequate hydration if ordered  - Administer IV fluids as ordered to ensure adequate hydration  - Administer ordered medications as needed  - Offer frequent toileting  - Follow urinary retention protocol if ordered  Outcome: Progressing  Goal: Absence of urinary retention  Description: INTERVENTIONS:  - Assess patient’s ability to void and empty bladder  - Monitor I/O  - Bladder scan as needed  - Discuss with physician/AP medications to alleviate retention as needed  - Discuss catheterization for long term situations as appropriate  Outcome: Progressing  Goal: Urinary catheter remains patent  Description: INTERVENTIONS:  - Assess patency of urinary catheter  - If patient has a chronic cadet, consider changing catheter if non-functioning  - Follow guidelines for intermittent irrigation of non-functioning urinary catheter  Outcome: Progressing     Problem: METABOLIC, FLUID AND ELECTROLYTES - ADULT  Goal: Electrolytes maintained within normal limits  Description: INTERVENTIONS:  - Monitor labs and assess patient for signs and symptoms of electrolyte imbalances  - Administer electrolyte replacement as ordered  - Monitor response to electrolyte replacements, including repeat lab results as appropriate  - Instruct patient on fluid and nutrition as appropriate  Outcome: Progressing  Goal: Fluid balance maintained  Description: INTERVENTIONS:  - Monitor labs   - Monitor I/O and WT  - Instruct patient on fluid and nutrition as appropriate  - Assess for signs & symptoms of volume excess or deficit  Outcome: Progressing  Goal: Glucose maintained within target range  Description: INTERVENTIONS:  - Monitor Blood Glucose as ordered  - Assess for signs and symptoms of hyperglycemia and hypoglycemia  - Administer ordered medications to maintain glucose within target range  - Assess nutritional intake and initiate nutrition service referral as needed  Outcome: Progressing     Problem: SKIN/TISSUE INTEGRITY - ADULT  Goal: Skin Integrity remains intact(Skin Breakdown Prevention)  Description: Assess:  -Perform [place] assessment every shift  -Clean and moisturize skin every shift  -Inspect skin when repositioning, toileting, and assisting with ADLS  -Assess under medical devices such as scds every shift  -Assess extremities for adequate circulation and sensation     Bed Management:  -Have minimal linens on bed & keep smooth, unwrinkled  -Change linens as needed when moist or perspiring  -Avoid sitting or lying in one position for more than 2 hours while in bed  -Keep HOB at 30 degrees     Toileting:  -Offer bedside commode  -Assess for incontinence every 2 hours  -Use incontinent care products after each incontinent episode     Activity:  -Mobilize patient 4 times a day  -Encourage activity and walks on unit  -Encourage or provide ROM exercises   -Turn and reposition patient every 2 Hours  -Use appropriate equipment to lift or move patient in bed  -Instruct/ Assist with weight shifting every 2 when out of bed in chair  -Consider limitation of chair time 2 hour intervals    Skin Care:  -Avoid use of baby powder, tape, friction and shearing, hot water or constrictive clothing  -Relieve pressure over bony prominences using allevyns  -Do not massage red bony areas    Next Steps:  -Teach patient strategies to minimize risks    -Consider consults to  interdisciplinary teams   Outcome: Progressing  Goal: Incision(s), wounds(s) or drain site(s) healing without S/S of infection  Description: INTERVENTIONS  - Assess and document dressing, incision, wound bed, drain sites and surrounding tissue  - Provide patient and family education    Outcome: Progressing  Goal: Pressure injury heals and does not worsen  Description: Interventions:  - Implement low air loss mattress or specialty surface (Criteria met)  - Apply silicone foam dressing  - Instruct/assist with weight shifting every 60 minutes when in chair   - Limit chair time to 2 hour intervals  - Use special pressure reducing interventions such as cushion when in chair   - Apply fecal or urinary incontinence containment device   - Perform passive or active ROM - Turn and reposition patient & offload bony prominences every 2 hours   - Utilize friction reducing device or surface for transfers   - Consider consults to  interdisciplinary teams   - Use incontinent care products after each incontinent episode   - Consider nutrition services referral as needed  Outcome: Progressing     Problem: MUSCULOSKELETAL - ADULT  Goal: Maintain or return mobility to safest level of function  Description: INTERVENTIONS:  - Assess patient's ability to carry out ADLs; assess patient's baseline for ADL function and identify physical deficits which impact ability to perform ADLs (bathing, care of mouth/teeth, toileting, grooming, dressing, etc.)  - Assess/evaluate cause of self-care deficits   - Assess range of motion  - Assess patient's mobility  - Assess patient's need for assistive devices and provide as appropriate  - Encourage maximum independence but intervene and supervise when necessary  - Involve family in performance of ADLs  - Assess for home care needs following discharge   - Consider OT consult to assist with ADL evaluation and planning for discharge  - Provide patient education as appropriate  Outcome: Progressing  Goal: Maintain proper alignment of affected body part  Description: INTERVENTIONS:  - Support, maintain and protect limb and body alignment  - Provide patient/ family with appropriate education  Outcome: Progressing     Problem: PAIN - ADULT  Goal: Verbalizes/displays adequate comfort level or baseline comfort level  Description: Interventions:  - Encourage patient to monitor pain and request assistance  - Assess pain using appropriate pain scale  - Administer analgesics based on type and severity of pain and evaluate response  - Implement non-pharmacological measures as appropriate and evaluate response  - Consider cultural and social influences on pain and pain management  - Notify physician/advanced practitioner if interventions unsuccessful or patient reports new pain  Outcome: Progressing     Problem: INFECTION - ADULT  Goal: Absence or prevention of progression during hospitalization  Description: INTERVENTIONS:  - Assess and monitor for signs and symptoms of infection  - Monitor lab/diagnostic results  - Monitor all insertion sites, i.e. indwelling lines, tubes, and drains  - Monitor endotracheal if appropriate and nasal secretions for changes in amount and color  - [place] appropriate cooling/warming therapies per order  - Administer medications as ordered  - Instruct and encourage patient and family to use good hand hygiene technique  - Identify and instruct in appropriate isolation precautions for identified infection/condition  Outcome: Progressing  Goal: Absence of fever/infection during neutropenic period  Description: INTERVENTIONS:  - Monitor WBC    Outcome: Progressing     Problem: SAFETY ADULT  Goal: Patient will remain free of falls  Description: INTERVENTIONS:  - Educate patient/family on patient safety including physical limitations  - Instruct patient to call for assistance with activity   - Consult OT/PT to assist with strengthening/mobility   - Keep Call bell within reach  - Keep bed low and locked with side rails adjusted as appropriate  - Keep care items and personal belongings within reach  - Initiate and maintain comfort rounds  - Make Fall Risk Sign visible to staff  - Offer Toileting every 2 Hours, in advance of need  - Initiate/Maintain bed alarm  - Obtain necessary fall risk management equipment  - Apply yellow socks and bracelet for high fall risk patients  - Consider moving patient to room near nurses station  Outcome: Progressing  Goal: Maintain or return to baseline ADL function  Description: INTERVENTIONS:  -  Assess patient's ability to carry out ADLs; assess patient's baseline for ADL function and identify physical deficits which impact ability to perform ADLs (bathing, care of mouth/teeth, toileting, grooming, dressing, etc.)  - Assess/evaluate cause of self-care deficits   - Assess range of motion  - Assess patient's mobility; develop plan if impaired  - Assess patient's need for assistive devices and provide as appropriate  - Encourage maximum independence but intervene and supervise when necessary  - Involve family in performance of ADLs  - Assess for home care needs following discharge   - Consider OT consult to assist with ADL evaluation and planning for discharge  - Provide patient education as appropriate  Outcome: Progressing  Goal: Maintains/Returns to pre admission functional level  Description: INTERVENTIONS:  - Perform BMAT or MOVE assessment daily.   - Set and communicate daily mobility goal to care team and patient/family/caregiver. - Collaborate with rehabilitation services on mobility goals if consulted  - Perform Range of Motion 4 times a day. - Reposition patient every 2 hours.   - Dangle patient 3 times a day  - Out of bed for toileting  - Record patient progress and toleration of activity level   Outcome: Progressing     Problem: DISCHARGE PLANNING  Goal: Discharge to home or other facility with appropriate resources  Description: INTERVENTIONS:  - Identify barriers to discharge w/patient and caregiver  - Arrange for needed discharge resources and transportation as appropriate  - Identify discharge learning needs (meds, wound care, etc.)  - Arrange for interpretive services to assist at discharge as needed  - Refer to Case Management Department for coordinating discharge planning if the patient needs post-hospital services based on physician/advanced practitioner order or complex needs related to functional status, cognitive ability, or social support system  Outcome: Progressing     Problem: Knowledge Deficit  Goal: Patient/family/caregiver demonstrates understanding of disease process, treatment plan, medications, and discharge instructions  Description: Complete learning assessment and assess knowledge base.  Interventions:  - Provide teaching at level of understanding  - Provide teaching via preferred learning methods  Outcome: Progressing     Problem: RESPIRATORY - ADULT  Goal: Achieves optimal ventilation and oxygenation  Description: INTERVENTIONS:  - Assess for changes in respiratory status  - Assess for changes in mentation and behavior  - Position to facilitate oxygenation and minimize respiratory effort  - Oxygen administered by appropriate delivery if ordered  - Initiate smoking cessation education as indicated  - Encourage broncho-pulmonary hygiene including cough, deep breathe, Incentive Spirometry  - Assess the need for suctioning and aspirate as needed  - Assess and instruct to report SOB or any respiratory difficulty  - Respiratory Therapy support as indicated  Outcome: Progressing     Problem: MOBILITY - ADULT  Goal: Maintain or return to baseline ADL function  Description: INTERVENTIONS:  -  Assess patient's ability to carry out ADLs; assess patient's baseline for ADL function and identify physical deficits which impact ability to perform ADLs (bathing, care of mouth/teeth, toileting, grooming, dressing, etc.)  - Assess/evaluate cause of self-care deficits   - Assess range of motion  - Assess patient's mobility; develop plan if impaired  - Assess patient's need for assistive devices and provide as appropriate  - Encourage maximum independence but intervene and supervise when necessary  - Involve family in performance of ADLs  - Assess for home care needs following discharge   - Consider OT consult to assist with ADL evaluation and planning for discharge  - Provide patient education as appropriate  Outcome: Progressing  Goal: Maintains/Returns to pre admission functional level  Description: INTERVENTIONS:  - Perform BMAT or MOVE assessment daily.   - Set and communicate daily mobility goal to care team and patient/family/caregiver.    - Collaborate with rehabilitation services on mobility goals if consulted  - Perform Range of Motion 4 times a day. - Reposition patient every 2 hours. - Dangle patient 3 times a day  - Record patient progress and toleration of activity level   Outcome: Progressing     Problem: Prexisting or High Potential for Compromised Skin Integrity  Goal: Skin integrity is maintained or improved  Description: INTERVENTIONS:  - Identify patients at risk for skin breakdown  - Assess and monitor skin integrity  - Assess and monitor nutrition and hydration status  - Monitor labs   - Assess for incontinence   - Turn and reposition patient  - Assist with mobility/ambulation  - Relieve pressure over bony prominences  - Avoid friction and shearing  - Provide appropriate hygiene as needed including keeping skin clean and dry  - Evaluate need for skin moisturizer/barrier cream  - Collaborate with interdisciplinary team   - Patient/family teaching  - Consider wound care consult   Outcome: Progressing     Problem: Nutrition/Hydration-ADULT  Goal: Nutrient/Hydration intake appropriate for improving, restoring or maintaining nutritional needs  Description: Monitor and assess patient's nutrition/hydration status for malnutrition. Collaborate with interdisciplinary team and initiate plan and interventions as ordered. Monitor patient's weight and dietary intake as ordered or per policy. Utilize nutrition screening tool and intervene as necessary. Determine patient's food preferences and provide high-protein, high-caloric foods as appropriate.      INTERVENTIONS:  - Monitor oral intake, urinary output, labs, and treatment plans  - Assess nutrition and hydration status and recommend course of action  - Evaluate amount of meals eaten  - Assist patient with eating if necessary   - Allow adequate time for meals  - Recommend/ encourage appropriate diets, oral nutritional supplements, and vitamin/mineral supplements  - Order, calculate, and assess calorie counts as needed  - Recommend, monitor, and adjust tube feedings and TPN/PPN based on assessed needs  - Assess need for intravenous fluids  - Provide specific nutrition/hydration education as appropriate  - Include patient/family/caregiver in decisions related to nutrition  Outcome: Progressing

## 2023-11-01 NOTE — ASSESSMENT & PLAN NOTE
By history  Last seen 9/25 in [address] [place] [address] ED for focal seizure following chemotherapy  Home rgimen: Keppra 1500 mg PO BID    Plan:  Continue with Keppra 1500 mg BID  Switched to IV secondary to decreased mentation  Seizure precautions

## 2023-11-01 NOTE — PROGRESS NOTES
Jessica Aguila  68 y.o.  male  1950  mrn [id number]    Assessment/Plan:    Pasteurella multocida bacteremia/post-obstructive pneumonia     Patient with pasteurella bacteremia. Source is probably his cat. This has been described in immunosuppressed. For now I would d/c cefepime and start Unasyn. If continues to do well and repeat blood cultures are negative okay to d/c on po Augmentin 875 mg bid until 11/6.     10/27:  No fevers, feeling well, anxious to go home. Repeat blood cultures are negative 24 hours. Okay to d/c from my standpoint on po augmentin 875 mg bid until 11/6. Education about cat exposures provided. 11/1:  Since last seen patient was ready for discharge but developed acute urinary retention and cadet was placed on the 28th. On the 30th he had increased work of breathing and a new fever. He was moved to ICU. He was placed on bipap. Abx were broadened to vanco and zosyn for health care associated infection. Patient appears to be clinically improving, oxygenation is back to baseline. Temps are slowly trending down. Blood cultures were negative UA was negative. WBC stable in 5 range, with minimal L shift. Xray revealed worsening opacification of R lung. Not sure what fever is from, ? Trapped lung. WBC is stable, procal low and getting better. Repeat COVID is pending. My understanding is that patient plans to go home on hospice. I think this is appropriate given the stage of his cancer and performance status. Would plan total 10 days of Augmentin from admission for pasteurella bacteremia, ? Post-obstructive pneumonia. Subjective:  Patient with hypoxic episode on the 30th followed by fever. Abx changed to vanco and zosyn with concerns of health care associated infection. Objective:     Tc 98.1  Cor: rrr s1s2  Lungs: decreased  Abd: soft nt/nd + BS      Labs:  CBC w/diff  Recent Labs     11/01/23  0559   WBC 4.71   HGB 8.5*   HCT 27.1*   *   LYMPHOPCT 7*   MONOPCT 1* EOSPCT 0     BMP  Recent Labs     11/01/23  0559   K 4.5      CO2 28   BUN 18   CREATININE 0.65   CALCIUM 8.2*     CMP  Recent Labs     11/01/23  0559   K 4.5      CO2 28   BUN 18   CREATININE 0.65   CALCIUM 8.2*   ALKPHOS 34   ALT 17   AST 21       .labrc    Cultures:  Lab Results   Component Value Date    BLOODCX No Growth at 24 hrs. 10/30/2023    BLOODCX No Growth at 24 hrs. 10/30/2023    BLOODCX No Growth After 5 Days. 10/26/2023    BLOODCX No Growth After 5 Days. 10/26/2023    BLOODCX Pasteurella multocida (A) 10/23/2023    BLOODCX Pasteurella multocida (A) 10/23/2023    BLOODCX No Growth After 5 Days. 04/17/2023    BLOODCX No Growth After 5 Days. 04/17/2023    BLOODCX Staphylococcus coagulase negative (A) 04/14/2023    BLOODCX Gram-positive jamie (A) 04/14/2023    BLOODCX No Growth After 5 Days.  04/14/2023     No results found for: "WOUNDCULT"  Lab Results   Component Value Date    URINECX >100,000 cfu/ml Enterococcus faecalis (A) 04/14/2023    URINECX (A) 08/21/2021     40,000-49,000 cfu/ml Staphylococcus coagulase negative    URINECX (A) 08/21/2021     <10,000 cfu/ml Non lactose fermenting gram negative jamie     Lab Results   Component Value Date    SPUTUMCULTUR 2+ Growth of Candida glabrata (A) 10/25/2023    SPUTUMCULTUR 2+ Growth of 10/25/2023    SPUTUMCULTUR 3+ Growth of 04/15/2023       MED: reviewed       Current Facility-Administered Medications:     acetaminophen (TYLENOL) tablet 650 mg, 650 mg, Oral, Q6H PRN, CRISTOFER Mills, 650 mg at 11/01/23 0041    atorvastatin (LIPITOR) tablet 40 mg, 40 mg, Oral, Daily With Dinner, [facility], 40 mg at 10/31/23 1543    bisacodyl (DULCOLAX) rectal suppository 10 mg, 10 mg, Rectal, Daily PRN, CRISTOFER Cervantes    chlorhexidine (PERIDEX) 0.12 % oral rinse 15 mL, 15 mL, Mouth/Throat, Q12H [address], CRISTOFER Arriaza, 15 mL at 11/01/23 0829    dexamethasone (DECADRON) tablet 4 mg, 4 mg, Oral, Q12H [address], CRISTOFER Arriaza, 4 mg at 11/01/23 0829    docusate sodium (COLACE) capsule 100 mg, 100 mg, Oral, BID, CRISTOFER Arriaza, 100 mg at 11/01/23 0829    enoxaparin (LOVENOX) subcutaneous injection 100 mg, 100 mg, Subcutaneous, Q12H [address], CRISTOFER Arriaza, 100 mg at 11/01/23 0829    finasteride (PROSCAR) tablet 5 mg, 5 mg, Oral, Daily, CRISTOFER Arriaza, 5 mg at 11/01/23 0829    ipratropium (ATROVENT) 0.02 % inhalation solution 0.5 mg, 0.5 mg, Nebulization, Q6H PRN, Milton Velazco, GISELNP, 0.5 mg at 10/29/23 1552    ipratropium (ATROVENT) 0.02 % inhalation solution 0.5 mg, 0.5 mg, Nebulization, TID, Milton Velazco, CRISTOFER, 0.5 mg at 11/01/23 1344    levalbuterol (XOPENEX) inhalation solution 1.25 mg, 1.25 mg, Nebulization, Q6H PRN, Johne Mora, CRNP, 1.25 mg at 10/29/23 1552    levalbuterol (Lashawn [place]) inhalation solution 1.25 mg, 1.25 mg, Nebulization, TID, Arlyce Mora, CRNP, 1.25 mg at 11/01/23 1344    levETIRAcetam (KEPPRA) tablet 1,500 mg, 1,500 mg, Oral, Q12H [address], CRISTOFER Edgar, 1,500 mg at 11/01/23 0829    melatonin tablet 6 mg, 6 mg, Oral, HS, CRISTOFER Arriaza, 6 mg at 10/31/23 2159    pantoprazole (PROTONIX) EC tablet 40 mg, 40 mg, Oral, Early Morning, CRISTOFER Arriaza, 40 mg at 11/01/23 0559    piperacillin-tazobactam (ZOSYN) IVPB 3.375 g, 3.375 g, Intravenous, Q8H, CRISTOFER Arriaza, Last Rate: 0 mL/hr at 11/01/23 0900, 3.375 g at 11/01/23 1136    polyethylene glycol (MIRALAX) packet 17 g, 17 g, Oral, Daily, CRISTOFER Arriaza, 17 g at 11/01/23 9171    senna (SENOKOT) tablet 8.6 mg, 1 tablet, Oral, HS, CRISTOFER Arriaza, 8.6 mg at 10/31/23 2159    tamsulosin (FLOMAX) capsule 0.4 mg, 0.4 mg, Oral, Daily With Dinner, CRISTOFER Cook, 0.4 mg at 10/31/23 1543    vancomycin (VANCOCIN) 1,250 mg in sodium chloride 0.9 % 250 mL IVPB, 15 mg/kg (Adjusted), Intravenous, Q12H, CRISTOFER Lama, Stopped at 11/01/23 0900    Principal Problem:    Acute on Chronic Hypoxemic respiratory failure, ([address])  Active Problems: Bilateral lower extremity DVTs    Adenocarcinoma of lung    Brain metastasis    Mixed hyperlipidemia    Anasarca    Sepsis ([address])    Stage 3a chronic kidney disease (HCC)    Seizure ([address])    Obesity, morbid ([address])    GERD (gastroesophageal reflux disease)    BPH (benign prostatic hyperplasia)      Vale Nielsen MD

## 2023-11-02 ENCOUNTER — TELEPHONE (OUTPATIENT)
Age: 73
End: 2023-11-02

## 2023-11-02 VITALS
HEIGHT: 69 IN | BODY MASS INDEX: 33.96 KG/M2 | HEART RATE: 97 BPM | OXYGEN SATURATION: 95 % | SYSTOLIC BLOOD PRESSURE: 126 MMHG | TEMPERATURE: 98.8 F | RESPIRATION RATE: 24 BRPM | WEIGHT: 229.28 LBS | DIASTOLIC BLOOD PRESSURE: 81 MMHG

## 2023-11-02 LAB
B PARAP IS1001 DNA NPH QL NAA+NON-PROBE: NOT DETECTED
B PERT.PT PRMT NPH QL NAA+NON-PROBE: NOT DETECTED
C PNEUM DNA NPH QL NAA+NON-PROBE: NOT DETECTED
FLUAV RNA NPH QL NAA+NON-PROBE: NOT DETECTED
FLUBV RNA NPH QL NAA+NON-PROBE: NOT DETECTED
HADV DNA NPH QL NAA+NON-PROBE: NOT DETECTED
HCOV 229E RNA NPH QL NAA+NON-PROBE: NOT DETECTED
HCOV HKU1 RNA NPH QL NAA+NON-PROBE: NOT DETECTED
HCOV NL63 RNA NPH QL NAA+NON-PROBE: NOT DETECTED
HCOV OC43 RNA NPH QL NAA+NON-PROBE: NOT DETECTED
HMPV RNA NPH QL NAA+NON-PROBE: NOT DETECTED
HPIV1 RNA NPH QL NAA+NON-PROBE: NOT DETECTED
HPIV2 RNA NPH QL NAA+NON-PROBE: NOT DETECTED
HPIV3 RNA NPH QL NAA+NON-PROBE: NOT DETECTED
HPIV4 RNA NPH QL NAA+NON-PROBE: NOT DETECTED
LEVETIRACETAM SERPL-MCNC: 37.8 UG/ML (ref 10–40)
M PNEUMO DNA NPH QL NAA+NON-PROBE: NOT DETECTED
RSV RNA NPH QL NAA+NON-PROBE: NOT DETECTED
RV+EV RNA NPH QL NAA+NON-PROBE: NOT DETECTED
SARS-COV-2 RNA NPH QL NAA+NON-PROBE: NOT DETECTED

## 2023-11-02 PROCEDURE — 94668 MNPJ CHEST WALL SBSQ: CPT

## 2023-11-02 PROCEDURE — 94640 AIRWAY INHALATION TREATMENT: CPT

## 2023-11-02 PROCEDURE — NC001 PR NO CHARGE: Performed by: INTERNAL MEDICINE

## 2023-11-02 PROCEDURE — 94669 MECHANICAL CHEST WALL OSCILL: CPT

## 2023-11-02 PROCEDURE — 94760 N-INVAS EAR/PLS OXIMETRY 1: CPT

## 2023-11-02 PROCEDURE — 99238 HOSP IP/OBS DSCHRG MGMT 30/<: CPT

## 2023-11-02 RX ORDER — LEVALBUTEROL INHALATION SOLUTION 1.25 MG/3ML
1.25 SOLUTION RESPIRATORY (INHALATION) EVERY 6 HOURS PRN
Qty: 3 ML | Refills: 0
Start: 2023-11-02

## 2023-11-02 RX ORDER — LEVETIRACETAM 750 MG/1
1500 TABLET ORAL EVERY 12 HOURS SCHEDULED
Refills: 0
Start: 2023-11-02

## 2023-11-02 RX ORDER — TAMSULOSIN HYDROCHLORIDE 0.4 MG/1
0.4 CAPSULE ORAL
Refills: 0
Start: 2023-11-02

## 2023-11-02 RX ORDER — AMOXICILLIN AND CLAVULANATE POTASSIUM 875; 125 MG/1; MG/1
1 TABLET, FILM COATED ORAL EVERY 12 HOURS SCHEDULED
Qty: 10 TABLET | Refills: 0 | Status: SHIPPED | OUTPATIENT
Start: 2023-11-02 | End: 2023-11-07

## 2023-11-02 RX ORDER — ACETAMINOPHEN 325 MG/1
650 TABLET ORAL EVERY 6 HOURS PRN
Refills: 0
Start: 2023-11-02

## 2023-11-02 RX ORDER — ENOXAPARIN SODIUM 100 MG/ML
100 INJECTION SUBCUTANEOUS EVERY 12 HOURS SCHEDULED
Qty: 0 ML | Refills: 0
Start: 2023-11-02

## 2023-11-02 RX ORDER — DEXAMETHASONE 4 MG/1
4 TABLET ORAL EVERY 12 HOURS SCHEDULED
Refills: 0
Start: 2023-11-02

## 2023-11-02 RX ORDER — BENZONATATE 100 MG/1
100 CAPSULE ORAL 3 TIMES DAILY PRN
Status: DISCONTINUED | OUTPATIENT
Start: 2023-11-02 | End: 2023-11-02 | Stop reason: HOSPADM

## 2023-11-02 RX ORDER — LANOLIN ALCOHOL/MO/W.PET/CERES
6 CREAM (GRAM) TOPICAL
Refills: 0
Start: 2023-11-02

## 2023-11-02 RX ORDER — FINASTERIDE 5 MG/1
5 TABLET, FILM COATED ORAL DAILY
Refills: 0
Start: 2023-11-03

## 2023-11-02 RX ORDER — BENZONATATE 100 MG/1
100 CAPSULE ORAL 3 TIMES DAILY PRN
Qty: 20 CAPSULE | Refills: 0
Start: 2023-11-02

## 2023-11-02 RX ADMIN — LEVALBUTEROL HYDROCHLORIDE 1.25 MG: 1.25 SOLUTION RESPIRATORY (INHALATION) at 13:49

## 2023-11-02 RX ADMIN — LEVETIRACETAM 1500 MG: 500 TABLET, FILM COATED ORAL at 08:36

## 2023-11-02 RX ADMIN — IPRATROPIUM BROMIDE 0.5 MG: 0.5 SOLUTION RESPIRATORY (INHALATION) at 13:49

## 2023-11-02 RX ADMIN — FINASTERIDE 5 MG: 5 TABLET, FILM COATED ORAL at 08:36

## 2023-11-02 RX ADMIN — LEVALBUTEROL HYDROCHLORIDE 1.25 MG: 1.25 SOLUTION RESPIRATORY (INHALATION) at 08:07

## 2023-11-02 RX ADMIN — IPRATROPIUM BROMIDE 0.5 MG: 0.5 SOLUTION RESPIRATORY (INHALATION) at 08:07

## 2023-11-02 RX ADMIN — PIPERACILLIN AND TAZOBACTAM 3.38 G: 3; .375 INJECTION, POWDER, LYOPHILIZED, FOR SOLUTION INTRAVENOUS at 04:25

## 2023-11-02 RX ADMIN — PIPERACILLIN AND TAZOBACTAM 3.38 G: 3; .375 INJECTION, POWDER, LYOPHILIZED, FOR SOLUTION INTRAVENOUS at 13:23

## 2023-11-02 RX ADMIN — PANTOPRAZOLE SODIUM 40 MG: 40 TABLET, DELAYED RELEASE ORAL at 06:16

## 2023-11-02 RX ADMIN — ENOXAPARIN SODIUM 100 MG: 100 INJECTION SUBCUTANEOUS at 08:36

## 2023-11-02 RX ADMIN — CHLORHEXIDINE GLUCONATE 15 ML: 1.2 SOLUTION ORAL at 08:36

## 2023-11-02 RX ADMIN — BENZONATATE 100 MG: 100 CAPSULE ORAL at 04:41

## 2023-11-02 RX ADMIN — VANCOMYCIN HYDROCHLORIDE 1250 MG: 5 INJECTION, POWDER, LYOPHILIZED, FOR SOLUTION INTRAVENOUS at 06:16

## 2023-11-02 RX ADMIN — DEXAMETHASONE 4 MG: 2 TABLET ORAL at 08:36

## 2023-11-02 NOTE — DISCHARGE SUMMARY
[facility]  Discharge- Pasquale Alem 1950, 68 y.o. male MRN: [id number]  Unit/Bed#: -01 Encounter: [id number]  Primary Care Provider: Lurdes Tariq MD   Date and time admitted to hospital: 10/23/2023  7:59 AM    * Acute on Chronic Hypoxemic respiratory failure, ([address])  Assessment & Plan  10/23 POA: increased WOB in the ED requiring the initiation of BIPAP  Hx of Stage IV lung CA with mets. 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 Completed a short course of PO lasix outpatient for generalized swelling   10/23 CTA PE Study: No evidence for acute pulmonary emboli. Densely consolidated right lower lobe and right upper lobe. Aeration remaining in the right middle lobe. Significant mass effect on the right lower lung secondary to large mixed attenuation masslike pleural abnormality. 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 Stable incompletely imaged right neck mass presumably representing metastatic adenopathy. Stable hypodensities within the liver. 10/30 upgrade to ICU for tachypnea, tachycardia, diffuse rhonchi, cyanosis of nose and lips, generalized pitting edema   Concern for volume overload   April 2023: EF 55%, g1dd   Bipap 12/8 for increased WOB, diffuse rhonchi   Since deescalated to nasal cannula, no additional bipap  Lasix 60mg IV x1 by SLIM 10/30    Plan:  Home Hospice    Brain metastasis  Assessment & Plan  Brain metastasis first diagnosed in February 2023 s/p SBRT  Currently on Keppra for focal seizures in 8/2023 due to brain metastasis and Decadron for surrounding vasogenic edema  Pt has been unable to be weaned from decadron due to decreased functional status and worsening lethargy with attempts of weaning  10/2 MRI brain: Metastatic adenocarcinoma of the lung status post chemotherapy.  Left posterior frontal lobe intracranial metastases (status post SBRT 2/22/2023) increased in size compared to 7/26/2023 with progression of surrounding vasogenic edema. There is no new suspicious intra-axial lesion. 10/4 started on Avastin due to concern for radiation necrosis  Recently developed significant peripheral edema, likely secondary to Avastin  Completed a short course of lasix outpatient PTA, however wife as described continued development of edema     Plan:  Pt currently at baseline neuro status as documented in outpatient Neurology notes  Continue [address] [facility])  Assessment & Plan  SIRS: Tachycardia, tachypnea, fever  UA negative for UTI  10/23 BC 2/2 + Pasteurella -->source likely cat  10/26 BC 2/2 NG x48H  Repeat cultures negative x48hrs  Urine strep/legionella and MRSA neg   Lactic cleared without IVF bolus initially 2/2 concern for volume overload   30 ml/kg were not given due to concern for fluid overload  Procal 0.18  LA 0.19  Abx D8: Vanc, Zosyn D4    Plan  ID following previously, have been reengaged in the setting of SIRS with temp 102.9, , tachypnea on bipap   ID reconsulted, recommending PO Augmentin   Home Hospice    Adenocarcinoma of lung  Assessment & Plan  Dx initially 09/2021 stage IIIC. Received multiple rounds of chemo/radiation. Found to have brain mets 02/2023 to the left precentral gyrus. Cancer upstaged to stage IV. Currently on daily dexamethasone, Lovenox for [facility] as well as a history of DVT. Palliative chemo with combination Avastin and Pembrolizumab at the infusion center  Last chemo 10/18/2023  Last seen by pulmonary 07/2023 by Dr. 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  Unlikely to benefit from ASEPT catheter  RP2 panel negative    Plan:  Continue with home dose dexamethasone   Home Hospice    BPH (benign prostatic hyperplasia)  Assessment & Plan  Continue home Flomax and Proscar  Hampton for retention  Home Hospice       Seizure [facility])  Assessment & Plan  By history  Last seen 9/25 in [address] [place] [address] ED for focal seizure following chemotherapy  Home rgimen: Keppra 1500 mg PO BID    Plan:  Continue with Keppra 1500 mg BID  Home hospice     Stage 3a chronic kidney disease [facility])  Assessment & Plan  Lab Results   Component Value Date    EGFR 97 11/01/2023    EGFR 93 10/31/2023    EGFR 93 10/30/2023    CREATININE 0.65 11/01/2023    CREATININE 0.71 10/31/2023    CREATININE 0.71 10/30/2023     Baseline creat appears to be 0.7-1  Current creat 0.65  [place]pton placed during admission for urinary retention  Urology following, for voiding trial as OP   S/p Lasix 60mg x1 on 10/30, amenable  Robust UOP from dosage. Anasarca  Assessment & Plan  In the setting of Avastin which was started earlier this month for radiation necrosis surrounding brain metastasis  Completed a course of lasix outpatient  Admission UA with only trace proteinuria  Wife states his anasarca has been worsening in the past 2 weeks     Plan:   Home Hospice    Bilateral lower extremity DVTs  Assessment & Plan  Likely in the setting of CA     Plan  Continue home Lovenox  Home Hospice              Medical Problems       Resolved Problems  Date Reviewed: 11/2/2023   None         Admission Date:   Admission Orders (From admission, onward)       Ordered        10/23/23 7172 [address]  Once                            Admitting Diagnosis: Acute respiratory failure ([address]) [J96.00]  Respiratory distress [R06.03]  Recurrent pleural effusion on right [J90]  Severe sepsis ([address]) [A41.9, R65.20]    HPI: Per H&P from Benjiman Primer on 10/23/23:  Nadja Mcnamara is a 68 y.o. 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 Presented to [address] [place] [address] 10/23/2023 after experiencing chills at home.  Patient reports checking temperature at home which was 102.6 F, and the patient came to the ED for further evaluation. In the ED the patient was tachycardic and tachypneic with increased WOB requiring BIPAP. Lab work was significant for an elevated lactate of 5.1. RVP was negative. CTA CAP performed in the ED and was significant for a new LLL ground glass opacity. Patient is currently pending repeat imaging to rule out PE. In the ED the patient was given 500 ml of crystalloid, 30 ml/kg of fluids were not given secondary to concern for volume overload, 60 mg IV lasix were given without significant urine output, blood cultures x 2 were drawn, and patient was given IV Cefepime empirically for PNA. ICU was asked to evaluate the patient for admission. Patient to be admitted to the ICU as a SD1 under the critical care service for acute on chronic hypoxemic respiratory failure requiring continuous BIPAP and sepsis."      Procedures Performed:   Orders Placed This Encounter   Procedures    Critical Care    ED ECG Documentation Only       Summary of Hospital Course:   67 yo M hospital day 10 in setting of AHRF in setting of metastatic CA, also with pasteurella bacteremia in setting of cat related wound. RP2 panel sent and negative to rule out additional causes of fatigue, AHRF. After discussion with CC team and Oncology, the patient and family have ultimately decided to seek home hospice services with [address]     Significant Complications, Findings, Care, Treatment and Services Provided:   10/23 Flu/Covid/RSV negative  10/23 MRSA negative  10/23 Blood cultures x2 +Pasteurella multocida  Unasyn x 5 days  10/24 Strep Pneumoniae and Legionella antigen negative  10/26 Repeat blood cultures negative   10/30 upgrade to ICU for tachypnea, tachycardia, diffuse rhonchi, cyanosis of nose and lips, generalized pitting edema and new fever, 104 F  Concern for volume overload   BIPAP and IV Lasix  IV Zosyn and IV Vancomycin x4 days and transitioned to Augmentin for outpatient  10/30 Repeat strep Pneumoniae and Legionella antigen negative  10/30 Repeat blood cultures negative    Condition at Discharge: stable       Discharge instructions/Information to patient and family:   See after visit summary for information provided to patient and family. Provisions for Follow-Up Care:  See after visit summary for information related to follow-up care and any pertinent home health orders. PCP: Ksenia Rain MD    Disposition: Home with hospice    Planned Readmission: No    Discharge Statement   I spent 30 minutes discharging the patient. This time was spent on the day of discharge. I had direct contact with the patient on the day of discharge. Additional documentation is required if more than 30 minutes were spent on discharge. Discharge Medications:  See after visit summary for reconciled discharge medications provided to patient and family.

## 2023-11-02 NOTE — PLAN OF CARE
Problem: Potential for Falls  Goal: Patient will remain free of falls  Description: INTERVENTIONS:  - Educate patient/family on patient safety including physical limitations  - Instruct patient to call for assistance with activity   - Consult OT/PT to assist with strengthening/mobility   - Keep Call bell within reach  - Keep bed low and locked with side rails adjusted as appropriate  - Keep care items and personal belongings within reach  - Initiate and maintain comfort rounds  - Make Fall Risk Sign visible to staff  - Apply yellow socks and bracelet for high fall risk patients  - Consider moving patient to room near nurses station  Outcome: Progressing     Problem: NEUROSENSORY - ADULT  Goal: Achieves stable or improved neurological status  Description: INTERVENTIONS  - Monitor and report changes in neurological status  - Monitor vital signs such as temperature, blood pressure, glucose, and any other labs ordered   - Initiate measures to prevent increased intracranial pressure  - Monitor for seizure activity and implement precautions if appropriate      Outcome: Progressing  Goal: Achieves maximal functionality and self care  Description: INTERVENTIONS  - Monitor swallowing and airway patency with patient fatigue and changes in neurological status  - Encourage and assist patient to increase activity and self care.    - Encourage visually impaired, hearing impaired and aphasic patients to use assistive/communication devices  Outcome: Progressing     Problem: GENITOURINARY - ADULT  Goal: Maintains or returns to baseline urinary function  Description: INTERVENTIONS:  - Assess urinary function  - Encourage oral fluids to ensure adequate hydration if ordered  - Administer IV fluids as ordered to ensure adequate hydration  - Administer ordered medications as needed  - Offer frequent toileting  - Follow urinary retention protocol if ordered  Outcome: Progressing  Goal: Absence of urinary retention  Description: INTERVENTIONS:  - Assess patient’s ability to void and empty bladder  - Monitor I/O  - Bladder scan as needed  - Discuss with physician/AP medications to alleviate retention as needed  - Discuss catheterization for long term situations as appropriate  Outcome: Progressing  Goal: Urinary catheter remains patent  Description: INTERVENTIONS:  - Assess patency of urinary catheter  - If patient has a chronic cadet, consider changing catheter if non-functioning  - Follow guidelines for intermittent irrigation of non-functioning urinary catheter  Outcome: Progressing     Problem: METABOLIC, FLUID AND ELECTROLYTES - ADULT  Goal: Electrolytes maintained within normal limits  Description: INTERVENTIONS:  - Monitor labs and assess patient for signs and symptoms of electrolyte imbalances  - Administer electrolyte replacement as ordered  - Monitor response to electrolyte replacements, including repeat lab results as appropriate  - Instruct patient on fluid and nutrition as appropriate  Outcome: Progressing  Goal: Fluid balance maintained  Description: INTERVENTIONS:  - Monitor labs   - Monitor I/O and WT  - Instruct patient on fluid and nutrition as appropriate  - Assess for signs & symptoms of volume excess or deficit  Outcome: Progressing  Goal: Glucose maintained within target range  Description: INTERVENTIONS:  - Monitor Blood Glucose as ordered  - Assess for signs and symptoms of hyperglycemia and hypoglycemia  - Administer ordered medications to maintain glucose within target range  - Assess nutritional intake and initiate nutrition service referral as needed  Outcome: Progressing     Problem: SKIN/TISSUE INTEGRITY - ADULT  Goal: Skin Integrity remains intact(Skin Breakdown Prevention)  Description: Assess:  -Inspect skin when repositioning, toileting, and assisting with ADLS  -Assess extremities for adequate circulation and sensation     Bed Management:  -Have minimal linens on bed & keep smooth, unwrinkled  -Change linens as needed when moist or perspiring      Toileting:  -Offer bedside commode      Activity:  -Encourage activity and walks on unit  -Encourage or provide ROM exercises   -Use appropriate equipment to lift or move patient in bed      Skin Care:  -Avoid use of baby powder, tape, friction and shearing, hot water or constrictive clothing  -Do not massage red bony areas      Outcome: Progressing  Goal: Incision(s), wounds(s) or drain site(s) healing without S/S of infection  Description: INTERVENTIONS  - Assess and document dressing, incision, wound bed, drain sites and surrounding tissue  - Provide patient and family education  Outcome: Progressing  Goal: Pressure injury heals and does not worsen  Description: Interventions:  - Implement low air loss mattress or specialty surface (Criteria met)  - Apply silicone foam dressing  - Apply fecal or urinary incontinence containment device   - Utilize friction reducing device or surface for transfers   - Consider nutrition services referral as needed  Outcome: Progressing     Problem: MUSCULOSKELETAL - ADULT  Goal: Maintain or return mobility to safest level of function  Description: INTERVENTIONS:  - Assess patient's ability to carry out ADLs; assess patient's baseline for ADL function and identify physical deficits which impact ability to perform ADLs (bathing, care of mouth/teeth, toileting, grooming, dressing, etc.)  - Assess/evaluate cause of self-care deficits   - Assess range of motion  - Assess patient's mobility  - Assess patient's need for assistive devices and provide as appropriate  - Encourage maximum independence but intervene and supervise when necessary  - Involve family in performance of ADLs  - Assess for home care needs following discharge   - Consider OT consult to assist with ADL evaluation and planning for discharge  - Provide patient education as appropriate  Outcome: Progressing  Goal: Maintain proper alignment of affected body part  Description: INTERVENTIONS:  - Support, maintain and protect limb and body alignment  - Provide patient/ family with appropriate education  Outcome: Progressing     Problem: RESPIRATORY - ADULT  Goal: Achieves optimal ventilation and oxygenation  Description: INTERVENTIONS:  - Assess for changes in respiratory status  - Assess for changes in mentation and behavior  - Position to facilitate oxygenation and minimize respiratory effort  - Oxygen administered by appropriate delivery if ordered  - Initiate smoking cessation education as indicated  - Encourage broncho-pulmonary hygiene including cough, deep breathe, Incentive Spirometry  - Assess the need for suctioning and aspirate as needed  - Assess and instruct to report SOB or any respiratory difficulty  - Respiratory Therapy support as indicated  Outcome: Progressing     Problem: INFECTION - ADULT  Goal: Absence or prevention of progression during hospitalization  Description: INTERVENTIONS:  - Assess and monitor for signs and symptoms of infection  - Monitor lab/diagnostic results  - Monitor all insertion sites, i.e. indwelling lines, tubes, and drains  - Monitor endotracheal if appropriate and nasal secretions for changes in amount and color  - Ansley appropriate cooling/warming therapies per order  - Administer medications as ordered  - Instruct and encourage patient and family to use good hand hygiene technique  - Identify and instruct in appropriate isolation precautions for identified infection/condition  Outcome: Progressing  Goal: Absence of fever/infection during neutropenic period  Description: INTERVENTIONS:  - Monitor WBC    Outcome: Progressing     Problem: SAFETY ADULT  Goal: Patient will remain free of falls  Description: INTERVENTIONS:  - Educate patient/family on patient safety including physical limitations  - Instruct patient to call for assistance with activity   - Consult OT/PT to assist with strengthening/mobility   - Keep Call bell within reach  - Keep bed low and locked with side rails adjusted as appropriate  - Keep care items and personal belongings within reach  - Initiate and maintain comfort rounds  - Apply yellow socks and bracelet for high fall risk patients  - Consider moving patient to room near nurses station  Outcome: Progressing  Goal: Maintain or return to baseline ADL function  Description: INTERVENTIONS:  -  Assess patient's ability to carry out ADLs; assess patient's baseline for ADL function and identify physical deficits which impact ability to perform ADLs (bathing, care of mouth/teeth, toileting, grooming, dressing, etc.)  - Assess/evaluate cause of self-care deficits   - Assess range of motion  - Assess patient's mobility; develop plan if impaired  - Assess patient's need for assistive devices and provide as appropriate  - Encourage maximum independence but intervene and supervise when necessary  - Involve family in performance of ADLs  - Assess for home care needs following discharge   - Consider OT consult to assist with ADL evaluation and planning for discharge  - Provide patient education as appropriate  Outcome: Progressing  Goal: Maintains/Returns to pre admission functional level  Description: INTERVENTIONS:  - Perform BMAT or MOVE assessment daily.   - Set and communicate daily mobility goal to care team and patient/family/caregiver.    - Collaborate with rehabilitation services on mobility goals if consulted  - Out of bed for toileting  - Record patient progress and toleration of activity level   Outcome: Progressing     Problem: DISCHARGE PLANNING  Goal: Discharge to home or other facility with appropriate resources  Description: INTERVENTIONS:  - Identify barriers to discharge w/patient and caregiver  - Arrange for needed discharge resources and transportation as appropriate  - Identify discharge learning needs (meds, wound care, etc.)  - Arrange for interpretive services to assist at discharge as needed  - Refer to Case Management Department for coordinating discharge planning if the patient needs post-hospital services based on physician/advanced practitioner order or complex needs related to functional status, cognitive ability, or social support system  Outcome: Progressing     Problem: Knowledge Deficit  Goal: Patient/family/caregiver demonstrates understanding of disease process, treatment plan, medications, and discharge instructions  Description: Complete learning assessment and assess knowledge base. Interventions:  - Provide teaching at level of understanding  - Provide teaching via preferred learning methods  Outcome: Progressing     Problem: MOBILITY - ADULT  Goal: Maintain or return to baseline ADL function  Description: INTERVENTIONS:  -  Assess patient's ability to carry out ADLs; assess patient's baseline for ADL function and identify physical deficits which impact ability to perform ADLs (bathing, care of mouth/teeth, toileting, grooming, dressing, etc.)  - Assess/evaluate cause of self-care deficits   - Assess range of motion  - Assess patient's mobility; develop plan if impaired  - Assess patient's need for assistive devices and provide as appropriate  - Encourage maximum independence but intervene and supervise when necessary  - Involve family in performance of ADLs  - Assess for home care needs following discharge   - Consider OT consult to assist with ADL evaluation and planning for discharge  - Provide patient education as appropriate  Outcome: Progressing  Goal: Maintains/Returns to pre admission functional level  Description: INTERVENTIONS:  - Perform BMAT or MOVE assessment daily.   - Set and communicate daily mobility goal to care team and patient/family/caregiver.    - Collaborate with rehabilitation services on mobility goals if consulted  - Out of bed for toileting  - Record patient progress and toleration of activity level   Outcome: Progressing     Problem: Prexisting or High Potential for Compromised Skin Integrity  Goal: Skin integrity is maintained or improved  Description: INTERVENTIONS:  - Identify patients at risk for skin breakdown  - Assess and monitor skin integrity  - Assess and monitor nutrition and hydration status  - Monitor labs   - Assess for incontinence   - Turn and reposition patient  - Assist with mobility/ambulation  - Relieve pressure over bony prominences  - Avoid friction and shearing  - Provide appropriate hygiene as needed including keeping skin clean and dry  - Evaluate need for skin moisturizer/barrier cream  - Collaborate with interdisciplinary team   - Patient/family teaching  - Consider wound care consult   Outcome: Progressing     Problem: Nutrition/Hydration-ADULT  Goal: Nutrient/Hydration intake appropriate for improving, restoring or maintaining nutritional needs  Description: Monitor and assess patient's nutrition/hydration status for malnutrition. Collaborate with interdisciplinary team and initiate plan and interventions as ordered. Monitor patient's weight and dietary intake as ordered or per policy. Utilize nutrition screening tool and intervene as necessary. Determine patient's food preferences and provide high-protein, high-caloric foods as appropriate.      INTERVENTIONS:  - Monitor oral intake, urinary output, labs, and treatment plans  - Assess nutrition and hydration status and recommend course of action  - Evaluate amount of meals eaten  - Assist patient with eating if necessary   - Allow adequate time for meals  - Recommend/ encourage appropriate diets, oral nutritional supplements, and vitamin/mineral supplements  - Order, calculate, and assess calorie counts as needed  - Recommend, monitor, and adjust tube feedings and TPN/PPN based on assessed needs  - Assess need for intravenous fluids  - Provide specific nutrition/hydration education as appropriate  - Include patient/family/caregiver in decisions related to nutrition  Outcome: Progressing

## 2023-11-02 NOTE — INCIDENTAL FINDINGS
The following findings require follow up:  None    Finding:   PE Study with CT Abdomen and Pelvis with contrast: Partially imaged mass in the right neck suspicious for darien metastases. Medical record indicates a CT of the neck has been ordered to evaluate this finding but not yet performed.      Follow up: Dr. Lady Conner at patient/family's discretion considering hospice status

## 2023-11-02 NOTE — CASE MANAGEMENT
Case Management Discharge Planning Note    Patient name Rony Burrows  Location /-31 MRN [id number]  : 1950 Date 2023       Current Admission Date: 10/23/2023  Current Admission Diagnosis:Acute on Chronic Hypoxemic respiratory failure, [facility])   Patient Active Problem List    Diagnosis Date Noted    Gram-negative bacteremia 10/24/2023    Acute on Chronic Hypoxemic respiratory failure, ([address]) 10/23/2023    GERD (gastroesophageal reflux disease) 10/23/2023    BPH (benign prostatic hyperplasia) 10/23/2023    Obesity, morbid ([address]) 10/04/2023    Seizure ([address]) 2023    Iron deficiency anemia 2023    Stage 3a chronic kidney disease ([address]) 2023    Sepsis ([address]) 2023    Anasarca 2023    Brain metastasis 2023    History of venous thromboembolism 2023    Mixed hyperlipidemia 2023    Restrictive lung disease 2022    Nocturnal hypoxemia 2022    Tobacco use disorder, severe, in sustained remission, dependence 2022    Recurrent right pleural effusion 2022    Chronic right-sided heart failure ([address]) 2022    Chronic anticoagulation 2022    Hypercalcemia of malignancy 10/21/2021    Chemotherapy induced neutropenia  10/11/2021    COPD mixed type ([address]) 2021    Malignant neoplasm of upper lobe of right lung ([address]) 2021    Acute on chronic anemia 2021    Adenocarcinoma of lung 2021    Impaired cognition 2021    Constipation 2021    Anemia of chronic disease 2021    Pulmonary embolism ([address]) 2021    History of urinary retention 2021    Mass of upper lobe of right lung 2021    History of stroke 2021    Bilateral lower extremity DVTs 2021    Dysphagia 2021      LOS (days): 10  Geometric Mean LOS (GMLOS) (days): 5.10  Days to GMLOS:-4.9     OBJECTIVE:  Risk of Unplanned Readmission Score: 34.17         Current admission status: Inpatient   Preferred Pharmacy: [id number] [place], [address] [facility] [address] [facility] [address]  [address] [facility] [address]  Phone: [phone] Fax: [phone]     [facility] [place], [place] - [address]  [address] [facility] [address]  Phone: [phone] Fax: [phone]    Primary Care Provider: Sherwin Yeager MD    Primary Insurance: MEDICARE  Secondary Insurance: COMMERCIAL MISCELLANEOUS    DISCHARGE DETAILS:  Additional Comments: CM notiified by critical care provider that Pt is stable for discharge home and Pt's family is choosing [facility] at home. Call placed to [facility], spoke with Bryan Khanna, [facility] informed Bryan Khanna that Pt is stable for discharge today. Bryan Khanna informed CM that hospice can see Pt at home after 430pm today. Met with Pt and Pt's wife at bedside. Confirmed Pt will need BLS transport home and has 1ste and Pt will stay on 1st floor. CM informed Pt's wife that [facility] will go out to their house today after 4:30pm.Pt's wife in agreement. BLS transport sent to [facility]. [facility] transport to transport Pt home today at 3pm. Pt and Pt's wife informed of transport time. Call placed to [facility], informed Bryan Khanna of transport time of 3pm. All in agreement.

## 2023-11-02 NOTE — ASSESSMENT & PLAN NOTE
Dx initially 09/2021 stage IIIC. Received multiple rounds of chemo/radiation. Found to have brain mets 02/2023 to the left precentral gyrus. Cancer upstaged to stage IV. Currently on daily dexamethasone, Lovenox for [facility] as well as a history of DVT. Palliative chemo with combination Avastin and Pembrolizumab at the infusion center  Last chemo 10/18/2023  Last seen by pulmonary 07/2023 by Dr. 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  Unlikely to benefit from ASEPT catheter  RP2 panel negative    Plan:  Continue with home dose dexamethasone   Home Hospice

## 2023-11-02 NOTE — ASSESSMENT & PLAN NOTE
In the setting of Avastin which was started earlier this month for radiation necrosis surrounding brain metastasis  Completed a course of lasix outpatient  Admission UA with only trace proteinuria  Wife states his anasarca has been worsening in the past 2 weeks     Plan:   [address]

## 2023-11-02 NOTE — ASSESSMENT & PLAN NOTE
Dx initially 09/2021 stage IIIC. Received multiple rounds of chemo/radiation. Found to have brain mets 02/2023 to the left precentral gyrus. Cancer upstaged to stage IV. Currently on daily dexamethasone, Lovenox for [facility] [place] as well as a history of DVT. Palliative chemo with combination Avastin and Pembrolizumab at the infusion center  Last chemo 10/18/2023  Last seen by pulmonary 07/2023 by Dr. 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  Unlikely to benefit from ASEPT catheter    Plan:  Continue with home dose dexamethasone   Palliative care consult  RP2 panel added and negative  Ongoing conversations with family regarding possible discharge home to home hospice, [facility] hospice pending

## 2023-11-02 NOTE — ASSESSMENT & PLAN NOTE
Brain metastasis first diagnosed in February 2023 s/p SBRT  Currently on Keppra for focal seizures in 8/2023 due to brain metastasis and Decadron for surrounding vasogenic edema  Pt has been unable to be weaned from decadron due to decreased functional status and worsening lethargy with attempts of weaning  10/2 MRI brain: Metastatic adenocarcinoma of the lung status post chemotherapy. Left posterior frontal lobe intracranial metastases (status post SBRT 2/22/2023) increased in size compared to 7/26/2023 with progression of surrounding vasogenic edema. There is no new suspicious intra-axial lesion.   10/4 started on Avastin due to concern for radiation necrosis  Recently developed significant peripheral edema, likely secondary to Avastin  Completed a short course of lasix outpatient PTA, however wife as described continued development of edema     Plan:  Pt currently at baseline neuro status as documented in outpatient Neurology notes  Serial neuro checks  With persistent confusion, appearing stable  Continue Keppra  Initial keppra level 58.8, does not appear keppra was held since admission -- repeat 31

## 2023-11-02 NOTE — ASSESSMENT & PLAN NOTE
SIRS: Tachycardia, tachypnea, fever  UA negative for UTI  10/23 BC 2/2 + Pasteurella -->source likely cat  10/26 BC 2/2 NG x48H  Repeat cultures negative x48hrs  Urine strep/legionella and MRSA neg   Lactic cleared without IVF bolus initially 2/2 concern for volume overload   30 ml/kg were not given due to concern for fluid overload  Procal 0.18  LA 0.19  Abx D8: Vanc, Zosyn D4    Plan  ID following previously, have been reengaged in the setting of SIRS with temp 102.9, , tachypnea on bipap   ID reconsulted, recommending PO Augmentin   Home Hospice

## 2023-11-02 NOTE — ASSESSMENT & PLAN NOTE
Brain metastasis first diagnosed in February 2023 s/p SBRT  Currently on Keppra for focal seizures in 8/2023 due to brain metastasis and Decadron for surrounding vasogenic edema  Pt has been unable to be weaned from decadron due to decreased functional status and worsening lethargy with attempts of weaning  10/2 MRI brain: Metastatic adenocarcinoma of the lung status post chemotherapy. Left posterior frontal lobe intracranial metastases (status post SBRT 2/22/2023) increased in size compared to 7/26/2023 with progression of surrounding vasogenic edema. There is no new suspicious intra-axial lesion.   10/4 started on Avastin due to concern for radiation necrosis  Recently developed significant peripheral edema, likely secondary to Avastin  Completed a short course of lasix outpatient PTA, however wife as described continued development of edema     Plan:  Pt currently at baseline neuro status as documented in outpatient Neurology notes  [facility]

## 2023-11-02 NOTE — CONSULTS
Vancomycin IV Pharmacy-to-Dose Consultation     Vancomycin has been discontinued. Pharmacy will sign off. Please contact or re-consult with questions.     Dorcas Hall, Pharmacist

## 2023-11-02 NOTE — OCCUPATIONAL THERAPY NOTE
Occupational Therapy DC Note     Patient Name: Bertha Narayanan  TMNGR'V Date: 11/2/2023  Problem List  Principal Problem:    Acute on Chronic Hypoxemic respiratory failure, ([address])  Active Problems:    Bilateral lower extremity DVTs    Adenocarcinoma of lung    Brain metastasis    Mixed hyperlipidemia    Anasarca    Sepsis ([address])    Stage 3a chronic kidney disease ([address])    Seizure ([address])    Obesity, morbid ([address])    GERD (gastroesophageal reflux disease)    BPH (benign prostatic hyperplasia)          11/02/23 1219   OT Last Visit   OT Visit Date 11/02/23   Note Type   Cancel Reasons Other   Additional Comments Pt to be [address] home on hospice. No further acute OT needs indicated. Will DC OT orders.        Adolfo Wong OTR/L

## 2023-11-02 NOTE — ASSESSMENT & PLAN NOTE
By history  Last seen 9/25 in [address] [place] [address] ED for focal seizure following chemotherapy  Home rgimen: Keppra 1500 mg PO BID    Plan:  Continue with Keppra 1500 mg BID  Home hospice

## 2023-11-02 NOTE — PHYSICAL THERAPY NOTE
Physical Therapy Cancellation Note       11/02/23 1219   PT Last Visit   PT Visit Date 11/02/23   Note Type   Cancel Reasons Other   Additional Comments Plan to return home with hospice. Will discharge P.T. orders. Israel Alvarado

## 2023-11-02 NOTE — ASSESSMENT & PLAN NOTE
Lab Results   Component Value Date    EGFR 97 11/01/2023    EGFR 93 10/31/2023    EGFR 93 10/30/2023    CREATININE 0.65 11/01/2023    CREATININE 0.71 10/31/2023    CREATININE 0.71 10/30/2023     Baseline creat appears to be 0.7-1  Current creat 0.65  Hampton placed during admission for urinary retention  Urology following, for voiding trial as OP   S/p Lasix 60mg x1 on 10/30, amenable  Robust UOP from dosage.    Hold on additional diuresis for now  Trend creat, UO   Avoid hypotension, nephrotoxic agents

## 2023-11-02 NOTE — ASSESSMENT & PLAN NOTE
SIRS: Tachycardia, tachypnea, fever  UA negative for UTI  10/23 BC 2/2 + Pasteurella   10/26 BC 2/2 NG x48H  Repeat cultures negative x48hrs  Urine strep/legionella and MRSA neg   Lactic cleared without IVF bolus initially 2/2 concern for volume overload   30 ml/kg were not given due to concern for fluid overload  Procal 0.18    Plan:  Abx D8: Vanc, Zosyn D4  ID following previously, have been reengaged in the setting of SIRS with temp 102.9, , tachypnea on bipap   ID reconsulted, recommending PO Augmentin in the outpatient setting upon discharge  Likely source: Cat  Trend fever curve and WBC count  Lactic 0.9

## 2023-11-02 NOTE — PROGRESS NOTES
[address] [facility]  Critical Care Progress Note  Name: Jelena Kwong  MRN: [id number]  Unit/Bed#: -01 I Date of Admission: 10/23/2023   Date of Service: 11/2/2023 I Hospital Day: 10    Assessment/Plan   * Acute on Chronic Hypoxemic respiratory failure, [facility])  Assessment & Plan  10/23 POA: increased WOB in the ED requiring the initiation of BIPAP  Hx of Stage IV lung CA with mets. 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 Completed a short course of PO lasix outpatient for generalized swelling   10/23 CTA PE Study: No evidence for acute pulmonary emboli. Densely consolidated right lower lobe and right upper lobe. Aeration remaining in the right middle lobe. Significant mass effect on the right lower lung secondary to large mixed attenuation masslike pleural abnormality. 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 Stable incompletely imaged right neck mass presumably representing metastatic adenopathy. Stable hypodensities within the liver. 10/30 upgrade to ICU for tachypnea, tachycardia, diffuse rhonchi, cyanosis of nose and lips, generalized pitting edema   Concern for volume overload   April 2023: EF 55%, g1dd   Bipap 12/8 for increased WOB, diffuse rhonchi   Since deescalated to nasal cannula, no additional bipap  Lasix 60mg IV x1 by SLIM 10/30    Plan:  AOC respiratory failure with increased WOB 10/20 with concern for volume overload   Remains on nasal cannula, stable  Goal spo2 > 90%  Consider additional diuresis given exam consistent with volume overload, pending blood pressures.  With decreased PO intake    Brain metastasis  Assessment & Plan  Brain metastasis first diagnosed in February 2023 s/p SBRT  Currently on Keppra for focal seizures in 8/2023 due to brain metastasis and Decadron for surrounding vasogenic edema  Pt has been unable to be weaned from decadron due to decreased functional status and worsening lethargy with attempts of weaning  10/2 MRI brain: Metastatic adenocarcinoma of the lung status post chemotherapy. Left posterior frontal lobe intracranial metastases (status post SBRT 2/22/2023) increased in size compared to 7/26/2023 with progression of surrounding vasogenic edema. There is no new suspicious intra-axial lesion. 10/4 started on Avastin due to concern for radiation necrosis  Recently developed significant peripheral edema, likely secondary to Avastin  Completed a short course of lasix outpatient PTA, however wife as described continued development of edema     Plan:  Pt currently at baseline neuro status as documented in outpatient Neurology notes  Serial neuro checks  With persistent confusion, appearing stable  Continue Keppra  Initial keppra level 58.8, does not appear keppra was held since admission -- repeat 31    Sepsis ([address])  Assessment & Plan  SIRS: Tachycardia, tachypnea, fever  UA negative for UTI  10/23 BC 2/2 + Pasteurella   10/26 BC 2/2 NG x48H  Repeat cultures negative x48hrs  Urine strep/legionella and MRSA neg   Lactic cleared without IVF bolus initially 2/2 concern for volume overload   30 ml/kg were not given due to concern for fluid overload  Procal 0.18    Plan:  Abx D8: Vanc, Zosyn D4  ID following previously, have been reengaged in the setting of SIRS with temp 102.9, , tachypnea on bipap   ID reconsulted, recommending PO Augmentin in the outpatient setting upon discharge  Likely source: Cat  Trend fever curve and WBC count  Lactic 0.9    Adenocarcinoma of lung  Assessment & Plan  Dx initially 09/2021 stage IIIC. Received multiple rounds of chemo/radiation. Found to have brain mets 02/2023 to the left precentral gyrus. Cancer upstaged to stage IV. Currently on daily dexamethasone, Lovenox for [facility] [place], operated by [facility] as well as a history of DVT.    Palliative chemo with combination Avastin and Pembrolizumab at the infusion center  Last chemo 10/18/2023  Last seen by pulmonary 07/2023 by Dr. 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 Unlikely to benefit from ASEPT catheter    Plan:  Continue with home dose dexamethasone   Palliative care consult  RP2 panel added and negative  Ongoing conversations with family regarding possible discharge home to home hospice, [facility] hospice pending    Mixed hyperlipidemia  Assessment & Plan  Continue home statin    BPH (benign prostatic hyperplasia)  Assessment & Plan  Continue home Flomax  Hampton D4 for retention   Urology following -- for voiding trial as OP     GERD (gastroesophageal reflux disease)  Assessment & Plan  Continue PPI    Obesity, morbid ([address])  Assessment & Plan  Lifestyle modifications     Seizure ([address])  Assessment & Plan  By history  Last seen 9/25 in [address] [place] [address] ED for focal seizure following chemotherapy  Home rgimen: Keppra 1500 mg PO BID    Plan:  Continue with Keppra 1500 mg BID  Switched to IV secondary to decreased mentation  Seizure precautions     Stage 3a chronic kidney disease [facility])  Assessment & Plan  Lab Results   Component Value Date    EGFR 97 11/01/2023    EGFR 93 10/31/2023    EGFR 93 10/30/2023    CREATININE 0.65 11/01/2023    CREATININE 0.71 10/31/2023    CREATININE 0.71 10/30/2023     Baseline creat appears to be 0.7-1  Current creat 0.65  Hampton placed during admission for urinary retention  Urology following, for voiding trial as OP   S/p Lasix 60mg x1 on 10/30, amenable  Robust UOP from dosage. Hold on additional diuresis for now  Trend creat, UO   Avoid hypotension, nephrotoxic agents     Anasarca  Assessment & Plan  In the setting of Avastin which was started earlier this month for radiation necrosis surrounding brain metastasis  Completed a course of lasix outpatient  Admission UA with only trace proteinuria    Plan:    Wife states his anasarca has been worsening in the past 2 weeks   Episode of respiratory distress requiring bipap on transfer to ICU  Remains off bipap without respiratory distress  Remains on nasal cannula  Lasix held in setting of low normal BPs, consider additional lasix if patient can tolerate hemodynamically    Bilateral lower extremity DVTs  Assessment & Plan  Likely in the setting of CA   Continue home Lovenox, considering NOAC       ICU Core Measures     A: Assess, Prevent, and Manage Pain Has pain been assessed? Yes  Need for changes to pain regimen? No   B: Both SAT/SAT  N/A   C: Choice of Sedation RASS Goal: 0 Alert and Calm  Need for changes to sedation or analgesia regimen? No   D: Delirium CAM-ICU: Unable to perform secondary to Acute cognitive dysfunction   E: Early Mobility  Plan for early mobility? Yes   F: Family Engagement Plan for family engagement today? Yes     Antibiotic Review: Patient on appropriate coverage based on culture data. Review of Invasive Devices: Hampton Plan:  Failed Urinary retention, D5    Prophylaxis:  VTE VTE covered by:  enoxaparin, Subcutaneous, 100 mg at 11/01/23 2146       Stress Ulcer  covered bypantoprazole (PROTONIX) EC tablet 40 mg [[id number]]     Subjective     HPI/24hr events: 68 YOM hospital day 10 in setting of AHRF in setting of metastatic CA, also with pasteurella bacteremia in setting of cat related wound. RP2 panel sent and negative to rule out additional causes of fatigue, AHRF. Family seeking options with [facility] for possible D/C on hospice. No acute events overnight. Review of Systems   Constitutional:  Positive for fatigue. Respiratory:  Negative for shortness of breath. Cardiovascular:  Negative for chest pain. Gastrointestinal:  Negative for diarrhea, nausea and vomiting. Neurological:  Positive for weakness. Psychiatric/Behavioral:  Positive for confusion.        Objective                            Vitals I/O      Most Recent Min/Max in 24hrs   Temp 99 °F (37.2 °C) Temp  Min: 97.7 °F (36.5 °C)  Max: 99.1 °F (37.3 °C)   Pulse 97 Pulse  Min: 72  Max: 97   Resp (!) 26 Resp  Min: 18  Max: 28   /82 BP  Min: 103/63  Max: 125/73   O2 Sat 91 % SpO2  Min: 91 %  Max: 98 %      Intake/Output Summary (Last 24 hours) at 2023 0147  Last data filed at 2023 2317  Gross per 24 hour   Intake 1539.95 ml   Output 1475 ml   Net 64.95 ml         Diet Dysphagia/Modified Consistency; Dysphagia 3-Dental Soft; Thin Liquid     Invasive Monitoring Physical exam   N/a Physical Exam  Eyes:      Extraocular Movements: Extraocular movements intact. Pupils: Pupils are equal, round, and reactive to light. Skin:     General: Skin is warm and dry. Capillary Refill: Capillary refill takes less than 2 seconds. HENT:      Head: Normocephalic and atraumatic. Right Ear: Hearing normal.      Left Ear: Hearing normal.      Mouth/Throat:      Mouth: Mucous membranes are dry. Cardiovascular:      Rate and Rhythm: Normal rate and regular rhythm. Heart sounds: Normal heart sounds. Musculoskeletal:      Right lower le+ Edema present. Left lower le+ Edema present. Abdominal:      Palpations: Abdomen is soft. Tenderness: There is no abdominal tenderness. There is no guarding. Constitutional:       Appearance: He is ill-appearing. Pulmonary:      Effort: Pulmonary effort is normal.   Psychiatric:         Behavior: Behavior is cooperative. Neurological:      Mental Status: He is disoriented to place, disoriented to time and disoriented to situation. GCS: GCS eye subscore is 4. GCS verbal subscore is 4. GCS motor subscore is 6. Genitourinary/Anorectal:  FoleyVitals and nursing note reviewed. Diagnostic Studies    EKG: NSR rate 86  Imaging: None new   I have personally reviewed pertinent reports.        Medications:  Scheduled PRN   atorvastatin, 40 mg, Daily With Dinner  chlorhexidine, 15 mL, Q12H [facility]  dexamethasone, 4 mg, Q12H [facility]  docusate sodium, 100 mg, BID  enoxaparin, 100 mg, Q12H [facility]  finasteride, 5 mg, Daily  ipratropium, 0.5 mg, TID  levalbuterol, 1.25 mg, TID  levETIRAcetam, 1,500 mg, Q12H [facility]  melatonin, 6 mg, HS  pantoprazole, 40 mg, Early Morning  piperacillin-tazobactam, 3.375 g, Q8H  polyethylene glycol, 17 g, Daily  senna, 1 tablet, HS  tamsulosin, 0.4 mg, Daily With Dinner  vancomycin, 15 mg/kg (Adjusted), Q12H      acetaminophen, 650 mg, Q6H PRN  bisacodyl, 10 mg, Daily PRN  ipratropium, 0.5 mg, Q6H PRN  levalbuterol, 1.25 mg, Q6H PRN       Continuous          Labs:    CBC    Recent Labs     10/31/23  0436 11/01/23  0559   WBC 4.81 4.71   HGB 9.6* 8.5*   HCT 32.5* 27.1*   * 116*   BANDSPCT  --  3     BMP    Recent Labs     10/31/23  0436 11/01/23  0559   SODIUM 143 139   K 4.2 4.5    107   CO2 32 28   AGAP 6 4   BUN 20 18   CREATININE 0.71 0.65   CALCIUM 8.2* 8.2*       Coags    No recent results     Additional Electrolytes  Recent Labs     10/31/23  0436 11/01/23  0559   MG 2.0 2.1   PHOS 2.8 2.8   CAIONIZED 1.17  --           Blood Gas    No recent results  Recent Labs     10/31/23  0436   PHVEN 7.323   OMO2HVB 59.7*   PO2VEN 36.6   GFQ6RMO 30.3*   BEVEN 3.1    LFTs  Recent Labs     10/31/23  0436 11/01/23  0559   ALT 16 17   AST 14 21   ALKPHOS 39 34   ALB 2.9* 2.7*   TBILI 0.45 0.36       Infectious  Recent Labs     10/31/23  0436 11/01/23  0559   PROCALCITONI 0.48* 0.36*     Glucose  Recent Labs     10/31/23  0436 11/01/23  0559   GLUC 105 Nathan Fang

## 2023-11-02 NOTE — ASSESSMENT & PLAN NOTE
By history  Last seen 9/25 in [address] [place] [address] ED for focal seizure following chemotherapy  Home rgimen: Keppra 1500 mg PO BID    Plan:  Continue with Keppra 1500 mg BID  Switched to IV secondary to decreased mentation  Seizure precautions

## 2023-11-02 NOTE — ASSESSMENT & PLAN NOTE
10/23 POA: increased WOB in the ED requiring the initiation of BIPAP  Hx of Stage IV lung CA with mets. 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 Completed a short course of PO lasix outpatient for generalized swelling   10/23 CTA PE Study: No evidence for acute pulmonary emboli. Densely consolidated right lower lobe and right upper lobe. Aeration remaining in the right middle lobe. Significant mass effect on the right lower lung secondary to large mixed attenuation masslike pleural abnormality. 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 Stable incompletely imaged right neck mass presumably representing metastatic adenopathy. Stable hypodensities within the liver.     10/30 upgrade to ICU for tachypnea, tachycardia, diffuse rhonchi, cyanosis of nose and lips, generalized pitting edema   Concern for volume overload   April 2023: EF 55%, g1dd   Bipap 12/8 for increased WOB, diffuse rhonchi   Since deescalated to nasal cannula, no additional bipap  Lasix 60mg IV x1 by SLIM 10/30    Plan:  [address]

## 2023-11-02 NOTE — ASSESSMENT & PLAN NOTE
Lab Results   Component Value Date    EGFR 97 11/01/2023    EGFR 93 10/31/2023    EGFR 93 10/30/2023    CREATININE 0.65 11/01/2023    CREATININE 0.71 10/31/2023    CREATININE 0.71 10/30/2023     Baseline creat appears to be 0.7-1  Current creat 0.65  Hampton placed during admission for urinary retention  Urology following, for voiding trial as OP   S/p Lasix 60mg x1 on 10/30, amenable  Robust UOP from dosage.

## 2023-11-03 LAB
BACTERIA SPT RESP CULT: ABNORMAL
GRAM STN SPEC: ABNORMAL

## 2023-11-04 LAB
BACTERIA BLD CULT: NORMAL
BACTERIA BLD CULT: NORMAL

## 2023-11-06 ENCOUNTER — TELEPHONE (OUTPATIENT)
Dept: FAMILY MEDICINE CLINIC | Facility: HOSPITAL | Age: 73
End: 2023-11-06

## 2023-11-06 NOTE — TELEPHONE ENCOUNTER
Ana Laura Gray from [facility] hospice needs a verbal order for a medication and also refills.   Anahia Force can be reached at [phone]

## 2023-11-06 NOTE — TELEPHONE ENCOUNTER
Currently on 5mg morphine q 2 hours. Is not doing well. Would like to increase to 10mg morphine q 2 hours. If ok please send into [employer] (Added into chart). Also needs refill on Haldol.

## 2023-11-08 ENCOUNTER — HOSPITAL ENCOUNTER (OUTPATIENT)
Dept: INFUSION CENTER | Facility: HOSPITAL | Age: 73
End: 2023-11-08
Attending: INTERNAL MEDICINE

## 2023-11-08 ENCOUNTER — TELEPHONE (OUTPATIENT)
Dept: FAMILY MEDICINE CLINIC | Facility: HOSPITAL | Age: 73
End: 2023-11-08

## 2023-11-08 NOTE — TELEPHONE ENCOUNTER
Hi, this is Isreal Martinez with [facility], [phone] calling. In regards to the Nancy Lock date of birth September 5, 1950. We had sent over the medical worksheet yesterday for the testificate, had confirmed that it was received and I understand the doctor was going to be in today to sign. I just wanted to get an update on that since I haven't seen anything come back on that yet. When you have a chance, if you can please give me a call, [phone]. Thank you much.  Lazarus.

## 2023-11-29 ENCOUNTER — HOSPITAL ENCOUNTER (OUTPATIENT)
Dept: INFUSION CENTER | Facility: HOSPITAL | Age: 73
End: 2023-11-29
Attending: INTERNAL MEDICINE

## 2025-06-21 NOTE — BRIEF OP NOTE (RAD/CATH)
[facility] Embedded Care Due Messages. Reference number: [id number].   6/21/2025 3:22:35 AM CDT   INTERVENTIONAL RADIOLOGY PROCEDURE NOTE    Date: 8/5/2021    Procedure: IR LUNG BIOPSY    Preoperative diagnosis:   1  Stroke ([facility] )    2  Nasopharyngeal mass    3  Elevated troponin    4  Cerebrovascular accident (CVA), unspecified mechanism ([facility] )    5  Facial droop    6  Dysarthria    7  Weakness due to acute stroke ([facility] )    8  Acute deep vein thrombosis (DVT) of distal vein of both lower extremities ([facility])    9  Acute respiratory failure with hypoxia ([facility])    10  Lung mass    11  Acute CVA (cerebrovascular accident) ([facility] )         Postoperative diagnosis: Same  Surgeon: Edmundo Tan MD     Assistant: None  No qualified resident was available  Blood loss: 0    Specimens: core touch prep     Findings: R lung mass biopsy    Complications: None immediate      Anesthesia: conscious sedation and local     Restart heparin 0400 no bolus    Thoracentesis today not performed given that the effusion is small and we would have to reposition the patient raising the risk of pneumothorax